# Patient Record
Sex: FEMALE | Race: WHITE | NOT HISPANIC OR LATINO | Employment: OTHER | URBAN - METROPOLITAN AREA
[De-identification: names, ages, dates, MRNs, and addresses within clinical notes are randomized per-mention and may not be internally consistent; named-entity substitution may affect disease eponyms.]

---

## 2011-12-01 LAB — EKG IMPRESSION: NORMAL

## 2017-01-24 ENCOUNTER — APPOINTMENT (OUTPATIENT)
Dept: INTERNAL MEDICINE | Facility: MEDICAL CENTER | Age: 77
End: 2017-01-24
Payer: MEDICARE

## 2017-01-24 DIAGNOSIS — M54.50 CHRONIC LEFT-SIDED LOW BACK PAIN WITHOUT SCIATICA: ICD-10-CM

## 2017-01-24 DIAGNOSIS — M25.512 CHRONIC PAIN OF BOTH SHOULDERS: ICD-10-CM

## 2017-01-24 DIAGNOSIS — Z79.899 CONTROLLED SUBSTANCE AGREEMENT SIGNED: ICD-10-CM

## 2017-01-24 DIAGNOSIS — G89.29 CHRONIC LEFT-SIDED LOW BACK PAIN WITHOUT SCIATICA: ICD-10-CM

## 2017-01-24 DIAGNOSIS — G89.29 CHRONIC PAIN OF BOTH SHOULDERS: ICD-10-CM

## 2017-01-24 DIAGNOSIS — F11.90 CHRONIC NARCOTIC USE: ICD-10-CM

## 2017-01-24 DIAGNOSIS — M25.511 CHRONIC PAIN OF BOTH SHOULDERS: ICD-10-CM

## 2017-01-25 ENCOUNTER — HOSPITAL ENCOUNTER (OUTPATIENT)
Dept: CARDIOLOGY | Facility: MEDICAL CENTER | Age: 77
End: 2017-01-25
Attending: INTERNAL MEDICINE
Payer: MEDICARE

## 2017-01-25 DIAGNOSIS — R06.09 DYSPNEA ON EXERTION: ICD-10-CM

## 2017-01-25 LAB
LV EJECT FRACT  99904: 60
LV EJECT FRACT MOD 2C 99903: 67.97
LV EJECT FRACT MOD 4C 99902: 51.48
LV EJECT FRACT MOD BP 99901: 58.06

## 2017-01-25 PROCEDURE — 93306 TTE W/DOPPLER COMPLETE: CPT

## 2017-01-25 PROCEDURE — 93306 TTE W/DOPPLER COMPLETE: CPT | Mod: 26 | Performed by: INTERNAL MEDICINE

## 2017-01-25 RX ORDER — HYDROCODONE BITARTRATE AND ACETAMINOPHEN 5; 325 MG/1; MG/1
1 TABLET ORAL 2 TIMES DAILY PRN
Qty: 60 TAB | Refills: 0 | Status: SHIPPED | OUTPATIENT
Start: 2017-01-25 | End: 2017-03-15

## 2017-01-25 NOTE — TELEPHONE ENCOUNTER
Reviewed Jesse's last note 12/16, UDS recent and . Repeat UDS negative for ambien as expected. Will refill x 60 tablets

## 2017-01-25 NOTE — TELEPHONE ENCOUNTER
Was the patient seen in the last year in this department? Yes    Last seen: 12/16/16 by Dr. Molina  Next appt: 01/31/17 with Dr. Asif      Does patient have an active prescription for medications requested? No     Received Request Via: Patient. Pt said she was unable to come today due to the access bus was unable to bring her. She r/s her appt on 01/31/2017. Also she has a heart test tomorrow. Pt is requesting a refill on her pain medication.     scanned into chart

## 2017-01-25 NOTE — TELEPHONE ENCOUNTER
Called pt phone number and no answer. Called pt's friend, Ebony and l/m. The thing pt requested is completed and its ready to be  in the front office.    Will try to call pt again later

## 2017-01-31 ENCOUNTER — OFFICE VISIT (OUTPATIENT)
Dept: INTERNAL MEDICINE | Facility: MEDICAL CENTER | Age: 77
End: 2017-01-31
Payer: MEDICARE

## 2017-01-31 VITALS
SYSTOLIC BLOOD PRESSURE: 110 MMHG | HEART RATE: 79 BPM | TEMPERATURE: 97.9 F | WEIGHT: 153.2 LBS | BODY MASS INDEX: 33.05 KG/M2 | HEIGHT: 57 IN | OXYGEN SATURATION: 95 % | DIASTOLIC BLOOD PRESSURE: 78 MMHG

## 2017-01-31 DIAGNOSIS — G89.29 CHRONIC LOW BACK PAIN WITHOUT SCIATICA, UNSPECIFIED BACK PAIN LATERALITY: ICD-10-CM

## 2017-01-31 DIAGNOSIS — M54.50 CHRONIC LOW BACK PAIN WITHOUT SCIATICA, UNSPECIFIED BACK PAIN LATERALITY: ICD-10-CM

## 2017-01-31 PROCEDURE — G8419 CALC BMI OUT NRM PARAM NOF/U: HCPCS | Mod: GC | Performed by: INTERNAL MEDICINE

## 2017-01-31 PROCEDURE — G8432 DEP SCR NOT DOC, RNG: HCPCS | Mod: GC | Performed by: INTERNAL MEDICINE

## 2017-01-31 PROCEDURE — 1036F TOBACCO NON-USER: CPT | Mod: GC | Performed by: INTERNAL MEDICINE

## 2017-01-31 PROCEDURE — 1101F PT FALLS ASSESS-DOCD LE1/YR: CPT | Mod: GC | Performed by: INTERNAL MEDICINE

## 2017-01-31 PROCEDURE — 4040F PNEUMOC VAC/ADMIN/RCVD: CPT | Mod: 8P,GC | Performed by: INTERNAL MEDICINE

## 2017-01-31 PROCEDURE — G8482 FLU IMMUNIZE ORDER/ADMIN: HCPCS | Mod: GC | Performed by: INTERNAL MEDICINE

## 2017-01-31 PROCEDURE — 99213 OFFICE O/P EST LOW 20 MIN: CPT | Mod: GE | Performed by: INTERNAL MEDICINE

## 2017-01-31 RX ORDER — KETOCONAZOLE 20 MG/ML
SHAMPOO TOPICAL
Qty: 1 BOTTLE | Refills: 3 | Status: SHIPPED | OUTPATIENT
Start: 2017-01-31 | End: 2017-10-30 | Stop reason: SDUPTHER

## 2017-01-31 ASSESSMENT — PATIENT HEALTH QUESTIONNAIRE - PHQ9: CLINICAL INTERPRETATION OF PHQ2 SCORE: 1

## 2017-01-31 NOTE — MR AVS SNAPSHOT
"        Guillermina Recio   2017 11:15 AM   Office Visit   MRN: [id number]    Department:  [facility] Med - Internal Med   Dept Phone:  [phone]    Description:  Female : 1940   Provider:  Juan Manuel Asif M.D.           Reason for Visit     Medication Refill Back pain f/u-Dr. Molina pt     Medication Refill Nizoral    Memory Loss     Results Echo      Allergies as of 2017     No Known Allergies      You were diagnosed with     Chronic low back pain without sciatica, unspecified back pain laterality   [[id number]]         Vital Signs     Blood Pressure Pulse Temperature Height Weight Body Mass Index    110/78 mmHg 79 36.6 °C (97.9 °F) 1.454 m (4' 9.25\") 69.491 kg (153 lb 3.2 oz) 32.87 kg/m2    Oxygen Saturation Smoking Status                95% Never Smoker           Basic Information     Date Of Birth Sex Race Ethnicity Preferred Language    1940 Female White Non- English      Your appointments     2017 11:15 AM   Established Patient with Juan Manuel Asif M.D.   [facility] - Internal Medicine (--)    [address] [place] [address]  [address]  [facility] [address] [phone]           You will be receiving a confirmation call a few days before your appointment from our automated call confirmation system.            2017  4:00 PM   Established Patient with CAMILA Nash / [facility] Med - Internal Medicine (--)    [address]  [address]  [facility] [id number] [phone]           You will be receiving a confirmation call a few days before your appointment from our automated call confirmation system.            Mar 15, 2017 10:40 AM   Established Patient with CAMILA Nash [facility] / [facility] - Internal Medicine (--)    [address]  [address]  [facility] [id number] [phone]           You will be receiving a confirmation call a few days before your appointment from our automated call confirmation system.           " Mar 21, 2017  2:00 PM   FOLLOW UP with Carmelo Rdz M.D.   [facility] (--)    [address]  [place] [address]   [phone]            Apr 19, 2017 10:40 AM   Established Patient with Elizabeth Molina M.D.   [facility] - Internal Medicine (--)    [address]  [address]  [place] [address]   [phone]           You will be receiving a confirmation call a few days before your appointment from our automated call confirmation system.              Problem List              ICD-10-CM Priority Class Noted - Resolved    HTN (hypertension) I10   6/25/2009 - Present    Low back pain M54.5   6/25/2009 - Present    Osteopenia M85.80   6/25/2009 - Present    Dysuria R30.0   6/25/2009 - Present    Anemia D64.9   6/25/2009 - Present    Hip pain M25.559   6/25/2009 - Present    Elbow pain M25.529   6/25/2009 - Present    VAGINAL LESION    11/10/2009 - Present    Pelvic pain    11/10/2009 - Present    Dermatochalasis H02.839   7/14/2015 - Present    Scalp itch L29.9   6/29/2016 - Present    Polypharmacy Z79.899   6/29/2016 - Present    Dyslipidemia E78.5   8/1/2016 - Present    Obesity (BMI 30-39.9) E66.9   12/16/2016 - Present    Hypercalcemia E83.52   12/16/2016 - Present    Daytime somnolence R40.0   12/16/2016 - Present    Insomnia G47.00   12/16/2016 - Present    Gastroesophageal reflux disease K21.9   12/16/2016 - Present    Vitamin D deficiency E55.9   12/16/2016 - Present    Chronic narcotic use F11.90   12/16/2016 - Present    Controlled substance agreement signed Z79.899   12/16/2016 - Present    Chronic pain of both shoulders M25.512, G89.29, M25.511   12/16/2016 - Present    Chronic left-sided low back pain without sciatica M54.5, G89.29   12/16/2016 - Present    Abnormal stress test R94.39   12/16/2016 - Present    Shortness of breath R06.02   12/16/2016 - Present      Health Maintenance        Date Due Completion Dates    IMM  DTaP/Tdap/Td Vaccine (1 - Tdap) 3/24/1957 ---    PAP SMEAR 3/24/1959 ---    IMM ZOSTER VACCINE 3/24/1998 ---    IMM PNEUMOCOCCAL 65+ (ADULT) LOW/MEDIUM RISK SERIES (1 of 2 - PCV13) 3/24/2003 ---    MAMMOGRAM 10/6/2012 10/6/2011    BONE DENSITY 4/19/2015 4/19/2010, 10/8/2008, 6/18/2007, 3/10/2006    COLONOSCOPY 1/25/2027 1/25/2017 (N/S)    Override on 1/25/2017: (N/S) (PER [facility] NO COLONOSCOPY DONE)            Current Immunizations     Influenza Vaccine Adult HD 10/31/2016 10:34 AM    Influenza Vaccine Pediatric 11/25/2008    Influenza Vaccine Quad Inj (Pf) 12/8/2015      Below and/or attached are the medications your provider expects you to take. Review all of your home medications and newly ordered medications with your provider and/or pharmacist. Follow medication instructions as directed by your provider and/or pharmacist. Please keep your medication list with you and share with your provider. Update the information when medications are discontinued, doses are changed, or new medications (including over-the-counter products) are added; and carry medication information at all times in the event of emergency situations     Allergies:  No Known Allergies          Medications  Valid as of: January 31, 2017 - 11:13 AM    Generic Name Brand Name Tablet Size Instructions for use    Acetaminophen (Tab) TYLENOL 325 MG Take 2 Tabs by mouth every 6 hours as needed.        Cholecalciferol (Tab) Vitamin D (Cholecalciferol) 1000 UNITS Take 1 Tab by mouth every day.        Ergocalciferol (Cap) DRISDOL 45177 UNITS Take 1 Cap by mouth every 7 days.        Hydrocodone-Acetaminophen (Tab) NORCO 5-325 MG Take 1 Tab by mouth 2 times a day as needed.        Ketoconazole (Shampoo) NIZORAL 2 % Apply 5 to 10 mL to wet scalp, lather, leave on 3 to 5 minutes, and rinse; apply twice weekly for 2 to 4 weeks        Lovastatin (Tab) MEVACOR 40 MG Take 1 Tab by mouth every bedtime.        Multiple Vitamins-Minerals (Cap) PRESERVISION AREDS  1 tab  po qday        Multiple Vitamins-Minerals (Tab) OCUVITE  Take 1 Tab by mouth every day.        NON SPECIFIED   OTC caffeine stimulate daily        Pantoprazole Sodium (Tablet Delayed Response) PROTONIX 20 MG Take 1 Tab by mouth every day.        .                 Medicines prescribed today were sent to:     [id number] - [place], [place] - [address]    [address] [place] [address]    Phone: [phone] Fax: [phone]    Open 24 Hours?: No      Medication refill instructions:       If your prescription bottle indicates you have medication refills left, it is not necessary to call your provider’s office. Please contact your pharmacy and they will refill your medication.    If your prescription bottle indicates you do not have any refills left, you may request refills at any time through one of the following ways: The online [employer] system (except Urgent Care), by calling your provider’s office, or by asking your pharmacy to contact your provider’s office with a refill request. Medication refills are processed only during regular business hours and may not be available until the next business day. Your provider may request additional information or to have a follow-up visit with you prior to refilling your medication.   *Please Note: Medication refills are assigned a new Rx number when refilled electronically. Your pharmacy may indicate that no refills were authorized even though a new prescription for the same medication is available at the pharmacy. Please request the medicine by name with the pharmacy before contacting your provider for a refill.        Instructions    Pain Medicine Instructions  HOW CAN PAIN MEDICINE AFFECT ME?  You were given a prescription for pain medicine. This medicine may make you tired or drowsy and may affect your ability to think clearly. Pain medicine may also affect your ability to drive or perform certain physical activities. It may not be possible to make all of your pain  go away, but you should be comfortable enough to move, breathe, and take care of yourself.  HOW OFTEN SHOULD I TAKE PAIN MEDICINE AND HOW MUCH SHOULD I TAKE?  · Take pain medicine only as directed by your health care provider and only as needed for pain.  · You do not need to take pain medicine if you are not having pain, unless directed by your health care provider.  · You can take less than the prescribed dose if you find that a smaller amount of medicine controls your pain.  WHAT RESTRICTIONS DO I HAVE WHILE TAKING PAIN MEDICINE?  Follow these instructions after you start taking pain medicine, while you are taking the medicine, and for 8 hours after you stop taking the medicine:  · Do not drive.  · Do not operate machinery.  · Do not operate power tools.  · Do not sign legal documents.  · Do not drink alcohol.  · Do not take sleeping pills.  · Do not supervise children by yourself.  · Do not participate in activities that require climbing or being in high places.  · Do not enter a body of water--such as a lake, river, ocean, spa, or swimming pool--without an adult nearby who can monitor and help you.  HOW CAN I KEEP OTHERS SAFE WHILE I AM TAKING PAIN MEDICINE?  · Store your pain medicine as directed by your health care provider. Make sure that it is placed where children and pets cannot reach it.  · Never share your pain medicine with anyone.  · Do not save any leftover pills. If you have any leftover pain medicine, get rid of it or destroy it as directed by your health care provider.  WHAT ELSE DO I NEED TO KNOW ABOUT TAKING PAIN MEDICINE?  · Use a stool softener if you become constipated from your pain medicine. Increasing your intake of fruits and vegetables will also help with constipation.  · Write down the times when you take your pain medicine. Look at the times before you take your next dose of medicine. It is easy to become confused while on pain medicine. Recording the times helps you to avoid an  overdose.  · If your pain is severe, do not try to treat it yourself by taking more pills than instructed on your prescription. Contact your health care provider for help.  · You may have been prescribed a pain medicine that contains acetaminophen. Do not take any other acetaminophen while taking this medicine. An overdose of acetaminophen can result in severe liver damage. Acetaminophen is found in many over-the-counter (OTC) and prescription medicines. If you are taking any medicines in addition to your pain medicine, check the active ingredients on those medicines to see if acetaminophen is listed.  WHEN SHOULD I CALL MY HEALTH CARE PROVIDER?  · Your medicine is not helping to make the pain go away.  · You vomit or have diarrhea shortly after taking the medicine.  · You develop new pain in areas that did not hurt before.  · You have an allergic reaction to your medicine. This may include:  ¨ Itchiness.  ¨ Swelling.  ¨ Dizziness.  ¨ Developing a new rash.  WHEN SHOULD I CALL 911 OR GO TO THE EMERGENCY ROOM?  · You feel dizzy or you faint.  · You are very confused or disoriented.  · You repeatedly vomit.  · Your skin or lips turn pale or bluish in color.  · You have shortness of breath or you are breathing much more slowly than usual.  · You have a severe allergic reaction to your medicine. This includes:  ¨ Developing tongue swelling.  ¨ Having difficulty breathing.     This information is not intended to replace advice given to you by your health care provider. Make sure you discuss any questions you have with your health care provider.     Document Released: 03/26/2002 Document Revised: 05/03/2016 Document Reviewed: 10/22/2015  [employer] Interactive Patient Education ©2016 [employer] Inc.            [employer] Access Code: [email or web address]  Expires: 3/2/2017 11:13 AM    [employer]  A secure, online tool to manage your health information     [employer]’s [employer]® is a secure, online tool that connects you to your  personalized health information from the privacy of your home -- day or night - making it very easy for you to manage your healthcare. Once the activation process is completed, you can even access your medical information using the [employer] ines, which is available for free in the Apple Ines store or Google Play store.     [employer] provides the following levels of access (as shown below):   My Chart Features   [facility] Doctor [facility]  Specialists [facility]  Urgent  Care Non-[facility]  Primary Care  Doctor   Email your healthcare team securely and privately 24/7 X X X    Manage appointments: schedule your next appointment; view details of past/upcoming appointments X      Request prescription refills. X      View recent personal medical records, including lab and immunizations X X X X   View health record, including health history, allergies, medications X X X X   Read reports about your outpatient visits, procedures, consult and ER notes X X X X   See your discharge summary, which is a recap of your hospital and/or ER visit that includes your diagnosis, lab results, and care plan. X X       How to register for [employer]:  1. Go to  [email or web address].  2. Click on the Sign Up Now box, which takes you to the New Member Sign Up page. You will need to provide the following information:  a. Enter your [employer] Access Code exactly as it appears at the top of this page. (You will not need to use this code after you’ve completed the sign-up process. If you do not sign up before the expiration date, you must request a new code.)   b. Enter your date of birth.   c. Enter your home email address.   d. Click Submit, and follow the next screen’s instructions.  3. Create a [employer] ID. This will be your [employer] login ID and cannot be changed, so think of one that is secure and easy to remember.  4. Create a [employer] password. You can change your password at any time.  5. Enter your Password Reset Question and Answer. This can  be used at a later time if you forget your password.   6. Enter your e-mail address. This allows you to receive e-mail notifications when new information is available in [employer].  7. Click Sign Up. You can now view your health information.    For assistance activating your [employer] account, call [phone]

## 2017-01-31 NOTE — PROGRESS NOTES
Established Patient    Guillermina presents today with the following:    CC: Pain med refills    HPI: 76-year-old female with chronic low back pain and shoulder pain here for more pain medication. Patient was given Norco 5/3/25/60 tablets on the 25th she is been taking the Norco but states that her back pain is not controlled on this medication. She is requesting to go up on the dosage. She states that a week ago when she was walking on snow she put her on foot forward and feels that that has worsened the pain. She has tried heating pads and they seem to be working.    Patient Active Problem List    Diagnosis Date Noted   • Obesity (BMI 30-39.9) 12/16/2016   • Hypercalcemia 12/16/2016   • Daytime somnolence 12/16/2016   • Insomnia 12/16/2016   • Gastroesophageal reflux disease 12/16/2016   • Vitamin D deficiency 12/16/2016   • Chronic narcotic use 12/16/2016   • Controlled substance agreement signed 12/16/2016   • Chronic pain of both shoulders 12/16/2016   • Chronic left-sided low back pain without sciatica 12/16/2016   • Abnormal stress test 12/16/2016   • Shortness of breath 12/16/2016   • Dyslipidemia 08/01/2016   • Scalp itch 06/29/2016   • Polypharmacy 06/29/2016   • Dermatochalasis 07/14/2015   • VAGINAL LESION 11/10/2009   • Pelvic pain 11/10/2009   • HTN (hypertension) 06/25/2009   • Low back pain 06/25/2009   • Osteopenia 06/25/2009   • Dysuria 06/25/2009   • Anemia 06/25/2009   • Hip pain 06/25/2009   • Elbow pain 06/25/2009       Current Outpatient Prescriptions   Medication Sig Dispense Refill   • hydrocodone-acetaminophen (NORCO) 5-325 MG Tab per tablet Take 1 Tab by mouth 2 times a day as needed. 60 Tab 0   • Multiple Vitamins-Minerals (OCUVITE) Tab Take 1 Tab by mouth every day.     • lovastatin (MEVACOR) 40 MG tablet Take 1 Tab by mouth every bedtime. 90 Tab 3   • acetaminophen (TYLENOL) 325 MG Tab Take 2 Tabs by mouth every 6 hours as needed.     • pantoprazole (PROTONIX) 20 MG tablet Take 1 Tab  "by mouth every day. 30 Tab 2   • vitamin D, Ergocalciferol, (DRISDOL) 85798 UNITS Cap capsule Take 1 Cap by mouth every 7 days. 12 Cap 0   • ketoconazole (NIZORAL) 2 % shampoo Apply 5 to 10 mL to wet scalp, lather, leave on 3 to 5 minutes, and rinse; apply twice weekly for 2 to 4 weeks 1 Bottle 3   • NON SPECIFIED OTC caffeine stimulate daily     • Vitamin D, Cholecalciferol, 1000 UNITS Tab Take 1 Tab by mouth every day.     • Multiple Vitamins-Minerals (PRESERVISION AREDS) Cap 1 tab po qday       No current facility-administered medications for this visit.       ROS: As per HPI.     /78 mmHg  Pulse 79  Temp(Src) 36.6 °C (97.9 °F)  Ht 1.454 m (4' 9.25\")  Wt 69.491 kg (153 lb 3.2 oz)  BMI 32.87 kg/m2  SpO2 95%    Physical Exam   Constitutional: No distress.   Cardiovascular: Normal rate, regular rhythm and normal heart sounds.  Exam reveals no gallop and no friction rub.  No murmur heard.  Pulmonary/Chest: Breath sounds normal. Chest wall is not dull to percussion.   Musculoskeletal:   no edema. Mild paraspinal muscle stiffness and tenderness in lower back left side.  Skin: No cyanosis. Nails show no clubbing.      Assessment and Plan  1. Chronic low back pain, recent worsening secondary to localized back spasm  Discussed that we will not be going up on her narcotics nor will we be refilling her pain medication before 25th of February. Advised to continue using heating pads/apply over-the-counter voltaren gel. Will avoid prescribing muscle relaxants because of her age.        Followup: Return if symptoms worsen or fail to improve.      Signed by: Juan Manuel Asif M.D.  "

## 2017-02-23 ENCOUNTER — APPOINTMENT (OUTPATIENT)
Dept: INTERNAL MEDICINE | Facility: MEDICAL CENTER | Age: 77
End: 2017-02-23
Payer: MEDICARE

## 2017-03-15 ENCOUNTER — OFFICE VISIT (OUTPATIENT)
Dept: INTERNAL MEDICINE | Facility: MEDICAL CENTER | Age: 77
End: 2017-03-15
Payer: MEDICARE

## 2017-03-15 ENCOUNTER — APPOINTMENT (OUTPATIENT)
Dept: INTERNAL MEDICINE | Facility: MEDICAL CENTER | Age: 77
End: 2017-03-15
Payer: MEDICARE

## 2017-03-15 VITALS
DIASTOLIC BLOOD PRESSURE: 80 MMHG | OXYGEN SATURATION: 94 % | SYSTOLIC BLOOD PRESSURE: 114 MMHG | WEIGHT: 154 LBS | HEART RATE: 90 BPM | HEIGHT: 57 IN | TEMPERATURE: 98.1 F | BODY MASS INDEX: 33.22 KG/M2

## 2017-03-15 DIAGNOSIS — M54.50 CHRONIC BILATERAL LOW BACK PAIN WITHOUT SCIATICA: ICD-10-CM

## 2017-03-15 DIAGNOSIS — R19.7 DIARRHEA, UNSPECIFIED TYPE: ICD-10-CM

## 2017-03-15 DIAGNOSIS — Z23 NEED FOR VACCINATION WITH 13-POLYVALENT PNEUMOCOCCAL CONJUGATE VACCINE: ICD-10-CM

## 2017-03-15 DIAGNOSIS — E55.9 VITAMIN D DEFICIENCY: ICD-10-CM

## 2017-03-15 DIAGNOSIS — J98.4 OTHER DISORDERS OF LUNG: ICD-10-CM

## 2017-03-15 DIAGNOSIS — M25.511 CHRONIC PAIN OF BOTH SHOULDERS: ICD-10-CM

## 2017-03-15 DIAGNOSIS — K21.9 GASTROESOPHAGEAL REFLUX DISEASE, ESOPHAGITIS PRESENCE NOT SPECIFIED: ICD-10-CM

## 2017-03-15 DIAGNOSIS — Z79.891 CHRONIC USE OF OPIATE DRUGS THERAPEUTIC PURPOSES: ICD-10-CM

## 2017-03-15 DIAGNOSIS — E83.52 HYPERCALCEMIA: ICD-10-CM

## 2017-03-15 DIAGNOSIS — G89.29 CHRONIC BILATERAL LOW BACK PAIN WITHOUT SCIATICA: ICD-10-CM

## 2017-03-15 DIAGNOSIS — R41.89 COGNITIVE IMPAIRMENT: ICD-10-CM

## 2017-03-15 DIAGNOSIS — Z79.899 CONTROLLED SUBSTANCE AGREEMENT SIGNED: ICD-10-CM

## 2017-03-15 DIAGNOSIS — G89.29 CHRONIC PAIN OF BOTH SHOULDERS: ICD-10-CM

## 2017-03-15 DIAGNOSIS — M25.512 CHRONIC PAIN OF BOTH SHOULDERS: ICD-10-CM

## 2017-03-15 DIAGNOSIS — R06.00 DYSPNEA, UNSPECIFIED TYPE: ICD-10-CM

## 2017-03-15 DIAGNOSIS — E78.5 DYSLIPIDEMIA: ICD-10-CM

## 2017-03-15 DIAGNOSIS — D64.9 ANEMIA, UNSPECIFIED TYPE: ICD-10-CM

## 2017-03-15 PROCEDURE — 90670 PCV13 VACCINE IM: CPT | Performed by: INTERNAL MEDICINE

## 2017-03-15 PROCEDURE — G0009 ADMIN PNEUMOCOCCAL VACCINE: HCPCS | Performed by: INTERNAL MEDICINE

## 2017-03-15 PROCEDURE — 99215 OFFICE O/P EST HI 40 MIN: CPT | Mod: 25 | Performed by: INTERNAL MEDICINE

## 2017-03-15 RX ORDER — HYDROCODONE BITARTRATE AND ACETAMINOPHEN 5; 325 MG/1; MG/1
1 TABLET ORAL 2 TIMES DAILY PRN
Qty: 60 TAB | Refills: 0 | Status: SHIPPED | OUTPATIENT
Start: 2017-03-15 | End: 2017-04-27 | Stop reason: SDUPTHER

## 2017-03-15 RX ORDER — PANTOPRAZOLE SODIUM 20 MG/1
20 TABLET, DELAYED RELEASE ORAL DAILY
Qty: 90 TAB | Refills: 3 | Status: SHIPPED | OUTPATIENT
Start: 2017-03-15 | End: 2017-04-17

## 2017-03-15 ASSESSMENT — PATIENT HEALTH QUESTIONNAIRE - PHQ9
CLINICAL INTERPRETATION OF PHQ2 SCORE: 0
SUM OF ALL RESPONSES TO PHQ QUESTIONS 1-9: 1

## 2017-03-15 NOTE — MR AVS SNAPSHOT
"        Guillermina Recio   3/15/2017 1:40 PM   Office Visit   MRN: [id number]    Department:  [facility] Med - Internal Med   Dept Phone:  [phone]    Description:  Female : 1940   Provider:  Elizabeth Molina M.D.           Reason for Visit     Memory Loss Issue. Per pt when begins to speak then she forgets    Referral Needed To help with memory    Medication Refill norco      Allergies as of 3/15/2017     No Known Allergies      You were diagnosed with     Cognitive impairment   [911575]       Chronic bilateral low back pain without sciatica   [7406150]       Chronic pain of both shoulders   [056986]       Chronic use of opiate drugs therapeutic purposes   [1223538]       Controlled substance agreement signed   [094526]       Gastroesophageal reflux disease, esophagitis presence not specified   [9064490]       Diarrhea, unspecified type   [9253126]       Vitamin D deficiency   [7262424]       Hypercalcemia   [275.42.ICD-9-CM]       Dyspnea, unspecified type   [2699908]       Other disorders of lung   [3081349]       Chronic diarrhea   [141394]       Anemia, unspecified type   [6751897]       Need for vaccination with 13-polyvalent pneumococcal conjugate vaccine   [7590661]         Vital Signs     Blood Pressure Pulse Temperature Height Weight Body Mass Index    114/80 mmHg 90 36.7 °C (98.1 °F) 1.454 m (4' 9.25\") 69.854 kg (154 lb) 33.04 kg/m2    Oxygen Saturation Smoking Status                94% Never Smoker           Basic Information     Date Of Birth Sex Race Ethnicity Preferred Language    1940 Female White Non- English      Your appointments     Mar 21, 2017  2:00 PM   FOLLOW UP with [facility] (--)    [address] [place] [address]   [phone]            2017 10:40 AM   Established Patient with CAMILA [facility] - Internal Medicine (--)    [address]  [address]  [place] [address]   [phone]           You will be receiving a confirmation call a few days before your appointment from our automated call confirmation system.            Jul 07, 2017  8:40 AM   Established Patient with Elizabeth Molina M.D.   [facility] - Internal Medicine (--)    [address]  [address]  [place] [address]   [phone]           You will be receiving a confirmation call a few days before your appointment from our automated call confirmation system.            Aug 16, 2017  2:20 PM   Established Patient with Elizabeth Molina M.D.   [facility] - Internal Medicine (--)    [address]  [address]  [place] [address]   [phone]           You will be receiving a confirmation call a few days before your appointment from our automated call confirmation system.            Sep 20, 2017  1:00 PM   Established Patient with [facility]IL[facility] - Internal Medicine (--)    [address]  [address]  [place] [address]   [phone]           You will be receiving a confirmation call a few days before your appointment from our automated call confirmation system.            Oct 18, 2017  1:40 PM   Established Patient with Elizabeth Molina M.D.   [facility] - Internal Medicine (--)    [address]  [address]  [place] [address]   [phone]           You will be receiving a confirmation call a few days before your appointment from our automated call confirmation system.              Problem List              ICD-10-CM Priority Class Noted - Resolved    HTN (hypertension) I10   6/25/2009 - Present    Low back pain M54.5   6/25/2009 - Present    Osteopenia M85.80   6/25/2009 - Present    Dysuria R30.0   6/25/2009 - Present    Anemia D64.9   6/25/2009 - Present    Hip pain M25.559   6/25/2009 - Present    Elbow pain M25.529   6/25/2009 - Present    VAGINAL LESION    11/10/2009 - Present    Pelvic  pain    11/10/2009 - Present    Dermatochalasis H02.839   7/14/2015 - Present    Scalp itch L29.9   6/29/2016 - Present    Polypharmacy Z79.899   6/29/2016 - Present    Dyslipidemia E78.5   8/1/2016 - Present    Obesity (BMI 30-39.9) E66.9   12/16/2016 - Present    Hypercalcemia E83.52   12/16/2016 - Present    Daytime somnolence R40.0   12/16/2016 - Present    Insomnia G47.00   12/16/2016 - Present    Gastroesophageal reflux disease K21.9   12/16/2016 - Present    Vitamin D deficiency E55.9   12/16/2016 - Present    Chronic narcotic use F11.90   12/16/2016 - Present    Controlled substance agreement signed Z79.899   12/16/2016 - Present    Chronic pain of both shoulders M25.512, G89.29, M25.511   12/16/2016 - Present    Chronic left-sided low back pain without sciatica M54.5, G89.29   12/16/2016 - Present    Abnormal stress test R94.39   12/16/2016 - Present    Shortness of breath R06.02   12/16/2016 - Present      Health Maintenance        Date Due Completion Dates    IMM DTaP/Tdap/Td Vaccine (1 - Tdap) 3/24/1957 ---    PAP SMEAR 3/24/1959 ---    IMM ZOSTER VACCINE 3/24/1998 ---    IMM PNEUMOCOCCAL 65+ (ADULT) LOW/MEDIUM RISK SERIES (1 of 2 - PCV13) 3/24/2003 ---    MAMMOGRAM 10/6/2012 10/6/2011    BONE DENSITY 4/19/2015 4/19/2010, 10/8/2008, 6/18/2007, 3/10/2006    COLONOSCOPY 1/25/2027 1/25/2017 (N/S)    Override on 1/25/2017: (N/S) (PER [facility] NO COLONOSCOPY DONE)            Current Immunizations     13-VALENT PCV PREVNAR 3/15/2017  2:58 PM    Influenza Vaccine Adult HD 10/31/2016 10:34 AM    Influenza Vaccine Pediatric 11/25/2008    Influenza Vaccine Quad Inj (Pf) 12/8/2015      Below and/or attached are the medications your provider expects you to take. Review all of your home medications and newly ordered medications with your provider and/or pharmacist. Follow medication instructions as directed by your provider and/or pharmacist. Please keep your medication list with you and share with your provider. Update  the information when medications are discontinued, doses are changed, or new medications (including over-the-counter products) are added; and carry medication information at all times in the event of emergency situations     Allergies:  No Known Allergies          Medications  Valid as of: March 15, 2017 -  3:01 PM    Generic Name Brand Name Tablet Size Instructions for use    Acetaminophen (Tab) TYLENOL 325 MG Take 2 Tabs by mouth every 6 hours as needed.        Cholecalciferol (Tab) Vitamin D (Cholecalciferol) 1000 UNITS Take 1 Tab by mouth every day.        Hydrocodone-Acetaminophen (Tab) NORCO 5-325 MG Take 1 Tab by mouth 2 times a day as needed.        Ketoconazole (Shampoo) NIZORAL 2 % Apply 5 to 10 mL to wet scalp, lather, leave on 3 to 5 minutes, and rinse; apply twice weekly for 2 to 4 weeks        Lovastatin (Tab) MEVACOR 40 MG Take 1 Tab by mouth every bedtime.        Multiple Vitamins-Minerals (Cap) PRESERVISION AREDS  1 tab po qday        Multiple Vitamins-Minerals (Tab) OCUVITE  Take 1 Tab by mouth every day.        Multiple Vitamins-Minerals   Take  by mouth every day.        NON SPECIFIED   OTC caffeine stimulate daily        Pantoprazole Sodium (Tablet Delayed Response) PROTONIX 20 MG Take 1 Tab by mouth every day.        .                 Medicines prescribed today were sent to:     [id number] - [place] - [address]    [address] [place] [address]    Phone: [phone] Fax: [phone]    Open 24 Hours?: No      Medication refill instructions:       If your prescription bottle indicates you have medication refills left, it is not necessary to call your provider’s office. Please contact your pharmacy and they will refill your medication.    If your prescription bottle indicates you do not have any refills left, you may request refills at any time through one of the following ways: The online [employer] system (except Urgent Care), by calling your provider’s office, or by asking  your pharmacy to contact your provider’s office with a refill request. Medication refills are processed only during regular business hours and may not be available until the next business day. Your provider may request additional information or to have a follow-up visit with you prior to refilling your medication.   *Please Note: Medication refills are assigned a new Rx number when refilled electronically. Your pharmacy may indicate that no refills were authorized even though a new prescription for the same medication is available at the pharmacy. Please request the medicine by name with the pharmacy before contacting your provider for a refill.        Your To Do List     Future Labs/Procedures Complete By Expires    BASIC METABOLIC PANEL  As directed 3/16/2018    CBC WITH DIFFERENTIAL  As directed 3/16/2018    DX-LUMBAR SPINE-2 OR 3 VIEWS  As directed 3/15/2018    VITAMIN D,25 HYDROXY  As directed 3/16/2018      Referral     A referral request has been sent to our patient care coordination department. Please allow 3-5 business days for us to process this request and contact you either by phone or mail. If you do not hear from us by the 5th business day, please call us at [phone].        Instructions    Labs.      Back Xray.     Get lung function test.      See GI.    [facility]  [address] [facility] [address] [phone]                            Have your friend come to next appt.  Come back in 1 month - come back sooner if needed.     Make appt with derm  [facility]    [address] [place] [address] [phone]                          If you want, get memory tested.               [facility]    [address] [place]  [facility] [address] [phone]                             MyChart Status: Patient Declined

## 2017-03-15 NOTE — PATIENT INSTRUCTIONS
Labs.      Back Xray.     Get lung function test.      See GI.    [facility]  [address] [facility] [place] [address] [phone]                            Have your friend come to next appt.  Come back in 1 month - come back sooner if needed.     Make appt with [facility]  [facility]    [facility] [place] [address] [phone]                          If you want, get memory tested.               [facility]    [address] [place]  [facility] [address] [phone]
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
## 2017-04-17 RX ORDER — PANTOPRAZOLE SODIUM 20 MG/1
TABLET, DELAYED RELEASE ORAL
Qty: 90 TAB | Refills: 3 | Status: SHIPPED | OUTPATIENT
Start: 2017-04-17 | End: 2018-07-08 | Stop reason: SDUPTHER

## 2017-04-17 NOTE — TELEPHONE ENCOUNTER
Last seen: 3/15/17 by Dr. Molina  Next appt: 4/19/17 with Dr. Molina    Was the patient seen in the last year in this department? Yes   Does patient have an active prescription for medications requested? No   Received Request Via: Pharmacy

## 2017-04-19 ENCOUNTER — APPOINTMENT (OUTPATIENT)
Dept: INTERNAL MEDICINE | Facility: MEDICAL CENTER | Age: 77
End: 2017-04-19
Payer: MEDICARE

## 2017-04-27 ENCOUNTER — OFFICE VISIT (OUTPATIENT)
Dept: INTERNAL MEDICINE | Facility: MEDICAL CENTER | Age: 77
End: 2017-04-27
Payer: MEDICARE

## 2017-04-27 ENCOUNTER — TELEPHONE (OUTPATIENT)
Dept: INTERNAL MEDICINE | Facility: MEDICAL CENTER | Age: 77
End: 2017-04-27

## 2017-04-27 VITALS
HEART RATE: 85 BPM | OXYGEN SATURATION: 90 % | DIASTOLIC BLOOD PRESSURE: 74 MMHG | TEMPERATURE: 97.8 F | WEIGHT: 153 LBS | SYSTOLIC BLOOD PRESSURE: 102 MMHG | BODY MASS INDEX: 33.01 KG/M2 | HEIGHT: 57 IN

## 2017-04-27 DIAGNOSIS — K21.9 GASTROESOPHAGEAL REFLUX DISEASE, ESOPHAGITIS PRESENCE NOT SPECIFIED: ICD-10-CM

## 2017-04-27 DIAGNOSIS — G89.29 CHRONIC BILATERAL LOW BACK PAIN WITHOUT SCIATICA: ICD-10-CM

## 2017-04-27 DIAGNOSIS — M25.511 CHRONIC PAIN OF BOTH SHOULDERS: ICD-10-CM

## 2017-04-27 DIAGNOSIS — M25.512 CHRONIC PAIN OF BOTH SHOULDERS: ICD-10-CM

## 2017-04-27 DIAGNOSIS — E78.5 DYSLIPIDEMIA: ICD-10-CM

## 2017-04-27 DIAGNOSIS — G89.29 CHRONIC PAIN OF BOTH SHOULDERS: ICD-10-CM

## 2017-04-27 DIAGNOSIS — M54.50 CHRONIC BILATERAL LOW BACK PAIN WITHOUT SCIATICA: ICD-10-CM

## 2017-04-27 DIAGNOSIS — Z79.891 CHRONIC USE OF OPIATE DRUGS THERAPEUTIC PURPOSES: ICD-10-CM

## 2017-04-27 DIAGNOSIS — Z79.899 CONTROLLED SUBSTANCE AGREEMENT SIGNED: ICD-10-CM

## 2017-04-27 PROCEDURE — 99214 OFFICE O/P EST MOD 30 MIN: CPT | Performed by: INTERNAL MEDICINE

## 2017-04-27 RX ORDER — HYDROCODONE BITARTRATE AND ACETAMINOPHEN 5; 325 MG/1; MG/1
1 TABLET ORAL 2 TIMES DAILY PRN
Qty: 60 TAB | Refills: 0 | Status: SHIPPED | OUTPATIENT
Start: 2017-04-27 | End: 2017-06-26 | Stop reason: SDUPTHER

## 2017-04-27 ASSESSMENT — ENCOUNTER SYMPTOMS: DEPRESSION: 0

## 2017-04-27 ASSESSMENT — LIFESTYLE VARIABLES: HISTORY_ALCOHOL_USE: 0
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
## 2017-04-27 NOTE — MR AVS SNAPSHOT
"Guillermina Recio   2017 3:20 PM   Office Visit   MRN: [id number]    Department:  [facility] - Internal Med   Dept Phone:  [phone]    Description:  Female : 1940   Provider:  Elizabeth Molina M.D.           Reason for Visit     Medication Refill Norco      Allergies as of 2017     No Known Allergies      You were diagnosed with     Chronic use of opiate drugs therapeutic purposes   [9778132]       Chronic pain of both shoulders   [120250]       Controlled substance agreement signed   [[id number]]       Chronic bilateral low back pain without sciatica   [6678401]         Vital Signs     Blood Pressure Pulse Temperature Height Weight Body Mass Index    102/74 mmHg 85 36.6 °C (97.8 °F) 1.454 m (4' 9.24\") 69.4 kg (153 lb) 32.83 kg/m2    Oxygen Saturation Smoking Status                90% Never Smoker           Basic Information     Date Of Birth Sex Race Ethnicity Preferred Language    1940 Female White Non- English      Your appointments     May 04, 2017  2:00 PM   Pulmonary Function Test with PULMONARY FUNCTION LAB   PULMONARY LAB OP [facility] (--)    [address] [facility] [id number] [phone]            2017  8:40 AM   Established Patient with CAMILA Nash / [facility] - Internal Medicine (--)    [address] [place] [address] [phone]           You will be receiving a confirmation call a few days before your appointment from our automated call confirmation system.            Aug 16, 2017  2:20 PM   Established Patient with CAMILA Nash / [facility] - Internal [facility] (--)    [address] [place] [address] [phone]           You will be receiving a confirmation call a few days before your appointment from our automated call confirmation system.            Sep 20, 2017  1:00 PM   Established Patient with CAMILA Nash / [facility] - Internal [facility] " (--)    [address]  [address]  [facility] [address]   [phone]           You will be receiving a confirmation call a few days before your appointment from our automated call confirmation system.            Oct 18, 2017  1:40 PM   Established Patient with Elizabeth Molina M.D.   [facility] Med - Internal Medicine (--)    [address]  [address]  [facility] [id number] [phone]           You will be receiving a confirmation call a few days before your appointment from our automated call confirmation system.              Problem List              ICD-10-CM Priority Class Noted - Resolved    HTN (hypertension) I10   6/25/2009 - Present    Low back pain M54.5   6/25/2009 - Present    Osteopenia M85.80   6/25/2009 - Present    Dysuria R30.0   6/25/2009 - Present    Anemia D64.9   6/25/2009 - Present    Hip pain M25.559   6/25/2009 - Present    Elbow pain M25.529   6/25/2009 - Present    VAGINAL LESION    11/10/2009 - Present    Pelvic pain    11/10/2009 - Present    Dermatochalasis H02.839   7/14/2015 - Present    Scalp itch L29.9   6/29/2016 - Present    Polypharmacy Z79.899   6/29/2016 - Present    Dyslipidemia E78.5   8/1/2016 - Present    Obesity (BMI 30-39.9) E66.9   12/16/2016 - Present    Hypercalcemia E83.52   12/16/2016 - Present    Daytime somnolence R40.0   12/16/2016 - Present    Insomnia G47.00   12/16/2016 - Present    Gastroesophageal reflux disease K21.9   12/16/2016 - Present    Vitamin D deficiency E55.9   12/16/2016 - Present    Chronic narcotic use F11.90   12/16/2016 - Present    Controlled substance agreement signed Z79.899   12/16/2016 - Present    Chronic pain of both shoulders M25.512, G89.29, M25.511   12/16/2016 - Present    Chronic left-sided low back pain without sciatica M54.5, G89.29   12/16/2016 - Present    Abnormal stress test R94.39   12/16/2016 - Present    Shortness of breath R06.02   12/16/2016 - Present      Health Maintenance        Date Due Completion  Dates    IMM DTaP/Tdap/Td Vaccine (1 - Tdap) 3/24/1957 ---    PAP SMEAR 3/24/1959 ---    IMM ZOSTER VACCINE 3/24/1998 ---    MAMMOGRAM 10/6/2012 10/6/2011    BONE DENSITY 4/19/2015 4/19/2010, 10/8/2008, 6/18/2007, 3/10/2006    IMM PNEUMOCOCCAL 65+ (ADULT) LOW/MEDIUM RISK SERIES (2 of 2 - PPSV23) 3/15/2018 3/15/2017    COLONOSCOPY 1/25/2027 1/25/2017 (N/S)    Override on 1/25/2017: (N/S) (PER [facility] NO COLONOSCOPY DONE)            Current Immunizations     13-VALENT PCV PREVNAR 3/15/2017  2:58 PM    Influenza Vaccine Adult HD 10/31/2016 10:34 AM    Influenza Vaccine Pediatric 11/25/2008    Influenza Vaccine Quad Inj (Pf) 12/8/2015      Below and/or attached are the medications your provider expects you to take. Review all of your home medications and newly ordered medications with your provider and/or pharmacist. Follow medication instructions as directed by your provider and/or pharmacist. Please keep your medication list with you and share with your provider. Update the information when medications are discontinued, doses are changed, or new medications (including over-the-counter products) are added; and carry medication information at all times in the event of emergency situations     Allergies:  No Known Allergies          Medications  Valid as of: April 27, 2017 -  4:10 PM    Generic Name Brand Name Tablet Size Instructions for use    Acetaminophen (Tab) TYLENOL 325 MG Take 2 Tabs by mouth every 6 hours as needed.        Cholecalciferol (Tab) Vitamin D (Cholecalciferol) 1000 UNITS Take 1 Tab by mouth every day.        Hydrocodone-Acetaminophen (Tab) NORCO 5-325 MG Take 1 Tab by mouth 2 times a day as needed.        Ketoconazole (Shampoo) NIZORAL 2 % Apply 5 to 10 mL to wet scalp, lather, leave on 3 to 5 minutes, and rinse; apply twice weekly for 2 to 4 weeks        Lovastatin (Tab) MEVACOR 40 MG Take 1 Tab by mouth every bedtime.        Multiple Vitamins-Minerals (Cap) PRESERVISION AREDS  1 tab po qday         Multiple Vitamins-Minerals (Tab) OCUVITE  Take 1 Tab by mouth every day.        Multiple Vitamins-Minerals   Take  by mouth every day.        NON SPECIFIED   OTC caffeine stimulate daily        Pantoprazole Sodium (Tablet Delayed Response) PROTONIX 20 MG TAKE 1 TABLET BY MOUTH EVERY DAY        .                 Medicines prescribed today were sent to:     [facility]Y #[id number] - [place], [place] - [address] [facility]    [address] [facility] [place] [address]    Phone: [phone] Fax: [phone]    Open 24 Hours?: No      Medication refill instructions:       If your prescription bottle indicates you have medication refills left, it is not necessary to call your provider’s office. Please contact your pharmacy and they will refill your medication.    If your prescription bottle indicates you do not have any refills left, you may request refills at any time through one of the following ways: The online [employer] system (except Urgent Care), by calling your provider’s office, or by asking your pharmacy to contact your provider’s office with a refill request. Medication refills are processed only during regular business hours and may not be available until the next business day. Your provider may request additional information or to have a follow-up visit with you prior to refilling your medication.   *Please Note: Medication refills are assigned a new Rx number when refilled electronically. Your pharmacy may indicate that no refills were authorized even though a new prescription for the same medication is available at the pharmacy. Please request the medicine by name with the pharmacy before contacting your provider for a refill.        Instructions    Labs.      Back Xray.     Get lung function test.  Already scheduled.    See GI.  [facility]  [address] [place] [address] [phone]    See dermatology:   [facility]    [facility] [place] [address] [phone]       Get memory testing with the following person:   SHARON ROD    [address] [place] [address] [phone]     Have your friend come to next appt.  Come back in 1 month - come back sooner if needed.             MyChart Status: Patient Declined

## 2017-04-27 NOTE — PATIENT INSTRUCTIONS
Labs.      Back Xray.     Get lung function test.  Already scheduled.    See GI.  [facility]  [address] [facility]  [place] [address] [phone]    See dermatology:   [facility]    [facility] [place] [address] [phone]      Get memory testing with the following person:   SHARON ROD    [address] [place] [address] [phone]     Have your friend come to next appt.  Come back in 1 month - come back sooner if needed.

## 2017-04-28 NOTE — TELEPHONE ENCOUNTER
Pt called says she has not called for an appt for cardiology will put it at the top of her list to get it done

## 2017-04-28 NOTE — TELEPHONE ENCOUNTER
Clair - please remind pt to see cards and give her info.   She needs to do that for possible abnormal stress test if she asks.    Forgot to tell her to do it today along with everything else  [have told her in past and placed referral]

## 2017-05-04 ENCOUNTER — HOSPITAL ENCOUNTER (OUTPATIENT)
Dept: OTHER | Facility: MEDICAL CENTER | Age: 77
End: 2017-05-04
Attending: INTERNAL MEDICINE
Payer: MEDICARE

## 2017-05-04 DIAGNOSIS — R06.00 DYSPNEA, UNSPECIFIED TYPE: ICD-10-CM

## 2017-05-04 PROCEDURE — 94726 PLETHYSMOGRAPHY LUNG VOLUMES: CPT

## 2017-05-04 PROCEDURE — 94060 EVALUATION OF WHEEZING: CPT

## 2017-05-04 PROCEDURE — 94726 PLETHYSMOGRAPHY LUNG VOLUMES: CPT | Mod: 26 | Performed by: INTERNAL MEDICINE

## 2017-05-04 PROCEDURE — 94729 DIFFUSING CAPACITY: CPT

## 2017-05-04 PROCEDURE — 94060 EVALUATION OF WHEEZING: CPT | Mod: 26 | Performed by: INTERNAL MEDICINE

## 2017-05-04 PROCEDURE — 94729 DIFFUSING CAPACITY: CPT | Mod: 26 | Performed by: INTERNAL MEDICINE

## 2017-05-04 ASSESSMENT — PULMONARY FUNCTION TESTS
FEV1_PERCENT_CHANGE: 2
FVC_PERCENT_PREDICTED: 90
FEV1_PERCENT_CHANGE: 5
FEV1/FVC_PERCENT_PREDICTED: 113
FEV1: 1.34
FEV1: 1.4
FVC: 1.66
FEV1/FVC: 82
FEV1/FVC: 84.34
FVC_PREDICTED: 1.81
FEV1_PERCENT_PREDICTED: 102
FVC_PERCENT_PREDICTED: 92
FVC: 1.63
FEV1_PERCENT_PREDICTED: 106
FEV1_PREDICTED: 1.32
FEV1/FVC_PERCENT_CHANGE: 250
FEV1/FVC_PERCENT_PREDICTED: 115
FEV1/FVC_PERCENT_PREDICTED: 73

## 2017-05-08 NOTE — PROCEDURES
DATE OF SERVICE:  05/04/2017    COMMENT:  The patient had good effort and cooperation.  The result of the   tests meet the ATS standards for acceptability and repeatability.    SPIROMETRY:  1.  FVC was 1.63 liters, 90% of predicted.  2.  FEV1 was 1.34 liters, 102% of predicted.  3.  FEV1/FVC ratio was 82%.  4.  There was no significant response to bronchodilators.  5.  Flow-volume loop was normal in shape and size.    LUNG VOLUMES:  1.  TLC was 89% of predicted.  2.  Residual volume was 87% of predicted.    Diffusion capacity was mildly decreased at 68% of predicted.    IMPRESSION:  Other than mild-to-moderate decrease in diffusion capacity, the   patient had normal pulmonary function test.  Clinical correlation is required.       ____________________________________     MD PHILOMENA Maher / RONN    DD:  05/07/2017 13:35:30  DT:  05/07/2017 17:40:32    D#:  [id number]  Job#:  [id number]

## 2017-05-31 ENCOUNTER — OFFICE VISIT (OUTPATIENT)
Dept: CARDIOLOGY | Facility: MEDICAL CENTER | Age: 77
End: 2017-05-31
Payer: MEDICARE

## 2017-05-31 VITALS
HEIGHT: 57 IN | HEART RATE: 78 BPM | DIASTOLIC BLOOD PRESSURE: 70 MMHG | BODY MASS INDEX: 33.22 KG/M2 | SYSTOLIC BLOOD PRESSURE: 102 MMHG | OXYGEN SATURATION: 90 % | WEIGHT: 154 LBS

## 2017-05-31 DIAGNOSIS — E78.5 DYSLIPIDEMIA: ICD-10-CM

## 2017-05-31 DIAGNOSIS — I10 ESSENTIAL HYPERTENSION: ICD-10-CM

## 2017-05-31 DIAGNOSIS — R07.89 CHEST DISCOMFORT: ICD-10-CM

## 2017-05-31 PROCEDURE — 1036F TOBACCO NON-USER: CPT | Performed by: NURSE PRACTITIONER

## 2017-05-31 PROCEDURE — G8432 DEP SCR NOT DOC, RNG: HCPCS | Performed by: NURSE PRACTITIONER

## 2017-05-31 PROCEDURE — G8419 CALC BMI OUT NRM PARAM NOF/U: HCPCS | Performed by: NURSE PRACTITIONER

## 2017-05-31 PROCEDURE — 1101F PT FALLS ASSESS-DOCD LE1/YR: CPT | Performed by: NURSE PRACTITIONER

## 2017-05-31 PROCEDURE — 4040F PNEUMOC VAC/ADMIN/RCVD: CPT | Performed by: NURSE PRACTITIONER

## 2017-05-31 PROCEDURE — 99214 OFFICE O/P EST MOD 30 MIN: CPT | Performed by: NURSE PRACTITIONER

## 2017-05-31 ASSESSMENT — ENCOUNTER SYMPTOMS
DOUBLE VISION: 0
ORTHOPNEA: 0
FOCAL WEAKNESS: 0
SHORTNESS OF BREATH: 1
COUGH: 0
DIZZINESS: 0
BLURRED VISION: 0
LOSS OF CONSCIOUSNESS: 0
WEAKNESS: 0
FALLS: 0
WHEEZING: 0
HEADACHES: 0
PALPITATIONS: 0

## 2017-05-31 NOTE — PROGRESS NOTES
Subjective:   Guillermina Recio is a 77 y.o. female who presents today for follow up hyperlipidemia and hypertension.     She is patient of Dr. Rdz in our clinic, HX: Hyperlipidemia and hypertension.    Patient was last seen Dec 2016 and complain of KHOURY. ECHO was normal.    Patient has been having chest discomfort on the left lateral side. She it feels like dull aching pain and lasts about couple seconds then resolves. It gets worse during exertion.     She has had no episodes of  palpitations, dizziness/lightheadedness, orthopnea, or peripheral edema. She has KHOURY on exertion which is chronic and has underlying moderate lung disease.    She is very active with her [employer] group and her grandchildren.      Past Medical History   Diagnosis Date   • History of total hip replacement      L   • Hyperlipidemia    • GERD (gastroesophageal reflux disease)    • Hiatus hernia syndrome    • Hepatitis 1970's     A based on labs done 2016   • Wears dentures    • S/P cataract extraction      bilat   • OA (osteoarthritis)      back, hip, shoulds, knees and hands   • Macular degeneration    • Dyslipidemia 8/1/2016     Past Surgical History   Procedure Laterality Date   • Other orthopedic surgery       left hip replacement   • Cataract phaco with iol  2/28/2012     Performed by ANTONIO VERMA at SURGERY [facility] DAY [facility] ORS   • Cataract phaco with iol  3/13/2012     Performed by ANTONIO VERMA at SURGERY [facility] DAY [facility] ORS   • Tubal ligation     • Hysterectomy, total abdominal     • Blepharoplasty Bilateral 7/14/2015     Procedure: BLEPHAROPLASTY - UPPER;  Surgeon: Antonio Verma M.D.;  Location: SURGERY SURG[facility]S;  Service:      Family History   Problem Relation Age of Onset   • Heart Disease     • Cancer       History   Smoking status   • Never Smoker    Smokeless tobacco   • Never Used     No Known Allergies  Outpatient Encounter Prescriptions as of 5/31/2017   Medication Sig Dispense Refill   • aspirin 81 MG  "tablet Take 81 mg by mouth every day.     • hydrocodone-acetaminophen (NORCO) 5-325 MG Tab per tablet Take 1 Tab by mouth 2 times a day as needed. (Patient taking differently: Take 1-2 Tabs by mouth 2 times a day as needed.) 60 Tab 0   • pantoprazole (PROTONIX) 20 MG tablet TAKE 1 TABLET BY MOUTH EVERY DAY 90 Tab 3   • Multiple Vitamins-Minerals (CENTRUM SILVER PO) Take  by mouth every day.     • ketoconazole (NIZORAL) 2 % shampoo Apply 5 to 10 mL to wet scalp, lather, leave on 3 to 5 minutes, and rinse; apply twice weekly for 2 to 4 weeks 1 Bottle 3   • Vitamin D, Cholecalciferol, 1000 UNITS Tab Take 1 Tab by mouth every day.     • lovastatin (MEVACOR) 40 MG tablet Take 1 Tab by mouth every bedtime. 90 Tab 3   • acetaminophen (TYLENOL) 325 MG Tab Take 2 Tabs by mouth every 6 hours as needed.     • NON SPECIFIED OTC caffeine stimulate daily     • [DISCONTINUED] Multiple Vitamins-Minerals (OCUVITE) Tab Take 1 Tab by mouth every day.     • [DISCONTINUED] Multiple Vitamins-Minerals (PRESERVISION AREDS) Cap 1 tab po qday       No facility-administered encounter medications on file as of 5/31/2017.     Review of Systems   Constitutional: Negative for malaise/fatigue.   Eyes: Negative for blurred vision and double vision.   Respiratory: Positive for shortness of breath. Negative for cough and wheezing.    Cardiovascular: Positive for chest pain. Negative for palpitations, orthopnea and leg swelling.   Musculoskeletal: Negative for falls.   Neurological: Negative for dizziness, focal weakness, loss of consciousness, weakness and headaches.   All other systems reviewed and are negative.       Objective:   /70 mmHg  Pulse 78  Ht 1.454 m (4' 9.24\")  Wt 69.854 kg (154 lb)  BMI 33.04 kg/m2  SpO2 90%    Physical Exam   Constitutional: She is oriented to person, place, and time. She appears well-developed and well-nourished.   Using cane   HENT:   Head: Normocephalic.   Neck: Normal range of motion. No JVD present. "   Cardiovascular: Normal rate, regular rhythm and normal heart sounds.    No murmur heard.  Pulmonary/Chest: Effort normal and breath sounds normal. No respiratory distress. She has no wheezes.   Abdominal: Bowel sounds are normal.   Musculoskeletal: She exhibits no edema or tenderness.   Neurological: She is alert and oriented to person, place, and time.   Skin: Skin is warm and dry.   Psychiatric: Her behavior is normal. Judgment normal.   Nursing note and vitals reviewed.        Echocardiography Laboratory  1/25/2017  Prior study is available for comparison, 11/28/2011.   Normal left ventricular systolic function.   Normal right ventricular systolic function.   No evidence of valvular abnormality based on Doppler evaluation.   Compared to the images of the prior study done -  there has been no   significant change.     PFT  5/17/2017  IMPRESSION:  Other than mild-to-moderate decrease in diffusion capacity, the    patient had normal pulmonary function test.  Clinical correlation is required.    Myocardial Perfusion   11/9/2016   Normal left ventricular size, ejection fraction, and wall motion.   Tiny fixed defect in the anterolateral wall at the base, worse on stress    images could relate to artifact.     No definite reversible defect.    Assessment:     1. Chest discomfort  PET SCAN MYOCARDIAL PERFUSION    NM-CARDIAC PET   2. Dyslipidemia     3. Essential hypertension         Medical Decision Making:  Today's Assessment / Status / Plan:     1. Chest discomfort:  - NM showed possible tiny fixed defect anterolateral wall at the base. Follow up with PET myocardial perfusion scan to further evaluate the chest discomfort.   - continue to take asa 81mg and lovastatin 40mg q evening.    2. Dyslipidemia:  - continue to take lovastatin 40mg qd  - encourage diet modification     3. Hypertension:  - controlled with exercise.     Follow up in 1 month after PET myocardial perfusion scan. Sooner as needed.    Collaborating  Provider: Dr. Ferrari

## 2017-05-31 NOTE — MR AVS SNAPSHOT
"Guillermina Recio   2017 4:00 PM   Office Visit   MRN: [id number]    Department:  [facility]   Dept Phone:  [phone]    Description:  Female : 1940   Provider:  MEDINA Barboza           Reason for Visit     Follow-Up Previous patient      Allergies as of 2017     No Known Allergies      You were diagnosed with     Chest discomfort   [537341]       Dyslipidemia   [282072]       Essential hypertension   [0629785]         Vital Signs     Blood Pressure Pulse Height Weight Body Mass Index Oxygen Saturation    102/70 mmHg 78 1.454 m (4' 9.24\") 69.854 kg (154 lb) 33.04 kg/m2 90%    Smoking Status                   Never Smoker            Basic Information     Date Of Birth Sex Race Ethnicity Preferred Language    1940 Female White Non- English      Your appointments     2017  3:20 PM   [facility] (--[facility]    [address] [place] [address]  [address]  [facility] [address] [phone]           You will be receiving a confirmation call a few days before your appointment from our automated call confirmation system.            2017  1:00 PM   Nm 58 with [facility] NM CARDIAC PET   [facility]    [address] [facility] [id number] [phone]            2017  2:00 PM   FOLLOW UP with MEDINA Barboza   [facility] (--)    [address] [place] [address]  [facility] [id number] [phone]            2017  8:40 AM   [facility] (--)    [address] [place] [address]  [address]  [facility] [id number] [phone]           You will be receiving a confirmation call a few days before your appointment from our automated call confirmation system.            Aug 16, 2017  2:20 PM   Established Patient with Elizabeth Mloina" M.D.   [facility] Med - Internal Medicine (--)    [address]  [address]  [facility] [address]   [phone]           You will be receiving a confirmation call a few days before your appointment from our automated call confirmation system.            Sep 20, 2017  1:00 PM   Established Patient with Elizabeth Molina M.D.   [facility] - Internal Medicine (--)    [address]  [address]  [facility] [id number] [phone]           You will be receiving a confirmation call a few days before your appointment from our automated call confirmation system.            Oct 18, 2017  1:40 PM   Established Patient with Elizabeth Molina M.D.   [facility] - Internal Medicine (--)    [address]  [address]  [facility] [id number] [phone]           You will be receiving a confirmation call a few days before your appointment from our automated call confirmation system.              Problem List              ICD-10-CM Priority Class Noted - Resolved    HTN (hypertension) I10   6/25/2009 - Present    Low back pain M54.5   6/25/2009 - Present    Osteopenia M85.80   6/25/2009 - Present    Dysuria R30.0   6/25/2009 - Present    Anemia D64.9   6/25/2009 - Present    Hip pain M25.559   6/25/2009 - Present    Elbow pain M25.529   6/25/2009 - Present    VAGINAL LESION    11/10/2009 - Present    Pelvic pain    11/10/2009 - Present    Dermatochalasis H02.839   7/14/2015 - Present    Scalp itch L29.9   6/29/2016 - Present    Polypharmacy Z79.899   6/29/2016 - Present    Dyslipidemia E78.5   8/1/2016 - Present    Obesity (BMI 30-39.9) E66.9   12/16/2016 - Present    Hypercalcemia E83.52   12/16/2016 - Present    Daytime somnolence R40.0   12/16/2016 - Present    Insomnia G47.00   12/16/2016 - Present    Gastroesophageal reflux disease K21.9   12/16/2016 - Present    Vitamin D deficiency E55.9   12/16/2016 - Present    Chronic narcotic use F11.90   12/16/2016 - Present     Controlled substance agreement signed Z79.899   12/16/2016 - Present    Chronic pain of both shoulders M25.512, G89.29, M25.511   12/16/2016 - Present    Chronic left-sided low back pain without sciatica M54.5, G89.29   12/16/2016 - Present    Abnormal stress test R94.39   12/16/2016 - Present    Shortness of breath R06.02   12/16/2016 - Present      Health Maintenance        Date Due Completion Dates    IMM DTaP/Tdap/Td Vaccine (1 - Tdap) 3/24/1957 ---    PAP SMEAR 3/24/1959 ---    IMM ZOSTER VACCINE 3/24/1998 ---    MAMMOGRAM 10/6/2012 10/6/2011    BONE DENSITY 4/19/2015 4/19/2010, 10/8/2008, 6/18/2007, 3/10/2006    IMM PNEUMOCOCCAL 65+ (ADULT) LOW/MEDIUM RISK SERIES (2 of 2 - PPSV23) 3/15/2018 3/15/2017    COLONOSCOPY 1/25/2027 1/25/2017 (N/S)    Override on 1/25/2017: (N/S) (PER [facility] NO COLONOSCOPY DONE)            Current Immunizations     13-VALENT PCV PREVNAR 3/15/2017  2:58 PM    Influenza Vaccine Adult HD 10/31/2016 10:34 AM    Influenza Vaccine Pediatric 11/25/2008    Influenza Vaccine Quad Inj (Pf) 12/8/2015      Below and/or attached are the medications your provider expects you to take. Review all of your home medications and newly ordered medications with your provider and/or pharmacist. Follow medication instructions as directed by your provider and/or pharmacist. Please keep your medication list with you and share with your provider. Update the information when medications are discontinued, doses are changed, or new medications (including over-the-counter products) are added; and carry medication information at all times in the event of emergency situations     Allergies:  No Known Allergies          Medications  Valid as of: May 31, 2017 -  4:20 PM    Generic Name Brand Name Tablet Size Instructions for use    Acetaminophen (Tab) TYLENOL 325 MG Take 2 Tabs by mouth every 6 hours as needed.        Aspirin (Tab) aspirin 81 MG Take 81 mg by mouth every day.        Cholecalciferol (Tab) Vitamin D  (Cholecalciferol) 1000 UNITS Take 1 Tab by mouth every day.        Hydrocodone-Acetaminophen (Tab) NORCO 5-325 MG Take 1 Tab by mouth 2 times a day as needed.        Ketoconazole (Shampoo) NIZORAL 2 % Apply 5 to 10 mL to wet scalp, lather, leave on 3 to 5 minutes, and rinse; apply twice weekly for 2 to 4 weeks        Lovastatin (Tab) MEVACOR 40 MG Take 1 Tab by mouth every bedtime.        Multiple Vitamins-Minerals   Take  by mouth every day.        NON SPECIFIED   OTC caffeine stimulate daily        Pantoprazole Sodium (Tablet Delayed Response) PROTONIX 20 MG TAKE 1 TABLET BY MOUTH EVERY DAY        .                 Medicines prescribed today were sent to:     [id number] - [place] - [address]    [address] [place] [address]    Phone: [phone] Fax: [phone]    Open 24 Hours?: No      Medication refill instructions:       If your prescription bottle indicates you have medication refills left, it is not necessary to call your provider’s office. Please contact your pharmacy and they will refill your medication.    If your prescription bottle indicates you do not have any refills left, you may request refills at any time through one of the following ways: The online [employer] system (except Urgent Care), by calling your provider’s office, or by asking your pharmacy to contact your provider’s office with a refill request. Medication refills are processed only during regular business hours and may not be available until the next business day. Your provider may request additional information or to have a follow-up visit with you prior to refilling your medication.   *Please Note: Medication refills are assigned a new Rx number when refilled electronically. Your pharmacy may indicate that no refills were authorized even though a new prescription for the same medication is available at the pharmacy. Please request the medicine by name with the pharmacy before contacting your provider for a refill.         Your To Do List     Future Labs/Procedures Complete By Expires    NM-CARDIAC PET  As directed 5/31/2018         MyChart Status: Patient Declined

## 2017-06-07 ENCOUNTER — APPOINTMENT (OUTPATIENT)
Dept: INTERNAL MEDICINE | Facility: MEDICAL CENTER | Age: 77
End: 2017-06-07
Payer: MEDICARE

## 2017-06-12 ENCOUNTER — APPOINTMENT (OUTPATIENT)
Dept: RADIOLOGY | Facility: MEDICAL CENTER | Age: 77
End: 2017-06-12
Attending: NURSE PRACTITIONER
Payer: MEDICARE

## 2017-06-14 ENCOUNTER — HOSPITAL ENCOUNTER (OUTPATIENT)
Dept: RADIOLOGY | Facility: MEDICAL CENTER | Age: 77
End: 2017-06-14
Attending: NURSE PRACTITIONER
Payer: MEDICARE

## 2017-06-14 DIAGNOSIS — R07.89 CHEST DISCOMFORT: ICD-10-CM

## 2017-06-14 PROCEDURE — 700111 HCHG RX REV CODE 636 W/ 250 OVERRIDE (IP)

## 2017-06-14 PROCEDURE — A9555 RB82 RUBIDIUM: HCPCS

## 2017-06-15 ENCOUNTER — TELEPHONE (OUTPATIENT)
Dept: CARDIOLOGY | Facility: MEDICAL CENTER | Age: 77
End: 2017-06-15

## 2017-06-15 NOTE — PROCEDURES
PET STRESS TEST    REFERRING PHYSICIAN:  CARRIE Barboza    AGE:  77.  GENDER:  Female.  HEIGHT:  4 feet 9 inches.  WEIGHT:  154 pounds.    BMI:  33.04.    INDICATIONS:  Chest pain.    PROCEDURE:  The patient reviewed and signed the acknowledgement for testing   form.  The patient was in a fasting state and was properly prepared for   testing.  An intravenous line was inserted and a flush of normal saline   followed to insure line patency.    A transmission scan was acquired for attenuation correction using the internal   Germanium sources.  The patient was then administered 30 mCi of Rubidium-82.    Approximately 90 seconds after the infusion, resting imagine were obtained   with ECG-gating.  Following the resting series, the patient administered 40 mg   of dipyridamole over four minutes.  The blood pressure, heart rate and ECG   were monitored and recorded.  After the dipyridamole infusion was completed,   another transmission scan for attenuation correction was obtained.  The   patient was then administered 25 mCi of Rubidium-82.  Approximately 90 seconds   after the infusion, Peak stress images were obtained with ECG-gating.    CLINICAL RESPONSE:  Resting blood pressure was 109/75 with a heart rate of 61.    Immediately post-dipyridamole infusion the blood pressure was 94/42 with a   heart rate of 78.  After a recovery period the blood pressure was 126/62 with   a heart rate of 68.    The patient experienced headache during testing.    Aminophylline 100 mg was administered following the scan.    ELECTROCARDIOGRAPHIC FINDINGS:  At baseline, the ECG shows sinus rhythm with   no ST or T-wave abnormalities.  There are no EKG changes with stress   compatible with ischemia.  This is a negative stress ECG for ischemia.    SCINTOGRAPHIC FINDINGS:  There is homogeneous normal tracer uptake both at   rest and stress in the LV myocardium.  There is an area of photopenia   consistent with attenuation artifact.  There is  calculated TID of 1.21.    GATED WALL MOTION FINDINGS:  Normal wall motion.  LVEF is 60%-65%.  There is   TID of 1.21.    CONCLUSIONS:  1.  Apical attenuation artifact.  2.  No fixed or reversible defects to clearly indicate ischemia or infarction.  3.  There is calculated TID of 1.21 with visual confirmation.  Clinical   correlation required.  4. Normal LVEF.       ____________________________________     MD MICHELLE Kearney / RONN    DD:  06/15/2017 06:56:53  DT:  06/15/2017 07:14:35    D#:  [id number]  Job#:  [id number]

## 2017-06-15 NOTE — TELEPHONE ENCOUNTER
----- Message from MEDINA Barboza sent at 6/15/2017  7:52 AM PDT -----  PET myocardial perfusion scan     No fixed or reversible defects to clearly indicate ischemia or infarction. Negative stress test.    Thanks,  RT

## 2017-06-26 DIAGNOSIS — M54.50 CHRONIC BILATERAL LOW BACK PAIN WITHOUT SCIATICA: ICD-10-CM

## 2017-06-26 DIAGNOSIS — G89.29 CHRONIC BILATERAL LOW BACK PAIN WITHOUT SCIATICA: ICD-10-CM

## 2017-06-26 DIAGNOSIS — Z79.899 CONTROLLED SUBSTANCE AGREEMENT SIGNED: ICD-10-CM

## 2017-06-26 DIAGNOSIS — M25.512 CHRONIC PAIN OF BOTH SHOULDERS: ICD-10-CM

## 2017-06-26 DIAGNOSIS — G89.29 CHRONIC PAIN OF BOTH SHOULDERS: ICD-10-CM

## 2017-06-26 DIAGNOSIS — M25.511 CHRONIC PAIN OF BOTH SHOULDERS: ICD-10-CM

## 2017-06-26 DIAGNOSIS — Z79.891 CHRONIC USE OF OPIATE DRUGS THERAPEUTIC PURPOSES: ICD-10-CM

## 2017-06-26 RX ORDER — HYDROCODONE BITARTRATE AND ACETAMINOPHEN 5; 325 MG/1; MG/1
1 TABLET ORAL 2 TIMES DAILY PRN
Qty: 60 TAB | Refills: 0 | Status: SHIPPED | OUTPATIENT
Start: 2017-06-26 | End: 2017-07-07 | Stop reason: SDUPTHER

## 2017-06-26 NOTE — TELEPHONE ENCOUNTER
----- Message from Anastacio Zavala sent at 6/26/2017  9:59 AM PDT -----  Regarding: JACQUE GARNER  Contact: [phone]  Patient needs Oxycodone Rx filled by today. She is completely out. I informed her we need a 72 hour notice to fill meds and she said she was unaware of this, no one has ever mentioned this to her before. She has a ride to bring her to  her Rx for this afternoon and would like to be able to  by 1-2 pm today.

## 2017-06-26 NOTE — TELEPHONE ENCOUNTER
Was the patient seen in the last year in this department? Yes    Last seen: 04/27/17 by Dr. Molina  Next appt: 06/29/17 with Dr. Keller      Does patient have an active prescription for medications requested? No I don't see Oxycodone on Med List. Called and spoke with pt. She meant Hydocodone.       Received Request Via: Patient     Scanned into pt's chart.

## 2017-07-07 ENCOUNTER — OFFICE VISIT (OUTPATIENT)
Dept: INTERNAL MEDICINE | Facility: MEDICAL CENTER | Age: 77
End: 2017-07-07
Payer: MEDICARE

## 2017-07-07 VITALS
OXYGEN SATURATION: 96 % | SYSTOLIC BLOOD PRESSURE: 136 MMHG | HEART RATE: 71 BPM | WEIGHT: 156.13 LBS | BODY MASS INDEX: 33.68 KG/M2 | DIASTOLIC BLOOD PRESSURE: 88 MMHG | TEMPERATURE: 98.2 F | HEIGHT: 57 IN

## 2017-07-07 DIAGNOSIS — Z79.899 CONTROLLED SUBSTANCE AGREEMENT SIGNED: ICD-10-CM

## 2017-07-07 DIAGNOSIS — G89.29 CHRONIC BILATERAL LOW BACK PAIN WITHOUT SCIATICA: ICD-10-CM

## 2017-07-07 DIAGNOSIS — G89.29 CHRONIC PAIN OF BOTH SHOULDERS: ICD-10-CM

## 2017-07-07 DIAGNOSIS — F34.1 DYSTHYMIA: ICD-10-CM

## 2017-07-07 DIAGNOSIS — K21.9 GASTROESOPHAGEAL REFLUX DISEASE, ESOPHAGITIS PRESENCE NOT SPECIFIED: ICD-10-CM

## 2017-07-07 DIAGNOSIS — R41.89 COGNITIVE IMPAIRMENT: ICD-10-CM

## 2017-07-07 DIAGNOSIS — M25.512 CHRONIC PAIN OF BOTH SHOULDERS: ICD-10-CM

## 2017-07-07 DIAGNOSIS — Z79.891 CHRONIC USE OF OPIATE DRUGS THERAPEUTIC PURPOSES: ICD-10-CM

## 2017-07-07 DIAGNOSIS — R06.02 SHORTNESS OF BREATH: ICD-10-CM

## 2017-07-07 DIAGNOSIS — M54.50 CHRONIC BILATERAL LOW BACK PAIN WITHOUT SCIATICA: ICD-10-CM

## 2017-07-07 DIAGNOSIS — M25.511 CHRONIC PAIN OF BOTH SHOULDERS: ICD-10-CM

## 2017-07-07 DIAGNOSIS — E78.5 DYSLIPIDEMIA: ICD-10-CM

## 2017-07-07 PROCEDURE — 99215 OFFICE O/P EST HI 40 MIN: CPT | Performed by: INTERNAL MEDICINE

## 2017-07-07 RX ORDER — HYDROCODONE BITARTRATE AND ACETAMINOPHEN 5; 325 MG/1; MG/1
1 TABLET ORAL 2 TIMES DAILY PRN
Qty: 60 TAB | Refills: 0 | Status: SHIPPED | OUTPATIENT
Start: 2017-08-01 | End: 2017-08-16 | Stop reason: SDUPTHER

## 2017-07-07 NOTE — PATIENT INSTRUCTIONS
Labs.  You need to get this done for future narcotic refills.      Back Xray.  You need to get this done for future narcotic refills.      See GI.  [facility]  [address] [facility]  [place] [address] [phone]    See dermatology:   [facility]    [address] [place], [place] [address] [address] [phone]      Get memory testing with the following person:   SHARON ROD    [address] [place] [facility] [facility] [address] [phone]     Follow up with cardiology re: cardiac and lung testing.     Have your friend come to next appt.  Come back in 6 weeks- come back sooner if needed.
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
## 2017-07-07 NOTE — MR AVS SNAPSHOT
"Guillermina Recio   2017 8:40 AM   Office Visit   MRN: [id number]    Department:  [facility] Med - Internal Med   Dept Phone:  [phone]    Description:  Female : 1940   Provider:  Elizabeth Molina M.D.           Reason for Visit     Follow-Up           Allergies as of 2017     No Known Allergies      You were diagnosed with     Chronic use of opiate drugs therapeutic purposes   [3171484]       Chronic bilateral low back pain without sciatica   [4919109]       Chronic pain of both shoulders   [271498]       Controlled substance agreement signed   [680858]       Dyslipidemia   [472857]       Gastroesophageal reflux disease, esophagitis presence not specified   [4028229]       Shortness of breath   [786.05.ICD-9-CM]       Dysthymia   [338307]       Cognitive impairment   [529898]         Vital Signs     Blood Pressure Pulse Temperature Height Weight Body Mass Index    136/88 mmHg 71 36.8 °C (98.2 °F) 1.454 m (4' 9.24\") 70.818 kg (156 lb 2 oz) 33.50 kg/m2    Oxygen Saturation Smoking Status                96% Never Smoker           Basic Information     Date Of Birth Sex Race Ethnicity Preferred Language    1940 Female White Non- English      Your appointments     2017  2:20 PM   FOLLOW UP with MEDINA Barboza   [facility] (--)    [address] [place] [id number] [phone]            Aug 16, 2017  2:20 PM   Established Patient with CAMILA Nash [facility] Med - Internal Medicine (--)    [address] [place] [address]  [facility] [address] [phone]           You will be receiving a confirmation call a few days before your appointment from our automated call confirmation system.            Sep 20, 2017  1:00 PM   Established Patient with CAMILA Nash [facility] Med - Internal Medicine (--)    [address] [place] [address]  [facility] [address] [phone]           You " will be receiving a confirmation call a few days before your appointment from our automated call confirmation system.            Oct 18, 2017  1:40 PM   Established Patient with Elizabeth Molina M.D.   [facility] Med - Internal Medicine (--)    [address] [place] [address]  [address]  [place] [address]   [phone]           You will be receiving a confirmation call a few days before your appointment from our automated call confirmation system.              Problem List              ICD-10-CM Priority Class Noted - Resolved    HTN (hypertension) I10   6/25/2009 - Present    Low back pain M54.5   6/25/2009 - Present    Osteopenia M85.80   6/25/2009 - Present    Dysuria R30.0   6/25/2009 - Present    Anemia D64.9   6/25/2009 - Present    Hip pain M25.559   6/25/2009 - Present    Elbow pain M25.529   6/25/2009 - Present    VAGINAL LESION    11/10/2009 - Present    Pelvic pain    11/10/2009 - Present    Dermatochalasis H02.839   7/14/2015 - Present    Scalp itch L29.9   6/29/2016 - Present    Polypharmacy Z79.899   6/29/2016 - Present    Dyslipidemia E78.5   8/1/2016 - Present    Obesity (BMI 30-39.9) E66.9   12/16/2016 - Present    Hypercalcemia E83.52   12/16/2016 - Present    Daytime somnolence R40.0   12/16/2016 - Present    Insomnia G47.00   12/16/2016 - Present    Gastroesophageal reflux disease K21.9   12/16/2016 - Present    Vitamin D deficiency E55.9   12/16/2016 - Present    Chronic narcotic use F11.90   12/16/2016 - Present    Controlled substance agreement signed Z79.899   12/16/2016 - Present    Chronic pain of both shoulders M25.512, G89.29, M25.511   12/16/2016 - Present    Chronic left-sided low back pain without sciatica M54.5, G89.29   12/16/2016 - Present    Abnormal stress test R94.39   12/16/2016 - Present    Shortness of breath R06.02   12/16/2016 - Present    Chronic use of opiate drugs therapeutic purposes Z79.891   7/7/2017 - Present    Chronic bilateral low back pain without sciatica  M54.5, G89.29   7/7/2017 - Present      Health Maintenance        Date Due Completion Dates    IMM DTaP/Tdap/Td Vaccine (1 - Tdap) 3/24/1957 ---    PAP SMEAR 3/24/1959 ---    IMM ZOSTER VACCINE 3/24/1998 ---    MAMMOGRAM 10/6/2012 10/6/2011    BONE DENSITY 4/19/2015 4/19/2010, 10/8/2008, 6/18/2007, 3/10/2006    IMM INFLUENZA (1) 9/1/2017 10/31/2016, 12/8/2015, 11/25/2008    IMM PNEUMOCOCCAL 65+ (ADULT) LOW/MEDIUM RISK SERIES (2 of 2 - PPSV23) 3/15/2018 3/15/2017    COLONOSCOPY 1/25/2027 1/25/2017 (N/S)    Override on 1/25/2017: (N/S) (PER [facility] NO COLONOSCOPY DONE)            Current Immunizations     13-VALENT PCV PREVNAR 3/15/2017  2:58 PM    Influenza Vaccine Adult HD 10/31/2016 10:34 AM    Influenza Vaccine Pediatric 11/25/2008    Influenza Vaccine Quad Inj (Pf) 12/8/2015      Below and/or attached are the medications your provider expects you to take. Review all of your home medications and newly ordered medications with your provider and/or pharmacist. Follow medication instructions as directed by your provider and/or pharmacist. Please keep your medication list with you and share with your provider. Update the information when medications are discontinued, doses are changed, or new medications (including over-the-counter products) are added; and carry medication information at all times in the event of emergency situations     Allergies:  No Known Allergies          Medications  Valid as of: July 07, 2017 -  9:37 AM    Generic Name Brand Name Tablet Size Instructions for use    Acetaminophen (Tab) TYLENOL 325 MG Take 2 Tabs by mouth every 6 hours as needed.        Aspirin (Tab) aspirin 81 MG Take 81 mg by mouth every day.        Cholecalciferol (Tab) Vitamin D (Cholecalciferol) 1000 UNITS Take 1 Tab by mouth every day.        Hydrocodone-Acetaminophen (Tab) NORCO 5-325 MG Take 1 Tab by mouth 2 times a day as needed.        Ketoconazole (Shampoo) NIZORAL 2 % Apply 5 to 10 mL to wet scalp, lather, leave on 3 to 5  minutes, and rinse; apply twice weekly for 2 to 4 weeks        Lovastatin (Tab) MEVACOR 40 MG Take 1 Tab by mouth every bedtime.        Multiple Vitamins-Minerals   Take  by mouth every day.        NON SPECIFIED   OTC caffeine stimulate daily        Pantoprazole Sodium (Tablet Delayed Response) PROTONIX 20 MG TAKE 1 TABLET BY MOUTH EVERY DAY        .                 Medicines prescribed today were sent to:     [facility][id number] - [place], [place] - [address] [facility]    [address] [place] [address]    Phone: [phone] Fax: [phone]    Open 24 Hours?: No      Medication refill instructions:       If your prescription bottle indicates you have medication refills left, it is not necessary to call your provider’s office. Please contact your pharmacy and they will refill your medication.    If your prescription bottle indicates you do not have any refills left, you may request refills at any time through one of the following ways: The online [employer] system (except Urgent Care), by calling your provider’s office, or by asking your pharmacy to contact your provider’s office with a refill request. Medication refills are processed only during regular business hours and may not be available until the next business day. Your provider may request additional information or to have a follow-up visit with you prior to refilling your medication.   *Please Note: Medication refills are assigned a new Rx number when refilled electronically. Your pharmacy may indicate that no refills were authorized even though a new prescription for the same medication is available at the pharmacy. Please request the medicine by name with the pharmacy before contacting your provider for a refill.        Instructions    Labs.  You need to get this done for future narcotic refills.      Back Xray.  You need to get this done for future narcotic refills.      See GI.  [facility]  [address] [place] [address] [place] [address] [phone]    See  dermatology:   [facility]    [address] [place] [address]    [phone]      Get memory testing with the following person:   SHARON ROD    [address] [place] [address] [phone]     Follow up with cardiology re: cardiac and lung testing.     Have your friend come to next appt.  Come back in 6 weeks- come back sooner if needed.             MyChart Status: Patient Declined

## 2017-08-11 ENCOUNTER — TELEPHONE (OUTPATIENT)
Dept: INTERNAL MEDICINE | Facility: MEDICAL CENTER | Age: 77
End: 2017-08-11

## 2017-08-11 NOTE — TELEPHONE ENCOUNTER
1. Caller Name: Guillermina                      Call Back Number: [phone] (home)       2. Message: Pt called and l/m. Just wanting to let Dr Molina know her kids are taking her on a mini vacation to [place]. Her original appt was 08/15 but she rescheduled it for 09/20/17    3. Patient approves office to leave a detailed voicemail/MyChart message: N\A

## 2017-08-16 ENCOUNTER — TELEPHONE (OUTPATIENT)
Dept: INTERNAL MEDICINE | Facility: MEDICAL CENTER | Age: 77
End: 2017-08-16

## 2017-08-16 DIAGNOSIS — M25.512 CHRONIC PAIN OF BOTH SHOULDERS: ICD-10-CM

## 2017-08-16 DIAGNOSIS — Z79.899 CONTROLLED SUBSTANCE AGREEMENT SIGNED: ICD-10-CM

## 2017-08-16 DIAGNOSIS — G89.29 CHRONIC BILATERAL LOW BACK PAIN WITHOUT SCIATICA: ICD-10-CM

## 2017-08-16 DIAGNOSIS — M25.511 CHRONIC PAIN OF BOTH SHOULDERS: ICD-10-CM

## 2017-08-16 DIAGNOSIS — Z79.891 CHRONIC USE OF OPIATE DRUGS THERAPEUTIC PURPOSES: ICD-10-CM

## 2017-08-16 DIAGNOSIS — G89.29 CHRONIC PAIN OF BOTH SHOULDERS: ICD-10-CM

## 2017-08-16 DIAGNOSIS — M54.50 CHRONIC BILATERAL LOW BACK PAIN WITHOUT SCIATICA: ICD-10-CM

## 2017-08-16 RX ORDER — HYDROCODONE BITARTRATE AND ACETAMINOPHEN 5; 325 MG/1; MG/1
1 TABLET ORAL 2 TIMES DAILY PRN
Qty: 60 TAB | Refills: 0 | Status: SHIPPED | OUTPATIENT
Start: 2017-08-16 | End: 2017-12-08

## 2017-08-16 NOTE — TELEPHONE ENCOUNTER
Pt came in with norco rx written in July and dated for beginning of aug.   Pharm would not fill  Reviewed  today - last filled June.   Last urine drug screen Dec was fine.   ok'd Norco for today.     Current outpatient prescriptions:   •  hydrocodone-acetaminophen (NORCO) 5-325 MG Tab per tablet, Take 1 Tab by mouth 2 times a day as needed., Disp: 60 Tab, Rfl: 0

## 2017-09-12 ENCOUNTER — OFFICE VISIT (OUTPATIENT)
Dept: CARDIOLOGY | Facility: MEDICAL CENTER | Age: 77
End: 2017-09-12
Payer: MEDICARE

## 2017-09-12 VITALS
BODY MASS INDEX: 33.22 KG/M2 | HEIGHT: 57 IN | SYSTOLIC BLOOD PRESSURE: 120 MMHG | OXYGEN SATURATION: 90 % | HEART RATE: 66 BPM | WEIGHT: 154 LBS | DIASTOLIC BLOOD PRESSURE: 64 MMHG

## 2017-09-12 DIAGNOSIS — E78.5 DYSLIPIDEMIA: ICD-10-CM

## 2017-09-12 DIAGNOSIS — E66.9 OBESITY (BMI 30-39.9): ICD-10-CM

## 2017-09-12 DIAGNOSIS — I10 ESSENTIAL HYPERTENSION: ICD-10-CM

## 2017-09-12 DIAGNOSIS — R94.39 ABNORMAL STRESS TEST: ICD-10-CM

## 2017-09-12 DIAGNOSIS — R07.9 CHEST PAIN, UNSPECIFIED TYPE: ICD-10-CM

## 2017-09-12 PROCEDURE — 99214 OFFICE O/P EST MOD 30 MIN: CPT | Performed by: NURSE PRACTITIONER

## 2017-09-12 ASSESSMENT — ENCOUNTER SYMPTOMS
PND: 0
SHORTNESS OF BREATH: 0
ORTHOPNEA: 0
CLAUDICATION: 0
FEVER: 0
ABDOMINAL PAIN: 0
PALPITATIONS: 1
MYALGIAS: 0
COUGH: 0
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
## 2017-09-18 ENCOUNTER — OFFICE VISIT (OUTPATIENT)
Dept: INTERNAL MEDICINE | Facility: MEDICAL CENTER | Age: 77
End: 2017-09-18
Payer: MEDICARE

## 2017-09-18 VITALS
TEMPERATURE: 98.6 F | WEIGHT: 154 LBS | DIASTOLIC BLOOD PRESSURE: 72 MMHG | SYSTOLIC BLOOD PRESSURE: 117 MMHG | HEIGHT: 57 IN | HEART RATE: 76 BPM | OXYGEN SATURATION: 96 % | BODY MASS INDEX: 33.22 KG/M2

## 2017-09-18 DIAGNOSIS — F32.A DEPRESSION, UNSPECIFIED DEPRESSION TYPE: ICD-10-CM

## 2017-09-18 DIAGNOSIS — M25.552 PAIN OF LEFT HIP JOINT: ICD-10-CM

## 2017-09-18 DIAGNOSIS — E78.5 DYSLIPIDEMIA: ICD-10-CM

## 2017-09-18 PROCEDURE — 99214 OFFICE O/P EST MOD 30 MIN: CPT | Mod: GC | Performed by: INTERNAL MEDICINE

## 2017-09-18 RX ORDER — HYDROCODONE BITARTRATE AND ACETAMINOPHEN 5; 325 MG/1; MG/1
1-2 TABLET ORAL EVERY 4 HOURS PRN
Qty: 60 TAB | Refills: 0 | Status: SHIPPED | OUTPATIENT
Start: 2017-09-18 | End: 2017-10-30 | Stop reason: SDUPTHER

## 2017-09-18 NOTE — PROGRESS NOTES
Established Patient    Guillermina presents today with the following:    CC: Medication refill, and consult about depression    HPI: 77 y.o. Female with past medical history of GERD, left hip joint replacement, osteoarthritis, and depression. Presents to the clinic for medication refill and for consult about anxiety with depression. The patient has an appointment with Dr. Molina on the next week but she is unable to make it because she want to attend her relative wedding next week. The patient starts crying when she starts explaining her depression and anxiety. The patient confirm having depressed mode but denies ideas of harming herself or others.  Denies denies felling guilty, psychomotor retardation, sleep problems, fever, chest pain, and headache.     Patient Active Problem List    Diagnosis Date Noted   • Chest pain 09/12/2017     Priority: Medium   • Obesity (BMI 30-39.9) 12/16/2016     Priority: Medium   • Abnormal stress test 12/16/2016     Priority: Medium   • Dyslipidemia 08/01/2016     Priority: Medium   • HTN (hypertension) 06/25/2009     Priority: Medium   • Chronic use of opiate drugs therapeutic purposes 07/07/2017     Priority: Low   • Chronic bilateral low back pain without sciatica 07/07/2017     Priority: Low   • Daytime somnolence 12/16/2016     Priority: Low   • Insomnia 12/16/2016     Priority: Low   • Gastroesophageal reflux disease 12/16/2016     Priority: Low   • Vitamin D deficiency 12/16/2016     Priority: Low   • Chronic narcotic use 12/16/2016     Priority: Low   • Controlled substance agreement signed 12/16/2016     Priority: Low   • Chronic pain of both shoulders 12/16/2016     Priority: Low   • Chronic left-sided low back pain without sciatica 12/16/2016     Priority: Low   • Scalp itch 06/29/2016     Priority: Low   • Polypharmacy 06/29/2016     Priority: Low   • Dermatochalasis 07/14/2015     Priority: Low   • VAGINAL LESION 11/10/2009     Priority: Low   • Pelvic pain 11/10/2009  "    Priority: Low   • Osteopenia 06/25/2009     Priority: Low   • Dysuria 06/25/2009     Priority: Low   • Anemia 06/25/2009     Priority: Low   • Hip pain 06/25/2009     Priority: Low   • Elbow pain 06/25/2009     Priority: Low       Current Outpatient Prescriptions   Medication Sig Dispense Refill   • sertraline (ZOLOFT) 50 MG Tab Take 1 Tab by mouth every day. 30 Tab 0   • hydrocodone-acetaminophen (NORCO) 5-325 MG Tab per tablet Take 1-2 Tabs by mouth every four hours as needed. 60 Tab 0   • hydrocodone-acetaminophen (NORCO) 5-325 MG Tab per tablet Take 1 Tab by mouth 2 times a day as needed. 60 Tab 0   • aspirin 81 MG tablet Take 81 mg by mouth every day.     • pantoprazole (PROTONIX) 20 MG tablet TAKE 1 TABLET BY MOUTH EVERY DAY 90 Tab 3   • Multiple Vitamins-Minerals (CENTRUM SILVER PO) Take  by mouth every day.     • ketoconazole (NIZORAL) 2 % shampoo Apply 5 to 10 mL to wet scalp, lather, leave on 3 to 5 minutes, and rinse; apply twice weekly for 2 to 4 weeks 1 Bottle 3   • NON SPECIFIED OTC caffeine stimulate daily     • Vitamin D, Cholecalciferol, 1000 UNITS Tab Take 1 Tab by mouth every day.     • lovastatin (MEVACOR) 40 MG tablet Take 1 Tab by mouth every bedtime. 90 Tab 3   • acetaminophen (TYLENOL) 325 MG Tab Take 2 Tabs by mouth every 6 hours as needed.       No current facility-administered medications for this visit.        ROS: As per HPI. Additional pertinent symptoms as noted below.    All others negative    /72   Pulse 76   Temp 37 °C (98.6 °F)   Ht 1.454 m (4' 9.24\")   Wt 69.9 kg (154 lb)   SpO2 96%   BMI 33.05 kg/m²     Physical Exam   Constitutional:  oriented to person, place, and time. No distress.   Eyes: Pupils are equal, round, and reactive to light. No scleral icterus.  Neck: Neck supple. No thyromegaly present.   Cardiovascular: Normal rate, regular rhythm and normal heart sounds.  Exam reveals no gallop and no friction rub.  No murmur heard.  Pulmonary/Chest: Breath " sounds normal. Chest wall is not dull to percussion.   Musculoskeletal:   no edema. Tenderness on palpation of the left hip joint, mild limitation of movement, but functional using matute.   Lymphadenopathy: no cervical adenopathy  Neurological: alert and oriented to person, place, and time.   Skin: No cyanosis. Nails show no clubbing.  Other:     Note: I have reviewed all pertinent labs and diagnostic tests associated with this visit with specific comments listed under the assessment and plan below    Assessment and Plan    1. Depression, unspecified depression type  - Hx of depression 3 years ago improved after being an active member in  [employer].  - Last months she started feeling depressed with anxiety without identifiable trigger.  - Pt is living alone and independent, but she does have friends who are helping her when she needs.  Impression: mixed anxiety - depressive disorder.  Plan  - Start on sertraline 50 mg daily  - Follow up with Dr. Molina next month.    2. Pain of left hip joint  Hx of left hip replacement surgery   Chronic pain in left hip joint after surgery  On Norco  Plan  Keep same medication.    3. Dyslipidemia.  - On Lovastatin 40 mg daily  - On 10/27/2016 Total cholesterol 277, , , and HDL 50  Plan  - Lipid panel   - Consider change Lovastatin to Lipitor according to lipid panel result (will discuss that with Dr. Molina)        Followup: Return in about 3 months (around 12/18/2017).      Signed by: Marlyn Gee M.D.

## 2017-09-18 NOTE — PATIENT INSTRUCTIONS
Depression, Adult  Depression refers to feeling sad, low, down in the dumps, blue, gloomy, or empty. In general, there are two kinds of depression:  1. Normal sadness or normal grief. This kind of depression is one that we all feel from time to time after upsetting life experiences, such as the loss of a job or the ending of a relationship. This kind of depression is considered normal, is short lived, and resolves within a few days to 2 weeks. Depression experienced after the loss of a loved one (bereavement) often lasts longer than 2 weeks but normally gets better with time.  2. Clinical depression. This kind of depression lasts longer than normal sadness or normal grief or interferes with your ability to function at home, at work, and in school. It also interferes with your personal relationships. It affects almost every aspect of your life. Clinical depression is an illness.  Symptoms of depression can also be caused by conditions other than those mentioned above, such as:  · Physical illness. Some physical illnesses, including underactive thyroid gland (hypothyroidism), severe anemia, specific types of cancer, diabetes, uncontrolled seizures, heart and lung problems, strokes, and chronic pain are commonly associated with symptoms of depression.  · Side effects of some prescription medicine. In some people, certain types of medicine can cause symptoms of depression.  · Substance abuse. Abuse of alcohol and illicit drugs can cause symptoms of depression.  SYMPTOMS  Symptoms of normal sadness and normal grief include the following:  · Feeling sad or crying for short periods of time.  · Not caring about anything (apathy).  · Difficulty sleeping or sleeping too much.  · No longer able to enjoy the things you used to enjoy.  · Desire to be by oneself all the time (social isolation).  · Lack of energy or motivation.  · Difficulty concentrating or remembering.  · Change in appetite or weight.  · Restlessness or  agitation.  Symptoms of clinical depression include the same symptoms of normal sadness or normal grief and also the following symptoms:  · Feeling sad or crying all the time.  · Feelings of guilt or worthlessness.  · Feelings of hopelessness or helplessness.  · Thoughts of suicide or the desire to harm yourself (suicidal ideation).  · Loss of touch with reality (psychotic symptoms). Seeing or hearing things that are not real (hallucinations) or having false beliefs about your life or the people around you (delusions and paranoia).  DIAGNOSIS   The diagnosis of clinical depression is usually based on how bad the symptoms are and how long they have lasted. Your health care provider will also ask you questions about your medical history and substance use to find out if physical illness, use of prescription medicine, or substance abuse is causing your depression. Your health care provider may also order blood tests.  TREATMENT   Often, normal sadness and normal grief do not require treatment. However, sometimes antidepressant medicine is given for bereavement to ease the depressive symptoms until they resolve.  The treatment for clinical depression depends on how bad the symptoms are but often includes antidepressant medicine, counseling with a mental health professional, or both. Your health care provider will help to determine what treatment is best for you.  Depression caused by physical illness usually goes away with appropriate medical treatment of the illness. If prescription medicine is causing depression, talk with your health care provider about stopping the medicine, decreasing the dose, or changing to another medicine.  Depression caused by the abuse of alcohol or illicit drugs goes away when you stop using these substances. Some adults need professional help in order to stop drinking or using drugs.  SEEK IMMEDIATE MEDICAL CARE IF:  · You have thoughts about hurting yourself or others.  · You lose touch  with reality (have psychotic symptoms).  · You are taking medicine for depression and have a serious side effect.  FOR MORE INFORMATION  · National [place] on Mental Illness: www.donaldo.org   · National [place] of Mental Health: www.nimh.nih.gov      This information is not intended to replace advice given to you by your health care provider. Make sure you discuss any questions you have with your health care provider.     Document Released: 12/15/2001 Document Revised: 01/08/2016 Document Reviewed: 03/18/2013  Elsevier Interactive Patient Education ©2016 [employer]

## 2017-10-19 DIAGNOSIS — F32.A DEPRESSION, UNSPECIFIED DEPRESSION TYPE: ICD-10-CM

## 2017-10-19 NOTE — TELEPHONE ENCOUNTER
Was the patient seen in the last year in this department? Yes    Last seen: 09/18/17 by Dr. Stern  Next appt: 10/30/17 with Dr. Castellanos      Does patient have an active prescription for medications requested? No     Received Request Via: Pharmacy

## 2017-10-19 NOTE — TELEPHONE ENCOUNTER
Needs to be seen before a 90 day supply  Gave her 30 days to last until she is seen by Jolanta sanabria

## 2017-10-30 ENCOUNTER — OFFICE VISIT (OUTPATIENT)
Dept: INTERNAL MEDICINE | Facility: MEDICAL CENTER | Age: 77
End: 2017-10-30
Payer: MEDICARE

## 2017-10-30 VITALS
TEMPERATURE: 97.2 F | OXYGEN SATURATION: 93 % | BODY MASS INDEX: 33.18 KG/M2 | WEIGHT: 153.8 LBS | DIASTOLIC BLOOD PRESSURE: 70 MMHG | SYSTOLIC BLOOD PRESSURE: 128 MMHG | HEIGHT: 57 IN | HEART RATE: 76 BPM | RESPIRATION RATE: 18 BRPM

## 2017-10-30 DIAGNOSIS — M25.552 PAIN OF LEFT HIP JOINT: ICD-10-CM

## 2017-10-30 DIAGNOSIS — Z79.891 CHRONIC USE OF OPIATE DRUGS THERAPEUTIC PURPOSES: ICD-10-CM

## 2017-10-30 DIAGNOSIS — L29.9 SCALP ITCH: ICD-10-CM

## 2017-10-30 DIAGNOSIS — K21.9 GASTROESOPHAGEAL REFLUX DISEASE, ESOPHAGITIS PRESENCE NOT SPECIFIED: ICD-10-CM

## 2017-10-30 DIAGNOSIS — Z00.00 HEALTH CARE MAINTENANCE: ICD-10-CM

## 2017-10-30 PROCEDURE — 90662 IIV NO PRSV INCREASED AG IM: CPT | Performed by: INTERNAL MEDICINE

## 2017-10-30 PROCEDURE — G0008 ADMIN INFLUENZA VIRUS VAC: HCPCS | Performed by: INTERNAL MEDICINE

## 2017-10-30 PROCEDURE — 99214 OFFICE O/P EST MOD 30 MIN: CPT | Mod: 25 | Performed by: INTERNAL MEDICINE

## 2017-10-30 RX ORDER — KETOCONAZOLE 20 MG/ML
SHAMPOO TOPICAL
Qty: 1 BOTTLE | Refills: 3 | Status: SHIPPED | OUTPATIENT
Start: 2017-10-30 | End: 2018-05-29 | Stop reason: SDUPTHER

## 2017-10-30 RX ORDER — HYDROCODONE BITARTRATE AND ACETAMINOPHEN 5; 325 MG/1; MG/1
1-2 TABLET ORAL EVERY 8 HOURS PRN
Qty: 60 TAB | Refills: 0 | Status: SHIPPED | OUTPATIENT
Start: 2017-10-30 | End: 2017-12-08 | Stop reason: SDUPTHER

## 2017-10-30 ASSESSMENT — PATIENT HEALTH QUESTIONNAIRE - PHQ9: CLINICAL INTERPRETATION OF PHQ2 SCORE: 0

## 2017-10-30 ASSESSMENT — PAIN SCALES - GENERAL: PAINLEVEL: 8=MODERATE-SEVERE PAIN

## 2017-10-30 NOTE — PROGRESS NOTES
Established Patient    Guillermina presents today with the following:    CC:   Neck pain/back pain    HPI:   Guillermina Recio is here today c/o of chronic pain in shoulder, neck/lower back/ no recent falls  Here for prescription refill.  She has been taking hydrocodone for pain 1 a day usu/reviewed [facility]/ some constipation- manages with increased fiber in diet/ forgetfulness -chronic; PCP aware.  She was planned on starting on sertraline on her last visit, has not started taking it yet as she was concerned with s/e of nausea.  Wants refill of ketoconazole shampoo.  Chronic conditions:  Hyperlipidemia/GERD/vit D def- stable.    Patient Active Problem List    Diagnosis Date Noted   • Chest pain 09/12/2017     Priority: Medium   • Obesity (BMI 30-39.9) 12/16/2016     Priority: Medium   • Abnormal stress test 12/16/2016     Priority: Medium   • Dyslipidemia 08/01/2016     Priority: Medium   • HTN (hypertension) 06/25/2009     Priority: Medium   • Chronic use of opiate drugs therapeutic purposes 07/07/2017     Priority: Low   • Chronic bilateral low back pain without sciatica 07/07/2017     Priority: Low   • Daytime somnolence 12/16/2016     Priority: Low   • Insomnia 12/16/2016     Priority: Low   • Gastroesophageal reflux disease 12/16/2016     Priority: Low   • Vitamin D deficiency 12/16/2016     Priority: Low   • Chronic narcotic use 12/16/2016     Priority: Low   • Controlled substance agreement signed 12/16/2016     Priority: Low   • Chronic pain of both shoulders 12/16/2016     Priority: Low   • Chronic left-sided low back pain without sciatica 12/16/2016     Priority: Low   • Scalp itch 06/29/2016     Priority: Low   • Polypharmacy 06/29/2016     Priority: Low   • Dermatochalasis 07/14/2015     Priority: Low   • VAGINAL LESION 11/10/2009     Priority: Low   • Pelvic pain 11/10/2009     Priority: Low   • Osteopenia 06/25/2009     Priority: Low   • Dysuria 06/25/2009     Priority: Low   • Anemia 06/25/2009    "Priority: Low   • Hip pain 06/25/2009     Priority: Low   • Elbow pain 06/25/2009     Priority: Low   • Health care maintenance 10/30/2017       Current Outpatient Prescriptions   Medication Sig Dispense Refill   • ketoconazole (NIZORAL) 2 % shampoo Apply 5 to 10 mL to wet scalp, lather, leave on 3 to 5 minutes, and rinse; apply twice weekly for 2 to 4 weeks 1 Bottle 3   • hydrocodone-acetaminophen (NORCO) 5-325 MG Tab per tablet Take 1-2 Tabs by mouth every 8 hours as needed. 60 Tab 0   • sertraline (ZOLOFT) 50 MG Tab Take 1 Tab by mouth every day. 30 Tab 0   • hydrocodone-acetaminophen (NORCO) 5-325 MG Tab per tablet Take 1 Tab by mouth 2 times a day as needed. 60 Tab 0   • aspirin 81 MG tablet Take 81 mg by mouth every day.     • pantoprazole (PROTONIX) 20 MG tablet TAKE 1 TABLET BY MOUTH EVERY DAY 90 Tab 3   • Multiple Vitamins-Minerals (CENTRUM SILVER PO) Take  by mouth every day.     • NON SPECIFIED OTC caffeine stimulate daily     • Vitamin D, Cholecalciferol, 1000 UNITS Tab Take 1 Tab by mouth every day.     • lovastatin (MEVACOR) 40 MG tablet Take 1 Tab by mouth every bedtime. 90 Tab 3   • acetaminophen (TYLENOL) 325 MG Tab Take 2 Tabs by mouth every 6 hours as needed.       No current facility-administered medications for this visit.        ROS:14 system review done and negative except per HPI and below  No n/v; baseline anxiety.    /70   Pulse 76   Temp 36.2 °C (97.2 °F)   Resp 18   Ht 1.454 m (4' 9.24\")   Wt 69.8 kg (153 lb 12.8 oz)   SpO2 93%   Breastfeeding? No   BMI 33.00 kg/m²     Physical Exam   Constitutional:  oriented to person, place, and time. No distress.   Eyes: Pupils are equal, round, and reactive to light. No scleral icterus.  Neck: Neck supple. No thyromegaly present.   Cardiovascular: Normal rate, regular rhythm and normal heart sounds No murmur heard.  Pulmonary/Chest: Breath sounds normal.   Musculoskeletal:   no edema.   Lymphadenopathy: no cervical " adenopathy  Neurological: alert and oriented to person, place, and time.   Skin: No cyanosis. Nails show no clubbing.  Other:no vertebral tenderness    Note: I have reviewed all pertinent labs and diagnostic tests associated with this visit with specific comments listed under the assessment and plan below    Assessment and Plan    1. Pain of left hip joint    - hydrocodone-acetaminophen (NORCO) 5-325 MG Tab per tablet; Take 1-2 Tabs by mouth every 8 hours as needed.  Dispense: 60 Tab; Refill: 0    2. Chronic use of opiate drugs therapeutic purposes  Chronic pain recheck:   Chronic pain treated with hydrocodone 1 -2 tab daily prn  Current alcohol or substance use:No    Consequences of Chronic Opiate therapy:  (5 A's)  Analgesia:improved  Activity: improved with pain control  Adverse Events:No  Aberrant Behaviors:NO  Affect/Mood: baseline anxiety/un altererd  Nonnarcotic treatments that are being used:tylenol as needed    I have reviewed the medical records, the [facility] and I have determined that controlled substance treatment is medically indicated.      3. Health care maintenance  - INFLUENZA VACCINE, HIGH DOSE (65+ ONLY)    4. Gastroesophageal reflux disease, esophagitis presence not specified  - on PPI    5. Scalp itch  Refill ketoconazole    Followup: Return in about 6 weeks (around 12/11/2017) for Long. with PCP      Signed by: Jolanta Castellanos M.D.

## 2017-10-31 NOTE — PROGRESS NOTES
"Guillermina Recio is a 77 y.o. female here for a non-provider visit for:   FLU    Reason for immunization: Annual Flu Vaccine  Immunization records indicate need for vaccine: Yes, confirmed with [place] [facility]  Minimum interval has been met for this vaccine: Yes  ABN completed: Not Indicated    Order and dose verified by: MAXINE NARANJO Dated  08/07/2015 was given to patient: Yes  All IAC Questionnaire questions were answered \"No.\"    Patient tolerated injection and no adverse effects were observed or reported: Yes    Pt scheduled for next dose in series: Not Indicated    "

## 2017-11-10 ENCOUNTER — HOSPITAL ENCOUNTER (OUTPATIENT)
Dept: LAB | Facility: MEDICAL CENTER | Age: 77
End: 2017-11-10
Attending: NURSE PRACTITIONER
Payer: MEDICARE

## 2017-11-10 ENCOUNTER — HOSPITAL ENCOUNTER (OUTPATIENT)
Dept: RADIOLOGY | Facility: MEDICAL CENTER | Age: 77
End: 2017-11-10
Attending: INTERNAL MEDICINE
Payer: MEDICARE

## 2017-11-10 DIAGNOSIS — G89.29 CHRONIC BILATERAL LOW BACK PAIN WITHOUT SCIATICA: ICD-10-CM

## 2017-11-10 DIAGNOSIS — E78.5 DYSLIPIDEMIA: ICD-10-CM

## 2017-11-10 DIAGNOSIS — M54.50 CHRONIC BILATERAL LOW BACK PAIN WITHOUT SCIATICA: ICD-10-CM

## 2017-11-10 LAB
ALBUMIN SERPL BCP-MCNC: 4.6 G/DL (ref 3.2–4.9)
ALBUMIN/GLOB SERPL: 1.6 G/DL
ALP SERPL-CCNC: 75 U/L (ref 30–99)
ALT SERPL-CCNC: 15 U/L (ref 2–50)
ANION GAP SERPL CALC-SCNC: 11 MMOL/L (ref 0–11.9)
AST SERPL-CCNC: 22 U/L (ref 12–45)
BILIRUB SERPL-MCNC: 0.5 MG/DL (ref 0.1–1.5)
BUN SERPL-MCNC: 26 MG/DL (ref 8–22)
CALCIUM SERPL-MCNC: 10 MG/DL (ref 8.5–10.5)
CHLORIDE SERPL-SCNC: 107 MMOL/L (ref 96–112)
CHOLEST SERPL-MCNC: 189 MG/DL (ref 100–199)
CO2 SERPL-SCNC: 23 MMOL/L (ref 20–33)
CREAT SERPL-MCNC: 1.1 MG/DL (ref 0.5–1.4)
GFR SERPL CREATININE-BSD FRML MDRD: 48 ML/MIN/1.73 M 2
GLOBULIN SER CALC-MCNC: 2.9 G/DL (ref 1.9–3.5)
GLUCOSE SERPL-MCNC: 105 MG/DL (ref 65–99)
HDLC SERPL-MCNC: 56 MG/DL
LDLC SERPL CALC-MCNC: 95 MG/DL
POTASSIUM SERPL-SCNC: 4.5 MMOL/L (ref 3.6–5.5)
PROT SERPL-MCNC: 7.5 G/DL (ref 6–8.2)
SODIUM SERPL-SCNC: 141 MMOL/L (ref 135–145)
TRIGL SERPL-MCNC: 189 MG/DL (ref 0–149)

## 2017-11-10 PROCEDURE — 36415 COLL VENOUS BLD VENIPUNCTURE: CPT

## 2017-11-10 PROCEDURE — 80053 COMPREHEN METABOLIC PANEL: CPT

## 2017-11-10 PROCEDURE — 72100 X-RAY EXAM L-S SPINE 2/3 VWS: CPT

## 2017-11-10 PROCEDURE — 80061 LIPID PANEL: CPT

## 2017-11-13 ENCOUNTER — TELEPHONE (OUTPATIENT)
Dept: CARDIOLOGY | Facility: MEDICAL CENTER | Age: 77
End: 2017-11-13

## 2017-11-13 NOTE — TELEPHONE ENCOUNTER
S/w pt about Lipid and CMP. She denies any questions at this time and says that she will try her best to limit carbohydrates even though it is the holidays.     ----- Message -----  From: MEDINA Hatch  Sent: 11/13/2017   7:53 AM  To: Nury Flowers R.N.    Triglycerides a little high, watch diet. CMP shows elevated glucose, FU with PCP. SC

## 2017-12-06 ENCOUNTER — TELEPHONE (OUTPATIENT)
Dept: INTERNAL MEDICINE | Facility: MEDICAL CENTER | Age: 77
End: 2017-12-06

## 2017-12-06 NOTE — TELEPHONE ENCOUNTER
1. Caller Name: Guillermina                      Call Back Number: [phone] (home)       2. Message: Pt called and l/m.  She has an appt on 12/11, but she can't make this appt. She made in appt in January. (I see in February she has an appt with you). Because she has a lot of other appts and she is going to be burying her  .  But she would need her pain medication for this month.    3. Patient approves office to leave a detailed voicemail/MyChart message: N\A

## 2017-12-07 NOTE — TELEPHONE ENCOUNTER
Pt needs to be seen for future pain pill Rxs.  She is due to re-sign a controlled sub agreement and provide a urine drug screen.   Please reschedule her appt.

## 2017-12-08 ENCOUNTER — OFFICE VISIT (OUTPATIENT)
Dept: INTERNAL MEDICINE | Facility: MEDICAL CENTER | Age: 77
End: 2017-12-08
Payer: MEDICARE

## 2017-12-08 VITALS
SYSTOLIC BLOOD PRESSURE: 110 MMHG | BODY MASS INDEX: 33.71 KG/M2 | OXYGEN SATURATION: 94 % | TEMPERATURE: 97.3 F | HEART RATE: 78 BPM | DIASTOLIC BLOOD PRESSURE: 70 MMHG | HEIGHT: 57 IN | WEIGHT: 156.25 LBS

## 2017-12-08 DIAGNOSIS — Z79.891 CHRONIC USE OF OPIATE DRUGS THERAPEUTIC PURPOSES: ICD-10-CM

## 2017-12-08 DIAGNOSIS — M25.512 CHRONIC PAIN OF BOTH SHOULDERS: ICD-10-CM

## 2017-12-08 DIAGNOSIS — G89.29 CHRONIC BILATERAL LOW BACK PAIN WITHOUT SCIATICA: ICD-10-CM

## 2017-12-08 DIAGNOSIS — K21.9 GASTROESOPHAGEAL REFLUX DISEASE, ESOPHAGITIS PRESENCE NOT SPECIFIED: ICD-10-CM

## 2017-12-08 DIAGNOSIS — G89.29 CHRONIC PAIN OF BOTH SHOULDERS: ICD-10-CM

## 2017-12-08 DIAGNOSIS — M54.50 CHRONIC BILATERAL LOW BACK PAIN WITHOUT SCIATICA: ICD-10-CM

## 2017-12-08 DIAGNOSIS — E78.5 DYSLIPIDEMIA: ICD-10-CM

## 2017-12-08 DIAGNOSIS — M25.511 CHRONIC PAIN OF BOTH SHOULDERS: ICD-10-CM

## 2017-12-08 DIAGNOSIS — F34.1 DYSTHYMIA: ICD-10-CM

## 2017-12-08 DIAGNOSIS — E55.9 VITAMIN D DEFICIENCY: ICD-10-CM

## 2017-12-08 PROCEDURE — 99214 OFFICE O/P EST MOD 30 MIN: CPT | Performed by: INTERNAL MEDICINE

## 2017-12-08 PROCEDURE — 99000 SPECIMEN HANDLING OFFICE-LAB: CPT | Performed by: INTERNAL MEDICINE

## 2017-12-08 RX ORDER — HYDROCODONE BITARTRATE AND ACETAMINOPHEN 5; 325 MG/1; MG/1
1 TABLET ORAL EVERY 8 HOURS PRN
Qty: 60 TAB | Refills: 0 | Status: SHIPPED | OUTPATIENT
Start: 2017-12-08 | End: 2018-01-15 | Stop reason: SDUPTHER

## 2017-12-08 RX ORDER — MULTIVIT WITH MINERALS/LUTEIN
TABLET ORAL PRN
COMMUNITY
End: 2019-04-18
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
## 2017-12-08 NOTE — PATIENT INSTRUCTIONS
Focused labs.     Do PT.      See you in January.    From last time.  See GI.  [facility]  [address] [facility]  [place] [id number] [phone]    Get memory testing with the following person:   SHARON ROD    [address] [place] [id number] [phone]     Follow up with cardiology re: cardiac and lung testing.

## 2017-12-11 DIAGNOSIS — E78.5 DYSLIPIDEMIA: ICD-10-CM

## 2017-12-12 RX ORDER — LOVASTATIN 40 MG/1
TABLET ORAL
Qty: 90 TAB | Refills: 3 | Status: SHIPPED | OUTPATIENT
Start: 2017-12-12 | End: 2018-12-12 | Stop reason: SDUPTHER

## 2017-12-12 NOTE — TELEPHONE ENCOUNTER
Last seen: 12/08/17 by Dr. Molina  Next appt: 01/15/18 with Dr. Vega    Was the patient seen in the last year in this department? Yes   Does patient have an active prescription for medications requested? No   Received Request Via: Pharmacy

## 2018-01-03 ENCOUNTER — PHYSICAL THERAPY (OUTPATIENT)
Dept: PHYSICAL THERAPY | Facility: REHABILITATION | Age: 78
End: 2018-01-03
Attending: INTERNAL MEDICINE
Payer: MEDICARE

## 2018-01-03 DIAGNOSIS — G89.29 CHRONIC MIDLINE LOW BACK PAIN WITHOUT SCIATICA: ICD-10-CM

## 2018-01-03 DIAGNOSIS — M25.512 CHRONIC LEFT SHOULDER PAIN: ICD-10-CM

## 2018-01-03 DIAGNOSIS — G89.29 CHRONIC LEFT SHOULDER PAIN: ICD-10-CM

## 2018-01-03 DIAGNOSIS — M54.50 CHRONIC MIDLINE LOW BACK PAIN WITHOUT SCIATICA: ICD-10-CM

## 2018-01-03 DIAGNOSIS — M25.50 PAIN IN JOINT, MULTIPLE SITES: ICD-10-CM

## 2018-01-03 PROCEDURE — 97161 PT EVAL LOW COMPLEX 20 MIN: CPT

## 2018-01-03 PROCEDURE — 97014 ELECTRIC STIMULATION THERAPY: CPT

## 2018-01-03 ASSESSMENT — ENCOUNTER SYMPTOMS
PAIN SCALE: 2
ALLEVIATING FACTORS: HEATING PAD

## 2018-01-03 NOTE — OP THERAPY EVALUATION
Outpatient Physical Therapy  INITIAL EVALUATION    [facility] Physical Therapy [facility]  [address]  [address]  [place] [address]  Phone:  [phone]  Fax:  [phone]    Date of Evaluation: 2018    Patient: Guillermina Recio  YOB: 1940  MRN: [id number]     Referring Provider: Elizabeth Molina M.D.  [address]  [address]  [place] [address]   Referring Diagnosis Chronic bilateral low back pain without sciatica [M54.5, G89.29];Chronic pain of both shoulders [M25.511, G89.29, M25.512];Chronic use of opiate drugs therapeutic purposes [Z79.891]     Time Calculation  Start time: 315  Stop time: 415 Time Calculation (min): 60 minutes         Chief Complaint: Back Problem and Shoulder Injury    Visit Diagnoses     ICD-10-CM   1. Pain in joint, multiple sites M25.50   2. Chronic left shoulder pain M25.512    G89.29   3. Chronic midline low back pain without sciatica M54.5    G89.29         Subjective:   Pain:     Current pain ratin    Relieving factors:  Heating pad  Treatments:     Current treatment:  Heat  Activities of Daily Living:     Patient reported ADL status: Makes and designs jewelry, active in [employer] and [employer] club, active. Lives alone. She gets a little help with house cleaning . Camps , fishes, gold paining. Plays with grand son. Hard to get off the floor. pt's going blind so she takes RTC bus.   Patient Goals:     Patient goals for therapy:  Decreased pain and increased strength      Past Medical History:   Diagnosis Date   • Dyslipidemia 2016   • GERD (gastroesophageal reflux disease)    • Hepatitis 1970's    A based on labs done    • Hiatus hernia syndrome    • History of total hip replacement     L   • Hyperlipidemia    • Macular degeneration    • OA (osteoarthritis)     back, hip, shoulds, knees and hands   • S/P cataract extraction     bilat   • Wears dentures      Past Surgical History:   Procedure Laterality Date   • BLEPHAROPLASTY Bilateral  7/14/2015    Procedure: BLEPHAROPLASTY - UPPER;  Surgeon: Antonio Garcia M.D.;  Location: SURGERY SURGICAL [facility] ORS;  Service:    • CATARACT PHACO WITH IOL  3/13/2012    Performed by ANTONIO GARCIA at SURGERY SAME DAY [facility] ORS   • CATARACT PHACO WITH IOL  2/28/2012    Performed by ANTONIO GARCIA at SURGERY SAME DAY [facility] ORS   • HYSTERECTOMY, TOTAL ABDOMINAL     • OTHER ORTHOPEDIC SURGERY      left hip replacement   • TUBAL LIGATION       Social History   Substance Use Topics   • Smoking status: Never Smoker   • Smokeless tobacco: Never Used   • Alcohol use 0.0 oz/week      Comment: 2 drink a month     Family and Occupational History     Social History   • Marital status:      Spouse name: N/A   • Number of children: N/A   • Years of education: N/A       Objective     Postural Observations  Seated posture: poor  Standing posture: poor    Additional Postural Observation Details  Pt very kyphotic with head forward posture. She ambulates with 4 point can and flexed trunk.     Active Range of Motion   Left Shoulder   Normal active range of motion    Right Shoulder   Normal active range of motion    Lumbar   Flexion: within functional limits  Extension: decreased  Left lateral flexion: decreased  Right lateral flexion: decreased  Left rotation: decreased  Right rotation: decreased    Additional Active Range of Motion Details  Pt has pain end range flexion and hand behind back    Strength:      Additional Strength Details  MMT: LE's 3+/5, UE's 3+/5         Therapeutic Exercises (CPT 29995):       Therapeutic Exercise Summary: Pt started on mid rows, clam shells and isometric ab exercises.     Therapeutic Treatments and Modalities:     1. E Stim Unattended (CPT 82762), 15 minutes, IFC to lumbar spine with MHP      Assessment, Response and Plan:   Impairments: abnormal gait and impaired physical strength    Assessment details:  This is a 77 year old female with chronic lumbar pain and bilateral  shoulder pain. Pt deconditioned and should benefit from therapy to address pain and weakness issues.   Barriers to therapy:  Financial  Other barriers to therapy:  Legally blind  Prognosis: fair    Goals:       Long Term Goals:    1. Pt able to exhibit improved sitting and standing posture.  2. Pt able to walk 10 minutes without increase in back pain.   3. Pt independent with daily postural and strengthening exercises.   Long term goal time span:  4-6 weeks    Plan:   Planned therapy interventions:  Gait Training (CPT 42341), Therapeutic Exercise (CPT 81183) and Manual Therapy (CPT 11814)  Frequency:  1x week  Duration in weeks:  6      Referring provider co-signature:  I have reviewed this plan of care and my co-signature certifies the need for services.      Physician Signature: ________________________________ Date: ______________

## 2018-01-10 ENCOUNTER — APPOINTMENT (OUTPATIENT)
Dept: PHYSICAL THERAPY | Facility: REHABILITATION | Age: 78
End: 2018-01-10
Attending: INTERNAL MEDICINE
Payer: MEDICARE

## 2018-01-15 ENCOUNTER — OFFICE VISIT (OUTPATIENT)
Dept: INTERNAL MEDICINE | Facility: MEDICAL CENTER | Age: 78
End: 2018-01-15
Payer: MEDICARE

## 2018-01-15 VITALS
BODY MASS INDEX: 32.58 KG/M2 | HEART RATE: 71 BPM | SYSTOLIC BLOOD PRESSURE: 128 MMHG | WEIGHT: 151 LBS | HEIGHT: 57 IN | DIASTOLIC BLOOD PRESSURE: 69 MMHG | TEMPERATURE: 98.1 F | OXYGEN SATURATION: 94 %

## 2018-01-15 DIAGNOSIS — M25.512 CHRONIC PAIN OF BOTH SHOULDERS: ICD-10-CM

## 2018-01-15 DIAGNOSIS — M25.511 CHRONIC PAIN OF BOTH SHOULDERS: ICD-10-CM

## 2018-01-15 DIAGNOSIS — Z79.891 CHRONIC USE OF OPIATE DRUGS THERAPEUTIC PURPOSES: ICD-10-CM

## 2018-01-15 DIAGNOSIS — G89.29 CHRONIC PAIN OF BOTH SHOULDERS: ICD-10-CM

## 2018-01-15 DIAGNOSIS — M54.50 CHRONIC BILATERAL LOW BACK PAIN WITHOUT SCIATICA: ICD-10-CM

## 2018-01-15 DIAGNOSIS — G89.29 CHRONIC BILATERAL LOW BACK PAIN WITHOUT SCIATICA: ICD-10-CM

## 2018-01-15 PROCEDURE — 99214 OFFICE O/P EST MOD 30 MIN: CPT | Mod: GC | Performed by: INTERNAL MEDICINE

## 2018-01-15 RX ORDER — HYDROCODONE BITARTRATE AND ACETAMINOPHEN 5; 325 MG/1; MG/1
1 TABLET ORAL EVERY 8 HOURS PRN
Qty: 60 TAB | Refills: 0 | Status: SHIPPED | OUTPATIENT
Start: 2018-01-15 | End: 2018-02-14

## 2018-01-15 ASSESSMENT — ENCOUNTER SYMPTOMS
ABDOMINAL PAIN: 0
SORE THROAT: 0
FLANK PAIN: 0
BLOOD IN STOOL: 0
DIARRHEA: 1
FEVER: 0
NERVOUS/ANXIOUS: 0
CLAUDICATION: 0
INSOMNIA: 0
BACK PAIN: 1
ORTHOPNEA: 0
SENSORY CHANGE: 0
SHORTNESS OF BREATH: 0
TINGLING: 0
TREMORS: 0
MYALGIAS: 0
FOCAL WEAKNESS: 0
CHILLS: 0
HEMOPTYSIS: 0
CONSTIPATION: 0
PND: 0
NAUSEA: 0
SEIZURES: 0
SPEECH CHANGE: 0
PALPITATIONS: 0
BRUISES/BLEEDS EASILY: 0
WEIGHT LOSS: 0
NECK PAIN: 1
SPUTUM PRODUCTION: 0
DIAPHORESIS: 0
COUGH: 0
POLYDIPSIA: 0
LOSS OF CONSCIOUSNESS: 0
DEPRESSION: 0
STRIDOR: 0
HEARTBURN: 0
HEADACHES: 0
DIZZINESS: 0
VOMITING: 0
HALLUCINATIONS: 0
FALLS: 0
WEAKNESS: 0
MEMORY LOSS: 0
WHEEZING: 0

## 2018-01-15 ASSESSMENT — LIFESTYLE VARIABLES: SUBSTANCE_ABUSE: 0

## 2018-01-15 NOTE — PATIENT INSTRUCTIONS
Chronic Back Pain   When back pain lasts longer than 3 months, it is called chronic back pain. People with chronic back pain often go through certain periods that are more intense (flare-ups).   CAUSES  Chronic back pain can be caused by wear and tear (degeneration) on different structures in your back. These structures include:  · The bones of your spine (vertebrae) and the joints surrounding your spinal cord and nerve roots (facets).  · The strong, fibrous tissues that connect your vertebrae (ligaments).  Degeneration of these structures may result in pressure on your nerves. This can lead to constant pain.  HOME CARE INSTRUCTIONS  · Avoid bending, heavy lifting, prolonged sitting, and activities which make the problem worse.  · Take brief periods of rest throughout the day to reduce your pain. Lying down or standing usually is better than sitting while you are resting.  · Take over-the-counter or prescription medicines only as directed by your caregiver.  SEEK IMMEDIATE MEDICAL CARE IF:   · You have weakness or numbness in one of your legs or feet.  · You have trouble controlling your bladder or bowels.  · You have nausea, vomiting, abdominal pain, shortness of breath, or fainting.     This information is not intended to replace advice given to you by your health care provider. Make sure you discuss any questions you have with your health care provider.     Document Released: 01/25/2006 Document Revised: 03/11/2013 Document Reviewed: 12/01/2012  [employer] Interactive Patient Education ©2016 [employer]

## 2018-01-15 NOTE — PROGRESS NOTES
Established Patient    Guillermina presents today with the following:    CC: Pain med refill       HPI: Mrs. Recio is a pleasant 77 years old female with past medical history of hypertension, dyslipidemia, obesity, anemia, vitamin D deficiency, chronic back pain and Lt hip replacement presented to the clinic today to get her to follow her Norco, she is on Norco 5- 325mg.   Chronic pain recheck:   Last dose of controlled substance: today  Chronic pain treated with Norco taken 3 times a day when needed   Current alcohol or substance use: drinks alcohol 1-2 drinks per month       Consequences of Chronic Opiate therapy:  (5 A's)  Analgesia:  not changed  Activity:  not changed  Adverse Events:  none  Aberrant Behaviors: no inappropriate refills requested, lost or stolen meds reported.   Affect/Mood: Normal full facial expressions     Opioid Risk Score: 6     Interpretation of Opioid Risk Score   Score 0-3 = Low risk of abuse. Do UDS at least once per year.  Score 4-7 = Moderate risk of abuse. Do UDS 1-4 times per year.  Score 8+ = High risk of abuse. Refer to specialist.    Nonnarcotic treatments that are being used: None  Pain management agreement initiated/updated and signed on: 12/2017   Urine drug screen done: 12/2017 which was reviewed today and is consistent with prescribed medications.     I have reviewed the medical records, the Prescription Monitoring Program and I have determined that controlled substance treatment is medically indicated        Patient Active Problem List    Diagnosis Date Noted   • Chest pain 09/12/2017     Priority: Medium   • Obesity (BMI 30-39.9) 12/16/2016     Priority: Medium   • Abnormal stress test 12/16/2016     Priority: Medium   • Dyslipidemia 08/01/2016     Priority: Medium   • HTN (hypertension) 06/25/2009     Priority: Medium   • Chronic use of opiate drugs therapeutic purposes 07/07/2017     Priority: Low   • Chronic bilateral low back pain without sciatica 07/07/2017     Priority:  Low   • Daytime somnolence 12/16/2016     Priority: Low   • Insomnia 12/16/2016     Priority: Low   • Gastroesophageal reflux disease 12/16/2016     Priority: Low   • Vitamin D deficiency 12/16/2016     Priority: Low   • Chronic narcotic use 12/16/2016     Priority: Low   • Controlled substance agreement signed 12/16/2016     Priority: Low   • Chronic pain of both shoulders 12/16/2016     Priority: Low   • Chronic left-sided low back pain without sciatica 12/16/2016     Priority: Low   • Scalp itch 06/29/2016     Priority: Low   • Polypharmacy 06/29/2016     Priority: Low   • Dermatochalasis 07/14/2015     Priority: Low   • VAGINAL LESION 11/10/2009     Priority: Low   • Pelvic pain 11/10/2009     Priority: Low   • Osteopenia 06/25/2009     Priority: Low   • Dysuria 06/25/2009     Priority: Low   • Anemia 06/25/2009     Priority: Low   • Hip pain 06/25/2009     Priority: Low   • Elbow pain 06/25/2009     Priority: Low   • Health care maintenance 10/30/2017       Current Outpatient Prescriptions   Medication Sig Dispense Refill   • hydrocodone-acetaminophen (NORCO) 5-325 MG Tab per tablet Take 1 Tab by mouth every 8 hours as needed for up to 30 days. 60 Tab 0   • lovastatin (MEVACOR) 40 MG tablet TAKE 1 TABLET BY MOUTH AT BEDTIME 90 Tab 3   • Ascorbic Acid (VITAMIN C) 1000 MG Tab Take  by mouth as needed (When feeling like if she starting a cold she will take 3/week).     • ketoconazole (NIZORAL) 2 % shampoo Apply 5 to 10 mL to wet scalp, lather, leave on 3 to 5 minutes, and rinse; apply twice weekly for 2 to 4 weeks 1 Bottle 3   • pantoprazole (PROTONIX) 20 MG tablet TAKE 1 TABLET BY MOUTH EVERY DAY 90 Tab 3   • Multiple Vitamins-Minerals (CENTRUM SILVER PO) Take  by mouth every day.     • sertraline (ZOLOFT) 50 MG Tab Take 1 Tab by mouth every day. 30 Tab 0   • aspirin 81 MG tablet Take 81 mg by mouth every day.     • NON SPECIFIED OTC caffeine stimulate daily     • Vitamin D, Cholecalciferol, 1000 UNITS Tab Take 1  "Tab by mouth every day.       No current facility-administered medications for this visit.        ROS: As per HPI. Additional pertinent symptoms as noted below.  Review of Systems   Constitutional: Negative for chills, diaphoresis, fever, malaise/fatigue and weight loss.   HENT: Positive for hearing loss. Negative for congestion, ear discharge, ear pain, nosebleeds, sore throat and tinnitus.    Eyes:        Partial blindness   Respiratory: Negative for cough, hemoptysis, sputum production, shortness of breath, wheezing and stridor.    Cardiovascular: Negative for chest pain, palpitations, orthopnea, claudication, leg swelling and PND.   Gastrointestinal: Positive for diarrhea (intermittent, base line for the last 4 years). Negative for abdominal pain, blood in stool, constipation, heartburn, melena, nausea and vomiting.   Genitourinary: Negative for dysuria, flank pain, frequency, hematuria and urgency.   Musculoskeletal: Positive for back pain, joint pain and neck pain. Negative for falls and myalgias.   Skin: Negative for itching and rash.   Neurological: Negative for dizziness, tingling, tremors, sensory change, speech change, focal weakness, seizures, loss of consciousness, weakness and headaches.   Endo/Heme/Allergies: Negative for environmental allergies and polydipsia. Does not bruise/bleed easily.   Psychiatric/Behavioral: Negative for depression, hallucinations, memory loss, substance abuse and suicidal ideas. The patient is not nervous/anxious and does not have insomnia.          /69   Pulse 71   Temp 36.7 °C (98.1 °F)   Ht 1.454 m (4' 9.25\")   Wt 68.5 kg (151 lb)   SpO2 94%   BMI 32.39 kg/m²     Physical Exam   Constitutional: She is well-developed, well-nourished, and in no distress. No distress.   HENT:   Head: Normocephalic and atraumatic.   Eyes: Pupils are equal, round, and reactive to light.   Neck: Normal range of motion. No thyromegaly present.   Cardiovascular: Normal rate.  Exam " reveals no gallop and no friction rub.    No murmur heard.  Pulmonary/Chest: Effort normal. No respiratory distress. She has no wheezes. She has no rales.   Abdominal: Soft. She exhibits no distension. There is no tenderness. There is no rebound.   Musculoskeletal:        Left hip: She exhibits normal range of motion and normal strength.        Lumbar back: She exhibits decreased range of motion and pain. She exhibits no tenderness, no bony tenderness, no swelling, no edema, no deformity and no laceration.   Lymphadenopathy:     She has no cervical adenopathy.   Skin: She is not diaphoretic.     Assessment and Plan    1. Chronic bilateral low back pain without sciatica  MRI l spine done in 2013 showed DJD/DDD  Repeat Xrays done in 2017 showed DJD/DDD  [facility] drug screen reviewed in 12/2017 and is consistent with patient prescribed  meds .  Controlled substance agreement done today  Opioid Risk Score: 6  I [place] the [place] or [facility] records  Last filled 12/11/2017.  Wrote for narcotics today.     2. Chronic pain of both shoulders  Shoulder Xrays 2016 showed OA  [facility] drug screen reviewed in 12/2017 and is consistent with patient prescribed  meds .  Controlled substance agreement done today   Opioid Risk Score: 6  I reviewed the patient's [place] Prescription Monitoring Program or [facility] records  Last filled 12/11/2017.  Wrote for narcotics today.       3. Chronic use of opiate drugs therapeutic purposes  MRI l spine done in 2013 showed DJD/DDD  Repeat Xrays done in 2017 showed DJD/DDD  [facility] drug screen reviewed in 12/2017 and is consistent with patient prescribed  meds .  Controlled substance agreement done today  Opioid Risk Score: 6  I reviewed the patient's [place] Prescription Monitoring Program or [facility] records  Last filled 12/11/2017.  Wrote for narcotics today.           Signed by: Ava Vega M.D.

## 2018-01-17 ENCOUNTER — PHYSICAL THERAPY (OUTPATIENT)
Dept: PHYSICAL THERAPY | Facility: REHABILITATION | Age: 78
End: 2018-01-17
Attending: INTERNAL MEDICINE
Payer: MEDICARE

## 2018-01-17 DIAGNOSIS — M25.50 PAIN IN JOINT, MULTIPLE SITES: ICD-10-CM

## 2018-01-17 PROCEDURE — 97140 MANUAL THERAPY 1/> REGIONS: CPT

## 2018-01-17 PROCEDURE — 97014 ELECTRIC STIMULATION THERAPY: CPT

## 2018-01-17 PROCEDURE — 97110 THERAPEUTIC EXERCISES: CPT

## 2018-01-17 NOTE — OP THERAPY DAILY TREATMENT
Outpatient Physical Therapy  DAILY TREATMENT     [facility] Physical Therapy [place] [address] [address]  [address]  [place] [address]  Phone:  [phone]  Fax:  [phone]    Date: 01/17/2018    Patient: Guillermina Recio  YOB: 1940  MRN: [id number]     Time Calculation    2:15  3  45 minutes     Chief Complaint: shoulder pain  Visit #: 2    SUBJECTIVE:  Pain same c/o.     OBJECTIVE:      Therapeutic Exercises (CPT 86890):     Total treatment time spent on Therapeutic Exercises: 15 minutes.      1. Nustep L2 5 minutes    2. Pulleys 3 minutes     3. Supine cane flexion     Therapeutic Treatments and Modalities:     1. Manual Therapy (CPT 10011), 14 minutes, c/s manual traction, upper trap stw, g/h obs    2. E Stim Unattended (CPT 76356), 15 minutes, ifc and MHP to right shoulder and c/s     Therapeutic Treatment and Modalities Summary: Instructed pt to work on posture and scap retraction when sitting.       ASSESSMENT:   Pt did well with exercises and manual work.   PLAN/RECOMMENDATIONS:   Cont to progress postural exercises as tolerated.

## 2018-01-24 ENCOUNTER — PHYSICAL THERAPY (OUTPATIENT)
Dept: PHYSICAL THERAPY | Facility: REHABILITATION | Age: 78
End: 2018-01-24
Attending: INTERNAL MEDICINE
Payer: MEDICARE

## 2018-01-24 DIAGNOSIS — M25.50 PAIN IN JOINT, MULTIPLE SITES: ICD-10-CM

## 2018-01-24 PROCEDURE — 97110 THERAPEUTIC EXERCISES: CPT

## 2018-01-24 PROCEDURE — 97140 MANUAL THERAPY 1/> REGIONS: CPT

## 2018-01-24 PROCEDURE — 97014 ELECTRIC STIMULATION THERAPY: CPT

## 2018-01-24 NOTE — OP THERAPY DAILY TREATMENT
Outpatient Physical Therapy  DAILY TREATMENT     [facility] Physical Therapy [address]  [address]  [place] [address]  Phone:  [phone]  Fax:  [phone]    Date: 01/24/2018    Patient: Guillermina Recio  YOB: 1940  MRN: [id number]     Time Calculation  Start time: 0215  Stop time: 0302 Time Calculation (min): 47 minutes     Chief Complaint: shoulder pain  Visit #: 3    SUBJECTIVE:  No new c/o    OBJECTIVE:    Therapeutic Exercises (CPT 96161):     Total treatment time spent on Therapeutic Exercises: 18 minutes.      1. Nustep L2 5 minutes    2. Pulleys 3 minutes     3. Supine cane flexion     4. Mid rows and pull downs with orange tband     5. Hs curls    6. Bridging     7. Hip abd with tband    Therapeutic Treatments and Modalities:     1. Manual Therapy (CPT 58081), 14 minutes, c/s manual traction, upper trap stw, g/h obs    2. E Stim Unattended (CPT 24434), 15 minutes, ifc and MHP to right shoulder and c/s       ASSESSMENT:   Response to treatment: no c/o    PLAN/RECOMMENDATIONS:   Continue to progress core exercises as tolerated.

## 2018-01-31 ENCOUNTER — PHYSICAL THERAPY (OUTPATIENT)
Dept: PHYSICAL THERAPY | Facility: REHABILITATION | Age: 78
End: 2018-01-31
Attending: INTERNAL MEDICINE
Payer: MEDICARE

## 2018-01-31 DIAGNOSIS — M25.512 CHRONIC LEFT SHOULDER PAIN: ICD-10-CM

## 2018-01-31 DIAGNOSIS — G89.29 CHRONIC LEFT SHOULDER PAIN: ICD-10-CM

## 2018-01-31 DIAGNOSIS — M25.50 PAIN IN JOINT, MULTIPLE SITES: ICD-10-CM

## 2018-01-31 PROCEDURE — 97110 THERAPEUTIC EXERCISES: CPT

## 2018-01-31 PROCEDURE — 97014 ELECTRIC STIMULATION THERAPY: CPT

## 2018-01-31 PROCEDURE — 97140 MANUAL THERAPY 1/> REGIONS: CPT

## 2018-01-31 NOTE — OP THERAPY DAILY TREATMENT
Outpatient Physical Therapy  DAILY TREATMENT     [facility] Physical Therapy [place] [address] [address]  [address]  [place] [address]  Phone:  [phone]  Fax:  [phone]    Date: 01/31/2018    Patient: Guillermina Recio  YOB: 1940  MRN: [id number]     Time Calculation  Start time: 0215  Stop time: 0256 Time Calculation (min): 41 minutes     Chief Complaint: shoulder pain  Visit #: 4    SUBJECTIVE:  I'm feeling a little better.     OBJECTIVE:    Therapeutic Exercises (CPT 86534):     Total treatment time spent on Therapeutic Exercises: 18 minutes.      1. Nustep L2 5 minutes    2. Pulleys 3 minutes     3. Supine cane flexion     4. Mid rows and pull downs with orange tband     5. Hs curls    6. Bridging     Therapeutic Treatments and Modalities:     1. Manual Therapy (CPT 60982), 14 minutes, c/s manual traction, upper trap stw, g/h obs    2. E Stim Unattended (CPT 56737), 15 minutes, ifc and MHP to right shoulder and c/s       ASSESSMENT:   Pt is improving gradually with treatment.     PLAN/RECOMMENDATIONS:   Continue with ther ex and manual treatment.

## 2018-02-07 ENCOUNTER — PHYSICAL THERAPY (OUTPATIENT)
Dept: PHYSICAL THERAPY | Facility: REHABILITATION | Age: 78
End: 2018-02-07
Attending: INTERNAL MEDICINE
Payer: MEDICARE

## 2018-02-07 DIAGNOSIS — M54.50 CHRONIC MIDLINE LOW BACK PAIN WITHOUT SCIATICA: ICD-10-CM

## 2018-02-07 DIAGNOSIS — G89.29 CHRONIC LEFT SHOULDER PAIN: ICD-10-CM

## 2018-02-07 DIAGNOSIS — G89.29 CHRONIC MIDLINE LOW BACK PAIN WITHOUT SCIATICA: ICD-10-CM

## 2018-02-07 DIAGNOSIS — M25.50 PAIN IN JOINT, MULTIPLE SITES: ICD-10-CM

## 2018-02-07 DIAGNOSIS — M25.512 CHRONIC LEFT SHOULDER PAIN: ICD-10-CM

## 2018-02-07 PROCEDURE — 97110 THERAPEUTIC EXERCISES: CPT

## 2018-02-07 PROCEDURE — 97140 MANUAL THERAPY 1/> REGIONS: CPT

## 2018-02-07 PROCEDURE — 97014 ELECTRIC STIMULATION THERAPY: CPT

## 2018-02-07 NOTE — OP THERAPY DAILY TREATMENT
Outpatient Physical Therapy  DAILY TREATMENT     [facility] Physical Therapy [address]  [address]  [place] [address]  Phone:  [phone]  Fax:  [phone]    Date: 02/07/2018    Patient: Guillermina Recio  YOB: 1940  MRN: [id number]     Time Calculation  Start time: 0145  Stop time: 0232 Time Calculation (min): 47 minutes     Chief Complaint: shoulder pain and back pain.   Visit #: 5    SUBJECTIVE:  My back is a little sore today.     OBJECTIVE:    Therapeutic Exercises (CPT 46482):     Total treatment time spent on Therapeutic Exercises: 20 minutes.      1. Nustep L2 7 minutes    2. Pulleys 3 minutes     3. Supine cane flexion     4. Mid rows and pull downs with orange tband     5. Hs curls    6. Bridging     7. Shuttle 4c, 30x    Therapeutic Treatments and Modalities:     1. Manual Therapy (CPT 75197), 14 minutes, c/s manual traction, upper trap stw, g/h obs    2. E Stim Unattended (CPT 32063), 15 minutes, ifc and MHP to right shoulder and c/s       ASSESSMENT:   Response to treatment: decrease in Low back pain.     PLAN/RECOMMENDATIONS:   Plan for treatment: continue to progress core and postural exercises.

## 2018-02-14 ENCOUNTER — PHYSICAL THERAPY (OUTPATIENT)
Dept: PHYSICAL THERAPY | Facility: REHABILITATION | Age: 78
End: 2018-02-14
Attending: INTERNAL MEDICINE
Payer: MEDICARE

## 2018-02-14 DIAGNOSIS — M25.512 CHRONIC LEFT SHOULDER PAIN: ICD-10-CM

## 2018-02-14 DIAGNOSIS — M25.50 PAIN IN JOINT, MULTIPLE SITES: ICD-10-CM

## 2018-02-14 DIAGNOSIS — G89.29 CHRONIC LEFT SHOULDER PAIN: ICD-10-CM

## 2018-02-14 PROCEDURE — 97014 ELECTRIC STIMULATION THERAPY: CPT

## 2018-02-14 PROCEDURE — 97140 MANUAL THERAPY 1/> REGIONS: CPT

## 2018-02-14 PROCEDURE — 97110 THERAPEUTIC EXERCISES: CPT

## 2018-02-14 NOTE — OP THERAPY DAILY TREATMENT
Outpatient Physical Therapy  DAILY TREATMENT     [facility] Physical Therapy [place] [address] [address]  [address]  [place] [address]  Phone:  [phone]  Fax:  [phone]    Date: 02/14/2018    Patient: Guillermina Recio  YOB: 1940  MRN: [id number]     Time Calculation  Start time: 1115  Stop time: 1200 Time Calculation (min): 45 minutes     Chief Complaint:   Visit #: 6    SUBJECTIVE:  No new c/o. I'm felling a little better overall.     OBJECTIVE:    Therapeutic Exercises (CPT 98283):     Total treatment time spent on Therapeutic Exercises: 20 minutes.      1. Nustep L3 10 minutes    2. Pulleys 3 minutes     3. Supine cane flexion     4. Mid rows and pull downs with orange tband     5. Hs curls    6. Bridging     7. Shuttle 4c, 30x    Therapeutic Treatments and Modalities:     1. Manual Therapy (CPT 07770), 14 minutes, c/s manual traction, upper trap stw, g/h obs    2. E Stim Unattended (CPT 98360), 15 minutes, ifc and MHP to right shoulder and c/s       ASSESSMENT:   pt's exercise tolerance is improving gradually.     PLAN/RECOMMENDATIONS:     Continue with ther ex, mobs, and modalities for 3-4 more sessions.

## 2018-02-21 ENCOUNTER — PHYSICAL THERAPY (OUTPATIENT)
Dept: PHYSICAL THERAPY | Facility: REHABILITATION | Age: 78
End: 2018-02-21
Attending: INTERNAL MEDICINE
Payer: MEDICARE

## 2018-02-21 DIAGNOSIS — M25.50 PAIN IN JOINT, MULTIPLE SITES: ICD-10-CM

## 2018-02-21 DIAGNOSIS — G89.29 CHRONIC LEFT SHOULDER PAIN: ICD-10-CM

## 2018-02-21 DIAGNOSIS — M25.512 CHRONIC LEFT SHOULDER PAIN: ICD-10-CM

## 2018-02-21 PROCEDURE — 97014 ELECTRIC STIMULATION THERAPY: CPT

## 2018-02-27 ENCOUNTER — OFFICE VISIT (OUTPATIENT)
Dept: INTERNAL MEDICINE | Facility: MEDICAL CENTER | Age: 78
End: 2018-02-27
Payer: MEDICARE

## 2018-02-27 VITALS
BODY MASS INDEX: 33.14 KG/M2 | DIASTOLIC BLOOD PRESSURE: 88 MMHG | SYSTOLIC BLOOD PRESSURE: 146 MMHG | WEIGHT: 153.6 LBS | HEIGHT: 57 IN | HEART RATE: 80 BPM | RESPIRATION RATE: 14 BRPM | OXYGEN SATURATION: 99 % | TEMPERATURE: 97.5 F

## 2018-02-27 DIAGNOSIS — K21.9 GASTROESOPHAGEAL REFLUX DISEASE, ESOPHAGITIS PRESENCE NOT SPECIFIED: ICD-10-CM

## 2018-02-27 DIAGNOSIS — E78.5 DYSLIPIDEMIA: ICD-10-CM

## 2018-02-27 DIAGNOSIS — F11.90 CHRONIC NARCOTIC USE: ICD-10-CM

## 2018-02-27 DIAGNOSIS — M54.50 CHRONIC BILATERAL LOW BACK PAIN WITHOUT SCIATICA: ICD-10-CM

## 2018-02-27 DIAGNOSIS — G89.29 CHRONIC BILATERAL LOW BACK PAIN WITHOUT SCIATICA: ICD-10-CM

## 2018-02-27 DIAGNOSIS — I10 ESSENTIAL HYPERTENSION: ICD-10-CM

## 2018-02-27 PROCEDURE — 99214 OFFICE O/P EST MOD 30 MIN: CPT | Mod: GC | Performed by: INTERNAL MEDICINE

## 2018-02-27 RX ORDER — HYDROCODONE BITARTRATE AND ACETAMINOPHEN 5; 325 MG/1; MG/1
1 TABLET ORAL EVERY 6 HOURS PRN
Qty: 60 TAB | Refills: 0 | Status: SHIPPED | OUTPATIENT
Start: 2018-02-27 | End: 2018-03-29

## 2018-02-27 ASSESSMENT — PAIN SCALES - GENERAL: PAINLEVEL: NO PAIN

## 2018-02-27 NOTE — PATIENT INSTRUCTIONS
Back Pain, Adult  Back pain is very common in adults. The cause of back pain is rarely dangerous and the pain often gets better over time. The cause of your back pain may not be known. Some common causes of back pain include:  · Strain of the muscles or ligaments supporting the spine.  · Wear and tear (degeneration) of the spinal disks.  · Arthritis.  · Direct injury to the back.  For many people, back pain may return. Since back pain is rarely dangerous, most people can learn to manage this condition on their own.  HOME CARE INSTRUCTIONS  Watch your back pain for any changes. The following actions may help to lessen any discomfort you are feeling:  · Remain active. It is stressful on your back to sit or  one place for long periods of time. Do not sit, drive, or  one place for more than 30 minutes at a time. Take short walks on even surfaces as soon as you are able. Try to increase the length of time you walk each day.  · Exercise regularly as directed by your health care provider. Exercise helps your back heal faster. It also helps avoid future injury by keeping your muscles strong and flexible.  · Do not stay in bed. Resting more than 1-2 days can delay your recovery.  · Pay attention to your body when you bend and lift. The most comfortable positions are those that put less stress on your recovering back. Always use proper lifting techniques, including:  ¨ Bending your knees.  ¨ Keeping the load close to your body.  ¨ Avoiding twisting.  · Find a comfortable position to sleep. Use a firm mattress and lie on your side with your knees slightly bent. If you lie on your back, put a pillow under your knees.  · Avoid feeling anxious or stressed. Stress increases muscle tension and can worsen back pain. It is important to recognize when you are anxious or stressed and learn ways to manage it, such as with exercise.  · Take medicines only as directed by your health care provider. Over-the-counter  medicines to reduce pain and inflammation are often the most helpful. Your health care provider may prescribe muscle relaxant drugs. These medicines help dull your pain so you can more quickly return to your normal activities and healthy exercise.  · Apply ice to the injured area:  ¨ Put ice in a plastic bag.  ¨ Place a towel between your skin and the bag.  ¨ Leave the ice on for 20 minutes, 2-3 times a day for the first 2-3 days. After that, ice and heat may be alternated to reduce pain and spasms.  · Maintain a healthy weight. Excess weight puts extra stress on your back and makes it difficult to maintain good posture.  SEEK MEDICAL CARE IF:  · You have pain that is not relieved with rest or medicine.  · You have increasing pain going down into the legs or buttocks.  · You have pain that does not improve in one week.  · You have night pain.  · You lose weight.  · You have a fever or chills.  SEEK IMMEDIATE MEDICAL CARE IF:   · You develop new bowel or bladder control problems.  · You have unusual weakness or numbness in your arms or legs.  · You develop nausea or vomiting.  · You develop abdominal pain.  · You feel faint.     This information is not intended to replace advice given to you by your health care provider. Make sure you discuss any questions you have with your health care provider.     Document Released: 12/18/2006 Document Revised: 01/08/2016 Document Reviewed: 04/21/2015  [employer] Interactive Patient Education ©2016 [employer]

## 2018-02-27 NOTE — PROGRESS NOTES
Established Patient    Guillermina presents today with the following:    CC: Pain med refill       HPI: Mrs. Recio is a pleasant 77 years old female with past medical history of hypertension, dyslipidemia, obesity, anemia, vitamin D deficiency, chronic back pain and Lt hip replacement presented to the clinic today to get her to follow her Norco, she is on Norco 5- 325mg from her primary provider Dr. Molina  She presented to our clinic today for her monthly refill. We reviewed her Epochheck system and there was no new red flag found by us regarding her pain meds use. She report some intermittent distention (gas related) in the belly, otherwise, she has no side effect of her pain meds. The patient also asked for mammogram survey, which the patient and us decided to leave the discussion of mammogram to Dr. Molina.   She has no other subjective complaints in office and the patient has stable vitals/activity when we saw her.       Chronic pain recheck:   Last dose of controlled substance: yesterday  Chronic pain treated with Norco when needed, on average, she used 2 pills per day.    Current alcohol or substance use: drinks alcohol 1-2 drinks per month       Consequences of Chronic Opiate therapy:  (5 A's)  Analgesia:  Getting better when taking meds.   Activity:  not changed  Adverse Events:  Some intermittent irregular bowel movement, but not major enough to affect her life.   Aberrant Behaviors: no inappropriate refills requested, lost or stolen meds reported.   Affect/Mood: Normal full facial expressions             Patient Active Problem List    Diagnosis Date Noted   • Chest pain 09/12/2017     Priority: Medium   • Obesity (BMI 30-39.9) 12/16/2016     Priority: Medium   • Abnormal stress test 12/16/2016     Priority: Medium   • Dyslipidemia 08/01/2016     Priority: Medium   • HTN (hypertension) 06/25/2009     Priority: Medium   • Chronic use of opiate drugs therapeutic purposes 07/07/2017     Priority: Low   • Chronic  bilateral low back pain without sciatica 07/07/2017     Priority: Low   • Daytime somnolence 12/16/2016     Priority: Low   • Insomnia 12/16/2016     Priority: Low   • Gastroesophageal reflux disease 12/16/2016     Priority: Low   • Vitamin D deficiency 12/16/2016     Priority: Low   • Chronic narcotic use 12/16/2016     Priority: Low   • Controlled substance agreement signed 12/16/2016     Priority: Low   • Chronic pain of both shoulders 12/16/2016     Priority: Low   • Chronic left-sided low back pain without sciatica 12/16/2016     Priority: Low   • Scalp itch 06/29/2016     Priority: Low   • Polypharmacy 06/29/2016     Priority: Low   • Dermatochalasis 07/14/2015     Priority: Low   • VAGINAL LESION 11/10/2009     Priority: Low   • Pelvic pain 11/10/2009     Priority: Low   • Osteopenia 06/25/2009     Priority: Low   • Dysuria 06/25/2009     Priority: Low   • Anemia 06/25/2009     Priority: Low   • Hip pain 06/25/2009     Priority: Low   • Elbow pain 06/25/2009     Priority: Low   • Preventative health care 10/30/2017       Current Outpatient Prescriptions   Medication Sig Dispense Refill   • HYDROcodone-acetaminophen (NORCO) 5-325 MG Tab per tablet Take 1 Tab by mouth every 6 hours as needed for up to 30 days. 60 Tab 0   • lovastatin (MEVACOR) 40 MG tablet TAKE 1 TABLET BY MOUTH AT BEDTIME 90 Tab 3   • Ascorbic Acid (VITAMIN C) 1000 MG Tab Take  by mouth as needed (When feeling like if she starting a cold she will take 3/week).     • ketoconazole (NIZORAL) 2 % shampoo Apply 5 to 10 mL to wet scalp, lather, leave on 3 to 5 minutes, and rinse; apply twice weekly for 2 to 4 weeks 1 Bottle 3   • sertraline (ZOLOFT) 50 MG Tab Take 1 Tab by mouth every day. 30 Tab 0   • aspirin 81 MG tablet Take 81 mg by mouth every day.     • pantoprazole (PROTONIX) 20 MG tablet TAKE 1 TABLET BY MOUTH EVERY DAY 90 Tab 3   • Multiple Vitamins-Minerals (CENTRUM SILVER PO) Take  by mouth every day.     • NON SPECIFIED OTC caffeine  "stimulate daily     • Vitamin D, Cholecalciferol, 1000 UNITS Tab Take 1 Tab by mouth every day.       No current facility-administered medications for this visit.        ROS: As per HPI. Additional pertinent symptoms as noted below.  Constitutional: Denies fevers.  Eyes: Difficult hearing and poor vision   Ears/Nose/Throat/Mouth: Denies nasal congestion or sore throat   Cardiovascular: Denies chest pain or palpitations   Respiratory: Denies shortness of breath , Denies cough  Gastrointestinal/Hepatic: Some gas in the belly.   Genitourinary: Denies bladder dysfunction, dysuria or frequency  Musculoskeletal/Rheum: No complaints   Skin/Breast: Denies rash   Neurological: Denies headache. Denied focal weakness/parasthesias  Psychiatric: Stable mood.   All other systems were reviewed and are negative (AMA/CMS criteria)      /88   Pulse 80   Temp 36.4 °C (97.5 °F)   Resp 14   Ht 1.454 m (4' 9.25\")   Wt 69.7 kg (153 lb 9.6 oz)   SpO2 99%   BMI 32.95 kg/m²     Physical Exam   HEENT: grossly normal   Cardiovascular: Normal heart rate, Normal rhythm   Lungs: Respiratory effort is normal. Normal breath sounds  Abdomen: Bowel sounds normal, Soft, No tenderness  Skin: No erythema, No rash  Lower limbs: normal, no pitting edema   Neurologic: Alert & oriented x 3, Normal motor function, Normal sensory function, No focal deficits noted, cranial nerves II through XII are normal  PSY: stable mood.   Other systems also examined, grossly normal.   Lower back: She exhibits decreased range of motion and pain when moving, not much changed compared to her baseline.      Assessment and Plan  Problem List Items Addressed This Visit     HTN (hypertension)  - stable at clinic, no change of her management.       Dyslipidemia  - Last lipid profile in Nov 2017, followed by Dr. Molina, no change from this visit.       Gastroesophageal reflux disease  - Stable, still feels some acid from time to time, good response to anti-acid meds. " Keep her PPI for now      Chronic bilateral low back pain without sciatica  Chronic narcotic use  - There is no major change regarding the characteristics of her pain.   - She is taking 60pills / 30day as Dr. Mloina provided   - No new red flag noted, no issue from the NarXcheck  - No new focal neurologic finding    Relevant Medications    HYDROcodone-acetaminophen (NORCO) 5-325 MG Tab per tablet   The patient will see Dr. Molina on April 5 for preventive care and long-term management of her lower back pain.          Signed by: López William M.D.

## 2018-02-28 ENCOUNTER — PHYSICAL THERAPY (OUTPATIENT)
Dept: PHYSICAL THERAPY | Facility: REHABILITATION | Age: 78
End: 2018-02-28
Attending: INTERNAL MEDICINE
Payer: MEDICARE

## 2018-02-28 DIAGNOSIS — M54.50 CHRONIC MIDLINE LOW BACK PAIN WITHOUT SCIATICA: ICD-10-CM

## 2018-02-28 DIAGNOSIS — M25.50 PAIN IN JOINT, MULTIPLE SITES: ICD-10-CM

## 2018-02-28 DIAGNOSIS — M25.512 CHRONIC LEFT SHOULDER PAIN: ICD-10-CM

## 2018-02-28 DIAGNOSIS — G89.29 CHRONIC LEFT SHOULDER PAIN: ICD-10-CM

## 2018-02-28 DIAGNOSIS — G89.29 CHRONIC MIDLINE LOW BACK PAIN WITHOUT SCIATICA: ICD-10-CM

## 2018-02-28 PROCEDURE — 97110 THERAPEUTIC EXERCISES: CPT

## 2018-02-28 PROCEDURE — 97014 ELECTRIC STIMULATION THERAPY: CPT

## 2018-02-28 PROCEDURE — 97140 MANUAL THERAPY 1/> REGIONS: CPT

## 2018-02-28 NOTE — OP THERAPY DAILY TREATMENT
Outpatient Physical Therapy  DAILY TREATMENT     [facility] Therapy [address].  [address]  [place] [address]  Phone:  [phone]  Fax:  [phone]    Date: 02/28/2018    Patient: Guillermina Recio  YOB: 1940  MRN: [id number]     Time Calculation  Start time: 0115  Stop time: 0215 Time Calculation (min): 60 minutes     Chief Complaint: shoulder pain   Visit #: 8    SUBJECTIVE:  I'm feeling a little stronger overall.     OBJECTIVE:    Therapeutic Exercises (CPT 63236):     1. Nustep L3 15 minutes    2. Pulleys 3 minutes     3. Supine cane flexion     4. Mid rows and pull downs with pink tband     5. Hs curls    6. Bridging     7. Shuttle 4c, 30x    Therapeutic Treatments and Modalities:     1. Manual Therapy (CPT 70293), c/s manual traction, upper trap stw, g/h obs    2. E Stim Unattended (CPT 59574), ifc and MHP to right shoulder and c/s     Time-based treatments/modalities:  Manual therapy minutes (CPT 68623): 15 minutes  Therapeutic exercise minutes (CPT 65707): 26 minutes    ASSESSMENT:   Pt is tolerating exercises well overall.     PLAN/RECOMMENDATIONS:   Continue to progress core exercises and postural exercises.

## 2018-03-07 ENCOUNTER — PHYSICAL THERAPY (OUTPATIENT)
Dept: PHYSICAL THERAPY | Facility: REHABILITATION | Age: 78
End: 2018-03-07
Attending: INTERNAL MEDICINE
Payer: MEDICARE

## 2018-03-07 DIAGNOSIS — M25.50 PAIN IN JOINT, MULTIPLE SITES: ICD-10-CM

## 2018-03-07 DIAGNOSIS — M54.50 CHRONIC MIDLINE LOW BACK PAIN WITHOUT SCIATICA: ICD-10-CM

## 2018-03-07 DIAGNOSIS — M25.512 CHRONIC LEFT SHOULDER PAIN: ICD-10-CM

## 2018-03-07 DIAGNOSIS — G89.29 CHRONIC LEFT SHOULDER PAIN: ICD-10-CM

## 2018-03-07 DIAGNOSIS — G89.29 CHRONIC MIDLINE LOW BACK PAIN WITHOUT SCIATICA: ICD-10-CM

## 2018-03-07 PROCEDURE — 97014 ELECTRIC STIMULATION THERAPY: CPT

## 2018-03-07 NOTE — OP THERAPY DAILY TREATMENT
Outpatient Physical Therapy  DAILY TREATMENT     [facility] Therapy [address]  [address]  [place] [address]  Phone:  [phone]  Fax:  [phone]    Date: 03/07/2018    Patient: Guillermina Recio  YOB: 1940  MRN: [id number]     Time Calculation  Start time: 0200  Stop time: 0305 Time Calculation (min): 65 minutes     Chief Complaint: back pain and shoulder pain.   Visit #: 9    SUBJECTIVE:  My hips are feeling a little stiff today.     OBJECTIVE:    Therapeutic Exercises (CPT 63568):     1. Nustep L3 20 minutes, 1 mile     2. Pulleys 3 minutes     3. Supine cane flexion     4. Mid rows and pull downs with pink tband     5. Hs curls    6. Bridging     7. Shuttle 4c, 30x    Therapeutic Treatments and Modalities:     1. Manual Therapy (CPT 92360), c/s manual traction, upper trap stw, g/h obs    2. E Stim Unattended (CPT 70426), ifc and MHP to right shoulder and c/s     Time-based treatments/modalities:  Manual therapy minutes (CPT 07030): 15 minutes  Therapeutic exercise minutes (CPT 27281): 42 minutes    ASSESSMENT:   pt's exercise tolerance improving.     PLAN/RECOMMENDATIONS:   Continue to progress strengthening exercises.

## 2018-03-14 ENCOUNTER — PHYSICAL THERAPY (OUTPATIENT)
Dept: PHYSICAL THERAPY | Facility: REHABILITATION | Age: 78
End: 2018-03-14
Attending: INTERNAL MEDICINE
Payer: MEDICARE

## 2018-03-14 DIAGNOSIS — G89.29 CHRONIC LEFT SHOULDER PAIN: ICD-10-CM

## 2018-03-14 DIAGNOSIS — M25.50 PAIN IN JOINT, MULTIPLE SITES: ICD-10-CM

## 2018-03-14 DIAGNOSIS — M54.50 CHRONIC MIDLINE LOW BACK PAIN WITHOUT SCIATICA: ICD-10-CM

## 2018-03-14 DIAGNOSIS — M25.512 CHRONIC LEFT SHOULDER PAIN: ICD-10-CM

## 2018-03-14 DIAGNOSIS — G89.29 CHRONIC MIDLINE LOW BACK PAIN WITHOUT SCIATICA: ICD-10-CM

## 2018-03-14 PROCEDURE — 97140 MANUAL THERAPY 1/> REGIONS: CPT

## 2018-03-14 PROCEDURE — 97014 ELECTRIC STIMULATION THERAPY: CPT

## 2018-03-14 PROCEDURE — 97110 THERAPEUTIC EXERCISES: CPT

## 2018-03-14 NOTE — OP THERAPY DAILY TREATMENT
Outpatient Physical Therapy  DAILY TREATMENT     [facility] Physical Therapy [address].  [address]  [place] [address]  Phone:  [phone]  Fax:  [phone]    Date: 03/14/2018    Patient: Guillermina Recio  YOB: 1940  MRN: [id number]     Time Calculation  Start time: 0215  Stop time: 0305 Time Calculation (min): 50 minutes     Chief Complaint: neck and shoulder pain back pain   Visit #: 10    SUBJECTIVE:  Pt states low back is sore today.     OBJECTIVE:    Therapeutic Exercises (CPT 25577):     1. Nustep L3 15 minutes, 1 mile     2. Pulleys 3 minutes     3. Hip abduction with tband    4. Mid rows and pull downs with pink tband     5. Hs curls, 20    6. Bridging , 20    7. Shuttle 4c, 30x    8. Sit to stand, 10    Therapeutic Treatments and Modalities:     1. Manual Therapy (CPT 27119), c/s manual traction, upper trap stw    2. E Stim Unattended (CPT 36028), ifc and MHP to right shoulder and c/s     Time-based treatments/modalities:  Manual therapy minutes (CPT 34643): 10 minutes  Therapeutic exercise minutes (CPT 90284): 20 minutes    ASSESSMENT:   Response to treatment: no c/o. Shoulders feel better.     PLAN/RECOMMENDATIONS:   Plan for treatment: continue with core strengthening and postural exercises.

## 2018-03-28 ENCOUNTER — PHYSICAL THERAPY (OUTPATIENT)
Dept: PHYSICAL THERAPY | Facility: REHABILITATION | Age: 78
End: 2018-03-28
Attending: INTERNAL MEDICINE
Payer: MEDICARE

## 2018-03-28 DIAGNOSIS — M25.50 PAIN IN JOINT, MULTIPLE SITES: ICD-10-CM

## 2018-03-28 PROCEDURE — 97110 THERAPEUTIC EXERCISES: CPT

## 2018-03-28 PROCEDURE — 97014 ELECTRIC STIMULATION THERAPY: CPT

## 2018-03-28 NOTE — OP THERAPY DAILY TREATMENT
"  Outpatient Physical Therapy  DAILY TREATMENT     [facility] Physical Therapy [address].  [address]  [place] [address]  Phone:  [phone]  Fax:  [phone]    Date: 03/28/2018    Patient: Guillermina Recio  YOB: 1940  MRN: [id number]     Time Calculation  Start time: 0220  Stop time: 0344 Time Calculation (min): 84 minutes     Chief Complaint: neck and shoulder pain back pain   Visit #: 11    SUBJECTIVE:  Pt states low back is sore today--5/10.  \" I katy of did my exercises\"    OBJECTIVE:    Therapeutic Exercises (CPT 36941):     1. Nustep L2-3 25 minutes, 1.10 mile     2. Pulley 8'    Therapeutic Treatments and Modalities:     1. Manual Therapy (CPT 31522), stm/mfr bilateral scap    2. E Stim Unattended (CPT 23780), [place] and MHP to bilateral traps and l/s    Time-based treatments/modalities:  Manual therapy minutes (CPT 03341): 10 minutes  Therapeutic exercise minutes (CPT 68705): 35 minutes  Much better 3/10 for back and shoulder  ASSESSMENT:   Feels better after exercise  PLAN/RECOMMENDATIONS:    continue with core strengthening and postural exercises, stm,mfr   "

## 2018-04-05 ENCOUNTER — APPOINTMENT (OUTPATIENT)
Dept: INTERNAL MEDICINE | Facility: MEDICAL CENTER | Age: 78
End: 2018-04-05
Payer: MEDICARE

## 2018-04-11 ENCOUNTER — PHYSICAL THERAPY (OUTPATIENT)
Dept: PHYSICAL THERAPY | Facility: REHABILITATION | Age: 78
End: 2018-04-11
Attending: INTERNAL MEDICINE
Payer: MEDICARE

## 2018-04-11 DIAGNOSIS — M25.512 CHRONIC LEFT SHOULDER PAIN: ICD-10-CM

## 2018-04-11 DIAGNOSIS — M25.50 PAIN IN JOINT, MULTIPLE SITES: ICD-10-CM

## 2018-04-11 DIAGNOSIS — G89.29 CHRONIC LEFT SHOULDER PAIN: ICD-10-CM

## 2018-04-11 PROCEDURE — 97140 MANUAL THERAPY 1/> REGIONS: CPT

## 2018-04-11 PROCEDURE — 97014 ELECTRIC STIMULATION THERAPY: CPT

## 2018-04-11 PROCEDURE — 97110 THERAPEUTIC EXERCISES: CPT

## 2018-04-11 NOTE — OP THERAPY DAILY TREATMENT
Outpatient Physical Therapy  DAILY TREATMENT     [facility] Physical Therapy [address].  [address]  [place] [address]  Phone:  [phone]  Fax:  [phone]    Date: 04/11/2018    Patient: Guillermina Recio  YOB: 1940  MRN: [id number]     Time Calculation  Start time: 0122  Stop time: 0216 Time Calculation (min): 54 minutes     Chief Complaint: neck and shoulder pain back pain   Visit #: 12    SUBJECTIVE:  Doing ok but R shoulder is bad today and R hip is worse.  Planning to get an x-ray to R hip within the next week    OBJECTIVE:    Therapeutic Exercises (CPT 31245):     1. Nustep L2-3 24 minutes, 1.1 mile     Therapeutic Treatments and Modalities:     1. Manual Therapy (CPT 71052), stm/mfr R scap and trapezius and caudal mobs R ghj gd:2    2. E Stim Unattended (CPT 85061), [place] and MHP to bilateral traps and l/s    Time-based treatments/modalities:  Manual therapy minutes (CPT 26785): 10 minutes  Therapeutic exercise minutes (CPT 34481): 24 minutes    Pain prior to rx: 3/10 r shoulder  Much better 2/10  A little less  Shoulder pain but still pops  ASSESSMENT:   Tolerated exercise w/ decrease in pain  PLAN/RECOMMENDATIONS:    continue with core strengthening and postural exercises, stm,mfr

## 2018-04-12 ENCOUNTER — OFFICE VISIT (OUTPATIENT)
Dept: INTERNAL MEDICINE | Facility: MEDICAL CENTER | Age: 78
End: 2018-04-12
Payer: MEDICARE

## 2018-04-12 VITALS
SYSTOLIC BLOOD PRESSURE: 122 MMHG | BODY MASS INDEX: 32.53 KG/M2 | HEART RATE: 98 BPM | OXYGEN SATURATION: 91 % | RESPIRATION RATE: 18 BRPM | WEIGHT: 150.8 LBS | HEIGHT: 57 IN | DIASTOLIC BLOOD PRESSURE: 72 MMHG | TEMPERATURE: 98.2 F

## 2018-04-12 DIAGNOSIS — M25.511 CHRONIC PAIN OF BOTH SHOULDERS: ICD-10-CM

## 2018-04-12 DIAGNOSIS — M54.50 CHRONIC LEFT-SIDED LOW BACK PAIN WITHOUT SCIATICA: ICD-10-CM

## 2018-04-12 DIAGNOSIS — M25.512 CHRONIC PAIN OF BOTH SHOULDERS: ICD-10-CM

## 2018-04-12 DIAGNOSIS — M54.50 CHRONIC BILATERAL LOW BACK PAIN WITHOUT SCIATICA: ICD-10-CM

## 2018-04-12 DIAGNOSIS — G89.29 HIP PAIN, CHRONIC, RIGHT: ICD-10-CM

## 2018-04-12 DIAGNOSIS — G89.29 CHRONIC PAIN OF BOTH SHOULDERS: ICD-10-CM

## 2018-04-12 DIAGNOSIS — M25.551 HIP PAIN, CHRONIC, RIGHT: ICD-10-CM

## 2018-04-12 DIAGNOSIS — G89.29 CHRONIC LEFT-SIDED LOW BACK PAIN WITHOUT SCIATICA: ICD-10-CM

## 2018-04-12 DIAGNOSIS — G89.29 CHRONIC BILATERAL LOW BACK PAIN WITHOUT SCIATICA: ICD-10-CM

## 2018-04-12 DIAGNOSIS — Z79.891 CHRONIC USE OF OPIATE DRUGS THERAPEUTIC PURPOSES: ICD-10-CM

## 2018-04-12 DIAGNOSIS — H91.93 HEARING DIFFICULTY OF BOTH EARS: ICD-10-CM

## 2018-04-12 PROCEDURE — 99214 OFFICE O/P EST MOD 30 MIN: CPT | Mod: GC | Performed by: INTERNAL MEDICINE

## 2018-04-12 RX ORDER — HYDROCODONE BITARTRATE AND ACETAMINOPHEN 5; 325 MG/1; MG/1
TABLET ORAL
Refills: 0 | COMMUNITY
Start: 2018-03-03 | End: 2018-05-29

## 2018-04-12 RX ORDER — HYDROCODONE BITARTRATE AND ACETAMINOPHEN 5; 325 MG/1; MG/1
1-2 TABLET ORAL 2 TIMES DAILY PRN
Qty: 60 TAB | Refills: 0 | Status: SHIPPED | OUTPATIENT
Start: 2018-04-12 | End: 2018-05-29 | Stop reason: SDUPTHER

## 2018-04-12 ASSESSMENT — PAIN SCALES - GENERAL: PAINLEVEL: 5=MODERATE PAIN

## 2018-04-12 NOTE — PATIENT INSTRUCTIONS
Hip Pain  Your hip is the joint between your upper legs and your lower pelvis. The bones, cartilage, tendons, and muscles of your hip joint perform a lot of work each day supporting your body weight and allowing you to move around.  Hip pain can range from a minor ache to severe pain in one or both of your hips. Pain may be felt on the inside of the hip joint near the groin, or the outside near the buttocks and upper thigh. You may have swelling or stiffness as well.  Follow these instructions at home:  · Take medicines only as directed by your health care provider.  · Apply ice to the injured area:  ¨ Put ice in a plastic bag.  ¨ Place a towel between your skin and the bag.  ¨ Leave the ice on for 15-20 minutes at a time, 3-4 times a day.  · Keep your leg raised (elevated) when possible to lessen swelling.  · Avoid activities that cause pain.  · Follow specific exercises as directed by your health care provider.  · Sleep with a pillow between your legs on your most comfortable side.  · Record how often you have hip pain, the location of the pain, and what it feels like.  Contact a health care provider if:  · You are unable to put weight on your leg.  · Your hip is red or swollen or very tender to touch.  · Your pain or swelling continues or worsens after 1 week.  · You have increasing difficulty walking.  · You have a fever.  Get help right away if:  · You have fallen.  · You have a sudden increase in pain and swelling in your hip.  This information is not intended to replace advice given to you by your health care provider. Make sure you discuss any questions you have with your health care provider.  Document Released: 06/07/2011 Document Revised: 05/25/2017 Document Reviewed: 08/14/2014  [employer] Interactive Patient Education © 2017 [employer]
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
## 2018-04-19 ENCOUNTER — PHYSICAL THERAPY (OUTPATIENT)
Dept: PHYSICAL THERAPY | Facility: REHABILITATION | Age: 78
End: 2018-04-19
Attending: INTERNAL MEDICINE
Payer: MEDICARE

## 2018-04-19 DIAGNOSIS — G89.29 CHRONIC LEFT SHOULDER PAIN: ICD-10-CM

## 2018-04-19 DIAGNOSIS — G89.29 CHRONIC MIDLINE LOW BACK PAIN WITHOUT SCIATICA: ICD-10-CM

## 2018-04-19 DIAGNOSIS — M25.512 CHRONIC LEFT SHOULDER PAIN: ICD-10-CM

## 2018-04-19 DIAGNOSIS — M54.50 CHRONIC MIDLINE LOW BACK PAIN WITHOUT SCIATICA: ICD-10-CM

## 2018-04-19 PROCEDURE — 97014 ELECTRIC STIMULATION THERAPY: CPT

## 2018-04-19 PROCEDURE — 97140 MANUAL THERAPY 1/> REGIONS: CPT

## 2018-04-19 PROCEDURE — 97110 THERAPEUTIC EXERCISES: CPT

## 2018-04-19 NOTE — OP THERAPY DAILY TREATMENT
Outpatient Physical Therapy  DAILY TREATMENT     [facility] Physical Therapy [address]  [address]  [place] [address]  Phone:  [phone]  Fax:  [phone]    Date: 04/19/2018    Patient: Guillermina Recio  YOB: 1940  MRN: [id number]     Time Calculation  Start time: 0219  Stop time: 0325 Time Calculation (min): 66 minutes     Chief Complaint: neck and shoulder pain back pain   Visit #: 13    SUBJECTIVE:  Working around house and flared up my R side from my shoulder to my hip two days ago    OBJECTIVE:    Therapeutic Exercises (CPT 10442):     1. Nustep L2 19' minutes, .84 mile       Therapeutic Exercise Summary: Pulley x 5' for shoulder rom'  stm t/s and l/s paraspinals  MFR: L scap      Therapeutic Treatments and Modalities:     2. E Stim Unattended (CPT 37213), [place] and MHP to bilateral traps and l/s    Time-based treatments/modalities:  Manual therapy minutes (CPT 05231): 8 minutes  Therapeutic exercise minutes (CPT 53734): 24 minutes    Pain prior to rx: 6/10 r shoulder  Much better3-4/10  A little less  Shoulder pain   ASSESSMENT:   Tolerated exercise w/ decrease in  But came into clinic with increased neck/back pain from work at home  PLAN/RECOMMENDATIONS:    continue with core strengthening and postural exercises, stm,mfr

## 2018-04-25 ENCOUNTER — PHYSICAL THERAPY (OUTPATIENT)
Dept: PHYSICAL THERAPY | Facility: REHABILITATION | Age: 78
End: 2018-04-25
Attending: INTERNAL MEDICINE
Payer: MEDICARE

## 2018-04-25 DIAGNOSIS — M25.512 CHRONIC LEFT SHOULDER PAIN: ICD-10-CM

## 2018-04-25 DIAGNOSIS — G89.29 CHRONIC LEFT SHOULDER PAIN: ICD-10-CM

## 2018-04-25 DIAGNOSIS — M54.50 CHRONIC MIDLINE LOW BACK PAIN WITHOUT SCIATICA: ICD-10-CM

## 2018-04-25 DIAGNOSIS — G89.29 CHRONIC MIDLINE LOW BACK PAIN WITHOUT SCIATICA: ICD-10-CM

## 2018-04-25 DIAGNOSIS — M25.50 PAIN IN JOINT, MULTIPLE SITES: ICD-10-CM

## 2018-04-25 PROCEDURE — 97140 MANUAL THERAPY 1/> REGIONS: CPT

## 2018-04-25 PROCEDURE — 97110 THERAPEUTIC EXERCISES: CPT

## 2018-04-25 PROCEDURE — 97014 ELECTRIC STIMULATION THERAPY: CPT

## 2018-04-25 NOTE — OP THERAPY DAILY TREATMENT
Outpatient Physical Therapy  DAILY TREATMENT     [facility] Physical Therapy [address].  [address]  [place] [address]  Phone:  [phone]  Fax:  [phone]    Date: 04/25/2018    Patient: Guillermina Recio  YOB: 1940  MRN: [id number]     Time Calculation  Start time: 0205  Stop time: 0300 Time Calculation (min): 55 minutes     Chief Complaint: neck and shoulder pain  Visit #: 14    SUBJECTIVE:  Pt over did it gardening last weekend. Whole body hurt afterwards.     OBJECTIVE:      Therapeutic Exercises (CPT 45376):     1. Nustep L3 15 minutes, 1 mile     2. Pulleys 3 minutes     3. Hip abduction with tband    4. Mid rows and pull downs with pink tband     5. Hs curls, 20    Therapeutic Treatments and Modalities:     1. Manual Therapy (CPT 02659), c/s manual traction, upper trap stw    2. E Stim Unattended (CPT 44850), ifc and MHP to left shoulder and c/s     Time-based treatments/modalities:  Manual therapy minutes (CPT 86586): 16 minutes  Therapeutic exercise minutes (CPT 73720): 27 minutes       ASSESSMENT:   Response to treatment: pt had decreased neck and shouder pain post treatment 6-3/10.    PLAN/RECOMMENDATIONS:   Continue to progress postural and core exercises.

## 2018-05-02 ENCOUNTER — APPOINTMENT (OUTPATIENT)
Dept: PHYSICAL THERAPY | Facility: REHABILITATION | Age: 78
End: 2018-05-02
Attending: INTERNAL MEDICINE
Payer: MEDICARE

## 2018-05-02 DIAGNOSIS — K21.9 GASTROESOPHAGEAL REFLUX DISEASE, ESOPHAGITIS PRESENCE NOT SPECIFIED: ICD-10-CM

## 2018-05-02 RX ORDER — PANTOPRAZOLE SODIUM 20 MG/1
20 TABLET, DELAYED RELEASE ORAL DAILY
Qty: 30 TAB | Refills: 0 | Status: SHIPPED | OUTPATIENT
Start: 2018-05-02 | End: 2018-05-29

## 2018-05-02 NOTE — TELEPHONE ENCOUNTER
Was the patient seen in the last year in this department? Yes    Last seen: 04/18/18 by Dr. Pruitt  Next appt: 06/15/18 with Dr. Molina    Does patient have an active prescription for medications requested? No     Received Request Via: Pharmacy

## 2018-05-08 ENCOUNTER — TELEPHONE (OUTPATIENT)
Dept: INTERNAL MEDICINE | Facility: MEDICAL CENTER | Age: 78
End: 2018-05-08

## 2018-05-08 NOTE — TELEPHONE ENCOUNTER
Called patient back with no answer. Left v/m to let her know we received message and asked for a call back to let her know what medication was recommended

## 2018-05-08 NOTE — TELEPHONE ENCOUNTER
1. Caller Name: Guillermina                      Call Back Number: [phone] (home)       2. Message: 03/07/18-Pt called and l/m. Pt feels like she has full blown allergies. Would like to know what she can take OTC for her allergies. Her eyes are running and nose is blowing.    3. Patient approves office to leave a detailed voicemail/MyChart message: N\A

## 2018-05-09 ENCOUNTER — APPOINTMENT (OUTPATIENT)
Dept: PHYSICAL THERAPY | Facility: REHABILITATION | Age: 78
End: 2018-05-09
Attending: INTERNAL MEDICINE
Payer: MEDICARE

## 2018-05-15 ENCOUNTER — PHYSICAL THERAPY (OUTPATIENT)
Dept: PHYSICAL THERAPY | Facility: REHABILITATION | Age: 78
End: 2018-05-15
Attending: INTERNAL MEDICINE
Payer: MEDICARE

## 2018-05-15 DIAGNOSIS — G89.29 CHRONIC MIDLINE LOW BACK PAIN WITHOUT SCIATICA: ICD-10-CM

## 2018-05-15 DIAGNOSIS — M25.512 CHRONIC LEFT SHOULDER PAIN: ICD-10-CM

## 2018-05-15 DIAGNOSIS — G89.29 CHRONIC LEFT SHOULDER PAIN: ICD-10-CM

## 2018-05-15 DIAGNOSIS — M54.50 CHRONIC MIDLINE LOW BACK PAIN WITHOUT SCIATICA: ICD-10-CM

## 2018-05-15 DIAGNOSIS — M25.50 PAIN IN JOINT, MULTIPLE SITES: ICD-10-CM

## 2018-05-15 PROCEDURE — 97014 ELECTRIC STIMULATION THERAPY: CPT

## 2018-05-15 PROCEDURE — 97110 THERAPEUTIC EXERCISES: CPT

## 2018-05-15 PROCEDURE — 97140 MANUAL THERAPY 1/> REGIONS: CPT

## 2018-05-15 NOTE — OP THERAPY DAILY TREATMENT
Outpatient Physical Therapy  DAILY TREATMENT     [facility] Physical Therapy [place] [address] [address]  [address]  [place] [address]  Phone:  [phone]  Fax:  [phone]    Date: 05/15/2018    Patient: Guillermina Recio  YOB: 1940  MRN: [id number]     Time Calculation  Start time: 1035  Stop time: 1124 Time Calculation (min): 49 minutes     Chief Complaint: shoulder and neck pain  Visit #: 15    SUBJECTIVE:  My shoulders have been a little sore from gardening.     OBJECTIVE:      Therapeutic Exercises (CPT 52144):     1. Nustep L3 18minutes, 1 mile     2. Pulleys 3 minutes     3. Hip abduction with tband    4. Mid rows and pull downs with pink tband     Therapeutic Treatments and Modalities:     1. Manual Therapy (CPT 23085), gentle T/S mobs, upper trap stw    2. E Stim Unattended (CPT 23007), to C/S and upper traps with MHP    Time-based treatments/modalities:       ASSESSMENT:   Pt seems to be plateauing with treatment .     PLAN/RECOMMENDATIONS:   Planned interventions for next visit: E-stim unattended (CPT 85578), manual therapy (CPT 72812) and therapeutic exercise (CPT 36095).  Will DC pt to a home program after 1-2 more sessions.

## 2018-05-16 ENCOUNTER — APPOINTMENT (OUTPATIENT)
Dept: PHYSICAL THERAPY | Facility: REHABILITATION | Age: 78
End: 2018-05-16
Attending: INTERNAL MEDICINE
Payer: MEDICARE

## 2018-05-22 ENCOUNTER — PHYSICAL THERAPY (OUTPATIENT)
Dept: PHYSICAL THERAPY | Facility: REHABILITATION | Age: 78
End: 2018-05-22
Attending: INTERNAL MEDICINE
Payer: MEDICARE

## 2018-05-22 DIAGNOSIS — G89.29 CHRONIC LEFT SHOULDER PAIN: ICD-10-CM

## 2018-05-22 DIAGNOSIS — M25.512 CHRONIC LEFT SHOULDER PAIN: ICD-10-CM

## 2018-05-22 DIAGNOSIS — M54.50 CHRONIC MIDLINE LOW BACK PAIN WITHOUT SCIATICA: ICD-10-CM

## 2018-05-22 DIAGNOSIS — G89.29 CHRONIC MIDLINE LOW BACK PAIN WITHOUT SCIATICA: ICD-10-CM

## 2018-05-22 PROCEDURE — 97140 MANUAL THERAPY 1/> REGIONS: CPT

## 2018-05-22 PROCEDURE — 97014 ELECTRIC STIMULATION THERAPY: CPT

## 2018-05-22 PROCEDURE — 97110 THERAPEUTIC EXERCISES: CPT

## 2018-05-22 NOTE — OP THERAPY DAILY TREATMENT
Outpatient Physical Therapy  DAILY TREATMENT     [facility] Physical Therapy [place] [address]  [address]  [place] [address]  Phone:  [phone]  Fax:  [phone]    Date: 05/22/2018    Patient: Guillermina Recio  YOB: 1940  MRN: [id number]     Time Calculation  Start time: 0145  Stop time: 0232 Time Calculation (min): 47 minutes     Chief Complaint: back and shoulder pain  Visit #: 16    SUBJECTIVE:  i feel like i'm improving gradually. pt's looking for a stationary bike to have at home.    OBJECTIVE:          Therapeutic Exercises (CPT 52976):     1. Nustep L3 15 minutes, 1 mile     2. Pulleys 3 minutes     3. Hip abduction with tband    4. Mid rows and pull downs with pink tband     5. Hs curls, 20    Therapeutic Treatments and Modalities:     1. Manual Therapy (CPT 52842), c/s manual traction, upper trap stw    2. E Stim Unattended (CPT 85661), ifc and MHP to left shoulder and c/s     Time-based treatments/modalities:        ASSESSMENT:   pt's exercise tolerance is improving gradually.     PLAN/RECOMMENDATIONS:   Plan for treatment: therapy treatment to continue next visit.progress home exercises.

## 2018-05-24 ENCOUNTER — TELEPHONE (OUTPATIENT)
Dept: INTERNAL MEDICINE | Facility: MEDICAL CENTER | Age: 78
End: 2018-05-24

## 2018-05-24 NOTE — TELEPHONE ENCOUNTER
1. Caller Name: Guillermina                      Call Back Number: [phone] (home)       2. Message:Pt called and l/m. Requesting a refill on her pain medication. She only has 8 pills left. She is doing heavy gardening with her [employer].    3. Patient approves office to leave a detailed voicemail/MyChart message: N\A    Called pt and l/m. Notifying her she would need to be seen for this refill. If Dr Molina doesn't have any availability she can make an appt with one of our residents.

## 2018-05-29 ENCOUNTER — OFFICE VISIT (OUTPATIENT)
Dept: INTERNAL MEDICINE | Facility: MEDICAL CENTER | Age: 78
End: 2018-05-29
Payer: MEDICARE

## 2018-05-29 VITALS
SYSTOLIC BLOOD PRESSURE: 124 MMHG | DIASTOLIC BLOOD PRESSURE: 66 MMHG | WEIGHT: 148 LBS | OXYGEN SATURATION: 92 % | HEIGHT: 57 IN | BODY MASS INDEX: 31.93 KG/M2 | TEMPERATURE: 99.3 F | HEART RATE: 71 BPM

## 2018-05-29 DIAGNOSIS — L21.9 SEBORRHEIC DERMATITIS OF SCALP: ICD-10-CM

## 2018-05-29 DIAGNOSIS — M54.50 CHRONIC BILATERAL LOW BACK PAIN WITHOUT SCIATICA: ICD-10-CM

## 2018-05-29 DIAGNOSIS — G89.29 CHRONIC LEFT-SIDED LOW BACK PAIN WITHOUT SCIATICA: ICD-10-CM

## 2018-05-29 DIAGNOSIS — E55.9 VITAMIN D DEFICIENCY: ICD-10-CM

## 2018-05-29 DIAGNOSIS — M25.512 CHRONIC PAIN OF BOTH SHOULDERS: ICD-10-CM

## 2018-05-29 DIAGNOSIS — M25.511 CHRONIC PAIN OF BOTH SHOULDERS: ICD-10-CM

## 2018-05-29 DIAGNOSIS — G89.29 CHRONIC BILATERAL LOW BACK PAIN WITHOUT SCIATICA: ICD-10-CM

## 2018-05-29 DIAGNOSIS — L43.9 LICHEN PLANUS: ICD-10-CM

## 2018-05-29 DIAGNOSIS — Z79.891 CHRONIC USE OF OPIATE DRUGS THERAPEUTIC PURPOSES: ICD-10-CM

## 2018-05-29 DIAGNOSIS — G89.29 CHRONIC PAIN OF BOTH SHOULDERS: ICD-10-CM

## 2018-05-29 DIAGNOSIS — M54.50 CHRONIC LEFT-SIDED LOW BACK PAIN WITHOUT SCIATICA: ICD-10-CM

## 2018-05-29 DIAGNOSIS — J30.2 SEASONAL ALLERGIC RHINITIS, UNSPECIFIED TRIGGER: ICD-10-CM

## 2018-05-29 PROCEDURE — 99214 OFFICE O/P EST MOD 30 MIN: CPT | Performed by: INTERNAL MEDICINE

## 2018-05-29 PROCEDURE — 99000 SPECIMEN HANDLING OFFICE-LAB: CPT | Performed by: INTERNAL MEDICINE

## 2018-05-29 RX ORDER — HYDROCODONE BITARTRATE AND ACETAMINOPHEN 5; 325 MG/1; MG/1
1-2 TABLET ORAL 2 TIMES DAILY PRN
Qty: 60 TAB | Refills: 0 | Status: SHIPPED | OUTPATIENT
Start: 2018-05-29 | End: 2018-07-10 | Stop reason: SDUPTHER

## 2018-05-29 RX ORDER — MV-MN/OM3/DHA/EPA/FISH/LUT/ZEA 250-5-1 MG
1 CAPSULE ORAL DAILY
Status: ON HOLD | COMMUNITY
End: 2019-12-28

## 2018-05-29 RX ORDER — KETOCONAZOLE 20 MG/ML
SHAMPOO TOPICAL
Qty: 1 BOTTLE | Refills: 3 | Status: SHIPPED | OUTPATIENT
Start: 2018-05-29 | End: 2019-01-29 | Stop reason: SDUPTHER

## 2018-05-29 RX ORDER — MULTIVIT-MIN/IRON/FOLIC ACID/K 18-600-40
1 CAPSULE ORAL DAILY
Qty: 60 TAB | Refills: 11 | Status: SHIPPED | OUTPATIENT
Start: 2018-05-29 | End: 2019-01-29

## 2018-05-29 RX ORDER — LORATADINE 10 MG/1
10 TABLET ORAL DAILY
Qty: 30 TAB | Refills: 11 | Status: SHIPPED | OUTPATIENT
Start: 2018-05-29 | End: 2019-11-12

## 2018-05-29 NOTE — PATIENT INSTRUCTIONS
Use the hydrocortisone cream for your rash on your arm.     Get your blood work and xray as ordered.     It was nice to you.     Your prescriptions were sent to the pharmacy.
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
## 2018-06-06 ENCOUNTER — TELEPHONE (OUTPATIENT)
Dept: HEALTH INFORMATION MANAGEMENT | Facility: OTHER | Age: 78
End: 2018-06-06

## 2018-06-06 ENCOUNTER — PATIENT OUTREACH (OUTPATIENT)
Dept: HEALTH INFORMATION MANAGEMENT | Facility: OTHER | Age: 78
End: 2018-06-06

## 2018-06-06 NOTE — PROGRESS NOTES
1. Attempt #: 1    2. [facility]Connect Verified: yes    3. Verify PCP: yes    5.  Reviewed/Updated the following with patient:       •   Communication Preference Obtained? YES    6. MyChart Activation: declined    7. [employer]: no        9. Care Gap Scheduling (Attempt to Schedule EACH Overdue Care Gap!)     Health Maintenance Due   Topic Date Due   • Annual Wellness Visit  1940   • IMM DTaP/Tdap/Td Vaccine (1 - Tdap) 03/24/1957   • PAP SMEAR  03/24/1959   • MAMMOGRAM  10/06/2012   • BONE DENSITY  04/19/2015   • IMM PNEUMOCOCCAL 65+ (ADULT) LOW/MEDIUM RISK SERIES (2 of 2 - PPSV23) 03/15/2018        Patient declined AWV

## 2018-06-06 NOTE — TELEPHONE ENCOUNTER
Patient is over the recommended age and is showing overdue for PAP SMEAR AND I DID SPEAK TO THE PT AND SHE STATED SHE NO LONGER GETS PAPS DONE DUE TO HER AGE. in Health Maintenance.    Please reply to this message as to whether these tests are appropriate and I will update the Health Maintenance Topic or contact the patient to schedule.

## 2018-06-15 ENCOUNTER — APPOINTMENT (OUTPATIENT)
Dept: INTERNAL MEDICINE | Facility: MEDICAL CENTER | Age: 78
End: 2018-06-15
Payer: MEDICARE

## 2018-06-19 ENCOUNTER — APPOINTMENT (OUTPATIENT)
Dept: PHYSICAL THERAPY | Facility: REHABILITATION | Age: 78
End: 2018-06-19
Attending: INTERNAL MEDICINE
Payer: MEDICARE

## 2018-07-09 RX ORDER — PANTOPRAZOLE SODIUM 20 MG/1
TABLET, DELAYED RELEASE ORAL
Qty: 90 TAB | Refills: 0 | Status: SHIPPED | OUTPATIENT
Start: 2018-07-09 | End: 2018-11-29 | Stop reason: SDUPTHER

## 2018-07-09 NOTE — TELEPHONE ENCOUNTER
Was the patient seen in the last year in this department? Yes    Last seen: 05/29/18 by Dr. Lofton  Next appt: 07/10/18 with Dr. Perez      Does patient have an active prescription for medications requested? No     Received Request Via: Pharmacy

## 2018-07-10 ENCOUNTER — OFFICE VISIT (OUTPATIENT)
Dept: INTERNAL MEDICINE | Facility: MEDICAL CENTER | Age: 78
End: 2018-07-10
Payer: MEDICARE

## 2018-07-10 VITALS
OXYGEN SATURATION: 97 % | HEART RATE: 85 BPM | TEMPERATURE: 98 F | SYSTOLIC BLOOD PRESSURE: 136 MMHG | BODY MASS INDEX: 32.15 KG/M2 | WEIGHT: 149 LBS | HEIGHT: 57 IN | DIASTOLIC BLOOD PRESSURE: 80 MMHG

## 2018-07-10 DIAGNOSIS — M54.50 CHRONIC BILATERAL LOW BACK PAIN WITHOUT SCIATICA: ICD-10-CM

## 2018-07-10 DIAGNOSIS — R19.4 CHANGE IN BOWEL HABITS: ICD-10-CM

## 2018-07-10 DIAGNOSIS — Z79.891 CHRONIC USE OF OPIATE DRUGS THERAPEUTIC PURPOSES: ICD-10-CM

## 2018-07-10 DIAGNOSIS — G89.29 CHRONIC BILATERAL LOW BACK PAIN WITHOUT SCIATICA: ICD-10-CM

## 2018-07-10 DIAGNOSIS — Z71.85 VACCINE COUNSELING: ICD-10-CM

## 2018-07-10 PROCEDURE — 99214 OFFICE O/P EST MOD 30 MIN: CPT | Performed by: INTERNAL MEDICINE

## 2018-07-10 RX ORDER — HYDROCODONE BITARTRATE AND ACETAMINOPHEN 5; 325 MG/1; MG/1
1-2 TABLET ORAL 2 TIMES DAILY PRN
Qty: 60 TAB | Refills: 0 | Status: SHIPPED | OUTPATIENT
Start: 2018-07-10 | End: 2018-08-09 | Stop reason: SDUPTHER

## 2018-07-10 NOTE — PATIENT INSTRUCTIONS
Diet for Irritable Bowel Syndrome  When you have irritable bowel syndrome (IBS), the foods you eat and your eating habits are very important. IBS may cause various symptoms, such as abdominal pain, constipation, or diarrhea. Choosing the right foods can help ease discomfort caused by these symptoms. Work with your health care provider and dietitian to find the best eating plan to help control your symptoms.  WHAT GENERAL GUIDELINES DO I NEED TO FOLLOW?  · Keep a food diary. This will help you identify foods that cause symptoms. Write down:  ¨ What you eat and when.  ¨ What symptoms you have.  ¨ When symptoms occur in relation to your meals.  · Avoid foods that cause symptoms. Talk with your dietitian about other ways to get the same nutrients that are in these foods.  · Eat more foods that contain fiber. Take a fiber supplement if directed by your dietitian.  · Eat your meals slowly, in a relaxed setting.  · Aim to eat 5-6 small meals per day. Do not skip meals.  · Drink enough fluids to keep your urine clear or pale yellow.  · Ask your health care provider if you should take an over-the-counter probiotic during flare-ups to help restore healthy gut bacteria.  · If you have cramping or diarrhea, try making your meals low in fat and high in carbohydrates. Examples of carbohydrates are pasta, rice, whole grain breads and cereals, fruits, and vegetables.  · If dairy products cause your symptoms to flare up, try eating less of them. You might be able to handle yogurt better than other dairy products because it contains bacteria that help with digestion.  WHAT FOODS ARE NOT RECOMMENDED?  The following are some foods and drinks that may worsen your symptoms:  · Fatty foods, such as French fries.  · Milk products, such as cheese or ice cream.  · Chocolate.  · Alcohol.  · Products with caffeine, such as coffee.  · Carbonated drinks, such as soda.  The items listed above may not be a complete list of foods and beverages to  avoid. Contact your dietitian for more information.  WHAT FOODS ARE GOOD SOURCES OF FIBER?  Your health care provider or dietitian may recommend that you eat more foods that contain fiber. Fiber can help reduce constipation and other IBS symptoms. Add foods with fiber to your diet a little at a time so that your body can get used to them. Too much fiber at once might cause gas and swelling of your abdomen. The following are some foods that are good sources of fiber:  · Apples.  · Peaches.  · Pears.  · Berries.  · Figs.  · Broccoli (raw).  · Cabbage.  · Carrots.  · Raw peas.  · Kidney beans.  · Lima beans.  · Whole grain bread.  · Whole grain cereal.  FOR MORE INFORMATION   International Foundation for Functional Gastrointestinal Disorders: www.iffgd.org  National [place] of Diabetes and Digestive and Kidney Diseases: www.niddk.nih.gov/health-information/health-topics/digestive-diseases/ibs/Pages/facts.aspx     This information is not intended to replace advice given to you by your health care provider. Make sure you discuss any questions you have with your health care provider.     Document Released: 03/09/2005 Document Revised: 01/08/2016 Document Reviewed: 03/20/2015  [employer] Interactive Patient Education ©2016 [employer]

## 2018-07-10 NOTE — PROGRESS NOTES
Guillermina Recio is a 78 y.o. female who is here for routine follow up.    CC: Chronic pain, medication refill, Diarrhea, constipation, vaccine question    HPI:    Patient is a 78-year-old female who is presenting today with complaints of changes in her bowel habits as well as chronic low back pain. In regards to the changes in her bowel habits, she is complaining of diarrhea as well as constipation. She says this has been ongoing for the past 6 months to one year. She says at times she will start on the toilet and will not have any bowel movement and then at other times she is unable to make it in time and has diarrhea. She is complaining of some abdominal cramping but denies any blood in her stool. She's not complaining of any nausea or vomiting. She is drinking a lot more fluids and one of her nurse friends from the [facility] suggested that she get dentures and that she may not be chewing her food that well and that she might be having the symptoms. We also discussed her diet in further detail. Patient says that she eats a lot of spinach, eggs, vegetables, and fruits. She has a garden at home so she does do a lot of gardening and grows a lot of foods at home. She says she does have state or pork ribs from time to time as well as she enjoys those. She said she was recommended at some point to cut down her sugar intake but she says that something that she enjoys and does not want to do. She has not noticed a correlation between any specific foods and the symptoms that she gets.    Patient is also complaining of chronic low back pain and does use Norco one to 2 tablets every day. Patient has been on this medication for her really long time. Patient says that she got up prescription for it May 29, 2018 and has been really active, has been gardening a lot, there were a lot of bugs in her house so she's been cleaning a lot, and she has been playing with her grandkids as she is trying to be more involved in their  lives. She is going to be going on a seven-day vocation as well and recently went to [place] so she has been using the pills about 2 times daily consistently and the last time she got a prescription it was from May 29 until July 29 and the prescription was for 60 pills but she says since the instructions say that she can take 1-2 tablets daily she has been taking 2 tablets more consistently because of the activity that she is doing. She is requesting a refill on that medication.     Chronic pain recheck:   Last dose of controlled substance:   Chronic pain treated with: Norco 5-325mg 1-2 tablet daily PRN.   Current alcohol or substance use: None     Consequences of Chronic Opiate therapy:  (5 A's)  Analgesia:  improved  Activity:  improved  Adverse Events:  none  Aberrant Behaviors: no inappropriate refills requested, lost or stolen meds reported.   Affect/Mood: full facial expressions, normal speech pattern and content, normal thought patterns, normal perception     Nonnarcotic treatments that are being used:      Pain management agreement initiated/updated and signed on: 12/12/2017  Urine drug screen done: 5/29/2018. It was consistent.  Consent for opioid therapy done today as per .     I have reviewed the medical records, the Prescription Monitoring Program and I have determined that controlled substance treatment is medically indicated.    No problem-specific Assessment & Plan notes found for this encounter.      She  has a past medical history of Chronic pain; Dyslipidemia (8/1/2016); GERD (gastroesophageal reflux disease); Hepatitis (1970's); Hiatus hernia syndrome; History of total hip replacement; Hyperlipidemia; Macular degeneration; OA (osteoarthritis); S/P cataract extraction; and Wears dentures.    Patient Active Problem List    Diagnosis Date Noted   • Chest pain 09/12/2017     Priority: Medium   • Obesity (BMI 30-39.9) 12/16/2016     Priority: Medium   • Abnormal stress test 12/16/2016      Priority: Medium   • Dyslipidemia 08/01/2016     Priority: Medium   • HTN (hypertension) 06/25/2009     Priority: Medium   • Chronic use of opiate drugs therapeutic purposes 07/07/2017     Priority: Low   • Chronic bilateral low back pain without sciatica 07/07/2017     Priority: Low   • Daytime somnolence 12/16/2016     Priority: Low   • Insomnia 12/16/2016     Priority: Low   • Gastroesophageal reflux disease 12/16/2016     Priority: Low   • Vitamin D deficiency 12/16/2016     Priority: Low   • Chronic narcotic use 12/16/2016     Priority: Low   • Controlled substance agreement signed 12/16/2016     Priority: Low   • Chronic pain of both shoulders 12/16/2016     Priority: Low   • Chronic left-sided low back pain without sciatica 12/16/2016     Priority: Low   • Scalp itch 06/29/2016     Priority: Low   • Polypharmacy 06/29/2016     Priority: Low   • Dermatochalasis 07/14/2015     Priority: Low   • VAGINAL LESION 11/10/2009     Priority: Low   • Pelvic pain 11/10/2009     Priority: Low   • Osteopenia 06/25/2009     Priority: Low   • Dysuria 06/25/2009     Priority: Low   • Anemia 06/25/2009     Priority: Low   • Hip pain 06/25/2009     Priority: Low   • Elbow pain 06/25/2009     Priority: Low   • Hip pain, chronic, right 04/12/2018   • Hearing difficulty of both ears 04/12/2018   • Preventative health care 10/30/2017       Allergies:Patient has no known allergies.    Current Outpatient Prescriptions   Medication Sig Dispense Refill   • HYDROcodone-acetaminophen (NORCO) 5-325 MG Tab per tablet Take 1-2 Tabs by mouth 2 times a day as needed for up to 30 days. 60 Tab 0   • pantoprazole (PROTONIX) 20 MG tablet TAKE 1 TABLET BY MOUTH EVERY DAY 90 Tab 0   • Multiple Vitamins-Minerals (OCUVITE ADULT 50+) Cap Take 1 Cap by mouth every day.     • ketoconazole (NIZORAL) 2 % shampoo Apply 5 to 10 mL to wet scalp, lather, leave on 3 to 5 minutes, and rinse; apply twice weekly for 2 to 4 weeks 1 Bottle 3   • Vitamin D,  "Cholecalciferol, 1000 units Tab Take 1 Tab by mouth every day. 60 Tab 11   • loratadine (CLARITIN) 10 MG Tab Take 1 Tab by mouth every day. 30 Tab 11   • lovastatin (MEVACOR) 40 MG tablet TAKE 1 TABLET BY MOUTH AT BEDTIME 90 Tab 3   • Ascorbic Acid (VITAMIN C) 1000 MG Tab Take  by mouth as needed (When feeling like if she starting a cold she will take 3/week).     • Multiple Vitamins-Minerals (CENTRUM SILVER PO) Take  by mouth every day.     • aspirin 81 MG tablet Take 81 mg by mouth every day.     • NON SPECIFIED OTC caffeine stimulate daily       No current facility-administered medications for this visit.        Social History   Substance Use Topics   • Smoking status: Never Smoker   • Smokeless tobacco: Never Used   • Alcohol use 0.0 oz/week      Comment: 2 drink a month       Family History   Problem Relation Age of Onset   • Heart Disease     • Cancer         Review of Systems:   Pertinent positives as stated in HPI, all others reviewed as negative.    Physical Exam:  Blood pressure 136/80, pulse 85, temperature 36.7 °C (98 °F), height 1.454 m (4' 9.25\"), weight 67.6 kg (149 lb), SpO2 97 %. Body mass index is 31.96 kg/m².    General Appearance: overweight appearing, walks with a cane, alert, no distress, cooperative  Skin: No rashes or lesions.  Oropharynx: Oropharynx moist and without lesion.  Lungs: Lungs clear to auscultation bilaterally.  Heart: RRR without murmur, gallop, or rubs.  Abdomen: Soft, non-tender. BS normal.   Musculoskeletal: Extremities: Upper and lower extremities appear normal. No deformities, edema. She does have tenderness to palpation over the lumbosacral region.  Peripheral Pulses: Pulses: radial=4/4, dorsalis pedis=4/4  Psychiatric: Mood appears normal.     Assessment/Plan:     1. Chronic use of opiate drugs therapeutic purposes  No red flags noted. No concerns on examination today.  Patient is using the medication as prescribed. She says since the label says that she can take up to 2 " tablets she has been doing so and hence the need for prescription today.  Contract on file and the UDS was done May 29, 2018 which was consistent.  Reviewed [place] prescription monitoring program and there are no signs of abuse. Patient is not experiencing adverse effects from the medication.  I did advise the patient that if she is having to take that additional pill to maybe break it in half and see if she has a good response with taking 1-1/2 pills rather than 2.  I advised the patient that I will give her medication refill for 30 days of 60 pills.  - HYDROcodone-acetaminophen (NORCO) 5-325 MG Tab per tablet; Take 1-2 Tabs by mouth 2 times a day as needed for up to 30 days.  Dispense: 60 Tab; Refill: 0  - CONSENT FOR OPIATE PRESCRIPTION    2. Chronic bilateral low back pain without sciatica  - HYDROcodone-acetaminophen (NORCO) 5-325 MG Tab per tablet; Take 1-2 Tabs by mouth 2 times a day as needed for up to 30 days.  Dispense: 60 Tab; Refill: 0  - CONSENT FOR OPIATE PRESCRIPTION    3. Change in bowel habits  Seems like the change in her bowel habits with diarrhea as well as constipation may be consistent with irritable bowel syndrome which is mixed type.  Patient given information on a diet for irritable bowel syndrome and I did discuss this with the patient in more detail as well verbally.  Tissue was also advised to possibly keep a food diary to see if there is any foods that might exacerbate her symptoms.    4. Vaccine counseling  Patient was here for tetanus and pneumonia vaccine.  Recommended patient to get both of them from the pharmacy.      Followup: Return if symptoms worsen or fail to improve.    This note was created using voice recognition software. There may be unintended errors spelling, and grammar or content.

## 2018-07-25 ENCOUNTER — TELEPHONE (OUTPATIENT)
Dept: INTERNAL MEDICINE | Facility: MEDICAL CENTER | Age: 78
End: 2018-07-25

## 2018-07-25 NOTE — TELEPHONE ENCOUNTER
1. Caller Name: Guillermina                      Call Back Number: [phone] (home)       2. Message: Pt called and l/m. She got no call regarding her vitamins. If she should continue with her Vitamin D?    3. Patient approves office to leave a detailed voicemail/MyChart message: N\A    Tried to call pt x3 today. Busy signal. Unable to l/m to clarify message

## 2018-07-26 NOTE — TELEPHONE ENCOUNTER
I would continue with vitamin D.   Not sure what else she is referring too.  Please clarify.  Try to get her in with me (as opposed to someone else in clinic)

## 2018-07-26 NOTE — TELEPHONE ENCOUNTER
Called and spoke with pt. Notified her regarding the Vitamin D. She also was asking about a refill on her allergy medication. I told pt she still has refills left at her pharmacy. Pt understood.     I can schedule pt on your schedule, the nest available I see is 08/23 at 11:20 on a same day appt slot. Would this be okay?  (Pt said she is not sure if she can come that day, she is planning to go camping with her kids but don't know when)

## 2018-08-09 ENCOUNTER — OFFICE VISIT (OUTPATIENT)
Dept: INTERNAL MEDICINE | Facility: MEDICAL CENTER | Age: 78
End: 2018-08-09
Payer: MEDICARE

## 2018-08-09 VITALS
BODY MASS INDEX: 31.71 KG/M2 | HEIGHT: 57 IN | DIASTOLIC BLOOD PRESSURE: 83 MMHG | OXYGEN SATURATION: 93 % | WEIGHT: 147 LBS | SYSTOLIC BLOOD PRESSURE: 139 MMHG | TEMPERATURE: 98.5 F | HEART RATE: 63 BPM

## 2018-08-09 DIAGNOSIS — M54.50 CHRONIC BILATERAL LOW BACK PAIN WITHOUT SCIATICA: ICD-10-CM

## 2018-08-09 DIAGNOSIS — Z79.891 CHRONIC USE OF OPIATE DRUGS THERAPEUTIC PURPOSES: ICD-10-CM

## 2018-08-09 DIAGNOSIS — G89.29 CHRONIC BILATERAL LOW BACK PAIN WITHOUT SCIATICA: ICD-10-CM

## 2018-08-09 DIAGNOSIS — G47.00 INSOMNIA, UNSPECIFIED TYPE: ICD-10-CM

## 2018-08-09 PROCEDURE — 99214 OFFICE O/P EST MOD 30 MIN: CPT | Performed by: INTERNAL MEDICINE

## 2018-08-09 RX ORDER — HYDROCODONE BITARTRATE AND ACETAMINOPHEN 5; 325 MG/1; MG/1
1-2 TABLET ORAL 2 TIMES DAILY PRN
Qty: 60 TAB | Refills: 0 | Status: SHIPPED | OUTPATIENT
Start: 2018-08-10 | End: 2018-08-23

## 2018-08-09 RX ORDER — CHOLECALCIFEROL (VITAMIN D3) 125 MCG
1 CAPSULE ORAL NIGHTLY PRN
Qty: 30 TAB | Refills: 0 | Status: SHIPPED | OUTPATIENT
Start: 2018-08-09 | End: 2018-08-23

## 2018-08-09 NOTE — PATIENT INSTRUCTIONS
· TRY MELATONIN FOR SLEEP.    Insomnia  Insomnia is a sleep disorder that makes it difficult to fall asleep or to stay asleep. Insomnia can cause tiredness (fatigue), low energy, difficulty concentrating, mood swings, and poor performance at work or school.  There are three different ways to classify insomnia:  · Difficulty falling asleep.  · Difficulty staying asleep.  · Waking up too early in the morning.  Any type of insomnia can be long-term (chronic) or short-term (acute). Both are common. Short-term insomnia usually lasts for three months or less. Chronic insomnia occurs at least three times a week for longer than three months.  What are the causes?  Insomnia may be caused by another condition, situation, or substance, such as:  · Anxiety.  · Certain medicines.  · Gastroesophageal reflux disease (GERD) or other gastrointestinal conditions.  · Asthma or other breathing conditions.  · Restless legs syndrome, sleep apnea, or other sleep disorders.  · Chronic pain.  · Menopause. This may include hot flashes.  · Stroke.  · Abuse of alcohol, tobacco, or illegal drugs.  · Depression.  · Caffeine.  · Neurological disorders, such as Alzheimer disease.  · An overactive thyroid (hyperthyroidism).  The cause of insomnia may not be known.  What increases the risk?  Risk factors for insomnia include:  · Gender. Women are more commonly affected than men.  · Age. Insomnia is more common as you get older.  · Stress. This may involve your professional or personal life.  · Income. Insomnia is more common in people with lower income.  · Lack of exercise.  · Irregular work schedule or night shifts.  · Traveling between different time zones.  What are the signs or symptoms?  If you have insomnia, trouble falling asleep or trouble staying asleep is the main symptom. This may lead to other symptoms, such as:  · Feeling fatigued.  · Feeling nervous about going to sleep.  · Not feeling rested in the morning.  · Having trouble  concentrating.  · Feeling irritable, anxious, or depressed.  How is this treated?  Treatment for insomnia depends on the cause. If your insomnia is caused by an underlying condition, treatment will focus on addressing the condition. Treatment may also include:  · Medicines to help you sleep.  · Counseling or therapy.  · Lifestyle adjustments.  Follow these instructions at home:  · Take medicines only as directed by your health care provider.  · Keep regular sleeping and waking hours. Avoid naps.  · Keep a sleep diary to help you and your health care provider figure out what could be causing your insomnia. Include:  ¨ When you sleep.  ¨ When you wake up during the night.  ¨ How well you sleep.  ¨ How rested you feel the next day.  ¨ Any side effects of medicines you are taking.  ¨ What you eat and drink.  · Make your bedroom a comfortable place where it is easy to fall asleep:  ¨ Put up shades or special blackout curtains to block light from outside.  ¨ Use a white noise machine to block noise.  ¨ Keep the temperature cool.  · Exercise regularly as directed by your health care provider. Avoid exercising right before bedtime.  · Use relaxation techniques to manage stress. Ask your health care provider to suggest some techniques that may work well for you. These may include:  ¨ Breathing exercises.  ¨ Routines to release muscle tension.  ¨ Visualizing peaceful scenes.  · Cut back on alcohol, caffeinated beverages, and cigarettes, especially close to bedtime. These can disrupt your sleep.  · Do not overeat or eat spicy foods right before bedtime. This can lead to digestive discomfort that can make it hard for you to sleep.  · Limit screen use before bedtime. This includes:  ¨ Watching TV.  ¨ Using your smartphone, tablet, and computer.  · Stick to a routine. This can help you fall asleep faster. Try to do a quiet activity, brush your teeth, and go to bed at the same time each night.  · Get out of bed if you are still  awake after 15 minutes of trying to sleep. Keep the lights down, but try reading or doing a quiet activity. When you feel sleepy, go back to bed.  · Make sure that you drive carefully. Avoid driving if you feel very sleepy.  · Keep all follow-up appointments as directed by your health care provider. This is important.  Contact a health care provider if:  · You are tired throughout the day or have trouble in your daily routine due to sleepiness.  · You continue to have sleep problems or your sleep problems get worse.  Get help right away if:  · You have serious thoughts about hurting yourself or someone else.  This information is not intended to replace advice given to you by your health care provider. Make sure you discuss any questions you have with your health care provider.  Document Released: 12/15/2001 Document Revised: 05/19/2017 Document Reviewed: 09/18/2015  [employer] Interactive Patient Education © 2017 [employer]

## 2018-08-10 NOTE — PROGRESS NOTES
"    Guillermina Recio is a 78 y.o. female who is here for routine follow up.    CC: Pain medication refill, insomnia    HPI:    Patient is a 78-year-old female who is a patient of Dr. Molina who is presenting here for refill on her pain medication. According to some of the telephone encounters, patient had asked about continuing on the vitamin D was recommended she continue. She says she needs a prescription for it but I informed her that she should have refills according to the telephone notes and that she should check with her pharmacy about that.    Patient is reporting insomnia today. She says that she lives in an apartment building where there is a lot of noise around and there is younger adults who lives in neighboring apartments who make a lot of noise\" asleep until early on in the morning around 3 AM or 5 AM. She said she needs something to help sleep. She has not tried anything over-the-counter at this point. We did discuss sleep hygiene and she says she follows those recommendations but it's the noise that does not let her sleep. She has no trouble staying asleep once she is asleep.    Patient is also worried at this visit about being placed in a nursing home. She says that she never wants to be placed in a nursing home and that she is independent and she wants to function as she is at this point. Unclear exactly why she is worried about it. She says she does forget things from time to time but knows where she is, knows the date, and is oriented. I advised her to discuss this with her primary care physician at the next visit. It looks like her primary physician did refer her to see someone for memory testing it is unclear whether the patient got it done. I'm not able to find this in her chart.    In regards to the pain medication, patient takes this for her chronic low back pain. She takes Norco one to 2 tablets every day. She says although she is in to get a refill on her medication she is not going to " fill it for some time but will need it before her appointment with her primary care physician. According to the prescription monitoring program, she is not getting early refill and its on time. She wants to a lot of activities with her grandkids and continue doing that. She says this enables her to do that. No concerns of abuse and no red flags noted.    Opioid Risk Score: 6     Interpretation of Opioid Risk Score   Score 0-3 = Low risk of abuse. Do UDS at least once per year.  Score 4-7 = Moderate risk of abuse. Do UDS 1-4 times per year.  Score 8+ = High risk of abuse. Refer to specialist.    Chronic pain recheck:   Last dose of controlled substance: 8/8/2018  Chronic pain treated with: Norco 5-325mg 1-2 tablet daily PRN.   Current alcohol or substance use: None     Consequences of Chronic Opiate therapy:  (5 A's)  Analgesia:  improved  Activity:  improved  Adverse Events:  none  Aberrant Behaviors: no inappropriate refills requested, lost or stolen meds reported.   Affect/Mood: full facial expressions, normal speech pattern and content, normal thought patterns, normal perception     Nonnarcotic treatments that are being used: None     Pain management agreement initiated/updated and signed on: 12/12/2017  Urine drug screen done: 5/29/2018. It was consistent.  Consent for opioid therapy done today as per .     I have reviewed the medical records, the Prescription Monitoring Program and I have determined that controlled substance treatment is medically indicated.      No problem-specific Assessment & Plan notes found for this encounter.      She  has a past medical history of Chronic pain; Dyslipidemia (8/1/2016); GERD (gastroesophageal reflux disease); Hepatitis (1970's); Hiatus hernia syndrome; History of total hip replacement; Hyperlipidemia; Macular degeneration; OA (osteoarthritis); S/P cataract extraction; and Wears dentures.    Patient Active Problem List    Diagnosis Date Noted   • Chest pain  09/12/2017     Priority: Medium   • Obesity (BMI 30-39.9) 12/16/2016     Priority: Medium   • Abnormal stress test 12/16/2016     Priority: Medium   • Dyslipidemia 08/01/2016     Priority: Medium   • HTN (hypertension) 06/25/2009     Priority: Medium   • Chronic use of opiate drugs therapeutic purposes 07/07/2017     Priority: Low   • Chronic bilateral low back pain without sciatica 07/07/2017     Priority: Low   • Daytime somnolence 12/16/2016     Priority: Low   • Insomnia 12/16/2016     Priority: Low   • Gastroesophageal reflux disease 12/16/2016     Priority: Low   • Vitamin D deficiency 12/16/2016     Priority: Low   • Chronic narcotic use 12/16/2016     Priority: Low   • Controlled substance agreement signed 12/16/2016     Priority: Low   • Chronic pain of both shoulders 12/16/2016     Priority: Low   • Chronic left-sided low back pain without sciatica 12/16/2016     Priority: Low   • Scalp itch 06/29/2016     Priority: Low   • Polypharmacy 06/29/2016     Priority: Low   • Dermatochalasis 07/14/2015     Priority: Low   • VAGINAL LESION 11/10/2009     Priority: Low   • Pelvic pain 11/10/2009     Priority: Low   • Osteopenia 06/25/2009     Priority: Low   • Dysuria 06/25/2009     Priority: Low   • Anemia 06/25/2009     Priority: Low   • Hip pain 06/25/2009     Priority: Low   • Elbow pain 06/25/2009     Priority: Low   • Hip pain, chronic, right 04/12/2018   • Hearing difficulty of both ears 04/12/2018   • Preventative health care 10/30/2017       Allergies:Patient has no known allergies.    Current Outpatient Prescriptions   Medication Sig Dispense Refill   • HYDROcodone-acetaminophen (NORCO) 5-325 MG Tab per tablet Take 1-2 Tabs by mouth 2 times a day as needed for up to 30 days. 60 Tab 0   • Melatonin 5 MG Tab Take 1 Tab by mouth at bedtime as needed. 30 Tab 0   • pantoprazole (PROTONIX) 20 MG tablet TAKE 1 TABLET BY MOUTH EVERY DAY 90 Tab 0   • Multiple Vitamins-Minerals (OCUVITE ADULT 50+) Cap Take 1 Cap by  "mouth every day.     • ketoconazole (NIZORAL) 2 % shampoo Apply 5 to 10 mL to wet scalp, lather, leave on 3 to 5 minutes, and rinse; apply twice weekly for 2 to 4 weeks 1 Bottle 3   • Vitamin D, Cholecalciferol, 1000 units Tab Take 1 Tab by mouth every day. 60 Tab 11   • loratadine (CLARITIN) 10 MG Tab Take 1 Tab by mouth every day. 30 Tab 11   • lovastatin (MEVACOR) 40 MG tablet TAKE 1 TABLET BY MOUTH AT BEDTIME 90 Tab 3   • Ascorbic Acid (VITAMIN C) 1000 MG Tab Take  by mouth as needed (When feeling like if she starting a cold she will take 3/week).     • Multiple Vitamins-Minerals (CENTRUM SILVER PO) Take  by mouth every day.     • aspirin 81 MG tablet Take 81 mg by mouth every day.     • NON SPECIFIED OTC caffeine stimulate daily       No current facility-administered medications for this visit.        Social History   Substance Use Topics   • Smoking status: Never Smoker   • Smokeless tobacco: Never Used   • Alcohol use 0.0 oz/week      Comment: 2 drink a month       Family History   Problem Relation Age of Onset   • Heart Disease Unknown    • Cancer Unknown      Review of Systems:   Pertinent positives as stated in HPI, all others reviewed as negative.    Physical Exam:  Blood pressure 139/83, pulse 63, temperature 36.9 °C (98.5 °F), height 1.454 m (4' 9.25\"), weight 66.7 kg (147 lb), SpO2 93 %. Body mass index is 31.53 kg/m².    General Appearance: healthy, alert, no distress, cooperative  Head: Normocephalic. No masses appreciated.   Lungs: Lungs clear to auscultation bilaterally.  Heart: RRR without murmur, gallop, or rubs.  Abdomen: Soft, non-tender. BS normal. Obese.   Musculoskeletal: Extremities: Upper and lower extremities appear normal. No deformities, edema. She does have tenderness to palpation over the right hip.  Peripheral Pulses: Pulses: radial=4/4, dorsalis pedis=4/4  Psychiatric: Mood appears normal.     Assessment/Plan:     1. Chronic bilateral low back pain without sciatica  Continue on the " current regimen of [place].  Patient has a contract on file and a recent urine drug screen.  Scription monitoring program was reviewed and there are no signs of early refills or extra prescriptions from other physicians.  Patient's history is consistent with what she is getting.  Patient is going to be seeing her primary care physician soon as well so she can talk about it in more detail at that time about possibility of weaning down or continuing on the current regimen.  No red flags or signs of abuse noted.  - HYDROcodone-acetaminophen (NORCO) 5-325 MG Tab per tablet; Take 1-2 Tabs by mouth 2 times a day as needed for up to 30 days.  Dispense: 60 Tab; Refill: 0  - Consent for Opiate Prescription    2. Insomnia, unspecified type  We discussed sleep hygiene in detail. She has no problems with sleep but there is a lot of noise around that doesn't let her sleep. I advised her to try melatonin and see if that may help. Also advised her to discuss this with her primary provider if she still having issues.  - Melatonin 5 MG Tab; Take 1 Tab by mouth at bedtime as needed.  Dispense: 30 Tab; Refill: 0    3. Chronic use of opiate drugs therapeutic purposes  See above for more detail.  - HYDROcodone-acetaminophen (NORCO) 5-325 MG Tab per tablet; Take 1-2 Tabs by mouth 2 times a day as needed for up to 30 days.  Dispense: 60 Tab; Refill: 0  - Consent for Opiate Prescription      Patient advised to discuss memory issues with her primary provider. It looks like she was made a referral to see a Psychologist for that but there is no record of that and it is unclear whether the patient followed up with those recommendations. She says she is extremely concerned about ending up in a nursing home.    Instructions were written for the patient in regards to taking the vitamin D, her appointments with her primary physician, the insomnia as well.      Followup: Return in 2 weeks (on 8/23/2018).     This note was created using voice  recognition software. There may be unintended errors spelling, and grammar or content.

## 2018-08-23 ENCOUNTER — OFFICE VISIT (OUTPATIENT)
Dept: INTERNAL MEDICINE | Facility: MEDICAL CENTER | Age: 78
End: 2018-08-23
Payer: MEDICARE

## 2018-08-23 VITALS
SYSTOLIC BLOOD PRESSURE: 106 MMHG | HEIGHT: 57 IN | DIASTOLIC BLOOD PRESSURE: 64 MMHG | WEIGHT: 149.13 LBS | OXYGEN SATURATION: 93 % | BODY MASS INDEX: 32.17 KG/M2 | TEMPERATURE: 97.3 F | HEART RATE: 80 BPM

## 2018-08-23 DIAGNOSIS — G89.29 CHRONIC BILATERAL LOW BACK PAIN WITHOUT SCIATICA: ICD-10-CM

## 2018-08-23 DIAGNOSIS — R73.01 IFG (IMPAIRED FASTING GLUCOSE): ICD-10-CM

## 2018-08-23 DIAGNOSIS — E66.9 OBESITY (BMI 30.0-34.9): ICD-10-CM

## 2018-08-23 DIAGNOSIS — R19.7 DIARRHEA, UNSPECIFIED TYPE: ICD-10-CM

## 2018-08-23 DIAGNOSIS — M54.50 CHRONIC BILATERAL LOW BACK PAIN WITHOUT SCIATICA: ICD-10-CM

## 2018-08-23 DIAGNOSIS — G47.9 SLEEP DIFFICULTIES: ICD-10-CM

## 2018-08-23 DIAGNOSIS — E78.5 DYSLIPIDEMIA: ICD-10-CM

## 2018-08-23 DIAGNOSIS — E55.9 VITAMIN D DEFICIENCY: ICD-10-CM

## 2018-08-23 DIAGNOSIS — Z79.891 CHRONIC USE OF OPIATE DRUGS THERAPEUTIC PURPOSES: ICD-10-CM

## 2018-08-23 DIAGNOSIS — R41.89 IMPAIRED COGNITION: ICD-10-CM

## 2018-08-23 PROCEDURE — 99214 OFFICE O/P EST MOD 30 MIN: CPT | Performed by: INTERNAL MEDICINE

## 2018-08-23 RX ORDER — ACETAMINOPHEN 325 MG/1
650 TABLET ORAL EVERY 6 HOURS PRN
COMMUNITY
Start: 2018-08-23 | End: 2019-11-12

## 2018-08-23 RX ORDER — PNEUMOCOCCAL 13-VALENT CONJUGATE VACCINE 2.2; 2.2; 2.2; 2.2; 2.2; 4.4; 2.2; 2.2; 2.2; 2.2; 2.2; 2.2; 2.2 UG/.5ML; UG/.5ML; UG/.5ML; UG/.5ML; UG/.5ML; UG/.5ML; UG/.5ML; UG/.5ML; UG/.5ML; UG/.5ML; UG/.5ML; UG/.5ML; UG/.5ML
INJECTION, SUSPENSION INTRAMUSCULAR
COMMUNITY
Start: 2018-08-15 | End: 2018-08-23

## 2018-08-23 NOTE — PATIENT INSTRUCTIONS
Labs.     Get memory testing.  [facility]    [id number] [place]  [place] [address]  [phone]     Don't miss meals.   Eat smaller meals.    Avoid processed foods.     I recommend the Shingrix vaccine series to prevent shingles.  Please get the vaccines at a local pharmacy.        Go to bed and wake up at regular times.   Spend no more than 6-10 hours in bed per day.   Keep bedroom dark, quiet, and comfortable.   Minimize naps to no more than 1 per day and no more than 30 min per nap.    Keep active during the day.   Increase exposure to sunlight during awake times and avoid bright lights before going to sleep.   Avoid caffeine use.   Limit fluid intake 3 hours prior to bed.    See GI.  [facility]  [address] [facility]  [place] [id number] [phone]    Follow up with cardiology re: cardiac and lung testing.    You can take acetaminophen (Tylenol) regular-strength 325 mg 1-2 tablets every 6 hours as needed for pain. No more than 650 mg per dose and no more than 3 grams in 24 hours. Do not combine with other acetaminophen (Tylenol) containing products.
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
## 2018-09-05 ENCOUNTER — TELEPHONE (OUTPATIENT)
Dept: INTERNAL MEDICINE | Facility: MEDICAL CENTER | Age: 78
End: 2018-09-05

## 2018-09-05 NOTE — TELEPHONE ENCOUNTER
1. Caller Name: Guillermina                      Call Back Number: [phone] (home)       2. Message: Pt called and l/m. She held onto the rx you gave her until she was able to fill it but when she gave it to the pharmacy they said they are unable to fill it.     3. Patient approves office to leave a detailed voicemail/MyChart message: Okay per pt to leave a detail message on v/m    Called and spoke with pt.  Notified her I received her message and I will forward this to Dr Molina     scanned and routed to you.

## 2018-09-05 NOTE — TELEPHONE ENCOUNTER
Called and spoke with pt. Notified pt of this. Pt said she does not take them very often only when she needs them.  Scheduled pt an appt on 09/07/18 with Dr Kendrick

## 2018-09-05 NOTE — TELEPHONE ENCOUNTER
At her last visit, she told me that she was no longer narcotics.   I did not write her for narcotics at that visit on 8-23-18.  I see the last Rx she filled was from 7-13-18 for 60 tabs.   She will need to come in to talk about her pain.    Ok to book with resident.

## 2018-09-07 ENCOUNTER — TELEPHONE (OUTPATIENT)
Dept: PHYSICAL THERAPY | Facility: REHABILITATION | Age: 78
End: 2018-09-07

## 2018-09-07 ENCOUNTER — OFFICE VISIT (OUTPATIENT)
Dept: INTERNAL MEDICINE | Facility: MEDICAL CENTER | Age: 78
End: 2018-09-07
Payer: MEDICARE

## 2018-09-07 VITALS
WEIGHT: 149 LBS | DIASTOLIC BLOOD PRESSURE: 88 MMHG | TEMPERATURE: 98 F | OXYGEN SATURATION: 93 % | SYSTOLIC BLOOD PRESSURE: 148 MMHG | HEART RATE: 76 BPM | HEIGHT: 57 IN | BODY MASS INDEX: 32.15 KG/M2

## 2018-09-07 DIAGNOSIS — M54.50 CHRONIC BILATERAL LOW BACK PAIN WITHOUT SCIATICA: ICD-10-CM

## 2018-09-07 DIAGNOSIS — Z79.891 CHRONIC USE OF OPIATE DRUGS THERAPEUTIC PURPOSES: ICD-10-CM

## 2018-09-07 DIAGNOSIS — G89.29 CHRONIC BILATERAL LOW BACK PAIN WITHOUT SCIATICA: ICD-10-CM

## 2018-09-07 PROCEDURE — 99214 OFFICE O/P EST MOD 30 MIN: CPT | Mod: GC | Performed by: INTERNAL MEDICINE

## 2018-09-07 RX ORDER — HYDROCODONE BITARTRATE AND ACETAMINOPHEN 5; 325 MG/1; MG/1
1-2 TABLET ORAL EVERY 4 HOURS PRN
Qty: 60 TAB | Refills: 0 | Status: SHIPPED | OUTPATIENT
Start: 2018-09-07 | End: 2018-10-07

## 2018-09-07 RX ORDER — HYDROCODONE BITARTRATE AND ACETAMINOPHEN 5; 325 MG/1; MG/1
1-2 TABLET ORAL EVERY 4 HOURS PRN
COMMUNITY
End: 2018-09-07 | Stop reason: SDUPTHER

## 2018-09-07 NOTE — PROGRESS NOTES
Established Patient    Guillermina presents today with the following:    CC: Refill for narcotics, referral for physical therapy    HPI: Patient is an 78 year old woman with a history of chronic lower back pain and arthritis. She has a 10 year history of opiate use for her pain. She states that she takes about 1-2 pills per day for severe back pain. She states that the pain is debilitating and interferes with her ability to clean her house and play with her grandkids. She was last seen in clinic on 8/23 and as per chart stated that she had self-discontinued her opiate medication. However, she called on 9/5/2018 stating that she needed a refill on her Norco. We discussed whether or not she would be able to cut down the frequency of taking her medication and she stated that she has tried but ends up in too much pain and that she cannot cut down her medication. She stated that she is only taking it as needed.  She denied any recent falls, trauma, sleepiness, or other side effects of the medication. She denies any alcohol or other drug abuse. There were no concerns or red flags for abuse of medication. Her last consent for opiate therapy was done on 7/10/2018 and her pain manaement agreement was on done on 12/12/2017.     Her opiate risk score is 6      Patient Active Problem List    Diagnosis Date Noted   • Chest pain 09/12/2017     Priority: Medium   • Obesity (BMI 30-39.9) 12/16/2016     Priority: Medium   • Abnormal stress test 12/16/2016     Priority: Medium   • Dyslipidemia 08/01/2016     Priority: Medium   • HTN (hypertension) 06/25/2009     Priority: Medium   • Chronic use of opiate drugs therapeutic purposes 07/07/2017     Priority: Low   • Chronic bilateral low back pain without sciatica 07/07/2017     Priority: Low   • Daytime somnolence 12/16/2016     Priority: Low   • Insomnia 12/16/2016     Priority: Low   • Gastroesophageal reflux disease 12/16/2016     Priority: Low   • Vitamin D deficiency 12/16/2016      Priority: Low   • Chronic narcotic use 12/16/2016     Priority: Low   • Controlled substance agreement signed 12/16/2016     Priority: Low   • Chronic pain of both shoulders 12/16/2016     Priority: Low   • Chronic left-sided low back pain without sciatica 12/16/2016     Priority: Low   • Scalp itch 06/29/2016     Priority: Low   • Polypharmacy 06/29/2016     Priority: Low   • Dermatochalasis 07/14/2015     Priority: Low   • VAGINAL LESION 11/10/2009     Priority: Low   • Pelvic pain 11/10/2009     Priority: Low   • Osteopenia 06/25/2009     Priority: Low   • Dysuria 06/25/2009     Priority: Low   • Anemia 06/25/2009     Priority: Low   • Hip pain 06/25/2009     Priority: Low   • Elbow pain 06/25/2009     Priority: Low   • Hip pain, chronic, right 04/12/2018   • Hearing difficulty of both ears 04/12/2018   • Preventative health care 10/30/2017       Current Outpatient Prescriptions   Medication Sig Dispense Refill   • HYDROcodone-acetaminophen (NORCO) 5-325 MG Tab per tablet Take 1-2 Tabs by mouth every four hours as needed.     • acetaminophen (TYLENOL) 325 MG Tab Take 2 Tabs by mouth every 6 hours as needed.     • pantoprazole (PROTONIX) 20 MG tablet TAKE 1 TABLET BY MOUTH EVERY DAY 90 Tab 0   • Multiple Vitamins-Minerals (OCUVITE ADULT 50+) Cap Take 1 Cap by mouth every day.     • ketoconazole (NIZORAL) 2 % shampoo Apply 5 to 10 mL to wet scalp, lather, leave on 3 to 5 minutes, and rinse; apply twice weekly for 2 to 4 weeks 1 Bottle 3   • Vitamin D, Cholecalciferol, 1000 units Tab Take 1 Tab by mouth every day. 60 Tab 11   • loratadine (CLARITIN) 10 MG Tab Take 1 Tab by mouth every day. (Patient taking differently: Take 10 mg by mouth 1 time daily as needed.) 30 Tab 11   • lovastatin (MEVACOR) 40 MG tablet TAKE 1 TABLET BY MOUTH AT BEDTIME 90 Tab 3   • Ascorbic Acid (VITAMIN C) 1000 MG Tab Take  by mouth as needed (When feeling like if she starting a cold she will take 3/week).     • aspirin 81 MG tablet Take 81  "mg by mouth every day.     • Multiple Vitamins-Minerals (CENTRUM SILVER PO) Take  by mouth every day.     • NON SPECIFIED OTC caffeine stimulate daily       No current facility-administered medications for this visit.        ROS: As per HPI. Additional pertinent symptoms as noted below.    Review of Systems   Constitutional: Negative for chills, fever and weight loss.   HENT: Positive for hearing loss. Negative for congestion, sinus pain, sore throat and tinnitus.         Baseline hearing loss. Patient states that she is seeing someone for hearing aids   Eyes: Negative for double vision and pain.   Respiratory: Negative for cough and shortness of breath.    Cardiovascular: Negative for chest pain and palpitations.   Gastrointestinal: Negative for constipation, diarrhea, heartburn, nausea and vomiting.   Genitourinary: Negative for dysuria, frequency and urgency.   Musculoskeletal: Positive for back pain and joint pain. Negative for myalgias.        Lower back pain and arthritis     Skin: Negative for itching and rash.   Neurological: Negative for dizziness and headaches.   Endo/Heme/Allergies: Negative for environmental allergies. Does not bruise/bleed easily.   Psychiatric/Behavioral: Positive for memory loss. Negative for suicidal ideas. The patient is not nervous/anxious.         Baseline difficulties with memory. Patient believes it to be age related         /88   Pulse 76   Temp 36.7 °C (98 °F)   Ht 1.454 m (4' 9.25\")   Wt 67.6 kg (149 lb)   SpO2 93%   BMI 31.96 kg/m²     Physical Exam   Constitutional:  oriented to person, place, and time. No distress.   Eyes: Pupils are equal, round, and reactive to light. No scleral icterus.  Neck: Neck supple. No thyromegaly present.   Cardiovascular: Normal rate, regular rhythm and normal heart sounds.  Exam reveals no gallop and no friction rub.  No murmur heard.  Pulmonary/Chest: Breath sounds normal. Chest wall is not dull to percussion.   Musculoskeletal:   " no edema.   Lymphadenopathy: no cervical adenopathy  Neurological: alert and oriented to person, place, and time.   Skin: No cyanosis. Nails show no clubbing.      Assessment and Plan    1. Chronic left sided back pain without sciatica  2. Chronic Narcotic Use  - Given that the patient has been on opiates for 10 years there is a high risk of withdrawal. Refilled norco for 30 days. Patient to follow up with Dr. Molina for further refills of medication in 4 weeks.  - Referral for PT for low back pain to help get exercises for pain to wean off narcotics.     3. Health maintenance  - Patient was seen last week in clinic. Patient is up to date on flu shor and prevnar. She wants to hold off on mammogram, pap, and dexa.      4. Obesity  - Patient would like a referral to physical therapy. She stated that last time she went for back pain she ended up doing exercise that helped her lose weight. She is motivated to lose weight.     Signed by: Regina Kendrick M.D.

## 2018-09-07 NOTE — OP THERAPY DISCHARGE SUMMARY
Outpatient Physical Therapy  DISCHARGE SUMMARY NOTE      [facility] [address]  [address]  [place] [address]  Phone:  [phone]  Fax:  [phone]    Date of Visit: 09/07/2018    Patient: Guillermina Recio  YOB: 1940  MRN: [id number]     Referring Provider: Elizabeth Molina M.D.    Referring Diagnosis M54.5, M25.511         Your patient is being discharged from Physical Therapy with the following comments:   · Goals partially met    Comments:  Pt was seen for 16 visits for chronic shoulder, neck and and back pain.    Patient has met her goals:   1. Pt able to exhibit improved sitting and standing posture.  2. Pt able to walk 10 minutes without increase in back pain.   3. Pt independent with daily postural and strengthening exercises.     Limitations Remaining:  She continues to have tightness in her cervical spine and shoulders if she's too sedentary.     Recommendations:  She will be dc'd on a HEP at this time.     Lucy Faria, PT    Date: 9/7/2018

## 2018-09-08 ASSESSMENT — ENCOUNTER SYMPTOMS
NAUSEA: 0
DIARRHEA: 0
CHILLS: 0
SORE THROAT: 0
PALPITATIONS: 0
VOMITING: 0
MYALGIAS: 0
CONSTIPATION: 0
NERVOUS/ANXIOUS: 0
BRUISES/BLEEDS EASILY: 0
DIZZINESS: 0
COUGH: 0
WEIGHT LOSS: 0
SHORTNESS OF BREATH: 0
MEMORY LOSS: 1
FEVER: 0
DOUBLE VISION: 0
SINUS PAIN: 0
EYE PAIN: 0
HEADACHES: 0
BACK PAIN: 1
HEARTBURN: 0

## 2018-10-04 ENCOUNTER — PATIENT OUTREACH (OUTPATIENT)
Dept: HEALTH INFORMATION MANAGEMENT | Facility: OTHER | Age: 78
End: 2018-10-04

## 2018-10-04 NOTE — PROGRESS NOTES
Attempt #:Final    WebIZ Checked & Epic Updated: yes  [facility] Verified: yes  Verify PCP: yes    Communication Preference Obtained: not able to complete pre-planning/ pt was in a rush.    Annual Wellness Visit Scheduling  1. Scheduling Status:Scheduled     Care Gap Scheduling (Attempt to Schedule EACH Overdue Care Gap!)  Health Maintenance Due   Topic Date Due   • Annual Wellness Visit  1940   • IMM HEP B VACCINE (1 of 3 - Risk 3-dose series) 03/24/1957   • IMM ZOSTER VACCINES (1 of 2) 03/24/1988   • MAMMOGRAM  10/06/2012   • BONE DENSITY  04/19/2015     MyChart Activation: declined

## 2018-10-29 ENCOUNTER — TELEPHONE (OUTPATIENT)
Dept: INTERNAL MEDICINE | Facility: MEDICAL CENTER | Age: 78
End: 2018-10-29

## 2018-10-29 ENCOUNTER — APPOINTMENT (OUTPATIENT)
Dept: RADIOLOGY | Facility: MEDICAL CENTER | Age: 78
End: 2018-10-29
Attending: EMERGENCY MEDICINE
Payer: MEDICARE

## 2018-10-29 ENCOUNTER — HOSPITAL ENCOUNTER (EMERGENCY)
Facility: MEDICAL CENTER | Age: 78
End: 2018-10-29
Attending: EMERGENCY MEDICINE
Payer: MEDICARE

## 2018-10-29 VITALS
HEART RATE: 85 BPM | WEIGHT: 153 LBS | DIASTOLIC BLOOD PRESSURE: 75 MMHG | TEMPERATURE: 98.5 F | SYSTOLIC BLOOD PRESSURE: 112 MMHG | OXYGEN SATURATION: 92 % | RESPIRATION RATE: 16 BRPM | BODY MASS INDEX: 32.82 KG/M2

## 2018-10-29 DIAGNOSIS — M54.16 LUMBAR RADICULOPATHY: Primary | ICD-10-CM

## 2018-10-29 DIAGNOSIS — M25.551 RIGHT HIP PAIN: ICD-10-CM

## 2018-10-29 DIAGNOSIS — M54.31 SCIATICA OF RIGHT SIDE: ICD-10-CM

## 2018-10-29 PROCEDURE — 99284 EMERGENCY DEPT VISIT MOD MDM: CPT

## 2018-10-29 PROCEDURE — 73502 X-RAY EXAM HIP UNI 2-3 VIEWS: CPT | Mod: RT

## 2018-10-29 PROCEDURE — 73560 X-RAY EXAM OF KNEE 1 OR 2: CPT | Mod: RT

## 2018-10-29 RX ORDER — PREDNISONE 20 MG/1
40 TABLET ORAL DAILY
Qty: 10 TAB | Refills: 0 | Status: SHIPPED | OUTPATIENT
Start: 2018-10-29 | End: 2018-11-03

## 2018-10-29 RX ORDER — HYDROCODONE BITARTRATE AND ACETAMINOPHEN 5; 325 MG/1; MG/1
TABLET ORAL
Qty: 15 TAB | Refills: 0 | Status: SHIPPED | OUTPATIENT
Start: 2018-10-29 | End: 2018-11-07
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
## 2018-10-29 NOTE — TELEPHONE ENCOUNTER
1. Caller Name: Guillermina                      Call Back Number: [phone] (home)       2. Message: Pt called and l/m. She hurt herself. Does she have to come in to be seen to get her hip xray?    3. Patient approves office to leave a detailed voicemail/MyChart message: N\A      Was about to call pt.  Saw pt is currently admitted

## 2018-10-29 NOTE — ED TRIAGE NOTES
Pt BIB family with c/c of right hip and knee pain for approx 1 month and worsening over 2 weeks. Pt denies trauma.

## 2018-10-30 NOTE — ED NOTES
Pt discharge home. Pt given discharge instructions and prescription. Pt verbalized understanding, all questions answered ,vss upon d/c. Pt's friend will be driving her home

## 2018-10-30 NOTE — ED NOTES
Pt back in her room after x-ray.  Wheeled to the bathroom, then assisted back to her bed.  Await results.

## 2018-10-31 ENCOUNTER — TELEPHONE (OUTPATIENT)
Dept: INTERNAL MEDICINE | Facility: MEDICAL CENTER | Age: 78
End: 2018-10-31

## 2018-10-31 ENCOUNTER — APPOINTMENT (OUTPATIENT)
Dept: RADIOLOGY | Facility: MEDICAL CENTER | Age: 78
End: 2018-10-31
Attending: EMERGENCY MEDICINE
Payer: MEDICARE

## 2018-10-31 NOTE — TELEPHONE ENCOUNTER
MAs -  Please call pt and let her know that I found out that her MRI L spine was cancelled  See if she wants to come in sooner -- we can eval her and see if an MRI is needed.

## 2018-10-31 NOTE — TELEPHONE ENCOUNTER
Called and spoke with pt. Pt said she feels miserable. I asked if she would like to be seen today. She said no, she wouldn't be able to scheduled. Asked if she is able to come tomorrow at 1045am with Dr Vega. She said she would have to call me back if she is able to get a ride. I told pt I will schedule just in case and wait for her call back. She understood.

## 2018-10-31 NOTE — TELEPHONE ENCOUNTER
Called and spoke with pt.  Pt said she is unable to come tomorrow.  Pt said she has no fever and no chills. She just feels miserable that she feels cold and she can feel it in her joints. Also the her hip/thigh hurts. But she still gets up and cooks herself something to eat.   She don't the point coming in sooner for an appt. I explained to pt she can get her MRI sooner and/or get her pain better controlled. She said she don't have a ride available and her medication is helping for now. I told pt if she would like to be seen sooner to give me a call. Pt is aware she has an appt on 11/09/18

## 2018-11-09 ENCOUNTER — OFFICE VISIT (OUTPATIENT)
Dept: INTERNAL MEDICINE | Facility: MEDICAL CENTER | Age: 78
End: 2018-11-09
Payer: MEDICARE

## 2018-11-09 VITALS
SYSTOLIC BLOOD PRESSURE: 123 MMHG | WEIGHT: 149.6 LBS | OXYGEN SATURATION: 94 % | BODY MASS INDEX: 32.28 KG/M2 | DIASTOLIC BLOOD PRESSURE: 77 MMHG | TEMPERATURE: 97.9 F | HEIGHT: 57 IN | HEART RATE: 73 BPM

## 2018-11-09 DIAGNOSIS — G89.29 CHRONIC BILATERAL LOW BACK PAIN WITHOUT SCIATICA: ICD-10-CM

## 2018-11-09 DIAGNOSIS — Z00.00 HEALTH CARE MAINTENANCE: ICD-10-CM

## 2018-11-09 DIAGNOSIS — F11.90 CHRONIC NARCOTIC USE: ICD-10-CM

## 2018-11-09 DIAGNOSIS — E66.9 OBESITY (BMI 30-39.9): ICD-10-CM

## 2018-11-09 DIAGNOSIS — Z79.891 CHRONIC USE OF OPIATE DRUGS THERAPEUTIC PURPOSES: ICD-10-CM

## 2018-11-09 DIAGNOSIS — M54.50 CHRONIC BILATERAL LOW BACK PAIN WITHOUT SCIATICA: ICD-10-CM

## 2018-11-09 PROCEDURE — 99214 OFFICE O/P EST MOD 30 MIN: CPT | Mod: GC | Performed by: INTERNAL MEDICINE

## 2018-11-09 RX ORDER — HYDROCODONE BITARTRATE AND ACETAMINOPHEN 5; 325 MG/1; MG/1
1-2 TABLET ORAL
Qty: 30 TAB | Refills: 0 | Status: SHIPPED | OUTPATIENT
Start: 2018-11-09 | End: 2018-12-09

## 2018-11-09 NOTE — PATIENT INSTRUCTIONS
You can take acetaminophen (Tylenol) regular-strength 325 mg 1-2 tablets every 6 hours as needed for pain. No more than 650 mg per dose and no more than 3 grams in 24 hours. Do not combine with other acetaminophen (Tylenol) containing products. You can take acetaminophen (Tylenol) regular-strength 325 mg 1-2 tablets every 6 hours as needed for pain. No more than 650 mg per dose and no more than 3 grams in 24 hours. Do not combine with other acetaminophen (Tylenol) containing products.     Try taking 1/2 hydrocodone tab as needed

## 2018-11-09 NOTE — PROGRESS NOTES
"Established Patient    Guillermina presents today with the following:    CC: back pain    HPI: 78F, PMH of chronic low back pain with opiate use, obesity, HTN, GERD, DLD; presents today with back pain. She had a recent hospitalization for right hip pain with sciatica which improved following a tapering dose of prednisone and now is at her baseline chronic pain. She continues to have her chronic back pain and is requesting refill for her norco as she will be active during the holidays cooking and for activities with her grandkids. She had been taking 1-2 pills a day as needed. Her opiate risk score is 6, her last signed consent was on 8/9/18, pain management agreement on 12/12/17. She states she is in the process of scheduling appointments for physical therapy at [facility] with her therapist for further pain management.     She is also concerned she is \"forgetting things\" without any acute changes, but has noticed she needs to write things down more often.    Patient Active Problem List    Diagnosis Date Noted   • Chest pain 09/12/2017     Priority: Medium   • Obesity (BMI 30-39.9) 12/16/2016     Priority: Medium   • Abnormal stress test 12/16/2016     Priority: Medium   • Dyslipidemia 08/01/2016     Priority: Medium   • HTN (hypertension) 06/25/2009     Priority: Medium   • Chronic use of opiate drugs therapeutic purposes 07/07/2017     Priority: Low   • Chronic bilateral low back pain without sciatica 07/07/2017     Priority: Low   • Daytime somnolence 12/16/2016     Priority: Low   • Insomnia 12/16/2016     Priority: Low   • Gastroesophageal reflux disease 12/16/2016     Priority: Low   • Vitamin D deficiency 12/16/2016     Priority: Low   • Chronic narcotic use 12/16/2016     Priority: Low   • Controlled substance agreement signed 12/16/2016     Priority: Low   • Chronic pain of both shoulders 12/16/2016     Priority: Low   • Chronic left-sided low back pain without sciatica 12/16/2016     Priority: Low   • Scalp itch " 06/29/2016     Priority: Low   • Polypharmacy 06/29/2016     Priority: Low   • Dermatochalasis 07/14/2015     Priority: Low   • VAGINAL LESION 11/10/2009     Priority: Low   • Pelvic pain 11/10/2009     Priority: Low   • Osteopenia 06/25/2009     Priority: Low   • Dysuria 06/25/2009     Priority: Low   • Anemia 06/25/2009     Priority: Low   • Hip pain 06/25/2009     Priority: Low   • Elbow pain 06/25/2009     Priority: Low   • Hip pain, chronic, right 04/12/2018   • Hearing difficulty of both ears 04/12/2018   • Preventative health care 10/30/2017       Current Outpatient Prescriptions   Medication Sig Dispense Refill   • HYDROcodone-acetaminophen (NORCO) 5-325 MG Tab per tablet Take 1-2 Tabs by mouth 1 time daily as needed for up to 30 days. 30 Tab 0   • acetaminophen (TYLENOL) 325 MG Tab Take 2 Tabs by mouth every 6 hours as needed.     • pantoprazole (PROTONIX) 20 MG tablet TAKE 1 TABLET BY MOUTH EVERY DAY 90 Tab 0   • Multiple Vitamins-Minerals (OCUVITE ADULT 50+) Cap Take 1 Cap by mouth every day.     • ketoconazole (NIZORAL) 2 % shampoo Apply 5 to 10 mL to wet scalp, lather, leave on 3 to 5 minutes, and rinse; apply twice weekly for 2 to 4 weeks 1 Bottle 3   • Vitamin D, Cholecalciferol, 1000 units Tab Take 1 Tab by mouth every day. 60 Tab 11   • loratadine (CLARITIN) 10 MG Tab Take 1 Tab by mouth every day. 30 Tab 11   • lovastatin (MEVACOR) 40 MG tablet TAKE 1 TABLET BY MOUTH AT BEDTIME 90 Tab 3   • Ascorbic Acid (VITAMIN C) 1000 MG Tab Take  by mouth as needed (When feeling like if she starting a cold she will take 3/week).     • aspirin 81 MG tablet Take 81 mg by mouth every day.     • Multiple Vitamins-Minerals (CENTRUM SILVER PO) Take  by mouth every day.     • NON SPECIFIED OTC caffeine stimulate daily       No current facility-administered medications for this visit.        ROS: As per HPI. Additional pertinent symptoms as noted below.    Constitutional: denies fever/chills  Eyes: denies vision  "changes/eye pain  ENT: denies hearing changes (does have hearing loss), ear/nose/mouth/throat pain  Cardiovascular: denies chest pain/palpitations  Respiratory: denies dyspnea, cough, wheezing  GI: denies nausea, vomiting, diarrhea, constipation, hematechezia, melena  : denies dysuria, hematuria  Musculo-skeletal: chronic back pain denies new muscle,joint,bone pain  Skin: denies skin changes, rashes  Endo: denies easy bruising/bleeding, polydipsia  Neurological: denies lightheadedness, headaches  Psychological: denies depression, anxiety     /77 (BP Location: Left arm, Patient Position: Sitting)   Pulse 73   Temp 36.6 °C (97.9 °F) (Temporal)   Ht 1.448 m (4' 9\")   Wt 67.9 kg (149 lb 9.6 oz)   SpO2 94%   BMI 32.37 kg/m²     Physical Exam   Constitutional:  oriented to person, place, and time. No distress.   Eyes: Pupils are equal, round, and reactive to light. No scleral icterus.  Neck: Neck supple. No thyromegaly present.   Cardiovascular: Normal rate, regular rhythm and normal heart sounds.  Exam reveals no gallop and no friction rub.  No murmur heard.  Pulmonary/Chest: Breath sounds normal. Chest wall is not dull to percussion.   Musculoskeletal:   Straight leg test negative, no point tenderness on spine/paraspinals. no edema.   Lymphadenopathy: no cervical adenopathy  Neurological: alert and oriented to person, place, and time.   Skin: No cyanosis. Nails show no clubbing.        Assessment and Plan    1. Chronic use of opiate drugs therapeutic purposes  -plan per Chronic bilateral low back pain without sciatica    2. Chronic bilateral low back pain without sciatica  -patient taking norco for chronic pain, scheduling PT (offered to provide referral if she has difficulty)  -norco prn 30 day supply with instructions to use half a pill at a time instead of 1 whole pill to reduce opiate dependence  -UDS to be considered next visit  -f/u in 4 weeks with PCP  -continue to follow with PT    3. Obesity (BMI " 30-39.9)  -patient scheduling appointments with PT, she had success with past experiences  -continue to follow with PT  -counseling on diet and exercise provided during this visit    4. Health care maintenance  -patient requesting to defer further health care maintenance until after the holidays (mammogram, pap, dexa, cognition/dementia screening)  -received flu shot 8/15    5. Chronic narcotic use  -plan per Chronic bilateral low back pain without sciatica      Signed by: Umer Hardin M.D.      Chronic pain recheck for: low back pain  Last dose of controlled substance: 11/7/18  Chronic pain treated with Norco 5-325mg taken 1-2 times a day    She  reports that she drinks alcohol. Occasional  She  reports that she does not use drugs.    Interval history: per HPI  Any major change in health since last appointment? No    Consequences of Chronic Opiate therapy:  (5 A's)  Analgesia: Compared to no treatment or prior treatment, pain is currently improved  Activity: improved  Adverse Events:CAPHE@ denies constipation, dry mouth, itchy skin, nausea and sedation  Aberrant Behaviors: She reports she is taking medication as prescribed and is not veering from agreed treatment regimen or provider recommendations. There have been no inappropriate refills or lost/stolen meds reported.  Affect/Mood: Pain is impacting patient's mood.  Patient denies depression/anxiety.    Nonnarcotic treatments that are being used: physical therapy.     Last imaging: -    Opioid Risk Score: 6    Interpretation of Opioid Risk Score   Score 0-3 = Low risk of abuse. Do UDS at least once per year.  Score 4-7 = Moderate risk of abuse. Do UDS 1-4 times per year.  Score 8+ = High risk of abuse. Refer to specialist.    Last order of CONTROLLED SUBSTANCE TREATMENT AGREEMENT was found on 8/23/2018 from Office Visit on 8/23/2018     UDS Summary                URINE DRUG SCREEN Next Due 5/24/2019      Done 5/29/2018 [facility] PAIN MANAGEMENT SCREEN      Patient has more history with this topic...        Most recent UDS reviewed today and is consistent with prescribed medications.     I have reviewed the medical records, the Prescription Monitoring Program and I have determined that controlled substance treatment is medically indicated.

## 2018-11-12 ENCOUNTER — APPOINTMENT (OUTPATIENT)
Dept: INTERNAL MEDICINE | Facility: MEDICAL CENTER | Age: 78
End: 2018-11-12
Payer: MEDICARE

## 2018-11-16 ENCOUNTER — HOSPITAL ENCOUNTER (OUTPATIENT)
Dept: RADIOLOGY | Facility: MEDICAL CENTER | Age: 78
End: 2018-11-16
Attending: EMERGENCY MEDICINE
Payer: MEDICARE

## 2018-11-16 DIAGNOSIS — M54.16 LUMBAR RADICULOPATHY: ICD-10-CM

## 2018-11-16 PROCEDURE — 72148 MRI LUMBAR SPINE W/O DYE: CPT

## 2018-11-27 ENCOUNTER — APPOINTMENT (OUTPATIENT)
Dept: PHYSICAL THERAPY | Facility: REHABILITATION | Age: 78
End: 2018-11-27
Payer: MEDICARE

## 2018-11-28 RX ORDER — PANTOPRAZOLE SODIUM 20 MG/1
TABLET, DELAYED RELEASE ORAL
Qty: 90 TAB | Refills: 0 | OUTPATIENT
Start: 2018-11-28

## 2018-11-28 NOTE — TELEPHONE ENCOUNTER
Last seen: 11/09/18 by Dr. Hardin  Next appt: 12/14/18 with Dr. Molina    Was the patient seen in the last year in this department? Yes   Does patient have an active prescription for medications requested? No   Received Request Via: Pharmacy

## 2018-11-28 NOTE — TELEPHONE ENCOUNTER
Declined med  Need to see labs as last ones done 1+ year  Will only ok 30 days if needed until she getslabs

## 2018-11-29 RX ORDER — CHOLECALCIFEROL (VITAMIN D3) 25 MCG
1 CAPSULE ORAL
Refills: 11 | COMMUNITY
Start: 2018-10-10 | End: 2018-11-29

## 2018-11-29 RX ORDER — PANTOPRAZOLE SODIUM 20 MG/1
20 TABLET, DELAYED RELEASE ORAL
Qty: 30 TAB | Refills: 0 | Status: SHIPPED | OUTPATIENT
Start: 2018-11-29 | End: 2019-01-29 | Stop reason: SDUPTHER

## 2018-11-30 ENCOUNTER — APPOINTMENT (OUTPATIENT)
Dept: PHYSICAL THERAPY | Facility: REHABILITATION | Age: 78
End: 2018-11-30
Payer: MEDICARE

## 2018-12-04 ENCOUNTER — TELEPHONE (OUTPATIENT)
Dept: INTERNAL MEDICINE | Facility: MEDICAL CENTER | Age: 78
End: 2018-12-04

## 2018-12-04 ENCOUNTER — APPOINTMENT (OUTPATIENT)
Dept: PHYSICAL THERAPY | Facility: REHABILITATION | Age: 78
End: 2018-12-04
Payer: MEDICARE

## 2018-12-05 NOTE — TELEPHONE ENCOUNTER
1. Caller Name: Guillermina                      Call Back Number: [phone] (home)       2. Message: Pt called and l/m. She was out of town and forgot to turn her rx in for her Oxycodone. She is going to be going to be leaving by train for 2 weeks in [place]. But she is going to really need her medication.    3. Patient approves office to leave a detailed voicemail/MyChart message: N\A      Called and spoke with pt. Scheduled pt an appt on 12/12/18 at 2:15pm with Dr Benavides

## 2018-12-07 ENCOUNTER — APPOINTMENT (OUTPATIENT)
Dept: PHYSICAL THERAPY | Facility: REHABILITATION | Age: 78
End: 2018-12-07
Payer: MEDICARE

## 2018-12-11 ENCOUNTER — APPOINTMENT (OUTPATIENT)
Dept: PHYSICAL THERAPY | Facility: REHABILITATION | Age: 78
End: 2018-12-11
Payer: MEDICARE

## 2018-12-12 ENCOUNTER — OFFICE VISIT (OUTPATIENT)
Dept: INTERNAL MEDICINE | Facility: MEDICAL CENTER | Age: 78
End: 2018-12-12
Payer: MEDICARE

## 2018-12-12 VITALS
TEMPERATURE: 98.3 F | SYSTOLIC BLOOD PRESSURE: 150 MMHG | WEIGHT: 149.4 LBS | HEART RATE: 88 BPM | OXYGEN SATURATION: 95 % | BODY MASS INDEX: 32.23 KG/M2 | DIASTOLIC BLOOD PRESSURE: 83 MMHG | HEIGHT: 57 IN

## 2018-12-12 DIAGNOSIS — G89.29 CHRONIC BILATERAL LOW BACK PAIN WITHOUT SCIATICA: ICD-10-CM

## 2018-12-12 DIAGNOSIS — E78.5 DYSLIPIDEMIA: ICD-10-CM

## 2018-12-12 DIAGNOSIS — M54.50 CHRONIC BILATERAL LOW BACK PAIN WITHOUT SCIATICA: ICD-10-CM

## 2018-12-12 DIAGNOSIS — Z79.891 CHRONIC USE OF OPIATE DRUGS THERAPEUTIC PURPOSES: ICD-10-CM

## 2018-12-12 DIAGNOSIS — M48.061 SPINAL STENOSIS OF LUMBAR REGION, UNSPECIFIED WHETHER NEUROGENIC CLAUDICATION PRESENT: ICD-10-CM

## 2018-12-12 PROCEDURE — 99213 OFFICE O/P EST LOW 20 MIN: CPT | Mod: GE | Performed by: INTERNAL MEDICINE

## 2018-12-12 RX ORDER — LOVASTATIN 40 MG/1
TABLET ORAL
Qty: 90 TAB | Refills: 0 | Status: SHIPPED | OUTPATIENT
Start: 2018-12-12 | End: 2019-03-11 | Stop reason: SDUPTHER

## 2018-12-12 RX ORDER — HYDROCODONE BITARTRATE AND ACETAMINOPHEN 5; 325 MG/1; MG/1
1 TABLET ORAL
Qty: 30 TAB | Refills: 0 | Status: SHIPPED | OUTPATIENT
Start: 2018-12-12 | End: 2019-01-11

## 2018-12-12 RX ORDER — HYDROCODONE BITARTRATE AND ACETAMINOPHEN 5; 325 MG/1; MG/1
1 TABLET ORAL
COMMUNITY
End: 2018-12-12 | Stop reason: SDUPTHER

## 2018-12-12 ASSESSMENT — PAIN SCALES - GENERAL: PAINLEVEL: 3=SLIGHT PAIN

## 2018-12-12 NOTE — LETTER
[place] [facility]  [facility] - INTERNAL MEDICINE  [address] [facility] [address]     December 12, 2018    Patient: Guillermina Recio   YOB: 1940   Date of Visit: 12/12/2018       To Whom It May Concern:    Guillermina Recio was seen and treated in our department on 12/12/2018.     This letter is written by her request (for when she travels) to verify that she has been given a new prescription for opiate mediation (Norco 5), for the next 30 days.      If you should note that she has narcotic medication (Norco 5), this is consistent with her prescription.         Sincerely,           Stone Benavides M.D.   12/12/2018
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
## 2018-12-14 ENCOUNTER — APPOINTMENT (OUTPATIENT)
Dept: PHYSICAL THERAPY | Facility: REHABILITATION | Age: 78
End: 2018-12-14
Payer: MEDICARE

## 2018-12-31 RX ORDER — PANTOPRAZOLE SODIUM 20 MG/1
TABLET, DELAYED RELEASE ORAL
Qty: 30 TAB | OUTPATIENT
Start: 2018-12-31

## 2018-12-31 NOTE — TELEPHONE ENCOUNTER
Was the patient seen in the last year in this department? Yes   Last seen: 12/12/18 by Dr. Benavides  Next appt: 01/29/19 with Dr. Mays      Does patient have an active prescription for medications requested? No     Received Request Via: Pharmacy

## 2019-01-21 RX ORDER — PANTOPRAZOLE SODIUM 20 MG/1
TABLET, DELAYED RELEASE ORAL
Qty: 30 TAB | OUTPATIENT
Start: 2019-01-21

## 2019-01-28 NOTE — PROGRESS NOTES
Established Patient    Guillermina presents today with the following:    CC: Med refill    HPI: Follow-up patient presents to the [facility] clinic patient is a pleasant 78-year-old lady that comes here for medication refills.  Her PCP has been asking her to have labs done for the last 6 months, but patient has forgot to do them.    GERD:  -Well-controlled with pantoprazole.  Patient is asking for refill.    Seborrheic keratosis of scalp  -Stable, chronic condition  -Well-controlled with ketoconazole shampoo     Chronic pain recheck:   Chronic pain treated with Norco 5 taken once a day  Current alcohol or substance use: no  Last refilled 20 tablets on December 12, 2018     Consequences of Chronic Opiate therapy:   Analgesia:  improved  Activity:  significantly improved  Adverse Events:  occasional constipation, but controlled with diet (chooses not to use softeners, but uses food alternatives, and finds it helps).   Aberrant Behaviors: no inappropriate refills, lost or stolen meds reported.   Affect/Mood: good grooming, full facial expressions, normal speech pattern and content, normal thought patterns     Non-narcotic treatments that are being used:   Also using tylenol occasionally (prn).   Will be going to physical therapy.      Functional ability and goals of treatment:  Be able to do routine activities and cleaning.         Urine drug screen done: 5/2018  was consistent with prescribed medications.     I have reviewed the medical records, the Prescription Monitoring Program (Amanda) and I have determined that controlled substance treatment is medically indicated.     Patient Active Problem List    Diagnosis Date Noted   • Chest pain 09/12/2017     Priority: Medium   • Obesity (BMI 30-39.9) 12/16/2016     Priority: Medium   • Abnormal stress test 12/16/2016     Priority: Medium   • Dyslipidemia 08/01/2016     Priority: Medium   • HTN (hypertension) 06/25/2009     Priority: Medium   • Chronic use of opiate drugs  therapeutic purposes 07/07/2017     Priority: Low   • Chronic bilateral low back pain without sciatica 07/07/2017     Priority: Low   • Daytime somnolence 12/16/2016     Priority: Low   • Insomnia 12/16/2016     Priority: Low   • Gastroesophageal reflux disease 12/16/2016     Priority: Low   • Vitamin D deficiency 12/16/2016     Priority: Low   • Chronic narcotic use 12/16/2016     Priority: Low   • Controlled substance agreement signed 12/16/2016     Priority: Low   • Chronic pain of both shoulders 12/16/2016     Priority: Low   • Chronic left-sided low back pain without sciatica 12/16/2016     Priority: Low   • Scalp itch 06/29/2016     Priority: Low   • Polypharmacy 06/29/2016     Priority: Low   • Dermatochalasis 07/14/2015     Priority: Low   • VAGINAL LESION 11/10/2009     Priority: Low   • Pelvic pain 11/10/2009     Priority: Low   • Osteopenia 06/25/2009     Priority: Low   • Dysuria 06/25/2009     Priority: Low   • Anemia 06/25/2009     Priority: Low   • Hip pain 06/25/2009     Priority: Low   • Elbow pain 06/25/2009     Priority: Low   • Spinal stenosis of lumbar region 12/12/2018   • Hip pain, chronic, right 04/12/2018   • Hearing difficulty of both ears 04/12/2018   • Preventative health care 10/30/2017       Current Outpatient Prescriptions   Medication Sig Dispense Refill   • lovastatin (MEVACOR) 40 MG tablet TAKE 1 TABLET BY MOUTH AT BEDTIME 90 Tab 0   • pantoprazole (PROTONIX) 20 MG tablet Take 1 Tab by mouth every day. 30 Tab 0   • acetaminophen (TYLENOL) 325 MG Tab Take 2 Tabs by mouth every 6 hours as needed.     • Multiple Vitamins-Minerals (OCUVITE ADULT 50+) Cap Take 1 Cap by mouth every day.     • ketoconazole (NIZORAL) 2 % shampoo Apply 5 to 10 mL to wet scalp, lather, leave on 3 to 5 minutes, and rinse; apply twice weekly for 2 to 4 weeks 1 Bottle 3   • Vitamin D, Cholecalciferol, 1000 units Tab Take 1 Tab by mouth every day. 60 Tab 11   • loratadine (CLARITIN) 10 MG Tab Take 1 Tab by mouth  "every day. 30 Tab 11   • Ascorbic Acid (VITAMIN C) 1000 MG Tab Take  by mouth as needed (When feeling like if she starting a cold she will take 3/week).     • aspirin 81 MG tablet Take 81 mg by mouth every day.     • Multiple Vitamins-Minerals (CENTRUM SILVER PO) Take  by mouth every day.     • NON SPECIFIED OTC caffeine stimulate daily       No current facility-administered medications for this visit.        ROS: As per HPI. Additional pertinent symptoms as noted below.    Review of Systems   Constitutional: Negative for fever and malaise/fatigue.   HENT: Negative for hearing loss.    Eyes: Negative for blurred vision.   Respiratory: Negative for cough and shortness of breath.    Cardiovascular: Negative for chest pain, palpitations and leg swelling.   Gastrointestinal: Positive for constipation. Negative for abdominal pain and nausea.   Genitourinary: Negative for dysuria and frequency.   Musculoskeletal: Positive for back pain.   Skin: Negative for rash.   Neurological: Negative for dizziness, sensory change, focal weakness, weakness and headaches.   Psychiatric/Behavioral: Positive for depression. Negative for memory loss, substance abuse and suicidal ideas. The patient is not nervous/anxious.        /78 (BP Location: Left arm, Patient Position: Sitting, BP Cuff Size: Adult)   Pulse 86   Temp 36.1 °C (97 °F)   Ht 1.448 m (4' 9\")   Wt 66.7 kg (147 lb)   SpO2 93%   BMI 31.81 kg/m²      Physical Exam   Constitutional:  oriented to person, place, and time. No distress.  Healthy appearing.  Eyes: Pupils are equal, round, and reactive to light. No scleral icterus.  Neck: Neck supple. No thyromegaly present.   Cardiovascular: Normal rate, regular rhythm and normal heart sounds.  Exam reveals no gallop and no friction rub.  No murmur heard.  Pulmonary/Chest: Breath sounds normal. Chest wall is not dull to percussion.   Musculoskeletal:   no edema.   Neurological: alert and oriented to person, place, and " time.  Grossly nonfocal.  Walking with walker.  Skin: No cyanosis. Nails show no clubbing.      Assessment and Plan    GERD:  -Well-controlled with pantoprazole.    -Refilled 1 months worth    Seborrheic keratosis of scalp  -Refilled ketoconazole shampoo    Chronic bilateral lower back pain  -Narc check was done, and is not receiving narcotics from any other clinic.  -[facility]  -Patient signed consent for opiate prescription and pain contract  -ORT risk is low  -Hydrocodone 1 a day helps improve patient's ADLs greatly.  Her function is greatly increased.    Healthcare maintenance  -Reprinted patient's labs that PCP had ordered - gave patient instructions on lab locations, and to have these done before her next PCP visit.  -She will follow-up with PCP in 3-4 weeks    Signed by: Sandra Mays M.D.

## 2019-01-29 ENCOUNTER — OFFICE VISIT (OUTPATIENT)
Dept: INTERNAL MEDICINE | Facility: MEDICAL CENTER | Age: 79
End: 2019-01-29
Payer: MEDICARE

## 2019-01-29 ENCOUNTER — TELEPHONE (OUTPATIENT)
Dept: INTERNAL MEDICINE | Facility: MEDICAL CENTER | Age: 79
End: 2019-01-29

## 2019-01-29 VITALS
WEIGHT: 147 LBS | BODY MASS INDEX: 31.71 KG/M2 | TEMPERATURE: 97 F | SYSTOLIC BLOOD PRESSURE: 124 MMHG | DIASTOLIC BLOOD PRESSURE: 78 MMHG | HEIGHT: 57 IN | OXYGEN SATURATION: 93 % | HEART RATE: 86 BPM

## 2019-01-29 DIAGNOSIS — M54.50 CHRONIC BILATERAL LOW BACK PAIN WITHOUT SCIATICA: ICD-10-CM

## 2019-01-29 DIAGNOSIS — K21.9 GASTROESOPHAGEAL REFLUX DISEASE, ESOPHAGITIS PRESENCE NOT SPECIFIED: ICD-10-CM

## 2019-01-29 DIAGNOSIS — M54.50 CHRONIC LEFT-SIDED LOW BACK PAIN WITHOUT SCIATICA: ICD-10-CM

## 2019-01-29 DIAGNOSIS — L21.9 SEBORRHEIC DERMATITIS OF SCALP: ICD-10-CM

## 2019-01-29 DIAGNOSIS — M15.9 GENERALIZED OSTEOARTHRITIS: ICD-10-CM

## 2019-01-29 DIAGNOSIS — G89.29 CHRONIC LEFT-SIDED LOW BACK PAIN WITHOUT SCIATICA: ICD-10-CM

## 2019-01-29 DIAGNOSIS — Z79.891 CHRONIC USE OF OPIATE DRUGS THERAPEUTIC PURPOSES: ICD-10-CM

## 2019-01-29 DIAGNOSIS — Z79.899 POLYPHARMACY: ICD-10-CM

## 2019-01-29 DIAGNOSIS — R41.89 IMPAIRED COGNITION: ICD-10-CM

## 2019-01-29 DIAGNOSIS — G89.29 CHRONIC BILATERAL LOW BACK PAIN WITHOUT SCIATICA: ICD-10-CM

## 2019-01-29 PROCEDURE — 99214 OFFICE O/P EST MOD 30 MIN: CPT | Mod: GC | Performed by: INTERNAL MEDICINE

## 2019-01-29 PROCEDURE — 99000 SPECIMEN HANDLING OFFICE-LAB: CPT | Performed by: INTERNAL MEDICINE

## 2019-01-29 RX ORDER — HYDROCODONE BITARTRATE AND ACETAMINOPHEN 5; 325 MG/1; MG/1
1-2 TABLET ORAL EVERY 4 HOURS PRN
COMMUNITY
End: 2019-01-29 | Stop reason: SDUPTHER

## 2019-01-29 RX ORDER — CHOLECALCIFEROL (VITAMIN D3) 25 MCG
1 CAPSULE ORAL
Refills: 11 | COMMUNITY
Start: 2018-12-12 | End: 2019-11-12

## 2019-01-29 RX ORDER — KETOCONAZOLE 20 MG/ML
SHAMPOO TOPICAL
Qty: 1 BOTTLE | Refills: 3 | Status: ON HOLD | OUTPATIENT
Start: 2019-01-29 | End: 2019-12-09

## 2019-01-29 RX ORDER — PANTOPRAZOLE SODIUM 20 MG/1
20 TABLET, DELAYED RELEASE ORAL
Qty: 30 TAB | Refills: 0 | Status: SHIPPED | OUTPATIENT
Start: 2019-01-29 | End: 2019-04-18 | Stop reason: SDUPTHER

## 2019-01-29 RX ORDER — HYDROCODONE BITARTRATE AND ACETAMINOPHEN 5; 325 MG/1; MG/1
1 TABLET ORAL
Qty: 30 TAB | Refills: 0 | Status: SHIPPED | OUTPATIENT
Start: 2019-01-29 | End: 2019-02-26 | Stop reason: SDUPTHER

## 2019-01-29 ASSESSMENT — ENCOUNTER SYMPTOMS
COUGH: 0
NERVOUS/ANXIOUS: 0
CONSTIPATION: 1
BLURRED VISION: 0
DEPRESSION: 1
NAUSEA: 0
PALPITATIONS: 0
FEVER: 0
BACK PAIN: 1
ABDOMINAL PAIN: 0
SHORTNESS OF BREATH: 0
FOCAL WEAKNESS: 0
WEAKNESS: 0
MEMORY LOSS: 0
SENSORY CHANGE: 0
DIZZINESS: 0
HEADACHES: 0

## 2019-01-29 ASSESSMENT — LIFESTYLE VARIABLES: SUBSTANCE_ABUSE: 0

## 2019-01-29 NOTE — TELEPHONE ENCOUNTER
Incoming fax from [facility] asking for records. Pt has an appt with them on 01/31/19.  Referred pt in 2016. Referral was closed. Asked Dr Molina to re-refer pt to [place] so I would be able to fax records.  Dr Molina re-referred pt to [facility]    Last OV note and demographics  faxed.

## 2019-02-13 ENCOUNTER — TELEPHONE (OUTPATIENT)
Dept: INTERNAL MEDICINE | Facility: MEDICAL CENTER | Age: 79
End: 2019-02-13

## 2019-02-13 NOTE — TELEPHONE ENCOUNTER
Please make her upcoming appt with Tanvir thornton (60min, not 30 minutes).  Reason - complex case and controlled substances.

## 2019-02-13 NOTE — TELEPHONE ENCOUNTER
1. Caller Name: Guillermina                       Call Back Number: [phone] (home)       2. Message: Pt called and l/m. Just wanting to let Dr Molina know. She has not gone to any appts that we set up for her. She has been sick. Now she is well. And have her bus passes. Now she is scheduling for them.  (FYI)    3. Patient approves office to leave a detailed voicemail/MyChart message: N\A

## 2019-02-22 ENCOUNTER — TELEPHONE (OUTPATIENT)
Dept: INTERNAL MEDICINE | Facility: MEDICAL CENTER | Age: 79
End: 2019-02-22

## 2019-02-22 NOTE — TELEPHONE ENCOUNTER
1. Caller Name: Guillermina                      Call Back Number: [phone] (home)       2. Message: Pt called and l/m. She has a pinch nerve in the Neck and been under a heating pad x3-4 days. For her Neck/shoulder area. That she has all her Paperwork for her labs. Pt wanted a call back    3. Patient approves office to leave a detailed voicemail/MyChart message: N\A      Called and spoke with pt. Pt wanted to let Dr Molina she is limited on bus passes.  She is going to be getting her labs done prior to her appt with Dr Ramey.  Pt said she is having Memory loss but will deal with that later.  Also she said her friend is going to have her meet someone on 03/06/19 to get her organized.

## 2019-02-22 NOTE — TELEPHONE ENCOUNTER
Has been having neck pain and HAs last few days.  Worse with certain movements.  Getting better with heat.   No alarm sxs -- no weakness, numbness, rads, b/b incont, f/c.   No falls.   Thinks snow and cold weather were a trigger.   All questions were answered to the patient's and/or family's satisfaction and patient and/or family was/were appreciative of the phone call.

## 2019-02-22 NOTE — TELEPHONE ENCOUNTER
Part 2 of telephone call  Said she has been lying on the heating pad all night long.  Said that I could not recommend lying on the pad and she should apply no direct pressure to be heating pad.  And should only use it for short period of time.  She expressed understanding.

## 2019-02-26 ENCOUNTER — OFFICE VISIT (OUTPATIENT)
Dept: INTERNAL MEDICINE | Facility: MEDICAL CENTER | Age: 79
End: 2019-02-26
Payer: MEDICARE

## 2019-02-26 ENCOUNTER — HOSPITAL ENCOUNTER (OUTPATIENT)
Dept: LAB | Facility: MEDICAL CENTER | Age: 79
End: 2019-02-26
Attending: INTERNAL MEDICINE
Payer: MEDICARE

## 2019-02-26 VITALS
DIASTOLIC BLOOD PRESSURE: 76 MMHG | TEMPERATURE: 97.2 F | WEIGHT: 148.38 LBS | BODY MASS INDEX: 32.01 KG/M2 | OXYGEN SATURATION: 93 % | HEIGHT: 57 IN | SYSTOLIC BLOOD PRESSURE: 112 MMHG | HEART RATE: 83 BPM

## 2019-02-26 DIAGNOSIS — E78.5 DYSLIPIDEMIA: ICD-10-CM

## 2019-02-26 DIAGNOSIS — G89.29 CHRONIC LEFT-SIDED LOW BACK PAIN WITHOUT SCIATICA: Primary | ICD-10-CM

## 2019-02-26 DIAGNOSIS — Z79.891 CHRONIC USE OF OPIATE DRUGS THERAPEUTIC PURPOSES: ICD-10-CM

## 2019-02-26 DIAGNOSIS — E55.9 VITAMIN D DEFICIENCY: ICD-10-CM

## 2019-02-26 DIAGNOSIS — M54.2 NECK PAIN ON RIGHT SIDE: ICD-10-CM

## 2019-02-26 DIAGNOSIS — R41.89 IMPAIRED COGNITION: ICD-10-CM

## 2019-02-26 DIAGNOSIS — M54.50 CHRONIC LEFT-SIDED LOW BACK PAIN WITHOUT SCIATICA: Primary | ICD-10-CM

## 2019-02-26 DIAGNOSIS — R73.01 IFG (IMPAIRED FASTING GLUCOSE): ICD-10-CM

## 2019-02-26 LAB
25(OH)D3 SERPL-MCNC: 32 NG/ML (ref 30–100)
ALBUMIN SERPL BCP-MCNC: 4.7 G/DL (ref 3.2–4.9)
ALBUMIN/GLOB SERPL: 2 G/DL
ALP SERPL-CCNC: 78 U/L (ref 30–99)
ALT SERPL-CCNC: 13 U/L (ref 2–50)
ANION GAP SERPL CALC-SCNC: 7 MMOL/L (ref 0–11.9)
AST SERPL-CCNC: 20 U/L (ref 12–45)
BASOPHILS # BLD AUTO: 0.2 % (ref 0–1.8)
BASOPHILS # BLD: 0.01 K/UL (ref 0–0.12)
BILIRUB SERPL-MCNC: 0.4 MG/DL (ref 0.1–1.5)
BUN SERPL-MCNC: 26 MG/DL (ref 8–22)
CALCIUM SERPL-MCNC: 11 MG/DL (ref 8.5–10.5)
CHLORIDE SERPL-SCNC: 107 MMOL/L (ref 96–112)
CHOLEST SERPL-MCNC: 211 MG/DL (ref 100–199)
CO2 SERPL-SCNC: 26 MMOL/L (ref 20–33)
CREAT SERPL-MCNC: 1.21 MG/DL (ref 0.5–1.4)
EOSINOPHIL # BLD AUTO: 0.01 K/UL (ref 0–0.51)
EOSINOPHIL NFR BLD: 0.2 % (ref 0–6.9)
ERYTHROCYTE [DISTWIDTH] IN BLOOD BY AUTOMATED COUNT: 51.7 FL (ref 35.9–50)
EST. AVERAGE GLUCOSE BLD GHB EST-MCNC: 123 MG/DL
FASTING STATUS PATIENT QL REPORTED: NORMAL
FOLATE SERPL-MCNC: >23.6 NG/ML
GLOBULIN SER CALC-MCNC: 2.4 G/DL (ref 1.9–3.5)
GLUCOSE SERPL-MCNC: 99 MG/DL (ref 65–99)
HBA1C MFR BLD: 5.9 % (ref 0–5.6)
HCT VFR BLD AUTO: 47.7 % (ref 37–47)
HDLC SERPL-MCNC: 51 MG/DL
HGB BLD-MCNC: 14.5 G/DL (ref 12–16)
IMM GRANULOCYTES # BLD AUTO: 0.01 K/UL (ref 0–0.11)
IMM GRANULOCYTES NFR BLD AUTO: 0.2 % (ref 0–0.9)
LDLC SERPL CALC-MCNC: 112 MG/DL
LYMPHOCYTES # BLD AUTO: 1.31 K/UL (ref 1–4.8)
LYMPHOCYTES NFR BLD: 25.4 % (ref 22–41)
MCH RBC QN AUTO: 26.3 PG (ref 27–33)
MCHC RBC AUTO-ENTMCNC: 30.4 G/DL (ref 33.6–35)
MCV RBC AUTO: 86.6 FL (ref 81.4–97.8)
MONOCYTES # BLD AUTO: 0.7 K/UL (ref 0–0.85)
MONOCYTES NFR BLD AUTO: 13.6 % (ref 0–13.4)
NEUTROPHILS # BLD AUTO: 3.11 K/UL (ref 2–7.15)
NEUTROPHILS NFR BLD: 60.4 % (ref 44–72)
NRBC # BLD AUTO: 0 K/UL
NRBC BLD-RTO: 0 /100 WBC
PLATELET # BLD AUTO: 250 K/UL (ref 164–446)
PMV BLD AUTO: 9.5 FL (ref 9–12.9)
POTASSIUM SERPL-SCNC: 4.6 MMOL/L (ref 3.6–5.5)
PROT SERPL-MCNC: 7.1 G/DL (ref 6–8.2)
RBC # BLD AUTO: 5.51 M/UL (ref 4.2–5.4)
SODIUM SERPL-SCNC: 140 MMOL/L (ref 135–145)
TRIGL SERPL-MCNC: 240 MG/DL (ref 0–149)
TSH SERPL DL<=0.005 MIU/L-ACNC: 3.4 UIU/ML (ref 0.38–5.33)
VIT B12 SERPL-MCNC: 557 PG/ML (ref 211–911)
WBC # BLD AUTO: 5.2 K/UL (ref 4.8–10.8)

## 2019-02-26 PROCEDURE — 85025 COMPLETE CBC W/AUTO DIFF WBC: CPT

## 2019-02-26 PROCEDURE — 99213 OFFICE O/P EST LOW 20 MIN: CPT | Performed by: FAMILY MEDICINE

## 2019-02-26 PROCEDURE — 80053 COMPREHEN METABOLIC PANEL: CPT

## 2019-02-26 PROCEDURE — 36415 COLL VENOUS BLD VENIPUNCTURE: CPT

## 2019-02-26 PROCEDURE — 82306 VITAMIN D 25 HYDROXY: CPT

## 2019-02-26 PROCEDURE — 82746 ASSAY OF FOLIC ACID SERUM: CPT

## 2019-02-26 PROCEDURE — 83036 HEMOGLOBIN GLYCOSYLATED A1C: CPT

## 2019-02-26 PROCEDURE — 80061 LIPID PANEL: CPT

## 2019-02-26 PROCEDURE — 82607 VITAMIN B-12: CPT

## 2019-02-26 PROCEDURE — 84443 ASSAY THYROID STIM HORMONE: CPT

## 2019-02-26 RX ORDER — HYDROCODONE BITARTRATE AND ACETAMINOPHEN 5; 325 MG/1; MG/1
1 TABLET ORAL
Qty: 30 TAB | Refills: 0 | Status: SHIPPED | OUTPATIENT
Start: 2019-03-01 | End: 2019-03-31

## 2019-02-26 ASSESSMENT — PATIENT HEALTH QUESTIONNAIRE - PHQ9
CLINICAL INTERPRETATION OF PHQ2 SCORE: 1
SUM OF ALL RESPONSES TO PHQ QUESTIONS 1-9: 4
5. POOR APPETITE OR OVEREATING: 1 - SEVERAL DAYS

## 2019-02-26 NOTE — PATIENT INSTRUCTIONS
1. You have been prescribed pain medicine for the neck pain.  2. You will be referred to pain clinic as discussed.  3. Please make a follow- up appointments in 2 weeks.
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
## 2019-02-26 NOTE — Clinical Note
CHINI - Made referral to pain specialist as she is using different prescribers for every refill over the last year and her ORT score is 8.

## 2019-02-26 NOTE — PROGRESS NOTES
Patient was scheduled for general follow-up however is acute neck pain due to lifting groceries, no neurological issue due to this.  She did increase her opiate use.  Was looking for a refill.  Patient says that her pain is so bad and that if she is unable to have medication she thinks about hurting herself, but has no current plans to.  She says that her [employer] keeps her safe.  Opiate Risk Score 8  Date of Last therapy agreement/ update 12/2018  Date of last UDS 12/2018    Discrepancies:No    [facility]  reviewed.  No discrepancies noted.  Review of  suggest patient has had a different prescriber every time for the last year.  Feel it is unsafe we will refer to pain management.    Opioid Risk Score: 8    Interpretation of Opioid Risk Score   Score 0-3 = Low risk of abuse. Do UDS at least once per year.  Score 4-7 = Moderate risk of abuse. Do UDS 1-4 times per year.  Score 8+ = High risk of abuse. Refer to specialist.    At the time of the visit, I personally examined the patient and evaluated the patient's medical history, physical examination, laboratory results/studies, and assessment and I discussed the findings and formulated the care plan documented with the resident physician.    Kehinde Lofton M.D.

## 2019-02-27 DIAGNOSIS — E83.52 HYPERCALCEMIA: ICD-10-CM

## 2019-02-27 PROBLEM — M54.2 NECK PAIN ON RIGHT SIDE: Status: ACTIVE | Noted: 2019-02-27

## 2019-03-11 DIAGNOSIS — E78.5 DYSLIPIDEMIA: ICD-10-CM

## 2019-03-11 RX ORDER — LOVASTATIN 40 MG/1
TABLET ORAL
Qty: 90 TAB | Refills: 0 | Status: SHIPPED | OUTPATIENT
Start: 2019-03-11 | End: 2019-06-15 | Stop reason: SDUPTHER

## 2019-03-11 NOTE — TELEPHONE ENCOUNTER
Last seen: 02/26/19 by Dr. Ramey  Next appt: 03/12/19 with Dr. Bryan    Was the patient seen in the last year in this department? Yes   Does patient have an active prescription for medications requested? No   Received Request Via: Pharmacy

## 2019-03-12 ENCOUNTER — OFFICE VISIT (OUTPATIENT)
Dept: INTERNAL MEDICINE | Facility: MEDICAL CENTER | Age: 79
End: 2019-03-12
Payer: MEDICARE

## 2019-03-12 VITALS
BODY MASS INDEX: 31.71 KG/M2 | HEIGHT: 57 IN | TEMPERATURE: 98.9 F | WEIGHT: 147 LBS | HEART RATE: 74 BPM | SYSTOLIC BLOOD PRESSURE: 108 MMHG | DIASTOLIC BLOOD PRESSURE: 64 MMHG | OXYGEN SATURATION: 94 %

## 2019-03-12 DIAGNOSIS — M54.2 NECK PAIN ON RIGHT SIDE: ICD-10-CM

## 2019-03-12 DIAGNOSIS — K21.9 GASTROESOPHAGEAL REFLUX DISEASE, ESOPHAGITIS PRESENCE NOT SPECIFIED: ICD-10-CM

## 2019-03-12 DIAGNOSIS — E55.9 VITAMIN D DEFICIENCY: ICD-10-CM

## 2019-03-12 PROCEDURE — 99214 OFFICE O/P EST MOD 30 MIN: CPT | Performed by: INTERNAL MEDICINE

## 2019-03-12 ASSESSMENT — PATIENT HEALTH QUESTIONNAIRE - PHQ9
CLINICAL INTERPRETATION OF PHQ2 SCORE: 2
5. POOR APPETITE OR OVEREATING: 3 - NEARLY EVERY DAY
SUM OF ALL RESPONSES TO PHQ QUESTIONS 1-9: 9

## 2019-03-12 ASSESSMENT — PAIN SCALES - GENERAL: PAINLEVEL: NO PAIN

## 2019-03-12 NOTE — PROGRESS NOTES
Established Patient    Guillermina presents today with the following:    CC: Patient of Dr. Molina is here today for follow up    HPI: 78 year old lady with PMH of neck and shoulder pain, dyslipidemia and GERD is here today for follow up on her medical problems.  She complaines of CP which I only lasts for 1-2 seconds. Some times they are related to exertion and some times she has them at rest. No other associated symptoms like SOB or dizziness. Chest pains are not new and have no recent changes. They happen 1-2 times a day.    She is working on some paper work to get assistance to pay for house keeping. Currently 2 ladies from her [employer] are helping her with the house chores, she cooks herself and can do some of the housekeeping herself.    She has chronic neck, shoulder and low back pain with no neurological symptoms.    Patient Active Problem List    Diagnosis Date Noted   • Chest pain 09/12/2017     Priority: Medium   • Obesity (BMI 30-39.9) 12/16/2016     Priority: Medium   • Abnormal stress test 12/16/2016     Priority: Medium   • Dyslipidemia 08/01/2016     Priority: Medium   • HTN (hypertension) 06/25/2009     Priority: Medium   • Chronic use of opiate drugs therapeutic purposes 07/07/2017     Priority: Low   • Chronic bilateral low back pain without sciatica 07/07/2017     Priority: Low   • Daytime somnolence 12/16/2016     Priority: Low   • Insomnia 12/16/2016     Priority: Low   • Gastroesophageal reflux disease 12/16/2016     Priority: Low   • Vitamin D deficiency 12/16/2016     Priority: Low   • Chronic narcotic use 12/16/2016     Priority: Low   • Controlled substance agreement signed 12/16/2016     Priority: Low   • Chronic pain of both shoulders 12/16/2016     Priority: Low   • Chronic left-sided low back pain without sciatica 12/16/2016     Priority: Low   • Scalp itch 06/29/2016     Priority: Low   • Polypharmacy 06/29/2016     Priority: Low   • Dermatochalasis 07/14/2015     Priority: Low   •  VAGINAL LESION 11/10/2009     Priority: Low   • Pelvic pain 11/10/2009     Priority: Low   • Osteopenia 06/25/2009     Priority: Low   • Dysuria 06/25/2009     Priority: Low   • Anemia 06/25/2009     Priority: Low   • Hip pain 06/25/2009     Priority: Low   • Elbow pain 06/25/2009     Priority: Low   • Neck pain on right side 02/27/2019   • Spinal stenosis of lumbar region 12/12/2018   • Hip pain, chronic, right 04/12/2018   • Hearing difficulty of both ears 04/12/2018   • Preventative health care 10/30/2017       Current Outpatient Prescriptions   Medication Sig Dispense Refill   • lovastatin (MEVACOR) 40 MG tablet TAKE 1 TABLET BY MOUTH EVERYDAY AT BEDTIME 90 Tab 0   • HYDROcodone-acetaminophen (NORCO) 5-325 MG Tab per tablet Take 1 Tab by mouth every 24 hours as needed for up to 30 days. 30 Tab 0   • ketoconazole (NIZORAL) 2 % shampoo Apply 5 to 10 mL to wet scalp, lather, leave on 3 to 5 minutes, and rinse; apply twice weekly for 2 to 4 weeks 1 Bottle 3   • pantoprazole (PROTONIX) 20 MG tablet Take 1 Tab by mouth every day. 30 Tab 0   • VITAMIN D HIGH POTENCY 1000 units Cap Take 1 Cap by mouth every day.  11   • acetaminophen (TYLENOL) 325 MG Tab Take 2 Tabs by mouth every 6 hours as needed.     • Multiple Vitamins-Minerals (OCUVITE ADULT 50+) Cap Take 1 Cap by mouth every day.     • loratadine (CLARITIN) 10 MG Tab Take 1 Tab by mouth every day. (Patient not taking: Reported on 2/26/2019) 30 Tab 11   • Ascorbic Acid (VITAMIN C) 1000 MG Tab Take  by mouth as needed (When feeling like if she starting a cold she will take 3/week).     • aspirin 81 MG tablet Take 81 mg by mouth every day.     • Multiple Vitamins-Minerals (CENTRUM SILVER PO) Take  by mouth every day.     • NON SPECIFIED OTC caffeine stimulate daily       No current facility-administered medications for this visit.        ROS: As per HPI. All others reviewed and were negative.      SL=023/84  GA= 74    Physical Exam   Constitutional:  oriented to  person, place, and time. No distress.   Eyes: Pupils are equal, round, and reactive to light. No scleral icterus.  Neck: Neck supple. No thyromegaly present.   Cardiovascular: Normal rate, regular rhythm and normal heart sounds.  Exam reveals no gallop and no friction rub.  No murmur heard.  Pulmonary/Chest: Breath sounds normal. Chest wall is not dull to percussion.   Musculoskeletal:   no edema.   Lymphadenopathy: no cervical adenopathy  Neurological: alert and oriented to person, place, and time.   Skin: No cyanosis. Nails show no clubbing.      Note: I have reviewed all pertinent labs and diagnostic tests associated with this visit with specific comments listed under the assessment and plan below  Results for ERIN JADE (MRN [id number]) as of 3/12/2019 13:09   Ref. Range 2/26/2019 10:11   WBC Latest Ref Range: 4.8 - 10.8 K/uL 5.2   RBC Latest Ref Range: 4.20 - 5.40 M/uL 5.51 (H)   Hemoglobin Latest Ref Range: 12.0 - 16.0 g/dL 14.5   Hematocrit Latest Ref Range: 37.0 - 47.0 % 47.7 (H)   MCV Latest Ref Range: 81.4 - 97.8 fL 86.6   MCH Latest Ref Range: 27.0 - 33.0 pg 26.3 (L)   MCHC Latest Ref Range: 33.6 - 35.0 g/dL 30.4 (L)   RDW Latest Ref Range: 35.9 - 50.0 fL 51.7 (H)   Platelet Count Latest Ref Range: 164 - 446 K/uL 250   MPV Latest Ref Range: 9.0 - 12.9 fL 9.5   Neutrophils-Polys Latest Ref Range: 44.00 - 72.00 % 60.40   Neutrophils (Absolute) Latest Ref Range: 2.00 - 7.15 K/uL 3.11   Lymphocytes Latest Ref Range: 22.00 - 41.00 % 25.40   Lymphs (Absolute) Latest Ref Range: 1.00 - 4.80 K/uL 1.31   Monocytes Latest Ref Range: 0.00 - 13.40 % 13.60 (H)   Monos (Absolute) Latest Ref Range: 0.00 - 0.85 K/uL 0.70   Eosinophils Latest Ref Range: 0.00 - 6.90 % 0.20   Eos (Absolute) Latest Ref Range: 0.00 - 0.51 K/uL 0.01   Basophils Latest Ref Range: 0.00 - 1.80 % 0.20   Baso (Absolute) Latest Ref Range: 0.00 - 0.12 K/uL 0.01   Immature Granulocytes Latest Ref Range: 0.00 - 0.90 % 0.20   Immature Granulocytes  (abs) Latest Ref Range: 0.00 - 0.11 K/uL 0.01   Nucleated RBC Latest Units: /100 WBC 0.00   NRBC (Absolute) Latest Units: K/uL 0.00   Sodium Latest Ref Range: 135 - 145 mmol/L 140   Potassium Latest Ref Range: 3.6 - 5.5 mmol/L 4.6   Chloride Latest Ref Range: 96 - 112 mmol/L 107   Co2 Latest Ref Range: 20 - 33 mmol/L 26   Anion Gap Latest Ref Range: 0.0 - 11.9  7.0   Glucose Latest Ref Range: 65 - 99 mg/dL 99   Bun Latest Ref Range: 8 - 22 mg/dL 26 (H)   Creatinine Latest Ref Range: 0.50 - 1.40 mg/dL 1.21   GFR If  Latest Ref Range: >60 mL/min/1.73 m 2 52 (A)   GFR If Non  Latest Ref Range: >60 mL/min/1.73 m 2 43 (A)   Calcium Latest Ref Range: 8.5 - 10.5 mg/dL 11.0 (H)   AST(SGOT) Latest Ref Range: 12 - 45 U/L 20   ALT(SGPT) Latest Ref Range: 2 - 50 U/L 13   Alkaline Phosphatase Latest Ref Range: 30 - 99 U/L 78   Total Bilirubin Latest Ref Range: 0.1 - 1.5 mg/dL 0.4   Albumin Latest Ref Range: 3.2 - 4.9 g/dL 4.7   Total Protein Latest Ref Range: 6.0 - 8.2 g/dL 7.1   Globulin Latest Ref Range: 1.9 - 3.5 g/dL 2.4   A-G Ratio Latest Units: g/dL 2.0   Glycohemoglobin Latest Ref Range: 0.0 - 5.6 % 5.9 (H)   Estim. Avg Glu Latest Units: mg/dL 123   Fasting Status Unknown Fasting   Cholesterol,Tot Latest Ref Range: 100 - 199 mg/dL 211 (H)   Triglycerides Latest Ref Range: 0 - 149 mg/dL 240 (H)   HDL Latest Ref Range: >=40 mg/dL 51   LDL Latest Ref Range: <100 mg/dL 112 (H)   Vitamin B12 -True Cobalamin Latest Ref Range: 211 - 911 pg/mL 557   Folate -Folic Acid Latest Ref Range: >4.0 ng/mL >23.6   25-Hydroxy   Vitamin D 25 Latest Ref Range: 30 - 100 ng/mL 32   TSH Latest Ref Range: 0.380 - 5.330 uIU/mL 3.400     Assessment and Plan    1. Neck pain on right side  On Hydrocodone /acetaminophen. No constipation. Last fill was 3/6/10. Also has chronic low back pain , she is requesting to be referred to PT again, I referred her.    2. Gastroesophageal reflux disease, esophagitis presence not  specified  On pantoprazole, good control.    3. Vitamin D deficiency  Last vitamin D level is 32 which is good range. Ok to continue Vit D 1000 unit daily.    4- CRI  No significant changes in creatinine. Last eGFR was 43.    5- Chest Pain  Atypical cp that last for 1-2 seconds, is not always related to exertion and is not associated with any other symptoms.  She declines any further evaluation. She would like to speak with her girl friend first and will let us know.     Followup: 1 month      Signed by: Violeta Bryan

## 2019-03-15 ENCOUNTER — TELEPHONE (OUTPATIENT)
Dept: INTERNAL MEDICINE | Facility: MEDICAL CENTER | Age: 79
End: 2019-03-15

## 2019-03-15 NOTE — TELEPHONE ENCOUNTER
MAs --   Pt reported chest pain at [place] earlier this week.   Could you offer her an appt to get this eval'd  Also, make sure she has a next available with me

## 2019-03-18 NOTE — TELEPHONE ENCOUNTER
If it's the chest pain that she mentioned to Patricia, no need to come in sooner.   If it is new, then, needs to come.   Still cont to try to get a hold of her sooner re: above and then she needs f/u 1month with someone and next available with me.

## 2019-03-19 NOTE — TELEPHONE ENCOUNTER
Spoke to pt chest pain is the same not stressing about if it gets worse will call and schedule, let pt know that  would like to see her back in a month with whoever is available and then follow up with her when available appt pt understood

## 2019-04-10 ENCOUNTER — PHYSICAL THERAPY (OUTPATIENT)
Dept: PHYSICAL THERAPY | Facility: MEDICAL CENTER | Age: 79
End: 2019-04-10
Attending: INTERNAL MEDICINE
Payer: MEDICARE

## 2019-04-10 DIAGNOSIS — M54.2 NECK PAIN ON RIGHT SIDE: ICD-10-CM

## 2019-04-10 PROCEDURE — 97161 PT EVAL LOW COMPLEX 20 MIN: CPT

## 2019-04-10 ASSESSMENT — ENCOUNTER SYMPTOMS
PAIN TIMING: INTERMITTENT
QUALITY: ACHING
PAIN SCALE: 3
PAIN SCALE AT LOWEST: 2
PAIN SCALE AT HIGHEST: 7

## 2019-04-10 NOTE — OP THERAPY EVALUATION
Outpatient Physical Therapy  INITIAL EVALUATION    [facility]  [address]  [place] [address]  Phone:  [phone]  Fax:  [phone]    Date of Evaluation: 04/10/2019    Patient: Guillermina Recio  YOB: 1940  MRN: [id number]     Referring Provider: Violeta Bryan  [address]  [address]  [place] [address]   Referring Diagnosis Neck pain on right side [M54.2]     Time Calculation  Start time: 415  Stop time: 515 Time Calculation (min): 60 minutes     Physical Therapy Occurrence Codes    Date of onset of impairment:  3/10/19   Date physical therapy care plan established or reviewed:  4/10/19   Date physical therapy treatment started:  4/10/19          Chief Complaint: Neck Pain; Hip Problem; and Back Injury    Visit Diagnoses     ICD-10-CM   1. Neck pain on right side M54.2         Subjective   History of Present Illness:     Date of onset:  3/10/2019    History of chief complaint:  Hx of left hip replacement. Did some weeding last month and pulled some weeds and strained her neck. She is having some left buttocks pain. Legally blind.     Pain:     Current pain rating:  3    At best pain ratin    At worst pain ratin    Location:  Right upper trap pain that radiates into her scapula    Quality:  Aching    Pain timing:  Intermittent    Aggravating factors:  Gardening, lifting or reaching over head, mining, housework, cooking, sleeping on her side.     Relieving factors:  Massage, manipulation    Progression:  Worsening    Activity Tolerance:     Current activity tolerance / Recreational activities:  Dusts, does dishes, light housework. Dances sometimes, likes to camp. Pt plays with adopted grandkids. she's been stretching and trying to do some core exercises.     Work:  Retired.    Social Support:     Lives in:  Apartment    Lives with:  Alone    Hand Dominance:     Hand dominance:  Right    Treatments:     Treatments to  date:  Previously did well with PT: exercise, manipulation and modalities.     Patient Goals:     Patient goals for therapy:  Decrease pain, get stronger, be able to camp, go mining, tend to her garden.       Past Medical History:   Diagnosis Date   • Chronic pain    • Dyslipidemia 8/1/2016   • GERD (gastroesophageal reflux disease)    • Hepatitis 1970's    A based on labs done 2016   • Hiatus hernia syndrome    • History of total hip replacement     L   • Hyperlipidemia    • Macular degeneration    • OA (osteoarthritis)     back, hip, shoulds, knees and hands   • S/P cataract extraction     bilat   • Wears dentures      Past Surgical History:   Procedure Laterality Date   • BLEPHAROPLASTY Bilateral 7/14/2015    Procedure: BLEPHAROPLASTY - UPPER;  Surgeon: Antonio Garcia M.D.;  Location: SURGERY SURGICAL [facility] ORS;  Service:    • CATARACT PHACO WITH IOL  3/13/2012    Performed by ANTONIO GARCIA at SURGERY SAME DAY [facility] ORS   • CATARACT PHACO WITH IOL  2/28/2012    Performed by ANTONIO GARCIA at SURGERY SAME DAY [facility] ORS   • HYSTERECTOMY, TOTAL ABDOMINAL     • OTHER ORTHOPEDIC SURGERY      left hip replacement   • TUBAL LIGATION         Precautions:       Objective   Observation and functional movement:  Pt has head forward posture with kyphotic T/S. Pt ambulates with a quad cane.     Range of motion and strength:    Pt has limited c/s rotation: 45 degrees bilateral. Decreased shoulder flexion bilaterally.     Sensation and reflexes:     Sensation is intact.      Reflexes are normal and symmetrical.    Palpation and joint mobility:     Tightness bilateral upper traps. Decreased mobility C6-T1 centrally.     Special tests:      Negative neural tension tests.     Basic self care and IADL's:     Independent with all ADL's.            Therapeutic Exercises (CPT 73717):     1. Nustep L2 , 10 minutes    2. Sit to stand    3. Scap retraction in supine    4. Hip abduction supine lying     Therapeutic  Treatments and Modalities:     1. E Stim Unattended (CPT 90545), ifc and mhp to left hip, no charge    Time-based treatments/modalities:          Assessment, Response and Plan:   Impairments: impaired physical strength, lacks appropriate home exercise program and pain with function    Assessment details:  Ms. Recio is a pleasant 79 year old female with chronic cervical spine pain that appears degenerative and postural in nature. Pt would benefit from skilled PT to address pain and dysfunction.   Prognosis: fair    Goals:   Short Term Goals:   Pt able to demonstrate improved posture.   Pt compliant with daily HEP.  Short term goal time span:  2-4 weeks      Long Term Goals:    Pt able to do light gardening without pain.  Pt able to stand for 30 minutes to prepare meals.  Pt able sleep on her sides without increase in neck pain.   Long term goal time span:  6-8 weeks    Plan:   Therapy options:  Physical therapy treatment to continue  Planned therapy interventions:  E Stim Unattended (CPT 03000), Neuromuscular Re-education (CPT 88038) and Therapeutic Exercise (CPT 30453)  Frequency:  2x week  Duration in weeks:  8        Referring provider co-signature:  I have reviewed this plan of care and my co-signature certifies the need for services.  Certification Dates:   From 4-10-19     To 6-10-19    Physician Signature: ________________________________ Date: ______________

## 2019-04-16 ENCOUNTER — TELEPHONE (OUTPATIENT)
Dept: INTERNAL MEDICINE | Facility: MEDICAL CENTER | Age: 79
End: 2019-04-16

## 2019-04-16 NOTE — TELEPHONE ENCOUNTER
Was the patient seen in the last year in this department? Yes    Does patient have an active prescription for medications requested? Yes-It was refilled on 3/11/19 for a 90 day supply.     Received Request Via: Pharmacy-FAX from [facility] insurance will allow a 100 day supply for pt.

## 2019-04-18 ENCOUNTER — OFFICE VISIT (OUTPATIENT)
Dept: INTERNAL MEDICINE | Facility: MEDICAL CENTER | Age: 79
End: 2019-04-18
Payer: MEDICARE

## 2019-04-18 VITALS
HEART RATE: 76 BPM | WEIGHT: 148.25 LBS | BODY MASS INDEX: 31.99 KG/M2 | HEIGHT: 57 IN | DIASTOLIC BLOOD PRESSURE: 80 MMHG | SYSTOLIC BLOOD PRESSURE: 126 MMHG | OXYGEN SATURATION: 96 % | TEMPERATURE: 97 F

## 2019-04-18 DIAGNOSIS — Z79.891 CHRONIC USE OF OPIATE DRUGS THERAPEUTIC PURPOSES: ICD-10-CM

## 2019-04-18 DIAGNOSIS — G89.29 CHRONIC LEFT-SIDED LOW BACK PAIN WITHOUT SCIATICA: ICD-10-CM

## 2019-04-18 DIAGNOSIS — M54.50 CHRONIC LEFT-SIDED LOW BACK PAIN WITHOUT SCIATICA: ICD-10-CM

## 2019-04-18 DIAGNOSIS — E66.9 OBESITY, UNSPECIFIED CLASSIFICATION, UNSPECIFIED OBESITY TYPE, UNSPECIFIED WHETHER SERIOUS COMORBIDITY PRESENT: ICD-10-CM

## 2019-04-18 DIAGNOSIS — R07.9 CHEST PAIN, UNSPECIFIED TYPE: ICD-10-CM

## 2019-04-18 DIAGNOSIS — K21.9 GASTROESOPHAGEAL REFLUX DISEASE, ESOPHAGITIS PRESENCE NOT SPECIFIED: ICD-10-CM

## 2019-04-18 DIAGNOSIS — R53.81 DEBILITY: ICD-10-CM

## 2019-04-18 PROCEDURE — 99215 OFFICE O/P EST HI 40 MIN: CPT | Performed by: INTERNAL MEDICINE

## 2019-04-18 RX ORDER — HYDROCODONE BITARTRATE AND ACETAMINOPHEN 5; 325 MG/1; MG/1
.5-1 TABLET ORAL
Qty: 30 TAB | Refills: 0 | Status: SHIPPED | OUTPATIENT
Start: 2019-04-18 | End: 2019-05-28 | Stop reason: SDUPTHER

## 2019-04-18 RX ORDER — HYDROCODONE BITARTRATE AND ACETAMINOPHEN 5; 325 MG/1; MG/1
0.5 TABLET ORAL EVERY 4 HOURS PRN
COMMUNITY
End: 2019-04-18 | Stop reason: SDUPTHER

## 2019-04-18 RX ORDER — PANTOPRAZOLE SODIUM 20 MG/1
20 TABLET, DELAYED RELEASE ORAL
Qty: 90 TAB | Refills: 1 | Status: ON HOLD | OUTPATIENT
Start: 2019-04-18 | End: 2019-12-28
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
## 2019-04-18 NOTE — LETTER
[place] [facility] ADVANCED [facility]IC[facility] MED - INTERNAL MEDICINE  [address] [facility] [address]     April 18, 2019    Patient: Guillermina Recio   YOB: 1940   Date of Visit: 4/18/2019       To Whom It May Concern:    Guillermina Recio was seen and treated in our department on 4/18/2019.   She is requesting reduced bus rates so that she can get to all of her medical appointments.  This sounds reasonable to me.  Please let me know if you need additional information.      Sincerely,     Elizabeth Molina M.D.

## 2019-04-23 ENCOUNTER — TELEPHONE (OUTPATIENT)
Dept: INTERNAL MEDICINE | Facility: MEDICAL CENTER | Age: 79
End: 2019-04-23

## 2019-04-23 DIAGNOSIS — M54.50 CHRONIC BILATERAL LOW BACK PAIN WITHOUT SCIATICA: ICD-10-CM

## 2019-04-23 DIAGNOSIS — G89.29 CHRONIC PAIN OF BOTH SHOULDERS: ICD-10-CM

## 2019-04-23 DIAGNOSIS — M25.511 CHRONIC PAIN OF BOTH SHOULDERS: ICD-10-CM

## 2019-04-23 DIAGNOSIS — G89.29 CHRONIC LEFT-SIDED LOW BACK PAIN WITHOUT SCIATICA: ICD-10-CM

## 2019-04-23 DIAGNOSIS — Z79.891 CHRONIC USE OF OPIATE DRUGS THERAPEUTIC PURPOSES: ICD-10-CM

## 2019-04-23 DIAGNOSIS — G89.29 CHRONIC BILATERAL LOW BACK PAIN WITHOUT SCIATICA: ICD-10-CM

## 2019-04-23 DIAGNOSIS — M25.512 CHRONIC PAIN OF BOTH SHOULDERS: ICD-10-CM

## 2019-04-23 DIAGNOSIS — M54.50 CHRONIC LEFT-SIDED LOW BACK PAIN WITHOUT SCIATICA: ICD-10-CM

## 2019-04-23 NOTE — TELEPHONE ENCOUNTER
Received [facility] drug test results today from earlier this month.   [facility] drug screen reviewed and is consistent with patient taking prescribed controlled substance(s) (Norco) with no inconsistencies.

## 2019-04-25 ENCOUNTER — TELEPHONE (OUTPATIENT)
Dept: INTERNAL MEDICINE | Facility: MEDICAL CENTER | Age: 79
End: 2019-04-25

## 2019-04-25 NOTE — TELEPHONE ENCOUNTER
I know that she says she can't come in to be seen; however, ... she would need to be seen either here or urgent care -- please offer her an appt.

## 2019-04-25 NOTE — TELEPHONE ENCOUNTER
1. Caller Name: Guillermina                      Call Back Number: [phone] (home)       2. Message: Pt called and l/m. Stating she has strep throat and asking what she should do.  She can't get to the dr's.  Pt said she will be drinking hot tea with honey and gurgling with hot salt water.  Per pt it's starting to hurt and starting to bother her ear.     3. Patient approves office to leave a detailed voicemail/MyChart message: N\A    Tried to call pt twice. Line busy

## 2019-04-26 NOTE — TELEPHONE ENCOUNTER
Called and spoke with pt. Pt said she don't have strep throat.  She said she has a cold and  allergies.  Right now she is taking tylenol cold medicine and some [facility] allergies OTC.  I asked if her throat is still hurting. She said no.  I asked if she would like for me to make her an appt.  Pt said she will call me in a few days if she is not better.

## 2019-05-01 ENCOUNTER — PHYSICAL THERAPY (OUTPATIENT)
Dept: PHYSICAL THERAPY | Facility: MEDICAL CENTER | Age: 79
End: 2019-05-01
Attending: INTERNAL MEDICINE
Payer: MEDICARE

## 2019-05-01 DIAGNOSIS — M25.50 PAIN IN JOINT, MULTIPLE SITES: ICD-10-CM

## 2019-05-01 DIAGNOSIS — M54.2 NECK PAIN ON RIGHT SIDE: ICD-10-CM

## 2019-05-01 DIAGNOSIS — M54.50 CHRONIC MIDLINE LOW BACK PAIN WITHOUT SCIATICA: ICD-10-CM

## 2019-05-01 DIAGNOSIS — G89.29 CHRONIC LEFT SHOULDER PAIN: ICD-10-CM

## 2019-05-01 DIAGNOSIS — G89.29 CHRONIC MIDLINE LOW BACK PAIN WITHOUT SCIATICA: ICD-10-CM

## 2019-05-01 DIAGNOSIS — M25.512 CHRONIC LEFT SHOULDER PAIN: ICD-10-CM

## 2019-05-01 PROCEDURE — 97110 THERAPEUTIC EXERCISES: CPT

## 2019-05-01 PROCEDURE — 97014 ELECTRIC STIMULATION THERAPY: CPT

## 2019-05-01 NOTE — OP THERAPY DAILY TREATMENT
Outpatient Physical Therapy  DAILY TREATMENT     [facility]  [address]  [place] [address]  Phone:  [phone]  Fax:  [phone]    Date: 05/01/2019    Patient: Guillermina Recio  YOB: 1940  MRN: [id number]     Time Calculation  Start time: 1130  Stop time: 1218 Time Calculation (min): 48 minutes     Chief Complaint: neck pain, hip pain, lumbar pain  Visit #: 2    SUBJECTIVE:  Pt states she's getting over a cold and feels a little weak.     OBJECTIVE:    Therapeutic Exercises (CPT 72709):     1. Nustep L2 10 minutes    2. Pulleys, 3 minutes    3. Bridging with shoulder flexion with ball    4. Hs curls    5. Sit and stand, 2 x 10    6. Hip abduction with pink tband    Therapeutic Treatments and Modalities:     1. E Stim Unattended (CPT 53499), IFC and MHP to C/S    Therapeutic Treatment and Modalities Summary: STW to upper traps and gentle T/S mobs : 7 minutes no charge    Time-based treatments/modalities:  Therapeutic exercise minutes (CPT 99523): 23 minutes       ASSESSMENT:   Pt tolerated exercises well overall.     PLAN/RECOMMENDATIONS:   Plan for treatment: therapy treatment to continue next visit.  Planned interventions for next visit: E-stim unattended (CPT 44458) and therapeutic exercise (CPT 36239).

## 2019-05-08 ENCOUNTER — APPOINTMENT (OUTPATIENT)
Dept: PHYSICAL THERAPY | Facility: MEDICAL CENTER | Age: 79
End: 2019-05-08
Attending: INTERNAL MEDICINE
Payer: MEDICARE

## 2019-05-15 ENCOUNTER — APPOINTMENT (OUTPATIENT)
Dept: PHYSICAL THERAPY | Facility: MEDICAL CENTER | Age: 79
End: 2019-05-15
Attending: INTERNAL MEDICINE
Payer: MEDICARE

## 2019-05-22 ENCOUNTER — PHYSICAL THERAPY (OUTPATIENT)
Dept: PHYSICAL THERAPY | Facility: MEDICAL CENTER | Age: 79
End: 2019-05-22
Attending: INTERNAL MEDICINE
Payer: MEDICARE

## 2019-05-22 DIAGNOSIS — G89.29 CHRONIC MIDLINE LOW BACK PAIN WITHOUT SCIATICA: ICD-10-CM

## 2019-05-22 DIAGNOSIS — G89.29 CHRONIC LEFT SHOULDER PAIN: ICD-10-CM

## 2019-05-22 DIAGNOSIS — M54.2 NECK PAIN ON RIGHT SIDE: ICD-10-CM

## 2019-05-22 DIAGNOSIS — M25.512 CHRONIC LEFT SHOULDER PAIN: ICD-10-CM

## 2019-05-22 DIAGNOSIS — M54.50 CHRONIC MIDLINE LOW BACK PAIN WITHOUT SCIATICA: ICD-10-CM

## 2019-05-22 PROCEDURE — 97014 ELECTRIC STIMULATION THERAPY: CPT

## 2019-05-22 PROCEDURE — 97110 THERAPEUTIC EXERCISES: CPT

## 2019-05-22 NOTE — OP THERAPY DAILY TREATMENT
Outpatient Physical Therapy  DAILY TREATMENT     [facility] Outpatient Physical Therapy  [address]  [place] [address]  Phone:  [phone]  Fax:  [phone]    Date: 05/22/2019    Patient: Guillermina Recio  YOB: 1940  MRN: [id number]     Time Calculation  Start time: 1130  Stop time: 1216 Time Calculation (min): 46 minutes     Chief Complaint: hip pain, neck and back pain  Visit #: 3    SUBJECTIVE:  Pt states her hip is hurting today: probably from the weather.     OBJECTIVE:            Therapeutic Exercises (CPT 00499):     1. Nustep L2 , 15 minutes    2. Pulleys, 3 minutes    3. Bridging with shoulder flexion with ball    4. Hs curls    5. Sit and stand, 2 x 10    6. Hip abduction with pink tband    7. Seated flexion, 2 x 5    8. Trunk rotations    Therapeutic Treatments and Modalities:     1. E Stim Unattended (CPT 39037), IFC and MHP to right C/S and LS    Time-based treatments/modalities:  Therapeutic exercise minutes (CPT 35467): 30 minutes      ASSESSMENT:   Pt tolerated ther ex well.     PLAN/RECOMMENDATIONS:   Plan for treatment: therapy treatment to continue next visit.  Planned interventions for next visit: E-stim unattended (CPT 04476) and therapeutic exercise (CPT 74556). Mechanical traction possibly,

## 2019-05-24 ENCOUNTER — APPOINTMENT (OUTPATIENT)
Dept: PHYSICAL THERAPY | Facility: MEDICAL CENTER | Age: 79
End: 2019-05-24
Attending: INTERNAL MEDICINE
Payer: MEDICARE

## 2019-05-28 ENCOUNTER — OFFICE VISIT (OUTPATIENT)
Dept: INTERNAL MEDICINE | Facility: MEDICAL CENTER | Age: 79
End: 2019-05-28
Payer: MEDICARE

## 2019-05-28 VITALS
OXYGEN SATURATION: 96 % | BODY MASS INDEX: 32.1 KG/M2 | WEIGHT: 148.8 LBS | TEMPERATURE: 98.6 F | HEIGHT: 57 IN | DIASTOLIC BLOOD PRESSURE: 60 MMHG | HEART RATE: 76 BPM | SYSTOLIC BLOOD PRESSURE: 110 MMHG

## 2019-05-28 DIAGNOSIS — Z79.891 CHRONIC USE OF OPIATE DRUGS THERAPEUTIC PURPOSES: ICD-10-CM

## 2019-05-28 DIAGNOSIS — F11.90 CHRONIC NARCOTIC USE: ICD-10-CM

## 2019-05-28 DIAGNOSIS — Z79.899 CONTROLLED SUBSTANCE AGREEMENT SIGNED: ICD-10-CM

## 2019-05-28 DIAGNOSIS — M48.061 SPINAL STENOSIS OF LUMBAR REGION, UNSPECIFIED WHETHER NEUROGENIC CLAUDICATION PRESENT: ICD-10-CM

## 2019-05-28 DIAGNOSIS — G89.29 CHRONIC LEFT-SIDED LOW BACK PAIN WITHOUT SCIATICA: ICD-10-CM

## 2019-05-28 DIAGNOSIS — L57.8 NODULAR ELASTOSIS WITH CYSTS AND COMEDONES OF FAVRE AND RACOUCHOT: ICD-10-CM

## 2019-05-28 DIAGNOSIS — M54.50 CHRONIC LEFT-SIDED LOW BACK PAIN WITHOUT SCIATICA: ICD-10-CM

## 2019-05-28 DIAGNOSIS — H54.7 IMPAIRED VISION: ICD-10-CM

## 2019-05-28 DIAGNOSIS — L70.0 NODULAR ELASTOSIS WITH CYSTS AND COMEDONES OF FAVRE AND RACOUCHOT: ICD-10-CM

## 2019-05-28 DIAGNOSIS — H91.93 HEARING DIFFICULTY OF BOTH EARS: ICD-10-CM

## 2019-05-28 PROCEDURE — 99214 OFFICE O/P EST MOD 30 MIN: CPT | Mod: GC | Performed by: INTERNAL MEDICINE

## 2019-05-28 RX ORDER — DIAPER,BRIEF,ADULT, DISPOSABLE
1 EACH MISCELLANEOUS 2 TIMES DAILY PRN
Status: ON HOLD | COMMUNITY
Start: 2014-03-13 | End: 2019-11-21

## 2019-05-28 RX ORDER — HYDROCODONE BITARTRATE AND ACETAMINOPHEN 5; 325 MG/1; MG/1
.5-1 TABLET ORAL
Qty: 30 TAB | Refills: 0 | Status: SHIPPED | OUTPATIENT
Start: 2019-05-28 | End: 2019-06-27

## 2019-05-28 RX ORDER — ZOLPIDEM TARTRATE 5 MG/1
5 TABLET ORAL DAILY
COMMUNITY
Start: 2014-01-08 | End: 2019-05-28

## 2019-05-28 RX ORDER — CYCLOBENZAPRINE HCL 10 MG
10 TABLET ORAL DAILY
COMMUNITY
Start: 2014-07-23 | End: 2019-05-28
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
## 2019-05-28 NOTE — PATIENT INSTRUCTIONS
Follow up with [facility] for your back pain medicines.  Return to see Dr. Molina in 30 days.  Refer to the information below for your black/white-heads.  Try Eucerin AQUAporin ACTIVE Refreshing Lutsen-Lotion           Acne  Acne is a skin problem that causes pimples. Acne occurs when the pores in the skin get blocked. The pores may become infected with bacteria, or they may become red, sore, and swollen. Acne is a common skin problem, especially for teenagers. Acne usually goes away over time.  What are the causes?  Each pore contains an oil gland. Oil glands make an oily substance that is called sebum. Acne happens when these glands get plugged with sebum, dead skin cells, and dirt. Then, the bacteria that are normally found in the oil glands multiply and cause inflammation.  Acne is commonly triggered by changes in your hormones. These hormonal changes can cause the oil glands to get bigger and to make more sebum. Factors that can make acne worse include:  · Hormone changes during:  ¨ Adolescence.  ¨ Women's menstrual cycles.  ¨ Pregnancy.  · Oil-based cosmetics and hair products.  · Harshly scrubbing the skin.  · Strong soaps.  · Stress.  · Hormone problems that are due to certain diseases.  · Long or oily hair rubbing against the skin.  · Certain medicines.  · Pressure from headbands, backpacks, or shoulder pads.  · Exposure to certain oils and chemicals.  What increases the risk?  This condition is more likely to develop in:  · Teenagers.  · People who have a family history of acne.  What are the signs or symptoms?  Acne often occurs on the face, neck, chest, and upper back. Symptoms include:  · Small, red bumps (pimples or papules).  · Whiteheads.  · Blackheads.  · Small, pus-filled pimples (pustules).  · Big, red pimples or pustules that feel tender.  More severe acne can cause:  · An infected area that contains a collection of pus (abscess).  · Hard, painful, fluid-filled sacs  (cysts).  · Scars.  How is this diagnosed?  This condition is diagnosed with a medical history and physical exam. Blood tests may also be done.  How is this treated?  Treatment for this condition can vary depending on the severity of your acne. Treatment may include:  · Creams and lotions that prevent oil glands from clogging.  · Creams and lotions that treat or prevent infections and inflammation.  · Antibiotic medicines that are applied to the skin or taken as a pill.  · Pills that decrease sebum production.  · Birth control pills.  · Light or laser treatments.  · Surgery.  · Injections of medicine into the affected areas.  · Chemicals that cause peeling of the skin.  Your health care provider will also recommend the best way to take care of your skin. Good skin care is the most important part of treatment.  Follow these instructions at home:  Skin care  Take care of your skin as told by your health care provider. You may be told to do these things:  · Wash your skin gently at least two times each day, as well as:  ¨ After you exercise.  ¨ Before you go to bed.  · Use mild soap.  · Apply a water-based skin moisturizer after you wash your skin.  · Use a sunscreen or sunblock with SPF 30 or greater. This is especially important if you are using acne medicines.  · Choose cosmetics that will not plug your oil glands (are noncomedogenic).  Medicines  · Take over-the-counter and prescription medicines only as told by your health care provider.  · If you were prescribed an antibiotic medicine, apply or take it as told by your health care provider. Do not stop taking the antibiotic even if your condition improves.  General instructions  · Keep your hair clean and off of your face. If you have oily hair, shampoo your hair regularly or daily.  · Avoid leaning your chin or forehead against your hands.  · Avoid wearing tight headbands or hats.  · Avoid picking or squeezing your pimples. That can make your acne worse and cause  scarring.  · Keep all follow-up visits as told by your health care provider. This is important.  · Shave gently and only when necessary.  · Keep a food journal to figure out if any foods are linked with your acne.  Contact a health care provider if:  · Your acne is not better after eight weeks.  · Your acne gets worse.  · You have a large area of skin that is red or tender.  · You think that you are having side effects from any acne medicine.  This information is not intended to replace advice given to you by your health care provider. Make sure you discuss any questions you have with your health care provider.  Document Released: 12/15/2001 Document Revised: 08/18/2017 Document Reviewed: 02/24/2016  [employer] Patient Education © 2017 Elsevier Inc.    Salicylic Acid pads, wash  What is this medicine?  SALICYCLIC ACID (SAL i LAILA ik AS id) is a skin cleanser. It is used to treat and prevent acne.  This medicine may be used for other purposes; ask your health care provider or pharmacist if you have questions.  COMMON BRAND NAME(S): Clearasil 3-in-1, Clearasil Blackhead Clearing Scrub, Clearasil Oil Control, Clearasil Ultra Astringent, Neutrogena Acne Wash, Salkera, Salvax  What should I tell my health care provider before I take this medicine?  They need to know if you have any of these conditions:  -kidney disease  -liver disease  -an unusual or allergic reaction to salicylic acid, other medicines, foods, dyes, or preservatives  -pregnant or trying to get pregnant  -breast-feeding  How should I use this medicine?  This medicine is for external use only. Do not take by mouth. Follow the directions on the label. Do not apply to raw or irritated skin. Avoid getting medicine in your eyes, lips, nose, mouth, or other sensitive areas. Use this medicine at regular intervals. Do not use more often than directed.  Talk to your pediatrician regarding the use of this medicine in children. Special care may be  needed.  Overdosage: If you think you have taken too much of this medicine contact a poison control center or emergency room at once.  NOTE: This medicine is only for you. Do not share this medicine with others.  What if I miss a dose?  If you miss a dose, use it as soon as you can. If it is almost time for your next dose, use only that dose. Do not use double or extra doses.  What may interact with this medicine?  -other medicines for acne  This list may not describe all possible interactions. Give your health care provider a list of all the medicines, herbs, non-prescription drugs, or dietary supplements you use. Also tell them if you smoke, drink alcohol, or use illegal drugs. Some items may interact with your medicine.  What should I watch for while using this medicine?  Tell your doctor or healthcare professional if your symptoms do not start to get better or if they get worse.  This medicine can make you more sensitive to the sun. Keep out of the sun. If you cannot avoid being in the sun, wear protective clothing and use sunscreen. Do not use sun lamps or tanning beds/booths.  What side effects may I notice from receiving this medicine?  Side effects that you should report to your doctor or health care professional as soon as possible:  -allergic reactions like skin rash, itching or hives, swelling of the face, lips, or tongue  Side effects that usually do not require medical attention (report to your doctor or health care professional if they continue or are bothersome):  -skin irritation  This list may not describe all possible side effects. Call your doctor for medical advice about side effects. You may report side effects to FDA at 8-253-FDA-0768.  Where should I keep my medicine?  Keep out of the reach of children.  Store at room temperature between 15 and 30 degrees C (59 and 86 degrees F). Keep container tightly closed. Throw away any unused medicine after the expiration date.  NOTE: This sheet is a  summary. It may not cover all possible information. If you have questions about this medicine, talk to your doctor, pharmacist, or health care provider.  © 2018 Elsevier/Gold Standard (2009-08-21 13:37:44)

## 2019-05-29 ENCOUNTER — PHYSICAL THERAPY (OUTPATIENT)
Dept: PHYSICAL THERAPY | Facility: MEDICAL CENTER | Age: 79
End: 2019-05-29
Attending: INTERNAL MEDICINE
Payer: MEDICARE

## 2019-05-29 DIAGNOSIS — M54.50 CHRONIC MIDLINE LOW BACK PAIN WITHOUT SCIATICA: ICD-10-CM

## 2019-05-29 DIAGNOSIS — M25.512 CHRONIC LEFT SHOULDER PAIN: ICD-10-CM

## 2019-05-29 DIAGNOSIS — G89.29 CHRONIC MIDLINE LOW BACK PAIN WITHOUT SCIATICA: ICD-10-CM

## 2019-05-29 DIAGNOSIS — G89.29 CHRONIC LEFT SHOULDER PAIN: ICD-10-CM

## 2019-05-29 DIAGNOSIS — M54.2 NECK PAIN ON RIGHT SIDE: ICD-10-CM

## 2019-05-29 PROCEDURE — 97110 THERAPEUTIC EXERCISES: CPT

## 2019-05-29 PROCEDURE — 97014 ELECTRIC STIMULATION THERAPY: CPT

## 2019-05-29 NOTE — ASSESSMENT & PLAN NOTE
For chronic, debilitating back pain not relieved with alternate pain medicines.   Back pain has been adequately managed with NORCO 5-325 PO daily.  ORT score 10.   Referred to the pain clinic, lost the referral information, has not yet established care with [facility].  Signed the opioid-use contract in Apr 2019, completed a UDS at that time, UDS was positive only for hydrocodone and otherwise negative.  NARxCHECK query non-concerning.   Denies worsening of her chronic constipation, denies SOB and altered mentation.     Agrees to follow up on her pain clinic referral soon.  Prescribed a 30-day supply of NORCO with a 1-month follow up visit.   Provided with copies of her referral information.

## 2019-05-29 NOTE — ASSESSMENT & PLAN NOTE
"Reports multiple \"blackheads\" and \"whiteheads\" in the periorbital, frontal and temporal regions of her face.   Elderly female with considerable sun exposure history; continues to spend large amounts of time outdoors, loves gardening and camping.   Physical exam significant for multiple, small, white-yellow nodules around the eyes and along the forehead with signs of actinic damage of facial skin.    Recommended OTC remedies for now.  The patient has been scheduled for a 1 month follow-up visit, she would benefit from the following additional information:   Apply sunscreen with good protection throughout the UVA and UVB range.   Avoid sun exposure between 10 am and 2 pm.  Try topical retinoids to help in the extraction of individual comedones.    "

## 2019-05-29 NOTE — ASSESSMENT & PLAN NOTE
Chronic, adequately managed with NORCO 5-325 PO daily currently.  Referred to the pain clinic but lost the referral information and has not yet established care with [facility].  Signed the opioid-use contract 1 month ago in Apr 2019, completed a UDS at that time, UDS was positive only for hydrocodone and otherwise negative, NARxCHECK query non-concerning.   Denies worsening of her chronic constipation, denies SOB and altered mentation.     Agrees to follow up on her pain clinic referral soon.  Prescribed a 30-day supply of NORCO with a 1-month follow up visit.   Provided with copies of her referral information.

## 2019-05-29 NOTE — OP THERAPY DAILY TREATMENT
Outpatient Physical Therapy  DAILY TREATMENT     [facility] Outpatient Physical Therapy  [address]  [place] [address]  Phone:  [phone]  Fax:  [phone]    Date: 05/29/2019    Patient: Guillermina Recio  YOB: 1940  MRN: [id number]     Time Calculation  Start time: 1130  Stop time: 1218 Time Calculation (min): 48 minutes     Chief Complaint: neck and back pain  Visit #: 4    SUBJECTIVE:  Pt states her right hip and upper back are hurting today.     OBJECTIVE:      Therapeutic Exercises (CPT 93497):     1. Nustep L3, 15 minutes    2. Trunk rotations    3. Bridging with shoulder flexion with ball    4. Hs curls    5. Sit and stand, 2 x 10    6. Hip abduction with pink tband    7. Seated flexion, 2 x 5    Therapeutic Treatments and Modalities:     1. E Stim Unattended (CPT 04917), IFC and MHP to right C/S and LS    Therapeutic Treatment and Modalities Summary: Gentle t/s rib screws, stw to upper traps. 6 minutes , nc.    Time-based treatments/modalities:  Therapeutic exercise minutes (CPT 31650): 28 minutes       ASSESSMENT:    pt continues to have chronic neck and hip pain.     PLAN/RECOMMENDATIONS:   Plan for treatment: therapy treatment to continue next visit.  Planned interventions for next visit: E-stim unattended (CPT 37211), neuromuscular re-education (CPT 24154) and therapeutic exercise (CPT 04997).

## 2019-05-31 ENCOUNTER — APPOINTMENT (OUTPATIENT)
Dept: PHYSICAL THERAPY | Facility: MEDICAL CENTER | Age: 79
End: 2019-05-31
Attending: INTERNAL MEDICINE
Payer: MEDICARE

## 2019-06-05 ENCOUNTER — PHYSICAL THERAPY (OUTPATIENT)
Dept: PHYSICAL THERAPY | Facility: MEDICAL CENTER | Age: 79
End: 2019-06-05
Attending: INTERNAL MEDICINE
Payer: MEDICARE

## 2019-06-05 DIAGNOSIS — G89.29 CHRONIC LEFT SHOULDER PAIN: ICD-10-CM

## 2019-06-05 DIAGNOSIS — M25.512 CHRONIC LEFT SHOULDER PAIN: ICD-10-CM

## 2019-06-05 DIAGNOSIS — M54.2 NECK PAIN ON RIGHT SIDE: ICD-10-CM

## 2019-06-05 DIAGNOSIS — M25.50 PAIN IN JOINT, MULTIPLE SITES: ICD-10-CM

## 2019-06-05 PROCEDURE — 97110 THERAPEUTIC EXERCISES: CPT

## 2019-06-05 PROCEDURE — 97014 ELECTRIC STIMULATION THERAPY: CPT

## 2019-06-05 NOTE — OP THERAPY DAILY TREATMENT
Outpatient Physical Therapy  DAILY TREATMENT     [facility] Outpatient Physical Therapy  [address]  [place] [address]  Phone:  [phone]  Fax:  [phone]    Date: 06/05/2019    Patient: Guillermina Recio  YOB: 1940  MRN: [id number]     Time Calculation  Start time: 0215  Stop time: 0300 Time Calculation (min): 45 minutes     Chief Complaint: back and hip pain  Visit #: 5    SUBJECTIVE:  My back is a little sore today, I did a lot of walking yesterday.     OBJECTIVE:    Therapeutic Exercises (CPT 35087):     1. Nustep L3, 13 minutes    2. Trunk rotations    3. Bridging with shoulder flexion with ball    4. Hs curls    5. Sit and stand, 2 x 10    6. Hip abduction with pink tband    7. Seated flexion, 2 x 5    Therapeutic Treatments and Modalities:     1. E Stim Unattended (CPT 31955), IFC and MHP to right C/S and LS    Time-based treatments/modalities:  Therapeutic exercise minutes (CPT 65833): 30 minutes       ASSESSMENT:   Pt fatigued today and needed shorts breaks.     PLAN/RECOMMENDATIONS:   Plan for treatment: therapy treatment to continue next visit.  Planned interventions for next visit: E-stim unattended (CPT 63579), neuromuscular re-education (CPT 57327) and therapeutic exercise (CPT 51829).

## 2019-06-12 ENCOUNTER — APPOINTMENT (OUTPATIENT)
Dept: PHYSICAL THERAPY | Facility: MEDICAL CENTER | Age: 79
End: 2019-06-12
Attending: INTERNAL MEDICINE
Payer: MEDICARE

## 2019-06-12 NOTE — OP THERAPY PROGRESS SUMMARY
Outpatient Physical Therapy  PROGRESS SUMMARY NOTE      [facility] Outpatient Physical Therapy  [address]  [place] [address]  Phone:  [phone]  Fax:  [phone]    Date of Visit: 06/05/2019    Patient: Guillermina Recio  YOB: 1940  MRN: [id number]     Referring Provider: Violeta Bryan  [address]  [facility] [address]  [place] [address]   Referring Diagnosis Cervicalgia [M54.2]     Visit Diagnoses     ICD-10-CM   1. Neck pain on right side M54.2   2. Chronic left shoulder pain M25.512    G89.29   3. Pain in joint, multiple sites M25.50       Rehab Potential: good    Physical Therapy Occurrence Codes    Date of onset of impairment:  3/10/19   Date physical therapy care plan established or reviewed:  4/10/19   Date physical therapy treatment started:  4/10/19          Cert Period: 6-5-19 to 8-5-19  Progress Report Period: 4-10-19 to 6-10-19            Plan:   Frequency:  1x week  Duration in weeks:  8    Guillermina has only been seen for 5 sessions in last 2 months bc it's difficult for her to get transportation into the clinic. She has been compliant with her HEP and I think she would benefit from 6-8 more sessions. If we spread them out, she'll be able to afford the bus fee.        Long Term Goals:    Pt able to do light gardening without pain.  Pt able to stand for 30 minutes to prepare meals.  Pt able sleep on her sides without increase in neck pain.     Referring provider co-signature:  I have reviewed this plan of care and my co-signature certifies the need for services.    Physician Signature: ________________________________ Date: ______________

## 2019-06-15 DIAGNOSIS — E78.5 DYSLIPIDEMIA: ICD-10-CM

## 2019-06-17 ENCOUNTER — TELEPHONE (OUTPATIENT)
Dept: INTERNAL MEDICINE | Facility: MEDICAL CENTER | Age: 79
End: 2019-06-17

## 2019-06-17 RX ORDER — LOVASTATIN 40 MG/1
TABLET ORAL
Qty: 90 TAB | Refills: 1 | Status: ON HOLD | OUTPATIENT
Start: 2019-06-17 | End: 2019-12-09

## 2019-06-17 NOTE — LETTER
June 18, 2019        Guillermina Recio  [address]  [place] [address]        Dear Guillermina:    The Lab Order that Dr Molina would like for you to get done is attached.    If you have any questions or concerns, please don't hesitate to call.        Sincerely,        Clair Lassiter  Medical Assistant  Electronically Signed

## 2019-06-17 NOTE — TELEPHONE ENCOUNTER
I ok'd some meds and noticed that pt did not get calcium level I ordered  Please remind her to do this.

## 2019-06-17 NOTE — TELEPHONE ENCOUNTER
Last seen: 05/28/19 by Dr. Galdamez  Next appt: 07/23/19 with Dr. Ramey    Was the patient seen in the last year in this department? Yes   Does patient have an active prescription for medications requested? No   Received Request Via: Pharmacy

## 2019-06-18 NOTE — TELEPHONE ENCOUNTER
Called pt and l/m.  Notifying pt there is a lab test Dr Molina still would like for her to get done and that I will be mailing her the lab slip in the mail.

## 2019-06-19 ENCOUNTER — PHYSICAL THERAPY (OUTPATIENT)
Dept: PHYSICAL THERAPY | Facility: MEDICAL CENTER | Age: 79
End: 2019-06-19
Attending: INTERNAL MEDICINE
Payer: MEDICARE

## 2019-06-19 DIAGNOSIS — G89.29 CHRONIC LEFT SHOULDER PAIN: ICD-10-CM

## 2019-06-19 DIAGNOSIS — G89.29 CHRONIC MIDLINE LOW BACK PAIN WITHOUT SCIATICA: ICD-10-CM

## 2019-06-19 DIAGNOSIS — M54.50 CHRONIC MIDLINE LOW BACK PAIN WITHOUT SCIATICA: ICD-10-CM

## 2019-06-19 DIAGNOSIS — M25.512 CHRONIC LEFT SHOULDER PAIN: ICD-10-CM

## 2019-06-19 DIAGNOSIS — M54.2 NECK PAIN ON RIGHT SIDE: ICD-10-CM

## 2019-06-19 DIAGNOSIS — M25.50 PAIN IN JOINT, MULTIPLE SITES: ICD-10-CM

## 2019-06-19 PROCEDURE — 97014 ELECTRIC STIMULATION THERAPY: CPT

## 2019-06-19 PROCEDURE — 97110 THERAPEUTIC EXERCISES: CPT

## 2019-06-19 NOTE — OP THERAPY DAILY TREATMENT
"  Outpatient Physical Therapy  DAILY TREATMENT     [facility] Outpatient Physical Therapy  [address]  [place] [address]  Phone:  [phone]  Fax:  [phone]    Date: 06/19/2019    Patient: Guillermina Recio  YOB: 1940  MRN: [id number]     Time Calculation  Start time: 1200  Stop time: 1300 Time Calculation (min): 60 minutes     Chief Complaint: neck and back pain  Visit #: 6    SUBJECTIVE:  i only slept 4 hours last night so i\"m tired. My right hip is sore today.     OBJECTIVE:        Therapeutic Exercises (CPT 38590):     1. Nustep L3, 15 minutes    2. Trunk rotations    3. Bridging with shoulder flexion with ball    4. Hs curls    5. Sit and stand, 2 x 10    6. Hip abduction with pink tband    7. Seated flexion, 2 x 5    Therapeutic Treatments and Modalities:     1. E Stim Unattended (CPT 86666), IFC and MHP to right C/S and LS    Time-based treatments/modalities:  Therapeutic exercise minutes (CPT 79258): 35 minutes      ASSESSMENT:   Pt continues to have hip and back pain that varies in nature.     PLAN/RECOMMENDATIONS:   Plan for treatment: therapy treatment to continue next visit.  Planned interventions for next visit: will DC to [facility] after next visit.       "

## 2019-06-26 ENCOUNTER — TELEPHONE (OUTPATIENT)
Dept: INTERNAL MEDICINE | Facility: MEDICAL CENTER | Age: 79
End: 2019-06-26

## 2019-06-26 NOTE — TELEPHONE ENCOUNTER
1. Caller Name: Guillermina                      Call Back Number: [phone] (home)       2. Message: Pt called and l/m. She has an appt in August. Pt said she would like to make appt with pain management. Pt would like to see someone before her appt. Because she is going to go on vacation for 2 weeks.  She needs her pain pills.     3. Patient approves office to leave a detailed voicemail/MyChart message: N\A      Called and spoke with pt. Notified her of the number to call to schedule for her pain management. I told pt unfortunately we don't have any availability in the next two week.

## 2019-06-28 ENCOUNTER — HOSPITAL ENCOUNTER (OUTPATIENT)
Dept: LAB | Facility: MEDICAL CENTER | Age: 79
End: 2019-06-28
Attending: INTERNAL MEDICINE
Payer: MEDICARE

## 2019-06-28 DIAGNOSIS — E83.52 HYPERCALCEMIA: ICD-10-CM

## 2019-06-28 LAB — CA-I SERPL-SCNC: 1.2 MMOL/L (ref 1.1–1.3)

## 2019-06-28 PROCEDURE — 36415 COLL VENOUS BLD VENIPUNCTURE: CPT

## 2019-06-28 PROCEDURE — 82330 ASSAY OF CALCIUM: CPT

## 2019-08-27 ENCOUNTER — TELEPHONE (OUTPATIENT)
Dept: PHYSICAL THERAPY | Facility: MEDICAL CENTER | Age: 79
End: 2019-08-27

## 2019-08-27 NOTE — OP THERAPY DISCHARGE SUMMARY
Outpatient Physical Therapy  DISCHARGE SUMMARY NOTE      [facility] Outpatient Physical Therapy  [address]  [place] [address]  Phone:  [phone]  Fax:  [phone]    Date of Visit: 08/27/2019    Patient: Guillermina Recio  YOB: 1940  MRN: [id number]     Referring Provider: Violeta Hagen M.D.   Referring Diagnosis Neck pain     Physical Therapy Occurrence Codes    Date of onset of impairment:  3/10/19   Date physical therapy care plan established or reviewed:  4/10/19   Date physical therapy treatment started:  4/10/19            Your patient is being discharged from Physical Therapy with the following comments:   · Goals partially met    Comments:  Pt was seen for 6 sessions of PT for chronic neck pain. She makes temporary improvement with treatment but her pain returns within a few days.     Limitations Remaining:  Chronic tightness in upper trapezius and decreased mobility in cervical spine.     Recommendations:  She will be DC'd on a HEP at this time.     Lucy Faria, PT    Date: 8/27/2019

## 2019-09-03 ENCOUNTER — HOSPITAL ENCOUNTER (OUTPATIENT)
Dept: RADIOLOGY | Facility: MEDICAL CENTER | Age: 79
End: 2019-09-03
Attending: INTERNAL MEDICINE
Payer: MEDICARE

## 2019-09-03 DIAGNOSIS — M25.551 RIGHT HIP PAIN: ICD-10-CM

## 2019-09-03 PROCEDURE — 73502 X-RAY EXAM HIP UNI 2-3 VIEWS: CPT | Mod: RT

## 2019-10-17 ENCOUNTER — HOSPITAL ENCOUNTER (OUTPATIENT)
Dept: LAB | Facility: MEDICAL CENTER | Age: 79
End: 2019-10-17
Attending: INTERNAL MEDICINE
Payer: MEDICARE

## 2019-10-17 LAB — AMBIGUOUS DTTM AMBI4: NORMAL

## 2019-10-17 PROCEDURE — 80307 DRUG TEST PRSMV CHEM ANLYZR: CPT

## 2019-10-19 LAB
AMPHETAMINES UR QL: NEGATIVE NG/ML
BARBITURATES UR QL: NEGATIVE NG/ML
BENZODIAZ UR QL: NEGATIVE NG/ML
CANNABINOIDS UR QL SCN: NEGATIVE NG/ML
COCAINE UR QL: NEGATIVE NG/ML
DRUG SCREEN COMMENT UR-IMP: NORMAL
MDMA CTO UR SCN-MCNC: NEGATIVE NG/ML
METHADONE UR QL: NEGATIVE NG/ML
OPIATES UR QL: POSITIVE NG/ML
OXYCODONE CTO UR SCN-MCNC: NEGATIVE NG/ML
PCP UR QL SCN: NEGATIVE NG/ML
PROPOXYPH UR QL: NEGATIVE NG/ML

## 2019-11-12 ENCOUNTER — HOSPITAL ENCOUNTER (OUTPATIENT)
Dept: RADIOLOGY | Facility: MEDICAL CENTER | Age: 79
DRG: 470 | End: 2019-11-12
Attending: ORTHOPAEDIC SURGERY | Admitting: ORTHOPAEDIC SURGERY
Payer: MEDICARE

## 2019-11-12 DIAGNOSIS — Z01.810 PRE-OPERATIVE CARDIOVASCULAR EXAMINATION: ICD-10-CM

## 2019-11-12 DIAGNOSIS — Z01.811 PREOP RESPIRATORY EXAM: ICD-10-CM

## 2019-11-12 DIAGNOSIS — Z01.812 PRE-OPERATIVE LABORATORY EXAMINATION: ICD-10-CM

## 2019-11-12 LAB
ANION GAP SERPL CALC-SCNC: 13 MMOL/L (ref 0–11.9)
ANISOCYTOSIS BLD QL SMEAR: ABNORMAL
APTT PPP: 31 SEC (ref 24.7–36)
BASOPHILS # BLD AUTO: 0.1 % (ref 0–1.8)
BASOPHILS # BLD: 0.01 K/UL (ref 0–0.12)
BUN SERPL-MCNC: 34 MG/DL (ref 8–22)
BURR CELLS BLD QL SMEAR: NORMAL
CALCIUM SERPL-MCNC: 10 MG/DL (ref 8.5–10.5)
CHLORIDE SERPL-SCNC: 108 MMOL/L (ref 96–112)
CO2 SERPL-SCNC: 21 MMOL/L (ref 20–33)
COMMENT 1642: NORMAL
CREAT SERPL-MCNC: 1.12 MG/DL (ref 0.5–1.4)
EOSINOPHIL # BLD AUTO: 0.01 K/UL (ref 0–0.51)
EOSINOPHIL NFR BLD: 0.1 % (ref 0–6.9)
ERYTHROCYTE [DISTWIDTH] IN BLOOD BY AUTOMATED COUNT: 50.4 FL (ref 35.9–50)
ERYTHROCYTE [SEDIMENTATION RATE] IN BLOOD BY WESTERGREN METHOD: 39 MM/HOUR (ref 0–30)
GLUCOSE SERPL-MCNC: 94 MG/DL (ref 65–99)
HCT VFR BLD AUTO: 48.1 % (ref 37–47)
HGB BLD-MCNC: 14.3 G/DL (ref 12–16)
IMM GRANULOCYTES # BLD AUTO: 0.04 K/UL (ref 0–0.11)
IMM GRANULOCYTES NFR BLD AUTO: 0.4 % (ref 0–0.9)
INR PPP: 1.1 (ref 0.87–1.13)
LYMPHOCYTES # BLD AUTO: 1.63 K/UL (ref 1–4.8)
LYMPHOCYTES NFR BLD: 17.3 % (ref 22–41)
MACROCYTES BLD QL SMEAR: ABNORMAL
MCH RBC QN AUTO: 25.5 PG (ref 27–33)
MCHC RBC AUTO-ENTMCNC: 29.7 G/DL (ref 33.6–35)
MCV RBC AUTO: 85.9 FL (ref 81.4–97.8)
MICROCYTES BLD QL SMEAR: ABNORMAL
MONOCYTES # BLD AUTO: 0.93 K/UL (ref 0–0.85)
MONOCYTES NFR BLD AUTO: 9.9 % (ref 0–13.4)
MORPHOLOGY BLD-IMP: NORMAL
NEUTROPHILS # BLD AUTO: 6.78 K/UL (ref 2–7.15)
NEUTROPHILS NFR BLD: 72.2 % (ref 44–72)
NRBC # BLD AUTO: 0 K/UL
NRBC BLD-RTO: 0 /100 WBC
OVALOCYTES BLD QL SMEAR: NORMAL
PLATELET # BLD AUTO: 237 K/UL (ref 164–446)
PLATELET BLD QL SMEAR: NORMAL
PMV BLD AUTO: 9.2 FL (ref 9–12.9)
POIKILOCYTOSIS BLD QL SMEAR: NORMAL
POLYCHROMASIA BLD QL SMEAR: NORMAL
POTASSIUM SERPL-SCNC: 5 MMOL/L (ref 3.6–5.5)
PROTHROMBIN TIME: 14.5 SEC (ref 12–14.6)
RBC # BLD AUTO: 5.6 M/UL (ref 4.2–5.4)
RBC BLD AUTO: PRESENT
SCCMEC + MECA PNL NOSE NAA+PROBE: NEGATIVE
SCCMEC + MECA PNL NOSE NAA+PROBE: NEGATIVE
SODIUM SERPL-SCNC: 142 MMOL/L (ref 135–145)
WBC # BLD AUTO: 9.4 K/UL (ref 4.8–10.8)

## 2019-11-12 PROCEDURE — 93010 ELECTROCARDIOGRAM REPORT: CPT | Performed by: ORTHOPAEDIC SURGERY

## 2019-11-12 PROCEDURE — 36415 COLL VENOUS BLD VENIPUNCTURE: CPT

## 2019-11-12 PROCEDURE — 93005 ELECTROCARDIOGRAM TRACING: CPT | Performed by: ORTHOPAEDIC SURGERY

## 2019-11-12 PROCEDURE — 80048 BASIC METABOLIC PNL TOTAL CA: CPT

## 2019-11-12 PROCEDURE — 85025 COMPLETE CBC W/AUTO DIFF WBC: CPT

## 2019-11-12 PROCEDURE — 85730 THROMBOPLASTIN TIME PARTIAL: CPT

## 2019-11-12 PROCEDURE — 85652 RBC SED RATE AUTOMATED: CPT

## 2019-11-12 PROCEDURE — 83036 HEMOGLOBIN GLYCOSYLATED A1C: CPT

## 2019-11-12 PROCEDURE — 93005 ELECTROCARDIOGRAM TRACING: CPT

## 2019-11-12 PROCEDURE — 87640 STAPH A DNA AMP PROBE: CPT

## 2019-11-12 PROCEDURE — 85610 PROTHROMBIN TIME: CPT

## 2019-11-12 PROCEDURE — 71046 X-RAY EXAM CHEST 2 VIEWS: CPT

## 2019-11-12 PROCEDURE — 87641 MR-STAPH DNA AMP PROBE: CPT

## 2019-11-12 RX ORDER — ACETAMINOPHEN 500 MG
500 TABLET ORAL 2 TIMES DAILY PRN
Status: ON HOLD | COMMUNITY
End: 2019-12-28

## 2019-11-12 RX ORDER — SWAB
1 SWAB, NON-MEDICATED MISCELLANEOUS DAILY
Status: ON HOLD | COMMUNITY
End: 2019-12-28

## 2019-11-12 RX ORDER — HYDROCODONE BITARTRATE AND ACETAMINOPHEN 5; 325 MG/1; MG/1
.5-1 TABLET ORAL 2 TIMES DAILY PRN
Status: ON HOLD | COMMUNITY
End: 2019-11-27

## 2019-11-13 LAB
EKG IMPRESSION: NORMAL
EST. AVERAGE GLUCOSE BLD GHB EST-MCNC: 123 MG/DL
HBA1C MFR BLD: 5.9 % (ref 0–5.6)

## 2019-11-13 PROCEDURE — 93010 ELECTROCARDIOGRAM REPORT: CPT | Performed by: INTERNAL MEDICINE

## 2019-11-13 NOTE — DISCHARGE PLANNING
DISCHARGE PLANNING NOTE - TOTAL JOINT     Procedure: Procedure(s):  ARTHROPLASTY, HIP, TOTAL  Procedure Date: 11/21/2019  Insurance:  Payor: AMBETTER / Plan: AMBETTER   Equipment currently available at home? cane, front-wheel walker, raised toilet seat and Quad cane  Steps into the home? 1  Steps within the home? 0  Toilet height? Standard  Type of shower? tub-shower  Who will be with you during your recovery? daughter, other: CNA Caregiver/friend  Outpatient Physical Therapy set up after surgery? No   Did you take the Total Joint Class and where? No, Provided JAIME booklet     Plan: Guillermina is almost blind.Reviewed Equipment Resource list and provided a copy to the patient. Recommended a Tub transfer bench and raised toilet seat with arms. Provided Home Safety Checklist. Dr. Delgado stated in his notes that he would be ordering HH for her. SW/MICHEAL to F/U. Anticipate discharge home.

## 2019-11-21 ENCOUNTER — ANESTHESIA (OUTPATIENT)
Dept: SURGERY | Facility: MEDICAL CENTER | Age: 79
DRG: 470 | End: 2019-11-21
Payer: MEDICARE

## 2019-11-21 ENCOUNTER — APPOINTMENT (OUTPATIENT)
Dept: RADIOLOGY | Facility: MEDICAL CENTER | Age: 79
DRG: 470 | End: 2019-11-21
Attending: PHYSICIAN ASSISTANT
Payer: MEDICARE

## 2019-11-21 ENCOUNTER — ANESTHESIA EVENT (OUTPATIENT)
Dept: SURGERY | Facility: MEDICAL CENTER | Age: 79
DRG: 470 | End: 2019-11-21
Payer: MEDICARE

## 2019-11-21 ENCOUNTER — HOSPITAL ENCOUNTER (INPATIENT)
Facility: MEDICAL CENTER | Age: 79
LOS: 6 days | DRG: 470 | End: 2019-11-27
Attending: ORTHOPAEDIC SURGERY | Admitting: ORTHOPAEDIC SURGERY
Payer: MEDICARE

## 2019-11-21 DIAGNOSIS — Z01.812 PRE-OPERATIVE LABORATORY EXAMINATION: ICD-10-CM

## 2019-11-21 DIAGNOSIS — Z01.810 PRE-OPERATIVE CARDIOVASCULAR EXAMINATION: ICD-10-CM

## 2019-11-21 DIAGNOSIS — Z01.811 PRE-OPERATIVE RESPIRATORY EXAMINATION: ICD-10-CM

## 2019-11-21 DIAGNOSIS — Z96.641 STATUS POST RIGHT HIP REPLACEMENT: ICD-10-CM

## 2019-11-21 PROCEDURE — 160035 HCHG PACU - 1ST 60 MINS PHASE I: Performed by: ORTHOPAEDIC SURGERY

## 2019-11-21 PROCEDURE — 160036 HCHG PACU - EA ADDL 30 MINS PHASE I: Performed by: ORTHOPAEDIC SURGERY

## 2019-11-21 PROCEDURE — 502000 HCHG MISC OR IMPLANTS RC 0278: Performed by: ORTHOPAEDIC SURGERY

## 2019-11-21 PROCEDURE — 501838 HCHG SUTURE GENERAL: Performed by: ORTHOPAEDIC SURGERY

## 2019-11-21 PROCEDURE — 0SR904A REPLACEMENT OF RIGHT HIP JOINT WITH CERAMIC ON POLYETHYLENE SYNTHETIC SUBSTITUTE, UNCEMENTED, OPEN APPROACH: ICD-10-PCS | Performed by: ORTHOPAEDIC SURGERY

## 2019-11-21 PROCEDURE — 700111 HCHG RX REV CODE 636 W/ 250 OVERRIDE (IP): Performed by: ANESTHESIOLOGY

## 2019-11-21 PROCEDURE — A9270 NON-COVERED ITEM OR SERVICE: HCPCS | Performed by: PHYSICIAN ASSISTANT

## 2019-11-21 PROCEDURE — 700112 HCHG RX REV CODE 229: Performed by: PHYSICIAN ASSISTANT

## 2019-11-21 PROCEDURE — 700105 HCHG RX REV CODE 258: Performed by: PHYSICIAN ASSISTANT

## 2019-11-21 PROCEDURE — 160048 HCHG OR STATISTICAL LEVEL 1-5: Performed by: ORTHOPAEDIC SURGERY

## 2019-11-21 PROCEDURE — 160002 HCHG RECOVERY MINUTES (STAT): Performed by: ORTHOPAEDIC SURGERY

## 2019-11-21 PROCEDURE — 700105 HCHG RX REV CODE 258

## 2019-11-21 PROCEDURE — 51798 US URINE CAPACITY MEASURE: CPT

## 2019-11-21 PROCEDURE — 160042 HCHG SURGERY MINUTES - EA ADDL 1 MIN LEVEL 5: Performed by: ORTHOPAEDIC SURGERY

## 2019-11-21 PROCEDURE — A9270 NON-COVERED ITEM OR SERVICE: HCPCS | Performed by: ANESTHESIOLOGY

## 2019-11-21 PROCEDURE — 770001 HCHG ROOM/CARE - MED/SURG/GYN PRIV*

## 2019-11-21 PROCEDURE — 700101 HCHG RX REV CODE 250: Performed by: ANESTHESIOLOGY

## 2019-11-21 PROCEDURE — 700101 HCHG RX REV CODE 250: Performed by: PHYSICIAN ASSISTANT

## 2019-11-21 PROCEDURE — 700111 HCHG RX REV CODE 636 W/ 250 OVERRIDE (IP): Performed by: ORTHOPAEDIC SURGERY

## 2019-11-21 PROCEDURE — 700102 HCHG RX REV CODE 250 W/ 637 OVERRIDE(OP): Performed by: PHYSICIAN ASSISTANT

## 2019-11-21 PROCEDURE — 700111 HCHG RX REV CODE 636 W/ 250 OVERRIDE (IP)

## 2019-11-21 PROCEDURE — 503051 HCHG CLOSURE PRINEO SKIN: Performed by: ORTHOPAEDIC SURGERY

## 2019-11-21 PROCEDURE — 700101 HCHG RX REV CODE 250: Performed by: ORTHOPAEDIC SURGERY

## 2019-11-21 PROCEDURE — 700105 HCHG RX REV CODE 258: Performed by: ORTHOPAEDIC SURGERY

## 2019-11-21 PROCEDURE — 72170 X-RAY EXAM OF PELVIS: CPT

## 2019-11-21 PROCEDURE — 700111 HCHG RX REV CODE 636 W/ 250 OVERRIDE (IP): Performed by: PHYSICIAN ASSISTANT

## 2019-11-21 PROCEDURE — 502578 HCHG PACK, TOTAL HIP: Performed by: ORTHOPAEDIC SURGERY

## 2019-11-21 PROCEDURE — 700102 HCHG RX REV CODE 250 W/ 637 OVERRIDE(OP): Performed by: ANESTHESIOLOGY

## 2019-11-21 PROCEDURE — 160031 HCHG SURGERY MINUTES - 1ST 30 MINS LEVEL 5: Performed by: ORTHOPAEDIC SURGERY

## 2019-11-21 PROCEDURE — 160009 HCHG ANES TIME/MIN: Performed by: ORTHOPAEDIC SURGERY

## 2019-11-21 DEVICE — IMPLANTABLE DEVICE: Type: IMPLANTABLE DEVICE | Site: HIP | Status: FUNCTIONAL

## 2019-11-21 RX ORDER — OMEPRAZOLE 20 MG/1
20 CAPSULE, DELAYED RELEASE ORAL
Status: DISCONTINUED | OUTPATIENT
Start: 2019-11-21 | End: 2019-11-27 | Stop reason: HOSPADM

## 2019-11-21 RX ORDER — CEFAZOLIN SODIUM 2 G/100ML
2 INJECTION, SOLUTION INTRAVENOUS EVERY 8 HOURS
Status: COMPLETED | OUTPATIENT
Start: 2019-11-21 | End: 2019-11-22

## 2019-11-21 RX ORDER — SCOLOPAMINE TRANSDERMAL SYSTEM 1 MG/1
1 PATCH, EXTENDED RELEASE TRANSDERMAL
Status: DISCONTINUED | OUTPATIENT
Start: 2019-11-21 | End: 2019-11-27 | Stop reason: HOSPADM

## 2019-11-21 RX ORDER — ONDANSETRON 2 MG/ML
4 INJECTION INTRAMUSCULAR; INTRAVENOUS EVERY 4 HOURS PRN
Status: DISCONTINUED | OUTPATIENT
Start: 2019-11-21 | End: 2019-11-27 | Stop reason: HOSPADM

## 2019-11-21 RX ORDER — POLYETHYLENE GLYCOL 3350 17 G/17G
1 POWDER, FOR SOLUTION ORAL 2 TIMES DAILY PRN
Status: DISCONTINUED | OUTPATIENT
Start: 2019-11-21 | End: 2019-11-27 | Stop reason: HOSPADM

## 2019-11-21 RX ORDER — DEXAMETHASONE SODIUM PHOSPHATE 4 MG/ML
4 INJECTION, SOLUTION INTRA-ARTICULAR; INTRALESIONAL; INTRAMUSCULAR; INTRAVENOUS; SOFT TISSUE
Status: COMPLETED | OUTPATIENT
Start: 2019-11-21 | End: 2019-11-25

## 2019-11-21 RX ORDER — DIPHENHYDRAMINE HYDROCHLORIDE 50 MG/ML
25 INJECTION INTRAMUSCULAR; INTRAVENOUS EVERY 6 HOURS PRN
Status: DISCONTINUED | OUTPATIENT
Start: 2019-11-21 | End: 2019-11-27 | Stop reason: HOSPADM

## 2019-11-21 RX ORDER — NYSTATIN 100000 [USP'U]/G
POWDER TOPICAL
Status: ON HOLD | COMMUNITY
Start: 2019-11-15 | End: 2019-12-09

## 2019-11-21 RX ORDER — BISACODYL 10 MG
10 SUPPOSITORY, RECTAL RECTAL
Status: DISCONTINUED | OUTPATIENT
Start: 2019-11-21 | End: 2019-11-27 | Stop reason: HOSPADM

## 2019-11-21 RX ORDER — ONDANSETRON 2 MG/ML
INJECTION INTRAMUSCULAR; INTRAVENOUS PRN
Status: DISCONTINUED | OUTPATIENT
Start: 2019-11-21 | End: 2019-11-21 | Stop reason: SURG

## 2019-11-21 RX ORDER — DIPHENHYDRAMINE HCL 25 MG
25 TABLET ORAL EVERY 6 HOURS PRN
Status: DISCONTINUED | OUTPATIENT
Start: 2019-11-21 | End: 2019-11-27 | Stop reason: HOSPADM

## 2019-11-21 RX ORDER — M-VIT,TX,IRON,MINS/CALC/FOLIC 27MG-0.4MG
1 TABLET ORAL DAILY
Status: DISCONTINUED | OUTPATIENT
Start: 2019-11-21 | End: 2019-11-27 | Stop reason: HOSPADM

## 2019-11-21 RX ORDER — DEXTROSE MONOHYDRATE, SODIUM CHLORIDE, AND POTASSIUM CHLORIDE 50; 1.49; 4.5 G/1000ML; G/1000ML; G/1000ML
INJECTION, SOLUTION INTRAVENOUS CONTINUOUS
Status: ACTIVE | OUTPATIENT
Start: 2019-11-21 | End: 2019-11-21

## 2019-11-21 RX ORDER — BUPIVACAINE HYDROCHLORIDE AND EPINEPHRINE 5; 5 MG/ML; UG/ML
INJECTION, SOLUTION EPIDURAL; INTRACAUDAL; PERINEURAL
Status: DISCONTINUED | OUTPATIENT
Start: 2019-11-21 | End: 2019-11-21 | Stop reason: HOSPADM

## 2019-11-21 RX ORDER — HALOPERIDOL 5 MG/ML
1 INJECTION INTRAMUSCULAR
Status: DISCONTINUED | OUTPATIENT
Start: 2019-11-21 | End: 2019-11-21 | Stop reason: HOSPADM

## 2019-11-21 RX ORDER — KETOROLAC TROMETHAMINE 30 MG/ML
15 INJECTION, SOLUTION INTRAMUSCULAR; INTRAVENOUS EVERY 6 HOURS
Status: DISPENSED | OUTPATIENT
Start: 2019-11-21 | End: 2019-11-24

## 2019-11-21 RX ORDER — DIPHENHYDRAMINE HYDROCHLORIDE 50 MG/ML
12.5 INJECTION INTRAMUSCULAR; INTRAVENOUS
Status: DISCONTINUED | OUTPATIENT
Start: 2019-11-21 | End: 2019-11-21 | Stop reason: HOSPADM

## 2019-11-21 RX ORDER — ACETAMINOPHEN 500 MG
1000 TABLET ORAL EVERY 6 HOURS
Status: DISPENSED | OUTPATIENT
Start: 2019-11-21 | End: 2019-11-26

## 2019-11-21 RX ORDER — DOCUSATE SODIUM 100 MG/1
100 CAPSULE, LIQUID FILLED ORAL 2 TIMES DAILY
Status: DISCONTINUED | OUTPATIENT
Start: 2019-11-21 | End: 2019-11-27 | Stop reason: HOSPADM

## 2019-11-21 RX ORDER — CHLORPROMAZINE HYDROCHLORIDE 10 MG/1
25 TABLET, FILM COATED ORAL EVERY 6 HOURS PRN
Status: DISCONTINUED | OUTPATIENT
Start: 2019-11-21 | End: 2019-11-27 | Stop reason: HOSPADM

## 2019-11-21 RX ORDER — CEFAZOLIN SODIUM 2 G/100ML
2 INJECTION, SOLUTION INTRAVENOUS
Status: DISCONTINUED | OUTPATIENT
Start: 2019-11-21 | End: 2019-11-21 | Stop reason: HOSPADM

## 2019-11-21 RX ORDER — KETAMINE HYDROCHLORIDE 50 MG/ML
INJECTION, SOLUTION INTRAMUSCULAR; INTRAVENOUS PRN
Status: DISCONTINUED | OUTPATIENT
Start: 2019-11-21 | End: 2019-11-21 | Stop reason: SURG

## 2019-11-21 RX ORDER — SODIUM CHLORIDE 9 MG/ML
INJECTION, SOLUTION INTRAVENOUS
Status: COMPLETED
Start: 2019-11-21 | End: 2019-11-21

## 2019-11-21 RX ORDER — OXYCODONE HYDROCHLORIDE 10 MG/1
10 TABLET ORAL
Status: DISCONTINUED | OUTPATIENT
Start: 2019-11-21 | End: 2019-11-27

## 2019-11-21 RX ORDER — OXYCODONE HYDROCHLORIDE 5 MG/1
5 TABLET ORAL
Status: DISCONTINUED | OUTPATIENT
Start: 2019-11-21 | End: 2019-11-27

## 2019-11-21 RX ORDER — LIDOCAINE HYDROCHLORIDE 10 MG/ML
INJECTION, SOLUTION EPIDURAL; INFILTRATION; INTRACAUDAL; PERINEURAL
Status: COMPLETED
Start: 2019-11-21 | End: 2019-11-21

## 2019-11-21 RX ORDER — LOVASTATIN 20 MG/1
40 TABLET ORAL
Status: DISCONTINUED | OUTPATIENT
Start: 2019-11-21 | End: 2019-11-27 | Stop reason: HOSPADM

## 2019-11-21 RX ORDER — CALCIUM CARBONATE 500 MG/1
500 TABLET, CHEWABLE ORAL
Status: DISCONTINUED | OUTPATIENT
Start: 2019-11-21 | End: 2019-11-27 | Stop reason: HOSPADM

## 2019-11-21 RX ORDER — AMOXICILLIN 250 MG
1 CAPSULE ORAL
Status: DISCONTINUED | OUTPATIENT
Start: 2019-11-21 | End: 2019-11-27 | Stop reason: HOSPADM

## 2019-11-21 RX ORDER — ONDANSETRON 2 MG/ML
4 INJECTION INTRAMUSCULAR; INTRAVENOUS
Status: DISCONTINUED | OUTPATIENT
Start: 2019-11-21 | End: 2019-11-21 | Stop reason: HOSPADM

## 2019-11-21 RX ORDER — KETOROLAC TROMETHAMINE 30 MG/ML
INJECTION, SOLUTION INTRAMUSCULAR; INTRAVENOUS PRN
Status: DISCONTINUED | OUTPATIENT
Start: 2019-11-21 | End: 2019-11-21 | Stop reason: SURG

## 2019-11-21 RX ORDER — HYDROMORPHONE HYDROCHLORIDE 1 MG/ML
0.2 INJECTION, SOLUTION INTRAMUSCULAR; INTRAVENOUS; SUBCUTANEOUS
Status: DISCONTINUED | OUTPATIENT
Start: 2019-11-21 | End: 2019-11-21 | Stop reason: HOSPADM

## 2019-11-21 RX ORDER — DIPHENHYDRAMINE HCL 25 MG
25 TABLET ORAL NIGHTLY PRN
Status: DISCONTINUED | OUTPATIENT
Start: 2019-11-22 | End: 2019-11-27 | Stop reason: HOSPADM

## 2019-11-21 RX ORDER — MENTHOL/CAMPHOR 11 %-11 %
1 OINTMENT (GRAM) TOPICAL PRN
Status: DISCONTINUED | OUTPATIENT
Start: 2019-11-21 | End: 2019-11-21

## 2019-11-21 RX ORDER — VANCOMYCIN HYDROCHLORIDE 1 G/20ML
INJECTION, POWDER, LYOPHILIZED, FOR SOLUTION INTRAVENOUS
Status: COMPLETED | OUTPATIENT
Start: 2019-11-21 | End: 2019-11-21

## 2019-11-21 RX ORDER — NYSTATIN 100000 [USP'U]/G
POWDER TOPICAL 2 TIMES DAILY
Status: DISCONTINUED | OUTPATIENT
Start: 2019-11-21 | End: 2019-11-27 | Stop reason: HOSPADM

## 2019-11-21 RX ORDER — CHLORPROMAZINE HYDROCHLORIDE 25 MG/ML
25 INJECTION INTRAMUSCULAR EVERY 6 HOURS PRN
Status: DISCONTINUED | OUTPATIENT
Start: 2019-11-21 | End: 2019-11-27 | Stop reason: HOSPADM

## 2019-11-21 RX ORDER — SODIUM CHLORIDE, SODIUM LACTATE, POTASSIUM CHLORIDE, CALCIUM CHLORIDE 600; 310; 30; 20 MG/100ML; MG/100ML; MG/100ML; MG/100ML
INJECTION, SOLUTION INTRAVENOUS CONTINUOUS
Status: ACTIVE | OUTPATIENT
Start: 2019-11-21 | End: 2019-11-21

## 2019-11-21 RX ORDER — DEXAMETHASONE SODIUM PHOSPHATE 4 MG/ML
6 INJECTION, SOLUTION INTRA-ARTICULAR; INTRALESIONAL; INTRAMUSCULAR; INTRAVENOUS; SOFT TISSUE EVERY 6 HOURS PRN
Status: DISCONTINUED | OUTPATIENT
Start: 2019-11-21 | End: 2019-11-27 | Stop reason: HOSPADM

## 2019-11-21 RX ORDER — MORPHINE SULFATE 4 MG/ML
4 INJECTION, SOLUTION INTRAMUSCULAR; INTRAVENOUS
Status: DISCONTINUED | OUTPATIENT
Start: 2019-11-21 | End: 2019-11-27

## 2019-11-21 RX ORDER — CALCIUM CARBONATE 500 MG/1
500 TABLET, CHEWABLE ORAL
COMMUNITY
End: 2020-01-09

## 2019-11-21 RX ORDER — AMOXICILLIN 250 MG
1 CAPSULE ORAL NIGHTLY
Status: DISCONTINUED | OUTPATIENT
Start: 2019-11-21 | End: 2019-11-27 | Stop reason: HOSPADM

## 2019-11-21 RX ORDER — HALOPERIDOL 5 MG/ML
1 INJECTION INTRAMUSCULAR EVERY 6 HOURS PRN
Status: DISCONTINUED | OUTPATIENT
Start: 2019-11-21 | End: 2019-11-27 | Stop reason: HOSPADM

## 2019-11-21 RX ORDER — HYDRALAZINE HYDROCHLORIDE 20 MG/ML
5 INJECTION INTRAMUSCULAR; INTRAVENOUS
Status: DISCONTINUED | OUTPATIENT
Start: 2019-11-21 | End: 2019-11-21 | Stop reason: HOSPADM

## 2019-11-21 RX ORDER — MULTIVIT WITH MINERALS/LUTEIN
1 TABLET ORAL DAILY
Status: DISCONTINUED | OUTPATIENT
Start: 2019-11-21 | End: 2019-11-21

## 2019-11-21 RX ORDER — CEFAZOLIN SODIUM 1 G/3ML
INJECTION, POWDER, FOR SOLUTION INTRAMUSCULAR; INTRAVENOUS PRN
Status: DISCONTINUED | OUTPATIENT
Start: 2019-11-21 | End: 2019-11-21 | Stop reason: SURG

## 2019-11-21 RX ORDER — HYDROMORPHONE HYDROCHLORIDE 2 MG/ML
INJECTION, SOLUTION INTRAMUSCULAR; INTRAVENOUS; SUBCUTANEOUS PRN
Status: DISCONTINUED | OUTPATIENT
Start: 2019-11-21 | End: 2019-11-21 | Stop reason: SURG

## 2019-11-21 RX ORDER — DEXAMETHASONE SODIUM PHOSPHATE 4 MG/ML
INJECTION, SOLUTION INTRA-ARTICULAR; INTRALESIONAL; INTRAMUSCULAR; INTRAVENOUS; SOFT TISSUE PRN
Status: DISCONTINUED | OUTPATIENT
Start: 2019-11-21 | End: 2019-11-21 | Stop reason: SURG

## 2019-11-21 RX ORDER — HYDROMORPHONE HYDROCHLORIDE 1 MG/ML
0.1 INJECTION, SOLUTION INTRAMUSCULAR; INTRAVENOUS; SUBCUTANEOUS
Status: DISCONTINUED | OUTPATIENT
Start: 2019-11-21 | End: 2019-11-21 | Stop reason: HOSPADM

## 2019-11-21 RX ORDER — HYDROMORPHONE HYDROCHLORIDE 1 MG/ML
0.4 INJECTION, SOLUTION INTRAMUSCULAR; INTRAVENOUS; SUBCUTANEOUS
Status: DISCONTINUED | OUTPATIENT
Start: 2019-11-21 | End: 2019-11-21 | Stop reason: HOSPADM

## 2019-11-21 RX ORDER — TRANEXAMIC ACID 100 MG/ML
INJECTION, SOLUTION INTRAVENOUS PRN
Status: DISCONTINUED | OUTPATIENT
Start: 2019-11-21 | End: 2019-11-21 | Stop reason: SURG

## 2019-11-21 RX ORDER — ENEMA 19; 7 G/133ML; G/133ML
1 ENEMA RECTAL
Status: DISCONTINUED | OUTPATIENT
Start: 2019-11-21 | End: 2019-11-27 | Stop reason: HOSPADM

## 2019-11-21 RX ORDER — SODIUM CHLORIDE, SODIUM LACTATE, POTASSIUM CHLORIDE, CALCIUM CHLORIDE 600; 310; 30; 20 MG/100ML; MG/100ML; MG/100ML; MG/100ML
INJECTION, SOLUTION INTRAVENOUS CONTINUOUS
Status: DISCONTINUED | OUTPATIENT
Start: 2019-11-21 | End: 2019-11-21 | Stop reason: HOSPADM

## 2019-11-21 RX ADMIN — FENTANYL CITRATE 25 MCG: 50 INJECTION, SOLUTION INTRAMUSCULAR; INTRAVENOUS at 11:53

## 2019-11-21 RX ADMIN — ROCURONIUM BROMIDE 50 MG: 10 INJECTION, SOLUTION INTRAVENOUS at 10:14

## 2019-11-21 RX ADMIN — DOCUSATE SODIUM 100 MG: 100 CAPSULE, LIQUID FILLED ORAL at 17:11

## 2019-11-21 RX ADMIN — CEFAZOLIN SODIUM 2 G: 2 INJECTION, SOLUTION INTRAVENOUS at 17:11

## 2019-11-21 RX ADMIN — CEFAZOLIN 2 G: 330 INJECTION, POWDER, FOR SOLUTION INTRAMUSCULAR; INTRAVENOUS at 10:16

## 2019-11-21 RX ADMIN — NYSTATIN: 100000 POWDER TOPICAL at 17:12

## 2019-11-21 RX ADMIN — ACETAMINOPHEN 1000 MG: 500 TABLET ORAL at 17:11

## 2019-11-21 RX ADMIN — Medication 0.5 ML: at 08:00

## 2019-11-21 RX ADMIN — KETAMINE HYDROCHLORIDE 30 MG: 50 INJECTION, SOLUTION INTRAMUSCULAR; INTRAVENOUS at 10:14

## 2019-11-21 RX ADMIN — METHOCARBAMOL 1000 MG: 100 INJECTION INTRAMUSCULAR; INTRAVENOUS at 22:55

## 2019-11-21 RX ADMIN — MULTIPLE VITAMINS W/ MINERALS TAB 1 TABLET: TAB at 17:11

## 2019-11-21 RX ADMIN — DEXAMETHASONE SODIUM PHOSPHATE 8 MG: 4 INJECTION, SOLUTION INTRA-ARTICULAR; INTRALESIONAL; INTRAMUSCULAR; INTRAVENOUS; SOFT TISSUE at 10:17

## 2019-11-21 RX ADMIN — LIDOCAINE HYDROCHLORIDE 80 MG: 20 INJECTION, SOLUTION INTRAVENOUS at 10:14

## 2019-11-21 RX ADMIN — TRANEXAMIC ACID 1000 MG: 100 INJECTION, SOLUTION INTRAVENOUS at 10:17

## 2019-11-21 RX ADMIN — KETOROLAC TROMETHAMINE 15 MG: 30 INJECTION, SOLUTION INTRAMUSCULAR at 22:12

## 2019-11-21 RX ADMIN — KETOROLAC TROMETHAMINE 15 MG: 30 INJECTION, SOLUTION INTRAMUSCULAR at 17:12

## 2019-11-21 RX ADMIN — HYDROMORPHONE HYDROCHLORIDE 0.4 MG: 2 INJECTION, SOLUTION INTRAMUSCULAR; INTRAVENOUS; SUBCUTANEOUS at 10:25

## 2019-11-21 RX ADMIN — CHOLECALCIFEROL (VITAMIN D3) 10 MCG (400 UNIT) TABLET 400 UNITS: at 17:11

## 2019-11-21 RX ADMIN — FENTANYL CITRATE 25 MCG: 50 INJECTION, SOLUTION INTRAMUSCULAR; INTRAVENOUS at 12:16

## 2019-11-21 RX ADMIN — FENTANYL CITRATE 50 MCG: 50 INJECTION, SOLUTION INTRAMUSCULAR; INTRAVENOUS at 10:14

## 2019-11-21 RX ADMIN — ONDANSETRON 4 MG: 2 INJECTION INTRAMUSCULAR; INTRAVENOUS at 11:03

## 2019-11-21 RX ADMIN — KETOROLAC TROMETHAMINE 30 MG: 30 INJECTION, SOLUTION INTRAMUSCULAR at 11:03

## 2019-11-21 RX ADMIN — HYDROCODONE BITARTRATE AND ACETAMINOPHEN 15 ML: 7.5; 325 SOLUTION ORAL at 11:52

## 2019-11-21 RX ADMIN — PROPOFOL 100 MG: 10 INJECTION, EMULSION INTRAVENOUS at 10:14

## 2019-11-21 RX ADMIN — SODIUM CHLORIDE 1000 ML: 9 INJECTION, SOLUTION INTRAVENOUS at 17:13

## 2019-11-21 RX ADMIN — SODIUM CHLORIDE, POTASSIUM CHLORIDE, SODIUM LACTATE AND CALCIUM CHLORIDE: 600; 310; 30; 20 INJECTION, SOLUTION INTRAVENOUS at 08:00

## 2019-11-21 RX ADMIN — TRANEXAMIC ACID 1000 MG: 100 INJECTION, SOLUTION INTRAVENOUS at 12:43

## 2019-11-21 RX ADMIN — METHOCARBAMOL 1000 MG: 100 INJECTION INTRAMUSCULAR; INTRAVENOUS at 18:00

## 2019-11-21 RX ADMIN — LIDOCAINE HYDROCHLORIDE 0.5 ML: 10 INJECTION, SOLUTION EPIDURAL; INFILTRATION; INTRACAUDAL at 08:00

## 2019-11-21 RX ADMIN — LOVASTATIN 40 MG: 20 TABLET ORAL at 22:13

## 2019-11-21 RX ADMIN — FENTANYL CITRATE 25 MCG: 50 INJECTION, SOLUTION INTRAMUSCULAR; INTRAVENOUS at 12:08

## 2019-11-21 ASSESSMENT — PATIENT HEALTH QUESTIONNAIRE - PHQ9
7. TROUBLE CONCENTRATING ON THINGS, SUCH AS READING THE NEWSPAPER OR WATCHING TELEVISION: NOT AT ALL
SUM OF ALL RESPONSES TO PHQ QUESTIONS 1-9: 5
2. FEELING DOWN, DEPRESSED, IRRITABLE, OR HOPELESS: SEVERAL DAYS
8. MOVING OR SPEAKING SO SLOWLY THAT OTHER PEOPLE COULD HAVE NOTICED. OR THE OPPOSITE, BEING SO FIGETY OR RESTLESS THAT YOU HAVE BEEN MOVING AROUND A LOT MORE THAN USUAL: NOT AT ALL
2. FEELING DOWN, DEPRESSED, IRRITABLE, OR HOPELESS: NOT AT ALL
6. FEELING BAD ABOUT YOURSELF - OR THAT YOU ARE A FAILURE OR HAVE LET YOURSELF OR YOUR FAMILY DOWN: NOT AL ALL
4. FEELING TIRED OR HAVING LITTLE ENERGY: SEVERAL DAYS
SUM OF ALL RESPONSES TO PHQ9 QUESTIONS 1 AND 2: 2
SUM OF ALL RESPONSES TO PHQ9 QUESTIONS 1 AND 2: 0
3. TROUBLE FALLING OR STAYING ASLEEP OR SLEEPING TOO MUCH: SEVERAL DAYS
1. LITTLE INTEREST OR PLEASURE IN DOING THINGS: SEVERAL DAYS
5. POOR APPETITE OR OVEREATING: SEVERAL DAYS
9. THOUGHTS THAT YOU WOULD BE BETTER OFF DEAD, OR OF HURTING YOURSELF: NOT AT ALL
1. LITTLE INTEREST OR PLEASURE IN DOING THINGS: NOT AT ALL

## 2019-11-21 ASSESSMENT — LIFESTYLE VARIABLES
ON A TYPICAL DAY WHEN YOU DRINK ALCOHOL HOW MANY DRINKS DO YOU HAVE: 0
HOW MANY TIMES IN THE PAST YEAR HAVE YOU HAD 5 OR MORE DRINKS IN A DAY: 0
DOES PATIENT WANT TO STOP DRINKING: NO
EVER_SMOKED: NEVER
CONSUMPTION TOTAL: NEGATIVE
TOTAL SCORE: 0
AVERAGE NUMBER OF DAYS PER WEEK YOU HAVE A DRINK CONTAINING ALCOHOL: 0
HAVE YOU EVER FELT YOU SHOULD CUT DOWN ON YOUR DRINKING: NO
EVER FELT BAD OR GUILTY ABOUT YOUR DRINKING: NO
ALCOHOL_USE: NO
HAVE PEOPLE ANNOYED YOU BY CRITICIZING YOUR DRINKING: NO
EVER HAD A DRINK FIRST THING IN THE MORNING TO STEADY YOUR NERVES TO GET RID OF A HANGOVER: NO
TOTAL SCORE: 0
TOTAL SCORE: 0

## 2019-11-21 ASSESSMENT — COGNITIVE AND FUNCTIONAL STATUS - GENERAL
DRESSING REGULAR LOWER BODY CLOTHING: A LOT
DAILY ACTIVITIY SCORE: 16
TOILETING: A LOT
SUGGESTED CMS G CODE MODIFIER MOBILITY: CL
MOVING FROM LYING ON BACK TO SITTING ON SIDE OF FLAT BED: A LOT
CLIMB 3 TO 5 STEPS WITH RAILING: A LOT
DRESSING REGULAR UPPER BODY CLOTHING: A LITTLE
HELP NEEDED FOR BATHING: A LOT
MOBILITY SCORE: 12
TURNING FROM BACK TO SIDE WHILE IN FLAT BAD: A LOT
EATING MEALS: A LITTLE
STANDING UP FROM CHAIR USING ARMS: A LOT
SUGGESTED CMS G CODE MODIFIER DAILY ACTIVITY: CK
MOVING TO AND FROM BED TO CHAIR: A LOT
WALKING IN HOSPITAL ROOM: A LOT

## 2019-11-21 ASSESSMENT — PAIN SCALES - WONG BAKER
WONGBAKER_NUMERICALRESPONSE: HURTS JUST A LITTLE BIT
WONGBAKER_NUMERICALRESPONSE: HURTS EVEN MORE
WONGBAKER_NUMERICALRESPONSE: HURTS A WHOLE LOT

## 2019-11-21 ASSESSMENT — COPD QUESTIONNAIRES
DURING THE PAST 4 WEEKS HOW MUCH DID YOU FEEL SHORT OF BREATH: SOME OF THE TIME
COPD SCREENING SCORE: 4
HAVE YOU SMOKED AT LEAST 100 CIGARETTES IN YOUR ENTIRE LIFE: NO/DON'T KNOW
IN THE PAST 12 MONTHS DO YOU DO LESS THAN YOU USED TO BECAUSE OF YOUR BREATHING PROBLEMS: DISAGREE/UNSURE
DO YOU EVER COUGH UP ANY MUCUS OR PHLEGM?: YES, A FEW DAYS A WEEK OR MONTH

## 2019-11-21 ASSESSMENT — PAIN SCALES - GENERAL: PAIN_LEVEL: 0

## 2019-11-21 NOTE — ANESTHESIA TIME REPORT
Anesthesia Start and Stop Event Times     Date Time Event    11/21/2019 0943 Ready for Procedure     1006 Anesthesia Start     1146 Anesthesia Stop        Responsible Staff  11/21/19    Name Role Begin End    Kristy De La O M.D. Anesth 1006 1146        Preop Diagnosis (Free Text):  Pre-op Diagnosis     RIGHT TOTAL OSTEOARTHRITIS AND DEGENERATIVE JOINT DISEASE        Preop Diagnosis (Codes):    Post op Diagnosis  Hip osteoarthritis      Premium Reason  Non-Premium    Comments:

## 2019-11-21 NOTE — OR SURGEON
Immediate Post OP Note    PreOp Diagnosis: r hip oa djd    PostOp Diagnosis: same    Procedure(s):R  ARTHROPLASTY, HIP, TOTAL - Wound Class: Clean    Surgeon(s):  Khang Delgado M.D.    Anesthesiologist/Type of Anesthesia:  Anesthesiologist: Kristy De La O M.D./General    Surgical Staff:  Assistant: WILLIAMS Cervantes  Circulator: Cyndi Ayala R.N.  Limb Payne: Jose Nava  Relief Circulator: Shiela Villalpando R.N.  Scrub Person: Mirza Garber    Specimens removed if any:  none    Estimated Blood Loss: 140cc    Findings: none    Complications: none        11/21/2019 11:16 AM Khang Delgado M.D.

## 2019-11-21 NOTE — ANESTHESIA PREPROCEDURE EVALUATION
79F HTN (c/w diet and exercise), HLD and recent TTE with pEF, nml RHF, no significant valvulopathies, GERD w/c on PPI, lumbar spinal stenosis with CLBP and sciatica with chronic narcotic use, chronic pain of both shoulders, here for R JAIME. Pt states she has COPD and refuses any inhalers. Cough productive of white sputum, breathing at baseline per patient, no recent hx of cough productive of colored sputum or changes in amount or character.  Denies CAD, CHF, CVA, CKD, DM2, bleeding/clotting d/o.  Denies FHX or PHx of anesthesia cxs.  Denies motion sickness.   Discussed risks/benefits GA. Questions answered.      Relevant Problems   CARDIAC   (+) HTN (hypertension)      GI   (+) Gastroesophageal reflux disease       Physical Exam    Airway   Mallampati: I  TM distance: >3 FB  Neck ROM: full       Cardiovascular - normal exam  Rhythm: regular  Rate: normal  (-) murmur     Dental - normal exam  (+) upper dentures, lower dentures         Pulmonary - normal exam  Breath sounds clear to auscultation     Abdominal    Neurological - normal exam               Anesthesia Plan    ASA 2       Plan - general       Airway plan will be ETT        Induction: intravenous    Postoperative Plan: Postoperative administration of opioids is intended.    Pertinent diagnostic labs and testing reviewed    Informed Consent:    Anesthetic plan and risks discussed with patient.    Use of blood products discussed with: patient whom consented to blood products.

## 2019-11-21 NOTE — OP REPORT
DATE OF SERVICE:  11/21/2019    SERVICE:  Orthopedic surgery.    PREOPERATIVE DIAGNOSES:  Right hip degenerative joint disease, osteoarthritis.    POSTOPERATIVE DIAGNOSES:  Right hip degenerative joint disease,   osteoarthritis.    PROCEDURE:  Right total hip arthroplasty using DePuy Dani and Dani   uncemented total hip prosthesis with a size 2 high offset femoral component, a   size 48 mm acetabular component, a 10-degree posterior-lipped polyethylene   liner and a +9, 32 mm ceramic head.    SURGEON:  Khang Delgado MD    ASSISTANT SURGEON:  Chapito Inman PA-C, [facility]    ANESTHETIC:  General.    ANESTHESIOLOGIST:  Kristy De La O MD    COMPLICATIONS:  None.    BLOOD LOSS:  140 mL.    MEASURES USED TO DECREASE THE CHANCES OF INFECTION:  1.  Mupirocin ointment nasally.  2.  A 24-hour Hibiclens wash.  3.  Perioperative Ancef.  4.  Use of tranexamic acid to decrease the total surgical time.  5.  I personally washed the entire area of the hip and the entire right lower   extremity myself with isopropyl alcohol before the routine scrub.  6.  Betadine wash and tissue massage during the operation.  7.  We irrigated at least 5 times with pulsatile lavage throughout the   operation.  8.  Placement of 1000 mg of vancomycin powder in the wound.  9.  Use of Prineo Dermabond mesh glue closure, which has been shown to   decrease the incidence of infection.    DESCRIPTION OF PROCEDURE:  After informed consent was obtained, the patient   was brought to the operating room and given general endotracheal anesthetic.    She was placed in the lateral decubitus position with right side up and given   preoperative IV Ancef.  After the field was draped, a time-out was called   correctly identifying the patient and the procedure to be done.  We then made   a curvilinear incision standard for the posterior approach to the hip.  We   carefully dissected down through subcutaneous tissue down to the fascial   layer.  The fascial layer  was then carefully cleaned off with a Hayes elevator.    A small rent was made in the fascia and this incision was carried proximally   with a curved Devine scissors.  We then freed up the fascial layer and placed   the deep [place] retractor.  We then removed the piriformis bursa   and identified the piriformis and conjoined tendon.  These 2 tendons were then   tagged with a #5 Ethibond, divided and later reattached to the piriformis   fossa at the end of the operation.  We then skeletonized the posterior   proximal femur, removing the capsule and soft tissue down to the lesser   trochanter.  The hip was then dislocated.  We then made our femoral neck cut   approximately 1 fingerbreadth proximal to the lesser trochanter.    We then placed the acetabular retractors.  We then used sequentially larger   acetabular reamers and reamed up to a size 47, at which time we had reached   the floor of the acetabular fossa.  We then placed a 48 mm acetabular shell   with blows from the mallet.  We placed maximal axial traction rotational   forces and rocking forces on this implant and noted to be completely solid.    Therefore, we did not need to place a supplemental screw.  After irrigating   with copious amounts of irrigation, we then placed a polyethylene   posterior-lipped liner with blows from the mallet.  Kocher traction was placed   on this implant and was noted to be secure.      We then turned our attention to preparing the femur.  We first used the cookie   cutter.  This was followed by sequentially larger cylindrical reamers.  A   lateralizing reamer and then we broached.  We broached up to a size 2, at   which time we had an excellent fit.  We then trialed.  All trial components   were then removed.  After irrigating, we then placed a femoral component,   which was a high offset, size 2 stem with blows from the mallet.  We then   trialed again and noted a +9, 32 mm component to be the ideal component.  The    Coleman taper was then thoroughly cleaned and dried.  We then malleted into   place the ceramic 32 mm +9 head.  Digital traction was placed on the implant   to make sure it was secure.  The implant was then reduced.  We then took the   patient through range of motion.  She had full extension approximately 2-3 mm   of shuck on axial traction.  Maximal internal and external rotation and full   extension failed to dislocate the prosthesis.  At 45 degrees of flexion, she   rotated to 90 degrees of internal rotation before starting to dislocate.  At   90 degrees of flexion, she also rotated to 90 degrees of internal rotation   before starting to dislocate.  We noted the hip to be exceptionally stable.    We then irrigated the wound with copious amounts of irrigation and closed the   wound in layers after doing a Betadine wash and tissue massage.  We placed   1000 mg of vancomycin powder distributed equally below and above the fascia.    The fascial layer was closed with interrupted #1 Vicryl suture, subcutaneous   tissue with 2-0 Vicryl and skin was closed with Prineo Dermabond mesh glue   closure.  We injected 20 mL of 0.5% Marcaine with epinephrine.  Wound dressing   was applied and the procedure was terminated.  No complications were   experienced.  Blood loss was approximately 140 mL.       ____________________________________     MD CRISTOBAL FIGUEREDO / RONN    DD:  11/21/2019 11:24:23  DT:  11/21/2019 11:39:06    D#:  [id number]  Job#:  [id number]

## 2019-11-21 NOTE — ANESTHESIA POSTPROCEDURE EVALUATION
Patient: Guillermina Recio    Procedure Summary     Date:  11/21/19 Room / Location:  [facility] 04 / SURGERY [facility]    Anesthesia Start:  1006 Anesthesia Stop:  1146    Procedure:  ARTHROPLASTY, HIP, TOTAL (Right Hip) Diagnosis:  (RIGHT TOTAL OSTEOARTHRITIS AND DEGENERATIVE JOINT DISEASE)    Surgeon:  Khang Delgado M.D. Responsible Provider:  Kristy De La O M.D.    Anesthesia Type:  general ASA Status:  2          Final Anesthesia Type: general  Last vitals  BP   Blood Pressure : 119/72    Temp   37.3 °C (99.2 °F)    Pulse   Pulse: 74   Resp   18    SpO2   97 %      Anesthesia Post Evaluation    Patient location during evaluation: PACU  Patient participation: complete - patient participated  Level of consciousness: awake and alert  Pain score: 0    Airway patency: patent  Anesthetic complications: no  Cardiovascular status: hemodynamically stable  Respiratory status: acceptable  Hydration status: euvolemic    PONV: none           Nurse Pain Score: 0 (NPRS)

## 2019-11-21 NOTE — ANESTHESIA PROCEDURE NOTES
Airway  Date/Time: 11/21/2019 10:15 AM  Performed by: Kristy De La O M.D.  Authorized by: Kristy De La O M.D.     Location:  OR  Urgency:  Elective  Difficult Airway: No    Indications for Airway Management:  Anesthesia  Spontaneous Ventilation: absent    Sedation Level:  Deep  Preoxygenated: Yes    Patient Position:  Sniffing  Final Airway Type:  Endotracheal airway  Final Endotracheal Airway:  ETT  Cuffed: Yes    Technique Used for Successful ETT Placement:  Direct laryngoscopy  Insertion Site:  Oral  Blade Type:  Mark  Laryngoscope Blade/Videolaryngoscope Blade Size:  3  ETT Size (mm):  7.0  Measured from:  Teeth  ETT to Teeth (cm):  20  Placement Verified by: auscultation and capnometry    Cormack-Lehane Classification:  Grade I - full view of glottis  Number of Attempts at Approach:  1   Atraumatic, DLx1, bite block placed, eyes taped, all PPP

## 2019-11-22 LAB
ERYTHROCYTE [DISTWIDTH] IN BLOOD BY AUTOMATED COUNT: 47.9 FL (ref 35.9–50)
HCT VFR BLD AUTO: 34.5 % (ref 37–47)
HGB BLD-MCNC: 10.7 G/DL (ref 12–16)
MCH RBC QN AUTO: 25.3 PG (ref 27–33)
MCHC RBC AUTO-ENTMCNC: 30.7 G/DL (ref 33.6–35)
MCV RBC AUTO: 82.3 FL (ref 81.4–97.8)
PLATELET # BLD AUTO: 246 K/UL (ref 164–446)
PMV BLD AUTO: 8.5 FL (ref 9–12.9)
RBC # BLD AUTO: 4.19 M/UL (ref 4.2–5.4)
WBC # BLD AUTO: 11.1 K/UL (ref 4.8–10.8)

## 2019-11-22 PROCEDURE — 97166 OT EVAL MOD COMPLEX 45 MIN: CPT

## 2019-11-22 PROCEDURE — 700102 HCHG RX REV CODE 250 W/ 637 OVERRIDE(OP): Performed by: PHYSICIAN ASSISTANT

## 2019-11-22 PROCEDURE — 36415 COLL VENOUS BLD VENIPUNCTURE: CPT

## 2019-11-22 PROCEDURE — 770001 HCHG ROOM/CARE - MED/SURG/GYN PRIV*

## 2019-11-22 PROCEDURE — 97162 PT EVAL MOD COMPLEX 30 MIN: CPT

## 2019-11-22 PROCEDURE — A9270 NON-COVERED ITEM OR SERVICE: HCPCS | Performed by: PHYSICIAN ASSISTANT

## 2019-11-22 PROCEDURE — 700111 HCHG RX REV CODE 636 W/ 250 OVERRIDE (IP): Performed by: PHYSICIAN ASSISTANT

## 2019-11-22 PROCEDURE — 700105 HCHG RX REV CODE 258: Performed by: PHYSICIAN ASSISTANT

## 2019-11-22 PROCEDURE — 700112 HCHG RX REV CODE 229: Performed by: PHYSICIAN ASSISTANT

## 2019-11-22 PROCEDURE — 97165 OT EVAL LOW COMPLEX 30 MIN: CPT

## 2019-11-22 PROCEDURE — 85027 COMPLETE CBC AUTOMATED: CPT

## 2019-11-22 RX ADMIN — DOCUSATE SODIUM 100 MG: 100 CAPSULE, LIQUID FILLED ORAL at 16:17

## 2019-11-22 RX ADMIN — LOVASTATIN 40 MG: 20 TABLET ORAL at 21:01

## 2019-11-22 RX ADMIN — METHOCARBAMOL 1000 MG: 100 INJECTION INTRAMUSCULAR; INTRAVENOUS at 06:22

## 2019-11-22 RX ADMIN — NYSTATIN: 100000 POWDER TOPICAL at 18:00

## 2019-11-22 RX ADMIN — ASPIRIN 325 MG: 325 TABLET, COATED ORAL at 13:52

## 2019-11-22 RX ADMIN — ACETAMINOPHEN 1000 MG: 500 TABLET ORAL at 05:06

## 2019-11-22 RX ADMIN — ASPIRIN 325 MG: 325 TABLET, COATED ORAL at 00:22

## 2019-11-22 RX ADMIN — ACETAMINOPHEN 1000 MG: 500 TABLET ORAL at 00:22

## 2019-11-22 RX ADMIN — OMEPRAZOLE 20 MG: 20 CAPSULE, DELAYED RELEASE ORAL at 13:51

## 2019-11-22 RX ADMIN — SENNOSIDES AND DOCUSATE SODIUM 1 TABLET: 8.6; 5 TABLET ORAL at 21:01

## 2019-11-22 RX ADMIN — CHOLECALCIFEROL (VITAMIN D3) 10 MCG (400 UNIT) TABLET 400 UNITS: at 05:05

## 2019-11-22 RX ADMIN — KETOROLAC TROMETHAMINE 15 MG: 30 INJECTION, SOLUTION INTRAMUSCULAR at 13:51

## 2019-11-22 RX ADMIN — KETOROLAC TROMETHAMINE 15 MG: 30 INJECTION, SOLUTION INTRAMUSCULAR at 05:05

## 2019-11-22 RX ADMIN — NYSTATIN: 100000 POWDER TOPICAL at 05:07

## 2019-11-22 RX ADMIN — CEFAZOLIN SODIUM 2 G: 2 INJECTION, SOLUTION INTRAVENOUS at 00:23

## 2019-11-22 RX ADMIN — KETOROLAC TROMETHAMINE 15 MG: 30 INJECTION, SOLUTION INTRAMUSCULAR at 21:00

## 2019-11-22 RX ADMIN — MULTIPLE VITAMINS W/ MINERALS TAB 1 TABLET: TAB at 05:07

## 2019-11-22 RX ADMIN — ACETAMINOPHEN 1000 MG: 500 TABLET ORAL at 13:51

## 2019-11-22 RX ADMIN — METHOCARBAMOL 1000 MG: 100 INJECTION INTRAMUSCULAR; INTRAVENOUS at 16:12

## 2019-11-22 ASSESSMENT — COGNITIVE AND FUNCTIONAL STATUS - GENERAL
PERSONAL GROOMING: A LITTLE
TOILETING: A LOT
MOBILITY SCORE: 11
WALKING IN HOSPITAL ROOM: A LOT
STANDING UP FROM CHAIR USING ARMS: A LITTLE
CLIMB 3 TO 5 STEPS WITH RAILING: TOTAL
SUGGESTED CMS G CODE MODIFIER MOBILITY: CL
SUGGESTED CMS G CODE MODIFIER DAILY ACTIVITY: CK
HELP NEEDED FOR BATHING: A LOT
MOVING TO AND FROM BED TO CHAIR: UNABLE
TURNING FROM BACK TO SIDE WHILE IN FLAT BAD: UNABLE
MOVING FROM LYING ON BACK TO SITTING ON SIDE OF FLAT BED: A LITTLE
DAILY ACTIVITIY SCORE: 15
DRESSING REGULAR UPPER BODY CLOTHING: A LITTLE
EATING MEALS: A LITTLE
DRESSING REGULAR LOWER BODY CLOTHING: A LOT

## 2019-11-22 ASSESSMENT — GAIT ASSESSMENTS
DISTANCE (FEET): 2
DEVIATION: ANTALGIC;STEP TO;DECREASED BASE OF SUPPORT
ASSISTIVE DEVICE: FRONT WHEEL WALKER
GAIT LEVEL OF ASSIST: MINIMAL ASSIST

## 2019-11-22 ASSESSMENT — ACTIVITIES OF DAILY LIVING (ADL): TOILETING: INDEPENDENT

## 2019-11-22 NOTE — CARE PLAN
Problem: Safety  Goal: Will remain free from injury  Outcome: PROGRESSING AS EXPECTED   Treaded socks in place, bed in the lowest position, bed alarm on, call light and belongings within reach, pt call for assistance appropriately   Problem: Pain Management  Goal: Pain level will decrease to patient's comfort goal  Outcome: PROGRESSING AS EXPECTED     Problem: Skin Integrity  Goal: Risk for impaired skin integrity will decrease  Outcome: PROGRESSING AS EXPECTED

## 2019-11-22 NOTE — PROGRESS NOTES
2 RN skin check complete with Milena GO. Milena is primary nurse and responsible for interventions.   Devices in place abductor pillow, SCD, and Oxy mask.  Skin assessed under devices.  No confirmed pressure ulcers found.  New potential pressure ulcers noted.  The following interventions in place; pillows in use for support and positioning, pillows used to float heels, q2h turns.       R breast redness, blanchable, Pt states she gets sweaty. Coccyx red, blanchable. Bilat heels red, but blanchable. Operative site, gauze tegaderm, CDI.

## 2019-11-22 NOTE — CARE PLAN
Problem: Pain  Goal: Alleviation of Pain or a reduction in pain to the patient's comfort goal  Outcome: PROGRESSING AS EXPECTED   Patient reports pain being managed sufficiently with scheduled pain medicine and ice. Doesn't require PRN medications overnight.    Problem: Risk for Deep Vein Thrombosis/Venous Thromboembolism  Goal: DVT/VTE Prevention Measures in Place  Outcome: PROGRESSING AS EXPECTED   Patient wears SCDs bilaterally when in bed. Patient started ASA 12hrs post op.

## 2019-11-22 NOTE — CARE PLAN
Problem: Nutritional:  Goal: Achieve adequate nutritional intake  Description  Patient will consume 50% of meals   Outcome: PROGRESSING AS EXPECTED    See RD note.

## 2019-11-22 NOTE — DIETARY
"Nutrition services: Day 1 of admit.  Guillermina Recio is a 79 y.o. female with admitting DX of right total osteoarthritis and degenerative joint disease and primary osteoarthritis of right hip.    Consult received for MST 2 with weight loss and poor PO intake.    Spoke with pt and family during visit, family members state pt had a good appetite PTA and was eating well. Reports few lb weight loss recently but unsure of specific amounts. Per chart review, pt lost 2 lbs in 6 months, which is not significant weight loss. Pt requesting Boost Plus daily.    Assessment:  Height: 148.2 cm (4' 10.35\")  Weight: 66.6 kg (146 lb 13.2 oz) - stand up scale  Body mass index is 30.32 kg/m²., BMI classification: obesity class 1  Diet/Intake: regular, % of 2 meals per ADLs    Evaluation:   1. PMH of obesity, HTN, impaired vision, GERD.  2. Pt is POD#1 s/p right hip replacement yesterday.  3. Per family, pt was eating adequately with good PO intake PTA and has had few lb weight loss recently, unsure specific amount. Per chart review, pt had 2 lb weight loss in 6 months, not significant.  4. Labs: BUN 34, A1C 5.9 (11/12/19)  5. Meds: D3, colace, nystatin, colace, theragran MVI, dulcolax prn, zofran prn  6. Last BM: 11/20 PTA    Malnutrition Risk: Malnutrition risk not met at this time.    Recommendations/Plan:  1. Recommend regular diet with Boost Plus daily requested per pt, pending MD approval.  2. Encourage intake of meals and supplements.  3. Document intake of all meals as % taken in ADL's to provide interdisciplinary communication across all shifts.   4. Monitor weight.  5. Nutrition rep will continue to see patient for ongoing meal and snack preferences.     RD to follow.            "

## 2019-11-22 NOTE — DISCHARGE PLANNING
Spoke with Representative from [facility] and he met with patient and gave her resources that [facility] has a program where she can contact Danielle at [facility] and they can arrange medical uber for transportation for her. She also has RTC access. Her daughter will be staying with her for awhile after discharge

## 2019-11-22 NOTE — THERAPY
"Physical Therapy Evaluation completed.   Bed Mobility:  Supine to Sit: (found up in and returned to chair)  Transfers: Sit to Stand: Minimal Assist  Gait: Level Of Assist: Minimal Assist(2-3 steps x2 trials) with Front-Wheel Walker       Plan of Care: Will benefit from Physical Therapy 4 times per week  Discharge Recommendations: Equipment: Will Continue to Assess for Equipment Needs. Post-acute therapy Discharge to a transitional care facility for continued skilled therapy services.    Pt is s/p R JAIME and presents most limited by pain. She required min A to initiate lift off to sit to stand as well as verbal directives for R LE placement. During brief stepping with FWW, pt with poor weight shift to R LE (due to pain) and quickly fatigued. While here, PT will follow to address impaired activity tolerance, gait devaitions, balance impairments, and fall risk. Pt will benefit from placement as she is well below her functional baseline and a high fall risk.     See \"Rehab Therapy-Acute\" Patient Summary Report for complete documentation.     "

## 2019-11-22 NOTE — RESPIRATORY CARE
COPD EDUCATION by COPD CLINICAL EDUCATOR  11/22/2019 at 2:48 PM by Marilia Lozano     Patient interviewed by COPD education team. Patient refused COPD program at this time. States she does not have and has never smoked

## 2019-11-22 NOTE — THERAPY
"Occupational Therapy Evaluation completed.   Functional Status: Supine > EOB mod A, stand-pivot with FWW min A, LB dressing max A  Plan of Care: Will benefit from Occupational Therapy 3 times per week  Discharge Recommendations:  Equipment: Will Continue to Assess for Equipment Needs. Post-acute therapy: see below    See \"Rehab Therapy-Acute\" Patient Summary Report for complete documentation.    79 y.o. female with h/o HTN, lumbar stenosis, HLD R eye blindness, admitted for elective R JAIME, posterior approach. Seen now for OT eval. Pt completed: bed mobility, transfer to bedside chair, LB dressing. Educated pt on posterior hip precautions. Pt unable to read hand-out due to visual deficit; need to reprint in larger font. Pt reports she obtained shower chair from [facility] versus tub transfer bench that was recommended by pre-op  (per notes). Pt's daughter will stay with her and assist as needed. OT will train pt on use of LB dressing equipment to increase assist within posterior hip precautions, however pt may require continued assist due to visual deficit. As of this assess pt would benefit from post-acute transitional care setting. If daughter is willing and able to provide assist with ADL and transfers, pt may be able to DC home with HH. Acute OT will follow to train on compensatory ADL, progress safe transfers and standing activity, assist with DC planning.     "

## 2019-11-22 NOTE — PROGRESS NOTES
2 RN skin check complete with Milena GO. Milena is primary nurse and responsible for interventions.   Devices in place abductor pillow, SCD, and Oxy mask.  Skin assessed under devices.  No confirmed pressure ulcers found.  New potential pressure ulcers noted.  The following interventions in place; pillows in use for support and positioning, pillows used to float heels, q2h turns.

## 2019-11-23 LAB
ERYTHROCYTE [DISTWIDTH] IN BLOOD BY AUTOMATED COUNT: 52.7 FL (ref 35.9–50)
HCT VFR BLD AUTO: 37 % (ref 37–47)
HGB BLD-MCNC: 10.6 G/DL (ref 12–16)
MCH RBC QN AUTO: 25.2 PG (ref 27–33)
MCHC RBC AUTO-ENTMCNC: 28.6 G/DL (ref 33.6–35)
MCV RBC AUTO: 87.9 FL (ref 81.4–97.8)
PLATELET # BLD AUTO: 238 K/UL (ref 164–446)
PMV BLD AUTO: 8.6 FL (ref 9–12.9)
RBC # BLD AUTO: 4.21 M/UL (ref 4.2–5.4)
WBC # BLD AUTO: 9.4 K/UL (ref 4.8–10.8)

## 2019-11-23 PROCEDURE — A9270 NON-COVERED ITEM OR SERVICE: HCPCS | Performed by: PHYSICIAN ASSISTANT

## 2019-11-23 PROCEDURE — 85027 COMPLETE CBC AUTOMATED: CPT

## 2019-11-23 PROCEDURE — 700105 HCHG RX REV CODE 258: Performed by: PHYSICIAN ASSISTANT

## 2019-11-23 PROCEDURE — 770001 HCHG ROOM/CARE - MED/SURG/GYN PRIV*

## 2019-11-23 PROCEDURE — 700102 HCHG RX REV CODE 250 W/ 637 OVERRIDE(OP): Performed by: PHYSICIAN ASSISTANT

## 2019-11-23 PROCEDURE — 97116 GAIT TRAINING THERAPY: CPT

## 2019-11-23 PROCEDURE — 36415 COLL VENOUS BLD VENIPUNCTURE: CPT

## 2019-11-23 PROCEDURE — 700112 HCHG RX REV CODE 229: Performed by: PHYSICIAN ASSISTANT

## 2019-11-23 PROCEDURE — 97530 THERAPEUTIC ACTIVITIES: CPT

## 2019-11-23 PROCEDURE — 700111 HCHG RX REV CODE 636 W/ 250 OVERRIDE (IP): Performed by: PHYSICIAN ASSISTANT

## 2019-11-23 RX ADMIN — METHOCARBAMOL 1000 MG: 100 INJECTION INTRAMUSCULAR; INTRAVENOUS at 01:16

## 2019-11-23 RX ADMIN — NYSTATIN: 100000 POWDER TOPICAL at 06:11

## 2019-11-23 RX ADMIN — ASPIRIN 325 MG: 325 TABLET, COATED ORAL at 01:09

## 2019-11-23 RX ADMIN — KETOROLAC TROMETHAMINE 15 MG: 30 INJECTION, SOLUTION INTRAMUSCULAR at 01:15

## 2019-11-23 RX ADMIN — OMEPRAZOLE 20 MG: 20 CAPSULE, DELAYED RELEASE ORAL at 13:26

## 2019-11-23 RX ADMIN — KETOROLAC TROMETHAMINE 15 MG: 30 INJECTION, SOLUTION INTRAMUSCULAR at 13:30

## 2019-11-23 RX ADMIN — NYSTATIN: 100000 POWDER TOPICAL at 17:51

## 2019-11-23 RX ADMIN — KETOROLAC TROMETHAMINE 15 MG: 30 INJECTION, SOLUTION INTRAMUSCULAR at 20:27

## 2019-11-23 RX ADMIN — MULTIPLE VITAMINS W/ MINERALS TAB 1 TABLET: TAB at 06:05

## 2019-11-23 RX ADMIN — ACETAMINOPHEN 1000 MG: 500 TABLET ORAL at 06:05

## 2019-11-23 RX ADMIN — KETOROLAC TROMETHAMINE 15 MG: 30 INJECTION, SOLUTION INTRAMUSCULAR at 06:05

## 2019-11-23 RX ADMIN — ACETAMINOPHEN 1000 MG: 500 TABLET ORAL at 01:09

## 2019-11-23 RX ADMIN — ASPIRIN 325 MG: 325 TABLET, COATED ORAL at 13:26

## 2019-11-23 RX ADMIN — DOCUSATE SODIUM 100 MG: 100 CAPSULE, LIQUID FILLED ORAL at 17:51

## 2019-11-23 RX ADMIN — SENNOSIDES AND DOCUSATE SODIUM 1 TABLET: 8.6; 5 TABLET ORAL at 20:28

## 2019-11-23 RX ADMIN — METHOCARBAMOL 1000 MG: 100 INJECTION INTRAMUSCULAR; INTRAVENOUS at 20:29

## 2019-11-23 RX ADMIN — LOVASTATIN 40 MG: 20 TABLET ORAL at 20:28

## 2019-11-23 RX ADMIN — OXYCODONE HYDROCHLORIDE 5 MG: 5 TABLET ORAL at 17:51

## 2019-11-23 RX ADMIN — ACETAMINOPHEN 1000 MG: 500 TABLET ORAL at 17:51

## 2019-11-23 RX ADMIN — METHOCARBAMOL 1000 MG: 100 INJECTION INTRAMUSCULAR; INTRAVENOUS at 11:02

## 2019-11-23 RX ADMIN — CHOLECALCIFEROL (VITAMIN D3) 10 MCG (400 UNIT) TABLET 400 UNITS: at 06:05

## 2019-11-23 RX ADMIN — ACETAMINOPHEN 1000 MG: 500 TABLET ORAL at 13:26

## 2019-11-23 ASSESSMENT — GAIT ASSESSMENTS
DISTANCE (FEET): 12
DEVIATION: BRADYKINETIC;ANTALGIC;STEP TO
GAIT LEVEL OF ASSIST: MINIMAL ASSIST
ASSISTIVE DEVICE: FRONT WHEEL WALKER

## 2019-11-23 ASSESSMENT — COGNITIVE AND FUNCTIONAL STATUS - GENERAL
WALKING IN HOSPITAL ROOM: A LOT
MOBILITY SCORE: 12
CLIMB 3 TO 5 STEPS WITH RAILING: A LOT
MOVING FROM LYING ON BACK TO SITTING ON SIDE OF FLAT BED: A LITTLE
SUGGESTED CMS G CODE MODIFIER MOBILITY: CL
MOVING TO AND FROM BED TO CHAIR: UNABLE
TURNING FROM BACK TO SIDE WHILE IN FLAT BAD: UNABLE
STANDING UP FROM CHAIR USING ARMS: A LITTLE

## 2019-11-23 NOTE — PROGRESS NOTES
Progress Note               Author: Khang Delgado Date & Time created: 2019  9:54 AM     Interval History:  Pt has no complaints    Review of Systems:  ROS    Physical Exam:  Physical Exam  Skin:     Comments: Sensation and motor intact b le  Wound dressing dry         Labs:          No results for input(s): SODIUM, POTASSIUM, CHLORIDE, CO2, BUN, CREATININE, MAGNESIUM, PHOSPHORUS, CALCIUM in the last 72 hours.  No results for input(s): ALTSGPT, ASTSGOT, ALKPHOSPHAT, TBILIRUBIN, DBILIRUBIN, GAMMAGT, AMYLASE, LIPASE, ALB, PREALBUMIN, GLUCOSE in the last 72 hours.  Recent Labs     19   RBC 4.19*   HEMOGLOBIN 10.7*   HEMATOCRIT 34.5*   PLATELETCT 246     Recent Labs     19   WBC 11.1*     Hemodynamics:  Temp (24hrs), Av.6 °C (97.9 °F), Min:36.3 °C (97.3 °F), Max:36.9 °C (98.5 °F)  Temperature: 36.3 °C (97.3 °F)  Pulse  Av  Min: 56  Max: 77   Blood Pressure : (!) 89/53(Informed RN)     Respiratory:    Respiration: 18, Pulse Oximetry: 90 %           Fluids:    Intake/Output Summary (Last 24 hours) at 2019 0954  Last data filed at 2019 1800  Gross per 24 hour   Intake 360 ml   Output --   Net 360 ml        GI/Nutrition:  Orders Placed This Encounter   Procedures   • Diet Order Regular     Standing Status:   Standing     Number of Occurrences:   1     Order Specific Question:   Diet:     Answer:   Regular [1]     Medical Decision Making, by Problem:  There are no active hospital problems to display for this patient.      Plan:  Pt adamantly against going to skilled wants rehab or home  Physical therapy today  Cont po pain meds  Aspirin dvt prophylaxis    Quality-Core Measures

## 2019-11-23 NOTE — CARE PLAN
Problem: Elimination  Goal: Establishment and Maintenance of regular bowel elimination  Outcome: PROGRESSING AS EXPECTED  Patient had BM 11/22 in bedside commode. Large BM, loose/soft. Patient reports feeling much better.  Goal: Regular urinary elimination  Outcome: PROGRESSING AS EXPECTED   Patient voids in bedside commode. Stress incontinence with coughing. No signs of retention.

## 2019-11-23 NOTE — CARE PLAN
Problem: Communication  Goal: The ability to communicate needs accurately and effectively will improve  Outcome: PROGRESSING AS EXPECTED  Pt uses call light appropriately, can communicate her needs, review plan of care with pt, call PT/OT for more tx     Problem: Safety  Goal: Will remain free from injury  Outcome: PROGRESSING AS EXPECTED  Pt calls for help as needed, call light in reach, bed in low, locked position, socks not on in bed ,but for walking, yes

## 2019-11-24 LAB
ERYTHROCYTE [DISTWIDTH] IN BLOOD BY AUTOMATED COUNT: 51.2 FL (ref 35.9–50)
HCT VFR BLD AUTO: 35.4 % (ref 37–47)
HGB BLD-MCNC: 10.6 G/DL (ref 12–16)
MCH RBC QN AUTO: 25.5 PG (ref 27–33)
MCHC RBC AUTO-ENTMCNC: 29.9 G/DL (ref 33.6–35)
MCV RBC AUTO: 85.1 FL (ref 81.4–97.8)
PLATELET # BLD AUTO: 249 K/UL (ref 164–446)
PMV BLD AUTO: 8.9 FL (ref 9–12.9)
RBC # BLD AUTO: 4.16 M/UL (ref 4.2–5.4)
WBC # BLD AUTO: 8.1 K/UL (ref 4.8–10.8)

## 2019-11-24 PROCEDURE — A9270 NON-COVERED ITEM OR SERVICE: HCPCS | Performed by: PHYSICIAN ASSISTANT

## 2019-11-24 PROCEDURE — 36415 COLL VENOUS BLD VENIPUNCTURE: CPT

## 2019-11-24 PROCEDURE — 700111 HCHG RX REV CODE 636 W/ 250 OVERRIDE (IP): Performed by: ORTHOPAEDIC SURGERY

## 2019-11-24 PROCEDURE — 700105 HCHG RX REV CODE 258: Performed by: PHYSICIAN ASSISTANT

## 2019-11-24 PROCEDURE — 700112 HCHG RX REV CODE 229: Performed by: PHYSICIAN ASSISTANT

## 2019-11-24 PROCEDURE — 700102 HCHG RX REV CODE 250 W/ 637 OVERRIDE(OP): Performed by: PHYSICIAN ASSISTANT

## 2019-11-24 PROCEDURE — 770001 HCHG ROOM/CARE - MED/SURG/GYN PRIV*

## 2019-11-24 PROCEDURE — 85027 COMPLETE CBC AUTOMATED: CPT

## 2019-11-24 PROCEDURE — 700111 HCHG RX REV CODE 636 W/ 250 OVERRIDE (IP): Performed by: PHYSICIAN ASSISTANT

## 2019-11-24 RX ORDER — ONDANSETRON 4 MG/1
4 TABLET, ORALLY DISINTEGRATING ORAL EVERY 4 HOURS PRN
Status: DISCONTINUED | OUTPATIENT
Start: 2019-11-24 | End: 2019-11-27 | Stop reason: HOSPADM

## 2019-11-24 RX ORDER — ONDANSETRON 4 MG/1
4 TABLET, ORALLY DISINTEGRATING ORAL EVERY 4 HOURS PRN
Status: DISCONTINUED | OUTPATIENT
Start: 2019-11-24 | End: 2019-11-24

## 2019-11-24 RX ADMIN — KETOROLAC TROMETHAMINE 15 MG: 30 INJECTION, SOLUTION INTRAMUSCULAR at 13:00

## 2019-11-24 RX ADMIN — CHOLECALCIFEROL (VITAMIN D3) 10 MCG (400 UNIT) TABLET 400 UNITS: at 05:22

## 2019-11-24 RX ADMIN — LOVASTATIN 40 MG: 20 TABLET ORAL at 21:48

## 2019-11-24 RX ADMIN — METHOCARBAMOL 1000 MG: 100 INJECTION INTRAMUSCULAR; INTRAVENOUS at 09:59

## 2019-11-24 RX ADMIN — ONDANSETRON 4 MG: 4 TABLET, ORALLY DISINTEGRATING ORAL at 18:44

## 2019-11-24 RX ADMIN — ANTACID TABLETS 500 MG: 500 TABLET, CHEWABLE ORAL at 18:13

## 2019-11-24 RX ADMIN — ACETAMINOPHEN 1000 MG: 500 TABLET ORAL at 05:22

## 2019-11-24 RX ADMIN — KETOROLAC TROMETHAMINE 15 MG: 30 INJECTION, SOLUTION INTRAMUSCULAR at 05:22

## 2019-11-24 RX ADMIN — NYSTATIN: 100000 POWDER TOPICAL at 18:22

## 2019-11-24 RX ADMIN — METHOCARBAMOL 1000 MG: 100 INJECTION INTRAMUSCULAR; INTRAVENOUS at 00:57

## 2019-11-24 RX ADMIN — ASPIRIN 325 MG: 325 TABLET, COATED ORAL at 12:59

## 2019-11-24 RX ADMIN — DOCUSATE SODIUM 100 MG: 100 CAPSULE, LIQUID FILLED ORAL at 18:06

## 2019-11-24 RX ADMIN — ACETAMINOPHEN 1000 MG: 500 TABLET ORAL at 13:00

## 2019-11-24 RX ADMIN — ASPIRIN 325 MG: 325 TABLET, COATED ORAL at 00:42

## 2019-11-24 RX ADMIN — MULTIPLE VITAMINS W/ MINERALS TAB 1 TABLET: TAB at 05:22

## 2019-11-24 RX ADMIN — DOCUSATE SODIUM 100 MG: 100 CAPSULE, LIQUID FILLED ORAL at 05:22

## 2019-11-24 RX ADMIN — ACETAMINOPHEN 1000 MG: 500 TABLET ORAL at 18:06

## 2019-11-24 RX ADMIN — SENNOSIDES AND DOCUSATE SODIUM 1 TABLET: 8.6; 5 TABLET ORAL at 21:49

## 2019-11-24 RX ADMIN — POLYETHYLENE GLYCOL 3350 1 PACKET: 17 POWDER, FOR SOLUTION ORAL at 19:33

## 2019-11-24 RX ADMIN — ONDANSETRON 4 MG: 2 INJECTION INTRAMUSCULAR; INTRAVENOUS at 13:10

## 2019-11-24 RX ADMIN — ACETAMINOPHEN 1000 MG: 500 TABLET ORAL at 00:42

## 2019-11-24 RX ADMIN — OMEPRAZOLE 20 MG: 20 CAPSULE, DELAYED RELEASE ORAL at 12:59

## 2019-11-24 NOTE — CARE PLAN
Problem: Communication  Goal: The ability to communicate needs accurately and effectively will improve  Outcome: PROGRESSING AS EXPECTED  Pt uses call light appropriately, can  communicate her needs, review plan of care- understood, will continue to monitor     Problem: Safety  Goal: Will remain free from injury  Outcome: PROGRESSING AS EXPECTED  Pt calls for help as needed, safely transfer to chair/commode with assist, bed in low, locked position, socks on, call light in reach     Problem: Pain Management  Goal: Pain level will decrease to patient's comfort goal  Outcome: PROGRESSING AS EXPECTED  Pt reporting pain sufficiently controlled at this time, encouraging mobility & fluids

## 2019-11-24 NOTE — THERAPY
"Physical Therapy Treatment completed.   Bed Mobility:  Supine to Sit: (found up in and returned to chair)  Transfers: Sit to Stand: Minimal Assist  Gait: Level Of Assist: Minimal Assist with Front-Wheel Walker 12ft       Plan of Care: Will benefit from Physical Therapy 4 times per week  Discharge Recommendations: Equipment: Will Continue to Assess for Equipment Needs. Post-acute therapy  Recommend post-acute placement for continued physical therapy services prior to discharge home. Patient can tolerate post-acute therapies at a 5x/week frequency.       See \"Rehab Therapy-Acute\" Patient Summary Report for complete documentation.     Pt was pleasant and motivated to participate. Upon arrival pt urgently requesting use of commode. She performed stand step pivot with fww over to commode and Maryan. Pt in forward flexed posture and vc to stand upright and maintain posterior hip precautions. Practiced sit to stand transfers with step by step cues for technique multiple time, due to vision pt will require vc for multiple reps. She was able to increase gait with fww and Maryan 12ft x2 with seated rest breaks. She demonstrated short shuffling steps and antalgic gait pattern, and decreased weigth bearing on RLE. Encouraged pt to continue to mobilize with nursing staff. Continue to recommend post acute placement at [facility].   "

## 2019-11-25 ENCOUNTER — APPOINTMENT (OUTPATIENT)
Dept: RADIOLOGY | Facility: MEDICAL CENTER | Age: 79
DRG: 470 | End: 2019-11-25
Attending: INTERNAL MEDICINE
Payer: MEDICARE

## 2019-11-25 PROBLEM — Z96.641 STATUS POST RIGHT HIP REPLACEMENT: Status: ACTIVE | Noted: 2019-11-25

## 2019-11-25 PROBLEM — J44.9 COPD (CHRONIC OBSTRUCTIVE PULMONARY DISEASE) (HCC): Status: ACTIVE | Noted: 2019-11-25

## 2019-11-25 PROBLEM — R73.9 HYPERGLYCEMIA: Status: ACTIVE | Noted: 2019-11-25

## 2019-11-25 PROBLEM — R79.89 LFT ELEVATION: Status: ACTIVE | Noted: 2019-11-25

## 2019-11-25 PROBLEM — R11.2 NAUSEA AND VOMITING: Status: ACTIVE | Noted: 2019-11-25

## 2019-11-25 LAB
ALBUMIN SERPL BCP-MCNC: 3.3 G/DL (ref 3.2–4.9)
ALBUMIN/GLOB SERPL: 0.9 G/DL
ALP SERPL-CCNC: 68 U/L (ref 30–99)
ALT SERPL-CCNC: 18 U/L (ref 2–50)
ANION GAP SERPL CALC-SCNC: 10 MMOL/L (ref 0–11.9)
AST SERPL-CCNC: 53 U/L (ref 12–45)
BILIRUB SERPL-MCNC: 0.4 MG/DL (ref 0.1–1.5)
BUN SERPL-MCNC: 25 MG/DL (ref 8–22)
CALCIUM SERPL-MCNC: 8.8 MG/DL (ref 8.5–10.5)
CHLORIDE SERPL-SCNC: 112 MMOL/L (ref 96–112)
CO2 SERPL-SCNC: 22 MMOL/L (ref 20–33)
CREAT SERPL-MCNC: 0.83 MG/DL (ref 0.5–1.4)
ERYTHROCYTE [DISTWIDTH] IN BLOOD BY AUTOMATED COUNT: 50.9 FL (ref 35.9–50)
GLOBULIN SER CALC-MCNC: 3.5 G/DL (ref 1.9–3.5)
GLUCOSE SERPL-MCNC: 139 MG/DL (ref 65–99)
HCT VFR BLD AUTO: 36.7 % (ref 37–47)
HGB BLD-MCNC: 11.1 G/DL (ref 12–16)
LIPASE SERPL-CCNC: 32 U/L (ref 11–82)
MCH RBC QN AUTO: 25.3 PG (ref 27–33)
MCHC RBC AUTO-ENTMCNC: 30.2 G/DL (ref 33.6–35)
MCV RBC AUTO: 83.8 FL (ref 81.4–97.8)
PLATELET # BLD AUTO: 314 K/UL (ref 164–446)
PMV BLD AUTO: 8.4 FL (ref 9–12.9)
POTASSIUM SERPL-SCNC: 4.8 MMOL/L (ref 3.6–5.5)
PROT SERPL-MCNC: 6.8 G/DL (ref 6–8.2)
RBC # BLD AUTO: 4.38 M/UL (ref 4.2–5.4)
SODIUM SERPL-SCNC: 144 MMOL/L (ref 135–145)
WBC # BLD AUTO: 9.1 K/UL (ref 4.8–10.8)

## 2019-11-25 PROCEDURE — 700111 HCHG RX REV CODE 636 W/ 250 OVERRIDE (IP): Performed by: INTERNAL MEDICINE

## 2019-11-25 PROCEDURE — 700117 HCHG RX CONTRAST REV CODE 255: Performed by: INTERNAL MEDICINE

## 2019-11-25 PROCEDURE — 80053 COMPREHEN METABOLIC PANEL: CPT

## 2019-11-25 PROCEDURE — A9270 NON-COVERED ITEM OR SERVICE: HCPCS | Performed by: PHYSICIAN ASSISTANT

## 2019-11-25 PROCEDURE — 36415 COLL VENOUS BLD VENIPUNCTURE: CPT

## 2019-11-25 PROCEDURE — 99223 1ST HOSP IP/OBS HIGH 75: CPT | Performed by: INTERNAL MEDICINE

## 2019-11-25 PROCEDURE — 700111 HCHG RX REV CODE 636 W/ 250 OVERRIDE (IP): Performed by: PHYSICIAN ASSISTANT

## 2019-11-25 PROCEDURE — 770001 HCHG ROOM/CARE - MED/SURG/GYN PRIV*

## 2019-11-25 PROCEDURE — 700102 HCHG RX REV CODE 250 W/ 637 OVERRIDE(OP): Performed by: PHYSICIAN ASSISTANT

## 2019-11-25 PROCEDURE — 97116 GAIT TRAINING THERAPY: CPT

## 2019-11-25 PROCEDURE — 700112 HCHG RX REV CODE 229: Performed by: PHYSICIAN ASSISTANT

## 2019-11-25 PROCEDURE — 74177 CT ABD & PELVIS W/CONTRAST: CPT

## 2019-11-25 PROCEDURE — 700111 HCHG RX REV CODE 636 W/ 250 OVERRIDE (IP): Performed by: ORTHOPAEDIC SURGERY

## 2019-11-25 PROCEDURE — 83690 ASSAY OF LIPASE: CPT

## 2019-11-25 PROCEDURE — 85027 COMPLETE CBC AUTOMATED: CPT

## 2019-11-25 PROCEDURE — 97530 THERAPEUTIC ACTIVITIES: CPT

## 2019-11-25 RX ADMIN — FAMOTIDINE 20 MG: 10 INJECTION INTRAVENOUS at 23:18

## 2019-11-25 RX ADMIN — ONDANSETRON 4 MG: 4 TABLET, ORALLY DISINTEGRATING ORAL at 02:06

## 2019-11-25 RX ADMIN — CHOLECALCIFEROL (VITAMIN D3) 10 MCG (400 UNIT) TABLET 400 UNITS: at 05:05

## 2019-11-25 RX ADMIN — NYSTATIN: 100000 POWDER TOPICAL at 17:55

## 2019-11-25 RX ADMIN — MAGNESIUM HYDROXIDE 30 ML: 400 SUSPENSION ORAL at 08:35

## 2019-11-25 RX ADMIN — DEXAMETHASONE SODIUM PHOSPHATE 4 MG: 4 INJECTION, SOLUTION INTRA-ARTICULAR; INTRALESIONAL; INTRAMUSCULAR; INTRAVENOUS; SOFT TISSUE at 14:40

## 2019-11-25 RX ADMIN — MULTIPLE VITAMINS W/ MINERALS TAB 1 TABLET: TAB at 05:05

## 2019-11-25 RX ADMIN — DOCUSATE SODIUM 100 MG: 100 CAPSULE, LIQUID FILLED ORAL at 05:05

## 2019-11-25 RX ADMIN — ASPIRIN 325 MG: 325 TABLET, COATED ORAL at 17:55

## 2019-11-25 RX ADMIN — IOHEXOL 100 ML: 350 INJECTION, SOLUTION INTRAVENOUS at 22:58

## 2019-11-25 RX ADMIN — ONDANSETRON 4 MG: 4 TABLET, ORALLY DISINTEGRATING ORAL at 08:13

## 2019-11-25 RX ADMIN — OMEPRAZOLE 20 MG: 20 CAPSULE, DELAYED RELEASE ORAL at 17:54

## 2019-11-25 RX ADMIN — ACETAMINOPHEN 1000 MG: 500 TABLET ORAL at 05:05

## 2019-11-25 ASSESSMENT — ENCOUNTER SYMPTOMS
DIARRHEA: 0
ORTHOPNEA: 0
FOCAL WEAKNESS: 0
WEIGHT LOSS: 0
DOUBLE VISION: 0
NAUSEA: 1
COUGH: 0
CHILLS: 0
NERVOUS/ANXIOUS: 1
POLYDIPSIA: 0
SPEECH CHANGE: 0
HEARTBURN: 0
BRUISES/BLEEDS EASILY: 0
HALLUCINATIONS: 0
HEMOPTYSIS: 0
FEVER: 0
PHOTOPHOBIA: 0
TREMORS: 0
HEADACHES: 0
SPUTUM PRODUCTION: 0
NECK PAIN: 0
CONSTIPATION: 0
VOMITING: 1
BACK PAIN: 0
PALPITATIONS: 0
FLANK PAIN: 0
BLURRED VISION: 0
ABDOMINAL PAIN: 1

## 2019-11-25 ASSESSMENT — COGNITIVE AND FUNCTIONAL STATUS - GENERAL
SUGGESTED CMS G CODE MODIFIER MOBILITY: CL
TURNING FROM BACK TO SIDE WHILE IN FLAT BAD: A LOT
MOVING FROM LYING ON BACK TO SITTING ON SIDE OF FLAT BED: A LITTLE
STANDING UP FROM CHAIR USING ARMS: A LITTLE
CLIMB 3 TO 5 STEPS WITH RAILING: A LOT
MOBILITY SCORE: 13
MOVING TO AND FROM BED TO CHAIR: UNABLE
WALKING IN HOSPITAL ROOM: A LOT

## 2019-11-25 ASSESSMENT — GAIT ASSESSMENTS
ASSISTIVE DEVICE: FRONT WHEEL WALKER
DEVIATION: BRADYKINETIC;SHUFFLED GAIT
GAIT LEVEL OF ASSIST: MINIMAL ASSIST
DISTANCE (FEET): 25

## 2019-11-25 ASSESSMENT — LIFESTYLE VARIABLES: SUBSTANCE_ABUSE: 0
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
## 2019-11-25 NOTE — CARE PLAN
Problem: Safety  Goal: Will remain free from injury  Outcome: PROGRESSING AS EXPECTED  Goal: Will remain free from falls  Outcome: PROGRESSING AS EXPECTED  Pt uses call light approprietly.     Problem: Pain Management  Goal: Pain level will decrease to patient's comfort goal  Outcome: PROGRESSING AS EXPECTED   Pt denies pain/need for pain meds.

## 2019-11-25 NOTE — THERAPY
"Physical Therapy Treatment completed.   Bed Mobility:  Supine to Sit: Minimal Assist(HOB flat and railing)  Transfers: Sit to Stand: Minimal Assist(from EOB->FWW)  Gait: Level Of Assist: Minimal Assist with Front-Wheel Walker       Plan of Care: Will benefit from Physical Therapy 4 times per week  Discharge Recommendations: Equipment: Will Continue to Assess for Equipment Needs. Post-acute therapy Discharge to a transitional care facility for continued skilled therapy services.     See \"Rehab Therapy-Acute\" Patient Summary Report for complete documentation.       "

## 2019-11-25 NOTE — PROGRESS NOTES
Progress Note               Author: Khang Delgado Date & Time created: 2019  6:40 PM     Interval History:  Pt c/o some nausea. Iv infiltrated    Review of Systems:  ROS    Physical Exam:  Physical Exam  Neurological:      Mental Status: Mental status is at baseline.      Comments: Sensation and motor intact b le  Wound intact with minimal drainage         Labs:          No results for input(s): SODIUM, POTASSIUM, CHLORIDE, CO2, BUN, CREATININE, MAGNESIUM, PHOSPHORUS, CALCIUM in the last 72 hours.  No results for input(s): ALTSGPT, ASTSGOT, ALKPHOSPHAT, TBILIRUBIN, DBILIRUBIN, GAMMAGT, AMYLASE, LIPASE, ALB, PREALBUMIN, GLUCOSE in the last 72 hours.  Recent Labs     19  1121 19  0438   RBC 4.19* 4.21 4.16*   HEMOGLOBIN 10.7* 10.6* 10.6*   HEMATOCRIT 34.5* 37.0 35.4*   PLATELETCT 246 238 249     Recent Labs     19  1121 19  0438   WBC 11.1* 9.4 8.1     Hemodynamics:  Temp (24hrs), Av.6 °C (97.8 °F), Min:36.1 °C (96.9 °F), Max:37 °C (98.6 °F)  Temperature: 36.2 °C (97.1 °F)  Pulse  Av.2  Min: 56  Max: 82   Blood Pressure : (!) 98/61(Informed Rn)     Respiratory:    Respiration: 17, Pulse Oximetry: 88 %(Informed RN)           Fluids:    Intake/Output Summary (Last 24 hours) at 2019 1840  Last data filed at 2019 1400  Gross per 24 hour   Intake 1083.33 ml   Output --   Net 1083.33 ml        GI/Nutrition:  Orders Placed This Encounter   Procedures   • Diet Order Regular     Standing Status:   Standing     Number of Occurrences:   1     Order Specific Question:   Diet:     Answer:   Regular [1]     Medical Decision Making, by Problem:  There are no active hospital problems to display for this patient.      Plan:  Po zofran  Will work on placement in rehab or home pt is adamantly against nursing skilled facility  Po pain meds  Pt/ot  Aspirin dvt prophylaxis      Quality-Core Measures

## 2019-11-25 NOTE — CARE PLAN
Problem: Nutritional:  Goal: Achieve adequate nutritional intake  Description  Patient will consume 50% of meals   Outcome: MET  Per chart pt PO %. Pt is eating well. RD available prn

## 2019-11-26 ENCOUNTER — APPOINTMENT (OUTPATIENT)
Dept: RADIOLOGY | Facility: MEDICAL CENTER | Age: 79
DRG: 470 | End: 2019-11-26
Attending: INTERNAL MEDICINE
Payer: MEDICARE

## 2019-11-26 PROBLEM — K56.609 SBO (SMALL BOWEL OBSTRUCTION) (HCC): Status: ACTIVE | Noted: 2019-11-25

## 2019-11-26 LAB
ERYTHROCYTE [DISTWIDTH] IN BLOOD BY AUTOMATED COUNT: 51 FL (ref 35.9–50)
HCT VFR BLD AUTO: 41.2 % (ref 37–47)
HGB BLD-MCNC: 12.5 G/DL (ref 12–16)
MCH RBC QN AUTO: 25.4 PG (ref 27–33)
MCHC RBC AUTO-ENTMCNC: 30.3 G/DL (ref 33.6–35)
MCV RBC AUTO: 83.7 FL (ref 81.4–97.8)
PLATELET # BLD AUTO: 425 K/UL (ref 164–446)
PMV BLD AUTO: 8.6 FL (ref 9–12.9)
RBC # BLD AUTO: 4.92 M/UL (ref 4.2–5.4)
WBC # BLD AUTO: 10.7 K/UL (ref 4.8–10.8)

## 2019-11-26 PROCEDURE — 770001 HCHG ROOM/CARE - MED/SURG/GYN PRIV*

## 2019-11-26 PROCEDURE — 85027 COMPLETE CBC AUTOMATED: CPT

## 2019-11-26 PROCEDURE — 97530 THERAPEUTIC ACTIVITIES: CPT

## 2019-11-26 PROCEDURE — 700112 HCHG RX REV CODE 229: Performed by: PHYSICIAN ASSISTANT

## 2019-11-26 PROCEDURE — 700111 HCHG RX REV CODE 636 W/ 250 OVERRIDE (IP): Performed by: INTERNAL MEDICINE

## 2019-11-26 PROCEDURE — 36415 COLL VENOUS BLD VENIPUNCTURE: CPT

## 2019-11-26 PROCEDURE — 71045 X-RAY EXAM CHEST 1 VIEW: CPT

## 2019-11-26 PROCEDURE — A9270 NON-COVERED ITEM OR SERVICE: HCPCS | Performed by: PHYSICIAN ASSISTANT

## 2019-11-26 PROCEDURE — 97535 SELF CARE MNGMENT TRAINING: CPT

## 2019-11-26 PROCEDURE — 99233 SBSQ HOSP IP/OBS HIGH 50: CPT | Performed by: INTERNAL MEDICINE

## 2019-11-26 PROCEDURE — 97116 GAIT TRAINING THERAPY: CPT

## 2019-11-26 PROCEDURE — 99222 1ST HOSP IP/OBS MODERATE 55: CPT | Performed by: PHYSICAL MEDICINE & REHABILITATION

## 2019-11-26 PROCEDURE — 700102 HCHG RX REV CODE 250 W/ 637 OVERRIDE(OP): Performed by: PHYSICIAN ASSISTANT

## 2019-11-26 PROCEDURE — 76705 ECHO EXAM OF ABDOMEN: CPT

## 2019-11-26 RX ADMIN — CHOLECALCIFEROL (VITAMIN D3) 10 MCG (400 UNIT) TABLET 400 UNITS: at 04:20

## 2019-11-26 RX ADMIN — MULTIPLE VITAMINS W/ MINERALS TAB 1 TABLET: TAB at 04:20

## 2019-11-26 RX ADMIN — ASPIRIN 325 MG: 325 TABLET, COATED ORAL at 13:10

## 2019-11-26 RX ADMIN — FAMOTIDINE 20 MG: 10 INJECTION INTRAVENOUS at 04:21

## 2019-11-26 RX ADMIN — OMEPRAZOLE 20 MG: 20 CAPSULE, DELAYED RELEASE ORAL at 13:10

## 2019-11-26 RX ADMIN — DOCUSATE SODIUM 100 MG: 100 CAPSULE, LIQUID FILLED ORAL at 04:20

## 2019-11-26 RX ADMIN — FAMOTIDINE 20 MG: 10 INJECTION INTRAVENOUS at 17:55

## 2019-11-26 RX ADMIN — ASPIRIN 325 MG: 325 TABLET, COATED ORAL at 01:49

## 2019-11-26 RX ADMIN — NYSTATIN: 100000 POWDER TOPICAL at 06:01

## 2019-11-26 RX ADMIN — NYSTATIN: 100000 POWDER TOPICAL at 17:57

## 2019-11-26 RX ADMIN — LOVASTATIN 40 MG: 20 TABLET ORAL at 20:48

## 2019-11-26 RX ADMIN — ACETAMINOPHEN 1000 MG: 500 TABLET ORAL at 01:49

## 2019-11-26 ASSESSMENT — COGNITIVE AND FUNCTIONAL STATUS - GENERAL
PERSONAL GROOMING: A LITTLE
SUGGESTED CMS G CODE MODIFIER MOBILITY: CK
MOBILITY SCORE: 15
WALKING IN HOSPITAL ROOM: A LITTLE
STANDING UP FROM CHAIR USING ARMS: A LITTLE
CLIMB 3 TO 5 STEPS WITH RAILING: A LOT
HELP NEEDED FOR BATHING: A LOT
TURNING FROM BACK TO SIDE WHILE IN FLAT BAD: A LOT
DRESSING REGULAR LOWER BODY CLOTHING: A LOT
TOILETING: A LOT
MOVING TO AND FROM BED TO CHAIR: A LOT
SUGGESTED CMS G CODE MODIFIER DAILY ACTIVITY: CK
DRESSING REGULAR UPPER BODY CLOTHING: A LITTLE
DAILY ACTIVITIY SCORE: 15
MOVING FROM LYING ON BACK TO SITTING ON SIDE OF FLAT BED: A LITTLE
EATING MEALS: A LITTLE

## 2019-11-26 ASSESSMENT — ENCOUNTER SYMPTOMS
NAUSEA: 0
DIARRHEA: 0
COUGH: 0
HEARTBURN: 0
WEIGHT LOSS: 0
SPUTUM PRODUCTION: 0
EYE REDNESS: 0
FOCAL WEAKNESS: 0
NECK PAIN: 0
HEADACHES: 0
SHORTNESS OF BREATH: 0
BACK PAIN: 0
VOMITING: 0
EYE DISCHARGE: 0
ABDOMINAL PAIN: 0
BLURRED VISION: 0
FEVER: 0
MYALGIAS: 0
STRIDOR: 0
EYE PAIN: 0
ORTHOPNEA: 0
SEIZURES: 0
DIZZINESS: 0
PALPITATIONS: 0
CHILLS: 0

## 2019-11-26 ASSESSMENT — GAIT ASSESSMENTS
DEVIATION: BRADYKINETIC;SHUFFLED GAIT
ASSISTIVE DEVICE: FRONT WHEEL WALKER
GAIT LEVEL OF ASSIST: MINIMAL ASSIST
DISTANCE (FEET): 50

## 2019-11-26 NOTE — PROGRESS NOTES
"Orthopaedic Progress Note    Author: Chapito Inman Date & Time created: 11/25/2019   9:04 PM     Interval Events:  Pod#4  ROS  Hemodynamics:  /81   Pulse 84   Temp 36.4 °C (97.6 °F) (Temporal)   Resp 20   Ht 1.482 m (4' 10.35\")   Wt 68.8 kg (151 lb 10.8 oz)   SpO2 90%      No Active Precaution Orders    Respiratory:    Respiration: 20, Pulse Oximetry: 90 %           Fluids:    Intake/Output Summary (Last 24 hours) at 11/25/2019 2104  Last data filed at 11/25/2019 1700  Gross per 24 hour   Intake 220 ml   Output 900 ml   Net -680 ml     Admit Weight: 66.6 kg (146 lb 13.2 oz)  Current      Physical Exam  Labs:  Recent Labs     11/23/19  1121 11/24/19  0438 11/25/19  0702   WBC 9.4 8.1 9.1   RBC 4.21 4.16* 4.38   HEMOGLOBIN 10.6* 10.6* 11.1*   HEMATOCRIT 37.0 35.4* 36.7*   MCV 87.9 85.1 83.8   MCH 25.2* 25.5* 25.3*   MCHC 28.6* 29.9* 30.2*   RDW 52.7* 51.2* 50.9*   PLATELETCT 238 249 314   MPV 8.6* 8.9* 8.4*     Recent Labs     11/25/19  1812   SODIUM 144   POTASSIUM 4.8   CHLORIDE 112   CO2 22   GLUCOSE 139*   BUN 25*   CREATININE 0.83   CALCIUM 8.8       Medical Decision Making/Problem List:    There are no active hospital problems to display for this patient.    Core Measures & Quality Metrics:  Current DVT prophylaxis: aspirin, mobilization, [place]  Discussed patient condition with RN and hospitalist and orthopedics.  Clearance for lovenox/heparin: ok, if needed  Weight Bearing Status: as tolerated with pain  Wounds & Drains:   Wound/dressing cdi  Disposition and Follow-up:   Pt nausea for 24hrs, bilious vomit per 12 hours today, new onset of left upper and lower quad abdomen pain,  Consulted internal med/hospitalist  Multiple anti-emesis med PO and IV failed   meaures  Should be okay to snf when N/V controlled  "

## 2019-11-26 NOTE — PROGRESS NOTES
[facility] Medicine Daily Progress Note    Date of Service  11/26/2019    Chief Complaint  79 y.o. female admitted 11/21/2019 with total hip arthroplasty on the right side    Hospital Course    79 y.o. female with  past medical history of emphysema, GERD, hiatal hernia syndrome, dyslipidemia, total hip replacement, who presented 11/21/2019 for total hip arthroplasty on the right side, that was performed on 11/21/2019. IM was consulted due to nausea and vomiting. CT was done and found to have SBO and NGT was placed overnight.      Interval Problem Update  Denied abdominal pain. Nausea and vomiting is improving. Had 2 loose stools this am and passing gas. Complaint about being hungry. NGT was pulled out by the patient. I consulted Dr. Ferrer for SBO.     Consultants/Specialty  DR. Ferrer    Code Status  full    Disposition  Remain on the floor    Review of Systems  Review of Systems   Constitutional: Negative for chills, fever and weight loss.   HENT: Negative for congestion and nosebleeds.    Eyes: Negative for blurred vision, pain, discharge and redness.   Respiratory: Negative for cough, sputum production, shortness of breath and stridor.    Cardiovascular: Negative for chest pain, palpitations and orthopnea.   Gastrointestinal: Negative for abdominal pain, diarrhea, heartburn, nausea and vomiting.   Genitourinary: Negative for dysuria, frequency and urgency.   Musculoskeletal: Negative for back pain, myalgias and neck pain.   Skin: Negative for itching and rash.   Neurological: Negative for dizziness, focal weakness, seizures and headaches.        Physical Exam  Temp:  [36.1 °C (97 °F)-36.9 °C (98.5 °F)] 36.9 °C (98.5 °F)  Pulse:  [74-85] 79  Resp:  [16-20] 18  BP: (119-137)/(65-92) 120/65  SpO2:  [90 %-95 %] 93 %    Physical Exam  Vitals signs reviewed.   Constitutional:       General: She is not in acute distress.     Appearance: Normal appearance.   HENT:      Head: Normocephalic and atraumatic.      Nose: No  congestion or rhinorrhea.   Eyes:      Extraocular Movements: Extraocular movements intact.      Pupils: Pupils are equal, round, and reactive to light.   Neck:      Musculoskeletal: Normal range of motion and neck supple.   Cardiovascular:      Rate and Rhythm: Normal rate and regular rhythm.      Pulses: Normal pulses.   Pulmonary:      Effort: Pulmonary effort is normal. No respiratory distress.      Breath sounds: Normal breath sounds.   Abdominal:      General: Bowel sounds are normal. There is no distension.      Palpations: Abdomen is soft.      Tenderness: There is no tenderness.   Musculoskeletal:         General: No swelling or tenderness.   Skin:     General: Skin is warm.      Findings: No erythema.   Neurological:      General: No focal deficit present.      Mental Status: She is alert and oriented to person, place, and time.         Fluids    Intake/Output Summary (Last 24 hours) at 11/26/2019 0802  Last data filed at 11/25/2019 2029  Gross per 24 hour   Intake --   Output 1150 ml   Net -1150 ml       Laboratory  Recent Labs     11/24/19  0438 11/25/19  0702 11/26/19  0511   WBC 8.1 9.1 10.7   RBC 4.16* 4.38 4.92   HEMOGLOBIN 10.6* 11.1* 12.5   HEMATOCRIT 35.4* 36.7* 41.2   MCV 85.1 83.8 83.7   MCH 25.5* 25.3* 25.4*   MCHC 29.9* 30.2* 30.3*   RDW 51.2* 50.9* 51.0*   PLATELETCT 249 314 425   MPV 8.9* 8.4* 8.6*     Recent Labs     11/25/19  1812   SODIUM 144   POTASSIUM 4.8   CHLORIDE 112   CO2 22   GLUCOSE 139*   BUN 25*   CREATININE 0.83   CALCIUM 8.8                   Imaging  DX-CHEST-LIMITED (1 VIEW)   Final Result      1.  No acute cardiac or pulmonary abnormalities are identified.   2.  Stable mildly enlarged cardiac silhouette.   3.  Large hiatal hernia is again seen.   4.  NG tube is not identified within the expected course of the esophagus. The extensive portion of the NG tube is visualized over the right lateral hemithorax and soft tissues.      DX-ABDOMEN FOR TUBE PLACEMENT   Final Result       1.  Enteric tube not visualized   2.  Small bowel obstruction or less likely ileus unchanged      DX-ABDOMEN FOR TUBE PLACEMENT   Final Result      1.  Enteric tube tip projects over the proximal small bowel   2.  Large hiatal hernia   3.  Small bowel obstruction, unchanged      DX-ABDOMEN FOR TUBE PLACEMENT   Final Result      1.  Enteric tube not visualized   2.  Large hiatal hernia   3.  Ongoing small bowel obstruction      US-RUQ   Final Result      1.  Cholelithiasis   2.  RIGHT hepatic cyst   3.  RIGHT renal cyst   4.  Pancreas obscured by bowel gas      CT-ABDOMEN-PELVIS WITH   Final Result      1.  Small bowel obstruction with transition point in the jejunum in an area of wall thickening. This could be infectious or inflammatory, less likely related to ischemia or neoplastic infiltration.   2.  Trace free fluid in the LEFT paracolic gutter   3.  Large hiatal hernia   4.  LEFT basilar atelectasis   5.  Trace LEFT pleural fluid   6.  6 mm RIGHT middle lobe pulmonary nodule   7.  Cholelithiasis   8.  Atrophic pancreas                  DX-PELVIS-1 OR 2 VIEWS   Final Result      Postsurgical changes status post right hip arthroplasty.         DX-CHEST-2 VIEWS   Final Result      Minimal linear atelectasis in the left costophrenic angle.      Stable cardiomegaly.      Prominent hiatal hernia.         [id number] VIEWS    (Results Pending)        Assessment/Plan  SBO (small bowel obstruction) (HCC)  Assessment & Plan  History of large hiatal hernia  CT showed SBO  NPO  Pain control  IVF  I consulted Dr. Ferrer      Dyslipidemia- (present on admission)  Assessment & Plan  Continue lovastatin as outpatient    HTN (hypertension)- (present on admission)  Assessment & Plan  Blood pressure is under control.  Not on blood pressure medications    Hyperglycemia  Assessment & Plan   A1c 5.9    Status post right hip replacement  Assessment & Plan  Management per orthopedic service    LFT elevation  Assessment &  Plan  Ultrasound cholelithiasis  Follow cmp in am    COPD (chronic obstructive pulmonary disease) (HCC)  Assessment & Plan  Not in exacerbation.  RT protocol, DuoNeb as needed       VTE prophylaxis: aspirin 325 mg bid per ortho

## 2019-11-26 NOTE — THERAPY
"Occupational Therapy Treatment completed with focus on ADLs, ADL transfers and patient education.  Functional Status: Sit to stands supv, transfers with FWW min A, toileting max A for hygiene, standing partial sponge bath and grooming min A  Plan of Care: Will benefit from Occupational Therapy 4 times per week  Discharge Recommendations:  Equipment Will Continue to Assess for Equipment Needs. Post-acute therapy: Recommend post-acute placement for additional occupational therapy services prior to discharge home. Patient can tolerate post-acute therapies at a 5x/week frequency.    See \"Rehab Therapy-Acute\" Patient Summary Report for complete documentation.     Pt seen for OT tx. Now with SBO. Pt received in bedside chair. Unable to recall any posterior hip precautions. PTA provided hand-written list of precautions in large print to accommodate pt's visual impairment. Pt able to read customized list. Pt mobilized to bathroom using FWW. Required max A for coty care following toilet use. Pt stood at sink to complete partial sponge bath and oral care. Increased WOB with mobility. Pt required seated rest break during standing activity due to fatigue. Returned to bedside chair using FWW. Pt's daughter arrived towards end of session. Educated daughter on posterior hip precautions. Pt demos improved functional mobility this session, improved standing ADL. Would continue to benefit from acute OT followed by post-acute transitional care setting.   "

## 2019-11-26 NOTE — DISCHARGE PLANNING
[facility]/ Transitional Care Coordination  Tc to [facility] / inquiring about possibility of obtaining an order for Physiatry Consult.  Rehab dx :  Total hip replacement.    NG tube for suction placed; +cholelithiasis -  Pt has support from foster daughter and dtr from [place] is staying in [place] to assist as needed.   +FWW.     Continue to follow for post acute needs.

## 2019-11-26 NOTE — CONSULTS
Surgical History and Physical    Date of Service: 11/26/2019    Requesting Physician: Pb Cheung MD - Hospitalist    PCP: Elizabeth Molina M.D.    Reason for Consult: Radiographic small bowel obstruction    HPI: This is a 79 y.o. female who is POD5 from a hip arthroplasty who developed nausea, vomiting, and abdominal distention in the preceding 24 hours.  Overnight a CT was performed and showed findings with a small bowel obstruction along the jejunum.  An NGT was placed, but subsequently came out.    The patient was evaluated at bedside in her room.  Her daughter was present.  She denies any nausea, vomiting, abdominal distention, fevers, chills, fatigue, malaise, or abdominal pain.  She is begging me to let her eat something.  Her last bowel movement was earlier today, x2, loose.      PAST MEDICAL HISTORY:   Past Medical History:   Diagnosis Date   • Arthritis    • Bowel habit changes     diarrhea   • Breath shortness     COPD   • Cataract     bilateral IOL   • Chronic pain 11/2019    hips   • Dyslipidemia 8/1/2016   • Emphysema of lung (HCC)    • GERD (gastroesophageal reflux disease)    • Heart burn    • Hepatitis 1970's    A based on labs done 2016   • Hiatus hernia syndrome    • High cholesterol    • History of total hip replacement     L   • Hyperlipidemia    • Macular degeneration    • Memory loss    • OA (osteoarthritis)     back, hip, shoulds, knees and hands   • Psychiatric problem     depression   • S/P cataract extraction     bilat   • Snoring    • stomach flu 11/08/2019   • Urinary incontinence    • Wears dentures          PAST SURGICAL HISTORY:   Past Surgical History:   Procedure Laterality Date   • PB TOTAL HIP ARTHROPLASTY Right 11/21/2019    Procedure: ARTHROPLASTY, HIP, TOTAL;  Surgeon: Khang Delgado M.D.;  Location: SURGERY [facility];  Service: Orthopedics   • BLEPHAROPLASTY Bilateral 7/14/2015    Procedure: BLEPHAROPLASTY - UPPER;  Surgeon: Antonio Garcia M.D.;  Location: SURGERY SURGICAL  [facility] ORS;  Service:    • CATARACT PHACO WITH IOL  3/13/2012    Performed by MALLORY VERMA at SURGERY SAME DAY [facility] ORS   • CATARACT PHACO WITH IOL  2/28/2012    Performed by MALLORY VERMA at SURGERY SAME DAY [facility] ORS   • HYSTERECTOMY, TOTAL ABDOMINAL     • OTHER ORTHOPEDIC SURGERY      left hip replacement   • TUBAL LIGATION            ALLERGIES: [place]       CURRENT MEDICATIONS:   Outpatient Medications Marked as Taking for the 11/21/19 encounter (Hospital Encounter)   Medication Sig   • Trolamine Salicylate (ASPERCREME EX) Apply 1 Each to affected area(s) 2 times a day as needed.   • calcium carbonate (TUMS) 500 MG Chew Tab Take 500 mg by mouth 1 time daily as needed.   • nystatin (MYCOSTATIN) powder NYSTATIN 769208 UNIT/GM POWD         FAMILY HISTORY: family history includes Cancer in an other family member; Heart Disease in an other family member.      SOCIAL HISTORY:  reports that she has never smoked. She has never used smokeless tobacco. She reports previous alcohol use. She reports that she does not use drugs.    [place]    Review of Systems:  Constitutional: Negative for fever, chills, weight loss, malaise/fatigue and diaphoresis.   HENT: Negative for hearing loss, ear pain, nosebleeds, congestion, sore throat, neck pain, tinnitus and ear discharge.    Eyes: Negative for blurred vision, double vision, photophobia, pain, discharge and redness.   Respiratory: Negative for cough, hemoptysis, sputum production, shortness of breath, wheezing and stridor.    Cardiovascular: Negative for chest pain, palpitations, orthopnea, claudication, leg swelling and PND.   Gastrointestinal: Negative for heartburn, nausea, vomiting, abdominal pain, diarrhea, constipation, blood in stool and melena.   Genitourinary: Negative for dysuria, urgency, frequency, hematuria and flank pain.   Musculoskeletal: Negative for myalgias, back pain, joint pain and falls.   Skin: Negative for itching and  "rash.  Neurological: Negative for dizziness, tingling, tremors, sensory change, speech change, focal weakness, seizures, loss of consciousness, weakness and headaches.   Endo/Heme/Allergies: Negative for environmental allergies and polydipsia. Does not bruise/bleed easily.   Psychiatric/Behavioral: Negative for depression, suicidal ideas, hallucinations, memory loss and substance abuse. The patient is not nervous/anxious and does not have insomnia.    Physical Exam:  /78   Pulse 76   Temp 36.4 °C (97.5 °F) (Temporal)   Resp 17   Ht 1.482 m (4' 10.35\")   Wt 68.8 kg (151 lb 10.8 oz)   SpO2 98%   Vitals:    11/26/19 0800   BP: 131/78   Pulse: 76   Resp: 17   Temp: 36.4 °C (97.5 °F)   SpO2: 98%     GENERAL:  The patient is healthy-appearing and in no acute distress  HEENT:  Atraumatic, normocephalic.  Normal pinna bilaterally.  External auditory canals are without discharge.  Conjunctivae and sclerae are clear. Extraocular movements are full. Pupils are equal, round, and reactive to light.  Oral mucosa is moist.  NECK:  Soft and supple without lymphadenopathy. No masses are noted.  Trachea is midline.  CHEST:  Lungs are clear to auscultation bilaterally.  No masses, lesions, or signs of trauma were noted.    CARDIOVASCULAR:  Regular rate and rhythm.  No murmurs appreciated.  No JVD.  Palpable pulses present in all four extremities.    ABDOMEN:  Soft, non-tender, non-distended.  Non-tympanitic.  No hepatomegaly or splenomegaly.  No incisional, umbilical, or inguinal hernias were appreciated.  There is a well healed infra-umbilical midline incision.  GENITOURINARY:  The patient has normal external reproductive anatomy.  LYMPHATIC:  There is no adenopathy in the cervical, supraclavicular, infraclavicular, axillary or inguinal regions.  SKIN:  Warm and well perfused. No rashes.  NEUROLOGIC:  Alert and oriented. Cranial nerves II through XII are grossly intact. Motor and sensory exams are normal in all four " extremities. Motor and sensory reflexes are 2+ and symmetric with bilateral plantar responses.  PSYCHIATRIC: Affect and mood is appropriate for age and condition.    Labs:  Recent Labs     11/24/19  0438 11/25/19  0702 11/26/19  0511   WBC 8.1 9.1 10.7   RBC 4.16* 4.38 4.92   HEMOGLOBIN 10.6* 11.1* 12.5   HEMATOCRIT 35.4* 36.7* 41.2   MCV 85.1 83.8 83.7   MCH 25.5* 25.3* 25.4*   MCHC 29.9* 30.2* 30.3*   RDW 51.2* 50.9* 51.0*   PLATELETCT 249 314 425   MPV 8.9* 8.4* 8.6*     Recent Labs     11/25/19  1812   SODIUM 144   POTASSIUM 4.8   CHLORIDE 112   CO2 22   GLUCOSE 139*   BUN 25*   CREATININE 0.83   CALCIUM 8.8         Recent Labs     11/25/19  1812   ASTSGOT 53*   ALTSGPT 18   TBILIRUBIN 0.4   ALKPHOSPHAT 68   GLOBULIN 3.5       Radiology:  DX-CHEST-LIMITED (1 VIEW)   Final Result      1.  No acute cardiac or pulmonary abnormalities are identified.   2.  Stable mildly enlarged cardiac silhouette.   3.  Large hiatal hernia is again seen.   4.  NG tube is not identified within the expected course of the esophagus. The extensive portion of the NG tube is visualized over the right lateral hemithorax and soft tissues.      DX-ABDOMEN FOR TUBE PLACEMENT   Final Result      1.  Enteric tube not visualized   2.  Small bowel obstruction or less likely ileus unchanged      DX-ABDOMEN FOR TUBE PLACEMENT   Final Result      1.  Enteric tube tip projects over the proximal small bowel   2.  Large hiatal hernia   3.  Small bowel obstruction, unchanged      DX-ABDOMEN FOR TUBE PLACEMENT   Final Result      1.  Enteric tube not visualized   2.  Large hiatal hernia   3.  Ongoing small bowel obstruction      US-RUQ   Final Result      1.  Cholelithiasis   2.  RIGHT hepatic cyst   3.  RIGHT renal cyst   4.  Pancreas obscured by bowel gas      CT-ABDOMEN-PELVIS WITH   Final Result      1.  Small bowel obstruction with transition point in the jejunum in an area of wall thickening. This could be infectious or inflammatory, less likely  related to ischemia or neoplastic infiltration.   2.  Trace free fluid in the LEFT paracolic gutter   3.  Large hiatal hernia   4.  LEFT basilar atelectasis   5.  Trace LEFT pleural fluid   6.  6 mm RIGHT middle lobe pulmonary nodule   7.  Cholelithiasis   8.  Atrophic pancreas                  DX-PELVIS-1 OR 2 VIEWS   Final Result      Postsurgical changes status post right hip arthroplasty.         DX-CHEST-2 VIEWS   Final Result      Minimal linear atelectasis in the left costophrenic angle.      Stable cardiomegaly.      Prominent hiatal hernia.             Assessment:   1) Small Bowel Obstruction:    No clinical evidence of obstruction at this time.  Afebrile.  Normal vitals.  Normal labs.  Abdominal exam benign.  Suspect this was post-op ileus versus partial small bowel obstruction from prior open tubal ligation through a lower midline abdominal incision.  No follow up imaging warranted at this time.    Plan    Clear liquid diet, OK for jello and applesauce too  Resume all scheduled and PRN oral medications  Morning labs  Day RN updated as to diet order  ____________________________________   Robi CUNNINGHAM / RONN     DD: 11/26/2019   DT: 2:27 PM

## 2019-11-26 NOTE — DISCHARGE PLANNING
[facility] Rehabilitation Transitional Care Coordination     Referral from: Dr. Cheung     Facesheet indicates: SCP    Potential Rehab Diagnosis:   total hip arthroplasty on the right side    Chart review indicates patient does have on going medical management and does have therapy needs to possibly meet inpatient rehab facility criteria with the goal of returning to community.    D/C support:Friend     Physiatry consultation forwarded per protocol.      Surgery to consult regarding SBO    Thank you for the referral.

## 2019-11-26 NOTE — CONSULTS
"Hospital Medicine Consultation    Date of Service  11/25/2019    Referring Physician  Khang Delgado M.D.    Consulting Physician  Tomy Brar M.D.    Reason for Consultation  Nausea and vomiting    History of Presenting Illness  79 y.o. female with  past medical history of emphysema, GERD, hiatal hernia syndrome, dyslipidemia, total hip replacement, who presented 11/21/2019 for total hip arthroplasty on the right side, that was performed on 11/21/2019.  Postop.  Was not complicated and patient was planned for discharge to rehab.  However, for the last 24 hours since yesterday patient's has been experiencing constant nausea, throwing up watery green bilious liquid.  Patient unable to tolerate oral intake.  Patient is currently feeling uncomfortable and holding the bag with greenish vomit.  Abdomen is distended.  According to her last bowel movements was \" probably yesterday\".  Patient does have some diffuse abdominal discomfort.  Reportedly she started having left upper and left lower quadrant pain today.  Patient does not provide me with details.  Patient is poor historian and gets irritated by any questions.    Review of Systems  Review of Systems   Constitutional: Negative for chills, fever and weight loss.   HENT: Negative for ear pain, hearing loss and tinnitus.    Eyes: Negative for blurred vision, double vision and photophobia.   Respiratory: Negative for cough, hemoptysis and sputum production.    Cardiovascular: Negative for chest pain, palpitations and orthopnea.   Gastrointestinal: Positive for abdominal pain, nausea and vomiting. Negative for constipation, diarrhea and heartburn.   Genitourinary: Negative for dysuria, flank pain, frequency and hematuria.   Musculoskeletal: Negative for back pain, joint pain and neck pain.   Skin: Negative for itching and rash.   Neurological: Negative for tremors, speech change, focal weakness and headaches.   Endo/Heme/Allergies: Negative for environmental " allergies and polydipsia. Does not bruise/bleed easily.   Psychiatric/Behavioral: Negative for hallucinations and substance abuse. The patient is nervous/anxious.        Past Medical History   has a past medical history of Arthritis, Bowel habit changes, Breath shortness, Cataract, Chronic pain (11/2019), Dyslipidemia (8/1/2016), Emphysema of lung (HCC), GERD (gastroesophageal reflux disease), Heart burn, Hepatitis (1970's), Hiatus hernia syndrome, High cholesterol, History of total hip replacement, Hyperlipidemia, Macular degeneration, Memory loss, OA (osteoarthritis), Psychiatric problem, S/P cataract extraction, Snoring, stomach flu (11/08/2019), Urinary incontinence, and Wears dentures.    Surgical History   has a past surgical history that includes other orthopedic surgery; cataract phaco with iol (2/28/2012); cataract phaco with iol (3/13/2012); tubal ligation; hysterectomy, total abdominal; blepharoplasty (Bilateral, 7/14/2015); and pr total hip arthroplasty (Right, 11/21/2019).    Family History  Denies family history of heart disease    Social History  She denies smoking cigarette. She has never used smokeless tobacco. She reports previous alcohol use. She reports that she does not use drugs.    Medications  Prior to Admission Medications   Prescriptions Last Dose Informant Patient Reported? Taking?   Camphor-Menthol (TIGER BALM ULTRA STRENGTH EX) 11/14/2019 at Unknown time Patient Yes No   Sig: Apply 1 Each to affected area(s) as needed.   HYDROcodone-acetaminophen (NORCO) 5-325 MG Tab per tablet 11/20/2019 at afternoon Patient Yes No   Sig: Take 0.5-1 Tabs by mouth 2 times a day as needed.   Multiple Vitamins-Minerals (CENTRUM SILVER PO) >week at Unknown time Patient Yes No   Sig: Take 1 Tab by mouth every day.   Multiple Vitamins-Minerals (OCUVITE ADULT 50+) Cap >week at Unknown time Patient Yes No   Sig: Take 1 Cap by mouth every day.   Non Formulary Request 11/17/2019 at finished Patient Yes No   Sig:  Take 1 Tab by mouth 2 times a day as needed. Cold +Head congestion daytime    Trolamine Salicylate (ASPERCREME EX) 11/19/2019 at Unknown time Patient Yes Yes   Sig: Apply 1 Each to affected area(s) 2 times a day as needed.   Vitamin D, Cholecalciferol, 400 units Cap >week at am Patient Yes No   Sig: Take 1 Cap by mouth every day.   acetaminophen (TYLENOL) 500 MG Tab 11/20/2019 at afternoon Patient Yes No   Sig: Take 500 mg by mouth 2 times a day as needed.   calcium carbonate (TUMS) 500 MG Chew Tab 11/14/2019 at Unknown time Patient Yes Yes   Sig: Take 500 mg by mouth 1 time daily as needed.   ketoconazole (NIZORAL) 2 % shampoo 11/14/2019 at Unknown time Patient No No   Sig: Apply 5 to 10 mL to wet scalp, lather, leave on 3 to 5 minutes, and rinse; apply twice weekly for 2 to 4 weeks   Patient taking differently: Apply 5 to 10 mL to wet scalp, lather, leave on 3 to 5 minutes, and rinse; apply once a week   lovastatin (MEVACOR) 40 MG tablet >week at pm Patient No No   Sig: TAKE 1 TABLET BY MOUTH EVERYDAY AT BEDTIME   mupirocin (BACTROBAN) 2 % Ointment 11/20/2019 at pm Patient Yes No   Sig: Apply 1 Each to affected area(s) 2 times a day.   nystatin (MYCOSTATIN) powder not started yet at Unknown time Patient Yes Yes   Sig: NYSTATIN [id number] UNIT/GM POWD   pantoprazole (PROTONIX) 20 MG tablet >2 weeks at Unknown time Patient No No   Sig: Take 1 Tab by mouth every day.      Facility-Administered Medications: None       Allergies  Allergies   Allergen Reactions   • [place]        Physical Exam  Temp:  [36.3 °C (97.3 °F)-37.3 °C (99.1 °F)] 36.4 °C (97.6 °F)  Pulse:  [82-93] 84  Resp:  [18-20] 20  BP: (117-133)/(69-81) 133/81  SpO2:  [90 %-94 %] 90 %    Physical Exam  Vitals signs and nursing note reviewed.   Constitutional:       General: She is in acute distress.      Appearance: Normal appearance. She is ill-appearing.   HENT:      Head: Normocephalic and atraumatic.      Nose: Nose normal.      Mouth/Throat:      Mouth:  Mucous membranes are moist.   Eyes:      Extraocular Movements: Extraocular movements intact.      Pupils: Pupils are equal, round, and reactive to light.   Neck:      Musculoskeletal: Normal range of motion and neck supple.   Cardiovascular:      Rate and Rhythm: Normal rate and regular rhythm.   Pulmonary:      Effort: Pulmonary effort is normal.      Breath sounds: Normal breath sounds.   Abdominal:      General: Abdomen is flat. There is distension.      Tenderness: There is no tenderness. There is no right CVA tenderness or left CVA tenderness.   Musculoskeletal: Normal range of motion.         General: No swelling or deformity.      Comments: Surgical site CDI   Skin:     General: Skin is warm and dry.   Neurological:      General: No focal deficit present.      Mental Status: She is alert and oriented to person, place, and time.   Psychiatric:         Mood and Affect: Mood normal.         Behavior: Behavior normal.         Cognition and Memory: Cognition is impaired.         Fluids  Date 11/25/19 0700 - 11/26/19 0659   Shift 9362-3074 0409-8395 2157-8339 24 Hour Total   INTAKE   Shift Total       OUTPUT   Emesis 400 500  900   Shift Total 400 500  900   Weight (kg) 68.8 68.8 68.8 68.8       Laboratory  Recent Labs     11/23/19  1121 11/24/19  0438 11/25/19  0702   WBC 9.4 8.1 9.1   RBC 4.21 4.16* 4.38   HEMOGLOBIN 10.6* 10.6* 11.1*   HEMATOCRIT 37.0 35.4* 36.7*   MCV 87.9 85.1 83.8   MCH 25.2* 25.5* 25.3*   MCHC 28.6* 29.9* 30.2*   RDW 52.7* 51.2* 50.9*   PLATELETCT 238 249 314   MPV 8.6* 8.9* 8.4*     Recent Labs     11/25/19  1812   SODIUM 144   POTASSIUM 4.8   CHLORIDE 112   CO2 22   GLUCOSE 139*   BUN 25*   CREATININE 0.83   CALCIUM 8.8                     Imaging  DX-PELVIS-1 OR 2 VIEWS   Final Result      Postsurgical changes status post right hip arthroplasty.         DX-CHEST-2 VIEWS   Final Result      Minimal linear atelectasis in the left costophrenic angle.      Stable cardiomegaly.      Prominent  hiatal hernia.         US-RUQ    (Results Pending)   CT-ABDOMEN-PELVIS WITH    (Results Pending)       Assessment/Plan  Nausea and vomiting  Assessment & Plan  History of large hiatal hernia  Abdomen is benign, but distended  Intractable nausea and vomiting  Will order CT abdomen with contrast to rule out obstruction  Right upper quadrant ultrasound  Zofran as needed  Place NG tube to low suction  Keep n.p.o. for now    Dyslipidemia- (present on admission)  Assessment & Plan  Continue lovastatin as outpatient    HTN (hypertension)- (present on admission)  Assessment & Plan  Blood pressure is under control.  Not on blood pressure medications    Hyperglycemia  Assessment & Plan  We will check A1c    Status post right hip replacement  Assessment & Plan  Management per orthopedic service    LFT elevation  Assessment & Plan  Follow-up with right upper quadrant ultrasound result    COPD (chronic obstructive pulmonary disease) (HCC)  Assessment & Plan  Not in exacerbation.  RT protocol, DuoNeb as needed

## 2019-11-26 NOTE — PROGRESS NOTES
Patient vomiting on and off for 12 hours, nausea for 24 hours +. PA notified. Emesis watery green / yellow. Over 500 ml throughout day.

## 2019-11-26 NOTE — CARE PLAN
Problem: Safety  Goal: Will remain free from injury  Outcome: PROGRESSING AS EXPECTED   Bed in lowest position, bed alarm on, call within reach, rounding in place, non skid socks on, bed locked, fall wrist band on, high risk for falls, kai 16. Patient turns self, activity provided and encouraged.   Problem: Bowel/Gastric:  Goal: Will not experience complications related to bowel motility  Outcome: PROGRESSING SLOWER THAN EXPECTED   Bowl sounds heard in all four quadrants, nausea and vomiting has subsided, patient is experience diarrhea from previous laxatives.

## 2019-11-26 NOTE — PROGRESS NOTES
Notified MD Cheung of [facility] recommendation for 2-view abdominal xray for placement verification of ngt.  Order received.

## 2019-11-26 NOTE — PROGRESS NOTES
Notified by xray dept that ordered imaging not appropriate for situation.  Discussed POC with MD Cheung at bedside.  Pt passing gas, loose BM x 2 today, denies n/v. MD states he will review chart as pt may not need ngt at this point.   Pt allowed to have ice chips.

## 2019-11-26 NOTE — CARE PLAN
Problem: Safety  Goal: Will remain free from injury  Outcome: PROGRESSING AS EXPECTED  Goal: Will remain free from falls  Outcome: PROGRESSING AS EXPECTED   Pt uses call light approprietly.   Problem: Bowel/Gastric:  Goal: Normal bowel function is maintained or improved  Outcome: PROGRESSING SLOWER THAN EXPECTED  Goal: Will not experience complications related to bowel motility  Outcome: PROGRESSING SLOWER THAN EXPECTED   Pt having nausea and vomiting.

## 2019-11-27 ENCOUNTER — HOSPITAL ENCOUNTER (INPATIENT)
Facility: REHABILITATION | Age: 79
LOS: 13 days | DRG: 560 | End: 2019-12-10
Attending: PHYSICAL MEDICINE & REHABILITATION | Admitting: PHYSICAL MEDICINE & REHABILITATION
Payer: MEDICARE

## 2019-11-27 VITALS
HEIGHT: 58 IN | HEART RATE: 62 BPM | DIASTOLIC BLOOD PRESSURE: 62 MMHG | TEMPERATURE: 98.7 F | BODY MASS INDEX: 31.84 KG/M2 | RESPIRATION RATE: 16 BRPM | WEIGHT: 151.68 LBS | SYSTOLIC BLOOD PRESSURE: 106 MMHG | OXYGEN SATURATION: 98 %

## 2019-11-27 DIAGNOSIS — Z96.641 STATUS POST RIGHT HIP REPLACEMENT: ICD-10-CM

## 2019-11-27 PROBLEM — Z78.9 IMPAIRED MOBILITY AND ADLS: Status: ACTIVE | Noted: 2019-11-27

## 2019-11-27 PROBLEM — Z74.09 IMPAIRED MOBILITY AND ADLS: Status: ACTIVE | Noted: 2019-11-27

## 2019-11-27 LAB
ALBUMIN SERPL BCP-MCNC: 2.8 G/DL (ref 3.2–4.9)
ALBUMIN/GLOB SERPL: 1 G/DL
ALP SERPL-CCNC: 50 U/L (ref 30–99)
ALT SERPL-CCNC: 16 U/L (ref 2–50)
ANION GAP SERPL CALC-SCNC: 7 MMOL/L (ref 0–11.9)
AST SERPL-CCNC: 35 U/L (ref 12–45)
BILIRUB SERPL-MCNC: 0.4 MG/DL (ref 0.1–1.5)
BUN SERPL-MCNC: 31 MG/DL (ref 8–22)
CALCIUM SERPL-MCNC: 8 MG/DL (ref 8.5–10.5)
CHLORIDE SERPL-SCNC: 109 MMOL/L (ref 96–112)
CO2 SERPL-SCNC: 23 MMOL/L (ref 20–33)
CREAT SERPL-MCNC: 0.78 MG/DL (ref 0.5–1.4)
ERYTHROCYTE [DISTWIDTH] IN BLOOD BY AUTOMATED COUNT: 50.7 FL (ref 35.9–50)
GLOBULIN SER CALC-MCNC: 2.7 G/DL (ref 1.9–3.5)
GLUCOSE SERPL-MCNC: 91 MG/DL (ref 65–99)
HCT VFR BLD AUTO: 36.7 % (ref 37–47)
HGB BLD-MCNC: 10.9 G/DL (ref 12–16)
MCH RBC QN AUTO: 24.9 PG (ref 27–33)
MCHC RBC AUTO-ENTMCNC: 29.7 G/DL (ref 33.6–35)
MCV RBC AUTO: 83.8 FL (ref 81.4–97.8)
PLATELET # BLD AUTO: 335 K/UL (ref 164–446)
PMV BLD AUTO: 8.8 FL (ref 9–12.9)
POTASSIUM SERPL-SCNC: 4.6 MMOL/L (ref 3.6–5.5)
PROT SERPL-MCNC: 5.5 G/DL (ref 6–8.2)
RBC # BLD AUTO: 4.38 M/UL (ref 4.2–5.4)
SODIUM SERPL-SCNC: 139 MMOL/L (ref 135–145)
WBC # BLD AUTO: 7.8 K/UL (ref 4.8–10.8)

## 2019-11-27 PROCEDURE — 700102 HCHG RX REV CODE 250 W/ 637 OVERRIDE(OP): Performed by: PHYSICAL MEDICINE & REHABILITATION

## 2019-11-27 PROCEDURE — 99223 1ST HOSP IP/OBS HIGH 75: CPT | Mod: AI | Performed by: PHYSICAL MEDICINE & REHABILITATION

## 2019-11-27 PROCEDURE — A9270 NON-COVERED ITEM OR SERVICE: HCPCS | Performed by: PHYSICIAN ASSISTANT

## 2019-11-27 PROCEDURE — 700102 HCHG RX REV CODE 250 W/ 637 OVERRIDE(OP): Performed by: PHYSICIAN ASSISTANT

## 2019-11-27 PROCEDURE — 85027 COMPLETE CBC AUTOMATED: CPT

## 2019-11-27 PROCEDURE — 770010 HCHG ROOM/CARE - REHAB SEMI PRIVAT*

## 2019-11-27 PROCEDURE — 700111 HCHG RX REV CODE 636 W/ 250 OVERRIDE (IP): Performed by: INTERNAL MEDICINE

## 2019-11-27 PROCEDURE — A9270 NON-COVERED ITEM OR SERVICE: HCPCS | Performed by: PHYSICAL MEDICINE & REHABILITATION

## 2019-11-27 PROCEDURE — 80053 COMPREHEN METABOLIC PANEL: CPT

## 2019-11-27 PROCEDURE — 36415 COLL VENOUS BLD VENIPUNCTURE: CPT

## 2019-11-27 PROCEDURE — 99239 HOSP IP/OBS DSCHRG MGMT >30: CPT | Performed by: INTERNAL MEDICINE

## 2019-11-27 RX ORDER — ASPIRIN 325 MG
325 TABLET, DELAYED RELEASE (ENTERIC COATED) ORAL 2 TIMES DAILY
Qty: 60 TAB | Refills: 0 | Status: ON HOLD
Start: 2019-11-27 | End: 2019-12-09

## 2019-11-27 RX ORDER — ACETAMINOPHEN 500 MG
1000 TABLET ORAL 3 TIMES DAILY
Status: DISCONTINUED | OUTPATIENT
Start: 2019-11-27 | End: 2019-12-10 | Stop reason: HOSPADM

## 2019-11-27 RX ORDER — TRAZODONE HYDROCHLORIDE 50 MG/1
50 TABLET ORAL
Status: DISCONTINUED | OUTPATIENT
Start: 2019-11-27 | End: 2019-12-10 | Stop reason: HOSPADM

## 2019-11-27 RX ORDER — OMEPRAZOLE 20 MG/1
20 CAPSULE, DELAYED RELEASE ORAL
Status: CANCELLED | OUTPATIENT
Start: 2019-11-27

## 2019-11-27 RX ORDER — ENEMA 19; 7 G/133ML; G/133ML
1 ENEMA RECTAL
Status: DISCONTINUED | OUTPATIENT
Start: 2019-11-27 | End: 2019-12-10 | Stop reason: HOSPADM

## 2019-11-27 RX ORDER — OXYCODONE HYDROCHLORIDE 5 MG/1
5 TABLET ORAL EVERY 4 HOURS PRN
Status: DISCONTINUED | OUTPATIENT
Start: 2019-11-27 | End: 2019-12-10 | Stop reason: HOSPADM

## 2019-11-27 RX ORDER — BISACODYL 10 MG
10 SUPPOSITORY, RECTAL RECTAL
Status: DISCONTINUED | OUTPATIENT
Start: 2019-11-27 | End: 2019-11-30

## 2019-11-27 RX ORDER — ALUMINA, MAGNESIA, AND SIMETHICONE 2400; 2400; 240 MG/30ML; MG/30ML; MG/30ML
20 SUSPENSION ORAL
Status: DISCONTINUED | OUTPATIENT
Start: 2019-11-27 | End: 2019-12-10 | Stop reason: HOSPADM

## 2019-11-27 RX ORDER — OXYCODONE HYDROCHLORIDE 5 MG/1
5 TABLET ORAL EVERY 6 HOURS PRN
Qty: 10 TAB | Refills: 0 | Status: ON HOLD | OUTPATIENT
Start: 2019-11-27 | End: 2019-12-09

## 2019-11-27 RX ORDER — ACETAMINOPHEN 325 MG/1
650 TABLET ORAL EVERY 4 HOURS PRN
Status: DISCONTINUED | OUTPATIENT
Start: 2019-11-27 | End: 2019-11-27

## 2019-11-27 RX ORDER — OMEPRAZOLE 20 MG/1
20 CAPSULE, DELAYED RELEASE ORAL
Status: DISCONTINUED | OUTPATIENT
Start: 2019-11-28 | End: 2019-12-10 | Stop reason: HOSPADM

## 2019-11-27 RX ORDER — LOVASTATIN 20 MG/1
40 TABLET ORAL
Status: CANCELLED | OUTPATIENT
Start: 2019-11-27

## 2019-11-27 RX ORDER — ONDANSETRON 2 MG/ML
4 INJECTION INTRAMUSCULAR; INTRAVENOUS 4 TIMES DAILY PRN
Status: DISCONTINUED | OUTPATIENT
Start: 2019-11-27 | End: 2019-12-10 | Stop reason: HOSPADM

## 2019-11-27 RX ORDER — POLYETHYLENE GLYCOL 3350 17 G/17G
1 POWDER, FOR SOLUTION ORAL
Status: DISCONTINUED | OUTPATIENT
Start: 2019-11-27 | End: 2019-11-30

## 2019-11-27 RX ORDER — CALCIUM CARBONATE 500 MG/1
500 TABLET, CHEWABLE ORAL
Status: DISCONTINUED | OUTPATIENT
Start: 2019-11-27 | End: 2019-12-10 | Stop reason: HOSPADM

## 2019-11-27 RX ORDER — TRAMADOL HYDROCHLORIDE 50 MG/1
50 TABLET ORAL EVERY 4 HOURS PRN
Status: DISCONTINUED | OUTPATIENT
Start: 2019-11-27 | End: 2019-12-10 | Stop reason: HOSPADM

## 2019-11-27 RX ORDER — ECHINACEA PURPUREA EXTRACT 125 MG
2 TABLET ORAL PRN
Status: DISCONTINUED | OUTPATIENT
Start: 2019-11-27 | End: 2019-12-10 | Stop reason: HOSPADM

## 2019-11-27 RX ORDER — NYSTATIN 100000 [USP'U]/G
POWDER TOPICAL 2 TIMES DAILY
Status: DISCONTINUED | OUTPATIENT
Start: 2019-11-27 | End: 2019-12-10 | Stop reason: HOSPADM

## 2019-11-27 RX ORDER — POLYVINYL ALCOHOL 14 MG/ML
1 SOLUTION/ DROPS OPHTHALMIC PRN
Status: DISCONTINUED | OUTPATIENT
Start: 2019-11-27 | End: 2019-12-10 | Stop reason: HOSPADM

## 2019-11-27 RX ORDER — AMOXICILLIN 250 MG
2 CAPSULE ORAL 2 TIMES DAILY
Status: DISCONTINUED | OUTPATIENT
Start: 2019-11-27 | End: 2019-11-30

## 2019-11-27 RX ORDER — NYSTATIN 100000 [USP'U]/G
POWDER TOPICAL 2 TIMES DAILY
Status: CANCELLED | OUTPATIENT
Start: 2019-11-27

## 2019-11-27 RX ORDER — OXYCODONE HYDROCHLORIDE 5 MG/1
5 TABLET ORAL EVERY 6 HOURS PRN
Status: DISCONTINUED | OUTPATIENT
Start: 2019-11-27 | End: 2019-11-27 | Stop reason: HOSPADM

## 2019-11-27 RX ORDER — LACTULOSE 20 G/30ML
30 SOLUTION ORAL
Status: DISCONTINUED | OUTPATIENT
Start: 2019-11-27 | End: 2019-12-10 | Stop reason: HOSPADM

## 2019-11-27 RX ORDER — LANOLIN ALCOHOL/MO/W.PET/CERES
3 CREAM (GRAM) TOPICAL NIGHTLY PRN
Status: DISCONTINUED | OUTPATIENT
Start: 2019-11-27 | End: 2019-12-10 | Stop reason: HOSPADM

## 2019-11-27 RX ORDER — ONDANSETRON 4 MG/1
4 TABLET, ORALLY DISINTEGRATING ORAL 4 TIMES DAILY PRN
Status: DISCONTINUED | OUTPATIENT
Start: 2019-11-27 | End: 2019-12-10 | Stop reason: HOSPADM

## 2019-11-27 RX ORDER — LOVASTATIN 20 MG/1
40 TABLET ORAL
Status: DISCONTINUED | OUTPATIENT
Start: 2019-11-27 | End: 2019-12-10 | Stop reason: HOSPADM

## 2019-11-27 RX ORDER — CALCIUM CARBONATE 500 MG/1
500 TABLET, CHEWABLE ORAL
Status: CANCELLED | OUTPATIENT
Start: 2019-11-27

## 2019-11-27 RX ORDER — AMOXICILLIN 250 MG
1 CAPSULE ORAL DAILY
Qty: 30 TAB | Refills: 0 | Status: ON HOLD
Start: 2019-11-27 | End: 2019-12-09

## 2019-11-27 RX ADMIN — OXYCODONE HYDROCHLORIDE 5 MG: 5 TABLET ORAL at 17:23

## 2019-11-27 RX ADMIN — CHOLECALCIFEROL (VITAMIN D3) 10 MCG (400 UNIT) TABLET 400 UNITS: at 06:01

## 2019-11-27 RX ADMIN — LOVASTATIN 40 MG: 20 TABLET ORAL at 20:05

## 2019-11-27 RX ADMIN — ASPIRIN 325 MG: 325 TABLET, COATED ORAL at 00:33

## 2019-11-27 RX ADMIN — MULTIPLE VITAMINS W/ MINERALS TAB 1 TABLET: TAB at 06:01

## 2019-11-27 RX ADMIN — NYSTATIN: 100000 POWDER TOPICAL at 06:05

## 2019-11-27 RX ADMIN — FAMOTIDINE 20 MG: 10 INJECTION INTRAVENOUS at 06:01

## 2019-11-27 RX ADMIN — ACETAMINOPHEN 1000 MG: 500 TABLET, FILM COATED ORAL at 20:05

## 2019-11-27 RX ADMIN — NYSTATIN: 100000 POWDER TOPICAL at 20:05

## 2019-11-27 RX ADMIN — ACETAMINOPHEN 1000 MG: 500 TABLET, FILM COATED ORAL at 17:23

## 2019-11-27 ASSESSMENT — ENCOUNTER SYMPTOMS
SHORTNESS OF BREATH: 0
HEARTBURN: 0
ABDOMINAL PAIN: 0
CHILLS: 0
HEADACHES: 0
VOMITING: 0
ORTHOPNEA: 0
BLURRED VISION: 0
DIZZINESS: 0
NAUSEA: 0
MYALGIAS: 0
WEIGHT LOSS: 0
FEVER: 0
COUGH: 0
EYE DISCHARGE: 0
DIARRHEA: 0
FOCAL WEAKNESS: 0
SPUTUM PRODUCTION: 0
EYE PAIN: 0
BACK PAIN: 0
PALPITATIONS: 0
NECK PAIN: 0
STRIDOR: 0
SEIZURES: 0
EYE REDNESS: 0

## 2019-11-27 ASSESSMENT — LIFESTYLE VARIABLES
HOW MANY TIMES IN THE PAST YEAR HAVE YOU HAD 5 OR MORE DRINKS IN A DAY: 0
HAVE PEOPLE ANNOYED YOU BY CRITICIZING YOUR DRINKING: NO
CONSUMPTION TOTAL: NEGATIVE
EVER FELT BAD OR GUILTY ABOUT YOUR DRINKING: NO
TOTAL SCORE: 0
ON A TYPICAL DAY WHEN YOU DRINK ALCOHOL HOW MANY DRINKS DO YOU HAVE: 0
EVER_SMOKED: NEVER
EVER HAD A DRINK FIRST THING IN THE MORNING TO STEADY YOUR NERVES TO GET RID OF A HANGOVER: NO
AVERAGE NUMBER OF DAYS PER WEEK YOU HAVE A DRINK CONTAINING ALCOHOL: 0
ALCOHOL_USE: NO
TOTAL SCORE: 0
DOES PATIENT WANT TO STOP DRINKING: NO
TOTAL SCORE: 0
HAVE YOU EVER FELT YOU SHOULD CUT DOWN ON YOUR DRINKING: NO

## 2019-11-27 ASSESSMENT — COPD QUESTIONNAIRES
COPD SCREENING SCORE: 6
DO YOU EVER COUGH UP ANY MUCUS OR PHLEGM?: YES, EVERY DAY
DURING THE PAST 4 WEEKS HOW MUCH DID YOU FEEL SHORT OF BREATH: SOME OF THE TIME
HAVE YOU SMOKED AT LEAST 100 CIGARETTES IN YOUR ENTIRE LIFE: NO/DON'T KNOW

## 2019-11-27 ASSESSMENT — PATIENT HEALTH QUESTIONNAIRE - PHQ9
SUM OF ALL RESPONSES TO PHQ9 QUESTIONS 1 AND 2: 1
8. MOVING OR SPEAKING SO SLOWLY THAT OTHER PEOPLE COULD HAVE NOTICED. OR THE OPPOSITE, BEING SO FIGETY OR RESTLESS THAT YOU HAVE BEEN MOVING AROUND A LOT MORE THAN USUAL: NOT AT ALL
9. THOUGHTS THAT YOU WOULD BE BETTER OFF DEAD, OR OF HURTING YOURSELF: NOT AT ALL
SUM OF ALL RESPONSES TO PHQ QUESTIONS 1-9: 5
2. FEELING DOWN, DEPRESSED, IRRITABLE, OR HOPELESS: NOT AT ALL
4. FEELING TIRED OR HAVING LITTLE ENERGY: SEVERAL DAYS
3. TROUBLE FALLING OR STAYING ASLEEP OR SLEEPING TOO MUCH: MORE THAN HALF THE DAYS
1. LITTLE INTEREST OR PLEASURE IN DOING THINGS: SEVERAL DAYS
6. FEELING BAD ABOUT YOURSELF - OR THAT YOU ARE A FAILURE OR HAVE LET YOURSELF OR YOUR FAMILY DOWN: NOT AL ALL
7. TROUBLE CONCENTRATING ON THINGS, SUCH AS READING THE NEWSPAPER OR WATCHING TELEVISION: NOT AT ALL
5. POOR APPETITE OR OVEREATING: SEVERAL DAYS

## 2019-11-27 NOTE — DISCHARGE SUMMARY
Discharge Summary    CHIEF COMPLAINT ON ADMISSION  No chief complaint on file.      Reason for Admission  RIGHT TOTAL OSTEOARTHRITIS AND DEG*     Admission Date  11/21/2019    CODE STATUS  Full Code    HPI & HOSPITAL COURSE    79 y.o. female with  past medical history of emphysema, GERD, hiatal hernia syndrome, dyslipidemia, total hip replacement, who presented 11/21/2019 for total hip arthroplasty on the right side, that was performed on 11/21/2019. IM was consulted due to nausea and vomiting. CT was done and found to have SBO and NGT was placed overnight.  The patient pulled out NG tube.  General surgery was consulted for her SBO.  The patient continued to have bowel movement today.  No more abdominal pain, nausea and vomiting.  Surgery saw her and recommended to advance diet as tolerated and currently patient is on the liquid diet.  PT OT saw her and recommended skilled which she will be discharged there today. I saw and examined the patient today.  Please call [phone] to schedule PCP appointment for patient.    Required specialty appointments include:   Dr. Delgado in 1 week.    # things to follow at SNF  - to advanced food slowly as tolerated  - PT/OT    Therefore, she is discharged in fair and stable condition to home with close outpatient follow-up.    The patient met 2-midnight criteria for an inpatient stay at the time of discharge.    Discharge Date  11/27/19      FOLLOW UP ITEMS POST DISCHARGE      DISCHARGE DIAGNOSES  Active Problems:    SBO (small bowel obstruction) (HCC) POA: Unknown    HTN (hypertension) POA: Yes    Dyslipidemia POA: Yes    COPD (chronic obstructive pulmonary disease) (HCC) POA: Unknown    LFT elevation POA: Unknown    Status post right hip replacement POA: Unknown    Hyperglycemia POA: Unknown  Resolved Problems:    * No resolved hospital problems. *      FOLLOW UP  Future Appointments   Date Time Provider Department Center   12/5/2019 10:40 AM Elizabeth Molina M.D. [facility] None    12/26/2019 10:40 AM Elizabeth Molina M.D. [facility] None     Elizabeth Molina M.D.  [address]  [address]  [place] [id number] [phone]    In 1 week      Khang Delgado M.D.  [address] [facility]  [address]  [place] [id number] [phone]    In 1 week        MEDICATIONS ON DISCHARGE     Medication List      START taking these medications      Instructions   Aspirin 325 MG Tbec   Take 1 Tab by mouth 2 times a day.  Dose:  325 mg     oxyCODONE immediate-release 5 MG Tabs  Commonly known as:  ROXICODONE   Take 1 Tab by mouth every 6 hours as needed for up to 3 days.  Dose:  5 mg     senna-docusate 8.6-50 MG Tabs  Commonly known as:  PERICOLACE or SENOKOT S   Take 1 Tab by mouth every day.  Dose:  1 Tab        CHANGE how you take these medications      Instructions   ketoconazole 2 % shampoo  What changed:  additional instructions  Commonly known as:  NIZORAL   Apply 5 to 10 mL to wet scalp, lather, leave on 3 to 5 minutes, and rinse; apply twice weekly for 2 to 4 weeks        CONTINUE taking these medications      Instructions   acetaminophen 500 MG Tabs  Commonly known as:  TYLENOL   Take 500 mg by mouth 2 times a day as needed.  Dose:  500 mg     ASPERCREME EX   Apply 1 Each to affected area(s) 2 times a day as needed.  Dose:  1 Each     calcium carbonate 500 MG Chew  Commonly known as:  TUMS   Take 500 mg by mouth 1 time daily as needed.  Dose:  500 mg     * CENTRUM SILVER PO   Take 1 Tab by mouth every day.  Dose:  1 Tab     * OCUVITE ADULT 50+ Caps   Take 1 Cap by mouth every day.  Dose:  1 Cap     lovastatin 40 MG tablet  Commonly known as:  MEVACOR   TAKE 1 TABLET BY MOUTH EVERYDAY AT BEDTIME     mupirocin 2 % Oint  Commonly known as:  BACTROBAN   Apply 1 Each to affected area(s) 2 times a day.  Dose:  1 Each     Non Formulary Request   Take 1 Tab by mouth 2 times a day as needed. Cold +Head congestion daytime  Dose:  1 Tab     nystatin powder  Commonly known as:  MYCOSTATIN   NYSTATIN 634789 UNIT/GM POWD      pantoprazole 20 MG tablet  Commonly known as:  PROTONIX   Take 1 Tab by mouth every day.  Dose:  20 mg     TIGER BALM ULTRA STRENGTH EX   Apply 1 Each to affected area(s) as needed.  Dose:  1 Each     Vitamin D (Cholecalciferol) 400 units Caps   Take 1 Cap by mouth every day.  Dose:  1 Cap         * This list has 2 medication(s) that are the same as other medications prescribed for you. Read the directions carefully, and ask your doctor or other care provider to review them with you.            STOP taking these medications    HYDROcodone-acetaminophen 5-325 MG Tabs per tablet  Commonly known as:  NORCO            Allergies  Allergies   Allergen Reactions   • Sagebrush        DIET  Orders Placed This Encounter   Procedures   • Diet Order Full Liquid     Standing Status:   Standing     Number of Occurrences:   1     Order Specific Question:   Diet:     Answer:   Full Liquid [11]       ACTIVITY  As tolerated.  Weight bearing as tolerated    CONSULTATIONS  Dr. Nicolas Delgado  PROCEDURES      LABORATORY  Lab Results   Component Value Date    SODIUM 139 11/27/2019    POTASSIUM 4.6 11/27/2019    CHLORIDE 109 11/27/2019    CO2 23 11/27/2019    GLUCOSE 91 11/27/2019    BUN 31 (H) 11/27/2019    CREATININE 0.78 11/27/2019    CREATININE 0.9 01/16/2009        Lab Results   Component Value Date    WBC 7.8 11/27/2019    HEMOGLOBIN 10.9 (L) 11/27/2019    HEMATOCRIT 36.7 (L) 11/27/2019    PLATELETCT 335 11/27/2019        Total time of the discharge process exceeds 38 minutes.

## 2019-11-27 NOTE — DISCHARGE PLANNING
F/U with client at bedside to discuss IRF referral. Explained specifics of inpatient rehab, answered questions/concerns. Guillermina is agreeable to admission. She tells me her daughter Mendy will be staying with her at discharge, assisting as needed. Pt is aware transfer to [facility] pending today 11:30a. Provided TCC contact information for any additional questions.     Dr. Tejada will accept pt to acute rehab. Transport scheduled 11:30a today. Nursing to call report to [phone]. ANGELA Tejada updated, reports she will forward information to bedside RN.  MICHEAL Roche aware. VM update left on pt's home phone for Mendy (per pt, daughter staying there now.) TCC will follow to assist as needed with transition to [facility].

## 2019-11-27 NOTE — PROGRESS NOTES
Patient left in good condition to [facility]. AVS printed. Copy given to patient and signed copy placed in chart. IV left in per rehab nurse. Patient left with all belongings with transport.

## 2019-11-27 NOTE — DISCHARGE PLANNING
Pt to be transferred to [facility] at 1130, Cobra complete and placed in chart, bedside nurse notified.

## 2019-11-27 NOTE — CARE PLAN
Problem: Communication  Goal: The ability to communicate needs accurately and effectively will improve  Outcome: PROGRESSING AS EXPECTED     Problem: Safety  Goal: Will remain free from injury  Outcome: PROGRESSING AS EXPECTED  Goal: Will remain free from falls  Outcome: PROGRESSING AS EXPECTED  Note:   Bed locked in lowest position with two side rails up and the bed alarm on; frequent rounding done     Problem: Discharge Barriers/Planning  Goal: Patient's continuum of care needs will be met  Outcome: PROGRESSING AS EXPECTED  Note:   Rehab today     Problem: Pain Management  Goal: Pain level will decrease to patient's comfort goal  Outcome: PROGRESSING AS EXPECTED

## 2019-11-27 NOTE — CONSULTS
"    Physical Medicine and Rehabilitation Consultation         Initial Consult      Date of Consultation: 11/26/2019  Consulting provider: Pb Cheung MD   Reason for consultation: assess for acute inpatient rehab appropriateness  LOS: 5 Day(s)    Chief complaint: Right Total Hip Arthroplasty    HPI: \"Harinder\" is a 79 y.o. right hand dominant female with a past medical history of arthritis, COPD, chronic pain in the hips, dyslipidemia, GERD, heartburn, hyperlipidemia, left total hip replacement, macular degeneration;  who presented on 11/21/2019  6:43 AM for a right total hip with Dr. Delgado.  Postop recovery was complicated by 2 days of constant nausea and bilious green vomiting, secondary to small bowel obstruction along the jejunum. An NG tube was placed on 11/26 but subsequently came out. Patient placed on a clear liquid diet by Dr. Ferrer. A RUQ US also found cholelithiasis.     The patient currently reports good energy, and eagerness to start rehab.  Patient is sparingly eating at [facility], states if she eats too much she will get nauseas.       PROCEDURE:  Right total hip arthroplasty  11/21/2019  SURGEON:  Khang Delgado MD    ROS:  Pertinent positives are listed in HPI, all other systems reviewed and are negative      Social Hx:  Pre-morbidly, this patient lived in a single level home with One steps to enter, alone and able to care for self.   Employment: retired   Tobacco: denies   Alcohol: denies   Drugs: denies       Patient's daughter is coming to live with her for a month to help out.     Current level of function:  The patient was evaluated by acute care Physical Therapy and Occupational Therapy; currently requiring minimal assistance for mobility and maximal assistance for ADLs      PMH:  Past Medical History:   Diagnosis Date   • Arthritis    • Bowel habit changes     diarrhea   • Breath shortness     COPD   • Cataract     bilateral IOL   • Chronic pain 11/2019    hips   • Dyslipidemia " 8/1/2016   • Emphysema of lung (HCC)    • GERD (gastroesophageal reflux disease)    • Heart burn    • Hepatitis 1970's    A based on labs done 2016   • Hiatus hernia syndrome    • High cholesterol    • History of total hip replacement     L   • Hyperlipidemia    • Macular degeneration    • Memory loss    • OA (osteoarthritis)     back, hip, shoulds, knees and hands   • Psychiatric problem     depression   • S/P cataract extraction     bilat   • Snoring    • stomach flu 11/08/2019   • Urinary incontinence    • Wears dentures        PSH:  Past Surgical History:   Procedure Laterality Date   • PB TOTAL HIP ARTHROPLASTY Right 11/21/2019    Procedure: ARTHROPLASTY, HIP, TOTAL;  Surgeon: Khang Delgado M.D.;  Location: SURGERY [facility] ORS;  Service: Orthopedics   • BLEPHAROPLASTY Bilateral 7/14/2015    Procedure: BLEPHAROPLASTY - UPPER;  Surgeon: Antonio Garcia M.D.;  Location: SURGERY [facility] ORS;  Service:    • CATARACT PHACO WITH IOL  3/13/2012    Performed by ANTONIO GARCIA at SURGERY SAME DAY [facility] ORS   • CATARACT PHACO WITH IOL  2/28/2012    Performed by ANTONIO GARCIA at SURGERY SAME DAY [facility] ORS   • HYSTERECTOMY, TOTAL ABDOMINAL     • OTHER ORTHOPEDIC SURGERY      left hip replacement   • TUBAL LIGATION         FHX:    Family History   Problem Relation Age of Onset   • Heart Disease Other    • Cancer Other        Medications:  Current Facility-Administered Medications   Medication Dose   • famotidine (PEPCID) injection 20 mg  20 mg   • ondansetron (ZOFRAN ODT) dispertab 4 mg  4 mg   • calcium carbonate (TUMS) chewable tab 500 mg  500 mg   • lovastatin (MEVACOR) tablet 40 mg  40 mg   • therapeutic multivitamin-minerals (THERAGRAN-M) tablet 1 Tab  1 Tab   • nystatin (MYCOSTATIN) powder     • omeprazole (PRILOSEC) capsule 20 mg  20 mg   • cholecalciferol (VITAMIN D3) tablet 400 Units  400 Units   • Pharmacy Consult Request ...Pain Management Review 1 Each  1 Each   • ondansetron (ZOFRAN)  "syringe/vial injection 4 mg  4 mg   • diphenhydrAMINE (BENADRYL) injection 25 mg  25 mg   • haloperidol lactate (HALDOL) injection 1 mg  1 mg   • scopolamine (TRANSDERM-SCOP) patch 1 Patch  1 Patch   • docusate sodium (COLACE) capsule 100 mg  100 mg   • senna-docusate (PERICOLACE or SENOKOT S) 8.6-50 MG per tablet 1 Tab  1 Tab   • senna-docusate (PERICOLACE or SENOKOT S) 8.6-50 MG per tablet 1 Tab  1 Tab   • polyethylene glycol/lytes (MIRALAX) PACKET 1 Packet  1 Packet   • magnesium hydroxide (MILK OF MAGNESIA) suspension 30 mL  30 mL   • bisacodyl (DULCOLAX) suppository 10 mg  10 mg   • fleet enema 133 mL  1 Each   • aspirin EC (ECOTRIN) tablet 325 mg  325 mg   • acetaminophen (TYLENOL) tablet 1,000 mg  1,000 mg   • oxyCODONE immediate-release (ROXICODONE) tablet 5 mg  5 mg   • oxyCODONE immediate release (ROXICODONE) tablet 10 mg  10 mg   • morphine (pf) 4 MG/ML injection 4 mg  4 mg   • diphenhydrAMINE (BENADRYL) tablet/capsule 25 mg  25 mg   • dexamethasone (DECADRON) injection 6 mg  6 mg   • diphenhydrAMINE (BENADRYL) tablet/capsule 25 mg  25 mg    Or   • diphenhydrAMINE (BENADRYL) injection 25 mg  25 mg   • chlorproMAZINE (THORAZINE) tablet 25 mg  25 mg    Or   • chlorproMAZINE (THORAZINE) injection 25 mg  25 mg   • benzocaine-menthol (CEPACOL) lozenge 1 Lozenge  1 Lozenge       Allergies:  Allergies   Allergen Reactions   • Sagebrush        Physical Exam:  Vitals: /77   Pulse 82   Temp 36.6 °C (97.8 °F) (Temporal)   Resp 17   Ht 1.482 m (4' 10.35\")   Wt 68.8 kg (151 lb 10.8 oz)   SpO2 96%   Gen: NAD  Head: NC/AT  Eyes/ Nose/ Mouth: PERRLA, moist mucous membranes  Cardio: RRR, no mumurs  Pulm: CTAB, with normal respiratory effort  Abd: Soft NTND, diminished bowel sounds,   Ext: No peripheral edema. No calf tenderness. No clubbing/cyanosis.     Mental status:  A&Ox4 (person, place, date, situation) answers questions appropriately follows commands  Speech: fluent, no aphasia or dysarthria    CRANIAL " NERVES:  2,3: visual acuity grossly intact, PERRL  3,4,6: EOMI bilaterally, no nystagmus or diplopia  5: sensation intact to light touch bilaterally and symmetric  7: no facial asymmetry  8: hearing grossly intact  9,10: symmetric palate elevation  11: SCM/Trapezius strength 5/5 bilaterally  12: tongue protrudes midline    Motor:      Shoulder flexors:  Bilateral -  5/5  Elbow flexors:  Bilateral -  5/5  Wrist extensors:  Bilateral -  5/5  Elbow extensors:  Bilateral -  5/5  Finger flexors:  Bilateral -  5/5  Finger abductors:  Bilateral -  5/5  Hip flexors:  Bilateral -  4/5  Knee ext:  Bilateral -  4/5  Dorsiflexors:  Bilateral -  5/5  EHL:  Bilateral -  5/5  Plantar flexors:  Bilateral -  5/5     Sensory:   intact to light touch through out    DTRs: 2+ in bilateral biceps, triceps, brachioradialis, 2+ in bilateral patellar and achilles tendons  Negative Valerio b/l     Tone: no spasticity noted, no cogwheeling noted    Labs: Reviewed and significant for   Recent Labs     11/24/19  0438 11/25/19  0702 11/26/19  0511   RBC 4.16* 4.38 4.92   HEMOGLOBIN 10.6* 11.1* 12.5   HEMATOCRIT 35.4* 36.7* 41.2   PLATELETCT 249 314 425     Recent Labs     11/25/19  1812   SODIUM 144   POTASSIUM 4.8   CHLORIDE 112   CO2 22   GLUCOSE 139*   BUN 25*   CREATININE 0.83   CALCIUM 8.8     Recent Results (from the past 24 hour(s))   LIPASE    Collection Time: 11/25/19  6:12 PM   Result Value Ref Range    Lipase 32 11 - 82 U/L   Comp Metabolic Panel    Collection Time: 11/25/19  6:12 PM   Result Value Ref Range    Sodium 144 135 - 145 mmol/L    Potassium 4.8 3.6 - 5.5 mmol/L    Chloride 112 96 - 112 mmol/L    Co2 22 20 - 33 mmol/L    Anion Gap 10.0 0.0 - 11.9    Glucose 139 (H) 65 - 99 mg/dL    Bun 25 (H) 8 - 22 mg/dL    Creatinine 0.83 0.50 - 1.40 mg/dL    Calcium 8.8 8.5 - 10.5 mg/dL    AST(SGOT) 53 (H) 12 - 45 U/L    ALT(SGPT) 18 2 - 50 U/L    Alkaline Phosphatase 68 30 - 99 U/L    Total Bilirubin 0.4 0.1 - 1.5 mg/dL    Albumin 3.3  3.2 - 4.9 g/dL    Total Protein 6.8 6.0 - 8.2 g/dL    Globulin 3.5 1.9 - 3.5 g/dL    A-G Ratio 0.9 g/dL   ESTIMATED GFR    Collection Time: 11/25/19  6:12 PM   Result Value Ref Range    GFR If African American >60 >60 mL/min/1.73 m 2    GFR If Non African American >60 >60 mL/min/1.73 m 2   CBC without Differential    Collection Time: 11/26/19  5:11 AM   Result Value Ref Range    WBC 10.7 4.8 - 10.8 K/uL    RBC 4.92 4.20 - 5.40 M/uL    Hemoglobin 12.5 12.0 - 16.0 g/dL    Hematocrit 41.2 37.0 - 47.0 %    MCV 83.7 81.4 - 97.8 fL    MCH 25.4 (L) 27.0 - 33.0 pg    MCHC 30.3 (L) 33.6 - 35.0 g/dL    RDW 51.0 (H) 35.9 - 50.0 fL    Platelet Count 425 164 - 446 K/uL    MPV 8.6 (L) 9.0 - 12.9 fL       Imaging:   CT AP 11/25/2019  1.  Small bowel obstruction with transition point in the jejunum in an area of wall thickening. This could be infectious or inflammatory, less likely related to ischemia or neoplastic infiltration.  2.  Trace free fluid in the LEFT paracolic gutter  3.  Large hiatal hernia  4.  LEFT basilar atelectasis  5.  Trace LEFT pleural fluid  6.  6 mm RIGHT middle lobe pulmonary nodule  7.  Cholelithiasis  8.  Atrophic pancreas       ASSESSMENT:  Patient is a 79 y.o. female POD #6 s/p Right JAIME now with SBO and on a full liquid diet.     Rehabilitation: Impaired ADLs and mobility  Patient is a good candidate for inpatient rehab based on needs for PT, OT.  Patient will also benefit from family training.  Patient has a good discharge situation which will be home with daughter who can provide 24hr assistance.   Barriers to transfer include: resolution of SBO, TCCs to verify disposition, medical clearance and bed availability     Right Total Hip Arthroplasty   - continue PT/OT  - Pain control as detailed below   - Expected transfer to [facility] pending resolution of SBO     SBO  - management per primary team   - Full liquid diet  - Monitor electrolyte abnormalities   - Multivitamin daily     Pain  - Tylenol 1,000 mg Q6  hrs PRN   - Morphine injections - not using, recommend DC   - Oxycodone 5-10 mg Q3 hrs PRN (Used once at 5 mg)    GI PPX  - prilosec 20mg daily     Anemia   - hgb 12.5, improved from 11.1 yesterday.   - Monitor     Hyperglycemia   - Glycohemoglobin 5.9% with    - diet controlled  - monitor     DVT Prophylaxis:   - SCDs       Discussed with pt, summarized hospitalization and care, options for next step of care  Labs reviewed   Imaging personally reviewed   Discussed with team about recommendations     Thank you for allowing us to participate in the care of this patient.       Discussed with patient about recommendations for and plan for rehabilitation as follows. Patient with multiple co-morbidities(including but not limited to Small bowel obstruction, anemia, hyperglycemia, COPD); with and functional deficits in mobility/self-care, and Moderate de-conditioning.     Pre-morbidly, this patient lived in a single level home with One steps to enter, alone and able to care for self. The patient was evaluated by acute care Physical Therapy and Occupational Therapy; currently requiring minimal assistance for mobility and maximal assistance for ADLs    The patient is a(n) Good candidate for an acute inpatient rehabilitation program with a coordinated program of care at an intensity and frequency not available at a lower level of care.     Note: if patient continues to progress while waiting for medical clearance, and no longer requires 2 of of 3 therapy services (PT/OT/SLP) at a CGA/Minimal assistance level, patient will no longer need acute inpatient rehabilitation.    This recommendation is substantiated by the patient's current medical condition with intervention and assessment of medical issues requiring an acute level of care for patient's safety and maximum outcome. A coordinated program of care will be provided by an interdisciplinary team including physical therapy, occupational therapy, physiatry and rehab  nursing. Rehab goals include improved mobility, self-care management, strength and conditioning/endurance, pain management, bowel and bladder management, mood and affect, and safety with independent home management including caregiver training.     Estimated length of stay is approximately 14 days. Rehab potential: Very Good. Disposition: to pre-morbid independent living setting with supportive care of patient's family. We will continue to follow with you in anticipation of discharge to acute inpatient rehabilitation when medically stable to do so at the discretion of her  attending physician. Thank you for allowing us to participate in her care. Please call with any questions regarding this recommendation.    Angelo Ho, DO   Physical Medicine and Rehabilitation   11/26/2019

## 2019-11-27 NOTE — PROGRESS NOTES
Patient admitted to facility [id number] via w/c; accompanied by hospital transport.  Patient assisted to room and positioned in bed for comfort and safety; call light within reach.  Patient assisted with stowing belongings and oriented to room and facility.  Admission assessment performed and documented in computer. Patient received and reviewed education binder. Admission paperwork completed; signed copies placed in chart.  Will continue to monitor.

## 2019-11-27 NOTE — CARE PLAN
Problem: Safety  Goal: Will remain free from injury  11/26/2019 2206 by Marcy Lambert R.N.  Outcome: PROGRESSING AS EXPECTED  11/26/2019 2205 by Marcy Lambert R.N.  Outcome: PROGRESSING AS EXPECTED     Problem: Safety  Goal: Will remain free from falls  11/26/2019 2206 by Marcy Lambert R.N.  Outcome: PROGRESSING AS EXPECTED  11/26/2019 2205 by Marcy Lambert R.N.  Outcome: PROGRESSING AS EXPECTED   PT uses call light approprietly.   Problem: Pain Management  Goal: Pain level will decrease to patient's comfort goal  Outcome: PROGRESSING AS EXPECTED   Pt has minimal pain/denies need for pain meds.

## 2019-11-27 NOTE — DISCHARGE INSTRUCTIONS
Discharge Instructions    Discharged to other by [facility] with escort. Discharged via wheelchair, hospital escort: Yes.  Special equipment needed: Not Applicable    Be sure to schedule a follow-up appointment with your primary care doctor or any specialists as instructed.     Discharge Plan:   Influenza Vaccine Indication: Not indicated: Previously immunized this influenza season and > 8 years of age(Pt states she got flu shot at [facility])    I understand that a diet low in cholesterol, fat, and sodium is recommended for good health. Unless I have been given specific instructions below for another diet, I accept this instruction as my diet prescription.   Other diet: full liquid    Special Instructions: Discharge instructions for the Orthopedic Patient    Follow up with Primary Care Physician within 2 weeks of discharge to home, regarding:  Review of medications and diagnostic testing.  Surveillance for medical complications.  Workup and treatment of osteoporosis, if appropriate.     -Is this a Joint Replacement patient? Yes   Total Joint Hip Replacement Discharge Instructions    Pain  - The goal is to slowly wean off the prescription pain medicine.  - Ice can be used for pain control.  20 minutes at a time is recommended, and never directly against your skin or incision.  - Most patients are off the pain pills by 3 weeks; others may require a low level of pain medications for many months. If your pain continues to be severe, follow up with your physician.  Infection  Deep hip joint infections that require removal of the prostheses occur in less than 0.1% of patients. Lesser infections in the skin (cellulites) are more common and much more easily treated.  - Keep the incision as clean and dry as possible.  - Always wash your hands before touching your incision.  - Skin infections tend to develop around 7-10 days after surgery, most can be treated with oral antibiotics.  - Dental Care should be delayed for 3 months  after surgery, your surgeon recommends taking a dose of antibiotics 1 hour prior to any dental procedure.  After 2 years, most surgeons recommend antibiotics only before an extensive procedure.  Ask your surgeon what he recommends.  - Signs and symptoms of infection can include:  low grade fever, redness, pain, swelling and drainage from your incision.  Notify your surgeon immediately if you develop any of these symptoms.  Post op Disturbances  - Bowel habits - constipation is extremely common and is caused by a combination of anesthesia, lack of mobility and pain medicine.  Use stool softeners or laxatives if necessary. It is important not to ignore this problem, as bowel obstructions can be a serious complication after joint replacement surgery.  - Mood/Energy Level - Many patients experience a lack of energy and endurance for up to 2-3 months after surgery.  Some may also feel down and can even become depressed.  This is likely due to the postoperative anemia, change in activity level, lack of sleep, pain medicine and just the emotional reaction to the surgery itself that is a big disruption in a person’s life.  This usually passes.  If symptoms persist, follow up with your primary physician.  - Returning to work - Your surgeon will give you more specific instructions.  Generally, if you work a sedentary job requiring little standing or walking, most patients may return within 2-6 weeks.  Manual labor jobs involving walking, lifting and standing may take 3-4 months.  Your surgeon’s office can provide a release to part-time or light duty work early on in your recovery and progress you to full duty as able.  - Driving - You can begin driving an automatic shift car in 4 to 8 weeks, provided you are no longer taking narcotic pain medication. If you have a stick-shift car and your right hip was replaced, do not begin driving until your doctor says you can.   - Avoiding falls -  throw rugs and tack down loose  carpeting.  Be aware of floor hazards such as pets, small objects or uneven surfaces.   -  Airport Metal Detectors - The sensitivity of metal detectors varies and it is likely that your prosthesis will cause an alarm. Inform the  that you have an artificial joint.  Diet  - Resume your normal diet as tolerated.  - It is important to achieve a healthy nutritional status by eating a well balanced diet on a regular basis.  - Your physician may recommend that you take iron and vitamin supplements.   - Continue to drink plenty of fluids.  Shower/Bathing  - You may shower as soon as you get home from the hospital unless otherwise instructed.  - Keep your incision out of water.  To keep the incision dry when showering, cover it with a plastic bag or plastic wrap.  - Pat incision dry if it gets wet.  Don’t rub.  - Do not submerge in a bath until staples are out and the incision is completely healed. (Approximately 6-8 weeks after surgery).  Dressing Change:  Procedure (if recommended by your physician)  - Wash hands.  - Open all dressing change materials.  - Remove old dressing and discard.  - Inspect incision for redness, increase in clear drainage, yellow/green drainage, odor and surrounding skin hot to touch.  -  ABD (large gauze) pad by one corner and lay over the incision.  Be careful not to touch the inside of the dressing that will lay over the incision.  - Secure in place as instructed (Ace wrap or tape).    Swelling/Bruising  - Swelling is normal after hip replacement and can involve the thigh, knee, calf and foot.  - Swelling can last from 3-6 months.  - Elevate your leg higher than your heart while reclining.  The first week you are home you should elevate your leg an equal amount of time, as you are active.    - Anti-inflammatory pills can be taken once you have stopped the blood thinners.  - The swelling is usually worse after you go home since you are upright for longer periods of  time.  - Bruising is common and can involve the entire leg including the thigh, calf and even foot.  Bruising often does not appear until after you arrive home and it can be quite dramatic- purple, black, green.  The bruising you can see is not usually concerning and will subside without any treatment.      Blood Clot Prevention  Blood clots in the legs and the less common, but frightening, clots that travel to the lungs are a real focus of our preventative. Most patients are at standard risk for them, but those patients who are at higher risk include people who have had previous clots, a family history of clotting, smoking, diabetes, obesity, advanced age, use of estrogen and a sedentary lifestyle.    - Signs of blood clots in legs - Swelling in thigh, calf or ankle that does not go down with elevation.  Pain, heat and tenderness in calf, back of calf or groin area.  NOTE: blood clots can occur in either leg.  - You have been receiving anticoagulant therapy (blood thinners) in the hospital and you may be instructed to continue at home depending on your risk factors.  - Your risk for developing a clot continues for up to 2-3 months after surgery.  You should avoid prolonged sitting and dehydration during that time (long air trips and car trips).  If you do take a trip during this time, please get up and move around every 1- 1.5 hours.  - If you are prescribed blood thinning medication for home, follow instructions as directed. (Handouts provided if applicable).      Activity    Once you get home, you should stay active. The key is not to overdo it! While you can expect some good days and some bad days, you should notice a gradual improvement over time you should notice a gradual improvement and a gradual increase in your endurance over the next 6 to 12 months.    - Weight Bearing - If you have undergone cemented or hybrid hip replacement, you can put some weight on the leg immediately using a cane or walker, and you  should continue to use some support for 4 to 6 weeks to help the muscles recover.   - Sleeping Positions - Sleep on your back with your legs slightly apart or on your side with a regular pillow between your knees. Be sure to use the pillow for at least 6 weeks, or until your doctor says you can do without it. Sleeping on your stomach should be all right  - Sitting - For at least the first 3 months, sit only in chairs that have arms. Do not sit on low chairs, low stools, or reclining chairs. Do not cross your legs at the knees. The physical therapist will show you how to sit and stand from a chair, keeping your affected leg out in front of you. Get up and move around on a regular basis--at least once every hour.  - Walking - Walk as much as you like once your doctor gives you the go-ahead, but remember that walking is no substitute for your prescribed exercises. Walking with a pair of trekking poles is helpful and adds as much as 40% to the exercise you get when you walk  - Therapy may be needed in some cases, to strengthen your muscles and improve your gait (walking pattern).  This decision will be made at your post-operative appointment.  Follow your therapist recommended post-operative exercises (handout provided by Therapist).  - Swimming is also recommended; you can begin as soon as the sutures have been removed and the wound is healed, approximately 6 to 8 weeks after surgery. Using a pair of training fins may make swimming a more enjoyable and effective exercise.  - Other activities - Lower impact activities are preferred.  If you have specific questions, consult your Surgeon.    - Sexual activity - Your surgeon can tell you when it should be safe to resume sexual activity.      When to Call the Doctor   Call the physician if:   - Fever over 100.5? F  - Increased pain, drainage, redness, odor or heat around the incision area  - Shaking chills  - Increased knee pain with activity and rest  - Increased pain in  calf, tenderness or redness above or below the knee  - Increased swelling of calf, ankle, foot  - Sudden increased shortness of breath, sudden onset of chest pain, localized chest pain with coughing  - Incision opening  Or, if there are any questions or concerns about medications or care.       -Is this patient being discharged with medication to prevent blood clots?  Yes, Aspirin Aspirin, ASA oral tablets  What is this medicine?  ASPIRIN (AS pir in) is a pain reliever. It is used to treat mild pain and fever. This medicine is also used as directed by a doctor to prevent and to treat heart attacks, to prevent strokes, and to treat arthritis or inflammation.  This medicine may be used for other purposes; ask your health care provider or pharmacist if you have questions.  COMMON BRAND NAME(S): Aspir-Low, Aspir-Katie, Aspirtab, [employer] Advanced Aspirin, [employer] Aspirin, [employer] Aspirin Extra Strength, [employer] Aspirin Plus, Juwan Extra Strength, Juwan Extra Strength Plus, [employer] Genuine Aspirin, [employer] Womens Aspirin, Bufferin, Bufferin Extra Strength, Bufferin Low Dose  What should I tell my health care provider before I take this medicine?  They need to know if you have any of these conditions:  -anemia  -asthma  -bleeding problems  -child with chickenpox, the flu, or other viral infection  -diabetes  -gout  -if you frequently drink alcohol containing drinks  -kidney disease  -liver disease  -low level of vitamin K  -lupus  -smoke tobacco  -stomach ulcers or other problems  -an unusual or allergic reaction to aspirin, tartrazine dye, other medicines, dyes, or preservatives  -pregnant or trying to get pregnant  -breast-feeding  How should I use this medicine?  Take this medicine by mouth with a glass of water. Follow the directions on the package or prescription label. You can take this medicine with or without food. If it upsets your stomach, take it with food. Do not take your medicine more often than directed.  Talk to your  pediatrician regarding the use of this medicine in children. While this drug may be prescribed for children as young as 12 years of age for selected conditions, precautions do apply. Children and teenagers should not use this medicine to treat chicken pox or flu symptoms unless directed by a doctor.  Patients over 65 years old may have a stronger reaction and need a smaller dose.  Overdosage: If you think you have taken too much of this medicine contact a poison control center or emergency room at once.  NOTE: This medicine is only for you. Do not share this medicine with others.  What if I miss a dose?  If you are taking this medicine on a regular schedule and miss a dose, take it as soon as you can. If it is almost time for your next dose, take only that dose. Do not take double or extra doses.  What may interact with this medicine?  Do not take this medicine with any of the following medications:  -cidofovir  -ketorolac  -probenecid  This medicine may also interact with the following medications:  -alcohol  -alendronate  -bismuth subsalicylate  -flavocoxid  -herbal supplements like feverfew, garlic, abiel, ginkgo biloba, horse chestnut  -medicines for diabetes or glaucoma like acetazolamide, methazolamide  -medicines for gout  -medicines that treat or prevent blood clots like enoxaparin, heparin, ticlopidine, warfarin  -other aspirin and aspirin-like medicines  -NSAIDs, medicines for pain and inflammation, like ibuprofen or naproxen  -pemetrexed  -sulfinpyrazone  -varicella live vaccine  This list may not describe all possible interactions. Give your health care provider a list of all the medicines, herbs, non-prescription drugs, or dietary supplements you use. Also tell them if you smoke, drink alcohol, or use illegal drugs. Some items may interact with your medicine.  What should I watch for while using this medicine?  If you are treating yourself for pain, tell your doctor or health care professional if the  pain lasts more than 10 days, if it gets worse, or if there is a new or different kind of pain. Tell your doctor if you see redness or swelling. Also, check with your doctor if you have a fever that lasts for more than 3 days. Only take this medicine to prevent heart attacks or blood clotting if prescribed by your doctor or health care professional.  Do not take aspirin or aspirin-like medicines with this medicine. Too much aspirin can be dangerous. Always read the labels carefully.  This medicine can irritate your stomach or cause bleeding problems. Do not smoke cigarettes or drink alcohol while taking this medicine. Do not lie down for 30 minutes after taking this medicine to prevent irritation to your throat.  If you are scheduled for any medical or dental procedure, tell your healthcare provider that you are taking this medicine. You may need to stop taking this medicine before the procedure.  This medicine may be used to treat migraines. If you take migraine medicines for 10 or more days a month, your migraines may get worse. Keep a diary of headache days and medicine use. Contact your healthcare professional if your migraine attacks occur more frequently.  What side effects may I notice from receiving this medicine?  Side effects that you should report to your doctor or health care professional as soon as possible:  -allergic reactions like skin rash, itching or hives, swelling of the face, lips, or tongue  -breathing problems  -changes in hearing, ringing in the ears  -confusion  -general ill feeling or flu-like symptoms  -pain on swallowing  -redness, blistering, peeling or loosening of the skin, including inside the mouth or nose  -signs and symptoms of bleeding such as bloody or black, tarry stools; red or dark-brown urine; spitting up blood or brown material that looks like coffee grounds; red spots on the skin; unusual bruising or bleeding from the eye, gums, or nose  -trouble passing urine or change in  the amount of urine  -unusually weak or tired  -yellowing of the eyes or skin  Side effects that usually do not require medical attention (report to your doctor or health care professional if they continue or are bothersome):  -diarrhea or constipation  -headache  -nausea, vomiting  -stomach gas, heartburn  This list may not describe all possible side effects. Call your doctor for medical advice about side effects. You may report side effects to FDA at 5-480-HQF-2585.  Where should I keep my medicine?  Keep out of the reach of children.  Store at room temperature between 15 and 30 degrees C (59 and 86 degrees F). Protect from heat and moisture. Do not use this medicine if it has a strong vinegar smell. Throw away any unused medicine after the expiration date.  NOTE: This sheet is a summary. It may not cover all possible information. If you have questions about this medicine, talk to your doctor, pharmacist, or health care provider.  © 2018 [employer] Standard (2014-08-19 11:30:31)      · Is patient discharged on Warfarin / Coumadin?   No     Depression / Suicide Risk    As you are discharged from this [facility] facility, it is important to learn how to keep safe from harming yourself.    Recognize the warning signs:  · Abrupt changes in personality, positive or negative- including increase in energy   · Giving away possessions  · Change in eating patterns- significant weight changes-  positive or negative  · Change in sleeping patterns- unable to sleep or sleeping all the time   · Unwillingness or inability to communicate  · Depression  · Unusual sadness, discouragement and loneliness  · Talk of wanting to die  · Neglect of personal appearance   · Rebelliousness- reckless behavior  · Withdrawal from people/activities they love  · Confusion- inability to concentrate     If you or a loved one observes any of these behaviors or has concerns about self-harm, here's what you can do:  · Talk about it- your feelings  and reasons for harming yourself  · Remove any means that you might use to hurt yourself (examples: pills, rope, extension cords, firearm)  · Get professional help from the community (Mental Health, Substance Abuse, psychological counseling)  · Do not be alone:Call your Safe Contact- someone whom you trust who will be there for you.  · Call your local CRISIS HOTLINE 406-0108 or 439-597-3339  · Call your local Children's Mobile Crisis Response Team [place] (559) 255-7745 or www.Glokalise  · Call the toll free National Suicide Prevention Hotlines   · National Suicide Prevention Lifeline 926-633-QMBZ (7369)  · National Hope Line Network 800-SUICIDE (437-1078)

## 2019-11-27 NOTE — PROGRESS NOTES
[facility] Medicine Daily Progress Note    Date of Service  11/27/2019    Chief Complaint  79 y.o. female admitted 11/21/2019 with total hip arthroplasty on the right side    Hospital Course    79 y.o. female with  past medical history of emphysema, GERD, hiatal hernia syndrome, dyslipidemia, total hip replacement, who presented 11/21/2019 for total hip arthroplasty on the right side, that was performed on 11/21/2019. IM was consulted due to nausea and vomiting. CT was done and found to have SBO and NGT was placed overnight.      Interval Problem Update  Denied abdominal pain. Nausea and vomiting is improving. Had 2 loose stools this am and passing gas. Complaint about being hungry. NGT was pulled out by the patient. I consulted Dr. Ferrer for SBO.     Consultants/Specialty  DR. Ferrer    Code Status  full    Disposition  Remain on the floor    Review of Systems  Review of Systems   Constitutional: Negative for chills, fever and weight loss.   HENT: Negative for congestion and nosebleeds.    Eyes: Negative for blurred vision, pain, discharge and redness.   Respiratory: Negative for cough, sputum production, shortness of breath and stridor.    Cardiovascular: Negative for chest pain, palpitations and orthopnea.   Gastrointestinal: Negative for abdominal pain, diarrhea, heartburn, nausea and vomiting.   Genitourinary: Negative for dysuria, frequency and urgency.   Musculoskeletal: Negative for back pain, myalgias and neck pain.   Skin: Negative for itching and rash.   Neurological: Negative for dizziness, focal weakness, seizures and headaches.        Physical Exam  Temp:  [36.3 °C (97.4 °F)-37.2 °C (98.9 °F)] 36.3 °C (97.4 °F)  Pulse:  [71-82] 71  Resp:  [17-18] 18  BP: (108-131)/(71-78) 113/72  SpO2:  [93 %-98 %] 93 %    Physical Exam  Vitals signs reviewed.   Constitutional:       General: She is not in acute distress.     Appearance: Normal appearance.   HENT:      Head: Normocephalic and atraumatic.      Nose: No  congestion or rhinorrhea.   Eyes:      Extraocular Movements: Extraocular movements intact.      Pupils: Pupils are equal, round, and reactive to light.   Neck:      Musculoskeletal: Normal range of motion and neck supple.   Cardiovascular:      Rate and Rhythm: Normal rate and regular rhythm.      Pulses: Normal pulses.   Pulmonary:      Effort: Pulmonary effort is normal. No respiratory distress.      Breath sounds: Normal breath sounds.   Abdominal:      General: Bowel sounds are normal. There is no distension.      Palpations: Abdomen is soft.      Tenderness: There is no tenderness.   Musculoskeletal:         General: No swelling or tenderness.   Skin:     General: Skin is warm.      Findings: No erythema.   Neurological:      General: No focal deficit present.      Mental Status: She is alert and oriented to person, place, and time.         Fluids  No intake or output data in the 24 hours ending 11/27/19 0758    Laboratory  Recent Labs     11/25/19  0702 11/26/19  0511 11/27/19  0458   WBC 9.1 10.7 7.8   RBC 4.38 4.92 4.38   HEMOGLOBIN 11.1* 12.5 10.9*   HEMATOCRIT 36.7* 41.2 36.7*   MCV 83.8 83.7 83.8   MCH 25.3* 25.4* 24.9*   MCHC 30.2* 30.3* 29.7*   RDW 50.9* 51.0* 50.7*   PLATELETCT 314 425 335   MPV 8.4* 8.6* 8.8*     Recent Labs     11/25/19  1812 11/27/19  0458   SODIUM 144 139   POTASSIUM 4.8 4.6   CHLORIDE 112 109   CO2 22 23   GLUCOSE 139* 91   BUN 25* 31*   CREATININE 0.83 0.78   CALCIUM 8.8 8.0*                   Imaging  DX-CHEST-LIMITED (1 VIEW)   Final Result      1.  No acute cardiac or pulmonary abnormalities are identified.   2.  Stable mildly enlarged cardiac silhouette.   3.  Large hiatal hernia is again seen.   4.  NG tube is not identified within the expected course of the esophagus. The extensive portion of the NG tube is visualized over the right lateral hemithorax and soft tissues.      DX-ABDOMEN FOR TUBE PLACEMENT   Final Result      1.  Enteric tube not visualized   2.  Small bowel  obstruction or less likely ileus unchanged      DX-ABDOMEN FOR TUBE PLACEMENT   Final Result      1.  Enteric tube tip projects over the proximal small bowel   2.  Large hiatal hernia   3.  Small bowel obstruction, unchanged      DX-ABDOMEN FOR TUBE PLACEMENT   Final Result      1.  Enteric tube not visualized   2.  Large hiatal hernia   3.  Ongoing small bowel obstruction      US-RUQ   Final Result      1.  Cholelithiasis   2.  RIGHT hepatic cyst   3.  RIGHT renal cyst   4.  Pancreas obscured by bowel gas      CT-ABDOMEN-PELVIS WITH   Final Result      1.  Small bowel obstruction with transition point in the jejunum in an area of wall thickening. This could be infectious or inflammatory, less likely related to ischemia or neoplastic infiltration.   2.  Trace free fluid in the LEFT paracolic gutter   3.  Large hiatal hernia   4.  LEFT basilar atelectasis   5.  Trace LEFT pleural fluid   6.  6 mm RIGHT middle lobe pulmonary nodule   7.  Cholelithiasis   8.  Atrophic pancreas                  DX-PELVIS-1 OR 2 VIEWS   Final Result      Postsurgical changes status post right hip arthroplasty.         DX-CHEST-2 VIEWS   Final Result      Minimal linear atelectasis in the left costophrenic angle.      Stable cardiomegaly.      Prominent hiatal hernia.              Assessment/Plan  SBO (small bowel obstruction) (HCC)  Assessment & Plan  History of large hiatal hernia  CT showed SBO  NPO  Pain control  IVF  I consulted Dr. Ferrer      Dyslipidemia- (present on admission)  Assessment & Plan  Continue lovastatin as outpatient    HTN (hypertension)- (present on admission)  Assessment & Plan  Blood pressure is under control.  Not on blood pressure medications    Hyperglycemia  Assessment & Plan   A1c 5.9    Status post right hip replacement  Assessment & Plan  Management per orthopedic service    LFT elevation  Assessment & Plan  Ultrasound cholelithiasis  Follow cmp in am    COPD (chronic obstructive pulmonary disease)  (HCC)  Assessment & Plan  Not in exacerbation.  RT protocol, DuoNeb as needed       VTE prophylaxis: aspirin 325 mg bid per ortho

## 2019-11-27 NOTE — PREADMISSION SCREENING NOTE
Pre-Admission Screening Form    Patient Information:   Name: Guillermina Recio     MRN: [id number]       : 1940      Age: 79 y.o.   Gender: female      Race: White [7]       Marital Status:  [5]  Family Contact: Ebony Spangler, Charity Crabrtee        Relationship: Friend [5]  Friend [5]  Daughter [2]  Home Phone: [phone]               Cell Phone:   [phone]    Advanced Directives: None  Code Status:  FULL  Current Attending Provider: Pb Cheung M.D.  Referring Physician: Dr. Cheung  Physiatrist Consult: Dr. Angelo Ho       Referral Date: 2019  Primary Payor Source:  [employer]  Secondary Payor Source:      Medical Information:   Date of Admission to Acute Care [place]2019  Room Number: T318/00  Rehabilitation Diagnosis: 08.51 Unilateral Hip Replacement  Immunization History   Administered Date(s) Administered   • Influenza (IM) Preservative Free 2015   • Influenza Seasonal Injectable 2012   • Influenza Vaccine Adult HD 10/07/2014, 10/31/2016, 10/30/2017, 08/15/2018, 10/01/2019   • Influenza Vaccine Pediatric Preserv Free 2008   • Influenza Vaccine Pediatric Split 2006   • Influenza Vaccine Quad Inj (Pf) 2006, 2015   • Pneumococcal Conjugate Vaccine (Prevnar/PCV-13) 03/15/2017, 08/15/2018   • Tdap Vaccine 08/15/2018     Allergies   Allergen Reactions   • [place]      Past Medical History:   Diagnosis Date   • Arthritis    • Bowel habit changes     diarrhea   • Breath shortness     COPD   • Cataract     bilateral IOL   • Chronic pain 2019    hips   • Dyslipidemia 2016   • Emphysema of lung (HCC)    • GERD (gastroesophageal reflux disease)    • Heart burn    • Hepatitis 1970's    A based on labs done    • Hiatus hernia syndrome    • High cholesterol    • History of total hip replacement     L   • Hyperlipidemia    • Macular degeneration    • Memory loss    • OA (osteoarthritis)     back, hip, shoulds, knees and  hands   • Psychiatric problem     depression   • S/P cataract extraction     bilat   • Snoring    • stomach flu 11/08/2019   • Urinary incontinence    • Wears dentures      Past Surgical History:   Procedure Laterality Date   • PB TOTAL HIP ARTHROPLASTY Right 11/21/2019    Procedure: ARTHROPLASTY, HIP, TOTAL;  Surgeon: Khang Delgado M.D.;  Location: SURGERY [facility] ORS;  Service: Orthopedics   • BLEPHAROPLASTY Bilateral 7/14/2015    Procedure: BLEPHAROPLASTY - UPPER;  Surgeon: Antonio Verma M.D.;  Location: SURGERY [facility] ORS;  Service:    • CATARACT PHACO WITH IOL  3/13/2012    Performed by ANTONIO VERMA at SURGERY SAME DAY [facility] ORS   • CATARACT PHACO WITH IOL  2/28/2012    Performed by ANTONIO VERMA at SURGERY SAME DAY [facility] ORS   • HYSTERECTOMY, TOTAL ABDOMINAL     • OTHER ORTHOPEDIC SURGERY      left hip replacement   • TUBAL LIGATION         History Leading to Admission, Conditions that Caused the Need for Rehab (CMS):     Khang Delgado M.D.   Physician   Surgery Orthopedic   OP Report   Signed   Date of Service:  11/21/2019 12:00 AM                    []Hide copied text    []Hover for details  DATE OF SERVICE:  11/21/2019     SERVICE:  Orthopedic surgery.     PREOPERATIVE DIAGNOSES:  Right hip degenerative joint disease, osteoarthritis.     POSTOPERATIVE DIAGNOSES:  Right hip degenerative joint disease,   osteoarthritis.     PROCEDURE:  Right total hip arthroplasty using DePuy Dani and Dani   uncemented total hip prosthesis with a size 2 high offset femoral component, a   size 48 mm acetabular component, a 10-degree posterior-lipped polyethylene   liner and a +9, 32 mm ceramic head.     SURGEON:  Khang Delgado MD     ASSISTANT SURGEON:  Chapito Inman PA-C, [facility]     ANESTHETIC:  General.     ANESTHESIOLOGIST:  Kristy De La O MD     COMPLICATIONS:  None.     BLOOD LOSS:  140 mL.     MEASURES USED TO DECREASE THE CHANCES OF INFECTION:  1.  Mupirocin ointment  "nasally.  2.  A 24-hour Hibiclens wash.  3.  Perioperative Ancef.  4.  Use of tranexamic acid to decrease the total surgical time.  5.  I personally washed the entire area of the hip and the entire right lower   extremity myself with isopropyl alcohol before the routine scrub.  6.  Betadine wash and tissue massage during the operation.  7.  We irrigated at least 5 times with pulsatile lavage throughout the   operation.  8.  Placement of 1000 mg of vancomycin powder in the wound.  9.  Use of Prineo Dermabond mesh glue closure, which has been shown to   decrease the incidence of infection.        Tomy Brar M.D.   Physician   [facility] Medicine   Consults   Addendum   Date of Service:  11/25/2019  8:57 PM               Addendum             []Hide copied text    [][place] for details  Hospital Medicine Consultation     Date of Service  11/25/2019     Referring Physician  Khang Delgado M.D.     Consulting Physician  Tomy Brar M.D.     Reason for Consultation  Nausea and vomiting     History of Presenting Illness  79 y.o. female with  past medical history of emphysema, GERD, hiatal hernia syndrome, dyslipidemia, total hip replacement, who presented 11/21/2019 for total hip arthroplasty on the right side, that was performed on 11/21/2019.  Postop.  Was not complicated and patient was planned for discharge to rehab.  However, for the last 24 hours since yesterday patient's has been experiencing constant nausea, throwing up watery green bilious liquid.  Patient unable to tolerate oral intake.  Patient is currently feeling uncomfortable and holding the bag with greenish vomit.  Abdomen is distended.  According to her last bowel movements was \" probably yesterday\".  Patient does have some diffuse abdominal discomfort.  Reportedly she started having left upper and left lower quadrant pain today.  Patient does not provide me with details.  Patient is poor historian and gets irritated by any questions.             "     Assessment/Plan  Nausea and vomiting  Assessment & Plan  History of large hiatal hernia  Abdomen is benign, but distended  Intractable nausea and vomiting  Will order CT abdomen with contrast to rule out obstruction  Right upper quadrant ultrasound  Zofran as needed  Place NG tube to low suction  Keep n.p.o. for now     Dyslipidemia- (present on admission)  Assessment & Plan  Continue lovastatin as outpatient     HTN (hypertension)- (present on admission)  Assessment & Plan  Blood pressure is under control.  Not on blood pressure medications     Hyperglycemia  Assessment & Plan  We will check A1c     Status post right hip replacement  Assessment & Plan  Management per orthopedic service     LFT elevation  Assessment & Plan  Follow-up with right upper quadrant ultrasound result     COPD (chronic obstructive pulmonary disease) (HCC)  Assessment & Plan  Not in exacerbation.  RT protocol, DuoNeb as needed                    Steve Ferrer M.D.   Physician   Surgery General   Consults   Signed   Date of Service:  11/26/2019  2:27 PM               Expand All Collapse All      []Hide copied text    []Hover for details  Surgical History and Physical     Date of Service: 11/26/2019     Requesting Physician: Pb Cheung MD - Hospitalist     PCP: Elizabeth Molina M.D.     Reason for Consult: Radiographic small bowel obstruction     HPI: This is a 79 y.o. female who is POD5 from a hip arthroplasty who developed nausea, vomiting, and abdominal distention in the preceding 24 hours.  Overnight a CT was performed and showed findings with a small bowel obstruction along the jejunum.  An NGT was placed, but subsequently came out.     The patient was evaluated at bedside in her room.  Her daughter was present.  She denies any nausea, vomiting, abdominal distention, fevers, chills, fatigue, malaise, or abdominal pain.  She is begging me to let her eat something.  Her last bowel movement was earlier today, x2, loose.        Assessment:  "  1) Small Bowel Obstruction:     No clinical evidence of obstruction at this time.  Afebrile.  Normal vitals.  Normal labs.  Abdominal exam benign.  Suspect this was post-op ileus versus partial small bowel obstruction from prior open tubal ligation through a lower midline abdominal incision.  No follow up imaging warranted at this time.     Plan    Clear liquid diet, OK for jello and applesauce too  Resume all scheduled and PRN oral medications  Morning labs  Day RN updated as to diet order  ____________________________________   Robi Ho D.O.   Physician   Physical Medicine & Rehab   Consults   Signed   Date of Service:  11/26/2019  4:17 PM            Consult Orders   IP CONSULT FOR PHYSIATRY [[id number]] ordered by Pb Cheung M.D. at 11/26/19 1358          Expand All Collapse All      []Hide copied text    []Hover for details                                                  Physical Medicine and Rehabilitation Consultation                                                                                      Initial Consult        Date of Consultation: 11/26/2019  Consulting provider: Pb Cheung MD   Reason for consultation: assess for acute inpatient rehab appropriateness  LOS: 5 Day(s)     Chief complaint: Right Total Hip Arthroplasty     HPI: \"Harinder\" is a 79 y.o. right hand dominant female with a past medical history of arthritis, COPD, chronic pain in the hips, dyslipidemia, GERD, heartburn, hyperlipidemia, left total hip replacement, macular degeneration;  who presented on 11/21/2019  6:43 AM for a right total hip with Dr. Delgado.  Postop recovery was complicated by 2 days of constant nausea and bilious green vomiting, secondary to small bowel obstruction along the jejunum. An NG tube was placed on 11/26 but subsequently came out. Patient placed on a clear liquid diet by Dr. Ferrer. A RUQ US also found cholelithiasis.      The patient currently reports good energy, and eagerness to " start rehab.  Patient is sparingly eating at [facility], states if she eats too much she will get nauseas.         PROCEDURE:  Right total hip arthroplasty  11/21/2019  SURGEON:  Khang Delgado MD     ROS:  Pertinent positives are listed in HPI, all other systems reviewed and are negative        Social Hx:  Pre-morbidly, this patient lived in a single level home with One steps to enter, alone and able to care for self.   Employment: retired   Tobacco: denies   Alcohol: denies   Drugs: denies         Patient's daughter is coming to live with her for a month to help out.      Current level of function:  The patient was evaluated by acute care Physical Therapy and Occupational Therapy; currently requiring minimal assistance for mobility and maximal assistance for ADLs              ASSESSMENT:  Patient is a 79 y.o. female POD #6 s/p Right JAIME now with SBO and on a full liquid diet.      Rehabilitation: Impaired ADLs and mobility  Patient is a good candidate for inpatient rehab based on needs for PT, OT.  Patient will also benefit from family training.  Patient has a good discharge situation which will be home with daughter who can provide 24hr assistance.   Barriers to transfer include: resolution of SBO, TCCs to verify disposition, medical clearance and bed availability      Right Total Hip Arthroplasty   - continue PT/OT  - Pain control as detailed below   - Expected transfer to [facility] pending resolution of SBO      SBO  - management per primary team   - Full liquid diet  - Monitor electrolyte abnormalities   - Multivitamin daily      Pain  - Tylenol 1,000 mg Q6 hrs PRN   - Morphine injections - not using, recommend DC   - Oxycodone 5-10 mg Q3 hrs PRN (Used once at 5 mg)     GI PPX  - prilosec 20mg daily      Anemia   - hgb 12.5, improved from 11.1 yesterday.   - Monitor      Hyperglycemia   - Glycohemoglobin 5.9% with    - diet controlled  - monitor      DVT Prophylaxis:   - SCDs         Discussed with  pt, summarized hospitalization and care, options for next step of care  Labs reviewed   Imaging personally reviewed   Discussed with team about recommendations      Thank you for allowing us to participate in the care of this patient.         Discussed with patient about recommendations for and plan for rehabilitation as follows. Patient with multiple co-morbidities(including but not limited to Small bowel obstruction, anemia, hyperglycemia, COPD); with and functional deficits in mobility/self-care, and Moderate de-conditioning.      Pre-morbidly, this patient lived in a single level home with One steps to enter, alone and able to care for self. The patient was evaluated by acute care Physical Therapy and Occupational Therapy; currently requiring minimal assistance for mobility and maximal assistance for ADLs     The patient is a(n) Good candidate for an acute inpatient rehabilitation program with a coordinated program of care at an intensity and frequency not available at a lower level of care.      Note: if patient continues to progress while waiting for medical clearance, and no longer requires 2 of of 3 therapy services (PT/OT/SLP) at a CGA/Minimal assistance level, patient will no longer need acute inpatient rehabilitation.     This recommendation is substantiated by the patient's current medical condition with intervention and assessment of medical issues requiring an acute level of care for patient's safety and maximum outcome. A coordinated program of care will be provided by an interdisciplinary team including physical therapy, occupational therapy, physiatry and rehab nursing. Rehab goals include improved mobility, self-care management, strength and conditioning/endurance, pain management, bowel and bladder management, mood and affect, and safety with independent home management including caregiver training.      Estimated length of stay is approximately 14 days. Rehab potential: Very Good. Disposition: to  pre-morbid independent living setting with supportive care of patient's family. We will continue to follow with you in anticipation of discharge to acute inpatient rehabilitation when medically stable to do so at the discretion of her  attending physician. Thank you for allowing us to participate in her care. Please call with any questions regarding this recommendation.     Angelo Ho, DO   Physical Medicine and Rehabilitation   11/26/2019                    Co-morbidities: See above  Potential Risk - Complications: Deep Vein Thrombosis, Malnutrition, Pain, Pneumonia, Pressure Ulcer and Infection  Level of Risk: High    Ongoing Medical Management Needed (Medical/Nursing Needs):   Patient Active Problem List    Diagnosis Date Noted   • SBO (small bowel obstruction) ([facility]) 11/25/2019     Priority: High   • Chest pain 09/12/2017     Priority: Medium   • Obesity (BMI 30-39.9) 12/16/2016     Priority: Medium   • Abnormal stress test 12/16/2016     Priority: Medium   • Dyslipidemia 08/01/2016     Priority: Medium   • HTN (hypertension) 06/25/2009     Priority: Medium   • Chronic use of opiate drugs therapeutic purposes 07/07/2017     Priority: Low   • Chronic bilateral low back pain without sciatica 07/07/2017     Priority: Low   • Daytime somnolence 12/16/2016     Priority: Low   • Insomnia 12/16/2016     Priority: Low   • Gastroesophageal reflux disease 12/16/2016     Priority: Low   • Vitamin D deficiency 12/16/2016     Priority: Low   • Chronic narcotic use 12/16/2016     Priority: Low   • Controlled substance agreement signed 12/16/2016     Priority: Low   • Chronic pain of both shoulders 12/16/2016     Priority: Low   • Chronic left-sided low back pain without sciatica 12/16/2016     Priority: Low   • Scalp itch 06/29/2016     Priority: Low   • Polypharmacy 06/29/2016     Priority: Low   • Dermatochalasis 07/14/2015     Priority: Low   • VAGINAL LESION 11/10/2009     Priority: Low   • Pelvic pain 11/10/2009      "Priority: Low   • Osteopenia 06/25/2009     Priority: Low   • Dysuria 06/25/2009     Priority: Low   • Anemia 06/25/2009     Priority: Low   • Hip pain 06/25/2009     Priority: Low   • Elbow pain 06/25/2009     Priority: Low   • COPD (chronic obstructive pulmonary disease) ([facility]) 11/25/2019   • LFT elevation 11/25/2019   • Status post right hip replacement 11/25/2019   • Hyperglycemia 11/25/2019   • Impaired vision 05/28/2019   • 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 05/28/2019   • Neck pain on right side 02/27/2019   • Hip pain, chronic, right 04/12/2018   • Hearing difficulty of both ears 04/12/2018   • Preventative health care 10/30/2017       Current Vital Signs:   Temperature: 37.1 °C (98.7 °F) Pulse: 62 Respiration: 16 Blood Pressure : 106/62  Weight: 68.8 kg (151 lb 10.8 oz) Height: 148.2 cm (4' 10.35\")  Pulse Oximetry: 98 % O2 (LPM): 1      Completed Laboratory Reports:  Recent Labs     11/25/19  0702 11/25/19  1812 11/26/19  0511 11/27/19  0458   WBC 9.1  --  10.7 7.8   HEMOGLOBIN 11.1*  --  12.5 10.9*   HEMATOCRIT 36.7*  --  41.2 36.7*   PLATELETCT 314  --  425 335   SODIUM  --  144  --  139   POTASSIUM  --  4.8  --  4.6   BUN  --  25*  --  31*   CREATININE  --  0.83  --  0.78   ALBUMIN  --  3.3  --  2.8*   GLUCOSE  --  139*  --  91     Additional Labs: Not Applicable    Prior Living Situation:   Housing / Facility: 1 Story Apartment / Condo  Steps Into Home: 1  Steps In Home: 0  Lives with - Patient's Self Care Capacity: Alone and Unable to Care For Self  Equipment Owned: 4-Wheel Walker, Quad Cane, Single Point Cane    Prior Level of Function / Living Situation:   Physical Therapy: Prior Services: Housekeeping / Homemaker Services, Other (Comments)  Housing / Facility: 1 Story Apartment / Condo  Steps Into Home: 1  Steps In Home: 0  Bathroom Set up: Bathtub / Shower Combination, Shower Chair  Equipment Owned: 4-Wheel Walker, Quad Cane, Single Point Cane  Lives with - Patient's " Self Care Capacity: Alone and Unable to Care For Self  Bed Mobility: Independent  Transfer Status: Independent  Ambulation: Independent  Distance Ambulation (Feet): (community)  Assistive Devices Used: None  Current Level of Function:   Level Of Assist: Minimal Assist  Assistive Device: Front Wheel Walker  Distance (Feet): 50  Deviation: Bradykinetic, Shuffled Gait  Weight Bearing Status: WBAT Rt LE// Post Prec  Skilled Intervention: Verbal Cuing  Comments: Pt conts to improve her amb distances. Overall amb efforts are limited by pt c/o weakness. + bowel obstruction and now has NGT  Supine to Sit: Minimal Assist(HOB flat and use of railing from the Lft side)  Sit to Supine: (pt left up in the chair)  Scooting: Supervised(seated)  Rolling: Minimum Assist to Lt.(w/railing)  Skilled Intervention: Verbal Cuing, Postural Facilitation, Compensatory Strategies  Comments: NT, up to chair pre and post   Sit to Stand: Supervised  Bed, Chair, Wheelchair Transfer: Minimal Assist  Toilet Transfers: Minimal Assist  Transfer Method: Stand Pivot(with FWW)  Skilled Intervention: Compensatory Strategies, Postural Facilitation, Tactile Cuing, Verbal Cuing  Comments: Pt conts to need min assist w/functional transfers and cueing for Rt LE placement to maintain her hip precautions.   Sitting in Chair: up pre and post   Sitting Edge of Bed: NT  Standing: 10 min total   Occupational Therapy:   Self Feeding: Independent  Grooming / Hygiene: Independent  Bathing: Independent  Dressing: Independent  Toileting: Independent  Medication Management: Independent  Laundry: Dependent  Kitchen Mobility: Independent  Finances: Independent  Home Management: Requires Assist  Shopping: Requires Assist  Prior Level Of Mobility: Supervision With Device in Community, Independent With Device in Home(Quad cane )  Driving / Transportation: Relatives / Others Provide Transportation  Prior Services: Housekeeping / Homemaker Services, Other (Comments)  Housing /  Facility[facility] [address] [facility]  Current Level of Function:   Eating: Supervision(set-up)  Bathing: Minimal Assist(partial sponge bath at sink )  Lower Body Dressing: Not Tested  Toileting: Maximal Assist(coty care following urination )  Skilled Intervention: Compensatory Strategies, Postural Facilitation, Verbal Cuing  Speech Language Pathology:      Rehabilitation Prognosis/Potential: Good  Estimated Length of Stay: 14 days    Nursing:   Orientation : Oriented x 4  Continent    Scope/Intensity of Services Recommended:  Physical Therapy: 1.5 hr / day  5 days / week. Therapeutic Interventions Required: Maximize Endurance, Mobility, Strength and Safety  Occupational Therapy: 1.5 hr / day 5 days / week. Therapeutic Interventions Required: Maximize Self Care, ADLs, IADLs and Energy Conservation  Rehabilitation Nursin/. Therapeutic Interventions Required: Monitor Pain, Skin, Vital Signs, Intake and Output, Labs, Safety, Family Training and Bowel & Bladder regimen; DVT  Prophylaxis; Rt hip surgical incision care; Infection control; ADL's.   Rehabilitation Physician: 3 - 5 days / week. Therapeutic Interventions Required: Medical Management  Respiratory Care: Daily. Therapeutic Interventions Required: Pulmonary Toileting, O2 Weaning and Respiratory care per protocol.    Dietician: Consult. Therapeutic Interventions Required: Nutritional evaluation with recommendations to promote optimal heatlh/healing.     Rehabilitation Goals and Plan (Expected frequency & duration of treatment in the IRF):   Return to the Community, Modified Independent Level of Care, Outpatient Support and Family Able to Provide  Assistance  Anticipated Date of Rehabilitation Admission: 2019  Patient/Family oriented IRF level of care/facility/plan: Yes  Patient/Family willing to participate in IRF care/facility/plan: Yes  Patient able to tolerate IRF level of care proposed: Yes  Patient has potential to benefit IRF level of care  proposed: Yes  Comments: Not Applicable    Special Needs or Precautions - Medical Necessity:  Safety Concerns/Precautions:  Fall Risk / High Risk for Falls, Balance and Bed / Chair Alarm  Complex Wound Care: Right hip surgical incision care  Pain Management  Requires Oxygen  Weight-bearing Status: As Tolerated, Right, Lower Extremity     Diet:   DIET ORDERS (From admission to next 24h)     Start     Ordered    11/26/19 1341  Diet Order Clear Liquid  ALL MEALS     Question:  Diet:  Answer:  Clear Liquid    11/26/19 1340                Anticipated Discharge Destination / Patient/Family Goal:  Destination: Home with Assistance Support System: Family   Anticipated home health services: OT, PT and Nursing  Previously used HH service/ provider: Not Applicable  Anticipated DME Needs: To be determined  Outpatient Services: To be determined  Alternative resources to address additional identified needs:   N/A  Pre-Screen Completed: 11/27/2019 9:26 AM Sushila Lawrence R.N.

## 2019-11-27 NOTE — PROGRESS NOTES
Trauma / Surgical Daily Progress Note    Date of Service  11/27/2019    Interval Events  No nausea or emesis overnight  No abdominal pain  Sitting on commode having a bowel movement, loose    -Advance diet as tolerated  -Patient requesting Vanilla Boost Shakes  -No indication for abdominal surgery, will sign off    Review of Systems  ROS     Vital Signs  Temp:  [36.3 °C (97.4 °F)-37.2 °C (98.9 °F)] 37.1 °C (98.7 °F)  Pulse:  [62-82] 62  Resp:  [16-18] 16  BP: (106-117)/(62-77) 106/62  SpO2:  [93 %-98 %] 98 %    Physical Exam  Physical Exam  Vitals signs and nursing note reviewed.   Constitutional:       General: She is awake.      Appearance: Normal appearance. She is morbidly obese.   Cardiovascular:      Rate and Rhythm: Normal rate and regular rhythm.      Pulses: Normal pulses.   Pulmonary:      Effort: Pulmonary effort is normal.      Breath sounds: Normal breath sounds and air entry.   Abdominal:      General: Abdomen is protuberant. A surgical scar is present.      Palpations: Abdomen is soft.      Tenderness: There is no tenderness.   Neurological:      Mental Status: She is alert.   Psychiatric:         Behavior: Behavior is cooperative.         Laboratory  Recent Results (from the past 24 hour(s))   CBC without Differential    Collection Time: 11/27/19  4:58 AM   Result Value Ref Range    WBC 7.8 4.8 - 10.8 K/uL    RBC 4.38 4.20 - 5.40 M/uL    Hemoglobin 10.9 (L) 12.0 - 16.0 g/dL    Hematocrit 36.7 (L) 37.0 - 47.0 %    MCV 83.8 81.4 - 97.8 fL    MCH 24.9 (L) 27.0 - 33.0 pg    MCHC 29.7 (L) 33.6 - 35.0 g/dL    RDW 50.7 (H) 35.9 - 50.0 fL    Platelet Count 335 164 - 446 K/uL    MPV 8.8 (L) 9.0 - 12.9 fL   Comp Metabolic Panel    Collection Time: 11/27/19  4:58 AM   Result Value Ref Range    Sodium 139 135 - 145 mmol/L    Potassium 4.6 3.6 - 5.5 mmol/L    Chloride 109 96 - 112 mmol/L    Co2 23 20 - 33 mmol/L    Anion Gap 7.0 0.0 - 11.9    Glucose 91 65 - 99 mg/dL    Bun 31 (H) 8 - 22 mg/dL    Creatinine 0.78  0.50 - 1.40 mg/dL    Calcium 8.0 (L) 8.5 - 10.5 mg/dL    AST(SGOT) 35 12 - 45 U/L    ALT(SGPT) 16 2 - 50 U/L    Alkaline Phosphatase 50 30 - 99 U/L    Total Bilirubin 0.4 0.1 - 1.5 mg/dL    Albumin 2.8 (L) 3.2 - 4.9 g/dL    Total Protein 5.5 (L) 6.0 - 8.2 g/dL    Globulin 2.7 1.9 - 3.5 g/dL    A-G Ratio 1.0 g/dL   ESTIMATED GFR    Collection Time: 11/27/19  4:58 AM   Result Value Ref Range    GFR If African American >60 >60 mL/min/1.73 m 2    GFR If Non African American >60 >60 mL/min/1.73 m 2       Fluids  No intake or output data in the 24 hours ending 11/27/19 0907    Core Measures & Quality Metrics  Core Measures & Quality Metrics  ANGEL Score  ETOH Screening    Assessment/Plan  1) Small Bowel Obstruction:    Plan as outlined above    Robi Ferrer MD
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
## 2019-11-27 NOTE — CARE PLAN
Problem: Safety  Goal: Will remain free from injury  Outcome: PROGRESSING AS EXPECTED  Note:   Oriented pt to new environment. Call light is within easy reach. Encouraged pt to use call light when assistance is needed, pt is wearing non skid socks.      Problem: Communication  Goal: The ability to communicate needs accurately and effectively will improve  Note:   Pt is AO x 4. Pt is able to communicate needs to staff effectively.

## 2019-11-28 ENCOUNTER — APPOINTMENT (OUTPATIENT)
Dept: RADIOLOGY | Facility: REHABILITATION | Age: 79
DRG: 560 | End: 2019-11-28
Attending: HOSPITALIST
Payer: MEDICARE

## 2019-11-28 ENCOUNTER — APPOINTMENT (OUTPATIENT)
Dept: RADIOLOGY | Facility: REHABILITATION | Age: 79
DRG: 560 | End: 2019-11-28
Attending: PHYSICAL MEDICINE & REHABILITATION
Payer: MEDICARE

## 2019-11-28 PROBLEM — K56.7 ILEUS (HCC): Status: ACTIVE | Noted: 2019-11-25

## 2019-11-28 PROBLEM — R79.89 AZOTEMIA: Status: ACTIVE | Noted: 2019-11-28

## 2019-11-28 PROBLEM — R91.1 LUNG NODULE: Status: ACTIVE | Noted: 2019-11-28

## 2019-11-28 LAB
25(OH)D3 SERPL-MCNC: 21 NG/ML (ref 30–100)
ALBUMIN SERPL BCP-MCNC: 3.3 G/DL (ref 3.2–4.9)
ALBUMIN/GLOB SERPL: 1.4 G/DL
ALP SERPL-CCNC: 57 U/L (ref 30–99)
ALT SERPL-CCNC: 15 U/L (ref 2–50)
ANION GAP SERPL CALC-SCNC: 9 MMOL/L (ref 0–11.9)
ANISOCYTOSIS BLD QL SMEAR: ABNORMAL
AST SERPL-CCNC: 25 U/L (ref 12–45)
BASOPHILS # BLD AUTO: 0.4 % (ref 0–1.8)
BASOPHILS # BLD: 0.03 K/UL (ref 0–0.12)
BILIRUB SERPL-MCNC: 0.3 MG/DL (ref 0.1–1.5)
BUN SERPL-MCNC: 25 MG/DL (ref 8–22)
BURR CELLS BLD QL SMEAR: NORMAL
CALCIUM SERPL-MCNC: 8.7 MG/DL (ref 8.5–10.5)
CHLORIDE SERPL-SCNC: 109 MMOL/L (ref 96–112)
CO2 SERPL-SCNC: 25 MMOL/L (ref 20–33)
COMMENT 1642: NORMAL
CREAT SERPL-MCNC: 0.84 MG/DL (ref 0.5–1.4)
EOSINOPHIL # BLD AUTO: 0.03 K/UL (ref 0–0.51)
EOSINOPHIL NFR BLD: 0.4 % (ref 0–6.9)
ERYTHROCYTE [DISTWIDTH] IN BLOOD BY AUTOMATED COUNT: 50.7 FL (ref 35.9–50)
GLOBULIN SER CALC-MCNC: 2.4 G/DL (ref 1.9–3.5)
GLUCOSE SERPL-MCNC: 84 MG/DL (ref 65–99)
HCT VFR BLD AUTO: 39.3 % (ref 37–47)
HGB BLD-MCNC: 11.4 G/DL (ref 12–16)
HYPOCHROMIA BLD QL SMEAR: ABNORMAL
IMM GRANULOCYTES # BLD AUTO: 0.23 K/UL (ref 0–0.11)
IMM GRANULOCYTES NFR BLD AUTO: 3.3 % (ref 0–0.9)
LYMPHOCYTES # BLD AUTO: 1.24 K/UL (ref 1–4.8)
LYMPHOCYTES NFR BLD: 17.6 % (ref 22–41)
MAGNESIUM SERPL-MCNC: 2.3 MG/DL (ref 1.5–2.5)
MCH RBC QN AUTO: 24.6 PG (ref 27–33)
MCHC RBC AUTO-ENTMCNC: 29 G/DL (ref 33.6–35)
MCV RBC AUTO: 84.7 FL (ref 81.4–97.8)
MICROCYTES BLD QL SMEAR: ABNORMAL
MONOCYTES # BLD AUTO: 0.75 K/UL (ref 0–0.85)
MONOCYTES NFR BLD AUTO: 10.6 % (ref 0–13.4)
MORPHOLOGY BLD-IMP: NORMAL
NEUTROPHILS # BLD AUTO: 4.78 K/UL (ref 2–7.15)
NEUTROPHILS NFR BLD: 67.7 % (ref 44–72)
NRBC # BLD AUTO: 0 K/UL
NRBC BLD-RTO: 0 /100 WBC
OVALOCYTES BLD QL SMEAR: NORMAL
PLATELET # BLD AUTO: 327 K/UL (ref 164–446)
PLATELET BLD QL SMEAR: NORMAL
PMV BLD AUTO: 8.8 FL (ref 9–12.9)
POIKILOCYTOSIS BLD QL SMEAR: NORMAL
POTASSIUM SERPL-SCNC: 5.1 MMOL/L (ref 3.6–5.5)
PROT SERPL-MCNC: 5.7 G/DL (ref 6–8.2)
RBC # BLD AUTO: 4.64 M/UL (ref 4.2–5.4)
RBC BLD AUTO: PRESENT
SODIUM SERPL-SCNC: 143 MMOL/L (ref 135–145)
WBC # BLD AUTO: 7.1 K/UL (ref 4.8–10.8)

## 2019-11-28 PROCEDURE — A9270 NON-COVERED ITEM OR SERVICE: HCPCS | Performed by: PHYSICAL MEDICINE & REHABILITATION

## 2019-11-28 PROCEDURE — 770010 HCHG ROOM/CARE - REHAB SEMI PRIVAT*

## 2019-11-28 PROCEDURE — 80053 COMPREHEN METABOLIC PANEL: CPT

## 2019-11-28 PROCEDURE — 99232 SBSQ HOSP IP/OBS MODERATE 35: CPT | Performed by: PHYSICAL MEDICINE & REHABILITATION

## 2019-11-28 PROCEDURE — A9270 NON-COVERED ITEM OR SERVICE: HCPCS | Performed by: HOSPITALIST

## 2019-11-28 PROCEDURE — 97166 OT EVAL MOD COMPLEX 45 MIN: CPT

## 2019-11-28 PROCEDURE — 700102 HCHG RX REV CODE 250 W/ 637 OVERRIDE(OP): Performed by: PHYSICAL MEDICINE & REHABILITATION

## 2019-11-28 PROCEDURE — 85025 COMPLETE CBC W/AUTO DIFF WBC: CPT

## 2019-11-28 PROCEDURE — 36415 COLL VENOUS BLD VENIPUNCTURE: CPT

## 2019-11-28 PROCEDURE — 82306 VITAMIN D 25 HYDROXY: CPT

## 2019-11-28 PROCEDURE — 92523 SPEECH SOUND LANG COMPREHEN: CPT

## 2019-11-28 PROCEDURE — 74018 RADEX ABDOMEN 1 VIEW: CPT

## 2019-11-28 PROCEDURE — 83735 ASSAY OF MAGNESIUM: CPT

## 2019-11-28 PROCEDURE — 97161 PT EVAL LOW COMPLEX 20 MIN: CPT

## 2019-11-28 PROCEDURE — 700102 HCHG RX REV CODE 250 W/ 637 OVERRIDE(OP): Performed by: HOSPITALIST

## 2019-11-28 PROCEDURE — 94760 N-INVAS EAR/PLS OXIMETRY 1: CPT

## 2019-11-28 PROCEDURE — 97530 THERAPEUTIC ACTIVITIES: CPT

## 2019-11-28 PROCEDURE — 99222 1ST HOSP IP/OBS MODERATE 55: CPT | Performed by: HOSPITALIST

## 2019-11-28 PROCEDURE — 92610 EVALUATE SWALLOWING FUNCTION: CPT

## 2019-11-28 RX ORDER — BUDESONIDE AND FORMOTEROL FUMARATE DIHYDRATE 80; 4.5 UG/1; UG/1
2 AEROSOL RESPIRATORY (INHALATION) 2 TIMES DAILY
Status: DISCONTINUED | OUTPATIENT
Start: 2019-11-28 | End: 2019-12-04

## 2019-11-28 RX ORDER — SIMETHICONE 80 MG
160 TABLET,CHEWABLE ORAL
Status: DISCONTINUED | OUTPATIENT
Start: 2019-11-28 | End: 2019-11-30

## 2019-11-28 RX ADMIN — ACETAMINOPHEN 1000 MG: 500 TABLET, FILM COATED ORAL at 09:18

## 2019-11-28 RX ADMIN — NYSTATIN: 100000 POWDER TOPICAL at 10:52

## 2019-11-28 RX ADMIN — SENNOSIDES AND DOCUSATE SODIUM 2 TABLET: 8.6; 5 TABLET ORAL at 09:19

## 2019-11-28 RX ADMIN — CHOLECALCIFEROL (VITAMIN D3) 10 MCG (400 UNIT) TABLET 400 UNITS: at 09:20

## 2019-11-28 RX ADMIN — SENNOSIDES AND DOCUSATE SODIUM 2 TABLET: 8.6; 5 TABLET ORAL at 20:30

## 2019-11-28 RX ADMIN — SIMETHICONE CHEW TAB 80 MG 160 MG: 80 TABLET ORAL at 20:32

## 2019-11-28 RX ADMIN — BUDESONIDE AND FORMOTEROL FUMARATE DIHYDRATE 2 PUFF: 80; 4.5 AEROSOL RESPIRATORY (INHALATION) at 20:38

## 2019-11-28 RX ADMIN — ASPIRIN 325 MG: 325 TABLET, COATED ORAL at 11:51

## 2019-11-28 RX ADMIN — OMEPRAZOLE 20 MG: 20 CAPSULE, DELAYED RELEASE ORAL at 11:51

## 2019-11-28 RX ADMIN — SIMETHICONE CHEW TAB 80 MG 160 MG: 80 TABLET ORAL at 11:51

## 2019-11-28 RX ADMIN — NYSTATIN: 100000 POWDER TOPICAL at 20:38

## 2019-11-28 RX ADMIN — OXYCODONE HYDROCHLORIDE 5 MG: 5 TABLET ORAL at 09:19

## 2019-11-28 RX ADMIN — OXYCODONE HYDROCHLORIDE 5 MG: 5 TABLET ORAL at 20:30

## 2019-11-28 RX ADMIN — LOVASTATIN 40 MG: 20 TABLET ORAL at 20:33

## 2019-11-28 RX ADMIN — SIMETHICONE CHEW TAB 80 MG 160 MG: 80 TABLET ORAL at 17:27

## 2019-11-28 ASSESSMENT — PATIENT HEALTH QUESTIONNAIRE - PHQ9
5. POOR APPETITE OR OVEREATING: SEVERAL DAYS
SUM OF ALL RESPONSES TO PHQ QUESTIONS 1-9: 5
4. FEELING TIRED OR HAVING LITTLE ENERGY: SEVERAL DAYS
7. TROUBLE CONCENTRATING ON THINGS, SUCH AS READING THE NEWSPAPER OR WATCHING TELEVISION: NOT AT ALL
2. FEELING DOWN, DEPRESSED, IRRITABLE, OR HOPELESS: NOT AT ALL
9. THOUGHTS THAT YOU WOULD BE BETTER OFF DEAD, OR OF HURTING YOURSELF: NOT AT ALL
8. MOVING OR SPEAKING SO SLOWLY THAT OTHER PEOPLE COULD HAVE NOTICED. OR THE OPPOSITE, BEING SO FIGETY OR RESTLESS THAT YOU HAVE BEEN MOVING AROUND A LOT MORE THAN USUAL: NOT AT ALL
SUM OF ALL RESPONSES TO PHQ9 QUESTIONS 1 AND 2: 1
3. TROUBLE FALLING OR STAYING ASLEEP OR SLEEPING TOO MUCH: MORE THAN HALF THE DAYS
6. FEELING BAD ABOUT YOURSELF - OR THAT YOU ARE A FAILURE OR HAVE LET YOURSELF OR YOUR FAMILY DOWN: NOT AL ALL
1. LITTLE INTEREST OR PLEASURE IN DOING THINGS: SEVERAL DAYS

## 2019-11-28 ASSESSMENT — BRIEF INTERVIEW FOR MENTAL STATUS (BIMS)
ASKED TO RECALL BLUE: NO, COULD NOT RECALL
WHAT YEAR IS IT: CORRECT
INITIAL REPETITION OF BED BLUE SOCK - FIRST ATTEMPT: 3
WHAT YEAR IS IT: CORRECT
INITIAL REPETITION OF BED BLUE SOCK - FIRST ATTEMPT: 3
ASKED TO RECALL SOCK: YES, AFTER CUEING (SOMETHING TO WEAR")"
ASKED TO RECALL BED: NO, COULD NOT RECALL
BIMS SUMMARY SCORE: 12
ASKED TO RECALL BED: YES, NO CUE REQUIRED
WHAT DAY OF THE WEEK IS IT: CORRECT
ASKED TO RECALL BLUE: YES, NO CUE REQUIRED
BIMS SUMMARY SCORE: 11
WHAT DAY OF THE WEEK IS IT: CORRECT
WHAT MONTH IS IT: ACCURATE WITHIN 5 DAYS
ASKED TO RECALL SOCK: NO, COULD NOT RECALL
WHAT MONTH IS IT: ACCURATE WITHIN 5 DAYS

## 2019-11-28 ASSESSMENT — ENCOUNTER SYMPTOMS
ROS GI COMMENTS: BLOATING
ABDOMINAL PAIN: 0
VOMITING: 0
NAUSEA: 0
COUGH: 0
FEVER: 0
PALPITATIONS: 0
EYES NEGATIVE: 1
CHILLS: 0
SHORTNESS OF BREATH: 0

## 2019-11-28 ASSESSMENT — MONTREAL COGNITIVE ASSESSMENT (MOCA)
9. REPEAT EACH SENTENCE: 2
6. READ LIST OF DIGITS [FORWARD/BACKWARD]: 2
7. [VIGILENCE] TAP WHEN HEARING DESIGNATED LETTER: 1
ORIENTATION SUBSCORE: 6
10. [FLUENCY] NAME WORDS STARTING WITH DESIGNATED LETTER: 1
11. FOR EACH PAIR OF WORDS, WHAT CATEGORY DO THEY BELONG TO (OUT OF 2): 1
8. SERIAL SUBTRACTION OF 7S: 1
12. MEMORY INDEX SCORE: 1

## 2019-11-28 ASSESSMENT — ACTIVITIES OF DAILY LIVING (ADL): TOILETING: INDEPENDENT

## 2019-11-28 NOTE — CARE PLAN
"  Problem: Communication  Goal: The ability to communicate needs accurately and effectively will improve  Note:   Patient able to communicate needs to staff. Extra time and emotional support provided. Encouraged patient to ask questions or concerns.      Problem: Bowel/Gastric:  Goal: Normal bowel function is maintained or improved  Note:   DX-Abdomen results are back as follows: \"Persistent mild dilation of loops of small bowel in the left mid abdomen suggesting resolving or partial small bowel obstruction\". Scheduled simethicone was administered as ordered.      "

## 2019-11-28 NOTE — PROGRESS NOTES
"Rehab Progress Note     Interval Events (Subjective)  Patient was seen in her room up in her wheelchair.  She reports that she slept well. No complaints of chest pain or shortness of breath.  She reports that she started working with therapy, which went pretty well, but she has had some trouble with transfers. Pain is well-controlled.  No abdominal pain today.    Objective:  VITAL SIGNS: /46   Pulse 65   Temp 36.7 °C (98.1 °F) (Oral)   Resp 20   Ht 1.473 m (4' 10\")   Wt 68.5 kg (151 lb)   SpO2 90%   BMI 31.56 kg/m²   Gen: patient is alert and cooperative and in no acute distress  CV: RRR, nl S1, S2  Lungs: Clear to auscultation bilaterally  Abd: positive bowel sounds, nontender, nondistended  Ext: No pitting edema in the lower extremities. No calf tenderness    Recent Results (from the past 72 hour(s))   LIPASE    Collection Time: 11/25/19  6:12 PM   Result Value Ref Range    Lipase 32 11 - 82 U/L   Comp Metabolic Panel    Collection Time: 11/25/19  6:12 PM   Result Value Ref Range    Sodium 144 135 - 145 mmol/L    Potassium 4.8 3.6 - 5.5 mmol/L    Chloride 112 96 - 112 mmol/L    Co2 22 20 - 33 mmol/L    Anion Gap 10.0 0.0 - 11.9    Glucose 139 (H) 65 - 99 mg/dL    Bun 25 (H) 8 - 22 mg/dL    Creatinine 0.83 0.50 - 1.40 mg/dL    Calcium 8.8 8.5 - 10.5 mg/dL    AST(SGOT) 53 (H) 12 - 45 U/L    ALT(SGPT) 18 2 - 50 U/L    Alkaline Phosphatase 68 30 - 99 U/L    Total Bilirubin 0.4 0.1 - 1.5 mg/dL    Albumin 3.3 3.2 - 4.9 g/dL    Total Protein 6.8 6.0 - 8.2 g/dL    Globulin 3.5 1.9 - 3.5 g/dL    A-G Ratio 0.9 g/dL   ESTIMATED GFR    Collection Time: 11/25/19  6:12 PM   Result Value Ref Range    GFR If African American >60 >60 mL/min/1.73 m 2    GFR If Non African American >60 >60 mL/min/1.73 m 2   CBC without Differential    Collection Time: 11/26/19  5:11 AM   Result Value Ref Range    WBC 10.7 4.8 - 10.8 K/uL    RBC 4.92 4.20 - 5.40 M/uL    Hemoglobin 12.5 12.0 - 16.0 g/dL    Hematocrit 41.2 37.0 - 47.0 %    " MCV 83.7 81.4 - 97.8 fL    MCH 25.4 (L) 27.0 - 33.0 pg    MCHC 30.3 (L) 33.6 - 35.0 g/dL    RDW 51.0 (H) 35.9 - 50.0 fL    Platelet Count 425 164 - 446 K/uL    MPV 8.6 (L) 9.0 - 12.9 fL   CBC without Differential    Collection Time: 11/27/19  4:58 AM   Result Value Ref Range    WBC 7.8 4.8 - 10.8 K/uL    RBC 4.38 4.20 - 5.40 M/uL    Hemoglobin 10.9 (L) 12.0 - 16.0 g/dL    Hematocrit 36.7 (L) 37.0 - 47.0 %    MCV 83.8 81.4 - 97.8 fL    MCH 24.9 (L) 27.0 - 33.0 pg    MCHC 29.7 (L) 33.6 - 35.0 g/dL    RDW 50.7 (H) 35.9 - 50.0 fL    Platelet Count 335 164 - 446 K/uL    MPV 8.8 (L) 9.0 - 12.9 fL   Comp Metabolic Panel    Collection Time: 11/27/19  4:58 AM   Result Value Ref Range    Sodium 139 135 - 145 mmol/L    Potassium 4.6 3.6 - 5.5 mmol/L    Chloride 109 96 - 112 mmol/L    Co2 23 20 - 33 mmol/L    Anion Gap 7.0 0.0 - 11.9    Glucose 91 65 - 99 mg/dL    Bun 31 (H) 8 - 22 mg/dL    Creatinine 0.78 0.50 - 1.40 mg/dL    Calcium 8.0 (L) 8.5 - 10.5 mg/dL    AST(SGOT) 35 12 - 45 U/L    ALT(SGPT) 16 2 - 50 U/L    Alkaline Phosphatase 50 30 - 99 U/L    Total Bilirubin 0.4 0.1 - 1.5 mg/dL    Albumin 2.8 (L) 3.2 - 4.9 g/dL    Total Protein 5.5 (L) 6.0 - 8.2 g/dL    Globulin 2.7 1.9 - 3.5 g/dL    A-G Ratio 1.0 g/dL   ESTIMATED GFR    Collection Time: 11/27/19  4:58 AM   Result Value Ref Range    GFR If African American >60 >60 mL/min/1.73 m 2    GFR If Non African American >60 >60 mL/min/1.73 m 2   CBC with Differential    Collection Time: 11/28/19  5:35 AM   Result Value Ref Range    WBC 7.1 4.8 - 10.8 K/uL    RBC 4.64 4.20 - 5.40 M/uL    Hemoglobin 11.4 (L) 12.0 - 16.0 g/dL    Hematocrit 39.3 37.0 - 47.0 %    MCV 84.7 81.4 - 97.8 fL    MCH 24.6 (L) 27.0 - 33.0 pg    MCHC 29.0 (L) 33.6 - 35.0 g/dL    RDW 50.7 (H) 35.9 - 50.0 fL    Platelet Count 327 164 - 446 K/uL    MPV 8.8 (L) 9.0 - 12.9 fL    Neutrophils-Polys 67.70 44.00 - 72.00 %    Lymphocytes 17.60 (L) 22.00 - 41.00 %    Monocytes 10.60 0.00 - 13.40 %    Eosinophils 0.40  0.00 - 6.90 %    Basophils 0.40 0.00 - 1.80 %    Immature Granulocytes 3.30 (H) 0.00 - 0.90 %    Nucleated RBC 0.00 /100 WBC    Neutrophils (Absolute) 4.78 2.00 - 7.15 K/uL    Lymphs (Absolute) 1.24 1.00 - 4.80 K/uL    Monos (Absolute) 0.75 0.00 - 0.85 K/uL    Eos (Absolute) 0.03 0.00 - 0.51 K/uL    Baso (Absolute) 0.03 0.00 - 0.12 K/uL    Immature Granulocytes (abs) 0.23 (H) 0.00 - 0.11 K/uL    NRBC (Absolute) 0.00 K/uL    Hypochromia 1+     Anisocytosis 2+     Microcytosis 1+    Comp Metabolic Panel (CMP)    Collection Time: 11/28/19  5:35 AM   Result Value Ref Range    Sodium 143 135 - 145 mmol/L    Potassium 5.1 3.6 - 5.5 mmol/L    Chloride 109 96 - 112 mmol/L    Co2 25 20 - 33 mmol/L    Anion Gap 9.0 0.0 - 11.9    Glucose 84 65 - 99 mg/dL    Bun 25 (H) 8 - 22 mg/dL    Creatinine 0.84 0.50 - 1.40 mg/dL    Calcium 8.7 8.5 - 10.5 mg/dL    AST(SGOT) 25 12 - 45 U/L    ALT(SGPT) 15 2 - 50 U/L    Alkaline Phosphatase 57 30 - 99 U/L    Total Bilirubin 0.3 0.1 - 1.5 mg/dL    Albumin 3.3 3.2 - 4.9 g/dL    Total Protein 5.7 (L) 6.0 - 8.2 g/dL    Globulin 2.4 1.9 - 3.5 g/dL    A-G Ratio 1.4 g/dL   Magnesium    Collection Time: 11/28/19  5:35 AM   Result Value Ref Range    Magnesium 2.3 1.5 - 2.5 mg/dL   Vitamin D, 25-hydroxy (blood)    Collection Time: 11/28/19  5:35 AM   Result Value Ref Range    25-Hydroxy   Vitamin D 25 21 (L) 30 - 100 ng/mL   ESTIMATED GFR    Collection Time: 11/28/19  5:35 AM   Result Value Ref Range    GFR If African American >60 >60 mL/min/1.73 m 2    GFR If Non African American >60 >60 mL/min/1.73 m 2   PERIPHERAL SMEAR REVIEW    Collection Time: 11/28/19  5:35 AM   Result Value Ref Range    Peripheral Smear Review see below    PLATELET ESTIMATE    Collection Time: 11/28/19  5:35 AM   Result Value Ref Range    Plt Estimation Normal    MORPHOLOGY    Collection Time: 11/28/19  5:35 AM   Result Value Ref Range    RBC Morphology Present     Poikilocytosis 2+     Ovalocytes 1+     Echinocytes 1+     DIFFERENTIAL COMMENT    Collection Time: 11/28/19  5:35 AM   Result Value Ref Range    Comments-Diff see below        Current Facility-Administered Medications   Medication Frequency   • simethicone (MYLICON) chewable tab 160 mg 4X/DAY WITH MEALS + NIGHTLY   • budesonide-formoterol (SYMBICORT) 80-4.5 MCG/ACT inhaler 2 Puff BID   • Respiratory Care per Protocol Continuous RT   • Pharmacy Consult Request ...Pain Management Review 1 Each PHARMACY TO DOSE   • senna-docusate (PERICOLACE or SENOKOT S) 8.6-50 MG per tablet 2 Tab BID    And   • polyethylene glycol/lytes (MIRALAX) PACKET 1 Packet QDAY PRN    And   • magnesium hydroxide (MILK OF MAGNESIA) suspension 30 mL QDAY PRN    And   • bisacodyl (DULCOLAX) suppository 10 mg QDAY PRN   • artificial tears ophthalmic solution 1 Drop PRN   • benzocaine-menthol (CEPACOL) lozenge 1 Lozenge Q2HRS PRN   • mag hydrox-al hydrox-simeth (MAALOX PLUS ES or MYLANTA DS) suspension 20 mL Q2HRS PRN   • ondansetron (ZOFRAN ODT) dispertab 4 mg 4X/DAY PRN    Or   • ondansetron (ZOFRAN) syringe/vial injection 4 mg 4X/DAY PRN   • traZODone (DESYREL) tablet 50 mg QHS PRN   • sodium chloride (OCEAN) 0.65 % nasal spray 2 Spray PRN   • melatonin tablet 3 mg HS PRN   • lactulose 20 GM/30ML solution 30 mL QDAY PRN   • fleet enema 133 mL QDAY PRN   • aspirin EC (ECOTRIN) tablet 325 mg BID   • calcium carbonate (TUMS) chewable tab 500 mg QDAY PRN   • cholecalciferol (VITAMIN D3) tablet 400 Units DAILY   • lovastatin (MEVACOR) tablet 40 mg QHS   • nystatin (MYCOSTATIN) powder BID   • omeprazole (PRILOSEC) capsule 20 mg QDAY   • acetaminophen (TYLENOL) tablet 1,000 mg TID   • tramadol (ULTRAM) 50 MG tablet 50 mg Q4HRS PRN   • oxyCODONE immediate-release (ROXICODONE) tablet 5 mg Q4HRS PRN       Orders Placed This Encounter   Procedures   • Diet Order Full Liquid     Standing Status:   Standing     Number of Occurrences:   1     Order Specific Question:   Diet:     Answer:   Full Liquid [11]     Order  Specific Question:   Consistency/Fluid modifications:     Answer:   Thin Liquids [3]       Assessment:  Active Hospital Problems    Diagnosis   • *Status post right hip replacement   • Ileus (HCC)   • Obesity (BMI 30-39.9)   • Dyslipidemia   • HTN (hypertension)   • Gastroesophageal reflux disease   • Vitamin D deficiency   • Anemia   • Azotemia   • Lung nodule   • Impaired mobility and ADLs   • COPD (chronic obstructive pulmonary disease) (HCC)   • Hip pain, chronic, right       Medical Decision Making and Plan:  1. S/p total hip replacement.  Continue full rehabilitation program with PT/OT to improve strength, balance, coordination and ADLs    2. DVT prophylaxis: Continue aspirin.  Encouraged OOB.  Continue to monitor for signs or symptoms of DVT    3. Pain: Continue scheduled tylenol.  PRN tramadol or oxycodone    4. Bowel/Bladder: Continue program to prevent and treat constipation and urinary incontinence  - Timed voids q3h while awake.    - I&O cath for PVR >400CC    5. Skin: Continue to monitor for skin changes and continue q2h turns    6. Vitamin D deficiency: Vitamin D level 21 on admission.  Increase supplementation to 1000IU daily.    7. Anemia: Follow H/H    8. COPD  -Symbicort started.  - RT protocol    9. GERD second to hiatal hernia  -Continue prilosec    10. Ileus  -Continue to follow  -Continue medications    11. Dyslipidemia  -Continue Mevaor    12. Azotemia  -Continue to monitor      Appreciate hospitalist's input regarding medical comorbidities      Esteban Oliveira M.D.  Physical Medicine and Rehabilitation  [facility]

## 2019-11-28 NOTE — FLOWSHEET NOTE
11/28/19 1514   Events/Summary/Plan   Events/Summary/Plan o2 check done pt resting well    Oximetry   #Pulse Oximetry (Single Determination) Yes   Oxygen   Home O2 Use Prior To Admission? No   Pulse Oximetry 95 %   O2 (LPM) 2   O2 Daily Delivery Respiratory  Silicone Nasal Cannula

## 2019-11-28 NOTE — CARE PLAN
Problem: Safety  Goal: Will remain free from injury  Note:   Pt remains free of accidental injury at this time. Able to verbalize needs and does not attempt self transfer. Bed rails x2 secured for safety. Call light and personal belongings within reach. Hourly rounds done by staff to ensure safety and check if needs are met. Will continue to assess and monitor for safety.       Problem: Pain Management  Goal: Pain level will decrease to patient's comfort goal  Note:   Pt denies pain or discomfort this shift. Advised to alert staff if pain occurs. Refuses PRN pain medications. Will continue to assess and monitor for pain.
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
## 2019-11-28 NOTE — THERAPY
"Occupational Therapy   Initial Evaluation     Patient Name: Guillermina Recio  Age:  79 y.o., Sex:  female  Medical Record #: [id number]  Today's Date: 11/28/2019     Subjective    \"I'm older now and I got more aches and pains now\" (patient comparing CLOF to previous rehab stay ~11 years ago)     Objective     11/28/19 0800   Prior Living Situation   Prior Services Home-Independent   Housing / Facility 1 Story House  (1st story apartment)   Steps Into Home 1   Steps In Home 0   Bathroom Set up Bathtub / Shower Combination;Tub Transfer Bench;Grab Bars  (hand held shower head)   Equipment Owned Quad Cane;Scooter;Raised Toilet Seat Without Arms;Tub Transfer Bench;4-Wheel Walker   Lives with - Patient's Self Care Capacity Alone and Able to Care For Self   Prior Level of ADL Function   Self Feeding Independent   Grooming / Hygiene Independent   Bathing Independent   Dressing Independent   Toileting Independent   Prior Level of IADL Function   Medication Management Independent   Laundry Requires Assist  (friend assists with picking up laundry Q 2 weeks.)   Kitchen Mobility Independent   Home Management Requires Assist   Shopping Requires Assist  ([employer] members assist)   Prior Level Of Mobility Independent With Device in Community  (Pt reports she previously used quad cane )   Driving / Transportation Relatives / Others Provide Transportation   Occupation (Pre-Hospital Vocational) Retired Due To Age   Leisure Interests Gardening;Hobbies   IRF-LAKEISHA:  Prior Functioning: Everyday Activities   Self Care Independent   Indoor Mobility (Ambulation) Independent   Stairs Needed some help   Functional Cognition Needed some help   Prior Device Use None of the given options   Vitals   Patient BP Position Sitting   Blood Pressure  100/46   Pulse Oximetry 90 %   O2 Delivery None (Room Air)   Pain 0 - 10 Group   Pain Rating Scale (NPRS) 0   Cognition    Orientation Level Oriented x 4   Level of Consciousness Alert   IRF-LAKEISHA:  Cognitive " "Pattern Assessment   Cognitive Pattern Assessment Used BIMS   IRF-LAKEISHA:  Brief Interview for Mental Status (BIMS)   Repetition of Three Words (First Attempt) 3   Temporal Orientation: Able to Report the Correct Year Correct   Temporal Orientation: Able to Report the Correct Month Accurate within 5 days   Temporal Orientation: Able to Report the Correct Day of the Week Correct   Able to Recall \"Sock\" No, could not recall   Able to Recall \"Blue\" Yes, no cue required   Able to Recall \"Bed\" No, could not recall   BIMS Summary Score 11   Vision Screen   Vision Not tested  (Wears glasses, 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, 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)   Passive ROM Upper Body   Passive ROM Upper Body WDL   Active ROM Upper Body   Active ROM Upper Body  WDL   Dominant Hand Right   Strength Upper Body   Upper Body Strength  WDL   Sensation Upper Body   Upper Extremity Sensation  Not Tested  (Appears WFL)   Upper Body Muscle Tone   Upper Body Muscle Tone  WDL   Balance Assessment   Sitting Balance (Static) Fair +   Sitting Balance (Dynamic) Fair   Standing Balance (Static) Poor +   Coordination Upper Body   Coordination Not Tested  (Appears intact based on functional observation)   IRF-LAKEISHA:  Eating   Assistance Needed Set-up / clean-up  (modified diet)   CARE Score 5   Discharge Goal:  Assistance Needed Independent   Discharge Goal:  Score 6   IRF-LAKEISHA:  Oral Hygiene   Assistance Needed Set-up / clean-up   CARE Score 5   Discharge Goal:  Assistance Needed Independent   Discharge Goal:  Score 6   IRF-LAKEISHA:  Shower/Bathe Self   Assistance Needed Physical assistance   Physical Assistance Level 25%-49%   CARE Score 3   Discharge Goal:  Assistance Needed Independent  (with AE)   Discharge Goal Score 6   IRF-LAKEISHA:  Upper Body Dressing   Assistance Needed Set-up / clean-up   Physical Assistance Level No physical assistance or only touching/steadying assist   CARE Score 5   Discharge Goal:  Assistance Needed Independent   Dischage Goal:  Score 6 "   IRF-LAKEISHA:  Lower Body Dressing   Assistance Needed Physical assistance   Physical Assistance Level 25%-49%   CARE Score 3   Discharge Goal:  Assistance Needed Independent   Discharge Goal:  Score 6   IRF LAKEISHA:  Putting On/Taking Off Footwear   Assistance Needed Physical assistance   Physical Assistance Level Total assistance   CARE Score 1   Discharge Goal:  Assistance Needed Independent  (with AE as needed)   Discharge Goal:  Score 6   IRF-LAKEISHA:  Toileting Hygiene   Assistance Needed Physical assistance   Physical Assistance Level 25%-49%   CARE Score 3   Discharge Goal:  Assistance Needed Independent   Discharge Goal:  Score 6   IRF-LAKEISHA:  Toilet Transfer   Assistance Needed Physical assistance   Physical Assistance Level Less than 25%   CARE Score 3   Discharge Goal:  Assistance Needed Independent   Discahrge Goal:  Score 6   IRF-LAKEISHA:  Hearing, Speech, and Vision   Expression of Ideas and Wants 4   Understanding Verbal and Non-Verbal Content 4   Problem List   Problem List Decreased Active Daily Living Skills;Decreased Homemaking Skills;Decreased Functional Mobility;Safety Awareness Deficits / Cognition;Decreased Activity Tolerance;Impaired Postural Control / Balance   Precautions   Precautions No Weight Bearing Restrictions Right Lower Extremity   Comments 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   Interdisciplinary Plan of Care Collaboration   IDT Collaboration with  Speech Therapist;Nursing   Patient Position at End of Therapy Seated;Call Light within Reach;Self Releasing Lap Belt Applied   Collaboration Comments Transferred care to ; notified RN re: low DBP   Equipment Needs   Assistive Device / DME Wheelchair;Tub Transfer Bench;Grab  Bars In Shower / Tub;Grab Bars By Toilet   Adaptive Equipment Other (Comments)  (To be assessed)   OT Total Time Spent   OT Individual Total Time Spent (Mins) 60   OT Charge Group   OT Evaluation OT Evaluation Mod       FIM Eating Score:  5 - Standby Prompting/Supervision or Set-up  Eating Description:  Increased time, Modified diet, Supervision for safety, Verbal cueing, Set-up of equipment or meal/tube feeding    FIM Grooming Score:  5 - Standby Prompting/Supervision or Set-up  Grooming Description:  Set-up of equipment, Verbal cueing, Supervision for safety, Increased time    FIM Bathing Score:  3 - Moderate Assistance  Bathing Description:  Grab bar, Tub bench, Hand held shower, Increased time, Supervision for safety, Verbal cueing, Set-up of equipment, Initial preparation for task(Patient requires assist with washing B feet and buttocks while incorporating sitting and standing with use of grab bar for safety and balance)    FIM Upper Body Dressin - Standby Prompting/Supervision or Set-up  Upper Body Dressing Description:  Increased time, Supervision for safety, Set-up of equipment, Verbal cueing(Patient donned shirt for set-up while seated)    FIM Lower Body Dressing Score:  3 - Moderate Assistance  Lower Body Dressing Description:  Increased time, Supervision for safety, Verbal cueing, Set-up of equipment, Initial preparation for task(Patient requires assist with threading RLE through elastic waist pants.)    FIM Toileting Body Dressing:  3 - Moderate Assistance  Toileting Description:  Grab bar, Increased time, Supervision for safety, Verbal cueing, Set-up of equipment, Initial preparation for task    FIM Bed/Chair/Wheelchair Transfers Score:    Bed/Chair/Wheelchair Transfers Description:       FIM Toilet Transfer Score:  4 - Minimal Assistance  Toilet Transfer Description:  Grab bar, Increased time, Supervision for safety, Verbal cueing, Set-up of equipment, Initial preparation for task    FIM  Tub/Shower Transfers Score:  4 - Minimal Assistance  Tub/Shower Transfers Description:  Grab bar, Increased time, Supervision for safety, Verbal cueing, Initial preparation for task, Set-up of equipment    Assessment  Patient is 79 y.o. female with a diagnosis of R hip replcament.  Additional factors influencing patient status / progress (ie: cognitive factors, co-morbidities, social support, etc): Independent with ADLs prior to hospitalization. received assist with IADLs, lives alone, decreased cognition (patient reports having difficulty with memory premorbidly).       Plan  Recommend Occupational Therapy  minutes per day 5-7 days per week for 2 weeks for the following treatments:  OT Group Therapy, OT Self Care/ADL, OT Cognitive Skill Dev, OT Community Reintegration, OT Manual Ther Technique, OT Neuro Re-Ed/Balance, OT Sensory Int Techniques, OT Therapeutic Activity, OT Evaluation and OT Therapeutic Exercise.    Goals:  Long term and short term goals have been discussed with patient and they are in agreement.    Occupational Therapy Goals     Problem: Bathing     Dates: Start: 11/28/19       Description:     Goal: STG-Within one week, patient will bathe     Dates: Start: 11/28/19       Description: 1) Individualized Goal:  Min assist with AE as needed    2) Interventions:  OT Group Therapy, OT Self Care/ADL, OT Cognitive Skill Dev, OT Community Reintegration, OT Manual Ther Technique, OT Neuro Re-Ed/Balance, OT Sensory Int Techniques, OT Therapeutic Activity, OT Evaluation and OT Therapeutic Exercise                   Problem: Dressing     Dates: Start: 11/28/19       Description:     Goal: STG-Within one week, patient will dress LB     Dates: Start: 11/28/19       Description: 1) Individualized Goal: Min assist with AE as needed    2) Interventions: OT Group Therapy, OT Self Care/ADL, OT Cognitive Skill Dev, OT Community Reintegration, OT Manual Ther Technique, OT Neuro Re-Ed/Balance, OT Sensory Int  Techniques, OT Therapeutic Activity, OT Evaluation and OT Therapeutic Exercise                   Problem: OT Long Term Goals     Dates: Start: 11/28/19       Description:     Goal: LTG-By discharge, patient will complete basic self care tasks     Dates: Start: 11/28/19       Description: 1) Individualized Goal: Mod I with AE as needed    2) Interventions: OT Group Therapy, OT Self Care/ADL, OT Cognitive Skill Dev, OT Community Reintegration, OT Manual Ther Technique, OT Neuro Re-Ed/Balance, OT Sensory Int Techniques, OT Therapeutic Activity, OT Evaluation and OT Therapeutic Exercise             Goal: LTG-By discharge, patient will perform bathroom transfers     Dates: Start: 11/28/19       Description: 1) Individualized Goal: CGA for safety and balance    2) Interventions: OT Group Therapy, OT Self Care/ADL, OT Cognitive Skill Dev, OT Community Reintegration, OT Manual Ther Technique, OT Neuro Re-Ed/Balance, OT Sensory Int Techniques, OT Therapeutic Activity, OT Evaluation and OT Therapeutic Exercise

## 2019-11-28 NOTE — ASSESSMENT & PLAN NOTE
Surgery notes reviewed, more likely post-op ileus than small bowel obstruction  Serial KUB shows resolution of distended bowel loops  Constipation resolved w/ increased bowel regimen  Continue Simethicone for gas  Diet advanced to regular and tolerating

## 2019-11-28 NOTE — THERAPY
"Physical Therapy   Initial Evaluation     Patient Name: Guillermina Recio  Age:  79 y.o., Sex:  female  Medical Record #: [id number]  Today's Date: 11/28/2019     Subjective  \"I'm interested in finding a male  to go to [employer] with, hold my my hand, rub my back once in awhile. I heard even on one of those [employer] dating sofia someone got murdered so I don't like those\" Daughter will be in town until the Jan 5th .   Members of the [employer] assist with grocery shop, assist with finances. Pt lives in an apartment.   \" I got to get home to make scones and cookies for my friends for Cj\"  \"Oh heavens, of course I cook. I make a mean pot roast!\"     Objective       11/28/19 0931   Prior Living Situation   Prior Services Home-Independent   Housing / Facility 1 Story House  (1 platform step to enter)   Steps Into Home 1   Steps In Home 0   Bathroom Set up Bathtub / Shower Combination;Tub Transfer Bench;Grab Bars   Equipment Owned Quad Cane;Scooter;Raised Toilet Seat Without Arms;Tub Transfer Bench;4-Wheel Walker   Lives with - Patient's Self Care Capacity Alone and Able to Care For Self   Comments \"I'm interested in finding a male  to go to [employer] with, hold my my hand, rub my back once in awhile. I heard even on one of those [employer] dating sofia someone got murdered so I don't like those\" Daughter will be in town until the Jan 5th . Members of the [employer] assist with grocery shop, assist with finances. Pt lives in an apartment. \" I got to get home to make scones and cookies for cj presents\" \"Oh heavens yes I cook!! I make a mean pot roast\"   Prior Level of Functional Mobility   Bed Mobility Independent   Transfer Status Independent   Ambulation Independent   Distance Ambulation (Feet)   (limited household distance)   Assistive Devices Used Quad Cane   Stairs Required Assist   Comments required SPV and occasionally physical assistance for stairs    IRF-LAKEISHA:  Prior Functioning: Everyday Activities "   Self Care Independent   Indoor Mobility (Ambulation) Independent   Stairs Needed some help   Functional Cognition Needed some help   Prior Device Use None of the given options  (quad cane)   Vitals   O2 (LPM) 2   O2 Delivery Nasal Cannula   Pain   Intervention Declines   Cognition    Orientation Level Oriented x 4   Level of Consciousness Alert   Passive ROM Lower Body   Comments WDL; R LE not tested in hip ext, IR/ER, flexion d/t pain and precautions    Active ROM Lower Body    Active ROM Lower Body  X   Comments R hip flexion limited by pain   Strength Lower Body   Lower Body Strength  WDL   Comments R hip flexion limited by pain   Sensation Lower Body   Lower Extremity Sensation   WDL   Lower Body Muscle Tone   Lower Body Muscle Tone  WDL   Balance Assessment   Sitting Balance (Static) Fair +   Sitting Balance (Dynamic) Fair   Standing Balance (Static) Poor   Standing Balance (Dynamic) Poor -   Weight Shift Sitting Poor   Weight Shift Standing Poor   Bed Mobility    Supine to Sit Stand by Assist   Sit to Supine Stand by Assist   Sit to Stand Minimal Assist   Scooting Minimal Assist   Rolling Minimal Assist to Rt.;Minimum Assist to Lt.   Coordination Lower Body    Coordination Lower Body  WDL   IRF-LAKEISHA:  Roll Left and Right   Assistance Needed Physical assistance   Physical Assistance Level Less than 25%   CARE Score 3   Discharge Goal:  Assistance Needed Independent   Discharge Goal:  Physical Assistance Level No physical assistance or only touching/steadying assist   Discharge Goal:  Score 6   IRF-LAKEISHA:  Sit to Lying   Assistance Needed Physical assistance   Physical Assistance Level 25%-49%   CARE Score 3   Discharge Goal:  Assistance Needed Independent   Discharge Goal:  Physical Assistance Level No physical assistance or only touching/steadying assist   Discharge Goal:  Score 6   IRF-LAKEISHA:  Lying to Sitting on Side of Bed   Assistance Needed Incidental touching   Physical Assistance Level No physical  assistance or only touching/steadying assist   CARE Score 4   IRF-LAKEISHA:  Sit to Stand   Assistance Needed Physical assistance   Physical Assistance Level 25%-49%   CARE Score 3   Discharge Goal:  Assistance Needed Independent;Adaptive equipment   Discharge Goal:  Physical Assistance Level No physical assistance or only touching/steadying assist   Discahrge Goal:  Score 6   IRF-LAKEISHA:  Chair/Bed-to-Chair Transfer   Assistance Needed Physical assistance;Adaptive equipment   Physical Assistance Level 25%-49%   CARE Score 3   Discharge Goal:  Assistance Needed Independent;Adaptive equipment   Discharge Goal:  Physical Assistance Level No physical assistance or only touching/steadying assist   Discharge Goal:  Score 6   IRF-LAKEISHA:  Car Transfer   Reason if not Attempted Safety concerns   CARE Score 88   Discharge Goal:  Assistance Needed Supervision;Adaptive equipment   Discharge Goal:  Physical Assistance Level No physical assistance or only touching/steadying assist   Discharge Goal:  Score 4   IRF LAKEISHA:  Walking   Does the Patient Walk? Yes   IRF LAKEISHA:  Walk 10 Feet   Assistance Needed Adaptive equipment;Incidental touching   Physical Assistance Level Less than 25%   CARE Score 3   Discharge Goal:  Assistance Needed Independent;Adaptive equipment   Discharge Goal:  Physical Assistance Level No physical assistance or only touching/steadying assist   Discharge Goal:  Score 6   IRF-LAKEISHA:  Walk 50 Feet with Two Turns   Reason if not Attempted Safety concerns   CARE Score 88   Discharge Goal:  Assistance Needed Independent;Adaptive equipment   Discharge Goal:  Physical Assistance Level No physical assistance or only touching/steadying assist   Discharge Goal:  Score 6   IRF-LAKEISHA:  Walk 150 Feet   Reason if not Attempted Safety concerns   CARE Score 88   Discharge Goal:  Assistance Needed Independent;Adaptive equipment   Discharge Goal:  Physical Assistance Level No physical assistance or only touching/steadying assist   Discharge  Goal:  Score 6   IRF LAKEISHA:  Walking 10 Feet on Uneven Surfaces   Reason if not Attempted Safety concerns   CARE Score 88   Discharge Goal:  Assistance Needed Independent;Adaptive equipment   Discharge Goal:  Physical Assistance Level No physical assistance or only touching/steadying assist   Discharge Goal:  Score 6   IRF LAKEISHA:  1 Step (Curb)   Reason if not Attempted Safety concerns   CARE Score 88   Discharge Goal:  Assistance Needed Independent;Adaptive equipment   Discharge Goal:  Physical Assistance Level No physical assistance or only touching/steadying assist   Discharge Goal:  Score 6   IRF-LAKEISHA:  4 Steps   Reason if not Attempted Safety concerns   CARE Score 88   Discharge Goal:  Assistance Needed Independent;Adaptive equipment   Discharge Goal:  Physical Assistance Level No physical assistance or only touching/steadying assist   Discharge Goal:  Score 6   IRF LAKEISHA:  12 Steps   Reason if not Attempted Safety concerns   CARE Score 88   Discharge Goal:  Assistance Needed Independent;Adaptive equipment   Discharge Goal:  Physical Assistance Level No physical assistance or only touching/steadying assist   Discharge Goal:  Score 6   IRF LAKEISHA:  Picking Up Object   Reason if not Attempted Safety concerns   CARE Score 88   Discharge Goal:  Assistance Needed Independent;Adaptive equipment   Discharge Goal:  Physical Assistance Level No physical assistance or only touching/steadying assist   Discharge Goal:  Score 6   IRF-LAKEISHA:  Wheel 50 Feet with Two Turns   Indicate the Type of Wheelchair or Scooter Used Manual   Assistance Needed Physical assistance   Physical Assistance Level Total assistance   CARE Score 1   Discharge Goal:  Assistance Needed Independent;Adaptive equipment   Discharge Goal:  Physical Assistance Level No physical assistance or only touching/steadying assist   Discharge Goal:  Score 6   IRF-LAKEISHA:  Wheel 150 Feet   Indicate the Type of Wheelchair or Scooter Used Manual   Assistance Needed Physical  assistance   Physical Assistance Level Total assistance   CARE Score 1   Discharge Goal:  Assistance Needed Independent;Adaptive equipment   Discharge Goal:  Physical Assistance Level No physical assistance or only touching/steadying assist   Discharge Goal:  Score 6   Problem List    Problems Motor Planning / Sequencing;Limited Knowledge of Post-Op Precautions;Impaired Vision;Decreased Activity Tolerance;Impaired Balance;Impaired Ambulation;Impaired Transfers;Impaired Bed Mobility   Precautions   Precautions No Weight Bearing Restrictions Left Lower Extremity   Comments 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   PT Total Time Spent   PT Individual Total Time Spent (Mins) 60   PT Charge Group   PT Therapeutic Activities 1   PT Evaluation PT Evaluation Low       FIM Bed/Chair/Wheelchair Transfers Score: 4 - Minimal Assistance  Bed/Chair/Wheelchair Transfers Description:  (bed mob: Maryan; transfer: Maryan for stability, no AD, SPT)    FIM Walking Score:  1 - Total Assistance  Walking Description:  (12ft x 1 w/c follow FWW CGA)    FIM Wheelchair Score:  1 - Total Assistance  Wheelchair Description:  (8ftx1, B UE, VCs for steering)    FIM Stairs Score:  0 - Not tested,unsafe activity  Stairs Description:       Assessment  Patient is 79 y.o. female with a diagnosis of s/p R hip replacement.  Additional factors influencing patient status / progress (ie: cognitive factors, co-morbidities, social support, etc): COPD, GERD, lives alone, daughter lives in [place], antalgic gait pattern, supportive Jehovah's witness/ friends in the community.      Plan  Recommend Physical Therapy  minutes  per day 5-7 days per week for 10-14 days for the following treatments:  PT Group Therapy, PT Gait Training, PT Therapeutic Exercises, PT Neuro Re-Ed/Balance, PT Therapeutic Activity, PT Manual Therapy and PT Evaluation.    Goals:  Long term and short term goals have been discussed with patient and they are in agreement.    Physical Therapy Problems     Problem: Mobility     Dates: Start: 11/28/19       Description:     Goal: STG-Within one week, patient will propel wheelchair household distances     Dates: Start: 11/28/19       Description: 1) Individualized goal:  50ft x 1, B UE, SPV  2) Interventions:  PT Group Therapy, PT Gait Training, PT Therapeutic Exercises, PT Neuro Re-Ed/Balance, PT Therapeutic Activity, PT Manual Therapy and PT Evaluation             Goal: STG-Within one week, patient will ambulate household distance     Dates: Start: 11/28/19       Description: 1) Individualized goal:  25ft x 1 FWW SBA  2) Interventions:  PT Group Therapy, PT Gait Training, PT Therapeutic Exercises, PT Neuro Re-Ed/Balance, PT Therapeutic Activity, PT Manual Therapy and PT Evaluation                   Problem: Mobility Transfers     Dates: Start: 11/28/19       Description:     Goal: STG-Within one week, patient will perform bed mobility     Dates: Start: 11/28/19       Description: 1) Individualized goal: SBA with HOB flat, leg  as needed  2) Interventions:  PT Group Therapy, PT Gait Training, PT Therapeutic Exercises, PT Neuro Re-Ed/Balance, PT Therapeutic Activity, PT Manual Therapy and PT Evaluation             Goal: STG-Within one week, patient will transfer bed to chair     Dates: Start: 11/28/19       Description: 1) Individualized goal:  SBA with FWW or LRAD  2) Interventions:  PT Group Therapy, PT Gait Training, PT Therapeutic Exercises, PT Neuro Re-Ed/Balance, PT Therapeutic Activity, PT Manual Therapy and PT Evaluation                   Problem: PT-Long Term Goals     Dates: Start: 11/28/19        Description:     Goal: LTG-By discharge, patient will ambulate     Dates: Start: 11/28/19       Description: 1) Individualized goal:  150ft  x1 FWW SPV  2) Interventions:  PT Group Therapy, PT Gait Training, PT Therapeutic Exercises, PT Neuro Re-Ed/Balance, PT Therapeutic Activity, PT Manual Therapy and PT Evaluation             Goal: LTG-By discharge, patient will perform home exercise program     Dates: Start: 11/28/19       Description: 1) Individualized goal:  For B LE strengthening, mod I  2) Interventions:  PT Group Therapy, PT Gait Training, PT Therapeutic Exercises, PT Neuro Re-Ed/Balance, PT Therapeutic Activity, PT Manual Therapy and PT Evaluation             Goal: LTG-By discharge, patient will transfer in/out of a car     Dates: Start: 11/28/19       Description: 1) Individualized goal:  LRAD, CGA  2) Interventions:  PT Group Therapy, PT Gait Training, PT Therapeutic Exercises, PT Neuro Re-Ed/Balance, PT Therapeutic Activity, PT Manual Therapy and PT Evaluation             Goal: LTG-By discharge, patient will     Dates: Start: 11/28/19       Description: 1) Individualized goal: perform platform step to enter home, CGA, FWW  2) Interventions:  PT Group Therapy, PT Gait Training, PT Therapeutic Exercises, PT Neuro Re-Ed/Balance, PT Therapeutic Activity, PT Manual Therapy and PT Evaluation

## 2019-11-29 PROCEDURE — 700102 HCHG RX REV CODE 250 W/ 637 OVERRIDE(OP): Performed by: PHYSICAL MEDICINE & REHABILITATION

## 2019-11-29 PROCEDURE — A9270 NON-COVERED ITEM OR SERVICE: HCPCS | Performed by: HOSPITALIST

## 2019-11-29 PROCEDURE — 97110 THERAPEUTIC EXERCISES: CPT

## 2019-11-29 PROCEDURE — 770010 HCHG ROOM/CARE - REHAB SEMI PRIVAT*

## 2019-11-29 PROCEDURE — 700102 HCHG RX REV CODE 250 W/ 637 OVERRIDE(OP): Performed by: HOSPITALIST

## 2019-11-29 PROCEDURE — 97530 THERAPEUTIC ACTIVITIES: CPT

## 2019-11-29 PROCEDURE — 97535 SELF CARE MNGMENT TRAINING: CPT

## 2019-11-29 PROCEDURE — G0515 COGNITIVE SKILLS DEVELOPMENT: HCPCS

## 2019-11-29 PROCEDURE — 99232 SBSQ HOSP IP/OBS MODERATE 35: CPT | Performed by: HOSPITALIST

## 2019-11-29 PROCEDURE — 94760 N-INVAS EAR/PLS OXIMETRY 1: CPT

## 2019-11-29 PROCEDURE — A9270 NON-COVERED ITEM OR SERVICE: HCPCS | Performed by: PHYSICAL MEDICINE & REHABILITATION

## 2019-11-29 PROCEDURE — 97116 GAIT TRAINING THERAPY: CPT

## 2019-11-29 PROCEDURE — 99233 SBSQ HOSP IP/OBS HIGH 50: CPT | Performed by: PHYSICAL MEDICINE & REHABILITATION

## 2019-11-29 RX ORDER — VITS A,C,E/LUTEIN/MINERALS 300MCG-200
1 TABLET ORAL DAILY
Status: DISCONTINUED | OUTPATIENT
Start: 2019-11-29 | End: 2019-11-29

## 2019-11-29 RX ORDER — I-VITE, TAB 1000-60-2MG (60/BT) 300MCG-200
1 TAB ORAL DAILY
Status: DISCONTINUED | OUTPATIENT
Start: 2019-11-29 | End: 2019-12-05

## 2019-11-29 RX ADMIN — LOVASTATIN 40 MG: 20 TABLET ORAL at 22:03

## 2019-11-29 RX ADMIN — ACETAMINOPHEN 1000 MG: 500 TABLET, FILM COATED ORAL at 08:19

## 2019-11-29 RX ADMIN — SENNOSIDES AND DOCUSATE SODIUM 2 TABLET: 8.6; 5 TABLET ORAL at 22:04

## 2019-11-29 RX ADMIN — SIMETHICONE CHEW TAB 80 MG 160 MG: 80 TABLET ORAL at 17:38

## 2019-11-29 RX ADMIN — ASPIRIN 325 MG: 325 TABLET, COATED ORAL at 11:28

## 2019-11-29 RX ADMIN — OMEPRAZOLE 20 MG: 20 CAPSULE, DELAYED RELEASE ORAL at 11:27

## 2019-11-29 RX ADMIN — VITAMIN D, TAB 1000IU (100/BT) 1000 UNITS: 25 TAB at 08:19

## 2019-11-29 RX ADMIN — SIMETHICONE CHEW TAB 80 MG 160 MG: 80 TABLET ORAL at 22:04

## 2019-11-29 RX ADMIN — NYSTATIN: 100000 POWDER TOPICAL at 22:06

## 2019-11-29 RX ADMIN — ACETAMINOPHEN 1000 MG: 500 TABLET, FILM COATED ORAL at 22:03

## 2019-11-29 RX ADMIN — SIMETHICONE CHEW TAB 80 MG 160 MG: 80 TABLET ORAL at 11:27

## 2019-11-29 RX ADMIN — OXYCODONE HYDROCHLORIDE 5 MG: 5 TABLET ORAL at 08:20

## 2019-11-29 RX ADMIN — SIMETHICONE CHEW TAB 80 MG 160 MG: 80 TABLET ORAL at 08:19

## 2019-11-29 RX ADMIN — ACETAMINOPHEN 1000 MG: 500 TABLET, FILM COATED ORAL at 15:05

## 2019-11-29 ASSESSMENT — ENCOUNTER SYMPTOMS
VOMITING: 0
ROS GI COMMENTS: BLOATING
SHORTNESS OF BREATH: 0
NAUSEA: 0
FEVER: 0
ABDOMINAL PAIN: 0
EYES NEGATIVE: 1
PALPITATIONS: 0
CHILLS: 0
COUGH: 0

## 2019-11-29 NOTE — THERAPY
"Speech Language Pathology   Initial Assessment     Patient Name: Guillermina Recio  AGE:  79 y.o., SEX:  female  Medical Record #: [id number]  Today's Date: 11/28/2019     Subjective    Pt pleasant and cooperative.      Objective       11/28/19 0801   Prior Living Situation   Prior Services Home-Independent   Housing / Facility 1 Story House   Lives with - Patient's Self Care Capacity Alone and Able to Care For Self   Prior Level Of Function   Communication Within Functional Limits   Swallow Within Functional Limits   Dentition Edentulous   Dentures Uppers;Lowers  (ill-fitting, does not use)   Hearing Impaired Both Ears   Hearing Aid Right;Left  (not present)   Vision   (impaired, both eyes)   Patient's Primary Language English   Education Some College or Trade School   Occupation (Pre-Hospital Vocational) Retired Due To Age   Cognition   Moderate Attention Moderate (3)   Verbal Short Term Memory 5 Minutes  (1/5 on MOCA; 5/5 with cues)   Functional Problem Solving Moderate (3)   Safety Awareness Minimal (4)   Insight into Deficits Minimal (4)   Self Monitoring Moderate (3)   IRF-LAKEISHA:  Brief Interview for Mental Status (BIMS)   Repetition of Three Words (First Attempt) 3   Temporal Orientation: Able to Report the Correct Year Correct   Temporal Orientation: Able to Report the Correct Month Accurate within 5 days   Temporal Orientation: Able to Report the Correct Day of the Week Correct   Able to Recall \"Sock\" Yes, after cueing (\"something to wear\")   Able to Recall \"Blue\" No, could not recall   Able to Recall \"Bed\" Yes, no cue required   BIMS Summary Score 12   Social / Pragmatic Communication   Turn Taking Minimal (4)   Topic Maintenance Minimal (4)   Attention to Social Cues Minimal (4)   Tracheostomy   Tracheostomy No   Speech Mechanisms / Voice Production   Speech Mechanisms / Voice Production (WDL) WDL   Labial Function   Labial Function (WDL) WDL   Lingual Function   Lingual Function (WDL) WDL   Laryngeal " Function   Laryngeal Function (WDL) WDL   Swallowing   Swallowing (WDL) X   Masticated Foods Minimal   Dysphagia Strategies / Recommendations   Strategies / Interventions Recommended (Yes / No) No   Barriers To Oral Feeding   Barriers To Oral Feeding Surgical Intervention / Anatomical Changes   IRF-LAKEISHA:  Swallowing/Nutritional Status   Swallowing/Nutritional Status Modified food consistency   IRF-LAKEISHA:  Eating   Assistance Needed Set-up / clean-up   [place] Physical Assistance Level No physical assistance or only touching/steadying assist   CARE Score 5   Discharge Goal:  Assistance Needed Independent   Discharge Goal:  Physical Assistance Level No physical assistance or only touching/steadying assist   Discharge Goal:  Score 6   Outcome Measures   Outcome Measures Utilized MoCA  (7.1 BLIND version)   MoCA (El Cognitive Assessment)   Attention - Digits 2   Attention - Letters 1   Attention - Serial 7 Subtraction 1   Language - Repeat 2   Language - Fluency 1   Language - Abstraction 1   Delayed Recall 1   Orientation 6   Problem List   Problem List Cognitive-Linguistic Deficits   Current Discharge Plan   Current Discharge Plan Return to Prior Living Situation   Interdisciplinary Plan of Care Collaboration   IDT Collaboration with  Physician;Nursing;Occupational Therapist   Patient Position at End of Therapy Seated;Call Light within Reach;Tray Table within Reach  (left in care of nursing)   Collaboration Comments CLOF, diet recs, OK to trial min amounts solid food to assess swallow per MD, then MD to advance diet 2/2 GI issues   Speech Language Pathologist Assigned   Assigned SLP / Pager # [phone] cog only   SLP Total Time Spent   SLP Individual Total Time Spent (Mins) 90   SLP Charge Group   SLP Speech Language Evaluation Speech Sound Language Comprehension   SLP Oral Pharyngeal Evaluation Oral Pharyngeal Evaluation       FIM Eating Score:  5 - Standby Prompting/Supervision or Set-up  Eating Description:  Modified  diet    FIM Comprehension Score:  5 - Stand-by Prompting/Supervision or Set-up  Comprehension Description:  Glasses, Visual devices    FIM Expression Score:  5 - Stand-by Prompting/Supervision or Set-up  Expression Description:  Verbal cueing    FIM Social Interaction Score:  4 - Minimal Assistance  Social Interaction Description:  Verbal cues    FIM Problem Solving Score:  4 - Minimal Assistance  Problem Solving Description:  Verbal cueing, Therapy schedule    FIM Memory Score:  3 - Moderate Assistance  Memory Description:  Verbal cueing    Assessment    Patient is 79 y.o. female with a diagnosis of s/p hip replacement and small bowel obstruction.  Additional factors influencing patient status/progress (ie: cognitive factors, co-morbidities, social support, etc): independent PLOF, COPD.      CLINICAL SWALLOW EVALUATION: Oral motor exam grossly unremarkable, aside from fully edentulous status. Pt has dentures, but reported that they are ill-fitting and she does not use them. Speech WFL. Vocal quality WFL. Pt currently on full liquid diet (thin liquids) 2/2 small bowel obstruction. OK per MD (Dr. Madrigal) to trial small amounts of solid texture for purpose of swallow evaluation. PO trials of soft solid texture and thin liquids via cup, spoon, and straw completed. Mildly prolonged mastication, but not oral residue noted. RN administered meds/pills with liquid wash. No overt clinical s/sx of possible aspiration noted. Pt reports no recent or distant history of swallowing issues. She does modify her diet at home due to dentition. Pt presents with a functional swallow and skilled swallowing intervention not warranted at this time. When pt has been medically cleared for diet advancement, recommended diet texture is chopped/dental soft due to edentulous status only, thin liquids, and meds as tolerated. Diet recs communicated to MD, who was in agreement and reported that she will change diet order when medically appropriate for  RYLAN chow. COGNITIVE-LINGUISTIC EVALUATION: El Cognitive Assessment (MOCA-BLIND) and informal non-standardized measures used for assessment. Pt reported having macular degeneration and visual impairments in both eyes. Pt not able to read paperwork presented due to vision. Pt wears bilateral hearing aids, which are not present. Basic expressive and receptive language intact. MILD to MOD deficits noted with delayed recall, attention, and working memory. MOD cues required to redirect patient. Pt reported that she has noticed recent changes in her memory and does not feel she is at baseline level of functioning since her hip surgery. Pt lives alone and manages her own medications, finances, and cooks for herself. She receives some assistance at the bank with bill management due to her vision status, but otherwise is independent. Pt would benefit from cognitive-linguistic intervention to assist with overall safe completion of ADLs/IADLs and return to independent PLOF.     Plan  Recommend Speech Therapy  minutes per day 5-7 days per week for 1-2 weeks for the following treatments:  SLP Speech Language Treatment, SLP Self Care / ADL Training , SLP Cognitive Skill Development and SLP Group Treatment.    Goals:  Long term and short term goals have been discussed with patient and they are in agreement.    Speech Therapy Problems     Problem: Memory STGs     Dates: Start: 11/28/19       Description:     Goal: STG-Within one week, patient will     Dates: Start: 11/28/19       Description: 1) Individualized goal:  Recall recently taught precautions and safety strategies r/t rehabilitation stay with 80% accuracy and MIN A.   2) Interventions:  SLP Speech Language Treatment, SLP Self Care / ADL Training , SLP Cognitive Skill Development and SLP Group Treatment                   Problem: Problem Solving STGs     Dates: Start: 11/28/19       Description:     Goal: STG-Within one week, patient will     Dates: Start:  11/28/19       Description: 1) Individualized goal:  Complete functional problem solving tasks r/t medication management with 80% accuracy and MIN A.  2) Interventions:  SLP Speech Language Treatment, SLP Self Care / ADL Training , SLP Cognitive Skill Development and SLP Group Treatment             Goal: STG-Within one week, patient will     Dates: Start: 11/28/19       Description: 1) Individualized goal:  Complete functional problem solving tasks r/t basic financial/bill management with 80% accuracy and MIN A.  2) Interventions:  SLP Speech Language Treatment, SLP Self Care / ADL Training , SLP Cognitive Skill Development and SLP Group Treatment                   Problem: Speech/Swallowing LTGs     Dates: Start: 11/28/19       Description:     Goal: LTG-By discharge, patient will solve basic problems     Dates: Start: 11/28/19       Description: 1) Individualized goal:  R/t safety and ADLs/IADLs with MOD I.   2) Interventions:  SLP Speech Language Treatment, SLP Self Care / ADL Training , SLP Cognitive Skill Development and SLP Group Treatment

## 2019-11-29 NOTE — REHAB-PHARMACY IDT TEAM NOTE
Pharmacy   Pharmacy  Antibiotics/Antifungals/Antivirals:  Medication:      Active Orders (From admission, onward)    None        Route:         n/a  Stop Date:  n/a  Reason:   Antihypertensives/Cardiac:  Medication:    Orders (72h ago, onward)     Start     Ordered    11/27/19 2100  lovastatin (MEVACOR) tablet 40 mg  EVERY BEDTIME      11/27/19 1312              Patient Vitals for the past 24 hrs:   BP Pulse   11/29/19 1022 -- 83   11/29/19 0700 102/54 --   11/29/19 0640 (!) 86/47 80   11/28/19 1915 (!) 90/54 89   11/28/19 1451 (!) 98/54 81     Anticoagulation:  Medication:  Aspirin  INR:      Other key medications: omeprazole  A review of the medication list reveals no issues at this time.  Section completed by:  Henrietta Caldwell [facility]

## 2019-11-29 NOTE — THERAPY
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, L eye viision impaired, COPD, posterior hip precautions - Right hip   Vitals   O2 (LPM) 2   O2 Delivery Nasal Cannula   Non Verbal Descriptors   Non Verbal Scale  Calm   Cognition    Level of Consciousness Alert   Sitting Upper Body Exercises   Upper Extremity Bike Level 2 Resistance  (FluidoBike, 15 mins, rest break x3, additional time, 1.182km)   Interdisciplinary Plan of Care Collaboration   IDT Collaboration with  Nursing   Patient Position at End of Therapy Seated;Self Releasing Lap Belt Applied   OT Total Time Spent   OT Individual Total Time Spent (Mins) 30   OT Charge Group   OT Self Care / ADL 1   OT Therapeutic Exercise  1       FIM Eating Score:  5 - Standby Prompting/Supervision or Set-up  Eating Description:  Increased time, Supervision for safety, Modified diet(Vision impaired, set up required.)    Assessment  Pt was alert and cooperative w/ tx.  Pt sitting on manual wc w/ O2, while eating breakfast.  Pt reported her food tray was just delivered and she was hungry.  Therapist assisted w/ set up and container management secondary impaired vision.  There ex included UEB/FluidoBike, 15 mins, Level 2 as reflected above.  Noted debility, impaired endurance, NWB RLE    Plan    Continued OT to address debility, balance.  Education for safety awareness, NRG conservation, use of AE/DME for safe ADL's " and transfers.

## 2019-11-29 NOTE — PROGRESS NOTES
"Rehab Progress Note     Date of Service: 11/29/2019  Chief Complaint: follow up hip replacement and small bowel obstruction    Interval Events (Subjective)    Patient seen and examined in her room today.  She is about ready to go to speech therapy.  She is irritable because she really wants to eat more solid food.  Her diet is slowly being advanced.  She does think that her belly continues to be distended.  She currently is denying any pain in her hip.  She is participating well with therapy.      Objective:  VITAL SIGNS: /54 Comment: manual  Pulse 83   Temp 36.6 °C (97.8 °F) (Oral)   Resp 18   Ht 1.473 m (4' 10\")   Wt 68.5 kg (151 lb)   SpO2 93%   BMI 31.56 kg/m²   Gen: alert, no apparent distress  CV: regular rate and rhythm, no murmurs, no peripheral edema  Resp: clear to ascultation bilaterally, normal respiratory effort  GI: soft, non-tender abdomen, bowel sounds present  Neuro: impaired vision    Recent Results (from the past 72 hour(s))   CBC without Differential    Collection Time: 11/27/19  4:58 AM   Result Value Ref Range    WBC 7.8 4.8 - 10.8 K/uL    RBC 4.38 4.20 - 5.40 M/uL    Hemoglobin 10.9 (L) 12.0 - 16.0 g/dL    Hematocrit 36.7 (L) 37.0 - 47.0 %    MCV 83.8 81.4 - 97.8 fL    MCH 24.9 (L) 27.0 - 33.0 pg    MCHC 29.7 (L) 33.6 - 35.0 g/dL    RDW 50.7 (H) 35.9 - 50.0 fL    Platelet Count 335 164 - 446 K/uL    MPV 8.8 (L) 9.0 - 12.9 fL   Comp Metabolic Panel    Collection Time: 11/27/19  4:58 AM   Result Value Ref Range    Sodium 139 135 - 145 mmol/L    Potassium 4.6 3.6 - 5.5 mmol/L    Chloride 109 96 - 112 mmol/L    Co2 23 20 - 33 mmol/L    Anion Gap 7.0 0.0 - 11.9    Glucose 91 65 - 99 mg/dL    Bun 31 (H) 8 - 22 mg/dL    Creatinine 0.78 0.50 - 1.40 mg/dL    Calcium 8.0 (L) 8.5 - 10.5 mg/dL    AST(SGOT) 35 12 - 45 U/L    ALT(SGPT) 16 2 - 50 U/L    Alkaline Phosphatase 50 30 - 99 U/L    Total Bilirubin 0.4 0.1 - 1.5 mg/dL    Albumin 2.8 (L) 3.2 - 4.9 g/dL    Total Protein 5.5 (L) 6.0 - 8.2 " g/dL    Globulin 2.7 1.9 - 3.5 g/dL    A-G Ratio 1.0 g/dL   ESTIMATED GFR    Collection Time: 11/27/19  4:58 AM   Result Value Ref Range    GFR If African American >60 >60 mL/min/1.73 m 2    GFR If Non African American >60 >60 mL/min/1.73 m 2   CBC with Differential    Collection Time: 11/28/19  5:35 AM   Result Value Ref Range    WBC 7.1 4.8 - 10.8 K/uL    RBC 4.64 4.20 - 5.40 M/uL    Hemoglobin 11.4 (L) 12.0 - 16.0 g/dL    Hematocrit 39.3 37.0 - 47.0 %    MCV 84.7 81.4 - 97.8 fL    MCH 24.6 (L) 27.0 - 33.0 pg    MCHC 29.0 (L) 33.6 - 35.0 g/dL    RDW 50.7 (H) 35.9 - 50.0 fL    Platelet Count 327 164 - 446 K/uL    MPV 8.8 (L) 9.0 - 12.9 fL    Neutrophils-Polys 67.70 44.00 - 72.00 %    Lymphocytes 17.60 (L) 22.00 - 41.00 %    Monocytes 10.60 0.00 - 13.40 %    Eosinophils 0.40 0.00 - 6.90 %    Basophils 0.40 0.00 - 1.80 %    Immature Granulocytes 3.30 (H) 0.00 - 0.90 %    Nucleated RBC 0.00 /100 WBC    Neutrophils (Absolute) 4.78 2.00 - 7.15 K/uL    Lymphs (Absolute) 1.24 1.00 - 4.80 K/uL    Monos (Absolute) 0.75 0.00 - 0.85 K/uL    Eos (Absolute) 0.03 0.00 - 0.51 K/uL    Baso (Absolute) 0.03 0.00 - 0.12 K/uL    Immature Granulocytes (abs) 0.23 (H) 0.00 - 0.11 K/uL    NRBC (Absolute) 0.00 K/uL    Hypochromia 1+     Anisocytosis 2+     Microcytosis 1+    Comp Metabolic Panel (CMP)    Collection Time: 11/28/19  5:35 AM   Result Value Ref Range    Sodium 143 135 - 145 mmol/L    Potassium 5.1 3.6 - 5.5 mmol/L    Chloride 109 96 - 112 mmol/L    Co2 25 20 - 33 mmol/L    Anion Gap 9.0 0.0 - 11.9    Glucose 84 65 - 99 mg/dL    Bun 25 (H) 8 - 22 mg/dL    Creatinine 0.84 0.50 - 1.40 mg/dL    Calcium 8.7 8.5 - 10.5 mg/dL    AST(SGOT) 25 12 - 45 U/L    ALT(SGPT) 15 2 - 50 U/L    Alkaline Phosphatase 57 30 - 99 U/L    Total Bilirubin 0.3 0.1 - 1.5 mg/dL    Albumin 3.3 3.2 - 4.9 g/dL    Total Protein 5.7 (L) 6.0 - 8.2 g/dL    Globulin 2.4 1.9 - 3.5 g/dL    A-G Ratio 1.4 g/dL   Magnesium    Collection Time: 11/28/19  5:35 AM    Result Value Ref Range    Magnesium 2.3 1.5 - 2.5 mg/dL   Vitamin D, 25-hydroxy (blood)    Collection Time: 11/28/19  5:35 AM   Result Value Ref Range    25-Hydroxy   Vitamin D 25 21 (L) 30 - 100 ng/mL   ESTIMATED GFR    Collection Time: 11/28/19  5:35 AM   Result Value Ref Range    GFR If African American >60 >60 mL/min/1.73 m 2    GFR If Non African American >60 >60 mL/min/1.73 m 2   PERIPHERAL SMEAR REVIEW    Collection Time: 11/28/19  5:35 AM   Result Value Ref Range    Peripheral Smear Review see below    PLATELET ESTIMATE    Collection Time: 11/28/19  5:35 AM   Result Value Ref Range    Plt Estimation Normal    MORPHOLOGY    Collection Time: 11/28/19  5:35 AM   Result Value Ref Range    RBC Morphology Present     Poikilocytosis 2+     Ovalocytes 1+     Echinocytes 1+    DIFFERENTIAL COMMENT    Collection Time: 11/28/19  5:35 AM   Result Value Ref Range    Comments-Diff see below        Current Facility-Administered Medications   Medication Frequency   • I-ULICES TABS 1 Tab DAILY   • simethicone (MYLICON) chewable tab 160 mg 4X/DAY WITH MEALS + NIGHTLY   • budesonide-formoterol (SYMBICORT) 80-4.5 MCG/ACT inhaler 2 Puff BID   • vitamin D (cholecalciferol) 1000 UNIT tablet 1,000 Units DAILY   • Respiratory Care per Protocol Continuous RT   • Pharmacy Consult Request ...Pain Management Review 1 Each PHARMACY TO DOSE   • senna-docusate (PERICOLACE or SENOKOT S) 8.6-50 MG per tablet 2 Tab BID    And   • polyethylene glycol/lytes (MIRALAX) PACKET 1 Packet QDAY PRN    And   • magnesium hydroxide (MILK OF MAGNESIA) suspension 30 mL QDAY PRN    And   • bisacodyl (DULCOLAX) suppository 10 mg QDAY PRN   • artificial tears ophthalmic solution 1 Drop PRN   • benzocaine-menthol (CEPACOL) lozenge 1 Lozenge Q2HRS PRN   • mag hydrox-al hydrox-simeth (MAALOX PLUS ES or MYLANTA DS) suspension 20 mL Q2HRS PRN   • ondansetron (ZOFRAN ODT) dispertab 4 mg 4X/DAY PRN    Or   • ondansetron (ZOFRAN) syringe/vial injection 4 mg 4X/DAY  PRN   • traZODone (DESYREL) tablet 50 mg QHS PRN   • sodium chloride (OCEAN) 0.65 % nasal spray 2 Spray PRN   • melatonin tablet 3 mg HS PRN   • lactulose 20 GM/30ML solution 30 mL QDAY PRN   • fleet enema 133 mL QDAY PRN   • aspirin EC (ECOTRIN) tablet 325 mg BID   • calcium carbonate (TUMS) chewable tab 500 mg QDAY PRN   • lovastatin (MEVACOR) tablet 40 mg QHS   • nystatin (MYCOSTATIN) powder BID   • omeprazole (PRILOSEC) capsule 20 mg QDAY   • acetaminophen (TYLENOL) tablet 1,000 mg TID   • tramadol (ULTRAM) 50 MG tablet 50 mg Q4HRS PRN   • oxyCODONE immediate-release (ROXICODONE) tablet 5 mg Q4HRS PRN       Orders Placed This Encounter   Procedures   • Diet Order Full Liquid     Standing Status:   Standing     Number of Occurrences:   1     Order Specific Question:   Diet:     Answer:   Full Liquid [11]     Order Specific Question:   Consistency/Fluid modifications:     Answer:   Thin Liquids [3]       Assessment:  Active Hospital Problems    Diagnosis   • *Status post right hip replacement   • Ileus (HCC)   • Obesity (BMI 30-39.9)   • Dyslipidemia   • HTN (hypertension)   • Gastroesophageal reflux disease   • Vitamin D deficiency   • Anemia   • Azotemia   • Lung nodule   • Impaired mobility and ADLs   • COPD (chronic obstructive pulmonary disease) (HCC)   • Hip pain, chronic, right     This patient is a 79 y.o. female admitted for acute inpatient rehabilitation with Status post right hip replacement.    Therapy update 11/29/2019  Modified Independent eating  Supervision grooming  Mod assist bathing  Supervision upper body dressing  Mod assist lower body dressing  Mod assist toileting  Total assistbladder  Supervision bowel  Min assist bed/chair transfer  Min assist toilet transfer  Min assist tub/shower transfer  Total assist ambulation  Total assist wheelchair propulsion  Not tested stairs  Supervision comprehension  Supervision expression  Min assist social interaction  Min assist problem solving  Mod assist  memory    We will have her first weekly conference on Monday to discuss a discharge date.    Medical Decision Making and Plan:    S/p Right hip replacement  11/21 Dr. Delgado  Continue full rehab program  PT/OT/SLP, 1 hr each discipline, 5 days per week  Outpatient follow up    Pain Management  Scheduled tylenol  PRN tramadol    Bowel  SBO, see below  Meds as needed  Last BM 11/27    Bladder  Check PVRs - 17  ICP for over 400 cc  Scheduled toileting    DVT prophylaxis  ASA BID per surgery    Appreciate the assistance of the hospitalist with her medical comorbidities :     Small bowel obstruction, diet slowly being advanced  Large hiatal hernia  GERD  Hypertension  COPD  Hyperlipidemia  Azotemia  Anemia  Vit D deficiency, started on supplementation  Lung nodule, outpatient follow up    Total time:  36 minutes.  I spent greater than 50% of the time for patient care, counseling, and coordination on this date, including patient face-to face time, unit/floor time with review of records/pertinent lab data and studies, as well as discussing diagnostic evaluation/work up, planned therapeutic interventions, and future disposition of care, as per the interval events/subjective and the assessment and plan as noted above.        Salma Tejada M.D.   Physical Medicine and Rehabilitation

## 2019-11-29 NOTE — THERAPY
Speech Language Pathology  Daily Treatment     Patient Name: Guillermina Recio  Age:  79 y.o., Sex:  female  Medical Record #: [id number]  Today's Date: 11/29/2019     Subjective    Pt was pleasant and cooperative during this ST session      Objective       11/29/19 0931   SLP Total Time Spent   SLP Individual Total Time Spent (Mins) 60   Charge Group   SLP Cognitive Skill Development 4       Assessment    Discussed how pt manages her medications at home with her current visual deficits.  Pt reported that she memorizes them and then organizes them based on the pill shape/color in a weekly medication box.  Pt reported that if she ever has any medication changes she has a friend from [employer] help her organize them the first time with her.  Pt would benefit from calling her pharmacy to see if they are able to print large font medication labels.  After pt's medications that she will be taking when she returns home are finalized pt would benefit from a list that she can refer to (pt is able to read 48 font with her glasses and a flashlight).  Pt also reported that she would like to learn brail.      Plan    Complete a medication sorting task with current medications at this time for practice

## 2019-11-29 NOTE — THERAPY
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, L eye viision impaired, COPD, posterior hip precautions - Right hip         Subjective    \"my back is bruised\" prior to starting UB therex      Objective       11/29/19 1031   Sitting Upper Body Exercises   Comments green theraband UB therex seated: chest pulls, diagonal pulls, tricep extension, bicep curls, scapular retraction pulls, internal rotation 2 sets x 10 each   Interdisciplinary Plan of Care Collaboration   Patient Position at End of Therapy Seated;Call Light within Reach;Tray Table within Reach   OT Total Time Spent   OT Individual Total Time Spent (Mins) 30   OT Charge Group   OT Therapeutic Exercise  2       Assessment    Pt completed UB therex to the best of their abilities with minimal complaints of lower back pain during exercises    Plan    Cont overall strength/endurance, standing balance for ADL's and functional mobility    "

## 2019-11-29 NOTE — PROGRESS NOTES
Report received from previous shift. Patient lying quietly in bed, offered no complaints and denied pain at that time. Patient was cooperative with assessment and medication administration. Will continue to monitor.

## 2019-11-30 ENCOUNTER — APPOINTMENT (OUTPATIENT)
Dept: RADIOLOGY | Facility: REHABILITATION | Age: 79
DRG: 560 | End: 2019-11-30
Attending: HOSPITALIST
Payer: MEDICARE

## 2019-11-30 PROCEDURE — 74018 RADEX ABDOMEN 1 VIEW: CPT

## 2019-11-30 PROCEDURE — A9270 NON-COVERED ITEM OR SERVICE: HCPCS | Performed by: PHYSICAL MEDICINE & REHABILITATION

## 2019-11-30 PROCEDURE — 97530 THERAPEUTIC ACTIVITIES: CPT

## 2019-11-30 PROCEDURE — 97110 THERAPEUTIC EXERCISES: CPT

## 2019-11-30 PROCEDURE — 97535 SELF CARE MNGMENT TRAINING: CPT

## 2019-11-30 PROCEDURE — 97116 GAIT TRAINING THERAPY: CPT

## 2019-11-30 PROCEDURE — 94760 N-INVAS EAR/PLS OXIMETRY 1: CPT

## 2019-11-30 PROCEDURE — 700102 HCHG RX REV CODE 250 W/ 637 OVERRIDE(OP): Performed by: PHYSICAL MEDICINE & REHABILITATION

## 2019-11-30 PROCEDURE — A9270 NON-COVERED ITEM OR SERVICE: HCPCS | Performed by: HOSPITALIST

## 2019-11-30 PROCEDURE — 99232 SBSQ HOSP IP/OBS MODERATE 35: CPT | Performed by: HOSPITALIST

## 2019-11-30 PROCEDURE — 700102 HCHG RX REV CODE 250 W/ 637 OVERRIDE(OP): Performed by: HOSPITALIST

## 2019-11-30 PROCEDURE — 770010 HCHG ROOM/CARE - REHAB SEMI PRIVAT*

## 2019-11-30 PROCEDURE — 700101 HCHG RX REV CODE 250: Performed by: PHYSICAL MEDICINE & REHABILITATION

## 2019-11-30 PROCEDURE — G0515 COGNITIVE SKILLS DEVELOPMENT: HCPCS

## 2019-11-30 RX ORDER — POLYETHYLENE GLYCOL 3350 17 G/17G
1 POWDER, FOR SOLUTION ORAL DAILY
Status: DISCONTINUED | OUTPATIENT
Start: 2019-11-30 | End: 2019-12-02

## 2019-11-30 RX ORDER — SIMETHICONE 80 MG
160 TABLET,CHEWABLE ORAL
Status: DISCONTINUED | OUTPATIENT
Start: 2019-12-01 | End: 2019-12-03

## 2019-11-30 RX ORDER — BISACODYL 10 MG
10 SUPPOSITORY, RECTAL RECTAL
Status: DISCONTINUED | OUTPATIENT
Start: 2019-11-30 | End: 2019-12-02

## 2019-11-30 RX ORDER — AMOXICILLIN 250 MG
2 CAPSULE ORAL 2 TIMES DAILY
Status: DISCONTINUED | OUTPATIENT
Start: 2019-11-30 | End: 2019-12-02

## 2019-11-30 RX ADMIN — BUDESONIDE AND FORMOTEROL FUMARATE DIHYDRATE 2 PUFF: 80; 4.5 AEROSOL RESPIRATORY (INHALATION) at 09:24

## 2019-11-30 RX ADMIN — SENNOSIDES AND DOCUSATE SODIUM 2 TABLET: 8.6; 5 TABLET ORAL at 21:03

## 2019-11-30 RX ADMIN — NYSTATIN: 100000 POWDER TOPICAL at 21:06

## 2019-11-30 RX ADMIN — SENNOSIDES AND DOCUSATE SODIUM 2 TABLET: 8.6; 5 TABLET ORAL at 09:01

## 2019-11-30 RX ADMIN — ACETAMINOPHEN 1000 MG: 500 TABLET, FILM COATED ORAL at 16:11

## 2019-11-30 RX ADMIN — OMEPRAZOLE 20 MG: 20 CAPSULE, DELAYED RELEASE ORAL at 13:02

## 2019-11-30 RX ADMIN — POLYETHYLENE GLYCOL 3350 1 PACKET: 17 POWDER, FOR SOLUTION ORAL at 18:02

## 2019-11-30 RX ADMIN — VITAMIN D, TAB 1000IU (100/BT) 1000 UNITS: 25 TAB at 09:01

## 2019-11-30 RX ADMIN — OXYCODONE HYDROCHLORIDE 5 MG: 5 TABLET ORAL at 13:02

## 2019-11-30 RX ADMIN — ACETAMINOPHEN 1000 MG: 500 TABLET, FILM COATED ORAL at 21:03

## 2019-11-30 RX ADMIN — NYSTATIN: 100000 POWDER TOPICAL at 09:02

## 2019-11-30 RX ADMIN — I-VITE, TAB 1000-60-2MG (60/BT) 1 TABLET: TAB at 09:02

## 2019-11-30 RX ADMIN — LOVASTATIN 40 MG: 20 TABLET ORAL at 21:03

## 2019-11-30 RX ADMIN — ACETAMINOPHEN 1000 MG: 500 TABLET, FILM COATED ORAL at 09:01

## 2019-11-30 RX ADMIN — SIMETHICONE CHEW TAB 80 MG 160 MG: 80 TABLET ORAL at 09:01

## 2019-11-30 RX ADMIN — SIMETHICONE CHEW TAB 80 MG 160 MG: 80 TABLET ORAL at 13:02

## 2019-11-30 RX ADMIN — ASPIRIN 325 MG: 325 TABLET, COATED ORAL at 13:03

## 2019-11-30 ASSESSMENT — ENCOUNTER SYMPTOMS
PALPITATIONS: 0
EYES NEGATIVE: 1
CONSTIPATION: 1
CHILLS: 0
NAUSEA: 0
COUGH: 0
ABDOMINAL PAIN: 0
FEVER: 0
DIARRHEA: 0
VOMITING: 0
ROS GI COMMENTS: BLOATING
SHORTNESS OF BREATH: 0

## 2019-11-30 NOTE — PROGRESS NOTES
"Hospital Medicine Daily Progress Note      Chief Complaint  Ileus    Interval Problem Update  Pt upset that she's not getting \"real food.\"  Denies abd pain, nausea, or vomiting but c/o constipation.  KUB reviewed.    Review of Systems  Review of Systems   Constitutional: Negative for chills and fever.   HENT: Negative.    Eyes: Negative.    Respiratory: Negative for cough and shortness of breath.    Cardiovascular: Negative for chest pain and palpitations.   Gastrointestinal: Positive for constipation. Negative for abdominal pain, diarrhea, nausea and vomiting.        Bloating   Musculoskeletal: Positive for joint pain.        Wound pain   Skin: Negative for itching and rash.        Physical Exam  Temp:  [36.4 °C (97.6 °F)-36.8 °C (98.3 °F)] 36.4 °C (97.6 °F)  Pulse:  [87-99] 97  Resp:  [16-18] 18  BP: (101-106)/(59-65) 101/59  SpO2:  [91 %-92 %] 91 %    Physical Exam  Constitutional:       General: She is not in acute distress.     Appearance: Normal appearance. She is not ill-appearing.   HENT:      Head: Normocephalic and atraumatic.      Right Ear: External ear normal.      Left Ear: External ear normal.      Nose: Nose normal.   Eyes:      General:         Right eye: No discharge.         Left eye: No discharge.      Extraocular Movements: Extraocular movements intact.      Conjunctiva/sclera: Conjunctivae normal.   Neck:      Musculoskeletal: Normal range of motion and neck supple.   Cardiovascular:      Rate and Rhythm: Normal rate and regular rhythm.   Pulmonary:      Effort: No respiratory distress.      Breath sounds: No wheezing.      Comments: Decreased BS  Abdominal:      General: Bowel sounds are normal. There is no distension.      Palpations: Abdomen is soft.      Tenderness: There is no tenderness. There is no guarding or rebound.   Musculoskeletal:      Right lower leg: No edema.      Left lower leg: No edema.   Skin:     General: Skin is warm and dry.   Neurological:      Mental Status: She is " alert and oriented to person, place, and time.         Fluids    Intake/Output Summary (Last 24 hours) at 11/30/2019 1539  Last data filed at 11/30/2019 1233  Gross per 24 hour   Intake 1040 ml   Output --   Net 1040 ml       Laboratory  Recent Labs     11/28/19  0535   WBC 7.1   RBC 4.64   HEMOGLOBIN 11.4*   HEMATOCRIT 39.3   MCV 84.7   MCH 24.6*   MCHC 29.0*   RDW 50.7*   PLATELETCT 327   MPV 8.8*     Recent Labs     11/28/19  0535   SODIUM 143   POTASSIUM 5.1   CHLORIDE 109   CO2 25   GLUCOSE 84   BUN 25*   CREATININE 0.84   CALCIUM 8.7                   Assessment/Plan  Ileus (HCC)- (present on admission)  Assessment & Plan  Surgery notes reviewed, more likely post-op ileus than small bowel obstruction  Serial KUB shows resolution of distended bowel loops  Advance to GI soft diet  Increase bowel regimen for constipation  Decrease Simethicone    Dyslipidemia- (present on admission)  Assessment & Plan  On Mevacor    Lung nodule- (present on admission)  Assessment & Plan  CT Abd/Pelvis 11/25/19 showed 6 mm RML pulmonary nodule  Needs close F/U given smoking history    Azotemia- (present on admission)  Assessment & Plan  Follow renal function  Check F/U labs in am    COPD (chronic obstructive pulmonary disease) (HCC)- (present on admission)  Assessment & Plan  Continue Symbicort and RT protocol    Hip pain, chronic, right- (present on admission)  Assessment & Plan  2/2 OA/DJD  S/P elective right total hip arthroplasty on 11/21/19 by Dr. Delgado  Wound care and pain control    Vitamin D deficiency- (present on admission)  Assessment & Plan  Vit D level 21  On supplementation    Gastroesophageal reflux disease- (present on admission)  Assessment & Plan  2/2 Hiatal Hernia  Continue Prilosec    Anemia- (present on admission)  Assessment & Plan  Follow H/H  Check F/U labs in am     Full Code    Reviewed w/ pt and RN

## 2019-11-30 NOTE — FLOWSHEET NOTE
Oximetry check     11/30/19 0830   Events/Summary/Plan   Events/Summary/Plan O2 spot check   Oximetry   #Pulse Oximetry (Single Determination) Yes   Oxygen   Home O2 Use Prior To Admission? No   Pulse Oximetry 91 %   O2 (LPM) 2   O2 Daily Delivery Respiratory  Silicone Nasal Cannula

## 2019-11-30 NOTE — THERAPY
11/29/19 1459   Precautions   Precautions Posterior Hip Precautions;Weight Bearing As Tolerated Right Lower Extremity;Fall Risk   Comments Impaired vision, COPD   Vitals   Pulse Oximetry 91 %   Room Air Oximetry 87   O2 (LPM) 2   O2 Delivery Nasal Cannula   Pain 0 - 10 Group   Location Hip   Location Orientation Right   Therapist Pain Assessment 3;During Activity   Cognition    Level of Consciousness Alert   Bed Mobility    Supine to Sit Stand by Assist   Sit to Stand Contact Guard Assist   Scooting Contact Guard Assist   PT Total Time Spent   PT Individual Total Time Spent (Mins) 60   PT Charge Group   Charges Yes   PT Gait Training 2   PT Therapeutic Activities 2   Physical Therapy   Daily Treatment     Patient Name: Guillermina Recio  Age:  79 y.o., Sex:  female  Medical Record #: [id number]  Today's Date: 11/29/2019     Precautions  Precautions: Posterior Hip Precautions, Weight Bearing As Tolerated Right Lower Extremity, Fall Risk  Comments: Impaired vision, COPD    Subjective    The patient was resting in bed and she agreed to PT.  She said she is trying to put more weight on her right leg when she is standing or walking.     Objective    The patient participated in bed mobility and transfer training requiring standby assistance supine to sit and to transfer with a FWW.  She participated in gait training in the parallel bars tolerating 20 FT x5 SBA.  Room air trial 87%.  91% on 2 L O2.    FIM Bed/Chair/Wheelchair Transfers Score: 4 - Minimal Assistance  Bed/Chair/Wheelchair Transfers Description:  Adaptive equipment, Increased time, Supervision for safety, Verbal cueing, Set-up of equipment, Assist with one limb(FWW, CGA)    FIM Walking Score:  1 - Total Assistance  Walking Description:  Extra time, Safety concerns, Verbal cueing, Supervision for safety, Two hand rails, Assist device/equipment(Parallel bars, SBA, 20 FT x5)    FIM Wheelchair Score:     Wheelchair Description:       FIM Stairs Score:     Stairs  Description:         Assessment    The patient demonstrated improved bed mobility and gait.  Tolerated 20 FT x5 in the parallel bars SBA.  The patient definitely needs to be using supplemental oxygen which may need to be increased to 3 L with activity.    Plan    Bed mobility and transfer training, gait training, therapeutic exercise, safety education and posterior hip precautions

## 2019-11-30 NOTE — THERAPY
Occupational Therapy  Daily Treatment     Patient Name: Guillermina Recio  Age:  79 y.o., Sex:  female  Medical Record #: [id number]  Today's Date: 11/30/2019     Precautions  Precautions: (P) Posterior Hip Precautions, Weight Bearing As Tolerated Right Lower Extremity, Fall Risk  Comments: (P) Impaired vision, COPD         Subjective    Patient and family in room. Agreeable to OT.     Objective       11/30/19 1301   Precautions   Precautions Posterior Hip Precautions;Weight Bearing As Tolerated Right Lower Extremity;Fall Risk   Comments Impaired vision, COPD   Sitting Upper Body Exercises   Sitting Upper Body Exercises Yes   Tricep Press 2 sets of 10;Bilateral;Weight (See Comments for lbs)  (20 lbs on rickshaw forward)   Interdisciplinary Plan of Care Collaboration   IDT Collaboration with  Family / Caregiver;Nursing   Patient Position at End of Therapy Seated;Chair Alarm On;Call Light within Reach;Tray Table within Reach   Collaboration Comments RN gave pain med; family present at start of session   OT Total Time Spent   OT Individual Total Time Spent (Mins) 60   OT Charge Group   OT Self Care / ADL 3   OT Therapeutic Exercise  1       FIM Lower Body Dressing Score:  5 - Standby Prompting/Supervsion or Set-up  Lower Body Dressing Description:  Reacher, Sock aid, Dressing stick, Increased time, Supervision for safety, Verbal cueing, Set-up of equipment, Initial preparation for task(setup/sba with cues, extra time and AE to don/doff socks and thread pants up to knees, then unthread)    Educated patient on purpose of and how to use AE for LB dressing in order to maintain posterior hip precautions.  Practiced with AE.  Patient donned/doffed socks x 2 attempts.  Informed pt she can get these items at [facility], as she stated she goes there sometimes for DME.  Printed off picture of sock aide and dressing stick with names of these items written in large black letters so patient can read them and remember them.   Patient Seen in: [facility] AND [facility] Department    History   Patient presents with:  Abdomen/Flank Pain (GI/)      HPI    The patient presents complaining of right lower quadrant abdominal pain that is been present for the past 3 days.   Pain wa     Assessment    Patient demonstrated ability to use AE for LB Dressing with practice.  Motivated to increase independence.    Plan    Cont overall strength/endurance, standing balance for ADL's and functional mobility     Skin: Negative for rash and wound. Neurological: Negative for syncope and headaches. Constitutional and vital signs reviewed. All other systems reviewed and negative except as noted above.     PSFH elements reviewed from today and agreed exce -----------         ------                     CBC W/ DIFFERENTIAL[[id number]]          Abnormal            Final result                 Please view results for these tests on the individual orders.    [address] Laboratory testing unremarkable, urinalysis unremarkable, nonpregnant status. CT obtained to rule out acute appendicitis and this shows no acute abnormalities.   Patient reassured, stable for discharge home with outpatient follow-up, understands return to

## 2019-11-30 NOTE — CARE PLAN
Problem: Infection  Goal: Will remain free from infection  Outcome: PROGRESSING AS EXPECTED  Note:   Patient remains free from s/s infection; afebrile.  Will continue to monitor.      Problem: Urinary Elimination:  Goal: Ability to reestablish a normal urinary elimination pattern will improve  Outcome: PROGRESSING AS EXPECTED  Note:   Patient voiding adequate amounts of clear, yellow urine. Denies dysuria and flank pain: afebrile. Has been continent despite hx of incontinence. Will continue to monitor.

## 2019-11-30 NOTE — THERAPY
11/30/19 1059   Precautions   Precautions Posterior Hip Precautions;Weight Bearing As Tolerated Right Lower Extremity;Fall Risk   Comments Impaired vision, COPD   Pain 0 - 10 Group   Location Leg   Location Orientation Right;Proximal;Left;Distal;Posterior   Therapist Pain Assessment 3;Prior to Activity;During Activity   Cognition    Level of Consciousness Alert   Sitting Lower Body Exercises   Sitting Lower Body Exercises Yes   Ankle Pumps 1 set of 10   Hip Flexion 1 set of 10  (Assisted)   Hip Abduction 2 sets of 15;Bilateral   Hip Adduction 2 sets of 15;Bilateral   Long Arc Quad 2 sets of 15;Bilateral   Hamstring Curl 2 sets of 15;Bilateral   PT Total Time Spent   PT Individual Total Time Spent (Mins) 60   PT Charge Group   PT Gait Training 2   PT Therapeutic Exercise 1   PT Therapeutic Activities 1   Physical Therapy   Daily Treatment     Patient Name: Guillermina Recio  Age:  79 y.o., Sex:  female  Medical Record #: [id number]  Today's Date: 11/30/2019     Precautions  Precautions: Posterior Hip Precautions, Weight Bearing As Tolerated Right Lower Extremity, Fall Risk  Comments: Impaired vision, COPD    Subjective    The patient agreed to PT.  She reported some low-grade pain in her right leg and left lower leg.  She said she wanted to get up and walk     Objective    The patient participated in gait training initially in the parallel bars 20 FT x3 SBA followed by 30 FT x1 with a FWW CGA.  She was limited by impaired endurance and respiration.  She then participated in seated bilateral lower extremity therapeutic exercise which is indicated above.  PT also assisted the patient in the x-ray room to transfer to/from the x-ray table with a walker.    FIM Bed/Chair/Wheelchair Transfers Score: 4 - Minimal Assistance  Bed/Chair/Wheelchair Transfers Description:       FIM Walking Score:  1 - Total Assistance  Walking Description:  Extra time, Safety concerns, Verbal cueing, Supervision for safety, Two hand rails,  Walker, Oxygen(20 FT x3 SBA parallel bars, 30 FT x1 FWW CGA)    FIM Wheelchair Score:     Wheelchair Description:       FIM Stairs Score:  0 - Not tested,unsafe activity  Stairs Description:         Assessment    The patient is making slow but consistent progress.  She is limited by compromised respiration due to COPD and fear of falling.    Plan    Safety education, bed mobility and transfer training gait training, posterior hip precautions, therapeutic exercise

## 2019-11-30 NOTE — CARE PLAN
Problem: Communication  Goal: The ability to communicate needs accurately and effectively will improve  Outcome: PROGRESSING AS EXPECTED  Patient is alert and oriented x 4.       Problem: Safety  Goal: Will remain free from injury  Outcome: PROGRESSING AS EXPECTED  Pt uses call light consistently and appropriately. Waits for assistance does not attempt self transfer this shift. Able to verbalize needs.

## 2019-11-30 NOTE — FLOWSHEET NOTE
MDI     11/30/19 0924   Events/Summary/Plan   Events/Summary/Plan MDI instruct   MDI/DPI Group   #MDI/DPI Given MDI/DPI x 1   Chest Exam   Work Of Breathing / Effort Mild   Respiration 16   Pulse 88   Breath Sounds   Pre/Post Intervention Pre Intervention Assessment   RUL Breath Sounds Clear   RML Breath Sounds Clear   RLL Breath Sounds Diminished   HAJA Breath Sounds Clear   LLL Breath Sounds Diminished   Secretions   Cough Non Productive

## 2019-11-30 NOTE — PROGRESS NOTES
Hospital Medicine Daily Progress Note      Chief Complaint  Ileus    Interval Problem Update  Still w/ abd bloating but pt denies abd pain, nausea, or vomiting.  Tolerating FLD.    Review of Systems  Review of Systems   Constitutional: Negative for chills and fever.   HENT: Negative.    Eyes: Negative.    Respiratory: Negative for cough and shortness of breath.    Cardiovascular: Negative for chest pain and palpitations.   Gastrointestinal: Negative for abdominal pain, nausea and vomiting.        Bloating   Musculoskeletal: Positive for joint pain.        Wound pain   Skin: Negative for itching and rash.        Physical Exam  Temp:  [36.6 °C (97.8 °F)-36.7 °C (98.1 °F)] 36.6 °C (97.9 °F)  Pulse:  [] 100  Resp:  [17-18] 18  BP: ()/(47-64) 94/64  SpO2:  [91 %-96 %] 91 %    Physical Exam  Constitutional:       General: She is not in acute distress.     Appearance: Normal appearance. She is not ill-appearing.   HENT:      Head: Normocephalic and atraumatic.      Right Ear: External ear normal.      Left Ear: External ear normal.      Nose: Nose normal.   Eyes:      General:         Right eye: No discharge.         Left eye: No discharge.      Extraocular Movements: Extraocular movements intact.      Conjunctiva/sclera: Conjunctivae normal.   Neck:      Musculoskeletal: Normal range of motion and neck supple.   Cardiovascular:      Rate and Rhythm: Normal rate and regular rhythm.   Pulmonary:      Effort: No respiratory distress.      Breath sounds: No wheezing.      Comments: Decreased BS  Abdominal:      General: Bowel sounds are normal. There is distension.      Palpations: Abdomen is soft.      Tenderness: There is no tenderness. There is no guarding or rebound.   Musculoskeletal:      Right lower leg: No edema.      Left lower leg: No edema.   Skin:     General: Skin is warm and dry.   Neurological:      Mental Status: She is alert and oriented to person, place, and time.         Fluids    Intake/Output  Summary (Last 24 hours) at 11/29/2019 1655  Last data filed at 11/29/2019 1300  Gross per 24 hour   Intake 1270 ml   Output --   Net 1270 ml       Laboratory  Recent Labs     11/27/19  0458 11/28/19  0535   WBC 7.8 7.1   RBC 4.38 4.64   HEMOGLOBIN 10.9* 11.4*   HEMATOCRIT 36.7* 39.3   MCV 83.8 84.7   MCH 24.9* 24.6*   MCHC 29.7* 29.0*   RDW 50.7* 50.7*   PLATELETCT 335 327   MPV 8.8* 8.8*     Recent Labs     11/27/19  0458 11/28/19  0535   SODIUM 139 143   POTASSIUM 4.6 5.1   CHLORIDE 109 109   CO2 23 25   GLUCOSE 91 84   BUN 31* 25*   CREATININE 0.78 0.84   CALCIUM 8.0* 8.7                   Assessment/Plan  Ileus (HCC)- (present on admission)  Assessment & Plan  Surgery notes reviewed, more likely post-op ileus than small bowel obstruction  Continue FLD, Senokot, and Simethicone  Follow serial KUB    Dyslipidemia- (present on admission)  Assessment & Plan  On Mevacor    Lung nodule- (present on admission)  Assessment & Plan  CT Abd/Pelvis 11/25/19 showed 6 mm RML pulmonary nodule  Needs close F/U given smoking history    Azotemia- (present on admission)  Assessment & Plan  Follow renal function    COPD (chronic obstructive pulmonary disease) (HCC)- (present on admission)  Assessment & Plan  Continue Symbicort and RT protocol    Hip pain, chronic, right- (present on admission)  Assessment & Plan  2/2 OA/DJD  S/P elective right total hip arthroplasty on 11/21/19 by Dr. Delgado  Wound care and pain control    Vitamin D deficiency- (present on admission)  Assessment & Plan  Vit D level 21  On supplementation    Gastroesophageal reflux disease- (present on admission)  Assessment & Plan  2/2 Hiatal Hernia  Continue Prilosec    Anemia- (present on admission)  Assessment & Plan  Follow H/H     Full Code

## 2019-12-01 ENCOUNTER — APPOINTMENT (OUTPATIENT)
Dept: RADIOLOGY | Facility: REHABILITATION | Age: 79
DRG: 560 | End: 2019-12-01
Attending: HOSPITALIST
Payer: MEDICARE

## 2019-12-01 LAB
ANION GAP SERPL CALC-SCNC: 8 MMOL/L (ref 0–11.9)
BUN SERPL-MCNC: 19 MG/DL (ref 8–22)
CALCIUM SERPL-MCNC: 8.6 MG/DL (ref 8.5–10.5)
CHLORIDE SERPL-SCNC: 109 MMOL/L (ref 96–112)
CO2 SERPL-SCNC: 23 MMOL/L (ref 20–33)
CREAT SERPL-MCNC: 0.88 MG/DL (ref 0.5–1.4)
ERYTHROCYTE [DISTWIDTH] IN BLOOD BY AUTOMATED COUNT: 49 FL (ref 35.9–50)
GLUCOSE SERPL-MCNC: 100 MG/DL (ref 65–99)
HCT VFR BLD AUTO: 36.7 % (ref 37–47)
HGB BLD-MCNC: 11 G/DL (ref 12–16)
MCH RBC QN AUTO: 24.7 PG (ref 27–33)
MCHC RBC AUTO-ENTMCNC: 30 G/DL (ref 33.6–35)
MCV RBC AUTO: 82.3 FL (ref 81.4–97.8)
PLATELET # BLD AUTO: 271 K/UL (ref 164–446)
PMV BLD AUTO: 8.9 FL (ref 9–12.9)
POTASSIUM SERPL-SCNC: 4.7 MMOL/L (ref 3.6–5.5)
RBC # BLD AUTO: 4.46 M/UL (ref 4.2–5.4)
SODIUM SERPL-SCNC: 140 MMOL/L (ref 135–145)
WBC # BLD AUTO: 6.7 K/UL (ref 4.8–10.8)

## 2019-12-01 PROCEDURE — 85027 COMPLETE CBC AUTOMATED: CPT

## 2019-12-01 PROCEDURE — 36415 COLL VENOUS BLD VENIPUNCTURE: CPT

## 2019-12-01 PROCEDURE — A9270 NON-COVERED ITEM OR SERVICE: HCPCS | Performed by: PHYSICAL MEDICINE & REHABILITATION

## 2019-12-01 PROCEDURE — 99232 SBSQ HOSP IP/OBS MODERATE 35: CPT | Performed by: HOSPITALIST

## 2019-12-01 PROCEDURE — 94760 N-INVAS EAR/PLS OXIMETRY 1: CPT

## 2019-12-01 PROCEDURE — 700101 HCHG RX REV CODE 250: Performed by: PHYSICAL MEDICINE & REHABILITATION

## 2019-12-01 PROCEDURE — 700102 HCHG RX REV CODE 250 W/ 637 OVERRIDE(OP): Performed by: PHYSICAL MEDICINE & REHABILITATION

## 2019-12-01 PROCEDURE — 74018 RADEX ABDOMEN 1 VIEW: CPT

## 2019-12-01 PROCEDURE — A9270 NON-COVERED ITEM OR SERVICE: HCPCS | Performed by: HOSPITALIST

## 2019-12-01 PROCEDURE — 700102 HCHG RX REV CODE 250 W/ 637 OVERRIDE(OP): Performed by: HOSPITALIST

## 2019-12-01 PROCEDURE — 770010 HCHG ROOM/CARE - REHAB SEMI PRIVAT*

## 2019-12-01 PROCEDURE — 80048 BASIC METABOLIC PNL TOTAL CA: CPT

## 2019-12-01 RX ADMIN — ASPIRIN 325 MG: 325 TABLET, COATED ORAL at 13:19

## 2019-12-01 RX ADMIN — ACETAMINOPHEN 1000 MG: 500 TABLET, FILM COATED ORAL at 16:22

## 2019-12-01 RX ADMIN — OMEPRAZOLE 20 MG: 20 CAPSULE, DELAYED RELEASE ORAL at 13:19

## 2019-12-01 RX ADMIN — NYSTATIN: 100000 POWDER TOPICAL at 20:53

## 2019-12-01 RX ADMIN — OXYCODONE HYDROCHLORIDE 5 MG: 5 TABLET ORAL at 20:49

## 2019-12-01 RX ADMIN — ACETAMINOPHEN 1000 MG: 500 TABLET, FILM COATED ORAL at 08:25

## 2019-12-01 RX ADMIN — SIMETHICONE CHEW TAB 80 MG 160 MG: 80 TABLET ORAL at 17:55

## 2019-12-01 RX ADMIN — BUDESONIDE AND FORMOTEROL FUMARATE DIHYDRATE 2 PUFF: 80; 4.5 AEROSOL RESPIRATORY (INHALATION) at 08:27

## 2019-12-01 RX ADMIN — I-VITE, TAB 1000-60-2MG (60/BT) 1 TABLET: TAB at 08:26

## 2019-12-01 RX ADMIN — LOVASTATIN 40 MG: 20 TABLET ORAL at 20:48

## 2019-12-01 RX ADMIN — ACETAMINOPHEN 1000 MG: 500 TABLET, FILM COATED ORAL at 20:49

## 2019-12-01 RX ADMIN — SIMETHICONE CHEW TAB 80 MG 160 MG: 80 TABLET ORAL at 08:25

## 2019-12-01 RX ADMIN — NYSTATIN: 100000 POWDER TOPICAL at 08:26

## 2019-12-01 RX ADMIN — SIMETHICONE CHEW TAB 80 MG 160 MG: 80 TABLET ORAL at 13:19

## 2019-12-01 RX ADMIN — VITAMIN D, TAB 1000IU (100/BT) 1000 UNITS: 25 TAB at 08:25

## 2019-12-01 ASSESSMENT — ENCOUNTER SYMPTOMS
DIARRHEA: 0
ROS GI COMMENTS: BLOATING
CHILLS: 0
SHORTNESS OF BREATH: 0
FEVER: 0
CONSTIPATION: 0
PALPITATIONS: 0
EYES NEGATIVE: 1
NAUSEA: 0
ABDOMINAL PAIN: 0
VOMITING: 0
COUGH: 0

## 2019-12-01 NOTE — PROGRESS NOTES
Hospital Medicine Daily Progress Note      Chief Complaint  Ileus    Interval Problem Update  No abd pain or N/V but pt still c/o bloating and gas.  Will check F/U KUB.  Labs reviewed.    Review of Systems  Review of Systems   Constitutional: Negative for chills and fever.   HENT: Negative.    Eyes: Negative.    Respiratory: Negative for cough and shortness of breath.    Cardiovascular: Negative for chest pain and palpitations.   Gastrointestinal: Negative for abdominal pain, constipation, diarrhea, nausea and vomiting.        Bloating   Musculoskeletal: Positive for joint pain.        Wound pain   Skin: Negative for itching and rash.        Physical Exam  Temp:  [36.4 °C (97.6 °F)-37.1 °C (98.8 °F)] 36.6 °C (97.9 °F)  Pulse:  [84-97] 91  Resp:  [18] 18  BP: ()/(59-76) 104/76  SpO2:  [91 %-92 %] 91 %    Physical Exam  Constitutional:       General: She is not in acute distress.     Appearance: Normal appearance. She is not ill-appearing.   HENT:      Head: Normocephalic and atraumatic.      Right Ear: External ear normal.      Left Ear: External ear normal.      Nose: Nose normal.   Eyes:      General:         Right eye: No discharge.         Left eye: No discharge.      Extraocular Movements: Extraocular movements intact.      Conjunctiva/sclera: Conjunctivae normal.   Neck:      Musculoskeletal: Normal range of motion and neck supple.   Cardiovascular:      Rate and Rhythm: Normal rate and regular rhythm.   Pulmonary:      Effort: No respiratory distress.      Breath sounds: No wheezing.      Comments: Decreased BS  Abdominal:      General: Bowel sounds are normal. There is no distension.      Palpations: Abdomen is soft.      Tenderness: There is no tenderness. There is no guarding or rebound.   Musculoskeletal:      Right lower leg: No edema.      Left lower leg: No edema.   Skin:     General: Skin is warm and dry.   Neurological:      Mental Status: She is alert and oriented to person, place, and time.          Fluids    Intake/Output Summary (Last 24 hours) at 12/1/2019 1335  Last data filed at 12/1/2019 0900  Gross per 24 hour   Intake 240 ml   Output --   Net 240 ml       Laboratory  Recent Labs     12/01/19  0617   WBC 6.7   RBC 4.46   HEMOGLOBIN 11.0*   HEMATOCRIT 36.7*   MCV 82.3   MCH 24.7*   MCHC 30.0*   RDW 49.0   PLATELETCT 271   MPV 8.9*     Recent Labs     12/01/19  0615   SODIUM 140   POTASSIUM 4.7   CHLORIDE 109   CO2 23   GLUCOSE 100*   BUN 19   CREATININE 0.88   CALCIUM 8.6                   Assessment/Plan  Ileus (HCC)- (present on admission)  Assessment & Plan  Surgery notes reviewed, more likely post-op ileus than small bowel obstruction  Serial KUB shows resolution of distended bowel loops  Constipation resolved w/ increased bowel regimen  Continue Simethicone for gas  Advanced to GI soft diet 11/30/19    Dyslipidemia- (present on admission)  Assessment & Plan  On Mevacor    Lung nodule- (present on admission)  Assessment & Plan  CT Abd/Pelvis 11/25/19 showed 6 mm RML pulmonary nodule  Needs close F/U given smoking history    Azotemia- (present on admission)  Assessment & Plan  Fenal function improving    COPD (chronic obstructive pulmonary disease) (HCC)- (present on admission)  Assessment & Plan  Continue Symbicort and RT protocol    Hip pain, chronic, right- (present on admission)  Assessment & Plan  2/2 OA/DJD  S/P elective right total hip arthroplasty on 11/21/19 by Dr. Delgado  Wound care and pain control    Vitamin D deficiency- (present on admission)  Assessment & Plan  Vit D level 21  On supplementation    Gastroesophageal reflux disease- (present on admission)  Assessment & Plan  2/2 Hiatal Hernia  Continue Prilosec    Anemia- (present on admission)  Assessment & Plan  Follow H/H     Full Code

## 2019-12-01 NOTE — FLOWSHEET NOTE
12/01/19 1023   Events/Summary/Plan   Events/Summary/Plan 02 spot check, pt denies SOB    Chest Exam   Respiration 18   Pulse 91   Oximetry   #Pulse Oximetry (Single Determination) Yes   Oxygen   Home O2 Use Prior To Admission? No   Pulse Oximetry 91 %   O2 (LPM) 2   O2 Daily Delivery Respiratory  Silicone Nasal Cannula   Room Air Challenge   (not able to wean yet)

## 2019-12-01 NOTE — THERAPY
Speech Language Pathology  Daily Treatment     Patient Name: Guillermina Recio  Age:  79 y.o., Sex:  female  Medical Record #: [id number]  Today's Date: 11/30/2019     Subjective    Pt pleasant and cooperative during tx.  Pt's dtr present and supportive.       Objective       11/30/19 1431   Cognition   Moderate Attention Moderate (3)   Functional Memory Activities Moderate (3)   Insight into Deficits Minimal (4)   Interdisciplinary Plan of Care Collaboration   IDT Collaboration with  Family / Caregiver   Collaboration Comments dtr present during tx.    SLP Total Time Spent   SLP Individual Total Time Spent (Mins) 60   Charge Group   SLP Cognitive Skill Development 4       FIM Comprehension Score:  5 - Stand-by Prompting/Supervision or Set-up  Comprehension Description:  Visual devices, Glasses    FIM Expression Score:  6 - Modified Independent  Expression Description:  Verbal cueing    FIM Social Interaction Score:  4 - Minimal Assistance  Social Interaction Description:  Verbal cues(verbose)    FIM Problem Solving Score:  4 - Minimal Assistance  Problem Solving Description:  Verbal cueing, Therapy schedule    FIM Memory Score:  3 - Moderate Assistance  Memory Description:  Verbal cueing      Assessment    Medication management: pt recalled names of 50% of medications, when given use.  Pt recalled therapy tasks, and meals, independently.  Pt benefits from min verbal cues to recall names of therapists.  Pt recalled items need from care chest (she discussed these during her OT session).      Plan    Target med management, finance, and recall.

## 2019-12-01 NOTE — FLOWSHEET NOTE
12/01/19 1346   Events/Summary/Plan   Events/Summary/Plan Re-checked SATS per CNA reque.  Oxygen increased to 3 lpm   Interdisciplinary Plan of Care-Goals (Indications)   Bronchodilator Indications History / Diagnosis   Interdisciplinary Plan of Care-Outcomes    Bronchodilator Outcome Patient at Stable Baseline   Respiratory WDL   Respiratory (WDL) X   Chest Exam   Respiration 18   Pulse 90   Oximetry   #Pulse Oximetry (Single Determination) Yes   Oxygen   Pulse Oximetry 92 %   O2 (LPM) 3  (had to increase to get SATS over 90%)   O2 Daily Delivery Respiratory  Silicone Nasal Cannula

## 2019-12-02 PROBLEM — I95.9 HYPOTENSION: Status: ACTIVE | Noted: 2019-12-02

## 2019-12-02 PROCEDURE — 99232 SBSQ HOSP IP/OBS MODERATE 35: CPT | Performed by: HOSPITALIST

## 2019-12-02 PROCEDURE — 700102 HCHG RX REV CODE 250 W/ 637 OVERRIDE(OP): Performed by: PHYSICAL MEDICINE & REHABILITATION

## 2019-12-02 PROCEDURE — 97535 SELF CARE MNGMENT TRAINING: CPT

## 2019-12-02 PROCEDURE — 97530 THERAPEUTIC ACTIVITIES: CPT

## 2019-12-02 PROCEDURE — 97116 GAIT TRAINING THERAPY: CPT

## 2019-12-02 PROCEDURE — 99233 SBSQ HOSP IP/OBS HIGH 50: CPT | Performed by: PHYSICAL MEDICINE & REHABILITATION

## 2019-12-02 PROCEDURE — 700101 HCHG RX REV CODE 250: Performed by: PHYSICAL MEDICINE & REHABILITATION

## 2019-12-02 PROCEDURE — 770010 HCHG ROOM/CARE - REHAB SEMI PRIVAT*

## 2019-12-02 PROCEDURE — 700102 HCHG RX REV CODE 250 W/ 637 OVERRIDE(OP): Performed by: HOSPITALIST

## 2019-12-02 PROCEDURE — A9270 NON-COVERED ITEM OR SERVICE: HCPCS | Performed by: PHYSICAL MEDICINE & REHABILITATION

## 2019-12-02 PROCEDURE — G0515 COGNITIVE SKILLS DEVELOPMENT: HCPCS

## 2019-12-02 PROCEDURE — A9270 NON-COVERED ITEM OR SERVICE: HCPCS | Performed by: HOSPITALIST

## 2019-12-02 PROCEDURE — 94760 N-INVAS EAR/PLS OXIMETRY 1: CPT

## 2019-12-02 RX ORDER — POLYETHYLENE GLYCOL 3350 17 G/17G
1 POWDER, FOR SOLUTION ORAL
Status: DISCONTINUED | OUTPATIENT
Start: 2019-12-02 | End: 2019-12-10 | Stop reason: HOSPADM

## 2019-12-02 RX ORDER — AMOXICILLIN 250 MG
2 CAPSULE ORAL 2 TIMES DAILY PRN
Status: DISCONTINUED | OUTPATIENT
Start: 2019-12-02 | End: 2019-12-10 | Stop reason: HOSPADM

## 2019-12-02 RX ORDER — BISACODYL 10 MG
10 SUPPOSITORY, RECTAL RECTAL
Status: DISCONTINUED | OUTPATIENT
Start: 2019-12-02 | End: 2019-12-10 | Stop reason: HOSPADM

## 2019-12-02 RX ADMIN — BUDESONIDE AND FORMOTEROL FUMARATE DIHYDRATE 2 PUFF: 80; 4.5 AEROSOL RESPIRATORY (INHALATION) at 20:21

## 2019-12-02 RX ADMIN — SIMETHICONE CHEW TAB 80 MG 160 MG: 80 TABLET ORAL at 18:02

## 2019-12-02 RX ADMIN — ACETAMINOPHEN 1000 MG: 500 TABLET, FILM COATED ORAL at 20:21

## 2019-12-02 RX ADMIN — ACETAMINOPHEN 1000 MG: 500 TABLET, FILM COATED ORAL at 08:56

## 2019-12-02 RX ADMIN — I-VITE, TAB 1000-60-2MG (60/BT) 1 TABLET: TAB at 08:48

## 2019-12-02 RX ADMIN — ACETAMINOPHEN 1000 MG: 500 TABLET, FILM COATED ORAL at 14:11

## 2019-12-02 RX ADMIN — SIMETHICONE CHEW TAB 80 MG 160 MG: 80 TABLET ORAL at 13:12

## 2019-12-02 RX ADMIN — BUDESONIDE AND FORMOTEROL FUMARATE DIHYDRATE 2 PUFF: 80; 4.5 AEROSOL RESPIRATORY (INHALATION) at 08:48

## 2019-12-02 RX ADMIN — SIMETHICONE CHEW TAB 80 MG 160 MG: 80 TABLET ORAL at 08:49

## 2019-12-02 RX ADMIN — VITAMIN D, TAB 1000IU (100/BT) 1000 UNITS: 25 TAB at 08:50

## 2019-12-02 RX ADMIN — LOVASTATIN 40 MG: 20 TABLET ORAL at 20:21

## 2019-12-02 RX ADMIN — NYSTATIN: 100000 POWDER TOPICAL at 20:21

## 2019-12-02 RX ADMIN — ASPIRIN 325 MG: 325 TABLET, COATED ORAL at 13:11

## 2019-12-02 RX ADMIN — SENNOSIDES AND DOCUSATE SODIUM 1 TABLET: 8.6; 5 TABLET ORAL at 08:49

## 2019-12-02 RX ADMIN — OMEPRAZOLE 20 MG: 20 CAPSULE, DELAYED RELEASE ORAL at 14:11

## 2019-12-02 RX ADMIN — NYSTATIN: 100000 POWDER TOPICAL at 08:48

## 2019-12-02 ASSESSMENT — ENCOUNTER SYMPTOMS
COUGH: 0
SHORTNESS OF BREATH: 0
CHILLS: 0
VOMITING: 0
CONSTIPATION: 0
DIARRHEA: 0
EYES NEGATIVE: 1
FEVER: 0
ROS GI COMMENTS: BLOATING
NAUSEA: 0
PALPITATIONS: 0
ABDOMINAL PAIN: 0

## 2019-12-02 NOTE — THERAPY
Physical Therapy   Daily Treatment     Patient Name: Guillermina Recio  Age:  79 y.o., Sex:  female  Medical Record #: [id number]  Today's Date: 12/2/2019     Precautions  Precautions: Fall Risk, Posterior Hip Precautions, Weight Bearing As Tolerated Right Lower Extremity  Comments: impaired vision, COPD     Subjective    Pt seated EOB with full breakfast tray bedside, requests time to finish eating before PT.      Objective       12/02/19 0831   Precautions   Precautions Fall Risk;Posterior Hip Precautions;Weight Bearing As Tolerated Right Lower Extremity   Comments impaired vision, COPD    Interdisciplinary Plan of Care Collaboration   IDT Collaboration with  Occupational Therapist   Patient Position at End of Therapy Seated  (in therapy gym)   Collaboration Comments pt care passed to OT   Therapy Missed   Missed Therapy (Minutes) 15   Reason For Missed Therapy Non-Medical - Other (Please Comment)  (Pt completing breakfast)   PT Total Time Spent   PT Individual Total Time Spent (Mins) 45   PT Charge Group   PT Gait Training 1   PT Therapeutic Activities 2     Pt completed dressing EOB with intermittent standing using FWW, doffed t-shirt and hospital pants, donned bra, sweatpants, and sweatshirt with set-up/ stand by and verbal cuing.     FIM Walking Score:  1 - Total Assistance  Walking Description:  Extra time, Supervision for safety(10 ft and 15 ft x3 with FWW and close SBA )      Assessment    Pt with poor adherence to hip precautions during dressing. Ambulation continues to be limited by L hip pain and SOB/ poor endurance.     Plan    Safety education, bed mobility and transfer training gait training, posterior hip precautions, therapeutic exercise

## 2019-12-02 NOTE — REHAB-OT IDT TEAM NOTE
Occupational Therapy   Activities of Daily Living  Eating Initial:  5 - Standby Prompting/Supervision or Set-up  Eating Current:  5 - Standby Prompting/Supervision or Set-up   Eating Description:  Increased time, Supervision for safety, Modified diet(Vision impaired, set up required.)  Grooming Initial:  5 - Standby Prompting/Supervision or Set-up  Grooming Current:  5 - Standby Prompting/Supervision or Set-up   Grooming Description:  Set-up of equipment, Verbal cueing, Supervision for safety, Increased time  Bathing Initial:  3 - Moderate Assistance  Bathing Current:  5 - Standby Prompting/Supervision or Set-up   Bathing Description:  Grab bar, Tub bench, Long handled bath tool, Hand held shower, Increased time, Supervision for safety, Verbal cueing(min A for problem solving new environment due to visual deficits)  Upper Body Dressing Initial:  5 - Standby Prompting/Supervision or Set-up  Upper Body Dressing Current:  5 - Standby Prompting/Supervision or Set-up   Upper Body Dressing Description:  Increased time, Supervision for safety, Verbal cueing  Lower Body Dressing Initial:  3 - Moderate Assistance  Lower Body Dressing Current:  4 - Minimal Assistance   Lower Body Dressing Description:  4 - Minimal Assistance  Toileting Initial:  3 - Moderate Assistance  Toileting Current:  4 - Minimal Assistance   Toileting Description:  Grab bar, Supervision for safety, Increased time(CGA)  Toilet Transfer Initial:  4 - Minimal Assistance  Toilet Transfer Current:  4 - Minimal Assistance   Toilet Transfer Description:  4 - Minimal Assistance  Tub / Shower Transfer Initial:  4 - Minimal Assistance  Tub / Shower Transfer Current:  4 - Minimal Assistance   Tub / Shower Transfer Description:  Grab bar, Increased time, Supervision for safety, Verbal cueing(CGA FWW from toilet to shower, grab bar for transition)  IADL:  Not yet addressed, per pt she has assist for laundry, shopping and home management  Family Training/Education:   Not yet addressed   DME/DC Recommendations:  Reacher, sock aid. Pt plans to meet needs using care chest which she is familiar with    Strengths:  Able to follow instructions, Alert and oriented, Effective communication skills, Making steady progress towards goals, Manages pain appropriately, Motivated for self care and independence, Pleasant and cooperative and Willingly participates in therapeutic activities  Barriers:  Generalized weakness, Limited mobility, Poor balance and Other: impaired short term memory, impaired attention, baseline visual deficits     # of short term goals set= 2    # of short term goals met= 2     Occupational Therapy Goals     Problem: OT Long Term Goals     Dates: Start: 11/28/19       Description:     Goal: LTG-By discharge, patient will complete basic self care tasks     Dates: Start: 11/28/19       Description: 1) Individualized Goal: Mod I with AE as needed    2) Interventions: OT Group Therapy, OT Self Care/ADL, OT Cognitive Skill Dev, OT Community Reintegration, OT Manual Ther Technique, OT Neuro Re-Ed/Balance, OT Sensory Int Techniques, OT Therapeutic Activity, OT Evaluation and OT Therapeutic Exercise             Goal: LTG-By discharge, patient will perform bathroom transfers     Dates: Start: 11/28/19       Description: 1) Individualized Goal: CGA for safety and balance    2) Interventions: OT Group Therapy, OT Self Care/ADL, OT Cognitive Skill Dev, OT Community Reintegration, OT Manual Ther Technique, OT Neuro Re-Ed/Balance, OT Sensory Int Techniques, OT Therapeutic Activity, OT Evaluation and OT Therapeutic Exercise                         Section completed by:  Nelly Escalona MS,OTR/L

## 2019-12-02 NOTE — ASSESSMENT & PLAN NOTE
Currently resolved  BP low normal and ok  Not on any hypertensive meds  ? 2nd to pain meds  Encouraging oral fluids  Monitor

## 2019-12-02 NOTE — CARE PLAN
Problem: Bathing  Goal: STG-Within one week, patient will bathe  Description  1) Individualized Goal:  Min assist with AE as needed    2) Interventions:  OT Group Therapy, OT Self Care/ADL, OT Cognitive Skill Dev, OT Community Reintegration, OT Manual Ther Technique, OT Neuro Re-Ed/Balance, OT Sensory Int Techniques, OT Therapeutic Activity, OT Evaluation and OT Therapeutic Exercise     Outcome: MET     Problem: Dressing  Goal: STG-Within one week, patient will dress LB  Description  1) Individualized Goal: Min assist with AE as needed    2) Interventions: OT Group Therapy, OT Self Care/ADL, OT Cognitive Skill Dev, OT Community Reintegration, OT Manual Ther Technique, OT Neuro Re-Ed/Balance, OT Sensory Int Techniques, OT Therapeutic Activity, OT Evaluation and OT Therapeutic Exercise     Outcome: MET

## 2019-12-02 NOTE — REHAB-SLP IDT TEAM NOTE
Speech Therapy   Cognitive Linquistic Functions  Comprehension Initial:  5 - Stand-by Prompting/Supervision or Set-up  Comprehension Current:  6 - Modified Independent   Comprehension Description:  Glasses  Expression Initial:  5 - Stand-by Prompting/Supervision or Set-up  Expression Current:  7 - Independent   Expression Description:  Verbal cueing  Social Interaction Initial:  4 - Minimal Assistance  Social Interaction Current:  5 - Stand-by Prompting/Supervision or Set-up   Social Interaction Description:  Verbal cues  Problem Solving Initial:  4 - Minimal Assistance  Problem Solving Current:  4 - Minimal Assistance   Problem Solving Description:  Verbal cueing  Memory Initial:  3 - Moderate Assistance  Memory Current:  4 - Minimal Assistance   Memory Description:  Verbal cueing  Executive Functioning / Organization Initial:   NA  Executive Functioning / Organization Current:   NA   Executive Functioning Desciption:  NA  Swallowing  Swallowing Status:  Not following for dysphagia management, Diet upgrades per GI   Orders Placed This Encounter   Procedures   • Diet Order Low Fiber(GI Soft)     Standing Status:   Standing     Number of Occurrences:   1     Order Specific Question:   Diet:     Answer:   Low Fiber(GI Soft) [2]     Order Specific Question:   Consistency/Fluid modifications:     Answer:   Thin Liquids [3]     Behavior Modification Plan  Keep instructions simple/concrete, Redirect to task/topic and Analyze tasks (break down into smaller steps)  Assistive Technology  Low/Impaired vision equipment, Memory aides: and Low tech: Calendar, planner, schedule, alarms/timers, pill organizer, post-it notes, lists  Family Training/Education:  To be completed   DC Recommendations:   Pt would benefit from assistance with medication management tasks when she returns home d/t visual deficits   Strengths:  Able to follow instructions, Effective communication skills, Motivated for self care and independence, Pleasant  and cooperative and Willingly participates in therapeutic activities  Barriers:  Other: Visual deficits   # of short term goals set=3  # of short term goals met=2  Speech Therapy Problems     Problem: Memory STGs     Dates: Start: 12/02/19       Description:     Goal: STG-Within one week, patient will     Dates: Start: 12/02/19       Description: 1) Individualized goal:  Recall recently taught precautions and safety strategies r/t rehabilitation stay with 80% accuracy and Spv.    2) Interventions:  SLP Speech Language Treatment, SLP Self Care / ADL Training , SLP Cognitive Skill Development and SLP Group Treatment                   Problem: Problem Solving STGs     Dates: Start: 11/28/19       Description:     Goal: STG-Within one week, patient will     Dates: Start: 11/28/19       Description: 1) Individualized goal:  Complete functional problem solving tasks r/t medication management with 80% accuracy and MIN A.  2) Interventions:  SLP Speech Language Treatment, SLP Self Care / ADL Training , SLP Cognitive Skill Development and SLP Group Treatment       Note:     Goal Note filed on 12/02/19 0932 by Tano Lazar MS,CCC-SLP    Continue to target medication management                        Problem: Speech/Swallowing LTGs     Dates: Start: 11/28/19       Description:     Goal: LTG-By discharge, patient will solve basic problems     Dates: Start: 11/28/19       Description: 1) Individualized goal:  R/t safety and ADLs/IADLs with MOD I.   2) Interventions:  SLP Speech Language Treatment, SLP Self Care / ADL Training , SLP Cognitive Skill Development and SLP Group Treatment                          Section completed by:  Tano Lazar MS,CCC-SLP

## 2019-12-02 NOTE — PROGRESS NOTES
"Rehab Progress Note     Date of Service: 12/2/2019  Chief Complaint: follow up hip replacement and small bowel obstruction    Interval Events (Subjective)    Patient seen and examined in her room today.  Her daughter is present.  Therapy reports patient is limited by fear of having pain in her hip with weightbearing.  Since diet has been advanced over the weekend so now she is tolerating a soft diet and is moving her bowels.  She did have some episodes of blood pressure on the lower side today.  We discussed the plan discharge date for next week.  Patient has no new complaints.    Objective:  VITAL SIGNS: /72   Pulse 80   Temp 36.9 °C (98.4 °F) (Oral)   Resp 18   Ht 1.473 m (4' 10\")   Wt 66.7 kg (147 lb 0.8 oz)   SpO2 92%   BMI 30.73 kg/m²   Gen: alert, no apparent distress  CV: regular rate and rhythm, no murmurs, no peripheral edema  Resp: clear to ascultation bilaterally, normal respiratory effort  GI: soft, non-tender abdomen, bowel sounds present    Recent Results (from the past 72 hour(s))   Basic Metabolic Panel    Collection Time: 12/01/19  6:15 AM   Result Value Ref Range    Sodium 140 135 - 145 mmol/L    Potassium 4.7 3.6 - 5.5 mmol/L    Chloride 109 96 - 112 mmol/L    Co2 23 20 - 33 mmol/L    Glucose 100 (H) 65 - 99 mg/dL    Bun 19 8 - 22 mg/dL    Creatinine 0.88 0.50 - 1.40 mg/dL    Calcium 8.6 8.5 - 10.5 mg/dL    Anion Gap 8.0 0.0 - 11.9   ESTIMATED GFR    Collection Time: 12/01/19  6:15 AM   Result Value Ref Range    GFR If African American >60 >60 mL/min/1.73 m 2    GFR If Non African American >60 >60 mL/min/1.73 m 2   CBC WITHOUT DIFFERENTIAL    Collection Time: 12/01/19  6:17 AM   Result Value Ref Range    WBC 6.7 4.8 - 10.8 K/uL    RBC 4.46 4.20 - 5.40 M/uL    Hemoglobin 11.0 (L) 12.0 - 16.0 g/dL    Hematocrit 36.7 (L) 37.0 - 47.0 %    MCV 82.3 81.4 - 97.8 fL    MCH 24.7 (L) 27.0 - 33.0 pg    MCHC 30.0 (L) 33.6 - 35.0 g/dL    RDW 49.0 35.9 - 50.0 fL    Platelet Count 271 164 - 446 K/uL "    MPV 8.9 (L) 9.0 - 12.9 fL       Current Facility-Administered Medications   Medication Frequency   • simethicone (MYLICON) chewable tab 160 mg TID WITH MEALS   • polyethylene glycol/lytes (MIRALAX) PACKET 1 Packet DAILY    And   • senna-docusate (PERICOLACE or SENOKOT S) 8.6-50 MG per tablet 2 Tab BID    And   • magnesium hydroxide (MILK OF MAGNESIA) suspension 30 mL QDAY PRN    And   • bisacodyl (DULCOLAX) suppository 10 mg QDAY PRN   • I-ULICES TABS 1 Tab DAILY   • budesonide-formoterol (SYMBICORT) 80-4.5 MCG/ACT inhaler 2 Puff BID   • vitamin D (cholecalciferol) 1000 UNIT tablet 1,000 Units DAILY   • Respiratory Care per Protocol Continuous RT   • Pharmacy Consult Request ...Pain Management Review 1 Each PHARMACY TO DOSE   • artificial tears ophthalmic solution 1 Drop PRN   • benzocaine-menthol (CEPACOL) lozenge 1 Lozenge Q2HRS PRN   • mag hydrox-al hydrox-simeth (MAALOX PLUS ES or MYLANTA DS) suspension 20 mL Q2HRS PRN   • ondansetron (ZOFRAN ODT) dispertab 4 mg 4X/DAY PRN    Or   • ondansetron (ZOFRAN) syringe/vial injection 4 mg 4X/DAY PRN   • traZODone (DESYREL) tablet 50 mg QHS PRN   • sodium chloride (OCEAN) 0.65 % nasal spray 2 Spray PRN   • melatonin tablet 3 mg HS PRN   • lactulose 20 GM/30ML solution 30 mL QDAY PRN   • fleet enema 133 mL QDAY PRN   • aspirin EC (ECOTRIN) tablet 325 mg BID   • calcium carbonate (TUMS) chewable tab 500 mg QDAY PRN   • lovastatin (MEVACOR) tablet 40 mg QHS   • nystatin (MYCOSTATIN) powder BID   • omeprazole (PRILOSEC) capsule 20 mg QDAY   • acetaminophen (TYLENOL) tablet 1,000 mg TID   • tramadol (ULTRAM) 50 MG tablet 50 mg Q4HRS PRN   • oxyCODONE immediate-release (ROXICODONE) tablet 5 mg Q4HRS PRN       Orders Placed This Encounter   Procedures   • Diet Order Low Fiber(GI Soft)     Standing Status:   Standing     Number of Occurrences:   1     Order Specific Question:   Diet:     Answer:   Low Fiber(GI Soft) [2]     Order Specific Question:   Consistency/Fluid  modifications:     Answer:   Thin Liquids [3]       Assessment:  Active Hospital Problems    Diagnosis   • *Status post right hip replacement   • Ileus (HCC)   • Obesity (BMI 30-39.9)   • Dyslipidemia   • HTN (hypertension)   • Gastroesophageal reflux disease   • Vitamin D deficiency   • Anemia   • Azotemia   • Lung nodule   • Impaired mobility and ADLs   • COPD (chronic obstructive pulmonary disease) (HCC)   • Hip pain, chronic, right     This patient is a 79 y.o. female admitted for acute inpatient rehabilitation with Status post right hip replacement.    I led and attended the weekly conference today, and agree with the IDT conference documentation and plan of care as noted below.    Date of conference: 12/2/2019    Goals and barriers: See IDT note.    Biggest barriers: bowel and bladder incontinence, limited endurance, difficult with weight bearing, fearful and pain, respiratory insufficiency, visual deficits, short term memory deficits    Goals: community outing    Therapy update 12/2/2019  Supervision eating  Supervision grooming  Supervision bathing  Supervision upper body dressing  Min assist lower body dressing  Min assist toileting  Total assistbladder  Supervision bowel  Min assist bed/chair transfer  Min assist toilet transfer  Min assist tub/shower transfer  Total assist ambulation  Total assist wheelchair propulsion  Not tested stairs  Modified Independent comprehension  Independent expression  Supervision social interaction  Min assist problem solving  Min assist memory    Decrease SLP to 30 min, share the other 30 with PT/OT    CM/social support: lives alone, friend with intermittent support    Anticipated DC date: 12/10/2019    Home health: PT/OT/SLP/RN    Equip: [facility]    Follow up: PCP, surgery, [facility]    Medical Decision Making and Plan:    S/p Right hip replacement  11/21 Dr. Delgado  Continue full rehab program  PT/OT/SLP, 1 hr each discipline, 5 days per week  Posterior hip  precautions  Outpatient follow up    Pain Management  Scheduled tylenol  PRN tramadol  Pain currently well controlled    Bowel  SBO, see below  Meds as needed  Last BM 12/1    Bladder  Check PVRs - 17, 11  ICP for over 400 cc  Scheduled toileting    DVT prophylaxis  ASA BID per surgery    Appreciate the assistance of the hospitalist with her medical comorbidities :     Small bowel obstruction, diet slowly being advanced - currently on soft diet  Large hiatal hernia  GERD  Hypertension  Hypotension  COPD  Respiratory insufficiency  Hyperlipidemia  Azotemia  Anemia, stable  Vit D deficiency, on supplementation  Lung nodule, outpatient follow up    Total time:  40 minutes.  I spent greater than 50% of the time for patient care, counseling, and coordination on this date, including patient face-to face time, unit/floor time with review of records/pertinent lab data and studies, as well as discussing diagnostic evaluation/work up, planned therapeutic interventions, and future disposition of care, as per the interval events/subjective and the assessment and plan as noted above.    Salma Tejada M.D.   Physical Medicine and Rehabilitation

## 2019-12-02 NOTE — REHAB-PT IDT TEAM NOTE
Physical Therapy   Mobility  Bed mobility:   SBA- CGA  Bed /Chair/Wheelchair Transfer Initial:  4 - Minimal Assistance  Bed /Chair/Wheelchair Transfer Current:  4 - Minimal Assistance   Bed/Chair/Wheelchair Transfer Description:  Adaptive equipment, Increased time, Supervision for safety, Verbal cueing, Set-up of equipment, Assist with one limb(FWW, CGA)  Walk Initial:  1 - Total Assistance  Walk Current:  1 - Total Assistance   Walk Description:  Extra time, Supervision for safety(10 ft and 15 ft x3 with FWW and close SBA )  Wheelchair Initial:  1 - Total Assistance  Wheelchair Current:  1 - Total Assistance   Wheelchair Description:  (8ftx1, B UE, VCs for steering)  Stairs Initial:  0 - Not tested,unsafe activity  Stairs Current: 0 - Not tested,unsafe activity   Stairs Description:    Patient/Family Training/Education:  TBD   DME/DC Recommendations:  FWW, possible manual WC  Strengths:  Adequate strength, Pleasant and cooperative and Willingly participates in therapeutic activities  Barriers:   Decreased endurance, Poor activity tolerance and Other: R hip pain and decreased WBing  # of short term goals set= 4  # of short term goals met=1  Physical Therapy Problems     Problem: Mobility     Dates: Start: 11/28/19       Description:     Goal: STG-Within one week, patient will propel wheelchair household distances     Dates: Start: 11/28/19       Description: 1) Individualized goal:  50ft x 1, B UE, SPV  2) Interventions:  PT Group Therapy, PT Gait Training, PT Therapeutic Exercises, PT Neuro Re-Ed/Balance, PT Therapeutic Activity, PT Manual Therapy and PT Evaluation       Note:     Goal Note filed on 12/02/19 1147 by Kala Hall, PT    Limited by endurance                   Goal: STG-Within one week, patient will ambulate household distance     Dates: Start: 11/28/19       Description: 1) Individualized goal:  25ft x 1 FWW SBA  2) Interventions:  PT Group Therapy, PT Gait Training, PT Therapeutic Exercises, PT Neuro  Re-Ed/Balance, PT Therapeutic Activity, PT Manual Therapy and PT Evaluation       Note:     Goal Note filed on 12/02/19 1147 by Kala Hall, PT    15 ft SBA with FWW                        Problem: Mobility Transfers     Dates: Start: 11/28/19       Description:     Goal: STG-Within one week, patient will transfer bed to chair     Dates: Start: 11/28/19       Description: 1) Individualized goal:  SBA with FWW or LRAD  2) Interventions:  PT Group Therapy, PT Gait Training, PT Therapeutic Exercises, PT Neuro Re-Ed/Balance, PT Therapeutic Activity, PT Manual Therapy and PT Evaluation       Note:     Goal Note filed on 12/02/19 1147 by Kala Hall, PT    CGA with FWW                        Problem: PT-Long Term Goals     Dates: Start: 11/28/19       Description:     Goal: LTG-By discharge, patient will ambulate     Dates: Start: 11/28/19       Description: 1) Individualized goal:  150ft  x1 FWW SPV  2) Interventions:  PT Group Therapy, PT Gait Training, PT Therapeutic Exercises, PT Neuro Re-Ed/Balance, PT Therapeutic Activity, PT Manual Therapy and PT Evaluation             Goal: LTG-By discharge, patient will perform home exercise program     Dates: Start: 11/28/19       Description: 1) Individualized goal:  For B LE strengthening, mod I  2) Interventions:  PT Group Therapy, PT Gait Training, PT Therapeutic Exercises, PT Neuro Re-Ed/Balance, PT Therapeutic Activity, PT Manual Therapy and PT Evaluation             Goal: LTG-By discharge, patient will transfer in/out of a car     Dates: Start: 11/28/19       Description: 1) Individualized goal:  LRAD, CGA  2) Interventions:  PT Group Therapy, PT Gait Training, PT Therapeutic Exercises, PT Neuro Re-Ed/Balance, PT Therapeutic Activity, PT Manual Therapy and PT Evaluation             Goal: LTG-By discharge, patient will     Dates: Start: 11/28/19       Description: 1) Individualized goal: perform platform step to enter home, CGA, FWW  2) Interventions:  PT Group Therapy, PT  Gait Training, PT Therapeutic Exercises, PT Neuro Re-Ed/Balance, PT Therapeutic Activity, PT Manual Therapy and PT Evaluation                           Section completed by:  Kala Hall, PT, DPT

## 2019-12-02 NOTE — THERAPY
Speech Language Pathology  Daily Treatment     Patient Name: Guillermina Recio  Age:  79 y.o., Sex:  female  Medical Record #: [id number]  Today's Date: 12/2/2019     Subjective    Pt was pleasant and cooperative during this ST session, pt's friend/caregiver present      Objective       12/02/19 1301   SLP Total Time Spent   SLP Individual Total Time Spent (Mins) 30   Charge Group   SLP Cognitive Skill Development 2       Assessment    Continued problem solving medication management with pt and pt's caregiver.  After further discussion of the Script Talk device pt's caregiver reported that pt might do better with a pill pack system.  [facility] does also provide a free pill pack system and pt's caregiver would be able to label each pack in larger print (M=Morning, A=Afternoon, N=Night) so pt is able to take her medications out of a pack.  Pt's caregiver reported that pt tends to spill medications on the floor when she uses a pill box.  Unsure if pt would be able to sign up for this service as soon as she discharges or If it would be better if she signs up after her appointment with her PCP in case her medications change.      Plan    Continue planning/problem solving medication management.  Decrease ST to 30 minutes.

## 2019-12-02 NOTE — THERAPY
"Occupational Therapy  Daily Treatment     Patient Name: Guillermina Recio  Age:  79 y.o., Sex:  female  Medical Record #: [id number]  Today's Date: 2019     Precautions  Precautions: (P) Fall Risk, Posterior Hip Precautions, Weight Bearing As Tolerated Right Lower Extremity  Comments: (P) impaired vision, COPD     Safety   ADL Safety : (P) Requires Supervision for Safety, Requires Cueing for Safety, Requires Physical Assist for Safety  Bathroom Safety: (P) Requires Supervision for Safety, Requires Physical Assist for Safety, Requires Cuing for Safety  Comments: (P) SBA/CGA for standing components, requires cues for initiation of AE for adherence to posterior hip precautions    Subjective    \"My left leg won't cooperate, I thought it was my good leg!\"     Objective       19   Precautions   Precautions Fall Risk;Posterior Hip Precautions;Weight Bearing As Tolerated Right Lower Extremity   Comments impaired vision, COPD    Safety    ADL Safety  Requires Supervision for Safety;Requires Cueing for Safety;Requires Physical Assist for Safety   Bathroom Safety Requires Supervision for Safety;Requires Physical Assist for Safety;Requires Cuing for Safety   Comments SBA/CGA for standing components, requires cues for initiation of AE for adherence to posterior hip precautions   OT Total Time Spent   OT Individual Total Time Spent (Mins) 60   OT Charge Group   OT Self Care / ADL 4       FIM Bathing Score:  5 - Standby Prompting/Supervision or Set-up  Bathing Description:       FIM Upper Body Dressin - Standby Prompting/Supervision or Set-up  Upper Body Dressing Description:  Increased time, Supervision for safety, Verbal cueing    FIM Lower Body Dressing Score:  4 - Minimal Assistance  Lower Body Dressing Description:  Reacher, Sock aid, Increased time, Supervision for safety, Verbal cueing(requires increased time and min A to thread underwear/pants with reacher, consistent verbal cues, can manage sock aid " with setup and increased time, SBA for standing components)    FIM Toiletin - Minimal Assistance  Toileting Description:  Grab bar, Supervision for safety, Increased time(CGA)    FIM Toilet Transfer Score:  4 - Minimal Assistance  Toilet Transfer Description:  (CGA FWW in/out of bathroom and on/off toilet)    FIM Tub/Shower Transfers Score:  4 - Minimal Assistance  Tub/Shower Transfers Description:  Grab bar, Increased time, Supervision for safety, Verbal cueing(CGA FWW from toilet to shower, grab bar for transition)      Assessment    Pt tolerated session well, demos steady progress with ADL routine, she was able to complete bathroom mobility using FWW given additional time and CGA, demos difficulty advancing LLE. She requires consistent cues for initiation and correct use of AE for adherence to posterior hip precautions, will benefit from blocked practice 2/2 cognitive and visual deficits impacting function.     Plan    Integration of posterior hip precautions into ADLs, IADLs. Cont overall strength/endurance, standing balance/tolerance for ADL's and functional mobility. Management of  deficits

## 2019-12-02 NOTE — THERAPY
"Speech Language Pathology  Daily Treatment     Patient Name: Guillermina Recio  Age:  79 y.o., Sex:  female  Medical Record #: [id number]  Today's Date: 12/2/2019     Subjective    Pt was pleasant and cooperative during this ST session      Objective       12/02/19 1101   SLP Total Time Spent   SLP Individual Total Time Spent (Mins) 30   Charge Group   SLP Cognitive Skill Development 2       Assessment    Pt participated in a ST session problem solving how pt will manage her medications when she returns home.  Pt concerned that she will not be able to memorize a new medication regimen.  After researching services [facility] offers to seniors the 'Script talk\" system was found.  This is a system that will read medication labels aloud for the visually impaired.  Called [employer] and they reported that they would be able to provide a system for free as long as pt is enrolled in [facility] mail order prescriptions.      Plan    Contact [facility] to see how pt can enroll in mail order prescriptions, continue medication management problem solving.      "

## 2019-12-02 NOTE — PROGRESS NOTES
Hospital Medicine Daily Progress Note      Chief Complaint  Ileus    Interval Problem Update  No new complaints, tolerating diet but has poor appetite.  BP trending low; pt asymptomatic.    Review of Systems  Review of Systems   Constitutional: Negative for chills and fever.   HENT: Negative.    Eyes: Negative.    Respiratory: Negative for cough and shortness of breath.    Cardiovascular: Negative for chest pain and palpitations.   Gastrointestinal: Negative for abdominal pain, constipation, diarrhea, nausea and vomiting.        Bloating   Musculoskeletal: Positive for joint pain.        Wound pain   Skin: Negative for itching and rash.        Physical Exam  Temp:  [36.5 °C (97.7 °F)-37.1 °C (98.7 °F)] 36.9 °C (98.4 °F)  Pulse:  [80-92] 80  Resp:  [18-20] 18  BP: ()/(52-72) 100/72  SpO2:  [91 %-93 %] 92 %    Physical Exam  Constitutional:       General: She is not in acute distress.     Appearance: Normal appearance. She is not ill-appearing.   HENT:      Head: Normocephalic and atraumatic.      Right Ear: External ear normal.      Left Ear: External ear normal.      Nose: Nose normal.   Eyes:      General:         Right eye: No discharge.         Left eye: No discharge.      Extraocular Movements: Extraocular movements intact.      Conjunctiva/sclera: Conjunctivae normal.   Neck:      Musculoskeletal: Normal range of motion and neck supple.   Cardiovascular:      Rate and Rhythm: Normal rate and regular rhythm.   Pulmonary:      Effort: No respiratory distress.      Breath sounds: No wheezing.      Comments: Decreased BS  Abdominal:      General: Bowel sounds are normal. There is no distension.      Palpations: Abdomen is soft.      Tenderness: There is no tenderness. There is no guarding or rebound.   Musculoskeletal:      Right lower leg: No edema.      Left lower leg: No edema.   Skin:     General: Skin is warm and dry.   Neurological:      Mental Status: She is alert and oriented to person, place, and  time.         Fluids    Intake/Output Summary (Last 24 hours) at 12/2/2019 1222  Last data filed at 12/1/2019 1800  Gross per 24 hour   Intake 580 ml   Output --   Net 580 ml       Laboratory  Recent Labs     12/01/19  0617   WBC 6.7   RBC 4.46   HEMOGLOBIN 11.0*   HEMATOCRIT 36.7*   MCV 82.3   MCH 24.7*   MCHC 30.0*   RDW 49.0   PLATELETCT 271   MPV 8.9*     Recent Labs     12/01/19  0615   SODIUM 140   POTASSIUM 4.7   CHLORIDE 109   CO2 23   GLUCOSE 100*   BUN 19   CREATININE 0.88   CALCIUM 8.6                   Assessment/Plan  Ileus (HCC)- (present on admission)  Assessment & Plan  Surgery notes reviewed, more likely post-op ileus than small bowel obstruction  Serial KUB shows resolution of distended bowel loops  Constipation resolved w/ increased bowel regimen  Continue Simethicone for gas  Advanced to GI soft diet 11/30/19    Dyslipidemia- (present on admission)  Assessment & Plan  On Mevacor    Hypotension  Assessment & Plan  Likely 2/2 pain meds and decreased PO intake  Encourage PO fluids    Lung nodule- (present on admission)  Assessment & Plan  CT Abd/Pelvis 11/25/19 showed 6 mm RML pulmonary nodule  Needs close F/U given smoking history    Azotemia- (present on admission)  Assessment & Plan  Fenal function improving    COPD (chronic obstructive pulmonary disease) (HCC)- (present on admission)  Assessment & Plan  Continue Symbicort and RT protocol    Hip pain, chronic, right- (present on admission)  Assessment & Plan  2/2 OA/DJD  S/P elective right total hip arthroplasty on 11/21/19 by Dr. Delgado  Wound care and pain control    Vitamin D deficiency- (present on admission)  Assessment & Plan  Vit D level 21  On supplementation    Gastroesophageal reflux disease- (present on admission)  Assessment & Plan  2/2 Hiatal Hernia  Continue Prilosec    Anemia- (present on admission)  Assessment & Plan  Follow H/H     Full Code

## 2019-12-02 NOTE — REHAB-CM IDT TEAM NOTE
Case Management    DC Planning  DC destination/dispostion: Patient lives alone in a single story house, one step to enter. She hopes to return home with supportive care from family. Daughter to come live with her for about a month after discharge to assist.    DC Needs: family training, DME TBD. Follow up appointments.   Barriers to discharge: Lives alone, used a blind cane for ambulation.  Strengths: supportive family and friends.     Section completed by:  Nat Robles R.N.

## 2019-12-02 NOTE — CARE PLAN
Problem: Bowel/Gastric:  Goal: Normal bowel function is maintained or improved  Outcome: PROGRESSING SLOWER THAN EXPECTED  Goal: Will not experience complications related to bowel motility  Outcome: PROGRESSING SLOWER THAN EXPECTED   Had 1 more large loose bowel movement in diaper this am. No further episodes during the day. She refused any stool softners at this time.

## 2019-12-02 NOTE — THERAPY
"Physical Therapy   Daily Treatment     Patient Name: Guillermina Recio  Age:  79 y.o., Sex:  female  Medical Record #: [id number]  Today's Date: 12/2/2019     Precautions  Precautions: Fall Risk, Posterior Hip Precautions, Weight Bearing As Tolerated Right Lower Extremity  Comments: impaired vision, COPD     Subjective    \"My right hip is pretty tired, she had me walk a lot this morning\"     Objective       12/02/19 1501   Neuro-Muscular Treatments   Neuro-Muscular Treatments Facilitation;Joint Approximation;Sequencing;Tactile Cuing;Verbal Cuing;Weight Shift Right;Weight Shift Left   Comments pre-gait standing/ stepping activity with UE support at FWW and manual cues for R terminal hip/ knee ext stabilization, 3 x 5 BLE's   PT Total Time Spent   PT Individual Total Time Spent (Mins) 15   PT Charge Group   PT Gait Training 1       FIM Bed/Chair/Wheelchair Transfers Score: 4 - Minimal Assistance  Bed/Chair/Wheelchair Transfers Description:  Adaptive equipment, Increased time, Set-up of equipment(CGA with FWW/ SBA for sit<>stand)      Assessment    Pt completed pre-gait/ WB activity as noted with manual cues for stabilization, presents with R hip weakness/ antalgia    Plan    Safety education, bed mobility and transfer training gait training, posterior hip precautions, therapeutic exercise    "

## 2019-12-02 NOTE — REHAB-COLLABORATIVE ONGOING IDT TEAM NOTE
Weekly Interdisciplinary Team Conference Note    Weekly Interdisciplinary Team Conference # 1  Date:  12/2/2019    Clinicians present and reporting at team conference include the following:   MD: Salma Tejada MD   RN:  Alexsandra Kearns RN   PT:   Kala Hall PT, DPT  OT:  Nelly Escalona MS, OTR/L    ST:  Tano Lazar MS, CCC-SLP  CM:  Nat Robles RN  REC:  None  RT:  Kate Lim, RRT  RPh:  Isidro Venegas RP  Other:   Dr. Berman  All reporting clinicians have a working knowledge of this patient's plan of care.    Targeted DC Date:  12/10     Medical    Patient Active Problem List    Diagnosis Date Noted   • Ileus (HCC) 11/25/2019     Priority: High   • Chest pain 09/12/2017     Priority: Medium   • Obesity (BMI 30-39.9) 12/16/2016     Priority: Medium   • Abnormal stress test 12/16/2016     Priority: Medium   • Dyslipidemia 08/01/2016     Priority: Medium   • HTN (hypertension) 06/25/2009     Priority: Medium   • Chronic use of opiate drugs therapeutic purposes 07/07/2017     Priority: Low   • Chronic bilateral low back pain without sciatica 07/07/2017     Priority: Low   • Daytime somnolence 12/16/2016     Priority: Low   • Insomnia 12/16/2016     Priority: Low   • Gastroesophageal reflux disease 12/16/2016     Priority: Low   • Vitamin D deficiency 12/16/2016     Priority: Low   • Chronic narcotic use 12/16/2016     Priority: Low   • Controlled substance agreement signed 12/16/2016     Priority: Low   • Chronic pain of both shoulders 12/16/2016     Priority: Low   • Chronic left-sided low back pain without sciatica 12/16/2016     Priority: Low   • Scalp itch 06/29/2016     Priority: Low   • Polypharmacy 06/29/2016     Priority: Low   • Dermatochalasis 07/14/2015     Priority: Low   • VAGINAL LESION 11/10/2009     Priority: Low   • Pelvic pain 11/10/2009     Priority: Low   • Osteopenia 06/25/2009     Priority: Low   • Dysuria 06/25/2009     Priority: Low   • Anemia 06/25/2009     Priority: Low   • Hip pain  06/25/2009     Priority: Low   • Elbow pain 06/25/2009     Priority: Low   • Hypotension 12/02/2019   • Azotemia 11/28/2019   • Lung nodule 11/28/2019   • Impaired mobility and ADLs 11/27/2019   • COPD (chronic obstructive pulmonary disease) (HCC) 11/25/2019   • LFT elevation 11/25/2019   • Status post right hip replacement 11/25/2019   • Hyperglycemia 11/25/2019   • Impaired vision 05/28/2019   • 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 **        Nursing  Diet/Nutrition:  Other:  Low Fiber GI Soft, Thin Liquids  Medication Administration:  Whole with Liquid Wash  % consumed at meals in last 24 hours:  Consumed 50-75% of meals per documentation.  Meal/Snack Consumption for the past 24 hrs:   Oral Nutrition   12/01/19 1800 Between 50-75% Consumed   12/01/19 1346 Between 50-75% Consumed   12/01/19 0900 Breakfast;Between % Consumed     Snack schedule:  None  Supplement: N/A  Appetite:  Good  Fluid Intake/Output in past 24 hours: In: 820 [P.O.:820]  Out: -   Admit Weight:  Weight: 68.5 kg (151 lb)  Weight Last 7 Days   [66.7 kg (147 lb 0.8 oz)-68.5 kg (151 lb)] 66.7 kg (147 lb 0.8 oz) (12/01 1600)    Pain Issues:    Location:  Leg;Back (12/01 2049)  Right;Proximal;Left;Distal;Posterior (12/01 2049)         Severity:  Moderate   Scheduled pain medications:  None     PRN pain medications used in last 24 hours:  oxycodone immediate release (ROXICODONE)    Non Pharmacologic Interventions:  relaxation, repositioned and rest  Effectiveness of pain management plan:  good=patient states acceptable comfort after interventions    Bowel:    Bowel Assist Initial Score:  5 - Standby Prompting/Supervision or Set-up  Bowel Assist Current Score:  5 - Standby  Prompting/Supervision or Set-up  Bowl Accidents in last 7 days:  5  Last bowel movement: 12/01/19  Stool Description: Large, Soft, Brown     Usual bowel pattern:  daily  Scheduled bowel medications:  senna-docusate (PERICOLACE or SENOKOT S)   PRN bowel medications used in last 24 hours:  senna-docusate (PERICOLACE or SENOKOT S)  and polyethylene glycol/lytes (MIRALAX)   Nursing Interventions:  Increased time, Supervision, Verbal cueing, Brief  Effectiveness of bowel program:   good=regular, predictable, controlled emptying of bowel  Bladder:    Bladder Assist Initial Score:  1 - Total Assistance  Bladder Assist Current Score:  1 - Total Assistance  Bladder Accidents in last 7 days:  0  PVR range for past 24-48 hours:  [11-29]  ()  Intermittent Catheterization:  N/A  Medications affecting bladder:  None    Time void schedule/voiding pattern:  Voiding every 2-4 hours  Interventions:  Increased time, Supervision, Verbal cueing, Brief, Time void patient initiated  Effectiveness of bladder training:  Good=regular, predictable, emptying of bladder, patient initiates time voiding        Sleep/wake cycle:   Average hours slept:  Sleeps 4-6 hours without waking  Sleep medication usage:  None    Patient/Family Training/Education:  General Self Care and Safety  Discharge Supply Recommendations:  Strengths: Alert and oriented, Supportive family and Pleasant and cooperative   Barriers:   Limited mobility       Nursing Problems     Problem: Bowel/Gastric:     Description:     Goal: Normal bowel function is maintained or improved     Description:           Goal: Will not experience complications related to bowel motility     Description:                 Problem: Communication     Description:     Goal: The ability to communicate needs accurately and effectively will improve     Description:                 Problem: Discharge Barriers/Planning     Description:     Goal: Patient's continuum of care needs will be met     Description:                  Problem: Infection     Description:     Goal: Will remain free from infection     Description:                 Problem: Knowledge Deficit     Description:     Goal: Knowledge of disease process/condition, treatment plan, diagnostic tests, and medications will improve     Description:           Goal: Knowledge of the prescribed therapeutic regimen will improve     Description:                 Problem: Pain Management     Description:     Goal: Pain level will decrease to patient's comfort goal     Description:                 Problem: Respiratory:     Description:     Goal: Respiratory status will improve     Description:                 Problem: Safety     Description:     Goal: Will remain free from injury     Description:           Goal: Will remain free from falls     Description:                 Problem: Skin Integrity     Description:     Goal: Risk for impaired skin integrity will decrease     Description:                 Problem: Urinary Elimination:     Description:     Goal: Ability to reestablish a normal urinary elimination pattern will improve     Description:                 Problem: Venous Thromboembolism (VTW)/Deep Vein Thrombosis (DVT) Prevention:     Description:     Goal: Patient will participate in Venous Thrombosis (VTE)/Deep Vein Thrombosis (DVT)Prevention Measures     Description:                        Long Term Goals:   At discharge patient will be able to function safely at home with support.    Section completed by:  HERMINIA CissePYeimyNYeimy        Respiratory Therapy    Pt has a history of COPD with no home oxygen and no home meds.  She has been on 2 lpm since admission but required an increase to 3 lpm yesterday to maintain SATS over 90%.  Symbicort 80-4.5 has been added since admission.  Section completed by:  Kate Lim, DAYSI    Mobility  Bed mobility:   SBA- CGA  Bed /Chair/Wheelchair Transfer Initial:  4 - Minimal Assistance  Bed /Chair/Wheelchair Transfer  Current:  4 - Minimal Assistance   Bed/Chair/Wheelchair Transfer Description:  Adaptive equipment, Increased time, Supervision for safety, Verbal cueing, Set-up of equipment, Assist with one limb(FWW, CGA)  Walk Initial:  1 - Total Assistance  Walk Current:  1 - Total Assistance   Walk Description:  Extra time, Supervision for safety(10 ft and 15 ft x3 with FWW and close SBA )  Wheelchair Initial:  1 - Total Assistance  Wheelchair Current:  1 - Total Assistance   Wheelchair Description:  (8ftx1, B UE, VCs for steering)  Stairs Initial:  0 - Not tested,unsafe activity  Stairs Current: 0 - Not tested,unsafe activity   Stairs Description:    Patient/Family Training/Education:  TBD   DME/DC Recommendations:  FWW, possible manual WC  Strengths:  Adequate strength, Pleasant and cooperative and Willingly participates in therapeutic activities  Barriers:   Decreased endurance, Poor activity tolerance and Other: R hip pain and decreased WBing  # of short term goals set= 4  # of short term goals met=1  Physical Therapy Problems     Problem: Mobility     Dates: Start: 11/28/19       Description:     Goal: STG-Within one week, patient will propel wheelchair household distances     Dates: Start: 11/28/19       Description: 1) Individualized goal:  50ft x 1, B UE, SPV  2) Interventions:  PT Group Therapy, PT Gait Training, PT Therapeutic Exercises, PT Neuro Re-Ed/Balance, PT Therapeutic Activity, PT Manual Therapy and PT Evaluation       Note:     Goal Note filed on 12/02/19 1147 by Kala Hall, PT    Limited by endurance                   Goal: STG-Within one week, patient will ambulate household distance     Dates: Start: 11/28/19       Description: 1) Individualized goal:  25ft x 1 FWW SBA  2) Interventions:  PT Group Therapy, PT Gait Training, PT Therapeutic Exercises, PT Neuro Re-Ed/Balance, PT Therapeutic Activity, PT Manual Therapy and PT Evaluation       Note:     Goal Note filed on 12/02/19 1147 by Kala Hall, PT    15 ft  SBA with FWW                        Problem: Mobility Transfers     Dates: Start: 11/28/19       Description:     Goal: STG-Within one week, patient will transfer bed to chair     Dates: Start: 11/28/19       Description: 1) Individualized goal:  SBA with FWW or LRAD  2) Interventions:  PT Group Therapy, PT Gait Training, PT Therapeutic Exercises, PT Neuro Re-Ed/Balance, PT Therapeutic Activity, PT Manual Therapy and PT Evaluation       Note:     Goal Note filed on 12/02/19 1147 by Kala Hall, PT    CGA with FWW                        Problem: PT-Long Term Goals     Dates: Start: 11/28/19       Description:     Goal: LTG-By discharge, patient will ambulate     Dates: Start: 11/28/19       Description: 1) Individualized goal:  150ft  x1 FWW SPV  2) Interventions:  PT Group Therapy, PT Gait Training, PT Therapeutic Exercises, PT Neuro Re-Ed/Balance, PT Therapeutic Activity, PT Manual Therapy and PT Evaluation             Goal: LTG-By discharge, patient will perform home exercise program     Dates: Start: 11/28/19       Description: 1) Individualized goal:  For B LE strengthening, mod I  2) Interventions:  PT Group Therapy, PT Gait Training, PT Therapeutic Exercises, PT Neuro Re-Ed/Balance, PT Therapeutic Activity, PT Manual Therapy and PT Evaluation             Goal: LTG-By discharge, patient will transfer in/out of a car     Dates: Start: 11/28/19       Description: 1) Individualized goal:  LRAD, CGA  2) Interventions:  PT Group Therapy, PT Gait Training, PT Therapeutic Exercises, PT Neuro Re-Ed/Balance, PT Therapeutic Activity, PT Manual Therapy and PT Evaluation             Goal: LTG-By discharge, patient will     Dates: Start: 11/28/19       Description: 1) Individualized goal: perform platform step to enter home, CGA, FWW  2) Interventions:  PT Group Therapy, PT Gait Training, PT Therapeutic Exercises, PT Neuro Re-Ed/Balance, PT Therapeutic Activity, PT Manual Therapy and PT Evaluation                            Section completed by:  Kala Hall, PT, DPT    Activities of Daily Living  Eating Initial:  5 - Standby Prompting/Supervision or Set-up  Eating Current:  5 - Standby Prompting/Supervision or Set-up   Eating Description:  Increased time, Supervision for safety, Modified diet(Vision impaired, set up required.)  Grooming Initial:  5 - Standby Prompting/Supervision or Set-up  Grooming Current:  5 - Standby Prompting/Supervision or Set-up   Grooming Description:  Set-up of equipment, Verbal cueing, Supervision for safety, Increased time  Bathing Initial:  3 - Moderate Assistance  Bathing Current:  5 - Standby Prompting/Supervision or Set-up   Bathing Description:  Grab bar, Tub bench, Long handled bath tool, Hand held shower, Increased time, Supervision for safety, Verbal cueing(min A for problem solving new environment due to visual deficits)  Upper Body Dressing Initial:  5 - Standby Prompting/Supervision or Set-up  Upper Body Dressing Current:  5 - Standby Prompting/Supervision or Set-up   Upper Body Dressing Description:  Increased time, Supervision for safety, Verbal cueing  Lower Body Dressing Initial:  3 - Moderate Assistance  Lower Body Dressing Current:  4 - Minimal Assistance   Lower Body Dressing Description:  4 - Minimal Assistance  Toileting Initial:  3 - Moderate Assistance  Toileting Current:  4 - Minimal Assistance   Toileting Description:  Grab bar, Supervision for safety, Increased time(CGA)  Toilet Transfer Initial:  4 - Minimal Assistance  Toilet Transfer Current:  4 - Minimal Assistance   Toilet Transfer Description:  4 - Minimal Assistance  Tub / Shower Transfer Initial:  4 - Minimal Assistance  Tub / Shower Transfer Current:  4 - Minimal Assistance   Tub / Shower Transfer Description:  Grab bar, Increased time, Supervision for safety, Verbal cueing(CGA FWW from toilet to shower, grab bar for transition)  IADL:  Not yet addressed, per pt she has assist for laundry, shopping and home  management  Family Training/Education:  Not yet addressed   DME/DC Recommendations:  Reacher, sock aid. Pt plans to meet needs using care chest which she is familiar with    Strengths:  Able to follow instructions, Alert and oriented, Effective communication skills, Making steady progress towards goals, Manages pain appropriately, Motivated for self care and independence, Pleasant and cooperative and Willingly participates in therapeutic activities  Barriers:  Generalized weakness, Limited mobility, Poor balance and Other: impaired short term memory, impaired attention, baseline visual deficits     # of short term goals set= 2    # of short term goals met= 2     Occupational Therapy Goals     Problem: OT Long Term Goals     Dates: Start: 11/28/19       Description:     Goal: LTG-By discharge, patient will complete basic self care tasks     Dates: Start: 11/28/19       Description: 1) Individualized Goal: Mod I with AE as needed    2) Interventions: OT Group Therapy, OT Self Care/ADL, OT Cognitive Skill Dev, OT Community Reintegration, OT Manual Ther Technique, OT Neuro Re-Ed/Balance, OT Sensory Int Techniques, OT Therapeutic Activity, OT Evaluation and OT Therapeutic Exercise             Goal: LTG-By discharge, patient will perform bathroom transfers     Dates: Start: 11/28/19       Description: 1) Individualized Goal: CGA for safety and balance    2) Interventions: OT Group Therapy, OT Self Care/ADL, OT Cognitive Skill Dev, OT Community Reintegration, OT Manual Ther Technique, OT Neuro Re-Ed/Balance, OT Sensory Int Techniques, OT Therapeutic Activity, OT Evaluation and OT Therapeutic Exercise                         Section completed by:  Nelly Escalona MS,OTR/L    Cognitive Linquistic Functions  Comprehension Initial:  5 - Stand-by Prompting/Supervision or Set-up  Comprehension Current:  6 - Modified Independent   Comprehension Description:  Glasses  Expression Initial:  5 - Stand-by Prompting/Supervision or  Set-up  Expression Current:  7 - Independent   Expression Description:  Verbal cueing  Social Interaction Initial:  4 - Minimal Assistance  Social Interaction Current:  5 - Stand-by Prompting/Supervision or Set-up   Social Interaction Description:  Verbal cues  Problem Solving Initial:  4 - Minimal Assistance  Problem Solving Current:  4 - Minimal Assistance   Problem Solving Description:  Verbal cueing  Memory Initial:  3 - Moderate Assistance  Memory Current:  4 - Minimal Assistance   Memory Description:  Verbal cueing  Executive Functioning / Organization Initial:   NA  Executive Functioning / Organization Current:   NA   Executive Functioning Desciption:  NA  Swallowing  Swallowing Status:  Not following for dysphagia management, Diet upgrades per GI   Orders Placed This Encounter   Procedures   • Diet Order Low Fiber(GI Soft)     Standing Status:   Standing     Number of Occurrences:   1     Order Specific Question:   Diet:     Answer:   Low Fiber(GI Soft) [2]     Order Specific Question:   Consistency/Fluid modifications:     Answer:   Thin Liquids [3]     Behavior Modification Plan  Keep instructions simple/concrete, Redirect to task/topic and Analyze tasks (break down into smaller steps)  Assistive Technology  Low/Impaired vision equipment, Memory aides: and Low tech: Calendar, planner, schedule, alarms/timers, pill organizer, post-it notes, lists  Family Training/Education:  To be completed   DC Recommendations:   Pt would benefit from assistance with medication management tasks when she returns home d/t visual deficits   Strengths:  Able to follow instructions, Effective communication skills, Motivated for self care and independence, Pleasant and cooperative and Willingly participates in therapeutic activities  Barriers:  Other: Visual deficits   # of short term goals set=3  # of short term goals met=2  Speech Therapy Problems     Problem: Memory STGs     Dates: Start: 12/02/19       Description:     Goal:  STG-Within one week, patient will     Dates: Start: 12/02/19       Description: 1) Individualized goal:  Recall recently taught precautions and safety strategies r/t rehabilitation stay with 80% accuracy and Spv.    2) Interventions:  SLP Speech Language Treatment, SLP Self Care / ADL Training , SLP Cognitive Skill Development and SLP Group Treatment                   Problem: Problem Solving STGs     Dates: Start: 11/28/19       Description:     Goal: STG-Within one week, patient will     Dates: Start: 11/28/19       Description: 1) Individualized goal:  Complete functional problem solving tasks r/t medication management with 80% accuracy and MIN A.  2) Interventions:  SLP Speech Language Treatment, SLP Self Care / ADL Training , SLP Cognitive Skill Development and SLP Group Treatment       Note:     Goal Note filed on 12/02/19 0932 by Tano Lazar MS,CCC-SLP    Continue to target medication management                        Problem: Speech/Swallowing LTGs     Dates: Start: 11/28/19       Description:     Goal: LTG-By discharge, patient will solve basic problems     Dates: Start: 11/28/19       Description: 1) Individualized goal:  R/t safety and ADLs/IADLs with MOD I.   2) Interventions:  SLP Speech Language Treatment, SLP Self Care / ADL Training , SLP Cognitive Skill Development and SLP Group Treatment                          Section completed by:  Tano Lazar MS,CCC-SLP          REHAB-Pharmacy IDT Team Note by Henrietta Caldwell RP at 11/29/2019 11:17 AM  Version 1 of 1    Author:  Henrietta Caldwell RPH Service:  -- Author Type:  Pharmacist    Filed:  11/29/2019 11:26 AM Date of Service:  11/29/2019 11:17 AM Status:  Signed    :  Henrietta Caldwell RPH (Pharmacist)         Pharmacy[TK.1]   Pharmacy  Antibiotics/Antifungals/Antivirals:  Medication:      Active Orders (From admission, onward)    None        Route:         n/a  Stop Date:  n/a  Reason:   Antihypertensives/Cardiac:  Medication:    Orders  (72h ago, onward)     Start     Ordered    11/27/19 2100  lovastatin (MEVACOR) tablet 40 mg  EVERY BEDTIME      11/27/19 1312              Patient Vitals for the past 24 hrs:   BP Pulse   11/29/19 1022 -- 83   11/29/19 0700 102/54 --   11/29/19 0640 (!) 86/47 80   11/28/19 1915 (!) 90/54 89   11/28/19 1451 (!) 98/54 81     Anticoagulation:  Medication:  Aspirin  INR:      Other key medications: omeprazole  A review of the medication list reveals no issues at this time.  Section completed by:[TK.2]  Henrietta Caldwell [facility][TK.1]        Attribution Key     TK.1 - Henrietta Caldwell [facility] on 11/29/2019 11:24 AM   TK.2 - Henrietta Caldwell [facility] on 11/29/2019 11:17 AM                  DC Planning  DC destination/dispostion: Patient lives alone in a single story house, one step to enter. She hopes to return home with supportive care from family. Daughter to come live with her for about a month after discharge to assist.    DC Needs: family training, DME TBD. Follow up appointments.   Barriers to discharge: Lives alone, used a blind cane for ambulation.  Strengths: supportive family and friends.     Section completed by:  Nat Robles R.N.    Recommendation:  Update IPOC to address therapy service delivery:  PT__60/90___ min/day alternating with OT, OT ____90/60_ min/day alternating with PT, ST ___30__ min/day on 5/7 days per week for at least 15 hours per week.    Physician Summary  Salma Tejada MD participated and led team conference discussion.

## 2019-12-02 NOTE — REHAB-NURSING IDT TEAM NOTE
Nursing   Nursing  Diet/Nutrition:  Other:  Low Fiber GI Soft, Thin Liquids  Medication Administration:  Whole with Liquid Wash  % consumed at meals in last 24 hours:  Consumed 50-75% of meals per documentation.  Meal/Snack Consumption for the past 24 hrs:   Oral Nutrition   12/01/19 1800 Between 50-75% Consumed   12/01/19 1346 Between 50-75% Consumed   12/01/19 0900 Breakfast;Between % Consumed     Snack schedule:  None  Supplement: N/A  Appetite:  Good  Fluid Intake/Output in past 24 hours: In: 820 [P.O.:820]  Out: -   Admit Weight:  Weight: 68.5 kg (151 lb)  Weight Last 7 Days   [66.7 kg (147 lb 0.8 oz)-68.5 kg (151 lb)] 66.7 kg (147 lb 0.8 oz) (12/01 1600)    Pain Issues:    Location:  Leg;Back (12/01 2049)  Right;Proximal;Left;Distal;Posterior (12/01 2049)         Severity:  Moderate   Scheduled pain medications:  None     PRN pain medications used in last 24 hours:  oxycodone immediate release (ROXICODONE)    Non Pharmacologic Interventions:  relaxation, repositioned and rest  Effectiveness of pain management plan:  good=patient states acceptable comfort after interventions    Bowel:    Bowel Assist Initial Score:  5 - Standby Prompting/Supervision or Set-up  Bowel Assist Current Score:  5 - Standby Prompting/Supervision or Set-up  Bowl Accidents in last 7 days:  5  Last bowel movement: 12/01/19  Stool Description: Large, Soft, Brown     Usual bowel pattern:  daily  Scheduled bowel medications:  senna-docusate (PERICOLACE or SENOKOT S)   PRN bowel medications used in last 24 hours:  senna-docusate (PERICOLACE or SENOKOT S)  and polyethylene glycol/lytes (MIRALAX)   Nursing Interventions:  Increased time, Supervision, Verbal cueing, Brief  Effectiveness of bowel program:   good=regular, predictable, controlled emptying of bowel  Bladder:    Bladder Assist Initial Score:  1 - Total Assistance  Bladder Assist Current Score:  1 - Total Assistance  Bladder Accidents in last 7 days:  0  PVR range for past  24-48 hours:  [11-29]  ()  Intermittent Catheterization:  N/A  Medications affecting bladder:  None    Time void schedule/voiding pattern:  Voiding every 2-4 hours  Interventions:  Increased time, Supervision, Verbal cueing, Brief, Time void patient initiated  Effectiveness of bladder training:  Good=regular, predictable, emptying of bladder, patient initiates time voiding        Sleep/wake cycle:   Average hours slept:  Sleeps 4-6 hours without waking  Sleep medication usage:  None    Patient/Family Training/Education:  General Self Care and Safety  Discharge Supply Recommendations:  Strengths: Alert and oriented, Supportive family and Pleasant and cooperative   Barriers:   Limited mobility       Nursing Problems     Problem: Bowel/Gastric:     Description:     Goal: Normal bowel function is maintained or improved     Description:           Goal: Will not experience complications related to bowel motility     Description:                 Problem: Communication     Description:     Goal: The ability to communicate needs accurately and effectively will improve     Description:                 Problem: Discharge Barriers/Planning     Description:     Goal: Patient's continuum of care needs will be met     Description:                 Problem: Infection     Description:     Goal: Will remain free from infection     Description:                 Problem: Knowledge Deficit     Description:     Goal: Knowledge of disease process/condition, treatment plan, diagnostic tests, and medications will improve     Description:           Goal: Knowledge of the prescribed therapeutic regimen will improve     Description:                 Problem: Pain Management     Description:     Goal: Pain level will decrease to patient's comfort goal     Description:                 Problem: Respiratory:     Description:     Goal: Respiratory status will improve     Description:                 Problem: Safety     Description:     Goal: Will  remain free from injury     Description:           Goal: Will remain free from falls     Description:                 Problem: Skin Integrity     Description:     Goal: Risk for impaired skin integrity will decrease     Description:                 Problem: Urinary Elimination:     Description:     Goal: Ability to reestablish a normal urinary elimination pattern will improve     Description:                 Problem: Venous Thromboembolism (VTW)/Deep Vein Thrombosis (DVT) Prevention:     Description:     Goal: Patient will participate in Venous Thrombosis (VTE)/Deep Vein Thrombosis (DVT)Prevention Measures     Description:                        Long Term Goals:   At discharge patient will be able to function safely at home with support.    Section completed by:  Aurea Garcia L.P.N.

## 2019-12-02 NOTE — CARE PLAN
Problem: Pain Management  Goal: Pain level will decrease to patient's comfort goal  Outcome: PROGRESSING AS EXPECTED  Medicated with Roxicodone 5 mg per prn order.  Patient complains of back pain.       Problem: Respiratory:  Goal: Respiratory status will improve  Outcome: PROGRESSING AS EXPECTED  Respirations even and unlabored.  Skin warm and dry.

## 2019-12-02 NOTE — CARE PLAN
Problem: Problem Solving STGs  Goal: STG-Within one week, patient will  Description  1) Individualized goal:  Complete functional problem solving tasks r/t medication management with 80% accuracy and MIN A.  2) Interventions:  SLP Speech Language Treatment, SLP Self Care / ADL Training , SLP Cognitive Skill Development and SLP Group Treatment     Outcome: NOT MET  Note:   Continue to target medication management     Problem: Memory STGs  Goal: STG-Within one week, patient will  Description  1) Individualized goal:  Recall recently taught precautions and safety strategies r/t rehabilitation stay with 80% accuracy and MIN A.   2) Interventions:  SLP Speech Language Treatment, SLP Self Care / ADL Training , SLP Cognitive Skill Development and SLP Group Treatment     Outcome: MET  Note:   Pt is able to recall daily therapy tasks with min A     Problem: Problem Solving STGs  Goal: STG-Within one week, patient will  Description  1) Individualized goal:  Complete functional problem solving tasks r/t basic financial/bill management with 80% accuracy and MIN A.  2) Interventions:  SLP Speech Language Treatment, SLP Self Care / ADL Training , SLP Cognitive Skill Development and SLP Group Treatment     Outcome: DISCHARGED-GOAL NOT MET  Note:   Pt has assistance with financial management tasks and will not need to complete this independently when she returns home

## 2019-12-02 NOTE — FLOWSHEET NOTE
12/02/19 0755   Events/Summary/Plan   Events/Summary/Plan 02 spot check   Interdisciplinary Plan of Care-Goals (Indications)   Bronchodilator Indications History / Diagnosis   Interdisciplinary Plan of Care-Outcomes    Bronchodilator Outcome Patient at Stable Baseline   Respiratory WDL   Respiratory (WDL) X   Chest Exam   Work Of Breathing / Effort Increased Work of Breathing  (when supine)   Respiration 20   Pulse 92   Oximetry   #Pulse Oximetry (Single Determination) Yes   Oxygen   Home O2 Use Prior To Admission? No   Pulse Oximetry 91 %   O2 (LPM) 3   O2 Daily Delivery Respiratory  Silicone Nasal Cannula

## 2019-12-02 NOTE — DISCHARGE PLANNING
CASE MANAGEMENT INITIAL ASSESSMENT    Admit Date:  11/27/2019     I met with patient to discuss role of case management / discharge planning / team conference. Patient is alert and able to answer all questions.   Patient is a  79 y.o. female transferred from [facility]. Admitting doctor at [facility] is Dr. Tejada.     Diagnosis: 08.51 unilateral hip replacement  S/P total hip arthroplasty    Co-morbidities:   Patient Active Problem List    Diagnosis Date Noted   • Ileus (HCC) 11/25/2019     Priority: High   • Chest pain 09/12/2017     Priority: Medium   • Obesity (BMI 30-39.9) 12/16/2016     Priority: Medium   • Abnormal stress test 12/16/2016     Priority: Medium   • Dyslipidemia 08/01/2016     Priority: Medium   • HTN (hypertension) 06/25/2009     Priority: Medium   • Chronic use of opiate drugs therapeutic purposes 07/07/2017     Priority: Low   • Chronic bilateral low back pain without sciatica 07/07/2017     Priority: Low   • Daytime somnolence 12/16/2016     Priority: Low   • Insomnia 12/16/2016     Priority: Low   • Gastroesophageal reflux disease 12/16/2016     Priority: Low   • Vitamin D deficiency 12/16/2016     Priority: Low   • Chronic narcotic use 12/16/2016     Priority: Low   • Controlled substance agreement signed 12/16/2016     Priority: Low   • Chronic pain of both shoulders 12/16/2016     Priority: Low   • Chronic left-sided low back pain without sciatica 12/16/2016     Priority: Low   • Scalp itch 06/29/2016     Priority: Low   • Polypharmacy 06/29/2016     Priority: Low   • Dermatochalasis 07/14/2015     Priority: Low   • VAGINAL LESION 11/10/2009     Priority: Low   • Pelvic pain 11/10/2009     Priority: Low   • Osteopenia 06/25/2009     Priority: Low   • Dysuria 06/25/2009     Priority: Low   • Anemia 06/25/2009     Priority: Low   • Hip pain 06/25/2009     Priority: Low   • Elbow pain 06/25/2009     Priority: Low   • Azotemia 11/28/2019   • Lung nodule 11/28/2019   • Impaired  "mobility and ADLs 11/27/2019   • COPD (chronic obstructive pulmonary disease) (HCC) 11/25/2019   • LFT elevation 11/25/2019   • Status post right hip replacement 11/25/2019   • Hyperglycemia 11/25/2019   • Impaired vision 05/28/2019   • 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 05/28/2019   • Neck pain on right side 02/27/2019   • Hip pain, chronic, right 04/12/2018   • Hearing difficulty of both ears 04/12/2018   • Preventative health care 10/30/2017     Prior Living Situation:  Housing / Facility: 1 Story Apartment / Condo(one step to enter)  Lives with - Patient's Self Care Capacity: Alone and Able to Care For Self    Prior Level of Function:  Medication Management: Independent  Home Management: Requires Assist  Shopping: Other (Comments)(Ebony assists )  Prior Level Of Mobility: Independent With Device in Community(Pt reports she previously used quad cane )  Driving / Transportation: Relatives / Others Provide Transportation    Support Systems:  Primary : Ebony Spangler, friend, [phone]. Cathy Alvarengaglenda, Friend, [phone]     Previous Services Utilized:   Equipment Owned: Quad Cane, 4-Wheel Walker, Scooter, Other (Comments), Raised Toilet Seat Without Arms(unable to use scooter (no ramp))  Prior Services: Home-Independent    Other Information:  Occupation (Pre-Hospital Vocational): Retired Due To Age  Primary Payor Source: [employer]  Secondary Payor Source: Other (Comments)(Medicaid )  Primary Care Practitioner : Elizabeth Molina     Patient / Family Goal:  Patient / Family Goal: \"to stay alive\" patient lives on the first floor of an apartment building with [address]. She lives alone and is able to care for herself. She has 2 friends that come over to help her with her cooking and shopping. She would like to go home with home health. She also stated that \" I would like someone to come in and clean for me because I cant ask my friends to do that.\" She was also inquiring if " home health has a nutrionalist that she could talk to.    DC Planning  DC destination/dispostion: Patient lives alone in a single story house, one step to enter. She hopes to return home with supportive care from family. Daughter to come live with her for about a month after discharge to assist.    DC Needs: family training, DME TBD. Follow up appointments with PCP   Barriers to discharge: Lives alone, used a blind cane for ambulation. Does not drive but uses access.    Strengths: supportive family and friends. Motivated.      Additional Case Management Questions:  Have you ever received case management services for yourself or a family member? No    Do you feel you have and an understanding of what services  provide? Yes    Do you have any additional questions regarding case management? No         CASE MANAGEMENT PLAN OF CARE   Individualized Goals:   1. To improve functional status to return home.   2. Would like home health.  3. Would like to speak with a dietician.    Barriers:   1. Lives alone  2. Impaired vision     Plan:  1. Continue to follow patient through hospitalization and provide discharge planning in collaboration with patient, family, physicians and ancillary services.     2. Utilize community resources to ensure a safe discharge.

## 2019-12-03 PROCEDURE — 700102 HCHG RX REV CODE 250 W/ 637 OVERRIDE(OP): Performed by: PHYSICAL MEDICINE & REHABILITATION

## 2019-12-03 PROCEDURE — 97530 THERAPEUTIC ACTIVITIES: CPT

## 2019-12-03 PROCEDURE — 99231 SBSQ HOSP IP/OBS SF/LOW 25: CPT | Performed by: PHYSICAL MEDICINE & REHABILITATION

## 2019-12-03 PROCEDURE — 770010 HCHG ROOM/CARE - REHAB SEMI PRIVAT*

## 2019-12-03 PROCEDURE — 700101 HCHG RX REV CODE 250: Performed by: PHYSICAL MEDICINE & REHABILITATION

## 2019-12-03 PROCEDURE — 97535 SELF CARE MNGMENT TRAINING: CPT

## 2019-12-03 PROCEDURE — A9270 NON-COVERED ITEM OR SERVICE: HCPCS | Performed by: PHYSICAL MEDICINE & REHABILITATION

## 2019-12-03 PROCEDURE — 94760 N-INVAS EAR/PLS OXIMETRY 1: CPT

## 2019-12-03 PROCEDURE — 97116 GAIT TRAINING THERAPY: CPT

## 2019-12-03 PROCEDURE — A9270 NON-COVERED ITEM OR SERVICE: HCPCS | Performed by: HOSPITALIST

## 2019-12-03 PROCEDURE — 700102 HCHG RX REV CODE 250 W/ 637 OVERRIDE(OP): Performed by: HOSPITALIST

## 2019-12-03 PROCEDURE — 97110 THERAPEUTIC EXERCISES: CPT

## 2019-12-03 PROCEDURE — 99232 SBSQ HOSP IP/OBS MODERATE 35: CPT | Performed by: HOSPITALIST

## 2019-12-03 PROCEDURE — G0515 COGNITIVE SKILLS DEVELOPMENT: HCPCS

## 2019-12-03 RX ORDER — SIMETHICONE 80 MG
80 TABLET,CHEWABLE ORAL
Status: DISCONTINUED | OUTPATIENT
Start: 2019-12-03 | End: 2019-12-10 | Stop reason: HOSPADM

## 2019-12-03 RX ADMIN — NYSTATIN: 100000 POWDER TOPICAL at 21:18

## 2019-12-03 RX ADMIN — OXYCODONE HYDROCHLORIDE 5 MG: 5 TABLET ORAL at 08:02

## 2019-12-03 RX ADMIN — ACETAMINOPHEN 1000 MG: 500 TABLET, FILM COATED ORAL at 15:24

## 2019-12-03 RX ADMIN — VITAMIN D, TAB 1000IU (100/BT) 1000 UNITS: 25 TAB at 08:03

## 2019-12-03 RX ADMIN — SIMETHICONE CHEW TAB 80 MG 80 MG: 80 TABLET ORAL at 17:32

## 2019-12-03 RX ADMIN — ACETAMINOPHEN 1000 MG: 500 TABLET, FILM COATED ORAL at 08:02

## 2019-12-03 RX ADMIN — BUDESONIDE AND FORMOTEROL FUMARATE DIHYDRATE 2 PUFF: 80; 4.5 AEROSOL RESPIRATORY (INHALATION) at 21:15

## 2019-12-03 RX ADMIN — BUDESONIDE AND FORMOTEROL FUMARATE DIHYDRATE 2 PUFF: 80; 4.5 AEROSOL RESPIRATORY (INHALATION) at 08:03

## 2019-12-03 RX ADMIN — I-VITE, TAB 1000-60-2MG (60/BT) 1 TABLET: TAB at 11:43

## 2019-12-03 RX ADMIN — LOVASTATIN 40 MG: 20 TABLET ORAL at 21:15

## 2019-12-03 RX ADMIN — ACETAMINOPHEN 1000 MG: 500 TABLET, FILM COATED ORAL at 21:15

## 2019-12-03 RX ADMIN — ASPIRIN 325 MG: 325 TABLET, COATED ORAL at 11:42

## 2019-12-03 RX ADMIN — SIMETHICONE CHEW TAB 80 MG 160 MG: 80 TABLET ORAL at 08:03

## 2019-12-03 RX ADMIN — OMEPRAZOLE 20 MG: 20 CAPSULE, DELAYED RELEASE ORAL at 11:45

## 2019-12-03 RX ADMIN — SENNOSIDES AND DOCUSATE SODIUM 2 TABLET: 8.6; 5 TABLET ORAL at 08:02

## 2019-12-03 RX ADMIN — SIMETHICONE CHEW TAB 80 MG 160 MG: 80 TABLET ORAL at 11:43

## 2019-12-03 ASSESSMENT — ENCOUNTER SYMPTOMS
CHILLS: 0
DIARRHEA: 0
NAUSEA: 0
SHORTNESS OF BREATH: 0
FEVER: 0
VOMITING: 0
ABDOMINAL PAIN: 0
NERVOUS/ANXIOUS: 0

## 2019-12-03 NOTE — PROGRESS NOTES
Hospital Medicine Daily Progress Note      Chief Complaint:  Ileus  Low blood pressure    Interval History:  No significant events or changes since last visit    Review of Systems  Review of Systems   Constitutional: Negative for chills and fever.   Respiratory: Negative for shortness of breath.    Cardiovascular: Negative for chest pain.   Gastrointestinal: Negative for abdominal pain, diarrhea, nausea and vomiting.   Psychiatric/Behavioral: The patient is not nervous/anxious.         Physical Exam  Temp:  [36.4 °C (97.5 °F)-36.9 °C (98.4 °F)] 36.9 °C (98.4 °F)  Pulse:  [63-92] 89  Resp:  [16-19] 18  BP: ()/(60-74) 110/74  SpO2:  [92 %-95 %] 95 %    Physical Exam  Vitals signs and nursing note reviewed.   Constitutional:       Appearance: Normal appearance.   HENT:      Head: Atraumatic.   Eyes:      Conjunctiva/sclera: Conjunctivae normal.      Pupils: Pupils are equal, round, and reactive to light.   Neck:      Musculoskeletal: Normal range of motion and neck supple.   Cardiovascular:      Rate and Rhythm: Normal rate and regular rhythm.      Heart sounds: No murmur.   Pulmonary:      Effort: Pulmonary effort is normal.      Breath sounds: No wheezing or rales.   Abdominal:      General: There is no distension.      Palpations: Abdomen is soft.      Tenderness: There is no tenderness.   Musculoskeletal:      Right lower leg: No edema.      Left lower leg: No edema.   Skin:     General: Skin is warm and dry.      Findings: No rash.   Neurological:      Mental Status: She is alert and oriented to person, place, and time.   Psychiatric:         Mood and Affect: Mood normal.         Behavior: Behavior normal.         Fluids    Intake/Output Summary (Last 24 hours) at 12/3/2019 1139  Last data filed at 12/2/2019 2026  Gross per 24 hour   Intake 240 ml   Output --   Net 240 ml       Laboratory  Recent Labs     12/01/19  0617   WBC 6.7   RBC 4.46   HEMOGLOBIN 11.0*   HEMATOCRIT 36.7*   MCV 82.3   MCH 24.7*   MCHC  30.0*   RDW 49.0   PLATELETCT 271   MPV 8.9*     Recent Labs     12/01/19  0615   SODIUM 140   POTASSIUM 4.7   CHLORIDE 109   CO2 23   GLUCOSE 100*   BUN 19   CREATININE 0.88   CALCIUM 8.6                   Assessment/Plan  Ileus (HCC)- (present on admission)  Assessment & Plan  Surgery notes reviewed, more likely post-op ileus than small bowel obstruction  Serial KUB shows resolution of distended bowel loops  Constipation resolved with increased bowel regimen -- having reguilar BM's  On Simethicone  Advancing diet as tolerate: GI soft diet (11/30)    Dyslipidemia- (present on admission)  Assessment & Plan  On Mevacor    Hypotension  Assessment & Plan  BP occ dips a little lower  ? 2nd to pain meds and decreased PO intake  Encouraging oral fluids  Monitor    Lung nodule- (present on admission)  Assessment & Plan  CT Abd/Pelvis 11/25/19 showed 6 mm RML pulmonary nodule  Needs close F/U given smoking history    COPD (chronic obstructive pulmonary disease) (HCC)- (present on admission)  Assessment & Plan  Continue Symbicort and RT protocol    Hip pain, chronic, right- (present on admission)  Assessment & Plan  2nd to OA/DJD  S/P elective right total hip arthroplasty on 11/21/19 by Dr. Delgado    Vitamin D deficiency- (present on admission)  Assessment & Plan  Vit D: 21  On supplements    Gastroesophageal reflux disease- (present on admission)  Assessment & Plan  2nd to hiatal hernia  On Prilosec    Anemia- (present on admission)  Assessment & Plan  H&H stable with Hb 9.4  Monitor

## 2019-12-03 NOTE — THERAPY
Occupational Therapy  Daily Treatment     Patient Name: Guillermina Recio  Age:  79 y.o., Sex:  female  Medical Record #: [id number]  Today's Date: 12/3/2019     Precautions  Precautions: (P) Fall Risk, Posterior Hip Precautions, Weight Bearing As Tolerated Right Lower Extremity  Comments: (P) impaired vision, COPD     Safety   ADL Safety : Requires Supervision for Safety, Requires Cueing for Safety, Requires Physical Assist for Safety  Bathroom Safety: Requires Supervision for Safety, Requires Physical Assist for Safety, Requires Cuing for Safety  Comments: SBA/CGA for standing components, requires cues for initiation of AE for adherence to posterior hip precautions    Subjective    Pt received up in w/c, friend Cathy present, reports intent to continue to support patient at d/c, would like to take patient out on Sunday to [facility]     Objective       12/03/19 1301   Precautions   Precautions Fall Risk;Posterior Hip Precautions;Weight Bearing As Tolerated Right Lower Extremity   Comments impaired vision, COPD    OT Total Time Spent   OT Individual Total Time Spent (Mins) 60   OT Charge Group   OT Self Care / ADL 3   OT Therapy Activity 1     Session focused on DME education in bathroom, pt completed tx in/out of tub using tub transfer bench and on/off commode over toilet, use of FWW for approach, all completed with SBA. Height of tub bench and commode measured for application to home DME, pt benefits from heights of 19-20 inches to enable maneuvering and maintaining hip precautions. Discussed options for transporting items including basket attachment for FWW or backpack    Assessment    Pt tolerated session well, requires consistent redirection to stay on task but receptive to education on DME. Demonstrating improved RLE WB tolerance during mobility and improved carryover of mechanics during sit to stands from w/c and bathroom DME surfaces.  Pt wrote down DME recommendations at end of session to pass onto friend who is  coordinating her equipment through care chest, friend Cathy present and also writing down recommendations to ensure clarity. Cathy agreeable to returning Friday for training on car and bathroom transfers for outing Sunday.     Plan    Integration of posterior hip precautions into ADLs, IADLs. Cont overall strength/endurance, standing balance/tolerance for ADL's and functional mobility. Management of  deficits

## 2019-12-03 NOTE — PROGRESS NOTES
"Rehab Progress Note     Date of Service: 12/3/2019  Chief Complaint: follow up hip replacement and small bowel obstruction    Interval Events (Subjective)    Patient seen and examined today on her way to her speech therapy session.  She tells me she would like to try to attend an event at her [employer] as therapeutic outing prior to her planned discharge on the 10th.  She reports her pain is currently well controlled.  She has no other complaints.  She reports she wishes she could find a male  but that she would not want him to be skinny.    Objective:  VITAL SIGNS: /74   Pulse 89   Temp 36.9 °C (98.4 °F) (Oral)   Resp 18   Ht 1.473 m (4' 10\")   Wt 66.7 kg (147 lb 0.8 oz)   SpO2 95% Comment: on 3 lpm, weaned to 2  BMI 30.73 kg/m²   Gen: alert, no apparent distress  CV: regular rate and rhythm, no murmurs, no peripheral edema  Resp: clear to ascultation bilaterally, normal respiratory effort  GI: soft, non-tender abdomen, bowel sounds present  Neuro: notable for visual deficits     Recent Results (from the past 72 hour(s))   Basic Metabolic Panel    Collection Time: 12/01/19  6:15 AM   Result Value Ref Range    Sodium 140 135 - 145 mmol/L    Potassium 4.7 3.6 - 5.5 mmol/L    Chloride 109 96 - 112 mmol/L    Co2 23 20 - 33 mmol/L    Glucose 100 (H) 65 - 99 mg/dL    Bun 19 8 - 22 mg/dL    Creatinine 0.88 0.50 - 1.40 mg/dL    Calcium 8.6 8.5 - 10.5 mg/dL    Anion Gap 8.0 0.0 - 11.9   ESTIMATED GFR    Collection Time: 12/01/19  6:15 AM   Result Value Ref Range    GFR If African American >60 >60 mL/min/1.73 m 2    GFR If Non African American >60 >60 mL/min/1.73 m 2   CBC WITHOUT DIFFERENTIAL    Collection Time: 12/01/19  6:17 AM   Result Value Ref Range    WBC 6.7 4.8 - 10.8 K/uL    RBC 4.46 4.20 - 5.40 M/uL    Hemoglobin 11.0 (L) 12.0 - 16.0 g/dL    Hematocrit 36.7 (L) 37.0 - 47.0 %    MCV 82.3 81.4 - 97.8 fL    MCH 24.7 (L) 27.0 - 33.0 pg    MCHC 30.0 (L) 33.6 - 35.0 g/dL    RDW 49.0 35.9 - 50.0 fL    " Platelet Count 271 164 - 446 K/uL    MPV 8.9 (L) 9.0 - 12.9 fL       Current Facility-Administered Medications   Medication Frequency   • simethicone (MYLICON) chewable tab 80 mg TID WITH MEALS   • senna-docusate (PERICOLACE or SENOKOT S) 8.6-50 MG per tablet 2 Tab BID PRN    And   • polyethylene glycol/lytes (MIRALAX) PACKET 1 Packet QDAY PRN    And   • magnesium hydroxide (MILK OF MAGNESIA) suspension 30 mL QDAY PRN    And   • bisacodyl (DULCOLAX) suppository 10 mg QDAY PRN   • I-ULICES TABS 1 Tab DAILY   • budesonide-formoterol (SYMBICORT) 80-4.5 MCG/ACT inhaler 2 Puff BID   • vitamin D (cholecalciferol) 1000 UNIT tablet 1,000 Units DAILY   • Respiratory Care per Protocol Continuous RT   • Pharmacy Consult Request ...Pain Management Review 1 Each PHARMACY TO DOSE   • artificial tears ophthalmic solution 1 Drop PRN   • benzocaine-menthol (CEPACOL) lozenge 1 Lozenge Q2HRS PRN   • mag hydrox-al hydrox-simeth (MAALOX PLUS ES or MYLANTA DS) suspension 20 mL Q2HRS PRN   • ondansetron (ZOFRAN ODT) dispertab 4 mg 4X/DAY PRN    Or   • ondansetron (ZOFRAN) syringe/vial injection 4 mg 4X/DAY PRN   • traZODone (DESYREL) tablet 50 mg QHS PRN   • sodium chloride (OCEAN) 0.65 % nasal spray 2 Spray PRN   • melatonin tablet 3 mg HS PRN   • lactulose 20 GM/30ML solution 30 mL QDAY PRN   • fleet enema 133 mL QDAY PRN   • aspirin EC (ECOTRIN) tablet 325 mg BID   • calcium carbonate (TUMS) chewable tab 500 mg QDAY PRN   • lovastatin (MEVACOR) tablet 40 mg QHS   • nystatin (MYCOSTATIN) powder BID   • omeprazole (PRILOSEC) capsule 20 mg QDAY   • acetaminophen (TYLENOL) tablet 1,000 mg TID   • tramadol (ULTRAM) 50 MG tablet 50 mg Q4HRS PRN   • oxyCODONE immediate-release (ROXICODONE) tablet 5 mg Q4HRS PRN       Orders Placed This Encounter   Procedures   • Diet Order Low Fiber(GI Soft)     Standing Status:   Standing     Number of Occurrences:   1     Order Specific Question:   Diet:     Answer:   Low Fiber(GI Soft) [2]     Order Specific  Question:   Consistency/Fluid modifications:     Answer:   Thin Liquids [3]       Assessment:  Active Hospital Problems    Diagnosis   • *Status post right hip replacement   • Ileus (HCC)   • Obesity (BMI 30-39.9)   • Dyslipidemia   • HTN (hypertension)   • Gastroesophageal reflux disease   • Vitamin D deficiency   • Anemia   • Azotemia   • Lung nodule   • Impaired mobility and ADLs   • COPD (chronic obstructive pulmonary disease) (HCC)   • Hip pain, chronic, right     This patient is a 79 y.o. female admitted for acute inpatient rehabilitation with Status post right hip replacement.    I led and attended the weekly conference, and agree with the IDT conference documentation and plan of care as noted below.    Date of conference: 12/2/2019    Goals and barriers: See IDT note.    Biggest barriers: bowel and bladder incontinence, limited endurance, difficult with weight bearing, fearful and pain, respiratory insufficiency, visual deficits, short term memory deficits    Goals: community outing  - patient wants to attend an event at her [employer]    Decrease SLP to 30 min, share the other 30 with PT/OT    CM/social support: lives alone, friend with intermittent support    Anticipated DC date: 12/10/2019    Home health: PT/OT/SLP/RN    Equip: [facility]    Follow up: PCP, surgery, [facility]    Medical Decision Making and Plan:    S/p Right hip replacement  11/21 Dr. Delgado  Continue full rehab program  PT/OT/SLP, 1 hr each discipline, 5 days per week  Posterior hip precautions  Outpatient follow up    Pain Management  Scheduled tylenol  PRN tramadol  Pain currently well controlled    Bowel  SBO, see below  Meds as needed  Last BM 12/1    Bladder  Check PVRs - 17, 11  ICP for over 400 cc  Scheduled toileting    DVT prophylaxis  ASA BID per surgery    Appreciate the assistance of the hospitalist with her medical comorbidities :     Small bowel obstruction, diet slowly being advanced - currently on soft diet  Large  hiatal hernia  GERD  Hypertension  Hypotension  COPD  Respiratory insufficiency  Hyperlipidemia  Azotemia  Anemia, stable  Vit D deficiency, on supplementation  Lung nodule, outpatient follow up    Total time:  16 minutes.  I spent greater than 50% of the time for patient care, counseling, and coordination on this date, including patient face-to face time, unit/floor time with review of records/pertinent lab data and studies, as well as discussing diagnostic evaluation/work up, planned therapeutic interventions, and future disposition of care, as per the interval events/subjective and the assessment and plan as noted above.    I have performed a physical exam, reviewed and updated ROS, as well as the assessment and plan today 12/3/2019. In review of note from 12/2/2019 there are no new changes except as documented above.          Salma Tejada M.D.   Physical Medicine and Rehabilitation

## 2019-12-03 NOTE — CARE PLAN
Problem: Safety  Goal: Will remain free from falls  Intervention: Implement fall precautions  Note:   Use of call light encouraged to make needs known.Fall precautions and hourly rounding in place for safety.Call light within reach.Will continue to monitor and assess needs and safety.     Problem: Pain Management  Goal: Pain level will decrease to patient's comfort goal  Intervention: Follow pain managment plan developed in collaboration with patient and Interdisciplinary Team  Note:   Pain is manageable with scheduled medication at this time.Repositioned with pillows for comfort.Will continue to monitor and assess pain level and medicate as needed.

## 2019-12-03 NOTE — THERAPY
Speech Language Pathology  Daily Treatment     Patient Name: Guillermina Recio  Age:  79 y.o., Sex:  female  Medical Record #: [id number]  Today's Date: 12/3/2019     Subjective    Pt was pleasant and cooperative during this ST session      Objective       12/03/19 1031   SLP Total Time Spent   SLP Individual Total Time Spent (Mins) 30   Charge Group   SLP Cognitive Skill Development 2       Assessment    Continued problem solving medication management when pt returns home.  Per [facility] pharmacy it will take 2-3 weeks to set up delivery of pill pack medications.  Pt reported that her friend Cathy might be able to sort her medications for her so she can take them out of a pill box for 2 weeks while they set up the pill pack medications.  Attempted to call pt's friend Cathy; however she was unavailable.      Plan    Schedule training with pt's friend Cathy to problem solve medication management

## 2019-12-03 NOTE — FLOWSHEET NOTE
12/03/19 0745   Events/Summary/Plan   Events/Summary/Plan 02 spot check and wean back to 2 lpm   Interdisciplinary Plan of Care-Goals (Indications)   Bronchodilator Indications History / Diagnosis   Interdisciplinary Plan of Care-Outcomes    Bronchodilator Outcome Patient at Stable Baseline   Respiratory WDL   Respiratory (WDL) X   Chest Exam   Respiration 18   Pulse 90   Secretions   Cough Congested;Non Productive   Oximetry   #Pulse Oximetry (Single Determination) Yes   Oxygen   Home O2 Use Prior To Admission? No   Pulse Oximetry 95 %  (on 3 lpm, weaned to 2)   O2 (LPM) 2   O2 Daily Delivery Respiratory  Silicone Nasal Cannula

## 2019-12-03 NOTE — THERAPY
"Physical Therapy   Daily Treatment     Patient Name: Guillermina Recio  Age:  79 y.o., Sex:  female  Medical Record #: [id number]  Today's Date: 12/3/2019     Precautions  Precautions: Fall Risk, Posterior Hip Precautions, Weight Bearing As Tolerated Right Lower Extremity  Comments: impaired vision, COPD     Subjective    Pt agreed to PT     Objective       12/03/19 0931   Precautions   Precautions Fall Risk;Posterior Hip Precautions;Weight Bearing As Tolerated Right Lower Extremity   Comments impaired vision, COPD    Vitals   Pulse 89   O2 (LPM) 3   Vitals Comments 88-89 3L pulsed concentrator with activity 90-91 with cues for PLB technique on 3L with activity   Pain   Pain Scales 0 to 10 Scale    Intervention Cold Pack  (to R hip seated post tx)   Pain 0 - 10 Group   Location Hip   Location Orientation Right   Therapist Pain Assessment 3;Post Activity Pain Same as Prior to Activity   Cognition    Level of Consciousness Alert   Supine Lower Body Exercise   Supine Lower Body Exercises Yes   Hip Abduction 3 sets of 15;Right    Short Arc Quad 3 sets of 15;Bilateral   Heel Slide 3 sets of 15;Right   Ankle Pumps 3 sets of 15;Bilateral;Medium Resistance Theraband  (PF/DF green resistance band)   Other Exercises glute squeezes x 15 with 5\" holds   Standing Lower Body Exercises   Standing Lower Body Exercises Yes   Other Exercises pre gait stepping forward with L LE to increase R LE wbing 2 x 10 B UE support   Bed Mobility    Supine to Sit Contact Guard Assist  (on therapy mat, no HR)   Sit to Supine Minimal Assist  (on therapy mat no HR)   Sit to Stand Stand by Assist   Neuro-Muscular Treatments   Neuro-Muscular Treatments Sequencing;Weight Shift Left;Weight Shift Right;Verbal Cuing;Postural Changes   Interdisciplinary Plan of Care Collaboration   IDT Collaboration with  Occupational Therapist;Respiratory Therapist   Patient Position at End of Therapy Seated;Self Releasing Lap Belt Applied;Call Light within Reach;Tray Table " within Reach   Collaboration Comments O2 wean/SP02   PT Total Time Spent   PT Individual Total Time Spent (Mins) 60   PT Charge Group   PT Gait Training 1   PT Therapeutic Exercise 2   PT Therapeutic Activities 1   Supervising Physical Therapist amy chávez       Provided large print hand out with posterior hip precautions and reviewed them with patient-provided visual demonstrations and verbal instruction     FIM Walking Score:  1 - Total Assistance  Walking Description:  Walker, Supervision for safety, Safety concerns, Extra time, Verbal cueing(18' x 2 FWW SBA)    FIM Bed/Chair/Wheelchair Transfers Score: 4 - Minimal Assistance  Bed/Chair/Wheelchair Transfers Description:       Assessment    With improved understanding of posterior hip precautions following demo/instructions. Pt SP02 88-89% on 3L with activity and increased to 90-91% with cues for pursed lip breathing technique-pulsed concentrator    Plan    Safety education, bed mobility and transfer training gait training, posterior hip precautions, therapeutic exercise

## 2019-12-03 NOTE — THERAPY
Physical Therapy   Daily Treatment     Patient Name: Guillermina Recio  Age:  79 y.o., Sex:  female  Medical Record #: [id number]  Today's Date: 12/3/2019     Precautions  Precautions: Fall Risk, Posterior Hip Precautions, Weight Bearing As Tolerated Right Lower Extremity  Comments: impaired vision, COPD     Subjective    Pt up in wc, willing to participate     Objective       12/03/19 1101   Sitting Lower Body Exercises   Long Arc Quad 2 sets of 15   Standing Lower Body Exercises   Standing Lower Body Exercises Yes   Heel Rise 3 sets of 10   Other Exercises pre-gait standing/ stepping forward/ back 3 x 10 BLE's with // bars, pt requires manual cues for stance stabilization RLE   PT Total Time Spent   PT Individual Total Time Spent (Mins) 30   PT Charge Group   PT Gait Training 1   PT Therapeutic Exercise 1       Gait/ endurance training with // bars and SBA 20 ft x 3, verbal cues for increased RLE WB with less UE support.    Assessment    Pt remains with antalgic gait/ limited WB tolerance RLE.     Plan    Safety education, bed mobility and transfer training gait training, posterior hip precautions, therapeutic exercise

## 2019-12-03 NOTE — DISCHARGE PLANNING
Met with patient following team conference on 12/2/2019. Discussed progress and targeted discharge date of 12/10/2019. Will refer patient to Home Health Care at discharge per team recommendation. Patient tearful during conversation her welfare is being discontinued if she doesn't get appropriate documentation completed. Debora (family friend) in room working on setting up social security account for patient. No questions for CM regarding plan at this time.

## 2019-12-03 NOTE — CARE PLAN
Problem: Safety  Goal: Will remain free from injury  Note:   Pt uses call light consistently and appropriately. Waits for assistance does not attempt self transfer this shift. Able to verbalize needs.      Problem: Infection  Goal: Will remain free from infection  Note:   Patient remains free from s/s infection; afebrile.  Will continue to monitor.

## 2019-12-04 ENCOUNTER — APPOINTMENT (OUTPATIENT)
Dept: RADIOLOGY | Facility: REHABILITATION | Age: 79
DRG: 560 | End: 2019-12-04
Attending: HOSPITALIST
Payer: MEDICARE

## 2019-12-04 PROCEDURE — 97530 THERAPEUTIC ACTIVITIES: CPT

## 2019-12-04 PROCEDURE — 700102 HCHG RX REV CODE 250 W/ 637 OVERRIDE(OP): Performed by: HOSPITALIST

## 2019-12-04 PROCEDURE — 99232 SBSQ HOSP IP/OBS MODERATE 35: CPT | Performed by: HOSPITALIST

## 2019-12-04 PROCEDURE — 700101 HCHG RX REV CODE 250: Performed by: PHYSICAL MEDICINE & REHABILITATION

## 2019-12-04 PROCEDURE — A9270 NON-COVERED ITEM OR SERVICE: HCPCS | Performed by: HOSPITALIST

## 2019-12-04 PROCEDURE — A9270 NON-COVERED ITEM OR SERVICE: HCPCS | Performed by: PHYSICAL MEDICINE & REHABILITATION

## 2019-12-04 PROCEDURE — 700102 HCHG RX REV CODE 250 W/ 637 OVERRIDE(OP): Performed by: PHYSICAL MEDICINE & REHABILITATION

## 2019-12-04 PROCEDURE — 770010 HCHG ROOM/CARE - REHAB SEMI PRIVAT*

## 2019-12-04 PROCEDURE — 97116 GAIT TRAINING THERAPY: CPT

## 2019-12-04 PROCEDURE — G0515 COGNITIVE SKILLS DEVELOPMENT: HCPCS

## 2019-12-04 PROCEDURE — 71045 X-RAY EXAM CHEST 1 VIEW: CPT

## 2019-12-04 PROCEDURE — 92508 TX SP LANG VOICE COMM GROUP: CPT

## 2019-12-04 PROCEDURE — 97110 THERAPEUTIC EXERCISES: CPT

## 2019-12-04 PROCEDURE — 99231 SBSQ HOSP IP/OBS SF/LOW 25: CPT | Performed by: PHYSICAL MEDICINE & REHABILITATION

## 2019-12-04 RX ORDER — BUDESONIDE AND FORMOTEROL FUMARATE DIHYDRATE 160; 4.5 UG/1; UG/1
2 AEROSOL RESPIRATORY (INHALATION)
Status: DISCONTINUED | OUTPATIENT
Start: 2019-12-04 | End: 2019-12-10 | Stop reason: HOSPADM

## 2019-12-04 RX ADMIN — LOVASTATIN 40 MG: 20 TABLET ORAL at 20:19

## 2019-12-04 RX ADMIN — OXYCODONE HYDROCHLORIDE 5 MG: 5 TABLET ORAL at 07:44

## 2019-12-04 RX ADMIN — SIMETHICONE CHEW TAB 80 MG 80 MG: 80 TABLET ORAL at 07:44

## 2019-12-04 RX ADMIN — ACETAMINOPHEN 1000 MG: 500 TABLET, FILM COATED ORAL at 07:44

## 2019-12-04 RX ADMIN — SIMETHICONE CHEW TAB 80 MG 80 MG: 80 TABLET ORAL at 11:56

## 2019-12-04 RX ADMIN — VITAMIN D, TAB 1000IU (100/BT) 1000 UNITS: 25 TAB at 07:44

## 2019-12-04 RX ADMIN — BUDESONIDE AND FORMOTEROL FUMARATE DIHYDRATE 2 PUFF: 80; 4.5 AEROSOL RESPIRATORY (INHALATION) at 07:46

## 2019-12-04 RX ADMIN — NYSTATIN: 100000 POWDER TOPICAL at 20:21

## 2019-12-04 RX ADMIN — SIMETHICONE CHEW TAB 80 MG 80 MG: 80 TABLET ORAL at 17:30

## 2019-12-04 RX ADMIN — ASPIRIN 325 MG: 325 TABLET, COATED ORAL at 11:56

## 2019-12-04 RX ADMIN — ACETAMINOPHEN 1000 MG: 500 TABLET, FILM COATED ORAL at 20:19

## 2019-12-04 RX ADMIN — BUDESONIDE AND FORMOTEROL FUMARATE DIHYDRATE 2 PUFF: 160; 4.5 AEROSOL RESPIRATORY (INHALATION) at 21:13

## 2019-12-04 RX ADMIN — I-VITE, TAB 1000-60-2MG (60/BT) 1 TABLET: TAB at 10:13

## 2019-12-04 RX ADMIN — OMEPRAZOLE 20 MG: 20 CAPSULE, DELAYED RELEASE ORAL at 11:56

## 2019-12-04 RX ADMIN — ACETAMINOPHEN 1000 MG: 500 TABLET, FILM COATED ORAL at 14:46

## 2019-12-04 ASSESSMENT — ENCOUNTER SYMPTOMS
HALLUCINATIONS: 0
FEVER: 0
SHORTNESS OF BREATH: 0
HEADACHES: 0
BLURRED VISION: 0
NAUSEA: 0
VOMITING: 0
DIZZINESS: 0
PALPITATIONS: 0

## 2019-12-04 NOTE — PROGRESS NOTES
Hospital Medicine Daily Progress Note      Chief Complaint:  Ileus  Low blood pressure    Interval History:  No significant events or changes since last visit    Review of Systems  Review of Systems   Constitutional: Negative for fever.   Eyes: Negative for blurred vision.   Respiratory: Negative for shortness of breath.    Cardiovascular: Negative for palpitations.   Gastrointestinal: Negative for nausea and vomiting.   Neurological: Negative for dizziness and headaches.   Psychiatric/Behavioral: Negative for hallucinations.        Physical Exam  Temp:  [36.4 °C (97.5 °F)-36.7 °C (98.1 °F)] 36.7 °C (98 °F)  Pulse:  [72-83] 72  Resp:  [16-18] 18  BP: ()/(50-72) 110/72  SpO2:  [93 %] 93 %    Physical Exam  Vitals signs and nursing note reviewed.   Constitutional:       General: She is not in acute distress.  HENT:      Mouth/Throat:      Mouth: Mucous membranes are moist.      Pharynx: Oropharynx is clear.   Eyes:      General: No scleral icterus.  Neck:      Musculoskeletal: No neck rigidity.      Vascular: No JVD.   Cardiovascular:      Rate and Rhythm: Normal rate and regular rhythm.      Heart sounds: Normal heart sounds. No murmur.   Pulmonary:      Effort: Pulmonary effort is normal.      Breath sounds: Normal breath sounds. No stridor.   Abdominal:      General: There is no distension.      Palpations: Abdomen is soft.      Tenderness: There is no tenderness.   Musculoskeletal:      Right lower leg: No edema.      Left lower leg: No edema.   Skin:     General: Skin is warm and dry.      Findings: No rash.   Neurological:      Mental Status: She is alert and oriented to person, place, and time.   Psychiatric:         Mood and Affect: Mood normal.         Behavior: Behavior normal.         Fluids    Intake/Output Summary (Last 24 hours) at 12/4/2019 1008  Last data filed at 12/4/2019 0900  Gross per 24 hour   Intake 700 ml   Output --   Net 700 ml       Laboratory                         Assessment/Plan  Ileus (HCC)- (present on admission)  Assessment & Plan  Surgery notes reviewed, more likely post-op ileus than small bowel obstruction  Serial KUB shows resolution of distended bowel loops  Constipation resolved with increased bowel regimen -- having reguilar BM's  On Simethicone  Advancing diet as tolerate: GI soft diet (11/30) --> will advance to a regular diet (12/4)  Monitor    Dyslipidemia- (present on admission)  Assessment & Plan  On Mevacor    Hypotension  Assessment & Plan  BP occ dips a little lower  Not on any hypertensive meds  ? 2nd to pain meds  Encouraging oral fluids  Monitor    Lung nodule- (present on admission)  Assessment & Plan  CT Abd/Pelvis 11/25/19 showed 6 mm RML pulmonary nodule  Needs close F/U given smoking history    COPD (chronic obstructive pulmonary disease) (HCC)- (present on admission)  Assessment & Plan  Continue Symbicort and RT protocol    Hip pain, chronic, right- (present on admission)  Assessment & Plan  2nd to OA/DJD  S/P elective right total hip arthroplasty on 11/21/19 by Dr. Delgado    Vitamin D deficiency- (present on admission)  Assessment & Plan  Vit D: 21  On supplements    Gastroesophageal reflux disease- (present on admission)  Assessment & Plan  2nd to hiatal hernia  On Prilosec    Anemia- (present on admission)  Assessment & Plan  H&H stable with Hb 11.0 (12/1)  Monitor

## 2019-12-04 NOTE — CARE PLAN
Problem: Safety  Goal: Will remain free from falls  Intervention: Implement fall precautions  Note:   Appropriately uses call light to make needs known.Fall precautions and hourly rounding in place for safety.Call light within reach.Will continue to monitor and assess needs and safety.     Problem: Bowel/Gastric:  Goal: Normal bowel function is maintained or improved  Intervention: Educate patient and significant other/support system about signs and symptoms of constipation and interventions to implement  Note:   Pt is continent of bowel per report.LBM 12/03.Will continue to monitor and assess for s/sx of constipation.     Problem: Pain Management  Goal: Pain level will decrease to patient's comfort goal  Intervention: Follow pain managment plan developed in collaboration with patient and Interdisciplinary Team  Note:   Scheduled tylenol given with good effect.Repositioned with pillows for comfort.Will continue to monitor and assess pain level and medicate as needed.

## 2019-12-04 NOTE — THERAPY
Occupational Therapy  Daily Treatment     Patient Name: Guillermina Recio  Age:  79 y.o., Sex:  female  Medical Record #: [id number]  Today's Date: 12/4/2019     Precautions  Precautions: (P) Fall Risk, Posterior Hip Precautions, Weight Bearing As Tolerated Right Lower Extremity  Comments: (P) impaired vision, COPD     Safety   ADL Safety : Requires Supervision for Safety, Requires Cueing for Safety, Requires Physical Assist for Safety  Bathroom Safety: Requires Supervision for Safety, Requires Physical Assist for Safety, Requires Cuing for Safety  Comments: SBA/CGA for standing components, requires cues for initiation of AE for adherence to posterior hip precautions    Subjective    Pt agreeable to OT     Objective       12/04/19 1031   Precautions   Precautions Fall Risk;Posterior Hip Precautions;Weight Bearing As Tolerated Right Lower Extremity   Comments impaired vision, COPD    Sitting Upper Body Exercises   Upper Extremity Bike Level 1 Resistance  (10 min, rest breaks x2)   Balance   Comments standing in // bars; alternating unilateral functional reach up/out/fwd, marching, stepping fwd/back   OT Total Time Spent   OT Individual Total Time Spent (Mins) 60   OT Charge Group   OT Therapy Activity 4     Assessment    Pt with limited tolerance this session due to fatigue and hip pain, ice pack applied to R hip with pt reporting some relief. She required consistent rest breaks for energy conservation and pain management. Pt was able to verbalize 1/3 hip precautions with prompting, able to recall 3/3 later in session, able to verbalize strategies and needed equipment for managing LB dressing while maintaining precautions.     Plan    Integration of posterior hip precautions into ADLs, IADLs. Cont overall strength/endurance, standing balance/tolerance for ADL's and functional mobility. Management of  deficits

## 2019-12-04 NOTE — THERAPY
12/04/19 1029   Precautions   Precautions Fall Risk;Posterior Hip Precautions;Weight Bearing As Tolerated Right Lower Extremity  (Right hip posterior precautions)   Comments Impaired vision, COPD   Pain 0 - 10 Group   Therapist Pain Assessment 0   Cognition    Level of Consciousness Alert   Sitting Lower Body Exercises   Ankle Pumps 1 set of 15;Bilateral   Hip Flexion 2 sets of 15;Bilateral   Hip Abduction 2 sets of 15;Bilateral   Hip Adduction 2 sets of 15;Bilateral   Long Arc Quad 2 sets of 15;Bilateral   Hamstring Curl 2 sets of 15;Bilateral   Neuro-Muscular Treatments   Neuro-Muscular Treatments Sequencing;Tactile Cuing;Tapping;Verbal Cuing   Comments Sequencing sit to/from stand, sequencing gait with a FWW   PT Total Time Spent   PT Individual Total Time Spent (Mins) 60   PT Charge Group   PT Gait Training 2   PT Therapeutic Exercise 2   Physical Therapy   Daily Treatment     Patient Name: Guillermina Recio  Age:  79 y.o., Sex:  female  Medical Record #: [id number]  Today's Date: 12/4/2019     Precautions  Precautions: Fall Risk, Posterior Hip Precautions, Weight Bearing As Tolerated Right Lower Extremity  Comments: impaired vision, COPD     Subjective    The patient agreed to PT.     Objective    The patient participated in gait training with a FWW, CGA/gait belt.  She tolerated 55 FT x4 with encouragement to increase her distance with consistency.  She also participated in seated bilateral lower extremity therapeutic exercise 2 sets of 15 as indicated above.    FIM Bed/Chair/Wheelchair Transfers Score: 4 - Minimal Assistance  Bed/Chair/Wheelchair Transfers Description:  Increased time, Adaptive equipment, Verbal cueing, Set-up of equipment, Supervision for safety(FWW, CGA)    FIM Walking Score:  2 - Max Assistance  Walking Description:  Extra time, Verbal cueing, Supervision for safety, Oxygen, Walker(55 FT x4, FWW, CGA)    FIM Wheelchair Score:     Wheelchair Description:       FIM Stairs Score:  0 - Not  tested,unsafe activity  Stairs Description:         Assessment    The patient is making progress in bed mobility, transfers, gait, tolerance for activity.  She is limited by COPD and pain.    Plan    Bed mobility and transfer training, safety education, gait training as tolerated, therapeutic exercise, balance and coordination

## 2019-12-04 NOTE — PROGRESS NOTES
"Rehab Progress Note     Date of Service: 12/4/2019  Chief Complaint: follow up hip replacement and small bowel obstruction    Interval Events (Subjective)    Patient seen and examined today in the therapy gym. She is working with PT. Pain is currently controlled. She states yesterday that her left leg just wouldn't move right. She would like to go to a therapeutic outing at her [employer] on Sunday. Discussed with PT will have to clear caregiver for transfers.    Objective:  VITAL SIGNS: /70   Pulse 74   Temp 36.6 °C (97.9 °F) (Temporal)   Resp 18   Ht 1.473 m (4' 10\")   Wt 66.7 kg (147 lb 0.8 oz)   SpO2 95%   BMI 30.73 kg/m²    Gen: alert, no apparent distress  CV: regular rate and rhythm, no murmurs, no peripheral edema  Resp: clear to ascultation bilaterally, normal respiratory effort  GI: soft, non-tender abdomen, bowel sounds present  Neuro: notable for chronic visual deficits    No results found for this or any previous visit (from the past 72 hour(s)).    Current Facility-Administered Medications   Medication Frequency   • budesonide-formoterol (SYMBICORT) 160-4.5 MCG/ACT inhaler 2 Puff BID (RT)   • simethicone (MYLICON) chewable tab 80 mg TID WITH MEALS   • senna-docusate (PERICOLACE or SENOKOT S) 8.6-50 MG per tablet 2 Tab BID PRN    And   • polyethylene glycol/lytes (MIRALAX) PACKET 1 Packet QDAY PRN    And   • magnesium hydroxide (MILK OF MAGNESIA) suspension 30 mL QDAY PRN    And   • bisacodyl (DULCOLAX) suppository 10 mg QDAY PRN   • I-ULICES TABS 1 Tab DAILY   • vitamin D (cholecalciferol) 1000 UNIT tablet 1,000 Units DAILY   • Respiratory Care per Protocol Continuous RT   • Pharmacy Consult Request ...Pain Management Review 1 Each PHARMACY TO DOSE   • artificial tears ophthalmic solution 1 Drop PRN   • benzocaine-menthol (CEPACOL) lozenge 1 Lozenge Q2HRS PRN   • mag hydrox-al hydrox-simeth (MAALOX PLUS ES or MYLANTA DS) suspension 20 mL Q2HRS PRN   • ondansetron (ZOFRAN ODT) dispertab 4 mg " 4X/DAY PRN    Or   • ondansetron (ZOFRAN) syringe/vial injection 4 mg 4X/DAY PRN   • traZODone (DESYREL) tablet 50 mg QHS PRN   • sodium chloride (OCEAN) 0.65 % nasal spray 2 Spray PRN   • melatonin tablet 3 mg HS PRN   • lactulose 20 GM/30ML solution 30 mL QDAY PRN   • fleet enema 133 mL QDAY PRN   • aspirin EC (ECOTRIN) tablet 325 mg BID   • calcium carbonate (TUMS) chewable tab 500 mg QDAY PRN   • lovastatin (MEVACOR) tablet 40 mg QHS   • nystatin (MYCOSTATIN) powder BID   • omeprazole (PRILOSEC) capsule 20 mg QDAY   • acetaminophen (TYLENOL) tablet 1,000 mg TID   • tramadol (ULTRAM) 50 MG tablet 50 mg Q4HRS PRN   • oxyCODONE immediate-release (ROXICODONE) tablet 5 mg Q4HRS PRN       Orders Placed This Encounter   Procedures   • Diet Order Regular     Standing Status:   Standing     Number of Occurrences:   1     Order Specific Question:   Diet:     Answer:   Regular [1]     Order Specific Question:   Consistency/Fluid modifications:     Answer:   Thin Liquids [3]       Assessment:  Active Hospital Problems    Diagnosis   • *Status post right hip replacement   • Ileus (HCC)   • Obesity (BMI 30-39.9)   • Dyslipidemia   • HTN (hypertension)   • Gastroesophageal reflux disease   • Vitamin D deficiency   • Anemia   • Azotemia   • Lung nodule   • Impaired mobility and ADLs   • COPD (chronic obstructive pulmonary disease) (HCC)   • Hip pain, chronic, right     This patient is a 79 y.o. female admitted for acute inpatient rehabilitation with Status post right hip replacement.    I led and attended the weekly conference, and agree with the IDT conference documentation and plan of care as noted below.    Date of conference: 12/2/2019    Goals and barriers: See IDT note.    Biggest barriers: bowel and bladder incontinence, limited endurance, difficult with weight bearing, fearful and pain, respiratory insufficiency, visual deficits, short term memory deficits    Goals: community outing  - patient wants to attend an event  at her [employer]    Decrease SLP to 30 min, share the other 30 with PT/OT    CM/social support: lives alone, friend with intermittent support    Anticipated DC date: 12/10/2019    Home health: PT/OT/SLP/RN    Equip: [facility]    Follow up: PCP, surgery, [facility]    Medical Decision Making and Plan:    S/p Right hip replacement  11/21 Dr. Delgado  Continue full rehab program  PT/OT/SLP, 1 hr each discipline, 5 days per week  Posterior hip precautions  Outpatient follow up    Pain Management  Scheduled tylenol  PRN tramadol  Pain currently well controlled    Bowel  SBO, see below  Meds as needed  Last BM 12/3    Bladder  Check PVRs - 17, 11  ICP for over 400 cc  Scheduled toileting    DVT prophylaxis  ASA BID per surgery    Appreciate the assistance of the hospitalist with her medical comorbidities :     Small bowel obstruction, resolved, diet advanced  Large hiatal hernia  GERD  Hypertension  Hypotension, continues  COPD  Respiratory insufficiency  Hyperlipidemia  Azotemia  Anemia, stable  Vit D deficiency, on supplementation  Lung nodule, outpatient follow up    Total time:  17 minutes.  I spent greater than 50% of the time for patient care, counseling, and coordination on this date, including patient face-to face time, unit/floor time with review of records/pertinent lab data and studies, as well as discussing diagnostic evaluation/work up, planned therapeutic interventions, and future disposition of care, as per the interval events/subjective and the assessment and plan as noted above.    I have performed a physical exam, reviewed and updated ROS, as well as the assessment and plan today 12/4/2019. In review of note from 12/3/2019 there are no new changes except as documented above.              Salma Tejada M.D.   Physical Medicine and Rehabilitation

## 2019-12-04 NOTE — THERAPY
Speech Language Pathology  Daily Treatment     Patient Name: Guillermina Recio  Age:  79 y.o., Sex:  female  Medical Record #: [id number]  Today's Date: 12/4/2019     Subjective    Pt was able to actively participate in a ST group session targeting safety awareness and fall prevention      Objective       12/04/19 1401   SLP Total Time Spent   SLP Group Total Time Spent (Mins) 45   Charge Group   SLP Treatment - Group Speech Language Treatment - Group       Assessment    Pt participated in a ST group targeting fall prevention and safety awareness in the home setting.  Pt was able to problem solve modifications she would have to make to her house before discharging home (I.e. getting food at eye level so she doesn't have to bend down to reach it, getting up a stair to get into the house, having help with medications, needing a plan for rides to doctors appointments, pulling up rugs that she has tripped on in the past) independently.      Plan    Continue targeting problem solving for visual deficits

## 2019-12-04 NOTE — THERAPY
Speech Language Pathology  Daily Treatment     Patient Name: Guillermina Recio  Age:  79 y.o., Sex:  female  Medical Record #: [id number]  Today's Date: 12/4/2019     Subjective    Pt was pleasant and cooperative during this ST session      Objective       12/04/19 1331   SLP Total Time Spent   SLP Individual Total Time Spent (Mins) 30   Charge Group   SLP Cognitive Skill Development 2       Assessment    Continued problem solving medication management.  Pt verbalized that it might be easier having her daughter or her friend Cathy set up all of her pills for her until they are able to sign up for [facility] pill packs.  Pt reported that her friend Cathy is coming in for transfer training on Friday and that she would be able to bring her daughter for family training with ST on Friday as well.    Plan    Family training to be completed on Friday with pt's daughter and friend Cathy, continue targeting problem solving for current visual deficits

## 2019-12-05 ENCOUNTER — HOME HEALTH ADMISSION (OUTPATIENT)
Dept: HOME HEALTH SERVICES | Facility: HOME HEALTHCARE | Age: 79
End: 2019-12-05
Payer: MEDICARE

## 2019-12-05 PROCEDURE — G0515 COGNITIVE SKILLS DEVELOPMENT: HCPCS

## 2019-12-05 PROCEDURE — 97535 SELF CARE MNGMENT TRAINING: CPT

## 2019-12-05 PROCEDURE — 94760 N-INVAS EAR/PLS OXIMETRY 1: CPT

## 2019-12-05 PROCEDURE — 99232 SBSQ HOSP IP/OBS MODERATE 35: CPT | Performed by: HOSPITALIST

## 2019-12-05 PROCEDURE — A9270 NON-COVERED ITEM OR SERVICE: HCPCS | Performed by: PHYSICAL MEDICINE & REHABILITATION

## 2019-12-05 PROCEDURE — 99231 SBSQ HOSP IP/OBS SF/LOW 25: CPT | Performed by: PHYSICAL MEDICINE & REHABILITATION

## 2019-12-05 PROCEDURE — 700102 HCHG RX REV CODE 250 W/ 637 OVERRIDE(OP): Performed by: HOSPITALIST

## 2019-12-05 PROCEDURE — 97530 THERAPEUTIC ACTIVITIES: CPT

## 2019-12-05 PROCEDURE — A9270 NON-COVERED ITEM OR SERVICE: HCPCS | Performed by: HOSPITALIST

## 2019-12-05 PROCEDURE — 97116 GAIT TRAINING THERAPY: CPT

## 2019-12-05 PROCEDURE — 700102 HCHG RX REV CODE 250 W/ 637 OVERRIDE(OP): Performed by: PHYSICAL MEDICINE & REHABILITATION

## 2019-12-05 PROCEDURE — 700101 HCHG RX REV CODE 250: Performed by: PHYSICAL MEDICINE & REHABILITATION

## 2019-12-05 PROCEDURE — 97110 THERAPEUTIC EXERCISES: CPT

## 2019-12-05 PROCEDURE — 770010 HCHG ROOM/CARE - REHAB SEMI PRIVAT*

## 2019-12-05 RX ORDER — VITS A,C,E/LUTEIN/MINERALS 300MCG-200
1 TABLET ORAL DAILY
Status: DISCONTINUED | OUTPATIENT
Start: 2019-12-05 | End: 2019-12-10 | Stop reason: HOSPADM

## 2019-12-05 RX ADMIN — SENNOSIDES AND DOCUSATE SODIUM 2 TABLET: 8.6; 5 TABLET ORAL at 11:30

## 2019-12-05 RX ADMIN — OXYCODONE HYDROCHLORIDE 5 MG: 5 TABLET ORAL at 08:01

## 2019-12-05 RX ADMIN — SIMETHICONE CHEW TAB 80 MG 80 MG: 80 TABLET ORAL at 16:45

## 2019-12-05 RX ADMIN — ACETAMINOPHEN 1000 MG: 500 TABLET, FILM COATED ORAL at 08:01

## 2019-12-05 RX ADMIN — SIMETHICONE CHEW TAB 80 MG 80 MG: 80 TABLET ORAL at 08:02

## 2019-12-05 RX ADMIN — BUDESONIDE AND FORMOTEROL FUMARATE DIHYDRATE 2 PUFF: 160; 4.5 AEROSOL RESPIRATORY (INHALATION) at 20:02

## 2019-12-05 RX ADMIN — I-VITE, TAB 1000-60-2MG (60/BT) 1 TABLET: TAB at 11:31

## 2019-12-05 RX ADMIN — LOVASTATIN 40 MG: 20 TABLET ORAL at 20:05

## 2019-12-05 RX ADMIN — OMEPRAZOLE 20 MG: 20 CAPSULE, DELAYED RELEASE ORAL at 11:30

## 2019-12-05 RX ADMIN — ASPIRIN 325 MG: 325 TABLET, COATED ORAL at 11:30

## 2019-12-05 RX ADMIN — VITAMIN D, TAB 1000IU (100/BT) 1000 UNITS: 25 TAB at 08:01

## 2019-12-05 RX ADMIN — NYSTATIN: 100000 POWDER TOPICAL at 20:02

## 2019-12-05 RX ADMIN — ASPIRIN 325 MG: 325 TABLET, COATED ORAL at 20:05

## 2019-12-05 RX ADMIN — ACETAMINOPHEN 1000 MG: 500 TABLET, FILM COATED ORAL at 20:05

## 2019-12-05 RX ADMIN — ACETAMINOPHEN 1000 MG: 500 TABLET, FILM COATED ORAL at 15:06

## 2019-12-05 RX ADMIN — SIMETHICONE CHEW TAB 80 MG 80 MG: 80 TABLET ORAL at 11:30

## 2019-12-05 RX ADMIN — BUDESONIDE AND FORMOTEROL FUMARATE DIHYDRATE 2 PUFF: 160; 4.5 AEROSOL RESPIRATORY (INHALATION) at 08:02

## 2019-12-05 ASSESSMENT — ENCOUNTER SYMPTOMS
FEVER: 0
BLURRED VISION: 0
DIARRHEA: 0
DIZZINESS: 0
NERVOUS/ANXIOUS: 0
COUGH: 0

## 2019-12-05 NOTE — THERAPY
"Occupational Therapy  Daily Treatment     Patient Name: Guillermina Recio  Age:  79 y.o., Sex:  female  Medical Record #: [id number]  Today's Date: 12/5/2019     Precautions  Precautions: Fall Risk, Posterior Hip Precautions, Weight Bearing As Tolerated Right Lower Extremity  Comments: Impaired vision, COPD    Safety   ADL Safety : Requires Supervision for Safety, Requires Cueing for Safety, Requires Physical Assist for Safety  Bathroom Safety: Requires Supervision for Safety, Requires Physical Assist for Safety, Requires Cuing for Safety  Comments: SBA/CGA for standing components, requires cues for initiation of AE for adherence to posterior hip precautions    Subjective    \"I just get so tired!\"     Objective       12/05/19 1031   Precautions   Precautions Fall Risk;Posterior Hip Precautions;Weight Bearing As Tolerated Right Lower Extremity   Comments Impaired vision, COPD   Sitting Upper Body Exercises   Upper Extremity Bike Level 2 Resistance  (10 min, rest breaks x2, motomed)   Balance   Comments functional mobility in gym with FWW with SBA   OT Total Time Spent   OT Individual Total Time Spent (Mins) 60   OT Charge Group   OT Self Care / ADL 1   OT Therapy Activity 3     Assessment    Pt tolerated session fair, continues with impaired endurance necessitating frequent extended rest breaks. Demos improvements in balance during functional mobility for toileting and in gym requiring SBA only and with increased foot clearance. Declined to practice ADLs wishing to focus on exercise, agreeable to weekend shower in prep for Sunday outing.     Plan    Integration of posterior hip precautions into ADLs, IADLs. Cont overall strength/endurance, standing balance/tolerance for ADL's and functional mobility. Management of  deficits    "

## 2019-12-05 NOTE — PROGRESS NOTES
"Rehab Progress Note     Date of Service: 12/5/2019  Chief Complaint: follow up hip replacement and small bowel obstruction    Interval Events (Subjective)    Patient seen and examined today in the therapy gym.  She continues to have mild hypotension for which the hospitalist is managing.  Her pain has improved and she is able to move better with therapy.  She has no new complaints today.    Objective:  VITAL SIGNS: /67   Pulse 80   Temp 36.2 °C (97.1 °F) (Temporal)   Resp 18   Ht 1.473 m (4' 10\")   Wt 66.7 kg (147 lb 0.8 oz)   SpO2 92%   BMI 30.73 kg/m²    Gen: alert, no apparent distress  CV: regular rate and rhythm, no murmurs, no peripheral edema  Resp: clear to ascultation bilaterally, normal respiratory effort  GI: soft, non-tender abdomen, bowel sounds present  Neuro: notable for chronic visual impairment    No results found for this or any previous visit (from the past 72 hour(s)).    Current Facility-Administered Medications   Medication Frequency   • OCUVITE-LUTEIN tablet 1 tablet DAILY   • aspirin EC (ECOTRIN) tablet 325 mg BID   • budesonide-formoterol (SYMBICORT) 160-4.5 MCG/ACT inhaler 2 Puff BID (RT)   • simethicone (MYLICON) chewable tab 80 mg TID WITH MEALS   • senna-docusate (PERICOLACE or SENOKOT S) 8.6-50 MG per tablet 2 Tab BID PRN    And   • polyethylene glycol/lytes (MIRALAX) PACKET 1 Packet QDAY PRN    And   • magnesium hydroxide (MILK OF MAGNESIA) suspension 30 mL QDAY PRN    And   • bisacodyl (DULCOLAX) suppository 10 mg QDAY PRN   • vitamin D (cholecalciferol) 1000 UNIT tablet 1,000 Units DAILY   • Respiratory Care per Protocol Continuous RT   • Pharmacy Consult Request ...Pain Management Review 1 Each PHARMACY TO DOSE   • artificial tears ophthalmic solution 1 Drop PRN   • benzocaine-menthol (CEPACOL) lozenge 1 Lozenge Q2HRS PRN   • mag hydrox-al hydrox-simeth (MAALOX PLUS ES or MYLANTA DS) suspension 20 mL Q2HRS PRN   • ondansetron (ZOFRAN ODT) dispertab 4 mg 4X/DAY PRN    Or "   • ondansetron (ZOFRAN) syringe/vial injection 4 mg 4X/DAY PRN   • traZODone (DESYREL) tablet 50 mg QHS PRN   • sodium chloride (OCEAN) 0.65 % nasal spray 2 Spray PRN   • melatonin tablet 3 mg HS PRN   • lactulose 20 GM/30ML solution 30 mL QDAY PRN   • fleet enema 133 mL QDAY PRN   • calcium carbonate (TUMS) chewable tab 500 mg QDAY PRN   • lovastatin (MEVACOR) tablet 40 mg QHS   • nystatin (MYCOSTATIN) powder BID   • omeprazole (PRILOSEC) capsule 20 mg QDAY   • acetaminophen (TYLENOL) tablet 1,000 mg TID   • tramadol (ULTRAM) 50 MG tablet 50 mg Q4HRS PRN   • oxyCODONE immediate-release (ROXICODONE) tablet 5 mg Q4HRS PRN       Orders Placed This Encounter   Procedures   • Diet Order Regular     Standing Status:   Standing     Number of Occurrences:   1     Order Specific Question:   Diet:     Answer:   Regular [1]     Order Specific Question:   Consistency/Fluid modifications:     Answer:   Thin Liquids [3]       Assessment:  Active Hospital Problems    Diagnosis   • *Status post right hip replacement   • Ileus (HCC)   • Obesity (BMI 30-39.9)   • Dyslipidemia   • HTN (hypertension)   • Gastroesophageal reflux disease   • Vitamin D deficiency   • Anemia   • Azotemia   • Lung nodule   • Impaired mobility and ADLs   • COPD (chronic obstructive pulmonary disease) (HCC)   • Hip pain, chronic, right     This patient is a 79 y.o. female admitted for acute inpatient rehabilitation with Status post right hip replacement.    I led and attended the weekly conference, and agree with the IDT conference documentation and plan of care as noted below.    Date of conference: 12/2/2019    Goals and barriers: See IDT note.    Biggest barriers: bowel and bladder incontinence, limited endurance, difficult with weight bearing, fearful and pain, respiratory insufficiency, visual deficits, short term memory deficits    Goals: community outing  - patient wants to attend an event at her [employer]    Decrease SLP to 30 min, share the other 30  with PT/OT    CM/social support: lives alone, friend with intermittent support    Anticipated DC date: 12/10/2019    Home health: PT/OT/SLP/RN    Equip: [facility]    Follow up: PCP, surgery, [facility]    Medical Decision Making and Plan:    S/p Right hip replacement  11/21 Dr. Delgado  Continue full rehab program  PT/OT/SLP, 1 hr each discipline, 5 days per week  Posterior hip precautions  Outpatient follow up    Pain Management  Scheduled tylenol  PRN tramadol  Pain currently well controlled    Bowel  SBO, see below  Meds as needed  Last BM 12/4    Bladder  Check PVRs - 17, 11, 50  ICP for over 400 cc  Scheduled toileting    DVT prophylaxis  ASA BID per surgery    Appreciate the assistance of the hospitalist with her medical comorbidities :     Small bowel obstruction, resolved, diet advanced  Large hiatal hernia  GERD  Hypertension, off all BP meds  Hypotension, continues  COPD, started on Symbicort, respiratory therapy to attempt titration, was not on O2 at home  Respiratory insufficiency  Hyperlipidemia  Azotemia, resolved  Anemia, stable  Vit D deficiency, on supplementation  Lung nodule, outpatient follow up    Total time:  18 minutes.  I spent greater than 50% of the time for patient care, counseling, and coordination on this date, including patient face-to face time, unit/floor time with review of records/pertinent lab data and studies, as well as discussing diagnostic evaluation/work up, planned therapeutic interventions, and future disposition of care, as per the interval events/subjective and the assessment and plan as noted above.    I have performed a physical exam, reviewed and updated ROS, as well as the assessment and plan today 12/5/2019. In review of note from 12/4/2019 there are no new changes except as documented above.        Salma Tejada M.D.   Physical Medicine and Rehabilitation

## 2019-12-05 NOTE — DISCHARGE PLANNING
ATTN: Case Management  RE: Referral for Home Health    As of 12/05/2019, we have accepted the Home Health referral for the patient listed above.    A [facility] clinician will be out to see the patient within 48 hours. If you have any questions or concerns regarding the patient's transition to Home Health, please do not hesitate to contact us at [phone].      We look forward to collaborating with you,  [facility]

## 2019-12-05 NOTE — THERAPY
"Physical Therapy   Daily Treatment     Patient Name: Guillermina Recio  Age:  79 y.o., Sex:  female  Medical Record #: [id number]  Today's Date: 12/4/2019     Precautions  Precautions: Fall Risk, Posterior Hip Precautions, Weight Bearing As Tolerated Right Lower Extremity  Comments: impaired vision, COPD     Subjective    \"I'm trying to rest! I already did a lot of therapy today!\" Pt very resistant initially, reluctantly agreeable to supine there-ex in bed      Objective       12/04/19 1531   Precautions   Precautions Fall Risk;Posterior Hip Precautions;Weight Bearing As Tolerated Right Lower Extremity   Comments impaired vision, COPD    Supine Lower Body Exercise   Hip Abduction 2 sets of 15   Hip Adduction  2 sets of 15  (not past midline on R)   Short Arc Quad 2 sets of 15   Heel Slide 2 sets of 15   Ankle Pumps 2 sets of 15   Other Exercises Supine marches 2x15    Interdisciplinary Plan of Care Collaboration   Patient Position at End of Therapy In Bed;Call Light within Reach;Tray Table within Reach   PT Total Time Spent   PT Individual Total Time Spent (Mins) 30   PT Charge Group   PT Therapeutic Exercise 1   PT Therapeutic Activities 1       FIM Bed/Chair/Wheelchair Transfers Score: 5 - Standby Prompting/Supervision or Set-up  Bed/Chair/Wheelchair Transfers Description:  Verbal cueing, Set-up of equipment, Supervision for safety(bed <> WC, SBA with extra time)    FIM Toilet Transfer Score:  5 - Standby Prompting/Supervision or Set-up  Toilet Transfer Description:  Grab bar, Increased time, Set-up of equipment(WC <> toilet, SBA with grab bar)      Assessment    Pt reported being very tired and was initially very rude to therapist. Apologized toward end of session reporting \"I'm not usually grumpy\". Cooperative with session.    Plan    Bed mobility and transfer training, safety education, gait training as tolerated, therapeutic exercise, balance and coordination       "

## 2019-12-05 NOTE — CARE PLAN
Problem: Safety  Goal: Will remain free from injury  Note:   Patient demonstrates good safety technique this shift.  Asks for assistance when needed and does not attempt self transfer.  Able to verbalize needs.  Will continue to monitor.      Problem: Skin Integrity  Goal: Risk for impaired skin integrity will decrease  Note:   Patient's skin remains intact and free from new or accidental injury this shift.  Will continue to monitor.

## 2019-12-05 NOTE — PROGRESS NOTES
Hospital Medicine Daily Progress Note      Chief Complaint:  Ileus  Low blood pressure    Interval History:  No significant events or changes since last visit    Review of Systems  Review of Systems   Constitutional: Negative for fever.   Eyes: Negative for blurred vision.   Respiratory: Negative for cough.    Cardiovascular: Negative for chest pain.   Gastrointestinal: Negative for diarrhea.   Musculoskeletal: Negative for joint pain.   Neurological: Negative for dizziness.   Psychiatric/Behavioral: The patient is not nervous/anxious.         Physical Exam  Temp:  [36.2 °C (97.1 °F)-36.6 °C (97.9 °F)] 36.2 °C (97.1 °F)  Pulse:  [] 80  Resp:  [18] 18  BP: ()/(62-70) 100/67  SpO2:  [92 %-95 %] 92 %    Physical Exam  Vitals signs and nursing note reviewed.   Constitutional:       Appearance: She is not diaphoretic.   HENT:      Mouth/Throat:      Pharynx: No oropharyngeal exudate or posterior oropharyngeal erythema.   Eyes:      Extraocular Movements: Extraocular movements intact.   Neck:      Vascular: No carotid bruit or JVD.   Cardiovascular:      Rate and Rhythm: Normal rate and regular rhythm.      Heart sounds: Normal heart sounds. No murmur.   Pulmonary:      Effort: Pulmonary effort is normal.      Breath sounds: Normal breath sounds. No stridor.   Abdominal:      General: Bowel sounds are normal.      Palpations: Abdomen is soft.   Musculoskeletal:      Right lower leg: No edema.      Left lower leg: No edema.   Skin:     General: Skin is warm and dry.      Findings: No rash.   Neurological:      Mental Status: She is alert and oriented to person, place, and time.   Psychiatric:         Mood and Affect: Mood normal.         Behavior: Behavior normal.         Fluids    Intake/Output Summary (Last 24 hours) at 12/5/2019 0957  Last data filed at 12/5/2019 0900  Gross per 24 hour   Intake 1120 ml   Output --   Net 1120 ml       Laboratory                        Assessment/Plan  Ileus (HCC)- (present  on admission)  Assessment & Plan  Surgery notes reviewed, more likely post-op ileus than small bowel obstruction  Serial KUB shows resolution of distended bowel loops  Constipation resolved with increased bowel regimen -- having fairly reguilar BM's  On Simethicone  Advancing diet as tolerate: GI soft diet (11/30) --> regular diet (12/4)  Monitor    Dyslipidemia- (present on admission)  Assessment & Plan  On Mevacor    Hypotension  Assessment & Plan  BP ok but occ dips a little lower  Not on any hypertensive meds  ? 2nd to pain meds  Encouraging oral fluids  Monitor    Lung nodule- (present on admission)  Assessment & Plan  CT Abd/Pelvis 11/25/19 showed 6 mm RML pulmonary nodule  Needs close F/U given smoking history    COPD (chronic obstructive pulmonary disease) (HCC)- (present on admission)  Assessment & Plan  Sat ok on 2 liters O2 NC  CXR (12/4): retrocardiac opacity with air-fluid levels consistent with a large hernia                      interstitial prominence in the lung bases similar to the prior exam                      no significant pleural fluid or pneumothorax is identified                      no significant interval change  Was not on O2 at home but should have been 2nd to pt getting SOB on regular activities   Continue Symbicort and RT protocol    Hip pain, chronic, right- (present on admission)  Assessment & Plan  2nd to OA/DJD  S/P elective right total hip arthroplasty on 11/21/19 by Dr. Delgado    Vitamin D deficiency- (present on admission)  Assessment & Plan  Vit D: 21  On supplements    Gastroesophageal reflux disease- (present on admission)  Assessment & Plan  2nd to hiatal hernia  On Prilosec    Anemia- (present on admission)  Assessment & Plan  H&H stable with Hb 11.0 (12/1)  Monitor

## 2019-12-05 NOTE — CARE PLAN
Problem: Safety  Goal: Will remain free from falls  Intervention: Implement fall precautions  Note:   Appropriately uses call light to make needs known.Fall precautions and frequent rounding in place for safety.Call light within reach.Will continue to monitor and assess needs and safety.     Problem: Pain Management  Goal: Pain level will decrease to patient's comfort goal  Intervention: Follow pain managment plan developed in collaboration with patient and Interdisciplinary Team  Note:   Pain is manageable with scheduled medication.Repositioned with pillows for comfort.Will continue to monitor and assess pain level and medicate as needed.     Problem: Urinary Elimination:  Goal: Ability to reestablish a normal urinary elimination pattern will improve  Intervention: Assist patient to sit on commode or toilet for voiding  Note:   Assisted to the bathroom, continent of bladder.Denies any discomfort or dysuria.Will continue to monitor.

## 2019-12-05 NOTE — THERAPY
12/05/19 1029   Precautions   Precautions Fall Risk;Posterior Hip Precautions;Weight Bearing As Tolerated Right Lower Extremity   Comments Impaired vision, COPD   Pain 0 - 10 Group   Location Hip   Location Orientation Right   Therapist Pain Assessment 3;Prior to Activity;During Activity   Cognition    Level of Consciousness Alert   Neuro-Muscular Treatments   Neuro-Muscular Treatments Sequencing;Tactile Cuing;Tapping;Verbal Cuing   Comments Sequencing sit to/from stand and sequencing gait with a fww   PT Total Time Spent   PT Individual Total Time Spent (Mins) 30   PT Charge Group   PT Gait Training 2   Physical Therapy   Daily Treatment     Patient Name: Guillermina Recio  Age:  79 y.o., Sex:  female  Medical Record #: [id number]  Today's Date: 12/5/2019     Precautions  Precautions: Fall Risk, Posterior Hip Precautions, Weight Bearing As Tolerated Right Lower Extremity  Comments: Impaired vision, COPD    Subjective    The patient was sitting up in her chair and she agreed to PT.  She said her hip was aching but not significantly painful.     Objective    The patient participated in practicing sequencing sit to/from stand and sequencing gait with a FWW.  She tolerated 55 FT x4 CGA.  Gait distance is limited by her compromised respiration.    FIM Bed/Chair/Wheelchair Transfers Score: 4 - Minimal Assistance  Bed/Chair/Wheelchair Transfers Description:       FIM Walking Score:  2 - Max Assistance  Walking Description:  Extra time, Safety concerns, Verbal cueing, Supervision for safety, Walker, Requires incidental assist(FWW, CGA, 55 FT x4)    FIM Wheelchair Score:     Wheelchair Description:       FIM Stairs Score:  0 - Not tested,unsafe activity  Stairs Description:         Assessment    The patient always tries to do her best and remains motivated.  She is limited by compromised respiration.    Plan    Bed mobility and transfer training, car transfer training 12/6/2019, gait training as tolerated, therapeutic  exercise, safety education

## 2019-12-06 PROCEDURE — 99231 SBSQ HOSP IP/OBS SF/LOW 25: CPT | Performed by: PHYSICAL MEDICINE & REHABILITATION

## 2019-12-06 PROCEDURE — A9270 NON-COVERED ITEM OR SERVICE: HCPCS | Performed by: HOSPITALIST

## 2019-12-06 PROCEDURE — 94760 N-INVAS EAR/PLS OXIMETRY 1: CPT

## 2019-12-06 PROCEDURE — 97110 THERAPEUTIC EXERCISES: CPT

## 2019-12-06 PROCEDURE — A9270 NON-COVERED ITEM OR SERVICE: HCPCS | Performed by: PHYSICAL MEDICINE & REHABILITATION

## 2019-12-06 PROCEDURE — 770010 HCHG ROOM/CARE - REHAB SEMI PRIVAT*

## 2019-12-06 PROCEDURE — G0515 COGNITIVE SKILLS DEVELOPMENT: HCPCS

## 2019-12-06 PROCEDURE — 700102 HCHG RX REV CODE 250 W/ 637 OVERRIDE(OP): Performed by: HOSPITALIST

## 2019-12-06 PROCEDURE — 97535 SELF CARE MNGMENT TRAINING: CPT

## 2019-12-06 PROCEDURE — 700102 HCHG RX REV CODE 250 W/ 637 OVERRIDE(OP): Performed by: PHYSICAL MEDICINE & REHABILITATION

## 2019-12-06 PROCEDURE — 99232 SBSQ HOSP IP/OBS MODERATE 35: CPT | Performed by: HOSPITALIST

## 2019-12-06 PROCEDURE — 97530 THERAPEUTIC ACTIVITIES: CPT

## 2019-12-06 RX ADMIN — I-VITE, TAB 1000-60-2MG (60/BT) 1 TABLET: TAB at 08:38

## 2019-12-06 RX ADMIN — BUDESONIDE AND FORMOTEROL FUMARATE DIHYDRATE 2 PUFF: 160; 4.5 AEROSOL RESPIRATORY (INHALATION) at 19:48

## 2019-12-06 RX ADMIN — ASPIRIN 325 MG: 325 TABLET, COATED ORAL at 19:49

## 2019-12-06 RX ADMIN — SIMETHICONE CHEW TAB 80 MG 80 MG: 80 TABLET ORAL at 12:56

## 2019-12-06 RX ADMIN — BUDESONIDE AND FORMOTEROL FUMARATE DIHYDRATE 2 PUFF: 160; 4.5 AEROSOL RESPIRATORY (INHALATION) at 08:40

## 2019-12-06 RX ADMIN — ASPIRIN 325 MG: 325 TABLET, COATED ORAL at 08:38

## 2019-12-06 RX ADMIN — OMEPRAZOLE 20 MG: 20 CAPSULE, DELAYED RELEASE ORAL at 12:56

## 2019-12-06 RX ADMIN — ACETAMINOPHEN 1000 MG: 500 TABLET, FILM COATED ORAL at 08:38

## 2019-12-06 RX ADMIN — NYSTATIN: 100000 POWDER TOPICAL at 19:48

## 2019-12-06 RX ADMIN — NYSTATIN: 100000 POWDER TOPICAL at 08:39

## 2019-12-06 RX ADMIN — LOVASTATIN 40 MG: 20 TABLET ORAL at 19:49

## 2019-12-06 RX ADMIN — VITAMIN D, TAB 1000IU (100/BT) 1000 UNITS: 25 TAB at 08:38

## 2019-12-06 RX ADMIN — ACETAMINOPHEN 1000 MG: 500 TABLET, FILM COATED ORAL at 14:43

## 2019-12-06 RX ADMIN — ACETAMINOPHEN 1000 MG: 500 TABLET, FILM COATED ORAL at 19:49

## 2019-12-06 RX ADMIN — SIMETHICONE CHEW TAB 80 MG 80 MG: 80 TABLET ORAL at 17:39

## 2019-12-06 RX ADMIN — SIMETHICONE CHEW TAB 80 MG 80 MG: 80 TABLET ORAL at 08:38

## 2019-12-06 ASSESSMENT — ENCOUNTER SYMPTOMS
NAUSEA: 0
NERVOUS/ANXIOUS: 0
ABDOMINAL PAIN: 0
CHILLS: 0
SHORTNESS OF BREATH: 0
FEVER: 0
VOMITING: 0
DIARRHEA: 0

## 2019-12-06 NOTE — PROGRESS NOTES
Hospital Medicine Daily Progress Note      Chief Complaint:  Ileus  Low blood pressure    Interval History:  No significant events or changes since last visit    Review of Systems  Review of Systems   Constitutional: Negative for chills and fever.   Respiratory: Negative for shortness of breath.    Cardiovascular: Negative for chest pain.   Gastrointestinal: Negative for abdominal pain, diarrhea, nausea and vomiting.   Psychiatric/Behavioral: The patient is not nervous/anxious.         Physical Exam  Temp:  [36.3 °C (97.3 °F)-36.8 °C (98.2 °F)] 36.3 °C (97.3 °F)  Pulse:  [67-95] 67  Resp:  [16-20] 18  BP: (100-117)/(63-74) 100/63  SpO2:  [92 %-94 %] 94 %    Physical Exam  Vitals signs and nursing note reviewed.   Constitutional:       Appearance: Normal appearance.   HENT:      Head: Atraumatic.   Eyes:      Conjunctiva/sclera: Conjunctivae normal.      Pupils: Pupils are equal, round, and reactive to light.   Neck:      Musculoskeletal: Normal range of motion and neck supple.      Vascular: No JVD.   Cardiovascular:      Rate and Rhythm: Normal rate and regular rhythm.      Heart sounds: Normal heart sounds.   Pulmonary:      Effort: Pulmonary effort is normal.      Breath sounds: Normal breath sounds.   Abdominal:      General: Bowel sounds are normal.      Palpations: Abdomen is soft.   Skin:     General: Skin is warm and dry.   Neurological:      Mental Status: She is alert and oriented to person, place, and time.   Psychiatric:         Mood and Affect: Mood normal.         Behavior: Behavior normal.         Fluids  No intake or output data in the 24 hours ending 12/06/19 0957    Laboratory                        Assessment/Plan  Ileus (HCC)- (present on admission)  Assessment & Plan  Surgery notes reviewed, more likely post-op ileus than small bowel obstruction  Serial KUB shows resolution of distended bowel loops  Constipation resolved with increased bowel regimen -- having fairly reguilar BM's  On  Simethicone  Advanced diet as tolerated: GI soft diet (11/30) --> Regular diet (12/4)  Monitor    Dyslipidemia- (present on admission)  Assessment & Plan  On Mevacor    Hypotension  Assessment & Plan  BP ok but occ dips a little lower  Not on any hypertensive meds  ? 2nd to pain meds  Encouraging oral fluids  Monitor    Lung nodule- (present on admission)  Assessment & Plan  CT Abd/Pelvis 11/25/19 showed 6 mm RML pulmonary nodule  Needs close F/U given smoking history    COPD (chronic obstructive pulmonary disease) (HCC)- (present on admission)  Assessment & Plan  Sats ok on 2 liters O2 NC  CXR (12/4): retrocardiac opacity with air-fluid levels consistent with a large hernia                      interstitial prominence in the lung bases similar to the prior exam                      no significant pleural fluid or pneumothorax is identified                      no significant interval change  Note: was not on O2 at home but should have been 2nd to pt getting SOB on regular activities   Continue Symbicort and RT protocol    Hip pain, chronic, right- (present on admission)  Assessment & Plan  2nd to OA/DJD  S/P elective right total hip arthroplasty on 11/21/19 by Dr. Delgado    Vitamin D deficiency- (present on admission)  Assessment & Plan  Vit D: 21  On supplements    Gastroesophageal reflux disease- (present on admission)  Assessment & Plan  2nd to hiatal hernia  On Prilosec    Anemia- (present on admission)  Assessment & Plan  H&H stable with Hb 11.0 (12/1)  Monitor

## 2019-12-06 NOTE — FLOWSHEET NOTE
12/05/19 1618   Events/Summary/Plan   Events/Summary/Plan O2 spot check   Chest Exam   Respiration 16   Pulse 80   Oximetry   #Pulse Oximetry (Single Determination) Yes   Oxygen   Home O2 Use Prior To Admission? No   Pulse Oximetry 92 %   O2 (LPM) 2   O2 Daily Delivery Respiratory  Silicone Nasal Cannula

## 2019-12-06 NOTE — REHAB-PHARMACY IDT TEAM NOTE
Pharmacy   Pharmacy  Antibiotics/Antifungals/Antivirals:  Medication:      Active Orders (From admission, onward)    None        Route:        NA  Stop Date:  NA  Reason:      NA  Antihypertensives/Cardiac:  Medication:    Orders (72h ago, onward)     Start     Ordered    11/27/19 2100  lovastatin (MEVACOR) tablet 40 mg  EVERY BEDTIME      11/27/19 1312              Patient Vitals for the past 24 hrs:   BP Pulse   12/06/19 1051 -- 88   12/06/19 0700 100/63 67   12/05/19 1853 117/74 95   12/05/19 1618 -- 80   12/05/19 1427 100/72 72     Anticoagulation:  Medication: Aspirin      Other key medications: A review of the medication list reveals no issues at this time.    Section completed by: Isidro Venegas [facility]

## 2019-12-06 NOTE — THERAPY
Speech Language Pathology  Daily Treatment     Patient Name: Guillermina Recio  Age:  79 y.o., Sex:  female  Medical Record #: [id number]  Today's Date: 12/5/2019     Subjective    Patient was pleasant and cooperative.  Her friend accompanied her.     Objective       12/05/19 1401   Cognition   Functional Problem Solving Minimal (4)   Safety Awareness Minimal (4)   Insight into Deficits Minimal (4)   SLP Total Time Spent   SLP Individual Total Time Spent (Mins) 30   Charge Group   SLP Cognitive Skill Development 2           Assessment    Patient reported that she believes she will go home using walker. Patient participated in safety planning and problem solving for safety in ADLS with use of walker.  Patient needed min A for safety insight and planning for walker.  Discussed problem solving for bending and reaching with walker ( perhaps better to do seated).  How to move and carry some items.  ( obtain a walker basket, covered cup, a rectangular container to move food in basket)   Patient did say that she wants to be able to heat liquids in a mug in microwave.   Educated on difficulty and safety risks of walking with walker while holding a mug filled with hot fluid.  Friend stated that she has help with medications until they can set up pill packs.    Plan    Target safety problem solving.

## 2019-12-06 NOTE — THERAPY
"Occupational Therapy  Daily Treatment     Patient Name: Guillermina Recio  Age:  79 y.o., Sex:  female  Medical Record #: [id number]  Today's Date: 2019     Precautions  Precautions: (P) Posterior Hip Precautions, Fall Risk  Comments: (P) Impaired vision, COPD    Safety   ADL Safety : (P) Requires Supervision for Safety, Requires Cueing for Safety  Bathroom Safety: (P) Requires Supervision for Safety, Requires Cuing for Safety  Comments: (P) requires cues for initiation and strategy when using AE for LB dressing for adherence to posterior hip precautions. Requires increased time to manage tasks due to visual deficits    Subjective    \"I don't want to be dependent on this machine! I hate being weak\" -pt frustrated with continued need for supplemental O2     Objective       19 0701   Precautions   Precautions Posterior Hip Precautions;Fall Risk   Comments Impaired vision, COPD   Safety    ADL Safety  Requires Supervision for Safety;Requires Cueing for Safety   Bathroom Safety Requires Supervision for Safety;Requires Cuing for Safety   Comments requires cues for initiation and strategy when using AE for LB dressing for adherence to posterior hip precautions. Requires increased time to manage tasks due to visual deficits   Vitals   Room Air Oximetry 88   O2 (LPM) 0   O2 Delivery None (Room Air)   Vitals Comments room air trial during ADLs, pt consistently dropping to 88-89, can recover with rest breaks and cues for PLB without O2 on. O2 replaced at end of session on 2L    OT Total Time Spent   OT Individual Total Time Spent (Mins) 60   OT Charge Group   OT Self Care / ADL 4       FIM Grooming Score:  6 - Modified Independent  Grooming Description:  Increased time    FIM Upper Body Dressin - Standby Prompting/Supervision or Set-up  Upper Body Dressing Description:  Set-up of equipment    FIM Lower Body Dressing Score:  5 - Standby Prompting/Supervsion or Set-up  Lower Body Dressing Description:  Increased " time, Set-up of equipment, Dressing stick, Sock aid, Reacher, Verbal cueing(consistent verbal cues for strategy when using reacher + dressing stick for threading pants, able to use sock aid with no cues, use of FWW as needed for safety during standing)    FIM Toiletin - Standby Prompting/Supervision or Set-up  Toileting Description:  Supervision for safety, Increased time, Grab bar    FIM Toilet Transfer Score:  5 - Standby Prompting/Supervision or Set-up  Toilet Transfer Description:  (FWW to/from toilet)      Assessment    Pt seen for continued training on use of AE during dressing for adherence to posterior hip precautions. She continues to require verbal cues for initiation of AE use as she tends to bend forward reflexively, once AE use is initiated she is able to complete with supervision, requires verbal cues for strategy when using reacher/dressing stick to doff/don pants and underwear. Pt trialed on room air during ADLs, consistently dropped to 88-89 O2 levels, able to recover to 92-94 with rest and cues for pursed lip breathing. O2 replaced at end of session as pt unable to maintain levels in 90s without verbal cues for breathing pattern.     Plan    Integration of posterior hip precautions into ADLs, IADLs. Cont overall strength/endurance, standing balance/tolerance for ADL's and functional mobility. Management of  deficits. Shower/ADL routine Saturday

## 2019-12-06 NOTE — THERAPY
12/06/19 1359   Precautions   Precautions Posterior Hip Precautions;Fall Risk;Other (See Comments)   Comments Impaired vision, COPD   Pain 0 - 10 Group   Location Hip;Leg   Location Orientation Right;Lateral   Therapist Pain Assessment 3   Cognition    Level of Consciousness Alert   PT Total Time Spent   PT Individual Total Time Spent (Mins) 60   PT Charge Group   PT Therapeutic Activities 4   Physical Therapy   Daily Treatment     Patient Name: Guillermina Recio  Age:  79 y.o., Sex:  female  Medical Record #: [id number]  Today's Date: 12/6/2019     Precautions  Precautions: Posterior Hip Precautions, Fall Risk, Other (See Comments)  Comments: Impaired vision, COPD    Subjective    The patient agreed to PT to do family training with her daughter and a caregiver/friend     Objective    Caregiver training was completed for transfer training bed to/from chair, gait training, car transfer for going to [facility] on Sunday with her daughter and friend, up/down a curb/platform, wheelchair management activities        Assessment    The patient's daughter and caregiver/friend participated in training which covered the above-mentioned activities and how to keep the patient safe when going on an outing Sunday to [facility].  They demonstrated appropriate skills to assist the patient and to keep her safe.    Plan    Safety education, therapeutic exercise, reinforce posterior hip precautions, therapeutic exercise, gait training, transfer training

## 2019-12-06 NOTE — THERAPY
12/05/19 1359   Precautions   Precautions Posterior Hip Precautions;Fall Risk   Comments Impaired vision, COPD   Pain 0 - 10 Group   Location Hip;Leg   Location Orientation Right;Lateral   Therapist Pain Assessment 3;4;During Activity   Sitting Lower Body Exercises   Ankle Pumps 2 sets of 15;Bilateral   Hip Flexion 2 sets of 15;Bilateral   Hip Abduction 2 sets of 15;Bilateral   Hip Adduction 2 sets of 15;Bilateral   Long Arc Quad 2 sets of 15;Bilateral   Hamstring Curl 2 sets of 15;Bilateral   Neuro-Muscular Treatments   Neuro-Muscular Treatments Sequencing;Verbal Cuing   Comments Sequencing sit to/from stand and sequencing gait with a fww   PT Total Time Spent   PT Individual Total Time Spent (Mins) 60   PT Charge Group   PT Gait Training 2   PT Therapeutic Exercise 2   Physical Therapy   Daily Treatment     Patient Name: Guillermina Recio  Age:  79 y.o., Sex:  female  Medical Record #: [id number]  Today's Date: 12/5/2019     Precautions  Precautions: Posterior Hip Precautions, Fall Risk  Comments: Impaired vision, COPD    Subjective    The patient was up in her chair and she was visiting with a good friend.     Objective    The patient participated in gait training using a fww and requiring CGA.  She tolerated 55 FT x4 and was limited by shortness of breath.  She also participated in seated bilateral lower extremity therapeutic exercise 2 sets of 15 as indicated above.        Assessment    The patient was in an exceptionally good mood this afternoon due to the visit from her long-term friend.  She is making progress in mobility, transfers, gait.  Primary limiting factor is shortness of breath due to COPD.    Plan    Continue therapeutic exercise, gait training as tolerated, up/down 4-6 stairs, transfer training

## 2019-12-06 NOTE — CARE PLAN
Problem: Safety  Goal: Will remain free from injury  Outcome: PROGRESSING AS EXPECTED  Patient weak/unsteady, assisted with transfers to prevent falls.     Problem: Infection  Goal: Will remain free from infection  Outcome: PROGRESSING AS EXPECTED  Patient's left hip incision derma bonded with no signs of infection, will continue to monitor for any changes.

## 2019-12-06 NOTE — THERAPY
Speech Language Pathology  Daily Treatment     Patient Name: Guillermina Recio  Age:  79 y.o., Sex:  female  Medical Record #: [id number]  Today's Date: 12/6/2019     Subjective    Pt was pleasant and cooperative during this ST session, pt's daughter and friend Cathy were present for family training.      Objective       12/06/19 1401   SLP Total Time Spent   SLP Individual Total Time Spent (Mins) 30   Charge Group   SLP Cognitive Skill Development 2       Assessment    Family training completed with pt's daughter who will be helping pt until the beginning of January and pt's friend Cathy who will be able to help pt when her daughter leaves.  Discussed that they are not able to sign up for [facility] pill packs until after she discharges home and it will take between 2-4 weeks to process.  Pt's daughter and her friend reported that they will be able to organize pt's medications in her pill boxes and label them with colors so pt is able to identify the days and times.  Pt's friend Cathy said she would be able to sign pt up for the pill packets when she is discharged.  Pt's friend mentioned that pt will be gone from 9 am to around 4 pm this Sunday on a day pass, they were then informed that the day pass could be no longer than 4 hours long and they would need to fill out a form with their nurse before they leave on Sunday.  Pt and pt's friend were in agreement and assured this therapist that they would not be gone for longer than 4 hours.      Plan    Continue targeting functional memory and problem solving

## 2019-12-06 NOTE — PROGRESS NOTES
"Rehab Progress Note     Date of Service: 12/6/2019  Chief Complaint: follow up hip replacement and small bowel obstruction    Interval Events (Subjective)    Patient seen and examined today just prior to her therapy session.  She does want to do a therapeutic outing on Sunday to go to [employer] and go to a potluck dinner for her .  Her caregiver will be here tomorrow for family training and car transfer training.  Patient currently has no new complaints.    Objective:  VITAL SIGNS: /63   Pulse 88   Temp 36.3 °C (97.3 °F) (Temporal)   Resp 16   Ht 1.473 m (4' 10\")   Wt 66.7 kg (147 lb 0.8 oz)   SpO2 95%   BMI 30.73 kg/m²    Gen: alert, no apparent distress  CV: regular rate and rhythm, no murmurs, no peripheral edema  Resp: clear to ascultation bilaterally, normal respiratory effort, on O2  GI: soft, non-tender abdomen, bowel sounds present  Neuro: Chronic visual impairment      No results found for this or any previous visit (from the past 72 hour(s)).    Current Facility-Administered Medications   Medication Frequency   • OCUVITE-LUTEIN tablet 1 tablet DAILY   • aspirin EC (ECOTRIN) tablet 325 mg BID   • budesonide-formoterol (SYMBICORT) 160-4.5 MCG/ACT inhaler 2 Puff BID (RT)   • simethicone (MYLICON) chewable tab 80 mg TID WITH MEALS   • senna-docusate (PERICOLACE or SENOKOT S) 8.6-50 MG per tablet 2 Tab BID PRN    And   • polyethylene glycol/lytes (MIRALAX) PACKET 1 Packet QDAY PRN    And   • magnesium hydroxide (MILK OF MAGNESIA) suspension 30 mL QDAY PRN    And   • bisacodyl (DULCOLAX) suppository 10 mg QDAY PRN   • vitamin D (cholecalciferol) 1000 UNIT tablet 1,000 Units DAILY   • Respiratory Care per Protocol Continuous RT   • Pharmacy Consult Request ...Pain Management Review 1 Each PHARMACY TO DOSE   • artificial tears ophthalmic solution 1 Drop PRN   • benzocaine-menthol (CEPACOL) lozenge 1 Lozenge Q2HRS PRN   • mag hydrox-al hydrox-simeth (MAALOX PLUS ES or MYLANTA DS) suspension 20 mL " Q2HRS PRN   • ondansetron (ZOFRAN ODT) dispertab 4 mg 4X/DAY PRN    Or   • ondansetron (ZOFRAN) syringe/vial injection 4 mg 4X/DAY PRN   • traZODone (DESYREL) tablet 50 mg QHS PRN   • sodium chloride (OCEAN) 0.65 % nasal spray 2 Spray PRN   • melatonin tablet 3 mg HS PRN   • lactulose 20 GM/30ML solution 30 mL QDAY PRN   • fleet enema 133 mL QDAY PRN   • calcium carbonate (TUMS) chewable tab 500 mg QDAY PRN   • lovastatin (MEVACOR) tablet 40 mg QHS   • nystatin (MYCOSTATIN) powder BID   • omeprazole (PRILOSEC) capsule 20 mg QDAY   • acetaminophen (TYLENOL) tablet 1,000 mg TID   • tramadol (ULTRAM) 50 MG tablet 50 mg Q4HRS PRN   • oxyCODONE immediate-release (ROXICODONE) tablet 5 mg Q4HRS PRN       Orders Placed This Encounter   Procedures   • Diet Order Regular     Standing Status:   Standing     Number of Occurrences:   1     Order Specific Question:   Diet:     Answer:   Regular [1]     Order Specific Question:   Consistency/Fluid modifications:     Answer:   Thin Liquids [3]       Assessment:  Active Hospital Problems    Diagnosis   • *Status post right hip replacement   • Ileus (HCC)   • Obesity (BMI 30-39.9)   • Dyslipidemia   • HTN (hypertension)   • Gastroesophageal reflux disease   • Vitamin D deficiency   • Anemia   • Azotemia   • Lung nodule   • Impaired mobility and ADLs   • COPD (chronic obstructive pulmonary disease) (HCC)   • Hip pain, chronic, right     This patient is a 79 y.o. female admitted for acute inpatient rehabilitation with Status post right hip replacement.    I led and attended the weekly conference, and agree with the IDT conference documentation and plan of care as noted below.    Date of conference: 12/2/2019    Goals and barriers: See IDT note.    Biggest barriers: bowel and bladder incontinence, limited endurance, difficult with weight bearing, fearful and pain, respiratory insufficiency, visual deficits, short term memory deficits    Goals: community outing  - patient wants to attend  an event at her [employer] -plan for this Sunday after caregiver and car transfer training    Decrease SLP to 30 min, share the other 30 with PT/OT    CM/social support: lives alone, friend with intermittent support    Anticipated DC date: 12/10/2019    Home health: PT/OT/SLP/RN    Equip: [facility]    Follow up: PCP, surgery, [facility]    Medical Decision Making and Plan:    S/p Right hip replacement  11/21 Dr. Delgado  Continue full rehab program  PT/OT/SLP, 1 hr each discipline, 5 days per week  Posterior hip precautions  Outpatient follow up for suture/dressing removal per surgeon    Pain Management  Scheduled tylenol  PRN tramadol  Pain currently well controlled    Bowel  SBO, see below  Meds as needed  Last BM 12/5    Bladder  Check PVRs - 17, 11, 50  ICP for over 400 cc  Scheduled toileting    DVT prophylaxis  ASA BID per surgery    Appreciate the assistance of the hospitalist with her medical comorbidities :     Small bowel obstruction, resolved, diet advanced  Large hiatal hernia  GERD  Hypertension, off all BP meds  Hypotension, improved  COPD, started on Symbicort, respiratory therapy to attempt titration, was not on O2 at home  Respiratory insufficiency  Hyperlipidemia  Azotemia, resolved  Anemia, stable  Vit D deficiency, on supplementation  Lung nodule, outpatient follow up    Total time:  16 minutes.  I spent greater than 50% of the time for patient care, counseling, and coordination on this date, including patient face-to face time, unit/floor time with review of records/pertinent lab data and studies, as well as discussing diagnostic evaluation/work up, planned therapeutic interventions, and future disposition of care, as per the interval events/subjective and the assessment and plan as noted above.    I have performed a physical exam, reviewed and updated ROS, as well as the assessment and plan today 12/6/2019. In review of note from 12/5/2019 there are no new changes except as documented  above.    Salma Tejada M.D.   Physical Medicine and Rehabilitation

## 2019-12-06 NOTE — CARE PLAN
Problem: Safety  Goal: Will remain free from injury  Note:   Patient educated on importance of utilizing call light to minimize the potential of injury from falls. Patient verbalizes understanding.       Problem: Infection  Goal: Will remain free from infection  Note:   Education reinforced to wash hands thoroughly after using the restroom, prior to eating and throughout the day to minimize the potential of acquiring germs while in a facility setting. Patient engaged in conversation and verbalizes understanding. Patient forgetful at times.

## 2019-12-06 NOTE — THERAPY
Occupational Therapy  Daily Treatment     Patient Name: Guillermina Recio  Age:  79 y.o., Sex:  female  Medical Record #: [id number]  Today's Date: 2019     Precautions  Precautions: Posterior Hip Precautions, Fall Risk  Comments: Impared vision, COPD    Safety   ADL Safety : Requires Supervision for Safety, Requires Cueing for Safety  Bathroom Safety: Requires Supervision for Safety, Requires Cuing for Safety  Comments: requires cues for initiation and strategy when using AE for LB dressing for adherence to posterior hip precautions. Requires increased time to manage tasks due to visual deficits    Subjective    Patient agreeable to participate in OT.      Objective       19 0931   Precautions   Precautions Posterior Hip Precautions;Fall Risk   Comments Impared vision, COPD   Sitting Upper Body Exercises   Upper Extremity Bike Level 1 Resistance  (10 mins (motomed))   Interdisciplinary Plan of Care Collaboration   Patient Position at End of Therapy Seated;Self Releasing Lap Belt Applied;Call Light within Reach   OT Total Time Spent   OT Individual Total Time Spent (Mins) 30   OT Charge Group   OT Self Care / ADL 1   OT Therapeutic Exercise  1       FIM Toiletin - Standby Prompting/Supervision or Set-up  Toileting Description:  Grab bar, Increased time, Supervision for safety, Verbal cueing    FIM Toilet Transfer Score:  5 - Standby Prompting/Supervision or Set-up  Toilet Transfer Description:  Increased time, Grab bar, Supervision for safety, Verbal cueing    Pt on 2L of O2 throughout session.     Assessment    Patient tolerated OT session well with focus on ADLs (toileting) and endurance. Patient benefits from rest breaks throughout session. Functional performance primarily limited by poor endurance.     Plan  Integration of posterior hip precautions into ADLs, IADLs. Cont overall strength/endurance, standing balance/tolerance for ADL's and functional mobility. Management of  deficits. Shower/ADL  routine Saturday. Therapeutic outing Sunday.

## 2019-12-06 NOTE — DISCHARGE PLANNING
Per Unique at [facility], DME referral accepted.  Per Sasha at [facility], referral accepted.

## 2019-12-07 PROBLEM — E87.5 HIGH POTASSIUM: Status: ACTIVE | Noted: 2019-12-07

## 2019-12-07 LAB — POTASSIUM SERPL-SCNC: 4.5 MMOL/L (ref 3.6–5.5)

## 2019-12-07 PROCEDURE — 84132 ASSAY OF SERUM POTASSIUM: CPT

## 2019-12-07 PROCEDURE — 99232 SBSQ HOSP IP/OBS MODERATE 35: CPT | Performed by: HOSPITALIST

## 2019-12-07 PROCEDURE — 700102 HCHG RX REV CODE 250 W/ 637 OVERRIDE(OP): Performed by: PHYSICAL MEDICINE & REHABILITATION

## 2019-12-07 PROCEDURE — 97110 THERAPEUTIC EXERCISES: CPT

## 2019-12-07 PROCEDURE — A9270 NON-COVERED ITEM OR SERVICE: HCPCS | Performed by: HOSPITALIST

## 2019-12-07 PROCEDURE — 97530 THERAPEUTIC ACTIVITIES: CPT

## 2019-12-07 PROCEDURE — 36415 COLL VENOUS BLD VENIPUNCTURE: CPT

## 2019-12-07 PROCEDURE — A9270 NON-COVERED ITEM OR SERVICE: HCPCS | Performed by: PHYSICAL MEDICINE & REHABILITATION

## 2019-12-07 PROCEDURE — 700102 HCHG RX REV CODE 250 W/ 637 OVERRIDE(OP): Performed by: HOSPITALIST

## 2019-12-07 PROCEDURE — 97535 SELF CARE MNGMENT TRAINING: CPT

## 2019-12-07 PROCEDURE — 770010 HCHG ROOM/CARE - REHAB SEMI PRIVAT*

## 2019-12-07 PROCEDURE — G0515 COGNITIVE SKILLS DEVELOPMENT: HCPCS

## 2019-12-07 PROCEDURE — 97116 GAIT TRAINING THERAPY: CPT

## 2019-12-07 PROCEDURE — 99231 SBSQ HOSP IP/OBS SF/LOW 25: CPT | Performed by: PHYSICAL MEDICINE & REHABILITATION

## 2019-12-07 RX ADMIN — BUDESONIDE AND FORMOTEROL FUMARATE DIHYDRATE 2 PUFF: 160; 4.5 AEROSOL RESPIRATORY (INHALATION) at 08:35

## 2019-12-07 RX ADMIN — I-VITE, TAB 1000-60-2MG (60/BT) 1 TABLET: TAB at 08:32

## 2019-12-07 RX ADMIN — LOVASTATIN 40 MG: 20 TABLET ORAL at 19:51

## 2019-12-07 RX ADMIN — ASPIRIN 325 MG: 325 TABLET, COATED ORAL at 19:51

## 2019-12-07 RX ADMIN — OMEPRAZOLE 20 MG: 20 CAPSULE, DELAYED RELEASE ORAL at 11:56

## 2019-12-07 RX ADMIN — SIMETHICONE CHEW TAB 80 MG 80 MG: 80 TABLET ORAL at 08:32

## 2019-12-07 RX ADMIN — SIMETHICONE CHEW TAB 80 MG 80 MG: 80 TABLET ORAL at 11:56

## 2019-12-07 RX ADMIN — TRAMADOL HYDROCHLORIDE 50 MG: 50 TABLET, COATED ORAL at 23:48

## 2019-12-07 RX ADMIN — NYSTATIN: 100000 POWDER TOPICAL at 19:50

## 2019-12-07 RX ADMIN — BUDESONIDE AND FORMOTEROL FUMARATE DIHYDRATE 2 PUFF: 160; 4.5 AEROSOL RESPIRATORY (INHALATION) at 19:50

## 2019-12-07 RX ADMIN — ASPIRIN 325 MG: 325 TABLET, COATED ORAL at 08:32

## 2019-12-07 RX ADMIN — VITAMIN D, TAB 1000IU (100/BT) 1000 UNITS: 25 TAB at 08:32

## 2019-12-07 RX ADMIN — NYSTATIN: 100000 POWDER TOPICAL at 08:35

## 2019-12-07 RX ADMIN — ACETAMINOPHEN 1000 MG: 500 TABLET, FILM COATED ORAL at 08:32

## 2019-12-07 RX ADMIN — ACETAMINOPHEN 1000 MG: 500 TABLET, FILM COATED ORAL at 14:42

## 2019-12-07 RX ADMIN — ACETAMINOPHEN 1000 MG: 500 TABLET, FILM COATED ORAL at 19:51

## 2019-12-07 RX ADMIN — OXYCODONE HYDROCHLORIDE 5 MG: 5 TABLET ORAL at 08:35

## 2019-12-07 RX ADMIN — SIMETHICONE CHEW TAB 80 MG 80 MG: 80 TABLET ORAL at 17:40

## 2019-12-07 RX ADMIN — MELATONIN 3 MG: at 19:51

## 2019-12-07 ASSESSMENT — ENCOUNTER SYMPTOMS
BLURRED VISION: 0
HALLUCINATIONS: 0
DIZZINESS: 0
NAUSEA: 0
SHORTNESS OF BREATH: 0
FEVER: 0
PALPITATIONS: 0
VOMITING: 0
HEADACHES: 0

## 2019-12-07 NOTE — PROGRESS NOTES
Hospital Medicine Daily Progress Note      Chief Complaint:  Ileus  Low blood pressure    Interval History:  No significant events or changes since last visit    Review of Systems  Review of Systems   Constitutional: Negative for fever.   Eyes: Negative for blurred vision.   Respiratory: Negative for shortness of breath.    Cardiovascular: Negative for palpitations.   Gastrointestinal: Negative for nausea and vomiting.   Neurological: Negative for dizziness and headaches.   Psychiatric/Behavioral: Negative for hallucinations.        Physical Exam  Temp:  [36.5 °C (97.7 °F)-36.9 °C (98.5 °F)] 36.5 °C (97.7 °F)  Pulse:  [66-90] 66  Resp:  [16-20] 16  BP: ()/(56-67) 98/58  SpO2:  [94 %-95 %] 94 %    Physical Exam  Vitals signs and nursing note reviewed.   Constitutional:       General: She is not in acute distress.  HENT:      Mouth/Throat:      Mouth: Mucous membranes are moist.      Pharynx: Oropharynx is clear.   Eyes:      General: No scleral icterus.  Neck:      Musculoskeletal: No neck rigidity.      Vascular: No JVD.   Cardiovascular:      Rate and Rhythm: Normal rate and regular rhythm.      Heart sounds: Normal heart sounds.   Pulmonary:      Effort: Pulmonary effort is normal.      Breath sounds: No wheezing or rales.   Abdominal:      General: Bowel sounds are normal.      Palpations: Abdomen is soft.   Skin:     General: Skin is warm and dry.   Neurological:      Mental Status: She is alert and oriented to person, place, and time.   Psychiatric:         Mood and Affect: Mood normal.         Behavior: Behavior normal.         Fluids    Intake/Output Summary (Last 24 hours) at 12/7/2019 1045  Last data filed at 12/6/2019 1900  Gross per 24 hour   Intake 320 ml   Output --   Net 320 ml       Laboratory      Recent Labs     12/07/19  0539   POTASSIUM 4.5                   Assessment/Plan  Ileus (HCC)- (present on admission)  Assessment & Plan  Surgery notes reviewed, more likely post-op ileus than small  bowel obstruction  Serial KUB shows resolution of distended bowel loops  Constipation resolved with increased bowel regimen -- having fairly regular BM's  On Simethicone  Diet advanced to a 'Regular' diet and tolerating (12/4)  Monitor    Dyslipidemia- (present on admission)  Assessment & Plan  On Mevacor    High potassium  Assessment & Plan  Currently resolved  K+ 5.1 --> 4.7 --> 4.5 (12/7)    Hypotension  Assessment & Plan  BP low normal  Not on any hypertensive meds  ? 2nd to pain meds  Encouraging oral fluids  Monitor    Lung nodule- (present on admission)  Assessment & Plan  CT Abd/Pelvis 11/25/19 showed 6 mm RML pulmonary nodule  Needs close F/U given smoking history    COPD (chronic obstructive pulmonary disease) (HCC)- (present on admission)  Assessment & Plan  Sats ok on 2 liters O2 NC  CXR (12/4): retrocardiac opacity with air-fluid levels consistent with a large hernia                      interstitial prominence in the lung bases similar to the prior exam                      no significant pleural fluid or pneumothorax is identified                      no significant interval change  Note: was not on O2 at home but should have been 2nd to pt getting SOB on regular activities   Continue Symbicort and RT protocol    Hip pain, chronic, right- (present on admission)  Assessment & Plan  2nd to OA/DJD  S/P elective right total hip arthroplasty on 11/21/19 by Dr. Delgado    Vitamin D deficiency- (present on admission)  Assessment & Plan  Vit D: 21  On supplements    Gastroesophageal reflux disease- (present on admission)  Assessment & Plan  2nd to hiatal hernia  On Prilosec    Anemia- (present on admission)  Assessment & Plan  H&H stable with Hb 11.0 (12/1)  Monitor

## 2019-12-07 NOTE — THERAPY
"Occupational Therapy  Daily Treatment     Patient Name: Guillermina Recio  Age:  79 y.o., Sex:  female  Medical Record #: [id number]  Today's Date: 2019     Precautions  Precautions: (P) Posterior Hip Precautions, Fall Risk, Other (See Comments)  Comments: (P) Impaired vision, COPD    Safety   ADL Safety : Requires Supervision for Safety  Bathroom Safety: Requires Supervision for Safety, Requires Cuing for Safety  Comments: requires cues for initiation and strategy when using AE for LB dressing for adherence to posterior hip precautions. Requires increased time to manage tasks due to visual deficits    Subjective    \"If you try I'll punch you.\"  Stated when offered assist with toileting      Objective       19 1301   Precautions   Precautions Posterior Hip Precautions;Fall Risk;Other (See Comments)   Comments Impaired vision, COPD   Sitting Upper Body Exercises   Upper Extremity Bike Level 1 Resistance  (x 5 minutes x 2)   Interdisciplinary Plan of Care Collaboration   IDT Collaboration with  Nursing   Patient Position at End of Therapy Seated;Call Light within Reach;Tray Table within Reach;Family / Friend in Room   Collaboration Comments nurse Griselda assisted with toilet and transfer as patient prefers female caregiver.    OT Total Time Spent   OT Individual Total Time Spent (Mins) 30   OT Charge Group   OT Self Care / ADL 1   OT Therapeutic Exercise  1       FIM Toiletin - Standby Prompting/Supervision or Set-up  Toileting Description:  (observed female caregiver assist per pt request )    FIM Toilet Transfer Score:  5 - Standby Prompting/Supervision or Set-up  Toilet Transfer Description:  Grab bar(observed female caregiver assist per pt reques)      Assessment   completed tx no complaints     Plan    Integration of posterior hip precautions into ADLs, IADLs. Cont overall strength/endurance, standing balance/tolerance for ADL's and functional mobility. Management of  deficits.    "

## 2019-12-07 NOTE — THERAPY
"Occupational Therapy  Daily Treatment     Patient Name: Guillermina Recio  Age:  79 y.o., Sex:  female  Medical Record #: [id number]  Today's Date: 2019     Precautions  Precautions: Posterior Hip Precautions, Fall Risk, Other (See Comments)  Comments: Impaired vision, COPD    Safety   ADL Safety : (P) Requires Supervision for Safety  Bathroom Safety: Requires Supervision for Safety, Requires Cuing for Safety  Comments: requires cues for initiation and strategy when using AE for LB dressing for adherence to posterior hip precautions. Requires increased time to manage tasks due to visual deficits    Subjective    \"I'm going to [employer] tomorrow. They will bring that grey belt with to help me.\"     Objective       19 0901   Safety    ADL Safety  Requires Supervision for Safety   Cognition    Orientation Level Oriented x 4   Level of Consciousness Alert   Balance   Sitting Balance (Static) Good   Sitting Balance (Dynamic) Fair +   Standing Balance (Static) Fair   Standing Balance (Dynamic) Fair -   Interdisciplinary Plan of Care Collaboration   IDT Collaboration with  Certified Nursing Assistant   Patient Position at End of Therapy Seated;Other (Comments)  (With CNA completing hair care)   Collaboration Comments CLOF and CNA assisted pt with hair care   OT Total Time Spent   OT Individual Total Time Spent (Mins) 60   OT Charge Group   OT Self Care / ADL 4     RN also changed pt's bandage during session.    FIM Eating Score:  5 - Standby Prompting/Supervision or Set-up  Eating Description:       FIM Grooming Score:  6 - Modified Independent  Grooming Description:       FIM Bathing Score:  5 - Standby Prompting/Supervision or Set-up  Bathing Description:       FIM Upper Body Dressin - Modified Independent  Upper Body Dressing Description:       FIM Lower Body Dressing Score:  5 - Standby Prompting/Supervsion or Set-up  Lower Body Dressing Description:  Increased time, Set-up of equipment(grab bar)    FIM " Toileting Body Dressin - Standby Prompting/Supervision or Set-up  Toileting Description:  Grab bar, Increased time, Supervision for safety    FIM Bed/Chair/Wheelchair Transfers Score: 5 - Standby Prompting/Supervision or Set-up  Bed/Chair/Wheelchair Transfers Description:  Increased time, Supervision for safety    FIM Toilet Transfer Score:  5 - Standby Prompting/Supervision or Set-up  Toilet Transfer Description:  Grab bar, Supervision for safety    FIM Tub/Shower Transfers Score:  5 - Standby Prompting/Supervision or Set-up  Tub/Shower Transfers Description:  Grab bar, Increased time, Supervision for safety      Assessment    Pt with bright affect. Pt progressing with transfers, managing her oxygen line during shower, and standing tolerance this date.     Plan    Integration of posterior hip precautions into ADLs, IADLs. Cont overall strength/endurance, standing balance/tolerance for ADL's and functional mobility. Management of  deficits. Therapeutic outing .

## 2019-12-07 NOTE — THERAPY
Speech Language Pathology  Daily Treatment     Patient Name: Guillermina Recio  Age:  79 y.o., Sex:  female  Medical Record #: [id number]  Today's Date: 12/7/2019     Subjective    Pt pleasant and cooperative. Dtr present and supportive during second half of session.      Objective       12/07/19 1101   Cognition   Functional Problem Solving Minimal (4)   Safety Awareness Supervision (5)   Insight into Deficits Minimal (4)   Self Monitoring Minimal (4)   Interdisciplinary Plan of Care Collaboration   IDT Collaboration with  Certified Nursing Assistant   Patient Position at End of Therapy Seated;Chair Alarm On;Family / Friend in Room  (02 plugged back into wall)   Collaboration Comments Pt's problem solving skills.   SLP Total Time Spent   SLP Individual Total Time Spent (Mins) 30   Charge Group   SLP Cognitive Skill Development 2       FIM Comprehension Score:  6 - Modified Independent  Comprehension Description:  Glasses    FIM Expression Score:  7 - Independent  Expression Description:       FIM Social Interaction Score:  5 - Stand-by Prompting/Supervision or Set-up  Social Interaction Description:  Verbal cues, Schedule    FIM Problem Solving Score:  5 - Standby Prompting/Supervision or Set-up  Problem Solving Description:  Verbal cueing    FIM Memory Score:  5 - Standby Prompting/Supervision or Set-up  Memory Description:  Verbal cueing, Therapy schedule      Assessment    Pt completed problem solving task while completing ADL (setting last couple curlers) with 90% acc and MIN cues. Pt struggling with problems related to medication (e.g., what would you do if your insurance suddenly stopped paying for a medication). Pt able to attend to problem solving task w/ SPV cues.  Pt able to generate list of safety equipment that she currently owns and what she will need.     Plan    Continue problem solving.

## 2019-12-07 NOTE — CARE PLAN
Problem: Venous Thromboembolism (VTW)/Deep Vein Thrombosis (DVT) Prevention:  Goal: Patient will participate in Venous Thrombosis (VTE)/Deep Vein Thrombosis (DVT)Prevention Measures  Outcome: PROGRESSING AS EXPECTED  Flowsheets (Taken 12/7/2019 1030)  Pharmacologic Prophylaxis Used: -- (ASA)  Note:   Pt is on ASA only for DVT prophylaxis. No s/s of DVT or edema; will continue to monitor.     Problem: Respiratory:  Goal: Respiratory status will improve  Outcome: PROGRESSING AS EXPECTED  Note:   Pt is currently weaning down from 2-3 LPM supplemental O2 as needed. Pt did not use home oxygen. No s/s of major respiratory depress; pt has cough and diminished breath sounds in the lower lobes. Will continue to monitor.

## 2019-12-07 NOTE — CARE PLAN
Problem: Infection  Goal: Will remain free from infection  Note:   Education reinforced to wash hands thoroughly after using the restroom, prior to eating and throughout the day to minimize the potential of acquiring germs while in a facility setting. Patient engaged in conversation and verbalizes understanding.       Problem: Skin Integrity  Goal: Risk for impaired skin integrity will decrease  Note:   Patient educated to change positions to minimize the potential of skin break down and possible further complications of open wounds due to pressure, when sitting or laying for periods over half of an hour. Patient engaged in education and verbalizes understanding.

## 2019-12-07 NOTE — PROGRESS NOTES
"Rehab Progress Note     Date of Service: 12/7/2019  Chief Complaint: follow up hip replacement and small bowel obstruction    Interval Events (Subjective)    She is seen and examined today in her room.  Her caregiver is present.  This is the caregiver that is going to take her to [employer] tomorrow.  Patient is very excited and as her hair is done up in curlers.  Really has no new complaints and is just very excited about her therapeutic outing tomorrow.  She tells me she is very happy as she will get to see her male friend.    Objective:  VITAL SIGNS: BP (!) 98/58   Pulse 66   Temp 36.5 °C (97.7 °F) (Temporal)   Resp 16   Ht 1.473 m (4' 10\")   Wt 66.7 kg (147 lb 0.8 oz)   SpO2 94%   BMI 30.73 kg/m²    Gen: alert, no apparent distress  CV: regular rate and rhythm, no murmurs, no peripheral edema  Resp: clear to ascultation bilaterally, normal respiratory effort  GI: soft, non-tender abdomen, bowel sounds present  Neuro: notable for chronic visual impairment    Recent Results (from the past 72 hour(s))   POTASSIUM SERUM (K)    Collection Time: 12/07/19  5:39 AM   Result Value Ref Range    Potassium 4.5 3.6 - 5.5 mmol/L       Current Facility-Administered Medications   Medication Frequency   • OCUVITE-LUTEIN tablet 1 tablet DAILY   • aspirin EC (ECOTRIN) tablet 325 mg BID   • budesonide-formoterol (SYMBICORT) 160-4.5 MCG/ACT inhaler 2 Puff BID (RT)   • simethicone (MYLICON) chewable tab 80 mg TID WITH MEALS   • senna-docusate (PERICOLACE or SENOKOT S) 8.6-50 MG per tablet 2 Tab BID PRN    And   • polyethylene glycol/lytes (MIRALAX) PACKET 1 Packet QDAY PRN    And   • magnesium hydroxide (MILK OF MAGNESIA) suspension 30 mL QDAY PRN    And   • bisacodyl (DULCOLAX) suppository 10 mg QDAY PRN   • vitamin D (cholecalciferol) 1000 UNIT tablet 1,000 Units DAILY   • Respiratory Care per Protocol Continuous RT   • Pharmacy Consult Request ...Pain Management Review 1 Each PHARMACY TO DOSE   • artificial tears ophthalmic " solution 1 Drop PRN   • benzocaine-menthol (CEPACOL) lozenge 1 Lozenge Q2HRS PRN   • mag hydrox-al hydrox-simeth (MAALOX PLUS ES or MYLANTA DS) suspension 20 mL Q2HRS PRN   • ondansetron (ZOFRAN ODT) dispertab 4 mg 4X/DAY PRN    Or   • ondansetron (ZOFRAN) syringe/vial injection 4 mg 4X/DAY PRN   • traZODone (DESYREL) tablet 50 mg QHS PRN   • sodium chloride (OCEAN) 0.65 % nasal spray 2 Spray PRN   • melatonin tablet 3 mg HS PRN   • lactulose 20 GM/30ML solution 30 mL QDAY PRN   • fleet enema 133 mL QDAY PRN   • calcium carbonate (TUMS) chewable tab 500 mg QDAY PRN   • lovastatin (MEVACOR) tablet 40 mg QHS   • nystatin (MYCOSTATIN) powder BID   • omeprazole (PRILOSEC) capsule 20 mg QDAY   • acetaminophen (TYLENOL) tablet 1,000 mg TID   • tramadol (ULTRAM) 50 MG tablet 50 mg Q4HRS PRN   • oxyCODONE immediate-release (ROXICODONE) tablet 5 mg Q4HRS PRN       Orders Placed This Encounter   Procedures   • Diet Order Regular     Standing Status:   Standing     Number of Occurrences:   1     Order Specific Question:   Diet:     Answer:   Regular [1]     Order Specific Question:   Consistency/Fluid modifications:     Answer:   Thin Liquids [3]       Assessment:  Active Hospital Problems    Diagnosis   • *Status post right hip replacement   • Ileus (HCC)   • Obesity (BMI 30-39.9)   • Dyslipidemia   • HTN (hypertension)   • Gastroesophageal reflux disease   • Vitamin D deficiency   • Anemia   • Azotemia   • Lung nodule   • Impaired mobility and ADLs   • COPD (chronic obstructive pulmonary disease) (HCC)   • Hip pain, chronic, right     This patient is a 79 y.o. female admitted for acute inpatient rehabilitation with Status post right hip replacement.    I led and attended the weekly conference, and agree with the IDT conference documentation and plan of care as noted below.    Date of conference: 12/2/2019    Goals and barriers: See IDT note.    Biggest barriers: bowel and bladder incontinence, limited endurance, difficult  with weight bearing, fearful and pain, respiratory insufficiency, visual deficits, short term memory deficits    Goals: community outing  - patient wants to attend an event at her [employer] -plan for this Sunday after caregiver and car transfer training    Decrease SLP to 30 min, share the other 30 with PT/OT    CM/social support: lives alone, friend with intermittent support    Anticipated DC date: 12/10/2019    Home health: PT/OT/SLP/RN    Equip: [facility]    Follow up: PCP, surgery, [facility]    Medical Decision Making and Plan:    S/p Right hip replacement  11/21 Dr. Delgado  Continue full rehab program  PT/OT/SLP, 1 hr each discipline, 5 days per week  Posterior hip precautions  Outpatient follow up for suture/dressing removal per surgeon    Pain Management  Scheduled tylenol  PRN tramadol  Pain currently well controlled    Bowel  SBO, see below  Meds as needed  Last BM 12/7    Bladder  Check PVRs - 17, 11, 50  ICP for over 400 cc  Scheduled toileting    DVT prophylaxis  ASA BID per surgery    Appreciate the assistance of the hospitalist with her medical comorbidities :     Small bowel obstruction, resolved, diet advanced  Large hiatal hernia  GERD  Hypertension, off all BP meds  Hypotension, improved  COPD, started on Symbicort, respiratory therapy to attempt titration, was not on O2 at home  Respiratory insufficiency  Hyperlipidemia  Azotemia, resolved  Anemia, stable  Vit D deficiency, on supplementation  Lung nodule, outpatient follow up    Total time:  17 minutes.  I spent greater than 50% of the time for patient care, counseling, and coordination on this date, including patient face-to face time, unit/floor time with review of records/pertinent lab data and studies, as well as discussing diagnostic evaluation/work up, planned therapeutic interventions, and future disposition of care, as per the interval events/subjective and the assessment and plan as noted above.    I have performed a physical exam,  reviewed and updated ROS, as well as the assessment and plan today 12/7/2019. In review of note from 12/6/2019 there are no new changes except as documented above.          Salma Tejada M.D.   Physical Medicine and Rehabilitation

## 2019-12-07 NOTE — REHAB-CM IDT TEAM NOTE
Case Management    DC Planning  DC destination/dispostion:  Patient lives alone in a single story house, one step to enter. She hopes to return home with supportive care from family and friends. Daughter to come live with her for about a month after discharge to assist.  Discharge support to be confirmed by case management.     Referrals: [facility] - RN/PT/OT  Preferred DME - wheel chair with cushion  Division of Aging and Disability for assist with personal care/homemaker services    DC Needs: Possible home oxygen. TBD closer to discharge.   MD f/u appointments - PCP appointment in place, Dr. Delgado, Surgery appointment scheduled for 12/12/19.      Barriers to discharge:  Lives alone.  Does not drive but uses RTC access.       Strengths: Supportive family and friends. Motivated.         Section completed by:  Irma Jose R.N.

## 2019-12-08 PROCEDURE — 700102 HCHG RX REV CODE 250 W/ 637 OVERRIDE(OP): Performed by: PHYSICAL MEDICINE & REHABILITATION

## 2019-12-08 PROCEDURE — 770010 HCHG ROOM/CARE - REHAB SEMI PRIVAT*

## 2019-12-08 PROCEDURE — A9270 NON-COVERED ITEM OR SERVICE: HCPCS | Performed by: HOSPITALIST

## 2019-12-08 PROCEDURE — 700102 HCHG RX REV CODE 250 W/ 637 OVERRIDE(OP): Performed by: HOSPITALIST

## 2019-12-08 PROCEDURE — 94760 N-INVAS EAR/PLS OXIMETRY 1: CPT

## 2019-12-08 PROCEDURE — A9270 NON-COVERED ITEM OR SERVICE: HCPCS | Performed by: PHYSICAL MEDICINE & REHABILITATION

## 2019-12-08 PROCEDURE — 99232 SBSQ HOSP IP/OBS MODERATE 35: CPT | Performed by: HOSPITALIST

## 2019-12-08 RX ADMIN — ASPIRIN 325 MG: 325 TABLET, COATED ORAL at 20:50

## 2019-12-08 RX ADMIN — NYSTATIN: 100000 POWDER TOPICAL at 20:52

## 2019-12-08 RX ADMIN — BUDESONIDE AND FORMOTEROL FUMARATE DIHYDRATE 2 PUFF: 160; 4.5 AEROSOL RESPIRATORY (INHALATION) at 20:53

## 2019-12-08 RX ADMIN — VITAMIN D, TAB 1000IU (100/BT) 1000 UNITS: 25 TAB at 08:17

## 2019-12-08 RX ADMIN — SIMETHICONE CHEW TAB 80 MG 80 MG: 80 TABLET ORAL at 08:17

## 2019-12-08 RX ADMIN — ACETAMINOPHEN 1000 MG: 500 TABLET, FILM COATED ORAL at 08:17

## 2019-12-08 RX ADMIN — BUDESONIDE AND FORMOTEROL FUMARATE DIHYDRATE 2 PUFF: 160; 4.5 AEROSOL RESPIRATORY (INHALATION) at 08:19

## 2019-12-08 RX ADMIN — TRAZODONE HYDROCHLORIDE 50 MG: 50 TABLET ORAL at 20:50

## 2019-12-08 RX ADMIN — ACETAMINOPHEN 1000 MG: 500 TABLET, FILM COATED ORAL at 14:12

## 2019-12-08 RX ADMIN — LOVASTATIN 40 MG: 20 TABLET ORAL at 20:50

## 2019-12-08 RX ADMIN — ASPIRIN 325 MG: 325 TABLET, COATED ORAL at 08:17

## 2019-12-08 RX ADMIN — NYSTATIN: 100000 POWDER TOPICAL at 08:18

## 2019-12-08 RX ADMIN — SIMETHICONE CHEW TAB 80 MG 80 MG: 80 TABLET ORAL at 16:50

## 2019-12-08 RX ADMIN — TRAMADOL HYDROCHLORIDE 50 MG: 50 TABLET, COATED ORAL at 09:04

## 2019-12-08 RX ADMIN — ACETAMINOPHEN 1000 MG: 500 TABLET, FILM COATED ORAL at 20:50

## 2019-12-08 RX ADMIN — I-VITE, TAB 1000-60-2MG (60/BT) 1 TABLET: TAB at 08:17

## 2019-12-08 RX ADMIN — MELATONIN 3 MG: at 20:50

## 2019-12-08 ASSESSMENT — ENCOUNTER SYMPTOMS
FEVER: 0
VOMITING: 0
CHILLS: 0
NERVOUS/ANXIOUS: 0
ABDOMINAL PAIN: 0
DIARRHEA: 0
SHORTNESS OF BREATH: 0
NAUSEA: 0

## 2019-12-08 NOTE — THERAPY
12/07/19 1559   Precautions   Precautions Posterior Hip Precautions;Weight Bearing As Tolerated Right Lower Extremity;Fall Risk;Other (See Comments)   Comments Impaired vision, COPD   Pain 0 - 10 Group   Location Hip;Leg   Location Orientation Right;Lateral   Therapist Pain Assessment 3;4;Prior to Activity;During Activity   PT Total Time Spent   PT Individual Total Time Spent (Mins) 60   PT Charge Group   PT Gait Training 3   PT Therapeutic Activities 1   Physical Therapy   Daily Treatment     Patient Name: Guillermina Recio  Age:  79 y.o., Sex:  female  Medical Record #: [id number]  Today's Date: 12/7/2019     Precautions  Precautions: Posterior Hip Precautions, Weight Bearing As Tolerated Right Lower Extremity, Fall Risk, Other (See Comments)  Comments: Impaired vision, COPD    Subjective    The patient was up in her chair and she agreed to PT.     Objective    The patient practiced sequencing sit to/from stand and participated in gait training with a fww, SBA.  She tolerated 55 FT x6.  We also spent time discussing her outing tomorrow going to the [employer] for [employer] service and the luncheon for the .  She was given the outing form that has goals for the outing.  Her caregiver Cathy will be responsible for her and assist her with the form and achieving her goals.    FIM Bed/Chair/Wheelchair Transfers Score: 4 - Minimal Assistance  Bed/Chair/Wheelchair Transfers Description:       FIM Walking Score:  2 - Max Assistance  Walking Description:  Extra time, Verbal cueing, Supervision for safety, Walker, Oxygen(FWW, SBA 55 FT x6)    FIM Wheelchair Score:     Wheelchair Description:       FIM Stairs Score:  0 - Not tested,unsafe activity  Stairs Description:         Assessment    The patient participated in gait training with a walker and tolerated 55 FT x6.  The patient's gait quality is improving as demonstrated by increased step length and increased pace.  The patient also recognized that she is walking with better  quality.  Her limitation in increasing distance is oxygen dependent COPD.    Plan    Safety education, therapeutic exercise, gait training, stepping up/down platform 5 inches with a FWW, safety education

## 2019-12-08 NOTE — CARE PLAN
Problem: Communication  Goal: The ability to communicate needs accurately and effectively will improve  Outcome: PROGRESSING AS EXPECTED  Note:   Patient uses call light consistently and appropriately this shift.  Waits for assistance when needed and does not attempt self transfer.  Able to verbalize needs.  Will continue to monitor.      Problem: Psychosocial Needs:  Goal: Level of anxiety will decrease  Outcome: PROGRESSING AS EXPECTED  Note:   Pt demonstrates appropriate mood and behavior this shift; [facility]. Will continue to monitor.

## 2019-12-08 NOTE — PROGRESS NOTES
Hospital Medicine Daily Progress Note      Chief Complaint:  Ileus  Low blood pressure    Interval History:  No significant events or changes since last visit    Review of Systems  Review of Systems   Constitutional: Negative for chills and fever.   Respiratory: Negative for shortness of breath.    Cardiovascular: Negative for chest pain.   Gastrointestinal: Negative for abdominal pain, diarrhea, nausea and vomiting.   Psychiatric/Behavioral: The patient is not nervous/anxious.         Physical Exam  Temp:  [36.2 °C (97.1 °F)-37 °C (98.6 °F)] 37 °C (98.6 °F)  Pulse:  [85-94] 94  Resp:  [18-20] 20  BP: (103-104)/(56-68) 103/56  SpO2:  [95 %] 95 %    Physical Exam  Vitals signs and nursing note reviewed.   Constitutional:       Appearance: Normal appearance.   HENT:      Head: Atraumatic.   Eyes:      Conjunctiva/sclera: Conjunctivae normal.      Pupils: Pupils are equal, round, and reactive to light.   Neck:      Musculoskeletal: Normal range of motion and neck supple.   Cardiovascular:      Rate and Rhythm: Normal rate and regular rhythm.      Heart sounds: No murmur.   Pulmonary:      Effort: Pulmonary effort is normal.      Breath sounds: No wheezing or rales.   Abdominal:      General: There is no distension.      Palpations: Abdomen is soft.      Tenderness: There is no tenderness.   Musculoskeletal:      Right lower leg: No edema.      Left lower leg: No edema.   Skin:     General: Skin is warm and dry.      Findings: No rash.   Neurological:      Mental Status: She is alert and oriented to person, place, and time.   Psychiatric:         Mood and Affect: Mood normal.         Behavior: Behavior normal.         Fluids    Intake/Output Summary (Last 24 hours) at 12/8/2019 0952  Last data filed at 12/8/2019 0900  Gross per 24 hour   Intake 720 ml   Output --   Net 720 ml       Laboratory      Recent Labs     12/07/19  0539   POTASSIUM 4.5                   Assessment/Plan  Ileus (HCC)- (present on  admission)  Assessment & Plan  Surgery notes reviewed, more likely post-op ileus than small bowel obstruction  Serial KUB shows resolution of distended bowel loops  Constipation resolved with increased bowel regimen -- having fairly regular BM's  On Simethicone  Diet advanced to a 'Regular' diet and tolerating (12/4)  Monitor    Dyslipidemia- (present on admission)  Assessment & Plan  On Mevacor    High potassium  Assessment & Plan  Currently resolved  K+ 5.1 --> 4.7 --> 4.5 (12/7)    Hypotension  Assessment & Plan  Currently resolved  BP low normal and ok  Not on any hypertensive meds  ? 2nd to pain meds  Encouraging oral fluids  Monitor    Lung nodule- (present on admission)  Assessment & Plan  CT Abd/Pelvis 11/25/19 showed 6 mm RML pulmonary nodule  Needs close F/U given smoking history    COPD (chronic obstructive pulmonary disease) (HCC)- (present on admission)  Assessment & Plan  Sats ok on 2 liters O2 NC  CXR (12/4): retrocardiac opacity with air-fluid levels consistent with a large hernia                      interstitial prominence in the lung bases similar to the prior exam                      no significant pleural fluid or pneumothorax is identified                      no significant interval change  Note: was not on O2 at home but should have been 2nd to pt getting SOB on regular activities   Continue Symbicort and RT protocol    Hip pain, chronic, right- (present on admission)  Assessment & Plan  2nd to OA/DJD  S/P elective right total hip arthroplasty on 11/21/19 by Dr. Delgado    Vitamin D deficiency- (present on admission)  Assessment & Plan  Vit D: 21  On supplements    Gastroesophageal reflux disease- (present on admission)  Assessment & Plan  2nd to hiatal hernia  On Prilosec    Anemia- (present on admission)  Assessment & Plan  H&H stable with Hb 11.0 (12/1)  Monitor

## 2019-12-08 NOTE — CARE PLAN
Problem: Safety  Goal: Will remain free from injury  Note:   Patient educated on importance of utilizing call light to minimize the potential of injury from falls. Patient verbalizes understanding. Patient using call light appropriately.       Problem: Infection  Goal: Will remain free from infection  Note:   Education reinforced to wash hands thoroughly after using the restroom, prior to eating and throughout the day to minimize the potential of acquiring germs while in a facility setting. Patient engaged in conversation and verbalizes understanding.

## 2019-12-09 PROBLEM — R79.89 AZOTEMIA: Status: RESOLVED | Noted: 2019-11-28 | Resolved: 2019-12-09

## 2019-12-09 PROBLEM — E87.5 HIGH POTASSIUM: Status: RESOLVED | Noted: 2019-12-07 | Resolved: 2019-12-09

## 2019-12-09 PROBLEM — I95.9 HYPOTENSION: Status: RESOLVED | Noted: 2019-12-02 | Resolved: 2019-12-09

## 2019-12-09 PROBLEM — K56.7 ILEUS (HCC): Status: RESOLVED | Noted: 2019-11-25 | Resolved: 2019-12-09

## 2019-12-09 PROCEDURE — 97535 SELF CARE MNGMENT TRAINING: CPT

## 2019-12-09 PROCEDURE — 97530 THERAPEUTIC ACTIVITIES: CPT

## 2019-12-09 PROCEDURE — A9270 NON-COVERED ITEM OR SERVICE: HCPCS | Performed by: PHYSICAL MEDICINE & REHABILITATION

## 2019-12-09 PROCEDURE — 94760 N-INVAS EAR/PLS OXIMETRY 1: CPT

## 2019-12-09 PROCEDURE — 99231 SBSQ HOSP IP/OBS SF/LOW 25: CPT | Performed by: HOSPITALIST

## 2019-12-09 PROCEDURE — A9270 NON-COVERED ITEM OR SERVICE: HCPCS | Performed by: HOSPITALIST

## 2019-12-09 PROCEDURE — 700102 HCHG RX REV CODE 250 W/ 637 OVERRIDE(OP): Performed by: PHYSICAL MEDICINE & REHABILITATION

## 2019-12-09 PROCEDURE — G0515 COGNITIVE SKILLS DEVELOPMENT: HCPCS

## 2019-12-09 PROCEDURE — 97116 GAIT TRAINING THERAPY: CPT

## 2019-12-09 PROCEDURE — 99233 SBSQ HOSP IP/OBS HIGH 50: CPT | Performed by: PHYSICAL MEDICINE & REHABILITATION

## 2019-12-09 PROCEDURE — 700102 HCHG RX REV CODE 250 W/ 637 OVERRIDE(OP): Performed by: HOSPITALIST

## 2019-12-09 PROCEDURE — 770010 HCHG ROOM/CARE - REHAB SEMI PRIVAT*

## 2019-12-09 RX ORDER — AMOXICILLIN 250 MG
2 CAPSULE ORAL 2 TIMES DAILY PRN
Qty: 30 TAB | Refills: 0 | Status: ON HOLD | COMMUNITY
Start: 2019-12-09 | End: 2019-12-28

## 2019-12-09 RX ORDER — TRAMADOL HYDROCHLORIDE 50 MG/1
50 TABLET ORAL EVERY 8 HOURS PRN
Qty: 14 TAB | Refills: 0 | Status: ON HOLD | OUTPATIENT
Start: 2019-12-09 | End: 2019-12-28

## 2019-12-09 RX ORDER — SIMETHICONE 80 MG
80 TABLET,CHEWABLE ORAL
Qty: 30 TAB | Refills: 3 | COMMUNITY
Start: 2019-12-09 | End: 2019-12-12

## 2019-12-09 RX ORDER — POLYETHYLENE GLYCOL 3350 17 G/17G
POWDER, FOR SOLUTION ORAL
Refills: 3 | COMMUNITY
Start: 2019-12-09 | End: 2019-12-12

## 2019-12-09 RX ORDER — LOVASTATIN 40 MG/1
40 TABLET ORAL
Qty: 30 TAB | Refills: 0 | Status: SHIPPED | OUTPATIENT
Start: 2019-12-09 | End: 2023-03-06 | Stop reason: SDUPTHER

## 2019-12-09 RX ORDER — ASPIRIN 325 MG
325 TABLET, DELAYED RELEASE (ENTERIC COATED) ORAL 2 TIMES DAILY
Qty: 60 TAB | Status: ON HOLD | COMMUNITY
Start: 2019-12-09 | End: 2019-12-28

## 2019-12-09 RX ORDER — BUDESONIDE AND FORMOTEROL FUMARATE DIHYDRATE 160; 4.5 UG/1; UG/1
2 AEROSOL RESPIRATORY (INHALATION) 2 TIMES DAILY
Qty: 1 INHALER | Refills: 0 | Status: SHIPPED | OUTPATIENT
Start: 2019-12-09 | End: 2021-06-09

## 2019-12-09 RX ADMIN — SIMETHICONE CHEW TAB 80 MG 80 MG: 80 TABLET ORAL at 08:43

## 2019-12-09 RX ADMIN — ACETAMINOPHEN 1000 MG: 500 TABLET, FILM COATED ORAL at 14:18

## 2019-12-09 RX ADMIN — VITAMIN D, TAB 1000IU (100/BT) 1000 UNITS: 25 TAB at 08:43

## 2019-12-09 RX ADMIN — TRAZODONE HYDROCHLORIDE 50 MG: 50 TABLET ORAL at 20:33

## 2019-12-09 RX ADMIN — ASPIRIN 325 MG: 325 TABLET, COATED ORAL at 08:43

## 2019-12-09 RX ADMIN — BUDESONIDE AND FORMOTEROL FUMARATE DIHYDRATE 2 PUFF: 160; 4.5 AEROSOL RESPIRATORY (INHALATION) at 08:47

## 2019-12-09 RX ADMIN — SENNOSIDES AND DOCUSATE SODIUM 2 TABLET: 8.6; 5 TABLET ORAL at 14:18

## 2019-12-09 RX ADMIN — SIMETHICONE CHEW TAB 80 MG 80 MG: 80 TABLET ORAL at 17:42

## 2019-12-09 RX ADMIN — OMEPRAZOLE 20 MG: 20 CAPSULE, DELAYED RELEASE ORAL at 12:11

## 2019-12-09 RX ADMIN — OXYCODONE HYDROCHLORIDE 5 MG: 5 TABLET ORAL at 19:55

## 2019-12-09 RX ADMIN — SIMETHICONE CHEW TAB 80 MG 80 MG: 80 TABLET ORAL at 12:11

## 2019-12-09 RX ADMIN — LOVASTATIN 40 MG: 20 TABLET ORAL at 20:32

## 2019-12-09 RX ADMIN — BUDESONIDE AND FORMOTEROL FUMARATE DIHYDRATE 2 PUFF: 160; 4.5 AEROSOL RESPIRATORY (INHALATION) at 20:32

## 2019-12-09 RX ADMIN — I-VITE, TAB 1000-60-2MG (60/BT) 1 TABLET: TAB at 08:43

## 2019-12-09 RX ADMIN — CALCIUM CARBONATE (ANTACID) CHEW TAB 500 MG 500 MG: 500 CHEW TAB at 12:50

## 2019-12-09 RX ADMIN — ASPIRIN 325 MG: 325 TABLET, COATED ORAL at 20:39

## 2019-12-09 RX ADMIN — ACETAMINOPHEN 1000 MG: 500 TABLET, FILM COATED ORAL at 08:43

## 2019-12-09 RX ADMIN — ACETAMINOPHEN 1000 MG: 500 TABLET, FILM COATED ORAL at 20:32

## 2019-12-09 RX ADMIN — NYSTATIN: 100000 POWDER TOPICAL at 20:36

## 2019-12-09 ASSESSMENT — ENCOUNTER SYMPTOMS
SHORTNESS OF BREATH: 0
ROS GI COMMENTS: +GAS
VOMITING: 0
ABDOMINAL PAIN: 0
COUGH: 0
PALPITATIONS: 0
NAUSEA: 0
FEVER: 0
CHILLS: 0
EYES NEGATIVE: 1

## 2019-12-09 ASSESSMENT — ACTIVITIES OF DAILY LIVING (ADL)
TOILET_TRANSFER_LEVEL_OF_ASSIST: REQUIRES SUPERVISION WITH TOILET TRANSFER
SHOWER_TRANSFER_LEVEL_OF_ASSIST: REQUIRES SUPERVISION WITH SHOWER TRANSFER
TOILETING_LEVEL_OF_ASSIST: ABLE TO COMPLETE TOILETING WITHOUT ASSIST

## 2019-12-09 NOTE — PROGRESS NOTES
Received bedside shift report from Griselda SEAMAN RN regarding patient and assumed care. Patient awake, calm and stable, currently positioned in bed for comfort and safety; call light within reach. Denies pain or discomfort at this time. Will continue to monitor.

## 2019-12-09 NOTE — THERAPY
"   12/09/19 1329   Precautions   Precautions Posterior Hip Precautions;Weight Bearing As Tolerated Right Lower Extremity;Fall Risk   Comments Impaired vision, COPD   Vitals   O2 (LPM) 2   O2 Delivery Nasal Cannula   Pain 0 - 10 Group   Location Hip;Leg   Location Orientation Right;Lateral   Therapist Pain Assessment 2;3;During Activity   Cognition    Level of Consciousness Alert   Neuro-Muscular Treatments   Neuro-Muscular Treatments Sequencing;Tactile Cuing;Tapping;Verbal Cuing   Comments Sequencing sit to/from stand, gait with a fww and stepping up/down 6\" high platform with fww   PT Total Time Spent   PT Individual Total Time Spent (Mins) 60   PT Charge Group   PT Gait Training 2   PT Therapeutic Activities 2   Physical Therapy   Daily Treatment     Patient Name: Guillermina Recio  Age:  79 y.o., Sex:  female  Medical Record #: [id number]  Today's Date: 12/9/2019     Precautions  Precautions: Posterior Hip Precautions, Weight Bearing As Tolerated Right Lower Extremity, Fall Risk  Comments: Impaired vision, COPD    Subjective    The patient was up in her chair and ready for PT.  She said her [employer] outing yesterday was successful without any problems.  She also said she wanted to practice up and down a platform with a walker to overcome her fear.     Objective    The patient participated in gait training with the fww and tolerated 55 FT x4.  She practiced up/down a 6 inch platform with a fww.  After the first attempt with verbal cues she was able to replicate the entire sequence without assistance.  She went up and down a total of 4 times.    FIM Bed/Chair/Wheelchair Transfers Score: 5 - Standby Prompting/Supervision or Set-up  Bed/Chair/Wheelchair Transfers Description:  Increased time, Supervision for safety, Verbal cueing, Adaptive equipment(FWW)    FIM Walking Score:  2 - Max Assistance  Walking Description:  Walker, Oxygen, Extra time, Verbal cueing, Supervision for safety, Assist device/equipment(FWW, SBA 55 " FT)    FIM Wheelchair Score:  1 - Total Assistance  Wheelchair Description:  Extra time, Supervision for safety, Verbal cueing, Requires incidental assist    FIM Stairs Score:  0 - Not tested,unsafe activity  Stairs Description:         Assessment    The patient was very concerned about going home and going up and down the front steps with her walker to go into her apartment.  With practice she was able to do it with CGA and a gait belt.  She remembered all of the components of the sequence.    Plan    Therapeutic exercise, safety education, gait training as tolerated, bed mobility and transfers, up/down one 6 inch step.

## 2019-12-09 NOTE — REHAB-COLLABORATIVE ONGOING IDT TEAM NOTE
Weekly Interdisciplinary Team Conference Note    Weekly Interdisciplinary Team Conference # 2  Date:  12/9/2019    Clinicians present and reporting at team conference include the following:   MD: Salma Tejada MD   RN:  Sharri Jackson RN    PT:   Steve Hoyt PT, MPT, MEd  OT:  Nelly Escalona MS, OTR/L    ST:  Tano Lazar MS, CCC-SLP  CM:  Irma Jose RN CCM  REC:  Not Applicable  RT:  Fco Sheehan, RRT  RPh:  Isidro Venegas RP  Other:   None  All reporting clinicians have a working knowledge of this patient's plan of care.    Targeted DC Date:  12/10/2019     Medical    Patient Active Problem List    Diagnosis Date Noted   • Ileus (HCC) 11/25/2019     Priority: High   • Chest pain 09/12/2017     Priority: Medium   • Obesity (BMI 30-39.9) 12/16/2016     Priority: Medium   • Abnormal stress test 12/16/2016     Priority: Medium   • Dyslipidemia 08/01/2016     Priority: Medium   • HTN (hypertension) 06/25/2009     Priority: Medium   • Chronic use of opiate drugs therapeutic purposes 07/07/2017     Priority: Low   • Chronic bilateral low back pain without sciatica 07/07/2017     Priority: Low   • Daytime somnolence 12/16/2016     Priority: Low   • Insomnia 12/16/2016     Priority: Low   • Gastroesophageal reflux disease 12/16/2016     Priority: Low   • Vitamin D deficiency 12/16/2016     Priority: Low   • Chronic narcotic use 12/16/2016     Priority: Low   • Controlled substance agreement signed 12/16/2016     Priority: Low   • Chronic pain of both shoulders 12/16/2016     Priority: Low   • Chronic left-sided low back pain without sciatica 12/16/2016     Priority: Low   • Scalp itch 06/29/2016     Priority: Low   • Polypharmacy 06/29/2016     Priority: Low   • Dermatochalasis 07/14/2015     Priority: Low   • VAGINAL LESION 11/10/2009     Priority: Low   • Pelvic pain 11/10/2009     Priority: Low   • Osteopenia 06/25/2009     Priority: Low   • Dysuria 06/25/2009     Priority: Low   • Anemia 06/25/2009     Priority: Low    • Hip pain 06/25/2009     Priority: Low   • Elbow pain 06/25/2009     Priority: Low   • High potassium 12/07/2019   • Hypotension 12/02/2019   • Azotemia 11/28/2019   • Lung nodule 11/28/2019   • Impaired mobility and ADLs 11/27/2019   • COPD (chronic obstructive pulmonary disease) (HCC) 11/25/2019   • LFT elevation 11/25/2019   • Status post right hip replacement 11/25/2019   • Hyperglycemia 11/25/2019   • Impaired vision 05/28/2019   • 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 **        Nursing  Diet/Nutrition:  Regular and Thin Liquids  Medication Administration:  Whole with Liquid Wash  % consumed at meals in last 24 hours:  Consumed 50-75% of meals per documentation.  Meal/Snack Consumption for the past 24 hrs:   Oral Nutrition   12/08/19 0900 Breakfast;Between 50-75% Consumed     Snack schedule:  None  Supplement:  Boost Plus milkshake, milkshake fortified with Beneprotein per pt req/discretion per home regimen.  Appetite:  Fair  Fluid Intake/Output in past 24 hours: In: 600 [P.O.:600]  Out: - Pt up to the toilet to void.  Admit Weight:  Weight: 68.5 kg (151 lb)  Weight Last 7 Days   [67.5 kg (148 lb 13 oz)] 67.5 kg (148 lb 13 oz) (12/08 0500)    Pain Issues:    Location:  Hip;Leg (12/08 2150)  Right;Lateral (12/08 2150)         Severity:  Moderate   Scheduled pain medications:  Tylenol  PRN pain medications used in last 24 hours:  tramadol (Ultram)   Non Pharmacologic Interventions:  rest  Effectiveness of pain management plan:  good=patient states acceptable comfort after interventions    Bowel:    Bowel Assist Initial Score:  5 - Standby Prompting/Supervision or Set-up  Bowel Assist Current Score:  5 - Standby Prompting/Supervision or Set-up  Bowl  Accidents in last 7 days:  0  Last bowel movement: 12/07/19  Stool Description: Large     Usual bowel pattern:  daily  Scheduled bowel medications:  None  PRN bowel medications used in last 24 hours:  None  Nursing Interventions:  Increased time, Positioning on commode/toilet, Supervision, Verbal cueing, Set-up, Brief  Effectiveness of bowel program:   fair=sometimes needs prn bowel meds for constipation  Bladder:    Bladder Assist Initial Score:  1 - Total Assistance  Bladder Assist Current Score:  5 - Standby Prompting/Supervision or Set-up  Bladder Accidents in last 7 days:  0  PVR range for past 24-48 hours: -- ()  Intermittent Catheterization:  N/A  Medications affecting bladder:  None    Time void schedule/voiding pattern:  Voiding every 2-4 hours  Interventions:  Increased time, Supervision, Verbal cueing, Brief, Time void patient initiated  Effectiveness of bladder training:  Good=regular, predictable, emptying of bladder, patient initiates time voiding    Wound:         Patient Lines/Drains/Airways Status    Active Current Wounds     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  Effectiveness of intervention:  wound is improving     Sleep/wake cycle:   Average hours slept:  Sleeps 3-4 hours without waking  Sleep medication usage:  Other Melatonin 3 mg and Trazodone 50 prn.    Patient/Family Training/Education:  Fall Prevention, General Self Care, Medication Management, Pain Management, Safe Transfers, Safety and Wound Care  Discharge Supply Recommendations:  Wound Care Supplies and Other: Adaptive equipment.  Strengths: Effective communication skills   Barriers:   Generalized weakness, Impulsive and Limited mobility       Nursing Problems     Problem: Bowel/Gastric:     Description:     Goal: Normal bowel function is maintained or improved     Description:           Goal: Will not experience complications related to bowel motility      Description:                 Problem: Communication     Description:     Goal: The ability to communicate needs accurately and effectively will improve     Description:                 Problem: Discharge Barriers/Planning     Description:     Goal: Patient's continuum of care needs will be met     Description:                 Problem: Infection     Description:     Goal: Will remain free from infection     Description:                 Problem: Knowledge Deficit     Description:     Goal: Knowledge of disease process/condition, treatment plan, diagnostic tests, and medications will improve     Description:           Goal: Knowledge of the prescribed therapeutic regimen will improve     Description:                 Problem: Pain Management     Description:     Goal: Pain level will decrease to patient's comfort goal     Description:                 Problem: Psychosocial Needs:     Description:     Goal: Level of anxiety will decrease     Description:                 Problem: Respiratory:     Description:     Goal: Respiratory status will improve     Description:                 Problem: Safety     Description:     Goal: Will remain free from injury     Description:           Goal: Will remain free from falls     Description:                 Problem: Skin Integrity     Description:     Goal: Risk for impaired skin integrity will decrease     Description:                 Problem: Urinary Elimination:     Description:     Goal: Ability to reestablish a normal urinary elimination pattern will improve     Description:                 Problem: Venous Thromboembolism (VTW)/Deep Vein Thrombosis (DVT) Prevention:     Description:     Goal: Patient will participate in Venous Thrombosis (VTE)/Deep Vein Thrombosis (DVT)Prevention Measures     Description:     Flowsheet:     Taken at 12/07/19 1030    Pharmacologic Prophylaxis Used No Value by  Griselda Alvarez RSINDI. Comment:  ASA                             Long Term Goals:   At  discharge patient will be able to function safely at home and in the community with support.    Section completed by:  Bin Ann L.P.N.2        Respiratory Therapy    Continues to require supplemental oxygen at 2 lpm.  However, room air SpO2 readings are improving.  Room air SpO2 now 87%.    Section completed by:  Fco Sheehan, RRT    Mobility  Bed mobility:   SBA supine to/from sit  Bed /Chair/Wheelchair Transfer Initial:  4 - Minimal Assistance  Bed /Chair/Wheelchair Transfer Current:  5 - Standby Prompting/Supervision FWW   Bed/Chair/Wheelchair Transfer Description:  Increased time, Supervision for safety, Verbal cueing, Adaptive equipment(FWW)  Walk Initial:  1 - Total Assistance  Walk Current:  2 - Max Assistance   FWW SBA 55 FT   Walk Description:  Walker, Oxygen, Extra time, Verbal cueing, Supervision for safety, Assist device/equipment(FWW, SBA 55 FT)  Wheelchair Initial:  1 - Total Assistance  Wheelchair Current:  1 - Total Assistance   Wheelchair Description:  Extra time, Supervision for safety, Verbal cueing, Requires incidental assist  Stairs Initial:  0 - Not tested,unsafe activity  Stairs Current: 0 - Not tested,unsafe activity   Stairs Description:    Patient/Family Training/Education: In progress  DME/DC Recommendations:  FWW, manual wheelchair  Strengths:  Making steady progress towards goals, Manages pain appropriately, Motivated for self care and independence, Pleasant and cooperative, Supportive family and Willingly participates in therapeutic activities  Barriers:   Other: Impaired bed mobility, transfers, gait, tolerance for activity/endurance, posterior hip precautions, fall risk  # of short term goals set= 3  # of short term goals met=3  Physical Therapy Problems     Problem: PT-Long Term Goals     Dates: Start: 11/28/19       Description:     Goal: LTG-By discharge, patient will ambulate     Dates: Start: 11/28/19       Description: 1) Individualized goal:  150ft  x1 FWW SPV  2)  Interventions:  PT Group Therapy, PT Gait Training, PT Therapeutic Exercises, PT Neuro Re-Ed/Balance, PT Therapeutic Activity, PT Manual Therapy and PT Evaluation             Goal: LTG-By discharge, patient will perform home exercise program     Dates: Start: 11/28/19       Description: 1) Individualized goal:  For B LE strengthening, mod I  2) Interventions:  PT Group Therapy, PT Gait Training, PT Therapeutic Exercises, PT Neuro Re-Ed/Balance, PT Therapeutic Activity, PT Manual Therapy and PT Evaluation             Goal: LTG-By discharge, patient will transfer in/out of a car     Dates: Start: 11/28/19       Description: 1) Individualized goal:  LRAD, CGA  2) Interventions:  PT Group Therapy, PT Gait Training, PT Therapeutic Exercises, PT Neuro Re-Ed/Balance, PT Therapeutic Activity, PT Manual Therapy and PT Evaluation             Goal: LTG-By discharge, patient will     Dates: Start: 11/28/19       Description: 1) Individualized goal: perform platform step to enter home, CGA, FWW  2) Interventions:  PT Group Therapy, PT Gait Training, PT Therapeutic Exercises, PT Neuro Re-Ed/Balance, PT Therapeutic Activity, PT Manual Therapy and PT Evaluation                           Section completed by:  Steve Hoyt MSPTYRONE    Activities of Daily Living  Eating Initial:  5 - Standby Prompting/Supervision or Set-up  Eating Current:  6 - Modified Independent   Eating Description:  Increased time, Supervision for safety, Modified diet(Vision impaired, set up required.)  Grooming Initial:  5 - Standby Prompting/Supervision or Set-up  Grooming Current:  6 - Modified Independent   Grooming Description:  Increased time  Bathing Initial:  3 - Moderate Assistance  Bathing Current:  5 - Standby Prompting/Supervision or Set-up   Bathing Description:  Long handled bath tool, Set-up of equipment  Upper Body Dressing Initial:  5 - Standby Prompting/Supervision or Set-up  Upper Body Dressing Current:  6 - Modified Independent   Upper Body  Dressing Description:  Increased time  Lower Body Dressing Initial:  3 - Moderate Assistance  Lower Body Dressing Current:  6 - Modified Independent   Lower Body Dressing Description:  6 - Modified Independent  Toileting Initial:  3 - Moderate Assistance  Toileting Current:  6 - Modified Independent   Toileting Description:  Grab bar, Increased time  Toilet Transfer Initial:  4 - Minimal Assistance  Toilet Transfer Current:  5 - Standby Prompting/Supervision or Set-up   Toilet Transfer Description:  5 - Standby Prompting/Supervision or Set-up  Tub / Shower Transfer Initial:  4 - Minimal Assistance  Tub / Shower Transfer Current:  5 - Standby Prompting/Supervision or Set-up   Tub / Shower Transfer Description:  Supervision for safety(FWW to/from bathroom, txed on/off tub bench, able to manage LEs in/out of tub with additional time)  IADL:  SBA for kitchen mobility   Family Training/Education:  Caregiver training completed   DME/DC Recommendations:  Caregiver has setup tub transfer bench and toilet riser with handles and has obtained sock aid. Pt has reacher already. She will need a FWW    Strengths:  Able to follow instructions, Making steady progress towards goals, Motivated for self care and independence, Pleasant and cooperative, Supportive family and Willingly participates in therapeutic activities  Barriers:  Decreased endurance, Generalized weakness, Limited mobility, Poor balance and Other: intermittent hip pain, impaired vision at baseline     # of short term goals set= 2    # of short term goals met= 2     Occupational Therapy Goals     Problem: OT Long Term Goals     Dates: Start: 11/28/19       Description:     Goal: LTG-By discharge, patient will complete basic self care tasks     Dates: Start: 11/28/19       Description: 1) Individualized Goal: Mod I with AE as needed    2) Interventions: OT Group Therapy, OT Self Care/ADL, OT Cognitive Skill Dev, OT Community Reintegration, OT Manual Ther Technique, OT  Neuro Re-Ed/Balance, OT Sensory Int Techniques, OT Therapeutic Activity, OT Evaluation and OT Therapeutic Exercise             Goal: LTG-By discharge, patient will perform bathroom transfers     Dates: Start: 11/28/19       Description: 1) Individualized Goal: CGA for safety and balance    2) Interventions: OT Group Therapy, OT Self Care/ADL, OT Cognitive Skill Dev, OT Community Reintegration, OT Manual Ther Technique, OT Neuro Re-Ed/Balance, OT Sensory Int Techniques, OT Therapeutic Activity, OT Evaluation and OT Therapeutic Exercise                         Section completed by:  Nelly Escalona MS,OTR/L    Cognitive Linquistic Functions  Comprehension Initial:  5 - Stand-by Prompting/Supervision or Set-up  Comprehension Current:  6 - Modified Independent   Comprehension Description:  Glasses  Expression Initial:  5 - Stand-by Prompting/Supervision or Set-up  Expression Current:  7 - Independent   Expression Description:  Verbal cueing  Social Interaction Initial:  4 - Minimal Assistance  Social Interaction Current:  5 - Stand-by Prompting/Supervision or Set-up   Social Interaction Description:  Verbal cues, Schedule  Problem Solving Initial:  4 - Minimal Assistance  Problem Solving Current:  5 - Standby Prompting/Supervision or Set-up   Problem Solving Description:  Verbal cueing  Memory Initial:  3 - Moderate Assistance  Memory Current:  5 - Standby Prompting/Supervision or Set-up   Memory Description:  Verbal cueing, Therapy schedule  Executive Functioning / Organization Initial:   NA  Executive Functioning / Organization Current:   NA   Executive Functioning Desciption:  NA  Swallowing  Swallowing Status:  Not following for dysphagia management   Orders Placed This Encounter   Procedures   • Diet Order Regular     Standing Status:   Standing     Number of Occurrences:   1     Order Specific Question:   Diet:     Answer:   Regular [1]     Order Specific Question:   Consistency/Fluid modifications:     Answer:    Thin Liquids [3]     Behavior Modification Plan  Keep instructions simple/concrete, Redirect to task/topic and Analyze tasks (break down into smaller steps)  Assistive Technology  Low/Impaired vision equipment, Memory aides: and Low tech: Calendar, planner, schedule, alarms/timers, pill organizer, post-it notes, lists  Family Training/Education:  Completed with pt's daughter and friend Cathy   DC Recommendations:   Pt will require assistance with medication management which her daughter and friend Cathy will provide   Strengths:  Able to follow instructions, Effective communication skills, Good insight into deficits/needs, Motivated for self care and independence, Pleasant and cooperative, Supportive family and Willingly participates in therapeutic activities  Barriers:  Poor carryover of learning  # of short term goals set=2  # of short term goals met=1  Speech Therapy Problems     Problem: Memory STGs     Dates: Start: 12/02/19       Description:     Goal: STG-Within one week, patient will     Dates: Start: 12/02/19       Description: 1) Individualized goal:  Recall recently taught precautions and safety strategies r/t rehabilitation stay with 80% accuracy and Spv.    2) Interventions:  SLP Speech Language Treatment, SLP Self Care / ADL Training , SLP Cognitive Skill Development and SLP Group Treatment       Note:     Goal Note filed on 12/09/19 0800 by Tano Lazar MS,CCC-SLP    Min A required for pt to recall recently trained strategies and precautions                         Problem: Speech/Swallowing LTGs     Dates: Start: 11/28/19       Description:     Goal: LTG-By discharge, patient will solve basic problems     Dates: Start: 11/28/19       Description: 1) Individualized goal:  R/t safety and ADLs/IADLs with MOD I.   2) Interventions:  SLP Speech Language Treatment, SLP Self Care / ADL Training , SLP Cognitive Skill Development and SLP Group Treatment                          Section completed by:  Tano  MS Nagi,[facility]-SLP          REHAB-Pharmacy IDT Team Note by Isidro Venegas [facility] at 12/6/2019 12:06 PM  Version 1 of 1    Author:  Isidro Venegas RPH Service:  -- Author Type:  Pharmacist    Filed:  12/6/2019 12:07 PM Date of Service:  12/6/2019 12:06 PM Status:  Signed    :  Isidro Venegas RPH (Pharmacist)         Pharmacy   Pharmacy  Antibiotics/Antifungals/Antivirals:  Medication:      Active Orders (From admission, onward)    None        Route:        NA  Stop Date:  NA  Reason:      NA  Antihypertensives/Cardiac:  Medication:    Orders (72h ago, onward)     Start     Ordered    11/27/19 2100  lovastatin (MEVACOR) tablet 40 mg  EVERY BEDTIME      11/27/19 1312              Patient Vitals for the past 24 hrs:   BP Pulse   12/06/19 1051 -- 88   12/06/19 0700 100/63 67   12/05/19 1853 117/74 95   12/05/19 1618 -- 80   12/05/19 1427 100/72 72     Anticoagulation:  Medication: Aspirin      Other key medications: A review of the medication list reveals no issues at this time.    Section completed by: Isidro Venegas [facility][[email or web address]]     Attribution Key     AW.1 - Isidro Venegas RP on 12/6/2019 12:06 PM                  DC Planning  DC destination/dispostion:  Patient lives alone in a single story house, one step to enter. She hopes to return home with supportive care from family and friends. Daughter to come live with her for about a month after discharge to assist.  Discharge support to be confirmed by case management.     Referrals: [facility] - RN/PT/OT  Preferred DME - wheel chair with cushion  Division of Aging and Disability for assist with personal care/homemaker services    DC Needs: Possible home oxygen. TBD closer to discharge.   MD f/u appointments - PCP appointment in place, Dr. Delgado, Surgery appointment scheduled for 12/12/19.      Barriers to discharge:  Lives alone.  Does not drive but uses RTC access.        Strengths: Supportive family and friends. Motivated.         Section completed  by:  Irma Jose R.N.      Physician Summary  Salma Tejada MD participated and led team conference discussion.

## 2019-12-09 NOTE — REHAB-NURSING IDT TEAM NOTE
Nursing   Nursing  Diet/Nutrition:  Regular and Thin Liquids  Medication Administration:  Whole with Liquid Wash  % consumed at meals in last 24 hours:  Consumed 50-75% of meals per documentation.  Meal/Snack Consumption for the past 24 hrs:   Oral Nutrition   12/08/19 0900 Breakfast;Between 50-75% Consumed     Snack schedule:  None  Supplement:  Boost Plus milkshake, milkshake fortified with Beneprotein per pt req/discretion per home regimen.  Appetite:  Fair  Fluid Intake/Output in past 24 hours: In: 600 [P.O.:600]  Out: - Pt up to the toilet to void.  Admit Weight:  Weight: 68.5 kg (151 lb)  Weight Last 7 Days   [67.5 kg (148 lb 13 oz)] 67.5 kg (148 lb 13 oz) (12/08 0500)    Pain Issues:    Location:  Hip;Leg (12/08 2150)  Right;Lateral (12/08 2150)         Severity:  Moderate   Scheduled pain medications:  Tylenol  PRN pain medications used in last 24 hours:  tramadol (Ultram)   Non Pharmacologic Interventions:  rest  Effectiveness of pain management plan:  good=patient states acceptable comfort after interventions    Bowel:    Bowel Assist Initial Score:  5 - Standby Prompting/Supervision or Set-up  Bowel Assist Current Score:  5 - Standby Prompting/Supervision or Set-up  Bowl Accidents in last 7 days:  0  Last bowel movement: 12/07/19  Stool Description: Large     Usual bowel pattern:  daily  Scheduled bowel medications:  None  PRN bowel medications used in last 24 hours:  None  Nursing Interventions:  Increased time, Positioning on commode/toilet, Supervision, Verbal cueing, Set-up, Brief  Effectiveness of bowel program:   fair=sometimes needs prn bowel meds for constipation  Bladder:    Bladder Assist Initial Score:  1 - Total Assistance  Bladder Assist Current Score:  5 - Standby Prompting/Supervision or Set-up  Bladder Accidents in last 7 days:  0  PVR range for past 24-48 hours: -- ()  Intermittent Catheterization:  N/A  Medications affecting bladder:  None    Time void schedule/voiding pattern:  Voiding  every 2-4 hours  Interventions:  Increased time, Supervision, Verbal cueing, Brief, Time void patient initiated  Effectiveness of bladder training:  Good=regular, predictable, emptying of bladder, patient initiates time voiding    Wound:         Patient Lines/Drains/Airways Status    Active Current Wounds     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  Effectiveness of intervention:  wound is improving     Sleep/wake cycle:   Average hours slept:  Sleeps 3-4 hours without waking  Sleep medication usage:  Other Melatonin 3 mg and Trazodone 50 prn.    Patient/Family Training/Education:  Fall Prevention, General Self Care, Medication Management, Pain Management, Safe Transfers, Safety and Wound Care  Discharge Supply Recommendations:  Wound Care Supplies and Other: Adaptive equipment.  Strengths: Effective communication skills   Barriers:   Generalized weakness, Impulsive and Limited mobility       Nursing Problems     Problem: Bowel/Gastric:     Description:     Goal: Normal bowel function is maintained or improved     Description:           Goal: Will not experience complications related to bowel motility     Description:                 Problem: Communication     Description:     Goal: The ability to communicate needs accurately and effectively will improve     Description:                 Problem: Discharge Barriers/Planning     Description:     Goal: Patient's continuum of care needs will be met     Description:                 Problem: Infection     Description:     Goal: Will remain free from infection     Description:                 Problem: Knowledge Deficit     Description:     Goal: Knowledge of disease process/condition, treatment plan, diagnostic tests, and medications will improve     Description:           Goal: Knowledge of the prescribed therapeutic regimen will improve     Description:                 Problem: Pain Management      Description:     Goal: Pain level will decrease to patient's comfort goal     Description:                 Problem: Psychosocial Needs:     Description:     Goal: Level of anxiety will decrease     Description:                 Problem: Respiratory:     Description:     Goal: Respiratory status will improve     Description:                 Problem: Safety     Description:     Goal: Will remain free from injury     Description:           Goal: Will remain free from falls     Description:                 Problem: Skin Integrity     Description:     Goal: Risk for impaired skin integrity will decrease     Description:                 Problem: Urinary Elimination:     Description:     Goal: Ability to reestablish a normal urinary elimination pattern will improve     Description:                 Problem: Venous Thromboembolism (VTW)/Deep Vein Thrombosis (DVT) Prevention:     Description:     Goal: Patient will participate in Venous Thrombosis (VTE)/Deep Vein Thrombosis (DVT)Prevention Measures     Description:     Flowsheet:     Taken at 12/07/19 1030    Pharmacologic Prophylaxis Used No Value by  Griselda Alvarez, R.N. Comment:  ASA                             Long Term Goals:   At discharge patient will be able to function safely at home and in the community with support.    Section completed by:  Bin Ann L.P.N.2

## 2019-12-09 NOTE — CARE PLAN
Problem: Memory STGs  Goal: STG-Within one week, patient will  Description  1) Individualized goal:  Recall recently taught precautions and safety strategies r/t rehabilitation stay with 80% accuracy and Spv.    2) Interventions:  SLP Speech Language Treatment, SLP Self Care / ADL Training , SLP Cognitive Skill Development and SLP Group Treatment     Outcome: NOT MET  Note:   Min A required for pt to recall recently trained strategies and precautions      Problem: Problem Solving STGs  Goal: STG-Within one week, patient will  Description  1) Individualized goal:  Complete functional problem solving tasks r/t medication management with 80% accuracy and MIN A.  2) Interventions:  SLP Speech Language Treatment, SLP Self Care / ADL Training , SLP Cognitive Skill Development and SLP Group Treatment     Outcome: MET  Note:   Pt is able to problem solve for medication management tasks with min A

## 2019-12-09 NOTE — REHAB-SLP IDT TEAM NOTE
Speech Therapy   Cognitive Linquistic Functions  Comprehension Initial:  5 - Stand-by Prompting/Supervision or Set-up  Comprehension Current:  6 - Modified Independent   Comprehension Description:  Glasses  Expression Initial:  5 - Stand-by Prompting/Supervision or Set-up  Expression Current:  7 - Independent   Expression Description:  Verbal cueing  Social Interaction Initial:  4 - Minimal Assistance  Social Interaction Current:  5 - Stand-by Prompting/Supervision or Set-up   Social Interaction Description:  Verbal cues, Schedule  Problem Solving Initial:  4 - Minimal Assistance  Problem Solving Current:  5 - Standby Prompting/Supervision or Set-up   Problem Solving Description:  Verbal cueing  Memory Initial:  3 - Moderate Assistance  Memory Current:  5 - Standby Prompting/Supervision or Set-up   Memory Description:  Verbal cueing, Therapy schedule  Executive Functioning / Organization Initial:   NA  Executive Functioning / Organization Current:   NA   Executive Functioning Desciption:  NA  Swallowing  Swallowing Status:  Not following for dysphagia management   Orders Placed This Encounter   Procedures   • Diet Order Regular     Standing Status:   Standing     Number of Occurrences:   1     Order Specific Question:   Diet:     Answer:   Regular [1]     Order Specific Question:   Consistency/Fluid modifications:     Answer:   Thin Liquids [3]     Behavior Modification Plan  Keep instructions simple/concrete, Redirect to task/topic and Analyze tasks (break down into smaller steps)  Assistive Technology  Low/Impaired vision equipment, Memory aides: and Low tech: Calendar, planner, schedule, alarms/timers, pill organizer, post-it notes, lists  Family Training/Education:  Completed with pt's daughter and friend Cathy   DC Recommendations:   Pt will require assistance with medication management which her daughter and friend Cathy will provide   Strengths:  Able to follow instructions, Effective communication skills,  Good insight into deficits/needs, Motivated for self care and independence, Pleasant and cooperative, Supportive family and Willingly participates in therapeutic activities  Barriers:  Poor carryover of learning  # of short term goals set=2  # of short term goals met=1  Speech Therapy Problems     Problem: Memory STGs     Dates: Start: 12/02/19       Description:     Goal: STG-Within one week, patient will     Dates: Start: 12/02/19       Description: 1) Individualized goal:  Recall recently taught precautions and safety strategies r/t rehabilitation stay with 80% accuracy and Spv.    2) Interventions:  SLP Speech Language Treatment, SLP Self Care / ADL Training , SLP Cognitive Skill Development and SLP Group Treatment       Note:     Goal Note filed on 12/09/19 0800 by Tano Lazar MS,CCC-SLP    Min A required for pt to recall recently trained strategies and precautions                         Problem: Speech/Swallowing LTGs     Dates: Start: 11/28/19       Description:     Goal: LTG-By discharge, patient will solve basic problems     Dates: Start: 11/28/19       Description: 1) Individualized goal:  R/t safety and ADLs/IADLs with MOD I.   2) Interventions:  SLP Speech Language Treatment, SLP Self Care / ADL Training , SLP Cognitive Skill Development and SLP Group Treatment                          Section completed by:  Tano Lazar MS,CCC-SLP

## 2019-12-09 NOTE — THERAPY
Speech Language Pathology  Daily Treatment     Patient Name: Guillermina Recio  Age:  79 y.o., Sex:  female  Medical Record #: [id number]  Today's Date: 12/9/2019     Subjective    Pt was pleasant and cooperative during this ST session      Objective       12/09/19 1431   SLP Total Time Spent   SLP Individual Total Time Spent (Mins) 30   Charge Group   SLP Cognitive Skill Development 2         Assessment    Pt required mod cues to recall how her medications were going to be managed when she returns home (pt unable to recall family training session that was completed on Friday with ST).  Pt was able to recall how to get up and down stairs, hip precautions and how to safely transfer in her wheelchair independently.      Plan    Pt to discharge home tomorrow with assistance from her daughter and friend Cathy

## 2019-12-09 NOTE — THERAPY
Occupational Therapy  Daily Treatment     Patient Name: Guillermina Recio  Age:  79 y.o., Sex:  female  Medical Record #: [id number]  Today's Date: 2019     Precautions  Precautions: (P) Posterior Hip Precautions, Weight Bearing As Tolerated Right Lower Extremity, Fall Risk, Other (See Comments)  Comments: (P) Impaired vision, COPD    Safety   ADL Safety : Requires Supervision for Safety  Bathroom Safety: Requires Supervision for Safety, Requires Cuing for Safety  Comments: (P) Requires SBA for mobility with FWW and bathing, modified independent for dressing and toileting with  use of AE for maintaining hip precautions    Subjective    Pt received seated in chair, reports feeling ready to go home, enjoyed her outing yesterday to [place]     Objective       19 0931   Precautions   Precautions Posterior Hip Precautions;Weight Bearing As Tolerated Right Lower Extremity;Fall Risk;Other (See Comments)   Comments Impaired vision, COPD   Safety    Comments Requires SBA for mobility with FWW and bathing, modified independent for dressing and toileting with  use of AE for maintaining hip precautions   Interdisciplinary Plan of Care Collaboration   IDT Collaboration with  Family / Caregiver   Patient Position at End of Therapy Seated;Call Light within Reach;Tray Table within Reach;Phone within Reach   Collaboration Comments phone discussion with friend/caregiver Ebony re: DME, per Ebony she has toilet riser with handles and tub transfer bench setup at house, has sock aid for patient as well which she will bring over. Discussed need for equipment to be ~20 inches in height to optimize independence and maintain hip precautions   OT Total Time Spent   OT Individual Total Time Spent (Mins) 60   OT Charge Group   OT Self Care / ADL 3   OT Therapy Activity 1       FIM Upper Body Dressin - Modified Independent  Upper Body Dressing Description:  Increased time    FIM Lower Body Dressing Score:  6 - Modified  Independent  Lower Body Dressing Description:  Reacher, Sock aid, Increased time    FIM Toiletin - Modified Independent  Toileting Description:  Grab bar, Increased time    FIM Toilet Transfer Score:  5 - Standby Prompting/Supervision or Set-up  Toilet Transfer Description:  Supervision for safety(FWW to/from bathroom)    FIM Tub/Shower Transfers Score:  5 - Standby Prompting/Supervision or Set-up  Tub/Shower Transfers Description:  Supervision for safety(FWW to/from bathroom, txed on/off tub bench, able to manage LEs in/out of tub with additional time)      Assessment    Pt tolerated session well, reviewed necessary DME for shower/toilet transfers and pt able to return demo appropriate and safe mechanics. Reviewed hip precautions and AE for dressing, pt with improved initiation and use of AE though does continue to require increased time. Pt's daughter planning to stay with her for a few weeks to ensure safe transition.     Plan    Home with HHOT follow up, support from daughter as needed

## 2019-12-09 NOTE — REHAB-PT IDT TEAM NOTE
Physical Therapy   Mobility  Bed mobility:   SBA supine to/from sit  Bed /Chair/Wheelchair Transfer Initial:  4 - Minimal Assistance  Bed /Chair/Wheelchair Transfer Current:  5 - Standby Prompting/Supervision FWW   Bed/Chair/Wheelchair Transfer Description:  Increased time, Supervision for safety, Verbal cueing, Adaptive equipment(FWW)  Walk Initial:  1 - Total Assistance  Walk Current:  2 - Max Assistance   FWW SBA 55 FT   Walk Description:  Walker, Oxygen, Extra time, Verbal cueing, Supervision for safety, Assist device/equipment(FWW, SBA 55 FT)  Wheelchair Initial:  1 - Total Assistance  Wheelchair Current:  1 - Total Assistance   Wheelchair Description:  Extra time, Supervision for safety, Verbal cueing, Requires incidental assist  Stairs Initial:  0 - Not tested,unsafe activity  Stairs Current: 0 - Not tested,unsafe activity   Stairs Description:    Patient/Family Training/Education: In progress  DME/DC Recommendations:  FWW, manual wheelchair  Strengths:  Making steady progress towards goals, Manages pain appropriately, Motivated for self care and independence, Pleasant and cooperative, Supportive family and Willingly participates in therapeutic activities  Barriers:   Other: Impaired bed mobility, transfers, gait, tolerance for activity/endurance, posterior hip precautions, fall risk  # of short term goals set= 3  # of short term goals met=3  Physical Therapy Problems     Problem: PT-Long Term Goals     Dates: Start: 11/28/19       Description:     Goal: LTG-By discharge, patient will ambulate     Dates: Start: 11/28/19       Description: 1) Individualized goal:  150ft  x1 FWW SPV  2) Interventions:  PT Group Therapy, PT Gait Training, PT Therapeutic Exercises, PT Neuro Re-Ed/Balance, PT Therapeutic Activity, PT Manual Therapy and PT Evaluation             Goal: LTG-By discharge, patient will perform home exercise program     Dates: Start: 11/28/19       Description: 1) Individualized goal:  For B LE  strengthening, mod I  2) Interventions:  PT Group Therapy, PT Gait Training, PT Therapeutic Exercises, PT Neuro Re-Ed/Balance, PT Therapeutic Activity, PT Manual Therapy and PT Evaluation             Goal: LTG-By discharge, patient will transfer in/out of a car     Dates: Start: 11/28/19       Description: 1) Individualized goal:  LRAD, CGA  2) Interventions:  PT Group Therapy, PT Gait Training, PT Therapeutic Exercises, PT Neuro Re-Ed/Balance, PT Therapeutic Activity, PT Manual Therapy and PT Evaluation             Goal: LTG-By discharge, patient will     Dates: Start: 11/28/19       Description: 1) Individualized goal: perform platform step to enter home, CGA, FWW  2) Interventions:  PT Group Therapy, PT Gait Training, PT Therapeutic Exercises, PT Neuro Re-Ed/Balance, PT Therapeutic Activity, PT Manual Therapy and PT Evaluation                           Section completed by:  BIANCA Moss

## 2019-12-09 NOTE — DISCHARGE PLANNING
Case Management Discharge Instructions  Discharge December 10, 2019        Discharge Location: Home with Home Health     Agency Name / Phone: [facility] [phone]     Home Health: Registered Nurse, Occupational Therapist, Physical Therapist           DME Provider / Phone: [facility] [phone]     Medical Equipment Ordered: Wheelchair, Oxygen  Patient has obtained a shower chair and toilet riser with arms from [employer]           Follow-up Information:    Khang Delgado M.D.  [address] [address]  [phone]  Surgery - Thursday, Dec. 12, 2019 @ 1PM, 12:15 check in     Dr. Cobos  [address] [place] [address]   [phone]  On 12/17/2019  Primary care - Elizabeth Molina M.D. had no opening until January.   Hospital f/u appointment scheduled for Tuesday, Dec. 17, 2019 @ 9:45AM

## 2019-12-09 NOTE — REHAB-OT IDT TEAM NOTE
Occupational Therapy   Activities of Daily Living  Eating Initial:  5 - Standby Prompting/Supervision or Set-up  Eating Current:  6 - Modified Independent   Eating Description:  Increased time, Supervision for safety, Modified diet(Vision impaired, set up required.)  Grooming Initial:  5 - Standby Prompting/Supervision or Set-up  Grooming Current:  6 - Modified Independent   Grooming Description:  Increased time  Bathing Initial:  3 - Moderate Assistance  Bathing Current:  5 - Standby Prompting/Supervision or Set-up   Bathing Description:  Long handled bath tool, Set-up of equipment  Upper Body Dressing Initial:  5 - Standby Prompting/Supervision or Set-up  Upper Body Dressing Current:  6 - Modified Independent   Upper Body Dressing Description:  Increased time  Lower Body Dressing Initial:  3 - Moderate Assistance  Lower Body Dressing Current:  6 - Modified Independent   Lower Body Dressing Description:  6 - Modified Independent  Toileting Initial:  3 - Moderate Assistance  Toileting Current:  6 - Modified Independent   Toileting Description:  Grab bar, Increased time  Toilet Transfer Initial:  4 - Minimal Assistance  Toilet Transfer Current:  5 - Standby Prompting/Supervision or Set-up   Toilet Transfer Description:  5 - Standby Prompting/Supervision or Set-up  Tub / Shower Transfer Initial:  4 - Minimal Assistance  Tub / Shower Transfer Current:  5 - Standby Prompting/Supervision or Set-up   Tub / Shower Transfer Description:  Supervision for safety(FWW to/from bathroom, txed on/off tub bench, able to manage LEs in/out of tub with additional time)  IADL:  SBA for kitchen mobility   Family Training/Education:  Caregiver training completed   DME/DC Recommendations:  Caregiver has setup tub transfer bench and toilet riser with handles and has obtained sock aid. Pt has reacher already. She will need a FWW    Strengths:  Able to follow instructions, Making steady progress towards goals, Motivated for self care and  independence, Pleasant and cooperative, Supportive family and Willingly participates in therapeutic activities  Barriers:  Decreased endurance, Generalized weakness, Limited mobility, Poor balance and Other: intermittent hip pain, impaired vision at baseline     # of short term goals set= 2    # of short term goals met= 2     Occupational Therapy Goals     Problem: OT Long Term Goals     Dates: Start: 11/28/19       Description:     Goal: LTG-By discharge, patient will complete basic self care tasks     Dates: Start: 11/28/19       Description: 1) Individualized Goal: Mod I with AE as needed    2) Interventions: OT Group Therapy, OT Self Care/ADL, OT Cognitive Skill Dev, OT Community Reintegration, OT Manual Ther Technique, OT Neuro Re-Ed/Balance, OT Sensory Int Techniques, OT Therapeutic Activity, OT Evaluation and OT Therapeutic Exercise             Goal: LTG-By discharge, patient will perform bathroom transfers     Dates: Start: 11/28/19       Description: 1) Individualized Goal: CGA for safety and balance    2) Interventions: OT Group Therapy, OT Self Care/ADL, OT Cognitive Skill Dev, OT Community Reintegration, OT Manual Ther Technique, OT Neuro Re-Ed/Balance, OT Sensory Int Techniques, OT Therapeutic Activity, OT Evaluation and OT Therapeutic Exercise                         Section completed by:  Nelly Escalona MS,OTR/L

## 2019-12-09 NOTE — PROGRESS NOTES
DATE OF SERVICE:  12/04/2019    The patient says she is participating actively.  She complained of some,   although not significant pain.  Patient reports she is sleeping fairly well.    She said her appetite is slowly returning.  The patient said that she is   unhappy about being in the hospital, but with this consideration in mind, she   is doing fairly well.       ____________________________________     BLAYNE DAWSON, PHD    STEFANIE / RONN    DD:  12/08/2019 18:16:02  DT:  12/09/2019 01:22:38    D#:  [id number]  Job#:  [id number]

## 2019-12-09 NOTE — CARE PLAN
Problem: Bathing  Goal: STG-Within one week, patient will bathe  Description  1) Individualized Goal:  With supervision using AE as needed    2) Interventions:  OT Group Therapy, OT Self Care/ADL, OT Cognitive Skill Dev, OT Community Reintegration, OT Manual Ther Technique, OT Neuro Re-Ed/Balance, OT Sensory Int Techniques, OT Therapeutic Activity, OT Evaluation and OT Therapeutic Exercise      12/9/2019 1130 by Nelly Escalona MS,OTR/L  Outcome: MET  12/9/2019 1130 by Nelly Escalona MS,OTR/L  Reactivated     Problem: Dressing  Goal: STG-Within one week, patient will dress LB  Description  1) Individualized Goal: supervision with AE as needed    2) Interventions: OT Group Therapy, OT Self Care/ADL, OT Cognitive Skill Dev, OT Community Reintegration, OT Manual Ther Technique, OT Neuro Re-Ed/Balance, OT Sensory Int Techniques, OT Therapeutic Activity, OT Evaluation and OT Therapeutic Exercise      12/9/2019 1130 by Nelly Escalona MS,OTR/L  Outcome: MET  12/9/2019 1130 by Nelly Escalona MS,OTR/L  Reactivated

## 2019-12-09 NOTE — FLOWSHEET NOTE
Respiratory Therapy Update    Interdisciplinary Plan of Care-Goals (Indications)  Bronchodilator Indications: History / Diagnosis (12/03/19 0745)  Interdisciplinary Plan of Care-Outcomes   Bronchodilator Outcome: Patient at Stable Baseline (12/03/19 0745)               Cough: Moist;Non Productive;Strong (12/08/19 1525)                           O2 (LPM): 2 (12/08/19 1525)  O2 Daily Delivery Respiratory : Silicone Nasal Cannula (12/08/19 1525)    Breath Sounds  Pre/Post Intervention: Pre Intervention Assessment (12/01/19 2030)  RUL Breath Sounds: Clear (12/08/19 1525)  RML Breath Sounds: Clear (12/08/19 1525)  RLL Breath Sounds: Diminished (12/08/19 1525)  HAJA Breath Sounds: Clear (12/08/19 1525)  LLL Breath Sounds: Diminished (12/08/19 1525)        Events/Summary/Plan: SpO2 checked with pt sitting at bedside.  No distress at this time. (12/08/19 1525)

## 2019-12-09 NOTE — FLOWSHEET NOTE
12/09/19 0933   Events/Summary/Plan   Events/Summary/Plan Room air SpO2 check sitting in wheelchair   Chest Exam   Respiration (!) 24   Pulse 87   Oximetry   #Pulse Oximetry (Single Determination) Yes   Oxygen   Home O2 Use Prior To Admission? No   Pulse Oximetry 94 %   O2 (LPM) 2   O2 Daily Delivery Respiratory  Silicone Nasal Cannula   Room Air Challenge Fail  (Room air SpO2=87%)

## 2019-12-09 NOTE — CARE PLAN
Problem: Communication  Goal: The ability to communicate needs accurately and effectively will improve  Note:   Encouraged to make needs known to staff and to use call light for staff assist.      Problem: Safety  Goal: Will remain free from falls  Note:   Call light kept within reach and encouraged to use for assistance and with toileting needs. Encouraged to call staff and to wait for staff before transfers.  Bed alarm is place.  Hourly rounding in effect.     Problem: Pain Management  Goal: Pain level will decrease to patient's comfort goal  Note:   Has scheduled Tylenol for right hip pain, has + relief. See MAR and doc flow sheet. Will continue to monitor patient.

## 2019-12-09 NOTE — PROGRESS NOTES
"Rehab Progress Note     Date of Service: 12/9/2019  Chief Complaint: follow up hip replacement and small bowel obstruction    Interval Events (Subjective)    Patient is seen and examined in her room today.  She is very happy that her therapeutic [facility] went [facility].  Unfortunately they forgot her walker but she was able to mobilize without any issues with her wheelchair and her caregiver assistance to do transfers.  She did not need to use the bathroom when she was out.  Pain is currently well controlled.  She does feel like she is ready for discharge tomorrow.  She has no new complaints.    Objective:  VITAL SIGNS: /60   Pulse 87   Temp 36.6 °C (97.8 °F) (Oral)   Resp (!) 24   Ht 1.473 m (4' 10\")   Wt 67.5 kg (148 lb 13 oz)   SpO2 94%   BMI 31.10 kg/m²    Gen: alert, no apparent distress  CV: regular rate and rhythm, no murmurs, no peripheral edema  Resp: clear to ascultation bilaterally, normal respiratory effort  GI: soft, non-tender abdomen, bowel sounds present  Neuro: notable for chronic visual impairment    Recent Results (from the past 72 hour(s))   POTASSIUM SERUM (K)    Collection Time: 12/07/19  5:39 AM   Result Value Ref Range    Potassium 4.5 3.6 - 5.5 mmol/L       Current Facility-Administered Medications   Medication Frequency   • OCUVITE-LUTEIN tablet 1 tablet DAILY   • aspirin EC (ECOTRIN) tablet 325 mg BID   • budesonide-formoterol (SYMBICORT) 160-4.5 MCG/ACT inhaler 2 Puff BID (RT)   • simethicone (MYLICON) chewable tab 80 mg TID WITH MEALS   • senna-docusate (PERICOLACE or SENOKOT S) 8.6-50 MG per tablet 2 Tab BID PRN    And   • polyethylene glycol/lytes (MIRALAX) PACKET 1 Packet QDAY PRN    And   • magnesium hydroxide (MILK OF MAGNESIA) suspension 30 mL QDAY PRN    And   • bisacodyl (DULCOLAX) suppository 10 mg QDAY PRN   • vitamin D (cholecalciferol) 1000 UNIT tablet 1,000 Units DAILY   • Respiratory Care per Protocol Continuous RT   • Pharmacy " Consult Request ...Pain Management Review 1 Each PHARMACY TO DOSE   • artificial tears ophthalmic solution 1 Drop PRN   • benzocaine-menthol (CEPACOL) lozenge 1 Lozenge Q2HRS PRN   • mag hydrox-al hydrox-simeth (MAALOX PLUS ES or MYLANTA DS) suspension 20 mL Q2HRS PRN   • ondansetron (ZOFRAN ODT) dispertab 4 mg 4X/DAY PRN    Or   • ondansetron (ZOFRAN) syringe/vial injection 4 mg 4X/DAY PRN   • traZODone (DESYREL) tablet 50 mg QHS PRN   • sodium chloride (OCEAN) 0.65 % nasal spray 2 Spray PRN   • melatonin tablet 3 mg HS PRN   • lactulose 20 GM/30ML solution 30 mL QDAY PRN   • fleet enema 133 mL QDAY PRN   • calcium carbonate (TUMS) chewable tab 500 mg QDAY PRN   • lovastatin (MEVACOR) tablet 40 mg QHS   • nystatin (MYCOSTATIN) powder BID   • omeprazole (PRILOSEC) capsule 20 mg QDAY   • acetaminophen (TYLENOL) tablet 1,000 mg TID   • tramadol (ULTRAM) 50 MG tablet 50 mg Q4HRS PRN   • oxyCODONE immediate-release (ROXICODONE) tablet 5 mg Q4HRS PRN       Orders Placed This Encounter   Procedures   • Diet Order Regular     Standing Status:   Standing     Number of Occurrences:   1     Order Specific Question:   Diet:     Answer:   Regular [1]     Order Specific Question:   Consistency/Fluid modifications:     Answer:   Thin Liquids [3]       Assessment:  Active Hospital Problems    Diagnosis   • *Status post right hip replacement   • Ileus (HCC)   • Obesity (BMI 30-39.9)   • Dyslipidemia   • HTN (hypertension)   • Gastroesophageal reflux disease   • Vitamin D deficiency   • Anemia   • Azotemia   • Lung nodule   • Impaired mobility and ADLs   • COPD (chronic obstructive pulmonary disease) (HCC)   • Hip pain, chronic, right     This patient is a 79 y.o. female admitted for acute inpatient rehabilitation with Status post right hip replacement.    I led and attended the weekly conference, and agree with the IDT conference documentation and plan of care as noted below.    Date of conference: 12/9/2019    Goals and barriers:  See IDT note.    Biggest barriers: visual impairments    Goals in next week: discharge    Therapy update 12/9/2019  Modified Independent eating  Modified Independent grooming  Supervision bathing  Modified Independent upper body dressing  Modified Independent lower body dressing  Modified Independent toileting  Supervisionbladder  Supervision bowel  Supervision bed/chair transfer  Supervision toilet transfer  Supervision tub/shower transfer  Max assist ambulation  Total assist wheelchair propulsion  Not tested stairs  Modified Independent comprehension  Independent expression  Supervision social interaction  Supervision problem solving  Supervision memory    Decrease SLP to 30 min, share the other 30 with PT/OT    CM/social support: lives alone, friend with intermittent support    Anticipated DC date: 12/10/2019    Home health: PT/OT/SLP/RN    Equip: JAMES, PINKY    Follow up: PCP, surgery, [facility]    Medical Decision Making and Plan:    S/p Right hip replacement  11/21 Dr. Delgado  Continue full rehab program  PT/OT/SLP, 1 hr each discipline, 5 days per week  Posterior hip precautions  Outpatient follow up for suture/dressing removal per surgeon    Pain Management  Scheduled tylenol  PRN tramadol  Pain currently well controlled    Bowel  SBO, see below  Meds as needed  Last BM 12/7    Bladder  Check PVRs - 17, 11, 50  ICP for over 400 cc  Scheduled toileting    DVT prophylaxis  ASA BID per surgery    Appreciate the assistance of the hospitalist with her medical comorbidities :     Small bowel obstruction, resolved, diet advanced  Large hiatal hernia  GERD  Hypertension, off all BP meds  Hypotension, improved  COPD, started on Symbicort, respiratory therapy to attempt titration, was not on O2 at home  Respiratory insufficiency  Hyperlipidemia  Azotemia, resolved  Anemia, stable  Vit D deficiency, on supplementation  Lung nodule, outpatient follow up    Total time:  40 minutes.  I spent greater than 50% of the  time for patient care, counseling, and coordination on this date, including patient face-to face time, unit/floor time with review of records/pertinent lab data and studies, as well as discussing diagnostic evaluation/work up, planned therapeutic interventions, and future disposition of care, as per the interval events/subjective and the assessment and plan as noted above.    Salma Tejada M.D.   Physical Medicine and Rehabilitation

## 2019-12-10 VITALS
BODY MASS INDEX: 31.24 KG/M2 | HEIGHT: 58 IN | HEART RATE: 79 BPM | WEIGHT: 148.81 LBS | TEMPERATURE: 98.7 F | SYSTOLIC BLOOD PRESSURE: 105 MMHG | RESPIRATION RATE: 18 BRPM | OXYGEN SATURATION: 94 % | DIASTOLIC BLOOD PRESSURE: 54 MMHG

## 2019-12-10 PROCEDURE — A9270 NON-COVERED ITEM OR SERVICE: HCPCS | Performed by: PHYSICAL MEDICINE & REHABILITATION

## 2019-12-10 PROCEDURE — 99239 HOSP IP/OBS DSCHRG MGMT >30: CPT | Performed by: PHYSICAL MEDICINE & REHABILITATION

## 2019-12-10 PROCEDURE — 700102 HCHG RX REV CODE 250 W/ 637 OVERRIDE(OP): Performed by: PHYSICAL MEDICINE & REHABILITATION

## 2019-12-10 PROCEDURE — 99231 SBSQ HOSP IP/OBS SF/LOW 25: CPT | Performed by: HOSPITALIST

## 2019-12-10 PROCEDURE — A9270 NON-COVERED ITEM OR SERVICE: HCPCS | Performed by: HOSPITALIST

## 2019-12-10 PROCEDURE — 700102 HCHG RX REV CODE 250 W/ 637 OVERRIDE(OP): Performed by: HOSPITALIST

## 2019-12-10 RX ADMIN — ACETAMINOPHEN 1000 MG: 500 TABLET, FILM COATED ORAL at 08:37

## 2019-12-10 RX ADMIN — I-VITE, TAB 1000-60-2MG (60/BT) 1 TABLET: TAB at 08:37

## 2019-12-10 RX ADMIN — ASPIRIN 325 MG: 325 TABLET, COATED ORAL at 08:37

## 2019-12-10 RX ADMIN — VITAMIN D, TAB 1000IU (100/BT) 1000 UNITS: 25 TAB at 08:37

## 2019-12-10 RX ADMIN — POLYETHYLENE GLYCOL 3350 1 PACKET: 17 POWDER, FOR SOLUTION ORAL at 06:19

## 2019-12-10 RX ADMIN — SIMETHICONE CHEW TAB 80 MG 80 MG: 80 TABLET ORAL at 08:37

## 2019-12-10 RX ADMIN — BUDESONIDE AND FORMOTEROL FUMARATE DIHYDRATE 2 PUFF: 160; 4.5 AEROSOL RESPIRATORY (INHALATION) at 08:39

## 2019-12-10 ASSESSMENT — ENCOUNTER SYMPTOMS
NAUSEA: 0
COUGH: 0
ABDOMINAL PAIN: 0
ROS GI COMMENTS: +GAS
PALPITATIONS: 0
VOMITING: 0
SHORTNESS OF BREATH: 0
EYES NEGATIVE: 1
FEVER: 0
CHILLS: 0

## 2019-12-10 NOTE — DISCHARGE PLANNING
Late entry for 12/9/19. Met with patient following Team Conference on 12/9/19 to relay progress and discuss discharge on 12/10/19. Patient was concerned about transportation home. Her daughter, May, will be staying with her, and patient had not spoken with daughter or caregiver re: transport home.   Daughter was currently have car trouble. I did not have daughter's phone number. I called Cathy, caregiver, who informed she was available today and could transport patient home. Cathy indicated that the best contact number for daughter is patient's home phone, but patient's phone message center is full.  Transport home concern was resolved yesterday.  Today when discharged, patient indicated she is aware of the medical uber benefit through [employer] and will work on using that if daughter's vehicle is not available or Cathy cannot transport her. Cathy voiced she could get patient to her two medical appointments - one with surgeon and the other with primary care provider.

## 2019-12-10 NOTE — PROGRESS NOTES
Hospital Medicine Daily Progress Note      Chief Complaint  Ileus    Interval Problem Update  No overnight problems.    Review of Systems  Review of Systems   Constitutional: Negative for chills and fever.   HENT: Negative.    Eyes: Negative.    Respiratory: Negative for cough and shortness of breath.    Cardiovascular: Negative for chest pain and palpitations.   Gastrointestinal: Negative for abdominal pain, nausea and vomiting.        +gas   Musculoskeletal: Positive for joint pain.   Skin: Negative for itching and rash.        Physical Exam  Temp:  [36.6 °C (97.8 °F)-37 °C (98.6 °F)] 37 °C (98.6 °F)  Pulse:  [67-95] 86  Resp:  [17-24] 18  BP: (100-110)/(58-75) 100/58  SpO2:  [91 %-96 %] 91 %    Physical Exam  Vitals signs reviewed.   Constitutional:       General: She is not in acute distress.     Appearance: Normal appearance. She is not ill-appearing.   HENT:      Head: Normocephalic and atraumatic.      Right Ear: External ear normal.      Left Ear: External ear normal.      Nose: Nose normal.   Eyes:      General:         Right eye: No discharge.         Left eye: No discharge.      Extraocular Movements: Extraocular movements intact.      Conjunctiva/sclera: Conjunctivae normal.   Neck:      Musculoskeletal: Normal range of motion and neck supple.   Cardiovascular:      Rate and Rhythm: Normal rate and regular rhythm.   Pulmonary:      Effort: No respiratory distress.      Breath sounds: No wheezing.      Comments: Decreased BS  Abdominal:      General: Bowel sounds are normal. There is no distension.      Palpations: Abdomen is soft.      Tenderness: There is no tenderness. There is no guarding or rebound.   Musculoskeletal:      Right lower leg: No edema.      Left lower leg: No edema.   Skin:     General: Skin is warm and dry.   Neurological:      Mental Status: She is alert and oriented to person, place, and time.         Fluids    Intake/Output Summary (Last 24 hours) at 12/9/2019 7376  Last data filed  at 12/9/2019 1249  Gross per 24 hour   Intake 920 ml   Output --   Net 920 ml       Laboratory      Recent Labs     12/07/19  0539   POTASSIUM 4.5                   Assessment/Plan  Ileus ([facility])  Assessment & Plan  Surgery notes reviewed, more likely post-op ileus than small bowel obstruction  Serial KUB shows resolution of distended bowel loops  Constipation resolved w/ increased bowel regimen  Continue Simethicone for gas  Diet advanced to regular and tolerating    Dyslipidemia- (present on admission)  Assessment & Plan  On Mevacor    Lung nodule- (present on admission)  Assessment & Plan  CT Abd/Pelvis 11/25/19 showed 6 mm RML pulmonary nodule  Needs close F/U given smoking history    COPD (chronic obstructive pulmonary disease) ([facility])- (present on admission)  Assessment & Plan  Continue Symbicort and RT protocol    Hip pain, chronic, right- (present on admission)  Assessment & Plan  2/2 OA/DJD  S/P elective right total hip arthroplasty on 11/21/19 by Dr. Delgado    Vitamin D deficiency- (present on admission)  Assessment & Plan  Vit D level 21  On supplementation    Gastroesophageal reflux disease- (present on admission)  Assessment & Plan  2/2 Hiatal Hernia  Continue Prilosec    Anemia- (present on admission)  Assessment & Plan  Follow H/H     Full Code    Reviewed w/ Dr. Tejada
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
## 2019-12-10 NOTE — DISCHARGE INSTRUCTIONS
[facility] NURSING DISCHARGE INSTRUCTIONS    Blood Pressure : 105/54  Weight: 67.5 kg (148 lb 13 oz)  Nursing recommendations for Guillermina Recio at time of discharge are as follows:  Client verbalized understanding of all discharge instructions and prescriptions.     Review all your home medications and newly ordered medications with your doctor and/or pharmacist. Follow medication instructions as directed by your doctor and/or pharmacist.    Pain Management:   Discharge Pain Medication Instructions:  Comfort Goal: Comfort at Rest, Comfort with Movement, Sleep Comfortably  Notify your primary care provider if pain is unrelieved with these measures, if the pain is new, or increased in intensity.    Discharge Skin Characteristics:    Discharge Skin Exam:       Skin / Wound Care Instructions: Please contact your primary care physician for any change in skin integrity.     If You Have Surgical Incisions / Wounds:  Monitor surgical site(s) for signs of increased swelling, redness or symptoms of drainage from the site or fever as this could indicate signs and symptoms of infection. If these symptoms are noted, notifiy your primary care provider.      Discharge Safety Instructions:       Discharge Safety Concerns:    The interdisciplinary team has made recommendation that you should have adult supervision in the house due to balance problem  Anti-embolic stockings are required below the knee to increase circulation to the lower extremities.      Nursing Discharge Plan:   Influenza Vaccine Indication: Not indicated: Previously immunized this influenza season and > 8 years of age    Case Management Discharge Instructions:   Discharge Location: Home with Home Health  Agency Name/Address/Phone: [facility] [phone]  Home Health: Registered Nurse, Occupational Therapist, Physical Therapist  Outpatient Services:    DME Provider/Phone: [facility] [phone]  Medical Equipment  Ordered: Wheelchair, Oxygen  Prescription Faxed to:      Suicidal Feelings: How to Help Yourself  Suicide is the taking of one's own life. If you feel as though life is getting too tough to handle and are thinking about suicide, get help right away. To get help:  · Call your local emergency services (911 in the [place]  · Call a suicide hotline to speak with a trained counselor who understands how you are feeling. The following is a list of suicide hotlines in the [place]. For a list of hotlines in [place], visit www.suicide.org/hotlines/international/ljgdnz-xnismxr-srnyenrh.html.  ¨ 5-889-910-TALK (1-439.238.9004).  ¨ 8-548-WJPLRRL (1-863.750.1092).  ¨ 1-113.156.4917. This is a hotline for [place] speakers.  ¨ 1-024-195-4TTY (1-983.506.6388). This is a hotline for TTY users.  ¨ 1-537-4-U-DIONY (1-195.937.6846). This is a hotline for lesbian, gallegos, bisexual, transgender, or questioning youth.  · Contact a crisis center or a local suicide prevention center. To find a crisis center or suicide prevention center:  ¨ Call your local hospital, clinic, community service organization, mental health center, social service provider, or health department. Ask for assistance in connecting to a crisis center.  ¨ Visit www.suicidepreventionlifeline.org/getinvolved/ for a list of crisis centers in the [place], or visit www.suicideprevention.ca/sqpevskv-hqrou-yhkrcwe/find-a-crisis-centre for a list of centers in [place].  · Visit the following websites:  ¨ National Suicide Prevention Lifeline: www.suicidepreventionlifeline.org  ¨ Hopeline: www.hopeline.[employer]  ¨ American Foundation for Suicide Prevention: www.afsp.org  ¨ The Diony Project (for lesbian, gallegos, bisexual, transgender, or questioning youth): www.thetrevorproject.org  How can I help myself feel better?  · Promise yourself that you will not do anything drastic when you have suicidal feelings. Remember, there is hope. Many people have gotten through suicidal  thoughts and feelings, and you will, too. You may have gotten through them before, and this proves that you can get through them again.  · Let family, friends, teachers, or counselors know how you are feeling. Try not to isolate yourself from those who care about you. Remember, they will want to help you. Talk with someone every day, even if you do not feel sociable. Face-to-face conversation is best.  · Call a mental health professional and see one regularly.  · Visit your primary health care provider every year.  · Eat a well-balanced diet, and space your meals so you eat regularly.  · Get plenty of rest.  · Avoid alcohol and drugs, and remove them from your home. They will only make you feel worse.  · If you are thinking of taking a lot of medicine, give your medicine to someone who can give it to you one day at a time. If you are on antidepressants and are concerned you will overdose, let your health care provider know so he or she can give you safer medicines. Ask your mental health professional about the possible side effects of any medicines you are taking.  · Remove weapons, poisons, knives, and anything else that could harm you from your home.  · Try to stick to routines. Follow a schedule every day. Put self-care on your schedule.  · Make a list of realistic goals, and cross them off when you achieve them. Accomplishments give a sense of worth.  · Wait until you are feeling better before doing the things you find difficult or unpleasant.  · Exercise if you are able. You will feel better if you exercise for even a half hour each day.  · Go out in the sun or into nature. This will help you recover from depression faster. If you have a favorite place to walk, go there.  · Do the things that have always given you pleasure. Play your favorite music, read a good book, paint a picture, play your favorite instrument, or do anything else that takes your mind off your depression if it is safe to do.  · Keep your  living space well lit.  · When you are feeling well, write yourself a letter about tips and support that you can read when you are not feeling well.  · Remember that life’s difficulties can be sorted out with help. Conditions can be treated. You can work on thoughts and strategies that serve you well.  This information is not intended to replace advice given to you by your health care provider. Make sure you discuss any questions you have with your health care provider.  Document Released: 06/23/2004 Document Revised: 08/16/2017 Document Reviewed: 04/14/2015  [employer] Interactive Patient Education © 2017 [employer]    Fall Prevention in the Home  Introduction  Falls can cause injuries. They can happen to people of all ages. There are many things you can do to make your home safe and to help prevent falls.  What can I do on the outside of my home?  · Regularly fix the edges of walkways and driveways and fix any cracks.  · Remove anything that might make you trip as you walk through a door, such as a raised step or threshold.  · Trim any bushes or trees on the path to your home.  · Use bright outdoor lighting.  · Clear any walking paths of anything that might make someone trip, such as rocks or tools.  · Regularly check to see if handrails are loose or broken. Make sure that both sides of any steps have handrails.  · Any raised decks and porches should have guardrails on the edges.  · Have any leaves, snow, or ice cleared regularly.  · Use sand or salt on walking paths during winter.  · Clean up any spills in your garage right away. This includes oil or grease spills.  What can I do in the bathroom?  · Use night lights.  · Install grab bars by the toilet and in the tub and shower. Do not use towel bars as grab bars.  · Use non-skid mats or decals in the tub or shower.  · If you need to sit down in the shower, use a plastic, non-slip stool.  · Keep the floor dry. Clean up any water that spills on the floor as soon  as it happens.  · Remove soap buildup in the tub or shower regularly.  · Attach bath mats securely with double-sided non-slip rug tape.  · Do not have throw rugs and other things on the floor that can make you trip.  What can I do in the bedroom?  · Use night lights.  · Make sure that you have a light by your bed that is easy to reach.  · Do not use any sheets or blankets that are too big for your bed. They should not hang down onto the floor.  · Have a firm chair that has side arms. You can use this for support while you get dressed.  · Do not have throw rugs and other things on the floor that can make you trip.  What can I do in the kitchen?  · Clean up any spills right away.  · Avoid walking on wet floors.  · Keep items that you use a lot in easy-to-reach places.  · If you need to reach something above you, use a strong step stool that has a grab bar.  · Keep electrical cords out of the way.  · Do not use floor polish or wax that makes floors slippery. If you must use wax, use non-skid floor wax.  · Do not have throw rugs and other things on the floor that can make you trip.  What can I do with my stairs?  · Do not leave any items on the stairs.  · Make sure that there are handrails on both sides of the stairs and use them. Fix handrails that are broken or loose. Make sure that handrails are as long as the stairways.  · Check any carpeting to make sure that it is firmly attached to the stairs. Fix any carpet that is loose or worn.  · Avoid having throw rugs at the top or bottom of the stairs. If you do have throw rugs, attach them to the floor with carpet tape.  · Make sure that you have a light switch at the top of the stairs and the bottom of the stairs. If you do not have them, ask someone to add them for you.  What else can I do to help prevent falls?  · Wear shoes that:  ¨ Do not have high heels.  ¨ Have rubber bottoms.  ¨ Are comfortable and fit you well.  ¨ Are closed at the toe. Do not wear sandals.  · If  you use a stepladder:  ¨ Make sure that it is fully opened. Do not climb a closed stepladder.  ¨ Make sure that both sides of the stepladder are locked into place.  ¨ Ask someone to hold it for you, if possible.  · Clearly delfino and make sure that you can see:  ¨ Any grab bars or handrails.  ¨ First and last steps.  ¨ Where the edge of each step is.  · Use tools that help you move around (mobility aids) if they are needed. These include:  ¨ Canes.  ¨ Walkers.  ¨ Scooters.  ¨ Crutches.  · Turn on the lights when you go into a dark area. Replace any light bulbs as soon as they burn out.  · Set up your furniture so you have a clear path. Avoid moving your furniture around.  · If any of your floors are uneven, fix them.  · If there are any pets around you, be aware of where they are.  · Review your medicines with your doctor. Some medicines can make you feel dizzy. This can increase your chance of falling.  Ask your doctor what other things that you can do to help prevent falls.  This information is not intended to replace advice given to you by your health care provider. Make sure you discuss any questions you have with your health care provider.  Document Released: 10/14/2010 Document Revised: 05/25/2017 Document Reviewed: 01/22/2016  © 2017 [employer]      Physical Therapy Discharge Instructions for Guillermina Recio    12/9/2019    Weight Bearing Status - Patient Should: Bear Weight as Tolerated Right Leg  Level of Assist Required for Ambulation: Supervision on Flat Surfaces, Assist for Balance on Curbs, Should Not Attempt Stairs at This Time(Assist for balance on front step into apartment)  Distance Patient May Ambulate:  FT as tolerated  Device Recommended for Ambulation: Front-Wheeled Walker  Level of Assist Required to Propel Wheelchair: Intermittent Physical Assist  Level of Assist Required for Transfers: Supervision(Always use the front wheel walker)  Device Recommended for Transfers: Front-Wheeled  Walker  Home Exercise Program: Refer to Home Exercise Program Handout for Details  Prosthesis / Orthosis Recommendation / Location: No Prosthesis  or Orthosis Recommended   Guillermina, it was great to help you during this phase of your recovery.  Best wishes for continued success,   Robi Hoyt [facility], MEd  Occupational Therapy Discharge Instructions for Guillermina Recio    12/9/2019    Level of Assist Required for Eating: Able to Complete Eating without Assist  Level of Assist Required for Grooming: Able to Complete Grooming without Assist  Level of Assist Required for Dressing: Able to Complete Dressing without Assist  Equipment for Dressing: Reacher, Sock Aid  Level of Assist Required for Toileting: Able to Complete Toileting without Assist  Level of Assist Required for Toilet Transfer: Requires Supervision with Toilet Transfer  Equipment for Toilet Transfer: Raised Toilet Seat with Arms  Level of Assist Required for Bathing: Requires Supervision with Bathing  Equipment for Bathing: Tub Transfer Bench, Long Handled Sponge  Level of Assist Required for Shower Transfer: Requires Supervision with Shower Transfer  Equipment for Shower Transfer: Tub Transfer Bench  Level of Assist Required for Home Mgmt: Requires Supervision with Home Management  Level of Assist Required for Meal Prep: Requires Supervision with Meal Preparation  Driving: May not Drive, Please Contact Physician for Further Information  Comments: It was great working with you Guillermina! -Nelly OTR/L

## 2019-12-10 NOTE — DISCHARGE PLANNING
Case management Summary:   Met with patient and prior to discharge.   Reviewed all follow up appointments.   Referral made to [facility]. They have accepted referral and are ready to follow.    [facility] has delivered a wc and FWW to patient.  WC needs to have Standard foot rests (has elevating foot rests) and FWW is too tall. [facility] will have to switch these out for patient at her home.   Patient has an open file with [facility], per Tiffani there. Patient had a  named Amaury at [facility]. [place] has retired. Tiffani will f/u and call patient tomorrow to schedule an appointment and assess for needs. Tiffani indicated patient was last seen by them in August.   During hospitalization, I have provided support and education and have been available for questions and information during hours of operation, communicated with therapy team and MD along with providing links/resources  to outside services.    Patient verbalizes agreement with all plans and has an understanding of the next steps within the post acute services.     Individualized Goals:   1. To improve functional status to return home.   2. Would like home health.  3. Would like to speak with a dietician.        Outcome:   1. Met. Daughter and caregiver to assist at discharge.   2. Met  3. Met

## 2019-12-10 NOTE — PROGRESS NOTES
Hospital Medicine Daily Progress Note      Chief Complaint  Ileus    Interval Problem Update  Pt looking forward to going home.    Review of Systems  Review of Systems   Constitutional: Negative for chills and fever.   HENT: Negative.    Eyes: Negative.    Respiratory: Negative for cough and shortness of breath.    Cardiovascular: Negative for chest pain and palpitations.   Gastrointestinal: Negative for abdominal pain, nausea and vomiting.        +gas   Musculoskeletal: Positive for joint pain.   Skin: Negative for itching and rash.        Physical Exam  Temp:  [37 °C (98.6 °F)-37.1 °C (98.7 °F)] 37.1 °C (98.7 °F)  Pulse:  [79-97] 79  Resp:  [18] 18  BP: ()/(53-58) 105/54  SpO2:  [91 %-94 %] 94 %    Physical Exam  Vitals signs reviewed.   Constitutional:       General: She is not in acute distress.     Appearance: Normal appearance. She is not ill-appearing.   HENT:      Head: Normocephalic and atraumatic.      Right Ear: External ear normal.      Left Ear: External ear normal.      Nose: Nose normal.   Eyes:      General:         Right eye: No discharge.         Left eye: No discharge.      Extraocular Movements: Extraocular movements intact.      Conjunctiva/sclera: Conjunctivae normal.   Neck:      Musculoskeletal: Normal range of motion and neck supple.   Cardiovascular:      Rate and Rhythm: Normal rate and regular rhythm.   Pulmonary:      Effort: No respiratory distress.      Breath sounds: No wheezing.      Comments: Decreased BS  Abdominal:      General: Bowel sounds are normal. There is no distension.      Palpations: Abdomen is soft.      Tenderness: There is no tenderness. There is no guarding or rebound.   Musculoskeletal:      Right lower leg: No edema.      Left lower leg: No edema.   Skin:     General: Skin is warm and dry.   Neurological:      Mental Status: She is alert and oriented to person, place, and time.         Fluids    Intake/Output Summary (Last 24 hours) at 12/10/2019 0942  Last  data filed at 12/10/2019 0619  Gross per 24 hour   Intake 890 ml   Output --   Net 890 ml       Laboratory                        Assessment/Plan  Ileus ([facility])  Assessment & Plan  Surgery notes reviewed, more likely post-op ileus than small bowel obstruction  Serial KUB shows resolution of distended bowel loops  Constipation resolved w/ increased bowel regimen  Continue Simethicone for gas  Diet advanced to regular and tolerating    Dyslipidemia- (present on admission)  Assessment & Plan  On Mevacor    Lung nodule- (present on admission)  Assessment & Plan  CT Abd/Pelvis 11/25/19 showed 6 mm RML pulmonary nodule  Needs close F/U given smoking history    COPD (chronic obstructive pulmonary disease) ([facility])- (present on admission)  Assessment & Plan  Continue Symbicort and RT protocol    Hip pain, chronic, right- (present on admission)  Assessment & Plan  2/2 OA/DJD  S/P elective right total hip arthroplasty on 11/21/19 by Dr. Delgado    Vitamin D deficiency- (present on admission)  Assessment & Plan  Vit D level 21  On supplementation    Gastroesophageal reflux disease- (present on admission)  Assessment & Plan  2/2 Hiatal Hernia  Continue Prilosec    Anemia- (present on admission)  Assessment & Plan  Follow H/H     Full Code

## 2019-12-10 NOTE — CARE PLAN
Problem: Communication  Goal: The ability to communicate needs accurately and effectively will improve  Outcome: PROGRESSING AS EXPECTED  Intervention: Educate patient and significant other/support system about the plan of care, procedures, treatments, medications and allow for questions  Note:   Plan of care for the day discussed this morning with pt. All questions and concerns answered at this time. Pt reports sleeping well last night. Will continue to monitor    Pt to be picked up today at 11am by her friend. Confirmed with CM yesterday.

## 2019-12-10 NOTE — CARE PLAN
Problem: Communication  Goal: The ability to communicate needs accurately and effectively will improve  Note:   Encouraged to make needs known to staff and to use call light for staff assist.      Problem: Safety  Goal: Will remain free from falls  Note:   Call light kept within reach and encouraged to use for assistance and with toileting needs. Encouraged to call staff and to wait for staff before transfers.  Bed alarm is place.  Hourly rounding in effect.     Problem: Pain Management  Goal: Pain level will decrease to patient's comfort goal  Note:   Has scheduled Tylenol and prn Oxycodone for right hip pain and kerline feet pain, has + relief. See MAR and doc flow sheet. Will continue to monitor patient.

## 2019-12-10 NOTE — PROGRESS NOTES
Confirmed with pt last bowel movement was on 12/07/19, offered laxatives this morning.  Pt agreed with Miralax.  Will continue to monitor patient.

## 2019-12-10 NOTE — THERAPY
Physical Therapy   Daily Treatment     Patient Name: Guillermina Recio  Age:  79 y.o., Sex:  female  Medical Record #: [id number]  Today's Date: 12/9/2019     Precautions  Precautions: (P) Posterior Hip Precautions, Weight Bearing As Tolerated Right Lower Extremity, Fall Risk  Comments: (P) Impaired vision, COPD    Subjective    Patient reported fatigue, but needed to use bathroom.  Patient is looking forward to D/C.  Patient reported that walker was too high, after in-room trial.      Objective       12/09/19 1401   Precautions   Precautions Posterior Hip Precautions;Weight Bearing As Tolerated Right Lower Extremity;Fall Risk   Comments Impaired vision, COPD   Vitals   O2 (LPM) 2   O2 Delivery Nasal Cannula   Bed Mobility    Sit to Stand Stand by Assist   Scooting Stand by Assist   Neuro-Muscular Treatments   Neuro-Muscular Treatments Weight Shift Right;Weight Shift Left   Comments In room wc mobility SPV in room/ bathroom 20 ft x2, amb in room 20 ft SBA; PT Managed O2.  SPT with FWW SBA wc <> bed. D/C plan reviewed.  Toileting mod I; grooming SPV wc level, toilet transfer <> wc SBA/set up.    Interdisciplinary Plan of Care Collaboration   IDT Collaboration with  Respiratory Therapist;Physical Therapist   Patient Position at End of Therapy Seated  (handoff to SLP)   Collaboration Comments Delivered FWW too high for patient is lowest position; primary PT to follow up with CM. RT notified of desat on room air to 81%; no wean- maintain 2L.   PT Total Time Spent   PT Individual Total Time Spent (Mins) 30   PT Charge Group   PT Therapeutic Activities 2       Assessment    Patient demonstrated SBA mobility with FWW and wc for household distances. Recommend shorter FWW to accommodate patient's stature- too high at lowest adjustment.     Plan    D/C patient home with daughter/ HEP, and HH PT.

## 2019-12-10 NOTE — PROGRESS NOTES
Received bedside shift report from Sharri BERNAL RN regarding patient and assumed care. Patient awake, calm and stable, currently positioned in bed for comfort and safety; call light within reach. Denies pain or discomfort at this time. Will continue to monitor.

## 2019-12-10 NOTE — PROGRESS NOTES
"Rehab Progress Note     Date of Service: 12/9/2019  Chief Complaint: follow up hip replacement and small bowel obstruction    Interval Events (Subjective)    Patient seen and examined today in her room. She had an excellent time at her therapeutic community outing yesterday to her [employer], despite not being able to find her walker prior to the outing. She was able to mobilize with her WC, and did not need to use the restroom. She has no new complaints, and is ready to go home tomorrow as she misses her cat.    Objective:  VITAL SIGNS: /58   Pulse 86   Temp 37 °C (98.6 °F) (Temporal)   Resp 18   Ht 1.473 m (4' 10\")   Wt 67.5 kg (148 lb 13 oz)   SpO2 91%   BMI 31.10 kg/m²    Gen: alert, no apparent distress  CV: regular rate and rhythm, no murmurs, no peripheral edema  Resp: clear to ascultation bilaterally, normal respiratory effort  GI: soft, non-tender abdomen, bowel sounds present  Neuro: notable for chronic visual impairment    Recent Results (from the past 72 hour(s))   POTASSIUM SERUM (K)    Collection Time: 12/07/19  5:39 AM   Result Value Ref Range    Potassium 4.5 3.6 - 5.5 mmol/L       Current Facility-Administered Medications   Medication Frequency   • OCUVITE-LUTEIN tablet 1 tablet DAILY   • aspirin EC (ECOTRIN) tablet 325 mg BID   • budesonide-formoterol (SYMBICORT) 160-4.5 MCG/ACT inhaler 2 Puff BID (RT)   • simethicone (MYLICON) chewable tab 80 mg TID WITH MEALS   • senna-docusate (PERICOLACE or SENOKOT S) 8.6-50 MG per tablet 2 Tab BID PRN    And   • polyethylene glycol/lytes (MIRALAX) PACKET 1 Packet QDAY PRN    And   • magnesium hydroxide (MILK OF MAGNESIA) suspension 30 mL QDAY PRN    And   • bisacodyl (DULCOLAX) suppository 10 mg QDAY PRN   • vitamin D (cholecalciferol) 1000 UNIT tablet 1,000 Units DAILY   • Respiratory Care per Protocol Continuous RT   • Pharmacy Consult Request ...Pain Management Review 1 Each PHARMACY TO DOSE   • artificial tears ophthalmic solution 1 Drop PRN   • " benzocaine-menthol (CEPACOL) lozenge 1 Lozenge Q2HRS PRN   • mag hydrox-al hydrox-simeth (MAALOX PLUS ES or MYLANTA DS) suspension 20 mL Q2HRS PRN   • ondansetron (ZOFRAN ODT) dispertab 4 mg 4X/DAY PRN    Or   • ondansetron (ZOFRAN) syringe/vial injection 4 mg 4X/DAY PRN   • traZODone (DESYREL) tablet 50 mg QHS PRN   • sodium chloride (OCEAN) 0.65 % nasal spray 2 Spray PRN   • melatonin tablet 3 mg HS PRN   • lactulose 20 GM/30ML solution 30 mL QDAY PRN   • fleet enema 133 mL QDAY PRN   • calcium carbonate (TUMS) chewable tab 500 mg QDAY PRN   • lovastatin (MEVACOR) tablet 40 mg QHS   • nystatin (MYCOSTATIN) powder BID   • omeprazole (PRILOSEC) capsule 20 mg QDAY   • acetaminophen (TYLENOL) tablet 1,000 mg TID   • tramadol (ULTRAM) 50 MG tablet 50 mg Q4HRS PRN   • oxyCODONE immediate-release (ROXICODONE) tablet 5 mg Q4HRS PRN       Orders Placed This Encounter   Procedures   • Diet Order Regular     Standing Status:   Standing     Number of Occurrences:   1     Order Specific Question:   Diet:     Answer:   Regular [1]     Order Specific Question:   Consistency/Fluid modifications:     Answer:   Thin Liquids [3]       Assessment:  Active Hospital Problems    Diagnosis   • *Status post right hip replacement   • Ileus (HCC)   • Obesity (BMI 30-39.9)   • Dyslipidemia   • HTN (hypertension)   • Gastroesophageal reflux disease   • Vitamin D deficiency   • Anemia   • Azotemia   • Lung nodule   • Impaired mobility and ADLs   • COPD (chronic obstructive pulmonary disease) (HCC)   • Hip pain, chronic, right     This patient is a 79 y.o. female admitted for acute inpatient rehabilitation with Status post right hip replacement.    I led and attended the weekly conference, and agree with the IDT conference documentation and plan of care as noted below.    Date of conference: 12/9/2019    Goals and barriers: See IDT note.    Biggest barriers: visual impairments    Goals in next week: discharge    Therapy update  12/9/2019  Modified Independent eating  Modified Independent grooming  Supervision bathing  Modified Independent upper body dressing  Modified Independent lower body dressing  Modified Independent toileting  Supervisionbladder  Supervision bowel  Supervision bed/chair transfer  Supervision toilet transfer  Supervision tub/shower transfer  Max assist ambulation  Total assist wheelchair propulsion  Not tested stairs  Modified Independent comprehension  Independent expression  Supervision social interaction  Supervision problem solving  Supervision memory    Decrease SLP to 30 min, share the other 30 with PT/OT    CM/social support: lives alone, friend with intermittent support    Anticipated DC date: 12/10/2019    Home health: PT/OT/SLP/RN    Equip: PINK[facility] RAMIREZ    Follow up: PCP, surgery, [facility]    Medical Decision Making and Plan:    S/p Right hip replacement  11/21 Dr. Delgado  Continue full rehab program  PT/OT/SLP, 1 hr each discipline, 5 days per week  Posterior hip precautions  Outpatient follow up for suture/dressing removal per surgeon    Pain Management  Scheduled tylenol  PRN tramadol - using very little  Pain currently well controlled    Bowel  SBO, see below  Meds as needed  Last BM 12/7    Bladder  Check PVRs - 17, 11, 50  ICP for over 400 cc  Scheduled toileting    DVT prophylaxis  ASA BID per surgery    Appreciated the assistance of the hospitalist with her medical comorbidities :     Small bowel obstruction, resolved, diet advanced  Large hiatal hernia  GERD  Hypertension, off all BP meds  Hypotension, improved  COPD, started on Symbicort, respiratory therapy to attempt titration, was not on O2 at home  Respiratory insufficiency  Hyperlipidemia  Azotemia, resolved  Anemia, stable  Vit D deficiency, on supplementation  Lung nodule, outpatient follow up    Total time:  40 minutes.  I spent greater than 50% of the time for patient care, counseling, and coordination on this date, including patient  face-to face time, unit/floor time with review of records/pertinent lab data and studies, as well as discussing diagnostic evaluation/work up, planned therapeutic interventions, and future disposition of care, as per the interval events/subjective and the assessment and plan as noted above.    Salma Tejada M.D.   Physical Medicine and Rehabilitation

## 2019-12-10 NOTE — PROGRESS NOTES
Patient discharged to home per order.  Discharge instructions reviewed with patient and friend; they verbalize understanding and signed copies placed in chart.  Patient has all belongings; signed copy of form in chart.  Patient left facility at 1120 via wc accompanied by rehab staff.  Have enjoyed working with this pleasant patient.

## 2019-12-12 ENCOUNTER — APPOINTMENT (OUTPATIENT)
Dept: RADIOLOGY | Facility: MEDICAL CENTER | Age: 79
DRG: 280 | End: 2019-12-12
Attending: EMERGENCY MEDICINE
Payer: MEDICARE

## 2019-12-12 ENCOUNTER — APPOINTMENT (OUTPATIENT)
Dept: CARDIOLOGY | Facility: MEDICAL CENTER | Age: 79
DRG: 280 | End: 2019-12-12
Attending: INTERNAL MEDICINE
Payer: MEDICARE

## 2019-12-12 ENCOUNTER — HOSPITAL ENCOUNTER (INPATIENT)
Facility: MEDICAL CENTER | Age: 79
LOS: 2 days | DRG: 280 | End: 2019-12-14
Attending: EMERGENCY MEDICINE | Admitting: HOSPITALIST
Payer: MEDICARE

## 2019-12-12 DIAGNOSIS — R94.31 ABNORMAL EKG: ICD-10-CM

## 2019-12-12 DIAGNOSIS — M24.451 RECURRENT DISLOCATION, RIGHT HIP: ICD-10-CM

## 2019-12-12 DIAGNOSIS — R07.9 CHEST PAIN, UNSPECIFIED TYPE: ICD-10-CM

## 2019-12-12 PROBLEM — S73.004A HIP DISLOCATION, RIGHT (HCC): Status: ACTIVE | Noted: 2019-12-12

## 2019-12-12 PROBLEM — J96.00 ACUTE RESPIRATORY FAILURE (HCC): Status: ACTIVE | Noted: 2019-12-12

## 2019-12-12 PROBLEM — I21.4 NSTEMI (NON-ST ELEVATED MYOCARDIAL INFARCTION) (HCC): Status: ACTIVE | Noted: 2017-09-12

## 2019-12-12 PROBLEM — R55 SYNCOPE: Status: ACTIVE | Noted: 2019-12-12

## 2019-12-12 LAB
ALBUMIN SERPL BCP-MCNC: 3.4 G/DL (ref 3.2–4.9)
ALBUMIN/GLOB SERPL: 1.3 G/DL
ALP SERPL-CCNC: 82 U/L (ref 30–99)
ALT SERPL-CCNC: 8 U/L (ref 2–50)
ANION GAP SERPL CALC-SCNC: 12 MMOL/L (ref 0–11.9)
ANISOCYTOSIS BLD QL SMEAR: ABNORMAL
APPEARANCE UR: ABNORMAL
APTT PPP: 30.3 SEC (ref 24.7–36)
AST SERPL-CCNC: 17 U/L (ref 12–45)
BACTERIA #/AREA URNS HPF: ABNORMAL /HPF
BASOPHILS # BLD AUTO: 1 % (ref 0–1.8)
BASOPHILS # BLD: 0.05 K/UL (ref 0–0.12)
BILIRUB SERPL-MCNC: 0.2 MG/DL (ref 0.1–1.5)
BILIRUB UR QL STRIP.AUTO: NEGATIVE
BUN SERPL-MCNC: 16 MG/DL (ref 8–22)
CALCIUM SERPL-MCNC: 9.1 MG/DL (ref 8.4–10.2)
CHLORIDE SERPL-SCNC: 106 MMOL/L (ref 96–112)
CO2 SERPL-SCNC: 24 MMOL/L (ref 20–33)
COLOR UR: YELLOW
CREAT SERPL-MCNC: 0.75 MG/DL (ref 0.5–1.4)
EKG IMPRESSION: NORMAL
EOSINOPHIL # BLD AUTO: 0 K/UL (ref 0–0.51)
EOSINOPHIL NFR BLD: 0 % (ref 0–6.9)
ERYTHROCYTE [DISTWIDTH] IN BLOOD BY AUTOMATED COUNT: 52.7 FL (ref 35.9–50)
GLOBULIN SER CALC-MCNC: 2.6 G/DL (ref 1.9–3.5)
GLUCOSE SERPL-MCNC: 95 MG/DL (ref 65–99)
GLUCOSE UR STRIP.AUTO-MCNC: NEGATIVE MG/DL
HCT VFR BLD AUTO: 35.8 % (ref 37–47)
HGB BLD-MCNC: 10.4 G/DL (ref 12–16)
INR PPP: 1.03 (ref 0.87–1.13)
KETONES UR STRIP.AUTO-MCNC: NEGATIVE MG/DL
LEUKOCYTE ESTERASE UR QL STRIP.AUTO: NEGATIVE
LV EJECT FRACT  99904: 65
LV EJECT FRACT MOD 4C 99902: 55.78
LYMPHOCYTES # BLD AUTO: 0.39 K/UL (ref 1–4.8)
LYMPHOCYTES NFR BLD: 8 % (ref 22–41)
MANUAL DIFF BLD: NORMAL
MCH RBC QN AUTO: 24.4 PG (ref 27–33)
MCHC RBC AUTO-ENTMCNC: 29.1 G/DL (ref 33.6–35)
MCV RBC AUTO: 83.8 FL (ref 81.4–97.8)
MICRO URNS: ABNORMAL
MONOCYTES # BLD AUTO: 0.74 K/UL (ref 0–0.85)
MONOCYTES NFR BLD AUTO: 15 % (ref 0–13.4)
NEUTROPHILS # BLD AUTO: 3.72 K/UL (ref 2–7.15)
NEUTROPHILS NFR BLD: 76 % (ref 44–72)
NITRITE UR QL STRIP.AUTO: POSITIVE
NRBC # BLD AUTO: 0 K/UL
NRBC BLD-RTO: 0 /100 WBC
OVALOCYTES BLD QL SMEAR: NORMAL
PH UR STRIP.AUTO: 5.5 [PH] (ref 5–8)
PLATELET # BLD AUTO: 278 K/UL (ref 164–446)
PLATELET BLD QL SMEAR: NORMAL
PMV BLD AUTO: 8.5 FL (ref 9–12.9)
POIKILOCYTOSIS BLD QL SMEAR: NORMAL
POLYCHROMASIA BLD QL SMEAR: NORMAL
POTASSIUM SERPL-SCNC: 4.5 MMOL/L (ref 3.6–5.5)
PROT SERPL-MCNC: 6 G/DL (ref 6–8.2)
PROT UR QL STRIP: NEGATIVE MG/DL
PROTHROMBIN TIME: 13.6 SEC (ref 12–14.6)
RBC # BLD AUTO: 4.27 M/UL (ref 4.2–5.4)
RBC BLD AUTO: PRESENT
RBC UR QL AUTO: NEGATIVE
SODIUM SERPL-SCNC: 142 MMOL/L (ref 135–145)
SP GR UR STRIP.AUTO: 1.02
TROPONIN T SERPL-MCNC: 34 NG/L (ref 6–19)
TROPONIN T SERPL-MCNC: 37 NG/L (ref 6–19)
TROPONIN T SERPL-MCNC: 39 NG/L (ref 6–19)
UNIDENT CRYS URNS QL MICRO: ABNORMAL /HPF
WBC # BLD AUTO: 4.9 K/UL (ref 4.8–10.8)
WBC #/AREA URNS HPF: ABNORMAL /HPF

## 2019-12-12 PROCEDURE — 700111 HCHG RX REV CODE 636 W/ 250 OVERRIDE (IP): Performed by: HOSPITALIST

## 2019-12-12 PROCEDURE — 99223 1ST HOSP IP/OBS HIGH 75: CPT | Mod: AI | Performed by: HOSPITALIST

## 2019-12-12 PROCEDURE — 84484 ASSAY OF TROPONIN QUANT: CPT | Mod: 91

## 2019-12-12 PROCEDURE — 72170 X-RAY EXAM OF PELVIS: CPT

## 2019-12-12 PROCEDURE — 94760 N-INVAS EAR/PLS OXIMETRY 1: CPT

## 2019-12-12 PROCEDURE — 85730 THROMBOPLASTIN TIME PARTIAL: CPT | Mod: 91

## 2019-12-12 PROCEDURE — 700102 HCHG RX REV CODE 250 W/ 637 OVERRIDE(OP): Performed by: HOSPITALIST

## 2019-12-12 PROCEDURE — 96374 THER/PROPH/DIAG INJ IV PUSH: CPT

## 2019-12-12 PROCEDURE — 770020 HCHG ROOM/CARE - TELE (206)

## 2019-12-12 PROCEDURE — 93306 TTE W/DOPPLER COMPLETE: CPT

## 2019-12-12 PROCEDURE — 700117 HCHG RX CONTRAST REV CODE 255: Performed by: INTERNAL MEDICINE

## 2019-12-12 PROCEDURE — 700111 HCHG RX REV CODE 636 W/ 250 OVERRIDE (IP): Performed by: EMERGENCY MEDICINE

## 2019-12-12 PROCEDURE — 71045 X-RAY EXAM CHEST 1 VIEW: CPT

## 2019-12-12 PROCEDURE — 85610 PROTHROMBIN TIME: CPT

## 2019-12-12 PROCEDURE — 85007 BL SMEAR W/DIFF WBC COUNT: CPT

## 2019-12-12 PROCEDURE — 99223 1ST HOSP IP/OBS HIGH 75: CPT | Mod: 25 | Performed by: INTERNAL MEDICINE

## 2019-12-12 PROCEDURE — 80053 COMPREHEN METABOLIC PANEL: CPT

## 2019-12-12 PROCEDURE — 93005 ELECTROCARDIOGRAM TRACING: CPT | Performed by: HOSPITALIST

## 2019-12-12 PROCEDURE — 85027 COMPLETE CBC AUTOMATED: CPT

## 2019-12-12 PROCEDURE — 93306 TTE W/DOPPLER COMPLETE: CPT | Mod: 26 | Performed by: INTERNAL MEDICINE

## 2019-12-12 PROCEDURE — 73552 X-RAY EXAM OF FEMUR 2/>: CPT | Mod: RT

## 2019-12-12 PROCEDURE — A9270 NON-COVERED ITEM OR SERVICE: HCPCS | Performed by: HOSPITALIST

## 2019-12-12 PROCEDURE — 99285 EMERGENCY DEPT VISIT HI MDM: CPT

## 2019-12-12 PROCEDURE — 93010 ELECTROCARDIOGRAM REPORT: CPT | Performed by: INTERNAL MEDICINE

## 2019-12-12 PROCEDURE — 96375 TX/PRO/DX INJ NEW DRUG ADDON: CPT

## 2019-12-12 PROCEDURE — 93005 ELECTROCARDIOGRAM TRACING: CPT | Performed by: EMERGENCY MEDICINE

## 2019-12-12 PROCEDURE — 93005 ELECTROCARDIOGRAM TRACING: CPT

## 2019-12-12 PROCEDURE — 81001 URINALYSIS AUTO W/SCOPE: CPT

## 2019-12-12 RX ORDER — ENALAPRILAT 1.25 MG/ML
1.25 INJECTION INTRAVENOUS EVERY 6 HOURS PRN
Status: DISCONTINUED | OUTPATIENT
Start: 2019-12-12 | End: 2019-12-14 | Stop reason: HOSPADM

## 2019-12-12 RX ORDER — OMEPRAZOLE 20 MG/1
20 CAPSULE, DELAYED RELEASE ORAL DAILY
Status: DISCONTINUED | OUTPATIENT
Start: 2019-12-12 | End: 2019-12-14 | Stop reason: HOSPADM

## 2019-12-12 RX ORDER — PANTOPRAZOLE SODIUM 20 MG/1
20 TABLET, DELAYED RELEASE ORAL
Status: DISCONTINUED | OUTPATIENT
Start: 2019-12-12 | End: 2019-12-12

## 2019-12-12 RX ORDER — HEPARIN SODIUM 1000 [USP'U]/ML
4000 INJECTION, SOLUTION INTRAVENOUS; SUBCUTANEOUS ONCE
Status: COMPLETED | OUTPATIENT
Start: 2019-12-12 | End: 2019-12-12

## 2019-12-12 RX ORDER — HEPARIN SODIUM 1000 [USP'U]/ML
2600 INJECTION, SOLUTION INTRAVENOUS; SUBCUTANEOUS PRN
Status: DISCONTINUED | OUTPATIENT
Start: 2019-12-12 | End: 2019-12-14 | Stop reason: HOSPADM

## 2019-12-12 RX ORDER — ASPIRIN 600 MG/1
300 SUPPOSITORY RECTAL DAILY
Status: DISCONTINUED | OUTPATIENT
Start: 2019-12-12 | End: 2019-12-14 | Stop reason: HOSPADM

## 2019-12-12 RX ORDER — ACETAMINOPHEN 500 MG
500 TABLET ORAL 2 TIMES DAILY PRN
Status: DISCONTINUED | OUTPATIENT
Start: 2019-12-12 | End: 2019-12-14 | Stop reason: HOSPADM

## 2019-12-12 RX ORDER — BUDESONIDE AND FORMOTEROL FUMARATE DIHYDRATE 160; 4.5 UG/1; UG/1
2 AEROSOL RESPIRATORY (INHALATION) 2 TIMES DAILY
Status: DISCONTINUED | OUTPATIENT
Start: 2019-12-12 | End: 2019-12-14 | Stop reason: HOSPADM

## 2019-12-12 RX ORDER — MORPHINE SULFATE 4 MG/ML
2 INJECTION, SOLUTION INTRAMUSCULAR; INTRAVENOUS ONCE
Status: COMPLETED | OUTPATIENT
Start: 2019-12-12 | End: 2019-12-12

## 2019-12-12 RX ORDER — BISACODYL 10 MG
10 SUPPOSITORY, RECTAL RECTAL
Status: DISCONTINUED | OUTPATIENT
Start: 2019-12-12 | End: 2019-12-14 | Stop reason: HOSPADM

## 2019-12-12 RX ORDER — AMOXICILLIN 250 MG
2 CAPSULE ORAL 2 TIMES DAILY
Status: DISCONTINUED | OUTPATIENT
Start: 2019-12-12 | End: 2019-12-14 | Stop reason: HOSPADM

## 2019-12-12 RX ORDER — CALCIUM CARBONATE 500 MG/1
TABLET, CHEWABLE ORAL
Status: DISPENSED
Start: 2019-12-12 | End: 2019-12-13

## 2019-12-12 RX ORDER — LOVASTATIN 20 MG/1
40 TABLET ORAL
Status: DISCONTINUED | OUTPATIENT
Start: 2019-12-12 | End: 2019-12-14 | Stop reason: HOSPADM

## 2019-12-12 RX ORDER — TRAMADOL HYDROCHLORIDE 50 MG/1
TABLET ORAL
Status: DISPENSED
Start: 2019-12-12 | End: 2019-12-13

## 2019-12-12 RX ORDER — TRAMADOL HYDROCHLORIDE 50 MG/1
50 TABLET ORAL EVERY 8 HOURS PRN
Status: DISCONTINUED | OUTPATIENT
Start: 2019-12-12 | End: 2019-12-14 | Stop reason: HOSPADM

## 2019-12-12 RX ORDER — ASPIRIN 325 MG
325 TABLET ORAL DAILY
Status: DISCONTINUED | OUTPATIENT
Start: 2019-12-12 | End: 2019-12-14 | Stop reason: HOSPADM

## 2019-12-12 RX ORDER — ONDANSETRON 2 MG/ML
4 INJECTION INTRAMUSCULAR; INTRAVENOUS EVERY 4 HOURS PRN
Status: DISCONTINUED | OUTPATIENT
Start: 2019-12-12 | End: 2019-12-14 | Stop reason: HOSPADM

## 2019-12-12 RX ORDER — POLYETHYLENE GLYCOL 3350 17 G/17G
1 POWDER, FOR SOLUTION ORAL
Status: DISCONTINUED | OUTPATIENT
Start: 2019-12-12 | End: 2019-12-14 | Stop reason: HOSPADM

## 2019-12-12 RX ORDER — ASPIRIN 81 MG/1
324 TABLET, CHEWABLE ORAL DAILY
Status: DISCONTINUED | OUTPATIENT
Start: 2019-12-12 | End: 2019-12-14 | Stop reason: HOSPADM

## 2019-12-12 RX ORDER — ONDANSETRON 2 MG/ML
4 INJECTION INTRAMUSCULAR; INTRAVENOUS ONCE
Status: COMPLETED | OUTPATIENT
Start: 2019-12-12 | End: 2019-12-12

## 2019-12-12 RX ORDER — MORPHINE SULFATE 4 MG/ML
2-4 INJECTION, SOLUTION INTRAMUSCULAR; INTRAVENOUS
Status: DISCONTINUED | OUTPATIENT
Start: 2019-12-12 | End: 2019-12-14 | Stop reason: HOSPADM

## 2019-12-12 RX ORDER — CALCIUM CARBONATE 500 MG/1
500 TABLET, CHEWABLE ORAL
Status: DISCONTINUED | OUTPATIENT
Start: 2019-12-12 | End: 2019-12-14 | Stop reason: HOSPADM

## 2019-12-12 RX ORDER — ONDANSETRON 4 MG/1
4 TABLET, ORALLY DISINTEGRATING ORAL EVERY 4 HOURS PRN
Status: DISCONTINUED | OUTPATIENT
Start: 2019-12-12 | End: 2019-12-14 | Stop reason: HOSPADM

## 2019-12-12 RX ORDER — HEPARIN SODIUM 5000 [USP'U]/100ML
INJECTION, SOLUTION INTRAVENOUS CONTINUOUS
Status: DISCONTINUED | OUTPATIENT
Start: 2019-12-12 | End: 2019-12-14 | Stop reason: HOSPADM

## 2019-12-12 RX ADMIN — ASPIRIN 325 MG ORAL TABLET 325 MG: 325 PILL ORAL at 17:45

## 2019-12-12 RX ADMIN — BUDESONIDE AND FORMOTEROL FUMARATE DIHYDRATE 2 PUFF: 160; 4.5 AEROSOL RESPIRATORY (INHALATION) at 17:46

## 2019-12-12 RX ADMIN — CALCIUM CARBONATE (ANTACID) CHEW TAB 500 MG 500 MG: 500 CHEW TAB at 15:36

## 2019-12-12 RX ADMIN — OMEPRAZOLE 20 MG: 20 CAPSULE, DELAYED RELEASE ORAL at 17:45

## 2019-12-12 RX ADMIN — MORPHINE SULFATE 2 MG: 4 INJECTION INTRAVENOUS at 17:35

## 2019-12-12 RX ADMIN — TRAMADOL HYDROCHLORIDE 50 MG: 50 TABLET, FILM COATED ORAL at 15:36

## 2019-12-12 RX ADMIN — ONDANSETRON 4 MG: 2 INJECTION INTRAMUSCULAR; INTRAVENOUS at 10:09

## 2019-12-12 RX ADMIN — MORPHINE SULFATE 2 MG: 4 INJECTION INTRAVENOUS at 10:09

## 2019-12-12 RX ADMIN — HEPARIN SODIUM 900 UNITS/HR: 5000 INJECTION, SOLUTION INTRAVENOUS at 17:38

## 2019-12-12 RX ADMIN — HEPARIN SODIUM 4000 UNITS: 1000 INJECTION, SOLUTION INTRAVENOUS; SUBCUTANEOUS at 17:38

## 2019-12-12 RX ADMIN — SENNOSIDES AND DOCUSATE SODIUM 2 TABLET: 8.6; 5 TABLET ORAL at 17:46

## 2019-12-12 RX ADMIN — HUMAN ALBUMIN MICROSPHERES AND PERFLUTREN 3 ML: 10; .22 INJECTION, SOLUTION INTRAVENOUS at 13:55

## 2019-12-12 ASSESSMENT — COGNITIVE AND FUNCTIONAL STATUS - GENERAL
SUGGESTED CMS G CODE MODIFIER DAILY ACTIVITY: CL
HELP NEEDED FOR BATHING: A LOT
PERSONAL GROOMING: A LOT
DRESSING REGULAR LOWER BODY CLOTHING: A LOT
STANDING UP FROM CHAIR USING ARMS: TOTAL
TOILETING: A LOT
SUGGESTED CMS G CODE MODIFIER MOBILITY: CN
WALKING IN HOSPITAL ROOM: TOTAL
TURNING FROM BACK TO SIDE WHILE IN FLAT BAD: UNABLE
EATING MEALS: A LOT
CLIMB 3 TO 5 STEPS WITH RAILING: TOTAL
DRESSING REGULAR UPPER BODY CLOTHING: A LOT
DAILY ACTIVITIY SCORE: 12
MOBILITY SCORE: 6
MOVING FROM LYING ON BACK TO SITTING ON SIDE OF FLAT BED: UNABLE
MOVING TO AND FROM BED TO CHAIR: UNABLE

## 2019-12-12 ASSESSMENT — ENCOUNTER SYMPTOMS
SORE THROAT: 0
EYE PAIN: 0
COUGH: 0
TINGLING: 0
DIZZINESS: 0
FEVER: 0
NAUSEA: 0
CHILLS: 0
BLURRED VISION: 0
ABDOMINAL PAIN: 0
PALPITATIONS: 0
SHORTNESS OF BREATH: 0
NECK PAIN: 0
INSOMNIA: 0
BACK PAIN: 0
DEPRESSION: 0
VOMITING: 0
HEADACHES: 0

## 2019-12-12 ASSESSMENT — COPD QUESTIONNAIRES
COPD SCREENING SCORE: 4
COPD SCREENING SCORE: 6
DURING THE PAST 4 WEEKS HOW MUCH DID YOU FEEL SHORT OF BREATH: SOME OF THE TIME
DO YOU EVER COUGH UP ANY MUCUS OR PHLEGM?: YES, A FEW DAYS A WEEK OR MONTH
IN THE PAST 12 MONTHS DO YOU DO LESS THAN YOU USED TO BECAUSE OF YOUR BREATHING PROBLEMS: DISAGREE/UNSURE
HAVE YOU SMOKED AT LEAST 100 CIGARETTES IN YOUR ENTIRE LIFE: NO/DON'T KNOW
DURING THE PAST 4 WEEKS HOW MUCH DID YOU FEEL SHORT OF BREATH: SOME OF THE TIME
DO YOU EVER COUGH UP ANY MUCUS OR PHLEGM?: NO/ONLY WITH OCCASIONAL COLDS OR INFECTIONS
HAVE YOU SMOKED AT LEAST 100 CIGARETTES IN YOUR ENTIRE LIFE: YES

## 2019-12-12 ASSESSMENT — LIFESTYLE VARIABLES
EVER FELT BAD OR GUILTY ABOUT YOUR DRINKING: NO
HAVE PEOPLE ANNOYED YOU BY CRITICIZING YOUR DRINKING: NO
EVER_SMOKED: NEVER
TOTAL SCORE: 0
EVER HAD A DRINK FIRST THING IN THE MORNING TO STEADY YOUR NERVES TO GET RID OF A HANGOVER: NO
EVER_SMOKED: NEVER
CONSUMPTION TOTAL: NEGATIVE
TOTAL SCORE: 0
DOES PATIENT WANT TO STOP DRINKING: NO
AVERAGE NUMBER OF DAYS PER WEEK YOU HAVE A DRINK CONTAINING ALCOHOL: 0
ON A TYPICAL DAY WHEN YOU DRINK ALCOHOL HOW MANY DRINKS DO YOU HAVE: 0
HAVE YOU EVER FELT YOU SHOULD CUT DOWN ON YOUR DRINKING: NO
ALCOHOL_USE: NO
HOW MANY TIMES IN THE PAST YEAR HAVE YOU HAD 5 OR MORE DRINKS IN A DAY: 0
TOTAL SCORE: 0

## 2019-12-12 ASSESSMENT — PATIENT HEALTH QUESTIONNAIRE - PHQ9
1. LITTLE INTEREST OR PLEASURE IN DOING THINGS: NOT AT ALL
2. FEELING DOWN, DEPRESSED, IRRITABLE, OR HOPELESS: NOT AT ALL
SUM OF ALL RESPONSES TO PHQ9 QUESTIONS 1 AND 2: 0

## 2019-12-12 ASSESSMENT — PAIN SCALES - WONG BAKER
WONGBAKER_NUMERICALRESPONSE: HURTS EVEN MORE
WONGBAKER_NUMERICALRESPONSE: HURTS JUST A LITTLE BIT

## 2019-12-12 NOTE — ED NOTES
Patient resting comfortably, denies needs at this time. Call light in reach. Bed in low position. A&O x 4. Family at bedside. Pain tolerable. Pt eating ice chips

## 2019-12-12 NOTE — ED NOTES
ERP at bedside to discuss results. Pt reports being nearly pain free following medication while at rest.
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
## 2019-12-13 LAB
APTT PPP: 72.2 SEC (ref 24.7–36)
APTT PPP: 92.8 SEC (ref 24.7–36)
APTT PPP: 93.6 SEC (ref 24.7–36)
TROPONIN T SERPL-MCNC: 39 NG/L (ref 6–19)
TROPONIN T SERPL-MCNC: 42 NG/L (ref 6–19)

## 2019-12-13 PROCEDURE — 85730 THROMBOPLASTIN TIME PARTIAL: CPT

## 2019-12-13 PROCEDURE — 99232 SBSQ HOSP IP/OBS MODERATE 35: CPT | Performed by: INTERNAL MEDICINE

## 2019-12-13 PROCEDURE — A9270 NON-COVERED ITEM OR SERVICE: HCPCS

## 2019-12-13 PROCEDURE — A9270 NON-COVERED ITEM OR SERVICE: HCPCS | Performed by: HOSPITALIST

## 2019-12-13 PROCEDURE — 700111 HCHG RX REV CODE 636 W/ 250 OVERRIDE (IP): Performed by: HOSPITALIST

## 2019-12-13 PROCEDURE — 84484 ASSAY OF TROPONIN QUANT: CPT

## 2019-12-13 PROCEDURE — 700102 HCHG RX REV CODE 250 W/ 637 OVERRIDE(OP)

## 2019-12-13 PROCEDURE — 770020 HCHG ROOM/CARE - TELE (206)

## 2019-12-13 PROCEDURE — 83550 IRON BINDING TEST: CPT

## 2019-12-13 PROCEDURE — 700102 HCHG RX REV CODE 250 W/ 637 OVERRIDE(OP): Performed by: HOSPITALIST

## 2019-12-13 PROCEDURE — 83540 ASSAY OF IRON: CPT

## 2019-12-13 RX ADMIN — LOVASTATIN 40 MG: 20 TABLET ORAL at 21:31

## 2019-12-13 RX ADMIN — HEPARIN SODIUM 25000 UNITS: 5000 INJECTION, SOLUTION INTRAVENOUS at 21:51

## 2019-12-13 RX ADMIN — BUDESONIDE AND FORMOTEROL FUMARATE DIHYDRATE 2 PUFF: 160; 4.5 AEROSOL RESPIRATORY (INHALATION) at 18:29

## 2019-12-13 RX ADMIN — MORPHINE SULFATE 4 MG: 4 INJECTION INTRAVENOUS at 21:33

## 2019-12-13 RX ADMIN — MORPHINE SULFATE 4 MG: 4 INJECTION INTRAVENOUS at 18:25

## 2019-12-13 RX ADMIN — ACETAMINOPHEN 500 MG: 500 TABLET, FILM COATED ORAL at 21:31

## 2019-12-13 RX ADMIN — SENNOSIDES AND DOCUSATE SODIUM 2 TABLET: 8.6; 5 TABLET ORAL at 18:24

## 2019-12-13 ASSESSMENT — ENCOUNTER SYMPTOMS
STRIDOR: 0
SHORTNESS OF BREATH: 0
ABDOMINAL PAIN: 0
DIZZINESS: 0
SINUS PAIN: 0
CHILLS: 0
PALPITATIONS: 0
DIAPHORESIS: 0
COUGH: 0
MYALGIAS: 1
FEVER: 0
TINGLING: 0
NAUSEA: 0
DIARRHEA: 0
HEADACHES: 0

## 2019-12-13 ASSESSMENT — PAIN SCALES - WONG BAKER
WONGBAKER_NUMERICALRESPONSE: HURTS A LITTLE MORE

## 2019-12-13 NOTE — PROGRESS NOTES
Hospital Medicine Daily Progress Note    Date of Service  12/13/2019    Chief Complaint  79 y.o. female admitted 12/12/2019 with hip dislocation    Hospital Course    79 y.o. female who presented 12/12/2019 with a syncopal episode while walking down stairs at home. She had subsequent hip pain. While in the ER she was found to have a right hip prosthetic dislocation. She then developed chest pain in the ER with EKG changes and an elevated troponin. She has been seen by cardiology and heparin was started. Today she denies any chest pain.      Interval Problem Update  Troponin is stable, echocardiogram is pending    Consultants/Specialty  Cardiology Dr. Julian  Orthopedic surgery Dr. Delgado    Code Status  full    Disposition  tbd    Review of Systems  Review of Systems   Constitutional: Positive for malaise/fatigue. Negative for chills, diaphoresis and fever.   HENT: Negative for sinus pain.    Respiratory: Negative for cough, shortness of breath and stridor.    Cardiovascular: Negative for chest pain, palpitations and leg swelling.   Gastrointestinal: Negative for abdominal pain, diarrhea and nausea.   Genitourinary: Negative for dysuria and urgency.   Musculoskeletal: Positive for joint pain and myalgias.   Skin: Negative for itching and rash.   Neurological: Negative for dizziness, tingling and headaches.        Physical Exam  Temp:  [36.5 °C (97.7 °F)-36.8 °C (98.2 °F)] 36.5 °C (97.7 °F)  Pulse:  [72-77] 77  Resp:  [17-18] 18  BP: (117-128)/(77-78) 119/78  SpO2:  [92 %-96 %] 92 %    Physical Exam  Vitals signs and nursing note reviewed.   Constitutional:       General: She is not in acute distress.     Appearance: She is not diaphoretic.   HENT:      Nose: Nose normal. No congestion.      Mouth/Throat:      Mouth: Mucous membranes are moist.      Pharynx: No oropharyngeal exudate.   Eyes:      Conjunctiva/sclera: Conjunctivae normal.      Pupils: Pupils are equal, round, and reactive to light.   Neck:       Musculoskeletal: Neck supple. No muscular tenderness.   Cardiovascular:      Rate and Rhythm: Normal rate and regular rhythm.      Pulses: Normal pulses.      Heart sounds: Normal heart sounds.   Pulmonary:      Effort: No respiratory distress.      Breath sounds: No stridor. No rhonchi.   Abdominal:      General: There is no distension.      Palpations: Abdomen is soft.      Tenderness: There is no tenderness.   Musculoskeletal:         General: No swelling or tenderness.   Skin:     General: Skin is warm and dry.      Capillary Refill: Capillary refill takes 2 to 3 seconds.      Coloration: Skin is not jaundiced.   Neurological:      Mental Status: She is alert and oriented to person, place, and time. Mental status is at baseline.   Psychiatric:         Mood and Affect: Mood normal.         Behavior: Behavior normal.         Fluids    Intake/Output Summary (Last 24 hours) at 12/13/2019 1446  Last data filed at 12/12/2019 1915  Gross per 24 hour   Intake 100 ml   Output --   Net 100 ml       Laboratory  Recent Labs     12/12/19  1015   WBC 4.9   RBC 4.27   HEMOGLOBIN 10.4*   HEMATOCRIT 35.8*   MCV 83.8   MCH 24.4*   MCHC 29.1*   RDW 52.7*   PLATELETCT 278   MPV 8.5*     Recent Labs     12/12/19  1015   SODIUM 142   POTASSIUM 4.5   CHLORIDE 106   CO2 24   GLUCOSE 95   BUN 16   CREATININE 0.75   CALCIUM 9.1     Recent Labs     12/12/19  1015 12/13/19  1238   APTT 30.3 92.8*   INR 1.03  --                Imaging  EC-ECHOCARDIOGRAM COMPLETE W/ CONT   Final Result      DX-FEMUR-2+ RIGHT   Final Result      No periprosthetic or other fracture      Right superior hip dislocation      DX-PELVIS-1 OR 2 VIEWS   Final Result      Right hip arthroplasty dislocation      Unchanged left hip arthroplasty            DX-CHEST-LIMITED (1 VIEW)   Final Result      Basilar atelectasis and minor fissure thickening could be from lung scarring or edema      Stable cardiac silhouette enlargement, aortic ectasia and large hiatal hernia       CT-CTA CHEST PULMONARY ARTERY W/ RECONS    (Results Pending)        Assessment/Plan  * Syncope  Assessment & Plan  Monitor on tele    NSTEMI (non-ST elevated myocardial infarction) ([facility])- (present on admission)  Assessment & Plan  Concerning for stress cardiomyopathy or pulmonary embolus, cardiology is consulted and will see the patient    HTN (hypertension)- (present on admission)  Assessment & Plan  Will monitor and treat elevated blood pressure as needed    Acute respiratory failure ([facility])  Assessment & Plan  She apparently was unable to wean off of O2 at rehab but has never been on O2 before this.  Patient does have COPD at her baseline but has not needed oxygen in the past  CT angiogram is pending    Hip dislocation, right ([facility])  Assessment & Plan  Ortho consulted. Relocation once cardiac issues are resolved/stable.   Pain control  eval for possible PE  Dr. Delgado is her orthopedic surgeon and requests transfer of the patient to [facility] once cardiac work up is completed      COPD (chronic obstructive pulmonary disease) ([facility])- (present on admission)  Assessment & Plan  No flare. With chronic respiratory failure  Will treat with respiratory therapy    Chronic narcotic use- (present on admission)  Assessment & Plan  Continue home pain meds.     Anemia- (present on admission)  Assessment & Plan  Check iron studies         VTE prophylaxis: heparin

## 2019-12-13 NOTE — ASSESSMENT & PLAN NOTE
Ortho consulted. The patient had right hip arthroplasty with Dr. Delgado on 11/21 and will need OR hip revision due to dislocation but will need to coordinate given her acute pulmonary emboli

## 2019-12-13 NOTE — FLOWSHEET NOTE
12/12/19 1720   Events/Summary/Plan   Events/Summary/Plan RCP complete   Non-Invasive Resp Device Site Inspection Completed Intact   Incentive Spirometry Group   Incentive Spirometry Instruction Yes   Breathing Exercises Yes   Incentive Spirometer Volume 1000 mL   Incentive Spirometer Date Last Changed 12/12/19   Incentive Spirometer Next Change Date (Q 30 Days) 01/11/20   Chest Exam   Respiration 18   Pulse 75   Breath Sounds   RUL Breath Sounds Clear   RML Breath Sounds Clear;Diminished   RLL Breath Sounds Diminished   HAJA Breath Sounds Clear   LLL Breath Sounds Diminished   Oximetry   #Pulse Oximetry (Single Determination) Yes   Oxygen   Home O2 Use Prior To Admission? No   Pulse Oximetry 96 %   O2 (LPM) 2   O2 Daily Delivery Respiratory  Silicone Nasal Cannula

## 2019-12-13 NOTE — H&P
Hospital Medicine History & Physical Note    Date of Service  12/12/2019    Primary Care Physician  Elizabeth Molina M.D.    Consultants  Cardiology. I discussed with them today.     Code Status  full    Chief Complaint  Hip pain    History of Presenting Illness  79 y.o. female who presented 12/12/2019 with a syncopal episode today while walking down stairs at home. She had subsequent hip pain. While in the ER she was found to have a right hip prosthetic dislocation. She then developed chest pain in the ER with EKG changes and an elevated troponin. She has been seen by cardiology and heparinized. She denies chest pain presently and says it resolved in the ED. She denies sympotoms prior to her syncopal episode and was not having chest pain at the time. She has a significant history of a hip replacement about 6 weeks ago. She was discharged from rehab on 12/10 and was unable to be weaned off of there. Home O2 was ordered.     Review of Systems  Review of Systems   Constitutional: Negative for chills and fever.   HENT: Negative for sore throat.    Eyes: Negative for blurred vision and pain.   Respiratory: Negative for cough and shortness of breath.    Cardiovascular: Negative for chest pain and palpitations.   Gastrointestinal: Negative for abdominal pain, nausea and vomiting.   Genitourinary: Negative for dysuria and urgency.   Musculoskeletal: Positive for joint pain. Negative for back pain and neck pain.   Skin: Negative for itching and rash.   Neurological: Negative for dizziness, tingling and headaches.   Psychiatric/Behavioral: Negative for depression. The patient does not have insomnia.    All other systems reviewed and are negative.      Past Medical History   has a past medical history of Arthritis, Bowel habit changes, Breath shortness, Cataract, Chronic pain (11/2019), Dyslipidemia (8/1/2016), Emphysema of lung (HCC), GERD (gastroesophageal reflux disease), Heart burn, Hepatitis (1970's), Hiatus hernia  syndrome, High cholesterol, History of total hip replacement, Hyperlipidemia, Macular degeneration, Memory loss, OA (osteoarthritis), Psychiatric problem, S/P cataract extraction, Snoring, stomach flu (11/08/2019), Urinary incontinence, and Wears dentures.    Surgical History   has a past surgical history that includes other orthopedic surgery; cataract phaco with iol (2/28/2012); cataract phaco with iol (3/13/2012); tubal ligation; hysterectomy, total abdominal; blepharoplasty (Bilateral, 7/14/2015); and pr total hip arthroplasty (Right, 11/21/2019).     Family History  family history includes Cancer in an other family member; Heart Disease in an other family member.     Social History   reports that she has never smoked. She has never used smokeless tobacco. She reports previous alcohol use. She reports that she does not use drugs.    Allergies  Allergies   Allergen Reactions   • Sagebrush        Medications  Prior to Admission Medications   Prescriptions Last Dose Informant Patient Reported? Taking?   Multiple Vitamins-Minerals (CENTRUM SILVER PO) 12/10/2019 Patient Yes No   Sig: Take 1 Tab by mouth every day.   Multiple Vitamins-Minerals (OCUVITE ADULT 50+) Cap 12/10/2019 Patient Yes No   Sig: Take 1 Cap by mouth every day.   Vitamin D, Cholecalciferol, 400 units Cap 12/10/2019 Patient Yes No   Sig: Take 1 Cap by mouth every day.   acetaminophen (TYLENOL) 500 MG Tab PRN at PRN Patient Yes No   Sig: Take 500 mg by mouth 2 times a day as needed.   aspirin  MG Tablet Delayed Response 12/10/2019 Patient Yes No   Sig: Take 1 Tab by mouth 2 times a day. Until told to discontinue by your surgeon   budesonide-formoterol (SYMBICORT) 160-4.5 MCG/ACT Aerosol 12/11/2019 at PM Patient No No   Sig: Inhale 2 Puffs by mouth 2 Times a Day.   calcium carbonate (TUMS) 500 MG Chew Tab PRN at PRN Patient Yes No   Sig: Take 500 mg by mouth 1 time daily as needed.   lovastatin (MEVACOR) 40 MG tablet 12/10/2019 Patient No No    Sig: Take 1 Tab by mouth every bedtime.   pantoprazole (PROTONIX) 20 MG tablet 12/10/2019 Patient No No   Sig: Take 1 Tab by mouth every day.   senna-docusate (PERICOLACE OR SENOKOT S) 8.6-50 MG Tab 12/10/2019 Patient Yes No   Sig: Take 2 Tabs by mouth 2 times a day as needed for Constipation.   tramadol (ULTRAM) 50 MG Tab 12/11/2019 at PM Patient No No   Sig: Take 1 Tab by mouth every 8 hours as needed for up to 7 days.      Facility-Administered Medications: None       Physical Exam  Temp:  [36.8 °C (98.2 °F)-37.3 °C (99.1 °F)] 36.8 °C (98.2 °F)  Pulse:  [49-79] 72  Resp:  [15-22] 18  BP: (102-128)/(61-78) 128/78  SpO2:  [91 %-96 %] 96 %    Physical Exam  Vitals signs and nursing note reviewed.   Constitutional:       General: She is not in acute distress.     Appearance: She is well-developed. She is not diaphoretic.   HENT:      Right Ear: External ear normal.      Left Ear: External ear normal.      Nose: Nose normal.   Eyes:      General:         Right eye: No discharge.         Left eye: No discharge.      Conjunctiva/sclera: Conjunctivae normal.   Neck:      Vascular: No JVD.   Cardiovascular:      Rate and Rhythm: Regular rhythm.      Heart sounds: Normal heart sounds. No murmur.   Pulmonary:      Effort: Pulmonary effort is normal. No respiratory distress.      Breath sounds: Normal breath sounds. No stridor. No wheezing or rales.   Abdominal:      General: Bowel sounds are normal. There is no distension.      Palpations: Abdomen is soft.      Tenderness: There is no tenderness.   Musculoskeletal:         General: Tenderness present.   Skin:     General: Skin is warm and dry.      Coloration: Skin is pale.      Findings: No erythema.   Neurological:      Mental Status: She is alert and oriented to person, place, and time.   Psychiatric:         Behavior: Behavior normal.         Laboratory:  Recent Labs     12/12/19  1015   WBC 4.9   RBC 4.27   HEMOGLOBIN 10.4*   HEMATOCRIT 35.8*   MCV 83.8   MCH 24.4*    MCHC 29.1*   RDW 52.7*   PLATELETCT 278   MPV 8.5*     Recent Labs     12/12/19  1015   SODIUM 142   POTASSIUM 4.5   CHLORIDE 106   CO2 24   GLUCOSE 95   BUN 16   CREATININE 0.75   CALCIUM 9.1     Recent Labs     12/12/19  1015   ALTSGPT 8   ASTSGOT 17   ALKPHOSPHAT 82   TBILIRUBIN 0.2   GLUCOSE 95     Recent Labs     12/12/19  1015   APTT 30.3   INR 1.03     No results for input(s): NTPROBNP in the last 72 hours.      Recent Labs     12/12/19  1015 12/12/19  1340   TROPONINT 37* 34*       Urinalysis:    Recent Labs     12/12/19  1206   SPECGRAVITY 1.025   GLUCOSEUR Negative   KETONES Negative   NITRITE Positive*   LEUKESTERAS Negative   WBCURINE 5-10*   BACTERIA Many*        Imaging:  EC-ECHOCARDIOGRAM COMPLETE W/ CONT   Final Result      DX-FEMUR-2+ RIGHT   Final Result      No periprosthetic or other fracture      Right superior hip dislocation      DX-PELVIS-1 OR 2 VIEWS   Final Result      Right hip arthroplasty dislocation      Unchanged left hip arthroplasty            DX-CHEST-LIMITED (1 VIEW)   Final Result      Basilar atelectasis and minor fissure thickening could be from lung scarring or edema      Stable cardiac silhouette enlargement, aortic ectasia and large hiatal hernia            Assessment/Plan:  I anticipate this patient will require at least two midnights for appropriate medical management, necessitating inpatient admission.    * Syncope  Assessment & Plan  Unclear etiology. No prodrome. This is concerning for a cardiac origin. Trend on tele. Treat nstemi as above. Work up for PE. Echocardiogram. Tele monitoring.     NSTEMI (non-ST elevated myocardial infarction) (HCC)- (present on admission)  Assessment & Plan  Concerning for stress cardiomyopathy vs acs. I am also concerned about a PE with recent hip surgery and syncope. Check cta for a PE. Heparinize, trend on tele. Cards following. Asa/statin therapy. Consider BB/ace pending workup.     HTN (hypertension)- (present on  admission)  Assessment & Plan  Not on meds. Bp lowish. Add prn meds.     Acute respiratory failure (HCC)  Assessment & Plan  She apparently was unable to wean off of O2 at rehab but has never been on O2 before this. Possibly from COPD but not having a flare and I am concerned about a PE. CTA chest.     Hip dislocation, right ([facility])  Assessment & Plan  Ortho consulted. Relocation once cardiac issues are resolved/stable.   Pain control  eval for possible PE  Consider DVT US      COPD (chronic obstructive pulmonary disease) ([facility])- (present on admission)  Assessment & Plan  No flare. With chronic respiratory failure    Chronic narcotic use- (present on admission)  Assessment & Plan  Continue meds.     Anemia- (present on admission)  Assessment & Plan  Check iron studies      VTE prophylaxis: scd

## 2019-12-13 NOTE — PROGRESS NOTES
Assumed pt care. AOx4. VSS. Pt screaming in pain, 10/10 R hip per pt. Discussed POC.  Pt needs reinforcement.  Hourly rounding in place. Fall precautions in place and call lights w/in reach.

## 2019-12-13 NOTE — PROGRESS NOTES
"IV infiltrated, pt refused to do another IV saying \"I have been poked so many times.\" This RN able to convince pt of another IV placement, US guided.  Charge RN made aware.   "

## 2019-12-14 ENCOUNTER — HOSPITAL ENCOUNTER (INPATIENT)
Facility: MEDICAL CENTER | Age: 79
LOS: 14 days | DRG: 299 | End: 2019-12-28
Attending: HOSPITALIST | Admitting: INTERNAL MEDICINE
Payer: MEDICARE

## 2019-12-14 ENCOUNTER — APPOINTMENT (OUTPATIENT)
Dept: RADIOLOGY | Facility: MEDICAL CENTER | Age: 79
DRG: 280 | End: 2019-12-14
Attending: HOSPITALIST
Payer: MEDICARE

## 2019-12-14 VITALS
HEIGHT: 57 IN | BODY MASS INDEX: 33.53 KG/M2 | DIASTOLIC BLOOD PRESSURE: 68 MMHG | TEMPERATURE: 99.9 F | WEIGHT: 155.42 LBS | HEART RATE: 104 BPM | OXYGEN SATURATION: 92 % | SYSTOLIC BLOOD PRESSURE: 114 MMHG | RESPIRATION RATE: 22 BRPM

## 2019-12-14 DIAGNOSIS — I26.01 ACUTE SEPTIC PULMONARY EMBOLISM WITH ACUTE COR PULMONALE (HCC): ICD-10-CM

## 2019-12-14 DIAGNOSIS — S73.004A CLOSED DISLOCATION OF RIGHT HIP, INITIAL ENCOUNTER (HCC): ICD-10-CM

## 2019-12-14 DIAGNOSIS — I95.9 HYPOTENSION, UNSPECIFIED HYPOTENSION TYPE: ICD-10-CM

## 2019-12-14 PROBLEM — I26.09 PULMONARY EMBOLISM WITH ACUTE COR PULMONALE (HCC): Status: ACTIVE | Noted: 2019-12-14

## 2019-12-14 PROBLEM — R79.89 ELEVATED TROPONIN: Status: ACTIVE | Noted: 2019-12-14

## 2019-12-14 LAB
APTT PPP: 71.1 SEC (ref 24.7–36)
IRON SATN MFR SERPL: 7 % (ref 15–55)
IRON SERPL-MCNC: 16 UG/DL (ref 40–170)
TIBC SERPL-MCNC: 223 UG/DL (ref 250–450)

## 2019-12-14 PROCEDURE — 770020 HCHG ROOM/CARE - TELE (206)

## 2019-12-14 PROCEDURE — 94760 N-INVAS EAR/PLS OXIMETRY 1: CPT

## 2019-12-14 PROCEDURE — 99223 1ST HOSP IP/OBS HIGH 75: CPT | Performed by: INTERNAL MEDICINE

## 2019-12-14 PROCEDURE — 700117 HCHG RX CONTRAST REV CODE 255

## 2019-12-14 PROCEDURE — 99232 SBSQ HOSP IP/OBS MODERATE 35: CPT | Performed by: INTERNAL MEDICINE

## 2019-12-14 PROCEDURE — 700102 HCHG RX REV CODE 250 W/ 637 OVERRIDE(OP): Performed by: HOSPITALIST

## 2019-12-14 PROCEDURE — 700111 HCHG RX REV CODE 636 W/ 250 OVERRIDE (IP): Performed by: INTERNAL MEDICINE

## 2019-12-14 PROCEDURE — 700111 HCHG RX REV CODE 636 W/ 250 OVERRIDE (IP): Performed by: HOSPITALIST

## 2019-12-14 PROCEDURE — 700102 HCHG RX REV CODE 250 W/ 637 OVERRIDE(OP): Performed by: INTERNAL MEDICINE

## 2019-12-14 PROCEDURE — 85730 THROMBOPLASTIN TIME PARTIAL: CPT

## 2019-12-14 PROCEDURE — A9270 NON-COVERED ITEM OR SERVICE: HCPCS | Performed by: HOSPITALIST

## 2019-12-14 PROCEDURE — 71275 CT ANGIOGRAPHY CHEST: CPT

## 2019-12-14 PROCEDURE — A9270 NON-COVERED ITEM OR SERVICE: HCPCS | Performed by: INTERNAL MEDICINE

## 2019-12-14 RX ORDER — MORPHINE SULFATE 4 MG/ML
2-4 INJECTION, SOLUTION INTRAMUSCULAR; INTRAVENOUS
Status: CANCELLED | OUTPATIENT
Start: 2019-12-14

## 2019-12-14 RX ORDER — POLYETHYLENE GLYCOL 3350 17 G/17G
1 POWDER, FOR SOLUTION ORAL
Status: CANCELLED | OUTPATIENT
Start: 2019-12-14

## 2019-12-14 RX ORDER — POLYETHYLENE GLYCOL 3350 17 G/17G
1 POWDER, FOR SOLUTION ORAL
Status: DISCONTINUED | OUTPATIENT
Start: 2019-12-14 | End: 2019-12-28 | Stop reason: HOSPADM

## 2019-12-14 RX ORDER — BISACODYL 10 MG
10 SUPPOSITORY, RECTAL RECTAL
Status: CANCELLED | OUTPATIENT
Start: 2019-12-14

## 2019-12-14 RX ORDER — AMOXICILLIN 250 MG
2 CAPSULE ORAL 2 TIMES DAILY
Status: CANCELLED | OUTPATIENT
Start: 2019-12-14

## 2019-12-14 RX ORDER — ONDANSETRON 4 MG/1
4 TABLET, ORALLY DISINTEGRATING ORAL EVERY 4 HOURS PRN
Status: DISCONTINUED | OUTPATIENT
Start: 2019-12-14 | End: 2019-12-28 | Stop reason: HOSPADM

## 2019-12-14 RX ORDER — AMOXICILLIN 250 MG
2 CAPSULE ORAL 2 TIMES DAILY
Status: DISCONTINUED | OUTPATIENT
Start: 2019-12-15 | End: 2019-12-28 | Stop reason: HOSPADM

## 2019-12-14 RX ORDER — ACETAMINOPHEN 500 MG
500 TABLET ORAL 2 TIMES DAILY PRN
Status: CANCELLED | OUTPATIENT
Start: 2019-12-14

## 2019-12-14 RX ORDER — BISACODYL 10 MG
10 SUPPOSITORY, RECTAL RECTAL
Status: DISCONTINUED | OUTPATIENT
Start: 2019-12-14 | End: 2019-12-28 | Stop reason: HOSPADM

## 2019-12-14 RX ORDER — BUDESONIDE AND FORMOTEROL FUMARATE DIHYDRATE 160; 4.5 UG/1; UG/1
2 AEROSOL RESPIRATORY (INHALATION) 2 TIMES DAILY
Status: CANCELLED | OUTPATIENT
Start: 2019-12-14

## 2019-12-14 RX ORDER — HALOPERIDOL 5 MG/ML
1 INJECTION INTRAMUSCULAR EVERY 4 HOURS PRN
Status: DISCONTINUED | OUTPATIENT
Start: 2019-12-14 | End: 2019-12-24

## 2019-12-14 RX ORDER — HEPARIN SODIUM 5000 [USP'U]/100ML
INJECTION, SOLUTION INTRAVENOUS CONTINUOUS
Status: DISCONTINUED | OUTPATIENT
Start: 2019-12-14 | End: 2019-12-18

## 2019-12-14 RX ORDER — ONDANSETRON 2 MG/ML
4 INJECTION INTRAMUSCULAR; INTRAVENOUS EVERY 4 HOURS PRN
Status: CANCELLED | OUTPATIENT
Start: 2019-12-14

## 2019-12-14 RX ORDER — ONDANSETRON 4 MG/1
4 TABLET, ORALLY DISINTEGRATING ORAL EVERY 4 HOURS PRN
Status: CANCELLED | OUTPATIENT
Start: 2019-12-14

## 2019-12-14 RX ORDER — MORPHINE SULFATE 4 MG/ML
2-4 INJECTION, SOLUTION INTRAMUSCULAR; INTRAVENOUS
Status: DISCONTINUED | OUTPATIENT
Start: 2019-12-14 | End: 2019-12-17

## 2019-12-14 RX ORDER — ASPIRIN 81 MG/1
324 TABLET, CHEWABLE ORAL DAILY
Status: CANCELLED | OUTPATIENT
Start: 2019-12-15

## 2019-12-14 RX ORDER — ONDANSETRON 2 MG/ML
4 INJECTION INTRAMUSCULAR; INTRAVENOUS EVERY 4 HOURS PRN
Status: DISCONTINUED | OUTPATIENT
Start: 2019-12-14 | End: 2019-12-28 | Stop reason: HOSPADM

## 2019-12-14 RX ORDER — CALCIUM CARBONATE 500 MG/1
500 TABLET, CHEWABLE ORAL
Status: CANCELLED | OUTPATIENT
Start: 2019-12-14

## 2019-12-14 RX ORDER — HEPARIN SODIUM 1000 [USP'U]/ML
2600 INJECTION, SOLUTION INTRAVENOUS; SUBCUTANEOUS PRN
Status: CANCELLED | OUTPATIENT
Start: 2019-12-14

## 2019-12-14 RX ORDER — OMEPRAZOLE 20 MG/1
20 CAPSULE, DELAYED RELEASE ORAL DAILY
Status: DISCONTINUED | OUTPATIENT
Start: 2019-12-15 | End: 2019-12-28 | Stop reason: HOSPADM

## 2019-12-14 RX ORDER — OMEPRAZOLE 20 MG/1
20 CAPSULE, DELAYED RELEASE ORAL DAILY
Status: CANCELLED | OUTPATIENT
Start: 2019-12-15

## 2019-12-14 RX ORDER — ASPIRIN 325 MG
325 TABLET ORAL DAILY
Status: DISCONTINUED | OUTPATIENT
Start: 2019-12-15 | End: 2019-12-15

## 2019-12-14 RX ORDER — TRAMADOL HYDROCHLORIDE 50 MG/1
50 TABLET ORAL EVERY 8 HOURS PRN
Status: DISCONTINUED | OUTPATIENT
Start: 2019-12-14 | End: 2019-12-17

## 2019-12-14 RX ORDER — ACETAMINOPHEN 500 MG
500 TABLET ORAL 2 TIMES DAILY PRN
Status: DISCONTINUED | OUTPATIENT
Start: 2019-12-14 | End: 2019-12-16

## 2019-12-14 RX ORDER — HEPARIN SODIUM 1000 [USP'U]/ML
2600 INJECTION, SOLUTION INTRAVENOUS; SUBCUTANEOUS PRN
Status: DISCONTINUED | OUTPATIENT
Start: 2019-12-14 | End: 2019-12-18

## 2019-12-14 RX ORDER — HEPARIN SODIUM 5000 [USP'U]/100ML
INJECTION, SOLUTION INTRAVENOUS CONTINUOUS
Status: CANCELLED | OUTPATIENT
Start: 2019-12-14

## 2019-12-14 RX ORDER — BUDESONIDE AND FORMOTEROL FUMARATE DIHYDRATE 160; 4.5 UG/1; UG/1
2 AEROSOL RESPIRATORY (INHALATION) 2 TIMES DAILY
Status: DISCONTINUED | OUTPATIENT
Start: 2019-12-15 | End: 2019-12-28 | Stop reason: HOSPADM

## 2019-12-14 RX ORDER — ENALAPRILAT 1.25 MG/ML
1.25 INJECTION INTRAVENOUS EVERY 6 HOURS PRN
Status: CANCELLED | OUTPATIENT
Start: 2019-12-14

## 2019-12-14 RX ORDER — ENALAPRILAT 1.25 MG/ML
1.25 INJECTION INTRAVENOUS EVERY 6 HOURS PRN
Status: DISCONTINUED | OUTPATIENT
Start: 2019-12-14 | End: 2019-12-26

## 2019-12-14 RX ORDER — FERROUS SULFATE 325(65) MG
325 TABLET ORAL 2 TIMES DAILY WITH MEALS
Status: DISCONTINUED | OUTPATIENT
Start: 2019-12-14 | End: 2019-12-28 | Stop reason: HOSPADM

## 2019-12-14 RX ORDER — TRAMADOL HYDROCHLORIDE 50 MG/1
50 TABLET ORAL EVERY 8 HOURS PRN
Status: CANCELLED | OUTPATIENT
Start: 2019-12-14

## 2019-12-14 RX ORDER — ASPIRIN 325 MG
325 TABLET ORAL DAILY
Status: CANCELLED | OUTPATIENT
Start: 2019-12-15

## 2019-12-14 RX ORDER — ASPIRIN 300 MG/1
300 SUPPOSITORY RECTAL DAILY
Status: DISCONTINUED | OUTPATIENT
Start: 2019-12-15 | End: 2019-12-15

## 2019-12-14 RX ORDER — ASPIRIN 81 MG/1
324 TABLET, CHEWABLE ORAL DAILY
Status: DISCONTINUED | OUTPATIENT
Start: 2019-12-15 | End: 2019-12-15

## 2019-12-14 RX ORDER — LOVASTATIN 20 MG/1
40 TABLET ORAL
Status: CANCELLED | OUTPATIENT
Start: 2019-12-14

## 2019-12-14 RX ORDER — FERROUS SULFATE 325(65) MG
325 TABLET ORAL 2 TIMES DAILY WITH MEALS
Status: CANCELLED | OUTPATIENT
Start: 2019-12-14

## 2019-12-14 RX ORDER — CALCIUM CARBONATE 500 MG/1
500 TABLET, CHEWABLE ORAL
Status: DISCONTINUED | OUTPATIENT
Start: 2019-12-14 | End: 2019-12-28 | Stop reason: HOSPADM

## 2019-12-14 RX ORDER — LOVASTATIN 20 MG/1
40 TABLET ORAL
Status: DISCONTINUED | OUTPATIENT
Start: 2019-12-14 | End: 2019-12-28 | Stop reason: HOSPADM

## 2019-12-14 RX ORDER — ASPIRIN 600 MG/1
300 SUPPOSITORY RECTAL DAILY
Status: CANCELLED | OUTPATIENT
Start: 2019-12-15

## 2019-12-14 RX ORDER — LORAZEPAM 2 MG/ML
2 INJECTION INTRAMUSCULAR ONCE
Status: COMPLETED | OUTPATIENT
Start: 2019-12-14 | End: 2019-12-14

## 2019-12-14 RX ADMIN — LOVASTATIN 40 MG: 20 TABLET ORAL at 20:05

## 2019-12-14 RX ADMIN — BUDESONIDE AND FORMOTEROL FUMARATE DIHYDRATE 2 PUFF: 160; 4.5 AEROSOL RESPIRATORY (INHALATION) at 07:12

## 2019-12-14 RX ADMIN — MORPHINE SULFATE 4 MG: 4 INJECTION INTRAVENOUS at 06:11

## 2019-12-14 RX ADMIN — SENNOSIDES AND DOCUSATE SODIUM 2 TABLET: 8.6; 5 TABLET ORAL at 06:11

## 2019-12-14 RX ADMIN — ACETAMINOPHEN 500 MG: 500 TABLET ORAL at 23:47

## 2019-12-14 RX ADMIN — HALOPERIDOL LACTATE 1 MG: 5 INJECTION, SOLUTION INTRAMUSCULAR at 23:46

## 2019-12-14 RX ADMIN — TRAMADOL HYDROCHLORIDE 50 MG: 50 TABLET, FILM COATED ORAL at 14:47

## 2019-12-14 RX ADMIN — TRAMADOL HYDROCHLORIDE 50 MG: 50 TABLET, FILM COATED ORAL at 23:47

## 2019-12-14 RX ADMIN — OMEPRAZOLE 20 MG: 20 CAPSULE, DELAYED RELEASE ORAL at 06:11

## 2019-12-14 RX ADMIN — MORPHINE SULFATE 4 MG: 4 INJECTION INTRAVENOUS at 17:31

## 2019-12-14 RX ADMIN — IOHEXOL 80 ML: 350 INJECTION, SOLUTION INTRAVENOUS at 09:25

## 2019-12-14 RX ADMIN — TRAMADOL HYDROCHLORIDE 50 MG: 50 TABLET, FILM COATED ORAL at 00:28

## 2019-12-14 RX ADMIN — FERROUS SULFATE TAB 325 MG (65 MG ELEMENTAL FE) 325 MG: 325 (65 FE) TAB at 20:05

## 2019-12-14 RX ADMIN — ASPIRIN 325 MG ORAL TABLET 325 MG: 325 PILL ORAL at 06:11

## 2019-12-14 RX ADMIN — LORAZEPAM 2 MG: 2 INJECTION INTRAMUSCULAR; INTRAVENOUS at 08:45

## 2019-12-14 ASSESSMENT — ENCOUNTER SYMPTOMS
WHEEZING: 0
CHILLS: 0
HEMOPTYSIS: 0
NAUSEA: 0
VOMITING: 0
ABDOMINAL PAIN: 0
COUGH: 0
PALPITATIONS: 0
SHORTNESS OF BREATH: 1
TINGLING: 0
DIARRHEA: 0
SINUS PAIN: 0
VOMITING: 0
TREMORS: 0
ABDOMINAL PAIN: 0
STRIDOR: 0
TINGLING: 0
SPUTUM PRODUCTION: 0
SHORTNESS OF BREATH: 1
DIZZINESS: 0
FEVER: 0
NAUSEA: 0
FEVER: 0
BRUISES/BLEEDS EASILY: 0
COUGH: 0
DIZZINESS: 0
WHEEZING: 0
SORE THROAT: 0
MYALGIAS: 1
MYALGIAS: 1

## 2019-12-14 ASSESSMENT — COPD QUESTIONNAIRES
DO YOU EVER COUGH UP ANY MUCUS OR PHLEGM?: NO/ONLY WITH OCCASIONAL COLDS OR INFECTIONS
HAVE YOU SMOKED AT LEAST 100 CIGARETTES IN YOUR ENTIRE LIFE: NO/DON'T KNOW
COPD SCREENING SCORE: 4
DURING THE PAST 4 WEEKS HOW MUCH DID YOU FEEL SHORT OF BREATH: SOME OF THE TIME

## 2019-12-14 ASSESSMENT — PATIENT HEALTH QUESTIONNAIRE - PHQ9
SUM OF ALL RESPONSES TO PHQ QUESTIONS 1-9: 7
1. LITTLE INTEREST OR PLEASURE IN DOING THINGS: SEVERAL DAYS
4. FEELING TIRED OR HAVING LITTLE ENERGY: NOT AT ALL
2. FEELING DOWN, DEPRESSED, IRRITABLE, OR HOPELESS: MORE THAN HALF THE DAYS
3. TROUBLE FALLING OR STAYING ASLEEP OR SLEEPING TOO MUCH: NOT AT ALL
5. POOR APPETITE OR OVEREATING: NOT AT ALL
6. FEELING BAD ABOUT YOURSELF - OR THAT YOU ARE A FAILURE OR HAVE LET YOURSELF OR YOUR FAMILY DOWN: SEVERAL DAYS
7. TROUBLE CONCENTRATING ON THINGS, SUCH AS READING THE NEWSPAPER OR WATCHING TELEVISION: MORE THAN HALF THE DAYS
8. MOVING OR SPEAKING SO SLOWLY THAT OTHER PEOPLE COULD HAVE NOTICED. OR THE OPPOSITE, BEING SO FIGETY OR RESTLESS THAT YOU HAVE BEEN MOVING AROUND A LOT MORE THAN USUAL: SEVERAL DAYS
9. THOUGHTS THAT YOU WOULD BE BETTER OFF DEAD, OR OF HURTING YOURSELF: NOT AT ALL
SUM OF ALL RESPONSES TO PHQ9 QUESTIONS 1 AND 2: 3

## 2019-12-14 ASSESSMENT — PAIN SCALES - WONG BAKER
WONGBAKER_NUMERICALRESPONSE: HURTS A LITTLE MORE
WONGBAKER_NUMERICALRESPONSE: HURTS A LITTLE MORE

## 2019-12-14 ASSESSMENT — LIFESTYLE VARIABLES: EVER_SMOKED: NEVER

## 2019-12-14 NOTE — PROGRESS NOTES
Direct admit from [facility]. Referring/accepting is Dr. Vera for NSTEMI, PE, & dislocated Rt hip prosthesis.   Pt's orthopedist, Dr. Khang Delgado does not have privileges at [facility] so [facility].  Discharge and readmit orders signed and held--please release and implement upon pt arrival. Page the direct admit on call hospitalist to notify that patient has arrived & for any acute changes. Call referring/accepting MD from [facility] for any concerns related to the admission orders.

## 2019-12-14 NOTE — ASSESSMENT & PLAN NOTE
12/12 Reduced in the operating room by Dr. Delgado  PT/OT and placement  Pain management  Rt hip arthroplasty 11/21  Multiple complications post operatively including SBO, and PE.

## 2019-12-14 NOTE — PROGRESS NOTES
Report given to ABBI Gordon. Pt denies needs at this time. Safety precautions in place. Call light within reach.

## 2019-12-14 NOTE — H&P
Hospital Medicine History & Physical Note    Date of Service  12/14/2019    Primary Care Physician  Elizabeth Molina M.D.    Consultants  Orthopedic surgery Dr. Delgado  Cardiology Dr. Julian    Code Status  full    Chief Complaint  Right hip pain    History of Presenting Illness  79 y.o. female who had a right hip arthroplasty on 11/21. Tatianna presented 12/12/2019 with a syncopal episode while walking down stairs at home. She had subsequent hip pain. While in the ER she was found to have a right hip prosthetic dislocation. She then developed chest pain in the ER with EKG changes and an elevated troponin. She has been seen by cardiology and heparin was started. CT scan of the chest shows pulmonary emboli so heparin drip is continued. These results were discussed with orthopedic surgery Dr. Delgado who states the hip relocation should not require any cutting so the patient should resume anticoagulation therapy although her hip will need to be reduced in the operating room at [facility].    Review of Systems  Review of Systems   Constitutional: Negative for chills, fever and malaise/fatigue.   HENT: Negative for congestion and sore throat.    Respiratory: Positive for shortness of breath. Negative for cough, hemoptysis, sputum production, wheezing and stridor.    Cardiovascular: Negative for chest pain and palpitations.   Gastrointestinal: Negative for abdominal pain, nausea and vomiting.   Genitourinary: Negative for dysuria and urgency.   Musculoskeletal: Positive for joint pain and myalgias.        Right hip pain   Skin: Negative for itching and rash.   Neurological: Negative for dizziness and tingling.   Endo/Heme/Allergies: Does not bruise/bleed easily.   All other systems reviewed and are negative.      Past Medical History   has a past medical history of Arthritis, Bowel habit changes, Breath shortness, Cataract, Chronic pain (11/2019), Dyslipidemia (8/1/2016), Emphysema of lung (HCC), GERD  (gastroesophageal reflux disease), Heart burn, Hepatitis (1970's), Hiatus hernia syndrome, High cholesterol, History of total hip replacement, Hyperlipidemia, Macular degeneration, Memory loss, OA (osteoarthritis), Psychiatric problem, S/P cataract extraction, Snoring, stomach flu (11/08/2019), Urinary incontinence, and Wears dentures.    Surgical History   has a past surgical history that includes other orthopedic surgery; cataract phaco with iol (2/28/2012); cataract phaco with iol (3/13/2012); tubal ligation; hysterectomy, total abdominal; blepharoplasty (Bilateral, 7/14/2015); and pr total hip arthroplasty (Right, 11/21/2019).     Family History  family history includes Cancer in an other family member; Heart Disease in an other family member.     Social History   reports that she has never smoked. She has never used smokeless tobacco. She reports previous alcohol use. She reports that she does not use drugs.    Allergies  Allergies   Allergen Reactions   • [place]        Medications  Heparin drip  Tylenol 650mg q4h prn mild pain  mevacor 40mg daily  symbicort 2 puffs bid  Tramadol 50mg q6h prn moderate pain  Aspirin 325mg daily  Omeprazole 20mg daily  Morphine 2-4mg iv q2h prn severe pain      Physical Exam  T99.4 HR96 /59 RR20 O2 sat 97% on 4 liters    Physical Exam  Vitals signs and nursing note reviewed.   Constitutional:       Appearance: She is ill-appearing. She is not diaphoretic.   HENT:      Nose: No congestion or rhinorrhea.      Mouth/Throat:      Mouth: Mucous membranes are moist.      Pharynx: No oropharyngeal exudate.   Eyes:      General: No scleral icterus.     Extraocular Movements: Extraocular movements intact.      Conjunctiva/sclera: Conjunctivae normal.   Cardiovascular:      Rate and Rhythm: Normal rate and regular rhythm.      Heart sounds: Normal heart sounds. No murmur.   Pulmonary:      Breath sounds: Normal breath sounds. Decreased air movement present. No stridor. No wheezing  or rhonchi.   Abdominal:      General: There is no distension.      Palpations: Abdomen is soft.      Tenderness: There is no tenderness.   Musculoskeletal:         General: Tenderness present. No swelling.   Skin:     General: Skin is dry.      Coloration: Skin is pale.      Findings: No erythema.   Neurological:      Mental Status: She is alert and oriented to person, place, and time.      Coordination: Coordination abnormal.   Psychiatric:         Mood and Affect: Mood normal.         Behavior: Behavior normal.         Laboratory:  Recent Labs     12/12/19  1015   WBC 4.9   RBC 4.27   HEMOGLOBIN 10.4*   HEMATOCRIT 35.8*   MCV 83.8   MCH 24.4*   MCHC 29.1*   RDW 52.7*   PLATELETCT 278   MPV 8.5*     Recent Labs     12/12/19  1015   SODIUM 142   POTASSIUM 4.5   CHLORIDE 106   CO2 24   GLUCOSE 95   BUN 16   CREATININE 0.75   CALCIUM 9.1     Recent Labs     12/12/19  1015   ALTSGPT 8   ASTSGOT 17   ALKPHOSPHAT 82   TBILIRUBIN 0.2   GLUCOSE 95     Recent Labs     12/12/19  1015  12/13/19  0536 12/13/19  1238 12/14/19  1159   APTT 30.3   < > 72.2* 92.8* 71.1*   INR 1.03  --   --   --   --     < > = values in this interval not displayed.     No results for input(s): NTPROBNP in the last 72 hours.      Recent Labs     12/12/19  1753 12/12/19  2326 12/13/19  0536   TROPONINT 39* 42* 39*       Urinalysis:    Recent Labs     12/12/19  1206   SPECGRAVITY 1.025   GLUCOSEUR Negative   KETONES Negative   NITRITE Positive*   LEUKESTERAS Negative   WBCURINE 5-10*   BACTERIA Many*        Imaging:  No orders to display         Assessment/Plan:  I anticipate this patient will require at least two midnights for appropriate medical management, necessitating inpatient admission.    Elevated troponin  Assessment & Plan  Due to pulmonary embolus, stable, cardiology is consulted and recommends no other intervention    Closed dislocation of right hip (HCC)  Assessment & Plan  Unable to be reduced in the emergency department  Dr Delgado is  consulted as right hip arthroplasty was done 11/21 and plans for OR reduction 12/15    Acute pulmonary embolism with acute cor pulmonale (HCC)  Assessment & Plan  Continue heparin drip, Dr. Delgado is aware and hip relocation should require anaesthesia but no cutting  Echocardiogram confirms increased right heart pressure    Acute respiratory failure (HCC)- (present on admission)  Assessment & Plan  Continue supplemental oxygen for hypoxia  Respiratory therapy and encourage mobility after surgery    Normocytic anemia  Assessment & Plan  Monitor labs on heparin drip        VTE prophylaxis: heparin drip

## 2019-12-14 NOTE — CARE PLAN
Problem: Safety  Goal: Will remain free from injury  Outcome: PROGRESSING AS EXPECTED  Note:   Reinforced fall risk precautions. Bed alarm on, Non-skid socks on feet, call light in reach. 2 person assist to turn and use bedpan. Bedrest only due to dislocated hip. Turn Q2 hrs.      Problem: Venous Thromboembolism (VTW)/Deep Vein Thrombosis (DVT) Prevention:  Goal: Patient will participate in Venous Thrombosis (VTE)/Deep Vein Thrombosis (DVT)Prevention Measures  Outcome: PROGRESSING AS EXPECTED  Note:   Heparin drip in place, patient aPTT therapeutic. Will continue to monitor aPTT Q24 hrs     Problem: Pain Management  Goal: Pain level will decrease to patient's comfort goal  Outcome: PROGRESSING AS EXPECTED  Note:   Patient does not have pain at rest, high rates of pain with activity. Medicated with oral medication

## 2019-12-14 NOTE — CARE PLAN
Problem: Communication  Goal: The ability to communicate needs accurately and effectively will improve  Outcome: PROGRESSING AS EXPECTED     Problem: Safety  Goal: Will remain free from injury  Outcome: PROGRESSING AS EXPECTED    Ongoing care. No acute issues at this time. Heparin at therapeutic range running at 900unit/hr or 18mls/hr. Plan for possible surgery tomorrow. Possible getting transferred to regional for further eval. Will continue to monitor patient.

## 2019-12-14 NOTE — ASSESSMENT & PLAN NOTE
Causing right heart strain and hypoxia, will continue heparin drip  I reviewed the CT scan results with Dr. Duggan and will discuss the findings with surgery Dr. Delgado's group

## 2019-12-14 NOTE — PROGRESS NOTES
Telemetry Shift Summary    Rhythm SR  HR Range 60-70  Ectopy r-o PVC/PAC  Measurements 0.16/0.08/0.40        Normal Values  Rhythm SR  HR Range    Measurements 0.12-0.20 / 0.06-0.10  / 0.30-0.52

## 2019-12-14 NOTE — PROGRESS NOTES
Bedside report received from ABBI Gordon. Patient resting comfortably in bed. Requesting pain medication but no available PRNs until 0830. Declines any other needs at this time. Heparin drip rate verified with off going nurse. Call light in reach. Bed alarm on. Safety measures in place.

## 2019-12-14 NOTE — PROGRESS NOTES
Hospital Medicine Daily Progress Note    Date of Service  12/14/2019    Chief Complaint  79 y.o. female admitted 12/12/2019 with hip dislocation    Hospital Course    79 y.o. female who presented 12/12/2019 with a syncopal episode while walking down stairs at home. She had subsequent hip pain. While in the ER she was found to have a right hip prosthetic dislocation. She then developed chest pain in the ER with EKG changes and an elevated troponin. She has been seen by cardiology and heparin was started. Today she denies any chest pain.      Interval Problem Update  Echocardiogram shows increased pulmonary artery pressure  CT scan shows pulmonary emboli    Consultants/Specialty  Cardiology Dr. Julian  Orthopedic surgery Dr. Delgado    Code Status  full    Disposition  tbd    Review of Systems  Review of Systems   Constitutional: Positive for malaise/fatigue. Negative for fever.   HENT: Negative for congestion and sinus pain.    Respiratory: Positive for shortness of breath. Negative for cough and wheezing.    Cardiovascular: Negative for chest pain and leg swelling.   Gastrointestinal: Negative for abdominal pain, diarrhea, nausea and vomiting.   Genitourinary: Negative for dysuria and urgency.   Musculoskeletal: Positive for joint pain and myalgias.        Right hip pain   Skin: Negative for itching.   Neurological: Negative for dizziness, tingling and tremors.        Physical Exam  Temp:  [36.4 °C (97.5 °F)-37.4 °C (99.4 °F)] 37 °C (98.6 °F)  Pulse:  [75-94] 94  Resp:  [18-22] 18  BP: ()/(43-89) 117/43  SpO2:  [90 %-97 %] 90 %    Physical Exam  Vitals signs and nursing note reviewed.   Constitutional:       General: She is not in acute distress.     Appearance: She is not diaphoretic.   HENT:      Nose: Nose normal. No congestion.      Mouth/Throat:      Mouth: Mucous membranes are moist.      Pharynx: No oropharyngeal exudate.   Eyes:      Conjunctiva/sclera: Conjunctivae normal.      Pupils: Pupils are equal,  round, and reactive to light.   Neck:      Musculoskeletal: Neck supple. No muscular tenderness.   Cardiovascular:      Rate and Rhythm: Normal rate and regular rhythm.      Pulses: Normal pulses.      Heart sounds: Normal heart sounds.   Pulmonary:      Effort: No respiratory distress.      Breath sounds: No stridor. No rhonchi.   Abdominal:      General: There is no distension.      Palpations: Abdomen is soft.      Tenderness: There is no tenderness.   Musculoskeletal:         General: No swelling or tenderness.   Skin:     General: Skin is warm and dry.      Capillary Refill: Capillary refill takes 2 to 3 seconds.      Coloration: Skin is not jaundiced.   Neurological:      Mental Status: She is alert and oriented to person, place, and time. Mental status is at baseline.   Psychiatric:         Mood and Affect: Mood normal.         Behavior: Behavior normal.         Fluids    Intake/Output Summary (Last 24 hours) at 12/14/2019 1040  Last data filed at 12/14/2019 0600  Gross per 24 hour   Intake 220 ml   Output 400 ml   Net -180 ml       Laboratory  Recent Labs     12/12/19  1015   WBC 4.9   RBC 4.27   HEMOGLOBIN 10.4*   HEMATOCRIT 35.8*   MCV 83.8   MCH 24.4*   MCHC 29.1*   RDW 52.7*   PLATELETCT 278   MPV 8.5*     Recent Labs     12/12/19  1015   SODIUM 142   POTASSIUM 4.5   CHLORIDE 106   CO2 24   GLUCOSE 95   BUN 16   CREATININE 0.75   CALCIUM 9.1     Recent Labs     12/12/19  1015 12/12/19  2326 12/13/19  0536 12/13/19  1238   APTT 30.3 93.6* 72.2* 92.8*   INR 1.03  --   --   --                Imaging  CT-CTA CHEST PULMONARY ARTERY W/ RECONS   Final Result      1.  Bilateral pulmonary emboli are identified involving the main pulmonary arteries and more peripheral arteries as listed above.      2.  Abnormally elevated RV LV ratio consistent with right heart strain.      3.  Large hiatal hernia extends in the left hemithorax.      4.  Probable consolidative atelectasis in the left lung base.      These findings  were discussed with dr navarro 12/14/2019.      EC-ECHOCARDIOGRAM COMPLETE W/ CONT   Final Result      DX-FEMUR-2+ RIGHT   Final Result      No periprosthetic or other fracture      Right superior hip dislocation      DX-PELVIS-1 OR 2 VIEWS   Final Result      Right hip arthroplasty dislocation      Unchanged left hip arthroplasty            DX-CHEST-LIMITED (1 VIEW)   Final Result      Basilar atelectasis and minor fissure thickening could be from lung scarring or edema      Stable cardiac silhouette enlargement, aortic ectasia and large hiatal hernia           Assessment/Plan  * Syncope  Assessment & Plan  Monitor on tele, due to acute pulmonary emboli    NSTEMI (non-ST elevated myocardial infarction) ([facility])- (present on admission)  Assessment & Plan  Due to pulmonary emboli, level stable    HTN (hypertension)- (present on admission)  Assessment & Plan  Will treat as needed    Pulmonary embolism with acute cor pulmonale ([facility])  Assessment & Plan  Causing right heart strain and hypoxia, will continue heparin drip  I reviewed the CT scan results with Dr. Duggan and will discuss the findings with surgery Dr. Delgado's group    Acute respiratory failure ([facility])  Assessment & Plan  Continue oxygen and respiratory therapy and heparin drip    Hip dislocation, right ([facility])  Assessment & Plan  Ortho consulted. The patient had right hip arthroplasty with Dr. Delgado on 11/21 and will need OR hip revision due to dislocation but will need to coordinate given her acute pulmonary emboli      COPD (chronic obstructive pulmonary disease) ([facility])- (present on admission)  Assessment & Plan  No flare.   Will treat with respiratory therapy    Chronic narcotic use- (present on admission)  Assessment & Plan  Continue home pain meds. Acute medication added for pain from hip dislocation    Anemia- (present on admission)  Assessment & Plan  Iron deficiency, will order oral replacement       VTE prophylaxis: heparin

## 2019-12-14 NOTE — DISCHARGE SUMMARY
"Discharge Summary    CHIEF COMPLAINT ON ADMISSION  Chief Complaint   Patient presents with   • Hip Pain     Rt hip pain following GLF. 3 weeks post rt hip replacement.    • Syncope     Possible syncopal event.  pt was walking to restroom, \" all of the sudden I was on ground\". Unwitnessed. Reports hitting head on dresser, denies head neck or back pain.        Reason for Admission  Hip dislocation    Admission Date  12/12/2019    CODE STATUS  Full Code    HPI & HOSPITAL COURSE    79 y.o. female who presented 12/12/2019 with a syncopal episode while walking down stairs at home. She had subsequent hip pain. While in the ER she was found to have a right hip prosthetic dislocation. She then developed chest pain in the ER with EKG changes and an elevated troponin. She has been seen by cardiology and heparin was started. Today she denies any chest pain. CT scan of the chest shows pulmonary emboli so heparin drip is continued. These results were discussed with orthopedic surgery Dr. Delgado who states the hip relocation should not require any cutting so the patient should resume anticoagulation therapy although her hip will need to be reduced in the operating room at [facility].    Therefore, she is discharged in guarded and stable condition to a short-term general hosptial for inpatient care.    The patient met 2-midnight criteria for an inpatient stay at the time of discharge.    Discharge Date  12/14/2019    FOLLOW UP ITEMS POST DISCHARGE  Orthopedic surgery Dr. Delgado for right hip reduction    DISCHARGE DIAGNOSES  Principal Problem:    Syncope POA: Unknown  Active Problems:    HTN (hypertension) POA: Yes    NSTEMI (non-ST elevated myocardial infarction) ([facility]) POA: Yes    COPD (chronic obstructive pulmonary disease) ([facility]) POA: Yes    Hip dislocation, right ([facility]) POA: Unknown    Acute respiratory failure (HCC) POA: Unknown    Pulmonary embolism with acute cor pulmonale (HCC) POA: Unknown    Anemia POA: " Yes    Chronic narcotic use POA: Yes  Resolved Problems:    * No resolved hospital problems. *      FOLLOW UP  Future Appointments   Date Time Provider Department Center   12/26/2019 10:40 AM Elizabeth Molina M.D. [facility] None     No follow-up provider specified.    MEDICATIONS ON DISCHARGE     Medication List      ASK your doctor about these medications      Instructions   acetaminophen 500 MG Tabs  Commonly known as:  TYLENOL   Take 500 mg by mouth 2 times a day as needed.  Dose:  500 mg     Aspirin 325 MG Tbec   Take 1 Tab by mouth 2 times a day. Until told to discontinue by your surgeon  Dose:  325 mg     budesonide-formoterol 160-4.5 MCG/ACT Aero  Commonly known as:  SYMBICORT   Inhale 2 Puffs by mouth 2 Times a Day.  Dose:  2 Puff     calcium carbonate 500 MG Chew  Commonly known as:  TUMS   Take 500 mg by mouth 1 time daily as needed.  Dose:  500 mg     * CENTRUM SILVER PO   Take 1 Tab by mouth every day.  Dose:  1 Tab     * OCUVITE ADULT 50+ Caps   Take 1 Cap by mouth every day.  Dose:  1 Cap     lovastatin 40 MG tablet  Commonly known as:  MEVACOR   Take 1 Tab by mouth every bedtime.  Dose:  40 mg     pantoprazole 20 MG tablet  Commonly known as:  PROTONIX   Take 1 Tab by mouth every day.  Dose:  20 mg     senna-docusate 8.6-50 MG Tabs  Commonly known as:  PERICOLACE or SENOKOT S   Take 2 Tabs by mouth 2 times a day as needed for Constipation.  Dose:  2 Tab     tramadol 50 MG Tabs  Commonly known as:  ULTRAM   Take 1 Tab by mouth every 8 hours as needed for up to 7 days.  Dose:  50 mg     Vitamin D (Cholecalciferol) 400 units Caps   Take 1 Cap by mouth every day.  Dose:  1 Cap         * This list has 2 medication(s) that are the same as other medications prescribed for you. Read the directions carefully, and ask your doctor or other care provider to review them with you.                Allergies  Allergies   Allergen Reactions   • [place]        DIET  Orders Placed This Encounter   Procedures   • Diet Order  Cardiac     Standing Status:   Standing     Number of Occurrences:   1     Order Specific Question:   Diet:     Answer:   Cardiac [6]     Order Specific Question:   Miscellaneous modifications:     Answer:   No Decaf, No Caffeine(for test) [11]       ACTIVITY    Non-weight bearing RIGHT leg    CONSULTATIONS  Orthopedic surgery Dr. Delgado  Cardiology Dr. Julian    PROCEDURES  none    LABORATORY  Lab Results   Component Value Date    SODIUM 142 12/12/2019    POTASSIUM 4.5 12/12/2019    CHLORIDE 106 12/12/2019    CO2 24 12/12/2019    GLUCOSE 95 12/12/2019    BUN 16 12/12/2019    CREATININE 0.75 12/12/2019    CREATININE 0.9 01/16/2009        Lab Results   Component Value Date    WBC 4.9 12/12/2019    HEMOGLOBIN 10.4 (L) 12/12/2019    HEMATOCRIT 35.8 (L) 12/12/2019    PLATELETCT 278 12/12/2019        Total time of the discharge process exceeds 55 minutes.

## 2019-12-14 NOTE — DIETARY
"Nutrition services: Day 2 of admit.  Guillermina Recio is a 79 y.o. female with admitting DX of NSTEMI, right hip dislocation.  Pt seen for MST score of 2 per nutrition screen for unplanned weight loss of 2-13 lb x 3 months with poor oral intake.    Assessment:  Height: 144.8 cm (4' 9\")  Weight: 70.5 kg (155 lb 6.8 oz)  Body mass index is 33.63 kg/m²., BMI classification: Class 1 Obesity  Diet/Intake: Cardiac, No Decaf, No Caffeine for test    Evaluation:   1. Pt very sleepy at time of visit.  Did not eat Breakfast this morning.  2. Recent admission to [facility] and then [facility] in November.  Pt recently discharged from Rehab on 12/10.  Recorded PO intake at Rehab generally >50%.  3. Per chart review, weight on 11/23/19 = 68.8 kg.  Weight has increased.  4. Pt with right hip dislocation.  Ortho consulted with possible transfer to [facility] for surgery.    Malnutrition Risk: Criteria not met    Recommendations/Plan:  1. Cardiac diet as ordered.   2. Encourage intake of meals  3. Document intake of all meals as % taken in ADL's to provide interdisciplinary communication across all shifts.   4. Monitor weight.  5. Nutrition rep will continue to see patient for ongoing meal and snack preferences.     RD following.            "

## 2019-12-14 NOTE — ASSESSMENT & PLAN NOTE
Due to pulmonary emboli, at this point there is likely some component of pulmonary edema as well  Continue oxygen wean as tolerates  Treat PE with eliquis  12/26:  Changed IV lasix to po lasix since SBP 90s and not given lasix today.  CXR remains with pulmonary edema noted.   EF 65% moderate pulmonary htn with right ventricle dilation.

## 2019-12-14 NOTE — PROGRESS NOTES
Cardiology Follow Up Progress Note    Date of Service  12/14/2019    Attending Physician  Katie Vera M.D.    Chief Complaint   Syncope with associated nausea and hip fracture.  Abnormal EKG    HPI  Guillermina Recio is a 79 y.o. female admitted 12/12/2019 with hip fracture after syncope and nausea possible vasovagal.  Scheduled for CT scan today.  Troponins trended and just minimally elevated.  Patient denies chest pain.  Needs surgery on the right hip.    Interim Events  As above    Review of Systems  Review of Systems    Vital signs in last 24 hours  Temp:  [36.4 °C (97.5 °F)-37.4 °C (99.4 °F)] 36.4 °C (97.5 °F)  Pulse:  [75-91] 90  Resp:  [20] 20  BP: ()/(55-89) 126/63  SpO2:  [91 %-97 %] 92 %    Physical Exam  Physical Exam  Vitals signs reviewed.   Constitutional:       General: She is not in acute distress.     Appearance: She is well-developed. She is not diaphoretic.   Eyes:      Conjunctiva/sclera: Conjunctivae normal.   Neck:      Thyroid: No thyroid mass or thyromegaly.      Vascular: No JVD.      Trachea: No tracheal deviation.   Cardiovascular:      Rate and Rhythm: Normal rate and regular rhythm.      Heart sounds: No murmur.   Pulmonary:      Effort: Pulmonary effort is normal. No respiratory distress.      Breath sounds: Normal breath sounds.   Chest:      Chest wall: No tenderness.   Abdominal:      Palpations: Abdomen is soft.      Tenderness: There is no tenderness.   Musculoskeletal:      Comments: Nonambulatory secondary to hip fracture   Skin:     General: Skin is warm and dry.   Neurological:      Mental Status: She is alert and oriented to person, place, and time.      Motor: No tremor.   Psychiatric:         Behavior: Behavior normal.         Lab Review  Lab Results   Component Value Date/Time    WBC 4.9 12/12/2019 10:15 AM    RBC 4.27 12/12/2019 10:15 AM    HEMOGLOBIN 10.4 (L) 12/12/2019 10:15 AM    HEMATOCRIT 35.8 (L) 12/12/2019 10:15 AM    MCV 83.8 12/12/2019 10:15 AM    MCH  24.4 (L) 12/12/2019 10:15 AM    MCHC 29.1 (L) 12/12/2019 10:15 AM    MPV 8.5 (L) 12/12/2019 10:15 AM      Lab Results   Component Value Date/Time    SODIUM 142 12/12/2019 10:15 AM    POTASSIUM 4.5 12/12/2019 10:15 AM    CHLORIDE 106 12/12/2019 10:15 AM    CO2 24 12/12/2019 10:15 AM    GLUCOSE 95 12/12/2019 10:15 AM    BUN 16 12/12/2019 10:15 AM    CREATININE 0.75 12/12/2019 10:15 AM    CREATININE 0.9 01/16/2009 11:34 AM      Lab Results   Component Value Date/Time    ASTSGOT 17 12/12/2019 10:15 AM    ALTSGPT 8 12/12/2019 10:15 AM     Lab Results   Component Value Date/Time    CHOLSTRLTOT 211 (H) 02/26/2019 10:11 AM     (H) 02/26/2019 10:11 AM    HDL 51 02/26/2019 10:11 AM    TRIGLYCERIDE 240 (H) 02/26/2019 10:11 AM    TROPONINT 39 (H) 12/13/2019 05:36 AM       No results for input(s): NTPROBNP in the last 72 hours.    Cardiac Imaging and Procedures Review  EKG:  My personal interpretation of the EKG dated anterior T wave inversions    Echocardiogram: Relatively normal      Imaging  CT of the chest pending    Assessment/Plan  No new Assessment & Plan notes have been filed under this hospital service since the last note was generated.  Service: Cardiac Electrophysiology  1.  Abnormal EKG with minimal troponin rise in the setting of a hip fracture.  Minimally elevated troponins.  Patient denies chest pain or congestive heart failure symptoms.  Await CT scan of the chest to rule out PE.  2.  Right hip fracture requires pinning.  Possible transfer to [facility] for surgery.    Thank you for allowing me to participate in the care of this patient.      Please contact me with any questions.    Chencho Springer M.D.   Cardiologist, [facility]  [phone]

## 2019-12-15 ENCOUNTER — APPOINTMENT (OUTPATIENT)
Dept: RADIOLOGY | Facility: MEDICAL CENTER | Age: 79
DRG: 299 | End: 2019-12-15
Attending: INTERNAL MEDICINE
Payer: MEDICARE

## 2019-12-15 ENCOUNTER — APPOINTMENT (OUTPATIENT)
Dept: RADIOLOGY | Facility: MEDICAL CENTER | Age: 79
DRG: 299 | End: 2019-12-15
Attending: ORTHOPAEDIC SURGERY
Payer: MEDICARE

## 2019-12-15 ENCOUNTER — APPOINTMENT (OUTPATIENT)
Dept: RADIOLOGY | Facility: MEDICAL CENTER | Age: 79
DRG: 299 | End: 2019-12-15
Attending: HOSPITALIST
Payer: MEDICARE

## 2019-12-15 PROBLEM — R50.9 FEVER: Status: ACTIVE | Noted: 2019-12-15

## 2019-12-15 PROBLEM — R57.9 SHOCK (HCC): Status: ACTIVE | Noted: 2019-12-15

## 2019-12-15 PROBLEM — I27.20 PULMONARY HYPERTENSION (HCC): Status: ACTIVE | Noted: 2019-12-15

## 2019-12-15 LAB
ABO GROUP BLD: NORMAL
ACTION RANGE TRIGGERED IACRT: NO
ACTION RANGE TRIGGERED IACRT: YES
ANION GAP SERPL CALC-SCNC: 12 MMOL/L (ref 0–11.9)
APPEARANCE UR: ABNORMAL
APTT PPP: 118.4 SEC (ref 24.7–36)
APTT PPP: 69.4 SEC (ref 24.7–36)
APTT PPP: 74.2 SEC (ref 24.7–36)
APTT PPP: 90.7 SEC (ref 24.7–36)
BACTERIA #/AREA URNS HPF: ABNORMAL /HPF
BARCODED ABORH UBTYP: 5100
BARCODED ABORH UBTYP: 5100
BARCODED PRD CODE UBPRD: NORMAL
BARCODED PRD CODE UBPRD: NORMAL
BARCODED UNIT NUM UBUNT: NORMAL
BARCODED UNIT NUM UBUNT: NORMAL
BASE EXCESS BLDA CALC-SCNC: 0 MMOL/L (ref -4–3)
BASE EXCESS BLDA CALC-SCNC: 1 MMOL/L (ref -4–3)
BASOPHILS # BLD AUTO: 0.1 % (ref 0–1.8)
BASOPHILS # BLD: 0.01 K/UL (ref 0–0.12)
BILIRUB UR QL STRIP.AUTO: NEGATIVE
BLD GP AB SCN SERPL QL: NORMAL
BODY TEMPERATURE: ABNORMAL DEGREES
BODY TEMPERATURE: ABNORMAL DEGREES
BUN SERPL-MCNC: 27 MG/DL (ref 8–22)
CALCIUM SERPL-MCNC: 9.6 MG/DL (ref 8.5–10.5)
CHLORIDE SERPL-SCNC: 103 MMOL/L (ref 96–112)
CO2 BLDA-SCNC: 25 MMOL/L (ref 20–33)
CO2 BLDA-SCNC: 25 MMOL/L (ref 20–33)
CO2 SERPL-SCNC: 24 MMOL/L (ref 20–33)
COLOR UR: YELLOW
COMPONENT R 8504R: NORMAL
COMPONENT R 8504R: NORMAL
CREAT SERPL-MCNC: 1.09 MG/DL (ref 0.5–1.4)
EKG IMPRESSION: NORMAL
EOSINOPHIL # BLD AUTO: 0 K/UL (ref 0–0.51)
EOSINOPHIL NFR BLD: 0 % (ref 0–6.9)
EPI CELLS #/AREA URNS HPF: NEGATIVE /HPF
ERYTHROCYTE [DISTWIDTH] IN BLOOD BY AUTOMATED COUNT: 55 FL (ref 35.9–50)
GLUCOSE SERPL-MCNC: 105 MG/DL (ref 65–99)
GLUCOSE UR STRIP.AUTO-MCNC: NEGATIVE MG/DL
HCO3 BLDA-SCNC: 23.8 MMOL/L (ref 17–25)
HCO3 BLDA-SCNC: 24 MMOL/L (ref 17–25)
HCT VFR BLD AUTO: 26 % (ref 37–47)
HGB BLD-MCNC: 6.3 G/DL (ref 12–16)
HGB BLD-MCNC: 7.1 G/DL (ref 12–16)
HGB BLD-MCNC: 7.5 G/DL (ref 12–16)
HOROWITZ INDEX BLDA+IHG-RTO: 122 MM[HG]
HOROWITZ INDEX BLDA+IHG-RTO: 46 MM[HG]
IMM GRANULOCYTES # BLD AUTO: 0.03 K/UL (ref 0–0.11)
IMM GRANULOCYTES NFR BLD AUTO: 0.2 % (ref 0–0.9)
INST. QUALIFIED PATIENT IIQPT: YES
INST. QUALIFIED PATIENT IIQPT: YES
KETONES UR STRIP.AUTO-MCNC: ABNORMAL MG/DL
LACTATE BLD-SCNC: 1.1 MMOL/L (ref 0.5–2)
LACTATE BLD-SCNC: 2.2 MMOL/L (ref 0.5–2)
LEUKOCYTE ESTERASE UR QL STRIP.AUTO: ABNORMAL
LYMPHOCYTES # BLD AUTO: 0.48 K/UL (ref 1–4.8)
LYMPHOCYTES NFR BLD: 3.8 % (ref 22–41)
MCH RBC QN AUTO: 25.1 PG (ref 27–33)
MCHC RBC AUTO-ENTMCNC: 29.5 G/DL (ref 33.6–35)
MCV RBC AUTO: 84.9 FL (ref 81.4–97.8)
MICRO URNS: ABNORMAL
MONOCYTES # BLD AUTO: 0.96 K/UL (ref 0–0.85)
MONOCYTES NFR BLD AUTO: 7.6 % (ref 0–13.4)
NEUTROPHILS # BLD AUTO: 11.18 K/UL (ref 2–7.15)
NEUTROPHILS NFR BLD: 88.3 % (ref 44–72)
NITRITE UR QL STRIP.AUTO: POSITIVE
NRBC # BLD AUTO: 0 K/UL
NRBC BLD-RTO: 0 /100 WBC
O2/TOTAL GAS SETTING VFR VENT: 100 %
O2/TOTAL GAS SETTING VFR VENT: 65 %
PCO2 BLDA: 29.2 MMHG (ref 26–37)
PCO2 BLDA: 35.2 MMHG (ref 26–37)
PCO2 TEMP ADJ BLDA: 30.5 MMHG (ref 26–37)
PCO2 TEMP ADJ BLDA: 37.8 MMHG (ref 26–37)
PH BLDA: 7.44 [PH] (ref 7.4–7.5)
PH BLDA: 7.52 [PH] (ref 7.4–7.5)
PH TEMP ADJ BLDA: 7.41 [PH] (ref 7.4–7.5)
PH TEMP ADJ BLDA: 7.51 [PH] (ref 7.4–7.5)
PH UR STRIP.AUTO: 5 [PH] (ref 5–8)
PLATELET # BLD AUTO: 372 K/UL (ref 164–446)
PMV BLD AUTO: 9.3 FL (ref 9–12.9)
PO2 BLDA: 46 MMHG (ref 64–87)
PO2 BLDA: 79 MMHG (ref 64–87)
PO2 TEMP ADJ BLDA: 51 MMHG (ref 64–87)
PO2 TEMP ADJ BLDA: 85 MMHG (ref 64–87)
POTASSIUM SERPL-SCNC: 4.9 MMOL/L (ref 3.6–5.5)
PRODUCT TYPE UPROD: NORMAL
PRODUCT TYPE UPROD: NORMAL
PROT UR QL STRIP: NEGATIVE MG/DL
RBC # BLD AUTO: 2.99 M/UL (ref 4.2–5.4)
RBC # URNS HPF: ABNORMAL /HPF
RBC UR QL AUTO: NEGATIVE
RH BLD: NORMAL
SAO2 % BLDA: 83 % (ref 93–99)
SAO2 % BLDA: 97 % (ref 93–99)
SODIUM SERPL-SCNC: 139 MMOL/L (ref 135–145)
SP GR UR STRIP.AUTO: 1.02
SPECIMEN DRAWN FROM PATIENT: ABNORMAL
SPECIMEN DRAWN FROM PATIENT: ABNORMAL
UFH PPP CHRO-ACNC: 0.3 U/ML
UNIT STATUS USTAT: NORMAL
UNIT STATUS USTAT: NORMAL
UROBILINOGEN UR STRIP.AUTO-MCNC: 0.2 MG/DL
WBC # BLD AUTO: 12.7 K/UL (ref 4.8–10.8)
WBC #/AREA URNS HPF: ABNORMAL /HPF

## 2019-12-15 PROCEDURE — 700101 HCHG RX REV CODE 250: Performed by: INTERNAL MEDICINE

## 2019-12-15 PROCEDURE — 93010 ELECTROCARDIOGRAM REPORT: CPT | Performed by: INTERNAL MEDICINE

## 2019-12-15 PROCEDURE — 87040 BLOOD CULTURE FOR BACTERIA: CPT

## 2019-12-15 PROCEDURE — 85520 HEPARIN ASSAY: CPT

## 2019-12-15 PROCEDURE — 700102 HCHG RX REV CODE 250 W/ 637 OVERRIDE(OP): Performed by: INTERNAL MEDICINE

## 2019-12-15 PROCEDURE — 80048 BASIC METABOLIC PNL TOTAL CA: CPT

## 2019-12-15 PROCEDURE — 700105 HCHG RX REV CODE 258: Performed by: INTERNAL MEDICINE

## 2019-12-15 PROCEDURE — 71045 X-RAY EXAM CHEST 1 VIEW: CPT

## 2019-12-15 PROCEDURE — A9270 NON-COVERED ITEM OR SERVICE: HCPCS | Performed by: INTERNAL MEDICINE

## 2019-12-15 PROCEDURE — 87077 CULTURE AEROBIC IDENTIFY: CPT

## 2019-12-15 PROCEDURE — 51798 US URINE CAPACITY MEASURE: CPT

## 2019-12-15 PROCEDURE — 87186 SC STD MICRODIL/AGAR DIL: CPT

## 2019-12-15 PROCEDURE — 700111 HCHG RX REV CODE 636 W/ 250 OVERRIDE (IP): Performed by: HOSPITALIST

## 2019-12-15 PROCEDURE — C1751 CATH, INF, PER/CENT/MIDLINE: HCPCS

## 2019-12-15 PROCEDURE — 83605 ASSAY OF LACTIC ACID: CPT

## 2019-12-15 PROCEDURE — 700111 HCHG RX REV CODE 636 W/ 250 OVERRIDE (IP): Performed by: INTERNAL MEDICINE

## 2019-12-15 PROCEDURE — 99291 CRITICAL CARE FIRST HOUR: CPT | Mod: 25 | Performed by: INTERNAL MEDICINE

## 2019-12-15 PROCEDURE — 81001 URINALYSIS AUTO W/SCOPE: CPT

## 2019-12-15 PROCEDURE — 36556 INSERT NON-TUNNEL CV CATH: CPT | Mod: RT | Performed by: INTERNAL MEDICINE

## 2019-12-15 PROCEDURE — 82803 BLOOD GASES ANY COMBINATION: CPT

## 2019-12-15 PROCEDURE — B548ZZA ULTRASONOGRAPHY OF SUPERIOR VENA CAVA, GUIDANCE: ICD-10-PCS | Performed by: INTERNAL MEDICINE

## 2019-12-15 PROCEDURE — 85730 THROMBOPLASTIN TIME PARTIAL: CPT | Mod: 91

## 2019-12-15 PROCEDURE — 36556 INSERT NON-TUNNEL CV CATH: CPT

## 2019-12-15 PROCEDURE — 86901 BLOOD TYPING SEROLOGIC RH(D): CPT

## 2019-12-15 PROCEDURE — 36600 WITHDRAWAL OF ARTERIAL BLOOD: CPT

## 2019-12-15 PROCEDURE — 85018 HEMOGLOBIN: CPT

## 2019-12-15 PROCEDURE — 99292 CRITICAL CARE ADDL 30 MIN: CPT | Mod: 25 | Performed by: INTERNAL MEDICINE

## 2019-12-15 PROCEDURE — 700101 HCHG RX REV CODE 250

## 2019-12-15 PROCEDURE — 87086 URINE CULTURE/COLONY COUNT: CPT

## 2019-12-15 PROCEDURE — 85025 COMPLETE CBC W/AUTO DIFF WBC: CPT

## 2019-12-15 PROCEDURE — 99233 SBSQ HOSP IP/OBS HIGH 50: CPT | Performed by: HOSPITALIST

## 2019-12-15 PROCEDURE — 94640 AIRWAY INHALATION TREATMENT: CPT

## 2019-12-15 PROCEDURE — 86900 BLOOD TYPING SEROLOGIC ABO: CPT

## 2019-12-15 PROCEDURE — 86850 RBC ANTIBODY SCREEN: CPT

## 2019-12-15 PROCEDURE — 02HV33Z INSERTION OF INFUSION DEVICE INTO SUPERIOR VENA CAVA, PERCUTANEOUS APPROACH: ICD-10-PCS | Performed by: INTERNAL MEDICINE

## 2019-12-15 PROCEDURE — 770022 HCHG ROOM/CARE - ICU (200)

## 2019-12-15 PROCEDURE — 93005 ELECTROCARDIOGRAM TRACING: CPT | Performed by: HOSPITALIST

## 2019-12-15 RX ORDER — NOREPINEPHRINE BITARTRATE 1 MG/ML
INJECTION, SOLUTION INTRAVENOUS
Status: COMPLETED
Start: 2019-12-15 | End: 2019-12-15

## 2019-12-15 RX ORDER — FUROSEMIDE 10 MG/ML
20 INJECTION INTRAMUSCULAR; INTRAVENOUS
Status: DISCONTINUED | OUTPATIENT
Start: 2019-12-15 | End: 2019-12-15

## 2019-12-15 RX ORDER — SODIUM CHLORIDE, SODIUM LACTATE, POTASSIUM CHLORIDE, CALCIUM CHLORIDE 600; 310; 30; 20 MG/100ML; MG/100ML; MG/100ML; MG/100ML
1000 INJECTION, SOLUTION INTRAVENOUS CONTINUOUS
Status: DISCONTINUED | OUTPATIENT
Start: 2019-12-15 | End: 2019-12-16

## 2019-12-15 RX ORDER — IPRATROPIUM BROMIDE AND ALBUTEROL SULFATE 2.5; .5 MG/3ML; MG/3ML
3 SOLUTION RESPIRATORY (INHALATION)
Status: DISCONTINUED | OUTPATIENT
Start: 2019-12-15 | End: 2019-12-16

## 2019-12-15 RX ORDER — SODIUM CHLORIDE, SODIUM LACTATE, POTASSIUM CHLORIDE, AND CALCIUM CHLORIDE .6; .31; .03; .02 G/100ML; G/100ML; G/100ML; G/100ML
500 INJECTION, SOLUTION INTRAVENOUS ONCE
Status: COMPLETED | OUTPATIENT
Start: 2019-12-15 | End: 2019-12-15

## 2019-12-15 RX ADMIN — ACETAMINOPHEN 500 MG: 500 TABLET ORAL at 12:28

## 2019-12-15 RX ADMIN — PIPERACILLIN AND TAZOBACTAM 3.38 G: 3; .375 INJECTION, POWDER, LYOPHILIZED, FOR SOLUTION INTRAVENOUS; PARENTERAL at 22:53

## 2019-12-15 RX ADMIN — AZITHROMYCIN MONOHYDRATE 500 MG: 500 INJECTION, POWDER, LYOPHILIZED, FOR SOLUTION INTRAVENOUS at 18:03

## 2019-12-15 RX ADMIN — FUROSEMIDE 20 MG: 10 INJECTION, SOLUTION INTRAMUSCULAR; INTRAVENOUS at 12:28

## 2019-12-15 RX ADMIN — SODIUM CHLORIDE, POTASSIUM CHLORIDE, SODIUM LACTATE AND CALCIUM CHLORIDE 1000 ML: 600; 310; 30; 20 INJECTION, SOLUTION INTRAVENOUS at 18:02

## 2019-12-15 RX ADMIN — ACETAMINOPHEN 500 MG: 500 TABLET ORAL at 21:30

## 2019-12-15 RX ADMIN — SENNOSIDES AND DOCUSATE SODIUM 2 TABLET: 8.6; 5 TABLET ORAL at 05:56

## 2019-12-15 RX ADMIN — IPRATROPIUM BROMIDE AND ALBUTEROL SULFATE 3 ML: .5; 3 SOLUTION RESPIRATORY (INHALATION) at 18:51

## 2019-12-15 RX ADMIN — VANCOMYCIN HYDROCHLORIDE 1600 MG: 500 INJECTION, POWDER, LYOPHILIZED, FOR SOLUTION INTRAVENOUS at 18:28

## 2019-12-15 RX ADMIN — BUDESONIDE AND FORMOTEROL FUMARATE DIHYDRATE 2 PUFF: 160; 4.5 AEROSOL RESPIRATORY (INHALATION) at 18:53

## 2019-12-15 RX ADMIN — SODIUM CHLORIDE, POTASSIUM CHLORIDE, SODIUM LACTATE AND CALCIUM CHLORIDE 1000 ML: 600; 310; 30; 20 INJECTION, SOLUTION INTRAVENOUS at 14:24

## 2019-12-15 RX ADMIN — LOVASTATIN 40 MG: 20 TABLET ORAL at 21:30

## 2019-12-15 RX ADMIN — HEPARIN SODIUM 900 UNITS/HR: 5000 INJECTION, SOLUTION INTRAVENOUS at 02:00

## 2019-12-15 RX ADMIN — OMEPRAZOLE 20 MG: 20 CAPSULE, DELAYED RELEASE ORAL at 05:56

## 2019-12-15 RX ADMIN — NOREPINEPHRINE BITARTRATE 6 MCG/MIN: 1 INJECTION INTRAVENOUS at 14:50

## 2019-12-15 RX ADMIN — PIPERACILLIN AND TAZOBACTAM 3.38 G: 3; .375 INJECTION, POWDER, LYOPHILIZED, FOR SOLUTION INTRAVENOUS; PARENTERAL at 18:24

## 2019-12-15 RX ADMIN — IPRATROPIUM BROMIDE AND ALBUTEROL SULFATE 3 ML: .5; 3 SOLUTION RESPIRATORY (INHALATION) at 22:32

## 2019-12-15 RX ADMIN — NOREPINEPHRINE BITARTRATE 8 MG: 1 INJECTION INTRAVENOUS at 14:43

## 2019-12-15 RX ADMIN — ASPIRIN 325 MG: 325 TABLET, FILM COATED ORAL at 05:56

## 2019-12-15 RX ADMIN — BUDESONIDE AND FORMOTEROL FUMARATE DIHYDRATE 2 PUFF: 160; 4.5 AEROSOL RESPIRATORY (INHALATION) at 06:05

## 2019-12-15 ASSESSMENT — COGNITIVE AND FUNCTIONAL STATUS - GENERAL
HELP NEEDED FOR BATHING: TOTAL
MOBILITY SCORE: 7
EATING MEALS: A LOT
CLIMB 3 TO 5 STEPS WITH RAILING: TOTAL
MOVING TO AND FROM BED TO CHAIR: UNABLE
SUGGESTED CMS G CODE MODIFIER MOBILITY: CM
PERSONAL GROOMING: A LOT
MOVING FROM LYING ON BACK TO SITTING ON SIDE OF FLAT BED: UNABLE
SUGGESTED CMS G CODE MODIFIER DAILY ACTIVITY: CL
DRESSING REGULAR UPPER BODY CLOTHING: A LOT
DAILY ACTIVITIY SCORE: 9
WALKING IN HOSPITAL ROOM: TOTAL
STANDING UP FROM CHAIR USING ARMS: TOTAL
TOILETING: TOTAL
TURNING FROM BACK TO SIDE WHILE IN FLAT BAD: A LOT
DRESSING REGULAR LOWER BODY CLOTHING: TOTAL

## 2019-12-15 ASSESSMENT — LIFESTYLE VARIABLES
TOTAL SCORE: 0
HAVE PEOPLE ANNOYED YOU BY CRITICIZING YOUR DRINKING: NO
EVER HAD A DRINK FIRST THING IN THE MORNING TO STEADY YOUR NERVES TO GET RID OF A HANGOVER: NO
EVER_SMOKED: NEVER
AVERAGE NUMBER OF DAYS PER WEEK YOU HAVE A DRINK CONTAINING ALCOHOL: 0
DOES PATIENT WANT TO STOP DRINKING: NO
ON A TYPICAL DAY WHEN YOU DRINK ALCOHOL HOW MANY DRINKS DO YOU HAVE: 1
SUBSTANCE_ABUSE: 0
CONSUMPTION TOTAL: NEGATIVE
HAVE YOU EVER FELT YOU SHOULD CUT DOWN ON YOUR DRINKING: NO
TOTAL SCORE: 0
EVER FELT BAD OR GUILTY ABOUT YOUR DRINKING: NO
TOTAL SCORE: 0
HOW MANY TIMES IN THE PAST YEAR HAVE YOU HAD 5 OR MORE DRINKS IN A DAY: 0
ALCOHOL_USE: YES

## 2019-12-15 ASSESSMENT — ENCOUNTER SYMPTOMS
EYE PAIN: 0
PALPITATIONS: 0
HEMOPTYSIS: 0
CONSTIPATION: 0
LOSS OF CONSCIOUSNESS: 0
CHILLS: 1
ABDOMINAL PAIN: 0
COUGH: 1
SORE THROAT: 0
DIARRHEA: 0
BACK PAIN: 0
FOCAL WEAKNESS: 0
VOMITING: 0
DIAPHORESIS: 1
SENSORY CHANGE: 0
SPUTUM PRODUCTION: 0
NECK PAIN: 0
WHEEZING: 1
DIZZINESS: 0
COUGH: 0
DEPRESSION: 0
CLAUDICATION: 0
MYALGIAS: 0
BRUISES/BLEEDS EASILY: 0
FEVER: 1
WEAKNESS: 0
EYE DISCHARGE: 0
SHORTNESS OF BREATH: 1
NAUSEA: 0
SPEECH CHANGE: 0
HEADACHES: 0

## 2019-12-15 NOTE — PROGRESS NOTES
[facility] Medicine Daily Progress Note    Date of Service  12/15/2019    Chief Complaint  79 y.o. female admitted 12/14/2019 with right hip pain s/p syncope and fall at home, found to have bilateral PE s/p right hip arthroplasty 11/21/19.    Hospital Course    12/15: This 80 yo female with right hip arthroplasty 11/21/19, sycnope fall at home with dislocation, CTA with extensive bilateral PEs, started on heparin drip overnight, echo EF 65% with moderate RV dilation, developed worsening hypoxia/SOB with bilateral rhonchi, wet cough on exam.  BP 80/50s.  Lasix 20mg IV bid ordered and given just prior to transfer.  CXR reviewed with bilateral infiltrates seen.  Transferred to ICU for possible pressor management, family at bedside discussion about her full code.  Family on phone with other family members before deciding.  Bladder scan 160.  No pain meds given except tylenol.  Fever may be from PE source.  Ordered BCs x2, UC, lactic acid.  U/s LEs.   No labs since 12/12.  Ordered cbc, BMP.  Transferred from [facility] originally admitted 12/12 transfer placed 12/14.  Critical care time 45 minutes with family discussion regarding code status, repeat assessments, transfer orders and management of critically ill patient. *        Consultants/Specialty  Dr. Danny Julian    Code Status  Full code:  D/w daughter and grand-daughter at bedside.  Understand patient needs ICU transfer, want to discuss with rest of family prior to determining code wishes.      Disposition  ICU transfer for persistent hypotension, worsening hypoxia, fever.    Review of Systems  Review of Systems   Constitutional: Positive for chills, diaphoresis and fever. Negative for malaise/fatigue.   HENT: Negative for congestion and sore throat.    Eyes: Negative for pain and discharge.   Respiratory: Positive for cough (wet), shortness of breath and wheezing (rhonchi bilaterally). Negative for hemoptysis and sputum production.    Cardiovascular: Positive  for leg swelling. Negative for chest pain, palpitations and claudication.   Gastrointestinal: Negative for abdominal pain, constipation, diarrhea, melena, nausea and vomiting.   Genitourinary: Negative for dysuria, frequency and urgency.   Musculoskeletal: Positive for joint pain (right hip internally rotated and shortened.). Negative for back pain, myalgias and neck pain.   Skin: Negative for itching and rash.   Neurological: Negative for dizziness, sensory change, speech change, focal weakness, loss of consciousness, weakness and headaches.   Endo/Heme/Allergies: Does not bruise/bleed easily.   Psychiatric/Behavioral: Negative for depression, substance abuse and suicidal ideas.        Physical Exam  Temp:  [36.9 °C (98.5 °F)-38 °C (100.4 °F)] 38 °C (100.4 °F)  Pulse:  [] 107  Resp:  [18-26] 26  BP: ()/(49-70) 87/49  SpO2:  [88 %-100 %] 96 %    Physical Exam  Vitals signs and nursing note reviewed.   Constitutional:       General: She is in acute distress.      Appearance: She is well-developed. She is ill-appearing and toxic-appearing. She is not diaphoretic.      Comments: pale   HENT:      Head: Normocephalic and atraumatic.      Nose: Nose normal.      Mouth/Throat:      Pharynx: No oropharyngeal exudate.   Eyes:      General: No scleral icterus.        Right eye: No discharge.         Left eye: No discharge.      Conjunctiva/sclera: Conjunctivae normal.      Pupils: Pupils are equal, round, and reactive to light.   Neck:      Musculoskeletal: Normal range of motion and neck supple.      Thyroid: No thyromegaly.      Vascular: No JVD.      Trachea: No tracheal deviation.   Cardiovascular:      Rate and Rhythm: Normal rate and regular rhythm.      Heart sounds: Normal heart sounds. No murmur. No friction rub. No gallop.    Pulmonary:      Effort: Respiratory distress present.      Breath sounds: No stridor. Rhonchi and rales present. No wheezing.      Comments: Wet cough on exam  Chest:      Chest  wall: No tenderness.   Abdominal:      General: Bowel sounds are normal. There is no distension.      Palpations: Abdomen is soft. There is no mass.      Tenderness: There is no tenderness. There is no guarding or rebound.   Musculoskeletal: Normal range of motion.         General: Tenderness and deformity (right leg internally rotated and shortened) present.   Lymphadenopathy:      Cervical: No cervical adenopathy.   Skin:     General: Skin is warm and dry.      Findings: No erythema or rash.   Neurological:      Mental Status: She is alert and oriented to person, place, and time.      Cranial Nerves: No cranial nerve deficit.      Motor: No abnormal muscle tone.      Coordination: Coordination normal.   Psychiatric:         Behavior: Behavior normal.         Thought Content: Thought content normal.         Judgment: Judgment normal.         Fluids    Intake/Output Summary (Last 24 hours) at 12/15/2019 1408  Last data filed at 12/14/2019 2347  Gross per 24 hour   Intake 100 ml   Output --   Net 100 ml       Laboratory  Recent Labs     12/15/19  1312   WBC 12.7*   RBC 2.99*   HEMOGLOBIN 7.5*   HEMATOCRIT 26.0*   MCV 84.9   MCH 25.1*   MCHC 29.5*   RDW 55.0*   PLATELETCT 372   MPV 9.3     Recent Labs     12/15/19  1312   SODIUM 139   POTASSIUM 4.9   CHLORIDE 103   CO2 24   GLUCOSE 105*   BUN 27*   CREATININE 1.09   CALCIUM 9.6     Recent Labs     12/14/19  1159 12/15/19  0322 12/15/19  1021   APTT 71.1* 118.4* 90.7*               Imaging  DX-CHEST-PORTABLE (1 VIEW)   Final Result         1.  Findings on chest radiograph appear stable since the prior radiograph.  No new abnormalities are identified.      2.  Large hiatal hernia is again identified.      DX-PORTABLE FLUORO > 1 HOUR    (Results Pending)   DX-HIP-UNILATERAL-WITHOUT PELVIS-1 VIEW RIGHT    (Results Pending)   US-EXTREMITY VENOUS LOWER BILAT    (Results Pending)        Assessment/Plan  Fever  Assessment & Plan  BCs x2, UC, lactic acid ordered.  Fever can be  seen with PE.  CTA chest no infiltrates seen.      Pulmonary hypertension (HCC)  Assessment & Plan  2/2 extensive bilateral PE load,   Echo EF 65% moderate RV dilation and pulm htn seen.    Elevated troponin- (present on admission)  Assessment & Plan  Due to pulmonary embolus, stable, cardiology is consulted and recommends no other intervention    Closed dislocation of right hip (HCC)  Assessment & Plan  Unable to be reduced in the emergency department  Dr Delgado is consulted as right hip arthroplasty was done 11/21 and plans for OR reduction 12/15    Acute pulmonary embolism with acute cor pulmonale (HCC)  Assessment & Plan  Continue heparin drip, Dr. Delgado is aware and hip relocation should require anaesthesia but no cutting  Echocardiogram confirms increased right heart pressure, pulmonary htn.  12/15:  Transfer to ICU for worsening hypoxia, pulmonary edema.  U/s LEs.  Fairly extensive PE load with bilateral PE main and peripheral.  Likely came from RLE recent hip arthroplasty.    Acute respiratory failure (HCC)- (present on admission)  Assessment & Plan  Continue supplemental oxygen for hypoxia  Respiratory therapy and encourage mobility after surgery  12/15: worsening hypoxia, rhonchi on exam  Started lasix 20mg IV bid  Extensive pulmonary load with RV dilation and pulmonary hypertension.  ICU transfer for hypotension and worsening hypoxia.      Hypotension (arterial)  Assessment & Plan  Unclear etiology  Ordered labs  NPO for OR reduction right hip.  May be throwing more PEs.    Normocytic anemia  Assessment & Plan  Monitor labs on heparin drip       VTE prophylaxis: heparin drip

## 2019-12-15 NOTE — DISCHARGE PLANNING
Notified by MD pt requires transfer to [facility] for higher level of care.     Obtaned room assignment T738 from transfer center.     REMSA form completed and faxed to [facility]    Called pt's daughter Charity [phone], per pt's request to notify of transfer.     COBRA completed and placed in pt's chart

## 2019-12-15 NOTE — PROGRESS NOTES
Discharge/Transfer orders written. IVs left in place for transfer. Heparin drip continued by JAVID using their tubing and pump. Tele removed and returned to monitor tech. Daughter notified of transfer. No personal belongings with patient, daughter states she has all the patient's belongings at home.   Report called to Wilbur at [facility] 7 (pt going to [id number])

## 2019-12-15 NOTE — PROGRESS NOTES
Telemetry Shift Summary    Rhythm SR/ST  HR Range 90s-100s  Ectopy Occ PVCs  Measurements *.14/.06/.36        Normal Values  Rhythm SR  HR Range    Measurements 0.12-0.20 / 0.06-0.10  / 0.30-0.52

## 2019-12-15 NOTE — H&P
DATE OF SCHEDULED SURGERY:  12/15/2019    HISTORY AND PHYSICAL AND CONSULTATION    CHIEF COMPLAINT:  Right hip pain.    HISTORY OF PRESENT ILLNESS:  The patient is an 79-year-old female who had   right total hip arthroplasty by Dr. Delgado on 2019.  She presented to the   ER on 2019 with syncopal episode while walking down the stairs at home   with subsequent hip pain and right hip dislocation.  While in the ER, they had   seen that she had right hip prosthetic dislocation.  They were not able to   reduce the hip dislocation on the right side.  She then developed chest pain   in the ER and elevated troponin, seen by cardiology and heparin.  CT scan   shows pulmonary embolism, so she is on a heparin drip at this point.    Therefore, we were consulted for hip relocation and should not require any   cutting, so the patient could resume anticoagulation therapy.  More likely, we   will try to reduce the hip dislocation on the right side.    REVIEW OF SYSTEMS:  CONSTITUTIONAL:  Negative for chills, fever, or malaise.  HEENT:  Negative for any congestion in her throat.  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  RESPIRATORY:  Positive shortness of breath when she is in pain.  CARDIOVASCULAR:  Regular rate and rhythm at this point.  GASTROINTESTINAL:  Negative for any abdominal pain, nausea, or vomiting.  GENITOURINARY:  Dysuria and urgency are negative.  MUSCULOSKELETAL:  Definitely positive right hip joint pain with myalgia,   severe right hip pain due to dislocation, looks likes on x-ray it is   posteriorly.  SKIN:  Negative for itching or rash.  NEUROLOGIC:  Negative for any dizziness or tingling.  All other systems are negative.    PAST MEDICAL HISTORY:  She does have an extensive past medical history   includin. Arthritis.  2. Bowel habits are irregular.  3. Shortness of breath.  4. Cataracts.  5. Dyslipidemia.  6. Emphysema in her lungs.  7. GERD.  8. Heartburn.  9. Hepatitis.  10. High cholesterol.  11.  Hyperlipidemia.  12. Macular degeneration causing blindness.  13. Psychiatric problems.  14. Cataract extractions.  15. Urinary incontinence.  16. Dentures.    PAST SURGICAL HISTORY:  Includes:  1. Cataract back in 2012.  2. Tubal ligation.  3. Hysterectomy, total abdominal.  4. Blepharoplasty, bilateral in 2015.  5. Previous left total hip arthroplasty by Dr. Delgado several years ago, but   most recently on 11/21/2019 right total hip arthroplasty.    SOCIAL HISTORY:  Negative for smoking, drug use, or alcohol.    FAMILY HISTORY:  At this point, is really irrelevant.    CURRENT MEDICATIONS:  1. Heparin drip.  2. Tylenol.  3. Symbicort.  4. Mevacor.  5. Tramadol.  6. Aspirin.  7. Omeprazole.  8. Morphine for exacerbation.    PHYSICAL EXAMINATION:  VITAL SIGNS:  Noted above.  CONSTITUTIONAL:  She does definitely appears in mild amount of distress.  HEENT:  No congestion or rhinorrhea.  No scleral icterus.  Again, she is   legally blind.  CARDIOVASCULAR:  Regular rate and rhythm.  PULMONARY:  Normal breath sounds.  It does not seems like she is having any   wheezing or stridor.  No problems breathing.  ABDOMEN:  No distention.  Soft and nontender.  MUSCULOSKELETAL:  Definitely tenderness on the right side of the hip on   palpation.  SKIN:  Warm and dry.  She does always look a kind of pale, although this is   her normal look though.  NEUROLOGICALLY:  Coordination is abnormal due to hip dislocation.  She is   alert and oriented to place, person, and time at this point.  Answers all   questions correctly.  Makes good eye contact.  PSYCHIATRIC:  Normal mood and behavior.    ASSESSMENT AND PLAN:  At this point, we are going to try to reduce the right   hip in the OR when we get her under anesthesia and get the muscles loosen from   muscle relaxers, more likely we will reduce the right hip.  If we are unable   to reduce it, she is going to have an open surgery at a later date, which   would be a poly exchange with a  constrained component on the right side.  I am   hoping to try and avoid an open operation.  More than likely we would do a   closed reduction of the right hip due to dislocation ___ they were unable to   reduce it in the emergency room.  I will do the OR reduction today on   12/15/2019.  Again, she does have an acute pulmonary embolism.  She has a   pulmonary drip.  She also has acute respiratory failure due to hypoxia and she   has a normal baseline anemia and elevated troponin more likely due to the   pulmonary embolism as well.  All questions and concerns were answered.  We are   going to the OR and hopefully will not have any problems and we can get her   hip back in place and hopefully she can recover from a pulmonary embolism,   possibly deep venous thrombosis.                   ____________________________________     JOSE LUIS Arroyo / RONN    DD:  12/15/2019 12:30:38  DT:  12/15/2019 15:04:06    D#:  [id number]  Job#:  [id number]

## 2019-12-15 NOTE — RESPIRATORY CARE
COPD EDUCATION by COPD CLINICAL EDUCATOR  12/15/2019 at 11:44 AM by Marilia Lozano     Patient interviewed by COPD education team. Patient declined COPD program at this time. Difficult to engage in conversation and denies she has COPD. She has never smoked per EMR review

## 2019-12-15 NOTE — ASSESSMENT & PLAN NOTE
Improved     PODIATRY PROGRESS NOTE   [id number] ROSALIE ESCOBEDO is a pleasant well-nourished, well developed 63y Male in no acute distress, alert awake, and oriented to person, place and time.   Patient is a 63y old  Male who presents with a chief complaint of worsening chronic left foot ulcer (06 Nov 2018 16:08)    HPI:  63y male with PMH of CAD, NSTEMI s/p CABG (5 vessel according to patient; March 2016); DM with peripheral neuropathy, Left DFU, HTN, HLD was sent for worsening chronic left foot ulcer. For the past 2 yrs pt has chronic diabetic left foot ulcer that has been taken care by podiatry at [facility] and has wound care nurse (3 days/week) for dressing. Today during dressing, nurse notice significant bleeding from the ulcer site and Q tip goes down to bone and referred the patient to the ER. He also complains of frequent falls approx. once every 2 months. On his last fall, he hit his back on the side walk and now suffering from back pain since. Denies fever, chills, trauma, chest pain, palpitations, dizziness, changes in urination, or diarrhea. Patient was recently discharged from the ED (10/29/18) with UTI and sent home on Vantin. At baseline, patient walk with cane; wife passed away Feb 2018 - coping ok but finds it difficult to manage ADL's.    In ED, Xray L Foot: Erosive change to the medial cuneiform, new since prior examination consistent with osteomyelitis.  He also started to have some nausea with no vomiting or abd pain. US Abdomen Limited (11.04.18): Cholelithiasis without evidence of cholecystitis.    Pt received:  2L LR stat  Vancomycin 2gm IV stat  Zosyn 4.5gm IV stat  Zofran 4mg IV push  Morphine 4 mg IV push (04 Nov 2018 17:17)    pt was seen and assessed at bedside am rounds with MS4.  pt states that he has chills overnight. he states that MRI was done last night.  denies any n/v/f/sob at this time    Vital Signs Last 24 Hrs  T(C): 36 (07 Nov 2018 06:01), Max: 36.9 (06 Nov 2018 14:15)  T(F): 96.8 (07 Nov 2018 06:01), Max: 98.5 (06 Nov 2018 14:15)  HR: 68 (07 Nov 2018 06:01) (68 - 73)  BP: 138/76 (07 Nov 2018 06:01) (138/76 - 170/79)  BP(mean): --  RR: 17 (07 Nov 2018 06:01) (17 - 18)  SpO2: --                        9.2    8.38  )-----------( 336      ( 07 Nov 2018 08:11 )             28.6                 11-07    138  |  99  |  9<L>  ----------------------------<  100<H>  4.3   |  27  |  0.9    Ca    9.0      07 Nov 2018 08:11  Mg     2.0     11-07    TPro  6.6  /  Alb  2.6<L>  /  TBili  0.8  /  DBili  x   /  AST  15  /  ALT  13  /  AlkPhos  199<H>  11-07  < from: Xray Foot AP + Lateral, Left (11.04.18 @ 13:21) >    EXAM:  XR FOOT 2 VIEWS LT            PROCEDURE DATE:  11/04/2018            INTERPRETATION:  Clinical History / Reason for exam: Evaluate for   infection.    Multiple views of left foot are provided for review and compared to   September 25, 2016.    The patient status post amputation of the first digit to the level of the   base of the first metatarsal. Again seen is amputation of the second   digit at the level of the proximal interphalangeal joint. There is no   acute displaced fracture or dislocation. There is erosive change to the   medial cuneiform, new since prior examination. There is adjacent soft   tissue swelling. There is soft tissue swelling along the dorsum of the   foot. There are chronic changes in the midfoot. There is a calcaneal spur   There are chronic changes at the Lisfranc joint.    Impression    Erosive change to the medial cuneiform, new since prior examination   consistent with osteomyelitis.    Diffuse soft tissue swelling consistent with cellulitis.    JUDI KEATING M.D., ATTENDING RADIOLOGIST  This document has been electronically signed. Nov 4 2018  2:25PM    < end of copied text>    < from: MR Foot No Cont, Left (11.06.18 @ 18:56) >    EXAM:  MR FOOT LT            PROCEDURE DATE:  11/06/2018            INTERPRETATION:  Clinical History / Reason for exam: Plantar midfoot   ulcer. Evaluate for osteomyelitis.    Technique: Multiplanar multisequence MRI of the left midfoot without   intravenous contrast was performed.    Comparison: Left foot radiographs November 4, 2018, and MRI of the   midfoot September 26, 2016    Findings:    Again seen is plantar medial midfoot ulcer, with subjacent large phlegmon   measuring 5.9 x 3.5 x 4.4 cm demonstrating direct extension into the   first tarsometatarsal joint. Patient is post trans-metatarsal amputation   of the first ray. There is septic arthritis of the first tarsometatarsal   joint, and osteomyelitis of the medial cuneiform with pathologic   fracture, new since 2016. There is osteomyelitis of the plantar aspect of   the first metatarsal base stump.    There is further increased neuropathic osteoarthropathy of the second   through fourth tarsometatarsal joints, with increased erosive changes   since 2016. There is small joint effusion continuation of the remainder   of the tarsometatarsal joints, and navicular cuneiform joints, consistent   with septic arthritis. There is associated bone marrow edema of the   middle and lateral cuneiforms, lateral navicula, second and third   metatarsal bases.    Focal hypointense T1 signal abnormality at the plantar aspect of the   second metatarsal base is noted, with superimposed osteomyelitis not   excluded.    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 Patient is post   trans-proximal phalangeal amputation of the second toe.    Chronic atrophy and edema of the intrinsic muscles of the foot consistent   with chronic neuritis.    Impression:    Large plantar medial midfoot ulcer with subjacent 5.9 cm phlegmon,   increased in size since September 2016, demonstrating direct extension   into the first tarsometatarsal joint.    First tarsometatarsal joint septic arthritis with osteomyelitis and   pathologic fractureof the medial cuneiform, and osteomyelitis of the   plantar first metatarsal base stump.    Worsening neuropathic osteoarthropathy of the second through fifth   tarsometatarsal joints since 2016, with superimposed septic arthritis,   and likely superimposed osteomyelitis of the plantar second metatarsal   base.    Septic arthritis of the navicular-cuneiform joints.    TROY PITTS M.D., ATTENDING RADIOLOGIST  This document has been electronically signed. Nov 7 2018  9:23AM                < end of copied text >    Derm: fibrogranular wound base with yellowish drainage noted on the dressing, +tunneling, + PTB, macerated rim around the wound, measures about 3 cm x 2.8cm x 5cm, no clinical signs of infection, no malodor.  Neuro: Gross sensation diminished to level of digits left foot    assessment  - left sub 1st metatarsal DFU  - Foot xray consistent w/ osteomyelitis medial cuneiform    Plan:   - pt seen/evaluated @ bedside  - c/w IV abx  - dressed w/ plain gauze packing/dsd/kerlix left foot  - f/u wound culture  - continue IV abx as per ID  - MRI reviewed- Positive OM  - cardiac clearance and OR stratification for possible sx.  - podiatry will f/u with attending for further evaluation  11-07-18 @ 10:28 PODIATRY PROGRESS NOTE   [id number] ROSALIE ESCOBEDO is a pleasant well-nourished, well developed 63y Male in no acute distress, alert awake, and oriented to person, place and time.   Patient is a 63y old  Male who presents with a chief complaint of worsening chronic left foot ulcer (06 Nov 2018 16:08)    HPI:  63y male with PMH of CAD, NSTEMI s/p CABG (5 vessel according to patient; March 2016); DM with peripheral neuropathy, Left DFU, HTN, HLD was sent for worsening chronic left foot ulcer. For the past 2 yrs pt has chronic diabetic left foot ulcer that has been taken care by podiatry at [facility] and has wound care nurse (3 days/week) for dressing. Today during dressing, nurse notice significant bleeding from the ulcer site and Q tip goes down to bone and referred the patient to the ER. He also complains of frequent falls approx. once every 2 months. On his last fall, he hit his back on the side walk and now suffering from back pain since. Denies fever, chills, trauma, chest pain, palpitations, dizziness, changes in urination, or diarrhea. Patient was recently discharged from the ED (10/29/18) with UTI and sent home on Vantin. At baseline, patient walk with cane; wife passed away Feb 2018 - coping ok but finds it difficult to manage ADL's.    In ED, Xray L Foot: Erosive change to the medial cuneiform, new since prior examination consistent with osteomyelitis.  He also started to have some nausea with no vomiting or abd pain. US Abdomen Limited (11.04.18): Cholelithiasis without evidence of cholecystitis.    Pt received:  2L LR stat  Vancomycin 2gm IV stat  Zosyn 4.5gm IV stat  Zofran 4mg IV push  Morphine 4 mg IV push (04 Nov 2018 17:17)    pt was seen and assessed at bedside am rounds with MS4.  pt states that he has chills overnight. he states that MRI was done last night.  denies any n/v/f/sob at this time    Vital Signs Last 24 Hrs  T(C): 36 (07 Nov 2018 06:01), Max: 36.9 (06 Nov 2018 14:15)  T(F): 96.8 (07 Nov 2018 06:01), Max: 98.5 (06 Nov 2018 14:15)  HR: 68 (07 Nov 2018 06:01) (68 - 73)  BP: 138/76 (07 Nov 2018 06:01) (138/76 - 170/79)  BP(mean): --  RR: 17 (07 Nov 2018 06:01) (17 - 18)  SpO2: --                        9.2    8.38  )-----------( 336      ( 07 Nov 2018 08:11 )             28.6                 11-07    138  |  99  |  9<L>  ----------------------------<  100<H>  4.3   |  27  |  0.9    Ca    9.0      07 Nov 2018 08:11  Mg     2.0     11-07    TPro  6.6  /  Alb  2.6<L>  /  TBili  0.8  /  DBili  x   /  AST  15  /  ALT  13  /  AlkPhos  199<H>  11-07  < from: Xray Foot AP + Lateral, Left (11.04.18 @ 13:21) >    EXAM:  XR FOOT 2 VIEWS LT            PROCEDURE DATE:  11/04/2018            INTERPRETATION:  Clinical History / Reason for exam: Evaluate for   infection.    Multiple views of left foot are provided for review and compared to   September 25, 2016.    The patient status post amputation of the first digit to the level of the   base of the first metatarsal. Again seen is amputation of the second   digit at the level of the proximal interphalangeal joint. There is no   acute displaced fracture or dislocation. There is erosive change to the   medial cuneiform, new since prior examination. There is adjacent soft   tissue swelling. There is soft tissue swelling along the dorsum of the   foot. There are chronic changes in the midfoot. There is a calcaneal spur   There are chronic changes at the Lisfranc joint.    Impression    Erosive change to the medial cuneiform, new since prior examination   consistent with osteomyelitis.    Diffuse soft tissue swelling consistent with cellulitis.    JUDI KEATING M.D., ATTENDING RADIOLOGIST  This document has been electronically signed. Nov 4 2018  2:25PM    < end of copied text>    < from: MR Foot No Cont, Left (11.06.18 @ 18:56) >    EXAM:  MR FOOT LT            PROCEDURE DATE:  11/06/2018            INTERPRETATION:  Clinical History / Reason for exam: Plantar midfoot   ulcer. Evaluate for osteomyelitis.    Technique: Multiplanar multisequence MRI of the left midfoot without   intravenous contrast was performed.    Comparison: Left foot radiographs November 4, 2018, and MRI of the   midfoot September 26, 2016    Findings:    Again seen is plantar medial midfoot ulcer, with subjacent large phlegmon   measuring 5.9 x 3.5 x 4.4 cm demonstrating direct extension into the   first tarsometatarsal joint. Patient is post trans-metatarsal amputation   of the first ray. There is septic arthritis of the first tarsometatarsal   joint, and osteomyelitis of the medial cuneiform with pathologic   fracture, new since 2016. There is osteomyelitis of the plantar aspect of   the first metatarsal base stump.    There is further increased neuropathic osteoarthropathy of the second   through fourth tarsometatarsal joints, with increased erosive changes   since 2016. There is small joint effusion continuation of the remainder   of the tarsometatarsal joints, and navicular cuneiform joints, consistent   with septic arthritis. There is associated bone marrow edema of the   middle and lateral cuneiforms, lateral navicula, second and third   metatarsal bases.    Focal hypointense T1 signal abnormality at the plantar aspect of the   second metatarsal base is noted, with superimposed osteomyelitis not   excluded.    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 Patient is post   trans-proximal phalangeal amputation of the second toe.    Chronic atrophy and edema of the intrinsic muscles of the foot consistent   with chronic neuritis.    Impression:    Large plantar medial midfoot ulcer with subjacent 5.9 cm phlegmon,   increased in size since September 2016, demonstrating direct extension   into the first tarsometatarsal joint.    First tarsometatarsal joint septic arthritis with osteomyelitis and   pathologic fractureof the medial cuneiform, and osteomyelitis of the   plantar first metatarsal base stump.    Worsening neuropathic osteoarthropathy of the second through fifth   tarsometatarsal joints since 2016, with superimposed septic arthritis,   and likely superimposed osteomyelitis of the plantar second metatarsal   base.    Septic arthritis of the navicular-cuneiform joints.    TROY PITTS M.D., ATTENDING RADIOLOGIST  This document has been electronically signed. Nov 7 2018  9:23AM                < end of copied text >    Derm: fibrogranular wound base with yellowish drainage noted on the dressing, +tunneling, + PTB, macerated rim around the wound, measures about 3 cm x 2.8cm x 5cm, no clinical signs of infection, no malodor.  Neuro: Gross sensation diminished to level of digits left foot    assessment  - left sub 1st metatarsal DFU  - Foot xray consistent w/ osteomyelitis medial cuneiform    Plan:   - pt seen/evaluated @ bedside  - c/w IV abx as per ID  - dressed w/ plain gauze packing/dsd/kerlix left foot  - f/u wound culture  - MRI reviewed- Positive OM  - No surgical intervention at this admission due to wedding to attend as per patient, request ID consult for possible PICC line.  - Local wound care: irrigate with normal saline and apply silvercel rope packing/dsd/abd/kerlix daily  - req visiting nurse for dressing change 4 times a week  - dispense DARCO left foot, partial weight bearing to the heel.  - pt will f/u as an outpatient at Dr. velazquez's office in 1 week  - podiaty will f/u while in house.  11-07-18 @ 10:28

## 2019-12-15 NOTE — PROGRESS NOTES
"Patient noted to be shaking, patient stated that she is \"cold\". Patient vitals obtained, patient noted to have fever, paged hospitalist for updates and orders.   "

## 2019-12-15 NOTE — DISCHARGE PLANNING
Received Transport Form @ 1600  Spoke to Kelsey HUA  Transport is scheduled for 12/14 @1700 going to [facility].    Attempted to get [facility] authorization however per Tristan, system is currently doing a routine system update and would be back up within 1 hour. Per Kelsey HUA no need to call [place] again, will arrange transport.

## 2019-12-15 NOTE — PROGRESS NOTES
Bedside report received, pt care assumed, tele box on.  Pt denies any additional needs at this time. Bed in lowest position, bed alarm on. Patient in restraints, restraints checked. call light within reach.

## 2019-12-15 NOTE — PROGRESS NOTES
2 RN skin check completed with Reshma GO. Small amount of redness/pink skin to breast folds but skin is intact. Sacrum is red but blanchable.  Incision to right hip and dressing is clean/dry/intact.  Patient to have her hip relocated tomorrow per Dr. Delgado.  states actual surgery will not actually be needed tomorrow.

## 2019-12-15 NOTE — PROGRESS NOTES
Patient received from dayshift RN.  Per RN report patient is confused at times and is also confused at times to situation. Currently patient is oriented self, situation, and place. Patient did not kow the year, stating it was 1919 but knew it was December. Heparin drip still present and this was verified to be okay with the pharmacist, next ptt tomorrow.    Dr. Jarvis paged at 1900 and spoken to at 1913 and  was notified patient was here on tele 7 to come be seen.    WILLIAMS Inman paged at 1910 and 2021 because he is currently taking calls for Dr. Delgado. P.A. called back at 2037 and he was notified patient was here at [facility] on tele 7.     Dr. Olson called at 2230 and stated patient would have hip relocated tomorow without any actual surgery (incision),  stated to make sure patient is npo at midnight.

## 2019-12-15 NOTE — PROGRESS NOTES
Patient grabbing at oxygen tubing, iv's, hip dressing, gown, continuous pulse ox line and removed all of the above or attempting to remove all of the above. Patient removed 1 IV.  Patient currently disoriented to situation. Patient knows she is at [facility]. Per RN handoff report patient has some confusion at times and is not oriented to situation at times. Bilateral soft wrist restraints placed for patient safety.    2317-Dr. Granger paged for restraints orders. Awaiting response.    2325-This RN spoke with pt.'s daughter Mendy and notified her patient was in soft wrist restraints.  Mendy stated patient has been getting confused since being in the hospital and she was confused this morning.    2334-This RN spoke to Dr. granger and  ordered haldol and restraints.   updated on pt.'s BP of 94/70.  stated it is okay to give the patient tramadol.      2335-Blood bank called this RN. Blood bank stated that if pt. needs blood it will need to be ordered and would need atleast 30 minutes to be delivered to hospital because pt. has an antibody.  Dr. Delgado stated prior that hip would be tried to be relocated without an actual surgery tomorrow so no incisions would have to made. The doctor stated if for some reason that this doesn't work then the patient would need surgery but it would not be tommorow. The blood bank was updated on no expected surgery tommorow.

## 2019-12-15 NOTE — CARE PLAN
Problem: Safety - Medical Restraint  Goal: Remains free of injury from restraints (Restraint for Interference with Medical Device)  Description  INTERVENTIONS:  1. Determine that other, less restrictive measures have been tried or would not be effective before applying the restraint  2. Evaluate the patient's condition at the time of restraint application  3. Inform patient/family regarding the reason for restraint  4. Q2H: Monitor safety, psychosocial status, comfort, nutrition and hydration  Outcome: PROGRESSING AS EXPECTED  Note:   No injuries     Problem: Communication  Goal: The ability to communicate needs accurately and effectively will improve  Outcome: PROGRESSING SLOWER THAN EXPECTED  Note:   Patient being noncompliant.     Problem: Knowledge Deficit  Goal: Knowledge of disease process/condition, treatment plan, diagnostic tests, and medications will improve  Outcome: PROGRESSING SLOWER THAN EXPECTED  Note:   Pt. confused     Problem: Safety - Medical Restraint  Goal: Free from restraint(s) (Restraint for Interference with Medical Device)  Description  INTERVENTIONS:  1. ONCE/SHIFT or MINIMUM Q12H: Assess and document the continuing need for restraints  2. Q24H: Continued use of restraint requires LIP to perform face to face examination and written order  3. Identify and implement measures to help patient regain control  Outcome: PROGRESSING SLOWER THAN EXPECTED  Note:   started

## 2019-12-15 NOTE — PROGRESS NOTES
And arrived from [facility] for syncopal episode and now has pulmonary embolism.  Patient was seen by me upon arrival.

## 2019-12-16 ENCOUNTER — APPOINTMENT (OUTPATIENT)
Dept: RADIOLOGY | Facility: MEDICAL CENTER | Age: 79
DRG: 299 | End: 2019-12-16
Attending: HOSPITALIST
Payer: MEDICARE

## 2019-12-16 LAB
ANION GAP SERPL CALC-SCNC: 9 MMOL/L (ref 0–11.9)
BASOPHILS # BLD AUTO: 0.2 % (ref 0–1.8)
BASOPHILS # BLD: 0.03 K/UL (ref 0–0.12)
BUN SERPL-MCNC: 23 MG/DL (ref 8–22)
CALCIUM SERPL-MCNC: 8.6 MG/DL (ref 8.5–10.5)
CHLORIDE SERPL-SCNC: 105 MMOL/L (ref 96–112)
CO2 SERPL-SCNC: 24 MMOL/L (ref 20–33)
CREAT SERPL-MCNC: 0.91 MG/DL (ref 0.5–1.4)
EOSINOPHIL # BLD AUTO: 0 K/UL (ref 0–0.51)
EOSINOPHIL NFR BLD: 0 % (ref 0–6.9)
ERYTHROCYTE [DISTWIDTH] IN BLOOD BY AUTOMATED COUNT: 49.1 FL (ref 35.9–50)
GLUCOSE SERPL-MCNC: 132 MG/DL (ref 65–99)
HCT VFR BLD AUTO: 22.9 % (ref 37–47)
HGB BLD-MCNC: 6.6 G/DL (ref 12–16)
HGB BLD-MCNC: 6.8 G/DL (ref 12–16)
HGB BLD-MCNC: 8 G/DL (ref 12–16)
HGB BLD-MCNC: 8.1 G/DL (ref 12–16)
IMM GRANULOCYTES # BLD AUTO: 0.09 K/UL (ref 0–0.11)
IMM GRANULOCYTES NFR BLD AUTO: 0.7 % (ref 0–0.9)
LACTATE BLD-SCNC: 1 MMOL/L (ref 0.5–2)
LYMPHOCYTES # BLD AUTO: 1.11 K/UL (ref 1–4.8)
LYMPHOCYTES NFR BLD: 8.3 % (ref 22–41)
MCH RBC QN AUTO: 24.9 PG (ref 27–33)
MCHC RBC AUTO-ENTMCNC: 29.7 G/DL (ref 33.6–35)
MCV RBC AUTO: 83.9 FL (ref 81.4–97.8)
MONOCYTES # BLD AUTO: 1.2 K/UL (ref 0–0.85)
MONOCYTES NFR BLD AUTO: 8.9 % (ref 0–13.4)
NEUTROPHILS # BLD AUTO: 11.01 K/UL (ref 2–7.15)
NEUTROPHILS NFR BLD: 81.9 % (ref 44–72)
NRBC # BLD AUTO: 0 K/UL
NRBC BLD-RTO: 0 /100 WBC
PLATELET # BLD AUTO: 299 K/UL (ref 164–446)
PMV BLD AUTO: 8.8 FL (ref 9–12.9)
POTASSIUM SERPL-SCNC: 4.1 MMOL/L (ref 3.6–5.5)
RBC # BLD AUTO: 2.73 M/UL (ref 4.2–5.4)
SODIUM SERPL-SCNC: 138 MMOL/L (ref 135–145)
VANCOMYCIN SERPL-MCNC: 10.4 UG/ML
WBC # BLD AUTO: 13.4 K/UL (ref 4.8–10.8)

## 2019-12-16 PROCEDURE — 86922 COMPATIBILITY TEST ANTIGLOB: CPT

## 2019-12-16 PROCEDURE — 80048 BASIC METABOLIC PNL TOTAL CA: CPT

## 2019-12-16 PROCEDURE — 94640 AIRWAY INHALATION TREATMENT: CPT

## 2019-12-16 PROCEDURE — 700102 HCHG RX REV CODE 250 W/ 637 OVERRIDE(OP): Performed by: INTERNAL MEDICINE

## 2019-12-16 PROCEDURE — A9270 NON-COVERED ITEM OR SERVICE: HCPCS | Performed by: INTERNAL MEDICINE

## 2019-12-16 PROCEDURE — 700105 HCHG RX REV CODE 258: Performed by: INTERNAL MEDICINE

## 2019-12-16 PROCEDURE — 80202 ASSAY OF VANCOMYCIN: CPT

## 2019-12-16 PROCEDURE — 85025 COMPLETE CBC W/AUTO DIFF WBC: CPT

## 2019-12-16 PROCEDURE — 700111 HCHG RX REV CODE 636 W/ 250 OVERRIDE (IP): Performed by: INTERNAL MEDICINE

## 2019-12-16 PROCEDURE — P9016 RBC LEUKOCYTES REDUCED: HCPCS

## 2019-12-16 PROCEDURE — 93970 EXTREMITY STUDY: CPT

## 2019-12-16 PROCEDURE — 99233 SBSQ HOSP IP/OBS HIGH 50: CPT | Performed by: HOSPITALIST

## 2019-12-16 PROCEDURE — 700101 HCHG RX REV CODE 250: Performed by: INTERNAL MEDICINE

## 2019-12-16 PROCEDURE — 30233N1 TRANSFUSION OF NONAUTOLOGOUS RED BLOOD CELLS INTO PERIPHERAL VEIN, PERCUTANEOUS APPROACH: ICD-10-PCS | Performed by: INTERNAL MEDICINE

## 2019-12-16 PROCEDURE — 93970 EXTREMITY STUDY: CPT | Mod: 26 | Performed by: INTERNAL MEDICINE

## 2019-12-16 PROCEDURE — A4314 CATH W/DRAINAGE 2-WAY LATEX: HCPCS | Performed by: INTERNAL MEDICINE

## 2019-12-16 PROCEDURE — 99291 CRITICAL CARE FIRST HOUR: CPT | Performed by: INTERNAL MEDICINE

## 2019-12-16 PROCEDURE — 770022 HCHG ROOM/CARE - ICU (200)

## 2019-12-16 PROCEDURE — 700105 HCHG RX REV CODE 258: Performed by: HOSPITALIST

## 2019-12-16 PROCEDURE — 85018 HEMOGLOBIN: CPT

## 2019-12-16 PROCEDURE — 83605 ASSAY OF LACTIC ACID: CPT

## 2019-12-16 PROCEDURE — 86902 BLOOD TYPE ANTIGEN DONOR EA: CPT

## 2019-12-16 PROCEDURE — 36430 TRANSFUSION BLD/BLD COMPNT: CPT

## 2019-12-16 RX ORDER — IPRATROPIUM BROMIDE AND ALBUTEROL SULFATE 2.5; .5 MG/3ML; MG/3ML
3 SOLUTION RESPIRATORY (INHALATION)
Status: DISCONTINUED | OUTPATIENT
Start: 2019-12-16 | End: 2019-12-28 | Stop reason: HOSPADM

## 2019-12-16 RX ORDER — ACETAMINOPHEN 500 MG
500 TABLET ORAL EVERY 6 HOURS PRN
Status: DISCONTINUED | OUTPATIENT
Start: 2019-12-16 | End: 2019-12-28 | Stop reason: HOSPADM

## 2019-12-16 RX ORDER — SODIUM CHLORIDE 9 MG/ML
INJECTION, SOLUTION INTRAVENOUS CONTINUOUS
Status: ACTIVE | OUTPATIENT
Start: 2019-12-16 | End: 2019-12-16

## 2019-12-16 RX ORDER — SODIUM CHLORIDE, SODIUM LACTATE, POTASSIUM CHLORIDE, CALCIUM CHLORIDE 600; 310; 30; 20 MG/100ML; MG/100ML; MG/100ML; MG/100ML
1000 INJECTION, SOLUTION INTRAVENOUS CONTINUOUS
Status: DISCONTINUED | OUTPATIENT
Start: 2019-12-16 | End: 2019-12-21

## 2019-12-16 RX ADMIN — SODIUM CHLORIDE, POTASSIUM CHLORIDE, SODIUM LACTATE AND CALCIUM CHLORIDE 1000 ML: 600; 310; 30; 20 INJECTION, SOLUTION INTRAVENOUS at 20:15

## 2019-12-16 RX ADMIN — TRAMADOL HYDROCHLORIDE 50 MG: 50 TABLET, FILM COATED ORAL at 13:31

## 2019-12-16 RX ADMIN — FERROUS SULFATE TAB 325 MG (65 MG ELEMENTAL FE) 325 MG: 325 (65 FE) TAB at 07:56

## 2019-12-16 RX ADMIN — ACETAMINOPHEN 500 MG: 500 TABLET ORAL at 11:47

## 2019-12-16 RX ADMIN — PIPERACILLIN AND TAZOBACTAM 3.38 G: 3; .375 INJECTION, POWDER, LYOPHILIZED, FOR SOLUTION INTRAVENOUS; PARENTERAL at 12:02

## 2019-12-16 RX ADMIN — TRAMADOL HYDROCHLORIDE 50 MG: 50 TABLET, FILM COATED ORAL at 21:49

## 2019-12-16 RX ADMIN — SENNOSIDES AND DOCUSATE SODIUM 2 TABLET: 8.6; 5 TABLET ORAL at 17:43

## 2019-12-16 RX ADMIN — BUDESONIDE AND FORMOTEROL FUMARATE DIHYDRATE 2 PUFF: 160; 4.5 AEROSOL RESPIRATORY (INHALATION) at 17:44

## 2019-12-16 RX ADMIN — SODIUM CHLORIDE, POTASSIUM CHLORIDE, SODIUM LACTATE AND CALCIUM CHLORIDE 1000 ML: 600; 310; 30; 20 INJECTION, SOLUTION INTRAVENOUS at 01:00

## 2019-12-16 RX ADMIN — PIPERACILLIN AND TAZOBACTAM 3.38 G: 3; .375 INJECTION, POWDER, LYOPHILIZED, FOR SOLUTION INTRAVENOUS; PARENTERAL at 21:49

## 2019-12-16 RX ADMIN — BUDESONIDE AND FORMOTEROL FUMARATE DIHYDRATE 2 PUFF: 160; 4.5 AEROSOL RESPIRATORY (INHALATION) at 05:02

## 2019-12-16 RX ADMIN — SODIUM CHLORIDE, POTASSIUM CHLORIDE, SODIUM LACTATE AND CALCIUM CHLORIDE 1000 ML: 600; 310; 30; 20 INJECTION, SOLUTION INTRAVENOUS at 10:17

## 2019-12-16 RX ADMIN — SODIUM CHLORIDE 30 ML: 9 INJECTION, SOLUTION INTRAVENOUS at 11:44

## 2019-12-16 RX ADMIN — LOVASTATIN 40 MG: 20 TABLET ORAL at 21:49

## 2019-12-16 RX ADMIN — ACETAMINOPHEN 500 MG: 500 TABLET ORAL at 22:58

## 2019-12-16 RX ADMIN — IPRATROPIUM BROMIDE AND ALBUTEROL SULFATE 3 ML: .5; 3 SOLUTION RESPIRATORY (INHALATION) at 07:08

## 2019-12-16 RX ADMIN — SENNOSIDES AND DOCUSATE SODIUM 2 TABLET: 8.6; 5 TABLET ORAL at 05:00

## 2019-12-16 RX ADMIN — IPRATROPIUM BROMIDE AND ALBUTEROL SULFATE 3 ML: .5; 3 SOLUTION RESPIRATORY (INHALATION) at 11:00

## 2019-12-16 RX ADMIN — PIPERACILLIN AND TAZOBACTAM 3.38 G: 3; .375 INJECTION, POWDER, LYOPHILIZED, FOR SOLUTION INTRAVENOUS; PARENTERAL at 05:00

## 2019-12-16 RX ADMIN — HEPARIN SODIUM 800 UNITS/HR: 5000 INJECTION, SOLUTION INTRAVENOUS at 09:57

## 2019-12-16 RX ADMIN — IPRATROPIUM BROMIDE AND ALBUTEROL SULFATE 3 ML: .5; 3 SOLUTION RESPIRATORY (INHALATION) at 02:36

## 2019-12-16 RX ADMIN — NOREPINEPHRINE BITARTRATE 3 MCG/MIN: 1 INJECTION INTRAVENOUS at 10:18

## 2019-12-16 RX ADMIN — OMEPRAZOLE 20 MG: 20 CAPSULE, DELAYED RELEASE ORAL at 05:00

## 2019-12-16 RX ADMIN — IPRATROPIUM BROMIDE AND ALBUTEROL SULFATE 3 ML: .5; 3 SOLUTION RESPIRATORY (INHALATION) at 18:25

## 2019-12-16 RX ADMIN — FERROUS SULFATE TAB 325 MG (65 MG ELEMENTAL FE) 325 MG: 325 (65 FE) TAB at 17:43

## 2019-12-16 ASSESSMENT — ENCOUNTER SYMPTOMS
SPEECH CHANGE: 0
HEADACHES: 0
SHORTNESS OF BREATH: 1
FEVER: 1
SPUTUM PRODUCTION: 0
BLOOD IN STOOL: 0
INSOMNIA: 1
SENSORY CHANGE: 0
CHILLS: 0
DOUBLE VISION: 0
FOCAL WEAKNESS: 0
NERVOUS/ANXIOUS: 0
BLURRED VISION: 0
PALPITATIONS: 0
HEMOPTYSIS: 0
NAUSEA: 0
VOMITING: 0
COUGH: 0
MYALGIAS: 1
ABDOMINAL PAIN: 0
BRUISES/BLEEDS EASILY: 0
SORE THROAT: 0
BACK PAIN: 0

## 2019-12-16 NOTE — ASSESSMENT & PLAN NOTE
Due to PE versus infection, possibly both, persisting but curve seems to improving  Antibiotics for pneumonia/UTI complete 12/19-status post 5 days Zosyn-monitoring for need to resume  Blood cultures negative so far- repeat if blood cultures positive and patient looks toxic  UA consistent with urinary tract infection, urine culture positive for pansensitive E. Coli  Repeat UA 12/21-negative  CXR with left retrocardiac opacity lower lobe atelectasis versus new infiltrate vs volume overload  She has a large left hiatal hernia which is obscuring the film in part as well  Repeat chest x-ray as needed  MRSA nares negative  No significant diarrhea, abdomen benign  Reassess daily with clinical pharmacist Re: Drug fever  Check blood cultures x2, lactic acid and procalcitonin as needed  Low threshold to resume antibiotics

## 2019-12-16 NOTE — PROGRESS NOTES
[facility] Medicine Daily Progress Note    Date of Service  12/16/2019    Chief Complaint  79 y.o. female admitted 12/14/2019 with history of total hip arthroplasty on 1121 transferred from [facility] where she was admitted for syncope due to PE and right hip dislocation    Hospital Course          Interval Problem Update    Patient transferred to ICU for worsening hypoxia and hypotension  AOx4  Hip pain with movement  SR 60-80  SBP   Levo at 3  Tmax 100.1  Kimbrough output 650    Hg 6.8   On 5 L NC  Day 2 Bri/zosyn/argenis    Consultants/Specialty  Critical care  Ortho    Code Status  DNR intubation okay    Disposition  To be determined    Review of Systems  Review of Systems   Respiratory: Positive for shortness of breath.    Cardiovascular: Positive for leg swelling. Negative for chest pain and palpitations.   Gastrointestinal: Negative for abdominal pain, nausea and vomiting.   Musculoskeletal: Positive for joint pain (Right hip pain).   All other systems reviewed and are negative.       Physical Exam  Temp:  [37.6 °C (99.7 °F)-38.4 °C (101.1 °F)] 37.8 °C (100 °F)  Pulse:  [] 80  Resp:  [] 24  BP: ()/(45-70) 107/56  SpO2:  [86 %-100 %] 95 %    Physical Exam  Vitals signs and nursing note reviewed.   Constitutional:       General: She is not in acute distress.  HENT:      Head: Normocephalic and atraumatic.      Nose: Nose normal.      Mouth/Throat:      Pharynx: No oropharyngeal exudate or posterior oropharyngeal erythema.   Eyes:      General: No scleral icterus.        Right eye: No discharge.         Left eye: No discharge.   Neck:      Musculoskeletal: Neck supple.   Cardiovascular:      Rate and Rhythm: Normal rate and regular rhythm.      Heart sounds: No murmur. No friction rub. No gallop.    Pulmonary:      Effort: Pulmonary effort is normal. No respiratory distress.      Breath sounds: No stridor. Decreased breath sounds present. No rhonchi or rales.   Chest:       Chest wall: No tenderness.   Abdominal:      General: Bowel sounds are normal. There is no distension.      Palpations: Abdomen is soft. There is no mass.      Tenderness: There is no tenderness. There is no guarding.   Musculoskeletal:         General: Swelling and tenderness present.   Skin:     General: Skin is warm and dry.      Coloration: Skin is not cyanotic.      Nails: There is no clubbing.        Comments: Surgical wound healing with no drainage erythema or induration   Neurological:      General: No focal deficit present.      Mental Status: She is alert and oriented to person, place, and time.      Cranial Nerves: No cranial nerve deficit.      Motor: No weakness.   Psychiatric:         Mood and Affect: Mood normal.         Behavior: Behavior normal.         Thought Content: Thought content normal.         Fluids    Intake/Output Summary (Last 24 hours) at 12/16/2019 1537  Last data filed at 12/16/2019 1400  Gross per 24 hour   Intake 4808.99 ml   Output 920 ml   Net 3888.99 ml       Laboratory  Recent Labs     12/15/19  1312  12/15/19  2145 12/16/19  0445 12/16/19  0912   WBC 12.7*  --   --  13.4*  --    RBC 2.99*  --   --  2.73*  --    HEMOGLOBIN 7.5*   < > 6.3* 6.8* 6.6*   HEMATOCRIT 26.0*  --   --  22.9*  --    MCV 84.9  --   --  83.9  --    MCH 25.1*  --   --  24.9*  --    MCHC 29.5*  --   --  29.7*  --    RDW 55.0*  --   --  49.1  --    PLATELETCT 372  --   --  299  --    MPV 9.3  --   --  8.8*  --     < > = values in this interval not displayed.     Recent Labs     12/15/19  1312 12/16/19  0445   SODIUM 139 138   POTASSIUM 4.9 4.1   CHLORIDE 103 105   CO2 24 24   GLUCOSE 105* 132*   BUN 27* 23*   CREATININE 1.09 0.91   CALCIUM 9.6 8.6     Recent Labs     12/15/19  1021 12/15/19  1535 12/15/19  2145   APTT 90.7* 69.4* 74.2*               Imaging  US-EXTREMITY VENOUS LOWER BILAT   Final Result      DX-CHEST-FOR LINE PLACEMENT Perform procedure in: Patient's Room   Final Result      1.  New right IJV  central line tip projects in satisfactory position. No pneumothorax.      2.  New linear atelectasis in the right midlung medially.      3.  Large hiatal hernia again noted.      DX-CHEST-PORTABLE (1 VIEW)   Final Result         1.  Findings on chest radiograph appear stable since the prior radiograph.  No new abnormalities are identified.      2.  Large hiatal hernia is again identified.      IR-PULMONARY ANGIOGRAM-BILATERAL    (Results Pending)        Assessment/Plan  * Shock (HCC)  Assessment & Plan  Wean off norepinephrine as tolerated    Fever  Assessment & Plan  Possible UTI  Wound does not appear infected  No pneumonia on chest imaging  Continue Zosyn  DC vancomycin and azithromycin  Follow-up on cultures      Acute pulmonary embolism with acute cor pulmonale (HCC)  Assessment & Plan  Continue anticoagulation with close clinical monitoring  Continue supportive care and wean off pressor as tolerated  Continue oxygen and O2 protocol      Acute respiratory failure (HCC)- (present on admission)  Assessment & Plan  Likely secondary to her pulmonary emboli  Continue oxygen  Continue heparin drip with close monitoring given anemia and drop in hemoglobin      Closed dislocation of right hip (HCC)  Assessment & Plan  Unable to be reduced in the emergency department  Discussed with Ortho plan is for reduction in the OR when she is more clinically stable and off pressors    Elevated troponin- (present on admission)  Assessment & Plan  Secondary to her pulmonary emboli    COPD (chronic obstructive pulmonary disease) (HCC)- (present on admission)  Assessment & Plan  Continue Symbicort oxygen and bronchodilators    Normocytic anemia  Assessment & Plan  With acute drop in hemoglobin  Transfuse 1 unit packed RBC  Monitor H&H  No signs of GI bleed or bleeding from surgical site continue close clinical monitoring      Plan of care reviewed with patient and discussed with nursing staff pharmacist and critical care    VTE  prophylaxis: heparin drip

## 2019-12-16 NOTE — DIETARY
Nutrition Services: Day 2 of admit.  Guillermina Recio is a 79 y.o. female with admitting DX of NSTEMI, PE, dislocated Rt hip prosthetic, Dislocated hip, right, initial encounter   Consult received for Poor PO & Wt Loss 2-13# in 3 months on Nutrition Admit Screen (MST 2)    Assessment:  Ht: 144.8 cm, Wt 12/16: 69.4 kg via bed scale, BMI: 33.1 - Class I Obesity  Diet: NPO since midnight    Evaluation:   1. Pt admitted to [facility] on 12/14 then transferred to [facility]  2. Per rounds pt had rapid called last night and was transferred to RICU  3. Per chart review pt's wt 11/23: 68.8 kg. Wt appears relatively stable.   4. Labs: Glucose 132, BUN 23  5. Meds: ferrous sulfate, mevacor, vancomycin, prilosec, zosyn, pericolace    Malnutrition Risk: No criteria noted at this time.     Recommendations/Plan:  1. Start PO diet when medically feasible.    2. Monitor weight.    RD following

## 2019-12-16 NOTE — CONSULTS
Critical Care Consultation    Date of consult: 12/15/2019    Referring Physician  Corazon Paz M.D.    Reason for Consultation  Shock, respiratory failure    History of Presenting Illness  79 y.o. female who presented 12/14/2019 as a transfer from [facility] where she was admitted for syncope due to a PE and complicated by right hip dislocation.  On 11/21 she had a right JAIME.  She was transferred to [facility] for reduction of the right hip dislocation, but today while on the floor developed worsening hypoxemia and hypotension so was transferred to the ICU.  She also developed fever.  Guillermina reports she is currently quite dyspneic, denies chest pain or abdominal pain.  She was previously quite healthy other than a history of COPD from secondhand smoke.  Her daughter reports that her mom does well at home except for when she gets up without help and her daughter attributes her syncope and fall to getting up without assistance.    Her PTTs have been therapeutic.  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  Considered clot in transit however it is more typical of a Chiari network and clot.  Fast exam is negative other than redemonstrating known hepatic cyst.    Code Status  DNAR, I OK    Review of Systems  Review of Systems   Constitutional: Positive for fever.   Respiratory: Positive for shortness of breath. Negative for cough.    Cardiovascular: Negative for chest pain.   Gastrointestinal: Negative for abdominal pain.       Past Medical History   has a past medical history of Arthritis, Bowel habit changes, Breath shortness, Cataract, Chronic pain (11/2019), Dyslipidemia (8/1/2016), Emphysema of lung (HCC), GERD (gastroesophageal reflux disease), Heart burn, Hepatitis (1970's), Hiatus hernia syndrome, High cholesterol, History of total hip replacement, Hyperlipidemia,  Macular degeneration, Memory loss, OA (osteoarthritis), Psychiatric problem, S/P cataract extraction, Snoring, stomach flu (11/08/2019), Urinary incontinence, and Wears dentures.    Surgical History   has a past surgical history that includes other orthopedic surgery; cataract phaco with iol (2/28/2012); cataract phaco with iol (3/13/2012); tubal ligation; hysterectomy, total abdominal; blepharoplasty (Bilateral, 7/14/2015); and pr total hip arthroplasty (Right, 11/21/2019).    Family History  family history includes Cancer in an other family member; Heart Disease in an other family member.    Social History   reports that she has never smoked. She has never used smokeless tobacco. She reports previous alcohol use. She reports that she does not use drugs.    Medications  Home Medications    **Home medications have not yet been reviewed for this encounter**       Current Facility-Administered Medications   Medication Dose Route Frequency Provider Last Rate Last Dose   • norepinephrine (LEVOPHED) 8 mg in  mL Infusion  0-30 mcg/min Intravenous Continuous Sasha Zimmerman M.D. 18.8 mL/hr at 12/15/19 1520 10 mcg/min at 12/15/19 1520   • MD Alert...Vancomycin per Pharmacy   Other PHARMACY TO DOSE Sasha Zimmerman M.D.       • azithromycin (ZITHROMAX) 500 mg in D5W 250 mL IVPB  500 mg Intravenous Q24HRS Sasha Zimmerman M.D.       • piperacillin-tazobactam (ZOSYN) 3.375 g in  mL IVPB  3.375 g Intravenous Once Sasha Zimmerman M.D.        And   • piperacillin-tazobactam (ZOSYN) 3.375 g in  mL IVPB  3.375 g Intravenous Q8HRS Sasha Zimmerman M.D.       • ferrous sulfate tablet 325 mg  325 mg Oral BID WITH MEALS Katie Vera M.D.   Stopped at 12/15/19 0730   • enalaprilat (VASOTEC) injection 1.25 mg  1.25 mg Intravenous Q6HRS PRN Katie Vera M.D.       • ondansetron (ZOFRAN ODT) dispertab 4 mg  4 mg Oral Q4HRS PRN Katie P Jody, M.D.       • ondansetron (ZOFRAN) syringe/vial injection 4 mg  4 mg  Intravenous Q4HRS PRN Katie Vera M.D.       • senna-docusate (PERICOLACE or SENOKOT S) 8.6-50 MG per tablet 2 Tab  2 Tab Oral BID Katie Vera M.D.   2 Tab at 12/15/19 0556    And   • polyethylene glycol/lytes (MIRALAX) PACKET 1 Packet  1 Packet Oral QDAY PRN Katie Vera M.D.        And   • magnesium hydroxide (MILK OF MAGNESIA) suspension 30 mL  30 mL Oral QDAY PRN Katie Vera M.D.        And   • bisacodyl (DULCOLAX) suppository 10 mg  10 mg Rectal QDAY PRN Katie Vera M.D.       • Respiratory Care per Protocol   Nebulization Continuous RT Katie Vera M.D.       • heparin injection 2,600 Units  2,600 Units Intravenous PRN Katie Vera M.D.        And   • heparin infusion 25,000 units in 500 mL 0.45% NACL   Intravenous Continuous Katie Vera M.D. 16 mL/hr at 12/15/19 1540 800 Units/hr at 12/15/19 1540   • acetaminophen (TYLENOL) tablet 500 mg  500 mg Oral BID PRN Katie Vera M.D.   500 mg at 12/15/19 1228   • budesonide-formoterol (SYMBICORT) 160-4.5 MCG/ACT inhaler 2 Puff  2 Puff Inhalation BID Katie Vera M.D.   2 Puff at 12/15/19 0605   • calcium carbonate (TUMS) chewable tab 500 mg  500 mg Oral QDAY PRN Katie Vera M.D.       • lovastatin (MEVACOR) tablet 40 mg  40 mg Oral QHS Katie Vera M.D.   40 mg at 12/14/19 2005   • morphine (pf) 4 MG/ML injection 2-4 mg  2-4 mg Intravenous Q2HRS PRN Katie Vera M.D.       • omeprazole (PRILOSEC) capsule 20 mg  20 mg Oral DAILY Katie Vera M.D.   20 mg at 12/15/19 0556   • tramadol (ULTRAM) 50 MG tablet 50 mg  50 mg Oral Q8HRS PRN Katie Vera M.D.   50 mg at 12/14/19 2347   • haloperidol lactate (HALDOL) injection 1 mg  1 mg Intravenous Q4HRS PRN Earlene Jarvis M.D.   1 mg at 12/14/19 2346       Allergies  Allergies   Allergen Reactions   • Sagebrush        Vital Signs last 24 hours  Temp:  [36.9 °C (98.5 °F)-38 °C (100.4 °F)] 37.6 °C (99.7 °F)  Pulse:  [] 99  Resp:  [] 112  BP: (71126)/(42-70)  102/55  SpO2:  [88 %-100 %] 95 %    Physical Exam  Physical Exam  Constitutional:       General: She is in acute distress.      Appearance: She is ill-appearing.   HENT:      Mouth/Throat:      Mouth: Mucous membranes are dry.   Eyes:      Pupils: Pupils are equal, round, and reactive to light.   Neck:      Musculoskeletal: No neck rigidity.   Cardiovascular:      Rate and Rhythm: Tachycardia present. Rhythm irregular.      Pulses: Normal pulses.   Pulmonary:      Effort: Respiratory distress present.      Breath sounds: Rales (L>R) present. No wheezing.   Abdominal:      General: Abdomen is flat. Bowel sounds are normal. There is distension.      Palpations: Abdomen is soft.      Tenderness: There is no tenderness.   Genitourinary:     Comments: RHYS with brown stool  Musculoskeletal:      Right lower leg: No edema.      Left lower leg: No edema.      Comments: Right hip is dislocated, patient lying rotated to the left   Skin:     General: Skin is warm and dry.      Comments: No ecchymosis over right hip   Neurological:      Mental Status: She is oriented to person, place, and time.      Comments: Occasionally confused         Fluids    Intake/Output Summary (Last 24 hours) at 12/15/2019 1725  Last data filed at 12/14/2019 2347  Gross per 24 hour   Intake 100 ml   Output --   Net 100 ml       Laboratory  Recent Results (from the past 48 hour(s))   APTT    Collection Time: 12/14/19 11:59 AM   Result Value Ref Range    APTT 71.1 (H) 24.7 - 36.0 sec   COD - Adult (Type and Screen)    Collection Time: 12/15/19  3:22 AM   Result Value Ref Range    ABO Grouping Only B     Rh Grouping Only POS     Antibody Screen-Cod NEG    APTT    Collection Time: 12/15/19  3:22 AM   Result Value Ref Range    APTT 118.4 (HH) 24.7 - 36.0 sec   APTT    Collection Time: 12/15/19 10:21 AM   Result Value Ref Range    APTT 90.7 (H) 24.7 - 36.0 sec   EKG    Collection Time: 12/15/19 12:58 PM   Result Value Ref Range    Report       [facility]    Test Date:  2019-12-15  Pt Name:    ERIN JADE                Department: 171  MRN:        [id number]                      Room:       R111  Gender:     Female                       Technician: GLORIA  :        [id number]                   Requested By:MAICOL TOMLIN  Order #:    [id number]                    Reading MD: Wilian Arrington MD    Measurements  Intervals                                Axis  Rate:       112                          P:          -7  PA:         129                          QRS:        33  QRSD:       82                           T:          -56  QT:         322  QTc:        440    Interpretive Statements  SINUS TACHYCARDIA  ABNORMAL T, CONSIDER ISCHEMIA, DIFFUSE LEADS  Compared to ECG 2019 16:33:09  Sinus rhythm no longer present  Electronically Signed On 12- 13:59:38 PST by Wilian Arrington MD     CBC WITH DIFFERENTIAL    Collection Time: 12/15/19  1:12 PM   Result Value Ref Range    WBC 12.7 (H) 4.8 - 10.8 K/uL    RBC 2.99 (L) 4.20 - 5.40 M/uL    Hemoglobin 7.5 (L) 12.0 - 16.0 g/dL    Hematocrit 26.0 (L) 37.0 - 47.0 %    MCV 84.9 81.4 - 97.8 fL    MCH 25.1 (L) 27.0 - 33.0 pg    MCHC 29.5 (L) 33.6 - 35.0 g/dL    RDW 55.0 (H) 35.9 - 50.0 fL    Platelet Count 372 164 - 446 K/uL    MPV 9.3 9.0 - 12.9 fL    Neutrophils-Polys 88.30 (H) 44.00 - 72.00 %    Lymphocytes 3.80 (L) 22.00 - 41.00 %    Monocytes 7.60 0.00 - 13.40 %    Eosinophils 0.00 0.00 - 6.90 %    Basophils 0.10 0.00 - 1.80 %    Immature Granulocytes 0.20 0.00 - 0.90 %    Nucleated RBC 0.00 /100 WBC    Neutrophils (Absolute) 11.18 (H) 2.00 - 7.15 K/uL    Lymphs (Absolute) 0.48 (L) 1.00 - 4.80 K/uL    Monos (Absolute) 0.96 (H) 0.00 - 0.85 K/uL    Eos (Absolute) 0.00 0.00 - 0.51 K/uL    Baso (Absolute) 0.01 0.00 - 0.12 K/uL    Immature Granulocytes (abs) 0.03 0.00 - 0.11 K/uL    NRBC (Absolute) 0.00 K/uL   Basic Metabolic Panel    Collection Time: 12/15/19  1:12 PM   Result Value Ref Range    Sodium 139 135 -  145 mmol/L    Potassium 4.9 3.6 - 5.5 mmol/L    Chloride 103 96 - 112 mmol/L    Co2 24 20 - 33 mmol/L    Glucose 105 (H) 65 - 99 mg/dL    Bun 27 (H) 8 - 22 mg/dL    Creatinine 1.09 0.50 - 1.40 mg/dL    Calcium 9.6 8.5 - 10.5 mg/dL    Anion Gap 12.0 (H) 0.0 - 11.9   LACTIC ACID    Collection Time: 12/15/19  1:12 PM   Result Value Ref Range    Lactic Acid 2.2 (H) 0.5 - 2.0 mmol/L   ESTIMATED GFR    Collection Time: 12/15/19  1:12 PM   Result Value Ref Range    GFR If  58 (A) >60 mL/min/1.73 m 2    GFR If Non  48 (A) >60 mL/min/1.73 m 2   ISTAT ARTERIAL BLOOD GAS    Collection Time: 12/15/19  1:16 PM   Result Value Ref Range    Ph 7.437 7.400 - 7.500    Pco2 35.2 26.0 - 37.0 mmHg    Po2 46 (LL) 64 - 87 mmHg    Tco2 25 20 - 33 mmol/L    S02 83 (L) 93 - 99 %    Hco3 23.8 17.0 - 25.0 mmol/L    BE 0 -4 - 3 mmol/L    Body Temp 101.5 F degrees    O2 Therapy 100 %    iPF Ratio 46     Ph Temp Rafa 7.413 7.400 - 7.500    Pco2 Temp Co 37.8 (H) 26.0 - 37.0 mmHg    Po2 Temp Cor 51 (L) 64 - 87 mmHg    Specimen Arterial     Action Range Triggered YES     Inst. Qualified Patient YES    ISTAT ARTERIAL BLOOD GAS    Collection Time: 12/15/19  2:10 PM   Result Value Ref Range    Ph 7.522 (H) 7.400 - 7.500    Pco2 29.2 26.0 - 37.0 mmHg    Po2 79 64 - 87 mmHg    Tco2 25 20 - 33 mmol/L    S02 97 93 - 99 %    Hco3 24.0 17.0 - 25.0 mmol/L    BE 1 -4 - 3 mmol/L    Body Temp 100.4 F degrees    O2 Therapy 65 %    iPF Ratio 122     Ph Temp Rafa 7.507 (H) 7.400 - 7.500    Pco2 Temp Co 30.5 26.0 - 37.0 mmHg    Po2 Temp Cor 85 64 - 87 mmHg    Specimen Arterial     Action Range Triggered NO     Inst. Qualified Patient YES    HEPARIN ANTI-XA    Collection Time: 12/15/19  3:35 PM   Result Value Ref Range    Heparin Anti-Xa 0.3 U/mL   APTT    Collection Time: 12/15/19  3:35 PM   Result Value Ref Range    APTT 69.4 (H) 24.7 - 36.0 sec   HGB    Collection Time: 12/15/19  4:30 PM   Result Value Ref Range    Hemoglobin 7.1  (L) 12.0 - 16.0 g/dL       Imaging  DX-CHEST-FOR LINE PLACEMENT Perform procedure in: Patient's Room   Final Result      1.  New right IJV central line tip projects in satisfactory position. No pneumothorax.      2.  New linear atelectasis in the right midlung medially.      3.  Large hiatal hernia again noted.      DX-CHEST-PORTABLE (1 VIEW)   Final Result         1.  Findings on chest radiograph appear stable since the prior radiograph.  No new abnormalities are identified.      2.  Large hiatal hernia is again identified.      US-EXTREMITY VENOUS LOWER BILAT    (Results Pending)   IR-PULMONARY ANGIOGRAM-BILATERAL    (Results Pending)       Assessment/Plan  * Shock (HCC)  Assessment & Plan  --Distributive d/t sepsis given fever versus obstructive from PE vs hemorrhagic/hypovolemic given dropping Hgb  --Appears hypovolemic by IVC analysis  --Bedside TTE shows akinetic RV free wall with pressure and volume overload.  TTE on 12/12 showed the akinetic free wall but does not comment on pressure or volume overload  --Hemoglobin is dropping so I am concerned she could be bleeding somewhere, although FAST and rectal exam are negative  --Blood cultures x2  --Vancomycin/Zosyn/azithromycin for empiric coverage of pneumonia at high risk for resistant pathogens  --Gentle fluids resuscitation with LR at 250 an hour for 1 L given her tenuous respiratory status  --Every 4 hour hemoglobins  --Management of PE as below    Fever  Assessment & Plan  --Due to PE versus infection  --Blood cultures, UA  --CXR with left lower lobe atelectasis versus new infiltrate.  She has a large left hiatal hernia which is obscuring the film  --Empiric treatment of pneumonia with Vanco/Zosyn/azithromycin as she has been in the hospital since 12/12 so is high risk for resistant bugs      Acute pulmonary embolism with acute cor pulmonale (HCC)  Assessment & Plan  --Massive versus submassive PE  --On 12/12 there was RV free wall hypokinesis, but they do  not comment on RV pressure or volume overload  --If her hypotension is due to PE then thrombolytics are indicated.  However it is not clear to me that PE is the sole cause of her hypotension, especially given the fact that her IVC is small and collapsing which is inconsistent with obstructive shock.  Her hemoglobin has decreased 3 points from 12/12, and dropped from 7.5 to 7.1 today within 3 hours, so I am concerned she could be bleeding and therefore TPA is contraindicated right now  --Continue supportive care and serial hemoglobins.  If she decompensates further we may need to readdress the idea of either catheter directed or systemic thrombolytics  --Continue heparin drip, PTTs have been therapeutic    Acute respiratory failure (HCC)- (present on admission)  Assessment & Plan  --Hypoxemia due to PE versus aspiration versus pneumonia  --Hypovolemic by IVC so low suspicion for cardiogenic pulmonary edema.  Difficult echo windows unable to get E/e'.  No diuresis  --High flow nasal cannula  --Patient would want trial of intubation if needed  --See discussion of PE below  --Treat pneumonia with vancomycin, Zosyn, azithromycin    Closed dislocation of right hip (HCC)  Assessment & Plan  --Not stable for reduction today    Elevated troponin- (present on admission)  Assessment & Plan  Due to PE    COPD (chronic obstructive pulmonary disease) (HCC)- (present on admission)  Assessment & Plan  --No wheezing on exam  --Every 4 hours duo nebs given respiratory status  --No indication for prednisone     Normocytic anemia  Assessment & Plan  --Blood loss versus chronic versus dilutional  --Negative FAST exam, no blood on rectal exam, no ecchymosis at hip dislocation site  --Every 6 hour hemoglobin        Discussed patient condition and risk of morbidity and/or mortality with Hospitalist, Family, RN, RT, Pharmacy, Patient and IR.    The patient remains critically ill with shock on vasopressors, respiratory failure and PE at very  high risk of decompensation and death.  Critical care time = 110 minutes in directly providing and coordinating critical care and extensive data review.  No time overlap and excludes procedures.

## 2019-12-16 NOTE — ASSESSMENT & PLAN NOTE
Hypoxemia due to PE versus aspiration versus pneumonia both, clinically minimal change, remains on 3.5-5 L nasal cannula O2, continue to titrate Status post high flow nasal cannula  Atelectasis/small effusions contributing-responding to pulmonary toilet  Mobilize as much as allowed by orthopedics will allow with her hip that was recently reduced-okay to mobilize with assistance  Add PEP or IPV since I-S is low  Diuresis with furosemide ongoing and will continue, significant positive fluid balance and volume overload clinically   Replete potassium  5-day course of Zosyn for UTI/pneumonia complete 12/19- E. coli in urine, no positive respiratory cultures  Monitoring for need to resume antibiotic therapy    Continue to mobilize and aggressive diuresis

## 2019-12-16 NOTE — PROGRESS NOTES
Blood is now ready, requested. Patient has 0330 HGB due, will draw post transfusion with a.m. labs.

## 2019-12-16 NOTE — ASSESSMENT & PLAN NOTE
Close reduction with sedation for Dr. Delgado at the bedside in ICU 12/19 with successful reduction by x-ray

## 2019-12-16 NOTE — ASSESSMENT & PLAN NOTE
Due to PE,  demand ischemia clinically, secondary to pulmonary embolus, pulmonary hypertension and strain noted on echo  Echocardiogram noted, LV function well-preserved  Monitor

## 2019-12-16 NOTE — ASSESSMENT & PLAN NOTE
No wheezing on exam, continue monitor  Continue RT protocols, continue DuoNeb via IPV every 6 hours, every 2 hours as needed  Hold on IV steroids for now, treating atelectasis and volume overload than bronchospasm, monitoring for infection  Update vaccines prior to discharge

## 2019-12-16 NOTE — CARE PLAN
Problem: Nutritional:  Goal: Achieve adequate nutritional intake  Description  Patient will start diet and consume 50% of meals   Outcome: NOT MET

## 2019-12-16 NOTE — ASSESSMENT & PLAN NOTE
No acute exacerbation  Continue Symbicort oxygen and bronchodilators  RT per protocol  Supplemental oxygen

## 2019-12-16 NOTE — PROGRESS NOTES
Spoke to Dr Choi regarding HGB, 1unit PRBC ordered. Consent obtained. Per blood bank, due to patient having antibodies it will take longer for the blood to be ready. Will continue to monitor patient..

## 2019-12-16 NOTE — PROCEDURES
Central Line Insertion  Date/Time: 12/15/2019 4:15 PM  Performed by: Sasha Zimmerman M.D.  Authorized by: Sasha Zimmerman M.D.     Consent:     Consent obtained:  Verbal    Consent given by:  Patient    Risks discussed:  Arterial puncture, infection, incorrect placement, pneumothorax, nerve damage and bleeding  Pre-procedure details:     Hand hygiene: Hand hygiene performed prior to insertion      Sterile barrier technique: All elements of maximal sterile technique followed      Skin preparation:  ChloraPrep    Skin preparation agent: Skin preparation agent completely dried prior to procedure    Sedation:     Sedation type:  None  Anesthesia:     Anesthesia method:  Local infiltration    Local anesthetic:  Lidocaine 1% w/o epi  Procedure details:     Location:  R internal jugular    Patient position:  Trendelenburg    Procedural supplies:  Triple lumen    Catheter size:  7 Fr    Landmarks identified: yes      Ultrasound guidance: yes      Sterile ultrasound techniques: Sterile gel and sterile probe covers were used      Number of attempts:  1    Successful placement: yes    Post-procedure details:     Post-procedure:  Dressing applied and line sutured    Assessment:  Blood return through all ports, no pneumothorax on x-ray, placement verified by x-ray and free fluid flow    Patient tolerance of procedure:  Tolerated well, no immediate complications

## 2019-12-16 NOTE — PROGRESS NOTES
"Pharmacy Kinetics 79 y.o. female on vancomycin day # 1 12/15/2019    New start vancomycin: Loaded with 25 mg/kg (1600 mg)  Provider specified end date: TBD    Indication for Treatment: Pneumonia    Pertinent history per medical record: Admitted on 2019 for syncope secondary to  pulmonary embolism. She presented to [facility] for evaluation 2 days prior to arrival here. She has been on a heparin drip for her pulmonary embolism when today she developed fever, worse dyspnea, and hypotension. She transferred to the ICU for closer monitoring and broad spectrum abx therapy was initiated for possible sepsis. Her hemoglobin has been trending down recently, possibly related to a bleed.     Other antibiotics: zosyn 3.375g IV q8h    Allergies: Sagebrush     List concerns for renal function: Age, concurrent zosyn, presser dependent shock, PERLA, SIRS    Pertinent cultures to date:   12/15/19 Blood x 2: IN process      MRSA nares swab if pneumonia is a concern: ordered    Recent Labs     12/15/19  1312   WBC 12.7*   NEUTSPOLYS 88.30*     Recent Labs     12/15/19  1312   BUN 27*   CREATININE 1.09     No results for input(s): VANCOTROUGH, VANCOPEAK, VANCORANDOM in the last 72 hours.    Intake/Output Summary (Last 24 hours) at 12/15/2019 2227  Last data filed at 12/15/2019 2200  Gross per 24 hour   Intake 1691.29 ml   Output 300 ml   Net 1391.29 ml      /55   Pulse 92   Temp (!) 38.3 °C (101 °F) (Temporal)   Resp (!) 25   Ht 1.448 m (4' 9\")   Wt 65.4 kg (144 lb 2.9 oz)   SpO2 94%  Temp (24hrs), Av.7 °C (99.8 °F), Min:36.9 °C (98.5 °F), Max:38.4 °C (101.1 °F)      A/P   1. Vancomycin dose change: Check level prior to starting maintenance dosing  2. Next vancomycin level: 19 at 1200 (18 hour level)  3. Goal trough: 16-20 mcg/mL  4. Comments: Will check level following the loading dose due to hypotension, elevated renal indices, and concern for accumulation. MRSA nares ordered to aid in rapid " de-escalation. H/h continuing to trend down, now at transfusion threshold.     Tony Brink, PharmD

## 2019-12-16 NOTE — CONSULTS
DATE OF SERVICE:  12/03/2019    BEHAVIORAL MEDICINE EVALUATION    BRIEF HISTORY OF PRESENTING COMPLAINTS:  The patient is an 79-year-old white    female who is referred for behavioral medicine evaluation by Dr. Tjeada.  The patient was transferred to rehab from acute where she was   admitted to the hospital on 11/21/2019 for a right total hip replacement.  The   patient developed some postoperative complications including a small-bowel   obstruction.  The patient was treated and stabilized.  She was then sent to   rehab to address her general debility.    PAST MEDICAL HISTORY:  Significant for arthritis, bowel habit changes, breath   shortness, COPD, cataracts, chronic hip pain, dyslipidemia, emphysema of the   lungs, GERD, heartburn, hepatitis A, hiatal hernia, hypercholesterolemia,   history of total hip replacement, hyperlipidemia, macular degeneration, memory   loss, osteoarthritis, cataracts, snoring, stomach flu, urinary incontinence,   dentures.    PSYCHOLOGICAL STATUS:  MENTAL STATUS EXAMINATION:  The patient is a well-nourished overweight female   of very short stature who appeared older than her stated age of 79.  At   presentation, the patient was alert.  She was sitting in a wheelchair next to   her bed talking to a friend when approached.  The patient oriented marginally   well to my presence.  The patient was kempt in appearance.  She was dressed   casually in street attire that was appropriate to her age and setting.  The   patient's manner of presentation was cooperative.    The patient was grossly oriented to time, place and person, but not always   precisely to the date.  Her language was logical and goal oriented.  Her   speech was normal rate and rhythm.  The patient's concentration and memory   functioning seemed generally intact.    The patient's affect was slightly constricted, stable, and mildly intense.    She related only marginally well.  Mood appeared irritable, but appropriate  to   the context.    There was no evidence of delusional or perceptual disturbance.  Also, the   patient showed no unusual pain or motor behavior during the interview.    SPECIFIC BEHAVIORAL COMPLAINTS:  Patient admitted to mild symptoms of   generalized mood disturbance.  She reported in the past several days she has   felt restless, irritable, sad and somewhat discouraged about the future.  She   reported no strong feelings of guilt, worthlessness, or hopelessness.  She   also denied any thoughts of wanting to die.    The patient also reported loss of energy and poor appetite.  She says she is   bothered by hip and leg pain.  She is asking for medication.  She also   reported her sleep is restless.    The patient denied any interpersonal discord or discomfort, family disharmony   or problems managing her day-to-day stressors.  She also reported no ETOH or   other drug abuse or any use of tobacco.    PSYCHIATRIC HISTORY:  The patient denied any history significant for   psychiatric disturbance or treatment including in or outpatient care.    PSYCHOMETRIC TESTING:  The patient was administered 2 psychometric tests and 2   screening instruments.  The PS/PC-R revealed mild symptoms of generalized   mood disturbance.  Her CDR survey showed no problems with level of   consciousness or attention.  Her thinking seemed somewhat limited in scope.    No perceptual problems are noted.  The patient's speech was slightly   dysarthric.  She did reveal some slight problems with memory functioning.  She   also reported problems with behavioral activation and basic self-care.    Moreover, she identified an irritable mood, loss of vigor, sleep disturbance,   diminished appetite and some leg and hip pain.    The patient was screened for any elder abuse or risk of suicide.  There is no   strong evidence for either problem.    SOCIAL HISTORY:  The patient is retired.  She is .  She lives alone in   [place], [place].    IMPRESSION:   Adjustment disorder with mild dysphoric mood.    RECOMMENDATIONS:  The patient will be followed for status and supportive care.       ____________________________________     BLAYNE DAWSON, PHD    STEFANIE / RONN    DD:  12/15/2019 18:30:09  DT:  12/15/2019 20:22:18    D#:  [id number]  Job#:  [id number]

## 2019-12-16 NOTE — ASSESSMENT & PLAN NOTE
S/p submassive PE, improved after fluid resuscitation, off IV pressures, on oral midodrine ongoing, off norepinephrine  Echo noted, EF 65%, moderate pulmonary hypertension and mild RV dilation with acute event  Oxygenation unchanged, on 3.5 L NC - WOB low  TPA contraindicated initially, has been treated with heparin, transitioning to Eliquis  Continue to monitor hemoglobin, if bleeding becomes significant may need IVC filter and reversal of Eliquis anticoagulation

## 2019-12-16 NOTE — PROGRESS NOTES
Critical Care Progress Note    Date of admission  12/14/2019    Chief Complaint  79 y.o. female admitted 12/14/2019 with transfer for PE    Hospital Course    79 y.o. female who presented 12/14/2019 as a transfer from [facility] where she was admitted for syncope due to a PE and complicated by right hip dislocation.  On 11/21 she had a right JAIME.  She was transferred to [facility] for reduction of the right hip dislocation, but today while on the floor developed worsening hypoxemia and hypotension so was transferred to the ICU.  She also developed fever.  Guillermina reports she is currently quite dyspneic, denies chest pain or abdominal pain.  She was previously quite healthy other than a history of COPD from secondhand smoke.  Her daughter reports that her mom does well at home except for when she gets up without help and her daughter attributes her syncope and fall to getting up without assistance.  Her PTTs have been therapeutic.  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  Considered clot in transit however it is more typical of a Chiari network and clot.       Interval Problem Update  Reviewed last 24 hour events:    A&O x4  Mentating well  WOB better  O2 weaned HFNC -> 5 lpm NC  No CXR  SR 80s  SBp 90-110s  NE 3  CVC  Tm 101.3    NPO - sips  Hgb 6.8 after 1 unit of blood, repeat blood transfusion ordered, repeat hemoglobin later in day 8.0  No signs of bleeding at this time, continue to monitor  Vanco/Zosyn/Azith  MRSA nares neg  LA 1.0  Leg U/s  ABN UA    Examined wound with nursing staff and hospitalist, right hip surgery site actually looks pretty good at this time without signs of infection    Diet as see  Stop Vanco, wound looks good  Stop Azith  Check NIVT  Check Ucx?  Transfusion  Monitoring for need for IVC filter    Still actively titrating  norepinephrine infusion, dose is come down  Oxygenation status unchanged  Reviewed case with orthopedics PA, suggested we reassess 24 hours post being off norepinephrine before considering elective procedures    Review of Systems  Review of Systems   Constitutional: Positive for fever and malaise/fatigue. Negative for chills.   HENT: Negative for congestion and sore throat.    Eyes: Negative for blurred vision and double vision.   Respiratory: Positive for shortness of breath (Improved). Negative for cough, hemoptysis and sputum production.    Cardiovascular: Positive for leg swelling. Negative for chest pain and palpitations.   Gastrointestinal: Negative for abdominal pain, blood in stool, melena, nausea and vomiting.   Genitourinary: Negative.    Musculoskeletal: Positive for joint pain and myalgias (Right hip). Negative for back pain.   Neurological: Negative for sensory change, speech change, focal weakness and headaches.   Endo/Heme/Allergies: Does not bruise/bleed easily.   Psychiatric/Behavioral: The patient has insomnia. The patient is not nervous/anxious.         Vital Signs for last 24 hours   Temp:  [37.6 °C (99.7 °F)-38.4 °C (101.1 °F)] 37.8 °C (100 °F)  Pulse:  [] 80  Resp:  [] 20  BP: ()/(45-70) 102/57  SpO2:  [86 %-100 %] 95 %    Hemodynamic parameters for last 24 hours       Respiratory Information for the last 24 hours       Physical Exam   Physical Exam  Vitals signs reviewed.   Constitutional:       Appearance: She is obese.      Interventions: Nasal cannula in place.   HENT:      Head: Normocephalic and atraumatic.      Mouth/Throat:      Mouth: Mucous membranes are moist.   Eyes:      General: No scleral icterus.     Extraocular Movements: Extraocular movements intact.      Pupils: Pupils are equal, round, and reactive to light.   Neck:      Musculoskeletal: Neck supple.   Cardiovascular:      Rate and Rhythm: Normal rate and regular rhythm.      Pulses: Normal pulses.       Heart sounds: No murmur. No friction rub. No gallop.    Pulmonary:      Effort: No respiratory distress.      Breath sounds: Rhonchi present. No wheezing or rales.   Abdominal:      General: Abdomen is flat. There is no distension.      Palpations: Abdomen is soft.      Tenderness: There is no tenderness. There is no right CVA tenderness, left CVA tenderness or guarding.   Musculoskeletal:         General: Swelling present.      Right lower leg: Edema (Recent right hip surgery) present.   Skin:     General: Skin is warm and dry.      Capillary Refill: Capillary refill takes less than 2 seconds.      Coloration: Skin is not cyanotic.      Nails: There is no clubbing.        Comments: Right hip incision clean and dry with large Steri-Strip   Neurological:      General: No focal deficit present.      Mental Status: She is alert and oriented to person, place, and time.      Cranial Nerves: No cranial nerve deficit.      Sensory: No sensory deficit.      Motor: No weakness.   Psychiatric:         Attention and Perception: Attention normal.         Mood and Affect: Mood is not anxious.         Speech: Speech normal.         Behavior: Behavior is not agitated. Behavior is cooperative.         Medications  Current Facility-Administered Medications   Medication Dose Route Frequency Provider Last Rate Last Dose   • lactated ringers infusion  1,000 mL Intravenous Continuous Jeremy M Gonda, M.D. 100 mL/hr at 12/16/19 1017 1,000 mL at 12/16/19 1017   • acetaminophen (TYLENOL) tablet 500 mg  500 mg Oral Q6HRS PRN Cash Shin M.D.   500 mg at 12/16/19 1147   • NS infusion   Intravenous Continuous Mo Lux M.D. 30 mL/hr at 12/16/19 1144 30 mL at 12/16/19 1144   • norepinephrine (LEVOPHED) 8 mg in  mL Infusion  0-30 mcg/min Intravenous Continuous Sasha Zimmerman M.D. 5.6 mL/hr at 12/16/19 1018 3 mcg/min at 12/16/19 1018   • piperacillin-tazobactam (ZOSYN) 3.375 g in  mL IVPB  3.375 g Intravenous Q8HRS  Sasha Zimmerman M.D.   Stopped at 12/16/19 1602   • ipratropium-albuterol (DUONEB) nebulizer solution  3 mL Nebulization Q4HRS (RT) Sasha Zimmerman M.D.   3 mL at 12/16/19 1100   • ferrous sulfate tablet 325 mg  325 mg Oral BID WITH MEALS Katie Vera M.D.   325 mg at 12/16/19 0756   • enalaprilat (VASOTEC) injection 1.25 mg  1.25 mg Intravenous Q6HRS PRN Katie Vera M.D.       • ondansetron (ZOFRAN ODT) dispertab 4 mg  4 mg Oral Q4HRS PRN Katie Vera M.D.       • ondansetron (ZOFRAN) syringe/vial injection 4 mg  4 mg Intravenous Q4HRS PRN Katie Vera M.D.       • senna-docusate (PERICOLACE or SENOKOT S) 8.6-50 MG per tablet 2 Tab  2 Tab Oral BID Katie Vera M.D.   2 Tab at 12/16/19 0500    And   • polyethylene glycol/lytes (MIRALAX) PACKET 1 Packet  1 Packet Oral QDAY PRN Katie Vera M.D.        And   • magnesium hydroxide (MILK OF MAGNESIA) suspension 30 mL  30 mL Oral QDAY PRN Katie Vera M.D.        And   • bisacodyl (DULCOLAX) suppository 10 mg  10 mg Rectal QDAY PRN Katie Vera M.D.       • Respiratory Care per Protocol   Nebulization Continuous RT Katie Vera M.D.       • heparin injection 2,600 Units  2,600 Units Intravenous PRN Katie Vera M.D.        And   • heparin infusion 25,000 units in 500 mL 0.45% NACL   Intravenous Continuous Katie Vera M.D. 16 mL/hr at 12/16/19 0957 800 Units/hr at 12/16/19 0957   • budesonide-formoterol (SYMBICORT) 160-4.5 MCG/ACT inhaler 2 Puff  2 Puff Inhalation BID Katie Vera M.D.   2 Puff at 12/16/19 0502   • calcium carbonate (TUMS) chewable tab 500 mg  500 mg Oral QDAY PRN Katie Vera M.D.       • lovastatin (MEVACOR) tablet 40 mg  40 mg Oral QHS Katie Vera M.D.   40 mg at 12/15/19 2130   • morphine (pf) 4 MG/ML injection 2-4 mg  2-4 mg Intravenous Q2HRS PRN Katie Vera M.D.       • omeprazole (PRILOSEC) capsule 20 mg  20 mg Oral DAILY Katie Vera M.D.   20 mg at 12/16/19 0500   • tramadol (ULTRAM) 50 MG  tablet 50 mg  50 mg Oral Q8HRS PRN Katie Vera M.D.   50 mg at 12/16/19 1331   • haloperidol lactate (HALDOL) injection 1 mg  1 mg Intravenous Q4HRS PRN Earlene Jarvis M.D.   1 mg at 12/14/19 2346       Fluids    Intake/Output Summary (Last 24 hours) at 12/16/2019 1655  Last data filed at 12/16/2019 1400  Gross per 24 hour   Intake 4808.99 ml   Output 920 ml   Net 3888.99 ml       Laboratory  Recent Labs     12/15/19  1316 12/15/19  1410   ISTATAPH 7.437 7.522*   ISTATAPCO2 35.2 29.2   ISTATAPO2 46* 79   ISTATATCO2 25 25   WJVAVVU8OZN 83* 97   ISTATARTHCO3 23.8 24.0   ISTATARTBE 0 1   ISTATTEMP 101.5 F 100.4 F   ISTATFIO2 100 65   ISTATSPEC Arterial Arterial   ISTATAPHTC 7.413 7.507*   YIFIUGAG6SG 51* 85         Recent Labs     12/15/19  1312 12/16/19  0445   SODIUM 139 138   POTASSIUM 4.9 4.1   CHLORIDE 103 105   CO2 24 24   BUN 27* 23*   CREATININE 1.09 0.91   CALCIUM 9.6 8.6     Recent Labs     12/15/19  1312 12/16/19  0445   GLUCOSE 105* 132*     Recent Labs     12/15/19  1312 12/16/19  0445   WBC 12.7* 13.4*   NEUTSPOLYS 88.30* 81.90*   LYMPHOCYTES 3.80* 8.30*   MONOCYTES 7.60 8.90   EOSINOPHILS 0.00 0.00   BASOPHILS 0.10 0.20     Recent Labs     12/15/19  1021 12/15/19  1312 12/15/19  1535  12/15/19  2145 12/16/19  0445 12/16/19  0912 12/16/19  1611   RBC  --  2.99*  --   --   --  2.73*  --   --    HEMOGLOBIN  --  7.5*  --    < > 6.3* 6.8* 6.6* 8.0*   HEMATOCRIT  --  26.0*  --   --   --  22.9*  --   --    PLATELETCT  --  372  --   --   --  299  --   --    APTT 90.7*  --  69.4*  --  74.2*  --   --   --     < > = values in this interval not displayed.       Imaging  X-Ray:  I have personally reviewed the images and compared with prior images.  EKG:  I have personally reviewed the images and compared with prior images.  CT:    Reviewed  Echo:   Reviewed    Assessment/Plan  * Shock (HCC)  Assessment & Plan  --Distributive d/t sepsis given fever versus obstructive from PE vs hemorrhagic/hypovolemic given  dropping Hgb - probable combination of several if not all  --Appears hypovolemic by IVC analysis, improved  --Bedside TTE shows akinetic RV free wall with pressure and volume overload.  TTE on 12/12 showed the akinetic free wall but does not comment on pressure or volume overload, RVSP 58  --Hemoglobin continues to be low despite transfusion, still concerned of bleeding, although FAST and rectal exam are negative, no bleeding clinically  --Transfusing to hemoglobin 7-8, continue serial H/H, monitor for the need for other blood products as needed clinically  --Blood cultures x2 ending and negative so far  --Vancomycin/Zosyn/azithromycin to be de-escalated to Zosyn, MRSA nares negative and no atypical features suggest the need for azithromycin and wound looks okay  --Resuscitation complete, appears euvolemic, follow-up ultrasound IVC as needed  --Reduce H/H to every 6-8 hours  --Management of PE as below with heparin, if GI bleeding becomes more predominant, consider IVC filter and reversal with protamine    Fever  Assessment & Plan  --Due to PE versus infection, possibly both  --Blood cultures pending  --UA consistent with urinary tract infection classically, urine culture pending?  --CXR with left lower lobe atelectasis versus new infiltrate.  She has a large left hiatal hernia which is obscuring the film, monitor.  --Empiric treatment of pneumonia with Vanco/Zosyn/azithromycin as she has been in the hospital since 12/12 so is high risk for resistant bugs  --De-escalate antibiotics, vancomycin and azithromycin discontinued, continue Zosyn 5 days for pneumonia, probably will need 3 days for UTI, monitor      Acute pulmonary embolism with acute cor pulmonale (HCC)  Assessment & Plan  --Massive versus submassive PE  --On 12/12 there was RV free wall hypokinesis, but they do not comment on RV pressure or volume overload  --If her hypotension is due to PE then thrombolytics are indicated.  However it is not clear to me  that PE is the sole cause of her hypotension, especially given the fact that her IVC is small and collapsing which is inconsistent with obstructive shock.  Her hemoglobin has decreased 3 points from 12/12, and dropped from 7.5 to 7.1 today within 3 hours, so I am concerned she could be bleeding and therefore TPA is contraindicated initially  --Continue supportive care and serial hemoglobins.  If she decompensates further we may need to readdress the idea of either catheter directed or systemic thrombolytics  --Continue heparin drip, PTTs have been therapeutic  --Continue to monitor hemoglobin, if bleeding becomes significant may need IVC filter and reversal of heparin anticoagulation  --Hold on long-term anticoagulation with warfarin or NOAC for now    Acute respiratory failure (HCC)- (present on admission)  Assessment & Plan  --Hypoxemia due to PE versus aspiration versus pneumonia both, clinically improved  --Hypovolemic by IVC so low suspicion for cardiogenic pulmonary edema.  Difficult echo windows unable to get E/e'.  No diuresis now  --High flow nasal cannula being weaned to regular nasal cannula  --Patient would want trial of intubation if needed, not necessary so far  --See discussion of PE below  --Treat pneumonia with Zosyn (vs UTI -UA consistent with UTI, urine culture pending?,  Plan 3 days antibiotics for UTI minimum)  --Discontinue azithromycin, doubt atypical pneumonic process  --Discontinue vancomycin, MRSA nares negative and wounds look good    Closed dislocation of right hip (HCC)  Assessment & Plan  --Not stable for reduction today, reviewed with orthopedics  --Ideally respiratory and hemodynamic status stable for at least 24 hours prior to elective orthopedic procedure    Elevated troponin- (present on admission)  Assessment & Plan  Due to PE,  demand ischemia clinically  Echocardiogram noted, LV function well-preserved    COPD (chronic obstructive pulmonary disease) (HCC)- (present on  admission)  Assessment & Plan  --No wheezing on exam, continue monitor  --Every 4 hours duo nebs given respiratory status, RT protocols  --No indication for prednisone, monitoring  --Update vaccines prior to discharge    Gastroesophageal reflux disease- (present on admission)  Assessment & Plan  PPI  Antireflux measures  Monitor for bleeding    Obesity (BMI 30-39.9)- (present on admission)  Assessment & Plan  Mobilize  Incentive spirometry  Weight loss to be encouraged    Dyslipidemia- (present on admission)  Assessment & Plan  Statin    Normocytic anemia  Assessment & Plan  --Blood loss versus chronic versus dilutional  --Negative FAST exam, no blood on rectal exam, no ecchymosis at hip dislocation site  --Every 6 hour hemoglobin         VTE:  Heparin  Ulcer: PPI  Lines: Central Line  Ongoing indication addressed    I have performed a physical exam and reviewed and updated ROS and Plan today (12/16/2019). In review of yesterday's note (12/15/2019), there are no changes except as documented above.     Patient remains critically ill.  Oxygenation has improved, weaning O2 supplementation.  Hemodynamics improved but were still actively titrating norepinephrine infusion for hypotension.  Prognosis remains guarded.  Patient remains at significant increased risk for morbidity and mortality without above critical care interventions.    Discussed patient condition and risk of morbidity and/or mortality with Hospitalist, RN, RT, Pharmacy, , Charge nurse / hot rounds, Patient and orthopedics     The patient remains critically ill.  Critical care time = 45 minutes in directly providing and coordinating critical care and extensive data review.  No time overlap and excludes procedures.

## 2019-12-16 NOTE — ASSESSMENT & PLAN NOTE
Initial shock and admit felt to be multifactorial, primarily pulmonary embolus with relative hypovolemia  Off pressor on oral midodrine continue to titrate

## 2019-12-17 LAB
ANION GAP SERPL CALC-SCNC: 5 MMOL/L (ref 0–11.9)
APTT PPP: 50.1 SEC (ref 24.7–36)
APTT PPP: 55.2 SEC (ref 24.7–36)
APTT PPP: 58.6 SEC (ref 24.7–36)
BASOPHILS # BLD AUTO: 0.3 % (ref 0–1.8)
BASOPHILS # BLD AUTO: 0.4 % (ref 0–1.8)
BASOPHILS # BLD: 0.02 K/UL (ref 0–0.12)
BASOPHILS # BLD: 0.03 K/UL (ref 0–0.12)
BUN SERPL-MCNC: 17 MG/DL (ref 8–22)
CALCIUM SERPL-MCNC: 8.1 MG/DL (ref 8.5–10.5)
CHLORIDE SERPL-SCNC: 108 MMOL/L (ref 96–112)
CO2 SERPL-SCNC: 27 MMOL/L (ref 20–33)
CREAT SERPL-MCNC: 0.7 MG/DL (ref 0.5–1.4)
EOSINOPHIL # BLD AUTO: 0.07 K/UL (ref 0–0.51)
EOSINOPHIL # BLD AUTO: 0.08 K/UL (ref 0–0.51)
EOSINOPHIL NFR BLD: 1 % (ref 0–6.9)
EOSINOPHIL NFR BLD: 1.2 % (ref 0–6.9)
ERYTHROCYTE [DISTWIDTH] IN BLOOD BY AUTOMATED COUNT: 51.5 FL (ref 35.9–50)
ERYTHROCYTE [DISTWIDTH] IN BLOOD BY AUTOMATED COUNT: 51.8 FL (ref 35.9–50)
GLUCOSE SERPL-MCNC: 132 MG/DL (ref 65–99)
HCT VFR BLD AUTO: 25.9 % (ref 37–47)
HCT VFR BLD AUTO: 26 % (ref 37–47)
HGB BLD-MCNC: 7.8 G/DL (ref 12–16)
HGB BLD-MCNC: 7.9 G/DL (ref 12–16)
IMM GRANULOCYTES # BLD AUTO: 0.06 K/UL (ref 0–0.11)
IMM GRANULOCYTES # BLD AUTO: 0.07 K/UL (ref 0–0.11)
IMM GRANULOCYTES NFR BLD AUTO: 0.8 % (ref 0–0.9)
IMM GRANULOCYTES NFR BLD AUTO: 1.1 % (ref 0–0.9)
LYMPHOCYTES # BLD AUTO: 0.79 K/UL (ref 1–4.8)
LYMPHOCYTES # BLD AUTO: 0.87 K/UL (ref 1–4.8)
LYMPHOCYTES NFR BLD: 11.9 % (ref 22–41)
LYMPHOCYTES NFR BLD: 11.9 % (ref 22–41)
MAGNESIUM SERPL-MCNC: 2 MG/DL (ref 1.5–2.5)
MCH RBC QN AUTO: 25.5 PG (ref 27–33)
MCH RBC QN AUTO: 25.6 PG (ref 27–33)
MCHC RBC AUTO-ENTMCNC: 30.1 G/DL (ref 33.6–35)
MCHC RBC AUTO-ENTMCNC: 30.4 G/DL (ref 33.6–35)
MCV RBC AUTO: 84.1 FL (ref 81.4–97.8)
MCV RBC AUTO: 84.6 FL (ref 81.4–97.8)
MONOCYTES # BLD AUTO: 0.9 K/UL (ref 0–0.85)
MONOCYTES # BLD AUTO: 0.98 K/UL (ref 0–0.85)
MONOCYTES NFR BLD AUTO: 13.4 % (ref 0–13.4)
MONOCYTES NFR BLD AUTO: 13.6 % (ref 0–13.4)
NEUTROPHILS # BLD AUTO: 4.76 K/UL (ref 2–7.15)
NEUTROPHILS # BLD AUTO: 5.33 K/UL (ref 2–7.15)
NEUTROPHILS NFR BLD: 71.9 % (ref 44–72)
NEUTROPHILS NFR BLD: 72.5 % (ref 44–72)
NRBC # BLD AUTO: 0 K/UL
NRBC # BLD AUTO: 0 K/UL
NRBC BLD-RTO: 0 /100 WBC
NRBC BLD-RTO: 0 /100 WBC
PLATELET # BLD AUTO: 277 K/UL (ref 164–446)
PLATELET # BLD AUTO: 303 K/UL (ref 164–446)
PMV BLD AUTO: 8.8 FL (ref 9–12.9)
PMV BLD AUTO: 8.9 FL (ref 9–12.9)
POTASSIUM SERPL-SCNC: 4.2 MMOL/L (ref 3.6–5.5)
RBC # BLD AUTO: 3.06 M/UL (ref 4.2–5.4)
RBC # BLD AUTO: 3.09 M/UL (ref 4.2–5.4)
SODIUM SERPL-SCNC: 140 MMOL/L (ref 135–145)
WBC # BLD AUTO: 6.6 K/UL (ref 4.8–10.8)
WBC # BLD AUTO: 7.3 K/UL (ref 4.8–10.8)

## 2019-12-17 PROCEDURE — 700105 HCHG RX REV CODE 258: Performed by: INTERNAL MEDICINE

## 2019-12-17 PROCEDURE — 700102 HCHG RX REV CODE 250 W/ 637 OVERRIDE(OP): Performed by: INTERNAL MEDICINE

## 2019-12-17 PROCEDURE — 85025 COMPLETE CBC W/AUTO DIFF WBC: CPT

## 2019-12-17 PROCEDURE — 700111 HCHG RX REV CODE 636 W/ 250 OVERRIDE (IP): Performed by: INTERNAL MEDICINE

## 2019-12-17 PROCEDURE — 80048 BASIC METABOLIC PNL TOTAL CA: CPT

## 2019-12-17 PROCEDURE — 83735 ASSAY OF MAGNESIUM: CPT

## 2019-12-17 PROCEDURE — A9270 NON-COVERED ITEM OR SERVICE: HCPCS | Performed by: INTERNAL MEDICINE

## 2019-12-17 PROCEDURE — 700102 HCHG RX REV CODE 250 W/ 637 OVERRIDE(OP): Performed by: HOSPITALIST

## 2019-12-17 PROCEDURE — 85730 THROMBOPLASTIN TIME PARTIAL: CPT

## 2019-12-17 PROCEDURE — 99291 CRITICAL CARE FIRST HOUR: CPT | Performed by: INTERNAL MEDICINE

## 2019-12-17 PROCEDURE — 770022 HCHG ROOM/CARE - ICU (200)

## 2019-12-17 PROCEDURE — 99233 SBSQ HOSP IP/OBS HIGH 50: CPT | Performed by: HOSPITALIST

## 2019-12-17 PROCEDURE — A9270 NON-COVERED ITEM OR SERVICE: HCPCS | Performed by: HOSPITALIST

## 2019-12-17 RX ORDER — MIDODRINE HYDROCHLORIDE 5 MG/1
5 TABLET ORAL
Status: DISCONTINUED | OUTPATIENT
Start: 2019-12-17 | End: 2019-12-20

## 2019-12-17 RX ORDER — OXYCODONE HYDROCHLORIDE 5 MG/1
5 TABLET ORAL EVERY 4 HOURS PRN
Status: DISCONTINUED | OUTPATIENT
Start: 2019-12-17 | End: 2019-12-26

## 2019-12-17 RX ORDER — OXYCODONE HYDROCHLORIDE 10 MG/1
10 TABLET ORAL EVERY 4 HOURS PRN
Status: DISCONTINUED | OUTPATIENT
Start: 2019-12-17 | End: 2019-12-26

## 2019-12-17 RX ADMIN — LOVASTATIN 40 MG: 20 TABLET ORAL at 20:47

## 2019-12-17 RX ADMIN — FENTANYL CITRATE 25 MCG: 0.05 INJECTION, SOLUTION INTRAMUSCULAR; INTRAVENOUS at 23:24

## 2019-12-17 RX ADMIN — HEPARIN SODIUM 2200 UNITS: 1000 INJECTION, SOLUTION INTRAVENOUS; SUBCUTANEOUS at 06:12

## 2019-12-17 RX ADMIN — PIPERACILLIN AND TAZOBACTAM 3.38 G: 3; .375 INJECTION, POWDER, LYOPHILIZED, FOR SOLUTION INTRAVENOUS; PARENTERAL at 20:47

## 2019-12-17 RX ADMIN — FERROUS SULFATE TAB 325 MG (65 MG ELEMENTAL FE) 325 MG: 325 (65 FE) TAB at 08:25

## 2019-12-17 RX ADMIN — OXYCODONE HYDROCHLORIDE 5 MG: 5 TABLET ORAL at 18:10

## 2019-12-17 RX ADMIN — MORPHINE SULFATE 4 MG: 4 INJECTION INTRAVENOUS at 00:19

## 2019-12-17 RX ADMIN — OMEPRAZOLE 20 MG: 20 CAPSULE, DELAYED RELEASE ORAL at 05:02

## 2019-12-17 RX ADMIN — OXYCODONE HYDROCHLORIDE 10 MG: 10 TABLET ORAL at 23:14

## 2019-12-17 RX ADMIN — SENNOSIDES AND DOCUSATE SODIUM 2 TABLET: 8.6; 5 TABLET ORAL at 05:03

## 2019-12-17 RX ADMIN — SENNOSIDES AND DOCUSATE SODIUM 2 TABLET: 8.6; 5 TABLET ORAL at 18:10

## 2019-12-17 RX ADMIN — TRAMADOL HYDROCHLORIDE 50 MG: 50 TABLET, FILM COATED ORAL at 08:27

## 2019-12-17 RX ADMIN — MAGNESIUM HYDROXIDE 30 ML: 400 SUSPENSION ORAL at 23:18

## 2019-12-17 RX ADMIN — SODIUM CHLORIDE, POTASSIUM CHLORIDE, SODIUM LACTATE AND CALCIUM CHLORIDE 1000 ML: 600; 310; 30; 20 INJECTION, SOLUTION INTRAVENOUS at 06:12

## 2019-12-17 RX ADMIN — BUDESONIDE AND FORMOTEROL FUMARATE DIHYDRATE 2 PUFF: 160; 4.5 AEROSOL RESPIRATORY (INHALATION) at 05:04

## 2019-12-17 RX ADMIN — PIPERACILLIN AND TAZOBACTAM 3.38 G: 3; .375 INJECTION, POWDER, LYOPHILIZED, FOR SOLUTION INTRAVENOUS; PARENTERAL at 05:02

## 2019-12-17 RX ADMIN — FERROUS SULFATE TAB 325 MG (65 MG ELEMENTAL FE) 325 MG: 325 (65 FE) TAB at 18:10

## 2019-12-17 RX ADMIN — OXYCODONE HYDROCHLORIDE 5 MG: 5 TABLET ORAL at 12:20

## 2019-12-17 RX ADMIN — HEPARIN SODIUM 900 UNITS/HR: 5000 INJECTION, SOLUTION INTRAVENOUS at 17:36

## 2019-12-17 RX ADMIN — PIPERACILLIN AND TAZOBACTAM 3.38 G: 3; .375 INJECTION, POWDER, LYOPHILIZED, FOR SOLUTION INTRAVENOUS; PARENTERAL at 12:21

## 2019-12-17 RX ADMIN — FENTANYL CITRATE 25 MCG: 0.05 INJECTION, SOLUTION INTRAMUSCULAR; INTRAVENOUS at 19:21

## 2019-12-17 RX ADMIN — MIDODRINE HYDROCHLORIDE 5 MG: 5 TABLET ORAL at 18:10

## 2019-12-17 RX ADMIN — ACETAMINOPHEN 500 MG: 500 TABLET ORAL at 19:27

## 2019-12-17 ASSESSMENT — ENCOUNTER SYMPTOMS
FEVER: 0
BLURRED VISION: 0
ABDOMINAL PAIN: 0
INSOMNIA: 1
SORE THROAT: 0
VOMITING: 0
CHILLS: 0
STRIDOR: 0
NERVOUS/ANXIOUS: 0
FOCAL WEAKNESS: 0
SHORTNESS OF BREATH: 1
SENSORY CHANGE: 0
SPUTUM PRODUCTION: 0
SPEECH CHANGE: 0
NECK PAIN: 0
SHORTNESS OF BREATH: 0
HEADACHES: 0
MYALGIAS: 1
BLOOD IN STOOL: 0
BRUISES/BLEEDS EASILY: 0
DIZZINESS: 0
NAUSEA: 0
COUGH: 0
HEMOPTYSIS: 0
FEVER: 1
DOUBLE VISION: 0
BACK PAIN: 0
PALPITATIONS: 0

## 2019-12-17 ASSESSMENT — PATIENT HEALTH QUESTIONNAIRE - PHQ9
SUM OF ALL RESPONSES TO PHQ9 QUESTIONS 1 AND 2: 3
1. LITTLE INTEREST OR PLEASURE IN DOING THINGS: SEVERAL DAYS
2. FEELING DOWN, DEPRESSED, IRRITABLE, OR HOPELESS: MORE THAN HALF THE DAYS

## 2019-12-17 NOTE — PROGRESS NOTES
Critical Care Progress Note    Date of admission  12/14/2019    Chief Complaint  79 y.o. female admitted 12/14/2019 with transfer for PE    Hospital Course    79 y.o. female who presented 12/14/2019 as a transfer from [facility] where she was admitted for syncope due to a PE and complicated by right hip dislocation.  On 11/21 she had a right JAIME.  She was transferred to [facility] for reduction of the right hip dislocation, but today while on the floor developed worsening hypoxemia and hypotension so was transferred to the ICU.  She also developed fever.  Guillermina reports she is currently quite dyspneic, denies chest pain or abdominal pain.  She was previously quite healthy other than a history of COPD from secondhand smoke.  Her daughter reports that her mom does well at home except for when she gets up without help and her daughter attributes her syncope and fall to getting up without assistance.  Her PTTs have been therapeutic.  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  Considered clot in transit however it is more typical of a Chiari network and clot.       Interval Problem Update  Reviewed last 24 hour events:    A&O x4  Follows well  R hip pain persists  SR 60s, occ PVCs, 7 beats SVT?  SBp 100s  NE drip 3.5, actively titrating  UO adequate  Kimbrough  3 lpm NC O2  IS 1000  CXR atx persists  Tm 100.2, WBC 6  Hgb 8.0  Heparin drip 900    PPI  Zosyn 3/5  Ucx pending    D/c Morphine/Tramadol -> FENT/Oxycodone   Wean NE drip  Wean O2  To OR to fix hip tomorrow if hemodynamics and respiratory status stable/improved    Serial follow-up  Neurologic status unchanged  Respiratory status unchanged, remains on 3 L O2 with acceptable saturations and work of breathing  Hemodynamics remain problematic, unable to wean off norepinephrine although doses come down from double  digits to 1-3 range     YESTERDAY  A&O x4  Mentating well  WOB better  O2 weaned HFNC -> 5 lpm NC  No CXR  SR 80s  SBp 90-110s  NE 3  CVC  Tm 101.3    NPO - sips  Hgb 6.8 after 1 unit of blood, repeat blood transfusion ordered, repeat hemoglobin later in day 8.0  No signs of bleeding at this time, continue to monitor  Vanco/Zosyn/Azith  MRSA nares neg  LA 1.0  Leg U/s  ABN UA    Examined wound with nursing staff and hospitalist, right hip surgery site actually looks pretty good at this time without signs of infection    Diet as see  Stop Vanco, wound looks good  Stop Azith  Check NIVT  Check Ucx?  Transfusion  Monitoring for need for IVC filter    Still actively titrating norepinephrine infusion, dose is come down  Oxygenation status unchanged  Reviewed case with orthopedics PA, suggested we reassess 24 hours post being off norepinephrine before considering elective procedures    Review of Systems  Review of Systems   Constitutional: Positive for fever (Improved) and malaise/fatigue (Improved). Negative for chills.   HENT: Negative for congestion and sore throat.    Eyes: Negative for blurred vision and double vision.   Respiratory: Positive for shortness of breath (Improved again). Negative for cough, hemoptysis and sputum production.    Cardiovascular: Positive for leg swelling (No change). Negative for chest pain and palpitations.   Gastrointestinal: Negative for abdominal pain, blood in stool, melena, nausea and vomiting.   Genitourinary: Negative.    Musculoskeletal: Positive for joint pain (No change right hip, any ROM very problematic) and myalgias (Right hip, no change). Negative for back pain.   Neurological: Negative for sensory change, speech change, focal weakness and headaches.   Endo/Heme/Allergies: Does not bruise/bleed easily.   Psychiatric/Behavioral: The patient has insomnia. The patient is not nervous/anxious.         Vital Signs for last 24 hours   Pulse:  [] 68  Resp:  [16-45]  27  BP: ()/(53-71) 103/55  SpO2:  [88 %-100 %] 95 %    Hemodynamic parameters for last 24 hours       Respiratory Information for the last 24 hours       Physical Exam   Physical Exam  Vitals signs reviewed.   Constitutional:       Appearance: She is obese.      Interventions: Nasal cannula in place.   HENT:      Head: Normocephalic and atraumatic.      Mouth/Throat:      Mouth: Mucous membranes are moist.   Eyes:      General: No scleral icterus.     Extraocular Movements: Extraocular movements intact.      Pupils: Pupils are equal, round, and reactive to light.   Neck:      Musculoskeletal: Neck supple.   Cardiovascular:      Rate and Rhythm: Normal rate and regular rhythm.      Pulses: Normal pulses.      Heart sounds: No murmur. No friction rub. No gallop.    Pulmonary:      Effort: No respiratory distress.      Breath sounds: Rhonchi (No change) present. No wheezing or rales.      Comments: Work of breathing acceptable, tachypneic at times  Abdominal:      General: Abdomen is flat. There is no distension.      Palpations: Abdomen is soft.      Tenderness: There is no tenderness. There is no right CVA tenderness, left CVA tenderness or guarding.   Musculoskeletal:         General: Swelling (No change) present.      Right lower leg: Edema (Recent right hip surgery) present.   Skin:     General: Skin is warm and dry.      Capillary Refill: Capillary refill takes less than 2 seconds.      Coloration: Skin is not cyanotic.      Nails: There is no clubbing.        Comments: Right hip incision remains clean and dry with large Steri-Strip   Neurological:      General: No focal deficit present.      Mental Status: She is alert and oriented to person, place, and time.      Cranial Nerves: No cranial nerve deficit.      Sensory: No sensory deficit.      Motor: No weakness.   Psychiatric:         Attention and Perception: Attention normal.         Mood and Affect: Mood is not anxious.         Speech: Speech normal.          Behavior: Behavior is not agitated. Behavior is cooperative.         Medications  Current Facility-Administered Medications   Medication Dose Route Frequency Provider Last Rate Last Dose   • piperacillin-tazobactam (ZOSYN) 3.375 g in  mL IVPB  3.375 g Intravenous Q8HRS Cash Shin M.D. 25 mL/hr at 12/17/19 2047 3.375 g at 12/17/19 2047   • oxyCODONE immediate-release (ROXICODONE) tablet 5 mg  5 mg Oral Q4HRS PRN Cash Shin M.D.   5 mg at 12/17/19 1810    Or   • oxyCODONE immediate release (ROXICODONE) tablet 10 mg  10 mg Oral Q4HRS PRN Cash Shin M.D.       • fentaNYL (SUBLIMAZE) injection 25-50 mcg  25-50 mcg Intravenous Q HOUR PRN Cash Shin M.D.   25 mcg at 12/17/19 1921   • midodrine (PROAMATINE) tablet 5 mg  5 mg Oral TID WITH MEALS Chencho Asencio DABIGAIL   5 mg at 12/17/19 1810   • lactated ringers infusion  1,000 mL Intravenous Continuous Jeremy M Gonda, M.D. 100 mL/hr at 12/17/19 0612 1,000 mL at 12/17/19 0612   • acetaminophen (TYLENOL) tablet 500 mg  500 mg Oral Q6HRS PRN Cash Shin M.D.   500 mg at 12/17/19 1927   • ipratropium-albuterol (DUONEB) nebulizer solution  3 mL Nebulization Q4H PRN (RT) Sasha Zimmerman M.D.       • norepinephrine (LEVOPHED) 8 mg in  mL Infusion  0-30 mcg/min Intravenous Continuous Sasha Zimmerman M.D. 1.9 mL/hr at 12/17/19 1500 1 mcg/min at 12/17/19 1500   • ferrous sulfate tablet 325 mg  325 mg Oral BID WITH MEALS Katie Vera M.D.   325 mg at 12/17/19 1810   • enalaprilat (VASOTEC) injection 1.25 mg  1.25 mg Intravenous Q6HRS PRN Katie Vera M.D.       • ondansetron (ZOFRAN ODT) dispertab 4 mg  4 mg Oral Q4HRS PRN Katie Vera M.D.       • ondansetron (ZOFRAN) syringe/vial injection 4 mg  4 mg Intravenous Q4HRS PRN Katie Vera M.D.       • senna-docusate (PERICOLACE or SENOKOT S) 8.6-50 MG per tablet 2 Tab  2 Tab Oral BID Katie Vera M.D.   2 Tab at 12/17/19 1810    And   • polyethylene glycol/lytes  (MIRALAX) PACKET 1 Packet  1 Packet Oral QDAY PRN Katie Vera M.D.        And   • magnesium hydroxide (MILK OF MAGNESIA) suspension 30 mL  30 mL Oral QDAY PRN Katie Vera M.D.        And   • bisacodyl (DULCOLAX) suppository 10 mg  10 mg Rectal QDAY PRN Katie Vera M.D.       • Respiratory Care per Protocol   Nebulization Continuous RT Katie Vera M.D.       • heparin injection 2,600 Units  2,600 Units Intravenous PRN Katie Vera M.D.   2,200 Units at 12/17/19 0612    And   • heparin infusion 25,000 units in 500 mL 0.45% NACL   Intravenous Continuous Katie Vera M.D. 18 mL/hr at 12/17/19 1909 900 Units/hr at 12/17/19 1909   • budesonide-formoterol (SYMBICORT) 160-4.5 MCG/ACT inhaler 2 Puff  2 Puff Inhalation BID Katie Vera M.D.   Stopped at 12/17/19 1800   • calcium carbonate (TUMS) chewable tab 500 mg  500 mg Oral QDAY PRN Katie Vera M.D.       • lovastatin (MEVACOR) tablet 40 mg  40 mg Oral QHS Katie Vera M.D.   40 mg at 12/17/19 2047   • omeprazole (PRILOSEC) capsule 20 mg  20 mg Oral DAILY Katie Vera M.D.   20 mg at 12/17/19 0502   • haloperidol lactate (HALDOL) injection 1 mg  1 mg Intravenous Q4HRS PRN Earlene Jarvis M.D.   1 mg at 12/14/19 2346       Fluids    Intake/Output Summary (Last 24 hours) at 12/17/2019 2243  Last data filed at 12/17/2019 2000  Gross per 24 hour   Intake 2962.49 ml   Output 745 ml   Net 2217.49 ml       Laboratory  Recent Labs     12/15/19  1316 12/15/19  1410   ISTATAPH 7.437 7.522*   ISTATAPCO2 35.2 29.2   ISTATAPO2 46* 79   ISTATATCO2 25 25   DCMXQHL4OEZ 83* 97   ISTATARTHCO3 23.8 24.0   ISTATARTBE 0 1   ISTATTEMP 101.5 F 100.4 F   ISTATFIO2 100 65   ISTATSPEC Arterial Arterial   ISTATAPHTC 7.413 7.507*   PSQOGBAD7TL 51* 85         Recent Labs     12/15/19  1312 12/16/19  0445 12/17/19  0500   SODIUM 139 138 140   POTASSIUM 4.9 4.1 4.2   CHLORIDE 103 105 108   CO2 24 24 27   BUN 27* 23* 17   CREATININE 1.09 0.91 0.70   MAGNESIUM  --   --   2.0   CALCIUM 9.6 8.6 8.1*     Recent Labs     12/15/19  1312 12/16/19  0445 12/17/19  0500   GLUCOSE 105* 132* 132*     Recent Labs     12/16/19  0445 12/17/19  0500 12/17/19  0833   WBC 13.4* 7.3 6.6   NEUTSPOLYS 81.90* 72.50* 71.90   LYMPHOCYTES 8.30* 11.90* 11.90*   MONOCYTES 8.90 13.40 13.60*   EOSINOPHILS 0.00 1.00 1.20   BASOPHILS 0.20 0.40 0.30     Recent Labs     12/16/19  0445  12/16/19  2340 12/17/19  0500 12/17/19  0833 12/17/19  1309 12/17/19  1917   RBC 2.73*  --   --  3.09* 3.06*  --   --    HEMOGLOBIN 6.8*   < > 8.1* 7.9* 7.8*  --   --    HEMATOCRIT 22.9*  --   --  26.0* 25.9*  --   --    PLATELETCT 299  --   --  303 277  --   --    APTT  --   --   --  50.1*  --  58.6* 55.2*    < > = values in this interval not displayed.       Imaging  X-Ray:  I have personally reviewed the images and compared with prior images.  EKG:  I have personally reviewed the images and compared with prior images.  CT:    Reviewed  Echo:   Reviewed    Assessment/Plan  * Shock (HCC)  Assessment & Plan  Improved, suspect multifactorial, primary etiology pulmonary embolus, relative hypovolemia initially, doubt significant sepsis or GI bleeding at this time, monitoring and/or actively treating all of the above    --Distributive d/t sepsis given fever versus obstructive from PE vs hemorrhagic/hypovolemic given dropping Hgb - probable combination of several if not all  --Appears hypovolemic by IVC analysis, improved  --Bedside TTE shows akinetic RV free wall with pressure and volume overload.  TTE on 12/12 showed the akinetic free wall but does not comment on pressure or volume overload, RVSP 58  --Hemoglobin continues to be low despite transfusion, still concerned of bleeding, although FAST and rectal exam are negative, no bleeding clinically  --Transfusing to hemoglobin 7-8, continue serial H/H, monitor for the need for other blood products as needed clinically  --Blood cultures x2 ending and negative so  far  --Vancomycin/Zosyn/azithromycin to be de-escalated to Zosyn, MRSA nares negative and no atypical features suggest the need for azithromycin and wound looks okay  --Resuscitation complete, appears euvolemic, follow-up ultrasound IVC as needed  --Reduce H/H to every 6-8 hours  --Management of PE as below with heparin, if GI bleeding becomes more predominant, consider IVC filter and reversal with protamine    Fever  Assessment & Plan  --Due to PE versus infection, possibly both, improved  --Blood cultures pending, negative so far  --UA consistent with urinary tract infection classically, urine culture pending?  No results to date  --CXR with left lower lobe atelectasis versus new infiltrate.  She has a large left hiatal hernia which is obscuring the film, monitor.  --Empiric treatment of pneumonia with Vanco/Zosyn/azithromycin as she has been in the hospital since 12/12 so is high risk for resistant bugs  --De-escalate antibiotics, vancomycin and azithromycin discontinued, continue Zosyn 5 days for pneumonia, probably will need 3 days for UTI, monitor      Acute pulmonary embolism with acute cor pulmonale (HCC)  Assessment & Plan  --submassive PE, improved after fluid resuscitation, remains on pressors, oxygenation improved  --On 12/12 there was RV free wall hypokinesis, but they do not comment on RV pressure or volume overload  --If her hypotension is due to PE then thrombolytics are indicated.  However it is not clear to me that PE is the sole cause of her hypotension, especially given the fact that her IVC is small and collapsing which is inconsistent with obstructive shock.  Her hemoglobin has decreased 3 points from 12/12, and dropped from 7.5 to 7.1 today within 3 hours, so I am concerned she could be bleeding and therefore TPA is contraindicated initially  --Continue supportive care and serial hemoglobins.  If she decompensates further we may need to readdress the idea of either catheter directed or  systemic thrombolytics  --Continue heparin drip, PTTs have been therapeutic  --Continue to monitor hemoglobin, if bleeding becomes significant may need IVC filter and reversal of heparin anticoagulation  --Hold on long-term anticoagulation with warfarin or NOAC for now, still needs to go the OR for correction of dislocated right hip    Acute respiratory failure (HCC)- (present on admission)  Assessment & Plan  --Hypoxemia due to PE versus aspiration versus pneumonia both, clinically unchanged, remains on supplemental O2 at reasonable flow  --Hypovolemic by IVC so low suspicion for cardiogenic pulmonary edema.  Difficult echo windows unable to get E/e'.  No diuresis now, monitoring  --Status post high flow nasal cannula  --Patient would want trial of intubation if needed, not necessary so far, monitoring  --See discussion of PE below  --Treat pneumonia with Zosyn (vs UTI -UA consistent with UTI, urine culture pending?,  Plan 3 days antibiotics for UTI minimum), probable 5-day course for possible pneumonic complication  --Discontinue azithromycin, doubt atypical pneumonic process  --Discontinue vancomycin, MRSA nares negative and wounds look good    Closed dislocation of right hip (HCC)  Assessment & Plan  --Not stable for reduction today, reviewed with orthopedics 12/16  --Ideally respiratory and hemodynamic status stable for at least 24 hours prior to elective orthopedic procedure  --Reassess in a.m. daily for appropriateness of elective procedure in the OR    COPD (chronic obstructive pulmonary disease) (HCC)- (present on admission)  Assessment & Plan  --No wheezing on exam, continue monitor  --Every 4 hours duo nebs given respiratory status, RT protocols  --No indication for prednisone, monitoring daily  --Update vaccines prior to discharge    Elevated troponin- (present on admission)  Assessment & Plan  Due to PE,  demand ischemia clinically  Echocardiogram noted, LV function  well-preserved  Monitor    Gastroesophageal reflux disease- (present on admission)  Assessment & Plan  PPI  Antireflux measures  Monitor for bleeding    Obesity (BMI 30-39.9)- (present on admission)  Assessment & Plan  Mobilize  Incentive spirometry  Weight loss to be encouraged    Dyslipidemia- (present on admission)  Assessment & Plan  Statin, weight loss, low-fat diet long-term    Normocytic anemia  Assessment & Plan  --Blood loss versus chronic versus dilutional  --Negative FAST exam, no blood on rectal exam, no ecchymosis at hip dislocation site  --Every 6 hour hemoglobin         VTE:  Heparin  Ulcer: PPI  Lines: Central Line  Ongoing indication addressed    I have performed a physical exam and reviewed and updated ROS and Plan today (12/17/2019). In review of yesterday's note (12/16/2019), there are no changes except as documented above.     Patient remains critically ill.  Oxygenation is unchanged.  Hemodynamics are still problematic, continue to actively titrate norepinephrine although doses improved versus yesterday.  Prognosis remains guarded.  Patient still needs to go to the OR for correction of dislocated right hip which is status post recent ORIF.  Patient remains at significant increased risk for morbidity and mortality without above critical care interventions.    Discussed patient condition and risk of morbidity and/or mortality with Hospitalist, RN, RT, Pharmacy, , Charge nurse / hot rounds and Patient     The patient remains critically ill.  Critical care time = 40 minutes in directly providing and coordinating critical care and extensive data review.  No time overlap and excludes procedures.

## 2019-12-17 NOTE — CARE PLAN
Problem: Bowel/Gastric:  Goal: Normal bowel function is maintained or improved  12/16/2019 1804 by Maddie Longoria R.N.  Outcome: PROGRESSING SLOWER THAN EXPECTED  12/16/2019 1803 by Maddie Longoria R.N.  Outcome: PROGRESSING AS EXPECTED    Unable to have bowel movement, stool softer given, passing gas

## 2019-12-17 NOTE — CARE PLAN
Problem: Communication  Goal: The ability to communicate needs accurately and effectively will improve  Outcome: PROGRESSING AS EXPECTED  Intervention: [facility] patient and significant other/support system to call light to alert staff of needs  Flowsheets (Taken 12/16/2019 2059)  Oriented to:: All of the Following : Location of Bathroom, Visiting Policy, Unit Routine, Call Light and Bedside Controls, Bedside Rail Policy, Smoking Policy, Rights and Responsibilities, Bedside Report, and Patient Education Notebook     Problem: Safety  Goal: Will remain free from falls  Outcome: PROGRESSING AS EXPECTED  Intervention: Implement fall precautions  Flowsheets (Taken 12/16/2019 2000)  Environmental Precautions: Treaded Slipper Socks on Patient;Personal Belongings, Wastebasket, Call Bell etc. in Easy Reach;Report Given to Other Health Care Providers Regarding Fall Risk;Communication Sign for Patients & Families;Bed in Low Position;Mobility Assessed & Appropriate Sign Placed

## 2019-12-17 NOTE — PROGRESS NOTES
"Orthopaedic Progress Note    Author: Chapito Inman Date & Time created: 12/17/2019   9:12 AM     Interval Events:  approx 3 days since re admit for PE 12/16  ROS  Hemodynamics:  /66   Pulse 61   Temp 37.8 °C (100 °F) (Bladder)   Resp 20   Ht 1.448 m (4' 9.01\")   Wt 69.4 kg (153 lb)   SpO2 100%      No Active Precaution Orders    Respiratory:    Respiration: 20, Pulse Oximetry: 100 %, O2 Daily Delivery Respiratory : Silicone Nasal Cannula     Given By:: Mouthpiece, Work Of Breathing / Effort: Mild  RUL Breath Sounds: Clear, RML Breath Sounds: Diminished, RLL Breath Sounds: Diminished, HAJA Breath Sounds: Clear, LLL Breath Sounds: Diminished  Fluids:    Intake/Output Summary (Last 24 hours) at 12/17/2019 0912  Last data filed at 12/17/2019 0600  Gross per 24 hour   Intake 4726.19 ml   Output 735 ml   Net 3991.19 ml     Admit Weight: 65.4 kg (144 lb 2.9 oz)  Current      Physical Exam  Labs:  Recent Labs     12/16/19  0445  12/16/19  2340 12/17/19  0500 12/17/19  0833   WBC 13.4*  --   --  7.3 6.6   RBC 2.73*  --   --  3.09* 3.06*   HEMOGLOBIN 6.8*   < > 8.1* 7.9* 7.8*   HEMATOCRIT 22.9*  --   --  26.0* 25.9*   MCV 83.9  --   --  84.1 84.6   MCH 24.9*  --   --  25.6* 25.5*   MCHC 29.7*  --   --  30.4* 30.1*   RDW 49.1  --   --  51.5* 51.8*   PLATELETCT 299  --   --  303 277   MPV 8.8*  --   --  8.9* 8.8*    < > = values in this interval not displayed.     Recent Labs     12/15/19  1312 12/16/19  0445 12/17/19  0500   SODIUM 139 138 140   POTASSIUM 4.9 4.1 4.2   CHLORIDE 103 105 108   CO2 24 24 27   GLUCOSE 105* 132* 132*   BUN 27* 23* 17   CREATININE 1.09 0.91 0.70   CALCIUM 9.6 8.6 8.1*       Medical Decision Making/Problem List:    Active Hospital Problems    Diagnosis   • *Shock (HCC) [R57.9]   • Fever [R50.9]   • Acute pulmonary embolism with acute cor pulmonale (HCC) [I26.09]   • Acute respiratory failure (HCC) [J96.00]   • Closed dislocation of right hip ([facility]) [S73.004A]   • Gastroesophageal reflux " disease [K21.9]   • Obesity (BMI 30-39.9) [E66.9]   • Dyslipidemia [E78.5]   • Normocytic anemia [D64.9]   • Elevated troponin [R79.89]   • COPD (chronic obstructive pulmonary disease) ([facility]) [J44.9]     Core Measures & Quality Metrics:  Current DVT prophylaxis: hospital heparin for PE  Discussed patient condition with Hospitalist, RN, Patient and orthopedics.  Clearance for lovenox/heparin: ok  Weight Bearing Status: not until reduced  Wounds & Drains: wound healed no infection  Disposition and Follow-up:   Discussed with ice  and wants to wait until pt is off pressors until we reduce hip probable Thursday.   measures

## 2019-12-17 NOTE — PROGRESS NOTES
Hospital Medicine Daily Progress Note    Date of Service  12/17/2019    Chief Complaint  Passed out while going down stairs    Hospital Course    This is a legally blind 79 y.o. 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  She required ICU status due to associated hypoxia and hypotension.      Interval Problem Update  A+Ox3  Afebrile  Regular diet  UOP via ferreira adequate   Heparin drip  Off pressors but had to resume tony in day.  I have ordered midodrine  Ortho to reevaluate for hip dislocation replacement    Consultants/Specialty  Intensivist    Code Status  DNR but intubation okay    Disposition  Monitor in ICU while on vasopressors    Review of Systems  Review of Systems   Constitutional: Negative for chills and fever.   HENT: Negative for congestion.    Eyes: Negative for blurred vision.   Respiratory: Negative for shortness of breath and stridor.    Cardiovascular: Positive for leg swelling. Negative for chest pain and palpitations.   Gastrointestinal: Negative for abdominal pain and nausea.   Genitourinary: Negative for dysuria.   Musculoskeletal: Positive for joint pain (right hip). Negative for back pain and neck pain.   Neurological: Negative for dizziness, sensory change and headaches.   Psychiatric/Behavioral: The patient is not nervous/anxious.         Physical Exam  Pulse:  [] 77  Resp:  [16-35] 24  BP: ()/(53-76) 104/59  SpO2:  [88 %-100 %] 95 %    Physical Exam  Vitals signs reviewed.   Constitutional:       Appearance: Normal appearance. She is not ill-appearing.   HENT:      Head: Normocephalic and atraumatic.      Nose: Nose normal.      Mouth/Throat:      Mouth: Mucous membranes are dry.   Eyes:      General:         Right eye: No discharge.         Left eye: No discharge.      Extraocular Movements: Extraocular movements intact.      Conjunctiva/sclera: Conjunctivae normal.   Neck:       Musculoskeletal: No muscular tenderness.   Cardiovascular:      Rate and Rhythm: Normal rate and regular rhythm.      Pulses:           Radial pulses are 2+ on the right side and 2+ on the left side.        Dorsalis pedis pulses are 2+ on the right side and 2+ on the left side.      Heart sounds: Normal heart sounds. No murmur.   Pulmonary:      Effort: Pulmonary effort is normal. No respiratory distress.      Breath sounds: Normal breath sounds. No wheezing.   Abdominal:      General: There is no distension.      Palpations: Abdomen is soft. There is no mass.      Tenderness: There is no tenderness.   Musculoskeletal:         General: No swelling, tenderness or deformity.      Right lower leg: Edema present.   Lymphadenopathy:      Cervical: No cervical adenopathy.   Skin:     Coloration: Skin is pale. Skin is not jaundiced.   Neurological:      Mental Status: She is alert and oriented to person, place, and time.      Cranial Nerves: No cranial nerve deficit.   Psychiatric:         Mood and Affect: Mood normal.         Behavior: Behavior normal.         Fluids    Intake/Output Summary (Last 24 hours) at 12/17/2019 1831  Last data filed at 12/17/2019 1600  Gross per 24 hour   Intake 3676.82 ml   Output 580 ml   Net 3096.82 ml       Laboratory  Recent Labs     12/16/19  0445  12/16/19  2340 12/17/19  0500 12/17/19  0833   WBC 13.4*  --   --  7.3 6.6   RBC 2.73*  --   --  3.09* 3.06*   HEMOGLOBIN 6.8*   < > 8.1* 7.9* 7.8*   HEMATOCRIT 22.9*  --   --  26.0* 25.9*   MCV 83.9  --   --  84.1 84.6   MCH 24.9*  --   --  25.6* 25.5*   MCHC 29.7*  --   --  30.4* 30.1*   RDW 49.1  --   --  51.5* 51.8*   PLATELETCT 299  --   --  303 277   MPV 8.8*  --   --  8.9* 8.8*    < > = values in this interval not displayed.     Recent Labs     12/15/19  1312 12/16/19  0445 12/17/19  0500   SODIUM 139 138 140   POTASSIUM 4.9 4.1 4.2   CHLORIDE 103 105 108   CO2 24 24 27   GLUCOSE 105* 132* 132*   BUN 27* 23* 17   CREATININE 1.09 0.91 0.70    CALCIUM 9.6 8.6 8.1*     Recent Labs     12/15/19  2145 12/17/19  0500 12/17/19  1309   APTT 74.2* 50.1* 58.6*               Imaging  US-EXTREMITY VENOUS LOWER BILAT   Final Result      DX-CHEST-FOR LINE PLACEMENT Perform procedure in: Patient's Room   Final Result      1.  New right IJV central line tip projects in satisfactory position. No pneumothorax.      2.  New linear atelectasis in the right midlung medially.      3.  Large hiatal hernia again noted.      DX-CHEST-PORTABLE (1 VIEW)   Final Result         1.  Findings on chest radiograph appear stable since the prior radiograph.  No new abnormalities are identified.      2.  Large hiatal hernia is again identified.      IR-PULMONARY ANGIOGRAM-BILATERAL    (Results Pending)        Assessment/Plan  * Shock (HCC)  Assessment & Plan  Vasopressors to keep MAP >65 and SBP >90  Initiate midodrine 5mg TID  Check tsh/ft4    Fever  Assessment & Plan  Afebrile  UTI with negative urine culture today  Negative MRSA swab  Blood cultures negative to date  On Zosyn since 12/15      Acute pulmonary embolism with acute cor pulmonale (HCC)  Assessment & Plan  On IV heparin with monitoring of PTT  She will need transition to oral anticoagulant status post reduction of dislocated hip  Continue supportive care and wean off pressor as tolerated  Continue oxygen and O2 protocol      Acute respiratory failure (HCC)- (present on admission)  Assessment & Plan  Likely secondary to her pulmonary emboli  Continue oxygen  RT per protocol      Closed dislocation of right hip (HCC)  Assessment & Plan  Unable to be reduced in the emergency department  Orthopedics consulting  Plan is to take to the operating room once off pressors  Pain management  Stop IV morphine and initiate IV fentanyl    Elevated troponin- (present on admission)  Assessment & Plan  Demand ischemia secondary to pulmonary emboli    COPD (chronic obstructive pulmonary disease) (HCC)- (present on admission)  Assessment &  Plan  Continue Symbicort oxygen and bronchodilators  RT per protocol  Supplemental oxygen    Gastroesophageal reflux disease- (present on admission)  Assessment & Plan  Tums as needed  Oral omeprazole 20 mg daily    Obesity (BMI 30-39.9)- (present on admission)  Assessment & Plan  Body mass index is 33.1 kg/m².      Dyslipidemia- (present on admission)  Assessment & Plan  Statin therapy    Normocytic anemia  Assessment & Plan  Monitor CBC  Status post transfusion packed red blood cells  No sign of acute blood loss       VTE prophylaxis: heparin

## 2019-12-17 NOTE — ASSESSMENT & PLAN NOTE
Continue PPI, okay to give enterally  Antireflux measures ongoing  Monitor for bleeding, clinically no so far

## 2019-12-18 LAB
ANION GAP SERPL CALC-SCNC: 6 MMOL/L (ref 0–11.9)
ANISOCYTOSIS BLD QL SMEAR: ABNORMAL
APTT PPP: 52.6 SEC (ref 24.7–36)
BACTERIA UR CULT: ABNORMAL
BACTERIA UR CULT: ABNORMAL
BASOPHILS # BLD AUTO: 0.2 % (ref 0–1.8)
BASOPHILS # BLD: 0.01 K/UL (ref 0–0.12)
BUN SERPL-MCNC: 11 MG/DL (ref 8–22)
CALCIUM SERPL-MCNC: 8.1 MG/DL (ref 8.5–10.5)
CHLORIDE SERPL-SCNC: 107 MMOL/L (ref 96–112)
CO2 SERPL-SCNC: 27 MMOL/L (ref 20–33)
COMMENT 1642: NORMAL
CREAT SERPL-MCNC: 0.65 MG/DL (ref 0.5–1.4)
EOSINOPHIL # BLD AUTO: 0.03 K/UL (ref 0–0.51)
EOSINOPHIL NFR BLD: 0.6 % (ref 0–6.9)
ERYTHROCYTE [DISTWIDTH] IN BLOOD BY AUTOMATED COUNT: 52.6 FL (ref 35.9–50)
GLUCOSE SERPL-MCNC: 97 MG/DL (ref 65–99)
HCT VFR BLD AUTO: 25.2 % (ref 37–47)
HGB BLD-MCNC: 7.5 G/DL (ref 12–16)
HYPOCHROMIA BLD QL SMEAR: ABNORMAL
IMM GRANULOCYTES # BLD AUTO: 0.09 K/UL (ref 0–0.11)
IMM GRANULOCYTES NFR BLD AUTO: 1.8 % (ref 0–0.9)
LYMPHOCYTES # BLD AUTO: 0.69 K/UL (ref 1–4.8)
LYMPHOCYTES NFR BLD: 14.1 % (ref 22–41)
MCH RBC QN AUTO: 25.1 PG (ref 27–33)
MCHC RBC AUTO-ENTMCNC: 29.8 G/DL (ref 33.6–35)
MCV RBC AUTO: 84.3 FL (ref 81.4–97.8)
MONOCYTES # BLD AUTO: 0.69 K/UL (ref 0–0.85)
MONOCYTES NFR BLD AUTO: 14.1 % (ref 0–13.4)
MORPHOLOGY BLD-IMP: NORMAL
NEUTROPHILS # BLD AUTO: 3.38 K/UL (ref 2–7.15)
NEUTROPHILS NFR BLD: 69.2 % (ref 44–72)
NRBC # BLD AUTO: 0.02 K/UL
NRBC BLD-RTO: 0.4 /100 WBC
OVALOCYTES BLD QL SMEAR: NORMAL
PLATELET # BLD AUTO: 271 K/UL (ref 164–446)
PLATELET BLD QL SMEAR: NORMAL
PMV BLD AUTO: 8.8 FL (ref 9–12.9)
POIKILOCYTOSIS BLD QL SMEAR: NORMAL
POTASSIUM SERPL-SCNC: 3.8 MMOL/L (ref 3.6–5.5)
RBC # BLD AUTO: 2.99 M/UL (ref 4.2–5.4)
RBC BLD AUTO: PRESENT
SIGNIFICANT IND 70042: ABNORMAL
SITE SITE: ABNORMAL
SODIUM SERPL-SCNC: 140 MMOL/L (ref 135–145)
SOURCE SOURCE: ABNORMAL
WBC # BLD AUTO: 4.9 K/UL (ref 4.8–10.8)

## 2019-12-18 PROCEDURE — 80048 BASIC METABOLIC PNL TOTAL CA: CPT

## 2019-12-18 PROCEDURE — 99233 SBSQ HOSP IP/OBS HIGH 50: CPT | Performed by: INTERNAL MEDICINE

## 2019-12-18 PROCEDURE — 700105 HCHG RX REV CODE 258: Performed by: INTERNAL MEDICINE

## 2019-12-18 PROCEDURE — 700102 HCHG RX REV CODE 250 W/ 637 OVERRIDE(OP): Performed by: INTERNAL MEDICINE

## 2019-12-18 PROCEDURE — 99232 SBSQ HOSP IP/OBS MODERATE 35: CPT | Performed by: HOSPITALIST

## 2019-12-18 PROCEDURE — 85730 THROMBOPLASTIN TIME PARTIAL: CPT

## 2019-12-18 PROCEDURE — 770022 HCHG ROOM/CARE - ICU (200)

## 2019-12-18 PROCEDURE — A9270 NON-COVERED ITEM OR SERVICE: HCPCS | Performed by: INTERNAL MEDICINE

## 2019-12-18 PROCEDURE — 94667 MNPJ CHEST WALL 1ST: CPT

## 2019-12-18 PROCEDURE — 700111 HCHG RX REV CODE 636 W/ 250 OVERRIDE (IP): Performed by: INTERNAL MEDICINE

## 2019-12-18 PROCEDURE — 94669 MECHANICAL CHEST WALL OSCILL: CPT

## 2019-12-18 PROCEDURE — 94760 N-INVAS EAR/PLS OXIMETRY 1: CPT

## 2019-12-18 PROCEDURE — A9270 NON-COVERED ITEM OR SERVICE: HCPCS | Performed by: HOSPITALIST

## 2019-12-18 PROCEDURE — 700102 HCHG RX REV CODE 250 W/ 637 OVERRIDE(OP): Performed by: HOSPITALIST

## 2019-12-18 PROCEDURE — 85025 COMPLETE CBC W/AUTO DIFF WBC: CPT

## 2019-12-18 RX ORDER — HEPARIN SODIUM 5000 [USP'U]/100ML
INJECTION, SOLUTION INTRAVENOUS CONTINUOUS
Status: DISCONTINUED | OUTPATIENT
Start: 2019-12-18 | End: 2019-12-20

## 2019-12-18 RX ORDER — HEPARIN SODIUM 1000 [USP'U]/ML
2200 INJECTION, SOLUTION INTRAVENOUS; SUBCUTANEOUS PRN
Status: DISCONTINUED | OUTPATIENT
Start: 2019-12-18 | End: 2019-12-20

## 2019-12-18 RX ADMIN — ACETAMINOPHEN 500 MG: 500 TABLET ORAL at 21:04

## 2019-12-18 RX ADMIN — SODIUM CHLORIDE, POTASSIUM CHLORIDE, SODIUM LACTATE AND CALCIUM CHLORIDE 1000 ML: 600; 310; 30; 20 INJECTION, SOLUTION INTRAVENOUS at 03:00

## 2019-12-18 RX ADMIN — ACETAMINOPHEN 500 MG: 500 TABLET ORAL at 06:41

## 2019-12-18 RX ADMIN — PIPERACILLIN AND TAZOBACTAM 3.38 G: 3; .375 INJECTION, POWDER, LYOPHILIZED, FOR SOLUTION INTRAVENOUS; PARENTERAL at 06:13

## 2019-12-18 RX ADMIN — OMEPRAZOLE 20 MG: 20 CAPSULE, DELAYED RELEASE ORAL at 06:13

## 2019-12-18 RX ADMIN — ACETAMINOPHEN 500 MG: 500 TABLET ORAL at 13:54

## 2019-12-18 RX ADMIN — LOVASTATIN 40 MG: 20 TABLET ORAL at 21:04

## 2019-12-18 RX ADMIN — OXYCODONE HYDROCHLORIDE 10 MG: 10 TABLET ORAL at 04:29

## 2019-12-18 RX ADMIN — FERROUS SULFATE TAB 325 MG (65 MG ELEMENTAL FE) 325 MG: 325 (65 FE) TAB at 17:09

## 2019-12-18 RX ADMIN — MIDODRINE HYDROCHLORIDE 5 MG: 5 TABLET ORAL at 11:51

## 2019-12-18 RX ADMIN — PIPERACILLIN AND TAZOBACTAM 3.38 G: 3; .375 INJECTION, POWDER, LYOPHILIZED, FOR SOLUTION INTRAVENOUS; PARENTERAL at 21:04

## 2019-12-18 RX ADMIN — SENNOSIDES AND DOCUSATE SODIUM 2 TABLET: 8.6; 5 TABLET ORAL at 17:09

## 2019-12-18 RX ADMIN — OXYCODONE HYDROCHLORIDE 10 MG: 10 TABLET ORAL at 10:12

## 2019-12-18 RX ADMIN — FENTANYL CITRATE 25 MCG: 0.05 INJECTION, SOLUTION INTRAMUSCULAR; INTRAVENOUS at 13:49

## 2019-12-18 RX ADMIN — BUDESONIDE AND FORMOTEROL FUMARATE DIHYDRATE 2 PUFF: 160; 4.5 AEROSOL RESPIRATORY (INHALATION) at 06:13

## 2019-12-18 RX ADMIN — FENTANYL CITRATE 25 MCG: 0.05 INJECTION, SOLUTION INTRAMUSCULAR; INTRAVENOUS at 15:06

## 2019-12-18 RX ADMIN — OXYCODONE HYDROCHLORIDE 10 MG: 10 TABLET ORAL at 21:26

## 2019-12-18 RX ADMIN — SODIUM CHLORIDE, POTASSIUM CHLORIDE, SODIUM LACTATE AND CALCIUM CHLORIDE 1000 ML: 600; 310; 30; 20 INJECTION, SOLUTION INTRAVENOUS at 11:51

## 2019-12-18 RX ADMIN — FENTANYL CITRATE 25 MCG: 0.05 INJECTION, SOLUTION INTRAMUSCULAR; INTRAVENOUS at 21:05

## 2019-12-18 RX ADMIN — SENNOSIDES AND DOCUSATE SODIUM 2 TABLET: 8.6; 5 TABLET ORAL at 06:13

## 2019-12-18 RX ADMIN — MIDODRINE HYDROCHLORIDE 5 MG: 5 TABLET ORAL at 07:41

## 2019-12-18 RX ADMIN — HEPARIN SODIUM 2200 UNITS: 1000 INJECTION, SOLUTION INTRAVENOUS; SUBCUTANEOUS at 21:13

## 2019-12-18 RX ADMIN — BUDESONIDE AND FORMOTEROL FUMARATE DIHYDRATE 2 PUFF: 160; 4.5 AEROSOL RESPIRATORY (INHALATION) at 17:09

## 2019-12-18 RX ADMIN — FERROUS SULFATE TAB 325 MG (65 MG ELEMENTAL FE) 325 MG: 325 (65 FE) TAB at 07:41

## 2019-12-18 RX ADMIN — PIPERACILLIN AND TAZOBACTAM 3.38 G: 3; .375 INJECTION, POWDER, LYOPHILIZED, FOR SOLUTION INTRAVENOUS; PARENTERAL at 12:01

## 2019-12-18 RX ADMIN — OXYCODONE HYDROCHLORIDE 10 MG: 10 TABLET ORAL at 15:06

## 2019-12-18 RX ADMIN — MIDODRINE HYDROCHLORIDE 5 MG: 5 TABLET ORAL at 17:10

## 2019-12-18 ASSESSMENT — ENCOUNTER SYMPTOMS
INSOMNIA: 1
CHILLS: 0
SORE THROAT: 0
FOCAL WEAKNESS: 0
NECK PAIN: 0
MYALGIAS: 1
PALPITATIONS: 0
NAUSEA: 0
HEMOPTYSIS: 0
NERVOUS/ANXIOUS: 0
FEVER: 1
BRUISES/BLEEDS EASILY: 0
ABDOMINAL PAIN: 0
DOUBLE VISION: 0
SENSORY CHANGE: 0
STRIDOR: 0
SHORTNESS OF BREATH: 0
COUGH: 0
SPEECH CHANGE: 0
VOMITING: 0
HEADACHES: 0
CHILLS: 1
BLURRED VISION: 0
BLOOD IN STOOL: 0
SPUTUM PRODUCTION: 0
BACK PAIN: 0
FEVER: 0
DIZZINESS: 0

## 2019-12-18 NOTE — PROGRESS NOTES
"Hospital Medicine Daily Progress Note    Date of Service  12/18/2019    Chief Complaint  Passed out while going down stairs    Hospital Course    This is a legally blind 79 y.o. 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  She required ICU status due to associated hypoxia and hypotension.      Interval Problem Update  A+Ox4  Anxious in regards to surgery  NSR  Tmax:101.1  On 3L NC O2  Regular diet      Consultants/Specialty  Intensivist    Code Status  DNR but intubation okay    Disposition  Monitor in ICU while on vasopressors    Review of Systems  Review of Systems   Constitutional: Positive for chills (\"I'm cold\"). Negative for fever.   HENT: Negative for congestion.    Respiratory: Negative for shortness of breath and stridor.    Cardiovascular: Positive for leg swelling. Negative for chest pain.   Gastrointestinal: Negative for abdominal pain and nausea.   Genitourinary: Negative for dysuria.   Musculoskeletal: Positive for joint pain (right hip). Negative for back pain and neck pain.   Neurological: Negative for dizziness, speech change and headaches.   Psychiatric/Behavioral: The patient is not nervous/anxious.         Physical Exam  Pulse:  [64-95] 84  Resp:  [17-59] 25  BP: ()/(53-72) 84/54  SpO2:  [91 %-98 %] 93 %    Physical Exam  Vitals signs reviewed.   Constitutional:       Appearance: Normal appearance. She is not ill-appearing.   HENT:      Head: Normocephalic and atraumatic.      Nose: Nose normal.      Mouth/Throat:      Mouth: Mucous membranes are dry.   Eyes:      General: No scleral icterus.        Right eye: No discharge.         Left eye: No discharge.      Extraocular Movements: Extraocular movements intact.      Conjunctiva/sclera: Conjunctivae normal.   Neck:      Musculoskeletal: No muscular tenderness.   Cardiovascular:      Rate and Rhythm: Normal rate and regular rhythm.      Pulses:    "        Radial pulses are 2+ on the right side and 2+ on the left side.        Dorsalis pedis pulses are 2+ on the right side and 2+ on the left side.      Heart sounds: Normal heart sounds. No murmur.   Pulmonary:      Effort: Pulmonary effort is normal. No respiratory distress.      Breath sounds: Normal breath sounds. No wheezing.   Abdominal:      General: There is no distension.      Palpations: Abdomen is soft. There is no mass.      Tenderness: There is no tenderness.   Musculoskeletal:         General: Swelling present. No tenderness or deformity.      Right lower leg: Edema present.   Lymphadenopathy:      Cervical: No cervical adenopathy.   Skin:     Coloration: Skin is pale. Skin is not jaundiced.   Neurological:      Mental Status: She is alert and oriented to person, place, and time.      Cranial Nerves: No cranial nerve deficit.   Psychiatric:         Mood and Affect: Mood normal.         Behavior: Behavior normal.         Thought Content: Thought content normal.         Fluids    Intake/Output Summary (Last 24 hours) at 12/18/2019 1821  Last data filed at 12/18/2019 1800  Gross per 24 hour   Intake 3291.95 ml   Output 925 ml   Net 2366.95 ml       Laboratory  Recent Labs     12/17/19  0500 12/17/19  0833 12/18/19  0416   WBC 7.3 6.6 4.9   RBC 3.09* 3.06* 2.99*   HEMOGLOBIN 7.9* 7.8* 7.5*   HEMATOCRIT 26.0* 25.9* 25.2*   MCV 84.1 84.6 84.3   MCH 25.6* 25.5* 25.1*   MCHC 30.4* 30.1* 29.8*   RDW 51.5* 51.8* 52.6*   PLATELETCT 303 277 271   MPV 8.9* 8.8* 8.8*     Recent Labs     12/16/19  0445 12/17/19  0500 12/18/19  0416   SODIUM 138 140 140   POTASSIUM 4.1 4.2 3.8   CHLORIDE 105 108 107   CO2 24 27 27   GLUCOSE 132* 132* 97   BUN 23* 17 11   CREATININE 0.91 0.70 0.65   CALCIUM 8.6 8.1* 8.1*     Recent Labs     12/17/19  0500 12/17/19  1309 12/17/19  1917   APTT 50.1* 58.6* 55.2*               Imaging  US-EXTREMITY VENOUS LOWER BILAT   Final Result      DX-CHEST-FOR LINE PLACEMENT Perform procedure in:  Patient's Room   Final Result      1.  New right IJV central line tip projects in satisfactory position. No pneumothorax.      2.  New linear atelectasis in the right midlung medially.      3.  Large hiatal hernia again noted.      DX-CHEST-PORTABLE (1 VIEW)   Final Result         1.  Findings on chest radiograph appear stable since the prior radiograph.  No new abnormalities are identified.      2.  Large hiatal hernia is again identified.      IR-PULMONARY ANGIOGRAM-BILATERAL    (Results Pending)        Assessment/Plan  * Shock (HCC)  Assessment & Plan  Vasopressors to keep MAP >65 and SBP >90  midodrine 5mg TID initiated 12/17/19  Check tsh/ft4    Fever  Assessment & Plan  Afebrile  UTI with negative urine culture today  Negative MRSA swab  Blood cultures negative to date  On Zosyn since 12/15 and 2 discontinue 12/19/2019      Acute pulmonary embolism with acute cor pulmonale (HCC)  Assessment & Plan  On IV heparin with monitoring of PTT  She will need transition to oral anticoagulant status post reduction of dislocated hip  Continue supportive care and wean off pressor as tolerated  Continue oxygen and O2 protocol      Acute respiratory failure (HCC)- (present on admission)  Assessment & Plan  Likely secondary to her pulmonary emboli  Continue oxygen  RT per protocol      Closed dislocation of right hip (HCC)  Assessment & Plan  Unable to be reduced in the emergency department  Orthopedics consulting, Dr. Delgado's office made aware the patient is ready for OR await input from orthopedics  Pain management  PRN IV fentanyl    COPD (chronic obstructive pulmonary disease) (HCC)- (present on admission)  Assessment & Plan  Continue Symbicort oxygen and bronchodilators  RT per protocol  Supplemental oxygen    Elevated troponin- (present on admission)  Assessment & Plan  Demand ischemia secondary to pulmonary emboli    Gastroesophageal reflux disease- (present on admission)  Assessment & Plan  Tums as needed  Oral  omeprazole 20 mg daily    Obesity (BMI 30-39.9)- (present on admission)  Assessment & Plan  Body mass index is 36.49 kg/m².      Dyslipidemia- (present on admission)  Assessment & Plan  Statin therapy    Normocytic anemia  Assessment & Plan  Monitor CBC  Status post transfusion packed red blood cells  No sign of acute blood loss       VTE prophylaxis: heparin

## 2019-12-18 NOTE — CARE PLAN
Problem: Bowel/Gastric:  Goal: Normal bowel function is maintained or improved  Outcome: PROGRESSING AS EXPECTED  Goal: Will not experience complications related to bowel motility  Outcome: PROGRESSING AS EXPECTED     Problem: Knowledge Deficit  Goal: Knowledge of disease process/condition, treatment plan, diagnostic tests, and medications will improve  Outcome: PROGRESSING AS EXPECTED  Goal: Knowledge of the prescribed therapeutic regimen will improve  Outcome: PROGRESSING AS EXPECTED

## 2019-12-18 NOTE — PROGRESS NOTES
Spoke with Dr. Dunne regarding scheduling for patient's hip reduction. Dr. Dunne stated that the procedure will be performed in the ICU tomorrow. Dr. Shin informed.

## 2019-12-18 NOTE — PROGRESS NOTES
Spiritual Care Note    Patient Information     Patient's Name: Guillermina Recio   MRN: [id number]    YOB: 1940   Age and Gender: 79 y.o. female   Service Area: [facility]   Room (and Bed): [facility]/   Ethnicity or Nationality:     Primary Language: English   Jew/Spiritual preference: Nazarene   Place of Residence: [place], [place]   Family/Friends/Others Present: , 3 family/friends   Clinical Team Present: no   Medical Diagnosis(-es)/Procedure(s): NSTEMI   Code Status: DNAR, I OK    Date of Admission: 12/14/2019   Length of Stay: 4 days        Spiritual Care Provider Information:  Name of Spiritual Care Provider: Qiana Faith  Title of Spiritual Care Provider: Associate Solis  Phone Number: [phone]  E-mail: [email or web address]  Total time : 20 minutes    Spiritual Screen Results:    Gen Nursing  Spiritual Screen  Is your spiritual health or inner well-being important to you as you cope with your medical condition?: Yes  Would you like to receive a visit from our Spiritual Care team or your own Bahai or spiritual leader?: No  Was spiritual care education provided to the patient?: Declined  Sources of Hope/Gabriella: Animals, Higher power, Nature, Excercise/Physical activity, Family, Prayer, Jew/Spiritual community     Palliative Care  PC Jew/Spiritual Screening  Was spiritual care education provided to the patient?: Declined      Encounter/Request Information  Encounter/Request Type     Visited With: Patient and family together    Nature of the Visit: Initial, On shift    Crisis Visit: Critical care    Referral From/ Origin of Request: Verbal staff(Referred by Chaplain Owusu re:pt having surgery later in day)    Religous Needs/Values       Spiritual Assessment   Spiritual Care Encounters    Observations/Symptoms: Accepting, Thankfulness( and three others present in room; pt alert, pleasant)    Interacton/Conversation:  introduced self to  pt. According to nurse, pt had been teary earlier in morning. Visitors in room very talktative, as well as pt herself. Pt very upbeat, appreciative of visit. Asst.  Miguel from [employer] [facility] in [facility] present in room. In addition,  [facility] in [place] is scheduled to come later today. Pt appreciated this 's visit and support of all her visitors at this time.    Assessment: Need  Need: Seeking Spiritual Assistance and Support    Interventions: Compassionate Presence, Reflective Listening, Encouragement    Outcomes: Ability to Communicate with Truth and Honesty, Acceptance, Coping, Value/Dignity/Respect    Plan: Visit Upon Request    Notes:

## 2019-12-18 NOTE — CARE PLAN
Problem: Bowel/Gastric:  Goal: Normal bowel function is maintained or improved  Outcome: NOT MET  Note:   Last BM prior to arrival. Patient states that she feels constipated. Bowel protocol escalated.      Problem: Pain Management  Goal: Pain level will decrease to patient's comfort goal  Outcome: PROGRESSING AS EXPECTED  Intervention: Follow pain managment plan developed in collaboration with patient and Interdisciplinary Team  Note:   Pain assessed q 2 hours and with each interaction. Medications administered as appropriate. Non-pharmacologic methods of pain relief utilized.

## 2019-12-18 NOTE — PROGRESS NOTES
Critical Care Progress Note    Date of admission  12/14/2019    Chief Complaint  79 y.o. female admitted 12/14/2019 with transfer for PE    Hospital Course    79 y.o. female who presented 12/14/2019 as a transfer from [facility] where she was admitted for syncope due to a PE and complicated by right hip dislocation.  On 11/21 she had a right JAIME.  She was transferred to [facility] for reduction of the right hip dislocation, but today while on the floor developed worsening hypoxemia and hypotension so was transferred to the ICU.  She also developed fever.  Guillermina reports she is currently quite dyspneic, denies chest pain or abdominal pain.  She was previously quite healthy other than a history of COPD from secondhand smoke.  Her daughter reports that her mom does well at home except for when she gets up without help and her daughter attributes her syncope and fall to getting up without assistance.  Her PTTs have been therapeutic.  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  Considered clot in transit however it is more typical of a Chiari network and clot.       Interval Problem Update  Reviewed last 24 hour events:    A&O x4  Anxious  SR 60-80s  SBp 100-120s  NE drip off  Tm 101.1  3 lpm NC  Kimbrough - UO adequate  CVC    Heparin 900 - PTT therapeutic  Zosyn 4/5      Add PEP  Reduce R hip in OR?  Orthopedics informed patient's hemodynamics have improved, left message with MA    Serial follow-up  Patient remains fully anticoagulated without bleeding  Patient anxious re: hip reduction  Hemodynamics okay, remains off norepinephrine  Awaiting follow-up from orthopedics     YESTERDAY  A&O x4  Follows well  R hip pain persists  SR 60s, occ PVCs, 7 beats SVT?  SBp 100s  NE drip 3.5, actively titrating  UO adequate  Kimbrough  3 lpm NC O2  IS 1000  CXR atx persists  Tm  100.2, WBC 6  Hgb 8.0  Heparin drip 900    PPI  Zosyn 3/5  Ucx pending    D/c Morphine/Tramadol -> FENT/Oxycodone   Wean NE drip  Wean O2  To OR to fix hip tomorrow if hemodynamics and respiratory status stable/improved    Serial follow-up  Neurologic status unchanged  Respiratory status unchanged, remains on 3 L O2 with acceptable saturations and work of breathing  Hemodynamics remain problematic, unable to wean off norepinephrine although doses come down from double digits to 1-3 range    Review of Systems  Review of Systems   Constitutional: Positive for fever (Improved) and malaise/fatigue (Improved). Negative for chills.   HENT: Negative for congestion and sore throat.    Eyes: Negative for blurred vision and double vision.   Respiratory: Positive for shortness of breath (Resolving). Negative for cough, hemoptysis and sputum production.    Cardiovascular: Positive for leg swelling (No change). Negative for chest pain and palpitations.   Gastrointestinal: Negative for abdominal pain, blood in stool, melena, nausea and vomiting.   Genitourinary: Negative.    Musculoskeletal: Positive for joint pain (No change right hip, any ROM very problematic) and myalgias (Right hip, no change). Negative for back pain.   Neurological: Negative for sensory change, speech change, focal weakness and headaches.   Endo/Heme/Allergies: Does not bruise/bleed easily.   Psychiatric/Behavioral: Negative for depression. The patient is nervous/anxious and has insomnia.       Vital Signs for last 24 hours   Pulse:  [72-95] 93  Resp:  [17-59] 22  BP: ()/(53-72) 114/59  SpO2:  [91 %-99 %] 94 %    Hemodynamic parameters for last 24 hours       Respiratory Information for the last 24 hours       Physical Exam   Physical Exam  Vitals signs reviewed.   Constitutional:       Appearance: She is obese.      Interventions: Nasal cannula in place.   HENT:      Head: Normocephalic and atraumatic.      Mouth/Throat:      Mouth: Mucous  membranes are moist.   Eyes:      General: No scleral icterus.     Extraocular Movements: Extraocular movements intact.      Pupils: Pupils are equal, round, and reactive to light.   Neck:      Musculoskeletal: Neck supple.   Cardiovascular:      Rate and Rhythm: Normal rate and regular rhythm.      Pulses: Normal pulses.      Heart sounds: No murmur. No friction rub. No gallop.    Pulmonary:      Effort: No respiratory distress.      Breath sounds: Rhonchi (No change) present. No wheezing or rales.      Comments: Work of breathing acceptable, tachypneic at times  Abdominal:      General: Abdomen is flat. There is no distension.      Palpations: Abdomen is soft.      Tenderness: There is no tenderness. There is no right CVA tenderness, left CVA tenderness or guarding.   Musculoskeletal:         General: Swelling (No change) present.      Right lower leg: Edema (Recent right hip surgery) present.   Skin:     General: Skin is warm and dry.      Capillary Refill: Capillary refill takes less than 2 seconds.      Coloration: Skin is not cyanotic.      Nails: There is no clubbing.        Comments: Right hip incision remains clean and dry with large Steri-Strip   Neurological:      General: No focal deficit present.      Mental Status: She is alert and oriented to person, place, and time.      Cranial Nerves: No cranial nerve deficit.      Sensory: No sensory deficit.      Motor: No weakness.   Psychiatric:         Attention and Perception: Attention normal.         Mood and Affect: Mood is anxious.         Speech: Speech normal.         Behavior: Behavior is not agitated. Behavior is cooperative.         Cognition and Memory: Cognition normal.         Medications  Current Facility-Administered Medications   Medication Dose Route Frequency Provider Last Rate Last Dose   • heparin injection 2,200 Units  2,200 Units Intravenous PRN Katie Vera M.D.        And   • heparin infusion 25,000 units in 500 mL 0.45% NACL    Intravenous Continuous Katie Vera M.D.       • piperacillin-tazobactam (ZOSYN) 3.375 g in  mL IVPB  3.375 g Intravenous Q8HRS Cash Shin M.D. 25 mL/hr at 12/18/19 2104 3.375 g at 12/18/19 2104   • oxyCODONE immediate-release (ROXICODONE) tablet 5 mg  5 mg Oral Q4HRS PRN Cash Shin M.D.   5 mg at 12/17/19 1810    Or   • oxyCODONE immediate release (ROXICODONE) tablet 10 mg  10 mg Oral Q4HRS PRN Cash Shin M.D.   10 mg at 12/18/19 2126   • fentaNYL (SUBLIMAZE) injection 25-50 mcg  25-50 mcg Intravenous Q HOUR PRN Cash Shin M.D.   25 mcg at 12/18/19 2105   • midodrine (PROAMATINE) tablet 5 mg  5 mg Oral TID WITH MEALS Chencho Asencio D.O.   5 mg at 12/18/19 1710   • lactated ringers infusion  1,000 mL Intravenous Continuous Jeremy M Gonda, M.D. 100 mL/hr at 12/18/19 1151 1,000 mL at 12/18/19 1151   • acetaminophen (TYLENOL) tablet 500 mg  500 mg Oral Q6HRS PRN Cash Shin M.D.   500 mg at 12/18/19 2104   • ipratropium-albuterol (DUONEB) nebulizer solution  3 mL Nebulization Q4H PRN (RT) Sasha Zimmerman M.D.       • norepinephrine (LEVOPHED) 8 mg in  mL Infusion  0-30 mcg/min Intravenous Continuous Sasha Zimmerman M.D.   Stopped at 12/18/19 0430   • ferrous sulfate tablet 325 mg  325 mg Oral BID WITH MEALS Katie Vera M.D.   325 mg at 12/18/19 1709   • enalaprilat (VASOTEC) injection 1.25 mg  1.25 mg Intravenous Q6HRS PRN Katie Vera M.D.       • ondansetron (ZOFRAN ODT) dispertab 4 mg  4 mg Oral Q4HRS PRN Katie Vera M.D.       • ondansetron (ZOFRAN) syringe/vial injection 4 mg  4 mg Intravenous Q4HRS PRN Katie Vera M.D.       • senna-docusate (PERICOLACE or SENOKOT S) 8.6-50 MG per tablet 2 Tab  2 Tab Oral BID Katie Vera M.D.   2 Tab at 12/18/19 1709    And   • polyethylene glycol/lytes (MIRALAX) PACKET 1 Packet  1 Packet Oral QDAY PRN Katie Vera M.D.        And   • magnesium hydroxide (MILK OF MAGNESIA) suspension 30 mL  30 mL Oral  QDAY PRN Katie Vera M.D.   30 mL at 12/17/19 2318    And   • bisacodyl (DULCOLAX) suppository 10 mg  10 mg Rectal QDAY PRN Katie Vera M.D.       • Respiratory Care per Protocol   Nebulization Continuous RT Katie Vera M.D.       • budesonide-formoterol (SYMBICORT) 160-4.5 MCG/ACT inhaler 2 Puff  2 Puff Inhalation BID Katie Vera M.D.   2 Puff at 12/18/19 1709   • calcium carbonate (TUMS) chewable tab 500 mg  500 mg Oral QDAY PRN Katie Vera M.D.       • lovastatin (MEVACOR) tablet 40 mg  40 mg Oral QHS Katie Vera M.D.   40 mg at 12/18/19 2104   • omeprazole (PRILOSEC) capsule 20 mg  20 mg Oral DAILY Katie Vera M.D.   20 mg at 12/18/19 0613   • haloperidol lactate (HALDOL) injection 1 mg  1 mg Intravenous Q4HRS PRN Earlene Jarvis M.D.   1 mg at 12/14/19 2346       Fluids    Intake/Output Summary (Last 24 hours) at 12/19/2019 0001  Last data filed at 12/18/2019 2200  Gross per 24 hour   Intake 2908.55 ml   Output 850 ml   Net 2058.55 ml       Laboratory          Recent Labs     12/16/19  0445 12/17/19  0500 12/18/19  0416   SODIUM 138 140 140   POTASSIUM 4.1 4.2 3.8   CHLORIDE 105 108 107   CO2 24 27 27   BUN 23* 17 11   CREATININE 0.91 0.70 0.65   MAGNESIUM  --  2.0  --    CALCIUM 8.6 8.1* 8.1*     Recent Labs     12/16/19  0445 12/17/19  0500 12/18/19  0416   GLUCOSE 132* 132* 97     Recent Labs     12/17/19  0500 12/17/19  0833 12/18/19  0416   WBC 7.3 6.6 4.9   NEUTSPOLYS 72.50* 71.90 69.20   LYMPHOCYTES 11.90* 11.90* 14.10*   MONOCYTES 13.40 13.60* 14.10*   EOSINOPHILS 1.00 1.20 0.60   BASOPHILS 0.40 0.30 0.20     Recent Labs     12/17/19  0500 12/17/19  0833 12/17/19  1309 12/17/19  1917 12/18/19  0416 12/18/19  1930   RBC 3.09* 3.06*  --   --  2.99*  --    HEMOGLOBIN 7.9* 7.8*  --   --  7.5*  --    HEMATOCRIT 26.0* 25.9*  --   --  25.2*  --    PLATELETCT 303 277  --   --  271  --    APTT 50.1*  --  58.6* 55.2*  --  52.6*       Imaging  X-Ray:  I have personally reviewed the images  and compared with prior images.  EKG:  I have personally reviewed the images and compared with prior images.  CT:    Reviewed  Echo:   Reviewed    Assessment/Plan  * Shock (HCC)  Assessment & Plan  Improved, suspect multifactorial, primary etiology pulmonary embolus, relative hypovolemia initially, doubt significant sepsis or GI bleeding at this time, monitoring and/or actively treating all of the above    --Distributive d/t sepsis given fever versus obstructive from PE vs hemorrhagic/hypovolemic given dropping Hgb - probable combination of several if not all  --Appears hypovolemic by IVC analysis, improved  --Bedside TTE shows akinetic RV free wall with pressure and volume overload.  TTE on 12/12 showed the akinetic free wall but does not comment on pressure or volume overload, RVSP 58  --Hemoglobin continues to be low despite transfusion, still concerned of bleeding, although FAST and rectal exam are negative, no bleeding clinically  --Transfusing to hemoglobin 7-8, continue serial H/H, monitor for the need for other blood products as needed clinically  --Blood cultures x2 ending and negative so far  --Vancomycin/Zosyn/azithromycin to be de-escalated to Zosyn, MRSA nares negative and no atypical features suggest the need for azithromycin and wound looks okay  --Resuscitation complete, appears euvolemic, follow-up ultrasound IVC as needed  --Reduce H/H to every 6-8 hours  --Management of PE as below with heparin, if GI bleeding becomes more predominant, consider IVC filter and reversal with protamine    Fever  Assessment & Plan  --Due to PE versus infection, possibly both, improved  --Blood cultures pending, negative so far  --UA consistent with urinary tract infection classically, urine culture pending?  No results to date  --CXR with left lower lobe atelectasis versus new infiltrate.  She has a large left hiatal hernia which is obscuring the film, monitor.  --Empiric treatment of pneumonia with  Vanco/Zosyn/azithromycin as she has been in the hospital since 12/12 so is high risk for resistant bugs  --De-escalate antibiotics, vancomycin and azithromycin discontinued, continue Zosyn 5 days for pneumonia, probably will need 3 days for UTI, monitor, last day of IV antibiotics 12/19      Acute pulmonary embolism with acute cor pulmonale (HCC)  Assessment & Plan  --submassive PE, improved after fluid resuscitation, remains on pressors, oxygenation improved  --On 12/12 there was RV free wall hypokinesis, but they do not comment on RV pressure or volume overload  --If her hypotension is due to PE then thrombolytics are indicated.  However it is not clear to me that PE is the sole cause of her hypotension, especially given the fact that her IVC is small and collapsing which is inconsistent with obstructive shock.  Her hemoglobin has decreased 3 points from 12/12, and dropped from 7.5 to 7.1 today within 3 hours, so I am concerned she could be bleeding and therefore TPA is contraindicated initially  --Continue supportive care and serial hemoglobins.  If she decompensates further we may need to readdress the idea of either catheter directed or systemic thrombolytics  --Continue heparin drip, PTTs have been therapeutic  --Continue to monitor hemoglobin, if bleeding becomes significant may need IVC filter and reversal of heparin anticoagulation  --Hold on long-term anticoagulation with warfarin or NOAC for now, still needs to go the OR for correction of dislocated right hip, start NOAC after reduction of hip and wean off heparin    Acute respiratory failure (HCC)- (present on admission)  Assessment & Plan  --Hypoxemia due to PE versus aspiration versus pneumonia both, clinically unchanged, remains on supplemental O2 at reasonable flow  --Hypovolemic by IVC so low suspicion for cardiogenic pulmonary edema.  Difficult echo windows unable to get E/e'.  No diuresis now, monitoring  --Status post high flow nasal  cannula  --Patient would want trial of intubation if needed, not necessary so far, monitoring  --See discussion of PE below  --Treat pneumonia with Zosyn (vs UTI -UA consistent with UTI, urine culture pending?,  Plan 3 days antibiotics for UTI minimum), probable 5-day course for possible pneumonic complication, last day antibiotics 12/19  --Discontinue azithromycin, doubt atypical pneumonic process  --Discontinue vancomycin, MRSA nares negative and wounds look good    Closed dislocation of right hip (HCC)  Assessment & Plan  --Reviewed with orthopedics 12/16  --Ideally respiratory and hemodynamic status stable for at least 24 hours prior to elective orthopedic procedure  --Off pressors and respiratory status improved, cleared for OR for closed reduction of right hip dislocation    COPD (chronic obstructive pulmonary disease) ([facility])- (present on admission)  Assessment & Plan  --No wheezing on exam, continue monitor  --Continue RT protocols, okay to reduce DuoNeb to every 6 hours and as needed  --No indication for prednisone, monitoring daily  --Update vaccines prior to discharge    Elevated troponin- (present on admission)  Assessment & Plan  Due to PE,  demand ischemia clinically  Echocardiogram noted, LV function well-preserved  Monitor    Gastroesophageal reflux disease- (present on admission)  Assessment & Plan  PPI  Antireflux measures  Monitor for bleeding    Obesity (BMI 30-39.9)- (present on admission)  Assessment & Plan  Mobilize  Incentive spirometry  Weight loss to be encouraged    Dyslipidemia- (present on admission)  Assessment & Plan  Statin, weight loss, low-fat diet long-term    Normocytic anemia  Assessment & Plan  --Blood loss versus chronic versus dilutional  --Negative FAST exam, no blood on rectal exam, no ecchymosis at hip dislocation site  --Every 6 hour hemoglobin         VTE:  Heparin  Ulcer: PPI  Lines: Central Line  Ongoing indication addressed    I have performed a physical exam and  reviewed and updated ROS and Plan today (12/19/2019). In review of yesterday's note (12/18/2019), there are no changes except as documented above.     Discussed patient condition and risk of morbidity and/or mortality with Hospitalist, RN, RT, Pharmacy, , Charge nurse / hot rounds and Patient.  Call placed orthopedics, waiting for callback.

## 2019-12-18 NOTE — CARE PLAN
Problem: Skin Integrity  Goal: Risk for impaired skin integrity will decrease  Outcome: NOT MET  Note:   PT non-compliant with q2hour repositioning. Refused every offer to reposition, offered to give pain meds beforehand but patient continues to refuse assistance repositioning. Provided patient with education regarding high risk for pressure injury formation, pt states she is aware of the risks.      Problem: Mobility  Goal: Risk for activity intolerance will decrease  Outcome: NOT MET  Note:   Bedrest, dislocated right hip.      Problem: Communication  Goal: The ability to communicate needs accurately and effectively will improve  Outcome: PROGRESSING AS EXPECTED     Problem: Safety  Goal: Will remain free from injury  Outcome: PROGRESSING AS EXPECTED  Note:   Bed in lowest position, call light within reach, bed alarm set.      Problem: Infection  Goal: Will remain free from infection  Outcome: PROGRESSING AS EXPECTED     Problem: Venous Thromboembolism (VTW)/Deep Vein Thrombosis (DVT) Prevention:  Goal: Patient will participate in Venous Thrombosis (VTE)/Deep Vein Thrombosis (DVT)Prevention Measures  Outcome: PROGRESSING AS EXPECTED  Note:   Weight based heparin drip protocol followed.      Problem: Pain Management  Goal: Pain level will decrease to patient's comfort goal  Outcome: PROGRESSING AS EXPECTED     Problem: Bowel/Gastric:  Goal: Normal bowel function is maintained or improved  Outcome: PROGRESSING SLOWER THAN EXPECTED  Goal: Will not experience complications related to bowel motility  Outcome: PROGRESSING SLOWER THAN EXPECTED  Note:   PRN milk of mag given along with scheduled medications. PT reports passing gas but no urge for a BM.      Problem: Urinary Elimination:  Goal: Ability to reestablish a normal urinary elimination pattern will improve  Outcome: PROGRESSING SLOWER THAN EXPECTED  Note:   Kimbrough Catheter in place.

## 2019-12-18 NOTE — CARE PLAN
Problem: Nutritional:  Goal: Achieve adequate nutritional intake  Description  Patient will start diet and consume 50% of meals   Outcome: PROGRESSING AS EXPECTED  Pt started on a regular diet. Per chart pt PO <25-75% 2 meals and % 2 snacks documented. RD will continue to follow.

## 2019-12-19 ENCOUNTER — APPOINTMENT (OUTPATIENT)
Dept: RADIOLOGY | Facility: MEDICAL CENTER | Age: 79
DRG: 299 | End: 2019-12-19
Attending: PHYSICIAN ASSISTANT
Payer: MEDICARE

## 2019-12-19 LAB
ANION GAP SERPL CALC-SCNC: 5 MMOL/L (ref 0–11.9)
APTT PPP: 81.2 SEC (ref 24.7–36)
APTT PPP: 91.7 SEC (ref 24.7–36)
BASOPHILS # BLD AUTO: 0.2 % (ref 0–1.8)
BASOPHILS # BLD: 0.01 K/UL (ref 0–0.12)
BUN SERPL-MCNC: 12 MG/DL (ref 8–22)
CALCIUM SERPL-MCNC: 7.8 MG/DL (ref 8.5–10.5)
CHLORIDE SERPL-SCNC: 109 MMOL/L (ref 96–112)
CO2 SERPL-SCNC: 26 MMOL/L (ref 20–33)
CREAT SERPL-MCNC: 0.7 MG/DL (ref 0.5–1.4)
EOSINOPHIL # BLD AUTO: 0.05 K/UL (ref 0–0.51)
EOSINOPHIL NFR BLD: 1.2 % (ref 0–6.9)
ERYTHROCYTE [DISTWIDTH] IN BLOOD BY AUTOMATED COUNT: 53.1 FL (ref 35.9–50)
GLUCOSE SERPL-MCNC: 100 MG/DL (ref 65–99)
HCT VFR BLD AUTO: 23.9 % (ref 37–47)
HGB BLD-MCNC: 7.1 G/DL (ref 12–16)
IMM GRANULOCYTES # BLD AUTO: 0.14 K/UL (ref 0–0.11)
IMM GRANULOCYTES NFR BLD AUTO: 3.2 % (ref 0–0.9)
LYMPHOCYTES # BLD AUTO: 0.77 K/UL (ref 1–4.8)
LYMPHOCYTES NFR BLD: 17.9 % (ref 22–41)
MCH RBC QN AUTO: 25.3 PG (ref 27–33)
MCHC RBC AUTO-ENTMCNC: 29.7 G/DL (ref 33.6–35)
MCV RBC AUTO: 85.1 FL (ref 81.4–97.8)
MONOCYTES # BLD AUTO: 0.65 K/UL (ref 0–0.85)
MONOCYTES NFR BLD AUTO: 15.1 % (ref 0–13.4)
NEUTROPHILS # BLD AUTO: 2.69 K/UL (ref 2–7.15)
NEUTROPHILS NFR BLD: 62.4 % (ref 44–72)
NRBC # BLD AUTO: 0.03 K/UL
NRBC BLD-RTO: 0.7 /100 WBC
PLATELET # BLD AUTO: 240 K/UL (ref 164–446)
PMV BLD AUTO: 8.8 FL (ref 9–12.9)
POTASSIUM SERPL-SCNC: 3.8 MMOL/L (ref 3.6–5.5)
RBC # BLD AUTO: 2.81 M/UL (ref 4.2–5.4)
SODIUM SERPL-SCNC: 140 MMOL/L (ref 135–145)
T4 FREE SERPL-MCNC: 0.92 NG/DL (ref 0.53–1.43)
TSH SERPL DL<=0.005 MIU/L-ACNC: 5.74 UIU/ML (ref 0.38–5.33)
WBC # BLD AUTO: 4.3 K/UL (ref 4.8–10.8)

## 2019-12-19 PROCEDURE — 700111 HCHG RX REV CODE 636 W/ 250 OVERRIDE (IP): Performed by: INTERNAL MEDICINE

## 2019-12-19 PROCEDURE — 84439 ASSAY OF FREE THYROXINE: CPT

## 2019-12-19 PROCEDURE — A9270 NON-COVERED ITEM OR SERVICE: HCPCS | Performed by: INTERNAL MEDICINE

## 2019-12-19 PROCEDURE — 99152 MOD SED SAME PHYS/QHP 5/>YRS: CPT

## 2019-12-19 PROCEDURE — 700105 HCHG RX REV CODE 258: Performed by: INTERNAL MEDICINE

## 2019-12-19 PROCEDURE — 72170 X-RAY EXAM OF PELVIS: CPT

## 2019-12-19 PROCEDURE — 99156 MOD SED OTH PHYS/QHP 5/>YRS: CPT | Mod: 59 | Performed by: INTERNAL MEDICINE

## 2019-12-19 PROCEDURE — 94668 MNPJ CHEST WALL SBSQ: CPT

## 2019-12-19 PROCEDURE — 85730 THROMBOPLASTIN TIME PARTIAL: CPT | Mod: 91

## 2019-12-19 PROCEDURE — 85025 COMPLETE CBC W/AUTO DIFF WBC: CPT

## 2019-12-19 PROCEDURE — 700102 HCHG RX REV CODE 250 W/ 637 OVERRIDE(OP): Performed by: INTERNAL MEDICINE

## 2019-12-19 PROCEDURE — A9270 NON-COVERED ITEM OR SERVICE: HCPCS | Performed by: HOSPITALIST

## 2019-12-19 PROCEDURE — 700102 HCHG RX REV CODE 250 W/ 637 OVERRIDE(OP): Performed by: HOSPITALIST

## 2019-12-19 PROCEDURE — 84443 ASSAY THYROID STIM HORMONE: CPT

## 2019-12-19 PROCEDURE — 99233 SBSQ HOSP IP/OBS HIGH 50: CPT | Performed by: HOSPITALIST

## 2019-12-19 PROCEDURE — 0SS9XZZ REPOSITION RIGHT HIP JOINT, EXTERNAL APPROACH: ICD-10-PCS | Performed by: ORTHOPAEDIC SURGERY

## 2019-12-19 PROCEDURE — 99157 MOD SED OTHER PHYS/QHP EA: CPT | Mod: 59 | Performed by: INTERNAL MEDICINE

## 2019-12-19 PROCEDURE — 94669 MECHANICAL CHEST WALL OSCILL: CPT

## 2019-12-19 PROCEDURE — 770022 HCHG ROOM/CARE - ICU (200)

## 2019-12-19 PROCEDURE — 80048 BASIC METABOLIC PNL TOTAL CA: CPT

## 2019-12-19 PROCEDURE — 700101 HCHG RX REV CODE 250: Performed by: INTERNAL MEDICINE

## 2019-12-19 RX ORDER — MIDAZOLAM HYDROCHLORIDE 1 MG/ML
2 INJECTION INTRAMUSCULAR; INTRAVENOUS ONCE
Status: DISCONTINUED | OUTPATIENT
Start: 2019-12-19 | End: 2019-12-19

## 2019-12-19 RX ORDER — PHENYLEPHRINE HCL IN 0.9% NACL 0.5 MG/5ML
SYRINGE (ML) INTRAVENOUS
Status: ACTIVE
Start: 2019-12-19 | End: 2019-12-19

## 2019-12-19 RX ORDER — KETAMINE HYDROCHLORIDE 50 MG/ML
0-80 INJECTION, SOLUTION INTRAMUSCULAR; INTRAVENOUS ONCE
Status: COMPLETED | OUTPATIENT
Start: 2019-12-19 | End: 2019-12-19

## 2019-12-19 RX ORDER — PHENYLEPHRINE HYDROCHLORIDE 10 MG/ML
INJECTION, SOLUTION INTRAMUSCULAR; INTRAVENOUS; SUBCUTANEOUS
Status: DISPENSED
Start: 2019-12-19 | End: 2019-12-19

## 2019-12-19 RX ORDER — KETAMINE HYDROCHLORIDE 50 MG/ML
0-80 INJECTION, SOLUTION INTRAMUSCULAR; INTRAVENOUS ONCE
Status: DISCONTINUED | OUTPATIENT
Start: 2019-12-19 | End: 2019-12-19

## 2019-12-19 RX ORDER — MIDAZOLAM HYDROCHLORIDE 1 MG/ML
2 INJECTION INTRAMUSCULAR; INTRAVENOUS ONCE
Status: COMPLETED | OUTPATIENT
Start: 2019-12-19 | End: 2019-12-19

## 2019-12-19 RX ORDER — PHENYLEPHRINE HCL IN 0.9% NACL 0.5 MG/5ML
100 SYRINGE (ML) INTRAVENOUS
Status: DISPENSED | OUTPATIENT
Start: 2019-12-19 | End: 2019-12-19

## 2019-12-19 RX ORDER — PHENYLEPHRINE HCL IN 0.9% NACL 0.5 MG/5ML
100 SYRINGE (ML) INTRAVENOUS
Status: DISCONTINUED | OUTPATIENT
Start: 2019-12-19 | End: 2019-12-19

## 2019-12-19 RX ADMIN — ACETAMINOPHEN 500 MG: 500 TABLET ORAL at 14:13

## 2019-12-19 RX ADMIN — MIDAZOLAM 0.5 MG: 1 INJECTION INTRAMUSCULAR; INTRAVENOUS at 11:51

## 2019-12-19 RX ADMIN — PIPERACILLIN AND TAZOBACTAM 3.38 G: 3; .375 INJECTION, POWDER, LYOPHILIZED, FOR SOLUTION INTRAVENOUS; PARENTERAL at 14:13

## 2019-12-19 RX ADMIN — BUDESONIDE AND FORMOTEROL FUMARATE DIHYDRATE 2 PUFF: 160; 4.5 AEROSOL RESPIRATORY (INHALATION) at 05:59

## 2019-12-19 RX ADMIN — SODIUM CHLORIDE, POTASSIUM CHLORIDE, SODIUM LACTATE AND CALCIUM CHLORIDE 1000 ML: 600; 310; 30; 20 INJECTION, SOLUTION INTRAVENOUS at 18:58

## 2019-12-19 RX ADMIN — MIDODRINE HYDROCHLORIDE 5 MG: 5 TABLET ORAL at 05:41

## 2019-12-19 RX ADMIN — KETAMINE HYDROCHLORIDE 40 MG: 50 INJECTION, SOLUTION INTRAMUSCULAR; INTRAVENOUS at 11:52

## 2019-12-19 RX ADMIN — PIPERACILLIN AND TAZOBACTAM 3.38 G: 3; .375 INJECTION, POWDER, LYOPHILIZED, FOR SOLUTION INTRAVENOUS; PARENTERAL at 04:16

## 2019-12-19 RX ADMIN — SENNOSIDES AND DOCUSATE SODIUM 2 TABLET: 8.6; 5 TABLET ORAL at 05:40

## 2019-12-19 RX ADMIN — FENTANYL CITRATE 50 MCG: 0.05 INJECTION, SOLUTION INTRAMUSCULAR; INTRAVENOUS at 11:50

## 2019-12-19 RX ADMIN — HEPARIN SODIUM 1000 UNITS/HR: 5000 INJECTION, SOLUTION INTRAVENOUS at 00:38

## 2019-12-19 RX ADMIN — PIPERACILLIN AND TAZOBACTAM 3.38 G: 3; .375 INJECTION, POWDER, LYOPHILIZED, FOR SOLUTION INTRAVENOUS; PARENTERAL at 20:10

## 2019-12-19 RX ADMIN — OMEPRAZOLE 20 MG: 20 CAPSULE, DELAYED RELEASE ORAL at 05:40

## 2019-12-19 RX ADMIN — LOVASTATIN 40 MG: 20 TABLET ORAL at 20:10

## 2019-12-19 RX ADMIN — ACETAMINOPHEN 500 MG: 500 TABLET ORAL at 21:04

## 2019-12-19 RX ADMIN — MIDODRINE HYDROCHLORIDE 5 MG: 5 TABLET ORAL at 18:09

## 2019-12-19 RX ADMIN — BUDESONIDE AND FORMOTEROL FUMARATE DIHYDRATE 2 PUFF: 160; 4.5 AEROSOL RESPIRATORY (INHALATION) at 18:09

## 2019-12-19 RX ADMIN — FENTANYL CITRATE 50 MCG: 0.05 INJECTION, SOLUTION INTRAMUSCULAR; INTRAVENOUS at 11:49

## 2019-12-19 RX ADMIN — OXYCODONE HYDROCHLORIDE 10 MG: 10 TABLET ORAL at 19:22

## 2019-12-19 RX ADMIN — KETAMINE HYDROCHLORIDE 40 MG: 50 INJECTION, SOLUTION INTRAMUSCULAR; INTRAVENOUS at 11:53

## 2019-12-19 RX ADMIN — FERROUS SULFATE TAB 325 MG (65 MG ELEMENTAL FE) 325 MG: 325 (65 FE) TAB at 05:41

## 2019-12-19 RX ADMIN — FERROUS SULFATE TAB 325 MG (65 MG ELEMENTAL FE) 325 MG: 325 (65 FE) TAB at 18:09

## 2019-12-19 RX ADMIN — SENNOSIDES AND DOCUSATE SODIUM 2 TABLET: 8.6; 5 TABLET ORAL at 18:09

## 2019-12-19 RX ADMIN — MIDODRINE HYDROCHLORIDE 5 MG: 5 TABLET ORAL at 14:12

## 2019-12-19 RX ADMIN — MIDAZOLAM 0.5 MG: 1 INJECTION INTRAMUSCULAR; INTRAVENOUS at 11:49

## 2019-12-19 ASSESSMENT — ENCOUNTER SYMPTOMS
SENSORY CHANGE: 0
CHILLS: 0
SHORTNESS OF BREATH: 0
COUGH: 0
DOUBLE VISION: 0
MYALGIAS: 1
DIZZINESS: 0
STRIDOR: 0
BRUISES/BLEEDS EASILY: 0
INSOMNIA: 1
FEVER: 0
SPEECH CHANGE: 0
BLURRED VISION: 0
BACK PAIN: 0
SPUTUM PRODUCTION: 0
BLOOD IN STOOL: 0
FEVER: 1
FOCAL WEAKNESS: 0
NERVOUS/ANXIOUS: 1
HEADACHES: 0
SORE THROAT: 0
ABDOMINAL PAIN: 0
NAUSEA: 0
DEPRESSION: 0
HEMOPTYSIS: 0
PALPITATIONS: 0
SHORTNESS OF BREATH: 1
NERVOUS/ANXIOUS: 0
VOMITING: 0

## 2019-12-19 ASSESSMENT — COGNITIVE AND FUNCTIONAL STATUS - GENERAL
MOBILITY SCORE: 6
CLIMB 3 TO 5 STEPS WITH RAILING: TOTAL
PERSONAL GROOMING: TOTAL
MOVING FROM LYING ON BACK TO SITTING ON SIDE OF FLAT BED: UNABLE
EATING MEALS: A LOT
TOILETING: TOTAL
DRESSING REGULAR LOWER BODY CLOTHING: TOTAL
SUGGESTED CMS G CODE MODIFIER DAILY ACTIVITY: CM
SUGGESTED CMS G CODE MODIFIER MOBILITY: CN
DAILY ACTIVITIY SCORE: 7
WALKING IN HOSPITAL ROOM: TOTAL
STANDING UP FROM CHAIR USING ARMS: TOTAL
TURNING FROM BACK TO SIDE WHILE IN FLAT BAD: UNABLE
HELP NEEDED FOR BATHING: TOTAL
MOVING TO AND FROM BED TO CHAIR: UNABLE
DRESSING REGULAR UPPER BODY CLOTHING: TOTAL

## 2019-12-19 NOTE — PROCEDURES
RCC    Conscious sedation note    Diagnosis: Dislocated right hip status post ORIF  Procedure: Reduction of dislocated right hip  Surgeon: Dr. Delgado, see his close reduction note    Timeout observed  Start time 11:48  Stop time 12:13  Total time 25 minutes    Medications:  Versed 0.5 mg IV x2  Fentanyl 50 mcg IV x2  Ketamine 40 mg IV x2    Cardiac monitoring with sinus rhythm in the 90s throughout  Systolic blood pressure in the 110-130 range throughout with BP every 2 minutes  Pulse oximetry 93 to 99% throughout  End-tidal CO2 25-30 throughout through continuous nasal monitoring    Patient awake alert and appropriate preprocedure and post procedure following simple commands and moving all 4 extremities    [id number], [id number]

## 2019-12-19 NOTE — PROGRESS NOTES
[facility] Medicine Daily Progress Note    Date of Service  12/19/2019    Chief Complaint  Passed out while going down stairs    Hospital Course    This is a legally blind 79 y.o. 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  She required ICU status due to associated hypoxia and hypotension.      Interval Problem Update  Completes 5 days of zosyn today with Ecoli finalized on Urine culture  On 3L O2 via NC  Alert with clear speech.  Daughter and son are at bedside.  Pulls 750cc on IS  Patient had reposition of dislocated right hip by Dr. Delgado today in ICU      Consultants/Specialty  Intensivist    Code Status  DNR but intubation okay     Disposition  Monitor in ICU    Review of Systems  Review of Systems   Constitutional: Positive for malaise/fatigue. Negative for fever.   HENT: Negative for congestion.    Respiratory: Negative for shortness of breath and stridor.    Cardiovascular: Positive for leg swelling. Negative for chest pain.   Gastrointestinal: Negative for abdominal pain and nausea.   Musculoskeletal: Positive for joint pain (right hip).   Neurological: Negative for dizziness, speech change and headaches.   Psychiatric/Behavioral: The patient is not nervous/anxious.         Physical Exam  Pulse:  [63-93] 69  Resp:  [16-62] 24  BP: ()/(49-91) 117/63  SpO2:  [91 %-99 %] 96 %    Physical Exam  Vitals signs reviewed.   Constitutional:       Appearance: Normal appearance. She is not ill-appearing.   HENT:      Head: Normocephalic and atraumatic.      Nose: Nose normal.      Mouth/Throat:      Mouth: Mucous membranes are dry.   Eyes:      General: No scleral icterus.        Right eye: No discharge.         Left eye: No discharge.      Extraocular Movements: Extraocular movements intact.      Conjunctiva/sclera: Conjunctivae normal.   Neck:      Musculoskeletal: No muscular tenderness.   Cardiovascular:      Rate  and Rhythm: Normal rate and regular rhythm.      Pulses:           Radial pulses are 2+ on the right side and 2+ on the left side.        Dorsalis pedis pulses are 2+ on the right side and 2+ on the left side.      Heart sounds: Normal heart sounds. No murmur.   Pulmonary:      Effort: Pulmonary effort is normal. No respiratory distress.      Breath sounds: Normal breath sounds. No wheezing.   Abdominal:      General: There is no distension.      Palpations: Abdomen is soft. There is no mass.      Tenderness: There is no tenderness.   Musculoskeletal:         General: Swelling present. No tenderness or deformity.   Lymphadenopathy:      Cervical: No cervical adenopathy.   Skin:     Coloration: Skin is pale. Skin is not jaundiced.   Neurological:      Mental Status: She is alert and oriented to person, place, and time.      Cranial Nerves: No cranial nerve deficit.   Psychiatric:         Mood and Affect: Mood normal.         Behavior: Behavior normal.         Thought Content: Thought content normal.         Fluids    Intake/Output Summary (Last 24 hours) at 12/19/2019 1736  Last data filed at 12/19/2019 1600  Gross per 24 hour   Intake 2430.23 ml   Output 725 ml   Net 1705.23 ml       Laboratory  Recent Labs     12/17/19  0833 12/18/19  0416 12/19/19  0400   WBC 6.6 4.9 4.3*   RBC 3.06* 2.99* 2.81*   HEMOGLOBIN 7.8* 7.5* 7.1*   HEMATOCRIT 25.9* 25.2* 23.9*   MCV 84.6 84.3 85.1   MCH 25.5* 25.1* 25.3*   MCHC 30.1* 29.8* 29.7*   RDW 51.8* 52.6* 53.1*   PLATELETCT 277 271 240   MPV 8.8* 8.8* 8.8*     Recent Labs     12/17/19  0500 12/18/19  0416 12/19/19  0400   SODIUM 140 140 140   POTASSIUM 4.2 3.8 3.8   CHLORIDE 108 107 109   CO2 27 27 26   GLUCOSE 132* 97 100*   BUN 17 11 12   CREATININE 0.70 0.65 0.70   CALCIUM 8.1* 8.1* 7.8*     Recent Labs     12/18/19  1930 12/19/19  0400 12/19/19  1036   APTT 52.6* 91.7* 81.2*               Imaging  DX-PELVIS-1 OR 2 VIEWS   Final Result      1.  START NUMBER RIGHT HIP  DISLOCATION NO LONGER PRESENT INVOLVING THE ARTHROPLASTY.      2.  NO ACUTE FRACTURE IDENTIFIED.      US-EXTREMITY VENOUS LOWER BILAT   Final Result      DX-CHEST-FOR LINE PLACEMENT Perform procedure in: Patient's Room   Final Result      1.  New right IJV central line tip projects in satisfactory position. No pneumothorax.      2.  New linear atelectasis in the right midlung medially.      3.  Large hiatal hernia again noted.      DX-CHEST-PORTABLE (1 VIEW)   Final Result         1.  Findings on chest radiograph appear stable since the prior radiograph.  No new abnormalities are identified.      2.  Large hiatal hernia is again identified.      IR-PULMONARY ANGIOGRAM-BILATERAL    (Results Pending)        Assessment/Plan  * Shock (HCC)  Assessment & Plan  As needed vasopressors to keep MAP >65 and SBP >90  midodrine 5mg TID initiated 12/17/19  Mildly elevated TSH with normal free thyroxine  PT/OT and mobilization as tolerates    Fever  Assessment & Plan  Afebrile  UTI with negative urine culture  Negative MRSA swab  Blood cultures negative to date  On Zosyn since 12/15 and discontinued 12/19/2019      Acute pulmonary embolism with acute cor pulmonale (HCC)  Assessment & Plan  On IV heparin with monitoring of PTT  She will need transition to oral anticoagulant status post reduction of dislocated hip  Continue supportive care and wean off pressor as tolerated  Continue oxygen and O2 protocol      Acute respiratory failure (HCC)- (present on admission)  Assessment & Plan  Likely secondary to her pulmonary emboli  Continue oxygen  RT per protocol      Closed dislocation of right hip (HCC)  Assessment & Plan  Unable to be reduced in the emergency department  Orthopedics consulting, Dr. Delgado's office made aware the patient is ready for OR await input from orthopedics  Pain management  PRN IV fentanyl    COPD (chronic obstructive pulmonary disease) (HCC)- (present on admission)  Assessment & Plan  Continue Symbicort  oxygen and bronchodilators  RT per protocol  Supplemental oxygen    Elevated troponin- (present on admission)  Assessment & Plan  Demand ischemia secondary to pulmonary emboli    Gastroesophageal reflux disease- (present on admission)  Assessment & Plan  Tums as needed  Oral omeprazole 20 mg daily    Obesity (BMI 30-39.9)- (present on admission)  Assessment & Plan  Body mass index is 36.49 kg/m².      Dyslipidemia- (present on admission)  Assessment & Plan  Statin therapy    Normocytic anemia  Assessment & Plan  Monitor CBC  Status post transfusion packed red blood cells  No sign of acute blood loss       VTE prophylaxis: heparin

## 2019-12-19 NOTE — PROGRESS NOTES
Assumed care of patient. Bedside report received. POC reviewed with patient and all questions answered.

## 2019-12-19 NOTE — PROGRESS NOTES
Critical Care Progress Note    Date of admission  12/14/2019    Chief Complaint  79 y.o. female admitted 12/14/2019 with transfer for PE    Hospital Course    79 y.o. female who presented 12/14/2019 as a transfer from [facility] where she was admitted for syncope due to a PE and complicated by right hip dislocation.  On 11/21 she had a right JAIME.  She was transferred to [facility] for reduction of the right hip dislocation, but today while on the floor developed worsening hypoxemia and hypotension so was transferred to the ICU.  She also developed fever.  Guillermina reports she is currently quite dyspneic, denies chest pain or abdominal pain.  She was previously quite healthy other than a history of COPD from secondhand smoke.  Her daughter reports that her mom does well at home except for when she gets up without help and her daughter attributes her syncope and fall to getting up without assistance.  Her PTTs have been therapeutic.  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  Considered clot in transit however it is more typical of a Chiari network and clot.       Interval Problem Update  Reviewed last 24 hour events:    A&O x4  Anxious re fixing hip  Follows well  SR 70-80s  SBp 80s-100s  UO adequate  NC O2  , PEP good  T-max 102.9  No new positive cultures  WBC 4.3  Midodrine 5 tid  Zosyn 5/5  Ucx E coli - finally back pansensitive  Hemoglobin 7.1, no active bleeding  Platelet count normal 240    Replete K  Conscious sedation for orthopedics for reduction of dislocated right artificial hip  Aggressive pulmonary toilet  Mobilize as per orthopedics, ideally get out of bed but that may not be prudent as of yet with her hip  Complete IV antibiotics  If continues to have fever further evaluation  Continue midodrine for soft blood pressure  Start oral anticoagulation if  okay with orthopedics 12/20    Patient received fentanyl 50 mcg x 2, Vidaza Baxter 0.5 mg x 2 and ketamine 40 mg x 2 IV for conscious sedation for reduction of dislocated right hip.  Patient tolerated sedation well without problem with close monitoring by RN, RT and myself.  Clinically Dr. Delgado appeared satisfied with reduction of the hip.  AP pelvic x-rays reveal reduction of dislocation.  Patient to be kept in be shaved abduction brace.     YESTERDAY  A&O x4  Anxious  SR 60-80s  SBp 100-120s  NE drip off  Tm 101.1  3 lpm NC  Kimbrough - UO adequate  CVC    Heparin 900 - PTT therapeutic  Zosyn 4/5      Add PEP  Reduce R hip in OR?  Orthopedics informed patient's hemodynamics have improved, left message with MA    Serial follow-up  Patient remains fully anticoagulated without bleeding  Patient anxious re: hip reduction  Hemodynamics okay, remains off norepinephrine  Awaiting follow-up from orthopedics    Review of Systems  Review of Systems   Constitutional: Positive for fever (Intermittent). Negative for chills and malaise/fatigue (Resolved).   HENT: Negative for congestion and sore throat.    Eyes: Negative for blurred vision and double vision.   Respiratory: Positive for shortness of breath (Improved again). Negative for cough, hemoptysis and sputum production.    Cardiovascular: Positive for leg swelling (No delta). Negative for chest pain and palpitations.   Gastrointestinal: Negative for abdominal pain, blood in stool, melena, nausea and vomiting.   Genitourinary: Negative.    Musculoskeletal: Positive for joint pain (No change right hip, any ROM very problematic) and myalgias (Right hip, no change again). Negative for back pain.   Neurological: Negative for sensory change, speech change, focal weakness and headaches.   Endo/Heme/Allergies: Does not bruise/bleed easily.   Psychiatric/Behavioral: Negative for depression. The patient is nervous/anxious (Anxious Re: Reduction of hip) and has insomnia.        Vital Signs for last 24 hours   Pulse:  [63-98] 90  Resp:  [16-62] 20  BP: ()/(50-91) 107/57  SpO2:  [86 %-99 %] 96 %    Hemodynamic parameters for last 24 hours       Respiratory Information for the last 24 hours       Physical Exam   Physical Exam  Vitals signs reviewed.   Constitutional:       Appearance: She is obese.      Interventions: Nasal cannula in place.   HENT:      Head: Normocephalic and atraumatic.      Mouth/Throat:      Mouth: Mucous membranes are moist.   Eyes:      General: No scleral icterus.     Extraocular Movements: Extraocular movements intact.      Pupils: Pupils are equal, round, and reactive to light.   Neck:      Musculoskeletal: Neck supple.   Cardiovascular:      Rate and Rhythm: Normal rate and regular rhythm.      Pulses: Normal pulses.      Heart sounds: No murmur. No friction rub. No gallop.    Pulmonary:      Effort: No respiratory distress.      Breath sounds: Rhonchi (Improved) present. No wheezing or rales.      Comments: Work of breathing acceptable, tachypneic at times  Abdominal:      General: Abdomen is flat. There is no distension.      Palpations: Abdomen is soft.      Tenderness: There is no tenderness. There is no right CVA tenderness, left CVA tenderness or guarding.   Musculoskeletal:         General: Swelling (No change) present.      Right lower leg: Edema (Recent right hip surgery) present.   Skin:     General: Skin is warm and dry.      Capillary Refill: Capillary refill takes less than 2 seconds.      Coloration: Skin is not cyanotic.      Nails: There is no clubbing.        Comments: Right hip incision remains clean and dry with large Steri-Strip   Neurological:      General: No focal deficit present.      Mental Status: She is alert and oriented to person, place, and time.      Cranial Nerves: No cranial nerve deficit.      Sensory: No sensory deficit.      Motor: No weakness.   Psychiatric:         Attention and Perception: Attention normal.          Mood and Affect: Mood is anxious.         Speech: Speech normal.         Behavior: Behavior is not agitated. Behavior is cooperative.         Cognition and Memory: Cognition normal.         Medications  Current Facility-Administered Medications   Medication Dose Route Frequency Provider Last Rate Last Dose   • PHENYLEPHRINE HCL 10 MG/ML INJ SOLN (S-ORDERABLE)        Stopped at 12/19/19 1200   • PHENYLEPHRINE HCL-NACL 1-0.9 MG/10ML-% IV SOSY        Stopped at 12/19/19 1200   • heparin injection 2,200 Units  2,200 Units Intravenous PRN Katie Vera M.D.        And   • heparin infusion 25,000 units in 500 mL 0.45% NACL   Intravenous Continuous Katie Vera M.D. 20 mL/hr at 12/19/19 1100 1,000 Units/hr at 12/19/19 1100   • piperacillin-tazobactam (ZOSYN) 3.375 g in  mL IVPB  3.375 g Intravenous Q8HRS Cash Shin M.D. 25 mL/hr at 12/19/19 2010 3.375 g at 12/19/19 2010   • oxyCODONE immediate-release (ROXICODONE) tablet 5 mg  5 mg Oral Q4HRS PRN Cash Shin M.D.   5 mg at 12/17/19 1810    Or   • oxyCODONE immediate release (ROXICODONE) tablet 10 mg  10 mg Oral Q4HRS PRN Cash Shin M.D.   10 mg at 12/19/19 1922   • fentaNYL (SUBLIMAZE) injection 25-50 mcg  25-50 mcg Intravenous Q HOUR PRN Cash Shin M.D.   50 mcg at 12/19/19 1150   • midodrine (PROAMATINE) tablet 5 mg  5 mg Oral TID WITH MEALS Chencho Asencio D.O.   5 mg at 12/19/19 1809   • lactated ringers infusion  1,000 mL Intravenous Continuous Jeremy M Gonda, M.D. 100 mL/hr at 12/19/19 1858 1,000 mL at 12/19/19 1858   • acetaminophen (TYLENOL) tablet 500 mg  500 mg Oral Q6HRS PRN Cash Shin M.D.   500 mg at 12/19/19 2104   • ipratropium-albuterol (DUONEB) nebulizer solution  3 mL Nebulization Q4H PRN (RT) Sasha Zimmerman M.D.       • norepinephrine (LEVOPHED) 8 mg in  mL Infusion  0-30 mcg/min Intravenous Continuous Sasha Zimmerman M.D.   Stopped at 12/18/19 8110   • ferrous sulfate tablet 325 mg  325 mg Oral  BID WITH MEALS Katie Vera M.D.   325 mg at 12/19/19 1809   • enalaprilat (VASOTEC) injection 1.25 mg  1.25 mg Intravenous Q6HRS PRN Katie Vera M.D.       • ondansetron (ZOFRAN ODT) dispertab 4 mg  4 mg Oral Q4HRS PRN Katie Vera M.D.       • ondansetron (ZOFRAN) syringe/vial injection 4 mg  4 mg Intravenous Q4HRS PRN Katie Vera M.D.       • senna-docusate (PERICOLACE or SENOKOT S) 8.6-50 MG per tablet 2 Tab  2 Tab Oral BID Katie Vera M.D.   2 Tab at 12/19/19 1809    And   • polyethylene glycol/lytes (MIRALAX) PACKET 1 Packet  1 Packet Oral QDAY PRN Katie Vera M.D.        And   • magnesium hydroxide (MILK OF MAGNESIA) suspension 30 mL  30 mL Oral QDAY PRN Katie Vera M.D.   30 mL at 12/17/19 2318    And   • bisacodyl (DULCOLAX) suppository 10 mg  10 mg Rectal QDAY PRN Katie Vera M.D.       • Respiratory Care per Protocol   Nebulization Continuous RT Katie Vera M.D.       • budesonide-formoterol (SYMBICORT) 160-4.5 MCG/ACT inhaler 2 Puff  2 Puff Inhalation BID Katie Vera M.D.   2 Puff at 12/19/19 1809   • calcium carbonate (TUMS) chewable tab 500 mg  500 mg Oral QDAY PRN Katie Vera M.D.       • lovastatin (MEVACOR) tablet 40 mg  40 mg Oral QHS Katie Vera M.D.   40 mg at 12/19/19 2010   • omeprazole (PRILOSEC) capsule 20 mg  20 mg Oral DAILY Katie Vera M.D.   20 mg at 12/19/19 0540   • haloperidol lactate (HALDOL) injection 1 mg  1 mg Intravenous Q4HRS PRN Earlene Jarvis M.D.   1 mg at 12/14/19 2346       Fluids    Intake/Output Summary (Last 24 hours) at 12/19/2019 2305  Last data filed at 12/19/2019 2200  Gross per 24 hour   Intake 2320 ml   Output 695 ml   Net 1625 ml       Laboratory          Recent Labs     12/17/19  0500 12/18/19  0416 12/19/19  0400   SODIUM 140 140 140   POTASSIUM 4.2 3.8 3.8   CHLORIDE 108 107 109   CO2 27 27 26   BUN 17 11 12   CREATININE 0.70 0.65 0.70   MAGNESIUM 2.0  --   --    CALCIUM 8.1* 8.1* 7.8*     Recent Labs      12/17/19  0500 12/18/19  0416 12/19/19  0400   GLUCOSE 132* 97 100*     Recent Labs     12/17/19  0833 12/18/19  0416 12/19/19  0400   WBC 6.6 4.9 4.3*   NEUTSPOLYS 71.90 69.20 62.40   LYMPHOCYTES 11.90* 14.10* 17.90*   MONOCYTES 13.60* 14.10* 15.10*   EOSINOPHILS 1.20 0.60 1.20   BASOPHILS 0.30 0.20 0.20     Recent Labs     12/17/19  0833  12/18/19  0416 12/18/19  1930 12/19/19  0400 12/19/19  1036   RBC 3.06*  --  2.99*  --  2.81*  --    HEMOGLOBIN 7.8*  --  7.5*  --  7.1*  --    HEMATOCRIT 25.9*  --  25.2*  --  23.9*  --    PLATELETCT 277  --  271  --  240  --    APTT  --    < >  --  52.6* 91.7* 81.2*    < > = values in this interval not displayed.       Imaging  X-Ray:  I have personally reviewed the images and compared with prior images.  EKG:  I have personally reviewed the images and compared with prior images.  CT:    Reviewed  Echo:   Reviewed    Assessment/Plan  * Shock (HCC)  Assessment & Plan  Improved, suspect multifactorial, primary etiology pulmonary embolus, relative hypovolemia initially, doubt significant sepsis or GI bleeding at this time, monitoring and/or actively treating all of the above    --Distributive d/t sepsis given fever versus obstructive from PE vs hemorrhagic/hypovolemic given dropping Hgb - probable combination of several if not all  --Appears hypovolemic by IVC analysis, improved  --Bedside TTE shows akinetic RV free wall with pressure and volume overload.  TTE on 12/12 showed the akinetic free wall but does not comment on pressure or volume overload, RVSP 58  --Hemoglobin continues to be low despite transfusion, still concerned of bleeding, although FAST and rectal exam are negative, no bleeding clinically  --Transfusing to hemoglobin 7-8, continue serial H/H, monitor for the need for other blood products as needed clinically  --Blood cultures x2 ending and negative so far  --Vancomycin/Zosyn/azithromycin to be de-escalated to Zosyn, MRSA nares negative and no atypical features  suggest the need for azithromycin and wound looks okay  --Resuscitation complete, appears euvolemic, follow-up ultrasound IVC as needed  --Reduce H/H to every 6-8 hours  --Management of PE as below with heparin, if GI bleeding becomes more predominant, consider IVC filter and reversal with protamine    Fever  Assessment & Plan  --Due to PE versus infection, possibly both, improved  --Blood cultures pending, negative so far  --UA consistent with urinary tract infection classically, urine culture positive for pansensitive E. coli  --CXR with left lower lobe atelectasis versus new infiltrate.  She has a large left hiatal hernia which is obscuring the film, monitor.  --Empiric treatment of pneumonia with Vanco/Zosyn/azithromycin as she has been in the hospital since 12/12 so is high risk for resistant bugs  --De-escalate antibiotics, vancomycin and azithromycin discontinued, continue Zosyn 5 days for pneumonia, probably will need 3 days for UTI, monitor, last day of IV antibiotics 12/19      Acute pulmonary embolism with acute cor pulmonale (HCC)  Assessment & Plan  --submassive PE, improved after fluid resuscitation, off IV pressures, on oral midodrine at low-dose, oxygenation improved, now on nasal cannula O2  --On 12/12 there was RV free wall hypokinesis, but they do not comment on RV pressure or volume overload  --TPA is contraindicated initially, improving on IV heparin  --Continue supportive care and serial hemoglobins.  If she decompensates further we may need to readdress the idea of either catheter directed or systemic thrombolytics  --Continue heparin drip, PTTs have been therapeutic  --Continue to monitor hemoglobin, if bleeding becomes significant may need IVC filter and reversal of heparin anticoagulation  --Hold on long-term anticoagulation with warfarin or NOAC for now, still needs to go the OR for correction of dislocated right hip, start NOAC after reduction of hip and wean off heparin if okay with  orthopedics    Acute respiratory failure (HCC)- (present on admission)  Assessment & Plan  --Hypoxemia due to PE versus aspiration versus pneumonia both, clinically unchanged, remains on supplemental O2 at reasonable flow  --Hypovolemic by IVC so low suspicion for cardiogenic pulmonary edema.  Difficult echo windows unable to get E/e'.  No diuresis now, monitoring  --Status post high flow nasal cannula  --Patient would want trial of intubation if needed, not necessary so far, monitoring  --See discussion of PE below  --Treat pneumonia with Zosyn (vs UTI -UA consistent with UTI, urine culture positive for E. coli,  Plan 3 days antibiotics for UTI minimum), probable 5-day course for possible pneumonic complication, last day antibiotics 12/19  --Discontinue azithromycin, doubt atypical pneumonic process  --Discontinue vancomycin, MRSA nares negative and wounds look good    Closed dislocation of right hip (HCC)  Assessment & Plan  --Reviewed with orthopedics 12/16 and again 1219  --Close reduction with sedation for Dr. Delgado at the bedside in ICU 1219 with successful reduction by x-ray    COPD (chronic obstructive pulmonary disease) (HCC)- (present on admission)  Assessment & Plan  --No wheezing on exam, continue monitor  --Continue RT protocols, okay to reduce DuoNeb to every 6 hours and as needed  --No indication for prednisone, monitoring daily  --Update vaccines prior to discharge    Elevated troponin- (present on admission)  Assessment & Plan  Due to PE,  demand ischemia clinically, secondary to pulmonary embolus, pulmonary hypertension and strain noted on echo  Echocardiogram noted, LV function well-preserved  Monitor    Gastroesophageal reflux disease- (present on admission)  Assessment & Plan  PPI  Antireflux measures  Monitor for bleeding    Obesity (BMI 30-39.9)- (present on admission)  Assessment & Plan  Mobilize  Incentive spirometry  Weight loss to be encouraged    Dyslipidemia- (present on  admission)  Assessment & Plan  Statin, weight loss, low-fat diet long-term    Normocytic anemia  Assessment & Plan  --Blood loss versus chronic versus dilutional  --Negative FAST exam, no blood on rectal exam, no ecchymosis at hip dislocation site  --Every 6 hour hemoglobin         VTE:  Heparin  Ulcer: PPI  Lines: Central Line  Ongoing indication addressed    I have performed a physical exam and reviewed and updated ROS and Plan today (12/19/2019). In review of yesterday's note (12/18/2019), there are no changes except as documented above.     Discussed patient condition and risk of morbidity and/or mortality with Hospitalist, RN, RT, Pharmacy, , Charge nurse / hot rounds and Patient.  Case reviewed at length with Dr. Delgado and his physician assistant.

## 2019-12-19 NOTE — PROGRESS NOTES
Dr. Dunne, Dr. Shin and RN agreed on plan of care for patient during bedside hip reduction. Procedure scheduled for 1100 today. Patient to remain NPO until procedure per MD. Patient informed and stated she is fearful of dying and would like her daughter to be notified. Daughter called and is on her way.

## 2019-12-19 NOTE — CARE PLAN
Problem: Bowel/Gastric:  Goal: Normal bowel function is maintained or improved  Outcome: PROGRESSING AS EXPECTED     Problem: Respiratory:  Goal: Respiratory status will improve  Outcome: PROGRESSING AS EXPECTED     Problem: Urinary Elimination:  Goal: Ability to reestablish a normal urinary elimination pattern will improve  Outcome: PROGRESSING AS EXPECTED

## 2019-12-19 NOTE — CARE PLAN
Problem: Oxygenation:  Goal: Maintain adequate oxygenation dependent on patient condition  Outcome: PROGRESSING AS EXPECTED     Problem: Hyperinflation:  Goal: Prevent or improve atelectasis  Outcome: PROGRESSING AS EXPECTED     PEP QID   3L O2 NC   -1000ml

## 2019-12-19 NOTE — DOCUMENTATION QUERY
"                                                                         [facility]                                                                       Query Response Note      PATIENT:               ERIN JADE  ACCT #:                  [id number]  MRN:                     [id number]  :                      1940  ADMIT DATE:       2019 7:00 PM  DISCH DATE:          RESPONDING  PROVIDER #:        [id number]           QUERY TEXT:    \"Sepsis\" is documented under \"Shock\" in the Pulmonary Notes, however this condition is not documented in the H&P or any other notes. Please clarify status of this condition:    NOTE:  If an appropriate response is not listed below, please respond with a new note.     The patient's Clinical Indicators include:  H&P: Elevated troponin; dislocation R hip; acute pulmonary embolism w/ cor pulmonale; acute respiratory failure  Admit SIRS 1/4 (HR: 104) T: 99.4; RR: 19; SIRS within 5 hours of admit / (HR: 112; RR: 24)   12/15 SIRS (HR: 82 - 113; RR: 12 - 51: T: 101.1 F; WBC's: 12.7); Lactic Acid: 2.2  12/15 UA: Positive - Leukocyte esterase; WBC's, bacteria; Urine Culture: Negative; Blood Cultures x 2: Negative  12/15 Pulmonary Note: Shock, d/t sepsis given fever versus obstructive from PE vs hemorrhagic/hypovolemic given dropping Hgb. Treat pneumonia with Zosyn (vs UTI -UA consistent with UTI, urine culture pending?  12/15 &  MD Note: Possible UTI   Treatment: IVF; zosyn; vancomycin; zithromax; lab/imaging  Risk Factors: Advanced age; possible UTI; possible pneumonia; extended hospitalization  Options provided:   -- Sepsis is ruled in   -- Sepsis ruled out   -- Unable to determine      Query created by: Cari Beatty on 2019 12:21 PM    RESPONSE TEXT:    Patient not septic when I saw her. Ask prior provider. She did have hypotension.          Electronically signed by:  TJ BOWMAN 2019 7:29 AM              "

## 2019-12-19 NOTE — PROGRESS NOTES
"Orthopaedic Progress Note    Author: Chapito Inman Date & Time created: 12/18/2019   10:07 PM     Interval Events:  Pod#4 since admit for PE  ROS  Hemodynamics:  /59   Pulse 93   Temp 37.8 °C (100 °F) (Bladder)   Resp (!) 22   Ht 1.448 m (4' 9.01\")   Wt 76.5 kg (168 lb 10.4 oz)   SpO2 94%      No Active Precaution Orders    Respiratory:    Respiration: (!) 22, Pulse Oximetry: 94 %, O2 Daily Delivery Respiratory : Nasal Cannula     PEP/CPT Method: Positive Airway Pressure Device, Work Of Breathing / Effort: Mild  RUL Breath Sounds: Clear, RML Breath Sounds: Clear, RLL Breath Sounds: Diminished, HAJA Breath Sounds: Clear, LLL Breath Sounds: Diminished  Fluids:    Intake/Output Summary (Last 24 hours) at 12/18/2019 2207  Last data filed at 12/18/2019 2000  Gross per 24 hour   Intake 3148.35 ml   Output 805 ml   Net 2343.35 ml     Admit Weight: 65.4 kg (144 lb 2.9 oz)  Current Weight: 76.5 kg (168 lb 10.4 oz)    Physical Exam  Labs:  Recent Labs     12/17/19  0500 12/17/19  0833 12/18/19  0416   WBC 7.3 6.6 4.9   RBC 3.09* 3.06* 2.99*   HEMOGLOBIN 7.9* 7.8* 7.5*   HEMATOCRIT 26.0* 25.9* 25.2*   MCV 84.1 84.6 84.3   MCH 25.6* 25.5* 25.1*   MCHC 30.4* 30.1* 29.8*   RDW 51.5* 51.8* 52.6*   PLATELETCT 303 277 271   MPV 8.9* 8.8* 8.8*     Recent Labs     12/16/19  0445 12/17/19  0500 12/18/19  0416   SODIUM 138 140 140   POTASSIUM 4.1 4.2 3.8   CHLORIDE 105 108 107   CO2 24 27 27   GLUCOSE 132* 132* 97   BUN 23* 17 11   CREATININE 0.91 0.70 0.65   CALCIUM 8.6 8.1* 8.1*       Medical Decision Making/Problem List:    Active Hospital Problems    Diagnosis   • *Shock (HCC) [R57.9]   • Fever [R50.9]   • Acute pulmonary embolism with acute cor pulmonale (HCC) [I26.09]   • Acute respiratory failure (HCC) [J96.00]   • Closed dislocation of right hip (HCC) [S73.004A]   • COPD (chronic obstructive pulmonary disease) (HCC) [J44.9]   • Elevated troponin [R79.89]   • Gastroesophageal reflux disease [K21.9]   • Obesity (BMI " 30-39.9) [E66.9]   • Dyslipidemia [E78.5]   • Normocytic anemia [D64.9]     Core Measures & Quality Metrics:  Current DVT prophylaxis: heparin drip  Discussed patient condition with RN.  Clearance for lovenox/heparin: ok  Weight Bearing Status: bedrest until hip is reduced  Wounds & Drains: wound completely healed no signs of infection  Disposition and Follow-up:   We are cleared by icu doctors to reduce hip right.  Will add on to OR scheduled tomorrow,   measures

## 2019-12-19 NOTE — PROGRESS NOTES
Roseann Delgado and Kip at bedside for reduction of right hip. Medicated per MAR and instruction from Dr. Shin. Time out called at 1148. Consent signed. All agreed. Reduction at 1156. MD to order post x-ray.

## 2019-12-20 LAB
ANION GAP SERPL CALC-SCNC: 6 MMOL/L (ref 0–11.9)
APTT PPP: 106.6 SEC (ref 24.7–36)
BACTERIA BLD CULT: NORMAL
BACTERIA BLD CULT: NORMAL
BASOPHILS # BLD AUTO: 0.2 % (ref 0–1.8)
BASOPHILS # BLD: 0.02 K/UL (ref 0–0.12)
BUN SERPL-MCNC: 12 MG/DL (ref 8–22)
CALCIUM SERPL-MCNC: 8.1 MG/DL (ref 8.5–10.5)
CHLORIDE SERPL-SCNC: 108 MMOL/L (ref 96–112)
CO2 SERPL-SCNC: 26 MMOL/L (ref 20–33)
CREAT SERPL-MCNC: 0.64 MG/DL (ref 0.5–1.4)
EOSINOPHIL # BLD AUTO: 0 K/UL (ref 0–0.51)
EOSINOPHIL NFR BLD: 0 % (ref 0–6.9)
ERYTHROCYTE [DISTWIDTH] IN BLOOD BY AUTOMATED COUNT: 54 FL (ref 35.9–50)
GLUCOSE SERPL-MCNC: 123 MG/DL (ref 65–99)
HCT VFR BLD AUTO: 25.9 % (ref 37–47)
HGB BLD-MCNC: 7.7 G/DL (ref 12–16)
IMM GRANULOCYTES # BLD AUTO: 0.24 K/UL (ref 0–0.11)
IMM GRANULOCYTES NFR BLD AUTO: 2.1 % (ref 0–0.9)
LYMPHOCYTES # BLD AUTO: 0.96 K/UL (ref 1–4.8)
LYMPHOCYTES NFR BLD: 8.5 % (ref 22–41)
MCH RBC QN AUTO: 25.2 PG (ref 27–33)
MCHC RBC AUTO-ENTMCNC: 29.7 G/DL (ref 33.6–35)
MCV RBC AUTO: 84.9 FL (ref 81.4–97.8)
MONOCYTES # BLD AUTO: 1.16 K/UL (ref 0–0.85)
MONOCYTES NFR BLD AUTO: 10.3 % (ref 0–13.4)
NEUTROPHILS # BLD AUTO: 8.86 K/UL (ref 2–7.15)
NEUTROPHILS NFR BLD: 78.9 % (ref 44–72)
NRBC # BLD AUTO: 0.03 K/UL
NRBC BLD-RTO: 0.3 /100 WBC
PLATELET # BLD AUTO: 290 K/UL (ref 164–446)
PMV BLD AUTO: 8.9 FL (ref 9–12.9)
POTASSIUM SERPL-SCNC: 4 MMOL/L (ref 3.6–5.5)
RBC # BLD AUTO: 3.05 M/UL (ref 4.2–5.4)
SIGNIFICANT IND 70042: NORMAL
SIGNIFICANT IND 70042: NORMAL
SITE SITE: NORMAL
SITE SITE: NORMAL
SODIUM SERPL-SCNC: 140 MMOL/L (ref 135–145)
SOURCE SOURCE: NORMAL
SOURCE SOURCE: NORMAL
WBC # BLD AUTO: 11.2 K/UL (ref 4.8–10.8)

## 2019-12-20 PROCEDURE — 700111 HCHG RX REV CODE 636 W/ 250 OVERRIDE (IP): Performed by: INTERNAL MEDICINE

## 2019-12-20 PROCEDURE — A9270 NON-COVERED ITEM OR SERVICE: HCPCS | Performed by: INTERNAL MEDICINE

## 2019-12-20 PROCEDURE — 770022 HCHG ROOM/CARE - ICU (200)

## 2019-12-20 PROCEDURE — 80048 BASIC METABOLIC PNL TOTAL CA: CPT

## 2019-12-20 PROCEDURE — 94668 MNPJ CHEST WALL SBSQ: CPT

## 2019-12-20 PROCEDURE — 94669 MECHANICAL CHEST WALL OSCILL: CPT

## 2019-12-20 PROCEDURE — 85730 THROMBOPLASTIN TIME PARTIAL: CPT

## 2019-12-20 PROCEDURE — 700102 HCHG RX REV CODE 250 W/ 637 OVERRIDE(OP): Performed by: HOSPITALIST

## 2019-12-20 PROCEDURE — 85025 COMPLETE CBC W/AUTO DIFF WBC: CPT

## 2019-12-20 PROCEDURE — 700102 HCHG RX REV CODE 250 W/ 637 OVERRIDE(OP): Performed by: INTERNAL MEDICINE

## 2019-12-20 PROCEDURE — 700105 HCHG RX REV CODE 258: Performed by: INTERNAL MEDICINE

## 2019-12-20 PROCEDURE — 99233 SBSQ HOSP IP/OBS HIGH 50: CPT | Performed by: INTERNAL MEDICINE

## 2019-12-20 PROCEDURE — 99232 SBSQ HOSP IP/OBS MODERATE 35: CPT | Performed by: HOSPITALIST

## 2019-12-20 PROCEDURE — A9270 NON-COVERED ITEM OR SERVICE: HCPCS | Performed by: HOSPITALIST

## 2019-12-20 RX ORDER — MIDODRINE HYDROCHLORIDE 5 MG/1
10 TABLET ORAL
Status: DISCONTINUED | OUTPATIENT
Start: 2019-12-20 | End: 2019-12-27

## 2019-12-20 RX ADMIN — ACETAMINOPHEN 500 MG: 500 TABLET ORAL at 22:37

## 2019-12-20 RX ADMIN — APIXABAN 10 MG: 5 TABLET, FILM COATED ORAL at 10:51

## 2019-12-20 RX ADMIN — SODIUM CHLORIDE, POTASSIUM CHLORIDE, SODIUM LACTATE AND CALCIUM CHLORIDE 1000 ML: 600; 310; 30; 20 INJECTION, SOLUTION INTRAVENOUS at 14:25

## 2019-12-20 RX ADMIN — FENTANYL CITRATE 25 MCG: 0.05 INJECTION, SOLUTION INTRAMUSCULAR; INTRAVENOUS at 15:26

## 2019-12-20 RX ADMIN — SODIUM CHLORIDE, POTASSIUM CHLORIDE, SODIUM LACTATE AND CALCIUM CHLORIDE 1000 ML: 600; 310; 30; 20 INJECTION, SOLUTION INTRAVENOUS at 03:22

## 2019-12-20 RX ADMIN — MIDODRINE HYDROCHLORIDE 10 MG: 5 TABLET ORAL at 17:41

## 2019-12-20 RX ADMIN — BUDESONIDE AND FORMOTEROL FUMARATE DIHYDRATE 2 PUFF: 160; 4.5 AEROSOL RESPIRATORY (INHALATION) at 05:06

## 2019-12-20 RX ADMIN — FERROUS SULFATE TAB 325 MG (65 MG ELEMENTAL FE) 325 MG: 325 (65 FE) TAB at 17:41

## 2019-12-20 RX ADMIN — HEPARIN SODIUM 1000 UNITS/HR: 5000 INJECTION, SOLUTION INTRAVENOUS at 02:00

## 2019-12-20 RX ADMIN — MIDODRINE HYDROCHLORIDE 10 MG: 5 TABLET ORAL at 12:30

## 2019-12-20 RX ADMIN — OMEPRAZOLE 20 MG: 20 CAPSULE, DELAYED RELEASE ORAL at 05:06

## 2019-12-20 RX ADMIN — OXYCODONE HYDROCHLORIDE 10 MG: 10 TABLET ORAL at 20:20

## 2019-12-20 RX ADMIN — APIXABAN 10 MG: 5 TABLET, FILM COATED ORAL at 17:42

## 2019-12-20 RX ADMIN — OXYCODONE HYDROCHLORIDE 10 MG: 10 TABLET ORAL at 05:57

## 2019-12-20 RX ADMIN — ACETAMINOPHEN 500 MG: 500 TABLET ORAL at 05:10

## 2019-12-20 RX ADMIN — LOVASTATIN 40 MG: 20 TABLET ORAL at 20:20

## 2019-12-20 RX ADMIN — ACETAMINOPHEN 500 MG: 500 TABLET ORAL at 12:37

## 2019-12-20 RX ADMIN — MIDODRINE HYDROCHLORIDE 5 MG: 5 TABLET ORAL at 08:58

## 2019-12-20 RX ADMIN — FERROUS SULFATE TAB 325 MG (65 MG ELEMENTAL FE) 325 MG: 325 (65 FE) TAB at 08:58

## 2019-12-20 RX ADMIN — BUDESONIDE AND FORMOTEROL FUMARATE DIHYDRATE 2 PUFF: 160; 4.5 AEROSOL RESPIRATORY (INHALATION) at 17:43

## 2019-12-20 ASSESSMENT — ENCOUNTER SYMPTOMS
HEMOPTYSIS: 0
BLURRED VISION: 0
SPUTUM PRODUCTION: 0
BACK PAIN: 0
SENSORY CHANGE: 0
NAUSEA: 0
DIZZINESS: 0
DIARRHEA: 0
HEADACHES: 0
CHILLS: 0
DEPRESSION: 0
PALPITATIONS: 0
FOCAL WEAKNESS: 0
NERVOUS/ANXIOUS: 0
SPEECH CHANGE: 0
FEVER: 1
COUGH: 0
SINUS PAIN: 0
SHORTNESS OF BREATH: 0
ABDOMINAL PAIN: 0
PND: 0
STRIDOR: 0
INSOMNIA: 1
FLANK PAIN: 0
BLOOD IN STOOL: 0
ORTHOPNEA: 0
CHILLS: 1
BRUISES/BLEEDS EASILY: 0
VOMITING: 0
SORE THROAT: 0
DOUBLE VISION: 0
MYALGIAS: 1

## 2019-12-20 NOTE — CARE PLAN
Problem: Bowel/Gastric:  Goal: Normal bowel function is maintained or improved  Outcome: NOT MET  Intervention: Educate patient and significant other/support system about diet, fluid intake, medications and activity to promote bowel function  Note:   Patient educated on the importance of oral fluid intake and diet in order to maintain normal bowel function.      Problem: Skin Integrity  Goal: Risk for impaired skin integrity will decrease  Note:   Patient encouraged to turn with assist q 2 hours. Patient compliant with turns and repositioning. Mepilex in place and devices rotated.

## 2019-12-20 NOTE — OP REPORT
DATE OF SERVICE:  12/19/2019    SERVICE:  Orthopedic surgery.    PREOPERATIVE DIAGNOSIS:  Dislocated right total hip arthroplasty after a   syncopal event.    POSTOPERATIVE DIAGNOSIS:   Dislocated right total hip arthroplasty after a   syncopal event.    PROCEDURE:  Closed reduction of right hip dislocation.    SURGEON:  Khang Delgado MD    ASSISTANT SURGEON:  Chapito Inman PA-C, CFA    ANESTHETIC:  IV sedation with fentanyl, Versed, and ketamine.    ANESTHESIOLOGIST:  Pulmonary intensive care specialist, Cash Sihn MD    COMPLICATIONS:  None.    BLOOD LOSS:  Zero.    HISTORY AND INDICATIONS:  The patient is an 79-year-old female who   approximately a month ago had a right total hip arthroplasty done by myself.    She has previously had a left total hip arthroplasty done several years ago.    Several weeks out of the operation, she had a syncopal episode, which resulted   in a hip dislocation of her right hip.  She was hospitalized and diagnosed   with pulmonary embolism and placed in intensive care.  Because of her   condition, reduction of the hip was precluded by her intensive care doctors   who were struggling with blood pressure and other physiological parameters.    Because of this, the reduction of the hip had to be delayed until she was   cleared by the pulmonary intensive care team.  Once she was cleared, today's   procedure was performed.    DESCRIPTION OF PROCEDURE:  After informed consent was obtained and a consent   was signed, IV induction of anesthesia was performed.  Dr. Shin supervised   this and it included IV fentanyl, Versed, and ketamine.  Once satisfactory   anesthesia was induced, I then performed a longitudinal traction maneuver on   the right hip.  There was a sensation of relocation of the hip.  The limb   suddenly became much longer to its length and it was thought that the hip was   reduced.  Pillows were placed between the patient's legs including an   abduction pillow  from the operating room and post-procedure x-rays were done,   which confirmed that the hip had been relocated.  No complications were   experienced.  The patient tolerated the procedure well and did not appear to   have any distress during the procedure.       ____________________________________     MD CRISTOBAL FIGUEREDO / RONN    DD:  12/19/2019 15:56:54  DT:  12/19/2019 18:34:55    D#:  [id number]  Job#:  [id number]

## 2019-12-20 NOTE — DOCUMENTATION QUERY
"                                                                         [facility]                                                                       Query Response Note      PATIENT:               ERIN JADE  ACCT #:                  [id number]  MRN:                     [id number]  :                      1940  ADMIT DATE:       2019 7:00 PM  DISCH DATE:          RESPONDING  PROVIDER #:        [id number]           QUERY TEXT:    \"Transfer to ICU for worsening pulmonary edema\" is documented in the Hospital Medicine Notes. \"Low suspicion for cardiogenic pulmonary edema\" is documented in the Pulmonary Notes. Please clarify acuity and type or if pulmonary edema has been ruled out.    NOTE:  If an appropriate response is not listed below, please respond with a new note.    The patient's Clinical Indicators include:  H&P: Elevated troponin; dislocation R hip; acute pulmonary embolism w/ cor pulmonale; acute respiratory failure   ECHO: LVEF 65-70%. Reduced right ventricular systolic function. Moderate pulmonary hypertension. Indeterminate diastolic function.  12/15 CXR: No focal infiltrate or pulmonary edema. New linear atelectasis in the right midlung medially.  12/15 MD Note: Transfer to ICU for worsening hypoxia, pulmonary edema  12/15 Pulmonary Consult: Hypovolemic by IVC so low suspicion for cardiogenic pulmonary edema.  Treatment: Pulmonary consult; IV Lasix x 1 dose; RT protocol; oxygen; imaging  Risk Factors: Advanced age; acute pulmonary embolism; acute respiratory failure  Options provided:   -- Acute pulmonary edema - cardiac related, (please specify type and acuity of CHF, if applicable, or other cardiac condition)   -- Acute Pulmonary Edema, Non-Cardiogenic   -- Chronic pulmonary edema - cardiac related, (please specify type and acuity of CHF, if applicable, or other cardiac condition)   -- Chronic Pulmonary Edema, Non-Cardiogenic   -- Pulmonary Edema has been ruled out   -- Unable to " determine      Query created by: Cari Beatty on 12/17/2019 3:29 PM    RESPONSE TEXT:    Pulmonary Edema has been ruled out          Electronically signed by:  ISA BRIGGS 12/19/2019 11:05 PM

## 2019-12-20 NOTE — CARE PLAN
Problem: Communication  Goal: The ability to communicate needs accurately and effectively will improve. I have encouraged patient to verbalize feelings about illness and hospital stay.   Outcome: PROGRESSING AS EXPECTED     Problem: Safety  Goal: Will remain free from injury: patient is wearing hospital clothing, gown and hospital non-skid socks, bed is at lowest position and alarm on. Patient's call light is also within reach.  Outcome: PROGRESSING AS EXPECTED

## 2019-12-20 NOTE — PROGRESS NOTES
Critical Care Progress Note    Date of admission  12/14/2019    Chief Complaint  79 y.o. female admitted 12/14/2019 with transfer for PE    Hospital Course    79 y.o. female who presented 12/14/2019 as a transfer from [facility] where she was admitted for syncope due to a PE and complicated by right hip dislocation.  On 11/21 she had a right JAIME.  She was transferred to [facility] for reduction of the right hip dislocation, but today while on the floor developed worsening hypoxemia and hypotension so was transferred to the ICU.  She also developed fever.  Guillermina reports she is currently quite dyspneic, denies chest pain or abdominal pain.  She was previously quite healthy other than a history of COPD from secondhand smoke.  Her daughter reports that her mom does well at home except for when she gets up without help and her daughter attributes her syncope and fall to getting up without assistance.  Her PTTs have been therapeutic.  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  Considered clot in transit however it is more typical of a Chiari network and clot.       Interval Problem Update  Reviewed last 24 hour events:    A&O x4  Hip reduce yesterday  Follows  SR 90s  SBp 80-90s  UO adequate  Midodrine    No CXR  O2 3 lpm NC    PEP QID  T 38.8  WBC 11.2  Zosyn complete  Loose BMs  Heparin drip    NOAC - Eliquis  Fever eval?  Mobility per Ortho  Transfer?  Rehab next week?    Serial follow-up  Hemodynamics unchanged, blood pressure soft on midodrine  Respiratory status similar with work of breathing still minimal but O2 requirements slight up as O2 titrated to 3.5 L  Patient still with fever, T-max this afternoon 102, ID ROS negative and patient is just completing 5 days of Zosyn for pneumonia/UTI  Central line site looks good, patient not having diarrhea,  surgical wound on right hip actually looks pretty good     YESTERDAY  A&O x4  Anxious re fixing hip  Follows well  SR 70-80s  SBp 80s-100s  UO adequate  NC O2  , PEP good  T-max 102.9  No new positive cultures  WBC 4.3  Midodrine 5 tid  Zosyn 5/5  Ucx E coli - finally back pansensitive  Hemoglobin 7.1, no active bleeding  Platelet count normal 240    Replete K  Conscious sedation for orthopedics for reduction of dislocated right artificial hip  Aggressive pulmonary toilet  Mobilize as per orthopedics, ideally get out of bed but that may not be prudent as of yet with her hip  Complete IV antibiotics  If continues to have fever further evaluation  Continue midodrine for soft blood pressure  Start oral anticoagulation if okay with orthopedics 12/20    Patient received fentanyl 50 mcg x 2, Vidaza Baxter 0.5 mg x 2 and ketamine 40 mg x 2 IV for conscious sedation for reduction of dislocated right hip.  Patient tolerated sedation well without problem with close monitoring by RN, RT and myself.  Clinically Dr. Delgado appeared satisfied with reduction of the hip.  AP pelvic x-rays reveal reduction of dislocation.  Patient to be kept in be shaved abduction brace.    Review of Systems  Review of Systems   Constitutional: Positive for fever (Persisting). Negative for chills and malaise/fatigue.   HENT: Negative for congestion, sinus pain and sore throat.    Eyes: Negative for blurred vision and double vision.   Respiratory: Negative for cough, hemoptysis, sputum production and shortness of breath (Denies today).    Cardiovascular: Positive for leg swelling (No delta). Negative for chest pain, palpitations, orthopnea and PND.   Gastrointestinal: Negative for abdominal pain, blood in stool, diarrhea, melena, nausea and vomiting.   Genitourinary: Negative for dysuria, flank pain and hematuria.   Musculoskeletal: Positive for joint pain (Improved after reduction of hip) and myalgias (Improved after reduction of right hip).  Negative for back pain.   Neurological: Negative for sensory change, speech change, focal weakness and headaches.   Endo/Heme/Allergies: Does not bruise/bleed easily.   Psychiatric/Behavioral: Negative for depression. The patient has insomnia. The patient is not nervous/anxious (Resolved for now).       Vital Signs for last 24 hours   Pulse:  [76-95] 92  Resp:  [20-41] 20  BP: ()/(50-62) 94/53  SpO2:  [90 %-97 %] 93 %    Hemodynamic parameters for last 24 hours       Respiratory Information for the last 24 hours       Physical Exam   Physical Exam  Vitals signs reviewed.   Constitutional:       Appearance: She is obese.      Interventions: Nasal cannula in place.   HENT:      Head: Normocephalic and atraumatic.      Mouth/Throat:      Mouth: Mucous membranes are moist.   Eyes:      General: No scleral icterus.     Extraocular Movements: Extraocular movements intact.      Pupils: Pupils are equal, round, and reactive to light.   Neck:      Musculoskeletal: Neck supple.   Cardiovascular:      Rate and Rhythm: Normal rate and regular rhythm.      Pulses: Normal pulses.      Heart sounds: No murmur. No friction rub. No gallop.    Pulmonary:      Effort: No respiratory distress.      Breath sounds: No wheezing, rhonchi (Resolved) or rales.      Comments: Work of breathing acceptable, tachypneic at times  Abdominal:      General: Abdomen is flat. There is no distension.      Palpations: Abdomen is soft.      Tenderness: There is no tenderness. There is no right CVA tenderness, left CVA tenderness or guarding.   Musculoskeletal:         General: Swelling (No delta) present.      Right lower leg: Edema (Unchanged, secondary to right hip surgery recently) present.   Skin:     General: Skin is warm and dry.      Capillary Refill: Capillary refill takes less than 2 seconds.      Coloration: Skin is not cyanotic.      Nails: There is no clubbing.        Comments: Right hip incision remains clean and dry with large  Steri-Strip   Neurological:      General: No focal deficit present.      Mental Status: She is alert and oriented to person, place, and time.      Cranial Nerves: No cranial nerve deficit.      Sensory: No sensory deficit.      Motor: No weakness.   Psychiatric:         Attention and Perception: Attention normal.         Mood and Affect: Mood is anxious.         Speech: Speech normal.         Behavior: Behavior is not agitated. Behavior is cooperative.         Cognition and Memory: Cognition normal.         Medications  Current Facility-Administered Medications   Medication Dose Route Frequency Provider Last Rate Last Dose   • midodrine (PROAMATINE) tablet 10 mg  10 mg Oral TID WITH MEALS Cash Shin M.D.   10 mg at 12/20/19 1741   • apixaban (ELIQUIS) tablet 10 mg  10 mg Oral BID Chencho Asencio D.OYeimy   10 mg at 12/20/19 1742   • [START ON 12/27/2019] apixaban (ELIQUIS) tablet 5 mg  5 mg Oral BID Chencho Asencio D.O.       • oxyCODONE immediate-release (ROXICODONE) tablet 5 mg  5 mg Oral Q4HRS PRN Cash Shin M.D.   5 mg at 12/17/19 1810    Or   • oxyCODONE immediate release (ROXICODONE) tablet 10 mg  10 mg Oral Q4HRS PRN Cash Shin M.D.   10 mg at 12/20/19 2020   • fentaNYL (SUBLIMAZE) injection 25-50 mcg  25-50 mcg Intravenous Q HOUR PRN Cash Shin M.D.   25 mcg at 12/20/19 1526   • lactated ringers infusion  1,000 mL Intravenous Continuous Jeremy M Gonda, M.D. 100 mL/hr at 12/20/19 1425 1,000 mL at 12/20/19 1425   • acetaminophen (TYLENOL) tablet 500 mg  500 mg Oral Q6HRS PRN Cash Shin M.D.   500 mg at 12/20/19 1237   • ipratropium-albuterol (DUONEB) nebulizer solution  3 mL Nebulization Q4H PRN (RT) Sasha Zimmerman M.D.       • norepinephrine (LEVOPHED) 8 mg in  mL Infusion  0-30 mcg/min Intravenous Continuous Sasha Zimmerman M.D.   Stopped at 12/18/19 4227   • ferrous sulfate tablet 325 mg  325 mg Oral BID WITH MEALS Katie Vera M.D.   325 mg at 12/20/19 3656   •  enalaprilat (VASOTEC) injection 1.25 mg  1.25 mg Intravenous Q6HRS PRN Katie Vera M.D.       • ondansetron (ZOFRAN ODT) dispertab 4 mg  4 mg Oral Q4HRS PRN Katie Vera M.D.       • ondansetron (ZOFRAN) syringe/vial injection 4 mg  4 mg Intravenous Q4HRS PRN Katie Vera M.D.       • senna-docusate (PERICOLACE or SENOKOT S) 8.6-50 MG per tablet 2 Tab  2 Tab Oral BID Katie Vera M.D.   Stopped at 12/20/19 0600    And   • polyethylene glycol/lytes (MIRALAX) PACKET 1 Packet  1 Packet Oral QDAY PRN Katie Vera M.D.        And   • magnesium hydroxide (MILK OF MAGNESIA) suspension 30 mL  30 mL Oral QDAY HIRAN Katie Vera M.D.   30 mL at 12/17/19 2318    And   • bisacodyl (DULCOLAX) suppository 10 mg  10 mg Rectal QDAY PRN Katie Vera M.D.       • Respiratory Care per Protocol   Nebulization Continuous RT Katie Vera M.D.       • budesonide-formoterol (SYMBICORT) 160-4.5 MCG/ACT inhaler 2 Puff  2 Puff Inhalation BID Katie Vera M.D.   2 Puff at 12/20/19 1743   • calcium carbonate (TUMS) chewable tab 500 mg  500 mg Oral QDAY PRN Katie Vera M.D.       • lovastatin (MEVACOR) tablet 40 mg  40 mg Oral QHS Katie Vera M.D.   40 mg at 12/20/19 2020   • omeprazole (PRILOSEC) capsule 20 mg  20 mg Oral DAILY Katie Vera M.D.   20 mg at 12/20/19 0506   • haloperidol lactate (HALDOL) injection 1 mg  1 mg Intravenous Q4HRS PRN Earlene Jarvis M.D.   1 mg at 12/14/19 2346       Fluids    Intake/Output Summary (Last 24 hours) at 12/20/2019 2201  Last data filed at 12/20/2019 2000  Gross per 24 hour   Intake 3398.67 ml   Output 595 ml   Net 2803.67 ml       Laboratory          Recent Labs     12/18/19  0416 12/19/19  0400 12/20/19  0516   SODIUM 140 140 140   POTASSIUM 3.8 3.8 4.0   CHLORIDE 107 109 108   CO2 27 26 26   BUN 11 12 12   CREATININE 0.65 0.70 0.64   CALCIUM 8.1* 7.8* 8.1*     Recent Labs     12/18/19  0416 12/19/19  0400 12/20/19  0516   GLUCOSE 97 100* 123*     Recent Labs      12/18/19  0416 12/19/19  0400 12/20/19  0516   WBC 4.9 4.3* 11.2*   NEUTSPOLYS 69.20 62.40 78.90*   LYMPHOCYTES 14.10* 17.90* 8.50*   MONOCYTES 14.10* 15.10* 10.30   EOSINOPHILS 0.60 1.20 0.00   BASOPHILS 0.20 0.20 0.20     Recent Labs     12/18/19  0416  12/19/19  0400 12/19/19  1036 12/20/19  0516 12/20/19  1040   RBC 2.99*  --  2.81*  --  3.05*  --    HEMOGLOBIN 7.5*  --  7.1*  --  7.7*  --    HEMATOCRIT 25.2*  --  23.9*  --  25.9*  --    PLATELETCT 271  --  240  --  290  --    APTT  --    < > 91.7* 81.2*  --  106.6*    < > = values in this interval not displayed.       Imaging  X-Ray:  I have personally reviewed the images and compared with prior images.  EKG:  I have personally reviewed the images and compared with prior images.  CT:    Reviewed  Echo:   Reviewed    Assessment/Plan  * Shock (HCC)  Assessment & Plan  Improved, suspect multifactorial, primary etiology pulmonary embolus, relative hypovolemia initially, doubt significant sepsis or GI bleeding at this time, monitoring and/or actively treating all of the above    --Distributive d/t sepsis given fever versus obstructive from PE vs hemorrhagic/hypovolemic given dropping Hgb - probable combination of several if not all  --Appears hypovolemic by IVC analysis, improved  --Bedside TTE shows akinetic RV free wall with pressure and volume overload.  TTE on 12/12 showed the akinetic free wall but does not comment on pressure or volume overload, RVSP 58  --Hemoglobin continues to be low despite transfusion, still concerned of bleeding, although FAST and rectal exam are negative, no bleeding clinically  --Transfusing to hemoglobin 7-8, continue serial H/H, monitor for the need for other blood products as needed clinically  --Blood cultures x2 ending and negative so far  --Vancomycin/Zosyn/azithromycin to be de-escalated to Zosyn, MRSA nares negative and no atypical features suggest the need for azithromycin and wound looks okay  --Resuscitation complete,  appears euvolemic, follow-up ultrasound IVC as needed  --Reduce H/H to every 6-8 hours  --Management of PE as below with heparin, if GI bleeding becomes more predominant, consider IVC filter and reversal with protamine    Fever  Assessment & Plan  Due to PE versus infection, possibly both, persisting  Antibiotics for pneumonia/UTI complete 12/19-status post 5 days Zosyn  Blood cultures negative so far  UA consistent with urinary tract infection classically, urine culture positive for pansensitive E. coli  Initial CXR with left lower lobe atelectasis versus new infiltrate.  She has a large left hiatal hernia which is obscuring the film, repeat chest x-ray as needed  Patient initially treated with vancomycin and azithromycin as well but both discontinued, MRSA nares negative  IV sites look good, reassess daily  No significant diarrhea, abdomen benign, monitor for complication of antibiotic use  Reassess daily with clinical pharmacist Re: Drug fever      Acute pulmonary embolism with acute cor pulmonale (HCC)  Assessment & Plan  --submassive PE, improved after fluid resuscitation, off IV pressures, on oral midodrine ongoing, oxygenation slight worse, now on nasal cannula O2 now 3.5 L  --On 12/12 there was RV free wall hypokinesis, but they do not comment on RV pressure or volume overload  --TPA is contraindicated initially, improving on IV heparin  --Continue supportive care and serial hemoglobins.  If she decompensates further we may need to readdress the idea of either catheter directed or systemic thrombolytics  --Continue heparin drip, PTTs have been therapeutic  --Continue to monitor hemoglobin, if bleeding becomes significant may need IVC filter and reversal of heparin anticoagulation  --Start Eliquis, right hip dislocation post ORIF has been successfully reduced and no surgery planned    Acute respiratory failure (HCC)- (present on admission)  Assessment & Plan  Hypoxemia due to PE versus aspiration versus  pneumonia both, clinically minimal change but requiring slightly increased oxygen flow at 3.5 L  Doubt cardiogenic pulmonary edema component  Status post high flow nasal cannula  Patient would want trial of intubation if needed, not necessary so far, monitoring  5-day course of Zosyn for UTI/pneumonia complete 12/19- E. coli in urine, no positive respiratory cultures  Repeat chest x-ray in a.m. as needed  Off azithromycin, doubt atypical pneumonic process  Off vancomycin, MRSA nares negative and wounds look good    Closed dislocation of right hip (HCC)  Assessment & Plan  Reviewed with orthopedics 12/16 and again 12/19  Close reduction with sedation for Dr. Delgado at the bedside in ICU 12/19 with successful reduction by x-ray    COPD (chronic obstructive pulmonary disease) (HCC)- (present on admission)  Assessment & Plan  No wheezing on exam, continue monitor  Continue RT protocols, okay to reduce DuoNeb to every 6 hours and as needed  No indication for prednisone, monitoring daily  Update vaccines prior to discharge    Elevated troponin- (present on admission)  Assessment & Plan  Due to PE,  demand ischemia clinically, secondary to pulmonary embolus, pulmonary hypertension and strain noted on echo  Echocardiogram noted, LV function well-preserved  Monitor    Gastroesophageal reflux disease- (present on admission)  Assessment & Plan  PPI  Antireflux measures  Monitor for bleeding    Obesity (BMI 30-39.9)- (present on admission)  Assessment & Plan  Mobilize  Incentive spirometry  Weight loss to be encouraged    Dyslipidemia- (present on admission)  Assessment & Plan  Statin, weight loss, low-fat diet long-term    Normocytic anemia  Assessment & Plan  --Blood loss versus chronic versus dilutional  --Negative FAST exam, no blood on rectal exam, no ecchymosis at hip dislocation site  --Every 6 hour hemoglobin         VTE:  Heparin  Ulcer: PPI  Lines: Central Line  Ongoing indication addressed    I have performed a  physical exam and reviewed and updated ROS and Plan today (12/20/2019). In review of yesterday's note (12/19/2019), there are no changes except as documented above.     Discussed patient condition and risk of morbidity and/or mortality with Hospitalist, RN, RT, Pharmacy, , Charge nurse / hot rounds and Patient.

## 2019-12-20 NOTE — CARE PLAN
Problem: Nutritional:  Goal: Achieve adequate nutritional intake  Description  Patient will start diet and consume 50% of meals   Outcome: PROGRESSING SLOWER THAN EXPECTED

## 2019-12-20 NOTE — CARE PLAN
Problem: Psychosocial Needs:  Goal: Level of anxiety will decrease  Outcome: NOT MET  Intervention: Identify and develop with patient strategies to cope with anxiety triggers  Note:   Patient states that pain is tolerable but fear of potential pain is causing anxiety. Patient informed of methods to reduce anxiety including deep breathing exercises.      Problem: Pain Management  Goal: Pain level will decrease to patient's comfort goal  Outcome: PROGRESSING AS EXPECTED  Intervention: Follow pain managment plan developed in collaboration with patient and Interdisciplinary Team  Note:   Pharmacologic and non-pharmacologic methods used to reduce pain. Turns, distraction and repositioning used to manage pain.

## 2019-12-20 NOTE — CARE PLAN
Problem: Oxygenation:  Goal: Maintain adequate oxygenation dependent on patient condition  Outcome: PROGRESSING AS EXPECTED     Problem: Hyperinflation:  Goal: Prevent or improve atelectasis  Outcome: PROGRESSING AS EXPECTED     PEP QID   IS- 750 ml

## 2019-12-20 NOTE — PROGRESS NOTES
Hospital Medicine Daily Progress Note    Date of Service  12/20/2019    Chief Complaint  Passed out while going down stairs    Hospital Course    This is a legally blind 79 y.o. 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  She required ICU status due to associated hypoxia and hypotension.      Interval Problem Update  A+Ox3  NSR  Midodrine 5mg increased to 10mgTID  Tmax:102.9  On 3L NC  Multiple loose BM's  UOP:845ml  Some right hip discomfort  Mobilize to edge of bed  initiated on Eliquis    Consultants/Specialty  Intensivist    Code Status  DNR but intubation okay     Disposition  Monitor in ICU    Review of Systems  Review of Systems   Constitutional: Positive for chills and fever.   HENT: Negative for congestion.    Respiratory: Negative for shortness of breath and stridor.    Cardiovascular: Positive for leg swelling. Negative for chest pain.   Gastrointestinal: Negative for abdominal pain, diarrhea and nausea.   Genitourinary: Negative for dysuria.   Musculoskeletal: Positive for joint pain (right hip).   Neurological: Negative for dizziness and headaches.   Psychiatric/Behavioral: The patient is not nervous/anxious.         Physical Exam  Pulse:  [76-98] 77  Resp:  [20-41] 20  BP: ()/(50-62) 93/62  SpO2:  [86 %-97 %] 95 %    Physical Exam  Vitals signs reviewed.   Constitutional:       Appearance: Normal appearance. She is not ill-appearing.   HENT:      Head: Normocephalic and atraumatic.      Nose: Nose normal.      Mouth/Throat:      Mouth: Mucous membranes are dry.   Eyes:      General: No scleral icterus.        Right eye: No discharge.         Left eye: No discharge.      Extraocular Movements: Extraocular movements intact.      Conjunctiva/sclera: Conjunctivae normal.   Neck:      Musculoskeletal: Normal range of motion.   Cardiovascular:      Rate and Rhythm: Normal rate and regular rhythm.       Pulses:           Radial pulses are 2+ on the right side and 2+ on the left side.        Dorsalis pedis pulses are 2+ on the right side and 2+ on the left side.      Heart sounds: Normal heart sounds. No murmur.   Pulmonary:      Effort: Pulmonary effort is normal. No respiratory distress.      Breath sounds: Normal breath sounds. No wheezing.   Abdominal:      General: There is no distension.      Palpations: Abdomen is soft. There is no mass.      Tenderness: There is no tenderness.   Musculoskeletal:         General: Swelling present. No tenderness or deformity.   Skin:     Coloration: Skin is pale. Skin is not jaundiced.   Neurological:      Mental Status: She is alert and oriented to person, place, and time.      Cranial Nerves: No cranial nerve deficit.   Psychiatric:         Mood and Affect: Mood normal.         Behavior: Behavior normal.         Thought Content: Thought content normal.         Fluids    Intake/Output Summary (Last 24 hours) at 12/20/2019 2039  Last data filed at 12/20/2019 1800  Gross per 24 hour   Intake 3378.67 ml   Output 610 ml   Net 2768.67 ml       Laboratory  Recent Labs     12/18/19  0416 12/19/19  0400 12/20/19  0516   WBC 4.9 4.3* 11.2*   RBC 2.99* 2.81* 3.05*   HEMOGLOBIN 7.5* 7.1* 7.7*   HEMATOCRIT 25.2* 23.9* 25.9*   MCV 84.3 85.1 84.9   MCH 25.1* 25.3* 25.2*   MCHC 29.8* 29.7* 29.7*   RDW 52.6* 53.1* 54.0*   PLATELETCT 271 240 290   MPV 8.8* 8.8* 8.9*     Recent Labs     12/18/19  0416 12/19/19  0400 12/20/19  0516   SODIUM 140 140 140   POTASSIUM 3.8 3.8 4.0   CHLORIDE 107 109 108   CO2 27 26 26   GLUCOSE 97 100* 123*   BUN 11 12 12   CREATININE 0.65 0.70 0.64   CALCIUM 8.1* 7.8* 8.1*     Recent Labs     12/19/19  0400 12/19/19  1036 12/20/19  1040   APTT 91.7* 81.2* 106.6*               Imaging  DX-PELVIS-1 OR 2 VIEWS   Final Result      1.  START NUMBER RIGHT HIP DISLOCATION NO LONGER PRESENT INVOLVING THE ARTHROPLASTY.      2.  NO ACUTE FRACTURE IDENTIFIED.       US-EXTREMITY VENOUS LOWER BILAT   Final Result      DX-CHEST-FOR LINE PLACEMENT Perform procedure in: Patient's Room   Final Result      1.  New right IJV central line tip projects in satisfactory position. No pneumothorax.      2.  New linear atelectasis in the right midlung medially.      3.  Large hiatal hernia again noted.      DX-CHEST-PORTABLE (1 VIEW)   Final Result         1.  Findings on chest radiograph appear stable since the prior radiograph.  No new abnormalities are identified.      2.  Large hiatal hernia is again identified.      IR-PULMONARY ANGIOGRAM-BILATERAL    (Results Pending)        Assessment/Plan  * Shock (HCC)  Assessment & Plan  As needed vasopressors to keep MAP >65 and SBP >90  midodrine 5mg TID initiated 12/17/19 and increased to 10mg TID 12/20/19  Mildly elevated TSH with normal free thyroxine  PT/OT and mobilization as tolerates    Fever  Assessment & Plan  Monitor vitals.  UTI with negative urine culture  Negative MRSA swab  Blood cultures negative to date  On Zosyn since 12/15 and discontinued 12/19/2019      Acute pulmonary embolism with acute cor pulmonale (HCC)  Assessment & Plan  Eliquis started 12/20/19  Continue supportive care and wean off pressor as tolerated  Continue oxygen and O2 protocol      Acute respiratory failure (HCC)- (present on admission)  Assessment & Plan  Likely secondary to her pulmonary emboli  Continue oxygen wean as tolerates  RT per protocol  12/20 started Eliquis BID       Closed dislocation of right hip (HCC)  Assessment & Plan  Unable to be reduced in the emergency department  Orthopedics consulting, and reduce hip into place 12/19.  PT/OT and placement  Pain management  PRN IV fentanyl    COPD (chronic obstructive pulmonary disease) (HCC)- (present on admission)  Assessment & Plan  Continue Symbicort oxygen and bronchodilators  RT per protocol  Supplemental oxygen    Elevated troponin- (present on admission)  Assessment & Plan  Demand ischemia  secondary to pulmonary emboli    Gastroesophageal reflux disease- (present on admission)  Assessment & Plan  Tums as needed  Oral omeprazole 20 mg daily    Obesity (BMI 30-39.9)- (present on admission)  Assessment & Plan  Body mass index is 38.01 kg/m².    Dyslipidemia- (present on admission)  Assessment & Plan  Statin therapy    Normocytic anemia  Assessment & Plan  Monitor CBC  12/20 Hgb:7.7  Status post transfusion packed red blood cells  No sign of acute blood loss       VTE prophylaxis: heparin

## 2019-12-20 NOTE — PROGRESS NOTES
"Orthopaedic Progress Note    Author: Chapito Inman Date & Time created: 12/19/2019   6:23 PM     Interval Events:  Pod#0  ROS  Hemodynamics:  /63   Pulse 69   Temp 37.8 °C (100 °F) (Bladder)   Resp (!) 24   Ht 1.448 m (4' 9.01\")   Wt 76.5 kg (168 lb 10.4 oz)   SpO2 96%      No Active Precaution Orders    Respiratory:    Respiration: (!) 24, Pulse Oximetry: 96 %, O2 Daily Delivery Respiratory : Silicone Nasal Cannula     PEP/CPT Method: Positive Airway Pressure Device, Work Of Breathing / Effort: Mild  RUL Breath Sounds: Clear, RML Breath Sounds: Clear, RLL Breath Sounds: Diminished, HAJA Breath Sounds: Clear, LLL Breath Sounds: Diminished  Fluids:    Intake/Output Summary (Last 24 hours) at 12/19/2019 1823  Last data filed at 12/19/2019 1600  Gross per 24 hour   Intake 2134.23 ml   Output 605 ml   Net 1529.23 ml     Admit Weight: 65.4 kg (144 lb 2.9 oz)  Current      Physical Exam  Labs:  Recent Labs     12/17/19  0833 12/18/19  0416 12/19/19  0400   WBC 6.6 4.9 4.3*   RBC 3.06* 2.99* 2.81*   HEMOGLOBIN 7.8* 7.5* 7.1*   HEMATOCRIT 25.9* 25.2* 23.9*   MCV 84.6 84.3 85.1   MCH 25.5* 25.1* 25.3*   MCHC 30.1* 29.8* 29.7*   RDW 51.8* 52.6* 53.1*   PLATELETCT 277 271 240   MPV 8.8* 8.8* 8.8*     Recent Labs     12/17/19  0500 12/18/19  0416 12/19/19  0400   SODIUM 140 140 140   POTASSIUM 4.2 3.8 3.8   CHLORIDE 108 107 109   CO2 27 27 26   GLUCOSE 132* 97 100*   BUN 17 11 12   CREATININE 0.70 0.65 0.70   CALCIUM 8.1* 8.1* 7.8*       Medical Decision Making/Problem List:    Active Hospital Problems    Diagnosis   • *Shock (HCC) [R57.9]   • Fever [R50.9]   • Acute pulmonary embolism with acute cor pulmonale (HCC) [I26.09]   • Acute respiratory failure (HCC) [J96.00]   • Closed dislocation of right hip (HCC) [S73.004A]   • COPD (chronic obstructive pulmonary disease) (HCC) [J44.9]   • Elevated troponin [R79.89]   • Gastroesophageal reflux disease [K21.9]   • Obesity (BMI 30-39.9) [E66.9]   • Dyslipidemia " [E78.5]   • Normocytic anemia [D64.9]     Core Measures & Quality Metrics:  Current DVT prophylaxis: heparin drip  Discussed patient condition with Hospitalist, Family, RN, Patient and orthopedics.  Clearance for lovenox/heparin: ok  Weight Bearing Status:   As tolerated with pain, adduction pillow while in bed  Wounds & Drains: wound completley healed no infection  Disposition and Follow-up:   Pt right hip back in place or reduced by Dr. Delgado with help of medications admin by ice doctor.  Will follow up in approx 2 weeks.   measures

## 2019-12-20 NOTE — PROGRESS NOTES
Called Dr. Gonda about patient's current  Temperature of 102.9. Last dose of acetaminophen 500 mg was at 2104.   Dr. Gonzalez orders: Continue to monitor, if greater than 104 place cooling blanket.

## 2019-12-20 NOTE — DIETARY
Nutrition Services: Update   Day 6 of admit.  Guillermina Recio is a 79 y.o. female with admitting DX of NSTEMI, PE, dislocated Rt hip prosthetic    Pt is currently on regular diet. Pt states her appetite hasn't been. Pt was requesting orange juice, added with meals. Pt states she drinks a Boost at home, only 1. Requested while here, added Boost Plus once a day with cup of ice. Pt likes her Boost cold. Pt states she will drink it. Discussed snack options, added pineapple as a snack per pt preference. Per chart pt PO 0-50%. Wt 12/19: 79.7 kg via bed scale - wt significantly increased question accuracy of wt.     Malnutrition Risk: Potential risk pt with variable intake since admit (4 days). No other criteria noted at this time.     Recommendations/Plan:  1. Encourage intake of meals  2. Document intake of all meals as % taken in ADL's to provide interdisciplinary communication across all shifts.   3. Monitor weight.  4. Nutrition rep will continue to see patient for ongoing meal and snack preferences.    RD following

## 2019-12-21 ENCOUNTER — APPOINTMENT (OUTPATIENT)
Dept: RADIOLOGY | Facility: MEDICAL CENTER | Age: 79
DRG: 299 | End: 2019-12-21
Attending: INTERNAL MEDICINE
Payer: MEDICARE

## 2019-12-21 LAB
ABO GROUP BLD: ABNORMAL
ALBUMIN SERPL BCP-MCNC: 2.3 G/DL (ref 3.2–4.9)
ANION GAP SERPL CALC-SCNC: 3 MMOL/L (ref 0–11.9)
ANISOCYTOSIS BLD QL SMEAR: ABNORMAL
APPEARANCE UR: ABNORMAL
APTT PPP: 46.8 SEC (ref 24.7–36)
BACTERIA #/AREA URNS HPF: NEGATIVE /HPF
BASOPHILS # BLD AUTO: 0 % (ref 0–1.8)
BASOPHILS # BLD AUTO: 0.1 % (ref 0–1.8)
BASOPHILS # BLD: 0 K/UL (ref 0–0.12)
BASOPHILS # BLD: 0.01 K/UL (ref 0–0.12)
BILIRUB UR QL STRIP.AUTO: NEGATIVE
BLD GP AB SCN SERPL QL: ABNORMAL
BUN SERPL-MCNC: 13 MG/DL (ref 8–22)
CALCIUM SERPL-MCNC: 7.8 MG/DL (ref 8.5–10.5)
CFT BLD TEG: 7.6 MIN (ref 5–10)
CHLORIDE SERPL-SCNC: 106 MMOL/L (ref 96–112)
CLOT ANGLE BLD TEG: 70.7 DEGREES (ref 53–72)
CLOT LYSIS 30M P MA LENFR BLD TEG: 0 % (ref 0–8)
CO2 SERPL-SCNC: 27 MMOL/L (ref 20–33)
COLOR UR: YELLOW
CORTIS SERPL-MCNC: 17.3 UG/DL (ref 0–23)
CREAT SERPL-MCNC: 0.58 MG/DL (ref 0.5–1.4)
CT.EXTRINSIC BLD ROTEM: 1.3 MIN (ref 1–3)
EOSINOPHIL # BLD AUTO: 0 K/UL (ref 0–0.51)
EOSINOPHIL # BLD AUTO: 0.02 K/UL (ref 0–0.51)
EOSINOPHIL NFR BLD: 0 % (ref 0–6.9)
EOSINOPHIL NFR BLD: 0.2 % (ref 0–6.9)
EPI CELLS #/AREA URNS HPF: ABNORMAL /HPF
ERYTHROCYTE [DISTWIDTH] IN BLOOD BY AUTOMATED COUNT: 53.4 FL (ref 35.9–50)
ERYTHROCYTE [DISTWIDTH] IN BLOOD BY AUTOMATED COUNT: 54.6 FL (ref 35.9–50)
GLUCOSE SERPL-MCNC: 145 MG/DL (ref 65–99)
GLUCOSE UR STRIP.AUTO-MCNC: NEGATIVE MG/DL
HCT VFR BLD AUTO: 23.1 % (ref 37–47)
HCT VFR BLD AUTO: 29.8 % (ref 37–47)
HEMOCCULT STL QL: NEGATIVE
HGB BLD-MCNC: 6.9 G/DL (ref 12–16)
HGB BLD-MCNC: 9.2 G/DL (ref 12–16)
HYALINE CASTS #/AREA URNS LPF: ABNORMAL /LPF
IMM GRANULOCYTES # BLD AUTO: 0.35 K/UL (ref 0–0.11)
IMM GRANULOCYTES NFR BLD AUTO: 3.6 % (ref 0–0.9)
KETONES UR STRIP.AUTO-MCNC: NEGATIVE MG/DL
LACTATE BLD-SCNC: 1.1 MMOL/L (ref 0.5–2)
LEUKOCYTE ESTERASE UR QL STRIP.AUTO: ABNORMAL
LG PLATELETS BLD QL SMEAR: NORMAL
LYMPHOCYTES # BLD AUTO: 0.46 K/UL (ref 1–4.8)
LYMPHOCYTES # BLD AUTO: 1.16 K/UL (ref 1–4.8)
LYMPHOCYTES NFR BLD: 11.9 % (ref 22–41)
LYMPHOCYTES NFR BLD: 3.5 % (ref 22–41)
MANUAL DIFF BLD: NORMAL
MCF BLD TEG: 78.7 MM (ref 50–70)
MCH RBC QN AUTO: 25.5 PG (ref 27–33)
MCH RBC QN AUTO: 26.1 PG (ref 27–33)
MCHC RBC AUTO-ENTMCNC: 29.9 G/DL (ref 33.6–35)
MCHC RBC AUTO-ENTMCNC: 30.9 G/DL (ref 33.6–35)
MCV RBC AUTO: 84.7 FL (ref 81.4–97.8)
MCV RBC AUTO: 85.2 FL (ref 81.4–97.8)
METAMYELOCYTES NFR BLD MANUAL: 1.7 %
MICRO URNS: ABNORMAL
MICROCYTES BLD QL SMEAR: ABNORMAL
MONOCYTES # BLD AUTO: 0.7 K/UL (ref 0–0.85)
MONOCYTES # BLD AUTO: 1.15 K/UL (ref 0–0.85)
MONOCYTES NFR BLD AUTO: 11.8 % (ref 0–13.4)
MONOCYTES NFR BLD AUTO: 5.3 % (ref 0–13.4)
MORPHOLOGY BLD-IMP: NORMAL
MYELOCYTES NFR BLD MANUAL: 0.9 %
NEUTROPHILS # BLD AUTO: 11.7 K/UL (ref 2–7.15)
NEUTROPHILS # BLD AUTO: 7.08 K/UL (ref 2–7.15)
NEUTROPHILS NFR BLD: 72.4 % (ref 44–72)
NEUTROPHILS NFR BLD: 88.6 % (ref 44–72)
NITRITE UR QL STRIP.AUTO: NEGATIVE
NRBC # BLD AUTO: 0.04 K/UL
NRBC # BLD AUTO: 0.04 K/UL
NRBC BLD-RTO: 0.3 /100 WBC
NRBC BLD-RTO: 0.4 /100 WBC
NT-PROBNP SERPL IA-MCNC: ABNORMAL PG/ML (ref 0–125)
OVALOCYTES BLD QL SMEAR: NORMAL
PA AA BLD-ACNC: 0 %
PA ADP BLD-ACNC: 0 %
PH UR STRIP.AUTO: 5.5 [PH] (ref 5–8)
PLATELET # BLD AUTO: 268 K/UL (ref 164–446)
PLATELET # BLD AUTO: 347 K/UL (ref 164–446)
PLATELET BLD QL SMEAR: NORMAL
PMV BLD AUTO: 8.7 FL (ref 9–12.9)
PMV BLD AUTO: 8.8 FL (ref 9–12.9)
POIKILOCYTOSIS BLD QL SMEAR: NORMAL
POLYCHROMASIA BLD QL SMEAR: NORMAL
POTASSIUM SERPL-SCNC: 3.9 MMOL/L (ref 3.6–5.5)
PROCALCITONIN SERPL-MCNC: 0.72 NG/ML
PROT UR QL STRIP: NEGATIVE MG/DL
RBC # BLD AUTO: 2.71 M/UL (ref 4.2–5.4)
RBC # BLD AUTO: 3.52 M/UL (ref 4.2–5.4)
RBC # URNS HPF: ABNORMAL /HPF
RBC BLD AUTO: PRESENT
RBC UR QL AUTO: ABNORMAL
RH BLD: ABNORMAL
SODIUM SERPL-SCNC: 136 MMOL/L (ref 135–145)
SP GR UR STRIP.AUTO: 1.01
TEG ALGORITHM TGALG: ABNORMAL
UROBILINOGEN UR STRIP.AUTO-MCNC: 0.2 MG/DL
WBC # BLD AUTO: 13.2 K/UL (ref 4.8–10.8)
WBC # BLD AUTO: 9.8 K/UL (ref 4.8–10.8)
WBC #/AREA URNS HPF: ABNORMAL /HPF

## 2019-12-21 PROCEDURE — 86922 COMPATIBILITY TEST ANTIGLOB: CPT | Mod: 91

## 2019-12-21 PROCEDURE — A9270 NON-COVERED ITEM OR SERVICE: HCPCS | Performed by: HOSPITALIST

## 2019-12-21 PROCEDURE — 99233 SBSQ HOSP IP/OBS HIGH 50: CPT | Performed by: HOSPITALIST

## 2019-12-21 PROCEDURE — 94669 MECHANICAL CHEST WALL OSCILL: CPT

## 2019-12-21 PROCEDURE — A9270 NON-COVERED ITEM OR SERVICE: HCPCS | Performed by: INTERNAL MEDICINE

## 2019-12-21 PROCEDURE — 71045 X-RAY EXAM CHEST 1 VIEW: CPT

## 2019-12-21 PROCEDURE — 85384 FIBRINOGEN ACTIVITY: CPT

## 2019-12-21 PROCEDURE — 700102 HCHG RX REV CODE 250 W/ 637 OVERRIDE(OP): Performed by: INTERNAL MEDICINE

## 2019-12-21 PROCEDURE — 85730 THROMBOPLASTIN TIME PARTIAL: CPT

## 2019-12-21 PROCEDURE — 84145 PROCALCITONIN (PCT): CPT

## 2019-12-21 PROCEDURE — 85025 COMPLETE CBC W/AUTO DIFF WBC: CPT

## 2019-12-21 PROCEDURE — 86850 RBC ANTIBODY SCREEN: CPT

## 2019-12-21 PROCEDURE — 92610 EVALUATE SWALLOWING FUNCTION: CPT

## 2019-12-21 PROCEDURE — 85027 COMPLETE CBC AUTOMATED: CPT

## 2019-12-21 PROCEDURE — 85007 BL SMEAR W/DIFF WBC COUNT: CPT

## 2019-12-21 PROCEDURE — 82272 OCCULT BLD FECES 1-3 TESTS: CPT

## 2019-12-21 PROCEDURE — P9016 RBC LEUKOCYTES REDUCED: HCPCS

## 2019-12-21 PROCEDURE — 83880 ASSAY OF NATRIURETIC PEPTIDE: CPT

## 2019-12-21 PROCEDURE — 97165 OT EVAL LOW COMPLEX 30 MIN: CPT

## 2019-12-21 PROCEDURE — 82040 ASSAY OF SERUM ALBUMIN: CPT

## 2019-12-21 PROCEDURE — 770022 HCHG ROOM/CARE - ICU (200)

## 2019-12-21 PROCEDURE — 700111 HCHG RX REV CODE 636 W/ 250 OVERRIDE (IP): Performed by: HOSPITALIST

## 2019-12-21 PROCEDURE — 86900 BLOOD TYPING SEROLOGIC ABO: CPT

## 2019-12-21 PROCEDURE — 36430 TRANSFUSION BLD/BLD COMPNT: CPT

## 2019-12-21 PROCEDURE — 700111 HCHG RX REV CODE 636 W/ 250 OVERRIDE (IP): Performed by: INTERNAL MEDICINE

## 2019-12-21 PROCEDURE — 85347 COAGULATION TIME ACTIVATED: CPT

## 2019-12-21 PROCEDURE — 80048 BASIC METABOLIC PNL TOTAL CA: CPT

## 2019-12-21 PROCEDURE — 700102 HCHG RX REV CODE 250 W/ 637 OVERRIDE(OP): Performed by: HOSPITALIST

## 2019-12-21 PROCEDURE — 83605 ASSAY OF LACTIC ACID: CPT

## 2019-12-21 PROCEDURE — 81001 URINALYSIS AUTO W/SCOPE: CPT

## 2019-12-21 PROCEDURE — 86901 BLOOD TYPING SEROLOGIC RH(D): CPT

## 2019-12-21 PROCEDURE — 82533 TOTAL CORTISOL: CPT

## 2019-12-21 PROCEDURE — 85576 BLOOD PLATELET AGGREGATION: CPT | Mod: 91

## 2019-12-21 PROCEDURE — 94668 MNPJ CHEST WALL SBSQ: CPT

## 2019-12-21 PROCEDURE — 99233 SBSQ HOSP IP/OBS HIGH 50: CPT | Performed by: INTERNAL MEDICINE

## 2019-12-21 RX ORDER — POTASSIUM CHLORIDE 20 MEQ/1
40 TABLET, EXTENDED RELEASE ORAL ONCE
Status: DISCONTINUED | OUTPATIENT
Start: 2019-12-21 | End: 2019-12-21

## 2019-12-21 RX ORDER — FUROSEMIDE 10 MG/ML
40 INJECTION INTRAMUSCULAR; INTRAVENOUS
Status: DISCONTINUED | OUTPATIENT
Start: 2019-12-21 | End: 2019-12-21

## 2019-12-21 RX ORDER — FUROSEMIDE 10 MG/ML
20 INJECTION INTRAMUSCULAR; INTRAVENOUS EVERY 12 HOURS
Status: COMPLETED | OUTPATIENT
Start: 2019-12-21 | End: 2019-12-25

## 2019-12-21 RX ORDER — POTASSIUM CHLORIDE 20 MEQ/1
40 TABLET, EXTENDED RELEASE ORAL 2 TIMES DAILY
Status: DISPENSED | OUTPATIENT
Start: 2019-12-21 | End: 2019-12-25

## 2019-12-21 RX ORDER — POTASSIUM CHLORIDE 20 MEQ/1
40 TABLET, EXTENDED RELEASE ORAL ONCE
Status: COMPLETED | OUTPATIENT
Start: 2019-12-21 | End: 2019-12-21

## 2019-12-21 RX ORDER — SODIUM CHLORIDE 9 MG/ML
INJECTION, SOLUTION INTRAVENOUS CONTINUOUS
Status: DISCONTINUED | OUTPATIENT
Start: 2019-12-21 | End: 2019-12-21

## 2019-12-21 RX ADMIN — APIXABAN 10 MG: 5 TABLET, FILM COATED ORAL at 05:27

## 2019-12-21 RX ADMIN — FUROSEMIDE 20 MG: 10 INJECTION, SOLUTION INTRAMUSCULAR; INTRAVENOUS at 17:12

## 2019-12-21 RX ADMIN — POTASSIUM CHLORIDE 40 MEQ: 20 TABLET, EXTENDED RELEASE ORAL at 10:15

## 2019-12-21 RX ADMIN — MIDODRINE HYDROCHLORIDE 10 MG: 5 TABLET ORAL at 12:25

## 2019-12-21 RX ADMIN — FERROUS SULFATE TAB 325 MG (65 MG ELEMENTAL FE) 325 MG: 325 (65 FE) TAB at 17:10

## 2019-12-21 RX ADMIN — ACETAMINOPHEN 500 MG: 500 TABLET ORAL at 15:16

## 2019-12-21 RX ADMIN — FENTANYL CITRATE 50 MCG: 0.05 INJECTION, SOLUTION INTRAMUSCULAR; INTRAVENOUS at 10:33

## 2019-12-21 RX ADMIN — OXYCODONE HYDROCHLORIDE 5 MG: 5 TABLET ORAL at 17:07

## 2019-12-21 RX ADMIN — BUDESONIDE AND FORMOTEROL FUMARATE DIHYDRATE 2 PUFF: 160; 4.5 AEROSOL RESPIRATORY (INHALATION) at 17:19

## 2019-12-21 RX ADMIN — MIDODRINE HYDROCHLORIDE 10 MG: 5 TABLET ORAL at 17:11

## 2019-12-21 RX ADMIN — OMEPRAZOLE 20 MG: 20 CAPSULE, DELAYED RELEASE ORAL at 05:27

## 2019-12-21 RX ADMIN — SENNOSIDES AND DOCUSATE SODIUM 2 TABLET: 8.6; 5 TABLET ORAL at 05:29

## 2019-12-21 RX ADMIN — LOVASTATIN 40 MG: 20 TABLET ORAL at 21:04

## 2019-12-21 RX ADMIN — BUDESONIDE AND FORMOTEROL FUMARATE DIHYDRATE 2 PUFF: 160; 4.5 AEROSOL RESPIRATORY (INHALATION) at 05:28

## 2019-12-21 RX ADMIN — ACETAMINOPHEN 500 MG: 500 TABLET ORAL at 05:32

## 2019-12-21 RX ADMIN — POTASSIUM CHLORIDE 40 MEQ: 1500 TABLET, EXTENDED RELEASE ORAL at 12:25

## 2019-12-21 RX ADMIN — APIXABAN 10 MG: 5 TABLET, FILM COATED ORAL at 17:11

## 2019-12-21 RX ADMIN — ALTEPLASE 2 MG: 2.2 INJECTION, POWDER, LYOPHILIZED, FOR SOLUTION INTRAVENOUS at 15:19

## 2019-12-21 RX ADMIN — MIDODRINE HYDROCHLORIDE 10 MG: 5 TABLET ORAL at 09:15

## 2019-12-21 RX ADMIN — FUROSEMIDE 40 MG: 10 INJECTION, SOLUTION INTRAMUSCULAR; INTRAVENOUS at 12:25

## 2019-12-21 RX ADMIN — FERROUS SULFATE TAB 325 MG (65 MG ELEMENTAL FE) 325 MG: 325 (65 FE) TAB at 09:15

## 2019-12-21 ASSESSMENT — ENCOUNTER SYMPTOMS
COUGH: 0
ORTHOPNEA: 0
SORE THROAT: 0
DIZZINESS: 0
BLOOD IN STOOL: 0
SHORTNESS OF BREATH: 0
SPEECH CHANGE: 0
SINUS PAIN: 0
VOMITING: 0
CHILLS: 1
DEPRESSION: 0
NERVOUS/ANXIOUS: 0
BRUISES/BLEEDS EASILY: 0
CHILLS: 0
FOCAL WEAKNESS: 0
HEMOPTYSIS: 0
FLANK PAIN: 0
MYALGIAS: 1
NAUSEA: 0
BACK PAIN: 0
BLURRED VISION: 0
NECK PAIN: 0
DIARRHEA: 0
PND: 0
ABDOMINAL PAIN: 0
SENSORY CHANGE: 0
FEVER: 1
INSOMNIA: 1
PALPITATIONS: 0
SPUTUM PRODUCTION: 0
DOUBLE VISION: 0
COUGH: 1
STRIDOR: 0
HEADACHES: 0

## 2019-12-21 ASSESSMENT — COGNITIVE AND FUNCTIONAL STATUS - GENERAL
SUGGESTED CMS G CODE MODIFIER DAILY ACTIVITY: CK
DRESSING REGULAR LOWER BODY CLOTHING: TOTAL
PERSONAL GROOMING: A LITTLE
DRESSING REGULAR UPPER BODY CLOTHING: A LITTLE
TOILETING: A LOT
DAILY ACTIVITIY SCORE: 15
HELP NEEDED FOR BATHING: A LOT

## 2019-12-21 ASSESSMENT — ACTIVITIES OF DAILY LIVING (ADL): TOILETING: INDEPENDENT

## 2019-12-21 NOTE — CARE PLAN
Problem: Mobility  Goal: Risk for activity intolerance will decrease  Outcome: PROGRESSING AS EXPECTED  Patient to edge of bed and briefly standing with 2 person assist (RN and OT).     Problem: Psychosocial Needs:  Goal: Level of anxiety will decrease  Outcome: PROGRESSING AS EXPECTED     Patient frequently expressing anxiety. Comfort measures and reassurance provided.

## 2019-12-21 NOTE — PROGRESS NOTES
Patient noted to have dark black melena. Dr. Shin notified. Order received and placed for occult stool lab. MD to come to bedside to evaluate.

## 2019-12-21 NOTE — CARE PLAN
Problem: Oxygenation: 3.5L NC  Goal: Maintain adequate oxygenation dependent on patient condition  Outcome: PROGRESSING AS EXPECTED     Problem: Hyperinflation: PEP QID, 250 mL on IS. 900 mL 60% predicted.   Goal: Prevent or improve atelectasis  Outcome: PROGRESSING AS EXPECTED

## 2019-12-21 NOTE — CARE PLAN
Problem: Oxygenation:  Goal: Maintain adequate oxygenation dependent on patient condition  Outcome: PROGRESSING AS EXPECTED     Problem: Hyperinflation:  Goal: Prevent or improve atelectasis  Outcome: PROGRESSING AS EXPECTED     PEP QID   IS- 500 ml

## 2019-12-21 NOTE — PROGRESS NOTES
[facility] Medicine Daily Progress Note    Date of Service  12/21/2019    Chief Complaint  Passed out while going down stairs    Hospital Course    This is a legally blind 79 y.o. 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  She required ICU status due to associated hypoxia and hypotension.      Interval Problem Update  Hgb:6.9 this am and receiving 1u pRBCs  Alert and awake.  NSR  Lower bp  Tmax:102  Urine analysis ordered and unremarkable today.    Consultants/Specialty  Intensivist  Orthopedist Dr. Delgado    Code Status  DNR but intubation okay     Disposition  Monitor in ICU    Review of Systems  Review of Systems   Constitutional: Positive for chills, fever and malaise/fatigue.   HENT: Negative for sore throat.    Respiratory: Positive for cough. Negative for sputum production, shortness of breath and stridor.    Cardiovascular: Positive for leg swelling. Negative for chest pain.   Gastrointestinal: Negative for abdominal pain, diarrhea and nausea.   Genitourinary: Negative for dysuria.   Musculoskeletal: Negative for joint pain and neck pain.   Neurological: Negative for dizziness, speech change and headaches.   Psychiatric/Behavioral: The patient is not nervous/anxious.         Physical Exam  Temp:  [37.4 °C (99.3 °F)] 37.4 °C (99.3 °F)  Pulse:  [] 96  Resp:  [18-57] 47  BP: ()/(50-83) 124/78  SpO2:  [90 %-99 %] 93 %    Physical Exam  Vitals signs reviewed.   Constitutional:       Appearance: Normal appearance. She is not ill-appearing.   HENT:      Head: Normocephalic and atraumatic.      Nose: Nose normal.      Mouth/Throat:      Mouth: Mucous membranes are dry.   Eyes:      General: No scleral icterus.        Right eye: No discharge.         Left eye: No discharge.      Extraocular Movements: Extraocular movements intact.      Conjunctiva/sclera: Conjunctivae normal.   Neck:      Musculoskeletal: Normal  range of motion.   Cardiovascular:      Rate and Rhythm: Normal rate and regular rhythm.      Pulses:           Radial pulses are 2+ on the right side and 2+ on the left side.        Dorsalis pedis pulses are 2+ on the right side and 2+ on the left side.      Heart sounds: Normal heart sounds. No murmur.   Pulmonary:      Effort: Pulmonary effort is normal. No respiratory distress.      Breath sounds: Rhonchi present. No wheezing.   Abdominal:      General: There is no distension.      Palpations: Abdomen is soft. There is no mass.      Tenderness: There is no tenderness.   Musculoskeletal:         General: Swelling present. No tenderness or deformity.   Lymphadenopathy:      Cervical: No cervical adenopathy.   Skin:     Coloration: Skin is pale. Skin is not jaundiced.   Neurological:      Mental Status: She is alert and oriented to person, place, and time.      Cranial Nerves: No cranial nerve deficit.   Psychiatric:         Mood and Affect: Mood normal.         Behavior: Behavior normal.         Thought Content: Thought content normal.         Fluids    Intake/Output Summary (Last 24 hours) at 12/21/2019 1643  Last data filed at 12/21/2019 1600  Gross per 24 hour   Intake 2850 ml   Output 1980 ml   Net 870 ml       Laboratory  Recent Labs     12/20/19  0516 12/21/19  0310 12/21/19  1347   WBC 11.2* 9.8 13.2*   RBC 3.05* 2.71* 3.52*   HEMOGLOBIN 7.7* 6.9* 9.2*   HEMATOCRIT 25.9* 23.1* 29.8*   MCV 84.9 85.2 84.7   MCH 25.2* 25.5* 26.1*   MCHC 29.7* 29.9* 30.9*   RDW 54.0* 54.6* 53.4*   PLATELETCT 290 268 347   MPV 8.9* 8.8* 8.7*     Recent Labs     12/19/19  0400 12/20/19  0516 12/21/19  0310   SODIUM 140 140 136   POTASSIUM 3.8 4.0 3.9   CHLORIDE 109 108 106   CO2 26 26 27   GLUCOSE 100* 123* 145*   BUN 12 12 13   CREATININE 0.70 0.64 0.58   CALCIUM 7.8* 8.1* 7.8*     Recent Labs     12/19/19  1036 12/20/19  1040 12/21/19  1232   APTT 81.2* 106.6* 46.8*               Imaging  DX-CHEST-PORTABLE (1 VIEW)   Final  Result      1.  Stable right IJ central catheter.   2.  Interval worsening of left basilar opacity, atelectasis versus consolidation. It is likely exaggerated by hiatal hernia.   3.  Right basilar opacity, likely atelectasis.      DX-PELVIS-1 OR 2 VIEWS   Final Result      1.  START NUMBER RIGHT HIP DISLOCATION NO LONGER PRESENT INVOLVING THE ARTHROPLASTY.      2.  NO ACUTE FRACTURE IDENTIFIED.      US-EXTREMITY VENOUS LOWER BILAT   Final Result      DX-CHEST-FOR LINE PLACEMENT Perform procedure in: Patient's Room   Final Result      1.  New right IJV central line tip projects in satisfactory position. No pneumothorax.      2.  New linear atelectasis in the right midlung medially.      3.  Large hiatal hernia again noted.      DX-CHEST-PORTABLE (1 VIEW)   Final Result         1.  Findings on chest radiograph appear stable since the prior radiograph.  No new abnormalities are identified.      2.  Large hiatal hernia is again identified.      IR-PULMONARY ANGIOGRAM-BILATERAL    (Results Pending)        Assessment/Plan  * Shock (HCC)  Assessment & Plan  As needed vasopressors to keep MAP >65 and SBP >90  midodrine 5mg TID initiated 12/17/19 and increased to 10mg TID 12/20/19  Mildly elevated TSH with normal free thyroxine  PT/OT and mobilization as tolerates    Fever  Assessment & Plan  Monitor vitals.  Original urine analysis showed UTI and she was treated with anti-biotics  12/21 had fever repeat urine analysis was unremarkable.  Negative MRSA swab  Blood cultures negative to date  On Zosyn since 12/15 and discontinued 12/19/2019 12/21 question of fever secondary to pulmonary embolism.  Question if developing pneumonia.  Monitor blood cultures, CBC and vitals      Acute pulmonary embolism with acute cor pulmonale (HCC)  Assessment & Plan  Eliquis started 12/20/19  Continue supportive care and wean off pressor as tolerated  Continue oxygen and O2 protocol      Acute respiratory failure (HCC)- (present on  admission)  Assessment & Plan  Likely secondary to her pulmonary emboli  Continue oxygen wean as tolerates  RT per protocol  12/20 started Eliquis BID       Closed dislocation of right hip (HCC)  Assessment & Plan  Unable to be reduced in the emergency department  Orthopedics consulting, and reduce hip into place 12/19.  PT/OT and placement  Pain management  PRN IV fentanyl    COPD (chronic obstructive pulmonary disease) (HCC)- (present on admission)  Assessment & Plan  Continue Symbicort oxygen and bronchodilators  RT per protocol  Supplemental oxygen    Elevated troponin- (present on admission)  Assessment & Plan  Demand ischemia secondary to pulmonary emboli    Gastroesophageal reflux disease- (present on admission)  Assessment & Plan  Tums as needed  Oral omeprazole 20 mg daily    Obesity (BMI 30-39.9)- (present on admission)  Assessment & Plan  Body mass index is 37.92 kg/m².    Dyslipidemia- (present on admission)  Assessment & Plan  Statin therapy    Normocytic anemia  Assessment & Plan  Monitor CBC  12/20 Hgb:7.7  12/21 Hgb: 6.9 transfused 1 unit of packed red blood cells  12/21 guaiac stools negative for blood  Status post transfusion packed red blood cells  No sign of acute blood loss       VTE prophylaxis: heparin

## 2019-12-21 NOTE — PROGRESS NOTES
Patient coughing when swallowing water and pills. Patient made NPO and order placed for clinical swallow evaluation.

## 2019-12-21 NOTE — THERAPY
"Speech Language Therapy Clinical Swallow Evaluation completed.    Functional Status: Patient was seen on this date for a clinical swallow evaluation. Patient AAOx4 and coughing and WOB with use of accessory muscles noted prior to PO intake. Vocal quality clear. Oral motor examination revealed missing dentition. Otherwise, WNL. PO consisted of single ice chips, NTL, puree, and thin liquids. Significant increase in WOB following all PO trials which places patient at HIGH risk for aspiration. Patient presented with s/sx concerning for aspiration as seen by immediate coughing with single ice chip, delayed coughing with purees, and increase wheezing following trials of thin liquids. Trials were limited due to concerns for aspiration.     Recommendations - Diet: At this time, patient is at high risk for aspiration given compromised respiratory status and would recommend continue NPO with reassessment tomorrow AM.                             Strategies: To be determined                            Medication Administration: Per RN, pt has PO meds. OK to crush in puree but would consider non-oral source with continued difficulty.     Plan of Care: Will benefit from Speech Therapy 3 times per week    Post-Acute Therapy: Recommend inpatient transitional care services for continued speech therapy services.      \"Rehab Therapy-Acute\" Patient Summary Report for complete documentation. Thank you for the consult.         "

## 2019-12-21 NOTE — PROGRESS NOTES
Progress Note               Author: Khang Delgado Date & Time created: 2019  10:39 AM     Interval History:  Pt doing well but with low hemogolbin  Review of Systems:  ROS    Physical Exam:  Physical Exam  Musculoskeletal:      Comments: Wound essentially completely healed  No evidence of bleeding or hematoma  I dont see r hip wound as the cause of declining hemoglobin   Neurological:      Comments: Sensation and motor intact b le at baseline         Labs:          Recent Labs     19  0400 19  0516 19  0310   SODIUM 140 140 136   POTASSIUM 3.8 4.0 3.9   CHLORIDE 109 108 106   CO2 26 26 27   BUN 12 12 13   CREATININE 0.70 0.64 0.58   CALCIUM 7.8* 8.1* 7.8*     Recent Labs     19  0400 19  0516 19  0310   GLUCOSE 100* 123* 145*     Recent Labs     19  0400 19  1036 19  0516 19  1040 19  0310   RBC 2.81*  --  3.05*  --  2.71*   HEMOGLOBIN 7.1*  --  7.7*  --  6.9*   HEMATOCRIT 23.9*  --  25.9*  --  23.1*   PLATELETCT 240  --  290  --  268   APTT 91.7* 81.2*  --  106.6*  --      Recent Labs     19  0400 19  0516 19  0310   WBC 4.3* 11.2* 9.8   NEUTSPOLYS 62.40 78.90* 72.40*   LYMPHOCYTES 17.90* 8.50* 11.90*   MONOCYTES 15.10* 10.30 11.80   EOSINOPHILS 1.20 0.00 0.20   BASOPHILS 0.20 0.20 0.10     Hemodynamics:  Temp (24hrs), Av.4 °C (99.3 °F), Min:37.4 °C (99.3 °F), Max:37.4 °C (99.3 °F)  Temperature: 37.4 °C (99.3 °F), Monitored Temp: 37.5 °C (99.5 °F)  Pulse  Av.8  Min: 59  Max: 114   Blood Pressure : (!) 90/51     Respiratory:    Respiration: 18, Pulse Oximetry: 95 %, O2 Daily Delivery Respiratory : Silicone Nasal Cannula     PEP/CPT Method: Positive Airway Pressure Device, Work Of Breathing / Effort: Mild  RUL Breath Sounds: Clear, RML Breath Sounds: Clear, RLL Breath Sounds: Diminished, HAJA Breath Sounds: Clear, LLL Breath Sounds: Diminished  Fluids:    Intake/Output Summary (Last 24 hours) at 2019 1039  Last  data filed at 12/21/2019 0600  Gross per 24 hour   Intake 2838.67 ml   Output 450 ml   Net 2388.67 ml     Weight: 79.5 kg (175 lb 4.3 oz)  GI/Nutrition:  Orders Placed This Encounter   Procedures   • Diet Order Regular     Standing Status:   Standing     Number of Occurrences:   1     Order Specific Question:   Diet:     Answer:   Regular [1]     Medical Decision Making, by Problem:  Active Hospital Problems    Diagnosis   • *Shock (HCC) [R57.9]   • Fever [R50.9]   • Acute pulmonary embolism with acute cor pulmonale (HCC) [I26.09]   • Acute respiratory failure (HCC) [J96.00]   • Closed dislocation of right hip (HCC) [S73.004A]   • COPD (chronic obstructive pulmonary disease) (HCC) [J44.9]   • Elevated troponin [R79.89]   • Gastroesophageal reflux disease [K21.9]   • Obesity (BMI 30-39.9) [E66.9]   • Dyslipidemia [E78.5]   • Normocytic anemia [D64.9]       Plan:  wbat b le with r post approach total hip precautions  No further recs    Quality-Core Measures

## 2019-12-21 NOTE — THERAPY
"Occupational Therapy Evaluation completed.   Functional Status:  Max A with ADLs and txfs  Plan of Care: Will benefit from Occupational Therapy 4 times per week  Discharge Recommendations: Discharge to a transitional care facility for continued therapy services.    See \"Rehab Therapy-Acute\" Patient Summary Report for complete documentation.    "

## 2019-12-21 NOTE — CARE PLAN
Problem: Safety  Goal: Will remain free from injury: hospital clothing on patient, bed alarm on and, safety precautions in place.  Outcome: PROGRESSING AS EXPECTED     Problem: Knowledge Deficit  Goal: Knowledge of disease process/condition, treatment plan, diagnostic tests, and medications will improve.  Explained to patient the reason for CXR and MOA of Symbicort, that she will be taking in the morning.   Outcome: PROGRESSING AS EXPECTED

## 2019-12-21 NOTE — FLOWSHEET NOTE
Respiratory Therapy Update    Interdisciplinary Plan of Care-Goals (Indications)  Bronchodilator Indications: History / Diagnosis (12/16/19 1826)  Hyperinflation Protocol Indications: Atelectasis Documented by Chest X-Ray (12/20/19 1610)  Interdisciplinary Plan of Care-Outcomes   Bronchodilator Outcome: Patient at Stable Baseline (12/16/19 1826)  Hyperinflation Protocol Goals/Outcome: Absences of or Improvement in Signs of Atelectasis;Stable Vital Capacity x24 hrs and Patient Understands / uses I.S. (12/20/19 1610)    #PEP/CPT (Manual) Initial: Initial (12/18/19 1846)     #SVN Performed: Yes (12/16/19 1826)    Cough: Non Productive (12/20/19 1610)  Sputum Amount: Unable to Evaluate (12/20/19 1610)  Sputum Color: Unable to Evaluate (12/20/19 1610)  Sputum Consistency: Unable to Evaluate (12/20/19 1610)    Heated Hi Flow Nasal Cannula (HHFNC): Yes (12/16/19 0712)  FiO2 (HHFNC): 40 (12/16/19 0712)  Flowrate (HHFNC): 40 (12/16/19 0712)    FiO2%: 40 % (12/16/19 1000)  O2 (LPM): 3 (12/20/19 1610)  O2 Daily Delivery Respiratory : Silicone Nasal Cannula (12/20/19 1610)    Breath Sounds  Pre/Post Intervention: Pre Intervention Assessment (12/20/19 1610)  RUL Breath Sounds: Clear (12/20/19 1610)  RML Breath Sounds: Diminished (12/20/19 1610)  RLL Breath Sounds: Diminished (12/20/19 1610)  HAJA Breath Sounds: Clear (12/20/19 1610)  LLL Breath Sounds: Diminished (12/20/19 1610)        Events/Summary/Plan: PEP/IS done with much coaching and a poor effort.  No distress at this time. (12/20/19 1610)

## 2019-12-21 NOTE — PROGRESS NOTES
Critical Care Progress Note    Date of admission  12/14/2019    Chief Complaint  79 y.o. female admitted 12/14/2019 with transfer for PE    Hospital Course    79 y.o. female who presented 12/14/2019 as a transfer from [facility] where she was admitted for syncope due to a PE and complicated by right hip dislocation.  On 11/21 she had a right JAIME.  She was transferred to [facility] for reduction of the right hip dislocation, but today while on the floor developed worsening hypoxemia and hypotension so was transferred to the ICU.  She also developed fever.  [place] reports she is currently quite dyspneic, denies chest pain or abdominal pain.  She was previously quite healthy other than a history of COPD from secondhand smoke.  Her daughter reports that her mom does well at home except for when she gets up without help and her daughter attributes her syncope and fall to getting up without assistance.  Her PTTs have been therapeutic.  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  Considered clot in transit however it is more typical of a Chiari network and clot.       Interval Problem Update  Reviewed last 24 hour events:    A&O x4  Feels better  Wants to go back to the rehab hospital, her family is bringing in a full [employer] dinner!  Hgb 6.9 -> blood ordered  ID ROS neg  Tm 102.0  Off ABX  WBC  SR 60s  SBp 90-120s  Midodrine 10 tid  Poor PO  UO low  CXR wet/atelectasis, slight worse hernia still present, line looks okay, no PTX  Eliquis  -500  3 lpm NC, unchanged    UA, if abnormal sent for culture  If spikes fever repeat blood cultures  Follow-up x-ray as needed  Cathflo  Mobility per Ortho, okay out of bed apparently will push this help with lungs  PT/OT  Replete K  PEP, May need IPV  Keep in ICU one more day    Follow-up  Black/green stool, RN concern of melena,  not classic appearance but Hemoccult pending  Recheck CBC at a completeness    UA from earlier in the day negative, 2-5 white cells and no bacteria with negative nitrite and only trace leukocyte esterase  Repeat CBC reveals hemoglobin 9.2 up from 6.9 after 1 unit of blood  Monitoring for need to stop or reverse Eliquis  TEG with mapping out of completeness pending     YESTERDAY  A&O x4  Hip reduce yesterday  Follows  SR 90s  SBp 80-90s  UO adequate  Midodrine    No CXR  O2 3 lpm NC    PEP QID  T 38.8  WBC 11.2  Zosyn complete  Loose BMs  Heparin drip    NOAC - Eliquis  Fever eval?  Mobility per Ortho  Transfer?  Rehab next week?    Serial follow-up  Hemodynamics unchanged, blood pressure soft on midodrine  Respiratory status similar with work of breathing still minimal but O2 requirements slight up as O2 titrated to 3.5 L  Patient still with fever, T-max this afternoon 102, ID ROS negative and patient is just completing 5 days of Zosyn for pneumonia/UTI  Central line site looks good, patient not having diarrhea, surgical wound on right hip actually looks pretty good    Serial follow-up  Patient a bit more tachypneic this afternoon, blood pressure better, febrile to 101.8  Few rales and wheezes on exam at this time with diminished breath sounds at base  Blood cultures x2, lactic acid/BNP/pro calcitonin, IPV, follow-up x-ray in a.m.,   Good response to Lasix this morning will give Lasix twice daily for a couple of days, on review of I's and O she is very positive over the whole course of her stay    Review of Systems  Review of Systems   Constitutional: Positive for fever (Persisting, does return to normal during the day). Negative for chills and malaise/fatigue.   HENT: Negative for congestion, sinus pain and sore throat.    Eyes: Negative for blurred vision and double vision.   Respiratory: Negative for cough, hemoptysis, sputum production and shortness of breath (Denies today).    Cardiovascular:  Positive for leg swelling (Unchanged). Negative for chest pain, palpitations, orthopnea and PND.   Gastrointestinal: Positive for melena (Possible melena, nurse described black/green stool, Hemoccult pending). Negative for abdominal pain, blood in stool, diarrhea, nausea and vomiting.   Genitourinary: Negative for dysuria, flank pain and hematuria.   Musculoskeletal: Positive for joint pain (Right hip pain improved after reduction) and myalgias (Resolving). Negative for back pain.   Neurological: Negative for sensory change, speech change, focal weakness and headaches.   Endo/Heme/Allergies: Does not bruise/bleed easily.   Psychiatric/Behavioral: Negative for depression. The patient has insomnia. The patient is not nervous/anxious (Resolved for now).       Vital Signs for last 24 hours   Temp:  [37.4 °C (99.3 °F)] 37.4 °C (99.3 °F)  Pulse:  [] 96  Resp:  [18-57] 47  BP: ()/(50-83) 124/78  SpO2:  [90 %-99 %] 95 %    Hemodynamic parameters for last 24 hours       Respiratory Information for the last 24 hours       Physical Exam   Physical Exam  Vitals signs reviewed.   Constitutional:       Appearance: She is obese.      Interventions: Nasal cannula in place.   HENT:      Head: Normocephalic and atraumatic.      Mouth/Throat:      Mouth: Mucous membranes are moist.   Eyes:      General: No scleral icterus.     Extraocular Movements: Extraocular movements intact.      Pupils: Pupils are equal, round, and reactive to light.   Neck:      Musculoskeletal: Neck supple.   Cardiovascular:      Rate and Rhythm: Normal rate and regular rhythm.      Pulses: Normal pulses.      Heart sounds: No murmur. No friction rub. No gallop.    Pulmonary:      Effort: No respiratory distress.      Breath sounds: No wheezing, rhonchi (Resolved) or rales.      Comments: Work of breathing acceptable, tachypneic at times  Abdominal:      General: Abdomen is flat. There is no distension.      Palpations: Abdomen is soft.       Tenderness: There is no tenderness. There is no right CVA tenderness, left CVA tenderness or guarding.   Musculoskeletal:         General: Swelling (Improved?) present.      Right lower leg: Edema (Improved?) present.   Skin:     General: Skin is warm and dry.      Capillary Refill: Capillary refill takes less than 2 seconds.      Coloration: Skin is not cyanotic.      Nails: There is no clubbing.        Comments: Right hip incision remains clean and dry with large Steri-Strip   Neurological:      General: No focal deficit present.      Mental Status: She is alert and oriented to person, place, and time.      Cranial Nerves: No cranial nerve deficit.      Sensory: No sensory deficit.      Motor: No weakness.   Psychiatric:         Attention and Perception: Attention normal.         Mood and Affect: Mood is not anxious (Less anxious today, in good mood).         Speech: Speech normal.         Behavior: Behavior is not agitated. Behavior is cooperative.         Cognition and Memory: Cognition normal.         Medications  Current Facility-Administered Medications   Medication Dose Route Frequency Provider Last Rate Last Dose   • alteplase (CATHFLO) syringe 2 mg  2 mg Intracatheter Once Cash Shin M.D.   Stopped at 12/21/19 1600   • furosemide (LASIX) injection 20 mg  20 mg Intravenous Q12HRS Cash Shin M.D.       • potassium bicarbonate (KLYTE) effervescent tablet 25 mEq  25 mEq Enteral Tube BID Cash Shin M.D.       • midodrine (PROAMATINE) tablet 10 mg  10 mg Oral TID WITH MEALS Cash Shin M.D.   10 mg at 12/21/19 1225   • apixaban (ELIQUIS) tablet 10 mg  10 mg Oral BID Chencho Asencio D.O.   10 mg at 12/21/19 0527   • [START ON 12/27/2019] apixaban (ELIQUIS) tablet 5 mg  5 mg Oral BID Chencho Asencio D.O.       • oxyCODONE immediate-release (ROXICODONE) tablet 5 mg  5 mg Oral Q4HRS PRN Cash Shin M.D.   5 mg at 12/17/19 1810    Or   • oxyCODONE immediate release (ROXICODONE)  tablet 10 mg  10 mg Oral Q4HRS PRN Cash Shin M.D.   10 mg at 12/20/19 2020   • fentaNYL (SUBLIMAZE) injection 25-50 mcg  25-50 mcg Intravenous Q HOUR PRN Cash Shin M.D.   50 mcg at 12/21/19 1033   • acetaminophen (TYLENOL) tablet 500 mg  500 mg Oral Q6HRS PRN Cash Shin M.D.   500 mg at 12/21/19 1516   • ipratropium-albuterol (DUONEB) nebulizer solution  3 mL Nebulization Q4H PRN (RT) Sasha Zimmerman M.D.       • norepinephrine (LEVOPHED) 8 mg in  mL Infusion  0-30 mcg/min Intravenous Continuous Sasha Zimmerman M.D.   Stopped at 12/18/19 0430   • ferrous sulfate tablet 325 mg  325 mg Oral BID WITH MEALS Katie Vera M.D.   325 mg at 12/21/19 0915   • enalaprilat (VASOTEC) injection 1.25 mg  1.25 mg Intravenous Q6HRS PRN Katie Vera M.D.       • ondansetron (ZOFRAN ODT) dispertab 4 mg  4 mg Oral Q4HRS PRN Katie Vera M.D.       • ondansetron (ZOFRAN) syringe/vial injection 4 mg  4 mg Intravenous Q4HRS PRN Katie Vera M.D.       • senna-docusate (PERICOLACE or SENOKOT S) 8.6-50 MG per tablet 2 Tab  2 Tab Oral BID Katie Vera M.D.   2 Tab at 12/21/19 0529    And   • polyethylene glycol/lytes (MIRALAX) PACKET 1 Packet  1 Packet Oral QDAY PRN Katie Vera M.D.        And   • magnesium hydroxide (MILK OF MAGNESIA) suspension 30 mL  30 mL Oral QDAY PRN Katie Vera M.D.   30 mL at 12/17/19 2318    And   • bisacodyl (DULCOLAX) suppository 10 mg  10 mg Rectal QDAY PRN Katie Vera M.D.       • Respiratory Care per Protocol   Nebulization Continuous RT Katie Vera M.D.       • budesonide-formoterol (SYMBICORT) 160-4.5 MCG/ACT inhaler 2 Puff  2 Puff Inhalation BID Katie Vera M.D.   2 Puff at 12/21/19 0528   • calcium carbonate (TUMS) chewable tab 500 mg  500 mg Oral QDAY PRN Katie Vera M.D.       • lovastatin (MEVACOR) tablet 40 mg  40 mg Oral QHS Katie Vera M.D.   40 mg at 12/20/19 2020   • omeprazole (PRILOSEC) capsule 20 mg  20 mg Oral DAILY  Katie Vera M.D.   20 mg at 12/21/19 0527   • haloperidol lactate (HALDOL) injection 1 mg  1 mg Intravenous Q4HRS PRN Earlene Jarvis M.D.   1 mg at 12/14/19 2346       Fluids    Intake/Output Summary (Last 24 hours) at 12/21/2019 1605  Last data filed at 12/21/2019 1600  Gross per 24 hour   Intake 2850 ml   Output 1980 ml   Net 870 ml       Laboratory          Recent Labs     12/19/19  0400 12/20/19  0516 12/21/19  0310   SODIUM 140 140 136   POTASSIUM 3.8 4.0 3.9   CHLORIDE 109 108 106   CO2 26 26 27   BUN 12 12 13   CREATININE 0.70 0.64 0.58   CALCIUM 7.8* 8.1* 7.8*     Recent Labs     12/19/19  0400 12/20/19  0516 12/21/19  0310   GLUCOSE 100* 123* 145*     Recent Labs     12/20/19  0516 12/21/19  0310 12/21/19  1347   WBC 11.2* 9.8 13.2*   NEUTSPOLYS 78.90* 72.40* 88.60*   LYMPHOCYTES 8.50* 11.90* 3.50*   MONOCYTES 10.30 11.80 5.30   EOSINOPHILS 0.00 0.20 0.00   BASOPHILS 0.20 0.10 0.00     Recent Labs     12/19/19  1036 12/20/19  0516 12/20/19  1040 12/21/19  0310 12/21/19  1232 12/21/19  1347   RBC  --  3.05*  --  2.71*  --  3.52*   HEMOGLOBIN  --  7.7*  --  6.9*  --  9.2*   HEMATOCRIT  --  25.9*  --  23.1*  --  29.8*   PLATELETCT  --  290  --  268  --  347   APTT 81.2*  --  106.6*  --  46.8*  --        Imaging  X-Ray:  I have personally reviewed the images and compared with prior images.  EKG:  I have personally reviewed the images and compared with prior images.  CT:    Reviewed  Echo:   Reviewed    Assessment/Plan  * Shock (HCC)  Assessment & Plan  Initial shock and admit felt to be multifactorial, primarily pulmonary embolus with relative hypovolemia, GI bleed never clearly identified although patient has been anemic and possible component of sepsis from UTI or pneumonia now status post antibiotics  Patient status post IV pressors  Soft blood pressure ongoing, treated with fluids, albumin and most recently midodrine  For balance fairly positive, appears ready for Lasix trial  Echocardiogram with good LV  function and elevated right-sided pressures consistent with her PE  Monitor for recurrence of hypotension      Fever  Assessment & Plan  Due to PE versus infection, possibly both, persisting  Antibiotics for pneumonia/UTI complete 12/19-status post 5 days Zosyn  Blood cultures negative so far  UA consistent with urinary tract infection classically, urine culture positive for pansensitive E. Coli  Repeat UA 12/21-negative  Initial CXR with left lower lobe atelectasis versus new infiltrate.    She has a large left hiatal hernia which is obscuring the film  Repeat chest x-ray consistent with atelectasis/effusions, hiatal hernia still present  MRSA nares negative  IV sites look good, reassess daily  No significant diarrhea, abdomen benign, monitor for complication of antibiotic use  Reassess daily with clinical pharmacist Re: Drug fever  Check blood cultures x2, lactic acid and procalcitonin  Low threshold to resume antibiotics, Zyvox and cefepime?      Acute pulmonary embolism with acute cor pulmonale (HCC)  Assessment & Plan  S/p submassive PE, improved after fluid resuscitation, off IV pressures, on oral midodrine ongoing  Echo noted, EF 65%, moderate pulmonary hypertension and mild RV dilation with acute event  Oxygenation unchanged, on 3.5 L NC - WOB low  TPA contraindicated initially, has been treated with heparin, transitioning to Eliquis  Continue to monitor hemoglobin, if bleeding becomes significant may need IVC filter and reversal of heparin/Eliquis anticoagulation      Acute respiratory failure (HCC)- (present on admission)  Assessment & Plan  Hypoxemia due to PE versus aspiration versus pneumonia both, clinically minimal change, remains on 3.5 L nasal cannula O2  Status post high flow nasal cannula  Patient would want trial of intubation if needed, not necessary so far, monitoring  Atelectasis/small effusions contributing  Mobilize as orthopedics will allow with her hip that was recently reduced  Add PEP or  IPV since I-S is low  Diuresis with furosemide  5-day course of Zosyn for UTI/pneumonia complete 12/19- E. coli in urine, no positive respiratory cultures  Repeat chest x-ray in a.m. as needed      Closed dislocation of right hip (HCC)  Assessment & Plan  Reviewed with orthopedics 12/16 and again 12/19  Close reduction with sedation for Dr. Delgado at the bedside in ICU 12/19 with successful reduction by x-ray    COPD (chronic obstructive pulmonary disease) (HCC)- (present on admission)  Assessment & Plan  No wheezing on exam, continue monitor  Continue RT protocols, continue DuoNeb every 6 hours, every 2 hours as needed  Hold on IV steroids for now, consider more of atelectasis, infection and volume overload than bronchospasm  Update vaccines prior to discharge    Elevated troponin- (present on admission)  Assessment & Plan  Due to PE,  demand ischemia clinically, secondary to pulmonary embolus, pulmonary hypertension and strain noted on echo  Echocardiogram noted, LV function well-preserved  Monitor    Gastroesophageal reflux disease- (present on admission)  Assessment & Plan  Continue PPI  Antireflux measures  Monitor for bleeding    Obesity (BMI 30-39.9)- (present on admission)  Assessment & Plan  Mobilize  Incentive spirometry  Weight loss to be encouraged    Dyslipidemia- (present on admission)  Assessment & Plan  Statin, weight loss, low-fat diet long-term    Normocytic anemia  Assessment & Plan  --Blood loss versus chronic versus dilutional  --Negative FAST exam, no blood on rectal exam, no ecchymosis at hip dislocation site  --Every 6 hour hemoglobin         VTE:  NOAC  Ulcer: PPI  Lines: Central Line  Ongoing indication addressed    I have performed a physical exam and reviewed and updated ROS and Plan today (12/21/2019). In review of yesterday's note (12/20/2019), there are no changes except as documented above.     Discussed patient condition and risk of morbidity and/or mortality with Hospitalist, RN, RT,  Pharmacy, Charge nurse / hot rounds and Patient.

## 2019-12-21 NOTE — PROGRESS NOTES
Dr. Richeson called to bedside to evaluate patient. Notified of increased respiratory rate, work of breathing, and fever. RT to start PEP therapy.

## 2019-12-22 ENCOUNTER — APPOINTMENT (OUTPATIENT)
Dept: RADIOLOGY | Facility: MEDICAL CENTER | Age: 79
DRG: 299 | End: 2019-12-22
Attending: INTERNAL MEDICINE
Payer: MEDICARE

## 2019-12-22 LAB
ANION GAP SERPL CALC-SCNC: 6 MMOL/L (ref 0–11.9)
ANISOCYTOSIS BLD QL SMEAR: ABNORMAL
BASOPHILS # BLD AUTO: 0 % (ref 0–1.8)
BASOPHILS # BLD: 0 K/UL (ref 0–0.12)
BUN SERPL-MCNC: 14 MG/DL (ref 8–22)
CALCIUM SERPL-MCNC: 8.1 MG/DL (ref 8.5–10.5)
CHLORIDE SERPL-SCNC: 106 MMOL/L (ref 96–112)
CO2 SERPL-SCNC: 30 MMOL/L (ref 20–33)
CREAT SERPL-MCNC: 0.63 MG/DL (ref 0.5–1.4)
EOSINOPHIL # BLD AUTO: 0 K/UL (ref 0–0.51)
EOSINOPHIL NFR BLD: 0 % (ref 0–6.9)
ERYTHROCYTE [DISTWIDTH] IN BLOOD BY AUTOMATED COUNT: 54.6 FL (ref 35.9–50)
GLUCOSE SERPL-MCNC: 108 MG/DL (ref 65–99)
HCT VFR BLD AUTO: 26.9 % (ref 37–47)
HGB BLD-MCNC: 8.1 G/DL (ref 12–16)
LYMPHOCYTES # BLD AUTO: 1.14 K/UL (ref 1–4.8)
LYMPHOCYTES NFR BLD: 10.6 % (ref 22–41)
MANUAL DIFF BLD: NORMAL
MCH RBC QN AUTO: 25.8 PG (ref 27–33)
MCHC RBC AUTO-ENTMCNC: 30.1 G/DL (ref 33.6–35)
MCV RBC AUTO: 85.7 FL (ref 81.4–97.8)
MICROCYTES BLD QL SMEAR: ABNORMAL
MONOCYTES # BLD AUTO: 0.67 K/UL (ref 0–0.85)
MONOCYTES NFR BLD AUTO: 6.2 % (ref 0–13.4)
MORPHOLOGY BLD-IMP: NORMAL
MYELOCYTES NFR BLD MANUAL: 1.8 %
NEUTROPHILS # BLD AUTO: 8.79 K/UL (ref 2–7.15)
NEUTROPHILS NFR BLD: 77 % (ref 44–72)
NEUTS BAND NFR BLD MANUAL: 4.4 % (ref 0–10)
NRBC # BLD AUTO: 0.02 K/UL
NRBC BLD-RTO: 0.2 /100 WBC
OVALOCYTES BLD QL SMEAR: NORMAL
PLATELET # BLD AUTO: 319 K/UL (ref 164–446)
PLATELET BLD QL SMEAR: NORMAL
PMV BLD AUTO: 8.7 FL (ref 9–12.9)
POIKILOCYTOSIS BLD QL SMEAR: NORMAL
POTASSIUM SERPL-SCNC: 4.2 MMOL/L (ref 3.6–5.5)
RBC # BLD AUTO: 3.14 M/UL (ref 4.2–5.4)
RBC BLD AUTO: PRESENT
SODIUM SERPL-SCNC: 142 MMOL/L (ref 135–145)
WBC # BLD AUTO: 10.8 K/UL (ref 4.8–10.8)

## 2019-12-22 PROCEDURE — A9270 NON-COVERED ITEM OR SERVICE: HCPCS | Performed by: HOSPITALIST

## 2019-12-22 PROCEDURE — 770022 HCHG ROOM/CARE - ICU (200)

## 2019-12-22 PROCEDURE — A9270 NON-COVERED ITEM OR SERVICE: HCPCS | Performed by: INTERNAL MEDICINE

## 2019-12-22 PROCEDURE — 99233 SBSQ HOSP IP/OBS HIGH 50: CPT | Performed by: INTERNAL MEDICINE

## 2019-12-22 PROCEDURE — 85007 BL SMEAR W/DIFF WBC COUNT: CPT

## 2019-12-22 PROCEDURE — 700102 HCHG RX REV CODE 250 W/ 637 OVERRIDE(OP): Performed by: INTERNAL MEDICINE

## 2019-12-22 PROCEDURE — 94669 MECHANICAL CHEST WALL OSCILL: CPT

## 2019-12-22 PROCEDURE — 700102 HCHG RX REV CODE 250 W/ 637 OVERRIDE(OP): Performed by: HOSPITALIST

## 2019-12-22 PROCEDURE — 700111 HCHG RX REV CODE 636 W/ 250 OVERRIDE (IP): Performed by: INTERNAL MEDICINE

## 2019-12-22 PROCEDURE — 85027 COMPLETE CBC AUTOMATED: CPT

## 2019-12-22 PROCEDURE — 80048 BASIC METABOLIC PNL TOTAL CA: CPT

## 2019-12-22 PROCEDURE — 92526 ORAL FUNCTION THERAPY: CPT

## 2019-12-22 PROCEDURE — 94762 N-INVAS EAR/PLS OXIMTRY CONT: CPT

## 2019-12-22 PROCEDURE — 99232 SBSQ HOSP IP/OBS MODERATE 35: CPT | Performed by: HOSPITALIST

## 2019-12-22 PROCEDURE — 71045 X-RAY EXAM CHEST 1 VIEW: CPT

## 2019-12-22 RX ORDER — ASCORBIC ACID 500 MG
500 TABLET ORAL DAILY
Status: DISCONTINUED | OUTPATIENT
Start: 2019-12-22 | End: 2019-12-28 | Stop reason: HOSPADM

## 2019-12-22 RX ADMIN — FERROUS SULFATE TAB 325 MG (65 MG ELEMENTAL FE) 325 MG: 325 (65 FE) TAB at 17:47

## 2019-12-22 RX ADMIN — OXYCODONE HYDROCHLORIDE AND ACETAMINOPHEN 500 MG: 500 TABLET ORAL at 12:22

## 2019-12-22 RX ADMIN — OXYCODONE HYDROCHLORIDE 10 MG: 10 TABLET ORAL at 23:12

## 2019-12-22 RX ADMIN — ACETAMINOPHEN 500 MG: 500 TABLET ORAL at 00:53

## 2019-12-22 RX ADMIN — POTASSIUM CHLORIDE 20 MEQ: 1500 TABLET, EXTENDED RELEASE ORAL at 17:48

## 2019-12-22 RX ADMIN — BUDESONIDE AND FORMOTEROL FUMARATE DIHYDRATE 2 PUFF: 160; 4.5 AEROSOL RESPIRATORY (INHALATION) at 17:56

## 2019-12-22 RX ADMIN — OXYCODONE HYDROCHLORIDE 5 MG: 5 TABLET ORAL at 00:53

## 2019-12-22 RX ADMIN — BUDESONIDE AND FORMOTEROL FUMARATE DIHYDRATE 2 PUFF: 160; 4.5 AEROSOL RESPIRATORY (INHALATION) at 05:12

## 2019-12-22 RX ADMIN — LOVASTATIN 40 MG: 20 TABLET ORAL at 20:09

## 2019-12-22 RX ADMIN — POTASSIUM CHLORIDE 40 MEQ: 1500 TABLET, EXTENDED RELEASE ORAL at 05:11

## 2019-12-22 RX ADMIN — ACETAMINOPHEN 500 MG: 500 TABLET ORAL at 09:13

## 2019-12-22 RX ADMIN — APIXABAN 10 MG: 5 TABLET, FILM COATED ORAL at 17:51

## 2019-12-22 RX ADMIN — FENTANYL CITRATE 50 MCG: 0.05 INJECTION, SOLUTION INTRAMUSCULAR; INTRAVENOUS at 14:45

## 2019-12-22 RX ADMIN — FERROUS SULFATE TAB 325 MG (65 MG ELEMENTAL FE) 325 MG: 325 (65 FE) TAB at 08:08

## 2019-12-22 RX ADMIN — FUROSEMIDE 20 MG: 10 INJECTION, SOLUTION INTRAMUSCULAR; INTRAVENOUS at 05:12

## 2019-12-22 RX ADMIN — MIDODRINE HYDROCHLORIDE 10 MG: 5 TABLET ORAL at 12:22

## 2019-12-22 RX ADMIN — FUROSEMIDE 20 MG: 10 INJECTION, SOLUTION INTRAMUSCULAR; INTRAVENOUS at 17:53

## 2019-12-22 RX ADMIN — MIDODRINE HYDROCHLORIDE 10 MG: 5 TABLET ORAL at 17:46

## 2019-12-22 RX ADMIN — MIDODRINE HYDROCHLORIDE 10 MG: 5 TABLET ORAL at 08:07

## 2019-12-22 RX ADMIN — OMEPRAZOLE 20 MG: 20 CAPSULE, DELAYED RELEASE ORAL at 05:11

## 2019-12-22 RX ADMIN — APIXABAN 10 MG: 5 TABLET, FILM COATED ORAL at 05:11

## 2019-12-22 ASSESSMENT — ENCOUNTER SYMPTOMS
SHORTNESS OF BREATH: 0
COUGH: 0
CHILLS: 0
BACK PAIN: 0
SORE THROAT: 0
FOCAL WEAKNESS: 0
HEMOPTYSIS: 0
ORTHOPNEA: 0
INSOMNIA: 1
ABDOMINAL PAIN: 0
DEPRESSION: 0
BLURRED VISION: 0
DIARRHEA: 0
VOMITING: 0
NERVOUS/ANXIOUS: 0
SPEECH CHANGE: 0
MYALGIAS: 1
PND: 0
FLANK PAIN: 0
NAUSEA: 0
SENSORY CHANGE: 0
SPUTUM PRODUCTION: 0
BLOOD IN STOOL: 0
DIAPHORESIS: 0
FEVER: 1
SINUS PAIN: 0
HEADACHES: 0
DOUBLE VISION: 0
PALPITATIONS: 0
BRUISES/BLEEDS EASILY: 0
NECK PAIN: 0
STRIDOR: 0
DIZZINESS: 0

## 2019-12-22 NOTE — CARE PLAN
Problem: Communication  Goal: The ability to communicate needs accurately and effectively will improve. Encouraged patient to verbalize feelings about current medical prognosis.   Outcome: PROGRESSING AS EXPECTED     Problem: Safety  Goal: Will remain free from injury: hospital clothing on, bed alarm set, bed at lowest level.   Outcome: PROGRESSING AS EXPECTED

## 2019-12-22 NOTE — FLOWSHEET NOTE
12/21/19 1535   Events/Summary/Plan   Events/Summary/Plan Began IPV therapy   Interdisciplinary Plan of Care-Goals (Indications)   Hyperinflation Protocol Indications Atelectasis Documented by Chest X-Ray   Interdisciplinary Plan of Care-Outcomes    Hyperinflation Protocol Goals/Outcome Stable Vital Capacity x24 hrs and Patient Understands / uses I.S.   Education   Education Yes - Pt. / Family has been Instructed in use of Respiratory Equipment   IPV/Metaneb Group   # IPV/Metaneb Performed Yes   Device Type IPV (red)   IPV (red)   Frequency 9 O'Clock Position;Other interval (see comment)  (pt couldn't tolerate much therapy.)   Date Circuit Last Changed 12/21/19   Circuit Change Next Due Date (Q 30 Days) 01/20/20   Respiratory WDL   Respiratory (WDL) X   Chest Exam   Work Of Breathing / Effort Mild;Shallow   Respiration (!) 36   Pulse 89   Oximetry   Continuous Oximetry Yes   Oxygen   Pulse Oximetry 95 %   O2 (LPM) 3.5   O2 Daily Delivery Respiratory  Silicone Nasal Cannula

## 2019-12-22 NOTE — PROGRESS NOTES
[facility] Medicine Daily Progress Note    Date of Service  12/22/2019    Chief Complaint  Passed out while going down stairs    Hospital Course    This is a legally blind 79 y.o. 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  She required ICU status due to associated hypoxia and hypotension.      Interval Problem Update  Hgb:8.1  Increase work of breathing  Tmax:101.3  UOP: 3L in past 24hrs  Two [facility]'s overnight      Consultants/Specialty  Intensivist  Orthopedist Dr. Delgado    Code Status  DNR but intubation okay     Disposition  Transfer to orthopedics    Review of Systems  Review of Systems   Constitutional: Positive for fever and malaise/fatigue. Negative for chills.   HENT: Negative for sore throat.    Respiratory: Negative for sputum production, shortness of breath and stridor.    Cardiovascular: Positive for leg swelling. Negative for chest pain.   Gastrointestinal: Negative for abdominal pain and nausea.   Musculoskeletal: Positive for joint pain (right hip). Negative for neck pain.   Neurological: Negative for dizziness, speech change and headaches.   Psychiatric/Behavioral: The patient is not nervous/anxious.         Physical Exam  Pulse:  [] 74  Resp:  [20-64] 61  BP: ()/(54-77) 114/56  SpO2:  [90 %-96 %] 95 %    Physical Exam  Vitals signs reviewed.   Constitutional:       Appearance: Normal appearance. She is not ill-appearing.   HENT:      Head: Normocephalic and atraumatic.      Nose: Nose normal.      Mouth/Throat:      Mouth: Mucous membranes are dry.   Eyes:      General: No scleral icterus.        Right eye: No discharge.         Left eye: No discharge.      Extraocular Movements: Extraocular movements intact.      Conjunctiva/sclera: Conjunctivae normal.   Neck:      Musculoskeletal: Normal range of motion.   Cardiovascular:      Rate and Rhythm: Normal rate and regular rhythm.      Pulses:            Radial pulses are 2+ on the right side and 2+ on the left side.        Dorsalis pedis pulses are 2+ on the right side and 2+ on the left side.      Heart sounds: Normal heart sounds. No murmur.   Pulmonary:      Effort: Pulmonary effort is normal. No respiratory distress.      Breath sounds: Normal breath sounds. No wheezing.   Abdominal:      General: There is no distension.      Palpations: Abdomen is soft. There is no mass.      Tenderness: There is no tenderness.   Musculoskeletal:         General: No tenderness or deformity.      Right lower leg: Edema present.      Left lower leg: Edema present.   Lymphadenopathy:      Cervical: No cervical adenopathy.   Skin:     Coloration: Skin is pale. Skin is not jaundiced.   Neurological:      Mental Status: She is alert and oriented to person, place, and time. Mental status is at baseline.      Cranial Nerves: No cranial nerve deficit.   Psychiatric:         Mood and Affect: Mood normal.         Behavior: Behavior normal.         Thought Content: Thought content normal.         Fluids    Intake/Output Summary (Last 24 hours) at 12/22/2019 1705  Last data filed at 12/22/2019 1600  Gross per 24 hour   Intake 200 ml   Output 2285 ml   Net -2085 ml       Laboratory  Recent Labs     12/21/19  0310 12/21/19  1347 12/22/19  0336   WBC 9.8 13.2* 10.8   RBC 2.71* 3.52* 3.14*   HEMOGLOBIN 6.9* 9.2* 8.1*   HEMATOCRIT 23.1* 29.8* 26.9*   MCV 85.2 84.7 85.7   MCH 25.5* 26.1* 25.8*   MCHC 29.9* 30.9* 30.1*   RDW 54.6* 53.4* 54.6*   PLATELETCT 268 347 319   MPV 8.8* 8.7* 8.7*     Recent Labs     12/20/19  0516 12/21/19  0310 12/22/19  0336   SODIUM 140 136 142   POTASSIUM 4.0 3.9 4.2   CHLORIDE 108 106 106   CO2 26 27 30   GLUCOSE 123* 145* 108*   BUN 12 13 14   CREATININE 0.64 0.58 0.63   CALCIUM 8.1* 7.8* 8.1*     Recent Labs     12/20/19  1040 12/21/19  1232   APTT 106.6* 46.8*               Imaging  DX-CHEST-PORTABLE (1 VIEW)   Final Result         1. Stable right IJ  central catheter.   2. Stable retrocardiac atelectasis versus consolidation and suspected left pleural effusion.   3. Ill-defined right basilar opacity, atelectasis versus consolidation. No significant right effusion.      DX-CHEST-PORTABLE (1 VIEW)   Final Result      1.  Stable right IJ central catheter.   2.  Interval worsening of left basilar opacity, atelectasis versus consolidation. It is likely exaggerated by hiatal hernia.   3.  Right basilar opacity, likely atelectasis.      DX-PELVIS-1 OR 2 VIEWS   Final Result      1.  START NUMBER RIGHT HIP DISLOCATION NO LONGER PRESENT INVOLVING THE ARTHROPLASTY.      2.  NO ACUTE FRACTURE IDENTIFIED.      US-EXTREMITY VENOUS LOWER BILAT   Final Result      DX-CHEST-FOR LINE PLACEMENT Perform procedure in: Patient's Room   Final Result      1.  New right IJV central line tip projects in satisfactory position. No pneumothorax.      2.  New linear atelectasis in the right midlung medially.      3.  Large hiatal hernia again noted.      DX-CHEST-PORTABLE (1 VIEW)   Final Result         1.  Findings on chest radiograph appear stable since the prior radiograph.  No new abnormalities are identified.      2.  Large hiatal hernia is again identified.      IR-PULMONARY ANGIOGRAM-BILATERAL    (Results Pending)        Assessment/Plan  * Shock (HCC)  Assessment & Plan  As needed vasopressors to keep MAP >65 and SBP >90  midodrine 5mg TID initiated 12/17/19 and increased to 10mg TID 12/20/19  Mildly elevated TSH with normal free thyroxine  PT/OT and mobilization as tolerates    Fever  Assessment & Plan  Monitor vitals.  Possibly secondary to pulmonary embolism  Original urine analysis showed UTI and she was treated with anti-biotics x5 days with Zosyn  12/21 had fever repeat urine analysis was unremarkable.  Negative MRSA swab  Blood cultures negative to date  On Zosyn since 12/15 and discontinued 12/19/2019 12/21 question of fever secondary to pulmonary embolism.  Question if  developing pneumonia.  Monitor blood cultures, CBC and vitals      Acute pulmonary embolism with acute cor pulmonale (HCC)  Assessment & Plan  Eliquis started 12/20/19  Continue supportive care and wean off pressor as tolerated  Continue oxygen and O2 protocol      Acute respiratory failure (HCC)- (present on admission)  Assessment & Plan  Likely secondary to her pulmonary emboli  Continue oxygen wean as tolerates  RT per protocol  12/20 started Eliquis BID   Encourage incentive spirometer  Mobilize      Closed dislocation of right hip (HCC)  Assessment & Plan  Unable to be reduced in the emergency department  Orthopedics consulting, and reduce hip into place 12/19.  PT/OT and placement  Pain management  PRN IV fentanyl    COPD (chronic obstructive pulmonary disease) (HCC)- (present on admission)  Assessment & Plan  Continue Symbicort oxygen and bronchodilators  RT per protocol  Supplemental oxygen    Elevated troponin- (present on admission)  Assessment & Plan  Demand ischemia secondary to pulmonary emboli    Gastroesophageal reflux disease- (present on admission)  Assessment & Plan  Tums as needed  Oral omeprazole 20 mg daily    Obesity (BMI 30-39.9)- (present on admission)  Assessment & Plan  Body mass index is 37.92 kg/m².    Dyslipidemia- (present on admission)  Assessment & Plan  Statin therapy    Normocytic anemia  Assessment & Plan  Monitor CBC  12/20 Hgb:7.7  12/21 Hgb: 6.9 transfused 1 unit of packed red blood cells  12/21 guaiac stools negative for blood  Status post transfusion packed red blood cells  No sign of acute blood loss       VTE prophylaxis: heparin

## 2019-12-22 NOTE — PROGRESS NOTES
Critical Care Progress Note    Date of admission  12/14/2019    Chief Complaint  79 y.o. female admitted 12/14/2019 with transfer for PE    Hospital Course    79 y.o. female who presented 12/14/2019 as a transfer from [facility] where she was admitted for syncope due to a PE and complicated by right hip dislocation.  On 11/21 she had a right JAIME.  She was transferred to [facility] for reduction of the right hip dislocation, but today while on the floor developed worsening hypoxemia and hypotension so was transferred to the ICU.  She also developed fever.  Guillermina reports she is currently quite dyspneic, denies chest pain or abdominal pain.  She was previously quite healthy other than a history of COPD from secondhand smoke.  Her daughter reports that her mom does well at home except for when she gets up without help and her daughter attributes her syncope and fall to getting up without assistance.  Her PTTs have been therapeutic.  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  Considered clot in transit however it is more typical of a Chiari network and clot.       Interval Problem Update  Reviewed last 24 hour events:    A&O x4  Follows well  SOB better  SR/ST 60-100s  SBp 100-130s  UO good  I/Os neg 2020cc/24 hours, ARB still +14 L  Tm 102  WBC 10.8  Off ABX  LA normal  Procal 0.72  BNP 13K  CXR better atx/effusion/CM  -500  5 pm NC O2  IPV Q4  Apixiban  Hgb 8.1  EOB with therapy    Continue forced diuresis  Continue to monitor for need for antibiotic therapy  Pushing mobility and IPV, not performing I-S well or PEP  Hemodynamics acceptable    Case reviewed at great length with daughter and her  along with children at the bedside, reviewed diagnosis, trends, prognosis and current treatment regimen, daughter is a CNA and had multiple excellent  "questions.  Children and singing [employer] some patient's mood significantly improved  Oxygenation slightly worse than yesterday but work of breathing dramatically better and has remained so throughout the day  Did tolerate some mobilization with therapy to side of bed and actually stood up well with assistance  Fever better today, oxygenation improving in afternoon and white count returned to normal    Patient requesting to go back to rehab earlier this week because she has plans to \"have Rescue dinner with the staff at the Rehab hospital next to [employer]\"     YESTERDAY  A&O x4  Feels better  Wants to go back to the rehab hospital, her family is bringing in a full [employer] dinner!  Hgb 6.9 -> blood ordered  ID ROS neg  Tm 102.0  Off ABX  WBC  SR 60s  SBp 90-120s  Midodrine 10 tid  Poor PO  UO low  CXR wet/atelectasis, slight worse hernia still present, line looks okay, no PTX  Eliquis  -500  3 lpm NC, unchanged    UA, if abnormal sent for culture  If spikes fever repeat blood cultures  Follow-up x-ray as needed  Cathflo  Mobility per Ortho, okay out of bed apparently will push this help with lungs  PT/OT  Replete K  PEP, May need IPV  Keep in ICU one more day    Follow-up  Black/green stool, RN concern of melena, not classic appearance but Hemoccult pending  Recheck CBC at a completeness    UA from earlier in the day negative, 2-5 white cells and no bacteria with negative nitrite and only trace leukocyte esterase  Repeat CBC reveals hemoglobin 9.2 up from 6.9 after 1 unit of blood  Monitoring for need to stop or reverse Eliquis  TEG with mapping out of completeness pending    Review of Systems  Review of Systems   Constitutional: Positive for fever (Improved). Negative for chills, diaphoresis and malaise/fatigue.   HENT: Negative for congestion, sinus pain and sore throat.    Eyes: Negative for blurred vision and double vision.   Respiratory: Negative for cough, hemoptysis, sputum production and " shortness of breath (Denies today).    Cardiovascular: Positive for leg swelling (Improved). Negative for chest pain, palpitations, orthopnea and PND.   Gastrointestinal: Negative for abdominal pain, blood in stool, diarrhea, melena (Pain negative, related to iron repletion?), nausea and vomiting.   Genitourinary: Negative for dysuria, flank pain and hematuria.   Musculoskeletal: Positive for joint pain (Significantly improved since right hip reduced) and myalgias (Improved). Negative for back pain.   Neurological: Negative for sensory change, speech change, focal weakness and headaches.   Endo/Heme/Allergies: Does not bruise/bleed easily.   Psychiatric/Behavioral: Negative for depression. The patient has insomnia (Improved). The patient is not nervous/anxious (Resolved for now).       Vital Signs for last 24 hours   Pulse:  [73-97] 84  Resp:  [20-64] 30  BP: ()/(54-77) 113/61  SpO2:  [90 %-96 %] 92 %    Hemodynamic parameters for last 24 hours       Respiratory Information for the last 24 hours       Physical Exam   Physical Exam  Vitals signs reviewed.   Constitutional:       Appearance: She is obese.      Interventions: Nasal cannula in place.   HENT:      Head: Normocephalic and atraumatic.      Mouth/Throat:      Mouth: Mucous membranes are moist.   Eyes:      General: No scleral icterus.     Extraocular Movements: Extraocular movements intact.      Pupils: Pupils are equal, round, and reactive to light.   Neck:      Musculoskeletal: Neck supple.   Cardiovascular:      Rate and Rhythm: Normal rate and regular rhythm.      Pulses: Normal pulses.      Heart sounds: No murmur. No friction rub. No gallop.    Pulmonary:      Effort: No respiratory distress.      Breath sounds: No wheezing, rhonchi (Resolved) or rales.      Comments: Work of breathing improved, respiratory rate better  Abdominal:      General: Abdomen is flat. There is no distension.      Palpations: Abdomen is soft.      Tenderness: There is  no tenderness. There is no right CVA tenderness, left CVA tenderness or guarding.   Musculoskeletal:         General: Swelling (Improved) present.      Right lower leg: Edema (Improved?) present.      Left lower leg: Edema present.   Skin:     General: Skin is warm and dry.      Capillary Refill: Capillary refill takes less than 2 seconds.      Coloration: Skin is not cyanotic.      Nails: There is no clubbing.        Comments: Right hip incision remains clean and dry with large Steri-Strip   Neurological:      General: No focal deficit present.      Mental Status: She is alert and oriented to person, place, and time.      Cranial Nerves: No cranial nerve deficit.      Sensory: No sensory deficit.      Motor: No weakness.   Psychiatric:         Attention and Perception: Attention normal.         Mood and Affect: Mood is not anxious (Less anxious today, in good mood).         Speech: Speech normal.         Behavior: Behavior is not agitated. Behavior is cooperative.         Cognition and Memory: Cognition normal.      Comments: Mood and spirits improved especially with family and today including grandchildren who are singing [employer]         Medications  Current Facility-Administered Medications   Medication Dose Route Frequency Provider Last Rate Last Dose   • ascorbic acid tablet 500 mg  500 mg Oral DAILY Chencho Asencio D.O.   500 mg at 12/22/19 1222   • furosemide (LASIX) injection 20 mg  20 mg Intravenous Q12HRS Cash Shin M.D.   20 mg at 12/22/19 1753   • potassium chloride SA (Kdur) tablet 40 mEq  40 mEq Oral BID Cash Shin M.D.   20 mEq at 12/22/19 1748   • midodrine (PROAMATINE) tablet 10 mg  10 mg Oral TID WITH MEALS Cash Shin M.D.   10 mg at 12/22/19 1746   • apixaban (ELIQUIS) tablet 10 mg  10 mg Oral BID WALLY Krause.O.   10 mg at 12/22/19 1751   • [START ON 12/27/2019] apixaban (ELIQUIS) tablet 5 mg  5 mg Oral BID Chencho Asencio, D.O.       • oxyCODONE  immediate-release (ROXICODONE) tablet 5 mg  5 mg Oral Q4HRS PRN Cash Shin M.D.   5 mg at 12/22/19 0053    Or   • oxyCODONE immediate release (ROXICODONE) tablet 10 mg  10 mg Oral Q4HRS PRN Cash Shin M.D.   10 mg at 12/22/19 2312   • fentaNYL (SUBLIMAZE) injection 25-50 mcg  25-50 mcg Intravenous Q HOUR PRN Cash Shin M.D.   50 mcg at 12/22/19 1445   • acetaminophen (TYLENOL) tablet 500 mg  500 mg Oral Q6HRS PRN Cash Shin M.D.   500 mg at 12/22/19 0913   • ipratropium-albuterol (DUONEB) nebulizer solution  3 mL Nebulization Q4H PRN (RT) Sasha Zimmerman M.D.       • ferrous sulfate tablet 325 mg  325 mg Oral BID WITH MEALS Katie Vera M.D.   325 mg at 12/22/19 1747   • enalaprilat (VASOTEC) injection 1.25 mg  1.25 mg Intravenous Q6HRS PRN Katie Vera M.D.       • ondansetron (ZOFRAN ODT) dispertab 4 mg  4 mg Oral Q4HRS PRN Katie Vera M.D.       • ondansetron (ZOFRAN) syringe/vial injection 4 mg  4 mg Intravenous Q4HRS PRN Katie Vera M.D.       • senna-docusate (PERICOLACE or SENOKOT S) 8.6-50 MG per tablet 2 Tab  2 Tab Oral BID Katie Vera M.D.   Stopped at 12/21/19 1800    And   • polyethylene glycol/lytes (MIRALAX) PACKET 1 Packet  1 Packet Oral QDAY PRN Katie Vera M.D.        And   • magnesium hydroxide (MILK OF MAGNESIA) suspension 30 mL  30 mL Oral QDAY PRN Katie Vera M.D.   30 mL at 12/17/19 2318    And   • bisacodyl (DULCOLAX) suppository 10 mg  10 mg Rectal QDAY PRN Katie Vera M.D.       • Respiratory Care per Protocol   Nebulization Continuous RT Katie Vera M.D.       • budesonide-formoterol (SYMBICORT) 160-4.5 MCG/ACT inhaler 2 Puff  2 Puff Inhalation BID Katie Vera M.D.   2 Puff at 12/22/19 1756   • calcium carbonate (TUMS) chewable tab 500 mg  500 mg Oral QDAY PRN Katie Vera M.D.       • lovastatin (MEVACOR) tablet 40 mg  40 mg Oral QHS Katie Vera M.D.   40 mg at 12/22/19 2009   • omeprazole (PRILOSEC) capsule 20 mg   20 mg Oral DAILY Katie Vera M.D.   20 mg at 12/22/19 0511   • haloperidol lactate (HALDOL) injection 1 mg  1 mg Intravenous Q4HRS PRN Earlene Jarvis M.D.   1 mg at 12/14/19 2346       Fluids    Intake/Output Summary (Last 24 hours) at 12/22/2019 2313  Last data filed at 12/22/2019 2200  Gross per 24 hour   Intake 440 ml   Output 2285 ml   Net -1845 ml       Laboratory          Recent Labs     12/20/19  0516 12/21/19  0310 12/22/19  0336   SODIUM 140 136 142   POTASSIUM 4.0 3.9 4.2   CHLORIDE 108 106 106   CO2 26 27 30   BUN 12 13 14   CREATININE 0.64 0.58 0.63   CALCIUM 8.1* 7.8* 8.1*     Recent Labs     12/20/19  0516 12/21/19  0310 12/22/19  0336   GLUCOSE 123* 145* 108*     Recent Labs     12/21/19  0310 12/21/19  1347 12/22/19  0336   WBC 9.8 13.2* 10.8   NEUTSPOLYS 72.40* 88.60* 77.00*   LYMPHOCYTES 11.90* 3.50* 10.60*   MONOCYTES 11.80 5.30 6.20   EOSINOPHILS 0.20 0.00 0.00   BASOPHILS 0.10 0.00 0.00     Recent Labs     12/20/19  1040 12/21/19  0310 12/21/19  1232 12/21/19  1347 12/22/19  0336   RBC  --  2.71*  --  3.52* 3.14*   HEMOGLOBIN  --  6.9*  --  9.2* 8.1*   HEMATOCRIT  --  23.1*  --  29.8* 26.9*   PLATELETCT  --  268  --  347 319   APTT 106.6*  --  46.8*  --   --        Imaging  X-Ray:  I have personally reviewed the images and compared with prior images.  EKG:  I have personally reviewed the images and compared with prior images.  CT:    Reviewed  Echo:   Reviewed    Assessment/Plan  * Shock (HCC)  Assessment & Plan  Initial shock and admit felt to be multifactorial, primarily pulmonary embolus with relative hypovolemia, GI bleed never clearly identified although patient has been anemic and possible component of sepsis from UTI or pneumonia now status post antibiotics  Patient status post IV pressors  Soft blood pressure ongoing, treated with fluids, albumin and most recently midodrine  For balance fairly positive, appears ready for Lasix trial  Echocardiogram with good LV function and elevated  right-sided pressures consistent with her PE  Monitor for recurrence of hypotension      Fever  Assessment & Plan  Due to PE versus infection, possibly both, persisting but finally improved  Antibiotics for pneumonia/UTI complete 12/19-status post 5 days Zosyn-monitoring for need to resume  Blood cultures negative so far- repeat if blood cultures positive and patient looks toxic  UA consistent with urinary tract infection classically, urine culture positive for pansensitive E. Coli  Repeat UA 12/21-negative  ID ROS negative and exam/inspection of IV sites okay so far on daily review  Initial CXR with left lower lobe atelectasis versus new infiltrate-current films with effusion/atelectasis  She has a large left hiatal hernia which is obscuring the film in part as well  Repeat chest x-ray as needed  MRSA nares negative  IV sites look good, continue to reassess daily, consider pulling central line- current line 7 days old, may need midline  No significant diarrhea, abdomen benign, monitor for complication of antibiotic use  Reassess daily with clinical pharmacist Re: Drug fever  Check blood cultures x2, lactic acid and procalcitonin as needed  Low threshold to resume antibiotics, Zyvox and cefepime?      Acute pulmonary embolism with acute cor pulmonale (HCC)  Assessment & Plan  S/p submassive PE, improved after fluid resuscitation, off IV pressures, on oral midodrine ongoing, off norepinephrine  Echo noted, EF 65%, moderate pulmonary hypertension and mild RV dilation with acute event  Oxygenation unchanged, on 3.5 L NC - WOB low  TPA contraindicated initially, has been treated with heparin, transitioning to Eliquis, stop heparin drip per protocols after adequately initiating Eliquis, reviewed with pharmacy  Continue to monitor hemoglobin, if bleeding becomes significant may need IVC filter and reversal of heparin/Eliquis anticoagulation      Acute respiratory failure (HCC)- (present on admission)  Assessment &  Plan  Hypoxemia due to PE versus aspiration versus pneumonia both, clinically minimal change, remains on 3.5-5 L nasal cannula O2, continue to titrate  Status post high flow nasal cannula  Patient would want trial of intubation if needed, not necessary so far, monitoring-not the best BiPAP candidate  Atelectasis/small effusions contributing-responding to pulmonary toilet  Mobilize as orthopedics will allow with her hip that was recently reduced-okay to mobilize with assistance  Add PEP or IPV since I-S is low  Diuresis with furosemide ongoing and will continue, volume overload clinically continue treating a couple more days  Replete potassium  5-day course of Zosyn for UTI/pneumonia complete 12/19- E. coli in urine, no positive respiratory cultures  Monitoring for need to resume antibiotic therapy, no for now  Repeat chest x-ray in a.m. as needed      Closed dislocation of right hip (HCC)  Assessment & Plan  Reviewed with orthopedics 12/16 and again 12/19  Close reduction with sedation for Dr. Delgado at the bedside in ICU 12/19 with successful reduction by x-ray, provided bedside sedation with ketamine/medazepam and fentanyl    COPD (chronic obstructive pulmonary disease) (HCC)- (present on admission)  Assessment & Plan  No wheezing on exam, continue monitor  Continue RT protocols, continue DuoNeb via IPV every 6 hours, every 2 hours as needed  Hold on IV steroids for now, treating atelectasis and volume overload than bronchospasm, monitoring for infection  Update vaccines prior to discharge    Elevated troponin- (present on admission)  Assessment & Plan  Due to PE,  demand ischemia clinically, secondary to pulmonary embolus, pulmonary hypertension and strain noted on echo  Echocardiogram noted, LV function well-preserved  Monitor    Gastroesophageal reflux disease- (present on admission)  Assessment & Plan  Continue PPI, okay to give enterally  Antireflux measures ongoing  Monitor for bleeding, clinically no so  far    Obesity (BMI 30-39.9)- (present on admission)  Assessment & Plan  Mobilize  Incentive spirometry  Weight loss to be encouraged    Dyslipidemia- (present on admission)  Assessment & Plan  Statin, weight loss, low-fat diet long-term  Dietary education prior to discharge    Normocytic anemia  Assessment & Plan  --Blood loss versus chronic versus dilutional  --Negative FAST exam, no blood on rectal exam, no ecchymosis at hip dislocation site  --Every 6 hour hemoglobin         VTE:  NOAC  Ulcer: PPI  Lines: Central Line  Ongoing indication addressed    I have performed a physical exam and reviewed and updated ROS and Plan today (12/22/2019). In review of yesterday's note (12/21/2019), there are no changes except as documented above.     Discussed patient condition and risk of morbidity and/or mortality with Hospitalist, Family, RN, RT, Pharmacy, Charge nurse / hot rounds and Patient.

## 2019-12-22 NOTE — THERAPY
"Speech Language Therapy dysphagia treatment completed.   Functional Status:  Pt seen on this date for a repeat swallow evaluation. Per RN, pt's breathing better today compared to when SLP saw pt yesterday, however, respiratory rate still between 30-40. Pt awake and agreeable to therapy. No overt s/sx of aspiration appreciated with NTL via teaspoon and cup sip, purees, and sips of thins x2, however, immediate belching appreciated with thins. Pt reports a hx of reflux managed by \"an orange pill that's shaped like an eye\". During PO trials, respiratory rate increased from 30's to high 40's, which puts pt at high risk of aspiration. Increased WOB and use of accessory muscles appreciated especially as pt was feeding self, however, better with 1:1 feeding from clinician. Although no s/sx of aspiration appreciated with trials as noted during assessment yesterday, pt is still at high risk given WOB and respiratory rate. Discussed results of repeat swallow evaluation with MD including high respiratory rate and no overt s/sx of aspiration and MD okay to carefully begin a nectar thick full liquid diet with 1:1 feeding from RN with implementation of swallowing strategies including sitting up at 90*. Dysphagia education presented to pt and she verbalized understanding. SLP to follow.    Recommendations: okay per MD to begin a NTFL diet with 1:1 feeding for all PO and strict implementation of swallowing compensatory strategies    Plan of Care: Will benefit from Speech Therapy 3 times per week  Post-Acute Therapy: Recommend inpatient transitional care services for continued speech therapy services.        See \"Rehab Therapy-Acute\" Patient Summary Report for complete documentation.     "

## 2019-12-23 LAB
ANION GAP SERPL CALC-SCNC: 5 MMOL/L (ref 0–11.9)
BASOPHILS # BLD AUTO: 0.2 % (ref 0–1.8)
BASOPHILS # BLD: 0.02 K/UL (ref 0–0.12)
BUN SERPL-MCNC: 14 MG/DL (ref 8–22)
CALCIUM SERPL-MCNC: 8.6 MG/DL (ref 8.5–10.5)
CHLORIDE SERPL-SCNC: 105 MMOL/L (ref 96–112)
CO2 SERPL-SCNC: 31 MMOL/L (ref 20–33)
CREAT SERPL-MCNC: 0.61 MG/DL (ref 0.5–1.4)
EOSINOPHIL # BLD AUTO: 0.02 K/UL (ref 0–0.51)
EOSINOPHIL NFR BLD: 0.2 % (ref 0–6.9)
ERYTHROCYTE [DISTWIDTH] IN BLOOD BY AUTOMATED COUNT: 55.7 FL (ref 35.9–50)
GLUCOSE SERPL-MCNC: 95 MG/DL (ref 65–99)
HCT VFR BLD AUTO: 27.6 % (ref 37–47)
HGB BLD-MCNC: 8.2 G/DL (ref 12–16)
IMM GRANULOCYTES # BLD AUTO: 0.28 K/UL (ref 0–0.11)
IMM GRANULOCYTES NFR BLD AUTO: 3 % (ref 0–0.9)
LYMPHOCYTES # BLD AUTO: 1.21 K/UL (ref 1–4.8)
LYMPHOCYTES NFR BLD: 13 % (ref 22–41)
MCH RBC QN AUTO: 25.5 PG (ref 27–33)
MCHC RBC AUTO-ENTMCNC: 29.7 G/DL (ref 33.6–35)
MCV RBC AUTO: 86 FL (ref 81.4–97.8)
MONOCYTES # BLD AUTO: 0.99 K/UL (ref 0–0.85)
MONOCYTES NFR BLD AUTO: 10.6 % (ref 0–13.4)
NEUTROPHILS # BLD AUTO: 6.8 K/UL (ref 2–7.15)
NEUTROPHILS NFR BLD: 73 % (ref 44–72)
NRBC # BLD AUTO: 0.02 K/UL
NRBC BLD-RTO: 0.2 /100 WBC
PLATELET # BLD AUTO: 350 K/UL (ref 164–446)
PMV BLD AUTO: 8.6 FL (ref 9–12.9)
POTASSIUM SERPL-SCNC: 4.3 MMOL/L (ref 3.6–5.5)
RBC # BLD AUTO: 3.21 M/UL (ref 4.2–5.4)
SODIUM SERPL-SCNC: 141 MMOL/L (ref 135–145)
WBC # BLD AUTO: 9.3 K/UL (ref 4.8–10.8)

## 2019-12-23 PROCEDURE — A9270 NON-COVERED ITEM OR SERVICE: HCPCS | Performed by: INTERNAL MEDICINE

## 2019-12-23 PROCEDURE — 99233 SBSQ HOSP IP/OBS HIGH 50: CPT | Performed by: HOSPITALIST

## 2019-12-23 PROCEDURE — 80048 BASIC METABOLIC PNL TOTAL CA: CPT

## 2019-12-23 PROCEDURE — 97162 PT EVAL MOD COMPLEX 30 MIN: CPT

## 2019-12-23 PROCEDURE — 86902 BLOOD TYPE ANTIGEN DONOR EA: CPT | Mod: 91

## 2019-12-23 PROCEDURE — A9270 NON-COVERED ITEM OR SERVICE: HCPCS | Performed by: HOSPITALIST

## 2019-12-23 PROCEDURE — 700102 HCHG RX REV CODE 250 W/ 637 OVERRIDE(OP): Performed by: HOSPITALIST

## 2019-12-23 PROCEDURE — 700111 HCHG RX REV CODE 636 W/ 250 OVERRIDE (IP): Performed by: INTERNAL MEDICINE

## 2019-12-23 PROCEDURE — 99233 SBSQ HOSP IP/OBS HIGH 50: CPT | Performed by: INTERNAL MEDICINE

## 2019-12-23 PROCEDURE — 770001 HCHG ROOM/CARE - MED/SURG/GYN PRIV*

## 2019-12-23 PROCEDURE — 700102 HCHG RX REV CODE 250 W/ 637 OVERRIDE(OP): Performed by: INTERNAL MEDICINE

## 2019-12-23 PROCEDURE — 92526 ORAL FUNCTION THERAPY: CPT

## 2019-12-23 PROCEDURE — 85025 COMPLETE CBC W/AUTO DIFF WBC: CPT

## 2019-12-23 PROCEDURE — 94669 MECHANICAL CHEST WALL OSCILL: CPT

## 2019-12-23 RX ADMIN — MIDODRINE HYDROCHLORIDE 10 MG: 5 TABLET ORAL at 08:19

## 2019-12-23 RX ADMIN — APIXABAN 10 MG: 5 TABLET, FILM COATED ORAL at 04:52

## 2019-12-23 RX ADMIN — MIDODRINE HYDROCHLORIDE 10 MG: 5 TABLET ORAL at 12:45

## 2019-12-23 RX ADMIN — POTASSIUM CHLORIDE 40 MEQ: 1500 TABLET, EXTENDED RELEASE ORAL at 04:52

## 2019-12-23 RX ADMIN — LOVASTATIN 40 MG: 20 TABLET ORAL at 21:31

## 2019-12-23 RX ADMIN — FUROSEMIDE 20 MG: 10 INJECTION, SOLUTION INTRAMUSCULAR; INTRAVENOUS at 04:53

## 2019-12-23 RX ADMIN — MIDODRINE HYDROCHLORIDE 10 MG: 5 TABLET ORAL at 17:55

## 2019-12-23 RX ADMIN — APIXABAN 10 MG: 5 TABLET, FILM COATED ORAL at 17:56

## 2019-12-23 RX ADMIN — FERROUS SULFATE TAB 325 MG (65 MG ELEMENTAL FE) 325 MG: 325 (65 FE) TAB at 08:19

## 2019-12-23 RX ADMIN — FUROSEMIDE 20 MG: 10 INJECTION, SOLUTION INTRAMUSCULAR; INTRAVENOUS at 17:56

## 2019-12-23 RX ADMIN — FERROUS SULFATE TAB 325 MG (65 MG ELEMENTAL FE) 325 MG: 325 (65 FE) TAB at 17:56

## 2019-12-23 RX ADMIN — OXYCODONE HYDROCHLORIDE 10 MG: 10 TABLET ORAL at 12:45

## 2019-12-23 RX ADMIN — SENNOSIDES AND DOCUSATE SODIUM 2 TABLET: 8.6; 5 TABLET ORAL at 04:53

## 2019-12-23 RX ADMIN — POTASSIUM CHLORIDE 40 MEQ: 1500 TABLET, EXTENDED RELEASE ORAL at 17:56

## 2019-12-23 RX ADMIN — BUDESONIDE AND FORMOTEROL FUMARATE DIHYDRATE 2 PUFF: 160; 4.5 AEROSOL RESPIRATORY (INHALATION) at 17:56

## 2019-12-23 RX ADMIN — OXYCODONE HYDROCHLORIDE AND ACETAMINOPHEN 500 MG: 500 TABLET ORAL at 04:53

## 2019-12-23 RX ADMIN — BUDESONIDE AND FORMOTEROL FUMARATE DIHYDRATE 2 PUFF: 160; 4.5 AEROSOL RESPIRATORY (INHALATION) at 04:52

## 2019-12-23 RX ADMIN — SENNOSIDES AND DOCUSATE SODIUM 2 TABLET: 8.6; 5 TABLET ORAL at 17:56

## 2019-12-23 RX ADMIN — OMEPRAZOLE 20 MG: 20 CAPSULE, DELAYED RELEASE ORAL at 04:52

## 2019-12-23 ASSESSMENT — ENCOUNTER SYMPTOMS
FOCAL WEAKNESS: 0
DIARRHEA: 0
COUGH: 1
DOUBLE VISION: 0
MYALGIAS: 0
BLURRED VISION: 0
NECK PAIN: 0
SHORTNESS OF BREATH: 1
HEMOPTYSIS: 0
NERVOUS/ANXIOUS: 0
DEPRESSION: 0
PND: 0
DIAPHORESIS: 0
NAUSEA: 0
SPEECH CHANGE: 0
INSOMNIA: 0
BLOOD IN STOOL: 0
FLANK PAIN: 0
BRUISES/BLEEDS EASILY: 0
CHILLS: 0
WEAKNESS: 1
VOMITING: 0
DIZZINESS: 0
BACK PAIN: 0
FEVER: 0
FEVER: 1
SHORTNESS OF BREATH: 0
ABDOMINAL PAIN: 0
SINUS PAIN: 0
SPUTUM PRODUCTION: 1
HEADACHES: 0
SORE THROAT: 0
SENSORY CHANGE: 0
ORTHOPNEA: 0
COUGH: 0
PALPITATIONS: 0
SPUTUM PRODUCTION: 0

## 2019-12-23 ASSESSMENT — COGNITIVE AND FUNCTIONAL STATUS - GENERAL
MOVING TO AND FROM BED TO CHAIR: UNABLE
SUGGESTED CMS G CODE MODIFIER MOBILITY: CN
MOBILITY SCORE: 6
STANDING UP FROM CHAIR USING ARMS: TOTAL
CLIMB 3 TO 5 STEPS WITH RAILING: TOTAL
TURNING FROM BACK TO SIDE WHILE IN FLAT BAD: UNABLE
WALKING IN HOSPITAL ROOM: TOTAL
MOVING FROM LYING ON BACK TO SITTING ON SIDE OF FLAT BED: UNABLE

## 2019-12-23 ASSESSMENT — GAIT ASSESSMENTS: GAIT LEVEL OF ASSIST: UNABLE TO PARTICIPATE

## 2019-12-23 NOTE — FLOWSHEET NOTE
Respiratory Therapy Update    Interdisciplinary Plan of Care-Goals (Indications)  Bronchodilator Indications: History / Diagnosis (12/16/19 1826)  Hyperinflation Protocol Indications: Atelectasis Documented by Chest X-Ray (12/22/19 1650)  Interdisciplinary Plan of Care-Outcomes   Bronchodilator Outcome: Patient at Stable Baseline (12/16/19 1826)  Hyperinflation Protocol Goals/Outcome: Greater Than 60% of Predicted I.S. Volume x 24 hrs;Stable Vital Capacity x24 hrs and Patient Understands / uses I.S. (12/22/19 1650)    #PEP/CPT (Manual) Initial: Initial (12/18/19 1846)     #SVN Performed: Yes (12/16/19 1826)    Cough: Congested;Non Productive (12/22/19 1650)  Sputum Amount: Unable to Evaluate (12/22/19 1650)  Sputum Color: Unable to Evaluate (12/22/19 1650)  Sputum Consistency: Unable to Evaluate (12/22/19 1650)    Heated Hi Flow Nasal Cannula (HHFNC): Yes (12/16/19 0712)  FiO2 (HHFNC): 40 (12/16/19 0712)  Flowrate (HHFNC): 40 (12/16/19 0712)    FiO2%: 40 % (12/16/19 1000)  O2 (LPM): 3 (12/22/19 1650)  O2 Daily Delivery Respiratory : Silicone Nasal Cannula (12/22/19 1650)    Breath Sounds  Pre/Post Intervention: Pre Intervention Assessment (12/22/19 1650)  RUL Breath Sounds: Clear (12/22/19 1650)  RML Breath Sounds: Clear;Diminished (12/22/19 1650)  RLL Breath Sounds: Diminished (12/22/19 1650)  HAJA Breath Sounds: Clear (12/22/19 1650)  LLL Breath Sounds: Diminished (12/22/19 1650)        Events/Summary/Plan: IPV done in fowlers via m/p.  F/B IS with fair effort.  Pt. talking w/o difficulty. (12/22/19 1650)

## 2019-12-23 NOTE — CARE PLAN
Problem: Communication  Goal: The ability to communicate needs accurately and effectively will improve: Patient encouraged to verbalize feelings about illness.   Outcome: PROGRESSING AS EXPECTED     Problem: Safety  Goal: Will remain free from injury: Hospital clothing on, bed alarm on and  bed is at lowest position,   Outcome: PROGRESSING AS EXPECTED

## 2019-12-23 NOTE — THERAPY
"Speech Language Therapy dysphagia treatment completed.   Functional Status:  Patient was seen as a high follow up for dysphagia tx with breakfast meal after starting a nectar thick full liquid diet yesterday with direct supervision. Pt was repositioned upright in bed. Pt is currently on 4L of O2 via NC and O2 sats remained 93-95% throughout the session. RR was between 30-40 while eating. Safe swallow strategy training conducted as pt was able to self-feed independently today. Min verbal cues provided for pt to reduce bite/sip size, implement pacing breaks to allow RR to slow down/reduce WOB and reduce risk of aspiration. Pt reporting she does not like cream of wheat but always eats oatmeal for breakfast. Oatmeal trialed today with pt demo'ing timely swallow response, complete hyolaryngeal excursion. No s/sx of aspiration occurred with cup/straw sips of NTL, thin/thick purees via tsp. Recommend lateral diet change to Dysphagia 1/NTL with direct supervision for meals. SLP will reassess with advanced textures as respiratory status improves.     Recommendations: Dysphagia 1/NTL with direct supervision  Plan of Care: Will benefit from Speech Therapy 4 times per week  Post-Acute Therapy: Recommend inpatient transitional care services for continued speech therapy services.        See \"Rehab Therapy-Acute\" Patient Summary Report for complete documentation.     "

## 2019-12-23 NOTE — PROGRESS NOTES
"Hospital Medicine Daily Progress Note    Date of Service  12/23/2019    Chief Complaint  Passed out while going down stairs    Hospital Course    This is a legally blind 79 y.o. 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  She required ICU status due to associated hypoxia and hypotension.  Her blood pressures is slowly improved on midodrine.  Dr. Khang Delgado performed closed reduction of right hip dislocation on 12/19/2019.       Interval Problem Update  A+Ox3  Moves all ext  Stable BP  Tmax 100  NTL diet  UOP via ferreira      Consultants/Specialty  Intensivist  Orthopedist Dr. Delgado    Code Status  DNR but intubation okay     Disposition  Transfer to orthopedics    Review of Systems  Review of Systems   Constitutional: Positive for malaise/fatigue. Negative for chills and fever.   HENT: Negative for sore throat.    Eyes:        \"I am legally blind\"   Respiratory: Negative for cough, sputum production and shortness of breath.    Cardiovascular: Positive for leg swelling. Negative for chest pain.   Gastrointestinal: Negative for abdominal pain and nausea.   Musculoskeletal: Positive for joint pain (right hip). Negative for neck pain.   Neurological: Positive for weakness. Negative for dizziness, speech change and headaches.   Psychiatric/Behavioral: The patient is not nervous/anxious.         Physical Exam  Pulse:  [] 88  Resp:  [20-64] 22  BP: (103-145)/(56-80) 145/75  SpO2:  [90 %-95 %] 94 %    Physical Exam  Vitals signs reviewed.   Constitutional:       Appearance: Normal appearance. She is not ill-appearing.   HENT:      Head: Normocephalic and atraumatic.      Nose: Nose normal.      Mouth/Throat:      Mouth: Mucous membranes are dry.   Eyes:      General: No scleral icterus.        Right eye: No discharge.         Left eye: No discharge.      Extraocular Movements: Extraocular movements intact.      " Conjunctiva/sclera: Conjunctivae normal.   Neck:      Musculoskeletal: Normal range of motion.   Cardiovascular:      Rate and Rhythm: Normal rate and regular rhythm.      Pulses:           Radial pulses are 2+ on the right side and 2+ on the left side.      Heart sounds: Normal heart sounds. No murmur.   Pulmonary:      Effort: Pulmonary effort is normal. No respiratory distress.      Breath sounds: Normal breath sounds.   Abdominal:      General: There is no distension.      Palpations: Abdomen is soft. There is no mass.      Tenderness: There is no tenderness.   Musculoskeletal:         General: No tenderness or deformity.      Right lower leg: Edema present.      Left lower leg: Edema present.   Lymphadenopathy:      Cervical: No cervical adenopathy.   Skin:     Coloration: Skin is pale. Skin is not jaundiced.   Neurological:      Mental Status: She is alert and oriented to person, place, and time. Mental status is at baseline.      Cranial Nerves: No cranial nerve deficit.   Psychiatric:         Mood and Affect: Mood normal.         Behavior: Behavior normal.         Thought Content: Thought content normal.         Fluids    Intake/Output Summary (Last 24 hours) at 12/23/2019 1409  Last data filed at 12/23/2019 1300  Gross per 24 hour   Intake 930 ml   Output 2370 ml   Net -1440 ml       Laboratory  Recent Labs     12/21/19  1347 12/22/19  0336 12/23/19  0315   WBC 13.2* 10.8 9.3   RBC 3.52* 3.14* 3.21*   HEMOGLOBIN 9.2* 8.1* 8.2*   HEMATOCRIT 29.8* 26.9* 27.6*   MCV 84.7 85.7 86.0   MCH 26.1* 25.8* 25.5*   MCHC 30.9* 30.1* 29.7*   RDW 53.4* 54.6* 55.7*   PLATELETCT 347 319 350   MPV 8.7* 8.7* 8.6*     Recent Labs     12/21/19  0310 12/22/19  0336 12/23/19  0315   SODIUM 136 142 141   POTASSIUM 3.9 4.2 4.3   CHLORIDE 106 106 105   CO2 27 30 31   GLUCOSE 145* 108* 95   BUN 13 14 14   CREATININE 0.58 0.63 0.61   CALCIUM 7.8* 8.1* 8.6     Recent Labs     12/21/19  1232   APTT 46.8*                Imaging  DX-CHEST-PORTABLE (1 VIEW)   Final Result         1. Stable right IJ central catheter.   2. Stable retrocardiac atelectasis versus consolidation and suspected left pleural effusion.   3. Ill-defined right basilar opacity, atelectasis versus consolidation. No significant right effusion.      DX-CHEST-PORTABLE (1 VIEW)   Final Result      1.  Stable right IJ central catheter.   2.  Interval worsening of left basilar opacity, atelectasis versus consolidation. It is likely exaggerated by hiatal hernia.   3.  Right basilar opacity, likely atelectasis.      DX-PELVIS-1 OR 2 VIEWS   Final Result      1.  START NUMBER RIGHT HIP DISLOCATION NO LONGER PRESENT INVOLVING THE ARTHROPLASTY.      2.  NO ACUTE FRACTURE IDENTIFIED.      US-EXTREMITY VENOUS LOWER BILAT   Final Result      DX-CHEST-FOR LINE PLACEMENT Perform procedure in: Patient's Room   Final Result      1.  New right IJV central line tip projects in satisfactory position. No pneumothorax.      2.  New linear atelectasis in the right midlung medially.      3.  Large hiatal hernia again noted.      DX-CHEST-PORTABLE (1 VIEW)   Final Result         1.  Findings on chest radiograph appear stable since the prior radiograph.  No new abnormalities are identified.      2.  Large hiatal hernia is again identified.      IR-PULMONARY ANGIOGRAM-BILATERAL    (Results Pending)        Assessment/Plan  * Shock (HCC)  Assessment & Plan  As needed vasopressors to keep MAP >65 and SBP >90  midodrine 5mg TID initiated 12/17/19 and increased to 10mg TID 12/20/19  Mildly elevated TSH with normal free thyroxine  PT/OT and mobilization as tolerates    Fever  Assessment & Plan  Monitor vitals.  Possibly secondary to pulmonary embolism  Original urine analysis showed UTI and she was treated with anti-biotics x5 days with Zosyn  12/21 had fever repeat urine analysis was unremarkable.  Negative MRSA swab  Blood cultures negative to date  On Zosyn since 12/15 and discontinued  12/19/2019 12/21 question of fever secondary to pulmonary embolism.  Question if developing pneumonia.  Monitor blood cultures, CBC and vitals      Acute pulmonary embolism with acute cor pulmonale (HCC)  Assessment & Plan  Eliquis started 12/20/19  Continue supportive care and wean off pressor as tolerated  Continue oxygen and O2 protocol      Acute respiratory failure (HCC)- (present on admission)  Assessment & Plan  Likely secondary to her pulmonary emboli  Continue oxygen wean as tolerates  RT per protocol  12/20 started Eliquis BID   Encourage incentive spirometer  Mobilize      Closed dislocation of right hip (HCC)  Assessment & Plan  Unable to be reduced in the emergency department  Orthopedics consulting, and reduce hip into place 12/19.  PT/OT and placement  Pain management  PRN IV fentanyl    COPD (chronic obstructive pulmonary disease) (HCC)- (present on admission)  Assessment & Plan  Continue Symbicort oxygen and bronchodilators  RT per protocol  Supplemental oxygen    Elevated troponin- (present on admission)  Assessment & Plan  Demand ischemia secondary to pulmonary emboli    Gastroesophageal reflux disease- (present on admission)  Assessment & Plan  Tums as needed  Oral omeprazole 20 mg daily    Obesity (BMI 30-39.9)- (present on admission)  Assessment & Plan  Body mass index is 37.92 kg/m².    Dyslipidemia- (present on admission)  Assessment & Plan  Statin therapy    Normocytic anemia  Assessment & Plan  Monitor CBC  12/20 Hgb:7.7  12/21 Hgb: 6.9 transfused 1 unit of packed red blood cells  12/21 guaiac stools negative for blood  Status post transfusion packed red blood cells  No sign of acute blood loss       VTE prophylaxis: heparin

## 2019-12-23 NOTE — DISCHARGE PLANNING
Care Transition Team Assessment  In the case of an emergency, pt's legal NOK is Mendy Kern     RNCM met with pt at bedside and obtained the information used in this assessment. Pt verified accuracy of facesheet. Pt lives in a single story apt alone, daughter and son staying with pt in early Jan..  Pt uses [email or web address] on [address]. Prior to current hospitalization, pt was  independent in ADLS/IADLS, needs jose angel assist with bathing. Uses [facility], is also sight impaired.   Pt earns approximately $740.00/mo SSI.  Pt has a good support system. Pt denies any hx of substance use and denies any dx of mh .Pt is adamant about not going to SNF. Is agreeable to [facility].    Information Source:ptOrientation : Oriented x 4  Information Given By: Patient  Informant's Name: (Guillermina)  Who is responsible for making decisions for patient? : Patient         Elopement Risk  Legal Hold: No  Ambulatory or Self Mobile in Wheelchair: No-Not an Elopement Risk  Elopement Risk: Not at Risk for Elopement    Interdisciplinary Discharge Planning  Does Admitting Nurse Feel This Could be a Complex Discharge?: No  Primary Care Physician: (Dr. Molina)  Lives with - Patient's Self Care Capacity: Alone and Able to Care For Self  Patient or legal guardian wants to designate a caregiver (see row info): No  Caregiver name: Charity Kern  Caregiver relationship to patient: Daughter  Caregiver contact info: [phone]  ([facility] [place]) Authorization for Release of Health Information has been completed: (patient currently confused )  Support Systems: Children  Housing / Facility: 1 Story Apartment / Condo  Do You Take your Prescribed Medications Regularly: Yes  Mobility Issues: Yes  Prior Services: Intermittent Physical Support for ADL Per Family  Durable Medical Equipment: Walker    Discharge Preparedness  What are your discharge supports?: Child  Difficulity with ADLs: Bathing, Walking  Difficulity with IADLs: Cooking, Driving, Laundry, Shopping          Finances  Financial Barriers to Discharge: No  Prescription Coverage: Yes    Vision / Hearing Impairment  Right Eye Vision: Blind, Wears Glasses  Left Eye Vision: Blind, Wears Glasses  Hearing Impairment: Both Ears, Hearing Device Not Available              Domestic Abuse  Have you ever been the victim of abuse or violence?: No  Physical Abuse or Sexual Abuse: No  Verbal Abuse or Emotional Abuse: No  Possible Abuse Reported to:: Not Applicable    Psychological Assessment  History of Substance Abuse: None  History of Psychiatric Problems: No         Anticipated Discharge Information  Anticipated discharge disposition: Acute rehab

## 2019-12-23 NOTE — PROGRESS NOTES
Critical Care Progress Note    Date of admission  12/14/2019    Chief Complaint  79 y.o. female admitted 12/14/2019 with transfer for PE    Hospital Course    79 y.o. female who presented 12/14/2019 as a transfer from [facility] where she was admitted for syncope due to a PE and complicated by right hip dislocation.  On 11/21 she had a right JAIME.  She was transferred to [facility] for reduction of the right hip dislocation, but today while on the floor developed worsening hypoxemia and hypotension so was transferred to the ICU.  She also developed fever.  Guillermina reports she is currently quite dyspneic, denies chest pain or abdominal pain.  She was previously quite healthy other than a history of COPD from secondhand smoke.  Her daughter reports that her mom does well at home except for when she gets up without help and her daughter attributes her syncope and fall to getting up without assistance.  Her PTTs have been therapeutic.  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  Considered clot in transit however it is more typical of a Chiari network and clot.       Interval Problem Update  Reviewed last 24 hour events:  Neuro: aox4 numbness and tinling moves all ext  HR: snr to tach 70-80's  SBP: 100-110  Tmax: 100  GI: regular diet liquid nectar thick  UOP: ferreira  Lines: right IJ triple  Resp: 4l , IPV QID mouth piece IS 250ml diminished  Vte: pe apixaban  PPI/H2:pepcid  Antibx: zosyn finished zosyn 12/19  Midodrine  Lasix  Edge of bed   Pt/ot   Floor transfer  Central line d/c      Review of Systems  Review of Systems   Constitutional: Positive for fever. Negative for chills, diaphoresis and malaise/fatigue.   HENT: Negative for congestion, sinus pain and sore throat.    Eyes: Negative for blurred vision and double vision.   Respiratory: Positive for cough, sputum  production and shortness of breath. Negative for hemoptysis.    Cardiovascular: Positive for leg swelling. Negative for chest pain, palpitations, orthopnea and PND.   Gastrointestinal: Negative for abdominal pain, blood in stool, diarrhea, melena (Pain negative, related to iron repletion?), nausea and vomiting.   Genitourinary: Negative for dysuria, flank pain and hematuria.   Musculoskeletal: Positive for joint pain. Negative for back pain and myalgias.   Neurological: Negative for sensory change, speech change, focal weakness and headaches.   Endo/Heme/Allergies: Does not bruise/bleed easily.   Psychiatric/Behavioral: Negative for depression. The patient is not nervous/anxious and does not have insomnia.       Vital Signs for last 24 hours   Pulse:  [] 88  Resp:  [20-50] 22  BP: (103-145)/(56-80) 145/75  SpO2:  [90 %-94 %] 94 %    Hemodynamic parameters for last 24 hours       Respiratory Information for the last 24 hours       Physical Exam   Physical Exam  Constitutional:       Appearance: She is obese. She is not ill-appearing.      Interventions: Nasal cannula in place.   HENT:      Head: Normocephalic and atraumatic.      Mouth/Throat:      Mouth: Mucous membranes are moist.   Eyes:      General: No scleral icterus.     Extraocular Movements: Extraocular movements intact.      Pupils: Pupils are equal, round, and reactive to light.   Neck:      Musculoskeletal: Neck supple.   Cardiovascular:      Rate and Rhythm: Normal rate and regular rhythm.      Pulses: Normal pulses.      Heart sounds: No murmur. No friction rub. No gallop.    Pulmonary:      Effort: No respiratory distress.      Breath sounds: Rales present. No wheezing or rhonchi (Resolved).      Comments: Work of breathing improved, respiratory rate better  Abdominal:      General: Abdomen is flat.      Palpations: Abdomen is soft.      Tenderness: There is no tenderness. There is no right CVA tenderness, left CVA tenderness or guarding.    Musculoskeletal:         General: Swelling present.      Right lower leg: Edema present.      Left lower leg: Edema present.      Comments: Pillow b/w leg for hip dislocation   Skin:     General: Skin is warm and dry.      Capillary Refill: Capillary refill takes less than 2 seconds.      Coloration: Skin is not cyanotic.      Nails: There is no clubbing.        Comments: Right hip incision remains clean and dry with large Steri-Strip   Neurological:      General: No focal deficit present.      Mental Status: She is alert and oriented to person, place, and time.      Cranial Nerves: No cranial nerve deficit.      Sensory: No sensory deficit.      Motor: No weakness.   Psychiatric:         Attention and Perception: Attention normal.         Mood and Affect: Mood is not anxious (Less anxious today, in good mood).         Speech: Speech normal.         Behavior: Behavior is not agitated. Behavior is cooperative.         Cognition and Memory: Cognition normal.      Comments: Good spirits         Medications  Current Facility-Administered Medications   Medication Dose Route Frequency Provider Last Rate Last Dose   • ascorbic acid tablet 500 mg  500 mg Oral DAILY GABE [email or web address]   500 mg at 12/23/19 0453   • furosemide (LASIX) injection 20 mg  20 mg Intravenous Q12HRS Cash Shin M.D.   20 mg at 12/23/19 0453   • potassium chloride SA (Kdur) tablet 40 mEq  40 mEq Oral BID Cash Shin M.D.   40 mEq at 12/23/19 0452   • midodrine (PROAMATINE) tablet 10 mg  10 mg Oral TID WITH MEALS Cash Shin M.D.   10 mg at 12/23/19 1245   • apixaban (ELIQUIS) tablet 10 mg  10 mg Oral BID WALLY [email or web address]   10 mg at 12/23/19 0452   • [START ON 12/27/2019] apixaban (ELIQUIS) tablet 5 mg  5 mg Oral BID WALLY Krause.O.       • oxyCODONE immediate-release (ROXICODONE) tablet 5 mg  5 mg Oral Q4HRS PRN Cash Shin M.D.   5 mg at 12/22/19 0053    Or   • oxyCODONE immediate release (ROXICODONE) tablet  10 mg  10 mg Oral Q4HRS PRN Cash Shin M.D.   10 mg at 12/23/19 1245   • fentaNYL (SUBLIMAZE) injection 25-50 mcg  25-50 mcg Intravenous Q HOUR PRN Cash Shin M.D.   50 mcg at 12/22/19 1445   • acetaminophen (TYLENOL) tablet 500 mg  500 mg Oral Q6HRS PRN Cash Shin M.D.   500 mg at 12/22/19 0913   • ipratropium-albuterol (DUONEB) nebulizer solution  3 mL Nebulization Q4H PRN (RT) Sasha Zimmerman M.D.       • ferrous sulfate tablet 325 mg  325 mg Oral BID WITH MEALS Katie Vear M.D.   325 mg at 12/23/19 0819   • enalaprilat (VASOTEC) injection 1.25 mg  1.25 mg Intravenous Q6HRS PRN Katie Vera M.D.       • ondansetron (ZOFRAN ODT) dispertab 4 mg  4 mg Oral Q4HRS PRN Katie Vera M.D.       • ondansetron (ZOFRAN) syringe/vial injection 4 mg  4 mg Intravenous Q4HRS PRN Katie Vera M.D.       • senna-docusate (PERICOLACE or SENOKOT S) 8.6-50 MG per tablet 2 Tab  2 Tab Oral BID Katie Vera M.D.   2 Tab at 12/23/19 0453    And   • polyethylene glycol/lytes (MIRALAX) PACKET 1 Packet  1 Packet Oral QDAY PRN Katie Vera M.D.        And   • magnesium hydroxide (MILK OF MAGNESIA) suspension 30 mL  30 mL Oral QDAY PRN Katie Vera M.D.   30 mL at 12/17/19 2318    And   • bisacodyl (DULCOLAX) suppository 10 mg  10 mg Rectal QDAY PRN Katie Vera M.D.       • Respiratory Care per Protocol   Nebulization Continuous RT Katie Vera M.D.       • budesonide-formoterol (SYMBICORT) 160-4.5 MCG/ACT inhaler 2 Puff  2 Puff Inhalation BID Katie Vera M.D.   2 Puff at 12/23/19 0452   • calcium carbonate (TUMS) chewable tab 500 mg  500 mg Oral QDAY PRN Katie Vera M.D.       • lovastatin (MEVACOR) tablet 40 mg  40 mg Oral QHS Katie Vera M.D.   40 mg at 12/22/19 2009   • omeprazole (PRILOSEC) capsule 20 mg  20 mg Oral DAILY Katie Vera M.D.   20 mg at 12/23/19 0452   • haloperidol lactate (HALDOL) injection 1 mg  1 mg Intravenous Q4HRS PRN Earlene Jarvis M.D.   1 mg at  12/14/19 2346       Fluids    Intake/Output Summary (Last 24 hours) at 12/23/2019 1608  Last data filed at 12/23/2019 1300  Gross per 24 hour   Intake 910 ml   Output 2310 ml   Net -1400 ml       Laboratory          Recent Labs     12/21/19  0310 12/22/19  0336 12/23/19  0315   SODIUM 136 142 141   POTASSIUM 3.9 4.2 4.3   CHLORIDE 106 106 105   CO2 27 30 31   BUN 13 14 14   CREATININE 0.58 0.63 0.61   CALCIUM 7.8* 8.1* 8.6     Recent Labs     12/21/19  0310 12/22/19  0336 12/23/19  0315   GLUCOSE 145* 108* 95     Recent Labs     12/21/19  1347 12/22/19  0336 12/23/19  0315   WBC 13.2* 10.8 9.3   NEUTSPOLYS 88.60* 77.00* 73.00*   LYMPHOCYTES 3.50* 10.60* 13.00*   MONOCYTES 5.30 6.20 10.60   EOSINOPHILS 0.00 0.00 0.20   BASOPHILS 0.00 0.00 0.20     Recent Labs     12/21/19  1232 12/21/19  1347 12/22/19  0336 12/23/19  0315   RBC  --  3.52* 3.14* 3.21*   HEMOGLOBIN  --  9.2* 8.1* 8.2*   HEMATOCRIT  --  29.8* 26.9* 27.6*   PLATELETCT  --  347 319 350   APTT 46.8*  --   --   --        Imaging  X-Ray:  I have personally reviewed the images and compared with prior images. and My impression is: right effusion and significant cardiomegaly  EKG:  I have personally reviewed the images and compared with prior images.  CT:    Reviewed  Echo:   Reviewed    Assessment/Plan  * Shock (HCC)  Assessment & Plan  Initial shock and admit felt to be multifactorial, primarily pulmonary embolus with relative hypovolemia  Off pressor on oral midodrine continue to titrate    Fever  Assessment & Plan  Due to PE versus infection, possibly both, persisting but curve seems to improving  Antibiotics for pneumonia/UTI complete 12/19-status post 5 days Zosyn-monitoring for need to resume  Blood cultures negative so far- repeat if blood cultures positive and patient looks toxic  UA consistent with urinary tract infection, urine culture positive for pansensitive E. Coli  Repeat UA 12/21-negative  CXR with left retrocardiac opacity lower lobe  atelectasis versus new infiltrate vs volume overload  She has a large left hiatal hernia which is obscuring the film in part as well  Repeat chest x-ray as needed  MRSA nares negative  No significant diarrhea, abdomen benign  Reassess daily with clinical pharmacist Re: Drug fever  Check blood cultures x2, lactic acid and procalcitonin as needed  Low threshold to resume antibiotics      Acute pulmonary embolism with acute cor pulmonale (HCC)  Assessment & Plan  S/p submassive PE, improved after fluid resuscitation, off IV pressures, on oral midodrine ongoing, off norepinephrine  Echo noted, EF 65%, moderate pulmonary hypertension and mild RV dilation with acute event  Oxygenation unchanged, on 3.5 L NC - WOB low  TPA contraindicated initially, has been treated with heparin, transitioning to Eliquis  Continue to monitor hemoglobin, if bleeding becomes significant may need IVC filter and reversal of Eliquis anticoagulation      Acute respiratory failure (HCC)- (present on admission)  Assessment & Plan  Hypoxemia due to PE versus aspiration versus pneumonia both, clinically minimal change, remains on 3.5-5 L nasal cannula O2, continue to titrate Status post high flow nasal cannula  Atelectasis/small effusions contributing-responding to pulmonary toilet  Mobilize as much as allowed by orthopedics will allow with her hip that was recently reduced-okay to mobilize with assistance  Add PEP or IPV since I-S is low  Diuresis with furosemide ongoing and will continue, significant positive fluid balance and volume overload clinically   Replete potassium  5-day course of Zosyn for UTI/pneumonia complete 12/19- E. coli in urine, no positive respiratory cultures  Monitoring for need to resume antibiotic therapy    Continue to mobilize and aggressive diuresis     Closed dislocation of right hip (HCC)  Assessment & Plan  Close reduction with sedation for Dr. Delgado at the bedside in ICU 12/19 with successful reduction by  x-ray    COPD (chronic obstructive pulmonary disease) (HCC)- (present on admission)  Assessment & Plan  No wheezing on exam, continue monitor  Continue RT protocols, continue DuoNeb via IPV every 6 hours, every 2 hours as needed  Hold on IV steroids for now, treating atelectasis and volume overload than bronchospasm, monitoring for infection  Update vaccines prior to discharge    Elevated troponin- (present on admission)  Assessment & Plan  Due to PE,  demand ischemia clinically, secondary to pulmonary embolus, pulmonary hypertension and strain noted on echo  Echocardiogram noted, LV function well-preserved  Monitor    Gastroesophageal reflux disease- (present on admission)  Assessment & Plan  Continue PPI, okay to give enterally  Antireflux measures ongoing  Monitor for bleeding, clinically no so far    Obesity (BMI 30-39.9)- (present on admission)  Assessment & Plan  Mobilize  Incentive spirometry  Weight loss to be encouraged    Dyslipidemia- (present on admission)  Assessment & Plan  Statin, weight loss, low-fat diet long-term  Dietary education prior to discharge    Normocytic anemia  Assessment & Plan  Stable continue to serially monitor        VTE:  NOAC  Ulcer: PPI  Lines: Kimbrough Catheter  Ongoing indication addressed    I have performed a physical exam and reviewed and updated ROS and Plan today (12/23/2019). In review of yesterday's note (12/22/2019), there are no changes except as documented above.     Discussed patient condition and risk of morbidity and/or mortality with Hospitalist, Family, RN, RT, Pharmacy, Charge nurse / hot rounds and Patient.

## 2019-12-23 NOTE — CARE PLAN
Problem: Respiratory:  Goal: Respiratory status will improve  Outcome: PROGRESSING AS EXPECTED    Respiratory assessment. Supplemental oxygen titrated per patient need. Continuous pulse ox. PIP as ordered.      Problem: Pain Management  Goal: Pain level will decrease to patient's comfort goal  Outcome: PROGRESSING AS EXPECTED     Pharmacologic and non-pharmacologic comfort measures in place. Mobilization and repositioning.

## 2019-12-24 LAB
ANION GAP SERPL CALC-SCNC: 5 MMOL/L (ref 0–11.9)
BASOPHILS # BLD AUTO: 0.4 % (ref 0–1.8)
BASOPHILS # BLD: 0.03 K/UL (ref 0–0.12)
BUN SERPL-MCNC: 14 MG/DL (ref 8–22)
CALCIUM SERPL-MCNC: 8.5 MG/DL (ref 8.5–10.5)
CHLORIDE SERPL-SCNC: 103 MMOL/L (ref 96–112)
CO2 SERPL-SCNC: 33 MMOL/L (ref 20–33)
CREAT SERPL-MCNC: 0.58 MG/DL (ref 0.5–1.4)
EOSINOPHIL # BLD AUTO: 0.05 K/UL (ref 0–0.51)
EOSINOPHIL NFR BLD: 0.6 % (ref 0–6.9)
ERYTHROCYTE [DISTWIDTH] IN BLOOD BY AUTOMATED COUNT: 57.5 FL (ref 35.9–50)
GLUCOSE SERPL-MCNC: 113 MG/DL (ref 65–99)
HCT VFR BLD AUTO: 29 % (ref 37–47)
HGB BLD-MCNC: 8.4 G/DL (ref 12–16)
IMM GRANULOCYTES # BLD AUTO: 0.17 K/UL (ref 0–0.11)
IMM GRANULOCYTES NFR BLD AUTO: 2 % (ref 0–0.9)
LYMPHOCYTES # BLD AUTO: 1.34 K/UL (ref 1–4.8)
LYMPHOCYTES NFR BLD: 15.9 % (ref 22–41)
MCH RBC QN AUTO: 25.2 PG (ref 27–33)
MCHC RBC AUTO-ENTMCNC: 29 G/DL (ref 33.6–35)
MCV RBC AUTO: 87.1 FL (ref 81.4–97.8)
MONOCYTES # BLD AUTO: 0.84 K/UL (ref 0–0.85)
MONOCYTES NFR BLD AUTO: 10 % (ref 0–13.4)
NEUTROPHILS # BLD AUTO: 5.99 K/UL (ref 2–7.15)
NEUTROPHILS NFR BLD: 71.1 % (ref 44–72)
NRBC # BLD AUTO: 0 K/UL
NRBC BLD-RTO: 0 /100 WBC
PLATELET # BLD AUTO: 370 K/UL (ref 164–446)
PMV BLD AUTO: 8.5 FL (ref 9–12.9)
POTASSIUM SERPL-SCNC: 4.5 MMOL/L (ref 3.6–5.5)
RBC # BLD AUTO: 3.33 M/UL (ref 4.2–5.4)
SODIUM SERPL-SCNC: 141 MMOL/L (ref 135–145)
WBC # BLD AUTO: 8.4 K/UL (ref 4.8–10.8)

## 2019-12-24 PROCEDURE — 700102 HCHG RX REV CODE 250 W/ 637 OVERRIDE(OP): Performed by: HOSPITALIST

## 2019-12-24 PROCEDURE — A9270 NON-COVERED ITEM OR SERVICE: HCPCS | Performed by: HOSPITALIST

## 2019-12-24 PROCEDURE — 700102 HCHG RX REV CODE 250 W/ 637 OVERRIDE(OP): Performed by: INTERNAL MEDICINE

## 2019-12-24 PROCEDURE — 700111 HCHG RX REV CODE 636 W/ 250 OVERRIDE (IP): Performed by: INTERNAL MEDICINE

## 2019-12-24 PROCEDURE — A9270 NON-COVERED ITEM OR SERVICE: HCPCS | Performed by: INTERNAL MEDICINE

## 2019-12-24 PROCEDURE — 99232 SBSQ HOSP IP/OBS MODERATE 35: CPT | Performed by: HOSPITALIST

## 2019-12-24 PROCEDURE — 80048 BASIC METABOLIC PNL TOTAL CA: CPT

## 2019-12-24 PROCEDURE — 85025 COMPLETE CBC W/AUTO DIFF WBC: CPT

## 2019-12-24 PROCEDURE — 770001 HCHG ROOM/CARE - MED/SURG/GYN PRIV*

## 2019-12-24 RX ADMIN — FUROSEMIDE 20 MG: 10 INJECTION, SOLUTION INTRAMUSCULAR; INTRAVENOUS at 05:48

## 2019-12-24 RX ADMIN — APIXABAN 10 MG: 5 TABLET, FILM COATED ORAL at 05:48

## 2019-12-24 RX ADMIN — FERROUS SULFATE TAB 325 MG (65 MG ELEMENTAL FE) 325 MG: 325 (65 FE) TAB at 08:14

## 2019-12-24 RX ADMIN — SENNOSIDES AND DOCUSATE SODIUM 2 TABLET: 8.6; 5 TABLET ORAL at 17:31

## 2019-12-24 RX ADMIN — APIXABAN 10 MG: 5 TABLET, FILM COATED ORAL at 17:30

## 2019-12-24 RX ADMIN — OXYCODONE HYDROCHLORIDE 5 MG: 5 TABLET ORAL at 01:50

## 2019-12-24 RX ADMIN — BUDESONIDE AND FORMOTEROL FUMARATE DIHYDRATE 2 PUFF: 160; 4.5 AEROSOL RESPIRATORY (INHALATION) at 17:32

## 2019-12-24 RX ADMIN — SENNOSIDES AND DOCUSATE SODIUM 2 TABLET: 8.6; 5 TABLET ORAL at 05:47

## 2019-12-24 RX ADMIN — OMEPRAZOLE 20 MG: 20 CAPSULE, DELAYED RELEASE ORAL at 05:48

## 2019-12-24 RX ADMIN — FUROSEMIDE 20 MG: 10 INJECTION, SOLUTION INTRAMUSCULAR; INTRAVENOUS at 17:31

## 2019-12-24 RX ADMIN — MIDODRINE HYDROCHLORIDE 10 MG: 5 TABLET ORAL at 12:20

## 2019-12-24 RX ADMIN — FERROUS SULFATE TAB 325 MG (65 MG ELEMENTAL FE) 325 MG: 325 (65 FE) TAB at 17:31

## 2019-12-24 RX ADMIN — MIDODRINE HYDROCHLORIDE 10 MG: 5 TABLET ORAL at 08:14

## 2019-12-24 RX ADMIN — OXYCODONE HYDROCHLORIDE 5 MG: 5 TABLET ORAL at 13:22

## 2019-12-24 RX ADMIN — POTASSIUM CHLORIDE 40 MEQ: 1500 TABLET, EXTENDED RELEASE ORAL at 05:48

## 2019-12-24 RX ADMIN — BUDESONIDE AND FORMOTEROL FUMARATE DIHYDRATE 2 PUFF: 160; 4.5 AEROSOL RESPIRATORY (INHALATION) at 06:09

## 2019-12-24 RX ADMIN — OXYCODONE HYDROCHLORIDE AND ACETAMINOPHEN 500 MG: 500 TABLET ORAL at 05:48

## 2019-12-24 RX ADMIN — ACETAMINOPHEN 500 MG: 500 TABLET ORAL at 18:31

## 2019-12-24 RX ADMIN — MIDODRINE HYDROCHLORIDE 10 MG: 5 TABLET ORAL at 17:31

## 2019-12-24 RX ADMIN — LOVASTATIN 40 MG: 20 TABLET ORAL at 20:37

## 2019-12-24 RX ADMIN — POTASSIUM CHLORIDE 40 MEQ: 1500 TABLET, EXTENDED RELEASE ORAL at 17:31

## 2019-12-24 RX ADMIN — ACETAMINOPHEN 500 MG: 500 TABLET ORAL at 05:47

## 2019-12-24 RX ADMIN — OXYCODONE HYDROCHLORIDE 5 MG: 5 TABLET ORAL at 20:37

## 2019-12-24 ASSESSMENT — ENCOUNTER SYMPTOMS
HEMOPTYSIS: 0
PALPITATIONS: 0
VOMITING: 0
DIZZINESS: 0
SPUTUM PRODUCTION: 0
COUGH: 0
CHILLS: 0
NAUSEA: 0
ORTHOPNEA: 0
SHORTNESS OF BREATH: 1

## 2019-12-24 ASSESSMENT — PATIENT HEALTH QUESTIONNAIRE - PHQ9
SUM OF ALL RESPONSES TO PHQ9 QUESTIONS 1 AND 2: 1
1. LITTLE INTEREST OR PLEASURE IN DOING THINGS: SEVERAL DAYS

## 2019-12-24 NOTE — PROGRESS NOTES
Progress Note               Author: Khang Delgado Date & Time created: 2019  10:01 AM     Interval History:  No new complaints awaiting bed on floor  Review of Systems:  ROS    Physical Exam:  Physical Exam  Musculoskeletal:      Comments: Leg lengths appropriate  Wound healed         Labs:          Recent Labs     19  03319  0600   SODIUM 142 141 141   POTASSIUM 4.2 4.3 4.5   CHLORIDE 106 105 103   CO2 30 31 33   BUN 14 14 14   CREATININE 0.63 0.61 0.58   CALCIUM 8.1* 8.6 8.5     Recent Labs     19  0336 19  0600   GLUCOSE 108* 95 113*     Recent Labs     19  1232  19  0600   RBC  --    < > 3.14* 3.21* 3.33*   HEMOGLOBIN  --    < > 8.1* 8.2* 8.4*   HEMATOCRIT  --    < > 26.9* 27.6* 29.0*   PLATELETCT  --    < > 319 350 370   APTT 46.8*  --   --   --   --     < > = values in this interval not displayed.     Recent Labs     19  03319  0600   WBC 10.8 9.3 8.4   NEUTSPOLYS 77.00* 73.00* 71.10   LYMPHOCYTES 10.60* 13.00* 15.90*   MONOCYTES 6.20 10.60 10.00   EOSINOPHILS 0.00 0.20 0.60   BASOPHILS 0.00 0.20 0.40     Hemodynamics:  No data recorded.  Monitored Temp: 37.6 °C (99.7 °F)  Pulse  Av.4  Min: 59  Max: 128   Blood Pressure : 106/65     Respiratory:    Respiration: (!) 24, Pulse Oximetry: 94 %, O2 Daily Delivery Respiratory : Silicone Nasal Cannula     Work Of Breathing / Effort: Mild  RUL Breath Sounds: Fine Crackles, RML Breath Sounds: Diminished, RLL Breath Sounds: Diminished, HAJA Breath Sounds: Fine Crackles, LLL Breath Sounds: Diminished  Fluids:    Intake/Output Summary (Last 24 hours) at 2019 1001  Last data filed at 2019 0800  Gross per 24 hour   Intake 1380 ml   Output 2100 ml   Net -720 ml        GI/Nutrition:  Orders Placed This Encounter   Procedures   • Diet Order Regular     Standing Status:   Standing     Number of Occurrences:   1     Order Specific  Question:   Diet:     Answer:   Regular [1]     Order Specific Question:   Texture/Fiber modifications:     Answer:   Dysphagia 1(Pureed)specify fluid consistency(question 6) [1]     Order Specific Question:   Consistency/Fluid modifications:     Answer:   Nectar Thick [2]     Comments:   no straws     Order Specific Question:   Miscellaneous modifications:     Answer:   SLP - 1:1 Supervision by Nursing [21]     Order Specific Question:   Miscellaneous modifications:     Answer:   SLP - Deliver to Nursing Station [22]     Medical Decision Making, by Problem:  Active Hospital Problems    Diagnosis   • *Shock (HCC) [R57.9]   • Fever [R50.9]   • Acute pulmonary embolism with acute cor pulmonale (HCC) [I26.09]   • Acute respiratory failure (HCC) [J96.00]   • Closed dislocation of right hip (HCC) [S73.004A]   • COPD (chronic obstructive pulmonary disease) (HCC) [J44.9]   • Elevated troponin [R79.89]   • Gastroesophageal reflux disease [K21.9]   • Obesity (BMI 30-39.9) [E66.9]   • Dyslipidemia [E78.5]   • Normocytic anemia [D64.9]       Plan:  No new res  Doing well  wbat with post approach total hip precautions    Quality-Core Measures

## 2019-12-24 NOTE — PROGRESS NOTES
[facility] Medicine Daily Progress Note    Date of Service  12/24/2019    Chief Complaint  79 y.o. female admitted 12/14/2019 with right hip pain.    Hospital Course    Ms Recio has a history of recent right hip arthroplasty.  She percent on 12/12/2019 after a syncopal episode and fall and was found to have a right hip prosthetic dislocation.  IN addition she complained of chest pain and had an elevated troponin.  She was diagnosed with a pulmonary embolism      Interval Problem Update  AOx4, depressed about hospital stay  SBP 90's-120's, -120's  On 4L NC, feels short of breath with any movement  Tolerating dysphagia diet, no BM since 12/21   Positive fluid balance, greater than 10 L since admission    Consultants/Specialty  Critical Care, I discussed the patient's condition with Dr. Ponce today on ICU Rounds  Orthopedics    Code Status  DNAR, I OK    Disposition  Transfer out of ICU, likely to need post acute care    Review of Systems  Review of Systems   Constitutional: Negative for chills and malaise/fatigue.   Respiratory: Positive for shortness of breath. Negative for cough, hemoptysis and sputum production.    Cardiovascular: Positive for leg swelling. Negative for chest pain, palpitations and orthopnea.   Gastrointestinal: Negative for nausea and vomiting.   Skin: Negative for itching and rash.   Neurological: Negative for dizziness.   All other systems reviewed and are negative.       Physical Exam  Pulse:  [74-94] 74  Resp:  [18-73] 24  BP: ()/(54-77) 106/65  SpO2:  [91 %-95 %] 94 %    Physical Exam  Constitutional:       General: She is not in acute distress.     Appearance: Normal appearance. She is normal weight.   HENT:      Head: Normocephalic and atraumatic.      Right Ear: External ear normal.      Left Ear: External ear normal.      Nose: Nose normal.      Mouth/Throat:      Mouth: Mucous membranes are moist.      Pharynx: Oropharynx is clear.   Eyes:      Extraocular Movements:  Extraocular movements intact.      Conjunctiva/sclera: Conjunctivae normal.      Pupils: Pupils are equal, round, and reactive to light.   Neck:      Musculoskeletal: Normal range of motion and neck supple.   Cardiovascular:      Rate and Rhythm: Normal rate and regular rhythm.      Pulses: Normal pulses.   Pulmonary:      Effort: Pulmonary effort is normal.      Breath sounds: Rales present.   Abdominal:      General: Abdomen is flat. Bowel sounds are normal.      Palpations: Abdomen is soft.   Musculoskeletal: Normal range of motion.      Right lower leg: Edema present.      Left lower leg: Edema present.   Skin:     General: Skin is warm and dry.      Capillary Refill: Capillary refill takes less than 2 seconds.      Coloration: Skin is not jaundiced.   Neurological:      General: No focal deficit present.      Mental Status: She is alert and oriented to person, place, and time.      Cranial Nerves: No cranial nerve deficit.      Gait: Gait normal.   Psychiatric:         Mood and Affect: Mood normal.         Behavior: Behavior normal.         Fluids    Intake/Output Summary (Last 24 hours) at 12/24/2019 1027  Last data filed at 12/24/2019 0800  Gross per 24 hour   Intake 1380 ml   Output 2100 ml   Net -720 ml       Laboratory  Recent Labs     12/22/19  0336 12/23/19  0315 12/24/19  0600   WBC 10.8 9.3 8.4   RBC 3.14* 3.21* 3.33*   HEMOGLOBIN 8.1* 8.2* 8.4*   HEMATOCRIT 26.9* 27.6* 29.0*   MCV 85.7 86.0 87.1   MCH 25.8* 25.5* 25.2*   MCHC 30.1* 29.7* 29.0*   RDW 54.6* 55.7* 57.5*   PLATELETCT 319 350 370   MPV 8.7* 8.6* 8.5*     Recent Labs     12/22/19  0336 12/23/19  0315 12/24/19  0600   SODIUM 142 141 141   POTASSIUM 4.2 4.3 4.5   CHLORIDE 106 105 103   CO2 30 31 33   GLUCOSE 108* 95 113*   BUN 14 14 14   CREATININE 0.63 0.61 0.58   CALCIUM 8.1* 8.6 8.5     Recent Labs     12/21/19  1232   APTT 46.8*               Imaging  DX-CHEST-PORTABLE (1 VIEW)   Final Result         1. Stable right IJ central catheter.    2. Stable retrocardiac atelectasis versus consolidation and suspected left pleural effusion.   3. Ill-defined right basilar opacity, atelectasis versus consolidation. No significant right effusion.      DX-CHEST-PORTABLE (1 VIEW)   Final Result      1.  Stable right IJ central catheter.   2.  Interval worsening of left basilar opacity, atelectasis versus consolidation. It is likely exaggerated by hiatal hernia.   3.  Right basilar opacity, likely atelectasis.      DX-PELVIS-1 OR 2 VIEWS   Final Result      1.  START NUMBER RIGHT HIP DISLOCATION NO LONGER PRESENT INVOLVING THE ARTHROPLASTY.      2.  NO ACUTE FRACTURE IDENTIFIED.      US-EXTREMITY VENOUS LOWER BILAT   Final Result      DX-CHEST-FOR LINE PLACEMENT Perform procedure in: Patient's Room   Final Result      1.  New right IJV central line tip projects in satisfactory position. No pneumothorax.      2.  New linear atelectasis in the right midlung medially.      3.  Large hiatal hernia again noted.      DX-CHEST-PORTABLE (1 VIEW)   Final Result         1.  Findings on chest radiograph appear stable since the prior radiograph.  No new abnormalities are identified.      2.  Large hiatal hernia is again identified.      IR-PULMONARY ANGIOGRAM-BILATERAL    (Results Pending)        Assessment/Plan  * Acute respiratory failure (HCC)- (present on admission)  Assessment & Plan  Likely secondary to her pulmonary emboli  Continue oxygen wean as tolerates  Diuresis    Shock (HCC)  Assessment & Plan  Improved  Cautious diuresis    Fever  Assessment & Plan  Improved      Acute pulmonary embolism with acute cor pulmonale (HCC)- (present on admission)  Assessment & Plan  Continue Eliquis  Supplemental oxygen, wean as tolerated      Closed dislocation of right hip (HCC)  Assessment & Plan  Unable to be reduced in the emergency department  Orthopedics consulting, and reduce hip into place 12/19.  PT/OT and placement  Pain management  PRN IV fentanyl    COPD (chronic  obstructive pulmonary disease) (HCC)- (present on admission)  Assessment & Plan  No acute exacerbation  Continue Symbicort oxygen and bronchodilators  RT per protocol  Supplemental oxygen    Elevated troponin- (present on admission)  Assessment & Plan  Demand ischemia secondary to pulmonary emboli    Gastroesophageal reflux disease- (present on admission)  Assessment & Plan  Tums as needed  Oral omeprazole 20 mg daily    Obesity (BMI 30-39.9)- (present on admission)  Assessment & Plan  Body mass index is 37.92 kg/m².    Dyslipidemia- (present on admission)  Assessment & Plan  Statin therapy    Normocytic anemia  Assessment & Plan  Monitor CBC  12/21 guaiac stools negative for blood       VTE prophylaxis: Eliquis

## 2019-12-24 NOTE — THERAPY
"Physical Therapy Evaluation completed.   Bed Mobility:  Supine to Sit: Maximal Assist  Transfers: Sit to Stand: Moderate Assist  Gait: Level Of Assist: Unable to Participate with No Equipment Needed       Plan of Care: Will benefit from Physical Therapy 4 times per week  Discharge Recommendations: Equipment: Will Continue to Assess for Equipment Needs. Post-acute therapy Discharge to a transitional care facility for continued skilled therapy services.    Pt admitted for R hip dislocation which has now been reduced and appears to be most limited by discomfort and fear of falling. She required max A to achieve upright at EOB at mod A with FWW to initiate sit to stand. Attempted transfer, but pt became too fearful of falling to complete. Pt reports feeling adamant that she does not want to go to a facility, however, she is well below her functional baseline and is a high fall risk. Recommend placement. While here, PT will follow to address gait deviations, poor activity tolerance, and instability.     See \"Rehab Therapy-Acute\" Patient Summary Report for complete documentation.     "

## 2019-12-24 NOTE — CARE PLAN
Problem: Nutritional:  Goal: Achieve adequate nutritional intake  Description  Patient will start diet and consume 50% of meals   Outcome: PROGRESSING AS EXPECTED  Per chart pt PO yesterday 50-75% 1 meal and % 1 supplements, 1 meal documented on 12/22 at 0%. Pt receiving Boost Plus once/day. Please document intake of all meals as % taken in ADL's to provide interdisciplinary communication across all shifts.  RD following.

## 2019-12-24 NOTE — CARE PLAN
Problem: Psychosocial Needs:  Goal: Level of anxiety will decrease  Outcome: NOT MET  Note:   Patient continues to be anxious and withdrawn. Spoke with patient about cause and patient stated that it is due to being in the hospital for the holidays. Family currently with patient and Cj music turned on.      Problem: Pain Management  Goal: Pain level will decrease to patient's comfort goal  Outcome: PROGRESSING AS EXPECTED  Note:   Pain remains at patient's comfort level.

## 2019-12-24 NOTE — RESPIRATORY CARE
Conscious Sedation Respiratory Update    FiO2%: 40 % (12/16/19 1000)  O2 (LPM): 4 (12/23/19 1620)  O2 Daily Delivery Respiratory : Silicone Nasal Cannula (12/23/19 1620)    Events/Summary/Plan: IPV done f/b IS with good effort.  Pt. alert and verbal. (12/23/19 1620)

## 2019-12-25 LAB
ANION GAP SERPL CALC-SCNC: 2 MMOL/L (ref 0–11.9)
BARCODED ABORH UBTYP: 5100
BARCODED ABORH UBTYP: 5100
BARCODED PRD CODE UBPRD: NORMAL
BARCODED PRD CODE UBPRD: NORMAL
BARCODED UNIT NUM UBUNT: NORMAL
BARCODED UNIT NUM UBUNT: NORMAL
BASOPHILS # BLD AUTO: 0.3 % (ref 0–1.8)
BASOPHILS # BLD: 0.02 K/UL (ref 0–0.12)
BUN SERPL-MCNC: 14 MG/DL (ref 8–22)
CALCIUM SERPL-MCNC: 8.7 MG/DL (ref 8.5–10.5)
CHLORIDE SERPL-SCNC: 100 MMOL/L (ref 96–112)
CO2 SERPL-SCNC: 37 MMOL/L (ref 20–33)
COMPONENT R 8504R: NORMAL
COMPONENT R 8504R: NORMAL
CREAT SERPL-MCNC: 0.65 MG/DL (ref 0.5–1.4)
EOSINOPHIL # BLD AUTO: 0.07 K/UL (ref 0–0.51)
EOSINOPHIL NFR BLD: 1 % (ref 0–6.9)
ERYTHROCYTE [DISTWIDTH] IN BLOOD BY AUTOMATED COUNT: 57.3 FL (ref 35.9–50)
GLUCOSE SERPL-MCNC: 102 MG/DL (ref 65–99)
HCT VFR BLD AUTO: 29.2 % (ref 37–47)
HGB BLD-MCNC: 8.6 G/DL (ref 12–16)
IMM GRANULOCYTES # BLD AUTO: 0.13 K/UL (ref 0–0.11)
IMM GRANULOCYTES NFR BLD AUTO: 1.9 % (ref 0–0.9)
LYMPHOCYTES # BLD AUTO: 1.49 K/UL (ref 1–4.8)
LYMPHOCYTES NFR BLD: 21.3 % (ref 22–41)
MCH RBC QN AUTO: 25.7 PG (ref 27–33)
MCHC RBC AUTO-ENTMCNC: 29.5 G/DL (ref 33.6–35)
MCV RBC AUTO: 87.2 FL (ref 81.4–97.8)
MONOCYTES # BLD AUTO: 0.68 K/UL (ref 0–0.85)
MONOCYTES NFR BLD AUTO: 9.7 % (ref 0–13.4)
NEUTROPHILS # BLD AUTO: 4.62 K/UL (ref 2–7.15)
NEUTROPHILS NFR BLD: 65.8 % (ref 44–72)
NRBC # BLD AUTO: 0 K/UL
NRBC BLD-RTO: 0 /100 WBC
PLATELET # BLD AUTO: 397 K/UL (ref 164–446)
PMV BLD AUTO: 8.8 FL (ref 9–12.9)
POTASSIUM SERPL-SCNC: 5 MMOL/L (ref 3.6–5.5)
PRODUCT TYPE UPROD: NORMAL
PRODUCT TYPE UPROD: NORMAL
RBC # BLD AUTO: 3.35 M/UL (ref 4.2–5.4)
SODIUM SERPL-SCNC: 139 MMOL/L (ref 135–145)
UNIT STATUS USTAT: NORMAL
UNIT STATUS USTAT: NORMAL
WBC # BLD AUTO: 7 K/UL (ref 4.8–10.8)

## 2019-12-25 PROCEDURE — 85025 COMPLETE CBC W/AUTO DIFF WBC: CPT

## 2019-12-25 PROCEDURE — 700111 HCHG RX REV CODE 636 W/ 250 OVERRIDE (IP): Performed by: HOSPITALIST

## 2019-12-25 PROCEDURE — 770001 HCHG ROOM/CARE - MED/SURG/GYN PRIV*

## 2019-12-25 PROCEDURE — A9270 NON-COVERED ITEM OR SERVICE: HCPCS | Performed by: INTERNAL MEDICINE

## 2019-12-25 PROCEDURE — 700111 HCHG RX REV CODE 636 W/ 250 OVERRIDE (IP): Performed by: INTERNAL MEDICINE

## 2019-12-25 PROCEDURE — 99233 SBSQ HOSP IP/OBS HIGH 50: CPT | Performed by: HOSPITALIST

## 2019-12-25 PROCEDURE — 700102 HCHG RX REV CODE 250 W/ 637 OVERRIDE(OP): Performed by: INTERNAL MEDICINE

## 2019-12-25 PROCEDURE — 80048 BASIC METABOLIC PNL TOTAL CA: CPT

## 2019-12-25 PROCEDURE — A9270 NON-COVERED ITEM OR SERVICE: HCPCS | Performed by: HOSPITALIST

## 2019-12-25 PROCEDURE — 94668 MNPJ CHEST WALL SBSQ: CPT

## 2019-12-25 PROCEDURE — 700102 HCHG RX REV CODE 250 W/ 637 OVERRIDE(OP): Performed by: HOSPITALIST

## 2019-12-25 RX ORDER — FUROSEMIDE 10 MG/ML
20 INJECTION INTRAMUSCULAR; INTRAVENOUS
Status: DISCONTINUED | OUTPATIENT
Start: 2019-12-25 | End: 2019-12-26

## 2019-12-25 RX ADMIN — MIDODRINE HYDROCHLORIDE 10 MG: 5 TABLET ORAL at 17:29

## 2019-12-25 RX ADMIN — FUROSEMIDE 20 MG: 10 INJECTION, SOLUTION INTRAMUSCULAR; INTRAVENOUS at 06:27

## 2019-12-25 RX ADMIN — MIDODRINE HYDROCHLORIDE 10 MG: 5 TABLET ORAL at 13:02

## 2019-12-25 RX ADMIN — LOVASTATIN 40 MG: 20 TABLET ORAL at 20:31

## 2019-12-25 RX ADMIN — APIXABAN 10 MG: 5 TABLET, FILM COATED ORAL at 17:29

## 2019-12-25 RX ADMIN — APIXABAN 10 MG: 5 TABLET, FILM COATED ORAL at 06:26

## 2019-12-25 RX ADMIN — FERROUS SULFATE TAB 325 MG (65 MG ELEMENTAL FE) 325 MG: 325 (65 FE) TAB at 09:44

## 2019-12-25 RX ADMIN — FUROSEMIDE 20 MG: 10 INJECTION, SOLUTION INTRAMUSCULAR; INTRAVENOUS at 17:30

## 2019-12-25 RX ADMIN — POTASSIUM CHLORIDE 40 MEQ: 1500 TABLET, EXTENDED RELEASE ORAL at 06:27

## 2019-12-25 RX ADMIN — OXYCODONE HYDROCHLORIDE AND ACETAMINOPHEN 500 MG: 500 TABLET ORAL at 06:26

## 2019-12-25 RX ADMIN — SENNOSIDES AND DOCUSATE SODIUM 2 TABLET: 8.6; 5 TABLET ORAL at 06:26

## 2019-12-25 RX ADMIN — FERROUS SULFATE TAB 325 MG (65 MG ELEMENTAL FE) 325 MG: 325 (65 FE) TAB at 17:29

## 2019-12-25 RX ADMIN — BUDESONIDE AND FORMOTEROL FUMARATE DIHYDRATE 2 PUFF: 160; 4.5 AEROSOL RESPIRATORY (INHALATION) at 17:30

## 2019-12-25 RX ADMIN — FUROSEMIDE 20 MG: 10 INJECTION, SOLUTION INTRAMUSCULAR; INTRAVENOUS at 13:02

## 2019-12-25 RX ADMIN — OMEPRAZOLE 20 MG: 20 CAPSULE, DELAYED RELEASE ORAL at 06:26

## 2019-12-25 RX ADMIN — BUDESONIDE AND FORMOTEROL FUMARATE DIHYDRATE 2 PUFF: 160; 4.5 AEROSOL RESPIRATORY (INHALATION) at 06:27

## 2019-12-25 RX ADMIN — MIDODRINE HYDROCHLORIDE 10 MG: 5 TABLET ORAL at 09:44

## 2019-12-25 RX ADMIN — ACETAMINOPHEN 500 MG: 500 TABLET ORAL at 22:35

## 2019-12-25 ASSESSMENT — ENCOUNTER SYMPTOMS
BACK PAIN: 0
PALPITATIONS: 0
VOMITING: 0
COUGH: 0
SHORTNESS OF BREATH: 1
NAUSEA: 0
HEMOPTYSIS: 0
NECK PAIN: 0
SPUTUM PRODUCTION: 0
ORTHOPNEA: 0
DIZZINESS: 0
CHILLS: 0

## 2019-12-25 ASSESSMENT — LIFESTYLE VARIABLES: EVER_SMOKED: NEVER

## 2019-12-25 ASSESSMENT — COPD QUESTIONNAIRES
DURING THE PAST 4 WEEKS HOW MUCH DID YOU FEEL SHORT OF BREATH: SOME OF THE TIME
COPD SCREENING SCORE: 4
DO YOU EVER COUGH UP ANY MUCUS OR PHLEGM?: NO/ONLY WITH OCCASIONAL COLDS OR INFECTIONS
HAVE YOU SMOKED AT LEAST 100 CIGARETTES IN YOUR ENTIRE LIFE: NO/DON'T KNOW

## 2019-12-25 NOTE — PROGRESS NOTES
Assumed care of patient. Bedside report received from ABBI Aden. POC reviewed with patient. All questions answered.

## 2019-12-25 NOTE — CARE PLAN
Problem: Safety  Goal: Will remain free from injury  Outcome: PROGRESSING AS EXPECTED  Note:   Bed in lowest position, call light within reach, bed alarm set, hourly rounding completed.   Goal: Will remain free from falls  Outcome: PROGRESSING AS EXPECTED     Problem: Skin Integrity  Goal: Risk for impaired skin integrity will decrease  Outcome: PROGRESSING AS EXPECTED  Note:   Patient repositioned every 2 hours     Problem: Pain Management  Goal: Pain level will decrease to patient's comfort goal  Outcome: PROGRESSING AS EXPECTED     Problem: Psychosocial Needs:  Goal: Level of anxiety will decrease  Outcome: PROGRESSING AS EXPECTED

## 2019-12-25 NOTE — CARE PLAN
Problem: Skin Integrity  Goal: Risk for impaired skin integrity will decrease  Outcome: PROGRESSING AS EXPECTED  Intervention: Assess risk factors for impaired skin integrity and/or pressure ulcers  Note:   Skin assessed during mobilization and bath. Pillows in use for positioning. Patient turned q 2 hours. Devices rotated.      Problem: Mobility  Goal: Risk for activity intolerance will decrease  Note:   Patient educated on the importance of mobilization. Patient stated understanding and mobilized to edge of bed.

## 2019-12-25 NOTE — PROGRESS NOTES
[facility] Medicine Daily Progress Note    Date of Service  12/25/2019    Chief Complaint  79 y.o. female admitted 12/14/2019 with right hip pain.    Hospital Course    Ms Recio has a history of recent elective right hip arthroplasty that was performed on 11/21/2019, post operative course was complicated by SBO that was treated conservatively and resolved, she was discharged to rehab, eventually being discharged home on 12/10/2019.  On 12/12/2019 she suffered a syncopal episode and fall, she was brought to the emergency room at [facility] and was found to have a right hip prosthetic dislocation.  Additionally, she complained of chest pain and had an elevated troponin.  She was diagnosed with a pulmonary embolism and started on anticoagulation.  She was transferred to [facility] and on 12/19/2019 she has closed reduction of the right hip dislocation.       Interval Problem Update  Patient says her breathing is about the same as yesterday, still feels short of breath, she is fatigued, she is on 4 L by nasal cannula at baseline  Responded to Lasix with nearly 2 L of urine output over the last 24 hours, her SBP is marginal ranging from 80s to 90s, not dizzy or lightheaded  SR with intermittent SVT  Pain is well controlled at rest    Consultants/Specialty  Critical Care, I discussed the patient's condition with Dr. Ponce today on ICU Rounds  Orthopedics    Code Status  DNAR, I OK    Disposition  Transfer out of ICU, likely to need post acute care    Review of Systems  Review of Systems   Constitutional: Positive for malaise/fatigue. Negative for chills.   Respiratory: Positive for shortness of breath. Negative for cough, hemoptysis and sputum production.    Cardiovascular: Positive for leg swelling. Negative for chest pain, palpitations and orthopnea.   Gastrointestinal: Negative for nausea and vomiting.   Musculoskeletal: Negative for back pain and neck pain.   Skin: Negative for itching and rash.    Neurological: Negative for dizziness.   All other systems reviewed and are negative.       Physical Exam  Pulse:  [] 103  Resp:  [21-65] 47  BP: (88-98)/(54-62) 98/62  SpO2:  [82 %-95 %] 82 %    Physical Exam  Constitutional:       General: She is not in acute distress.     Appearance: Normal appearance. She is normal weight. She is not ill-appearing, toxic-appearing or diaphoretic.   HENT:      Head: Normocephalic and atraumatic.      Right Ear: External ear normal.      Left Ear: External ear normal.      Nose: Nose normal.      Mouth/Throat:      Mouth: Mucous membranes are moist.      Pharynx: Oropharynx is clear. No oropharyngeal exudate or posterior oropharyngeal erythema.   Eyes:      Extraocular Movements: Extraocular movements intact.      Conjunctiva/sclera: Conjunctivae normal.      Pupils: Pupils are equal, round, and reactive to light.   Neck:      Musculoskeletal: Normal range of motion and neck supple.   Cardiovascular:      Rate and Rhythm: Normal rate and regular rhythm.      Pulses: Normal pulses.   Pulmonary:      Effort: Pulmonary effort is normal.      Breath sounds: Rales present.   Abdominal:      General: Abdomen is flat. Bowel sounds are normal. There is no distension.      Palpations: Abdomen is soft.      Tenderness: There is no tenderness.   Musculoskeletal: Normal range of motion.      Right lower leg: Edema present.      Left lower leg: Edema present.   Skin:     General: Skin is warm and dry.      Capillary Refill: Capillary refill takes less than 2 seconds.      Coloration: Skin is not jaundiced or pale.   Neurological:      General: No focal deficit present.      Mental Status: She is alert. She is disoriented.      Cranial Nerves: No cranial nerve deficit.      Gait: Gait normal.   Psychiatric:         Mood and Affect: Mood normal.         Behavior: Behavior normal.         Fluids    Intake/Output Summary (Last 24 hours) at 12/25/2019 1004  Last data filed at 12/25/2019  0400  Gross per 24 hour   Intake 420 ml   Output 1275 ml   Net -855 ml       Laboratory  Recent Labs     12/23/19  0315 12/24/19  0600 12/25/19  0441   WBC 9.3 8.4 7.0   RBC 3.21* 3.33* 3.35*   HEMOGLOBIN 8.2* 8.4* 8.6*   HEMATOCRIT 27.6* 29.0* 29.2*   MCV 86.0 87.1 87.2   MCH 25.5* 25.2* 25.7*   MCHC 29.7* 29.0* 29.5*   RDW 55.7* 57.5* 57.3*   PLATELETCT 350 370 397   MPV 8.6* 8.5* 8.8*     Recent Labs     12/23/19 0315 12/24/19  0600 12/25/19  0441   SODIUM 141 141 139   POTASSIUM 4.3 4.5 5.0   CHLORIDE 105 103 100   CO2 31 33 37*   GLUCOSE 95 113* 102*   BUN 14 14 14   CREATININE 0.61 0.58 0.65   CALCIUM 8.6 8.5 8.7                   Imaging  DX-CHEST-PORTABLE (1 VIEW)   Final Result         1. Stable right IJ central catheter.   2. Stable retrocardiac atelectasis versus consolidation and suspected left pleural effusion.   3. Ill-defined right basilar opacity, atelectasis versus consolidation. No significant right effusion.      DX-CHEST-PORTABLE (1 VIEW)   Final Result      1.  Stable right IJ central catheter.   2.  Interval worsening of left basilar opacity, atelectasis versus consolidation. It is likely exaggerated by hiatal hernia.   3.  Right basilar opacity, likely atelectasis.      DX-PELVIS-1 OR 2 VIEWS   Final Result      1.  START NUMBER RIGHT HIP DISLOCATION NO LONGER PRESENT INVOLVING THE ARTHROPLASTY.      2.  NO ACUTE FRACTURE IDENTIFIED.      US-EXTREMITY VENOUS LOWER BILAT   Final Result      DX-CHEST-FOR LINE PLACEMENT Perform procedure in: Patient's Room   Final Result      1.  New right IJV central line tip projects in satisfactory position. No pneumothorax.      2.  New linear atelectasis in the right midlung medially.      3.  Large hiatal hernia again noted.      DX-CHEST-PORTABLE (1 VIEW)   Final Result         1.  Findings on chest radiograph appear stable since the prior radiograph.  No new abnormalities are identified.      2.  Large hiatal hernia is again identified.      IR-PULMONARY  ANGIOGRAM-BILATERAL    (Results Pending)        Assessment/Plan  * Acute respiratory failure (HCC)- (present on admission)  Assessment & Plan  Due to pulmonary emboli, at this point there is likely some component of pulmonary edema as well  Continue oxygen wean as tolerates  Diuresis, I have ordered additional doses of Lasix, I have ordered labs to follow her potassium level while on lasix  Treat PE with eliquis    Shock (HCC)  Assessment & Plan  Resolved  Continue cautious diuresis    Fever  Assessment & Plan  Improved      Acute pulmonary embolism with acute cor pulmonale (HCC)- (present on admission)  Assessment & Plan  Continue Eliquis  Supplemental oxygen, wean as tolerated      Closed dislocation of right hip (HCC)  Assessment & Plan  Reduced in the operating room by Dr. Delgado  PT/OT and placement  Pain management    COPD (chronic obstructive pulmonary disease) (HCC)- (present on admission)  Assessment & Plan  No acute exacerbation  Continue Symbicort oxygen and bronchodilators  RT per protocol  Supplemental oxygen    Elevated troponin- (present on admission)  Assessment & Plan  Demand ischemia secondary to pulmonary emboli    Gastroesophageal reflux disease- (present on admission)  Assessment & Plan  Tums as needed  Oral omeprazole 20 mg daily    Obesity (BMI 30-39.9)- (present on admission)  Assessment & Plan  Body mass index is 37.92 kg/m².    Dyslipidemia- (present on admission)  Assessment & Plan  Statin therapy    Normocytic anemia- (present on admission)  Assessment & Plan  Monitor CBC  12/21 guaiac stools negative for blood       VTE prophylaxis: Eliquis

## 2019-12-26 ENCOUNTER — APPOINTMENT (OUTPATIENT)
Dept: RADIOLOGY | Facility: MEDICAL CENTER | Age: 79
DRG: 299 | End: 2019-12-26
Attending: HOSPITALIST
Payer: MEDICARE

## 2019-12-26 PROBLEM — I27.20 PULMONARY HYPERTENSION (HCC): Status: ACTIVE | Noted: 2019-12-26

## 2019-12-26 PROBLEM — I95.9 HYPOTENSION: Status: ACTIVE | Noted: 2019-12-26

## 2019-12-26 LAB
ANION GAP SERPL CALC-SCNC: 5 MMOL/L (ref 0–11.9)
BUN SERPL-MCNC: 14 MG/DL (ref 8–22)
CALCIUM SERPL-MCNC: 8.7 MG/DL (ref 8.5–10.5)
CHLORIDE SERPL-SCNC: 97 MMOL/L (ref 96–112)
CO2 SERPL-SCNC: 36 MMOL/L (ref 20–33)
CREAT SERPL-MCNC: 0.76 MG/DL (ref 0.5–1.4)
GLUCOSE SERPL-MCNC: 103 MG/DL (ref 65–99)
MAGNESIUM SERPL-MCNC: 2.2 MG/DL (ref 1.5–2.5)
PHOSPHATE SERPL-MCNC: 3 MG/DL (ref 2.5–4.5)
POTASSIUM SERPL-SCNC: 4.5 MMOL/L (ref 3.6–5.5)
SODIUM SERPL-SCNC: 138 MMOL/L (ref 135–145)

## 2019-12-26 PROCEDURE — 71045 X-RAY EXAM CHEST 1 VIEW: CPT

## 2019-12-26 PROCEDURE — 700102 HCHG RX REV CODE 250 W/ 637 OVERRIDE(OP): Performed by: INTERNAL MEDICINE

## 2019-12-26 PROCEDURE — A9270 NON-COVERED ITEM OR SERVICE: HCPCS | Performed by: HOSPITALIST

## 2019-12-26 PROCEDURE — 80048 BASIC METABOLIC PNL TOTAL CA: CPT

## 2019-12-26 PROCEDURE — 94669 MECHANICAL CHEST WALL OSCILL: CPT

## 2019-12-26 PROCEDURE — 99233 SBSQ HOSP IP/OBS HIGH 50: CPT | Performed by: HOSPITALIST

## 2019-12-26 PROCEDURE — 83735 ASSAY OF MAGNESIUM: CPT

## 2019-12-26 PROCEDURE — 36415 COLL VENOUS BLD VENIPUNCTURE: CPT

## 2019-12-26 PROCEDURE — 94668 MNPJ CHEST WALL SBSQ: CPT

## 2019-12-26 PROCEDURE — 700102 HCHG RX REV CODE 250 W/ 637 OVERRIDE(OP): Performed by: HOSPITALIST

## 2019-12-26 PROCEDURE — A9270 NON-COVERED ITEM OR SERVICE: HCPCS | Performed by: INTERNAL MEDICINE

## 2019-12-26 PROCEDURE — 770001 HCHG ROOM/CARE - MED/SURG/GYN PRIV*

## 2019-12-26 PROCEDURE — 84100 ASSAY OF PHOSPHORUS: CPT

## 2019-12-26 RX ORDER — GABAPENTIN 300 MG/1
300 CAPSULE ORAL 3 TIMES DAILY PRN
Status: DISCONTINUED | OUTPATIENT
Start: 2019-12-26 | End: 2019-12-28 | Stop reason: HOSPADM

## 2019-12-26 RX ORDER — FUROSEMIDE 20 MG/1
20 TABLET ORAL
Status: DISCONTINUED | OUTPATIENT
Start: 2019-12-26 | End: 2019-12-28 | Stop reason: HOSPADM

## 2019-12-26 RX ADMIN — GABAPENTIN 300 MG: 300 CAPSULE ORAL at 23:33

## 2019-12-26 RX ADMIN — MIDODRINE HYDROCHLORIDE 10 MG: 5 TABLET ORAL at 07:52

## 2019-12-26 RX ADMIN — FUROSEMIDE 20 MG: 20 TABLET ORAL at 15:47

## 2019-12-26 RX ADMIN — ACETAMINOPHEN 500 MG: 500 TABLET ORAL at 17:44

## 2019-12-26 RX ADMIN — APIXABAN 10 MG: 5 TABLET, FILM COATED ORAL at 17:44

## 2019-12-26 RX ADMIN — APIXABAN 10 MG: 5 TABLET, FILM COATED ORAL at 04:29

## 2019-12-26 RX ADMIN — LOVASTATIN 40 MG: 20 TABLET ORAL at 19:44

## 2019-12-26 RX ADMIN — OMEPRAZOLE 20 MG: 20 CAPSULE, DELAYED RELEASE ORAL at 04:29

## 2019-12-26 RX ADMIN — OXYCODONE HYDROCHLORIDE AND ACETAMINOPHEN 500 MG: 500 TABLET ORAL at 04:29

## 2019-12-26 RX ADMIN — FERROUS SULFATE TAB 325 MG (65 MG ELEMENTAL FE) 325 MG: 325 (65 FE) TAB at 17:44

## 2019-12-26 RX ADMIN — MIDODRINE HYDROCHLORIDE 10 MG: 5 TABLET ORAL at 11:37

## 2019-12-26 RX ADMIN — MIDODRINE HYDROCHLORIDE 10 MG: 5 TABLET ORAL at 17:44

## 2019-12-26 RX ADMIN — ACETAMINOPHEN 500 MG: 500 TABLET ORAL at 09:40

## 2019-12-26 RX ADMIN — BUDESONIDE AND FORMOTEROL FUMARATE DIHYDRATE 2 PUFF: 160; 4.5 AEROSOL RESPIRATORY (INHALATION) at 04:29

## 2019-12-26 RX ADMIN — FERROUS SULFATE TAB 325 MG (65 MG ELEMENTAL FE) 325 MG: 325 (65 FE) TAB at 07:52

## 2019-12-26 RX ADMIN — BUDESONIDE AND FORMOTEROL FUMARATE DIHYDRATE 2 PUFF: 160; 4.5 AEROSOL RESPIRATORY (INHALATION) at 17:44

## 2019-12-26 ASSESSMENT — ENCOUNTER SYMPTOMS
BACK PAIN: 0
COUGH: 0
NAUSEA: 0
HEMOPTYSIS: 0
ORTHOPNEA: 0
SHORTNESS OF BREATH: 1
VOMITING: 0
PALPITATIONS: 0
DIZZINESS: 0
CHILLS: 0
NECK PAIN: 0
SPUTUM PRODUCTION: 0

## 2019-12-26 NOTE — ASSESSMENT & PLAN NOTE
Noted on echo 2/2 COPD and PE.  Lasix 20mg po daily for CXR with pulmonary edema  ISS ordered.  eliquis for PE  COPD meds.

## 2019-12-26 NOTE — PROGRESS NOTES
Two RN Skin check completed    Dressing to R hip, CDI  Bruising to upper back.  Kimbrough in place, patent.  Adductor pillow in place.   Sacrum red, blanchable.

## 2019-12-26 NOTE — CARE PLAN
Problem: Oxygenation:  Goal: Maintain adequate oxygenation dependent on patient condition  Outcome: PROGRESSING SLOWER THAN EXPECTED     Problem: Hyperinflation:  Goal: Prevent or improve atelectasis  Outcome: PROGRESSING SLOWER THAN EXPECTED   PEP QID. Pt achieving 250 ml on IS. 4LPM n/c.

## 2019-12-26 NOTE — PROGRESS NOTES
2 RN skin check completed with Keiko GO  Devices in place abductor pillow in place. Silicone oxygen tubing. Kimbrough catheter.  Skin assessed under devices yes, skin is intact no evidence of skin breakdown noted. However they did appear to be on a little tight despite patient stating that they were comfortable. Mepilex applied to areas of concern. Patients overall skin is intact. She does have generalized bruising. Her sacrum is very red but blanching, barrier paste applied.  Confirmed pressure ulcers found on none.  New potential pressure ulcers noted on none. Wound consult placed n/a.  The following interventions in place pillows in use for support and positioning, waffle overlay applied to mattress, Q2H turns in place.

## 2019-12-26 NOTE — CARE PLAN
Problem: Communication  Goal: The ability to communicate needs accurately and effectively will improve  Outcome: PROGRESSING AS EXPECTED  Intervention: Educate patient and significant other/support system about the plan of care, procedures, treatments, medications and allow for questions  Note:   Patient is alert and oriented, has been educated about plan of care. Answered questions accordingly. Patient verbalizes understanding.      Problem: Respiratory:  Goal: Respiratory status will improve  Outcome: PROGRESSING SLOWER THAN EXPECTED  Intervention: Educate and encourage incentive spirometry usage  Note:   Assessed patient's ability to perform deep breathing exercises, patient requires prompting and reinforcement of direction. Provided education about importance of using incentive spirometry, at a minimum of ten times per hour. Patient verbalizes understanding, respiratory team on board and doing rounds.

## 2019-12-26 NOTE — PROGRESS NOTES
Hospital Medicine Daily Progress Note    Date of Service  12/26/2019    Chief Complaint  79 y.o. female admitted 12/14/2019 with right hip pain.    Hospital Course    Ms Recio has a history of recent elective right hip arthroplasty that was performed on 11/21/2019, post operative course was complicated by SBO that was treated conservatively and resolved, she was discharged to rehab, eventually being discharged home on 12/10/2019.  On 12/12/2019 she suffered a syncopal episode and fall, she was brought to the emergency room at [facility] and was found to have a right hip prosthetic dislocation.  Additionally, she complained of chest pain and had an elevated troponin.  She was diagnosed with a pulmonary embolism and started on anticoagulation.  She was transferred to [facility] and on 12/19/2019 she has closed reduction of the right hip dislocation.  12/26: s/p ICU day #1:  Patient remains on 5 LPM NC, but no respiratory distress.  SBP remains soft 90s and therefore did not get IV lasix since yesterday.  CXR ordered and with pulmonary edema remaining about the same as 2 weeks ago when sent to ICU for respiratoy distress and ALOC.  Remains on NTL 1:1 diet.  No new infiltrates seen.  Remains on midodrine 10 tid.  dc'd IV lasix, start lasix 20mg daily instead.  C/o right hip pain with nausea.  Ordered neurontin 300 tid prn pain.  Kaylan remains for now.        Consultants/Specialty  Critical Care  Orthopedics    Code Status  DNAR, I OK    Disposition  PT/OT rec transitional care.  RR consulted.  Not medically cleared at this time.    Review of Systems  Review of Systems   Constitutional: Positive for malaise/fatigue. Negative for chills.   Respiratory: Positive for shortness of breath. Negative for cough, hemoptysis and sputum production.    Cardiovascular: Negative for chest pain, palpitations, orthopnea and leg swelling.   Gastrointestinal: Negative for nausea and vomiting.   Musculoskeletal:  Positive for joint pain (right hip pain). Negative for back pain and neck pain.   Skin: Negative for itching and rash.   Neurological: Negative for dizziness.   All other systems reviewed and are negative.       Physical Exam  Temp:  [36.2 °C (97.1 °F)-37.6 °C (99.7 °F)] 37.2 °C (98.9 °F)  Pulse:  [] 73  Resp:  [16-40] 17  BP: ()/(51-63) 99/59  SpO2:  [90 %-95 %] 90 %    Physical Exam  Constitutional:       General: She is not in acute distress.     Appearance: Normal appearance. She is normal weight. She is not ill-appearing, toxic-appearing or diaphoretic.   HENT:      Head: Normocephalic and atraumatic.      Right Ear: External ear normal.      Left Ear: External ear normal.      Nose: Nose normal.      Mouth/Throat:      Mouth: Mucous membranes are moist.      Pharynx: Oropharynx is clear. No oropharyngeal exudate or posterior oropharyngeal erythema.   Eyes:      Extraocular Movements: Extraocular movements intact.      Conjunctiva/sclera: Conjunctivae normal.      Pupils: Pupils are equal, round, and reactive to light.   Neck:      Musculoskeletal: Normal range of motion and neck supple.   Cardiovascular:      Rate and Rhythm: Normal rate and regular rhythm.      Pulses: Normal pulses.   Pulmonary:      Effort: Pulmonary effort is normal.      Breath sounds: No rales.   Abdominal:      General: Abdomen is flat. Bowel sounds are normal. There is no distension.      Palpations: Abdomen is soft.      Tenderness: There is no tenderness.   Musculoskeletal: Normal range of motion.         General: Signs of injury (right hip bandage CD&I, equal length of legs.) present.      Right lower leg: Edema present.      Left lower leg: Edema present.   Skin:     General: Skin is warm and dry.      Capillary Refill: Capillary refill takes less than 2 seconds.      Coloration: Skin is not jaundiced or pale.   Neurological:      General: No focal deficit present.      Mental Status: She is alert. She is disoriented.       Cranial Nerves: No cranial nerve deficit.      Gait: Gait normal.   Psychiatric:         Mood and Affect: Mood normal.         Behavior: Behavior normal.         Fluids    Intake/Output Summary (Last 24 hours) at 12/26/2019 1438  Last data filed at 12/26/2019 0459  Gross per 24 hour   Intake 120 ml   Output 2550 ml   Net -2430 ml       Laboratory  Recent Labs     12/24/19  0600 12/25/19  0441   WBC 8.4 7.0   RBC 3.33* 3.35*   HEMOGLOBIN 8.4* 8.6*   HEMATOCRIT 29.0* 29.2*   MCV 87.1 87.2   MCH 25.2* 25.7*   MCHC 29.0* 29.5*   RDW 57.5* 57.3*   PLATELETCT 370 397   MPV 8.5* 8.8*     Recent Labs     12/24/19  0600 12/25/19  0441 12/26/19  0253   SODIUM 141 139 138   POTASSIUM 4.5 5.0 4.5   CHLORIDE 103 100 97   CO2 33 37* 36*   GLUCOSE 113* 102* 103*   BUN 14 14 14   CREATININE 0.58 0.65 0.76   CALCIUM 8.5 8.7 8.7                   Imaging  DX-CHEST-PORTABLE (1 VIEW)   Final Result      Large hiatal hernia.   Bilateral lung base atelectasis/edema. Pneumonitis/pneumonia not excluded.   Small bilateral pleural effusions.      DX-CHEST-PORTABLE (1 VIEW)   Final Result         1. Stable right IJ central catheter.   2. Stable retrocardiac atelectasis versus consolidation and suspected left pleural effusion.   3. Ill-defined right basilar opacity, atelectasis versus consolidation. No significant right effusion.      DX-CHEST-PORTABLE (1 VIEW)   Final Result      1.  Stable right IJ central catheter.   2.  Interval worsening of left basilar opacity, atelectasis versus consolidation. It is likely exaggerated by hiatal hernia.   3.  Right basilar opacity, likely atelectasis.      DX-PELVIS-1 OR 2 VIEWS   Final Result      1.  START NUMBER RIGHT HIP DISLOCATION NO LONGER PRESENT INVOLVING THE ARTHROPLASTY.      2.  NO ACUTE FRACTURE IDENTIFIED.      US-EXTREMITY VENOUS LOWER BILAT   Final Result      DX-CHEST-FOR LINE PLACEMENT Perform procedure in: Patient's Room   Final Result      1.  New right IJV central line tip  projects in satisfactory position. No pneumothorax.      2.  New linear atelectasis in the right midlung medially.      3.  Large hiatal hernia again noted.      DX-CHEST-PORTABLE (1 VIEW)   Final Result         1.  Findings on chest radiograph appear stable since the prior radiograph.  No new abnormalities are identified.      2.  Large hiatal hernia is again identified.      IR-PULMONARY ANGIOGRAM-BILATERAL    (Results Pending)        Assessment/Plan  * Acute respiratory failure (HCC)- (present on admission)  Assessment & Plan  Due to pulmonary emboli, at this point there is likely some component of pulmonary edema as well  Continue oxygen wean as tolerates  Treat PE with eliquis  12/26:  Changed IV lasix to po lasix since SBP 90s and not given lasix today.  CXR remains with pulmonary edema noted.   EF 65% moderate pulmonary htn with right ventricle dilation.    Shock (HCC)  Assessment & Plan  Resolved  Continue cautious diuresis    Fever  Assessment & Plan  Improved      Acute pulmonary embolism with acute cor pulmonale (HCC)- (present on admission)  Assessment & Plan  Continue Eliquis  Supplemental oxygen, wean as tolerated      Closed dislocation of right hip (HCC)- (present on admission)  Assessment & Plan  12/12 Reduced in the operating room by Dr. Delgado  PT/OT and placement  Pain management  Rt hip arthroplasty 11/21  Multiple complications post operatively including SBO, and PE.    COPD (chronic obstructive pulmonary disease) (HCC)- (present on admission)  Assessment & Plan  No acute exacerbation  Continue Symbicort oxygen and bronchodilators  RT per protocol  Supplemental oxygen    Pulmonary hypertension (HCC)  Assessment & Plan  Noted on echo 2/2 COPD and PE.  Lasix 20mg po daily for CXR with pulmonary edema  ISS ordered.  eliquis for PE  COPD meds.    Hypotension  Assessment & Plan  On midodrine 10 tid  12/26:  Changed IV lasix to po lasix 20mg po daily since hypotensive and pulmonary edema remains on  CXR.      Elevated troponin- (present on admission)  Assessment & Plan  Demand ischemia secondary to pulmonary emboli    Gastroesophageal reflux disease- (present on admission)  Assessment & Plan  Tums as needed  Oral omeprazole 20 mg daily    Obesity (BMI 30-39.9)- (present on admission)  Assessment & Plan  Body mass index is 37.92 kg/m².    Dyslipidemia- (present on admission)  Assessment & Plan  Statin therapy    Normocytic anemia- (present on admission)  Assessment & Plan  Monitor CBC  12/21 guaiac stools negative for blood       VTE prophylaxis: Eliquis

## 2019-12-26 NOTE — DISCHARGE PLANNING
[facility] Transitional Care Coordination     Referral from:  Dr. Howard    Facesheet indicates: SCP/ARMEN FFS    Potential Rehab Diagnosis:  recent elective right hip arthroplasty that was performed on 11/21/2019, post operative course was complicated by SBO that was treated conservatively and resolved, she was discharged to rehab, eventually being discharged home on 12/10/2019.  On 12/12/2019 she suffered a syncopal episode and fall, she was brought to the emergency room at [facility] and was found to have a right hip prosthetic dislocation.     Chart review indicates patient may have on going medical management and may have therapy needs to possibly meet inpatient rehab facility criteria with the goal of returning to community.    D/C support: Alone     Physiatry consultation pended per protocol.      Would welcome updated PT/OT evals once appropriate.      Thank you for the referral.

## 2019-12-26 NOTE — ASSESSMENT & PLAN NOTE
On midodrine 10 tid  12/26:  Changed IV lasix to po lasix 20mg po daily since hypotensive and pulmonary edema remains on CXR.  12/27: Patient with persistent hypotension with systolic blood pressure in the 70s.  I have ordered to increase Midrin to 15 mg 3 times daily in order for patient to be able to take the p.o. Lasix for her shortness of breath pulmonary edema.

## 2019-12-27 ENCOUNTER — APPOINTMENT (OUTPATIENT)
Dept: RADIOLOGY | Facility: MEDICAL CENTER | Age: 79
DRG: 299 | End: 2019-12-27
Attending: HOSPITALIST
Payer: MEDICARE

## 2019-12-27 LAB
ANION GAP SERPL CALC-SCNC: 4 MMOL/L (ref 0–11.9)
BASOPHILS # BLD AUTO: 0.4 % (ref 0–1.8)
BASOPHILS # BLD: 0.03 K/UL (ref 0–0.12)
BUN SERPL-MCNC: 15 MG/DL (ref 8–22)
CALCIUM SERPL-MCNC: 8.5 MG/DL (ref 8.5–10.5)
CHLORIDE SERPL-SCNC: 98 MMOL/L (ref 96–112)
CO2 SERPL-SCNC: 35 MMOL/L (ref 20–33)
CREAT SERPL-MCNC: 0.69 MG/DL (ref 0.5–1.4)
EOSINOPHIL # BLD AUTO: 0.05 K/UL (ref 0–0.51)
EOSINOPHIL NFR BLD: 0.7 % (ref 0–6.9)
ERYTHROCYTE [DISTWIDTH] IN BLOOD BY AUTOMATED COUNT: 56.5 FL (ref 35.9–50)
GLUCOSE SERPL-MCNC: 97 MG/DL (ref 65–99)
HCT VFR BLD AUTO: 31.6 % (ref 37–47)
HEMOCCULT STL QL: NEGATIVE
HGB BLD-MCNC: 9.3 G/DL (ref 12–16)
IMM GRANULOCYTES # BLD AUTO: 0.1 K/UL (ref 0–0.11)
IMM GRANULOCYTES NFR BLD AUTO: 1.4 % (ref 0–0.9)
LYMPHOCYTES # BLD AUTO: 1.53 K/UL (ref 1–4.8)
LYMPHOCYTES NFR BLD: 21.5 % (ref 22–41)
MCH RBC QN AUTO: 25.1 PG (ref 27–33)
MCHC RBC AUTO-ENTMCNC: 29.4 G/DL (ref 33.6–35)
MCV RBC AUTO: 85.4 FL (ref 81.4–97.8)
MONOCYTES # BLD AUTO: 0.75 K/UL (ref 0–0.85)
MONOCYTES NFR BLD AUTO: 10.5 % (ref 0–13.4)
NEUTROPHILS # BLD AUTO: 4.66 K/UL (ref 2–7.15)
NEUTROPHILS NFR BLD: 65.5 % (ref 44–72)
NRBC # BLD AUTO: 0 K/UL
NRBC BLD-RTO: 0 /100 WBC
PLATELET # BLD AUTO: 437 K/UL (ref 164–446)
PMV BLD AUTO: 8.4 FL (ref 9–12.9)
POTASSIUM SERPL-SCNC: 4.3 MMOL/L (ref 3.6–5.5)
RBC # BLD AUTO: 3.7 M/UL (ref 4.2–5.4)
SODIUM SERPL-SCNC: 137 MMOL/L (ref 135–145)
WBC # BLD AUTO: 7.1 K/UL (ref 4.8–10.8)

## 2019-12-27 PROCEDURE — 700102 HCHG RX REV CODE 250 W/ 637 OVERRIDE(OP): Performed by: HOSPITALIST

## 2019-12-27 PROCEDURE — 770001 HCHG ROOM/CARE - MED/SURG/GYN PRIV*

## 2019-12-27 PROCEDURE — 700102 HCHG RX REV CODE 250 W/ 637 OVERRIDE(OP): Performed by: INTERNAL MEDICINE

## 2019-12-27 PROCEDURE — A9270 NON-COVERED ITEM OR SERVICE: HCPCS | Performed by: INTERNAL MEDICINE

## 2019-12-27 PROCEDURE — A9270 NON-COVERED ITEM OR SERVICE: HCPCS | Performed by: HOSPITALIST

## 2019-12-27 PROCEDURE — 80048 BASIC METABOLIC PNL TOTAL CA: CPT

## 2019-12-27 PROCEDURE — 92526 ORAL FUNCTION THERAPY: CPT

## 2019-12-27 PROCEDURE — 36415 COLL VENOUS BLD VENIPUNCTURE: CPT

## 2019-12-27 PROCEDURE — 85025 COMPLETE CBC W/AUTO DIFF WBC: CPT

## 2019-12-27 PROCEDURE — 94668 MNPJ CHEST WALL SBSQ: CPT

## 2019-12-27 PROCEDURE — 71045 X-RAY EXAM CHEST 1 VIEW: CPT

## 2019-12-27 PROCEDURE — 99233 SBSQ HOSP IP/OBS HIGH 50: CPT | Performed by: HOSPITALIST

## 2019-12-27 PROCEDURE — 306015 LOCK STAT FOLEY: Performed by: HOSPITALIST

## 2019-12-27 PROCEDURE — 82272 OCCULT BLD FECES 1-3 TESTS: CPT

## 2019-12-27 RX ORDER — MIDODRINE HYDROCHLORIDE 5 MG/1
15 TABLET ORAL
Status: DISCONTINUED | OUTPATIENT
Start: 2019-12-27 | End: 2019-12-28 | Stop reason: HOSPADM

## 2019-12-27 RX ORDER — MIDODRINE HYDROCHLORIDE 5 MG/1
5 TABLET ORAL ONCE
Status: COMPLETED | OUTPATIENT
Start: 2019-12-27 | End: 2019-12-27

## 2019-12-27 RX ADMIN — FERROUS SULFATE TAB 325 MG (65 MG ELEMENTAL FE) 325 MG: 325 (65 FE) TAB at 07:47

## 2019-12-27 RX ADMIN — FUROSEMIDE 20 MG: 20 TABLET ORAL at 04:27

## 2019-12-27 RX ADMIN — ACETAMINOPHEN 500 MG: 500 TABLET ORAL at 07:46

## 2019-12-27 RX ADMIN — LOVASTATIN 40 MG: 20 TABLET ORAL at 19:39

## 2019-12-27 RX ADMIN — APIXABAN 5 MG: 5 TABLET, FILM COATED ORAL at 04:27

## 2019-12-27 RX ADMIN — BUDESONIDE AND FORMOTEROL FUMARATE DIHYDRATE 2 PUFF: 160; 4.5 AEROSOL RESPIRATORY (INHALATION) at 17:34

## 2019-12-27 RX ADMIN — APIXABAN 5 MG: 5 TABLET, FILM COATED ORAL at 17:35

## 2019-12-27 RX ADMIN — MIDODRINE HYDROCHLORIDE 15 MG: 5 TABLET ORAL at 17:36

## 2019-12-27 RX ADMIN — BUDESONIDE AND FORMOTEROL FUMARATE DIHYDRATE 2 PUFF: 160; 4.5 AEROSOL RESPIRATORY (INHALATION) at 04:26

## 2019-12-27 RX ADMIN — OXYCODONE HYDROCHLORIDE AND ACETAMINOPHEN 500 MG: 500 TABLET ORAL at 04:27

## 2019-12-27 RX ADMIN — OMEPRAZOLE 20 MG: 20 CAPSULE, DELAYED RELEASE ORAL at 04:27

## 2019-12-27 RX ADMIN — MELATONIN 2000 UNITS: at 10:49

## 2019-12-27 RX ADMIN — FERROUS SULFATE TAB 325 MG (65 MG ELEMENTAL FE) 325 MG: 325 (65 FE) TAB at 17:35

## 2019-12-27 RX ADMIN — SENNOSIDES AND DOCUSATE SODIUM 2 TABLET: 8.6; 5 TABLET ORAL at 04:27

## 2019-12-27 RX ADMIN — MIDODRINE HYDROCHLORIDE 10 MG: 5 TABLET ORAL at 07:47

## 2019-12-27 RX ADMIN — MIDODRINE HYDROCHLORIDE 5 MG: 5 TABLET ORAL at 10:52

## 2019-12-27 RX ADMIN — MIDODRINE HYDROCHLORIDE 10 MG: 5 TABLET ORAL at 10:49

## 2019-12-27 ASSESSMENT — PATIENT HEALTH QUESTIONNAIRE - PHQ9
1. LITTLE INTEREST OR PLEASURE IN DOING THINGS: SEVERAL DAYS
5. POOR APPETITE OR OVEREATING: NOT AT ALL
SUM OF ALL RESPONSES TO PHQ9 QUESTIONS 1 AND 2: 3
2. FEELING DOWN, DEPRESSED, IRRITABLE, OR HOPELESS: MORE THAN HALF THE DAYS
9. THOUGHTS THAT YOU WOULD BE BETTER OFF DEAD, OR OF HURTING YOURSELF: NOT AT ALL
3. TROUBLE FALLING OR STAYING ASLEEP OR SLEEPING TOO MUCH: NOT AT ALL
4. FEELING TIRED OR HAVING LITTLE ENERGY: NOT AT ALL
8. MOVING OR SPEAKING SO SLOWLY THAT OTHER PEOPLE COULD HAVE NOTICED. OR THE OPPOSITE, BEING SO FIGETY OR RESTLESS THAT YOU HAVE BEEN MOVING AROUND A LOT MORE THAN USUAL: SEVERAL DAYS
6. FEELING BAD ABOUT YOURSELF - OR THAT YOU ARE A FAILURE OR HAVE LET YOURSELF OR YOUR FAMILY DOWN: SEVERAL DAYS
7. TROUBLE CONCENTRATING ON THINGS, SUCH AS READING THE NEWSPAPER OR WATCHING TELEVISION: MORE THAN HALF THE DAYS
SUM OF ALL RESPONSES TO PHQ QUESTIONS 1-9: 7

## 2019-12-27 ASSESSMENT — ENCOUNTER SYMPTOMS
PALPITATIONS: 0
SPUTUM PRODUCTION: 0
ORTHOPNEA: 0
VOMITING: 0
COUGH: 0
BACK PAIN: 0
NAUSEA: 0
CHILLS: 0
HEMOPTYSIS: 0
DIZZINESS: 0
NECK PAIN: 0
SHORTNESS OF BREATH: 1

## 2019-12-27 NOTE — CARE PLAN
Problem: Communication  Goal: The ability to communicate needs accurately and effectively will improve  Outcome: PROGRESSING AS EXPECTED  Intervention: [place] patient and significant other/support system to call light to alert staff of needs  Note:   Patient alert and oriented, uses call light to communicate needs to staff. Hourly rounding in place.     Problem: Respiratory:  Goal: Respiratory status will improve  Outcome: PROGRESSING AS EXPECTED  Intervention: Educate and encourage incentive spirometry usage  Note:   Patient has IS at bedside, encourage to perform exercise when rounding in place. Although weak effort patient does perform task

## 2019-12-27 NOTE — THERAPY
Attempted therapy follow up session. Bp assessed supine 78/55 and 80/54. Hr varied from  all supine. Rn notified deferred therapy at this time due to BP. Will follow up as able.

## 2019-12-27 NOTE — PROGRESS NOTES
"/52   Pulse 67   Temp 37.2 °C (99 °F) (Temporal)   Resp 20   Ht 1.448 m (4' 9.01\")   Wt 70.8 kg (156 lb 1.4 oz)   SpO2 93%   Vitals improving with midodrine.  Oxygen and RR improved with titration of O2.  CXR was done await results.  Encourage deep breathing, turn, cough, and IS use.   "

## 2019-12-27 NOTE — PROGRESS NOTES
"BP (!) 86/56   Pulse 69   Temp 37.2 °C (99 °F) (Temporal)   Resp (!) 24   Ht 1.448 m (4' 9.01\")   Wt 70.8 kg (156 lb 1.4 oz)   SpO2 93%   Patient with asymptomatic hypotension giving midodrine, now increased to 15 mg TID first dose now as per Dr. Paz.    Stat portable chest XR notified radiology [phone] spoke with Leona will come to perform.    Charge RN updated on patient status.    "

## 2019-12-27 NOTE — THERAPY
SPEECH PATHOLOGY:  Came to see patient for swallow reassessment; however, PT just walking in room to see patient.  Will attempt to see patient later today if possible.

## 2019-12-27 NOTE — CARE PLAN
Problem: Nutritional:  Goal: Achieve adequate nutritional intake  Description  Patient will start diet and consume 50% of meals   Outcome: PROGRESSING SLOWER THAN EXPECTED    See RD note.

## 2019-12-27 NOTE — DISCHARGE PLANNING
Spoke by phone with daughter Charity Kern to discuss IRF referral/clarify discharge support. Reviewed specifics of inpatient rehab, answered questions/concerns. Mendy tells me she has been staying with her Mother since d/c from [facility]; planning indefinite stay to assist as needed when pt returns home. She is hopeful her Mother will meet criteria for [facility] when medically appropriate.    Case forwarded to Physiatry Dr. Tejada for review. Will follow for PMR recommendations.    Contusion of right hand, initial encounter

## 2019-12-27 NOTE — DISCHARGE PLANNING
This RN CM spoke with Lucy of Therapy that pt needs PT/OT eval notes for referral to [facility].

## 2019-12-27 NOTE — THERAPY
Occupational Therapy Contact Note    Attempted OT treatment, pt hypotensive supine in bed. BP taken twice, first time 78/55. Second time 80/54. HR from 80s-129 in supine. RN notified. Will defer therapy at this time due to hypotension, and will attempt later as able.    Leonor Mendoza, OTR/L

## 2019-12-27 NOTE — PROGRESS NOTES
XR done at bedside.     Patient incontient or large loose green dark bm, incontinent care rendered.

## 2019-12-27 NOTE — DIETARY
Nutrition Service: update    Pt is eating 0-50% of meals per ADLs. RN states pt is on and off refusing meal with poor PO intake and likely drinking minimal of Boost shakes but has not seen her drink shakes today.     Pending possible referral to inpatient rehab.    RD to monitor PO intake.

## 2019-12-27 NOTE — PROGRESS NOTES
"Patient noted to have symptomatic hypotension felt dizzy when seen by PT.    Repeat improved, /55   Pulse 72   Temp 37.2 °C (99 °F) (Temporal)   Resp (!) 24   Ht 1.448 m (4' 9.01\")   Wt 70.8 kg (156 lb 1.4 oz)   SpO2 91%     Patient also had tachycardia now improved.   However patient has some moderate dyspnea attempted to use IS poor effort.  Increased oxygen from 4L to 6L as was having desaturations into 82-86 on 4L NC, now on 6L satruation improved to 90-93%.        "

## 2019-12-27 NOTE — CARE PLAN
Problem: Communication  Goal: The ability to communicate needs accurately and effectively will improve  Outcome: PROGRESSING AS EXPECTED   Patinet able to communicate needs as needed, and effectively.     Problem: Safety  Goal: Will remain free from falls  Outcome: PROGRESSING AS EXPECTED    Patient has had no falls this admission

## 2019-12-27 NOTE — CONSULTS
Medical chart review completed.     Patient is a 79 y.o. female  with a past medical history of COPD, legally blind, recent right hip arthroplasty complicated by small bowel obstruction, admitted to [facility] [place] 2[place] on 12/12, with syncopal episode and fall.  He was found to have a right hip prosthetic dislocation, complained of chest pain and was transferred from [facility] from [facility].  Was found to have pulmonary emboli for which she was started on anticoagulation with Eliquis.  He had an echocardiogram which showed moderate pulmonary hypertension. She had closed reduction of her right hip dislocation on 12/19 with Dr. Delgado.  She is on Midodrine for hypotension. Her elevated troponin was thought secondary to demand ischemia from her pulmonary emboli.    Patient with multiple co-morbidities(including but not limited to respiratory insufficiency, anemia, hyperglycemia pleural effusions, pulmonary emboli, midodrine); with cognitive deficits and functional deficits in mobility/self-cares/swallowing, and Severe de-conditioning.     Pre-morbidly, this patient lived in a single level home with unknown steps to enter.  Her daughter plans on staying with her after discharge indefinitely to assist with her care.  The patient was evaluated by acute care Physical Therapy, Occupational Therapy and Speech Language Pathology; currently requiring moderate to maximum assistance for mobility and moderate to maximum assistance for ADLs, also with ongoin  g swallowing deficits.     6 clicks score 6 mobility, 15 ADLs    The patient is a very good candidate for an acute inpatient rehabilitation program with a coordinated program of care at an intensity and frequency not available at a lower level of care.     Note: if the patient continues to progress while waiting for medical clearance, and no longer requires 2 out of 3 therapy services (PT/OT/SLP), then the patient would no longer meet the criteria for acute inpatient  rehabilitation.    This recommendation is substantiated by the patient's current medical condition with intervention and assessment of medical issues requiring an acute level of care for patient's safety and maximum outcome. A coordinated program of care will be provided by an interdisciplinary team including physical therapy, occupational therapy, speech language pathology, hospitalist, physiatry, rehab nursing and rehab psychology. Rehab goals include improved cognition and swallowing, mobility, self-care management, strength and conditioning/endurance, pain management, bowel and bladder management, mood and affect, and safety with independent home management including caregiver training.    Estimated length of stay is approximately 14-21 days. Rehab potential: Very Good. Disposition: to pre-morbid independent living setting with supportive care of patient's community resources. We will continue to follow with you in anticipation of discharge to acute inpatient rehabilitation when medically stable to do so at the discretion of her attending physician.     Thank you for allowing us to participate in her care.    Salma Tejada M.D.  Physical Medicine and Rehabilitation

## 2019-12-27 NOTE — THERAPY
"Speech Language Therapy dysphagia treatment completed.   Functional Status:  Pt seen on this date for dysphagia therapy per RN request as pt refusing to eat pureed diet. Pt reported that she loves mashed potatoes and gravy and wanted to receive that for lunch and dinner. Weak coughing noted prior to PO when pt laying down almost flat. Pt initially refusing HOB to be raised past 35*, however, with encouragement, agreed to HOB being raised to ~60-70*. No overt s/sx of aspiration appreciated with trials of NTL via cup sip or purees. Upon palpation, laryngeal elevation was weak and swallow trigger was timely. Trials of soft solids were presented to be and no overt s/sx of aspiration noted. Mastication is slightly prolonged given edentulous state, however, pt endorsed that she has been edentulous for ~14 years. Pt appeared to be chewing adequately before swallowing and did not talk with PO in mouth. At this time, would recommend upgrading diet to dysphagia II/NTL with implementation of posted swallowing strategies. Dysphagia education and recommendations provided to pt and she verbalized understanding. SLP to follow.    Recommendations: upgrade to dysphagia II/NTL with implementation of strategies   Plan of Care: Will benefit from Speech Therapy 4 times per week  Post-Acute Therapy: Recommend inpatient transitional care services for continued speech therapy services.        See \"Rehab Therapy-Acute\" Patient Summary Report for complete documentation.     "

## 2019-12-28 ENCOUNTER — HOSPITAL ENCOUNTER (INPATIENT)
Facility: REHABILITATION | Age: 79
LOS: 11 days | DRG: 175 | End: 2020-01-08
Attending: PHYSICAL MEDICINE & REHABILITATION | Admitting: PHYSICAL MEDICINE & REHABILITATION
Payer: MEDICARE

## 2019-12-28 ENCOUNTER — PATIENT OUTREACH (OUTPATIENT)
Dept: HEALTH INFORMATION MANAGEMENT | Facility: OTHER | Age: 79
End: 2019-12-28

## 2019-12-28 VITALS
DIASTOLIC BLOOD PRESSURE: 62 MMHG | TEMPERATURE: 97.9 F | OXYGEN SATURATION: 96 % | WEIGHT: 156.09 LBS | SYSTOLIC BLOOD PRESSURE: 101 MMHG | BODY MASS INDEX: 33.67 KG/M2 | RESPIRATION RATE: 20 BRPM | HEART RATE: 69 BPM | HEIGHT: 57 IN

## 2019-12-28 PROBLEM — R79.89 ELEVATED TROPONIN: Status: RESOLVED | Noted: 2019-12-14 | Resolved: 2019-12-28

## 2019-12-28 PROBLEM — R50.9 FEVER: Status: RESOLVED | Noted: 2019-12-15 | Resolved: 2019-12-28

## 2019-12-28 PROBLEM — R57.9 SHOCK (HCC): Status: RESOLVED | Noted: 2019-12-15 | Resolved: 2019-12-28

## 2019-12-28 LAB
ANION GAP SERPL CALC-SCNC: 8 MMOL/L (ref 0–11.9)
BASOPHILS # BLD AUTO: 0.4 % (ref 0–1.8)
BASOPHILS # BLD: 0.03 K/UL (ref 0–0.12)
BUN SERPL-MCNC: 16 MG/DL (ref 8–22)
CALCIUM SERPL-MCNC: 8.7 MG/DL (ref 8.5–10.5)
CHLORIDE SERPL-SCNC: 100 MMOL/L (ref 96–112)
CO2 SERPL-SCNC: 33 MMOL/L (ref 20–33)
CREAT SERPL-MCNC: 0.76 MG/DL (ref 0.5–1.4)
EOSINOPHIL # BLD AUTO: 0.02 K/UL (ref 0–0.51)
EOSINOPHIL NFR BLD: 0.3 % (ref 0–6.9)
ERYTHROCYTE [DISTWIDTH] IN BLOOD BY AUTOMATED COUNT: 56.1 FL (ref 35.9–50)
FERRITIN SERPL-MCNC: 640.8 NG/ML (ref 10–291)
FOLATE SERPL-MCNC: >24 NG/ML
GLUCOSE SERPL-MCNC: 102 MG/DL (ref 65–99)
HCT VFR BLD AUTO: 31 % (ref 37–47)
HGB BLD-MCNC: 9.3 G/DL (ref 12–16)
IMM GRANULOCYTES # BLD AUTO: 0.08 K/UL (ref 0–0.11)
IMM GRANULOCYTES NFR BLD AUTO: 1.2 % (ref 0–0.9)
IRON SATN MFR SERPL: 7 % (ref 15–55)
IRON SERPL-MCNC: 16 UG/DL (ref 40–170)
LYMPHOCYTES # BLD AUTO: 1.58 K/UL (ref 1–4.8)
LYMPHOCYTES NFR BLD: 23.7 % (ref 22–41)
MAGNESIUM SERPL-MCNC: 2.3 MG/DL (ref 1.5–2.5)
MCH RBC QN AUTO: 25.4 PG (ref 27–33)
MCHC RBC AUTO-ENTMCNC: 30 G/DL (ref 33.6–35)
MCV RBC AUTO: 84.7 FL (ref 81.4–97.8)
MONOCYTES # BLD AUTO: 0.71 K/UL (ref 0–0.85)
MONOCYTES NFR BLD AUTO: 10.6 % (ref 0–13.4)
NEUTROPHILS # BLD AUTO: 4.25 K/UL (ref 2–7.15)
NEUTROPHILS NFR BLD: 63.8 % (ref 44–72)
NRBC # BLD AUTO: 0 K/UL
NRBC BLD-RTO: 0 /100 WBC
PLATELET # BLD AUTO: 420 K/UL (ref 164–446)
PMV BLD AUTO: 8.4 FL (ref 9–12.9)
POTASSIUM SERPL-SCNC: 4.4 MMOL/L (ref 3.6–5.5)
RBC # BLD AUTO: 3.66 M/UL (ref 4.2–5.4)
SODIUM SERPL-SCNC: 141 MMOL/L (ref 135–145)
TIBC SERPL-MCNC: 214 UG/DL (ref 250–450)
VIT B12 SERPL-MCNC: 706 PG/ML (ref 211–911)
WBC # BLD AUTO: 6.7 K/UL (ref 4.8–10.8)

## 2019-12-28 PROCEDURE — 99239 HOSP IP/OBS DSCHRG MGMT >30: CPT | Performed by: HOSPITALIST

## 2019-12-28 PROCEDURE — 83550 IRON BINDING TEST: CPT

## 2019-12-28 PROCEDURE — 36415 COLL VENOUS BLD VENIPUNCTURE: CPT

## 2019-12-28 PROCEDURE — A9270 NON-COVERED ITEM OR SERVICE: HCPCS | Performed by: HOSPITALIST

## 2019-12-28 PROCEDURE — 85025 COMPLETE CBC W/AUTO DIFF WBC: CPT

## 2019-12-28 PROCEDURE — 82607 VITAMIN B-12: CPT

## 2019-12-28 PROCEDURE — 700102 HCHG RX REV CODE 250 W/ 637 OVERRIDE(OP): Performed by: HOSPITALIST

## 2019-12-28 PROCEDURE — 83540 ASSAY OF IRON: CPT

## 2019-12-28 PROCEDURE — A9270 NON-COVERED ITEM OR SERVICE: HCPCS | Performed by: PHYSICAL MEDICINE & REHABILITATION

## 2019-12-28 PROCEDURE — 99223 1ST HOSP IP/OBS HIGH 75: CPT | Mod: AI | Performed by: PHYSICAL MEDICINE & REHABILITATION

## 2019-12-28 PROCEDURE — 700102 HCHG RX REV CODE 250 W/ 637 OVERRIDE(OP): Performed by: INTERNAL MEDICINE

## 2019-12-28 PROCEDURE — 94760 N-INVAS EAR/PLS OXIMETRY 1: CPT

## 2019-12-28 PROCEDURE — 700102 HCHG RX REV CODE 250 W/ 637 OVERRIDE(OP): Performed by: PHYSICAL MEDICINE & REHABILITATION

## 2019-12-28 PROCEDURE — 80048 BASIC METABOLIC PNL TOTAL CA: CPT

## 2019-12-28 PROCEDURE — 83735 ASSAY OF MAGNESIUM: CPT

## 2019-12-28 PROCEDURE — 82728 ASSAY OF FERRITIN: CPT

## 2019-12-28 PROCEDURE — 82746 ASSAY OF FOLIC ACID SERUM: CPT

## 2019-12-28 PROCEDURE — A9270 NON-COVERED ITEM OR SERVICE: HCPCS | Performed by: INTERNAL MEDICINE

## 2019-12-28 PROCEDURE — 97530 THERAPEUTIC ACTIVITIES: CPT

## 2019-12-28 PROCEDURE — 770010 HCHG ROOM/CARE - REHAB SEMI PRIVAT*

## 2019-12-28 RX ORDER — LOVASTATIN 20 MG/1
40 TABLET ORAL
Status: DISCONTINUED | OUTPATIENT
Start: 2019-12-28 | End: 2020-01-08 | Stop reason: HOSPADM

## 2019-12-28 RX ORDER — LOVASTATIN 20 MG/1
40 TABLET ORAL
Status: CANCELLED | OUTPATIENT
Start: 2019-12-28

## 2019-12-28 RX ORDER — FERROUS SULFATE 325(65) MG
325 TABLET ORAL 2 TIMES DAILY WITH MEALS
Qty: 30 TAB
Start: 2019-12-28 | End: 2021-06-09

## 2019-12-28 RX ORDER — CALCIUM CARBONATE 500 MG/1
500 TABLET, CHEWABLE ORAL
Status: DISCONTINUED | OUTPATIENT
Start: 2019-12-28 | End: 2020-01-08 | Stop reason: HOSPADM

## 2019-12-28 RX ORDER — GABAPENTIN 300 MG/1
300 CAPSULE ORAL 3 TIMES DAILY PRN
Status: CANCELLED | OUTPATIENT
Start: 2019-12-28

## 2019-12-28 RX ORDER — FUROSEMIDE 20 MG/1
20 TABLET ORAL
Status: DISCONTINUED | OUTPATIENT
Start: 2019-12-29 | End: 2020-01-06

## 2019-12-28 RX ORDER — IPRATROPIUM BROMIDE AND ALBUTEROL SULFATE 2.5; .5 MG/3ML; MG/3ML
3 SOLUTION RESPIRATORY (INHALATION)
Status: CANCELLED | OUTPATIENT
Start: 2019-12-28

## 2019-12-28 RX ORDER — OXYCODONE HYDROCHLORIDE 5 MG/1
5 TABLET ORAL
Status: DISCONTINUED | OUTPATIENT
Start: 2019-12-28 | End: 2020-01-08 | Stop reason: HOSPADM

## 2019-12-28 RX ORDER — TRAMADOL HYDROCHLORIDE 50 MG/1
50 TABLET ORAL EVERY 4 HOURS PRN
Status: DISCONTINUED | OUTPATIENT
Start: 2019-12-28 | End: 2020-01-08 | Stop reason: HOSPADM

## 2019-12-28 RX ORDER — BUDESONIDE AND FORMOTEROL FUMARATE DIHYDRATE 160; 4.5 UG/1; UG/1
2 AEROSOL RESPIRATORY (INHALATION) 2 TIMES DAILY
Status: CANCELLED | OUTPATIENT
Start: 2019-12-28

## 2019-12-28 RX ORDER — ONDANSETRON 2 MG/ML
4 INJECTION INTRAMUSCULAR; INTRAVENOUS 4 TIMES DAILY PRN
Status: DISCONTINUED | OUTPATIENT
Start: 2019-12-28 | End: 2020-01-08 | Stop reason: HOSPADM

## 2019-12-28 RX ORDER — ACETAMINOPHEN 500 MG
500 TABLET ORAL EVERY 6 HOURS PRN
Qty: 30 TAB | Refills: 0
Start: 2019-12-28 | End: 2020-01-09

## 2019-12-28 RX ORDER — FUROSEMIDE 20 MG/1
20 TABLET ORAL DAILY
Qty: 60 TAB
Start: 2019-12-29 | End: 2023-06-26

## 2019-12-28 RX ORDER — ASCORBIC ACID 500 MG
500 TABLET ORAL DAILY
Status: DISCONTINUED | OUTPATIENT
Start: 2019-12-29 | End: 2020-01-08 | Stop reason: HOSPADM

## 2019-12-28 RX ORDER — IPRATROPIUM BROMIDE AND ALBUTEROL SULFATE 2.5; .5 MG/3ML; MG/3ML
3 SOLUTION RESPIRATORY (INHALATION)
Status: DISCONTINUED | OUTPATIENT
Start: 2019-12-28 | End: 2020-01-08 | Stop reason: HOSPADM

## 2019-12-28 RX ORDER — AMOXICILLIN 250 MG
2 CAPSULE ORAL 2 TIMES DAILY
Status: DISCONTINUED | OUTPATIENT
Start: 2019-12-28 | End: 2020-01-08 | Stop reason: HOSPADM

## 2019-12-28 RX ORDER — TRAZODONE HYDROCHLORIDE 50 MG/1
50 TABLET ORAL
Status: DISCONTINUED | OUTPATIENT
Start: 2019-12-28 | End: 2020-01-08 | Stop reason: HOSPADM

## 2019-12-28 RX ORDER — BISACODYL 10 MG
10 SUPPOSITORY, RECTAL RECTAL
Status: DISCONTINUED | OUTPATIENT
Start: 2019-12-28 | End: 2020-01-08 | Stop reason: HOSPADM

## 2019-12-28 RX ORDER — ONDANSETRON 4 MG/1
4 TABLET, ORALLY DISINTEGRATING ORAL 4 TIMES DAILY PRN
Status: DISCONTINUED | OUTPATIENT
Start: 2019-12-28 | End: 2020-01-08 | Stop reason: HOSPADM

## 2019-12-28 RX ORDER — FERROUS SULFATE 325(65) MG
325 TABLET ORAL 2 TIMES DAILY WITH MEALS
Status: CANCELLED | OUTPATIENT
Start: 2019-12-28

## 2019-12-28 RX ORDER — MIDODRINE HYDROCHLORIDE 5 MG/1
15 TABLET ORAL
Status: CANCELLED | OUTPATIENT
Start: 2019-12-28

## 2019-12-28 RX ORDER — BUDESONIDE AND FORMOTEROL FUMARATE DIHYDRATE 160; 4.5 UG/1; UG/1
2 AEROSOL RESPIRATORY (INHALATION) 2 TIMES DAILY
Status: DISCONTINUED | OUTPATIENT
Start: 2019-12-28 | End: 2020-01-08 | Stop reason: HOSPADM

## 2019-12-28 RX ORDER — GABAPENTIN 300 MG/1
300 CAPSULE ORAL 3 TIMES DAILY
Qty: 90 CAP
Start: 2019-12-28 | End: 2021-10-05

## 2019-12-28 RX ORDER — OMEPRAZOLE 20 MG/1
20 CAPSULE, DELAYED RELEASE ORAL DAILY
Qty: 30 CAP
Start: 2019-12-29 | End: 2021-06-09

## 2019-12-28 RX ORDER — FUROSEMIDE 20 MG/1
20 TABLET ORAL
Status: CANCELLED | OUTPATIENT
Start: 2019-12-29

## 2019-12-28 RX ORDER — POLYETHYLENE GLYCOL 3350 17 G/17G
1 POWDER, FOR SOLUTION ORAL
Status: DISCONTINUED | OUTPATIENT
Start: 2019-12-28 | End: 2020-01-08 | Stop reason: HOSPADM

## 2019-12-28 RX ORDER — GABAPENTIN 300 MG/1
300 CAPSULE ORAL 3 TIMES DAILY PRN
Status: DISCONTINUED | OUTPATIENT
Start: 2019-12-28 | End: 2019-12-30

## 2019-12-28 RX ORDER — MIDODRINE HYDROCHLORIDE 5 MG/1
15 TABLET ORAL
Qty: 60 TAB
Start: 2019-12-28 | End: 2020-01-09

## 2019-12-28 RX ORDER — POLYVINYL ALCOHOL 14 MG/ML
1 SOLUTION/ DROPS OPHTHALMIC PRN
Status: DISCONTINUED | OUTPATIENT
Start: 2019-12-28 | End: 2020-01-08 | Stop reason: HOSPADM

## 2019-12-28 RX ORDER — ASCORBIC ACID 500 MG
500 TABLET ORAL DAILY
Status: CANCELLED | OUTPATIENT
Start: 2019-12-29

## 2019-12-28 RX ORDER — ALUMINA, MAGNESIA, AND SIMETHICONE 2400; 2400; 240 MG/30ML; MG/30ML; MG/30ML
20 SUSPENSION ORAL
Status: DISCONTINUED | OUTPATIENT
Start: 2019-12-28 | End: 2020-01-08 | Stop reason: HOSPADM

## 2019-12-28 RX ORDER — ECHINACEA PURPUREA EXTRACT 125 MG
2 TABLET ORAL PRN
Status: DISCONTINUED | OUTPATIENT
Start: 2019-12-28 | End: 2020-01-08 | Stop reason: HOSPADM

## 2019-12-28 RX ORDER — HYDRALAZINE HYDROCHLORIDE 25 MG/1
25 TABLET, FILM COATED ORAL EVERY 8 HOURS PRN
Status: DISCONTINUED | OUTPATIENT
Start: 2019-12-28 | End: 2020-01-08 | Stop reason: HOSPADM

## 2019-12-28 RX ORDER — CALCIUM CARBONATE 500 MG/1
500 TABLET, CHEWABLE ORAL
Status: CANCELLED | OUTPATIENT
Start: 2019-12-28

## 2019-12-28 RX ORDER — ASCORBIC ACID 500 MG
500 TABLET ORAL DAILY
Qty: 30 TAB | Refills: 3
Start: 2019-12-29

## 2019-12-28 RX ORDER — OXYCODONE HYDROCHLORIDE 5 MG/1
2.5 TABLET ORAL
Status: DISCONTINUED | OUTPATIENT
Start: 2019-12-28 | End: 2020-01-08 | Stop reason: HOSPADM

## 2019-12-28 RX ORDER — ACETAMINOPHEN 325 MG/1
650 TABLET ORAL EVERY 4 HOURS PRN
Status: DISCONTINUED | OUTPATIENT
Start: 2019-12-28 | End: 2019-12-30

## 2019-12-28 RX ORDER — FERROUS SULFATE 325(65) MG
325 TABLET ORAL 2 TIMES DAILY WITH MEALS
Status: DISCONTINUED | OUTPATIENT
Start: 2019-12-28 | End: 2020-01-08 | Stop reason: HOSPADM

## 2019-12-28 RX ADMIN — LOVASTATIN 40 MG: 20 TABLET ORAL at 21:58

## 2019-12-28 RX ADMIN — APIXABAN 5 MG: 5 TABLET, FILM COATED ORAL at 21:58

## 2019-12-28 RX ADMIN — OXYCODONE HYDROCHLORIDE AND ACETAMINOPHEN 500 MG: 500 TABLET ORAL at 05:16

## 2019-12-28 RX ADMIN — MIDODRINE HYDROCHLORIDE 15 MG: 10 TABLET ORAL at 18:12

## 2019-12-28 RX ADMIN — FUROSEMIDE 20 MG: 20 TABLET ORAL at 05:17

## 2019-12-28 RX ADMIN — FERROUS SULFATE TAB 325 MG (65 MG ELEMENTAL FE) 325 MG: 325 (65 FE) TAB at 08:04

## 2019-12-28 RX ADMIN — OMEPRAZOLE 20 MG: 20 CAPSULE, DELAYED RELEASE ORAL at 05:16

## 2019-12-28 RX ADMIN — ACETAMINOPHEN 650 MG: 325 TABLET, FILM COATED ORAL at 21:58

## 2019-12-28 RX ADMIN — MIDODRINE HYDROCHLORIDE 15 MG: 5 TABLET ORAL at 08:04

## 2019-12-28 RX ADMIN — FERROUS SULFATE TAB 325 MG (65 MG ELEMENTAL FE) 325 MG: 325 (65 FE) TAB at 18:12

## 2019-12-28 RX ADMIN — APIXABAN 5 MG: 5 TABLET, FILM COATED ORAL at 05:16

## 2019-12-28 RX ADMIN — BUDESONIDE AND FORMOTEROL FUMARATE DIHYDRATE 2 PUFF: 160; 4.5 AEROSOL RESPIRATORY (INHALATION) at 05:21

## 2019-12-28 RX ADMIN — BUDESONIDE AND FORMOTEROL FUMARATE DIHYDRATE 2 PUFF: 160; 4.5 AEROSOL RESPIRATORY (INHALATION) at 22:04

## 2019-12-28 RX ADMIN — MIDODRINE HYDROCHLORIDE 15 MG: 5 TABLET ORAL at 12:31

## 2019-12-28 RX ADMIN — MELATONIN 2000 UNITS: at 05:16

## 2019-12-28 RX ADMIN — GABAPENTIN 300 MG: 300 CAPSULE ORAL at 08:04

## 2019-12-28 RX ADMIN — SENNOSIDES AND DOCUSATE SODIUM 2 TABLET: 8.6; 5 TABLET ORAL at 21:58

## 2019-12-28 ASSESSMENT — GAIT ASSESSMENTS: GAIT LEVEL OF ASSIST: UNABLE TO PARTICIPATE

## 2019-12-28 ASSESSMENT — PATIENT HEALTH QUESTIONNAIRE - PHQ9
2. FEELING DOWN, DEPRESSED, IRRITABLE, OR HOPELESS: NOT AT ALL
1. LITTLE INTEREST OR PLEASURE IN DOING THINGS: NOT AT ALL
SUM OF ALL RESPONSES TO PHQ9 QUESTIONS 1 AND 2: 0

## 2019-12-28 ASSESSMENT — COGNITIVE AND FUNCTIONAL STATUS - GENERAL
TURNING FROM BACK TO SIDE WHILE IN FLAT BAD: UNABLE
SUGGESTED CMS G CODE MODIFIER MOBILITY: CN
WALKING IN HOSPITAL ROOM: TOTAL
MOBILITY SCORE: 6
CLIMB 3 TO 5 STEPS WITH RAILING: TOTAL
STANDING UP FROM CHAIR USING ARMS: TOTAL
MOVING TO AND FROM BED TO CHAIR: UNABLE
MOVING FROM LYING ON BACK TO SITTING ON SIDE OF FLAT BED: UNABLE

## 2019-12-28 ASSESSMENT — LIFESTYLE VARIABLES: EVER_SMOKED: NEVER

## 2019-12-28 ASSESSMENT — COPD QUESTIONNAIRES
DURING THE PAST 4 WEEKS HOW MUCH DID YOU FEEL SHORT OF BREATH: SOME OF THE TIME
DO YOU EVER COUGH UP ANY MUCUS OR PHLEGM?: NO/ONLY WITH OCCASIONAL COLDS OR INFECTIONS
COPD SCREENING SCORE: 4
HAVE YOU SMOKED AT LEAST 100 CIGARETTES IN YOUR ENTIRE LIFE: NO/DON'T KNOW

## 2019-12-28 NOTE — DISCHARGE PLANNING
Anticipated Discharge Disposition: IRF-[facility]    Action: RN CM obtained signatures for transfer form; pt notified her daughters personally of intent to transfer today.  Daughter Richland to arrive shortly to transfer her belongings home.    Barriers to Discharge: None    Plan: Discharge to IRF at 1430.

## 2019-12-28 NOTE — DISCHARGE PLANNING
Approved transfer to [facility] Dr. Murry accepting. CM notified [phone] via voice mail and TT.  MD has medically cleared. Bedside nurse is aware.

## 2019-12-28 NOTE — PROGRESS NOTES
Hospital Medicine Daily Progress Note    Date of Service  12/27/2019    Chief Complaint  79 y.o. female admitted 12/14/2019 with right hip pain.    Hospital Course    Ms Recio has a history of recent elective right hip arthroplasty that was performed on 11/21/2019, post operative course was complicated by SBO that was treated conservatively and resolved, she was discharged to rehab, eventually being discharged home on 12/10/2019.  On 12/12/2019 she suffered a syncopal episode and fall, she was brought to the emergency room at [facility] and was found to have a right hip prosthetic dislocation.  Additionally, she complained of chest pain and had an elevated troponin.  She was diagnosed with a pulmonary embolism and started on anticoagulation.  She was transferred to [facility] and on 12/19/2019 she has closed reduction of the right hip dislocation.  12/26: s/p ICU day #1:  Patient remains on 5 LPM NC, but no respiratory distress.  SBP remains soft 90s and therefore did not get IV lasix since yesterday.  CXR ordered and with pulmonary edema remaining about the same as 2 weeks ago when sent to ICU for respiratoy distress and ALOC.  Remains on NTL 1:1 diet.  No new infiltrates seen.  Remains on midodrine 10 tid.  dc'd IV lasix, start lasix 20mg daily instead.  C/o right hip pain with nausea.  Ordered neurontin 300 tid prn pain.  Kimbrough remains for now.   12/27: Patient continues to have shortness of breath.  She had increased shortness of breath noted today on exam with her systolic blood pressure in the 70s.  Repeat chest x-ray showed improvement of pulmonary edema from yesterday's chest x-ray.  No wheezing noted on exam.  Patient is refusing to eat for nectar thick liquid diet no no new aspiration is suspected.  I have increased Midrin from 10 3 times daily to 15 mg 3 times daily.  Patient did receive Lasix p.o. yesterday and will continue daily as tolerated with her blood pressures.  Vitamin D  ordered hemoglobin improved from 8.3-9.6.  Occult blood stool negative.       Consultants/Specialty  Critical Care  Orthopedics    Code Status  DNAR, I OK    Disposition  PT/OT rec transitional care.  RR consulted and have accepted patient.  Not medically cleared at this time.    Review of Systems  Review of Systems   Constitutional: Positive for malaise/fatigue. Negative for chills.   Respiratory: Positive for shortness of breath. Negative for cough, hemoptysis and sputum production.    Cardiovascular: Negative for chest pain, palpitations, orthopnea and leg swelling.   Gastrointestinal: Negative for nausea and vomiting.   Musculoskeletal: Positive for joint pain (right hip pain). Negative for back pain and neck pain.   Skin: Negative for itching and rash.   Neurological: Negative for dizziness.   All other systems reviewed and are negative.       Physical Exam  Temp:  [36.1 °C (97 °F)-37.2 °C (99 °F)] 37 °C (98.6 °F)  Pulse:  [] 86  Resp:  [15-24] 16  BP: ()/(52-67) 91/59  SpO2:  [91 %-98 %] 93 %    Physical Exam  Constitutional:       General: She is not in acute distress.     Appearance: Normal appearance. She is normal weight. She is not ill-appearing, toxic-appearing or diaphoretic.   HENT:      Head: Normocephalic and atraumatic.      Right Ear: External ear normal.      Left Ear: External ear normal.      Nose: Nose normal.      Mouth/Throat:      Mouth: Mucous membranes are moist.      Pharynx: Oropharynx is clear. No oropharyngeal exudate or posterior oropharyngeal erythema.   Eyes:      Extraocular Movements: Extraocular movements intact.      Conjunctiva/sclera: Conjunctivae normal.      Pupils: Pupils are equal, round, and reactive to light.   Neck:      Musculoskeletal: Normal range of motion and neck supple.   Cardiovascular:      Rate and Rhythm: Normal rate and regular rhythm.      Pulses: Normal pulses.   Pulmonary:      Effort: Pulmonary effort is normal.      Breath sounds: No rales.    Abdominal:      General: Abdomen is flat. Bowel sounds are normal. There is no distension.      Palpations: Abdomen is soft.      Tenderness: There is no tenderness.   Musculoskeletal: Normal range of motion.         General: Signs of injury (right hip bandage CD&I, equal length of legs.) present.      Right lower leg: Edema present.      Left lower leg: Edema present.   Skin:     General: Skin is warm and dry.      Capillary Refill: Capillary refill takes less than 2 seconds.      Coloration: Skin is not jaundiced or pale.   Neurological:      General: No focal deficit present.      Mental Status: She is alert. She is disoriented.      Cranial Nerves: No cranial nerve deficit.      Gait: Gait normal.   Psychiatric:         Mood and Affect: Mood normal.         Behavior: Behavior normal.         Fluids    Intake/Output Summary (Last 24 hours) at 12/27/2019 1837  Last data filed at 12/27/2019 1800  Gross per 24 hour   Intake 240 ml   Output 2140 ml   Net -1900 ml       Laboratory  Recent Labs     12/25/19  0441 12/27/19  0424   WBC 7.0 7.1   RBC 3.35* 3.70*   HEMOGLOBIN 8.6* 9.3*   HEMATOCRIT 29.2* 31.6*   MCV 87.2 85.4   MCH 25.7* 25.1*   MCHC 29.5* 29.4*   RDW 57.3* 56.5*   PLATELETCT 397 437   MPV 8.8* 8.4*     Recent Labs     12/25/19  0441 12/26/19  0253 12/27/19  0424   SODIUM 139 138 137   POTASSIUM 5.0 4.5 4.3   CHLORIDE 100 97 98   CO2 37* 36* 35*   GLUCOSE 102* 103* 97   BUN 14 14 15   CREATININE 0.65 0.76 0.69   CALCIUM 8.7 8.7 8.5                   Imaging  DX-CHEST-PORTABLE (1 VIEW)   Final Result      1.  Slight interval decrease in pulmonary edema and central vascular congestion   2.  Hiatal hernia   3.  Small BILATERAL pleural effusions   4.  Persistently enlarged cardiac silhouette      DX-CHEST-PORTABLE (1 VIEW)   Final Result      Large hiatal hernia.   Bilateral lung base atelectasis/edema. Pneumonitis/pneumonia not excluded.   Small bilateral pleural effusions.      DX-CHEST-PORTABLE (1 VIEW)    Final Result         1. Stable right IJ central catheter.   2. Stable retrocardiac atelectasis versus consolidation and suspected left pleural effusion.   3. Ill-defined right basilar opacity, atelectasis versus consolidation. No significant right effusion.      DX-CHEST-PORTABLE (1 VIEW)   Final Result      1.  Stable right IJ central catheter.   2.  Interval worsening of left basilar opacity, atelectasis versus consolidation. It is likely exaggerated by hiatal hernia.   3.  Right basilar opacity, likely atelectasis.      DX-PELVIS-1 OR 2 VIEWS   Final Result      1.  START NUMBER RIGHT HIP DISLOCATION NO LONGER PRESENT INVOLVING THE ARTHROPLASTY.      2.  NO ACUTE FRACTURE IDENTIFIED.      US-EXTREMITY VENOUS LOWER BILAT   Final Result      DX-CHEST-FOR LINE PLACEMENT Perform procedure in: Patient's Room   Final Result      1.  New right IJV central line tip projects in satisfactory position. No pneumothorax.      2.  New linear atelectasis in the right midlung medially.      3.  Large hiatal hernia again noted.      DX-CHEST-PORTABLE (1 VIEW)   Final Result         1.  Findings on chest radiograph appear stable since the prior radiograph.  No new abnormalities are identified.      2.  Large hiatal hernia is again identified.      IR-PULMONARY ANGIOGRAM-BILATERAL    (Results Pending)        Assessment/Plan  * Acute respiratory failure (HCC)- (present on admission)  Assessment & Plan  Due to pulmonary emboli, at this point there is likely some component of pulmonary edema as well  Continue oxygen wean as tolerates  Treat PE with eliquis  12/26:  Changed IV lasix to po lasix since SBP 90s and not given lasix today.  CXR remains with pulmonary edema noted.   EF 65% moderate pulmonary htn with right ventricle dilation.    Shock (HCC)  Assessment & Plan  Resolved  Continue cautious diuresis    Fever  Assessment & Plan  Improved      Acute pulmonary embolism with acute cor pulmonale (HCC)- (present on  admission)  Assessment & Plan  Continue Eliquis  Supplemental oxygen, wean as tolerated      Closed dislocation of right hip (HCC)- (present on admission)  Assessment & Plan  12/12 Reduced in the operating room by Dr. Delgado  PT/OT and placement  Pain management  Rt hip arthroplasty 11/21  Multiple complications post operatively including SBO, and PE.    COPD (chronic obstructive pulmonary disease) (HCC)- (present on admission)  Assessment & Plan  No acute exacerbation  Continue Symbicort oxygen and bronchodilators  RT per protocol  Supplemental oxygen    Pulmonary hypertension (HCC)  Assessment & Plan  Noted on echo 2/2 COPD and PE.  Lasix 20mg po daily for CXR with pulmonary edema  ISS ordered.  eliquis for PE  COPD meds.    Hypotension  Assessment & Plan  On midodrine 10 tid  12/26:  Changed IV lasix to po lasix 20mg po daily since hypotensive and pulmonary edema remains on CXR.  12/27: Patient with persistent hypotension with systolic blood pressure in the 70s.  I have ordered to increase Midrin to 15 mg 3 times daily in order for patient to be able to take the p.o. Lasix for her shortness of breath pulmonary edema.      Elevated troponin- (present on admission)  Assessment & Plan  Demand ischemia secondary to pulmonary emboli    Gastroesophageal reflux disease- (present on admission)  Assessment & Plan  Tums as needed  Oral omeprazole 20 mg daily    Obesity (BMI 30-39.9)- (present on admission)  Assessment & Plan  Body mass index is 37.92 kg/m².    Dyslipidemia- (present on admission)  Assessment & Plan  Statin therapy    Normocytic anemia- (present on admission)  Assessment & Plan  Monitor CBC  12/21 guaiac stools negative for blood       VTE prophylaxis: Eliquis

## 2019-12-28 NOTE — DISCHARGE PLANNING
Email sent to Medical director for insurance for consideration for IRF level of care.  Will follow.

## 2019-12-28 NOTE — DISCHARGE PLANNING
Follow up for rehab Dr. Tejada recommending IRF level of care. SCP CM notified requesting consideration or authorization. If medically cleared potential weekend transfer.

## 2019-12-28 NOTE — DISCHARGE INSTRUCTIONS
Discharge Instructions    Discharged to other by [facility] with self. Discharged via ambulance, hospital escort: Yes.  Special equipment needed: Walker    Be sure to schedule a follow-up appointment with your primary care doctor or any specialists as instructed.     Discharge Plan:   Influenza Vaccine Indication: Not indicated: Previously immunized this influenza season and > 8 years of age    I understand that a diet low in cholesterol, fat, and sodium is recommended for good health. Unless I have been given specific instructions below for another diet, I accept this instruction as my diet prescription.   Other diet: Dysphagia 2 nectar thick liquds    Special Instructions: None    · Is patient discharged on Warfarin / Coumadin?   No     Depression / Suicide Risk    As you are discharged from this Critical access hospital facility, it is important to learn how to keep safe from harming yourself.    Recognize the warning signs:  · Abrupt changes in personality, positive or negative- including increase in energy   · Giving away possessions  · Change in eating patterns- significant weight changes-  positive or negative  · Change in sleeping patterns- unable to sleep or sleeping all the time   · Unwillingness or inability to communicate  · Depression  · Unusual sadness, discouragement and loneliness  · Talk of wanting to die  · Neglect of personal appearance   · Rebelliousness- reckless behavior  · Withdrawal from people/activities they love  · Confusion- inability to concentrate     If you or a loved one observes any of these behaviors or has concerns about self-harm, here's what you can do:  · Talk about it- your feelings and reasons for harming yourself  · Remove any means that you might use to hurt yourself (examples: pills, rope, extension cords, firearm)  · Get professional help from the community (Mental Health, Substance Abuse, psychological counseling)  · Do not be alone:Call your Safe Contact- someone whom you trust who  will be there for you.  · Call your local CRISIS HOTLINE 930-2407 or 663-788-1872  · Call your local Children's Mobile Crisis Response Team [place] (898) 442-7472 or www.Needly  · Call the toll free National Suicide Prevention Hotlines   · National Suicide Prevention Lifeline 191-017-CCEH (0054)  · National [employer] Line Network 800-SUICIDE (786-1491)

## 2019-12-28 NOTE — PREADMISSION SCREENING NOTE
Pre-Admission Screening Form    Patient Information:   Name: Guillermina Recio     MRN: [id number]       : 1940      Age: 79 y.o.   Gender: female      Race: White [7]       Marital Status:  [5]  Family Contact: Ebony Spangler, Cathy Kern, Debora Peck        Relationship: Friend [5]  Friend [5]  Daughter [2]  Other [10]  Home Phone: [phone]                 Cell Phone:   [phone]  Advanced Directives: None  Code Status:  DNAR, I OK  Current Attending Provider: Corazon Paz M.D.  Referring Physician: Dr. Howard      Physiatrist Consult: Dr. Tejada        Referral Date: 2019  Primary Payor Source:  [employer]  Secondary Payor Source:  MEDICAID FFS    Medical Information:   Date of Admission to Acute Care [place]2019  Room Number: T326/00  Rehabilitation Diagnosis: 10.9 Other Pulmonary  Immunization History   Administered Date(s) Administered   • Influenza (IM) Preservative Free 2015   • Influenza Seasonal Injectable 2012   • Influenza Vaccine Adult HD 10/07/2014, 10/31/2016, 10/30/2017, 08/15/2018, 10/01/2019   • Influenza Vaccine Pediatric Preserv Free 2008   • Influenza Vaccine Pediatric Split 2006   • Influenza Vaccine Quad Inj (Pf) 2006, 2015   • Pneumococcal Conjugate Vaccine (Prevnar/PCV-13) 03/15/2017, 08/15/2018   • Tdap Vaccine 08/15/2018     Allergies   Allergen Reactions   • [place]      Past Medical History:   Diagnosis Date   • Arthritis    • Bowel habit changes     diarrhea   • Breath shortness     COPD   • Cataract     bilateral IOL   • Chronic pain 2019    hips   • Dyslipidemia 2016   • Emphysema of lung (HCC)    • GERD (gastroesophageal reflux disease)    • Heart burn    • Hepatitis 1970's    A based on labs done    • Hiatus hernia syndrome    • High cholesterol    • History of total hip replacement     L   • Hyperlipidemia    • Macular degeneration    • Memory loss     • OA (osteoarthritis)     back, hip, shoulds, knees and hands   • Psychiatric problem     depression   • S/P cataract extraction     bilat   • Snoring    • stomach flu 11/08/2019   • Urinary incontinence    • Wears dentures      Past Surgical History:   Procedure Laterality Date   • PB TOTAL HIP ARTHROPLASTY Right 11/21/2019    Procedure: ARTHROPLASTY, HIP, TOTAL;  Surgeon: Khang Delgado M.D.;  Location: SURGERY [facility] ORS;  Service: Orthopedics   • BLEPHAROPLASTY Bilateral 7/14/2015    Procedure: BLEPHAROPLASTY - UPPER;  Surgeon: Antonio Verma M.D.;  Location: SURGERY [facility] ORS;  Service:    • CATARACT PHACO WITH IOL  3/13/2012    Performed by ANTONIO VERMA at SURGERY SAME DAY [facility] ORS   • CATARACT PHACO WITH IOL  2/28/2012    Performed by ANTONIO VERMA at SURGERY SAME DAY [facility] ORS   • HYSTERECTOMY, TOTAL ABDOMINAL     • OTHER ORTHOPEDIC SURGERY      left hip replacement   • TUBAL LIGATION         History Leading to Admission, Conditions that Caused the Need for Rehab (CMS):     Katie Vera M.D.   Physician   [facility]   H&P   Signed   Date of Service:  12/14/2019  2:53 PM                          Hospital Medicine History & Physical Note     Date of Service  12/14/2019     Primary Care Physician  Elizabeth Molina M.D.     Consultants  Orthopedic surgery Dr. Delgado  Cardiology Dr. Julian     Code Status  full     Chief Complaint  Right hip pain     History of Presenting Illness  79 y.o. female who had a right hip arthroplasty on 11/21. Tatianna presented 12/12/2019 with a syncopal episode while walking down stairs at home. She had subsequent hip pain. While in the ER she was found to have a right hip prosthetic dislocation. She then developed chest pain in the ER with EKG changes and an elevated troponin. She has been seen by cardiology and heparin was started. CT scan of the chest shows pulmonary emboli so heparin drip is continued. These results were discussed with orthopedic  surgery Dr. Delgado who states the hip relocation should not require any cutting so the patient should resume anticoagulation therapy although her hip will need to be reduced in the operating room at [facility].        Assessment/Plan:  I anticipate this patient will require at least two midnights for appropriate medical management, necessitating inpatient admission.     Elevated troponin  Assessment & Plan  Due to pulmonary embolus, stable, cardiology is consulted and recommends no other intervention     Closed dislocation of right hip (HCC)  Assessment & Plan  Unable to be reduced in the emergency department  Dr Delgado is consulted as right hip arthroplasty was done 11/21 and plans for OR reduction 12/15     Acute pulmonary embolism with acute cor pulmonale (HCC)  Assessment & Plan  Continue heparin drip, Dr. Delgado is aware and hip relocation should require anaesthesia but no cutting  Echocardiogram confirms increased right heart pressure     Acute respiratory failure (HCC)- (present on admission)  Assessment & Plan  Continue supplemental oxygen for hypoxia  Respiratory therapy and encourage mobility after surgery     Normocytic anemia  Assessment & Plan  Monitor labs on heparin drip           VTE prophylaxis: heparin drip        Khang Delgado M.D.   Physician   Surgery Orthopedic   H&P   Signed   Date of Service:  12/15/2019 12:00 AM                          DATE OF SCHEDULED SURGERY:  12/15/2019     HISTORY AND PHYSICAL AND CONSULTATION           ASSESSMENT AND PLAN:  At this point, we are going to try to reduce the right   hip in the OR when we get her under anesthesia and get the muscles loosen from   muscle relaxers, more likely we will reduce the right hip.  If we are unable   to reduce it, she is going to have an open surgery at a later date, which   would be a poly exchange with a constrained component on the right side.  I am   hoping to try and avoid an open operation.  More than likely  we would do a   closed reduction of the right hip due to dislocation ___ they were unable to   reduce it in the emergency room.  I will do the OR reduction today on   12/15/2019.  Again, she does have an acute pulmonary embolism.  She has a   pulmonary drip.  She also has acute respiratory failure due to hypoxia and she   has a normal baseline anemia and elevated troponin more likely due to the   pulmonary embolism as well.  All questions and concerns were answered.  We are   going to the OR and hopefully will not have any problems and we can get her   hip back in place and hopefully she can recover from a pulmonary embolism,   possibly deep venous thrombosis.                          ____________________________________     JOSE LUIS Arroyo / RONN     DD:  12/15/2019 12:30:38  DT:  12/15/2019 15:04:06     D#:  [id number]  Job#:  [id number]         Last signed by: Khang Delgado M.D. at 12/19/2019 11:10 AM       Salma Tejada M.D.   Physician   Physical Medicine & Rehab   Consults   Signed   Date of Service:  12/27/2019  3:55 PM            Consult Orders   IP CONSULT FOR PHYSIATRY [[id number]] ordered by Romain Howard M.D. at 12/27/19 1421               []Hide copied text    []Kitty for details  Medical chart review completed.      Patient is a 79 y.o. female  with a past medical history of COPD, legally blind, recent right hip arthroplasty complicated by small bowel obstruction, admitted to [facility] on 12/12, with syncopal episode and fall.  He was found to have a right hip prosthetic dislocation, complained of chest pain and was transferred from [facility] from [facility].  Was found to have pulmonary emboli for which she was started on anticoagulation with Eliquis.  He had an echocardiogram which showed moderate pulmonary hypertension. She had closed reduction of her right hip dislocation on 12/19 with Dr. Delgado.  She is on Midodrine for hypotension. Her elevated troponin was thought  secondary to demand ischemia from her pulmonary emboli.     Patient with multiple co-morbidities(including but not limited to respiratory insufficiency, anemia, hyperglycemia pleural effusions, pulmonary emboli, midodrine); with cognitive deficits and functional deficits in mobility/self-cares/swallowing, and Severe de-conditioning.      Pre-morbidly, this patient lived in a single level home with unknown steps to enter.  Her daughter plans on staying with her after discharge indefinitely to assist with her care.  The patient was evaluated by acute care Physical Therapy, Occupational Therapy and Speech Language Pathology; currently requiring moderate to maximum assistance for mobility and moderate to maximum assistance for ADLs, also with ongoin  g swallowing deficits.      6 clicks score 6 mobility, 15 ADLs     The patient is a very good candidate for an acute inpatient rehabilitation program with a coordinated program of care at an intensity and frequency not available at a lower level of care.      Note: if the patient continues to progress while waiting for medical clearance, and no longer requires 2 out of 3 therapy services (PT/OT/SLP), then the patient would no longer meet the criteria for acute inpatient rehabilitation.     This recommendation is substantiated by the patient's current medical condition with intervention and assessment of medical issues requiring an acute level of care for patient's safety and maximum outcome. A coordinated program of care will be provided by an interdisciplinary team including physical therapy, occupational therapy, speech language pathology, hospitalist, physiatry, rehab nursing and rehab psychology. Rehab goals include improved cognition and swallowing, mobility, self-care management, strength and conditioning/endurance, pain management, bowel and bladder management, mood and affect, and safety with independent home management including caregiver training.     Estimated  length of stay is approximately 14-21 days. Rehab potential: Very Good. Disposition: to pre-morbid independent living setting with supportive care of patient's community resources. We will continue to follow with you in anticipation of discharge to acute inpatient rehabilitation when medically stable to do so at the discretion of her attending physician.      Thank you for allowing us to participate in her care.     Salma Tejada M.D.  Physical Medicine and Rehabilitation                          Co-morbidities: as listed above   Potential Risk - Complications: Contractures, Deep Vein Thrombosis, Dysphagia, Incontinence, Malnutrition, Pain, Perceptual Impairment, Pneumonia and leaglly blind  Level of Risk: High    Ongoing Medical Management Needed (Medical/Nursing Needs):   Patient Active Problem List    Diagnosis Date Noted   • Fever 12/15/2019     Priority: High   • Shock ([facility]) 12/15/2019     Priority: High   • Acute pulmonary embolism with acute cor pulmonale ([facility]) 12/14/2019     Priority: High   • Acute respiratory failure ([facility]) 12/12/2019     Priority: High   • Ileus ([facility]) 11/25/2019     Priority: High   • Closed dislocation of right hip ([facility]) 12/14/2019     Priority: Medium   • COPD (chronic obstructive pulmonary disease) ([facility]) 11/25/2019     Priority: Medium   • NSTEMI (non-ST elevated myocardial infarction) ([facility]) 09/12/2017     Priority: Medium   • Abnormal stress test 12/16/2016     Priority: Medium   • HTN (hypertension) 06/25/2009     Priority: Medium   • Elevated troponin 12/14/2019     Priority: Low   • Chronic use of opiate drugs therapeutic purposes 07/07/2017     Priority: Low   • Chronic bilateral low back pain without sciatica 07/07/2017     Priority: Low   • Obesity (BMI 30-39.9) 12/16/2016     Priority: Low   • Daytime somnolence 12/16/2016     Priority: Low   • Insomnia 12/16/2016     Priority: Low   • Gastroesophageal reflux disease 12/16/2016     Priority: Low   • Vitamin D deficiency  "12/16/2016     Priority: Low   • Chronic narcotic use 12/16/2016     Priority: Low   • Controlled substance agreement signed 12/16/2016     Priority: Low   • Chronic pain of both shoulders 12/16/2016     Priority: Low   • Chronic left-sided low back pain without sciatica 12/16/2016     Priority: Low   • Dyslipidemia 08/01/2016     Priority: Low   • Scalp itch 06/29/2016     Priority: Low   • Polypharmacy 06/29/2016     Priority: Low   • Dermatochalasis 07/14/2015     Priority: Low   • VAGINAL LESION 11/10/2009     Priority: Low   • Pelvic pain 11/10/2009     Priority: Low   • Osteopenia 06/25/2009     Priority: Low   • Dysuria 06/25/2009     Priority: Low   • Normocytic anemia 06/25/2009     Priority: Low   • Hip pain 06/25/2009     Priority: Low   • Elbow pain 06/25/2009     Priority: Low   • Hypotension 12/26/2019   • Pulmonary hypertension (HCC) 12/26/2019   • Hip dislocation, right (HCC) 12/12/2019   • Syncope 12/12/2019   • Lung nodule 11/28/2019   • Impaired mobility and ADLs 11/27/2019   • LFT elevation 11/25/2019   • Status post right hip replacement 11/25/2019   • Hyperglycemia 11/25/2019   • Impaired vision 05/28/2019   • 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 and Joselint 05/28/2019   • Neck pain on right side 02/27/2019   • Hip pain, chronic, right 04/12/2018   • Hearing difficulty of both ears 04/12/2018   • Preventative health care 10/30/2017       Current Vital Signs:   Temperature: 37.1 °C (98.7 °F) Pulse: 77 Respiration: 20 Blood Pressure : 102/63  Weight: 70.8 kg (156 lb 1.4 oz) Height: 144.8 cm (4' 9.01\")  Pulse Oximetry: 94 % O2 (LPM): 4      Completed Laboratory Reports:  Recent Labs     12/26/19  0253 12/27/19  0424 12/28/19  0244   WBC  --  7.1 6.7   HEMOGLOBIN  --  9.3* 9.3*   HEMATOCRIT  --  31.6* 31.0*   PLATELETCT  --  437 420   SODIUM 138 137 141   POTASSIUM 4.5 4.3 4.4   BUN 14 15 16   CREATININE 0.76 0.69 0.76   GLUCOSE 103* 97 102*             Additional Labs: Not " Applicable    Prior Living Situation:   Housing / Facility: 1 Story Apartment / Condo  Steps Into Home: 1  Steps In Home: 0  Lives with - Patient's Self Care Capacity: Alone and Able to Care For Self  Equipment Owned: Front-Wheel Walker, Blu    Prior Level of Function / Living Situation:   Physical Therapy: Prior Services: None  Housing / Facility: 1 Story Apartment / Condo  Steps Into Home: 1  Steps In Home: 0  Equipment Owned: Front-Wheel WalkerBlu  Lives with - Patient's Self Care Capacity: Alone and Able to Care For Self  Bed Mobility: Independent  Transfer Status: Independent  Ambulation: Independent  Distance Ambulation (Feet): (community)  Assistive Devices Used: Front-Wheel Walker  Current Level of Function:   Level Of Assist: Unable to Participate  Supine to Sit: Maximal Assist  Sit to Supine: Maximal Assist  Scooting: Maximal Assist  Sit to Stand: Moderate Assist  Bed, Chair, Wheelchair Transfer: Unable to Participate  Sitting in Chair: NT  Sitting Edge of Bed: 15mins  Standin-2mins x4  Occupational Therapy:   Self Feeding: Independent  Grooming / Hygiene: Independent  Bathing: Independent  Dressing: Independent  Toileting: Independent  Medication Management: Independent  Laundry: Independent  Kitchen Mobility: Independent  Finances: Independent  Home Management: Independent  Shopping: Independent  Prior Level Of Mobility: Independent With Device in Community  Prior Services: None  Housing / Facility: 1 Story Apartment / Condo  Current Level of Function:   Upper Body Dressing: Minimal Assist  Lower Body Dressing: Maximal Assist  Toileting: Maximal Assist  Speech Language Pathology:   Assessment : Patient was seen on this date for a clinical swallow evaluation. Patient AAOx4 and coughing and WOB with use of accessory muscles noted prior to PO intake. Vocal quality clear. Oral motor examination revealed missing dentition. Otherwise, WNL. PO consisted of single ice chips, NTL, puree, and thin  liquids. Significant increase in WOB following all PO trials which places patient at HIGH risk for aspiration. Patient presented with s/sx concerning for aspiration as seen by immediate coughing with single ice chip, delayed coughing with purees, and increase wheezing following trials of thin liquids. Trials were limited due to concerns for aspiration. At this time, patient is at high risk for aspiration given compromised respiratory status and would recommend continue NPO with reassessment tomorrow AM.   Problem List: Dysphagia  Diet / Liquid Recommendation: Dysphagia II, Nectar Thick Liquid  Comments:  Pt seen on this date for dysphagia therapy per RN request as pt refusing to eat pureed diet. Pt reported that she loves mashed potatoes and gravy and wanted to receive that for lunch and dinner. Weak coughing noted prior to PO when pt laying down almost flat. Pt initially refusing HOB to be raised past 35*, however, with encouragement, agreed to HOB being raised to ~60-70*. No overt s/sx of aspiration appreciated with trials of NTL via cup sip or purees. Upon palpation, laryngeal elevation was weak and swallow trigger was timely. Trials of soft solids were presented to be and no overt s/sx of aspiration noted. Mastication is slightly prolonged given edentulous state, however, pt endorsed that she has been edentulous for ~14 years. Pt appeared to be chewing adequately before swallowing and did not talk with PO in mouth. At this time, would recommend upgrading diet to dysphagia II/NTL with implementation of posted swallowing strategies. Dysphagia education and recommendations provided to pt and she verbalized understanding. SLP to follow.  Rehabilitation Prognosis/Potential: Good  Estimated Length of Stay: 14-21 days    Nursing:   Orientation : Oriented x 4  Continent    Scope/Intensity of Services Recommended:  Physical Therapy: 1 hr / day  5 days / week. Therapeutic Interventions Required: Maximize Endurance,  Mobility, Strength and Safety  Occupational Therapy: 1 hr / day 5 days / week. Therapeutic Interventions Required: Maximize Self Care, ADLs, IADLs and Energy Conservation  Speech & Language Pathology: 1 hr / day 5 days / week. Therapeutic Interventions Required: Maximize Cognition, Swallowing and Safety  Rehabilitation Nursin/7. Therapeutic Interventions Required: Monitor Pain, Skin, Vital Signs, Intake and Output, Labs, Safety, Aspiration Risk and Family Training  Rehabilitation Physician: 3 - 5 days / week. Therapeutic Interventions Required: Medical Management  Respiratory Care: consult . Therapeutic Interventions Required: Pulmonary Toileting, O2 Weaning and Aspiration Risk  Dietician: consult . Therapeutic Interventions Required: nutritional need     Rehabilitation Goals and Plan (Expected frequency & duration of treatment in the IRF):   Return to the Community, Supervised Level of Care, Modified Independent Level of Care and Minimal Assist Level of Care  Anticipated Date of Rehabilitation Admission: 2019  Patient/Family oriented IRF level of care/facility/plan: Yes  Patient/Family willing to participate in IRF care/facility/plan: Yes  Patient able to tolerate IRF level of care proposed: Yes  Patient has potential to benefit IRF level of care proposed: Yes  Comments: Not Applicable    Special Needs or Precautions - Medical Necessity:  Safety Concerns/Precautions:  Fall Risk / High Risk for Falls, Balance and Blind  Pain Management  Requires Oxygen  Total Hip Precaution: Posterior Post approach total hip precautions wbat   Cardiac Precautions  Current Medications:    Current Facility-Administered Medications Ordered in Epic   Medication Dose Route Frequency Provider Last Rate Last Dose   • vitamin D (cholecalciferol) 1000 UNIT tablet 2,000 Units  2,000 Units Oral DAILY Corazon Paz M.D.   2,000 Units at 19 0516   • midodrine (PROAMATINE) tablet 15 mg  15 mg Oral TID WITH MEALS Corazon Paz  M.D.   15 mg at 12/28/19 0804   • furosemide (LASIX) tablet 20 mg  20 mg Oral Q DAY Corazon Paz M.D.   20 mg at 12/28/19 0517   • gabapentin (NEURONTIN) capsule 300 mg  300 mg Oral TID PRN Corazon Paz M.D.   300 mg at 12/28/19 0804   • ascorbic acid tablet 500 mg  500 mg Oral DAILY Chencho Asencio DYeimyOYeimy   500 mg at 12/28/19 0516   • apixaban (ELIQUIS) tablet 5 mg  5 mg Oral BID Chencho Asencio D.OYeimy   5 mg at 12/28/19 0516   • acetaminophen (TYLENOL) tablet 500 mg  500 mg Oral Q6HRS PRN Cash Shin M.D.   500 mg at 12/27/19 0746   • ipratropium-albuterol (DUONEB) nebulizer solution  3 mL Nebulization Q4H PRN (RT) Sasha Zimmerman M.D.       • ferrous sulfate tablet 325 mg  325 mg Oral BID WITH MEALS Katie Vera M.D.   325 mg at 12/28/19 0804   • ondansetron (ZOFRAN ODT) dispertab 4 mg  4 mg Oral Q4HRS PRN Katie Vera M.D.       • ondansetron (ZOFRAN) syringe/vial injection 4 mg  4 mg Intravenous Q4HRS PRN Katie Vera M.D.       • senna-docusate (PERICOLACE or SENOKOT S) 8.6-50 MG per tablet 2 Tab  2 Tab Oral BID Katie Vera M.D.   Stopped at 12/27/19 1800    And   • polyethylene glycol/lytes (MIRALAX) PACKET 1 Packet  1 Packet Oral QDAY PRN Katie Vera M.D.        And   • magnesium hydroxide (MILK OF MAGNESIA) suspension 30 mL  30 mL Oral QDAY PRN Katie Vera M.D.   30 mL at 12/17/19 2318    And   • bisacodyl (DULCOLAX) suppository 10 mg  10 mg Rectal QDAY PRN Katie Vera M.D.       • Respiratory Care per Protocol   Nebulization Continuous RT Katie Vera M.D.       • budesonide-formoterol (SYMBICORT) 160-4.5 MCG/ACT inhaler 2 Puff  2 Puff Inhalation BID Katie Vera M.D.   2 Puff at 12/28/19 0521   • calcium carbonate (TUMS) chewable tab 500 mg  500 mg Oral QDAY PRN Katie Vera M.D.       • lovastatin (MEVACOR) tablet 40 mg  40 mg Oral QHS Katie Vera M.D.   40 mg at 12/27/19 1939   • omeprazole (PRILOSEC) capsule 20 mg  20 mg Oral DAILY Katie Vera M.D.   20 mg  at 12/28/19 0516     No current Epic-ordered outpatient medications on file.     Diet:   DIET ORDERS (From admission to next 24h)     Start     Ordered    12/27/19 1540  Diet Order Regular  ALL MEALS     Question Answer Comment   Diet: Regular    Texture/Fiber modifications: Dysphagia 2(Pureed/Chopped)specify fluid consistency(question 6) mashed potatos with extra gravy for all lunches and dinners please   Consistency/Fluid modifications: Nectar Thick    Miscellaneous modifications: SLP - Deliver to Nursing Station        12/27/19 1539    12/20/19 1129  Supplements  ONCE     Question:  Which Supplement  Answer:  Per RD    12/20/19 1131                Anticipated Discharge Destination / Patient/Family Goal:  Destination: Home with Assistance Support System: Family   Anticipated home health services: OT, PT, SLP, Nursing, Social Work and Aide  Previously used HH service/ provider: Not Applicable  Anticipated DME Needs: Oxygen and Walker  Outpatient Services: OT, PT and SLP  Alternative resources to address additional identified needs:     Pre-Screen Completed: 12/28/2019 12:06 PM Sivakumar Olivares

## 2019-12-28 NOTE — DISCHARGE SUMMARY
Discharge Summary    CHIEF COMPLAINT ON ADMISSION  Difficulty breathing, PE, right hip dislocation    Reason for Admission  PE, NIDHI    CODE STATUS  DNAR, I OK    HPI & HOSPITAL COURSE  This is a 79 y.o. female here with NIDHI.   Ms Recio has a history of recent elective right hip arthroplasty that was performed on 11/21/2019, post operative course was complicated by SBO that was treated conservatively and resolved, she was discharged to rehab, eventually being discharged home on 12/10/2019.  On 12/12/2019 she suffered a syncopal episode and fall, she was brought to the emergency room at [facility] and was found to have a right hip prosthetic dislocation.  Additionally, she complained of chest pain and had an elevated troponin.  She was diagnosed with a pulmonary embolism and started on anticoagulation.  She was transferred to [facility] and on 12/19/2019 she has closed reduction of the right hip dislocation.  12/26: s/p ICU day #1:  Patient remains on 5 LPM NC, but no respiratory distress.  SBP remains soft 90s and therefore did not get IV lasix since yesterday.  CXR ordered and with pulmonary edema remaining about the same as 2 weeks ago when sent to ICU for respiratoy distress and ALOC.  Remains on NTL 1:1 diet.  No new infiltrates seen.  Remains on midodrine 10 tid.  dc'd IV lasix, start lasix 20mg daily instead.  C/o right hip pain with nausea.  Ordered neurontin 300 tid prn pain.  Kimbrough remains for now.   12/27: Patient continues to have shortness of breath.  She had increased shortness of breath noted today on exam with her systolic blood pressure in the 70s.  Repeat chest x-ray showed improvement of pulmonary edema from yesterday's chest x-ray.  No wheezing noted on exam.  Patient is refusing to eat for nectar thick liquid diet no no new aspiration is suspected.  I have increased Midrin from 10 3 times daily to 15 mg 3 times daily.  Patient did receive Lasix p.o. yesterday and will  continue daily as tolerated with her blood pressures.  Vitamin D ordered hemoglobin improved from 8.3-9.6.  Occult blood stool negative.        Hg stable, BP stable on increased midodrine dose of 15mg tid in order to tolerate lasix 20mg  Po daily for her pulmonary edema related to her PE/elevated pulmonary hypertension.  She remains on Eliquis for her PE.  WBAT for her right total hip s/p dislocation and reduction on 12/19/19 by Dr. Delgado.    Therefore, she is discharged in good and stable condition to home with close outpatient follow-up.    The patient met 2-midnight criteria for an inpatient stay at the time of discharge.      FOLLOW UP ITEMS POST DISCHARGE  Follow up with PCP after discharge from [facility].    DISCHARGE DIAGNOSES  Principal Problem:    Acute respiratory failure (HCC) POA: Yes  Active Problems:    Acute pulmonary embolism with acute cor pulmonale (HCC) POA: Yes    COPD (chronic obstructive pulmonary disease) (HCC) POA: Yes    Closed dislocation of right hip (HCC) POA: Yes    Hypotension POA: Yes    Pulmonary hypertension (HCC) POA: Yes    Iron deficiency anemia POA: Yes    Dyslipidemia POA: Yes    Gastroesophageal reflux disease POA: Yes  Resolved Problems:    Fever POA: No    Shock (HCC) POA: Yes    Obesity (BMI 30-39.9) POA: Yes    Elevated troponin POA: Yes      FOLLOW UP  No future appointments.  No follow-up provider specified.    MEDICATIONS ON DISCHARGE     Medication List      START taking these medications      Instructions   apixaban 5mg Tabs  Commonly known as:  ELIQUIS   Take 1 Tab by mouth 2 Times a Day.  Dose:  5 mg     ascorbic acid 500 MG tablet  Start taking on:  December 29, 2019  Commonly known as:  VITAMIN C   Take 1 Tab by mouth every day.  Dose:  500 mg     Cholecalciferol 2000 UNIT Tabs  Start taking on:  December 29, 2019  Replaces:  Vitamin D (Cholecalciferol) 400 units Caps   Take 1 Tab by mouth every day.  Dose:  2,000 Units     ferrous sulfate 325 (65 Fe) MG tablet   Take  1 Tab by mouth 2 times a day, with meals.  Dose:  325 mg     furosemide 20 MG Tabs  Start taking on:  December 29, 2019  Commonly known as:  LASIX   Take 1 Tab by mouth every day.  Dose:  20 mg     gabapentin 300 MG Caps  Commonly known as:  NEURONTIN   Take 1 Cap by mouth 3 times a day.  Dose:  300 mg     midodrine 5 MG Tabs  Commonly known as:  PROAMATINE   Take 3 Tabs by mouth 3 times a day, with meals.  Dose:  15 mg     omeprazole 20 MG delayed-release capsule  Start taking on:  December 29, 2019  Commonly known as:  PRILOSEC   Take 1 Cap by mouth every day.  Dose:  20 mg        CHANGE how you take these medications      Instructions   acetaminophen 500 MG Tabs  What changed:  when to take this  Commonly known as:  TYLENOL   Take 1 Tab by mouth every 6 hours as needed.  Dose:  500 mg        CONTINUE taking these medications      Instructions   budesonide-formoterol 160-4.5 MCG/ACT Aero  Commonly known as:  SYMBICORT   Inhale 2 Puffs by mouth 2 Times a Day.  Dose:  2 Puff     calcium carbonate 500 MG Chew  Commonly known as:  TUMS   Take 500 mg by mouth 1 time daily as needed.  Dose:  500 mg     lovastatin 40 MG tablet  Commonly known as:  MEVACOR   Take 1 Tab by mouth every bedtime.  Dose:  40 mg        STOP taking these medications    Aspirin 325 MG Tbec     CENTRUM SILVER PO     OCUVITE ADULT 50+ Caps     pantoprazole 20 MG tablet  Commonly known as:  PROTONIX     senna-docusate 8.6-50 MG Tabs  Commonly known as:  PERICOLACE or SENOKOT S     tramadol 50 MG Tabs  Commonly known as:  ULTRAM     Vitamin D (Cholecalciferol) 400 units Caps  Replaced by:  Cholecalciferol 2000 UNIT Tabs            Allergies  Allergies   Allergen Reactions   • [place]        DIET  Orders Placed This Encounter   Procedures   • Diet Order Regular     Standing Status:   Standing     Number of Occurrences:   1     Order Specific Question:   Diet:     Answer:   Regular [1]     Order Specific Question:   Texture/Fiber modifications:      Answer:   Dysphagia 2(Pureed/Chopped)specify fluid consistency(question 6) [2]     Comments:   mashed potatos with extra gravy for all lunches and dinners please     Order Specific Question:   Consistency/Fluid modifications:     Answer:   Nectar Thick [2]     Order Specific Question:   Miscellaneous modifications:     Answer:   SLP - Deliver to Nursing Station [22]       ACTIVITY  As tolerated.  Weight bearing as tolerated    LINES, DRAINS, AND WOUNDS  This is an automated list. Peripheral IVs will be removed prior to discharge.  Peripheral IV 12/24/19 20 G Right Upper arm (Active)   Site Assessment Clean;Dry;Intact 12/28/2019  8:11 AM   Dressing Type Transparent;Occlusive 12/28/2019  8:11 AM   Line Status Scrubbed the hub prior to access;Flushed;Saline locked 12/28/2019  8:11 AM   Dressing Status Clean;Dry;Intact 12/28/2019  8:11 AM   Dressing Intervention N/A 12/27/2019  8:00 PM   Infiltration Grading ([facility], [facility]) 0 12/28/2019  8:11 AM   Phlebitis Scale ([facility] Only) 0 12/28/2019  8:11 AM     Urethral Catheter Non-latex;Temperature probe 16 Fr. (Active)   Site Assessment Clean;Skin intact 12/27/2019  8:00 PM   Collection Container Standard drainage bag 12/27/2019  8:00 PM   Urinary Catheter Care Tamper Evident Seal in Place;Drainage Tube Extended;Drainage Bag Not Overfilled;Drainage Tubing Properly Secured;Drainage Bag Below Bladder Level and Not on Floor 12/27/2019  8:00 PM   Securement Method Securing device (Describe) 12/27/2019  8:00 PM   Output (mL) 420 mL 12/27/2019  3:29 PM         Peripheral IV 12/24/19 20 G Right Upper arm (Active)   Site Assessment Clean;Dry;Intact 12/28/2019  8:11 AM   Dressing Type Transparent;Occlusive 12/28/2019  8:11 AM   Line Status Scrubbed the hub prior to access;Flushed;Saline locked 12/28/2019  8:11 AM   Dressing Status Clean;Dry;Intact 12/28/2019  8:11 AM   Dressing Intervention N/A 12/27/2019  8:00 PM   Infiltration Grading ([facility]) 0 12/28/2019  8:11 AM   Phlebitis  Scale ([facility] Only) 0 12/28/2019  8:11 AM           Urethral Catheter Non-latex;Temperature probe 16 Fr. (Active)   Site Assessment Clean;Skin intact 12/27/2019  8:00 PM   Collection Container Standard drainage bag 12/27/2019  8:00 PM   Urinary Catheter Care Tamper Evident Seal in Place;Drainage Tube Extended;Drainage Bag Not Overfilled;Drainage Tubing Properly Secured;Drainage Bag Below Bladder Level and Not on Floor 12/27/2019  8:00 PM   Securement Method Securing device (Describe) 12/27/2019  8:00 PM   Output (mL) 420 mL 12/27/2019  3:29 PM        MENTAL STATUS ON TRANSFER  Level of Consciousness: Alert  Orientation : Oriented x 4  Speech: Speech Clear    CONSULTATIONS      PROCEDURES  DATE OF SERVICE:  12/19/2019     SERVICE:  Orthopedic surgery.     PREOPERATIVE DIAGNOSIS:  Dislocated right total hip arthroplasty after a   syncopal event.     POSTOPERATIVE DIAGNOSIS:   Dislocated right total hip arthroplasty after a   syncopal event.     PROCEDURE:  Closed reduction of right hip dislocation.     SURGEON:  Khang Delgado MD     ASSISTANT SURGEON:  Chapito Inman PA-C, [facility]     ANESTHETIC:  IV sedation with fentanyl, Versed, and ketamine.     ANESTHESIOLOGIST:  Pulmonary intensive care specialist, Cash Shin MD     COMPLICATIONS:  None.     BLOOD LOSS:  Zero.     HISTORY AND INDICATIONS:  The patient is an 79-year-old female who   approximately a month ago had a right total hip arthroplasty done by myself.    She has previously had a left total hip arthroplasty done several years ago.    Several weeks out of the operation, she had a syncopal episode, which resulted   in a hip dislocation of her right hip.  She was hospitalized and diagnosed   with pulmonary embolism and placed in intensive care.  Because of her   condition, reduction of the hip was precluded by her intensive care doctors   who were struggling with blood pressure and other physiological parameters.    Because of this, the reduction of  the hip had to be delayed until she was   cleared by the pulmonary intensive care team.  Once she was cleared, today's   procedure was performed.     DESCRIPTION OF PROCEDURE:  After informed consent was obtained and a consent   was signed, IV induction of anesthesia was performed.  Dr. Shin supervised   this and it included IV fentanyl, Versed, and ketamine.  Once satisfactory   anesthesia was induced, I then performed a longitudinal traction maneuver on   the right hip.  There was a sensation of relocation of the hip.  The limb   suddenly became much longer to its length and it was thought that the hip was   reduced.  Pillows were placed between the patient's legs including an   abduction pillow from the operating room and post-procedure x-rays were done,   which confirmed that the hip had been relocated.  No complications were   experienced.  The patient tolerated the procedure well and did not appear to   have any distress during the procedure.        ____________________________________     ANASTASIA VERA MD      Immediate Post OP Note     PreOp Diagnosis: r hip oa djd     PostOp Diagnosis: same     Procedure(s):R  ARTHROPLASTY, HIP, TOTAL - Wound Class: Clean     Surgeon(s):  Anastasia Vera M.D.     Anesthesiologist/Type of Anesthesia:  Anesthesiologist: Kristy De La O M.D./General     Surgical Staff:  Assistant: WILLIAMS Cervantes  Circulator: Cyndi Ayala R.N.  Limb Payne: Jose Nava  Relief Circulator: Shiela Villalpando R.N.  Scrub Person: Mirza Garber     Specimens removed if any:  none     Estimated Blood Loss: 140cc     Findings: none     Complications: none           11/21/2019 11:16 AM Anastasia Vera M.D.    LABORATORY  Lab Results   Component Value Date    SODIUM 141 12/28/2019    POTASSIUM 4.4 12/28/2019    CHLORIDE 100 12/28/2019    CO2 33 12/28/2019    GLUCOSE 102 (H) 12/28/2019    BUN 16 12/28/2019    CREATININE 0.76 12/28/2019    CREATININE 0.9 01/16/2009        Lab  Results   Component Value Date    WBC 6.7 12/28/2019    HEMOGLOBIN 9.3 (L) 12/28/2019    HEMATOCRIT 31.0 (L) 12/28/2019    PLATELETCT 420 12/28/2019        Total time of the discharge process exceeds 42 minutes.

## 2019-12-28 NOTE — CARE PLAN
Problem: Safety  Goal: Will remain free from falls  Outcome: MET     Problem: Urinary Elimination:  Goal: Ability to reestablish a normal urinary elimination pattern will improve  Outcome: MET   Kaylan ARAMBULA'ed  Problem: Skin Integrity  Goal: Risk for impaired skin integrity will decrease  Outcome: MET   Skin intact  Problem: Pain Management  Goal: Pain level will decrease to patient's comfort goal  Outcome: MET

## 2019-12-28 NOTE — PROGRESS NOTES
IV removed, Kimbrough removed, [facility]. Pt left floor via wheelchair.

## 2019-12-28 NOTE — CARE PLAN
Problem: Communication  Goal: The ability to communicate needs accurately and effectively will improve  Outcome: PROGRESSING AS EXPECTED     Problem: Safety  Goal: Will remain free from injury  Outcome: PROGRESSING AS EXPECTED     Problem: Infection  Goal: Will remain free from infection  Outcome: PROGRESSING AS EXPECTED       Patient is able to communicate need effectively with use of call light, she alerts staff for needs. Afebrile, vital signs stable at this moment. Hourly rounding in progress.

## 2019-12-29 LAB
25(OH)D3 SERPL-MCNC: 20 NG/ML (ref 30–100)
ALBUMIN SERPL BCP-MCNC: 3.1 G/DL (ref 3.2–4.9)
ALBUMIN/GLOB SERPL: 1.1 G/DL
ALP SERPL-CCNC: 69 U/L (ref 30–99)
ALT SERPL-CCNC: 15 U/L (ref 2–50)
ANION GAP SERPL CALC-SCNC: 7 MMOL/L (ref 0–11.9)
ANISOCYTOSIS BLD QL SMEAR: ABNORMAL
AST SERPL-CCNC: 25 U/L (ref 12–45)
BASOPHILS # BLD AUTO: 0.5 % (ref 0–1.8)
BASOPHILS # BLD: 0.03 K/UL (ref 0–0.12)
BILIRUB SERPL-MCNC: 0.5 MG/DL (ref 0.1–1.5)
BUN SERPL-MCNC: 16 MG/DL (ref 8–22)
CALCIUM SERPL-MCNC: 8.9 MG/DL (ref 8.5–10.5)
CHLORIDE SERPL-SCNC: 102 MMOL/L (ref 96–112)
CO2 SERPL-SCNC: 32 MMOL/L (ref 20–33)
COMMENT 1642: NORMAL
CREAT SERPL-MCNC: 0.72 MG/DL (ref 0.5–1.4)
EOSINOPHIL # BLD AUTO: 0.01 K/UL (ref 0–0.51)
EOSINOPHIL NFR BLD: 0.2 % (ref 0–6.9)
ERYTHROCYTE [DISTWIDTH] IN BLOOD BY AUTOMATED COUNT: 57.7 FL (ref 35.9–50)
GLOBULIN SER CALC-MCNC: 2.9 G/DL (ref 1.9–3.5)
GLUCOSE SERPL-MCNC: 95 MG/DL (ref 65–99)
HCT VFR BLD AUTO: 32.7 % (ref 37–47)
HGB BLD-MCNC: 9.4 G/DL (ref 12–16)
HYPOCHROMIA BLD QL SMEAR: ABNORMAL
IMM GRANULOCYTES # BLD AUTO: 0.07 K/UL (ref 0–0.11)
IMM GRANULOCYTES NFR BLD AUTO: 1.2 % (ref 0–0.9)
LYMPHOCYTES # BLD AUTO: 1.68 K/UL (ref 1–4.8)
LYMPHOCYTES NFR BLD: 28 % (ref 22–41)
MCH RBC QN AUTO: 24.9 PG (ref 27–33)
MCHC RBC AUTO-ENTMCNC: 28.7 G/DL (ref 33.6–35)
MCV RBC AUTO: 86.5 FL (ref 81.4–97.8)
MICROCYTES BLD QL SMEAR: ABNORMAL
MONOCYTES # BLD AUTO: 0.71 K/UL (ref 0–0.85)
MONOCYTES NFR BLD AUTO: 11.9 % (ref 0–13.4)
MORPHOLOGY BLD-IMP: NORMAL
NEUTROPHILS # BLD AUTO: 3.49 K/UL (ref 2–7.15)
NEUTROPHILS NFR BLD: 58.2 % (ref 44–72)
NRBC # BLD AUTO: 0 K/UL
NRBC BLD-RTO: 0 /100 WBC
OVALOCYTES BLD QL SMEAR: NORMAL
PLATELET # BLD AUTO: 443 K/UL (ref 164–446)
PLATELET BLD QL SMEAR: NORMAL
PMV BLD AUTO: 8.4 FL (ref 9–12.9)
POIKILOCYTOSIS BLD QL SMEAR: NORMAL
POLYCHROMASIA BLD QL SMEAR: NORMAL
POTASSIUM SERPL-SCNC: 4.4 MMOL/L (ref 3.6–5.5)
PROT SERPL-MCNC: 6 G/DL (ref 6–8.2)
RBC # BLD AUTO: 3.78 M/UL (ref 4.2–5.4)
RBC BLD AUTO: PRESENT
SODIUM SERPL-SCNC: 141 MMOL/L (ref 135–145)
TSH SERPL DL<=0.005 MIU/L-ACNC: 3.73 UIU/ML (ref 0.38–5.33)
WBC # BLD AUTO: 6 K/UL (ref 4.8–10.8)

## 2019-12-29 PROCEDURE — 99223 1ST HOSP IP/OBS HIGH 75: CPT | Performed by: HOSPITALIST

## 2019-12-29 PROCEDURE — 700102 HCHG RX REV CODE 250 W/ 637 OVERRIDE(OP)

## 2019-12-29 PROCEDURE — 84443 ASSAY THYROID STIM HORMONE: CPT

## 2019-12-29 PROCEDURE — 82306 VITAMIN D 25 HYDROXY: CPT

## 2019-12-29 PROCEDURE — 99233 SBSQ HOSP IP/OBS HIGH 50: CPT | Performed by: PHYSICAL MEDICINE & REHABILITATION

## 2019-12-29 PROCEDURE — 80053 COMPREHEN METABOLIC PANEL: CPT

## 2019-12-29 PROCEDURE — 36415 COLL VENOUS BLD VENIPUNCTURE: CPT

## 2019-12-29 PROCEDURE — 85025 COMPLETE CBC W/AUTO DIFF WBC: CPT

## 2019-12-29 PROCEDURE — 97535 SELF CARE MNGMENT TRAINING: CPT

## 2019-12-29 PROCEDURE — 97530 THERAPEUTIC ACTIVITIES: CPT

## 2019-12-29 PROCEDURE — 770010 HCHG ROOM/CARE - REHAB SEMI PRIVAT*

## 2019-12-29 PROCEDURE — 700102 HCHG RX REV CODE 250 W/ 637 OVERRIDE(OP): Performed by: PHYSICAL MEDICINE & REHABILITATION

## 2019-12-29 PROCEDURE — A9270 NON-COVERED ITEM OR SERVICE: HCPCS

## 2019-12-29 PROCEDURE — 700102 HCHG RX REV CODE 250 W/ 637 OVERRIDE(OP): Performed by: HOSPITALIST

## 2019-12-29 PROCEDURE — 94760 N-INVAS EAR/PLS OXIMETRY 1: CPT

## 2019-12-29 PROCEDURE — 97166 OT EVAL MOD COMPLEX 45 MIN: CPT

## 2019-12-29 PROCEDURE — A9270 NON-COVERED ITEM OR SERVICE: HCPCS | Performed by: PHYSICAL MEDICINE & REHABILITATION

## 2019-12-29 PROCEDURE — 92610 EVALUATE SWALLOWING FUNCTION: CPT

## 2019-12-29 PROCEDURE — A9270 NON-COVERED ITEM OR SERVICE: HCPCS | Performed by: HOSPITALIST

## 2019-12-29 PROCEDURE — 83036 HEMOGLOBIN GLYCOSYLATED A1C: CPT

## 2019-12-29 PROCEDURE — 97162 PT EVAL MOD COMPLEX 30 MIN: CPT

## 2019-12-29 RX ORDER — MONTELUKAST SODIUM 10 MG/1
10 TABLET ORAL NIGHTLY
Status: DISCONTINUED | OUTPATIENT
Start: 2019-12-29 | End: 2020-01-08 | Stop reason: HOSPADM

## 2019-12-29 RX ORDER — SIMETHICONE 80 MG
TABLET,CHEWABLE ORAL
Status: ACTIVE
Start: 2019-12-29 | End: 2019-12-30

## 2019-12-29 RX ORDER — SIMETHICONE 80 MG
120 TABLET,CHEWABLE ORAL
Status: DISCONTINUED | OUTPATIENT
Start: 2019-12-29 | End: 2020-01-08 | Stop reason: HOSPADM

## 2019-12-29 RX ORDER — OMEPRAZOLE 20 MG/1
CAPSULE, DELAYED RELEASE ORAL
Status: COMPLETED
Start: 2019-12-29 | End: 2019-12-29

## 2019-12-29 RX ORDER — OMEPRAZOLE 20 MG/1
20 CAPSULE, DELAYED RELEASE ORAL DAILY
Status: DISCONTINUED | OUTPATIENT
Start: 2019-12-29 | End: 2020-01-08 | Stop reason: HOSPADM

## 2019-12-29 RX ORDER — MONTELUKAST SODIUM 10 MG/1
TABLET ORAL
Status: ACTIVE
Start: 2019-12-29 | End: 2019-12-30

## 2019-12-29 RX ADMIN — MONTELUKAST 10 MG: 10 TABLET, FILM COATED ORAL at 20:02

## 2019-12-29 RX ADMIN — LOVASTATIN 40 MG: 20 TABLET ORAL at 19:54

## 2019-12-29 RX ADMIN — MIDODRINE HYDROCHLORIDE 15 MG: 10 TABLET ORAL at 08:13

## 2019-12-29 RX ADMIN — FERROUS SULFATE TAB 325 MG (65 MG ELEMENTAL FE) 325 MG: 325 (65 FE) TAB at 17:47

## 2019-12-29 RX ADMIN — APIXABAN 5 MG: 5 TABLET, FILM COATED ORAL at 08:14

## 2019-12-29 RX ADMIN — SIMETHICONE CHEW TAB 80 MG 120 MG: 80 TABLET ORAL at 20:03

## 2019-12-29 RX ADMIN — APIXABAN 5 MG: 5 TABLET, FILM COATED ORAL at 19:54

## 2019-12-29 RX ADMIN — OXYCODONE HYDROCHLORIDE AND ACETAMINOPHEN 500 MG: 500 TABLET ORAL at 08:13

## 2019-12-29 RX ADMIN — MIDODRINE HYDROCHLORIDE 15 MG: 10 TABLET ORAL at 11:22

## 2019-12-29 RX ADMIN — OMEPRAZOLE 20 MG: 20 CAPSULE, DELAYED RELEASE ORAL at 17:48

## 2019-12-29 RX ADMIN — FUROSEMIDE 20 MG: 20 TABLET ORAL at 05:52

## 2019-12-29 RX ADMIN — OXYCODONE HYDROCHLORIDE 5 MG: 5 TABLET ORAL at 13:15

## 2019-12-29 RX ADMIN — FERROUS SULFATE TAB 325 MG (65 MG ELEMENTAL FE) 325 MG: 325 (65 FE) TAB at 08:13

## 2019-12-29 RX ADMIN — VITAMIN D, TAB 1000IU (100/BT) 2000 UNITS: 25 TAB at 08:14

## 2019-12-29 RX ADMIN — BUDESONIDE AND FORMOTEROL FUMARATE DIHYDRATE 2 PUFF: 160; 4.5 AEROSOL RESPIRATORY (INHALATION) at 08:11

## 2019-12-29 RX ADMIN — MIDODRINE HYDROCHLORIDE 15 MG: 10 TABLET ORAL at 17:46

## 2019-12-29 RX ADMIN — OXYCODONE HYDROCHLORIDE 5 MG: 5 TABLET ORAL at 20:07

## 2019-12-29 ASSESSMENT — PATIENT HEALTH QUESTIONNAIRE - PHQ9
SUM OF ALL RESPONSES TO PHQ9 QUESTIONS 1 AND 2: 0
1. LITTLE INTEREST OR PLEASURE IN DOING THINGS: NOT AT ALL
1. LITTLE INTEREST OR PLEASURE IN DOING THINGS: NOT AT ALL
SUM OF ALL RESPONSES TO PHQ9 QUESTIONS 1 AND 2: 0
2. FEELING DOWN, DEPRESSED, IRRITABLE, OR HOPELESS: NOT AT ALL
2. FEELING DOWN, DEPRESSED, IRRITABLE, OR HOPELESS: NOT AT ALL

## 2019-12-29 ASSESSMENT — BRIEF INTERVIEW FOR MENTAL STATUS (BIMS)
WHAT MONTH IS IT: ACCURATE WITHIN 5 DAYS
WHAT DAY OF THE WEEK IS IT: CORRECT
ASKED TO RECALL BLUE: YES, NO CUE REQUIRED
ASKED TO RECALL BED: YES, AFTER CUEING (A PIECE OF FURNITURE")"
ASKED TO RECALL SOCK: YES, NO CUE REQUIRED
WHAT YEAR IS IT: CORRECT
BIMS SUMMARY SCORE: 14
INITIAL REPETITION OF BED BLUE SOCK - FIRST ATTEMPT: 3

## 2019-12-29 ASSESSMENT — ENCOUNTER SYMPTOMS
CHILLS: 0
FEVER: 0
SHORTNESS OF BREATH: 1
BRUISES/BLEEDS EASILY: 0
EYES NEGATIVE: 1
COUGH: 0
PALPITATIONS: 0
VOMITING: 0
NAUSEA: 0
ABDOMINAL PAIN: 0

## 2019-12-29 ASSESSMENT — ACTIVITIES OF DAILY LIVING (ADL): TOILETING: INDEPENDENT

## 2019-12-29 NOTE — THERAPY
"Occupational Therapy   Initial Evaluation     Patient Name: Guillermina Recio  Age:  79 y.o., Sex:  female  Medical Record #: [id number]  Today's Date: 12/29/2019     Subjective    \"I'm so afraid of falling again, it really shook me up\"     Objective       12/29/19 0931   Prior Living Situation   Prior Services Continuous (24 Hour) Care Giving Family   Housing / Facility 1 Story House   Steps Into Home 1   Steps In Home 0   Bathroom Set up Tub Transfer Bench;Bathtub / Shower Combination;Shower Curtain   Equipment Owned Front-Wheel Walker;Tub Transfer Bench;Raised Toilet Seat With Arms;Sock Aid;Reacher;Oxygen   Lives with - Patient's Self Care Capacity Alone and Able to Care For Self;Adult Children  (see below)   Comments pt living alone prior to initial hip replacement, daughter has been staying with her since recent d/c from his rehab center to assist   Prior Level of ADL Function   Self Feeding Independent   Grooming / Hygiene Independent   Bathing Independent   Dressing Independent   Toileting Independent   Comments Pt modified independent to supervised with FWW for ADLs and mobility at recent d/c from this rehab center   Prior Level of IADL Function   Medication Management Independent   Laundry Requires Assist   Kitchen Mobility Independent   Home Management Requires Assist   Shopping Requires Assist   Prior Level Of Mobility Supervision With Device in Home   Driving / Transportation Relatives / Others Provide Transportation   Leisure Interests Other (Comments)  ([place], socializing)   IRF-LAKEISHA:  Prior Functioning: Everyday Activities   Self Care Independent   Indoor Mobility (Ambulation) Independent   Stairs Needed some help   Functional Cognition Independent   Prior Device Use Walker   Vitals   Patient BP Position Sitting   Blood Pressure  127/42   Vitals Comments BP up to 147/87 in standing   Cognition    Level of Consciousness Alert   Vision Screen   Vision Not tested  (Pt legally blind, vision is better in L " eye than R)   Passive ROM Upper Body   Passive ROM Upper Body WDL   Active ROM Upper Body   Active ROM Upper Body  WDL   Dominant Hand Right   Strength Upper Body   Upper Body Strength  WDL   Sensation Upper Body   Upper Extremity Sensation  WDL   Upper Body Muscle Tone   Upper Body Muscle Tone  WDL   Balance Assessment   Sitting Balance (Static) Fair   Sitting Balance (Dynamic) Fair -   Standing Balance (Static) Trace +   Standing Balance (Dynamic) Trace +   Weight Shift Sitting Fair   Weight Shift Standing Poor   Bed Mobility    Sit to Stand Moderate Assist   Coordination Upper Body   Coordination WDL   IRF-LAKEISHA:  Oral Hygiene   Assistance Needed Set-up / clean-up   Physical Assistance Level No physical assistance   CARE Score 5   Discharge Goal:  Assistance Needed Independent   Discharge Goal:  Physical Assistance Level No physical assistance or only touching/steadying assist   Discharge Goal:  Score 6   IRF-LAKEISHA:  Shower/Bathe Self   Reason if not Attempted Refused to perform   CARE Score 7   Discharge Goal:  Assistance Needed Supervision   Discharge Goal:  Physical Assistance Level No physical assistance or only touching/steadying assist   Discharge Goal Score 4   IRF-LAKEISHA:  Upper Body Dressing   Assistance Needed Set-up / clean-up   Physical Assistance Level No physical assistance or only touching/steadying assist   CARE Score 5   Discharge Goal:  Assistance Needed Independent   Discharge Goal:  Physical Assistance Level No physical assistance or only touching/steadying assist   Dischage Goal:  Score 6   IRF-LAKEISHA:  Lower Body Dressing   Assistance Needed Physical assistance   Physical Assistance Level Total assistance   CARE Score 1   Discharge Goal:  Assistance Needed Adaptive equipment   Discharge Goal:  Physical Assistance Level No physical assistance or only touching/steadying assist   Discharge Goal:  Score 6   IRF LAKEISHA:  Putting On/Taking Off Footwear   Assistance Needed Physical assistance   Physical  Assistance Level Total assistance   CARE Score 1   Discharge Goal:  Assistance Needed Adaptive equipment   Discharge Goal:  Physical Assistance Level No physical assistance or only touching/steadying assist   Discharge Goal:  Score 6   IRF-LAKEISHA:  Toileting Hygiene   Assistance Needed Physical assistance   Physical Assistance Level Total assistance   CARE Score 1   Discharge Goal:  Assistance Needed Adaptive equipment   Discharge Goal:  Physical Assistance Level No physical assistance or only touching/steadying assist   Discharge Goal:  Score 6   IRF-LAKEISHA:  Toilet Transfer   Assistance Needed Physical assistance   Physical Assistance Level Total assistance   CARE Score 1   Discharge Goal:  Assistance Needed Adaptive equipment   Discharge Goal:  Physical Assistance Level No physical assistance or only touching/steadying assist   Discahrge Goal:  Score 6   IRF-LAKEISHA:  Hearing, Speech, and Vision   Expression of Ideas and Wants 4   Understanding Verbal and Non-Verbal Content 4   Problem List   Problem List Decreased Active Daily Living Skills;Decreased Functional Mobility;Decreased Activity Tolerance;Impaired Posture / Trunk Alignment;Limited Knowledge of Post Op Precautions;Impaired Postural Control / Balance   Precautions   Precautions Fall Risk   Comments R hip WBAT, post hip precautions   Current Discharge Plan   Current Discharge Plan Return to Prior Living Situation   Benefit    Therapy Benefit Patient Would Benefit from Inpatient Rehab Occupational Therapy to Maximize [place] with ADLs, IADLs and Functional Mobility.   OT Total Time Spent   OT Individual Total Time Spent (Mins) 60   OT Charge Group   Charges Yes   OT Self Care / ADL 1   OT Evaluation OT Evaluation Mod       FIM Grooming Score:  5 - Standby Prompting/Supervision or Set-up  Grooming Description:  Supervision for safety    FIM Bathing Score:  0 - Not tested, patient refused  Bathing Description:       FIM Upper Body Dressin - Standby  Prompting/Supervision or Set-up  Upper Body Dressing Description:  Set-up of equipment    FIM Lower Body Dressing Score:  1 - Total Assistance  Lower Body Dressing Description:       FIM Toileting Body Dressin - Total Assist per CNA report  Toileting Description:       FIM Bed/Chair/Wheelchair Transfers Score:    Bed/Chair/Wheelchair Transfers Description:       FIM Toilet Transfer Score:   1 - Total Assist per CNA report  Toilet Transfer Description:       FIM Tub/Shower Transfers Score:  0 - Not tested, patient refused  Tub/Shower Transfers Description:       Assessment  Patient is 79 y.o. female admitted after sustaining a right hip dislocation from a mechanical fall though to be syncopal in nature, she is now s/p closed reduction of right hip dislocation on  with rec for WBAT and posterior hip precautions. She was found to have pulmonary emboli and pulmonary hypertension. Additional factors influencing patient status / progress (ie: cognitive factors, co-morbidities, social support, etc): patient is legally blind.  Patient known to this OT from recent rehab stay for R JAIME, she discharged home at supervised to modified independent level for mobility with FWW and for ADLs, daughter has been staying with her. At time of OT evaluation patient presents below recent baseline requiring max to total assist for mobility and ADLs, she is impacted by R hip pain, decreased RLE strength, high anxiety and fear of falling, generalized weakness and decreased endurance.     Plan  Recommend Occupational Therapy  minutes per day 5-7 days per week for 10-14 days for the following treatments:  OT Group Therapy, OT Self Care/ADL, OT Cognitive Skill Dev, OT Community Reintegration, OT Manual Ther Technique, OT Neuro Re-Ed/Balance, OT Sensory Int Techniques, OT Therapeutic Activity, OT Evaluation and OT Therapeutic Exercise.    Goals:  Long term and short term goals have been discussed with patient and they are in  agreement.    Occupational Therapy Goals     Problem: Bathing     Dates: Start: 12/29/19       Description:     Goal: STG-Within one week, patient will bathe     Dates: Start: 12/29/19       Description: 1) Individualized Goal:  With min assist  2) Interventions:  OT Group Therapy, OT Self Care/ADL, OT Cognitive Skill Dev, OT Community Reintegration, OT Manual Ther Technique, OT Neuro Re-Ed/Balance, OT Therapeutic Activity, OT Evaluation and OT Therapeutic Exercise                   Problem: Dressing     Dates: Start: 12/29/19       Description:     Goal: STG-Within one week, patient will dress LB     Dates: Start: 12/29/19       Description: 1) Individualized Goal:  With mod assist  2) Interventions:  OT Group Therapy, OT Self Care/ADL, OT Cognitive Skill Dev, OT Community Reintegration, OT Manual Ther Technique, OT Neuro Re-Ed/Balance, OT Therapeutic Activity, OT Evaluation and OT Therapeutic Exercise                   Problem: Functional Transfers     Dates: Start: 12/29/19       Description:     Goal: STG-Within one week, patient will transfer to toilet     Dates: Start: 12/29/19       Description: 1) Individualized Goal:  With mod assist  2) Interventions:  OT Group Therapy, OT Self Care/ADL, OT Cognitive Skill Dev, OT Community Reintegration, OT Manual Ther Technique, OT Neuro Re-Ed/Balance, OT Therapeutic Activity, OT Evaluation and OT Therapeutic Exercise                   Problem: OT Long Term Goals     Dates: Start: 12/29/19       Description:     Goal: LTG-By discharge, patient will complete basic self care tasks     Dates: Start: 12/29/19       Description: 1) Individualized Goal:  With supervision to modified independence  2) Interventions:  OT Group Therapy, OT Self Care/ADL, OT Cognitive Skill Dev, OT Community Reintegration, OT Manual Ther Technique, OT Neuro Re-Ed/Balance, OT Therapeutic Activity, OT Evaluation and OT Therapeutic Exercise             Goal: LTG-By discharge, patient will perform  bathroom transfers     Dates: Start: 12/29/19       Description: 1) Individualized Goal:  With supervision to modified independence  2) Interventions:  OT Group Therapy, OT Self Care/ADL, OT Cognitive Skill Dev, OT Community Reintegration, OT Manual Ther Technique, OT Neuro Re-Ed/Balance, OT Therapeutic Activity, OT Evaluation and OT Therapeutic Exercise                 Problem: Toileting     Dates: Start: 12/29/19       Description:     Goal: STG-Within one week, patient will complete toileting tasks     Dates: Start: 12/29/19       Description: 1) Individualized Goal:  With mod assist  2) Interventions:  OT Group Therapy, OT Self Care/ADL, OT Cognitive Skill Dev, OT Community Reintegration, OT Manual Ther Technique, OT Neuro Re-Ed/Balance, OT Therapeutic Activity, OT Evaluation and OT Therapeutic Exercise

## 2019-12-29 NOTE — PROGRESS NOTES
Patient admitted to facility at 1556 via WC; accompanied by hospital transport.  Patient assisted to room and positioned in bed for comfort and safety; call light within reach.  Patient assisted with stowing belongings and oriented to room and facility.  Admission assessment performed and documented in computer. 2 RN skin check complete with RAFA Carvalho.  Admission paperwork completed; signed copies placed in chart.  Will continue to monitor.

## 2019-12-29 NOTE — H&P
REHABILITATION HISTORY AND PHYSICAL/POST ADMISSION PHYSICAL EVALUATION    Date of Admission: 12/28/2019  Guillermina Recio  RH24/02    [facility] Code / Diagnosis to Support: 10.9 Other Pulmonary  Etiologic Diagnosis: Acute pulmonary embolism with acute cor pulmonale (HCC)    CC: SOB, hip pain    HPI:  Patient is a 79 y.o. female  with a past medical history of COPD, legally blind, recent right hip arthroplasty complicated by small bowel obstruction, admitted to [facility] on 12/12, with syncopal episode and fall.  She was found to have a right hip prosthetic dislocation, complained of chest pain and was transferred from [facility] from [facility] to [facility] on 12/14/19.  Was found to have pulmonary emboli for which she was started on anticoagulation with Eliquis.  She had an echocardiogram which showed moderate pulmonary hypertension. She had closed reduction of her right hip dislocation on 12/19 with Dr. Delgado.  She is on Midodrine for hypotension. Her elevated troponin was thought secondary to demand ischemia from her pulmonary emboli. Patient is reportedly WBAT for right LE per Dr. Dunne. Kimbrough removed prior to transfer to [facility]. Patient on Dysphagia 2 with NTL.     Patient sitting up in bed. She reports pain in right hip. She reports it also itches which she has a cream for. She reports she is very motivated to get out of bed but that has been limited by her low blood pressure. She reports when she sits up she is nauseated. She is practicing deep breathing for when she gets up so that the nausea is not as overwhelming. Discussed about orthostatic hypotension. Denies SOB at rest. Denies chest pain. Denies NVD. Denies easy bruising or bleeding. Patient reports being completely blind in one eye.     REVIEW OF SYSTEMS:     Comprehensive 14 point ROS was reviewed and all were negative except as noted elsewhere in this document.     PMH:  Past Medical History:   Diagnosis Date   • Arthritis    • Bowel habit changes      diarrhea   • Breath shortness     COPD   • Cataract     bilateral IOL   • Chronic pain 11/2019    hips   • Dyslipidemia 8/1/2016   • Emphysema of lung (HCC)    • GERD (gastroesophageal reflux disease)    • Heart burn    • Hepatitis 1970's    A based on labs done 2016   • Hiatus hernia syndrome    • High cholesterol    • History of total hip replacement     L   • Hyperlipidemia    • Macular degeneration    • Memory loss    • OA (osteoarthritis)     back, hip, shoulds, knees and hands   • Psychiatric problem     depression   • S/P cataract extraction     bilat   • Snoring    • stomach flu 11/08/2019   • Urinary incontinence    • Wears dentures        PSH:  Past Surgical History:   Procedure Laterality Date   • PB TOTAL HIP ARTHROPLASTY Right 11/21/2019    Procedure: ARTHROPLASTY, HIP, TOTAL;  Surgeon: Khang Delgado M.D.;  Location: SURGERY [facility];  Service: Orthopedics   • BLEPHAROPLASTY Bilateral 7/14/2015    Procedure: BLEPHAROPLASTY - UPPER;  Surgeon: Antonio Garcia M.D.;  Location: SURGERY [facility] ORS;  Service:    • CATARACT PHACO WITH IOL  3/13/2012    Performed by ANTONIO GARCIA at SURGERY SAME DAY [facility] ORS   • CATARACT PHACO WITH IOL  2/28/2012    Performed by ANTONIO GARCIA at SURGERY SAME DAY [facility] ORS   • HYSTERECTOMY, TOTAL ABDOMINAL     • OTHER ORTHOPEDIC SURGERY      left hip replacement   • TUBAL LIGATION         FAMILY HISTORY:  Family History   Problem Relation Age of Onset   • Heart Disease Other    • Cancer Other        MEDICATIONS:  Current Facility-Administered Medications   Medication Dose   • apixaban (ELIQUIS) tablet 5 mg  5 mg   • [START ON 12/29/2019] ascorbic acid tablet 500 mg  500 mg   • budesonide-formoterol (SYMBICORT) 160-4.5 MCG/ACT inhaler 2 Puff  2 Puff   • calcium carbonate (TUMS) chewable tab 500 mg  500 mg   • ferrous sulfate tablet 325 mg  325 mg   • [START ON 12/29/2019] furosemide (LASIX) tablet 20 mg  20 mg   • gabapentin (NEURONTIN) capsule 300  mg  300 mg   • ipratropium-albuterol (DUONEB) nebulizer solution  3 mL   • lovastatin (MEVACOR) tablet 40 mg  40 mg   • midodrine (PROAMATINE) tablet 15 mg  15 mg   • [START ON 12/29/2019] vitamin D (cholecalciferol) 1000 UNIT tablet 2,000 Units  2,000 Units   • Respiratory Care per Protocol     • Pharmacy Consult Request ...Pain Management Review 1 Each  1 Each   • oxyCODONE immediate-release (ROXICODONE) tablet 2.5 mg  2.5 mg   • oxyCODONE immediate-release (ROXICODONE) tablet 5 mg  5 mg   • tramadol (ULTRAM) 50 MG tablet 50 mg  50 mg   • hydrALAZINE (APRESOLINE) tablet 25 mg  25 mg   • acetaminophen (TYLENOL) tablet 650 mg  650 mg   • senna-docusate (PERICOLACE or SENOKOT S) 8.6-50 MG per tablet 2 Tab  2 Tab    And   • polyethylene glycol/lytes (MIRALAX) PACKET 1 Packet  1 Packet    And   • magnesium hydroxide (MILK OF MAGNESIA) suspension 30 mL  30 mL    And   • bisacodyl (DULCOLAX) suppository 10 mg  10 mg   • artificial tears ophthalmic solution 1 Drop  1 Drop   • benzocaine-menthol (CEPACOL) lozenge 1 Lozenge  1 Lozenge   • mag hydrox-al hydrox-simeth (MAALOX PLUS ES or MYLANTA DS) suspension 20 mL  20 mL   • ondansetron (ZOFRAN ODT) dispertab 4 mg  4 mg    Or   • ondansetron (ZOFRAN) syringe/vial injection 4 mg  4 mg   • traZODone (DESYREL) tablet 50 mg  50 mg   • sodium chloride (OCEAN) 0.65 % nasal spray 2 Spray  2 Spray       ALLERGIES:  [place]    PSYCHOSOCIAL HISTORY:  Housing / Facility: 1 Story Apartment / Condo  Steps Into Home: 1  Steps In Home: 0  Lives with - Patient's Self Care Capacity: Alone and Able to Care For Self  Equipment Owned: Front-Wheel WalkerBlu     Prior Level of Function / Living Situation:   Physical Therapy: Prior Services: None  Housing / Facility: 1 Story Apartment / Condo  Steps Into Home: 1  Steps In Home: 0  Equipment Owned: Front-Wheel Walker, [place]  Lives with - Patient's Self Care Capacity: Alone and Able to Care For Self  Bed Mobility: Independent  Transfer  Status: Independent  Ambulation: Independent  Distance Ambulation (Feet): (community)  Assistive Devices Used: Front-Wheel Walker  Current Level of Function:   Level Of Assist: Unable to Participate  Supine to Sit: Maximal Assist  Sit to Supine: Maximal Assist  Scooting: Maximal Assist  Sit to Stand: Moderate Assist  Bed, Chair, Wheelchair Transfer: Unable to Participate  Sitting in Chair: NT  Sitting Edge of Bed: 15mins  Standin-2mins x4  Occupational Therapy:   Self Feeding: Independent  Grooming / Hygiene: Independent  Bathing: Independent  Dressing: Independent  Toileting: Independent  Medication Management: Independent  Laundry: Independent  Kitchen Mobility: Independent  Finances: Independent  Home Management: Independent  Shopping: Independent  Prior Level Of Mobility: Independent With Device in Community  Prior Services: None  Housing / Facility: 1 Story Apartment / Condo  Current Level of Function:   Upper Body Dressing: Minimal Assist  Lower Body Dressing: Maximal Assist  Toileting: Maximal Assist  Speech Language Pathology:   Assessment : Patient was seen on this date for a clinical swallow evaluation. Patient AAOx4 and coughing and WOB with use of accessory muscles noted prior to PO intake. Vocal quality clear. Oral motor examination revealed missing dentition. Otherwise, WNL. PO consisted of single ice chips, NTL, puree, and thin liquids. Significant increase in WOB following all PO trials which places patient at HIGH risk for aspiration. Patient presented with s/sx concerning for aspiration as seen by immediate coughing with single ice chip, delayed coughing with purees, and increase wheezing following trials of thin liquids. Trials were limited due to concerns for aspiration. At this time, patient is at high risk for aspiration given compromised respiratory status and would recommend continue NPO with reassessment tomorrow AM.   Problem List: Dysphagia  Diet / Liquid Recommendation: Dysphagia II,  "Nectar Thick Liquid  Comments:  Pt seen on this date for dysphagia therapy per RN request as pt refusing to eat pureed diet. Pt reported that she loves mashed potatoes and gravy and wanted to receive that for lunch and dinner. Weak coughing noted prior to PO when pt laying down almost flat. Pt initially refusing HOB to be raised past 35*, however, with encouragement, agreed to HOB being raised to ~60-70*. No overt s/sx of aspiration appreciated with trials of NTL via cup sip or purees. Upon palpation, laryngeal elevation was weak and swallow trigger was timely. Trials of soft solids were presented to be and no overt s/sx of aspiration noted. Mastication is slightly prolonged given edentulous state, however, pt endorsed that she has been edentulous for ~14 years. Pt appeared to be chewing adequately before swallowing and did not talk with PO in mouth. At this time, would recommend upgrading diet to dysphagia II/NTL with implementation of posted swallowing strategies. Dysphagia education and recommendations provided to pt and she verbalized understanding. SLP to follow.  Rehabilitation Prognosis/Potential: Good  Estimated Length of Stay: 14-21 days    CURRENT LEVEL OF FUNCTION:   Same as level of function prior to admission to [facility]    PHYSICAL EXAM:     VITAL SIGNS:   height is 1.494 m (4' 10.8\") and weight is 65.1 kg (143 lb 8 oz). Her oral temperature is 37.1 °C (98.7 °F). Her blood pressure is 108/63 and her pulse is 81. Her respiration is 20 and oxygen saturation is 94%.     GENERAL: No apparent distress  HEENT: Normocephalic/atraumatic, PERRL  CARDIAC: Regular rate and rhythm, normal S1, S2   LUNGS: Diminished BLL, no upper airway sounds  ABDOMINAL: bowel sounds present, soft, nontender and nondistended    EXTREMITIES: no contractures, spasticity, or edema.  No calf tenderness bilaterally. Right hip incision bandage removed, well healing, small 1x1 cm skin tear at top of incision from " bandage removal.   NEURO:  Mental status: answers questions appropriately follows commands  Speech: fluent, no aphasia or dysarthria  CRANIAL NERVES: CN 2-12 intact except poor hearing and poor vision  Motor:  Limited from transfer. 5/5 BUE  5/5 LLE at bed level. RLE at least antigravity.  Sensory: intact to light touch through out      RADIOLOGY:   CXR 12/27/19  IMPRESSION:     1.  Slight interval decrease in pulmonary edema and central vascular congestion  2.  Hiatal hernia  3.  Small BILATERAL pleural effusions  4.  Persistently enlarged cardiac silhouette    XR Pelvis 12/19/19  IMPRESSION:     1.  START NUMBER RIGHT HIP DISLOCATION NO LONGER PRESENT INVOLVING THE ARTHROPLASTY.     2.  NO ACUTE FRACTURE IDENTIFIED.    US BLE 12/16/19  CONCLUSIONS   Acute LEFT DVT   Normal right lower extremity superficial and deep venous examination.       No prior study is available for comparison.     XR Pelvis 12/12/19  IMPRESSION:     Right hip arthroplasty dislocation     Unchanged left hip arthroplasty    TTE 12/12/19  CONCLUSIONS  Normal left ventricular systolic function.  Left ventricular ejection fraction is visually estimated to be 65-70%.  Aortic sclerosis without stenosis.  Mild aortic insufficiency.  Mildly dilated right ventricle.  Reduced right ventricular systolic function.  Hypokinetic/akinetic right ventricular free wall.  Right heart pressures are consistent with moderate pulmonary   hypertension.    LABS:  Recent Labs     12/26/19  0253 12/27/19  0424 12/28/19  0244   SODIUM 138 137 141   POTASSIUM 4.5 4.3 4.4   CHLORIDE 97 98 100   CO2 36* 35* 33   GLUCOSE 103* 97 102*   BUN 14 15 16   CREATININE 0.76 0.69 0.76   CALCIUM 8.7 8.5 8.7     Recent Labs     12/27/19  0424 12/28/19  0244   WBC 7.1 6.7   RBC 3.70* 3.66*   HEMOGLOBIN 9.3* 9.3*   HEMATOCRIT 31.6* 31.0*   MCV 85.4 84.7   MCH 25.1* 25.4*   MCHC 29.4* 30.0*   RDW 56.5* 56.1*   PLATELETCT 437 420   MPV 8.4* 8.4*             PRIMARY REHAB DIAGNOSIS:     This patient is a 79 y.o. female admitted for acute inpatient rehabilitation with Acute pulmonary embolism with acute cor pulmonale (HCC).    IMPAIRMENTS:   ADLs/IADLs  Mobility  Swallow    SECONDARY DIAGNOSIS/MEDICAL CO-MORBIDITIES AFFECTING FUNCTION:  COPD, legally blind, recent right hip arthroplasty complicated by small bowel obstruction,  DVT/PE  Hip dislocation s/p relocation  Anemia, Iron deficient    RELEVANT CHANGES SINCE PREADMISSION EVALUATION:    Status unchanged    The patient's rehabilitation potential is Good  The patient's medical prognosis is fair    PLAN:   Discussion and Recommendations:   1. The patient requires an acute inpatient rehabilitation program with a coordinated program of care at an intensity and frequency not available at a lower level of care. This recommendation is substantiated by the patient's medical physicians who recommend that the patient's intervention and assessment of medical issues needs to be done at an acute level of care for patient's safety and maximum outcome.   2. A coordinated program of care will be supplied by an interdisciplinary team of physical therapy, occupational therapy, rehab physician, rehab nursing, and, if needed, speech therapy and rehab psychology. Rehab team presents a patient-specific rehabilitation and education program concentrating on prevention of future problems related to accessibility, mobility, skin, bowel, bladder, sexuality, and psychosocial and medical/surgical problems.   3. Need for Rehabilitation Physician: The rehab physician will be evaluating the patient on a multi-weekly basis to help coordinate the program of care. The rehab physician communicates between medical physicians, therapists, and nurses to maximize the patient's potential outcome. Specific areas in which the rehab physician will be providing daily assessment include the following:   A. Assessing the patient's heart rate and blood pressure response (vitals monitoring)  to activity and making adjustments in medications or conservative measures as needed.   B. The rehab physician will be assessing the frequency at which the program can be increased to allow the patient to reach optimal functional outcome.   C. The rehab physician will also provide assessments in daily skin care, especially in light of patient's impairments in mobility.   D. The rehab physician will provide special expertise in understanding how to work with functional impairment and recommend appropriate interventions, compensatory techniques, and education that will facilitate the patient's outcome.   4. Rehab R.N.   The rehab RN will be working with patient to carry over in room mobility and activities of daily living when the patient is not in 3 hours of skilled therapy. Rehab nursing will be working in conjunction with rehab physician to address all the medical issues above and continue to assess laboratory work and discuss abnormalities with the treating physicians, assess vitals, and response to activity, and discuss and report abnormalities with the rehab physician. Rehab RN will also continue daily skin care, supervise bladder/bowel program, instruct in medication administration, and ensure patient safety.   5. Rehab Therapy: Therapies to treat at intensity and frequency of (may change after completion of evaluation by all therapeutic disciplines):       PT:  Physical therapy to address mobility, transfer, gait training and evaluation for adaptive equipment needs 1 hour/day at least 5 days/week for the duration of the ELOS (see below)       OT:  Occupational therapy to address ADLs, self-care, home management training, functional mobility/transfers and assistive device evaluation, and community re-integration 1  hour/day at least 5 days/week for the duration of the ELOS (see below).        ST/Dysphagia:  Speech therapy to address speech, language, and cognitive deficits as well as swallowing difficulties  with retraining/dysphagia management and community re-integration with comprehension, expression, cognitive training 1  hour/day at least 5 days/week for the duration of the ELOS (see below).     Medical management / Rehabilitation Issues/ Adverse Potential as part of rehabilitation plan     REHABILITATION ISSUES/ADVERSE POTENTIAL:  1.  Acute PE with syncopy - Patient with fall down stairs with right hip dislocation s/p relocation with Dr. Delgado on 12/19/19. Patient started on Eliquis. Found on BLE dopplers to have RLE DVT. Patient demonstrates functional deficits in strength, balance, coordination, and ADL's. Patient is admitted to [facility] for comprehensive rehabilitation therapy as described below.   Rehabilitation nursing monitors bowel and bladder control, educates on medication administration, co-morbidities and monitors patient safety.    2.  Neurostimulants: None at this time but continue to assess daily for need to initiate should status change.    3.  DVT prophylaxis:  Patient is on Eliquis for anticoagulation upon transfer. Encourage OOB. Monitor daily for signs and symptoms of DVT including but not limited to swelling and pain to prevent the development of DVT that may interfere with therapies.    4.  GI prophylaxis:  On prilosec to prevent gastritis/dyspepsia which may interfere with therapies.    5.  Pain: No issues with pain currently / Controlled with APAP/Oxycodone    6.  Nutrition/Dysphagia: Dietician monitors nutrient intake, recommend supplements prn and provide nutrition education to pt/family to promote optimal nutrition for wound healing/recovery.     7.  Bladder/bowel:  Start bowel and bladder program as described below, to prevent constipation, urinary retention (which may lead to UTI), and urinary incontinence (which will impact upon pt's functional independence).   - Post void bladder scans, I&O cath for PVRs >400  - up to commode after meal     8.  Skin/dermal ulcer  prophylaxis: Monitor for new skin conditions with q.2 h. turns as required to prevent the development of skin breakdown.     9.  Cognition/Behavior:  Psychologist Dr. Burnham provides adjustment counseling to illness and psychosocial barriers that may be potential barriers to rehabilitation.     10. Respiratory therapy: RT performs O2 management prn, breathing retraining, pulmonary hygiene and bronchospasm management prn to optimize participation in therapies.     MEDICAL CO-MORBIDITIES/ADVERSE POTENTIAL AFFECTING FUNCTION:  Acute PE with syncopy - Patient with fall down stairs with right hip dislocation s/p relocation with Dr. Delgado on 12/19/19. Patient started on Eliquis. Found on BLE dopplers to have RLE DVT.   -Continue Eliquis.  -PT and OT for mobility and ADLs    Dysphagia - Unknown cause but evaluated by SLP  -SLP for swallow    Hypotension - Most likely 2/2 PE with cor pulmonale.  -Continue Midodrine 15 mg TID.     Fe deficient anemia - Patient started on Fe tablets prior to transfer.    COPD/Pulmonary edema - Patient on Lasix 20 mg daily.  Continue Symbicort    HLD - Patient on Lovastatin 40 mg QHS.     Vitamin D deficiency - Continue 2000 U daily.     DVT Ppx - Patient on Eliquis 5 mg BID.     I personally performed a complete drug regimen review and no potential clinically significant medication issues were identified.     Pt was seen today for 77 min, and entire time spent in face-to-face contact was >50% in counseling and coordination of care as detailed in A/P above.        GOALS/EXPECTED LEVEL OF FUNCTION BASED ON CURRENT MEDICAL AND FUNCTIONAL STATUS (may change based on patient's medical status and rate of impairment recovery):  Transfers:   Modified Independent  Mobility/Gait:   Modified Independent  ADL's:   Modified Independent  Swallowing:  Regular diet    DISPOSITION: Discharge to pre-morbid independent living setting with the supportive care of patient's community resources.    ELOS: 10 - 14  days

## 2019-12-29 NOTE — FLOWSHEET NOTE
12/28/19 1934   Type of Assessment   Assessment Yes   Patient History   Pulmonary Diagnosis Emphasema   Surgical Procedures Total hip Arthroplasty   Home O2 Yes   Oxygen liter flow   (4 lpm day, 5 lpm noc)   Oxygen at night only No   Nocturnal CPAP No   Home Treatments/Frequency Yes   SVN 1/Frequency Duoneb prn   MDI 1/Frequency Symbicort 2 puffs Bid   COPD Risk Screening   Do you have a history of COPD? Yes   Do you have a Pulmonologist? No   COPD Population Screener   During the past 4 weeks, how much did you feel short of breath? 1   Do you ever cough up any mucus or phlegm? 0   In the past 12 months, you do less than you used to because of your breathing problems 1   Have you smoked at least 100 cigarettes in your entire life? 0   How old are you? 2   COPD Screening Score 4   Smoking History   Have you ever smoked Never   Level Of Consciousness   Level of Consciousness Alert   Respiratory WDL   Respiratory (WDL) X   Chest Exam   Work Of Breathing / Effort Moderate   Respiration 20   Pulse 85   Breath Sounds   RUL Breath Sounds Clear   RML Breath Sounds Clear   RLL Breath Sounds Fine Crackles   HAJA Breath Sounds Clear   LLL Breath Sounds Fine Crackles   Secretions   Cough Non Productive   Oximetry   #Pulse Oximetry (Single Determination) Yes   Oxygen   Home O2 Use Prior To Admission? Yes   Home O2 LPM Flow   (4 lpm day, 5 lpm noc)   Home O2 Delivery Method Nasal Cannula   Home O2 Frequency of Use Continuous   Pulse Oximetry 94 %   O2 (LPM) 4   O2 Daily Delivery Respiratory  Silicone Nasal Cannula   Protocol Pathways   Protocol Pathways Yes   Bronchodilator Protocol   Med Order With RCP Yes - Initial Order by MD for Therapy - Follow Order for 24 Hours, Reassess Patient   Is this an exacerbation of COPD/Asthma? No   Bronchodilator Indications History / Diagnosis   Breath Sounds Criteria 1    Benefit Criteria 1    Pulse Criteria 0    Respiratory Rate Criteria 1    S.O.B. Criteria 0    Criteria Total 3   Point  Values 0-3 - PRN   Bronchodilator Goals/Outcome Patient at Stable Baseline   O2 Protocol   O2 Protocol Indications Room Air SpO2 Less Than 90%   Continuous oxygen initiated for: SpO2 Less Than 90% by ABG or Oximetry in Last 24 hrs   O2 Protocol Goals/Outcome Normoxemia on Oxygen, SpO2 greater than 90%

## 2019-12-29 NOTE — PROGRESS NOTES
"Rehab Progress Note     Encounter Date: 12/29/2019    CC: PE, right hip injury    Interval Events (Subjective)  Patient sitting up in the hallway. She reports she is happy to be back and working on her function. She reports \"if you stay in bed you end up dead.\" Reviewed admission labs including anemia, low iron panel from prior day and low vitamin D. Patient with low SBP this morning, had staff recheck and improved.     Objective:  VITAL SIGNS: /62   Pulse 71   Temp 36.8 °C (98.2 °F) (Oral)   Resp 20   Ht 1.494 m (4' 10.8\")   Wt 65.1 kg (143 lb 8 oz)   SpO2 92%   BMI 29.18 kg/m²   Gen: NAD  Psych: Mood and affect appropriate  CV: RRR, 1+ edema   Resp: CTAB, no upper airway sounds  Abd: NTND  Neuro: AOx3, poor vision, following simple commands    Recent Results (from the past 72 hour(s))   BASIC METABOLIC PANEL    Collection Time: 12/27/19  4:24 AM   Result Value Ref Range    Sodium 137 135 - 145 mmol/L    Potassium 4.3 3.6 - 5.5 mmol/L    Chloride 98 96 - 112 mmol/L    Co2 35 (H) 20 - 33 mmol/L    Glucose 97 65 - 99 mg/dL    Bun 15 8 - 22 mg/dL    Creatinine 0.69 0.50 - 1.40 mg/dL    Calcium 8.5 8.5 - 10.5 mg/dL    Anion Gap 4.0 0.0 - 11.9   CBC WITH DIFFERENTIAL    Collection Time: 12/27/19  4:24 AM   Result Value Ref Range    WBC 7.1 4.8 - 10.8 K/uL    RBC 3.70 (L) 4.20 - 5.40 M/uL    Hemoglobin 9.3 (L) 12.0 - 16.0 g/dL    Hematocrit 31.6 (L) 37.0 - 47.0 %    MCV 85.4 81.4 - 97.8 fL    MCH 25.1 (L) 27.0 - 33.0 pg    MCHC 29.4 (L) 33.6 - 35.0 g/dL    RDW 56.5 (H) 35.9 - 50.0 fL    Platelet Count 437 164 - 446 K/uL    MPV 8.4 (L) 9.0 - 12.9 fL    Neutrophils-Polys 65.50 44.00 - 72.00 %    Lymphocytes 21.50 (L) 22.00 - 41.00 %    Monocytes 10.50 0.00 - 13.40 %    Eosinophils 0.70 0.00 - 6.90 %    Basophils 0.40 0.00 - 1.80 %    Immature Granulocytes 1.40 (H) 0.00 - 0.90 %    Nucleated RBC 0.00 /100 WBC    Neutrophils (Absolute) 4.66 2.00 - 7.15 K/uL    Lymphs (Absolute) 1.53 1.00 - 4.80 K/uL    Monos " (Absolute) 0.75 0.00 - 0.85 K/uL    Eos (Absolute) 0.05 0.00 - 0.51 K/uL    Baso (Absolute) 0.03 0.00 - 0.12 K/uL    Immature Granulocytes (abs) 0.10 0.00 - 0.11 K/uL    NRBC (Absolute) 0.00 K/uL   ESTIMATED GFR    Collection Time: 12/27/19  4:24 AM   Result Value Ref Range    GFR If African American >60 >60 mL/min/1.73 m 2    GFR If Non African American >60 >60 mL/min/1.73 m 2   OCCULT BLOOD STOOL    Collection Time: 12/27/19 10:36 AM   Result Value Ref Range    Occult Blood Feces Negative Negative   BASIC METABOLIC PANEL    Collection Time: 12/28/19  2:44 AM   Result Value Ref Range    Sodium 141 135 - 145 mmol/L    Potassium 4.4 3.6 - 5.5 mmol/L    Chloride 100 96 - 112 mmol/L    Co2 33 20 - 33 mmol/L    Glucose 102 (H) 65 - 99 mg/dL    Bun 16 8 - 22 mg/dL    Creatinine 0.76 0.50 - 1.40 mg/dL    Calcium 8.7 8.5 - 10.5 mg/dL    Anion Gap 8.0 0.0 - 11.9   CBC WITH DIFFERENTIAL    Collection Time: 12/28/19  2:44 AM   Result Value Ref Range    WBC 6.7 4.8 - 10.8 K/uL    RBC 3.66 (L) 4.20 - 5.40 M/uL    Hemoglobin 9.3 (L) 12.0 - 16.0 g/dL    Hematocrit 31.0 (L) 37.0 - 47.0 %    MCV 84.7 81.4 - 97.8 fL    MCH 25.4 (L) 27.0 - 33.0 pg    MCHC 30.0 (L) 33.6 - 35.0 g/dL    RDW 56.1 (H) 35.9 - 50.0 fL    Platelet Count 420 164 - 446 K/uL    MPV 8.4 (L) 9.0 - 12.9 fL    Neutrophils-Polys 63.80 44.00 - 72.00 %    Lymphocytes 23.70 22.00 - 41.00 %    Monocytes 10.60 0.00 - 13.40 %    Eosinophils 0.30 0.00 - 6.90 %    Basophils 0.40 0.00 - 1.80 %    Immature Granulocytes 1.20 (H) 0.00 - 0.90 %    Nucleated RBC 0.00 /100 WBC    Neutrophils (Absolute) 4.25 2.00 - 7.15 K/uL    Lymphs (Absolute) 1.58 1.00 - 4.80 K/uL    Monos (Absolute) 0.71 0.00 - 0.85 K/uL    Eos (Absolute) 0.02 0.00 - 0.51 K/uL    Baso (Absolute) 0.03 0.00 - 0.12 K/uL    Immature Granulocytes (abs) 0.08 0.00 - 0.11 K/uL    NRBC (Absolute) 0.00 K/uL   VITAMIN B12    Collection Time: 12/28/19  2:44 AM   Result Value Ref Range    Vitamin B12 -True Cobalamin 706 211  - 911 pg/mL   IRON/TOTAL IRON BIND    Collection Time: 12/28/19  2:44 AM   Result Value Ref Range    Iron 16 (L) 40 - 170 ug/dL    Total Iron Binding 214 (L) 250 - 450 ug/dL    % Saturation 7 (L) 15 - 55 %   FERRITIN    Collection Time: 12/28/19  2:44 AM   Result Value Ref Range    Ferritin 640.8 (H) 10.0 - 291.0 ng/mL   FOLATE    Collection Time: 12/28/19  2:44 AM   Result Value Ref Range    Folate -Folic Acid >24.0 >4.0 ng/mL   MAGNESIUM    Collection Time: 12/28/19  2:44 AM   Result Value Ref Range    Magnesium 2.3 1.5 - 2.5 mg/dL   ESTIMATED GFR    Collection Time: 12/28/19  2:44 AM   Result Value Ref Range    GFR If African American >60 >60 mL/min/1.73 m 2    GFR If Non African American >60 >60 mL/min/1.73 m 2   Comp Metabolic Panel (CMP)    Collection Time: 12/29/19  5:34 AM   Result Value Ref Range    Sodium 141 135 - 145 mmol/L    Potassium 4.4 3.6 - 5.5 mmol/L    Chloride 102 96 - 112 mmol/L    Co2 32 20 - 33 mmol/L    Anion Gap 7.0 0.0 - 11.9    Glucose 95 65 - 99 mg/dL    Bun 16 8 - 22 mg/dL    Creatinine 0.72 0.50 - 1.40 mg/dL    Calcium 8.9 8.5 - 10.5 mg/dL    AST(SGOT) 25 12 - 45 U/L    ALT(SGPT) 15 2 - 50 U/L    Alkaline Phosphatase 69 30 - 99 U/L    Total Bilirubin 0.5 0.1 - 1.5 mg/dL    Albumin 3.1 (L) 3.2 - 4.9 g/dL    Total Protein 6.0 6.0 - 8.2 g/dL    Globulin 2.9 1.9 - 3.5 g/dL    A-G Ratio 1.1 g/dL   CBC with Differential    Collection Time: 12/29/19  5:34 AM   Result Value Ref Range    WBC 6.0 4.8 - 10.8 K/uL    RBC 3.78 (L) 4.20 - 5.40 M/uL    Hemoglobin 9.4 (L) 12.0 - 16.0 g/dL    Hematocrit 32.7 (L) 37.0 - 47.0 %    MCV 86.5 81.4 - 97.8 fL    MCH 24.9 (L) 27.0 - 33.0 pg    MCHC 28.7 (L) 33.6 - 35.0 g/dL    RDW 57.7 (H) 35.9 - 50.0 fL    Platelet Count 443 164 - 446 K/uL    MPV 8.4 (L) 9.0 - 12.9 fL    Neutrophils-Polys 58.20 44.00 - 72.00 %    Lymphocytes 28.00 22.00 - 41.00 %    Monocytes 11.90 0.00 - 13.40 %    Eosinophils 0.20 0.00 - 6.90 %    Basophils 0.50 0.00 - 1.80 %    Immature  Granulocytes 1.20 (H) 0.00 - 0.90 %    Nucleated RBC 0.00 /100 WBC    Neutrophils (Absolute) 3.49 2.00 - 7.15 K/uL    Lymphs (Absolute) 1.68 1.00 - 4.80 K/uL    Monos (Absolute) 0.71 0.00 - 0.85 K/uL    Eos (Absolute) 0.01 0.00 - 0.51 K/uL    Baso (Absolute) 0.03 0.00 - 0.12 K/uL    Immature Granulocytes (abs) 0.07 0.00 - 0.11 K/uL    NRBC (Absolute) 0.00 K/uL    Hypochromia 1+     Anisocytosis 2+     Microcytosis 2+    TSH with Reflex to FT4    Collection Time: 12/29/19  5:34 AM   Result Value Ref Range    TSH 3.730 0.380 - 5.330 uIU/mL   Vitamin D, 25-hydroxy (blood)    Collection Time: 12/29/19  5:34 AM   Result Value Ref Range    25-Hydroxy   Vitamin D 25 20 (L) 30 - 100 ng/mL   ESTIMATED GFR    Collection Time: 12/29/19  5:34 AM   Result Value Ref Range    GFR If African American >60 >60 mL/min/1.73 m 2    GFR If Non African American >60 >60 mL/min/1.73 m 2   PERIPHERAL SMEAR REVIEW    Collection Time: 12/29/19  5:34 AM   Result Value Ref Range    Peripheral Smear Review see below    PLATELET ESTIMATE    Collection Time: 12/29/19  5:34 AM   Result Value Ref Range    Plt Estimation Normal    MORPHOLOGY    Collection Time: 12/29/19  5:34 AM   Result Value Ref Range    RBC Morphology Present     Polychromia 2+     Poikilocytosis 1+     Ovalocytes 1+    DIFFERENTIAL COMMENT    Collection Time: 12/29/19  5:34 AM   Result Value Ref Range    Comments-Diff see below        Current Facility-Administered Medications   Medication Frequency   • apixaban (ELIQUIS) tablet 5 mg BID   • ascorbic acid tablet 500 mg DAILY   • budesonide-formoterol (SYMBICORT) 160-4.5 MCG/ACT inhaler 2 Puff BID   • calcium carbonate (TUMS) chewable tab 500 mg QDAY PRN   • ferrous sulfate tablet 325 mg BID WITH MEALS   • furosemide (LASIX) tablet 20 mg Q DAY   • gabapentin (NEURONTIN) capsule 300 mg TID PRN   • ipratropium-albuterol (DUONEB) nebulizer solution Q4H PRN (RT)   • lovastatin (MEVACOR) tablet 40 mg QHS   • midodrine (PROAMATINE) tablet  15 mg TID WITH MEALS   • vitamin D (cholecalciferol) 1000 UNIT tablet 2,000 Units DAILY   • Respiratory Care per Protocol Continuous RT   • Pharmacy Consult Request ...Pain Management Review 1 Each PHARMACY TO DOSE   • oxyCODONE immediate-release (ROXICODONE) tablet 2.5 mg Q3HRS PRN   • oxyCODONE immediate-release (ROXICODONE) tablet 5 mg Q3HRS PRN   • tramadol (ULTRAM) 50 MG tablet 50 mg Q4HRS PRN   • hydrALAZINE (APRESOLINE) tablet 25 mg Q8HRS PRN   • acetaminophen (TYLENOL) tablet 650 mg Q4HRS PRN   • senna-docusate (PERICOLACE or SENOKOT S) 8.6-50 MG per tablet 2 Tab BID    And   • polyethylene glycol/lytes (MIRALAX) PACKET 1 Packet QDAY PRN    And   • magnesium hydroxide (MILK OF MAGNESIA) suspension 30 mL QDAY PRN    And   • bisacodyl (DULCOLAX) suppository 10 mg QDAY PRN   • artificial tears ophthalmic solution 1 Drop PRN   • benzocaine-menthol (CEPACOL) lozenge 1 Lozenge Q2HRS PRN   • mag hydrox-al hydrox-simeth (MAALOX PLUS ES or MYLANTA DS) suspension 20 mL Q2HRS PRN   • ondansetron (ZOFRAN ODT) dispertab 4 mg 4X/DAY PRN    Or   • ondansetron (ZOFRAN) syringe/vial injection 4 mg 4X/DAY PRN   • traZODone (DESYREL) tablet 50 mg QHS PRN   • sodium chloride (OCEAN) 0.65 % nasal spray 2 Spray PRN       Orders Placed This Encounter   Procedures   • Diet Order Regular     Standing Status:   Standing     Number of Occurrences:   1     Order Specific Question:   Diet:     Answer:   Regular [1]     Order Specific Question:   Texture/Fiber modifications:     Answer:   Dysphagia 2(Pureed/Chopped)specify fluid consistency(question 6) [2]     Order Specific Question:   Consistency/Fluid modifications:     Answer:   Thin Liquids [3]       Assessment:  Active Hospital Problems    Diagnosis   • *Acute pulmonary embolism with acute cor pulmonale (HCC)   • Acute respiratory failure (HCC)   • Closed dislocation of right hip (HCC)   • COPD (chronic obstructive pulmonary disease) (HCC)   • Gastroesophageal reflux disease   •  Dyslipidemia   • Iron deficiency anemia   • Status post right hip replacement       Medical Decision Making and Plan:  Acute PE with syncopy - Patient with fall down stairs with right hip dislocation s/p relocation with Dr. Delgado on 12/19/19. Patient started on Eliquis. Found on BLE dopplers to have RLE DVT.   -Continue Eliquis.  -PT and OT for mobility and ADLs     Dysphagia - Unknown cause but evaluated by SLP  -SLP for swallow     Hypotension - Most likely 2/2 PE with cor pulmonale.  -Continue Midodrine 15 mg TID. Consult hospitalist     Fe deficient anemia - Patient started on Fe tablets prior to transfer.  -Stable on admission. Consult hospitalist     COPD/Pulmonary edema - Patient on Lasix 20 mg daily.  Continue Symbicort     HLD - Patient on Lovastatin 40 mg QHS.      Vitamin D deficiency - Continue 2000 U daily. 20 on admission. Increase to 4000 U for 2 weeks     DVT Ppx - Patient on Eliquis 5 mg BID.     Total time:  36 minutes.  I spent greater than 50% of the time for patient care and coordination on this date, including unit/floor time, and face-to-face time with the patient as per assessment and plan above.  Discussion included admission labs, home oxygen discussion with family, increase Vitamin D and consult hospitalist. Discussed patient's care face to face with Hospitalist on [facility] floor.     Yann Murry M.D.

## 2019-12-29 NOTE — FLOWSHEET NOTE
12/29/19 1357   Events/Summary/Plan   Events/Summary/Plan 02 spot check   Interdisciplinary Plan of Care-Goals (Indications)   Bronchodilator Indications History / Diagnosis   Interdisciplinary Plan of Care-Outcomes    Bronchodilator Outcome Patient at Stable Baseline   Education   Education Yes - Pt. / Family has been Instructed in use of Respiratory Equipment   Respiratory WDL   Respiratory (WDL) X   Chest Exam   Work Of Breathing / Effort Accessory Muscle Use   Respiration (!) 22  (after getting back in bed)   Pulse 78   Oximetry   #Pulse Oximetry (Single Determination) Yes   Oxygen   Home O2 Use Prior To Admission? Yes   Home O2 Delivery Method Nasal Cannula   Home O2 Frequency of Use Continuous   Pulse Oximetry 94 %   O2 (LPM) 4   O2 Daily Delivery Respiratory  Silicone Nasal Cannula

## 2019-12-29 NOTE — THERAPY
"Speech Language Pathology   Initial Assessment     Patient Name: Guillermina Recio  AGE:  79 y.o., SEX:  female  Medical Record #: [id number]  Today's Date: 12/29/2019     Subjective    Pt pleasant and cooperative during evaluation.      Objective       12/29/19 0801   Prior Living Situation   Prior Services Continuous (24 Hour) Care Giving Family   Housing / Facility 1 Story House   Lives with - Patient's Self Care Capacity Alone and Able to Care For Self;Adult Children   Prior Level Of Function   Communication Within Functional Limits   Swallow Impaired   Dentition Edentulous   Dentures None   Hearing Within Functional Limits for Evaluation   Hearing Aid None   Vision Wears Corrective Lenses  (legally blind)   Patient's Primary Language English   Education Some College or Trade School   Occupation (Pre-Hospital Vocational) Retired Due To Age   IRF-LAKEISHA:  Cognitive Pattern Assessment   Cognitive Pattern Assessment Used BIMS   IRF-LAKEISHA:  Brief Interview for Mental Status (BIMS)   Repetition of Three Words (First Attempt) 3   Temporal Orientation: Able to Report the Correct Year Correct   Temporal Orientation: Able to Report the Correct Month Accurate within 5 days   Temporal Orientation: Able to Report the Correct Day of the Week Correct   Able to Recall \"Sock\" Yes, no cue required   Able to Recall \"Blue\" Yes, no cue required   Able to Recall \"Bed\" Yes, after cueing (\"a piece of furniture\")   BIMS Summary Score 14   Speech Mechanisms / Voice Production   Voice Quality Hoarse;Reduced Intensity   Labial Function   Labial Function (WDL) WDL   Lingual Function   Lingual Function (WDL) WDL   Velar Function   Velar Function (WDL) WDL   Laryngeal Function   Voice Quality Minimal   Volutional Cough Minimal   Swallowing   Thick Nectar / Honey / Liquid Within Functional Limits   Puree Within Functional Limits   Thin Liquid Within Functional Limits   Masticated Foods Minimal   Dysphagia Strategies / Recommendations   Compensatory " Strategies Alternate Solids / Liquids;Single Sips / Bites;Multiple Swallows;No Talking During Eating / Drinking   Diet / Liquid Recommendation Dysphagia II;Thin Liquid   Medication Administration  Float Whole with Puree   IRF-LAKEISHA:  Swallowing/Nutritional Status   Swallowing/Nutritional Status Modified food consistency   IRF-LAKEISHA:  Eating   Assistance Needed Set-up / clean-up   [place] Physical Assistance Level No physical assistance or only touching/steadying assist   CARE Score 5   Discharge Goal:  Assistance Needed Independent   Discharge Goal:  Physical Assistance Level No physical assistance or only touching/steadying assist   Discharge Goal:  Score 6   Problem List   Problem List Dysphagia   Current Discharge Plan   Current Discharge Plan Return to Prior Living Situation   Benefit   Therapy Benefit Patient would benefit from Inpatient Rehab Speech-Language Pathology to address above identified deficits.   Interdisciplinary Plan of Care Collaboration   IDT Collaboration with  Physician;Nursing   Collaboration Comments regarding current level of function.    SLP Total Time Spent   SLP Individual Total Time Spent (Mins) 60   SLP Charge Group   SLP Oral Pharyngeal Evaluation Oral Pharyngeal Evaluation       FIM Eating Score:  5 - Standby Prompting/Supervision or Set-up  Eating Description:  Modified diet, Increased time, Supervision for safety, Verbal cueing    FIM Comprehension Score:  6 - Modified Independent  Comprehension Description:  Glasses(legally blind)    FIM Expression Score:  6 - Modified Independent  Expression Description:  Verbal cueing    FIM Social Interaction Score:  5 - Stand-by Prompting/Supervision or Set-up  Social Interaction Description:  Increased time, Verbal cues    FIM Problem Solving Score:  5 - Standby Prompting/Supervision or Set-up  Problem Solving Description:  Verbal cueing, Increased time    FIM Memory Score:  6 - Modified Independent  Memory Description:  Therapy  schedule    Assessment    Patient is 79 y.o. female with a diagnosis of acute pulmonary embolism with acute core pulmonale.  Additional factors influencing patient status/progress (ie: cognitive factors, co-morbidities, social support, etc): mild oral, and suspect minimal pharyngeal dysphagia.  Oral motor examination was completed, and was unremarkable with exception to Pt's edentulous state.  Pt tolerated thin, and NTL, by cup without overt s/s of aspiration.  Pt tolerated pureed and dys2 textures without oral residue.  Pt presented with cough x1 following dys2 textures.  Pt attempted to talk while eating, and bite size was large.  Recommend dys2/thin, meds whole floated in applesauce.  Recommend dysphagia tx to assess diet tolerance, use of strategies, and if pt is appropriate for upgrade.      Plan  Recommend Speech Therapy 30-60 minutes per day 5-6 days per week for 4 weeks for the following treatments:  SLP Speech Language Treatment, SLP Swallowing Dysfunction Treatment and SLP Group Treatment.    Goals:  Long term and short term goals have been discussed with patient and they are in agreement.    Speech Therapy Problems     Problem: Speech/Swallowing LTGs     Dates: Start: 12/29/19       Description:     Goal: LTG-By discharge, patient will safely swallow     Dates: Start: 12/29/19       Description: 1) Individualized goal:  Regular textures and thin liquids without aspiration.    2) Interventions:  SLP Swallowing Dysfunction Treatment, SLP Oral Pharyngeal Evaluation and SLP Group Treatment                   Problem: Swallowing STGs     Dates: Start: 12/29/19       Description:     Goal: STG-Within one week, patient will     Dates: Start: 12/29/19       Description: 1) Individualized goal:  Tolerate dys2 textures and thin liquids, without overt s/s of aspiration over 2 meals.    2) Interventions:  SLP Swallowing Dysfunction Treatment and SLP Group Treatment             Goal: STG-Within one week, patient will      Dates: Start: 12/29/19       Description: 1) Individualized goal:  Tolerate dys3 trials without overt s/s of aspiration or oral residue.    2) Interventions:  SLP Swallowing Dysfunction Treatment and SLP Group Treatment

## 2019-12-29 NOTE — DOCUMENTATION QUERY
"                                                                         [facility]                                                                       Query Response Note      PATIENT:               ERIN JADE  ACCT #:                  [id number]  MRN:                     [id number]  :                      1940  ADMIT DATE:       2019 7:00 PM  DISCH DATE:        2019 3:56 PM  RESPONDING  PROVIDER #:        [id number]           QUERY TEXT:    Please clarify the term ?post operative? related to the \"PE\" documented in the Medical Record    NOTE:  If an appropriate response is not listed below, please respond with a new note.    Documentation indicators that raised basis for question:   H&P: Right hip arthroplasty on . Presented 2019 with a syncopal episode. Found to have a right hip prosthetic dislocation. Chest pain in the ER, EKG changes, elevated troponin. CT chest shows pulmonary emboli.    MD Note: Multiple complications post operatively including SBO, and PE.   Treatment: Heparin drip; eliquis; pulmonary consult; RT protocol; oxygen  lab/imaging  Risk Factors: Advanced age; recent hip arthroplasty; COPD; pulmonary hypertension; obesity  Options provided:   -- Condition PE (pulmonary embolism) is due to surgery   -- Condition  PE (pulmonary embolism) is not due to surgery   -- Condition  PE (pulmonary embolism) is incidental to surgery   -- Unable to determine      Query created by: Cari Beatty on 2019 11:22 AM    RESPONSE TEXT:    Condition PE (pulmonary embolism) is due to surgery          Electronically signed by:  MAICOL TOMLIN 2019 2:49 PM              "

## 2019-12-29 NOTE — THERAPY
Physical Therapy   Initial Evaluation     Patient Name: Guillermina Recio  Age:  79 y.o., Sex:  female  Medical Record #: [id number]  Today's Date: 12/29/2019     Subjective    Pt was seated in w/c upon arrival and agreeable to treatment.  Pt's daughter present at beginning of session.  Pt reported high levels of pain and fearfulness from her recent fall.       Objective       12/29/19 1301   Prior Living Situation   Prior Services Continuous (24 Hour) Care Giving Family   Housing / Facility 1 Story House;1 Story Apartment / Condo   Steps Into Home 1   Steps In Home 0   Rail None   Elevator No   Equipment Owned Front-Wheel Walker;Wheelchair   Lives with - Patient's Self Care Capacity Alone and Able to Care For Self;Adult Children   Comments Pt was living alone prior to previous admission, but pt's daughter has been staying with her   Prior Level of Functional Mobility   Bed Mobility Independent   Transfer Status Independent   Ambulation Independent   Distance Ambulation (Feet)   (limited household)   Assistive Devices Used Front-Wheel Walker   Wheelchair Independent   Stairs Required Assist   IRF-LAKEISHA:  Prior Functioning: Everyday Activities   Self Care Independent   Indoor Mobility (Ambulation) Independent   Stairs Needed some help   Functional Cognition Independent   Prior Device Use Walker   Vitals   Pulse 81   Pulse Oximetry 96 %   O2 (LPM) 4   O2 Delivery Nasal Cannula   Pain 0 - 10 Group   Location Back;Hip   Therapist Pain Assessment Post Activity Pain Same as Prior to Activity;Nurse Notified;6   Cognition    Level of Consciousness Alert   Passive ROM Lower Body   Passive ROM Lower Body X   Comments RLE limited secondary to pain and hip precautions   Active ROM Lower Body    Active ROM Lower Body  X   Comments RLE limited secondary to pain and hip precautions   Strength Lower Body   Rt Hip Flexion Strength 2+ (P+)   Rt Knee Flexion Strength 3+ (F+)   Rt Knee Extension Strength 3+ (F+)   Rt Ankle Dorsiflexion  Strength 4 (G)   Lt Hip Flexion Strength 3- (F-)   Lt Knee Flexion Strength 4 (G)   Lt Knee Extension Strength 4 (G)   Lt Ankle Dorsiflexion Strength 4 (G)   Sensation Lower Body   Lower Extremity Sensation   WDL   Comments Intact light touch sensation   Bed Mobility    Supine to Sit Maximal Assist   Sit to Supine Moderate Assist   Sit to Stand Moderate Assist   Scooting Minimal Assist   Rolling Moderate Assist to Rt.;Moderate Assist to Lt.   Comments use of bed rail, HOBE   IRF-LAKEISHA:  Roll Left and Right   Assistance Needed Physical assistance   Physical Assistance Level 50%-74%   CARE Score 2   Discharge Goal:  Assistance Needed Independent;Adaptive equipment   Discharge Goal:  Score 6   IRF-LAKEISHA:  Sit to Lying   Assistance Needed Physical assistance   Physical Assistance Level 50%-74%   CARE Score 2   Discharge Goal:  Assistance Needed Independent;Adaptive equipment   Discharge Goal:  Score 6   IRF-LAKEISHA:  Lying to Sitting on Side of Bed   Assistance Needed Physical assistance   Physical Assistance Level 50%-74%   CARE Score 2   Discharge Goal:  Assistance Needed Independent;Adaptive equipment   Discharge Goal:  Score 6   IRF-LAKEISHA:  Sit to Stand   Assistance Needed Physical assistance   Physical Assistance Level 50%-74%   CARE Score 2   Discharge Goal:  Assistance Needed Adaptive equipment;Supervision   Discahrge Goal:  Score 4   IRF-LAKEISHA:  Chair/Bed-to-Chair Transfer   Assistance Needed Physical assistance   Physical Assistance Level 50%-74%   CARE Score 2   Discharge Goal:  Assistance Needed Adaptive equipment;Supervision   Discharge Goal:  Score 4   IRF-LAKEISHA:  Car Transfer   Reason if not Attempted Safety concerns   CARE Score 88   Discharge Goal:  Assistance Needed Adaptive equipment;Supervision   Discharge Goal:  Score 4   IRF LAKEISHA:  Walking   Does the Patient Walk? No   Is Walking a Clinically Indicated Goal? Yes   Uses a Wheelchair/Scooter? Yes   IRF LAKEISHA:  Walk 10 Feet   Reason if not Attempted Refused to perform    CARE Score 7   Discharge Goal:  Assistance Needed Adaptive equipment;Supervision   Discharge Goal:  Score 4   IRF-LAKEISHA:  Walk 50 Feet with Two Turns   Reason if not Attempted Refused to perform   CARE Score 7   Discharge Goal:  Assistance Needed Adaptive equipment;Supervision   Discharge Goal:  Score 4   IRF-LAKEISHA:  Walk 150 Feet   Reason if not Attempted Refused to perform   CARE Score 7   Discharge Goal:  Assistance Needed Physical assistance   Discharge Goal:  Physical Assistance Level Total assistance   Discharge Goal:  Score 1   IRF LAKEISHA:  Walking 10 Feet on Uneven Surfaces   Reason if not Attempted Safety concerns   CARE Score 88   Discharge Goal:  Assistance Needed Supervision;Adaptive equipment   Discharge Goal:  Score 4   IRF LAKEISHA:  1 Step (Curb)   Reason if not Attempted Safety concerns   CARE Score 88   Discharge Goal:  Assistance Needed Incidental touching;Supervision;Adaptive equipment   Discharge Goal:  Score 4   IRF-LAKEISHA:  4 Steps   Reason if not Attempted Safety concerns   CARE Score 88   Discharge Goal:  Assistance Needed Physical assistance   Discharge Goal:  Physical Assistance Level Total assistance   Discharge Goal:  Score 1   IRF LAKEISHA:  12 Steps   Reason if not Attempted Safety concerns   CARE Score 88   Discharge Goal:  Assistance Needed Physical assistance   Discharge Goal:  Physical Assistance Level Total assistance   Discharge Goal:  Score 1   IRF LAKEISHA:  Picking Up Object   Reason if not Attempted Safety concerns   CARE Score 88   Discharge Goal:  Assistance Needed Supervision;Adaptive equipment   Discharge Goal:  Score 4   IRF-LAKEISHA:  Wheel 50 Feet with Two Turns   Indicate the Type of Wheelchair or Scooter Used Manual   Assistance Needed Supervision;Adaptive equipment;Physical assistance   Physical Assistance Level 25%-49%   CARE Score 3   Discharge Goal:  Assistance Needed Independent;Adaptive equipment   Discharge Goal:  Score 6   IRF-LAKEISHA:  Wheel 150 Feet   Reason if not Attempted Medical  concerns   CARE Score 88   Discharge Goal:  Assistance Needed Adaptive equipment;Independent   Discharge Goal:  Score 6   Problem List    Problems Pain;Impaired Bed Mobility;Impaired Transfers;Impaired Ambulation;Functional ROM Deficit;Functional Strength Deficit;Decreased Activity Tolerance;Limited Knowledge of Post-Op Precautions   Precautions   Precautions Posterior Hip Precautions;Fall Risk;Weight Bearing As Tolerated Right Lower Extremity   Current Discharge Plan   Current Discharge Plan Return to Prior Living Situation   Interdisciplinary Plan of Care Collaboration   IDT Collaboration with  Certified Nursing Assistant   Patient Position at End of Therapy In Bed;Call Light within Reach;Tray Table within Reach;Phone within Reach   Collaboration Comments CLOF   Benefit   Therapy Benefit Patient Would Benefit from Inpatient Rehabilitation Physical Therapy to Maximize Functional [place] with ADLs, IADLs and Mobility.   PT Total Time Spent   PT Individual Total Time Spent (Mins) 60   PT Charge Group   PT Therapeutic Activities 1   PT Evaluation PT Evaluation Mod       FIM Bed/Chair/Wheelchair Transfers Score: 3 - Moderate Assistance  Bed/Chair/Wheelchair Transfers Description:  Adaptive equipment, Increased time, Supervision for safety, Set-up of equipment, Verbal cueing, Assist with two limbs(Bed mobility and SPT with mod A)    FIM Walking Score:  0 - Not tested, patient refused  Walking Description:       FIM Wheelchair Score:  2 - Max Assistance  Wheelchair Description:  Adaptive equipment, Extra time, Safety concerns, Supervision for safety, Verbal cueing, Requires incidental assist(x50 feet with BUE and min A for steering)    FIM Stairs Score:  0 - Not tested,unsafe activity  Stairs Description:       Assessment  Patient is 79 y.o. female with a diagnosis of R posterior hip dislocation s/p fall (recent JAIME 11/21/2019) and pulmonary embolisms.  Additional factors influencing patient status / progress (ie:  cognitive factors, co-morbidities, social support, etc): good social support, required some assistance prior to recent admission, PMH of COPD, legally blind, recent right hip arthroplasty complicated by small bowel obstruction.      Plan  Recommend Physical Therapy  minutes per day 5-7 days per week for 10-14 days for the following treatments:  PT Group Therapy, PT E Stim Attended, PT Gait Training, PT Therapeutic Exercises, PT Neuro Re-Ed/Balance, PT Aquatic Therapy, PT Therapeutic Activity, PT Manual Therapy and PT Evaluation.    Goals:  Long term and short term goals have been discussed with patient and they are in agreement.    Physical Therapy Problems     Problem: Mobility     Dates: Start: 12/29/19       Description:     Goal: STG-Within one week, patient will propel wheelchair household distances     Dates: Start: 12/29/19       Description: 1) Individualized goal:  W/C mobility x 100 feet with BUE and SBA  2) Interventions:  PT Group Therapy, PT E Stim Attended, PT Gait Training, PT Therapeutic Exercises, PT Neuro Re-Ed/Balance, PT Aquatic Therapy, PT Therapeutic Activity, PT Manual Therapy and PT Evaluation             Goal: STG-Within one week, patient will ambulate household distance     Dates: Start: 12/29/19       Description: 1) Individualized goal: AMB x 10 feet with FWW and min A  2) Interventions:  PT Group Therapy, PT E Stim Attended, PT Gait Training, PT Therapeutic Exercises, PT Neuro Re-Ed/Balance, PT Aquatic Therapy, PT Therapeutic Activity, PT Manual Therapy and PT Evaluation                 Problem: Mobility Transfers     Dates: Start: 12/29/19       Description:     Goal: STG-Within one week, patient will transfer bed to chair     Dates: Start: 12/29/19       Description: 1) Individualized goal:  SPT with FWW and min A  2) Interventions:  PT Group Therapy, PT E Stim Attended, PT Gait Training, PT Therapeutic Exercises, PT Neuro Re-Ed/Balance, PT Aquatic Therapy, PT Therapeutic  "Activity, PT Manual Therapy and PT Evaluation                   Problem: PT-Long Term Goals     Dates: Start: 12/29/19       Description:     Goal: LTG-By discharge, patient will ambulate     Dates: Start: 12/29/19       Description: 1) Individualized goal: AMB x 50 feet with FWW with SPV  2) Interventions:  PT Group Therapy, PT E Stim Attended, PT Gait Training, PT Therapeutic Exercises, PT Neuro Re-Ed/Balance, PT Aquatic Therapy, PT Therapeutic Activity, PT Manual Therapy and PT Evaluation             Goal: LTG-By discharge, patient will transfer one surface to another     Dates: Start: 12/29/19       Description: 1) Individualized goal:  SPT with FWW, mod I   2) Interventions:  PT Group Therapy, PT E Stim Attended, PT Gait Training, PT Therapeutic Exercises, PT Neuro Re-Ed/Balance, PT Aquatic Therapy, PT Therapeutic Activity, PT Manual Therapy and PT Evaluation             Goal: LTG-By discharge, patient will transfer in/out of a car     Dates: Start: 12/29/19       Description: 1) Individualized goal:  Car transfer with FWW and SPV  2) Interventions:  PT Group Therapy, PT E Stim Attended, PT Gait Training, PT Therapeutic Exercises, PT Neuro Re-Ed/Balance, PT Aquatic Therapy, PT Therapeutic Activity, PT Manual Therapy and PT Evaluation             Goal: LTG-By discharge, patient will     Dates: Start: 12/29/19       Description: 1) Individualized goal:  Up/down 6\" curb with FWW and CGA  2) Interventions:  PT Group Therapy, PT E Stim Attended, PT Gait Training, PT Therapeutic Exercises, PT Neuro Re-Ed/Balance, PT Aquatic Therapy, PT Therapeutic Activity, PT Manual Therapy and PT Evaluation                        "

## 2019-12-29 NOTE — CARE PLAN
Problem: Safety  Goal: Will remain free from injury  Outcome: PROGRESSING AS EXPECTED  Note:   Patient demonstrates good safety technique this shift.  Asks for assistance when needed and does not attempt self transfer.  Able to verbalize needs.  Will continue to monitor.      Problem: Bowel/Gastric:  Goal: Normal bowel function is maintained or improved  Outcome: PROGRESSING SLOWER THAN EXPECTED  Note:   Pt reporting fatigue today due to getting up to the bathroom multiple times last night and this morning for bowel movements. Pt states she was given laxatives at previous facility yesterday. Held morning dose of Senna.

## 2019-12-30 ENCOUNTER — APPOINTMENT (OUTPATIENT)
Dept: RADIOLOGY | Facility: REHABILITATION | Age: 79
DRG: 175 | End: 2019-12-30
Attending: HOSPITALIST
Payer: MEDICARE

## 2019-12-30 PROBLEM — J90 PLEURAL EFFUSION: Status: ACTIVE | Noted: 2019-12-30

## 2019-12-30 LAB
EST. AVERAGE GLUCOSE BLD GHB EST-MCNC: 117 MG/DL
HBA1C MFR BLD: 5.7 % (ref 0–5.6)
NT-PROBNP SERPL IA-MCNC: 3720 PG/ML (ref 0–125)

## 2019-12-30 PROCEDURE — 36415 COLL VENOUS BLD VENIPUNCTURE: CPT

## 2019-12-30 PROCEDURE — 700102 HCHG RX REV CODE 250 W/ 637 OVERRIDE(OP): Performed by: PHYSICAL MEDICINE & REHABILITATION

## 2019-12-30 PROCEDURE — 83880 ASSAY OF NATRIURETIC PEPTIDE: CPT

## 2019-12-30 PROCEDURE — 99233 SBSQ HOSP IP/OBS HIGH 50: CPT | Performed by: PHYSICAL MEDICINE & REHABILITATION

## 2019-12-30 PROCEDURE — 92526 ORAL FUNCTION THERAPY: CPT

## 2019-12-30 PROCEDURE — 99233 SBSQ HOSP IP/OBS HIGH 50: CPT | Performed by: HOSPITALIST

## 2019-12-30 PROCEDURE — 97535 SELF CARE MNGMENT TRAINING: CPT

## 2019-12-30 PROCEDURE — 71045 X-RAY EXAM CHEST 1 VIEW: CPT

## 2019-12-30 PROCEDURE — 770010 HCHG ROOM/CARE - REHAB SEMI PRIVAT*

## 2019-12-30 PROCEDURE — A9270 NON-COVERED ITEM OR SERVICE: HCPCS | Performed by: PHYSICAL MEDICINE & REHABILITATION

## 2019-12-30 PROCEDURE — 97530 THERAPEUTIC ACTIVITIES: CPT

## 2019-12-30 PROCEDURE — 74018 RADEX ABDOMEN 1 VIEW: CPT

## 2019-12-30 PROCEDURE — 97110 THERAPEUTIC EXERCISES: CPT

## 2019-12-30 PROCEDURE — 700102 HCHG RX REV CODE 250 W/ 637 OVERRIDE(OP): Performed by: HOSPITALIST

## 2019-12-30 PROCEDURE — 97116 GAIT TRAINING THERAPY: CPT

## 2019-12-30 PROCEDURE — 94760 N-INVAS EAR/PLS OXIMETRY 1: CPT

## 2019-12-30 PROCEDURE — 700111 HCHG RX REV CODE 636 W/ 250 OVERRIDE (IP): Performed by: PHYSICAL MEDICINE & REHABILITATION

## 2019-12-30 PROCEDURE — A9270 NON-COVERED ITEM OR SERVICE: HCPCS | Performed by: HOSPITALIST

## 2019-12-30 RX ORDER — ACETAMINOPHEN 325 MG/1
650 TABLET ORAL 3 TIMES DAILY
Status: DISCONTINUED | OUTPATIENT
Start: 2019-12-30 | End: 2020-01-08 | Stop reason: HOSPADM

## 2019-12-30 RX ORDER — TIOTROPIUM BROMIDE 18 UG/1
CAPSULE ORAL; RESPIRATORY (INHALATION)
Status: COMPLETED
Start: 2019-12-30 | End: 2019-12-30

## 2019-12-30 RX ADMIN — LOVASTATIN 40 MG: 20 TABLET ORAL at 21:03

## 2019-12-30 RX ADMIN — FERROUS SULFATE TAB 325 MG (65 MG ELEMENTAL FE) 325 MG: 325 (65 FE) TAB at 17:46

## 2019-12-30 RX ADMIN — MIDODRINE HYDROCHLORIDE 15 MG: 10 TABLET ORAL at 09:32

## 2019-12-30 RX ADMIN — BUDESONIDE AND FORMOTEROL FUMARATE DIHYDRATE 2 PUFF: 160; 4.5 AEROSOL RESPIRATORY (INHALATION) at 08:34

## 2019-12-30 RX ADMIN — GLYCOPYRROLATE 1 CAPSULE: 15.6 CAPSULE RESPIRATORY (INHALATION) at 08:54

## 2019-12-30 RX ADMIN — APIXABAN 5 MG: 5 TABLET, FILM COATED ORAL at 21:03

## 2019-12-30 RX ADMIN — SIMETHICONE CHEW TAB 80 MG 120 MG: 80 TABLET ORAL at 17:30

## 2019-12-30 RX ADMIN — SIMETHICONE CHEW TAB 80 MG 120 MG: 80 TABLET ORAL at 13:03

## 2019-12-30 RX ADMIN — MIDODRINE HYDROCHLORIDE 15 MG: 10 TABLET ORAL at 13:04

## 2019-12-30 RX ADMIN — ONDANSETRON 4 MG: 4 TABLET, ORALLY DISINTEGRATING ORAL at 14:43

## 2019-12-30 RX ADMIN — OXYCODONE HYDROCHLORIDE AND ACETAMINOPHEN 500 MG: 500 TABLET ORAL at 09:34

## 2019-12-30 RX ADMIN — MONTELUKAST 10 MG: 10 TABLET, FILM COATED ORAL at 21:05

## 2019-12-30 RX ADMIN — OXYCODONE HYDROCHLORIDE 5 MG: 5 TABLET ORAL at 09:33

## 2019-12-30 RX ADMIN — SENNOSIDES AND DOCUSATE SODIUM 2 TABLET: 8.6; 5 TABLET ORAL at 09:34

## 2019-12-30 RX ADMIN — APIXABAN 5 MG: 5 TABLET, FILM COATED ORAL at 09:34

## 2019-12-30 RX ADMIN — ACETAMINOPHEN 650 MG: 325 TABLET, FILM COATED ORAL at 21:03

## 2019-12-30 RX ADMIN — OMEPRAZOLE 20 MG: 20 CAPSULE, DELAYED RELEASE ORAL at 09:33

## 2019-12-30 RX ADMIN — VITAMIN D, TAB 1000IU (100/BT) 4000 UNITS: 25 TAB at 09:33

## 2019-12-30 RX ADMIN — SIMETHICONE CHEW TAB 80 MG 120 MG: 80 TABLET ORAL at 09:38

## 2019-12-30 RX ADMIN — FUROSEMIDE 20 MG: 20 TABLET ORAL at 05:28

## 2019-12-30 RX ADMIN — MIDODRINE HYDROCHLORIDE 15 MG: 10 TABLET ORAL at 17:46

## 2019-12-30 RX ADMIN — FERROUS SULFATE TAB 325 MG (65 MG ELEMENTAL FE) 325 MG: 325 (65 FE) TAB at 09:34

## 2019-12-30 RX ADMIN — GLYCOPYRROLATE 1 CAPSULE: 15.6 CAPSULE RESPIRATORY (INHALATION) at 21:07

## 2019-12-30 RX ADMIN — SENNOSIDES AND DOCUSATE SODIUM 2 TABLET: 8.6; 5 TABLET ORAL at 21:05

## 2019-12-30 RX ADMIN — ACETAMINOPHEN 650 MG: 325 TABLET, FILM COATED ORAL at 14:43

## 2019-12-30 RX ADMIN — BUDESONIDE AND FORMOTEROL FUMARATE DIHYDRATE 2 PUFF: 160; 4.5 AEROSOL RESPIRATORY (INHALATION) at 21:07

## 2019-12-30 ASSESSMENT — ENCOUNTER SYMPTOMS
VOMITING: 0
CHILLS: 0
COUGH: 0
SHORTNESS OF BREATH: 0
ABDOMINAL PAIN: 0
FEVER: 0
EYES NEGATIVE: 1
NAUSEA: 0
BRUISES/BLEEDS EASILY: 0
PALPITATIONS: 0

## 2019-12-30 ASSESSMENT — PATIENT HEALTH QUESTIONNAIRE - PHQ9
SUM OF ALL RESPONSES TO PHQ9 QUESTIONS 1 AND 2: 0
1. LITTLE INTEREST OR PLEASURE IN DOING THINGS: NOT AT ALL
2. FEELING DOWN, DEPRESSED, IRRITABLE, OR HOPELESS: NOT AT ALL

## 2019-12-30 NOTE — ASSESSMENT & PLAN NOTE
CTA 12/14/19 +bilat PE w/ right heart strain  Echo EF 65-70% w/ mod PHTN  Anticoagulated on Eliquis  Elevated Trop and BNP most likely 2/2 increased right heart pressures from PE  BNP improving

## 2019-12-30 NOTE — PROGRESS NOTES
[facility] Medicine Daily Progress Note    Date of Service  12/30/2019    Chief Complaint  Pulmonary Embolism  Hypotension    Interval Problem Update  Shortness of breath better today.  Denies chest pain or palpitations.  Labs and imaging reviewed.    Review of Systems  Review of Systems   Constitutional: Negative for chills and fever.   HENT: Negative.    Eyes: Negative.    Respiratory: Negative for cough and shortness of breath.    Cardiovascular: Negative for chest pain and palpitations.   Gastrointestinal: Negative for abdominal pain, nausea and vomiting.   Genitourinary: Negative.    Musculoskeletal: Positive for joint pain.   Skin: Negative for itching and rash.   Endo/Heme/Allergies: Does not bruise/bleed easily.        Physical Exam  Temp:  [36.8 °C (98.2 °F)-37.1 °C (98.8 °F)] 37.1 °C (98.8 °F)  Pulse:  [65-89] 82  Resp:  [18-22] 20  BP: ()/(40-62) 97/56  SpO2:  [92 %-96 %] 96 %    Physical Exam  Vitals signs reviewed.   Constitutional:       General: She is not in acute distress.     Appearance: Normal appearance. She is not ill-appearing.   HENT:      Head: Normocephalic and atraumatic.      Right Ear: External ear normal.      Left Ear: External ear normal.      Nose: Nose normal.   Eyes:      General:         Right eye: No discharge.         Left eye: No discharge.      Conjunctiva/sclera: Conjunctivae normal.   Neck:      Musculoskeletal: Normal range of motion and neck supple.   Cardiovascular:      Rate and Rhythm: Normal rate and regular rhythm.   Pulmonary:      Effort: No respiratory distress.      Breath sounds: No wheezing.      Comments: Decreased BS  Abdominal:      General: Bowel sounds are normal. There is no distension.      Palpations: Abdomen is soft.      Tenderness: There is no tenderness.   Musculoskeletal:      Comments: Trace edema   Skin:     General: Skin is warm and dry.   Neurological:      Mental Status: She is alert and oriented to person, place, and time.          Fluids    Intake/Output Summary (Last 24 hours) at 12/30/2019 1347  Last data filed at 12/30/2019 1300  Gross per 24 hour   Intake 450 ml   Output --   Net 450 ml       Laboratory  Recent Labs     12/28/19  0244 12/29/19  0534   WBC 6.7 6.0   RBC 3.66* 3.78*   HEMOGLOBIN 9.3* 9.4*   HEMATOCRIT 31.0* 32.7*   MCV 84.7 86.5   MCH 25.4* 24.9*   MCHC 30.0* 28.7*   RDW 56.1* 57.7*   PLATELETCT 420 443   MPV 8.4* 8.4*     Recent Labs     12/28/19  0244 12/29/19  0534   SODIUM 141 141   POTASSIUM 4.4 4.4   CHLORIDE 100 102   CO2 33 32   GLUCOSE 102* 95   BUN 16 16   CREATININE 0.76 0.72   CALCIUM 8.7 8.9                 Assessment/Plan  * Acute pulmonary embolism with acute cor pulmonale (HCC)- (present on admission)  Assessment & Plan  CTA 12/14/19 +bilate PE w/ right heart strain  Echo EF 65-70% w/ mod PHTN  Anticoagulated on Eliquis  Elevated Trop and BNP most likely 2/2 increased right heart pressures from PE  BNP improving    Closed dislocation of right hip (HCC)- (present on admission)  Assessment & Plan  2/2 syncopal episode w/ fall  S/P closed reduction of prosthetic hip dislocation on 12/19/19 by Dr. Delgado  Pain control    COPD (chronic obstructive pulmonary disease) ([facility])- (present on admission)  Assessment & Plan  On Symbicort  Seebri and Singulair added to optimize management  Monitor for acute exacerbation  RT protocol    Pleural effusion  Assessment & Plan  CXR shows small bilat effusions  Continue low dose Lasix as tolerated    Hypotension- (present on admission)  Assessment & Plan  Goal is to wean off Midodrine once Lasix is able to be tapered off    Vitamin D deficiency- (present on admission)  Assessment & Plan  Vit D level 20  On supplementation    Gastroesophageal reflux disease- (present on admission)  Assessment & Plan  PPI started given h/o hiatal hernia and on anticoagulation  Resumed Simethicone for chronic issues w/ gas  KUB negative acute    Dyslipidemia- (present on admission)  Assessment  & Plan  On Mevacor    Iron deficiency anemia- (present on admission)  Assessment & Plan  On Fe and Vit C supplementation for low iron stores    DNR

## 2019-12-30 NOTE — CARE PLAN
Problem: Safety  Goal: Will remain free from injury  Outcome: PROGRESSING AS EXPECTED     Problem: Bowel/Gastric:  Goal: Normal bowel function is maintained or improved  Outcome: PROGRESSING AS EXPECTED     Problem: Pain Management  Goal: Pain level will decrease to patient's comfort goal  Outcome: PROGRESSING AS EXPECTED     Problem: Respiratory:  Goal: Respiratory status will improve  Outcome: PROGRESSING AS EXPECTED  Note:   Pt continues on oxygen at 4 lpm via n/c. No acute respiratory distress noted.

## 2019-12-30 NOTE — CARE PLAN
Problem: Communication  Goal: The ability to communicate needs accurately and effectively will improve  Outcome: PROGRESSING AS EXPECTED  Note:   Patient able to verbalize needs.  Will continue to monitor.      Problem: Bowel/Gastric:  Goal: Normal bowel function is maintained or improved  Outcome: PROGRESSING SLOWER THAN EXPECTED  Note:   Pt had an episode on incontinent 12/29. Scheduled bowel medication held. Charge nurse notified. Will continue to monitor.      Problem: Pain Management  Goal: Pain level will decrease to patient's comfort goal  Outcome: PROGRESSING AS EXPECTED  Note:   Patient able to verbalize pain level and verbalize an acceptable level of pain.

## 2019-12-30 NOTE — ASSESSMENT & PLAN NOTE
2/2 syncopal episode w/ fall  S/P closed reduction of prosthetic hip dislocation on 12/19/19 by Dr. Delgado

## 2019-12-30 NOTE — THERAPY
Physical Therapy   Daily Treatment     Patient Name: Guillermina Recio  Age:  79 y.o., Sex:  female  Medical Record #: [id number]  Today's Date: 12/30/2019     Precautions  Precautions: (P) Posterior Hip Precautions, Weight Bearing As Tolerated Right Lower Extremity, Fall Risk  Comments: R hip WBAT, post hip precautions    Subjective    Patient reported nausea, HA pain, and feeling cold. Patient was agreeable to supine therapeutic activities/ exercise.     Objective       12/30/19 1431   Precautions   Precautions Posterior Hip Precautions;Weight Bearing As Tolerated Right Lower Extremity;Fall Risk   Vitals   O2 (LPM) 4   O2 Delivery Nasal Cannula   Supine Lower Body Exercise   Short Arc Quad 1 set of 10;Bilateral   Heel Slide 1 set of 10;Bilateral   Ankle Pumps 1 set of 10;Bilateral   Other Exercises Quad sets BLE 10x, Glute squeezes 10x BLE   Bed Mobility    Scooting Moderate Assist  (up in bed, with bed rails, and 1 legged bridges)   Neuro-Muscular Treatments   Neuro-Muscular Treatments Sequencing;Verbal Cuing;Weight Shift Right;Weight Shift Left   PT Total Time Spent   PT Individual Total Time Spent (Mins) 30   PT Charge Group   PT Therapeutic Exercise 1   PT Therapeutic Activities 1     RN/ CNA notified of complaints.  Nausea and pain meds provided.    Assessment    Patient was limited by nausea and HA pain, but willing to participate from a supine position. AAROM was provided for LLE heel slides.  Patient continues to require mod A to scoot in bed.     Plan    Progress activity tolerance, weight acceptance/ pre-gait, HEP, bed mobility, and transfers.
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
## 2019-12-30 NOTE — THERAPY
Occupational Therapy  Daily Treatment     Patient Name: Guillermina Recio  Age:  79 y.o., Sex:  female  Medical Record #: [id number]  Today's Date: 2019     Precautions  Precautions: Posterior Hip Precautions, Weight Bearing As Tolerated Right Lower Extremity  Comments: (P) R hip WBAT, post hip precautions         Subjective    Pt received up in chair, agreeable to shower     Objective       19 0831   Precautions   Precautions Posterior Hip Precautions;Weight Bearing As Tolerated Right Lower Extremity   Comments R hip WBAT, post hip precautions   OT Total Time Spent   OT Individual Total Time Spent (Mins) 60   OT Charge Group   OT Self Care / ADL 4       FIM Bathing Score:  4 - Minimal Assistance  Bathing Description:       FIM Upper Body Dressin - Standby Prompting/Supervision or Set-up  Upper Body Dressing Description:       FIM Lower Body Dressing Score:  3 - Moderate Assistance  Lower Body Dressing Description:  Reacher, Sock aid, Increased time(min A for reacher use, total A to pull pants up, SBA for sock aid)    FIM Toiletin - Max Assistance  Toileting Description:  Grab bar, Increased time, Supervision for safety, Verbal cueing    FIM Toilet Transfer Score:  3 - Moderate Assistance  Toilet Transfer Description:  Grab bar(stand pivote w/c <> toilet via grab bar)    FIM Tub/Shower Transfers Score:  3 - Moderate Assistance  Tub/Shower Transfers Description:  Grab bar(stand pivot w/c <> shower chair via grab bar)      Assessment    Pt tolerated session well, demos improved activity tolerance this session relative to yesterday, still very anxious with all mobility. Good recall of use of AE for LB dressing from prior stay, however requires increased assist.     Plan    Integration of hip precautions into ADLs, standing tolerance/balance, transfers

## 2019-12-30 NOTE — FLOWSHEET NOTE
12/30/19 0743   Events/Summary/Plan   Events/Summary/Plan 02 spot check,  will give resp meds when they are available   Interdisciplinary Plan of Care-Goals (Indications)   Bronchodilator Indications History / Diagnosis   Interdisciplinary Plan of Care-Outcomes    Bronchodilator Outcome Patient at Stable Baseline   Education   Education Yes - Pt. / Family has been Instructed in use of Respiratory Equipment;Yes - Pt. / Family has been Instructed in use of Respiratory Medications and Adverse Reactions   Respiratory WDL   Respiratory (WDL) X   Chest Exam   Work Of Breathing / Effort Moderate   Respiration 20   Pulse 89   Secretions   Cough Non Productive   Oximetry   #Pulse Oximetry (Single Determination) Yes   Oxygen   Home O2 Use Prior To Admission? Yes   Home O2 LPM Flow   (4 lpm days, 5 lpm nocs)   Home O2 Delivery Method Nasal Cannula   Home O2 Frequency of Use Continuous   Pulse Oximetry 93 %   O2 (LPM) 4   O2 Daily Delivery Respiratory  Silicone Nasal Cannula

## 2019-12-30 NOTE — THERAPY
Physical Therapy   Daily Treatment     Patient Name: Guillermina Recio  Age:  79 y.o., Sex:  female  Medical Record #: [id number]  Today's Date: 12/30/2019     Precautions  Precautions: (P) Posterior Hip Precautions, Weight Bearing As Tolerated Right Lower Extremity  Comments: R hip WBAT, post hip precautions    Subjective    Patient was agreeable to POC, but frequently needed reassurance that the wheelchair was behind her.      Objective       12/30/19 1001   Precautions   Precautions Posterior Hip Precautions;Weight Bearing As Tolerated Right Lower Extremity   Bed Mobility    Sit to Stand Moderate Assist   Neuro-Muscular Treatments   Neuro-Muscular Treatments Weight Shift Right;Weight Shift Left;Verbal Cuing;Tactile Cuing;Sequencing;Postural Facilitation;Postural Changes;Joint Approximation;Facilitation   Comments Backrest insert added to support sitting posture   Interdisciplinary Plan of Care Collaboration   IDT Collaboration with  Therapy Tech   Collaboration Comments wc follow    PT Total Time Spent   PT Individual Total Time Spent (Mins) 30   PT Charge Group   PT Gait Training 1   PT Therapeutic Activities 1     Standing tolerance: ~1 min increments in // bars static and dyanmic  Cues to relax elevated shoulders, inc thoracic ext, and recruit gluteals, for improved standing posture.    FIM Walking Score:  1 - Total Assistance  Walking Description:  Two hand rails, Requires wheelchair follow(5 ft x 2 with wc follow in // bars min A )      Assessment    Patient is limited by fear of falling, elevated respiratory rate when standing, and poor activity tolerance. Patient progressed to gait assessment.     Plan    Progress activity tolerance, pregait, transfer safety, and bed mobility.

## 2019-12-30 NOTE — THERAPY
Speech Language Pathology  Daily Treatment     Patient Name: Guillermina Recio  Age:  79 y.o., Sex:  female  Medical Record #: [id number]  Today's Date: 12/30/2019     Subjective    Pt refused to eat breakfast in t-dine stating that she only eats in her room.      Objective       12/30/19 0801   SLP Total Time Spent   SLP Individual Total Time Spent (Mins) 30   Charge Group   SLP Swallowing Dysfunction Treatment Swallowing Dysfunction Treatment       Assessment    Pt tolerated dysphagia 2 textures, a small trial of dysphagia 3 (toast) and thin liquids without any overt s/sx of aspiration/penetration.  Pt currently does not have dentures, but described that her baseline diet is somewhere between a dysphagia 3 and regular texture diet (pt does omit some foods from her diet at home d/t difficulty chewing such as steak and apples).  Pt verbalized that her goal is to be upgraded to a regular texture diet and be able to choose what she is able to chew and what she cannot chew.      Plan    Trials of dysphagia 3 textures to continue during lunch.

## 2019-12-30 NOTE — REHAB-OT IDT TEAM NOTE
Occupational Therapy   Activities of Daily Living  Eating Initial:  5 - Standby Prompting/Supervision or Set-up  Eating Current:  5 - Standby Prompting/Supervision or Set-up   Eating Description:  Modified diet, Increased time, Supervision for safety, Verbal cueing  Grooming Initial:  5 - Standby Prompting/Supervision or Set-up  Grooming Current:  5 - Standby Prompting/Supervision or Set-up   Grooming Description:  Supervision for safety  Bathing Initial:  0 - Not tested, patient refused  Bathing Current:  4 - Minimal Assistance   Bathing Description:  Long handled bath tool(completed in sitting, LH sponge for feet)  Upper Body Dressing Initial:  5 - Standby Prompting/Supervision or Set-up  Upper Body Dressing Current:  5 - Standby Prompting/Supervision or Set-up   Upper Body Dressing Description:  Set-up of equipment  Lower Body Dressing Initial:  1 - Total Assistance  Lower Body Dressing Current:  3 - Moderate Assistance   Lower Body Dressing Description:  3 - Moderate Assistance  Toileting Initial:  1 - Total Assistance  Toileting Current:  2 - Max Assistance   Toileting Description:  Grab bar, Increased time, Supervision for safety, Verbal cueing  Toilet Transfer Initial:  4 - Minimal Assistance  Toilet Transfer Current:  3 - Moderate Assistance   Toilet Transfer Description:  3 - Moderate Assistance  Tub / Shower Transfer Initial:  0 - Not tested, patient refused  Tub / Shower Transfer Current:  3 - Moderate Assistance   Tub / Shower Transfer Description:  Grab bar(stand pivot w/c <> shower chair via grab bar)  IADL:  Not yet addressed   Family Training/Education:  Not yet addressed   DME/DC Recommendations:  Patient has DME needs in place, has tub transfer bench, FWW, w/c, reacher, sock aid     Strengths:  Able to follow instructions, Motivated for self care and independence, Pleasant and cooperative, Supportive family and Willingly participates in therapeutic activities  Barriers:  Decreased endurance,  Generalized weakness, Limited mobility, Pain poorly managed, Poor activity tolerance, Poor balance and Other: high anxiety with mobility and standing     # of short term goals set= 4    # of short term goals met= 3     Occupational Therapy Goals     Problem: OT Long Term Goals     Dates: Start: 12/29/19       Description:     Goal: LTG-By discharge, patient will complete basic self care tasks     Dates: Start: 12/29/19       Description: 1) Individualized Goal:  With supervision to modified independence  2) Interventions:  OT Group Therapy, OT Self Care/ADL, OT Cognitive Skill Dev, OT Community Reintegration, OT Manual Ther Technique, OT Neuro Re-Ed/Balance, OT Therapeutic Activity, OT Evaluation and OT Therapeutic Exercise             Goal: LTG-By discharge, patient will perform bathroom transfers     Dates: Start: 12/29/19       Description: 1) Individualized Goal:  With supervision to modified independence  2) Interventions:  OT Group Therapy, OT Self Care/ADL, OT Cognitive Skill Dev, OT Community Reintegration, OT Manual Ther Technique, OT Neuro Re-Ed/Balance, OT Therapeutic Activity, OT Evaluation and OT Therapeutic Exercise                 Problem: Toileting     Dates: Start: 12/29/19       Description:     Goal: STG-Within one week, patient will complete toileting tasks     Dates: Start: 12/29/19       Description: 1) Individualized Goal:  With mod assist  2) Interventions:  OT Group Therapy, OT Self Care/ADL, OT Cognitive Skill Dev, OT Community Reintegration, OT Manual Ther Technique, OT Neuro Re-Ed/Balance, OT Therapeutic Activity, OT Evaluation and OT Therapeutic Exercise                         Section completed by:  Nelly Escalona MS,OTR/L

## 2019-12-30 NOTE — CARE PLAN
Problem: Toileting  Goal: STG-Within one week, patient will complete toileting tasks  Description  1) Individualized Goal:  With mod assist  2) Interventions:  OT Group Therapy, OT Self Care/ADL, OT Cognitive Skill Dev, OT Community Reintegration, OT Manual Ther Technique, OT Neuro Re-Ed/Balance, OT Therapeutic Activity, OT Evaluation and OT Therapeutic Exercise     Outcome: PROGRESSING AS EXPECTED     Problem: Bathing  Goal: STG-Within one week, patient will bathe  Description  1) Individualized Goal:  With min assist  2) Interventions:  OT Group Therapy, OT Self Care/ADL, OT Cognitive Skill Dev, OT Community Reintegration, OT Manual Ther Technique, OT Neuro Re-Ed/Balance, OT Therapeutic Activity, OT Evaluation and OT Therapeutic Exercise     Outcome: MET     Problem: Dressing  Goal: STG-Within one week, patient will dress LB  Description  1) Individualized Goal:  With mod assist  2) Interventions:  OT Group Therapy, OT Self Care/ADL, OT Cognitive Skill Dev, OT Community Reintegration, OT Manual Ther Technique, OT Neuro Re-Ed/Balance, OT Therapeutic Activity, OT Evaluation and OT Therapeutic Exercise     Outcome: MET     Problem: Functional Transfers  Goal: STG-Within one week, patient will transfer to toilet  Description  1) Individualized Goal:  With mod assist  2) Interventions:  OT Group Therapy, OT Self Care/ADL, OT Cognitive Skill Dev, OT Community Reintegration, OT Manual Ther Technique, OT Neuro Re-Ed/Balance, OT Therapeutic Activity, OT Evaluation and OT Therapeutic Exercise     Outcome: MET

## 2019-12-30 NOTE — ASSESSMENT & PLAN NOTE
On Symbicort and Singulair  Off Seebri due to concern of contributing to hypotension  Monitor for acute exacerbation  RT protocol

## 2019-12-31 PROCEDURE — 700102 HCHG RX REV CODE 250 W/ 637 OVERRIDE(OP): Performed by: PHYSICAL MEDICINE & REHABILITATION

## 2019-12-31 PROCEDURE — 99232 SBSQ HOSP IP/OBS MODERATE 35: CPT | Performed by: HOSPITALIST

## 2019-12-31 PROCEDURE — 97530 THERAPEUTIC ACTIVITIES: CPT

## 2019-12-31 PROCEDURE — 700102 HCHG RX REV CODE 250 W/ 637 OVERRIDE(OP): Performed by: HOSPITALIST

## 2019-12-31 PROCEDURE — 92526 ORAL FUNCTION THERAPY: CPT

## 2019-12-31 PROCEDURE — A9270 NON-COVERED ITEM OR SERVICE: HCPCS | Performed by: PHYSICAL MEDICINE & REHABILITATION

## 2019-12-31 PROCEDURE — 770010 HCHG ROOM/CARE - REHAB SEMI PRIVAT*

## 2019-12-31 PROCEDURE — 94760 N-INVAS EAR/PLS OXIMETRY 1: CPT

## 2019-12-31 PROCEDURE — 99233 SBSQ HOSP IP/OBS HIGH 50: CPT | Performed by: PHYSICAL MEDICINE & REHABILITATION

## 2019-12-31 PROCEDURE — A9270 NON-COVERED ITEM OR SERVICE: HCPCS | Performed by: HOSPITALIST

## 2019-12-31 RX ADMIN — MIDODRINE HYDROCHLORIDE 15 MG: 10 TABLET ORAL at 11:17

## 2019-12-31 RX ADMIN — FERROUS SULFATE TAB 325 MG (65 MG ELEMENTAL FE) 325 MG: 325 (65 FE) TAB at 17:46

## 2019-12-31 RX ADMIN — MIDODRINE HYDROCHLORIDE 15 MG: 10 TABLET ORAL at 08:45

## 2019-12-31 RX ADMIN — MIDODRINE HYDROCHLORIDE 15 MG: 10 TABLET ORAL at 17:46

## 2019-12-31 RX ADMIN — SIMETHICONE CHEW TAB 80 MG 120 MG: 80 TABLET ORAL at 11:18

## 2019-12-31 RX ADMIN — ACETAMINOPHEN 650 MG: 325 TABLET, FILM COATED ORAL at 08:44

## 2019-12-31 RX ADMIN — OMEPRAZOLE 20 MG: 20 CAPSULE, DELAYED RELEASE ORAL at 08:45

## 2019-12-31 RX ADMIN — SENNOSIDES AND DOCUSATE SODIUM 2 TABLET: 8.6; 5 TABLET ORAL at 08:49

## 2019-12-31 RX ADMIN — BUDESONIDE AND FORMOTEROL FUMARATE DIHYDRATE 2 PUFF: 160; 4.5 AEROSOL RESPIRATORY (INHALATION) at 08:48

## 2019-12-31 RX ADMIN — FERROUS SULFATE TAB 325 MG (65 MG ELEMENTAL FE) 325 MG: 325 (65 FE) TAB at 08:45

## 2019-12-31 RX ADMIN — GLYCOPYRROLATE 1 CAPSULE: 15.6 CAPSULE RESPIRATORY (INHALATION) at 08:47

## 2019-12-31 RX ADMIN — SIMETHICONE CHEW TAB 80 MG 120 MG: 80 TABLET ORAL at 20:46

## 2019-12-31 RX ADMIN — OXYCODONE HYDROCHLORIDE AND ACETAMINOPHEN 500 MG: 500 TABLET ORAL at 08:45

## 2019-12-31 RX ADMIN — OXYCODONE HYDROCHLORIDE 5 MG: 5 TABLET ORAL at 02:00

## 2019-12-31 RX ADMIN — MONTELUKAST 10 MG: 10 TABLET, FILM COATED ORAL at 20:46

## 2019-12-31 RX ADMIN — APIXABAN 5 MG: 5 TABLET, FILM COATED ORAL at 20:46

## 2019-12-31 RX ADMIN — SENNOSIDES AND DOCUSATE SODIUM 2 TABLET: 8.6; 5 TABLET ORAL at 20:45

## 2019-12-31 RX ADMIN — SIMETHICONE CHEW TAB 80 MG 120 MG: 80 TABLET ORAL at 17:46

## 2019-12-31 RX ADMIN — LOVASTATIN 40 MG: 20 TABLET ORAL at 20:45

## 2019-12-31 RX ADMIN — APIXABAN 5 MG: 5 TABLET, FILM COATED ORAL at 08:45

## 2019-12-31 RX ADMIN — ACETAMINOPHEN 650 MG: 325 TABLET, FILM COATED ORAL at 14:08

## 2019-12-31 RX ADMIN — ACETAMINOPHEN 650 MG: 325 TABLET, FILM COATED ORAL at 20:46

## 2019-12-31 RX ADMIN — FUROSEMIDE 20 MG: 20 TABLET ORAL at 05:32

## 2019-12-31 RX ADMIN — VITAMIN D, TAB 1000IU (100/BT) 4000 UNITS: 25 TAB at 08:45

## 2019-12-31 RX ADMIN — SIMETHICONE CHEW TAB 80 MG 120 MG: 80 TABLET ORAL at 08:46

## 2019-12-31 ASSESSMENT — ENCOUNTER SYMPTOMS
NAUSEA: 0
DIARRHEA: 0
VOMITING: 0
ABDOMINAL PAIN: 0
SHORTNESS OF BREATH: 0
NERVOUS/ANXIOUS: 0
FEVER: 0
CHILLS: 0

## 2019-12-31 ASSESSMENT — PATIENT HEALTH QUESTIONNAIRE - PHQ9
SUM OF ALL RESPONSES TO PHQ9 QUESTIONS 1 AND 2: 0
2. FEELING DOWN, DEPRESSED, IRRITABLE, OR HOPELESS: NOT AT ALL
1. LITTLE INTEREST OR PLEASURE IN DOING THINGS: NOT AT ALL

## 2019-12-31 NOTE — DISCHARGE PLANNING
CASE MANAGEMENT INITIAL ASSESSMENT    Admit Date:  12/28/2019     I met with patient, her daughter, Mary and her son, David (here from [place]) to discuss role of case management / discharge planning / team conference.   Patient is a  79 y.o. female transferred from [facility]. She was inpatient at [facility] 12/12 to 12/14/19 and at [facility] from 12/14 to 12/28/2019.  Patient was admitted to [facility] on 12/28/2019.  The admitting physician is Dr. Salma Tejada.     Diagnosis: 10.9 Other Pulmonary  Pulmonary embolism ([facility])  Dislocation of right hip, initial encounter ([facility])    Co-morbidities:   Patient Active Problem List    Diagnosis Date Noted   • Acute pulmonary embolism with acute cor pulmonale ([facility]) 12/14/2019     Priority: High   • Acute respiratory failure ([facility]) 12/12/2019     Priority: High   • Ileus ([facility]) 11/25/2019     Priority: High   • Closed dislocation of right hip ([facility]) 12/14/2019     Priority: Medium   • COPD (chronic obstructive pulmonary disease) ([facility]) 11/25/2019     Priority: Medium   • NSTEMI (non-ST elevated myocardial infarction) ([facility]) 09/12/2017     Priority: Medium   • Abnormal stress test 12/16/2016     Priority: Medium   • HTN (hypertension) 06/25/2009     Priority: Medium   • Chronic use of opiate drugs therapeutic purposes 07/07/2017     Priority: Low   • Chronic bilateral low back pain without sciatica 07/07/2017     Priority: Low   • Daytime somnolence 12/16/2016     Priority: Low   • Insomnia 12/16/2016     Priority: Low   • Gastroesophageal reflux disease 12/16/2016     Priority: Low   • Vitamin D deficiency 12/16/2016     Priority: Low   • Chronic narcotic use 12/16/2016     Priority: Low   • Controlled substance agreement signed 12/16/2016     Priority: Low   • Chronic pain of both shoulders 12/16/2016     Priority: Low   • Chronic left-sided low back pain without sciatica 12/16/2016     Priority: Low   • Dyslipidemia 08/01/2016     Priority: Low   • Scalp itch 06/29/2016      Priority: Low   • Polypharmacy 06/29/2016     Priority: Low   • Dermatochalasis 07/14/2015     Priority: Low   • VAGINAL LESION 11/10/2009     Priority: Low   • Pelvic pain 11/10/2009     Priority: Low   • Osteopenia 06/25/2009     Priority: Low   • Dysuria 06/25/2009     Priority: Low   • Iron deficiency anemia 06/25/2009     Priority: Low   • Hip pain 06/25/2009     Priority: Low   • Elbow pain 06/25/2009     Priority: Low   • Pleural effusion 12/30/2019   • Hypotension 12/26/2019   • Pulmonary hypertension (HCC) 12/26/2019   • Hip dislocation, right (HCC) 12/12/2019   • Syncope 12/12/2019   • Lung nodule 11/28/2019   • Impaired mobility and ADLs 11/27/2019   • LFT elevation 11/25/2019   • Status post right hip replacement 11/25/2019   • Hyperglycemia 11/25/2019   • Impaired vision 05/28/2019   • 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 05/28/2019   • Neck pain on right side 02/27/2019   • Hip pain, chronic, right 04/12/2018   • Hearing difficulty of both ears 04/12/2018   • Preventative health care 10/30/2017     Prior Living Situation:  Housing / Facility: Ground level apartment with 1 step to get into home  Lives with - Patient's Self Care Capacity: Alone and Unable to Care For Self, Adult Children    Prior Level of Function:  Medication Management: Independent  Home Management: Requires Assist  Shopping: Requires Assist  Prior Level Of Mobility: Supervision With Device in Home  Driving / Transportation: Relatives / Others Provide Transportation    Support Systems:  Primary : Mary, Daughter, [phone]  Other support systems: David Pichardo, Son, [phone]       Previous Services Utilized:   Equipment Owned: Front-Wheel Walker, Wheelchair  Prior Services: Continuous (24 Hour) Care Giving Family     Other Information:  Occupation (Pre-Hospital Vocational): Retired Due To Age     Primary Payor Source: [employer]  Secondary Payor Source: Other (Comments)(Medicaid  FF[employer])  Primary Care Practitioner : Dr. Molina  Other MDs: Dr. Zimmerman, Pulmonary; Dr. Ferrer, General Surgery; Dr. Delgado, Orthopedic Surgery    Patient / Family Goal:  Patient / Family Goal: To go home. Wants portable concentrator or lighter form of portable oxygen than she currently has.  DC Disposition: To ground level apartment with daughter's support.  DC Needs: MD f/u appointments. Anticipate home health care. Additional DME needs TBD. Has a FWW, WC and oxygen concentrator plus portable.   Strengths: Motivated. Supportive family.    Additional Case Management Questions:  Have you ever received case management services for yourself or a family member? yes    Do you feel you have and an understanding of what services  provide? yes    Do you have any additional questions regarding case management?    No - wants lighter portable oxygen.       CASE MANAGEMENT PLAN OF CARE   Individualized Goals:   1. Improve functional status to return home with daughter's support  2. F/U therapies ordered  3. MD appointments in place    Barriers:   1. Functional deficits.  2. COPD - has home oxygen.     Plan:  1. Continue to follow patient through hospitalization and provide discharge planning in collaboration with patient, family, physicians and ancillary services.     2. Utilize community resources to ensure a safe discharge.

## 2019-12-31 NOTE — REHAB-PT IDT TEAM NOTE
Physical Therapy   Mobility  Bed mobility:   Mod A   Bed /Chair/Wheelchair Transfer Initial:  1 - Total Assistance  Bed /Chair/Wheelchair Transfer Current:  3 - Moderate Assistance   Bed/Chair/Wheelchair Transfer Description:  Increased time, Supervision for safety, Verbal cueing, Set-up of equipment, Initial preparation for task, Assist with two limbs  Walk Initial:  0 - Not tested, patient refused  Walk Current:  1 - Total Assistance   Walk Description:  Two hand rails, Requires wheelchair follow(5 ft x 2 with wc follow in // bars min A )  Wheelchair Initial:  2 - Max Assistance  Wheelchair Current:  2 - Max Assistance   Wheelchair Description:  Adaptive equipment, Extra time, Safety concerns, Supervision for safety, Verbal cueing, Requires incidental assist(x50 feet with BUE and min A for steering)  Stairs Initial:  0 - Not tested,unsafe activity  Stairs Current: 0 - Not tested,unsafe activity   Stairs Description:    Patient/Family Training/Education:  TBD  DME/DC Recommendations:  TBD   Strengths:  Alert and oriented, Pleasant and cooperative, Supportive family and Willingly participates in therapeutic activities  Barriers:   Decreased endurance, Generalized weakness, Limited mobility, Poor activity tolerance, Poor balance and Other: anxiety, visual deficits   # of short term goals set= 3  # of short term goals met= 0 (annal completed 12/29)  Physical Therapy Problems     Problem: Mobility     Dates: Start: 12/29/19       Description:     Goal: STG-Within one week, patient will propel wheelchair household distances     Dates: Start: 12/29/19       Description: 1) Individualized goal:  W/C mobility x 100 feet with BUE and SBA  2) Interventions:  PT Group Therapy, PT E Stim Attended, PT Gait Training, PT Therapeutic Exercises, PT Neuro Re-Ed/Balance, PT Aquatic Therapy, PT Therapeutic Activity, PT Manual Therapy and PT Evaluation       Note:     Goal Note filed on 12/31/19 0829 by Kala Hall, PT    Dustin completed  12/29                  Goal: STG-Within one week, patient will ambulate household distance     Dates: Start: 12/29/19       Description: 1) Individualized goal: AMB x 10 feet with FWW and min A  2) Interventions:  PT Group Therapy, PT E Stim Attended, PT Gait Training, PT Therapeutic Exercises, PT Neuro Re-Ed/Balance, PT Aquatic Therapy, PT Therapeutic Activity, PT Manual Therapy and PT Evaluation     Note:     Goal Note filed on 12/31/19 0829 by Kala Hall, PT    Eval completed 12/29                        Problem: Mobility Transfers     Dates: Start: 12/29/19       Description:     Goal: STG-Within one week, patient will transfer bed to chair     Dates: Start: 12/29/19       Description: 1) Individualized goal:  SPT with FWW and min A  2) Interventions:  PT Group Therapy, PT E Stim Attended, PT Gait Training, PT Therapeutic Exercises, PT Neuro Re-Ed/Balance, PT Aquatic Therapy, PT Therapeutic Activity, PT Manual Therapy and PT Evaluation       Note:     Goal Note filed on 12/31/19 0829 by Kala Hall, PT    Eval completed 12/29                        Problem: PT-Long Term Goals     Dates: Start: 12/29/19       Description:     Goal: LTG-By discharge, patient will ambulate     Dates: Start: 12/29/19       Description: 1) Individualized goal: AMB x 50 feet with FWW with SPV  2) Interventions:  PT Group Therapy, PT E Stim Attended, PT Gait Training, PT Therapeutic Exercises, PT Neuro Re-Ed/Balance, PT Aquatic Therapy, PT Therapeutic Activity, PT Manual Therapy and PT Evaluation             Goal: LTG-By discharge, patient will transfer one surface to another     Dates: Start: 12/29/19       Description: 1) Individualized goal:  SPT with FWW, mod I   2) Interventions:  PT Group Therapy, PT E Stim Attended, PT Gait Training, PT Therapeutic Exercises, PT Neuro Re-Ed/Balance, PT Aquatic Therapy, PT Therapeutic Activity, PT Manual Therapy and PT Evaluation             Goal: LTG-By discharge, patient will transfer in/out of  "a car     Dates: Start: 12/29/19       Description: 1) Individualized goal:  Car transfer with FWW and SPV  2) Interventions:  PT Group Therapy, PT E Stim Attended, PT Gait Training, PT Therapeutic Exercises, PT Neuro Re-Ed/Balance, PT Aquatic Therapy, PT Therapeutic Activity, PT Manual Therapy and PT Evaluation             Goal: LTG-By discharge, patient will     Dates: Start: 12/29/19       Description: 1) Individualized goal:  Up/down 6\" curb with FWW and CGA  2) Interventions:  PT Group Therapy, PT E Stim Attended, PT Gait Training, PT Therapeutic Exercises, PT Neuro Re-Ed/Balance, PT Aquatic Therapy, PT Therapeutic Activity, PT Manual Therapy and PT Evaluation                            Section completed by:  Kala Hall, PT, DPT     "

## 2019-12-31 NOTE — REHAB-RESPIRATORY IDT TEAM NOTE
Respiratory Therapy   Respiratory Therapy    Pt is using 4 lpm oxygen most of the time and 5 lpm with sleep and during activity.  This is her new home regimen.  Her SATS have been in the low 90s.    Section completed by:  Kate Lim, RRT

## 2019-12-31 NOTE — FLOWSHEET NOTE
12/31/19 0914   Events/Summary/Plan   Events/Summary/Plan 02 spot check   Interdisciplinary Plan of Care-Goals (Indications)   Bronchodilator Indications History / Diagnosis   Interdisciplinary Plan of Care-Outcomes    Bronchodilator Outcome Patient at Stable Baseline   Education   Education Yes - Pt. / Family has been Instructed in use of Respiratory Equipment   Respiratory WDL   Respiratory (WDL) X   Chest Exam   Work Of Breathing / Effort Accessory Muscle Use   Respiration 20   Pulse 74   Oximetry   #Pulse Oximetry (Single Determination) Yes   Oxygen   Home O2 Use Prior To Admission? Yes   Home O2 LPM Flow   (4-5 lpm)   Home O2 Delivery Method Nasal Cannula   Home O2 Frequency of Use Continuous   Pulse Oximetry 93 %   O2 (LPM) 4   O2 Daily Delivery Respiratory  Silicone Nasal Cannula

## 2019-12-31 NOTE — REHAB-CM IDT TEAM NOTE
Case Management    DC Planning  DC destination/dispostion:  To ground level apartment with daughter's support.    DC Needs: MD f/u appointments. Anticipate home health care. Additional DME needs TBD. Has a FWW, WC and oxygen concentrator plus portable.     Barriers to discharge:   Functional deficits. COPD - on oxygen. Dysphagia.    Strengths: Motivated. Supportive family.    Section completed by:  Irma Jose R.N.

## 2019-12-31 NOTE — DISCHARGE PLANNING
Met with patient following Team Conference to relay progress and anticipated discharge date of 1/14/2020. No questions at this time.

## 2019-12-31 NOTE — REHAB-PHARMACY IDT TEAM NOTE
Pharmacy   Pharmacy  Antibiotics/Antifungals/Antivirals:  Medication:      Active Orders (From admission, onward)    None        Route:         n/a  Stop Date:  n/a  Reason:   Antihypertensives/Cardiac:  Medication:    Orders (72h ago, onward)     Start     Ordered    12/29/19 0600  furosemide (LASIX) tablet 20 mg  Q DAY      12/28/19 1606    12/28/19 2100  lovastatin (MEVACOR) tablet 40 mg  EVERY BEDTIME      12/28/19 1606    12/28/19 1845  midodrine (PROAMATINE) tablet 15 mg  3 TIMES DAILY WITH MEALS      12/28/19 1606    12/28/19 1612  hydrALAZINE (APRESOLINE) tablet 25 mg  EVERY 8 HOURS PRN      12/28/19 1612              Patient Vitals for the past 24 hrs:   BP Pulse   12/30/19 1322 (!) 97/56 82   12/30/19 0900 100/58 --   12/30/19 0743 -- 89   12/30/19 0623 (!) 96/40 77   12/29/19 1825 100/58 65     Anticoagulation:  Medication:  Eliquis  INR:      Other key medications: furosemide, gabapentin, midodrine, montelukast, omeprazole   A review of the medication list reveals no issues at this time.  Section completed by:  Henrietta Caldwell RPH

## 2019-12-31 NOTE — THERAPY
Speech Language Pathology  Daily Treatment     Patient Name: Guillermina Recio  Age:  79 y.o., Sex:  female  Medical Record #: [id number]  Today's Date: 12/31/2019     Subjective    Pt was pleasant and cooperative during this ST session.       Objective       12/31/19 1131   SLP Total Time Spent   SLP Individual Total Time Spent (Mins) 30   Charge Group   SLP Swallowing Dysfunction Treatment Swallowing Dysfunction Treatment       Assessment    Pt consumed dysphagia 3 textures and a very small regular texture trial (2 cherry tomatoes).  Pt refused salad d/t difficulty chewing lettuce.  Pt tolerated all textures without any overt s/sx of aspiration/penetration.      Plan    Recommend pt remain on a dysphagia 3 diet d/t difficulty chewing regular textures (pt does not have dentures) and discharge from t-dine and ST at this time.

## 2019-12-31 NOTE — THERAPY
Speech Language Pathology  Daily Treatment     Patient Name: Guillermina Recio  Age:  79 y.o., Sex:  female  Medical Record #: [id number]  Today's Date: 12/31/2019     Subjective    Patient arrived on time to ST with assistance.      Objective       12/31/19 0802   Dysphagia    Diet / Liquid Recommendation Dysphagia III;Thin Liquid   Nutritional Liquid Intake Rating Scale 3   Nutritional Food Intake Rating Scale 6   Nursing Communication Swallow Precaution Sign Posted at Head of Bed   SLP Total Time Spent   SLP Individual Total Time Spent (Mins) 30   Charge Group   SLP Swallowing Dysfunction Treatment Swallowing Dysfunction Treatment         Assessment    Patient was assessed with regular diet textures. No overt s/sx of aspiration were noted. Follows safe swallow strategies independently.     Plan    Continue trials and upgrade as appropriate. Consider d/dc from speech once upgraded.

## 2019-12-31 NOTE — REHAB-SLP IDT TEAM NOTE
Speech Therapy   Cognitive Linquistic Functions  Comprehension Initial:  6 - Modified Independent  Comprehension Current:  6 - Modified Independent   Comprehension Description:  Glasses(legally blind)  Expression Initial:  6 - Modified Independent  Expression Current:  6 - Modified Independent   Expression Description:  Verbal cueing  Social Interaction Initial:  5 - Stand-by Prompting/Supervision or Set-up  Social Interaction Current:  5 - Stand-by Prompting/Supervision or Set-up   Social Interaction Description:  Increased time, Verbal cues  Problem Solving Initial:  5 - Standby Prompting/Supervision or Set-up  Problem Solving Current:  5 - Standby Prompting/Supervision or Set-up   Problem Solving Description:  Verbal cueing, Increased time  Memory Initial:  6 - Modified Independent  Memory Current:  5 - Standby Prompting/Supervision or Set-up   Memory Description:  Therapy schedule  Executive Functioning / Organization Initial:     Executive Functioning / Organization Current:      Executive Functioning Desciption:  TBA  Swallowing  Swallowing Status:  Dysphagia III/thin liquid diet. Patient describes dysphagia III textures as baseline diet d/t poor dentition. Likely d/c from t-dine after lunch today.   Orders Placed This Encounter   Procedures   • Diet Order Regular     Standing Status:   Standing     Number of Occurrences:   1     Order Specific Question:   Diet:     Answer:   Regular [1]     Order Specific Question:   Texture/Fiber modifications:     Answer:   Dysphagia 3(Mechanical Soft)specify fluid consistency(question 6) [3]     Order Specific Question:   Consistency/Fluid modifications:     Answer:   Thin Liquids [3]     Behavior Modification Plan  Keep instructions simple/concrete, Give clear feedback, Set clear goals and Redirect to task/topic  Assistive Technology  Low/Impaired vision equipment and Low tech: Calendar, planner, schedule, alarms/timers, pill organizer, post-it notes, lists  Family  Training/Education:  Not completed  DC Recommendations:   Further ST is not indicated at this time. D/C speech therapy.   Strengths:  Able to follow instructions and Making steady progress towards goals  Barriers:  Other: motivation  # of short term goals set=2  # of short term goals met=2  Speech Therapy Problems     Problem: Speech/Swallowing LTGs     Dates: Start: 12/29/19       Description:     Goal: LTG-By discharge, patient will safely swallow     Dates: Start: 12/29/19       Description: 1) Individualized goal:  Regular textures and thin liquids without aspiration.    2) Interventions:  SLP Swallowing Dysfunction Treatment, SLP Oral Pharyngeal Evaluation and SLP Group Treatment                   Problem: Swallowing STGs     Dates: Start: 12/31/19       Description:     Goal: STG-Within one week, patient will safely swallow     Dates: Start: 12/31/19       Description: 1) Individualized goal:  Dysphagia III and thin liquids without overt s/sx of aspiration   2) Interventions:  SLP Swallowing Dysfunction Treatment, SLP Oral Pharyngeal Evaluation, SLP Video Swallow Evaluation, SLP Self Care / ADL Training  and SLP Group Treatment                          Section completed by:  Inez Wells MS,CCC-SLP

## 2019-12-31 NOTE — PROGRESS NOTES
"Rehab Progress Note     Date of Service: 12/31/2019  Chief Complaint: hip pain    Interval Events (Subjective)    Patient seen and examined in her bed today.  She is resting in between therapies.  She currently is denying any pain.  Staff reports she just has incredible anxiety and fear of falling which is limiting her mobility the most.  Reports she is sleeping okay.  She has no new complaints.    Objective:  VITAL SIGNS: BP (!) 92/56   Pulse 74   Temp 36.6 °C (97.9 °F) (Oral)   Resp 20   Ht 1.494 m (4' 10.8\")   Wt 65.1 kg (143 lb 8 oz)   SpO2 93%   BMI 29.18 kg/m²   Gen: alert, no apparent distress  CV: regular rate and rhythm, no murmurs, no peripheral edema  Resp: clear to ascultation bilaterally, normal respiratory effort, on oxygen  GI: soft, non-tender abdomen, bowel sounds present  Neuro: notable for chronic visual impairments      Recent Results (from the past 72 hour(s))   Comp Metabolic Panel (CMP)    Collection Time: 12/29/19  5:34 AM   Result Value Ref Range    Sodium 141 135 - 145 mmol/L    Potassium 4.4 3.6 - 5.5 mmol/L    Chloride 102 96 - 112 mmol/L    Co2 32 20 - 33 mmol/L    Anion Gap 7.0 0.0 - 11.9    Glucose 95 65 - 99 mg/dL    Bun 16 8 - 22 mg/dL    Creatinine 0.72 0.50 - 1.40 mg/dL    Calcium 8.9 8.5 - 10.5 mg/dL    AST(SGOT) 25 12 - 45 U/L    ALT(SGPT) 15 2 - 50 U/L    Alkaline Phosphatase 69 30 - 99 U/L    Total Bilirubin 0.5 0.1 - 1.5 mg/dL    Albumin 3.1 (L) 3.2 - 4.9 g/dL    Total Protein 6.0 6.0 - 8.2 g/dL    Globulin 2.9 1.9 - 3.5 g/dL    A-G Ratio 1.1 g/dL   CBC with Differential    Collection Time: 12/29/19  5:34 AM   Result Value Ref Range    WBC 6.0 4.8 - 10.8 K/uL    RBC 3.78 (L) 4.20 - 5.40 M/uL    Hemoglobin 9.4 (L) 12.0 - 16.0 g/dL    Hematocrit 32.7 (L) 37.0 - 47.0 %    MCV 86.5 81.4 - 97.8 fL    MCH 24.9 (L) 27.0 - 33.0 pg    MCHC 28.7 (L) 33.6 - 35.0 g/dL    RDW 57.7 (H) 35.9 - 50.0 fL    Platelet Count 443 164 - 446 K/uL    MPV 8.4 (L) 9.0 - 12.9 fL    " Neutrophils-Polys 58.20 44.00 - 72.00 %    Lymphocytes 28.00 22.00 - 41.00 %    Monocytes 11.90 0.00 - 13.40 %    Eosinophils 0.20 0.00 - 6.90 %    Basophils 0.50 0.00 - 1.80 %    Immature Granulocytes 1.20 (H) 0.00 - 0.90 %    Nucleated RBC 0.00 /100 WBC    Neutrophils (Absolute) 3.49 2.00 - 7.15 K/uL    Lymphs (Absolute) 1.68 1.00 - 4.80 K/uL    Monos (Absolute) 0.71 0.00 - 0.85 K/uL    Eos (Absolute) 0.01 0.00 - 0.51 K/uL    Baso (Absolute) 0.03 0.00 - 0.12 K/uL    Immature Granulocytes (abs) 0.07 0.00 - 0.11 K/uL    NRBC (Absolute) 0.00 K/uL    Hypochromia 1+     Anisocytosis 2+     Microcytosis 2+    HEMOGLOBIN A1C    Collection Time: 12/29/19  5:34 AM   Result Value Ref Range    Glycohemoglobin 5.7 (H) 0.0 - 5.6 %    Est Avg Glucose 117 mg/dL   TSH with Reflex to FT4    Collection Time: 12/29/19  5:34 AM   Result Value Ref Range    TSH 3.730 0.380 - 5.330 uIU/mL   Vitamin D, 25-hydroxy (blood)    Collection Time: 12/29/19  5:34 AM   Result Value Ref Range    25-Hydroxy   Vitamin D 25 20 (L) 30 - 100 ng/mL   ESTIMATED GFR    Collection Time: 12/29/19  5:34 AM   Result Value Ref Range    GFR If African American >60 >60 mL/min/1.73 m 2    GFR If Non African American >60 >60 mL/min/1.73 m 2   PERIPHERAL SMEAR REVIEW    Collection Time: 12/29/19  5:34 AM   Result Value Ref Range    Peripheral Smear Review see below    PLATELET ESTIMATE    Collection Time: 12/29/19  5:34 AM   Result Value Ref Range    Plt Estimation Normal    MORPHOLOGY    Collection Time: 12/29/19  5:34 AM   Result Value Ref Range    RBC Morphology Present     Polychromia 2+     Poikilocytosis 1+     Ovalocytes 1+    DIFFERENTIAL COMMENT    Collection Time: 12/29/19  5:34 AM   Result Value Ref Range    Comments-Diff see below    proBrain Natriuretic Peptide, NT    Collection Time: 12/30/19  6:43 AM   Result Value Ref Range    NT-proBNP 3720 (H) 0 - 125 pg/mL       Current Facility-Administered Medications   Medication Frequency   • glycopyrrolate  (Seebri) 15.6 mcg inhalation capsule 1 Cap BID   • acetaminophen (TYLENOL) tablet 650 mg TID   • vitamin D (cholecalciferol) 1000 UNIT tablet 4,000 Units DAILY   • montelukast (SINGULAIR) tablet 10 mg Nightly   • omeprazole (PRILOSEC) capsule 20 mg DAILY   • simethicone (MYLICON) chewable tab 120 mg 4X/DAY WITH MEALS + NIGHTLY   • apixaban (ELIQUIS) tablet 5 mg BID   • ascorbic acid tablet 500 mg DAILY   • budesonide-formoterol (SYMBICORT) 160-4.5 MCG/ACT inhaler 2 Puff BID   • calcium carbonate (TUMS) chewable tab 500 mg QDAY PRN   • ferrous sulfate tablet 325 mg BID WITH MEALS   • furosemide (LASIX) tablet 20 mg Q DAY   • ipratropium-albuterol (DUONEB) nebulizer solution Q4H PRN (RT)   • lovastatin (MEVACOR) tablet 40 mg QHS   • midodrine (PROAMATINE) tablet 15 mg TID WITH MEALS   • Respiratory Care per Protocol Continuous RT   • Pharmacy Consult Request ...Pain Management Review 1 Each PHARMACY TO DOSE   • oxyCODONE immediate-release (ROXICODONE) tablet 2.5 mg Q3HRS PRN   • oxyCODONE immediate-release (ROXICODONE) tablet 5 mg Q3HRS PRN   • tramadol (ULTRAM) 50 MG tablet 50 mg Q4HRS PRN   • hydrALAZINE (APRESOLINE) tablet 25 mg Q8HRS PRN   • senna-docusate (PERICOLACE or SENOKOT S) 8.6-50 MG per tablet 2 Tab BID    And   • polyethylene glycol/lytes (MIRALAX) PACKET 1 Packet QDAY PRN    And   • magnesium hydroxide (MILK OF MAGNESIA) suspension 30 mL QDAY PRN    And   • bisacodyl (DULCOLAX) suppository 10 mg QDAY PRN   • artificial tears ophthalmic solution 1 Drop PRN   • benzocaine-menthol (CEPACOL) lozenge 1 Lozenge Q2HRS PRN   • mag hydrox-al hydrox-simeth (MAALOX PLUS ES or MYLANTA DS) suspension 20 mL Q2HRS PRN   • ondansetron (ZOFRAN ODT) dispertab 4 mg 4X/DAY PRN    Or   • ondansetron (ZOFRAN) syringe/vial injection 4 mg 4X/DAY PRN   • traZODone (DESYREL) tablet 50 mg QHS PRN   • sodium chloride (OCEAN) 0.65 % nasal spray 2 Spray PRN       Orders Placed This Encounter   Procedures   • Diet Order Regular      Standing Status:   Standing     Number of Occurrences:   1     Order Specific Question:   Diet:     Answer:   Regular [1]     Order Specific Question:   Texture/Fiber modifications:     Answer:   Dysphagia 3(Mechanical Soft)specify fluid consistency(question 6) [3]     Order Specific Question:   Consistency/Fluid modifications:     Answer:   Thin Liquids [3]       Assessment:  Active Hospital Problems    Diagnosis   • *Acute pulmonary embolism with acute cor pulmonale (HCC)   • Acute respiratory failure (HCC)   • Closed dislocation of right hip (HCC)   • COPD (chronic obstructive pulmonary disease) (HCC)   • Gastroesophageal reflux disease   • Vitamin D deficiency   • Dyslipidemia   • Iron deficiency anemia   • Pleural effusion   • Hypotension   • Status post right hip replacement     79 yr old female with history of recent right JAIME, admitted to acute inpatient rehab on 12/28 with debility and respiratory insufficiency with PEs.    I led and attended the weekly conference today, and agree with the IDT conference documentation and plan of care as noted below.    Date of conference: 12/31/2019    Goals and barriers: See IDT note.    Biggest barriers: anxiety, fear of falling, visual impairments, decreased endurance    Goals in next week: improved tolerance, decreased anxiety    Therapy update 12/31/2019  Supervision eating  Supervision grooming  Min assist bathing  Supervision upper body dressing  Mod assist lower body dressing  Max assist toileting  Max assistbladder  Mod assist bowel  Min assist bed/chair transfer  Mod assist toilet transfer  Mod assist tub/shower transfer  Total assist ambulation  Max assist wheelchair propulsion  Not tested stairs  Modified Independent comprehension  Modified Independent expression  Supervision social interaction  Supervision problem solving  Supervision memory    CM/social support: daughter supportive, will care for her at discharge    Anticipated DC date: 1/14/2019    Home  health: PT/OT/SLP/RN    Equip: none needed    Follow up: PCP, surgery     Medical Decision Making and Plan:    Recent right JAIME, dislocation  S/p relocation 12/19  Continue full rehab program  PT/OT, 1.5 hr each discipline, 5 days per week  Outpatient follow up with orthopedics    Dysphagia, resolved    Pain Management  Schedule tylenol  PRN oxycodone    Bowel  Meds as needed  Last BM 12/29    Bladder  Check PVRs - 62  ICP for over 400 cc  Scheduled toileting    DVT prophylaxis  Eliquis    Appreciate the assistance of the hospitalist with her medical co-morbidities:    Acute bilateral PE  COPD  Pleural effusion  Hypotension  GERD  Iron def anemia    Total time:  40 minutes.  I spent greater than 50% of the time for patient care, counseling, and coordination on this date, including patient face-to face time, unit/floor time with review of records/pertinent lab data and studies, as well as discussing diagnostic evaluation/work up, planned therapeutic interventions, and future disposition of care, as per the interval events/subjective and the assessment and plan as noted above.      Salma Tejada M.D.   Physical Medicine and Rehabilitation

## 2019-12-31 NOTE — THERAPY
"Occupational Therapy  Daily Treatment     Patient Name: Guillermina Recio  Age:  79 y.o., Sex:  female  Medical Record #: [id number]  Today's Date: 12/31/2019     Precautions  Precautions: Posterior Hip Precautions, Weight Bearing As Tolerated Right Lower Extremity, Fall Risk  Comments: R hip WBAT, post hip precautions         Subjective    \"Its so hard to stand up straight\"     Objective       12/31/19 1401   Precautions   Precautions Posterior Hip Precautions;Weight Bearing As Tolerated Right Lower Extremity;Fall Risk   Comments R hip WBAT, post hip precautions   Sitting Upper Body Exercises   Sitting Upper Body Exercises Yes   Upper Extremity Bike Level 3 Resistance  (motomed 7 min clockwise, 7 min counterclockwise)   Sitting Lower Body Exercises   Sitting Lower Body Exercises Yes   Sit to Stand 1 set of 15  (mat to FWW, min A to lift)   OT Total Time Spent   OT Individual Total Time Spent (Mins) 60   OT Charge Group   Charges Yes   OT Therapy Activity 4     Min A stand pivot w/c <> mat with FWW    Assessment    Pt tolerated session well, continues to be limited by decreased strength/endurance, anxiety improved this session but still present. O2 stable on 4L with activity.     Plan    Integration of hip precautions into ADLs, standing tolerance/balance, transfers    "

## 2019-12-31 NOTE — THERAPY
Physical Therapy   Daily Treatment     Patient Name: Guillermina Recio  Age:  79 y.o., Sex:  female  Medical Record #: [id number]  Today's Date: 12/31/2019     Precautions  Precautions: (P) Posterior Hip Precautions, Weight Bearing As Tolerated Right Lower Extremity, Fall Risk  Comments: (P) R hip WBAT, post hip precautions    Subjective    Pt was sitting in w/c upon arrival ; agreeable to tx. Asked to use the restroom.     Objective       12/31/19 0831   Precautions   Precautions Posterior Hip Precautions;Weight Bearing As Tolerated Right Lower Extremity;Fall Risk   Comments R hip WBAT, post hip precautions   Vitals   O2 (LPM) 6   O2 Delivery Nasal Cannula   Vitals Comments SpO2 88 post bed mobility, recovers to 93% in <1 min   Interdisciplinary Plan of Care Collaboration   IDT Collaboration with  Respiratory Therapist;Nursing   Patient Position at End of Therapy Seated;Call Light within Reach;Tray Table within Reach;Phone within Reach   Collaboration Comments re: O2 stats with activity, level of O2; med pass/ CLOF   PT Total Time Spent   PT Individual Total Time Spent (Mins) 60   PT Charge Group   PT Therapeutic Activities 4       FIM Bed/Chair/Wheelchair Transfers Score: 4 - Minimal Assistance  Bed/Chair/Wheelchair Transfers Description:  (bed mob: CGA with leg : transfer: SPT with FWW Maryan from w/c, CGA from standard bed height)    Bed mobility completed x 4 from standard bed with O2 monitored, use of bed rail (1x with no leg  requiring modA for B LE support and 3 x with leg  progressing to CGA); repeated to hospital bed with CGA for R LE management with use of leg      SPT with FWW x 4 with min-modA from w/c and CGA from standard bed height  Provided leg  for in room performance    Toilet transfer:Maryan with grab bar, modA for hygiene    Assessment    Pt limited by endurance, respiratory capacity and R hip pain. Improved performance with use of leg  and good  motivation    Plan    Progress activity tolerance, weight acceptance/ pre-gait, HEP, bed mobility, and transfers.

## 2019-12-31 NOTE — REHAB-NURSING IDT TEAM NOTE
Nursing   Nursing  Diet/Nutrition:  Regular, dysphagia III  Medication Administration:  Float Whole with Puree  % consumed at meals in last 24 hours:  Consumed 25-50% of meals per documentation.  Meal/Snack Consumption for the past 24 hrs:   Oral Nutrition   12/30/19 1300 Lunch;Between 25-50% Consumed   12/30/19 0900 Breakfast;Between 25-50% Consumed     Snack schedule:  None  Supplement:  Other: none  Appetite:  Poor  Fluid Intake/Output in past 24 hours: In: 450 [P.O.:450]  Out: -   Admit Weight:  Weight: 65.1 kg (143 lb 8 oz)  Weight Last 7 Days   [65.1 kg (143 lb 8 oz)] 65.1 kg (143 lb 8 oz) (12/28 1600)    Pain Issues:    Location:  --  --         Severity:  Moderate   Scheduled pain medications:  None     PRN pain medications used in last 24 hours:  oxycodone immediate release (ROXICODONE)    Non Pharmacologic Interventions:  cold pack, heat, relaxation, repositioned and rest  Effectiveness of pain management plan:  good=patient states acceptable comfort after interventions    Bowel:    Bowel Assist Initial Score:  1 - Total Assistance  Bowel Assist Current Score:  3 - Moderate Assistance  Bowl Accidents in last 7 days:     Last bowel movement: 12/29/19  Stool Description: Medium, Incontinence, Loose     Usual bowel pattern:  every other day  Scheduled bowel medications:  senna-docusate (PERICOLACE or SENOKOT S)   PRN bowel medications used in last 24 hours:  None  Nursing Interventions:  Increased time, Scheduled medication, Positioning on commode/toilet, Set-up, Verbal cueing  Effectiveness of bowel program:   fair=occasional unpredictable, incontinent emptying of bowel (less than 1 time day)  Bladder:    Bladder Assist Initial Score:  1 - Total Assistance  Bladder Assist Current Score:  2 - Max Assistance  Bladder Accidents in last 7 days:     PVR range for past 24-48 hours:  [62]  ()  Intermittent Catheterization:  na  Medications affecting bladder:  None    Time void schedule/voiding pattern:  Voiding  every 2-4 hours  Interventions:  Increased time, Verbal cueing, Brief  Effectiveness of bladder training:  Good=regular, predictable, emptying of bladder, patient initiates time voiding    Kimbrough:    Type:     Patient Lines/Drains/Airways Status    Active Catheter     None              Date placed:          Justification:    Diabetes:  Blood Sugar Frequency:  None    Range of BS for last 48 hours:       Scheduled diabetic medications:  None  Sliding scale usage in past 24 hours:  No  Total Short acting insulin in the past 24 hours:  None  Total Long acting insulin in the past 24 hours:  None    Wound:         Patient Lines/Drains/Airways Status    Active Current Wounds     R- hip incision                    Interventions:  Q shift assessment, PRN site care,  PRN dressing change   Effectiveness of intervention:  wound is improving     Wound Vac Location:  Not applicable  Dressing last changed:  Not applicable  Pump Mode Pressure Setting       Description of drainage:  Not applicable    Sleep/wake cycle:   Average hours slept:  Sleeps 3-4 hours without waking  Sleep medication usage:  None    Patient/Family Training/Education:  Bowel Management/Training, Diet/Nutrition, Fall Prevention, General Self Care, Safety and Wound Care  Discharge Supply Recommendations:  Wound Care Supplies  Strengths: Alert and oriented, Able to follow instructions, Pleasant and cooperative, Effective communication skills, Good carryover of learning and Motivated for self care and independence   Barriers:   Fatigue, Generalized weakness, Limited mobility and Poor sleep pattern       Nursing Problems     Problem: Bowel/Gastric:     Description:     Goal: Normal bowel function is maintained or improved     Description:           Goal: Will not experience complications related to bowel motility     Description:                 Problem: Communication     Description:     Goal: The ability to communicate needs accurately and effectively will  improve     Description:                 Problem: Discharge Barriers/Planning     Description:     Goal: Patient's continuum of care needs will be met     Description:                 Problem: Infection     Description:     Goal: Will remain free from infection     Description:                 Problem: Knowledge Deficit     Description:     Goal: Knowledge of disease process/condition, treatment plan, diagnostic tests, and medications will improve     Description:           Goal: Knowledge of the prescribed therapeutic regimen will improve     Description:                 Problem: Pain Management     Description:     Goal: Pain level will decrease to patient's comfort goal     Description:                 Problem: Respiratory:     Description:     Goal: Respiratory status will improve     Description:                 Problem: Safety     Description:     Goal: Will remain free from injury     Description:           Goal: Will remain free from falls     Description:                 Problem: Skin Integrity     Description:     Goal: Risk for impaired skin integrity will decrease     Description:                 Problem: Venous Thromboembolism (VTW)/Deep Vein Thrombosis (DVT) Prevention:     Description:     Goal: Patient will participate in Venous Thrombosis (VTE)/Deep Vein Thrombosis (DVT)Prevention Measures     Description:                        Long Term Goals:   At discharge patient will be able to function safely at home and in the community with support.    Section completed by:  Tal Basilio R.N.

## 2019-12-31 NOTE — PROGRESS NOTES
[facility] Medicine Daily Progress Note    Date of Service  12/31/2019    Chief Complaint:  Hypotension  Fluid overload  PE    Interval History:  No significant events or changes since last visit    Review of Systems  Review of Systems   Constitutional: Negative for chills and fever.   Respiratory: Negative for shortness of breath.    Cardiovascular: Negative for chest pain.   Gastrointestinal: Negative for abdominal pain, diarrhea, nausea and vomiting.   Psychiatric/Behavioral: The patient is not nervous/anxious.         Physical Exam  Temp:  [36.6 °C (97.9 °F)-37.1 °C (98.8 °F)] 36.6 °C (97.9 °F)  Pulse:  [68-82] 74  Resp:  [18-20] 20  BP: (87-97)/(48-56) 92/56  SpO2:  [92 %-96 %] 93 %    Physical Exam  Vitals signs and nursing note reviewed.   Constitutional:       Appearance: Normal appearance.   HENT:      Head: Atraumatic.   Eyes:      Conjunctiva/sclera: Conjunctivae normal.      Pupils: Pupils are equal, round, and reactive to light.   Neck:      Musculoskeletal: Normal range of motion and neck supple.   Cardiovascular:      Rate and Rhythm: Normal rate and regular rhythm.      Heart sounds: No murmur.   Pulmonary:      Effort: Pulmonary effort is normal.      Breath sounds: No wheezing or rales.      Comments: Has diminished breath sounds  Abdominal:      General: There is no distension.      Palpations: Abdomen is soft.      Tenderness: There is no tenderness.   Musculoskeletal:      Right lower leg: No edema.      Left lower leg: No edema.   Skin:     General: Skin is warm and dry.      Findings: No rash.   Neurological:      Mental Status: She is alert and oriented to person, place, and time.   Psychiatric:         Mood and Affect: Mood normal.         Behavior: Behavior normal.         Fluids    Intake/Output Summary (Last 24 hours) at 12/31/2019 1104  Last data filed at 12/31/2019 0800  Gross per 24 hour   Intake 390 ml   Output --   Net 390 ml       Laboratory  Recent Labs     12/29/19  0534   WBC 6.0    RBC 3.78*   HEMOGLOBIN 9.4*   HEMATOCRIT 32.7*   MCV 86.5   MCH 24.9*   MCHC 28.7*   RDW 57.7*   PLATELETCT 443   MPV 8.4*     Recent Labs     12/29/19  0534   SODIUM 141   POTASSIUM 4.4   CHLORIDE 102   CO2 32   GLUCOSE 95   BUN 16   CREATININE 0.72   CALCIUM 8.9                 Assessment/Plan  * Acute pulmonary embolism with acute cor pulmonale (HCC)- (present on admission)  Assessment & Plan  CTA (12/14): shows B?L PE with right heart strain  Echo: EF 65-70% with mod PHTN  On Eliquis  Note: elevated Trop and BNP most likely 2nd to increased right heart pressures from PE  Monitor    Closed dislocation of right hip (HCC)- (present on admission)  Assessment & Plan  2nd to syncopy with GLF  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)    COPD (chronic obstructive pulmonary disease) (HCC)- (present on admission)  Assessment & Plan  On Symbicort  Seebri and Singulair added to optimize management  Monitor for acute exacerbation  RT protocol    Pleural effusion  Assessment & Plan  CXR shows small B/L pleural effusions  On Lasix: 20 mg daily  Note: not on any K+ supplements  Monitor K+ levels: 4.4 (12/29)     Hypotension- (present on admission)  Assessment & Plan  SBP   Cortisol: 17.3 (12/19)  ? 2nd to Lasix  Recent PE may be a component  On Midodrine: 15 mg tid  Will consider d/c Lasix  Cont to monitor    Vitamin D deficiency- (present on admission)  Assessment & Plan  Vit D level 20  On supplementation    Gastroesophageal reflux disease- (present on admission)  Assessment & Plan  Has hx of hiatal hernia  On Prilosec  On Simethicone for chronic issues w/ gas    Dyslipidemia- (present on admission)  Assessment & Plan  On Mevacor    Iron deficiency anemia- (present on admission)  Assessment & Plan  H&H stable  On Fe & Vit C supplements    DNR

## 2019-12-31 NOTE — PROGRESS NOTES
"Rehab Progress Note     Date of Service: 12/30/2019  Chief Complaint: hip pain    Interval Events (Subjective)    Patient seen and examined in her hospital bed today. She is known to me from her previous admission. She reports when she first feel she had severe pain in her hip. This has improved since it was re-located, but still bothers her intermittently. She wants to know why her blood pressure is dropping. She is on oxygen, but denies being short of breath. Because of her hospitalization, she missed her family's [employer] dinner, and would like to do it here this weekend. She has no other complaints.    Objective:  VITAL SIGNS: BP (!) 97/56   Pulse 82   Temp 37.1 °C (98.8 °F) (Oral)   Resp 20   Ht 1.494 m (4' 10.8\")   Wt 65.1 kg (143 lb 8 oz)   SpO2 96%   BMI 29.18 kg/m²   Gen: alert, no apparent distress  CV: regular rate and rhythm, no murmurs, mild 1_ bilateral shin peripheral edema  Resp: clear to ascultation bilaterally, normal respiratory effort  GI: soft, non-tender abdomen, bowel sounds present  Neuro: notable for chronic visual impairment      Recent Results (from the past 72 hour(s))   BASIC METABOLIC PANEL    Collection Time: 12/28/19  2:44 AM   Result Value Ref Range    Sodium 141 135 - 145 mmol/L    Potassium 4.4 3.6 - 5.5 mmol/L    Chloride 100 96 - 112 mmol/L    Co2 33 20 - 33 mmol/L    Glucose 102 (H) 65 - 99 mg/dL    Bun 16 8 - 22 mg/dL    Creatinine 0.76 0.50 - 1.40 mg/dL    Calcium 8.7 8.5 - 10.5 mg/dL    Anion Gap 8.0 0.0 - 11.9   CBC WITH DIFFERENTIAL    Collection Time: 12/28/19  2:44 AM   Result Value Ref Range    WBC 6.7 4.8 - 10.8 K/uL    RBC 3.66 (L) 4.20 - 5.40 M/uL    Hemoglobin 9.3 (L) 12.0 - 16.0 g/dL    Hematocrit 31.0 (L) 37.0 - 47.0 %    MCV 84.7 81.4 - 97.8 fL    MCH 25.4 (L) 27.0 - 33.0 pg    MCHC 30.0 (L) 33.6 - 35.0 g/dL    RDW 56.1 (H) 35.9 - 50.0 fL    Platelet Count 420 164 - 446 K/uL    MPV 8.4 (L) 9.0 - 12.9 fL    Neutrophils-Polys 63.80 44.00 - 72.00 %    " Lymphocytes 23.70 22.00 - 41.00 %    Monocytes 10.60 0.00 - 13.40 %    Eosinophils 0.30 0.00 - 6.90 %    Basophils 0.40 0.00 - 1.80 %    Immature Granulocytes 1.20 (H) 0.00 - 0.90 %    Nucleated RBC 0.00 /100 WBC    Neutrophils (Absolute) 4.25 2.00 - 7.15 K/uL    Lymphs (Absolute) 1.58 1.00 - 4.80 K/uL    Monos (Absolute) 0.71 0.00 - 0.85 K/uL    Eos (Absolute) 0.02 0.00 - 0.51 K/uL    Baso (Absolute) 0.03 0.00 - 0.12 K/uL    Immature Granulocytes (abs) 0.08 0.00 - 0.11 K/uL    NRBC (Absolute) 0.00 K/uL   VITAMIN B12    Collection Time: 12/28/19  2:44 AM   Result Value Ref Range    Vitamin B12 -True Cobalamin 706 211 - 911 pg/mL   IRON/TOTAL IRON BIND    Collection Time: 12/28/19  2:44 AM   Result Value Ref Range    Iron 16 (L) 40 - 170 ug/dL    Total Iron Binding 214 (L) 250 - 450 ug/dL    % Saturation 7 (L) 15 - 55 %   FERRITIN    Collection Time: 12/28/19  2:44 AM   Result Value Ref Range    Ferritin 640.8 (H) 10.0 - 291.0 ng/mL   FOLATE    Collection Time: 12/28/19  2:44 AM   Result Value Ref Range    Folate -Folic Acid >24.0 >4.0 ng/mL   MAGNESIUM    Collection Time: 12/28/19  2:44 AM   Result Value Ref Range    Magnesium 2.3 1.5 - 2.5 mg/dL   ESTIMATED GFR    Collection Time: 12/28/19  2:44 AM   Result Value Ref Range    GFR If African American >60 >60 mL/min/1.73 m 2    GFR If Non African American >60 >60 mL/min/1.73 m 2   Comp Metabolic Panel (CMP)    Collection Time: 12/29/19  5:34 AM   Result Value Ref Range    Sodium 141 135 - 145 mmol/L    Potassium 4.4 3.6 - 5.5 mmol/L    Chloride 102 96 - 112 mmol/L    Co2 32 20 - 33 mmol/L    Anion Gap 7.0 0.0 - 11.9    Glucose 95 65 - 99 mg/dL    Bun 16 8 - 22 mg/dL    Creatinine 0.72 0.50 - 1.40 mg/dL    Calcium 8.9 8.5 - 10.5 mg/dL    AST(SGOT) 25 12 - 45 U/L    ALT(SGPT) 15 2 - 50 U/L    Alkaline Phosphatase 69 30 - 99 U/L    Total Bilirubin 0.5 0.1 - 1.5 mg/dL    Albumin 3.1 (L) 3.2 - 4.9 g/dL    Total Protein 6.0 6.0 - 8.2 g/dL    Globulin 2.9 1.9 - 3.5 g/dL     A-G Ratio 1.1 g/dL   CBC with Differential    Collection Time: 12/29/19  5:34 AM   Result Value Ref Range    WBC 6.0 4.8 - 10.8 K/uL    RBC 3.78 (L) 4.20 - 5.40 M/uL    Hemoglobin 9.4 (L) 12.0 - 16.0 g/dL    Hematocrit 32.7 (L) 37.0 - 47.0 %    MCV 86.5 81.4 - 97.8 fL    MCH 24.9 (L) 27.0 - 33.0 pg    MCHC 28.7 (L) 33.6 - 35.0 g/dL    RDW 57.7 (H) 35.9 - 50.0 fL    Platelet Count 443 164 - 446 K/uL    MPV 8.4 (L) 9.0 - 12.9 fL    Neutrophils-Polys 58.20 44.00 - 72.00 %    Lymphocytes 28.00 22.00 - 41.00 %    Monocytes 11.90 0.00 - 13.40 %    Eosinophils 0.20 0.00 - 6.90 %    Basophils 0.50 0.00 - 1.80 %    Immature Granulocytes 1.20 (H) 0.00 - 0.90 %    Nucleated RBC 0.00 /100 WBC    Neutrophils (Absolute) 3.49 2.00 - 7.15 K/uL    Lymphs (Absolute) 1.68 1.00 - 4.80 K/uL    Monos (Absolute) 0.71 0.00 - 0.85 K/uL    Eos (Absolute) 0.01 0.00 - 0.51 K/uL    Baso (Absolute) 0.03 0.00 - 0.12 K/uL    Immature Granulocytes (abs) 0.07 0.00 - 0.11 K/uL    NRBC (Absolute) 0.00 K/uL    Hypochromia 1+     Anisocytosis 2+     Microcytosis 2+    HEMOGLOBIN A1C    Collection Time: 12/29/19  5:34 AM   Result Value Ref Range    Glycohemoglobin 5.7 (H) 0.0 - 5.6 %    Est Avg Glucose 117 mg/dL   TSH with Reflex to FT4    Collection Time: 12/29/19  5:34 AM   Result Value Ref Range    TSH 3.730 0.380 - 5.330 uIU/mL   Vitamin D, 25-hydroxy (blood)    Collection Time: 12/29/19  5:34 AM   Result Value Ref Range    25-Hydroxy   Vitamin D 25 20 (L) 30 - 100 ng/mL   ESTIMATED GFR    Collection Time: 12/29/19  5:34 AM   Result Value Ref Range    GFR If African American >60 >60 mL/min/1.73 m 2    GFR If Non African American >60 >60 mL/min/1.73 m 2   PERIPHERAL SMEAR REVIEW    Collection Time: 12/29/19  5:34 AM   Result Value Ref Range    Peripheral Smear Review see below    PLATELET ESTIMATE    Collection Time: 12/29/19  5:34 AM   Result Value Ref Range    Plt Estimation Normal    MORPHOLOGY    Collection Time: 12/29/19  5:34 AM   Result Value  Ref Range    RBC Morphology Present     Polychromia 2+     Poikilocytosis 1+     Ovalocytes 1+    DIFFERENTIAL COMMENT    Collection Time: 12/29/19  5:34 AM   Result Value Ref Range    Comments-Diff see below    proBrain Natriuretic Peptide, NT    Collection Time: 12/30/19  6:43 AM   Result Value Ref Range    NT-proBNP 3720 (H) 0 - 125 pg/mL       Current Facility-Administered Medications   Medication Frequency   • glycopyrrolate (Seebri) 15.6 mcg inhalation capsule 1 Cap BID   • vitamin D (cholecalciferol) 1000 UNIT tablet 4,000 Units DAILY   • montelukast (SINGULAIR) tablet 10 mg Nightly   • omeprazole (PRILOSEC) capsule 20 mg DAILY   • simethicone (MYLICON) chewable tab 120 mg 4X/DAY WITH MEALS + NIGHTLY   • apixaban (ELIQUIS) tablet 5 mg BID   • ascorbic acid tablet 500 mg DAILY   • budesonide-formoterol (SYMBICORT) 160-4.5 MCG/ACT inhaler 2 Puff BID   • calcium carbonate (TUMS) chewable tab 500 mg QDAY PRN   • ferrous sulfate tablet 325 mg BID WITH MEALS   • furosemide (LASIX) tablet 20 mg Q DAY   • gabapentin (NEURONTIN) capsule 300 mg TID PRN   • ipratropium-albuterol (DUONEB) nebulizer solution Q4H PRN (RT)   • lovastatin (MEVACOR) tablet 40 mg QHS   • midodrine (PROAMATINE) tablet 15 mg TID WITH MEALS   • Respiratory Care per Protocol Continuous RT   • Pharmacy Consult Request ...Pain Management Review 1 Each PHARMACY TO DOSE   • oxyCODONE immediate-release (ROXICODONE) tablet 2.5 mg Q3HRS PRN   • oxyCODONE immediate-release (ROXICODONE) tablet 5 mg Q3HRS PRN   • tramadol (ULTRAM) 50 MG tablet 50 mg Q4HRS PRN   • hydrALAZINE (APRESOLINE) tablet 25 mg Q8HRS PRN   • acetaminophen (TYLENOL) tablet 650 mg Q4HRS PRN   • senna-docusate (PERICOLACE or SENOKOT S) 8.6-50 MG per tablet 2 Tab BID    And   • polyethylene glycol/lytes (MIRALAX) PACKET 1 Packet QDAY PRN    And   • magnesium hydroxide (MILK OF MAGNESIA) suspension 30 mL QDAY PRN    And   • bisacodyl (DULCOLAX) suppository 10 mg QDAY PRN   • artificial  tears ophthalmic solution 1 Drop PRN   • benzocaine-menthol (CEPACOL) lozenge 1 Lozenge Q2HRS PRN   • mag hydrox-al hydrox-simeth (MAALOX PLUS ES or MYLANTA DS) suspension 20 mL Q2HRS PRN   • ondansetron (ZOFRAN ODT) dispertab 4 mg 4X/DAY PRN    Or   • ondansetron (ZOFRAN) syringe/vial injection 4 mg 4X/DAY PRN   • traZODone (DESYREL) tablet 50 mg QHS PRN   • sodium chloride (OCEAN) 0.65 % nasal spray 2 Spray PRN       Orders Placed This Encounter   Procedures   • Diet Order Regular     Standing Status:   Standing     Number of Occurrences:   1     Order Specific Question:   Diet:     Answer:   Regular [1]     Order Specific Question:   Texture/Fiber modifications:     Answer:   Dysphagia 3(Mechanical Soft)specify fluid consistency(question 6) [3]     Order Specific Question:   Consistency/Fluid modifications:     Answer:   Thin Liquids [3]       Assessment:  Active Hospital Problems    Diagnosis   • *Acute pulmonary embolism with acute cor pulmonale (HCC)   • Acute respiratory failure (HCC)   • Closed dislocation of right hip (HCC)   • COPD (chronic obstructive pulmonary disease) (HCC)   • Gastroesophageal reflux disease   • Vitamin D deficiency   • Dyslipidemia   • Iron deficiency anemia   • Pleural effusion   • Hypotension   • Status post right hip replacement     79 yr old female with history of recent right JAIME, admitted to acute inpatient rehab on 12/28 with debility and respiratory insufficiency with PEs.    We will have her first weekly conference tomorrow to discuss her discharge date.     Medical Decision Making and Plan:    Recent right JAIME, dislocation  S/p relocation 12/19  Continue full rehab program  PT/OT/SLP, 1 hr each discipline, 5 days per week  Outpatient follow up with orthopedics    Dysphagia  Speech therapy to advance diet    Pain Management  Schedule tylenol  PRN oxycodone    Bowel  Meds as needed  Last BM 12/29    Bladder  Check PVRs - 62  ICP for over 400 cc  Scheduled toileting    DVT  prophylaxis  Eliquis    Appreciate the assistance of the hospitalist with her medical co-morbidities:    Acute bilateral PE  COPD  Pleural effusion  Hypotension  GERD  Iron def anemia    Total time:  35 minutes.  I spent greater than 50% of the time for patient care, counseling, and coordination on this date, including patient face-to face time, unit/floor time with review of records/pertinent lab data and studies, as well as discussing diagnostic evaluation/work up, planned therapeutic interventions, and future disposition of care, as per the interval events/subjective and the assessment and plan as noted above.    Salma Tejada M.D.   Physical Medicine and Rehabilitation

## 2019-12-31 NOTE — REHAB-COLLABORATIVE ONGOING IDT TEAM NOTE
Weekly Interdisciplinary Team Conference Note    Weekly Interdisciplinary Team Conference # 1  Date:  12/31/2019    Clinicians present and reporting at team conference include the following:   MD: Salma Tejada MD   RN:  Ana Maria Aparicio RN   PT:   Kala Hall PT, DPT  OT:  Nelly Escalona MS, OTR/L    ST:  Inez Wells MS, CCC-SLP  CM:  Irma Jose RN [facility]  REC:  Not Applicable  RT:  Kate Lim, RRT  RPh:  Henrietta Caldwell RP  Other:   None  All reporting clinicians have a working knowledge of this patient's plan of care.    Targeted DC Date:  1/14/2020     Medical    Patient Active Problem List    Diagnosis Date Noted   • Acute pulmonary embolism with acute cor pulmonale (HCC) 12/14/2019     Priority: High   • Acute respiratory failure (HCC) 12/12/2019     Priority: High   • Ileus ([facility]) 11/25/2019     Priority: High   • Closed dislocation of right hip ([facility]) 12/14/2019     Priority: Medium   • COPD (chronic obstructive pulmonary disease) ([facility]) 11/25/2019     Priority: Medium   • NSTEMI (non-ST elevated myocardial infarction) ([facility]) 09/12/2017     Priority: Medium   • Abnormal stress test 12/16/2016     Priority: Medium   • HTN (hypertension) 06/25/2009     Priority: Medium   • Chronic use of opiate drugs therapeutic purposes 07/07/2017     Priority: Low   • Chronic bilateral low back pain without sciatica 07/07/2017     Priority: Low   • Daytime somnolence 12/16/2016     Priority: Low   • Insomnia 12/16/2016     Priority: Low   • Gastroesophageal reflux disease 12/16/2016     Priority: Low   • Vitamin D deficiency 12/16/2016     Priority: Low   • Chronic narcotic use 12/16/2016     Priority: Low   • Controlled substance agreement signed 12/16/2016     Priority: Low   • Chronic pain of both shoulders 12/16/2016     Priority: Low   • Chronic left-sided low back pain without sciatica 12/16/2016     Priority: Low   • Dyslipidemia 08/01/2016     Priority: Low   • Scalp itch 06/29/2016     Priority: Low   • Polypharmacy  06/29/2016     Priority: Low   • Dermatochalasis 07/14/2015     Priority: Low   • VAGINAL LESION 11/10/2009     Priority: Low   • Pelvic pain 11/10/2009     Priority: Low   • Osteopenia 06/25/2009     Priority: Low   • Dysuria 06/25/2009     Priority: Low   • Iron deficiency anemia 06/25/2009     Priority: Low   • Hip pain 06/25/2009     Priority: Low   • Elbow pain 06/25/2009     Priority: Low   • Pleural effusion 12/30/2019   • Hypotension 12/26/2019   • Pulmonary hypertension (HCC) 12/26/2019   • Hip dislocation, right (HCC) 12/12/2019   • Syncope 12/12/2019   • Lung nodule 11/28/2019   • Impaired mobility and ADLs 11/27/2019   • LFT elevation 11/25/2019   • Status post right hip replacement 11/25/2019   • Hyperglycemia 11/25/2019   • Impaired vision 05/28/2019   • 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 05/28/2019   • Neck pain on right side 02/27/2019   • Hip pain, chronic, right 04/12/2018   • Hearing difficulty of both ears 04/12/2018   • Preventative health care 10/30/2017     Results     Procedure Component Value Ref Range Date/Time    HEMOGLOBIN A1C [[id number]]  (Abnormal) Collected:  12/29/19 0534    Order Status:  Completed Lab Status:  Final result Updated:  12/30/19 1219    Specimen:  Blood      Glycohemoglobin 5.7 0.0 - 5.6 %      Comment: Increased risk for diabetes:  5.7 -6.4%  Diabetes:  >6.4%  Glycemic control for adults with diabetes:  <7.0%  The above interpretations are per ADA guidelines.  Diagnosis  of diabetes mellitus on the basis of elevated Hemoglobin A1c  should be confirmed by repeating the Hb A1c test.          Est Avg Glucose 117 mg/dL      Comment: The eAG calculation is based on the A1c-Derived Daily Glucose  (ADAG) study.  See the ADA's website for additional information.             Nursing  Diet/Nutrition:  Regular, dysphagia III  Medication Administration:  Float Whole with Puree  % consumed at meals in last 24 hours:  Consumed 25-50% of meals per  documentation.  Meal/Snack Consumption for the past 24 hrs:   Oral Nutrition   12/30/19 1300 Lunch;Between 25-50% Consumed   12/30/19 0900 Breakfast;Between 25-50% Consumed     Snack schedule:  None  Supplement:  Other: none  Appetite:  Poor  Fluid Intake/Output in past 24 hours: In: 450 [P.O.:450]  Out: -   Admit Weight:  Weight: 65.1 kg (143 lb 8 oz)  Weight Last 7 Days   [65.1 kg (143 lb 8 oz)] 65.1 kg (143 lb 8 oz) (12/28 1600)    Pain Issues:    Location:  --  --         Severity:  Moderate   Scheduled pain medications:  None     PRN pain medications used in last 24 hours:  oxycodone immediate release (ROXICODONE)    Non Pharmacologic Interventions:  cold pack, heat, relaxation, repositioned and rest  Effectiveness of pain management plan:  good=patient states acceptable comfort after interventions    Bowel:    Bowel Assist Initial Score:  1 - Total Assistance  Bowel Assist Current Score:  3 - Moderate Assistance  Bowl Accidents in last 7 days:     Last bowel movement: 12/29/19  Stool Description: Medium, Incontinence, Loose     Usual bowel pattern:  every other day  Scheduled bowel medications:  senna-docusate (PERICOLACE or SENOKOT S)   PRN bowel medications used in last 24 hours:  None  Nursing Interventions:  Increased time, Scheduled medication, Positioning on commode/toilet, Set-up, Verbal cueing  Effectiveness of bowel program:   fair=occasional unpredictable, incontinent emptying of bowel (less than 1 time day)  Bladder:    Bladder Assist Initial Score:  1 - Total Assistance  Bladder Assist Current Score:  2 - Max Assistance  Bladder Accidents in last 7 days:     PVR range for past 24-48 hours:  [62]  ()  Intermittent Catheterization:  na  Medications affecting bladder:  None    Time void schedule/voiding pattern:  Voiding every 2-4 hours  Interventions:  Increased time, Verbal cueing, Brief  Effectiveness of bladder training:  Good=regular, predictable, emptying of bladder, patient initiates time  voiding    Kimbrough:    Type:     Patient Lines/Drains/Airways Status    Active Catheter     None              Date placed:          Justification:    Diabetes:  Blood Sugar Frequency:  None    Range of BS for last 48 hours:       Scheduled diabetic medications:  None  Sliding scale usage in past 24 hours:  No  Total Short acting insulin in the past 24 hours:  None  Total Long acting insulin in the past 24 hours:  None    Wound:         Patient Lines/Drains/Airways Status    Active Current Wounds     R- hip incision                    Interventions:  Q shift assessment, PRN site care,  PRN dressing change   Effectiveness of intervention:  wound is improving     Wound Vac Location:  Not applicable  Dressing last changed:  Not applicable  Pump Mode Pressure Setting       Description of drainage:  Not applicable    Sleep/wake cycle:   Average hours slept:  Sleeps 3-4 hours without waking  Sleep medication usage:  None    Patient/Family Training/Education:  Bowel Management/Training, Diet/Nutrition, Fall Prevention, General Self Care, Safety and Wound Care  Discharge Supply Recommendations:  Wound Care Supplies  Strengths: Alert and oriented, Able to follow instructions, Pleasant and cooperative, Effective communication skills, Good carryover of learning and Motivated for self care and independence   Barriers:   Fatigue, Generalized weakness, Limited mobility and Poor sleep pattern       Nursing Problems     Problem: Bowel/Gastric:     Description:     Goal: Normal bowel function is maintained or improved     Description:           Goal: Will not experience complications related to bowel motility     Description:                 Problem: Communication     Description:     Goal: The ability to communicate needs accurately and effectively will improve     Description:                 Problem: Discharge Barriers/Planning     Description:     Goal: Patient's continuum of care needs will be met     Description:                  Problem: Infection     Description:     Goal: Will remain free from infection     Description:                 Problem: Knowledge Deficit     Description:     Goal: Knowledge of disease process/condition, treatment plan, diagnostic tests, and medications will improve     Description:           Goal: Knowledge of the prescribed therapeutic regimen will improve     Description:                 Problem: Pain Management     Description:     Goal: Pain level will decrease to patient's comfort goal     Description:                 Problem: Respiratory:     Description:     Goal: Respiratory status will improve     Description:                 Problem: Safety     Description:     Goal: Will remain free from injury     Description:           Goal: Will remain free from falls     Description:                 Problem: Skin Integrity     Description:     Goal: Risk for impaired skin integrity will decrease     Description:                 Problem: Venous Thromboembolism (VTW)/Deep Vein Thrombosis (DVT) Prevention:     Description:     Goal: Patient will participate in Venous Thrombosis (VTE)/Deep Vein Thrombosis (DVT)Prevention Measures     Description:                        Long Term Goals:   At discharge patient will be able to function safely at home and in the community with support.    Section completed by:  Tal Basilio R.N.        Respiratory Therapy    Pt is using 4 lpm oxygen most of the time and 5 lpm with sleep and during activity.  This is her new home regimen.  Her SATS have been in the low 90s.    Section completed by:  Kate Lim, RRT    Mobility  Bed mobility:   Mod A   Bed /Chair/Wheelchair Transfer Initial:  1 - Total Assistance  Bed /Chair/Wheelchair Transfer Current:  3 - Moderate Assistance   Bed/Chair/Wheelchair Transfer Description:  Increased time, Supervision for safety, Verbal cueing, Set-up of equipment, Initial preparation for task, Assist with two limbs  Walk Initial:  0 -  Not tested, patient refused  Walk Current:  1 - Total Assistance   Walk Description:  Two hand rails, Requires wheelchair follow(5 ft x 2 with wc follow in // bars min A )  Wheelchair Initial:  2 - Max Assistance  Wheelchair Current:  2 - Max Assistance   Wheelchair Description:  Adaptive equipment, Extra time, Safety concerns, Supervision for safety, Verbal cueing, Requires incidental assist(x50 feet with BUE and min A for steering)  Stairs Initial:  0 - Not tested,unsafe activity  Stairs Current: 0 - Not tested,unsafe activity   Stairs Description:    Patient/Family Training/Education:  TBD  DME/DC Recommendations:  TBD   Strengths:  Alert and oriented, Pleasant and cooperative, Supportive family and Willingly participates in therapeutic activities  Barriers:   Decreased endurance, Generalized weakness, Limited mobility, Poor activity tolerance, Poor balance and Other: anxiety, visual deficits   # of short term goals set= 3  # of short term goals met= 0 (eval completed 12/29)  Physical Therapy Problems     Problem: Mobility     Dates: Start: 12/29/19       Description:     Goal: STG-Within one week, patient will propel wheelchair household distances     Dates: Start: 12/29/19       Description: 1) Individualized goal:  W/C mobility x 100 feet with BUE and SBA  2) Interventions:  PT Group Therapy, PT E Stim Attended, PT Gait Training, PT Therapeutic Exercises, PT Neuro Re-Ed/Balance, PT Aquatic Therapy, PT Therapeutic Activity, PT Manual Therapy and PT Evaluation       Note:     Goal Note filed on 12/31/19 0829 by Kala Hall, PT    Dustin completed 12/29                  Goal: STG-Within one week, patient will ambulate household distance     Dates: Start: 12/29/19       Description: 1) Individualized goal: AMB x 10 feet with FWW and min A  2) Interventions:  PT Group Therapy, PT E Stim Attended, PT Gait Training, PT Therapeutic Exercises, PT Neuro Re-Ed/Balance, PT Aquatic Therapy, PT Therapeutic Activity, PT Manual  Therapy and PT Evaluation     Note:     Goal Note filed on 12/31/19 0829 by Kala Hall, PT    Eval completed 12/29                        Problem: Mobility Transfers     Dates: Start: 12/29/19       Description:     Goal: STG-Within one week, patient will transfer bed to chair     Dates: Start: 12/29/19       Description: 1) Individualized goal:  SPT with FWW and min A  2) Interventions:  PT Group Therapy, PT E Stim Attended, PT Gait Training, PT Therapeutic Exercises, PT Neuro Re-Ed/Balance, PT Aquatic Therapy, PT Therapeutic Activity, PT Manual Therapy and PT Evaluation       Note:     Goal Note filed on 12/31/19 0829 by Kala Hall, PT    Eval completed 12/29                        Problem: PT-Long Term Goals     Dates: Start: 12/29/19       Description:     Goal: LTG-By discharge, patient will ambulate     Dates: Start: 12/29/19       Description: 1) Individualized goal: AMB x 50 feet with FWW with SPV  2) Interventions:  PT Group Therapy, PT E Stim Attended, PT Gait Training, PT Therapeutic Exercises, PT Neuro Re-Ed/Balance, PT Aquatic Therapy, PT Therapeutic Activity, PT Manual Therapy and PT Evaluation             Goal: LTG-By discharge, patient will transfer one surface to another     Dates: Start: 12/29/19       Description: 1) Individualized goal:  SPT with FWW, mod I   2) Interventions:  PT Group Therapy, PT E Stim Attended, PT Gait Training, PT Therapeutic Exercises, PT Neuro Re-Ed/Balance, PT Aquatic Therapy, PT Therapeutic Activity, PT Manual Therapy and PT Evaluation             Goal: LTG-By discharge, patient will transfer in/out of a car     Dates: Start: 12/29/19       Description: 1) Individualized goal:  Car transfer with FWW and SPV  2) Interventions:  PT Group Therapy, PT E Stim Attended, PT Gait Training, PT Therapeutic Exercises, PT Neuro Re-Ed/Balance, PT Aquatic Therapy, PT Therapeutic Activity, PT Manual Therapy and PT Evaluation             Goal: LTG-By discharge, patient will     Dates:  "Start: 12/29/19       Description: 1) Individualized goal:  Up/down 6\" curb with FWW and CGA  2) Interventions:  PT Group Therapy, PT E Stim Attended, PT Gait Training, PT Therapeutic Exercises, PT Neuro Re-Ed/Balance, PT Aquatic Therapy, PT Therapeutic Activity, PT Manual Therapy and PT Evaluation                            Section completed by:  Kala Hall, PT, DPT    Activities of Daily Living  Eating Initial:  5 - Standby Prompting/Supervision or Set-up  Eating Current:  5 - Standby Prompting/Supervision or Set-up   Eating Description:  Modified diet, Increased time, Supervision for safety, Verbal cueing  Grooming Initial:  5 - Standby Prompting/Supervision or Set-up  Grooming Current:  5 - Standby Prompting/Supervision or Set-up   Grooming Description:  Supervision for safety  Bathing Initial:  0 - Not tested, patient refused  Bathing Current:  4 - Minimal Assistance   Bathing Description:  Long handled bath tool(completed in sitting, LH sponge for feet)  Upper Body Dressing Initial:  5 - Standby Prompting/Supervision or Set-up  Upper Body Dressing Current:  5 - Standby Prompting/Supervision or Set-up   Upper Body Dressing Description:  Set-up of equipment  Lower Body Dressing Initial:  1 - Total Assistance  Lower Body Dressing Current:  3 - Moderate Assistance   Lower Body Dressing Description:  3 - Moderate Assistance  Toileting Initial:  1 - Total Assistance  Toileting Current:  2 - Max Assistance   Toileting Description:  Grab bar, Increased time, Supervision for safety, Verbal cueing  Toilet Transfer Initial:  4 - Minimal Assistance  Toilet Transfer Current:  3 - Moderate Assistance   Toilet Transfer Description:  3 - Moderate Assistance  Tub / Shower Transfer Initial:  0 - Not tested, patient refused  Tub / Shower Transfer Current:  3 - Moderate Assistance   Tub / Shower Transfer Description:  Grab bar(stand pivot w/c <> shower chair via grab bar)  IADL:  Not yet addressed   Family Training/Education:  " Not yet addressed   DME/DC Recommendations:  Patient has DME needs in place, has tub transfer bench, FWW, w/c, reacher, sock aid     Strengths:  Able to follow instructions, Motivated for self care and independence, Pleasant and cooperative, Supportive family and Willingly participates in therapeutic activities  Barriers:  Decreased endurance, Generalized weakness, Limited mobility, Pain poorly managed, Poor activity tolerance, Poor balance and Other: high anxiety with mobility and standing     # of short term goals set= 4    # of short term goals met= 3     Occupational Therapy Goals     Problem: OT Long Term Goals     Dates: Start: 12/29/19       Description:     Goal: LTG-By discharge, patient will complete basic self care tasks     Dates: Start: 12/29/19       Description: 1) Individualized Goal:  With supervision to modified independence  2) Interventions:  OT Group Therapy, OT Self Care/ADL, OT Cognitive Skill Dev, OT Community Reintegration, OT Manual Ther Technique, OT Neuro Re-Ed/Balance, OT Therapeutic Activity, OT Evaluation and OT Therapeutic Exercise             Goal: LTG-By discharge, patient will perform bathroom transfers     Dates: Start: 12/29/19       Description: 1) Individualized Goal:  With supervision to modified independence  2) Interventions:  OT Group Therapy, OT Self Care/ADL, OT Cognitive Skill Dev, OT Community Reintegration, OT Manual Ther Technique, OT Neuro Re-Ed/Balance, OT Therapeutic Activity, OT Evaluation and OT Therapeutic Exercise                 Problem: Toileting     Dates: Start: 12/29/19       Description:     Goal: STG-Within one week, patient will complete toileting tasks     Dates: Start: 12/29/19       Description: 1) Individualized Goal:  With mod assist  2) Interventions:  OT Group Therapy, OT Self Care/ADL, OT Cognitive Skill Dev, OT Community Reintegration, OT Manual Ther Technique, OT Neuro Re-Ed/Balance, OT Therapeutic Activity, OT Evaluation and OT Therapeutic  Exercise                         Section completed by:  Nelly Escalona MS,OTR/L    Cognitive Linquistic Functions  Comprehension Initial:  6 - Modified Independent  Comprehension Current:  6 - Modified Independent   Comprehension Description:  Glasses(legally blind)  Expression Initial:  6 - Modified Independent  Expression Current:  6 - Modified Independent   Expression Description:  Verbal cueing  Social Interaction Initial:  5 - Stand-by Prompting/Supervision or Set-up  Social Interaction Current:  5 - Stand-by Prompting/Supervision or Set-up   Social Interaction Description:  Increased time, Verbal cues  Problem Solving Initial:  5 - Standby Prompting/Supervision or Set-up  Problem Solving Current:  5 - Standby Prompting/Supervision or Set-up   Problem Solving Description:  Verbal cueing, Increased time  Memory Initial:  6 - Modified Independent  Memory Current:  5 - Standby Prompting/Supervision or Set-up   Memory Description:  Therapy schedule  Executive Functioning / Organization Initial:     Executive Functioning / Organization Current:      Executive Functioning Desciption:  TBA  Swallowing  Swallowing Status:  Dysphagia III/thin liquid diet. Patient describes dysphagia III textures as baseline diet d/t poor dentition. Likely d/c from t-dine after lunch today.   Orders Placed This Encounter   Procedures   • Diet Order Regular     Standing Status:   Standing     Number of Occurrences:   1     Order Specific Question:   Diet:     Answer:   Regular [1]     Order Specific Question:   Texture/Fiber modifications:     Answer:   Dysphagia 3(Mechanical Soft)specify fluid consistency(question 6) [3]     Order Specific Question:   Consistency/Fluid modifications:     Answer:   Thin Liquids [3]     Behavior Modification Plan  Keep instructions simple/concrete, Give clear feedback, Set clear goals and Redirect to task/topic  Assistive Technology  Low/Impaired vision equipment and Low tech: Calendar, planner, schedule,  alarms/timers, pill organizer, post-it notes, lists  Family Training/Education:  Not completed  DC Recommendations:   Further ST is not indicated at this time. D/C speech therapy.   Strengths:  Able to follow instructions and Making steady progress towards goals  Barriers:  Other: motivation  # of short term goals set=2  # of short term goals met=2  Speech Therapy Problems     Problem: Speech/Swallowing LTGs     Dates: Start: 12/29/19       Description:     Goal: LTG-By discharge, patient will safely swallow     Dates: Start: 12/29/19       Description: 1) Individualized goal:  Regular textures and thin liquids without aspiration.    2) Interventions:  SLP Swallowing Dysfunction Treatment, SLP Oral Pharyngeal Evaluation and SLP Group Treatment                   Problem: Swallowing STGs     Dates: Start: 12/31/19       Description:     Goal: STG-Within one week, patient will safely swallow     Dates: Start: 12/31/19       Description: 1) Individualized goal:  Dysphagia III and thin liquids without overt s/sx of aspiration   2) Interventions:  SLP Swallowing Dysfunction Treatment, SLP Oral Pharyngeal Evaluation, SLP Video Swallow Evaluation, SLP Self Care / ADL Training  and SLP Group Treatment                          Section completed by:  Inez Wells MS,CCC-SLP          REHAB-Pharmacy IDT Team Note by Henrietta Caldwell RPH at 12/30/2019  4:01 PM  Version 1 of 1    Author:  Henrietta Caldwell RPH Service:  -- Author Type:  Pharmacist    Filed:  12/30/2019  4:02 PM Date of Service:  12/30/2019  4:01 PM Status:  Signed    :  Henrietta Caldwell RPH (Pharmacist)         Pharmacy   Pharmacy  Antibiotics/Antifungals/Antivirals:  Medication:      Active Orders (From admission, onward)    None        Route:         n/a  Stop Date:  n/a  Reason:   Antihypertensives/Cardiac:  Medication:    Orders (72h ago, onward)     Start     Ordered    12/29/19 0600  furosemide (LASIX) tablet 20 mg  Q DAY      12/28/19 2791     12/28/19 2100  lovastatin (MEVACOR) tablet 40 mg  EVERY BEDTIME      12/28/19 1606    12/28/19 1845  midodrine (PROAMATINE) tablet 15 mg  3 TIMES DAILY WITH MEALS      12/28/19 1606    12/28/19 1612  hydrALAZINE (APRESOLINE) tablet 25 mg  EVERY 8 HOURS PRN      12/28/19 1612              Patient Vitals for the past 24 hrs:   BP Pulse   12/30/19 1322 (!) 97/56 82   12/30/19 0900 100/58 --   12/30/19 0743 -- 89   12/30/19 0623 (!) 96/40 77   12/29/19 1825 100/58 65     Anticoagulation:  Medication:  Eliquis  INR:      Other key medications: furosemide, gabapentin, midodrine, montelukast, omeprazole   A review of the medication list reveals no issues at this time.  Section completed by:  Henrietta Caldwell [facility][[email or web address]]        Attribution Key     TK.1 - Henrietta Caldwell [facility] on 12/30/2019  4:01 PM                  DC Planning  DC destination/dispostion:  To ground level apartment with daughter's support.    DC Needs: MD f/u appointments. Anticipate home health care. Additional DME needs TBD. Has a FWW, WC and oxygen concentrator plus portable.     Barriers to discharge:   Functional deficits. COPD - on oxygen. Dysphagia.    Strengths: Motivated. Supportive family.    Section completed by:  Irma Jose R.N.  Recommendation:  Update IPOC to address therapy service delivery:  PT_60/90____ min/day alternating with OT, OT __90/60___ min/day alternating with PT, ST _0____ min/day on 5/7 days per week for at least 15 hours per week.    Physician Summary  Salma Tejada MD participated and led team conference discussion.

## 2020-01-01 PROCEDURE — A9270 NON-COVERED ITEM OR SERVICE: HCPCS | Performed by: HOSPITALIST

## 2020-01-01 PROCEDURE — 97530 THERAPEUTIC ACTIVITIES: CPT

## 2020-01-01 PROCEDURE — A9270 NON-COVERED ITEM OR SERVICE: HCPCS | Performed by: PHYSICAL MEDICINE & REHABILITATION

## 2020-01-01 PROCEDURE — 94760 N-INVAS EAR/PLS OXIMETRY 1: CPT

## 2020-01-01 PROCEDURE — 97116 GAIT TRAINING THERAPY: CPT

## 2020-01-01 PROCEDURE — 97110 THERAPEUTIC EXERCISES: CPT

## 2020-01-01 PROCEDURE — 700102 HCHG RX REV CODE 250 W/ 637 OVERRIDE(OP): Performed by: PHYSICAL MEDICINE & REHABILITATION

## 2020-01-01 PROCEDURE — 770010 HCHG ROOM/CARE - REHAB SEMI PRIVAT*

## 2020-01-01 PROCEDURE — 99232 SBSQ HOSP IP/OBS MODERATE 35: CPT | Performed by: HOSPITALIST

## 2020-01-01 PROCEDURE — 99231 SBSQ HOSP IP/OBS SF/LOW 25: CPT | Performed by: PHYSICAL MEDICINE & REHABILITATION

## 2020-01-01 PROCEDURE — 700102 HCHG RX REV CODE 250 W/ 637 OVERRIDE(OP): Performed by: HOSPITALIST

## 2020-01-01 RX ADMIN — SIMETHICONE CHEW TAB 80 MG 120 MG: 80 TABLET ORAL at 08:33

## 2020-01-01 RX ADMIN — FERROUS SULFATE TAB 325 MG (65 MG ELEMENTAL FE) 325 MG: 325 (65 FE) TAB at 17:32

## 2020-01-01 RX ADMIN — MONTELUKAST 10 MG: 10 TABLET, FILM COATED ORAL at 19:32

## 2020-01-01 RX ADMIN — MIDODRINE HYDROCHLORIDE 15 MG: 10 TABLET ORAL at 17:32

## 2020-01-01 RX ADMIN — APIXABAN 5 MG: 5 TABLET, FILM COATED ORAL at 19:32

## 2020-01-01 RX ADMIN — ACETAMINOPHEN 650 MG: 325 TABLET, FILM COATED ORAL at 14:12

## 2020-01-01 RX ADMIN — FUROSEMIDE 20 MG: 20 TABLET ORAL at 05:43

## 2020-01-01 RX ADMIN — APIXABAN 5 MG: 5 TABLET, FILM COATED ORAL at 08:32

## 2020-01-01 RX ADMIN — MIDODRINE HYDROCHLORIDE 15 MG: 10 TABLET ORAL at 08:32

## 2020-01-01 RX ADMIN — SIMETHICONE CHEW TAB 80 MG 120 MG: 80 TABLET ORAL at 17:32

## 2020-01-01 RX ADMIN — SENNOSIDES AND DOCUSATE SODIUM 2 TABLET: 8.6; 5 TABLET ORAL at 08:32

## 2020-01-01 RX ADMIN — SIMETHICONE CHEW TAB 80 MG 120 MG: 80 TABLET ORAL at 19:32

## 2020-01-01 RX ADMIN — ACETAMINOPHEN 650 MG: 325 TABLET, FILM COATED ORAL at 08:31

## 2020-01-01 RX ADMIN — OXYCODONE HYDROCHLORIDE AND ACETAMINOPHEN 500 MG: 500 TABLET ORAL at 08:32

## 2020-01-01 RX ADMIN — SIMETHICONE CHEW TAB 80 MG 120 MG: 80 TABLET ORAL at 11:01

## 2020-01-01 RX ADMIN — BUDESONIDE AND FORMOTEROL FUMARATE DIHYDRATE 2 PUFF: 160; 4.5 AEROSOL RESPIRATORY (INHALATION) at 08:37

## 2020-01-01 RX ADMIN — OMEPRAZOLE 20 MG: 20 CAPSULE, DELAYED RELEASE ORAL at 08:32

## 2020-01-01 RX ADMIN — OXYCODONE HYDROCHLORIDE 5 MG: 5 TABLET ORAL at 09:16

## 2020-01-01 RX ADMIN — SENNOSIDES AND DOCUSATE SODIUM 2 TABLET: 8.6; 5 TABLET ORAL at 19:32

## 2020-01-01 RX ADMIN — MIDODRINE HYDROCHLORIDE 15 MG: 10 TABLET ORAL at 11:01

## 2020-01-01 RX ADMIN — VITAMIN D, TAB 1000IU (100/BT) 4000 UNITS: 25 TAB at 08:32

## 2020-01-01 RX ADMIN — OXYCODONE HYDROCHLORIDE 5 MG: 5 TABLET ORAL at 02:17

## 2020-01-01 RX ADMIN — GLYCOPYRROLATE 1 CAPSULE: 15.6 CAPSULE RESPIRATORY (INHALATION) at 08:37

## 2020-01-01 RX ADMIN — LOVASTATIN 40 MG: 20 TABLET ORAL at 19:31

## 2020-01-01 RX ADMIN — FERROUS SULFATE TAB 325 MG (65 MG ELEMENTAL FE) 325 MG: 325 (65 FE) TAB at 08:32

## 2020-01-01 RX ADMIN — ACETAMINOPHEN 650 MG: 325 TABLET, FILM COATED ORAL at 19:32

## 2020-01-01 ASSESSMENT — ENCOUNTER SYMPTOMS
HEADACHES: 0
DIZZINESS: 0
NAUSEA: 0
FEVER: 0
BLURRED VISION: 0
SHORTNESS OF BREATH: 0
PALPITATIONS: 0
HALLUCINATIONS: 0
VOMITING: 0

## 2020-01-01 ASSESSMENT — PATIENT HEALTH QUESTIONNAIRE - PHQ9
1. LITTLE INTEREST OR PLEASURE IN DOING THINGS: NOT AT ALL
SUM OF ALL RESPONSES TO PHQ9 QUESTIONS 1 AND 2: 0
2. FEELING DOWN, DEPRESSED, IRRITABLE, OR HOPELESS: NOT AT ALL

## 2020-01-01 NOTE — CARE PLAN
Problem: Communication  Goal: The ability to communicate needs accurately and effectively will improve  Outcome: PROGRESSING AS EXPECTED  Note:   Patient able to verbalize needs.  Will continue to monitor.      Problem: Bowel/Gastric:  Goal: Normal bowel function is maintained or improved  Outcome: PROGRESSING SLOWER THAN EXPECTED  Note:   Pt's last BM 12/29. Pt to receive PRN bowel medication in the AM.       Problem: Pain Management  Goal: Pain level will decrease to patient's comfort goal  Outcome: PROGRESSING AS EXPECTED  Note:   Patient able to verbalize pain level and verbalize an acceptable level of pain.

## 2020-01-01 NOTE — FLOWSHEET NOTE
01/01/20 0815   Events/Summary/Plan   Events/Summary/Plan 02 spot check   Interdisciplinary Plan of Care-Goals (Indications)   Bronchodilator Indications History / Diagnosis   Interdisciplinary Plan of Care-Outcomes    Bronchodilator Outcome Patient at Stable Baseline   Education   Education Yes - Pt. / Family has been Instructed in use of Respiratory Equipment   Incentive Spirometry Group   Incentive Spirometry Instruction Yes   Breathing Exercises Yes   Respiratory WDL   Respiratory (WDL) X   Chest Exam   Work Of Breathing / Effort Accessory Muscle Use   Respiration 20   Pulse 77   Oximetry   #Pulse Oximetry (Single Determination) Yes   Oxygen   Home O2 Use Prior To Admission? Yes   Home O2 LPM Flow   (4-5 lpm)   Home O2 Delivery Method Nasal Cannula   Home O2 Frequency of Use Continuous   Pulse Oximetry 94 %   O2 (LPM) 4   O2 Daily Delivery Respiratory  Silicone Nasal Cannula

## 2020-01-01 NOTE — THERAPY
Physical Therapy   Daily Treatment     Patient Name: Guillermina Recio  Age:  79 y.o., Sex:  female  Medical Record #: [id number]  Today's Date: 1/1/2020     Precautions  Precautions: Posterior Hip Precautions, Weight Bearing As Tolerated Right Lower Extremity, Fall Risk  Comments: R hip WBAT, post hip precautions    Subjective  Pt was sitting in w/c upon arrival and agreeable to tx. Requested to use restroom. Pt declines AMB with FWW at this time but agreeable to AMB in // bars     Objective       01/01/20 0831   Precautions   Precautions Posterior Hip Precautions;Weight Bearing As Tolerated Right Lower Extremity;Fall Risk   Comments R hip WBAT, post hip precautions   Vitals   O2 (LPM) 6   O2 Delivery Nasal Cannula   Vitals Comments SpO2 post AMB 86%, returns to 94% in <60sec   Pain 0 - 10 Group   Location Back;Hip;Head   Pain Rating Scale (NPRS) 7   Therapist Pain Assessment Post Activity Pain Same as Prior to Activity;Nurse Notified   Sitting Lower Body Exercises   Ankle Pumps 3 sets of 15;Bilateral   Hip Abduction 2 sets of 15;Bilateral;Medium Resistance Theraband   Long Arc Quad 3 sets of 15;Bilateral   Marching 3 sets of 15;Reciprocal   Hamstring Curl 2 sets of 15;Bilateral;Medium Resistance Theraband   Sit to Stand   (4 x from w/c level in // bars with Maryan)   Neuro-Muscular Treatments   Comments B LE coordination exercises 3 minutes with toe/heel taps to given beat   Interdisciplinary Plan of Care Collaboration   IDT Collaboration with  Nursing   Patient Position at End of Therapy Seated;Self Releasing Lap Belt Applied;Call Light within Reach;Tray Table within Reach;Phone within Reach   Collaboration Comments re: pt request for pain meds   PT Total Time Spent   PT Individual Total Time Spent (Mins) 60   PT Charge Group   PT Gait Training 1   PT Therapeutic Exercise 1   PT Therapeutic Activities 2       FIM Walking Score:  1 - Total Assistance  Walking Description:  (5ft x 4 in // bars, O2 management, B UE  support, CGA)    Toilet transfer: CGA with grab bar; Maryan for pulling brief/pants up/down; pt request for totalA with hygiene    Assessment    Pt limited in AMB by complaints of back pain at this time and presents with impaired standing tolerance. Good carryover of sequencing for STS and progressing in this skill. Pt limited by hip abduction weakness    Plan    Core/hip strengthening, standing tolerance with functional reaching activities, endurance in AMB progressing to stance/ gait with FWW, bed mobility, transfers, monitor spO2 with exercise, endurance

## 2020-01-01 NOTE — PROGRESS NOTES
[facility] Medicine Daily Progress Note    Date of Service  1/1/2020    Chief Complaint:  Hypotension  Fluid overload  PE    Interval History:  No significant events or changes since last visit    Review of Systems  Review of Systems   Constitutional: Negative for fever.   Eyes: Negative for blurred vision.   Respiratory: Negative for shortness of breath.    Cardiovascular: Negative for palpitations.   Gastrointestinal: Negative for nausea and vomiting.   Neurological: Negative for dizziness and headaches.   Psychiatric/Behavioral: Negative for hallucinations.        Physical Exam  Temp:  [36.8 °C (98.2 °F)-36.9 °C (98.4 °F)] 36.9 °C (98.4 °F)  Pulse:  [71-85] 77  Resp:  [17-20] 20  BP: ()/(46-62) 84/46  SpO2:  [90 %-94 %] 94 %    Physical Exam  Vitals signs and nursing note reviewed.   Constitutional:       General: She is not in acute distress.  HENT:      Mouth/Throat:      Mouth: Mucous membranes are moist.      Pharynx: Oropharynx is clear.   Eyes:      General: No scleral icterus.  Neck:      Musculoskeletal: No neck rigidity.   Cardiovascular:      Rate and Rhythm: Normal rate and regular rhythm.      Heart sounds: No murmur.   Pulmonary:      Effort: Pulmonary effort is normal.      Breath sounds: Normal breath sounds.      Comments: Has diminished breath sounds  Abdominal:      General: There is no distension.      Palpations: Abdomen is soft.      Tenderness: There is no tenderness.   Musculoskeletal:      Right lower leg: No edema.      Left lower leg: No edema.   Skin:     General: Skin is warm and dry.      Findings: No rash.   Neurological:      Mental Status: She is alert and oriented to person, place, and time.   Psychiatric:         Mood and Affect: Mood normal.         Behavior: Behavior normal.         Fluids    Intake/Output Summary (Last 24 hours) at 1/1/2020 0953  Last data filed at 1/1/2020 0520  Gross per 24 hour   Intake 1160 ml   Output --   Net 1160 ml       Laboratory                       Assessment/Plan  * Acute pulmonary embolism with acute cor pulmonale (HCC)- (present on admission)  Assessment & Plan  CTA (12/14): shows B/L PE with right heart strain  Echo: EF 65-70% with mod PHTN  On Eliquis  Note: elevated Trop and BNP most likely 2nd to increased right heart pressures from PE  Monitor    Closed dislocation of right hip (HCC)- (present on admission)  Assessment & Plan  2nd to syncopy with GLF  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 2nd to Seebri -- started on 12/30 right before BP started dropping --> will d/c 2nd to possible SE of hypotension (last dose 1/1 am)  ? 2nd to Lasix  ? 2nd to recent PE -- on Eliquis  On Midodrine: 15 mg tid  Will consider d/c Lasix  Cont to monitor    Vitamin D deficiency- (present on admission)  Assessment & Plan  Vit D: 20  On supplements    Gastroesophageal reflux disease- (present on admission)  Assessment & Plan  Has hx of hiatal hernia  On Prilosec  On Simethicone for chronic issues with gas    Dyslipidemia- (present on admission)  Assessment & Plan  On Mevacor    Iron deficiency anemia- (present on admission)  Assessment & Plan  H&H stable  On Fe & Vit C supplements

## 2020-01-01 NOTE — THERAPY
"Occupational Therapy  Daily Treatment     Patient Name: Guillermina Recio  Age:  79 y.o., Sex:  female  Medical Record #: [id number]  Today's Date: 1/1/2020     Precautions  Precautions: (P) Posterior Hip Precautions, Weight Bearing As Tolerated Right Lower Extremity, Fall Risk  Comments: (P) monitor BP         Subjective    \"You would be so proud of me! I've been doing my kicks, and my breathing exercise. I want to get better\"     Objective       01/01/20 1001   Precautions   Precautions Posterior Hip Precautions;Weight Bearing As Tolerated Right Lower Extremity;Fall Risk   Comments monitor BP   Sitting Upper Body Exercises   Sitting Upper Body Exercises Yes   Front Arm Raise 2 sets of 10;Medium Resistance Theraband   Bicep Curls 2 sets of 10;Medium Resistance Theraband   Elbow Extension 2 sets of 10;Medium Resistance Theraband   Other Exercise horizontal abduction 2x10 with medium resistance theraband   Hand Strengthening   Hand Strengthening Gross Grasp Left;Gross Grasp Right;Theraputty (Comment on Resistance)   Comment issued theraputty level 3 resistance, completed gross grasp 2x10 each side, bilateral grasp and pull 2x10 each side   OT Total Time Spent   OT Individual Total Time Spent (Mins) 60   OT Charge Group   OT Therapy Activity 4     Pt completed w/c <> mat transfers x3, requires mod assist to stand from lower surface, min from high surface, once in standing requires contact guard for stand pivot with FWW    Assessment    Pt tolerated session well with focus on progression of UE strength, issued UE HEP for general strengthening, pt able to return demo appropriately, will benefit from follow up to ensure carryover. Unable to issue printed instructions due to low vision. Pt continues with high anxiety during mobility though slowly improving, she reports her anxiety is most related to the FWW. Reported nausea and lightheadedness during 3rd transfer, BP taken and recorded at 113/87.     Plan    Integration of " hip precautions into ADLs, standing tolerance/balance, increased comfort with transfers and mobility using FWW

## 2020-01-01 NOTE — THERAPY
Physical Therapy   Daily Treatment     Patient Name: Guillermina Recio  Age:  79 y.o., Sex:  female  Medical Record #: [id number]  Today's Date: 1/1/2020     Precautions  Precautions: Posterior Hip Precautions, Weight Bearing As Tolerated Right Lower Extremity, Fall Risk  Comments: monitor BP and SpO2    Subjective    Pt was sitting in w/c upon arrival and agreeable to tx     Objective       01/01/20 1301   Precautions   Precautions Posterior Hip Precautions;Weight Bearing As Tolerated Right Lower Extremity;Fall Risk   Comments monitor BP and SpO2   Vitals   O2 (LPM) 4   O2 Delivery Nasal Cannula   Interdisciplinary Plan of Care Collaboration   IDT Collaboration with  Family / Caregiver   Patient Position at End of Therapy Seated;Call Light within Reach;Tray Table within Reach;Phone within Reach;Family / Friend in Room;Self Releasing Lap Belt Applied   Collaboration Comments re: family training with close friendNolvia   PT Total Time Spent   PT Individual Total Time Spent (Mins) 30   PT Charge Group   PT Therapeutic Activities 2       FIM Bed/Chair/Wheelchair Transfers Score: 4 - Minimal Assistance  Bed/Chair/Wheelchair Transfers Description:  (bed mob: CGA for R LE with leg  sit to supine, SBA with leg  supine to sit; STS from w/c with FWW Maryan, SBA from standard bed; CGA for SPT with FWW)    Education with Nolvia and Guillermina regarding proper fit with FWW, bed rail purchase vs use of FWW as bed rail, resources from care chest, hip precaution adherence during bed mobility with demo/ practice (rolling R/ L, repositioning in bed with bridging), use of leg      Assessment    Nolvia very receptive to education. Pt with improvements in bed mobility. Will require increased practice with leg     Plan    Consider home evaluation, endurance, standing tolerance/ balance, STS, transfers, bed mobility, review of issued HEP

## 2020-01-01 NOTE — CARE PLAN
Problem: Safety  Goal: Will remain free from injury  Outcome: PROGRESSING AS EXPECTED     Problem: Bowel/Gastric:  Goal: Normal bowel function is maintained or improved  Outcome: PROGRESSING AS EXPECTED     Problem: Pain Management  Goal: Pain level will decrease to patient's comfort goal  Outcome: PROGRESSING AS EXPECTED

## 2020-01-02 PROCEDURE — 97535 SELF CARE MNGMENT TRAINING: CPT

## 2020-01-02 PROCEDURE — 97110 THERAPEUTIC EXERCISES: CPT | Mod: CQ

## 2020-01-02 PROCEDURE — A9270 NON-COVERED ITEM OR SERVICE: HCPCS | Performed by: PHYSICAL MEDICINE & REHABILITATION

## 2020-01-02 PROCEDURE — 700102 HCHG RX REV CODE 250 W/ 637 OVERRIDE(OP): Performed by: PHYSICAL MEDICINE & REHABILITATION

## 2020-01-02 PROCEDURE — 97530 THERAPEUTIC ACTIVITIES: CPT

## 2020-01-02 PROCEDURE — A9270 NON-COVERED ITEM OR SERVICE: HCPCS | Performed by: HOSPITALIST

## 2020-01-02 PROCEDURE — 770010 HCHG ROOM/CARE - REHAB SEMI PRIVAT*

## 2020-01-02 PROCEDURE — 99232 SBSQ HOSP IP/OBS MODERATE 35: CPT | Performed by: HOSPITALIST

## 2020-01-02 PROCEDURE — 99232 SBSQ HOSP IP/OBS MODERATE 35: CPT | Performed by: PHYSICAL MEDICINE & REHABILITATION

## 2020-01-02 PROCEDURE — 97116 GAIT TRAINING THERAPY: CPT

## 2020-01-02 PROCEDURE — 700102 HCHG RX REV CODE 250 W/ 637 OVERRIDE(OP): Performed by: HOSPITALIST

## 2020-01-02 PROCEDURE — 97530 THERAPEUTIC ACTIVITIES: CPT | Mod: CQ

## 2020-01-02 RX ADMIN — SIMETHICONE CHEW TAB 80 MG 120 MG: 80 TABLET ORAL at 08:26

## 2020-01-02 RX ADMIN — MIDODRINE HYDROCHLORIDE 15 MG: 10 TABLET ORAL at 17:30

## 2020-01-02 RX ADMIN — APIXABAN 5 MG: 5 TABLET, FILM COATED ORAL at 21:36

## 2020-01-02 RX ADMIN — LOVASTATIN 40 MG: 20 TABLET ORAL at 21:35

## 2020-01-02 RX ADMIN — SENNOSIDES AND DOCUSATE SODIUM 2 TABLET: 8.6; 5 TABLET ORAL at 21:36

## 2020-01-02 RX ADMIN — OXYCODONE HYDROCHLORIDE AND ACETAMINOPHEN 500 MG: 500 TABLET ORAL at 08:26

## 2020-01-02 RX ADMIN — SIMETHICONE CHEW TAB 80 MG 120 MG: 80 TABLET ORAL at 17:30

## 2020-01-02 RX ADMIN — SIMETHICONE CHEW TAB 80 MG 120 MG: 80 TABLET ORAL at 11:38

## 2020-01-02 RX ADMIN — MIDODRINE HYDROCHLORIDE 15 MG: 10 TABLET ORAL at 11:38

## 2020-01-02 RX ADMIN — BUDESONIDE AND FORMOTEROL FUMARATE DIHYDRATE 2 PUFF: 160; 4.5 AEROSOL RESPIRATORY (INHALATION) at 08:28

## 2020-01-02 RX ADMIN — FERROUS SULFATE TAB 325 MG (65 MG ELEMENTAL FE) 325 MG: 325 (65 FE) TAB at 08:26

## 2020-01-02 RX ADMIN — VITAMIN D, TAB 1000IU (100/BT) 4000 UNITS: 25 TAB at 08:25

## 2020-01-02 RX ADMIN — SIMETHICONE CHEW TAB 80 MG 120 MG: 80 TABLET ORAL at 21:36

## 2020-01-02 RX ADMIN — ACETAMINOPHEN 650 MG: 325 TABLET, FILM COATED ORAL at 08:25

## 2020-01-02 RX ADMIN — BUDESONIDE AND FORMOTEROL FUMARATE DIHYDRATE 2 PUFF: 160; 4.5 AEROSOL RESPIRATORY (INHALATION) at 21:43

## 2020-01-02 RX ADMIN — FERROUS SULFATE TAB 325 MG (65 MG ELEMENTAL FE) 325 MG: 325 (65 FE) TAB at 17:30

## 2020-01-02 RX ADMIN — MIDODRINE HYDROCHLORIDE 15 MG: 10 TABLET ORAL at 08:26

## 2020-01-02 RX ADMIN — ACETAMINOPHEN 650 MG: 325 TABLET, FILM COATED ORAL at 15:35

## 2020-01-02 RX ADMIN — TRAZODONE HYDROCHLORIDE 50 MG: 50 TABLET ORAL at 21:36

## 2020-01-02 RX ADMIN — MONTELUKAST 10 MG: 10 TABLET, FILM COATED ORAL at 21:35

## 2020-01-02 RX ADMIN — FUROSEMIDE 20 MG: 20 TABLET ORAL at 05:20

## 2020-01-02 RX ADMIN — ACETAMINOPHEN 650 MG: 325 TABLET, FILM COATED ORAL at 21:35

## 2020-01-02 RX ADMIN — APIXABAN 5 MG: 5 TABLET, FILM COATED ORAL at 08:26

## 2020-01-02 RX ADMIN — OMEPRAZOLE 20 MG: 20 CAPSULE, DELAYED RELEASE ORAL at 08:26

## 2020-01-02 ASSESSMENT — ENCOUNTER SYMPTOMS
FEVER: 0
DIARRHEA: 0
COUGH: 0
NERVOUS/ANXIOUS: 0
DIZZINESS: 0
BLURRED VISION: 0

## 2020-01-02 NOTE — THERAPY
Physical Therapy   Daily Treatment     Patient Name: Guillermina Recio  Age:  79 y.o., Sex:  female  Medical Record #: [id number]  Today's Date: 1/2/2020     Precautions  Precautions: (P) Posterior Hip Precautions, Weight Bearing As Tolerated Right Lower Extremity, Fall Risk  Comments: (P) monitor BP and SpO2, fear of falling    Subjective    Pt agreeable to PT. Pt's daughter and son are present for preliminary family training session     Objective       01/02/20 1431   Precautions   Precautions Posterior Hip Precautions;Weight Bearing As Tolerated Right Lower Extremity;Fall Risk   Comments monitor BP and SpO2, fear of falling   Bed Mobility    Sit to Supine Moderate Assist   Sit to Stand Minimal Assist   Neuro-Muscular Treatments   Neuro-Muscular Treatments Sequencing;Verbal Cuing   Interdisciplinary Plan of Care Collaboration   IDT Collaboration with  Family / Caregiver;Occupational Therapist   Patient Position at End of Therapy In Bed;Call Light within Reach;Tray Table within Reach   Collaboration Comments FT with son and daughter   PT Total Time Spent   PT Individual Total Time Spent (Mins) 30   PT Charge Group   PT Gait Training 1   PT Therapeutic Activities 1   Supervising Physical Therapist toma bullock       FIM Walking Score:  1 - Total Assistance  Walking Description:  Walker, Oxygen, Supervision for safety, Verbal cueing, Safety concerns, Extra time, Requires incidental assist(15' x 1 and 5' x 1  FWW wc follow for safety-vc for R hip ER)    Pt amb with FWW CGA, as indicated above-her daughter Olman provided a wc follow. Reviewed posterior hip precautions with patient and caregiver    Assessment    Family training session completed with focus on gait training with FWW and education on posterior hip precautions. Pt requires continued reinforcement and verbal cuing to maintain neutral hip rotation with standing/amb/transfers/bed mobility.    Plan    Consider home evaluation, endurance, standing tolerance/  balance, STS, transfers, bed mobility, review of issued HEP

## 2020-01-02 NOTE — THERAPY
"Physical Therapy   Daily Treatment     Patient Name: Guillermina Recio  Age:  79 y.o., Sex:  female  Medical Record #: [id number]  Today's Date: 2020     Precautions  Precautions: (P) Posterior Hip Precautions, Weight Bearing As Tolerated Right Lower Extremity, Fall Risk  Comments: (P) monitor BP and SpO2, fear of falling    Subjective    \"can you get me some batteries for my flashlight\"  \"im gonna need to go potty first\"     Objective       20 1031   Precautions   Precautions Posterior Hip Precautions;Weight Bearing As Tolerated Right Lower Extremity;Fall Risk   Comments monitor BP and SpO2, fear of falling   Sitting Lower Body Exercises   Sitting Lower Body Exercises Yes  (AROM)   Ankle Pumps 2 sets of 15   Hip Abduction 2 sets of 15   Hip Adduction 2 sets of 15   Long Arc Quad 2 sets of 15   Hamstring Curl 2 sets of 15   Bed Mobility    Sit to Stand Minimal Assist  (wc <> FWW)   Interdisciplinary Plan of Care Collaboration   Patient Position at End of Therapy Seated;Self Releasing Lap Belt Applied;Call Light within Reach;Tray Table within Reach   PT Total Time Spent   PT Individual Total Time Spent (Mins) 60   PT Charge Group   PT Gait Training 1   PT Therapeutic Exercise 1   PT Therapeutic Activities 2   Supervising Physical Therapist toma tierney       FIM Bed/Chair/Wheelchair Transfers Score:    Bed/Chair/Wheelchair Transfers Description:       FIM Walking Score:  1 - Total Assistance  Walking Description:  Walker, Oxygen, Extra time, Requires incidental assist, Supervision for safety, Verbal cueing, Safety concerns(8' x 1 FWW wc follow Maryan/CGA)    FIM Wheelchair Score:  2 - Max Assistance  Wheelchair Description:  Verbal cueing, Supervision for safety, Extra time(90' x 1 B UE SBA vc for obstacle negotiation)    FIM Stairs Score:  0 - Not tested,unsafe activity  Stairs Description:       FIM Toiletin - Total Assistance  Toileting Description:  Grab bar, Increased time, Verbal cueing, Set-up of " equipment, Supervision for safety    FIM Toilet Transfer Score:  4 - Minimal Assistance  Toilet Transfer Description:  Grab bar, Increased time, Supervision for safety, Verbal cueing, Set-up of equipment    There ex- UE mita med x 5 minutes for strength/enduarnce    Assessment    Pt tolerated session well with focus on functional mobility and mobilization of LE's. Pt is making slow progress toward stated therapy goals, she requires cuing for pursed lip breathing technique    Plan    Consider home evaluation, endurance, standing tolerance/ balance, STS, transfers, bed mobility, review of issued HEP

## 2020-01-02 NOTE — THERAPY
"Occupational Therapy  Daily Treatment     Patient Name: Guillermina Recio  Age:  79 y.o., Sex:  female  Medical Record #: [id number]  Today's Date: 2020     Precautions  Precautions: (P) Posterior Hip Precautions, Weight Bearing As Tolerated Right Lower Extremity, Fall Risk  Comments: (P) monitor BP and SpO2         Subjective    \"Let me just finish my cheesecake\"     Objective       20 0831   Precautions   Precautions Posterior Hip Precautions;Weight Bearing As Tolerated Right Lower Extremity;Fall Risk   Comments monitor BP and SpO2   OT Total Time Spent   OT Individual Total Time Spent (Mins) 60   OT Charge Group   OT Self Care / ADL 2   OT Therapy Activity 2       FIM Toiletin - Minimal Assistance  Toileting Description:  Grab bar(min A for standing balance)    FIM Toilet Transfer Score:  4 - Minimal Assistance  Toilet Transfer Description:  Grab bar(stand pivot w/c <> toilet via grab bar)    In // bars:  Sit to stands x4 with min A  Walking fwd/back 2 reps with extended rest between  Standing w/o UE support with functional reach up/out with 5 second holds x2 trials of 3 minutes each    Assessment    Pt tolerated session well, demos improved tolerance for standing and mobility this session, continues with anxiety requiring cues for pursed lip breathing. Improved independence during toileting this session    Plan    Family training this afternoon, Shower/ADLs tomorrow AM  Integration of hip precautions into ADLs, standing tolerance/balance, increased comfort with transfers and mobility using FWW, possible home eval next week.   "

## 2020-01-02 NOTE — PROGRESS NOTES
[facility] Medicine Daily Progress Note    Date of Service  1/2/2020    Chief Complaint:  Hypotension  Fluid overload  PE    Interval History:  No significant events or changes since last visit    Review of Systems  Review of Systems   Constitutional: Negative for fever.   Eyes: Negative for blurred vision.   Respiratory: Negative for cough.    Cardiovascular: Negative for chest pain.   Gastrointestinal: Negative for diarrhea.   Musculoskeletal: Negative for joint pain.   Neurological: Negative for dizziness.   Psychiatric/Behavioral: The patient is not nervous/anxious.         Physical Exam  Temp:  [36.8 °C (98.3 °F)-36.9 °C (98.4 °F)] 36.9 °C (98.4 °F)  Pulse:  [57-78] 76  Resp:  [17-24] 24  BP: ()/(49-61) 110/51  SpO2:  [91 %-94 %] 91 %    Physical Exam  Vitals signs and nursing note reviewed.   Constitutional:       Appearance: She is not diaphoretic.   HENT:      Mouth/Throat:      Pharynx: No oropharyngeal exudate or posterior oropharyngeal erythema.   Eyes:      Extraocular Movements: Extraocular movements intact.   Neck:      Vascular: No carotid bruit.   Cardiovascular:      Rate and Rhythm: Normal rate and regular rhythm.      Heart sounds: No murmur.   Pulmonary:      Effort: Pulmonary effort is normal.      Breath sounds: Normal breath sounds. No stridor.      Comments: Has diminished breath sounds  Abdominal:      General: Bowel sounds are normal.      Palpations: Abdomen is soft.   Musculoskeletal:      Right lower leg: No edema.      Left lower leg: No edema.   Skin:     General: Skin is warm and dry.      Findings: No rash.   Neurological:      Mental Status: She is alert and oriented to person, place, and time.   Psychiatric:         Mood and Affect: Mood normal.         Behavior: Behavior normal.         Fluids    Intake/Output Summary (Last 24 hours) at 1/2/2020 0979  Last data filed at 1/2/2020 0800  Gross per 24 hour   Intake 840 ml   Output --   Net 840 ml       Laboratory                       Assessment/Plan  * Acute pulmonary embolism with acute cor pulmonale (HCC)- (present on admission)  Assessment & Plan  CTA (12/14): shows B/L PE with right heart strain  Echo: EF 65-70% with mod PHTN  On Eliquis  Note: elevated Trop and BNP most likely 2nd to increased right heart pressures from PE  Monitor    Closed dislocation of right hip (HCC)- (present on admission)  Assessment & Plan  2nd to syncopy with GLF  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 2nd to Seebri -- d/c'd 2nd to possible SE of hypotension (last dose 1/1 am)  ? 2nd to Lasix  ? 2nd to recent PE -- on Eliquis  On Midodrine: 15 mg tid  Will consider d/c Lasix  Cont to monitor    Vitamin D deficiency- (present on admission)  Assessment & Plan  Vit D: 20  On supplements    Gastroesophageal reflux disease- (present on admission)  Assessment & Plan  Has hx of hiatal hernia  On Prilosec  On Simethicone for chronic issues with gas    Dyslipidemia- (present on admission)  Assessment & Plan  On Mevacor    Iron deficiency anemia- (present on admission)  Assessment & Plan  H&H stable  On Fe & Vit C supplements

## 2020-01-02 NOTE — PROGRESS NOTES
"Rehab Progress Note     Date of Service: 1/2/2020  Chief Complaint: hip pain    Interval Events (Subjective)    Patient seen and examined in her room today. She thought that her home evaluation was today, but it's actually next Thursday. She reports urinary frequency, is on Lasix. Denies any dysuria. Also is having difficulty with her bowel movements, hard stool, already gone to the bathroom 4 times today. She is having some mild pain in her right hip. Thinks her leg is a little swollen. She has no other complaints.    Objective:  VITAL SIGNS: /51   Pulse 76   Temp 36.9 °C (98.4 °F) (Oral)   Resp 19   Ht 1.494 m (4' 10.8\")   Wt 65.1 kg (143 lb 8 oz)   SpO2 91%   BMI 29.18 kg/m²   Gen: alert, no apparent distress  CV: regular rate and rhythm, no murmurs, 1+ peripheral edema in right lower leg  Resp: mild crackles in right base, otherwise clear, normal respiratory effort  GI: soft, non-tender abdomen, bowel sounds present  Neuro: notable for visual impairments      No results found for this or any previous visit (from the past 72 hour(s)).    Current Facility-Administered Medications   Medication Frequency   • acetaminophen (TYLENOL) tablet 650 mg TID   • vitamin D (cholecalciferol) 1000 UNIT tablet 4,000 Units DAILY   • montelukast (SINGULAIR) tablet 10 mg Nightly   • omeprazole (PRILOSEC) capsule 20 mg DAILY   • simethicone (MYLICON) chewable tab 120 mg 4X/DAY WITH MEALS + NIGHTLY   • apixaban (ELIQUIS) tablet 5 mg BID   • ascorbic acid tablet 500 mg DAILY   • budesonide-formoterol (SYMBICORT) 160-4.5 MCG/ACT inhaler 2 Puff BID   • calcium carbonate (TUMS) chewable tab 500 mg QDAY PRN   • ferrous sulfate tablet 325 mg BID WITH MEALS   • furosemide (LASIX) tablet 20 mg Q DAY   • ipratropium-albuterol (DUONEB) nebulizer solution Q4H PRN (RT)   • lovastatin (MEVACOR) tablet 40 mg QHS   • midodrine (PROAMATINE) tablet 15 mg TID WITH MEALS   • Respiratory Care per Protocol Continuous RT   • Pharmacy Consult " Request ...Pain Management Review 1 Each PHARMACY TO DOSE   • oxyCODONE immediate-release (ROXICODONE) tablet 2.5 mg Q3HRS PRN   • oxyCODONE immediate-release (ROXICODONE) tablet 5 mg Q3HRS PRN   • tramadol (ULTRAM) 50 MG tablet 50 mg Q4HRS PRN   • hydrALAZINE (APRESOLINE) tablet 25 mg Q8HRS PRN   • senna-docusate (PERICOLACE or SENOKOT S) 8.6-50 MG per tablet 2 Tab BID    And   • polyethylene glycol/lytes (MIRALAX) PACKET 1 Packet QDAY PRN    And   • magnesium hydroxide (MILK OF MAGNESIA) suspension 30 mL QDAY PRN    And   • bisacodyl (DULCOLAX) suppository 10 mg QDAY PRN   • artificial tears ophthalmic solution 1 Drop PRN   • benzocaine-menthol (CEPACOL) lozenge 1 Lozenge Q2HRS PRN   • mag hydrox-al hydrox-simeth (MAALOX PLUS ES or MYLANTA DS) suspension 20 mL Q2HRS PRN   • ondansetron (ZOFRAN ODT) dispertab 4 mg 4X/DAY PRN    Or   • ondansetron (ZOFRAN) syringe/vial injection 4 mg 4X/DAY PRN   • traZODone (DESYREL) tablet 50 mg QHS PRN   • sodium chloride (OCEAN) 0.65 % nasal spray 2 Spray PRN       Orders Placed This Encounter   Procedures   • Diet Order Regular     Standing Status:   Standing     Number of Occurrences:   1     Order Specific Question:   Diet:     Answer:   Regular [1]     Order Specific Question:   Texture/Fiber modifications:     Answer:   Dysphagia 3(Mechanical Soft)specify fluid consistency(question 6) [3]     Order Specific Question:   Consistency/Fluid modifications:     Answer:   Thin Liquids [3]       Assessment:  Active Hospital Problems    Diagnosis   • *Acute pulmonary embolism with acute cor pulmonale (HCC)   • Acute respiratory failure (HCC)   • Closed dislocation of right hip (HCC)   • COPD (chronic obstructive pulmonary disease) (HCC)   • Gastroesophageal reflux disease   • Vitamin D deficiency   • Dyslipidemia   • Iron deficiency anemia   • Pleural effusion   • Hypotension   • Status post right hip replacement     79 yr old female with history of recent right JAIME, admitted to  acute inpatient rehab on 12/28 with debility and respiratory insufficiency with PEs.    I led and attended the weekly conference, and agree with the IDT conference documentation and plan of care as noted below.    Date of conference: 12/31/2019    Goals and barriers: See IDT note.    Biggest barriers: anxiety, fear of falling, visual impairments, decreased endurance    Goals in next week: improved tolerance, decreased anxiety    CM/social support: daughter supportive, will care for her at discharge    Anticipated DC date: 1/14/2019    Home health: PT/OT/SLP/RN    Equip: none needed    Follow up: PCP, surgery     Therapy update 1/2/2020  Supervision eating  Supervision grooming  Min assist bathing  Supervision upper body dressing  Mod assist lower body dressing  Mod assist toileting  Mod assistbladder  Mod assist bowel  Min assist bed/chair transfer  Min assist toilet transfer  Mod assist tub/shower transfer  Total assist ambulation  Max assist wheelchair propulsion  Not tested stairs  Modified Independent comprehension  Modified Independent expression  Supervision social interaction  Supervision problem solving  Supervision memory      Medical Decision Making and Plan:    Recent right JAIME, dislocation  S/p relocation 12/19  Continue full rehab program  PT/OT, 1.5 hr each discipline, 5 days per week  Outpatient follow up with orthopedics    Dysphagia, resolved    Pain Management  Schedule tylenol  PRN oxycodone  PRN ice    Bowel  Continue bowel meds  Last BM 1/2    Bladder  Check PVRs - 62  ICP for over 400 cc  Scheduled toileting    DVT prophylaxis  Eliquis    Appreciate the assistance of the hospitalist with her medical co-morbidities:    Acute bilateral PE  COPD, was on oxygen at home  Pleural effusion, Lasix  Hypotension, likely from PEs, midodrine  GERD  Iron def anemia  Mild RLE edema, likely from hip surgery/relocation, already on Eliquis    Total time:  26 minutes.  I spent greater than 50% of the time for  patient care, counseling, and coordination on this date, including patient face-to face time, unit/floor time with review of records/pertinent lab data and studies, as well as discussing diagnostic evaluation/work up, planned therapeutic interventions, and future disposition of care, as per the interval events/subjective and the assessment and plan as noted above.    I have performed a physical exam, reviewed and updated ROS, as well as the assessment and plan today 1/2/2020. In review of note from 1/1/2020 there are no new changes except as documented above.          Salma Tejada M.D.   Physical Medicine and Rehabilitation

## 2020-01-02 NOTE — CARE PLAN
Problem: Safety  Goal: Will remain free from injury  Outcome: PROGRESSING AS EXPECTED  Goal: Will remain free from falls  Outcome: PROGRESSING AS EXPECTED     Problem: Pain Management  Goal: Pain level will decrease to patient's comfort goal  Outcome: PROGRESSING AS EXPECTED     Problem: Respiratory:  Goal: Respiratory status will improve  Outcome: PROGRESSING AS EXPECTED

## 2020-01-02 NOTE — CARE PLAN
Problem: Safety  Goal: Will remain free from injury  Outcome: PROGRESSING AS EXPECTED     Problem: Bowel/Gastric:  Goal: Normal bowel function is maintained or improved  Outcome: PROGRESSING AS EXPECTED     Problem: Pain Management  Goal: Pain level will decrease to patient's comfort goal  Outcome: PROGRESSING AS EXPECTED  Note:   Pt taking scheduled Tylenol and PRN Oxycodone during therapy. Pt using ice packs to hip for pain relief. Pt able to participate in therapies and activities this shift.      Problem: Skin Integrity  Goal: Risk for impaired skin integrity will decrease  Outcome: PROGRESSING AS EXPECTED

## 2020-01-02 NOTE — PROGRESS NOTES
"Rehab Progress Note     Date of Service: 1/1/2020  Chief Complaint: hip pain    Interval Events (Subjective)    Patient seen and evaluated today in the therapy gym.  She continues to have some dizziness associated with her orthostasis.  She is also slightly nauseous and constipated today.  She is working with occupational therapy with arm exercises.  She has no other complaints.    Objective:  VITAL SIGNS: BP (!) 98/49   Pulse (!) 57   Temp 36.9 °C (98.4 °F) (Oral)   Resp 17   Ht 1.494 m (4' 10.8\")   Wt 65.1 kg (143 lb 8 oz)   SpO2 93%   BMI 29.18 kg/m²   Gen: alert, no apparent distress  CV: regular rate and rhythm, no murmurs, no peripheral edema  Resp: clear to ascultation bilaterally, normal respiratory effort  GI: soft, non-tender abdomen, bowel sounds present  Neuro: notable for chronic visual impairments      Recent Results (from the past 72 hour(s))   proBrain Natriuretic Peptide, NT    Collection Time: 12/30/19  6:43 AM   Result Value Ref Range    NT-proBNP 3720 (H) 0 - 125 pg/mL       Current Facility-Administered Medications   Medication Frequency   • acetaminophen (TYLENOL) tablet 650 mg TID   • vitamin D (cholecalciferol) 1000 UNIT tablet 4,000 Units DAILY   • montelukast (SINGULAIR) tablet 10 mg Nightly   • omeprazole (PRILOSEC) capsule 20 mg DAILY   • simethicone (MYLICON) chewable tab 120 mg 4X/DAY WITH MEALS + NIGHTLY   • apixaban (ELIQUIS) tablet 5 mg BID   • ascorbic acid tablet 500 mg DAILY   • budesonide-formoterol (SYMBICORT) 160-4.5 MCG/ACT inhaler 2 Puff BID   • calcium carbonate (TUMS) chewable tab 500 mg QDAY PRN   • ferrous sulfate tablet 325 mg BID WITH MEALS   • furosemide (LASIX) tablet 20 mg Q DAY   • ipratropium-albuterol (DUONEB) nebulizer solution Q4H PRN (RT)   • lovastatin (MEVACOR) tablet 40 mg QHS   • midodrine (PROAMATINE) tablet 15 mg TID WITH MEALS   • Respiratory Care per Protocol Continuous RT   • Pharmacy Consult Request ...Pain Management Review 1 Each PHARMACY TO " DOSE   • oxyCODONE immediate-release (ROXICODONE) tablet 2.5 mg Q3HRS PRN   • oxyCODONE immediate-release (ROXICODONE) tablet 5 mg Q3HRS PRN   • tramadol (ULTRAM) 50 MG tablet 50 mg Q4HRS PRN   • hydrALAZINE (APRESOLINE) tablet 25 mg Q8HRS PRN   • senna-docusate (PERICOLACE or SENOKOT S) 8.6-50 MG per tablet 2 Tab BID    And   • polyethylene glycol/lytes (MIRALAX) PACKET 1 Packet QDAY PRN    And   • magnesium hydroxide (MILK OF MAGNESIA) suspension 30 mL QDAY PRN    And   • bisacodyl (DULCOLAX) suppository 10 mg QDAY PRN   • artificial tears ophthalmic solution 1 Drop PRN   • benzocaine-menthol (CEPACOL) lozenge 1 Lozenge Q2HRS PRN   • mag hydrox-al hydrox-simeth (MAALOX PLUS ES or MYLANTA DS) suspension 20 mL Q2HRS PRN   • ondansetron (ZOFRAN ODT) dispertab 4 mg 4X/DAY PRN    Or   • ondansetron (ZOFRAN) syringe/vial injection 4 mg 4X/DAY PRN   • traZODone (DESYREL) tablet 50 mg QHS PRN   • sodium chloride (OCEAN) 0.65 % nasal spray 2 Spray PRN       Orders Placed This Encounter   Procedures   • Diet Order Regular     Standing Status:   Standing     Number of Occurrences:   1     Order Specific Question:   Diet:     Answer:   Regular [1]     Order Specific Question:   Texture/Fiber modifications:     Answer:   Dysphagia 3(Mechanical Soft)specify fluid consistency(question 6) [3]     Order Specific Question:   Consistency/Fluid modifications:     Answer:   Thin Liquids [3]       Assessment:  Active Hospital Problems    Diagnosis   • *Acute pulmonary embolism with acute cor pulmonale (HCC)   • Acute respiratory failure (HCC)   • Closed dislocation of right hip (HCC)   • COPD (chronic obstructive pulmonary disease) (HCC)   • Gastroesophageal reflux disease   • Vitamin D deficiency   • Dyslipidemia   • Iron deficiency anemia   • Pleural effusion   • Hypotension   • Status post right hip replacement     79 yr old female with history of recent right JAIME, admitted to acute inpatient rehab on 12/28 with debility and  respiratory insufficiency with PEs.    I led and attended the weekly conference, and agree with the IDT conference documentation and plan of care as noted below.    Date of conference: 12/31/2019    Goals and barriers: See IDT note.    Biggest barriers: anxiety, fear of falling, visual impairments, decreased endurance    Goals in next week: improved tolerance, decreased anxiety    CM/social support: daughter supportive, will care for her at discharge    Anticipated DC date: 1/14/2019    Home health: PT/OT/SLP/RN    Equip: none needed    Follow up: PCP, surgery     Medical Decision Making and Plan:    Recent right JAIME, dislocation  S/p relocation 12/19  Continue full rehab program  PT/OT, 1.5 hr each discipline, 5 days per week  Outpatient follow up with orthopedics    Dysphagia, resolved    Pain Management  Schedule tylenol  PRN oxycodone    Bowel  Meds as needed  Last BM 12/31    Bladder  Check PVRs - 62  ICP for over 400 cc  Scheduled toileting    DVT prophylaxis  Eliquis    Appreciate the assistance of the hospitalist with her medical co-morbidities:    Acute bilateral PE  COPD  Pleural effusion  Hypotension  GERD  Iron def anemia    Total time:  18 minutes.  I spent greater than 50% of the time for patient care, counseling, and coordination on this date, including patient face-to face time, unit/floor time with review of records/pertinent lab data and studies, as well as discussing diagnostic evaluation/work up, planned therapeutic interventions, and future disposition of care, as per the interval events/subjective and the assessment and plan as noted above.    I have performed a physical exam, reviewed and updated ROS, as well as the assessment and plan today 1/1/2020. In review of note from 12/31/2019 there are no new changes except as documented above.        Salma Tejada M.D.   Physical Medicine and Rehabilitation

## 2020-01-02 NOTE — CARE PLAN
Problem: Safety  Goal: Will remain free from injury  Outcome: PROGRESSING AS EXPECTED  Note:   Pt uses call light consistently and appropriately. Waits for assistance does not attempt self transfer this shift. Able to verbalize needs.      Problem: Bowel/Gastric:  Goal: Normal bowel function is maintained or improved  Outcome: PROGRESSING AS EXPECTED  Note:   Patient having regular bowel movements; last BM 12/31.  Denies s/s constipation; bowel meds available if needed.  Will continue to monitor.      Problem: Pain Management  Goal: Pain level will decrease to patient's comfort goal  Outcome: PROGRESSING AS EXPECTED  Note:   Patient able to verbalize pain level and verbalize an acceptable level of pain.

## 2020-01-03 PROBLEM — J18.9 PNEUMONIA: Status: ACTIVE | Noted: 2020-01-03

## 2020-01-03 LAB
ANION GAP SERPL CALC-SCNC: 6 MMOL/L (ref 0–11.9)
APPEARANCE UR: CLEAR
BASOPHILS # BLD AUTO: 0.4 % (ref 0–1.8)
BASOPHILS # BLD: 0.02 K/UL (ref 0–0.12)
BILIRUB UR QL STRIP.AUTO: NEGATIVE
BUN SERPL-MCNC: 21 MG/DL (ref 8–22)
CALCIUM SERPL-MCNC: 9.5 MG/DL (ref 8.5–10.5)
CHLORIDE SERPL-SCNC: 104 MMOL/L (ref 96–112)
CO2 SERPL-SCNC: 30 MMOL/L (ref 20–33)
COLOR UR: YELLOW
CREAT SERPL-MCNC: 0.71 MG/DL (ref 0.5–1.4)
EOSINOPHIL # BLD AUTO: 0.01 K/UL (ref 0–0.51)
EOSINOPHIL NFR BLD: 0.2 % (ref 0–6.9)
ERYTHROCYTE [DISTWIDTH] IN BLOOD BY AUTOMATED COUNT: 61.6 FL (ref 35.9–50)
GLUCOSE SERPL-MCNC: 91 MG/DL (ref 65–99)
GLUCOSE UR STRIP.AUTO-MCNC: NEGATIVE MG/DL
HCT VFR BLD AUTO: 34.2 % (ref 37–47)
HGB BLD-MCNC: 9.6 G/DL (ref 12–16)
IMM GRANULOCYTES # BLD AUTO: 0.02 K/UL (ref 0–0.11)
IMM GRANULOCYTES NFR BLD AUTO: 0.4 % (ref 0–0.9)
KETONES UR STRIP.AUTO-MCNC: NEGATIVE MG/DL
LEUKOCYTE ESTERASE UR QL STRIP.AUTO: NEGATIVE
LYMPHOCYTES # BLD AUTO: 1.64 K/UL (ref 1–4.8)
LYMPHOCYTES NFR BLD: 36.2 % (ref 22–41)
MAGNESIUM SERPL-MCNC: 2.3 MG/DL (ref 1.5–2.5)
MCH RBC QN AUTO: 24.9 PG (ref 27–33)
MCHC RBC AUTO-ENTMCNC: 28.1 G/DL (ref 33.6–35)
MCV RBC AUTO: 88.6 FL (ref 81.4–97.8)
MICRO URNS: NORMAL
MONOCYTES # BLD AUTO: 0.55 K/UL (ref 0–0.85)
MONOCYTES NFR BLD AUTO: 12.1 % (ref 0–13.4)
NEUTROPHILS # BLD AUTO: 2.29 K/UL (ref 2–7.15)
NEUTROPHILS NFR BLD: 50.7 % (ref 44–72)
NITRITE UR QL STRIP.AUTO: NEGATIVE
NRBC # BLD AUTO: 0 K/UL
NRBC BLD-RTO: 0 /100 WBC
NT-PROBNP SERPL IA-MCNC: 3512 PG/ML (ref 0–125)
PH UR STRIP.AUTO: 7.5 [PH] (ref 5–8)
PHOSPHATE SERPL-MCNC: 2.8 MG/DL (ref 2.5–4.5)
PLATELET # BLD AUTO: 394 K/UL (ref 164–446)
PMV BLD AUTO: 8.5 FL (ref 9–12.9)
POTASSIUM SERPL-SCNC: 4.8 MMOL/L (ref 3.6–5.5)
PROT UR QL STRIP: NEGATIVE MG/DL
RBC # BLD AUTO: 3.86 M/UL (ref 4.2–5.4)
RBC UR QL AUTO: NEGATIVE
SODIUM SERPL-SCNC: 140 MMOL/L (ref 135–145)
SP GR UR STRIP.AUTO: 1.01
UROBILINOGEN UR STRIP.AUTO-MCNC: 0.2 MG/DL
WBC # BLD AUTO: 4.5 K/UL (ref 4.8–10.8)

## 2020-01-03 PROCEDURE — 700102 HCHG RX REV CODE 250 W/ 637 OVERRIDE(OP): Performed by: PHYSICAL MEDICINE & REHABILITATION

## 2020-01-03 PROCEDURE — A9270 NON-COVERED ITEM OR SERVICE: HCPCS | Performed by: HOSPITALIST

## 2020-01-03 PROCEDURE — 85025 COMPLETE CBC W/AUTO DIFF WBC: CPT

## 2020-01-03 PROCEDURE — A9270 NON-COVERED ITEM OR SERVICE: HCPCS | Performed by: PHYSICAL MEDICINE & REHABILITATION

## 2020-01-03 PROCEDURE — 770010 HCHG ROOM/CARE - REHAB SEMI PRIVAT*

## 2020-01-03 PROCEDURE — 97116 GAIT TRAINING THERAPY: CPT

## 2020-01-03 PROCEDURE — 700102 HCHG RX REV CODE 250 W/ 637 OVERRIDE(OP): Performed by: HOSPITALIST

## 2020-01-03 PROCEDURE — 97530 THERAPEUTIC ACTIVITIES: CPT

## 2020-01-03 PROCEDURE — 97535 SELF CARE MNGMENT TRAINING: CPT

## 2020-01-03 PROCEDURE — 81003 URINALYSIS AUTO W/O SCOPE: CPT

## 2020-01-03 PROCEDURE — 94760 N-INVAS EAR/PLS OXIMETRY 1: CPT

## 2020-01-03 PROCEDURE — 83735 ASSAY OF MAGNESIUM: CPT

## 2020-01-03 PROCEDURE — 99231 SBSQ HOSP IP/OBS SF/LOW 25: CPT | Performed by: PHYSICAL MEDICINE & REHABILITATION

## 2020-01-03 PROCEDURE — 36415 COLL VENOUS BLD VENIPUNCTURE: CPT

## 2020-01-03 PROCEDURE — 83880 ASSAY OF NATRIURETIC PEPTIDE: CPT

## 2020-01-03 PROCEDURE — 80048 BASIC METABOLIC PNL TOTAL CA: CPT

## 2020-01-03 PROCEDURE — 84100 ASSAY OF PHOSPHORUS: CPT

## 2020-01-03 PROCEDURE — 99232 SBSQ HOSP IP/OBS MODERATE 35: CPT | Performed by: HOSPITALIST

## 2020-01-03 RX ORDER — MIDODRINE HYDROCHLORIDE 10 MG/1
10 TABLET ORAL
Status: DISCONTINUED | OUTPATIENT
Start: 2020-01-03 | End: 2020-01-05

## 2020-01-03 RX ORDER — LEVOFLOXACIN 250 MG/1
500 TABLET, FILM COATED ORAL DAILY
Status: DISCONTINUED | OUTPATIENT
Start: 2020-01-03 | End: 2020-01-07

## 2020-01-03 RX ADMIN — MIDODRINE HYDROCHLORIDE 15 MG: 10 TABLET ORAL at 07:51

## 2020-01-03 RX ADMIN — BUDESONIDE AND FORMOTEROL FUMARATE DIHYDRATE 2 PUFF: 160; 4.5 AEROSOL RESPIRATORY (INHALATION) at 07:57

## 2020-01-03 RX ADMIN — MIDODRINE HYDROCHLORIDE 10 MG: 10 TABLET ORAL at 11:42

## 2020-01-03 RX ADMIN — BUDESONIDE AND FORMOTEROL FUMARATE DIHYDRATE 2 PUFF: 160; 4.5 AEROSOL RESPIRATORY (INHALATION) at 21:17

## 2020-01-03 RX ADMIN — SIMETHICONE CHEW TAB 80 MG 120 MG: 80 TABLET ORAL at 11:42

## 2020-01-03 RX ADMIN — LOVASTATIN 40 MG: 20 TABLET ORAL at 21:14

## 2020-01-03 RX ADMIN — MONTELUKAST 10 MG: 10 TABLET, FILM COATED ORAL at 21:16

## 2020-01-03 RX ADMIN — FERROUS SULFATE TAB 325 MG (65 MG ELEMENTAL FE) 325 MG: 325 (65 FE) TAB at 17:38

## 2020-01-03 RX ADMIN — OXYCODONE HYDROCHLORIDE AND ACETAMINOPHEN 500 MG: 500 TABLET ORAL at 07:51

## 2020-01-03 RX ADMIN — FERROUS SULFATE TAB 325 MG (65 MG ELEMENTAL FE) 325 MG: 325 (65 FE) TAB at 07:51

## 2020-01-03 RX ADMIN — TRAZODONE HYDROCHLORIDE 50 MG: 50 TABLET ORAL at 21:17

## 2020-01-03 RX ADMIN — OXYCODONE HYDROCHLORIDE 5 MG: 5 TABLET ORAL at 13:17

## 2020-01-03 RX ADMIN — SIMETHICONE CHEW TAB 80 MG 120 MG: 80 TABLET ORAL at 21:15

## 2020-01-03 RX ADMIN — APIXABAN 5 MG: 5 TABLET, FILM COATED ORAL at 21:16

## 2020-01-03 RX ADMIN — APIXABAN 5 MG: 5 TABLET, FILM COATED ORAL at 07:52

## 2020-01-03 RX ADMIN — ACETAMINOPHEN 650 MG: 325 TABLET, FILM COATED ORAL at 07:51

## 2020-01-03 RX ADMIN — VITAMIN D, TAB 1000IU (100/BT) 4000 UNITS: 25 TAB at 07:51

## 2020-01-03 RX ADMIN — LEVOFLOXACIN 500 MG: 250 TABLET, FILM COATED ORAL at 11:42

## 2020-01-03 RX ADMIN — SIMETHICONE CHEW TAB 80 MG 120 MG: 80 TABLET ORAL at 17:30

## 2020-01-03 RX ADMIN — FUROSEMIDE 20 MG: 20 TABLET ORAL at 06:19

## 2020-01-03 RX ADMIN — SIMETHICONE CHEW TAB 80 MG 120 MG: 80 TABLET ORAL at 07:52

## 2020-01-03 RX ADMIN — MIDODRINE HYDROCHLORIDE 10 MG: 10 TABLET ORAL at 17:39

## 2020-01-03 RX ADMIN — OMEPRAZOLE 20 MG: 20 CAPSULE, DELAYED RELEASE ORAL at 07:52

## 2020-01-03 RX ADMIN — SENNOSIDES AND DOCUSATE SODIUM 2 TABLET: 8.6; 5 TABLET ORAL at 21:15

## 2020-01-03 RX ADMIN — ACETAMINOPHEN 650 MG: 325 TABLET, FILM COATED ORAL at 21:14

## 2020-01-03 RX ADMIN — ACETAMINOPHEN 650 MG: 325 TABLET, FILM COATED ORAL at 14:50

## 2020-01-03 ASSESSMENT — ENCOUNTER SYMPTOMS
CHILLS: 0
ABDOMINAL PAIN: 0
DIARRHEA: 0
NAUSEA: 0
VOMITING: 0
SHORTNESS OF BREATH: 0
FEVER: 0
NERVOUS/ANXIOUS: 0

## 2020-01-03 ASSESSMENT — PATIENT HEALTH QUESTIONNAIRE - PHQ9
2. FEELING DOWN, DEPRESSED, IRRITABLE, OR HOPELESS: NOT AT ALL
SUM OF ALL RESPONSES TO PHQ9 QUESTIONS 1 AND 2: 0
1. LITTLE INTEREST OR PLEASURE IN DOING THINGS: NOT AT ALL

## 2020-01-03 NOTE — FLOWSHEET NOTE
01/03/20 1218   Events/Summary/Plan   Events/Summary/Plan SpO2 check   Chest Exam   Respiration 18   Pulse 76   Oximetry   #Pulse Oximetry (Single Determination) Yes   Oxygen   Home O2 Use Prior To Admission? Yes   Home O2 LPM Flow 4 LPM   Home O2 Delivery Method Nasal Cannula   Home O2 Frequency of Use Continuous   Pulse Oximetry 96 %   O2 (LPM) 4   O2 Daily Delivery Respiratory  Silicone Nasal Cannula

## 2020-01-03 NOTE — THERAPY
Physical Therapy   Daily Treatment     Patient Name: Guillermina Recio  Age:  79 y.o., Sex:  female  Medical Record #: [id number]  Today's Date: 1/3/2020     Precautions  Precautions: Posterior Hip Precautions, Weight Bearing As Tolerated Right Lower Extremity, Fall Risk  Comments: monitor BP and SpO2, fear of falling    Subjective    Pt seated in room with family members visiting, agreeable to PT.      Objective       01/03/20 1431   Precautions   Precautions Posterior Hip Precautions;Weight Bearing As Tolerated Right Lower Extremity;Fall Risk   Comments monitor BP and SpO2, fear of falling   Interdisciplinary Plan of Care Collaboration   IDT Collaboration with  Nursing   Patient Position at End of Therapy In Bed;Call Light within Reach;Tray Table within Reach   Collaboration Comments RN administered tylenol during PT session    PT Total Time Spent   PT Individual Total Time Spent (Mins) 30   PT Charge Group   PT Therapeutic Activities 2       FIM Bed/Chair/Wheelchair Transfers Score: 4 - Minimal Assistance  Bed/Chair/Wheelchair Transfers Description:  Increased time, Set-up of equipment, Adaptive equipment(WC > bed, CGA with use of bed rail and leg , extra time to complete and verbal cuing for technique)    FIM Walking Score:  1 - Total Assistance  Walking Description:  Supervision for safety, Walker, Extra time, Oxygen(5 ft x3 with FWW and CGA- SBA, PT managing O2 and WC )      Assessment    Pt participatory in session, despite reports of fatigue at beginning of session.     Plan    Consider home evaluation, endurance, standing tolerance/ balance, STS, transfers, bed mobility, review of issued HEP

## 2020-01-03 NOTE — CARE PLAN
Problem: Safety  Goal: Will remain free from injury  Outcome: PROGRESSING AS EXPECTED     Problem: Infection  Goal: Will remain free from infection  Outcome: PROGRESSING AS EXPECTED  Note:   Started on oral Abx. Urine collected for UA.      Problem: Pain Management  Goal: Pain level will decrease to patient's comfort goal  Outcome: PROGRESSING AS EXPECTED

## 2020-01-03 NOTE — REHAB-CM IDT TEAM NOTE
Case Management    DC Planning  DC destination/dispostion: To ground level apartment with daughter's support.     DC Needs:  MD f/u appointments - PCP, Surgery. Anticipate home health care - RN/PT/OT/ST. Has a FWW, WC and oxygen concentrator plus portable. No additional DME needs identified at this time.      Barriers to discharge: Functional deficits. Fear of falling.       Strengths: Motivated. Supportive son and daughter.     Section completed by:  Irma Jose R.N.

## 2020-01-03 NOTE — PROGRESS NOTES
"Rehab Progress Note     Date of Service: 1/3/2020  Chief Complaint: hip pain    Interval Events (Subjective)    Patient seen and examined in her bathroom this morning. She is putting curlers in her hair, in preparation for her party tomorrow, which is going to happen at 2 pm. She tells me I can have a slice of pumpkin cheesecake with real whipping cream if I want.    She was able to sleep well last night with a pain pill and a sleeping pill. She got about 6 hours, which is an improvement. She currently is denying any pain. She is moving her bowels. She has no new complaints.    Objective:  VITAL SIGNS: /64   Pulse 74   Temp 37.4 °C (99.3 °F) (Oral)   Resp 18   Ht 1.494 m (4' 10.8\")   Wt 65.1 kg (143 lb 8 oz)   SpO2 93%   BMI 29.18 kg/m²   Gen: alert, no apparent distress  CV: regular rate and rhythm, no murmurs, mild right LE peripheral edema  Resp: clear to ascultation bilaterally, normal respiratory effort  GI: soft, non-tender abdomen, bowel sounds present  Neuro: notable for chronic visual impairments      Recent Results (from the past 72 hour(s))   Basic Metabolic Panel    Collection Time: 01/03/20  5:32 AM   Result Value Ref Range    Sodium 140 135 - 145 mmol/L    Potassium 4.8 3.6 - 5.5 mmol/L    Chloride 104 96 - 112 mmol/L    Co2 30 20 - 33 mmol/L    Glucose 91 65 - 99 mg/dL    Bun 21 8 - 22 mg/dL    Creatinine 0.71 0.50 - 1.40 mg/dL    Calcium 9.5 8.5 - 10.5 mg/dL    Anion Gap 6.0 0.0 - 11.9   MAGNESIUM    Collection Time: 01/03/20  5:32 AM   Result Value Ref Range    Magnesium 2.3 1.5 - 2.5 mg/dL   PHOSPHORUS    Collection Time: 01/03/20  5:32 AM   Result Value Ref Range    Phosphorus 2.8 2.5 - 4.5 mg/dL   CBC WITH DIFFERENTIAL    Collection Time: 01/03/20  5:32 AM   Result Value Ref Range    WBC 4.5 (L) 4.8 - 10.8 K/uL    RBC 3.86 (L) 4.20 - 5.40 M/uL    Hemoglobin 9.6 (L) 12.0 - 16.0 g/dL    Hematocrit 34.2 (L) 37.0 - 47.0 %    MCV 88.6 81.4 - 97.8 fL    MCH 24.9 (L) 27.0 - 33.0 pg    MCHC " 28.1 (L) 33.6 - 35.0 g/dL    RDW 61.6 (H) 35.9 - 50.0 fL    Platelet Count 394 164 - 446 K/uL    MPV 8.5 (L) 9.0 - 12.9 fL    Neutrophils-Polys 50.70 44.00 - 72.00 %    Lymphocytes 36.20 22.00 - 41.00 %    Monocytes 12.10 0.00 - 13.40 %    Eosinophils 0.20 0.00 - 6.90 %    Basophils 0.40 0.00 - 1.80 %    Immature Granulocytes 0.40 0.00 - 0.90 %    Nucleated RBC 0.00 /100 WBC    Neutrophils (Absolute) 2.29 2.00 - 7.15 K/uL    Lymphs (Absolute) 1.64 1.00 - 4.80 K/uL    Monos (Absolute) 0.55 0.00 - 0.85 K/uL    Eos (Absolute) 0.01 0.00 - 0.51 K/uL    Baso (Absolute) 0.02 0.00 - 0.12 K/uL    Immature Granulocytes (abs) 0.02 0.00 - 0.11 K/uL    NRBC (Absolute) 0.00 K/uL   proBrain Natriuretic Peptide, NT    Collection Time: 01/03/20  5:32 AM   Result Value Ref Range    NT-proBNP 3512 (H) 0 - 125 pg/mL   ESTIMATED GFR    Collection Time: 01/03/20  5:32 AM   Result Value Ref Range    GFR If African American >60 >60 mL/min/1.73 m 2    GFR If Non African American >60 >60 mL/min/1.73 m 2       Current Facility-Administered Medications   Medication Frequency   • levoFLOXacin (LEVAQUIN) tablet 500 mg DAILY   • midodrine (PROAMATINE) tablet 10 mg TID WITH MEALS   • acetaminophen (TYLENOL) tablet 650 mg TID   • vitamin D (cholecalciferol) 1000 UNIT tablet 4,000 Units DAILY   • montelukast (SINGULAIR) tablet 10 mg Nightly   • omeprazole (PRILOSEC) capsule 20 mg DAILY   • simethicone (MYLICON) chewable tab 120 mg 4X/DAY WITH MEALS + NIGHTLY   • apixaban (ELIQUIS) tablet 5 mg BID   • ascorbic acid tablet 500 mg DAILY   • budesonide-formoterol (SYMBICORT) 160-4.5 MCG/ACT inhaler 2 Puff BID   • calcium carbonate (TUMS) chewable tab 500 mg QDAY PRN   • ferrous sulfate tablet 325 mg BID WITH MEALS   • furosemide (LASIX) tablet 20 mg Q DAY   • ipratropium-albuterol (DUONEB) nebulizer solution Q4H PRN (RT)   • lovastatin (MEVACOR) tablet 40 mg QHS   • Respiratory Care per Protocol Continuous RT   • Pharmacy Consult Request ...Pain  Management Review 1 Each PHARMACY TO DOSE   • oxyCODONE immediate-release (ROXICODONE) tablet 2.5 mg Q3HRS PRN   • oxyCODONE immediate-release (ROXICODONE) tablet 5 mg Q3HRS PRN   • tramadol (ULTRAM) 50 MG tablet 50 mg Q4HRS PRN   • hydrALAZINE (APRESOLINE) tablet 25 mg Q8HRS PRN   • senna-docusate (PERICOLACE or SENOKOT S) 8.6-50 MG per tablet 2 Tab BID    And   • polyethylene glycol/lytes (MIRALAX) PACKET 1 Packet QDAY PRN    And   • magnesium hydroxide (MILK OF MAGNESIA) suspension 30 mL QDAY PRN    And   • bisacodyl (DULCOLAX) suppository 10 mg QDAY PRN   • artificial tears ophthalmic solution 1 Drop PRN   • benzocaine-menthol (CEPACOL) lozenge 1 Lozenge Q2HRS PRN   • mag hydrox-al hydrox-simeth (MAALOX PLUS ES or MYLANTA DS) suspension 20 mL Q2HRS PRN   • ondansetron (ZOFRAN ODT) dispertab 4 mg 4X/DAY PRN    Or   • ondansetron (ZOFRAN) syringe/vial injection 4 mg 4X/DAY PRN   • traZODone (DESYREL) tablet 50 mg QHS PRN   • sodium chloride (OCEAN) 0.65 % nasal spray 2 Spray PRN       Orders Placed This Encounter   Procedures   • Diet Order Regular     Standing Status:   Standing     Number of Occurrences:   1     Order Specific Question:   Diet:     Answer:   Regular [1]     Order Specific Question:   Texture/Fiber modifications:     Answer:   Dysphagia 3(Mechanical Soft)specify fluid consistency(question 6) [3]     Order Specific Question:   Consistency/Fluid modifications:     Answer:   Thin Liquids [3]       Assessment:  Active Hospital Problems    Diagnosis   • *Acute pulmonary embolism with acute cor pulmonale (HCC)   • Acute respiratory failure (HCC)   • Closed dislocation of right hip (HCC)   • COPD (chronic obstructive pulmonary disease) (HCC)   • Gastroesophageal reflux disease   • Vitamin D deficiency   • Dyslipidemia   • Iron deficiency anemia   • Pleural effusion   • Hypotension   • Status post right hip replacement     79 yr old female with history of recent right JAIME, admitted to acute inpatient  rehab on 12/28 with debility and respiratory insufficiency with PEs.    I led and attended the weekly conference, and agree with the IDT conference documentation and plan of care as noted below.    Date of conference: 12/31/2019    Goals and barriers: See IDT note.    Biggest barriers: anxiety, fear of falling, visual impairments, decreased endurance    Goals in next week: improved tolerance, decreased anxiety    CM/social support: daughter supportive, will care for her at discharge    Anticipated DC date: 1/14/2019    Home health: PT/OT/SLP/RN    Equip: none needed    Follow up: PCP, surgery       Medical Decision Making and Plan:    Recent right JAIME, dislocation  S/p relocation 12/19  Continue full rehab program  PT/OT, 1.5 hr each discipline, 5 days per week  Outpatient follow up with orthopedics    Dysphagia, resolved    Pain Management  Schedule tylenol  PRN oxycodone  PRN ice  Currently well controlled    Bowel  Continue bowel meds  Last BM 1/2    Bladder  Check PVRs - 62  ICP for over 400 cc  Scheduled toileting    DVT prophylaxis  Eliquis    Appreciate the assistance of the hospitalist with her medical co-morbidities:    Acute bilateral PE  COPD, was on oxygen at home  Pleural effusion, Lasix  Hypotension, likely from PEs, midodrine  GERD  Iron def anemia, on supplementation  Mild RLE edema, likely from hip surgery/relocation, already on Eliquis  Possible pneumonia, started on antibiotics  Dyslipidemia    Total time:  19 minutes.  I spent greater than 50% of the time for patient care, counseling, and coordination on this date, including patient face-to face time, unit/floor time with review of records/pertinent lab data and studies, as well as discussing diagnostic evaluation/work up, planned therapeutic interventions, and future disposition of care, as per the interval events/subjective and the assessment and plan as noted above.    I have performed a physical exam, reviewed and updated ROS, as well as the  assessment and plan today 1/3/2020. In review of note from 1/2/2020 there are no new changes except as documented above.                Salma Tejada M.D.   Physical Medicine and Rehabilitation

## 2020-01-03 NOTE — THERAPY
"Occupational Therapy  Daily Treatment     Patient Name: Guillermina Recio  Age:  79 y.o., Sex:  female  Medical Record #: [id number]  Today's Date: 1/3/2020     Precautions  Precautions: (P) Posterior Hip Precautions, Weight Bearing As Tolerated Right Lower Extremity, Fall Risk  Comments: (P) monitor BP and SpO2, fear of falling         Subjective    \"I haven't been this clean in ages\"     Objective       20 0901   Precautions   Precautions Posterior Hip Precautions;Weight Bearing As Tolerated Right Lower Extremity;Fall Risk   Comments monitor BP and SpO2, fear of falling   OT Total Time Spent   OT Individual Total Time Spent (Mins) 90   OT Charge Group   OT Self Care / ADL 6       FIM Grooming Score:  5 - Standby Prompting/Supervision or Set-up  Grooming Description:  (setup at sink to apply curlers, requires increased time due to visual deficits)    FIM Bathing Score:  5 - Standby Prompting/Supervision or Set-up  Bathing Description:       FIM Upper Body Dressin - Standby Prompting/Supervision or Set-up  Upper Body Dressing Description:       FIM Lower Body Dressing Score:  4 - Minimal Assistance  Lower Body Dressing Description:  Reacher, Sock aid, Increased time, Verbal cueing, Supervision for safety(min A to manage R sock aid, ues to adjust angle of foot)    FIM Toiletin - Standby Prompting/Supervision or Set-up  Toileting Description:  Grab bar, Increased time, Supervision for safety, Verbal cueing    FIM Toilet Transfer Score:  5 - Standby Prompting/Supervision or Set-up  Toilet Transfer Description:  (stand pivot w/c <> toilet via grab bar)    FIM Tub/Shower Transfers Score:  5 - Standby Prompting/Supervision or Set-up  Tub/Shower Transfers Description:  (stand pivot w/c <> shower chair via grab bar)      Assessment    Pt tolerated session well, demos significant improvement in ADL independence since initial eval, improved use of AE for LB dressing though has difficulty managing R sock, will " benefit from blocked practice. Requires increased time for all tasks due to impaired strength/endurance and visual deficits. Has progressed to stand by assist for stand pivot transfers with grab bar and for standing tasks, ready to progress to completing ADL routine at FWW level.     Plan    Integration of hip precautions into ADLs, progress to ADLs with FWW, standing tolerance/balance, increased comfort with transfers and mobility using FWW, home eval next week Thursday

## 2020-01-03 NOTE — PROGRESS NOTES
[facility] Medicine Daily Progress Note    Date of Service  1/3/2020    Chief Complaint:  Hypotension  Fluid overload  PE    Interval History:  No significant events or changes since last visit    Review of Systems  Review of Systems   Constitutional: Negative for chills and fever.   Respiratory: Negative for shortness of breath.    Cardiovascular: Negative for chest pain.   Gastrointestinal: Negative for abdominal pain, diarrhea, nausea and vomiting.   Psychiatric/Behavioral: The patient is not nervous/anxious.         Physical Exam  Temp:  [36.7 °C (98 °F)-37.4 °C (99.3 °F)] 37.4 °C (99.3 °F)  Pulse:  [68-74] 74  Resp:  [17-20] 18  BP: (108-120)/(64-68) 108/64  SpO2:  [93 %-94 %] 93 %    Physical Exam  Vitals signs and nursing note reviewed.   Constitutional:       Appearance: Normal appearance.   HENT:      Head: Atraumatic.   Eyes:      Conjunctiva/sclera: Conjunctivae normal.      Pupils: Pupils are equal, round, and reactive to light.   Neck:      Musculoskeletal: Normal range of motion and neck supple.   Cardiovascular:      Rate and Rhythm: Normal rate and regular rhythm.   Pulmonary:      Effort: Pulmonary effort is normal.      Breath sounds: Normal breath sounds.      Comments: Has diminished breath sounds  Abdominal:      General: Bowel sounds are normal.      Palpations: Abdomen is soft.   Skin:     General: Skin is warm and dry.      Findings: No rash.   Neurological:      Mental Status: She is alert and oriented to person, place, and time.   Psychiatric:         Mood and Affect: Mood normal.         Behavior: Behavior normal.         Fluids    Intake/Output Summary (Last 24 hours) at 1/3/2020 1005  Last data filed at 1/3/2020 0900  Gross per 24 hour   Intake 480 ml   Output --   Net 480 ml       Laboratory  Recent Labs     01/03/20  0532   WBC 4.5*   RBC 3.86*   HEMOGLOBIN 9.6*   HEMATOCRIT 34.2*   MCV 88.6   MCH 24.9*   MCHC 28.1*   RDW 61.6*   PLATELETCT 394   MPV 8.5*     Recent Labs      01/03/20  0532   SODIUM 140   POTASSIUM 4.8   CHLORIDE 104   CO2 30   GLUCOSE 91   BUN 21   CREATININE 0.71   CALCIUM 9.5                 Assessment/Plan  * Acute pulmonary embolism with acute cor pulmonale (HCC)- (present on admission)  Assessment & Plan  CTA (12/14): shows B/L PE with right heart strain  Echo: EF 65-70% with mod PHTN  On Eliquis  Note: recent elevated Trop & BNP likely 2nd to increased right heart pressures from PE  Monitor    Closed dislocation of right hip (HCC)- (present on admission)  Assessment & Plan  2nd to syncopy with GLF  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)    COPD (chronic obstructive pulmonary disease) (HCC)- (present on admission)  Assessment & Plan  Off Seebri -- 2nd to SE of hypotension (last dose 1/1 am)  On Symbicort  On Singular  Monitor for acute exacerbation  RT protocol    Pneumonia  Assessment & Plan  Temp 99.3  Has lower BP requiring Midodrine  No leukocytosis but has leukopenia  CXRs over the last week suggest possible pneumonia  Will check U/A  Will start Levaquin (1/3)    Pleural effusion  Assessment & Plan  CXR shows small B/L pleural effusions  Elevated BNP (likley from PE with heart strain): 12,922 (12/21) --> 3720 (12/30) --> 3512 (1/3)  On Lasix: 20 mg daily  Note: not on any K+ supplements  Monitor K+ levels: 4.4 (12/29) --> 4.8 (1/3)    Hypotension- (present on admission)  Assessment & Plan  BP better after Seebri stopped  Asymptomatic  Cortisol: 17.3 (12/19)  TSH wnl  ? 2nd to Seebri -- d/c'd 2nd to possible SE of hypotension (last dose 1/1 am)  ? 2nd to Lasix  ? recent PE a contributory factor -- therapeutic on Eliquis  On Midodrine: 15 mg tid --> will decrease to 10 mg tid (1/3)  Cont to monitor    Vitamin D deficiency- (present on admission)  Assessment & Plan  Vit D: 20  On supplements    Gastroesophageal reflux disease- (present on admission)  Assessment & Plan  Has hx of hiatal hernia  On Prilosec  On Simethicone for chronic issues  with gas    Dyslipidemia- (present on admission)  Assessment & Plan  On Mevacor    Iron deficiency anemia- (present on admission)  Assessment & Plan  H&H stable  On Fe & Vit C supplements

## 2020-01-03 NOTE — PROGRESS NOTES
Received report from previous Nurse at shift change. Patient lying in bed, no complaints voiced and denied pain at that time. Cooperative with medication administration and assessment. Will continue to monitor.

## 2020-01-04 PROCEDURE — 700102 HCHG RX REV CODE 250 W/ 637 OVERRIDE(OP): Performed by: PHYSICAL MEDICINE & REHABILITATION

## 2020-01-04 PROCEDURE — 700102 HCHG RX REV CODE 250 W/ 637 OVERRIDE(OP): Performed by: HOSPITALIST

## 2020-01-04 PROCEDURE — 770010 HCHG ROOM/CARE - REHAB SEMI PRIVAT*

## 2020-01-04 PROCEDURE — A9270 NON-COVERED ITEM OR SERVICE: HCPCS | Performed by: HOSPITALIST

## 2020-01-04 PROCEDURE — A9270 NON-COVERED ITEM OR SERVICE: HCPCS | Performed by: PHYSICAL MEDICINE & REHABILITATION

## 2020-01-04 PROCEDURE — 94760 N-INVAS EAR/PLS OXIMETRY 1: CPT

## 2020-01-04 PROCEDURE — 99231 SBSQ HOSP IP/OBS SF/LOW 25: CPT | Performed by: PHYSICAL MEDICINE & REHABILITATION

## 2020-01-04 PROCEDURE — 99232 SBSQ HOSP IP/OBS MODERATE 35: CPT | Performed by: HOSPITALIST

## 2020-01-04 RX ORDER — VITS A,C,E/LUTEIN/MINERALS 300MCG-200
1 TABLET ORAL DAILY
Status: DISCONTINUED | OUTPATIENT
Start: 2020-01-04 | End: 2020-01-08 | Stop reason: HOSPADM

## 2020-01-04 RX ADMIN — LEVOFLOXACIN 500 MG: 250 TABLET, FILM COATED ORAL at 08:01

## 2020-01-04 RX ADMIN — BUDESONIDE AND FORMOTEROL FUMARATE DIHYDRATE 2 PUFF: 160; 4.5 AEROSOL RESPIRATORY (INHALATION) at 08:05

## 2020-01-04 RX ADMIN — LOVASTATIN 40 MG: 20 TABLET ORAL at 21:28

## 2020-01-04 RX ADMIN — ACETAMINOPHEN 650 MG: 325 TABLET, FILM COATED ORAL at 21:28

## 2020-01-04 RX ADMIN — OXYCODONE HYDROCHLORIDE 5 MG: 5 TABLET ORAL at 15:04

## 2020-01-04 RX ADMIN — OXYCODONE HYDROCHLORIDE AND ACETAMINOPHEN 500 MG: 500 TABLET ORAL at 08:01

## 2020-01-04 RX ADMIN — SIMETHICONE CHEW TAB 80 MG 120 MG: 80 TABLET ORAL at 08:01

## 2020-01-04 RX ADMIN — MIDODRINE HYDROCHLORIDE 10 MG: 10 TABLET ORAL at 11:46

## 2020-01-04 RX ADMIN — OXYCODONE HYDROCHLORIDE 5 MG: 5 TABLET ORAL at 21:29

## 2020-01-04 RX ADMIN — FERROUS SULFATE TAB 325 MG (65 MG ELEMENTAL FE) 325 MG: 325 (65 FE) TAB at 17:57

## 2020-01-04 RX ADMIN — BUDESONIDE AND FORMOTEROL FUMARATE DIHYDRATE 2 PUFF: 160; 4.5 AEROSOL RESPIRATORY (INHALATION) at 21:59

## 2020-01-04 RX ADMIN — SIMETHICONE CHEW TAB 80 MG 120 MG: 80 TABLET ORAL at 17:57

## 2020-01-04 RX ADMIN — APIXABAN 5 MG: 5 TABLET, FILM COATED ORAL at 08:01

## 2020-01-04 RX ADMIN — SIMETHICONE CHEW TAB 80 MG 120 MG: 80 TABLET ORAL at 21:28

## 2020-01-04 RX ADMIN — SIMETHICONE CHEW TAB 80 MG 120 MG: 80 TABLET ORAL at 11:46

## 2020-01-04 RX ADMIN — FUROSEMIDE 20 MG: 20 TABLET ORAL at 06:03

## 2020-01-04 RX ADMIN — APIXABAN 5 MG: 5 TABLET, FILM COATED ORAL at 21:29

## 2020-01-04 RX ADMIN — ACETAMINOPHEN 650 MG: 325 TABLET, FILM COATED ORAL at 08:01

## 2020-01-04 RX ADMIN — OMEPRAZOLE 20 MG: 20 CAPSULE, DELAYED RELEASE ORAL at 08:01

## 2020-01-04 RX ADMIN — MONTELUKAST 10 MG: 10 TABLET, FILM COATED ORAL at 21:29

## 2020-01-04 RX ADMIN — TRAZODONE HYDROCHLORIDE 50 MG: 50 TABLET ORAL at 21:59

## 2020-01-04 RX ADMIN — MIDODRINE HYDROCHLORIDE 10 MG: 10 TABLET ORAL at 17:57

## 2020-01-04 RX ADMIN — ACETAMINOPHEN 650 MG: 325 TABLET, FILM COATED ORAL at 14:26

## 2020-01-04 RX ADMIN — VITAMIN D, TAB 1000IU (100/BT) 4000 UNITS: 25 TAB at 08:01

## 2020-01-04 RX ADMIN — FERROUS SULFATE TAB 325 MG (65 MG ELEMENTAL FE) 325 MG: 325 (65 FE) TAB at 08:01

## 2020-01-04 RX ADMIN — I-VITE, TAB 1000-60-2MG (60/BT) 1 TABLET: TAB at 11:46

## 2020-01-04 RX ADMIN — MIDODRINE HYDROCHLORIDE 10 MG: 10 TABLET ORAL at 08:01

## 2020-01-04 ASSESSMENT — ENCOUNTER SYMPTOMS
BLURRED VISION: 0
SHORTNESS OF BREATH: 0
DIZZINESS: 0
VOMITING: 0
NAUSEA: 0
FEVER: 0
PALPITATIONS: 0
HEADACHES: 0
HALLUCINATIONS: 0

## 2020-01-04 NOTE — THERAPY
"Physical Therapy   Daily Treatment     Patient Name: Guillerimna Recio  Age:  79 y.o., Sex:  female  Medical Record #: [id number]  Today's Date: 1/3/2020     Precautions  Precautions: Posterior Hip Precautions, Weight Bearing As Tolerated Right Lower Extremity, Fall Risk  Comments: monitor BP and SpO2, fear of falling    Subjective    Pt was sitting in w/c upon arrival and agreeable to tx     Objective       01/03/20 1231   Precautions   Precautions Posterior Hip Precautions;Weight Bearing As Tolerated Right Lower Extremity;Fall Risk   Comments monitor BP and SpO2, fear of falling   Interdisciplinary Plan of Care Collaboration   Patient Position at End of Therapy Seated;Call Light within Reach;Tray Table within Reach;Phone within Reach   PT Total Time Spent   PT Individual Total Time Spent (Mins) 60   PT Charge Group   PT Gait Training 3   PT Therapeutic Activities 1     STS in // bars from w/c height : 3 x 5   Step up to 6\" step in // bars with B UE support: 3 x 5 and CGA  Pt attempted to perform 6\" curb with FWW but was unable to complete task d/t impaired strength, balance.     2x 5 step ups on 4\" steps with B UE support and CGA  2x 5 step ups on 6\" steps with B UE support and CGA    FIM Bed/Chair/Wheelchair Transfers Score: 5 - Standby Prompting/Supervision or Set-up  Bed/Chair/Wheelchair Transfers Description:  (bed mob: Vcs with use of leg ; transfer: Maryan with fww from w/c, SBA with FWW from standard bed height)    Assessment    Pt requires rest breaks between each step performance. Pt with improve independence in STS and in bed mobility with use of leg . Impaired endurance and respiratory capacity    Plan    Bed mobility, STS from decreasing height, 2\"-4\" curb training to prepare for 6\" curb to mimic home environment, transfers with FWW, endurance, AMB with FWW    "

## 2020-01-04 NOTE — PROGRESS NOTES
"Rehab Progress Note     Date of Service: 1/4/2020  Chief Complaint: hip pain    Interval Events (Subjective)    Seen and examined today in her room.  She is already for a party which is going to happen this afternoon.  Her only complaint is that she should be taking her Ocuvite vitamins which she has not had since she has been here.  She reports a very mild headache and she wonders if this is from not getting her vitamins.  She has no other complaints.    Objective:  VITAL SIGNS: /72   Pulse 71   Temp 37 °C (98.6 °F) (Oral)   Resp 18   Ht 1.494 m (4' 10.8\")   Wt 65.1 kg (143 lb 8 oz)   SpO2 94%   BMI 29.18 kg/m²   Gen: alert, no apparent distress  CV: regular rate and rhythm, no murmurs, mild RLE peripheral edema  Resp: clear to ascultation bilaterally, normal respiratory effort  GI: soft, non-tender abdomen, bowel sounds present  Neuro: notable for chronic visual impairments        Recent Results (from the past 72 hour(s))   Basic Metabolic Panel    Collection Time: 01/03/20  5:32 AM   Result Value Ref Range    Sodium 140 135 - 145 mmol/L    Potassium 4.8 3.6 - 5.5 mmol/L    Chloride 104 96 - 112 mmol/L    Co2 30 20 - 33 mmol/L    Glucose 91 65 - 99 mg/dL    Bun 21 8 - 22 mg/dL    Creatinine 0.71 0.50 - 1.40 mg/dL    Calcium 9.5 8.5 - 10.5 mg/dL    Anion Gap 6.0 0.0 - 11.9   MAGNESIUM    Collection Time: 01/03/20  5:32 AM   Result Value Ref Range    Magnesium 2.3 1.5 - 2.5 mg/dL   PHOSPHORUS    Collection Time: 01/03/20  5:32 AM   Result Value Ref Range    Phosphorus 2.8 2.5 - 4.5 mg/dL   CBC WITH DIFFERENTIAL    Collection Time: 01/03/20  5:32 AM   Result Value Ref Range    WBC 4.5 (L) 4.8 - 10.8 K/uL    RBC 3.86 (L) 4.20 - 5.40 M/uL    Hemoglobin 9.6 (L) 12.0 - 16.0 g/dL    Hematocrit 34.2 (L) 37.0 - 47.0 %    MCV 88.6 81.4 - 97.8 fL    MCH 24.9 (L) 27.0 - 33.0 pg    MCHC 28.1 (L) 33.6 - 35.0 g/dL    RDW 61.6 (H) 35.9 - 50.0 fL    Platelet Count 394 164 - 446 K/uL    MPV 8.5 (L) 9.0 - 12.9 fL    " Neutrophils-Polys 50.70 44.00 - 72.00 %    Lymphocytes 36.20 22.00 - 41.00 %    Monocytes 12.10 0.00 - 13.40 %    Eosinophils 0.20 0.00 - 6.90 %    Basophils 0.40 0.00 - 1.80 %    Immature Granulocytes 0.40 0.00 - 0.90 %    Nucleated RBC 0.00 /100 WBC    Neutrophils (Absolute) 2.29 2.00 - 7.15 K/uL    Lymphs (Absolute) 1.64 1.00 - 4.80 K/uL    Monos (Absolute) 0.55 0.00 - 0.85 K/uL    Eos (Absolute) 0.01 0.00 - 0.51 K/uL    Baso (Absolute) 0.02 0.00 - 0.12 K/uL    Immature Granulocytes (abs) 0.02 0.00 - 0.11 K/uL    NRBC (Absolute) 0.00 K/uL   proBrain Natriuretic Peptide, NT    Collection Time: 01/03/20  5:32 AM   Result Value Ref Range    NT-proBNP 3512 (H) 0 - 125 pg/mL   ESTIMATED GFR    Collection Time: 01/03/20  5:32 AM   Result Value Ref Range    GFR If African American >60 >60 mL/min/1.73 m 2    GFR If Non African American >60 >60 mL/min/1.73 m 2   URINALYSIS    Collection Time: 01/03/20 11:50 AM   Result Value Ref Range    Color Yellow     Character Clear     Specific Gravity 1.012 <1.035    Ph 7.5 5.0 - 8.0    Glucose Negative Negative mg/dL    Ketones Negative Negative mg/dL    Protein Negative Negative mg/dL    Bilirubin Negative Negative    Urobilinogen, Urine 0.2 Negative    Nitrite Negative Negative    Leukocyte Esterase Negative Negative    Occult Blood Negative Negative    Micro Urine Req see below        Current Facility-Administered Medications   Medication Frequency   • OCUVITE-LUTEIN tablet 1 tablet DAILY   • levoFLOXacin (LEVAQUIN) tablet 500 mg DAILY   • midodrine (PROAMATINE) tablet 10 mg TID WITH MEALS   • acetaminophen (TYLENOL) tablet 650 mg TID   • vitamin D (cholecalciferol) 1000 UNIT tablet 4,000 Units DAILY   • montelukast (SINGULAIR) tablet 10 mg Nightly   • omeprazole (PRILOSEC) capsule 20 mg DAILY   • simethicone (MYLICON) chewable tab 120 mg 4X/DAY WITH MEALS + NIGHTLY   • apixaban (ELIQUIS) tablet 5 mg BID   • ascorbic acid tablet 500 mg DAILY   • budesonide-formoterol (SYMBICORT)  160-4.5 MCG/ACT inhaler 2 Puff BID   • calcium carbonate (TUMS) chewable tab 500 mg QDAY PRN   • ferrous sulfate tablet 325 mg BID WITH MEALS   • furosemide (LASIX) tablet 20 mg Q DAY   • ipratropium-albuterol (DUONEB) nebulizer solution Q4H PRN (RT)   • lovastatin (MEVACOR) tablet 40 mg QHS   • Respiratory Care per Protocol Continuous RT   • Pharmacy Consult Request ...Pain Management Review 1 Each PHARMACY TO DOSE   • oxyCODONE immediate-release (ROXICODONE) tablet 2.5 mg Q3HRS PRN   • oxyCODONE immediate-release (ROXICODONE) tablet 5 mg Q3HRS PRN   • tramadol (ULTRAM) 50 MG tablet 50 mg Q4HRS PRN   • hydrALAZINE (APRESOLINE) tablet 25 mg Q8HRS PRN   • senna-docusate (PERICOLACE or SENOKOT S) 8.6-50 MG per tablet 2 Tab BID    And   • polyethylene glycol/lytes (MIRALAX) PACKET 1 Packet QDAY PRN    And   • magnesium hydroxide (MILK OF MAGNESIA) suspension 30 mL QDAY PRN    And   • bisacodyl (DULCOLAX) suppository 10 mg QDAY PRN   • artificial tears ophthalmic solution 1 Drop PRN   • benzocaine-menthol (CEPACOL) lozenge 1 Lozenge Q2HRS PRN   • mag hydrox-al hydrox-simeth (MAALOX PLUS ES or MYLANTA DS) suspension 20 mL Q2HRS PRN   • ondansetron (ZOFRAN ODT) dispertab 4 mg 4X/DAY PRN    Or   • ondansetron (ZOFRAN) syringe/vial injection 4 mg 4X/DAY PRN   • traZODone (DESYREL) tablet 50 mg QHS PRN   • sodium chloride (OCEAN) 0.65 % nasal spray 2 Spray PRN       Orders Placed This Encounter   Procedures   • Diet Order Regular     Standing Status:   Standing     Number of Occurrences:   1     Order Specific Question:   Diet:     Answer:   Regular [1]     Order Specific Question:   Texture/Fiber modifications:     Answer:   Dysphagia 3(Mechanical Soft)specify fluid consistency(question 6) [3]     Order Specific Question:   Consistency/Fluid modifications:     Answer:   Thin Liquids [3]       Assessment:  Active Hospital Problems    Diagnosis   • *Acute pulmonary embolism with acute cor pulmonale (HCC)   • Acute respiratory  failure (HCC)   • Closed dislocation of right hip (HCC)   • COPD (chronic obstructive pulmonary disease) (HCC)   • Gastroesophageal reflux disease   • Vitamin D deficiency   • Dyslipidemia   • Iron deficiency anemia   • Pleural effusion   • Hypotension   • Status post right hip replacement     79 yr old female with history of recent right JAIME, admitted to acute inpatient rehab on 12/28 with debility and respiratory insufficiency with PEs.    I led and attended the weekly conference, and agree with the IDT conference documentation and plan of care as noted below.    Date of conference: 12/31/2019    Goals and barriers: See IDT note.    Biggest barriers: anxiety, fear of falling, visual impairments, decreased endurance    Goals in next week: improved tolerance, decreased anxiety    CM/social support: daughter supportive, will care for her at discharge    Anticipated DC date: 1/14/2019    Home health: PT/OT/SLP/RN    Equip: none needed    Follow up: PCP, surgery       Medical Decision Making and Plan:    Recent right JAIME, dislocation  S/p relocation 12/19  Continue full rehab program  PT/OT, 1.5 hr each discipline, 5 days per week  Outpatient follow up with orthopedics    Dysphagia, resolved    Pain Management  Schedule tylenol  PRN oxycodone  PRN ice  Currently well controlled    Bowel  Continue bowel meds  Last BM 1/4    Bladder  Checked PVRs - 62  Not retaining  ICP for over 400 cc  Scheduled toileting    DVT prophylaxis  Eliquis    Appreciate the assistance of the hospitalist with her medical co-morbidities:    Acute bilateral PE  COPD, was on oxygen at home  Pleural effusion, Lasix  Hypotension, likely from PEs, midodrine  GERD  Iron def anemia, on supplementation  Mild RLE edema, likely from hip surgery/relocation, already on Eliquis  Possible pneumonia, continue antibiotics  Dyslipidemia  Chronic visual impairments, Add Ocuvite     Total time:  15 minutes.  I spent greater than 50% of the time for patient care,  counseling, and coordination on this date, including patient face-to face time, unit/floor time with review of records/pertinent lab data and studies, as well as discussing diagnostic evaluation/work up, planned therapeutic interventions, and future disposition of care, as per the interval events/subjective and the assessment and plan as noted above.    I have performed a physical exam, reviewed and updated ROS, as well as the assessment and plan today 1/4/2020. In review of note from 1/3/2020 there are no new changes except as documented above.      Salma Tejada M.D.   Physical Medicine and Rehabilitation

## 2020-01-04 NOTE — REHAB-PHARMACY IDT TEAM NOTE
Pharmacy   Pharmacy  Antibiotics/Antifungals/Antivirals:  Medication:      Active Orders (From admission, onward)    Ordered     Ordering Provider       Fri Pepe 3, 2020 10:22 AM    01/03/20 1022  levoFLOXacin (LEVAQUIN) tablet 500 mg  DAILY      Nicolas Nicolas M.D.        Route:         po  Stop Date:  1/10/2020  Reason: Pneumonia  Antihypertensives/Cardiac:  Medication:    Orders (72h ago, onward)     Start     Ordered    01/03/20 1130  midodrine (PROAMATINE) tablet 10 mg  3 TIMES DAILY WITH MEALS      01/03/20 1022    12/29/19 0600  furosemide (LASIX) tablet 20 mg  Q DAY      12/28/19 1606    12/28/19 2100  lovastatin (MEVACOR) tablet 40 mg  EVERY BEDTIME      12/28/19 1606    12/28/19 1845  midodrine (PROAMATINE) tablet 15 mg  3 TIMES DAILY WITH MEALS,   Status:  Discontinued      12/28/19 1606    12/28/19 1612  hydrALAZINE (APRESOLINE) tablet 25 mg  EVERY 8 HOURS PRN      12/28/19 1612              Patient Vitals for the past 24 hrs:   BP Pulse   01/03/20 1400 112/72 98   01/03/20 1218 -- 76   01/03/20 0658 108/64 74   01/02/20 1855 120/65 73     Anticoagulation:  Medication: Eliquis    Other key medications: A review of the medication list reveals no issues at this time.    Section completed by: Isidro Venegas [facility]

## 2020-01-04 NOTE — FLOWSHEET NOTE
01/04/20 0930   Events/Summary/Plan   Events/Summary/Plan 02 check   Interdisciplinary Plan of Care-Goals (Indications)   Bronchodilator Indications History / Diagnosis   Interdisciplinary Plan of Care-Outcomes    Bronchodilator Outcome Patient at Stable Baseline   Education   Education Yes - Pt. / Family has been Instructed in use of Respiratory Equipment   Chest Exam   Respiration 18   Pulse 93   Oximetry   #Pulse Oximetry (Single Determination) Yes   Oxygen   Home O2 Use Prior To Admission? Yes   Home O2 LPM Flow 4 LPM   Home O2 Delivery Method Nasal Cannula   Home O2 Frequency of Use Continuous   Pulse Oximetry 98 %   O2 (LPM) 4   O2 Daily Delivery Respiratory  Nasal Cannula

## 2020-01-04 NOTE — PROGRESS NOTES
[facility] Medicine Daily Progress Note    Date of Service  1/4/2020    Chief Complaint:  Hypotension  Fluid overload  PE    Interval History:  No significant events or changes since last visit    Review of Systems  Review of Systems   Constitutional: Negative for fever.   Eyes: Negative for blurred vision.   Respiratory: Negative for shortness of breath.    Cardiovascular: Negative for palpitations.   Gastrointestinal: Negative for nausea and vomiting.   Neurological: Negative for dizziness and headaches.   Psychiatric/Behavioral: Negative for hallucinations.        Physical Exam  Temp:  [36.7 °C (98 °F)-37 °C (98.6 °F)] 36.7 °C (98 °F)  Pulse:  [76-98] 85  Resp:  [18-20] 18  BP: (102-130)/(59-82) 130/82  SpO2:  [94 %-96 %] 95 %    Physical Exam  Vitals signs and nursing note reviewed.   Constitutional:       General: She is not in acute distress.  HENT:      Mouth/Throat:      Mouth: Mucous membranes are moist.      Pharynx: Oropharynx is clear.   Eyes:      General: No scleral icterus.  Neck:      Musculoskeletal: No neck rigidity.   Cardiovascular:      Rate and Rhythm: Normal rate and regular rhythm.   Pulmonary:      Effort: Pulmonary effort is normal.      Breath sounds: No wheezing or rales.      Comments: Has diminished breath sounds  Abdominal:      General: Bowel sounds are normal.      Palpations: Abdomen is soft.   Skin:     General: Skin is warm and dry.      Findings: No rash.   Neurological:      Mental Status: She is alert and oriented to person, place, and time.   Psychiatric:         Mood and Affect: Mood normal.         Behavior: Behavior normal.         Fluids    Intake/Output Summary (Last 24 hours) at 1/4/2020 0932  Last data filed at 1/3/2020 1600  Gross per 24 hour   Intake 360 ml   Output 200 ml   Net 160 ml       Laboratory  Recent Labs     01/03/20  0532   WBC 4.5*   RBC 3.86*   HEMOGLOBIN 9.6*   HEMATOCRIT 34.2*   MCV 88.6   MCH 24.9*   MCHC 28.1*   RDW 61.6*   PLATELETCT 394   MPV 8.5*      Recent Labs     01/03/20  0532   SODIUM 140   POTASSIUM 4.8   CHLORIDE 104   CO2 30   GLUCOSE 91   BUN 21   CREATININE 0.71   CALCIUM 9.5                 Assessment/Plan  * Acute pulmonary embolism with acute cor pulmonale (HCC)- (present on admission)  Assessment & Plan  CTA (12/14): shows B/L PE with right heart strain  Echo: EF 65-70% with mod PHTN  On Eliquis  Note: recent elevated Trop & BNP likely 2nd to increased right heart pressures from PE  Monitor    Closed dislocation of right hip (HCC)- (present on admission)  Assessment & Plan  2nd to syncopy with GLF  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)    COPD (chronic obstructive pulmonary disease) (HCC)- (present on admission)  Assessment & Plan  Off [place]ri -- 2nd to SE of hypotension (last dose 1/1 am)  On Symbicort  On Singular  Resp & O2 protocols as needed    Pneumonia  Assessment & Plan  Temp 99.3  No leukocytosis but has leukopenia  CXRs over the last week suggest possible pneumonia  U/A neg  On Levaquin (1/3)    Pleural effusion  Assessment & Plan  CXR shows small B/L pleural effusions  Elevated BNP (likley from PE with heart strain): 12,922 (12/21) --> 3720 (12/30) --> 3512 (1/3)  On Lasix: 20 mg daily  Note: not on any K+ supplements  Monitor K+ levels: 4.4 (12/29) --> 4.8 (1/3)    Hypotension- (present on admission)  Assessment & Plan  BP ok after Midodrine decreased  Asymptomatic  Cortisol: 17.3 (12/19)  TSH wnl  ? 2nd to [place] -- d/c'd 2nd to possible SE of hypotension (last dose 1/1 am)  ? 2nd to Lasix  ? recent PE a contributory factor -- therapeutic on Eliquis  On Midodrine: 15 mg tid --> 10 mg tid (1/3 at 11 am)  Cont to monitor    Vitamin D deficiency- (present on admission)  Assessment & Plan  Vit D: 20  On supplements    Gastroesophageal reflux disease- (present on admission)  Assessment & Plan  Has hx of hiatal hernia  On Prilosec  On Simethicone for chronic issues with gas    Dyslipidemia- (present on  admission)  Assessment & Plan  On Mevacor    Iron deficiency anemia- (present on admission)  Assessment & Plan  H&H stable  On Fe & Vit C supplements

## 2020-01-05 PROCEDURE — A9270 NON-COVERED ITEM OR SERVICE: HCPCS | Performed by: PHYSICAL MEDICINE & REHABILITATION

## 2020-01-05 PROCEDURE — 700102 HCHG RX REV CODE 250 W/ 637 OVERRIDE(OP): Performed by: PHYSICAL MEDICINE & REHABILITATION

## 2020-01-05 PROCEDURE — 94760 N-INVAS EAR/PLS OXIMETRY 1: CPT

## 2020-01-05 PROCEDURE — 700102 HCHG RX REV CODE 250 W/ 637 OVERRIDE(OP): Performed by: HOSPITALIST

## 2020-01-05 PROCEDURE — A9270 NON-COVERED ITEM OR SERVICE: HCPCS | Performed by: HOSPITALIST

## 2020-01-05 PROCEDURE — 99232 SBSQ HOSP IP/OBS MODERATE 35: CPT | Performed by: HOSPITALIST

## 2020-01-05 PROCEDURE — 97116 GAIT TRAINING THERAPY: CPT

## 2020-01-05 PROCEDURE — 770010 HCHG ROOM/CARE - REHAB SEMI PRIVAT*

## 2020-01-05 PROCEDURE — 97530 THERAPEUTIC ACTIVITIES: CPT

## 2020-01-05 RX ORDER — MIDODRINE HYDROCHLORIDE 10 MG/1
10 TABLET ORAL 2 TIMES DAILY
Status: DISCONTINUED | OUTPATIENT
Start: 2020-01-05 | End: 2020-01-07

## 2020-01-05 RX ADMIN — OMEPRAZOLE 20 MG: 20 CAPSULE, DELAYED RELEASE ORAL at 08:33

## 2020-01-05 RX ADMIN — TRAZODONE HYDROCHLORIDE 50 MG: 50 TABLET ORAL at 22:35

## 2020-01-05 RX ADMIN — ACETAMINOPHEN 650 MG: 325 TABLET, FILM COATED ORAL at 08:34

## 2020-01-05 RX ADMIN — ACETAMINOPHEN 650 MG: 325 TABLET, FILM COATED ORAL at 22:33

## 2020-01-05 RX ADMIN — SIMETHICONE CHEW TAB 80 MG 120 MG: 80 TABLET ORAL at 08:34

## 2020-01-05 RX ADMIN — OXYCODONE HYDROCHLORIDE 5 MG: 5 TABLET ORAL at 11:16

## 2020-01-05 RX ADMIN — LEVOFLOXACIN 500 MG: 250 TABLET, FILM COATED ORAL at 08:34

## 2020-01-05 RX ADMIN — ACETAMINOPHEN 650 MG: 325 TABLET, FILM COATED ORAL at 14:01

## 2020-01-05 RX ADMIN — FUROSEMIDE 20 MG: 20 TABLET ORAL at 05:07

## 2020-01-05 RX ADMIN — OXYCODONE HYDROCHLORIDE 5 MG: 5 TABLET ORAL at 22:39

## 2020-01-05 RX ADMIN — LOVASTATIN 40 MG: 20 TABLET ORAL at 22:34

## 2020-01-05 RX ADMIN — FERROUS SULFATE TAB 325 MG (65 MG ELEMENTAL FE) 325 MG: 325 (65 FE) TAB at 16:57

## 2020-01-05 RX ADMIN — SIMETHICONE CHEW TAB 80 MG 120 MG: 80 TABLET ORAL at 11:14

## 2020-01-05 RX ADMIN — OXYCODONE HYDROCHLORIDE AND ACETAMINOPHEN 500 MG: 500 TABLET ORAL at 08:35

## 2020-01-05 RX ADMIN — FERROUS SULFATE TAB 325 MG (65 MG ELEMENTAL FE) 325 MG: 325 (65 FE) TAB at 08:35

## 2020-01-05 RX ADMIN — APIXABAN 5 MG: 5 TABLET, FILM COATED ORAL at 08:35

## 2020-01-05 RX ADMIN — SENNOSIDES AND DOCUSATE SODIUM 2 TABLET: 8.6; 5 TABLET ORAL at 08:35

## 2020-01-05 RX ADMIN — MIDODRINE HYDROCHLORIDE 10 MG: 10 TABLET ORAL at 08:34

## 2020-01-05 RX ADMIN — BUDESONIDE AND FORMOTEROL FUMARATE DIHYDRATE 2 PUFF: 160; 4.5 AEROSOL RESPIRATORY (INHALATION) at 08:36

## 2020-01-05 RX ADMIN — SIMETHICONE CHEW TAB 80 MG 120 MG: 80 TABLET ORAL at 22:34

## 2020-01-05 RX ADMIN — VITAMIN D, TAB 1000IU (100/BT) 4000 UNITS: 25 TAB at 08:33

## 2020-01-05 RX ADMIN — MONTELUKAST 10 MG: 10 TABLET, FILM COATED ORAL at 22:35

## 2020-01-05 RX ADMIN — SIMETHICONE CHEW TAB 80 MG 120 MG: 80 TABLET ORAL at 16:57

## 2020-01-05 RX ADMIN — MIDODRINE HYDROCHLORIDE 10 MG: 10 TABLET ORAL at 14:01

## 2020-01-05 RX ADMIN — BUDESONIDE AND FORMOTEROL FUMARATE DIHYDRATE 2 PUFF: 160; 4.5 AEROSOL RESPIRATORY (INHALATION) at 22:35

## 2020-01-05 RX ADMIN — I-VITE, TAB 1000-60-2MG (60/BT) 1 TABLET: TAB at 08:35

## 2020-01-05 RX ADMIN — APIXABAN 5 MG: 5 TABLET, FILM COATED ORAL at 22:33

## 2020-01-05 ASSESSMENT — ENCOUNTER SYMPTOMS
DIZZINESS: 0
BLURRED VISION: 0
FEVER: 0
DIARRHEA: 0
NERVOUS/ANXIOUS: 0
COUGH: 0

## 2020-01-05 NOTE — PROGRESS NOTES
Received patient on bed. Conscious coherent not in cp distress. Patient on chair. Patient on o2 at 4lpm per nc. Patient verbalized no severe pain. Safety and fall precaution reinforced.

## 2020-01-05 NOTE — FLOWSHEET NOTE
01/05/20 1052   Events/Summary/Plan   Events/Summary/Plan Pt found to have 02 connected to empty tank on back of wheel chair.  SATS were 85%.  Reconnected to wall oxygen and SATS came up to 90% with 15 sec.   Interdisciplinary Plan of Care-Goals (Indications)   Bronchodilator Indications History / Diagnosis   Interdisciplinary Plan of Care-Outcomes    Bronchodilator Outcome Patient at Stable Baseline   Education   Education Yes - Pt. / Family has been Instructed in use of Respiratory Equipment   Respiratory WDL   Respiratory (WDL) X   Chest Exam   Work Of Breathing / Effort Accessory Muscle Use   Respiration 20   Pulse 89   Oximetry   #Pulse Oximetry (Single Determination) Yes   Oxygen   Home O2 Use Prior To Admission? Yes   Home O2 LPM Flow 4 LPM   Home O2 Delivery Method Nasal Cannula   Home O2 Frequency of Use Continuous   Pulse Oximetry (!) 85 %  (increased to 90 )   O2 (LPM) 4   O2 Daily Delivery Respiratory  Silicone Nasal Cannula

## 2020-01-05 NOTE — PROGRESS NOTES
[facility] Medicine Daily Progress Note    Date of Service  1/5/2020    Chief Complaint:  Hypotension  Fluid overload  PE    Interval History:  No significant events or changes since last visit    Review of Systems  Review of Systems   Constitutional: Negative for fever.   Eyes: Negative for blurred vision.   Respiratory: Negative for cough.    Cardiovascular: Negative for chest pain.   Gastrointestinal: Negative for diarrhea.   Musculoskeletal: Negative for joint pain.   Neurological: Negative for dizziness.   Psychiatric/Behavioral: The patient is not nervous/anxious.         Physical Exam  Temp:  [36.7 °C (98 °F)-37 °C (98.6 °F)] 36.7 °C (98 °F)  Pulse:  [71-81] 81  Resp:  [18] 18  BP: (105-118)/(60-74) 105/60  SpO2:  [94 %-98 %] 98 %    Physical Exam  Vitals signs and nursing note reviewed.   Constitutional:       Appearance: She is not diaphoretic.   HENT:      Mouth/Throat:      Pharynx: No oropharyngeal exudate or posterior oropharyngeal erythema.   Eyes:      Extraocular Movements: Extraocular movements intact.   Neck:      Vascular: No carotid bruit.   Cardiovascular:      Rate and Rhythm: Normal rate and regular rhythm.   Pulmonary:      Effort: Pulmonary effort is normal.      Breath sounds: No wheezing or rales.      Comments: Has diminished breath sounds  Abdominal:      General: There is no distension.      Palpations: Abdomen is soft.      Tenderness: There is no tenderness.   Skin:     General: Skin is warm and dry.   Neurological:      Mental Status: She is alert and oriented to person, place, and time.   Psychiatric:         Mood and Affect: Mood normal.         Behavior: Behavior normal.         Fluids    Intake/Output Summary (Last 24 hours) at 1/5/2020 0989  Last data filed at 1/4/2020 1200  Gross per 24 hour   Intake 120 ml   Output --   Net 120 ml       Laboratory  Recent Labs     01/03/20  0532   WBC 4.5*   RBC 3.86*   HEMOGLOBIN 9.6*   HEMATOCRIT 34.2*   MCV 88.6   MCH 24.9*   MCHC 28.1*   RDW  61.6*   PLATELETCT 394   MPV 8.5*     Recent Labs     01/03/20  0532   SODIUM 140   POTASSIUM 4.8   CHLORIDE 104   CO2 30   GLUCOSE 91   BUN 21   CREATININE 0.71   CALCIUM 9.5                 Assessment/Plan  * Acute pulmonary embolism with acute cor pulmonale (HCC)- (present on admission)  Assessment & Plan  CTA (12/14): shows B/L PE with right heart strain  Echo: EF 65-70% with mod PHTN  On Eliquis  Note: recent elevated Trop & BNP likely 2nd to increased right heart pressures from PE  Monitor    Closed dislocation of right hip (HCC)- (present on admission)  Assessment & Plan  2nd to syncopy with GLF  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)    COPD (chronic obstructive pulmonary disease) (HCC)- (present on admission)  Assessment & Plan  Off [place] -- 2nd to SE of hypotension (last dose 1/1 am)  On Symbicort  On Singular  Resp & O2 protocols as needed    Pneumonia  Assessment & Plan  Temp 99.3  No leukocytosis but has leukopenia  CXRs over the last week suggest possible pneumonia  U/A neg  On Levaquin (thru 1/9)    Pleural effusion  Assessment & Plan  CXR shows small B/L pleural effusions  Elevated BNP (likley from PE with heart strain): 12,922 (12/21) --> 3720 (12/30) --> 3512 (1/3)  On Lasix: 20 mg daily  Note: not on any K+ supplements  Monitor K+ levels: 4.4 (12/29) --> 4.8 (1/3)    Hypotension- (present on admission)  Assessment & Plan  BP ok after Midodrine decreased  Asymptomatic  Cortisol: 17.3 (12/19)  TSH wnl  ? 2nd to [place] -- d/c'd 2nd to possible SE of hypotension (last dose 1/1 am)  ? 2nd to Lasix  ? recent PE a contributory factor -- therapeutic on Eliquis  On Midodrine: 15 mg tid --> 10 mg tid (1/3 at 11 am) --> will decrease to 10 mg bid (1/5)  Cont to monitor    Vitamin D deficiency- (present on admission)  Assessment & Plan  Vit D: 20  On supplements    Gastroesophageal reflux disease- (present on admission)  Assessment & Plan  Has hx of hiatal hernia  On Prilosec  On  Simethicone for chronic issues with gas    Dyslipidemia- (present on admission)  Assessment & Plan  On Mevacor    Iron deficiency anemia- (present on admission)  Assessment & Plan  H&H stable  On Fe & Vit C supplements

## 2020-01-05 NOTE — THERAPY
Physical Therapy   Daily Treatment     Patient Name: Guillermina Recio  Age:  79 y.o., Sex:  female  Medical Record #: [id number]  Today's Date: 1/5/2020     Precautions  Precautions: (P) Posterior Hip Precautions, Weight Bearing As Tolerated Right Lower Extremity, Fall Risk  Comments: (P) monitor BP and SpO2, fear of falling    Subjective    Patient was agreeable to POC; but admitted fatigue.      Objective       01/05/20 1501   Precautions   Precautions Posterior Hip Precautions;Weight Bearing As Tolerated Right Lower Extremity;Fall Risk   Comments monitor BP and SpO2, fear of falling   Vitals   Pulse 92   Blood Pressure  (!) 98/64   O2 (LPM) 4   O2 Delivery Nasal Cannula   Bed Mobility    Supine to Sit Stand by Assist   Sit to Supine Stand by Assist  (AE)   Sit to Stand Minimal Assist  (FWW 5x Min A -> CGA)   Scooting Stand by Assist   Rolling Supervised   Neuro-Muscular Treatments   Neuro-Muscular Treatments Weight Shift Left;Weight Shift Right;Verbal Cuing;Sequencing;Tactile Cuing;Postural Changes;Facilitation   Interdisciplinary Plan of Care Collaboration   IDT Collaboration with  Certified Nursing Assistant   Patient Position at End of Therapy In Bed;Call Light within Reach;Tray Table within Reach   Collaboration Comments Vitals   PT Total Time Spent   PT Individual Total Time Spent (Mins) 30   PT Charge Group   PT Gait Training 1   PT Therapeutic Activities 1       FIM Bed/Chair/Wheelchair Transfers Score: 5 - Standby Prompting/Supervision or Set-up  Bed/Chair/Wheelchair Transfers Description:  Adaptive equipment(SBA SPT FWW, SBA bed mob with AE)    FIM Walking Score:  1 - Total Assistance  Walking Description:  Walker, Oxygen, Extra time, Safety concerns(5-12 ft x 3 indoors with FWW; PT managed O2)      Assessment    Patient continues to be limited by fear of falling, dec weight acceptance through RLE, and impaired activity tolerance.     Plan    Continue STS training with FWW from various heights,  progressing gait endurance, and weight acceptance.

## 2020-01-06 PROCEDURE — 99233 SBSQ HOSP IP/OBS HIGH 50: CPT | Performed by: PHYSICAL MEDICINE & REHABILITATION

## 2020-01-06 PROCEDURE — 97530 THERAPEUTIC ACTIVITIES: CPT

## 2020-01-06 PROCEDURE — A9270 NON-COVERED ITEM OR SERVICE: HCPCS | Performed by: PHYSICAL MEDICINE & REHABILITATION

## 2020-01-06 PROCEDURE — A9270 NON-COVERED ITEM OR SERVICE: HCPCS | Performed by: HOSPITALIST

## 2020-01-06 PROCEDURE — 97116 GAIT TRAINING THERAPY: CPT

## 2020-01-06 PROCEDURE — 700102 HCHG RX REV CODE 250 W/ 637 OVERRIDE(OP): Performed by: PHYSICAL MEDICINE & REHABILITATION

## 2020-01-06 PROCEDURE — 700102 HCHG RX REV CODE 250 W/ 637 OVERRIDE(OP): Performed by: HOSPITALIST

## 2020-01-06 PROCEDURE — 770010 HCHG ROOM/CARE - REHAB SEMI PRIVAT*

## 2020-01-06 PROCEDURE — 99232 SBSQ HOSP IP/OBS MODERATE 35: CPT | Performed by: HOSPITALIST

## 2020-01-06 PROCEDURE — 94760 N-INVAS EAR/PLS OXIMETRY 1: CPT

## 2020-01-06 RX ADMIN — ACETAMINOPHEN 650 MG: 325 TABLET, FILM COATED ORAL at 20:38

## 2020-01-06 RX ADMIN — APIXABAN 5 MG: 5 TABLET, FILM COATED ORAL at 20:39

## 2020-01-06 RX ADMIN — FERROUS SULFATE TAB 325 MG (65 MG ELEMENTAL FE) 325 MG: 325 (65 FE) TAB at 08:18

## 2020-01-06 RX ADMIN — ACETAMINOPHEN 650 MG: 325 TABLET, FILM COATED ORAL at 08:18

## 2020-01-06 RX ADMIN — SIMETHICONE CHEW TAB 80 MG 120 MG: 80 TABLET ORAL at 08:17

## 2020-01-06 RX ADMIN — FUROSEMIDE 20 MG: 20 TABLET ORAL at 05:41

## 2020-01-06 RX ADMIN — LOVASTATIN 40 MG: 20 TABLET ORAL at 20:39

## 2020-01-06 RX ADMIN — FERROUS SULFATE TAB 325 MG (65 MG ELEMENTAL FE) 325 MG: 325 (65 FE) TAB at 16:59

## 2020-01-06 RX ADMIN — BUDESONIDE AND FORMOTEROL FUMARATE DIHYDRATE 2 PUFF: 160; 4.5 AEROSOL RESPIRATORY (INHALATION) at 20:47

## 2020-01-06 RX ADMIN — MIDODRINE HYDROCHLORIDE 10 MG: 10 TABLET ORAL at 14:18

## 2020-01-06 RX ADMIN — BUDESONIDE AND FORMOTEROL FUMARATE DIHYDRATE 2 PUFF: 160; 4.5 AEROSOL RESPIRATORY (INHALATION) at 08:19

## 2020-01-06 RX ADMIN — SIMETHICONE CHEW TAB 80 MG 120 MG: 80 TABLET ORAL at 20:39

## 2020-01-06 RX ADMIN — SIMETHICONE CHEW TAB 80 MG 120 MG: 80 TABLET ORAL at 11:10

## 2020-01-06 RX ADMIN — APIXABAN 5 MG: 5 TABLET, FILM COATED ORAL at 08:17

## 2020-01-06 RX ADMIN — MONTELUKAST 10 MG: 10 TABLET, FILM COATED ORAL at 20:39

## 2020-01-06 RX ADMIN — OXYCODONE HYDROCHLORIDE AND ACETAMINOPHEN 500 MG: 500 TABLET ORAL at 08:17

## 2020-01-06 RX ADMIN — OMEPRAZOLE 20 MG: 20 CAPSULE, DELAYED RELEASE ORAL at 08:18

## 2020-01-06 RX ADMIN — I-VITE, TAB 1000-60-2MG (60/BT) 1 TABLET: TAB at 08:18

## 2020-01-06 RX ADMIN — OXYCODONE HYDROCHLORIDE 5 MG: 5 TABLET ORAL at 20:45

## 2020-01-06 RX ADMIN — SIMETHICONE CHEW TAB 80 MG 120 MG: 80 TABLET ORAL at 16:59

## 2020-01-06 RX ADMIN — LEVOFLOXACIN 500 MG: 250 TABLET, FILM COATED ORAL at 08:18

## 2020-01-06 RX ADMIN — SENNOSIDES AND DOCUSATE SODIUM 2 TABLET: 8.6; 5 TABLET ORAL at 20:39

## 2020-01-06 RX ADMIN — ACETAMINOPHEN 650 MG: 325 TABLET, FILM COATED ORAL at 14:18

## 2020-01-06 RX ADMIN — MAGNESIUM HYDROXIDE 30 ML: 400 SUSPENSION ORAL at 16:59

## 2020-01-06 RX ADMIN — VITAMIN D, TAB 1000IU (100/BT) 4000 UNITS: 25 TAB at 08:16

## 2020-01-06 RX ADMIN — MIDODRINE HYDROCHLORIDE 10 MG: 10 TABLET ORAL at 05:41

## 2020-01-06 ASSESSMENT — PATIENT HEALTH QUESTIONNAIRE - PHQ9
SUM OF ALL RESPONSES TO PHQ9 QUESTIONS 1 AND 2: 0
2. FEELING DOWN, DEPRESSED, IRRITABLE, OR HOPELESS: NOT AT ALL
2. FEELING DOWN, DEPRESSED, IRRITABLE, OR HOPELESS: NOT AT ALL
SUM OF ALL RESPONSES TO PHQ9 QUESTIONS 1 AND 2: 0
1. LITTLE INTEREST OR PLEASURE IN DOING THINGS: NOT AT ALL
1. LITTLE INTEREST OR PLEASURE IN DOING THINGS: NOT AT ALL

## 2020-01-06 ASSESSMENT — ENCOUNTER SYMPTOMS
VOMITING: 0
FEVER: 0
NERVOUS/ANXIOUS: 0
DIARRHEA: 0
ABDOMINAL PAIN: 0
CHILLS: 0
SHORTNESS OF BREATH: 0
NAUSEA: 0

## 2020-01-06 NOTE — PROGRESS NOTES
"Rehab Progress Note     Date of Service: 1/6/2020  Chief Complaint: hip pain    Interval Events (Subjective)    Patient seen and examined in the therapy gym today. She is in the parallel bars. She continues to have orthostasis. Dr. Nicolas aware. Pain currently well controlled. She has no new complaints.     Objective:  VITAL SIGNS: BP (!) 98/67   Pulse 82   Temp 36.8 °C (98.3 °F) (Temporal)   Resp 20   Ht 1.494 m (4' 10.8\")   Wt 64 kg (141 lb 1.5 oz)   SpO2 97%   BMI 28.69 kg/m²   Gen: alert, no apparent distress  CV: regular rate and rhythm, no murmurs, right LE mild peripheral edema  Resp: clear to ascultation bilaterally, normal respiratory effort  GI: soft, non-tender abdomen, bowel sounds present    No results found for this or any previous visit (from the past 72 hour(s)).    Current Facility-Administered Medications   Medication Frequency   • midodrine (PROAMATINE) tablet 10 mg BID   • OCUVITE-LUTEIN tablet 1 tablet DAILY   • levoFLOXacin (LEVAQUIN) tablet 500 mg DAILY   • acetaminophen (TYLENOL) tablet 650 mg TID   • vitamin D (cholecalciferol) 1000 UNIT tablet 4,000 Units DAILY   • montelukast (SINGULAIR) tablet 10 mg Nightly   • omeprazole (PRILOSEC) capsule 20 mg DAILY   • simethicone (MYLICON) chewable tab 120 mg 4X/DAY WITH MEALS + NIGHTLY   • apixaban (ELIQUIS) tablet 5 mg BID   • ascorbic acid tablet 500 mg DAILY   • budesonide-formoterol (SYMBICORT) 160-4.5 MCG/ACT inhaler 2 Puff BID   • calcium carbonate (TUMS) chewable tab 500 mg QDAY PRN   • ferrous sulfate tablet 325 mg BID WITH MEALS   • ipratropium-albuterol (DUONEB) nebulizer solution Q4H PRN (RT)   • lovastatin (MEVACOR) tablet 40 mg QHS   • Respiratory Care per Protocol Continuous RT   • Pharmacy Consult Request ...Pain Management Review 1 Each PHARMACY TO DOSE   • oxyCODONE immediate-release (ROXICODONE) tablet 2.5 mg Q3HRS PRN   • oxyCODONE immediate-release (ROXICODONE) tablet 5 mg Q3HRS PRN   • tramadol (ULTRAM) 50 MG tablet 50 mg " Q4HRS PRN   • hydrALAZINE (APRESOLINE) tablet 25 mg Q8HRS PRN   • senna-docusate (PERICOLACE or SENOKOT S) 8.6-50 MG per tablet 2 Tab BID    And   • polyethylene glycol/lytes (MIRALAX) PACKET 1 Packet QDAY PRN    And   • magnesium hydroxide (MILK OF MAGNESIA) suspension 30 mL QDAY PRN    And   • bisacodyl (DULCOLAX) suppository 10 mg QDAY PRN   • artificial tears ophthalmic solution 1 Drop PRN   • benzocaine-menthol (CEPACOL) lozenge 1 Lozenge Q2HRS PRN   • mag hydrox-al hydrox-simeth (MAALOX PLUS ES or MYLANTA DS) suspension 20 mL Q2HRS PRN   • ondansetron (ZOFRAN ODT) dispertab 4 mg 4X/DAY PRN    Or   • ondansetron (ZOFRAN) syringe/vial injection 4 mg 4X/DAY PRN   • traZODone (DESYREL) tablet 50 mg QHS PRN   • sodium chloride (OCEAN) 0.65 % nasal spray 2 Spray PRN       Orders Placed This Encounter   Procedures   • Diet Order Regular     Standing Status:   Standing     Number of Occurrences:   1     Order Specific Question:   Diet:     Answer:   Regular [1]     Order Specific Question:   Texture/Fiber modifications:     Answer:   Dysphagia 3(Mechanical Soft)specify fluid consistency(question 6) [3]     Order Specific Question:   Consistency/Fluid modifications:     Answer:   Thin Liquids [3]       Assessment:  Active Hospital Problems    Diagnosis   • *Acute pulmonary embolism with acute cor pulmonale (HCC)   • Acute respiratory failure (HCC)   • Closed dislocation of right hip (HCC)   • COPD (chronic obstructive pulmonary disease) (HCC)   • Gastroesophageal reflux disease   • Vitamin D deficiency   • Dyslipidemia   • Iron deficiency anemia   • Pleural effusion   • Hypotension   • Status post right hip replacement     79 yr old female with history of recent right JAIME, admitted to acute inpatient rehab on 12/28 with debility and respiratory insufficiency with PEs.    I led and attended the weekly conference, and agree with the IDT conference documentation and plan of care as noted below.    Date of conference:  1/6/2020    Goals and barriers: See IDT note.    Biggest barriers: orthostasis, shortness of breath    Goals in next week: home evaluation    Therapy update 1/6/2020  Supervision eating  Supervision grooming  Supervision bathing  Supervision upper body dressing  Supervision lower body dressing  Min assist toileting  Mod assistbladder  Mod assist bowel  Supervision bed/chair transfer  Supervision toilet transfer  Supervision tub/shower transfer  Total assist ambulation  Max assist wheelchair propulsion  Not tested stairs  Modified Independent comprehension  Modified Independent expression  Supervision social interaction  Supervision problem solving  Supervision memory    CM/social support: daughter supportive, will care for her at discharge    Anticipated DC date: 1/14/2019    Home health: PT/OT/RN    Equip: none needed    Follow up: PCP, surgery       Medical Decision Making and Plan:    Recent right JAIME, dislocation  S/p relocation 12/19  Continue full rehab program  PT/OT, 1.5 hr each discipline, 5 days per week  Outpatient follow up with orthopedics    Dysphagia, resolved    Pain Management  Schedule tylenol  PRN oxycodone  PRN ice  Currently well controlled    Bowel  Continue bowel meds  Last BM 1/4    Bladder  Checked PVRs - 62  Not retaining  ICP for over 400 cc  Scheduled toileting    DVT prophylaxis  Eliquis    Appreciate the assistance of the hospitalist with her medical co-morbidities:    Acute bilateral PE  COPD, was on oxygen at home  Pleural effusion, Lasix  Hypotension, likely from PEs, midodrine  GERD  Iron def anemia, on supplementation  Mild RLE edema, likely from hip surgery/relocation, already on Eliquis  Possible pneumonia, continue antibiotics  Dyslipidemia  Chronic visual impairments, Add Ocuvite     Total time:  40 minutes.  I spent greater than 50% of the time for patient care, counseling, and coordination on this date, including patient face-to face time, unit/floor time with review of  records/pertinent lab data and studies, as well as discussing diagnostic evaluation/work up, planned therapeutic interventions, and future disposition of care, as per the interval events/subjective and the assessment and plan as noted above.        Salma Tejada M.D.   Physical Medicine and Rehabilitation

## 2020-01-06 NOTE — THERAPY
"Occupational Therapy  Daily Treatment     Patient Name: Guillermina Recio  Age:  79 y.o., Sex:  female  Medical Record #: [id number]  Today's Date: 1/6/2020     Precautions  Precautions: (P) Posterior Hip Precautions, Weight Bearing As Tolerated Right Lower Extremity, Fall Risk  Comments: (P) Monitor BP and O2 sat, fear of falling         Subjective    \"I just wore myself out this weekend\"     Objective       01/06/20 1031   Precautions   Precautions Posterior Hip Precautions;Weight Bearing As Tolerated Right Lower Extremity;Fall Risk   Comments Monitor BP and O2 sat, fear of falling   Sitting Upper Body Exercises   Upper Extremity Bike Level 3 Resistance  (motomed 10 minutes, 2 rest breaks)   Sitting Lower Body Exercises   Sit to Stand 1 set of 10  (in // bars)   Balance   Comments standing in // bars; walking fwd/back with bilateral UE support   OT Total Time Spent   OT Individual Total Time Spent (Mins) 60   OT Charge Group   OT Therapy Activity 4     Assessment    Pt tolerated session fair, reported fatigue today from weekend activity. BP dropped from 113/65 to 92/55 with activity in // bars. Continues to be limited by decreased endurance, decreased RLE strength, anxiety with mobility.     Plan    Integration of hip precautions into ADLs, progress to ADLs with FWW, standing tolerance/balance, increased comfort with transfers and mobility using FWW, home eval next week Thursday    "

## 2020-01-06 NOTE — REHAB-OT IDT TEAM NOTE
Occupational Therapy   Activities of Daily Living  Eating Initial:  5 - Standby Prompting/Supervision or Set-up  Eating Current:  5 - Standby Prompting/Supervision or Set-up   Eating Description:  Modified diet, Increased time, Supervision for safety, Verbal cueing  Grooming Initial:  5 - Standby Prompting/Supervision or Set-up  Grooming Current:  5 - Standby Prompting/Supervision or Set-up   Grooming Description:  (setup at sink to apply curlers, requires increased time due to visual deficits)  Bathing Initial:  0 - Not tested, patient refused  Bathing Current:  5 - Standby Prompting/Supervision or Set-up   Bathing Description:  Tub bench, Hand held shower, Supervision for safety, Verbal cueing(setup to complete in seated, requires increased time, verbal cues to locate needed items due to visual deficits)  Upper Body Dressing Initial:  5 - Standby Prompting/Supervision or Set-up  Upper Body Dressing Current:  5 - Standby Prompting/Supervision or Set-up   Upper Body Dressing Description:  Set-up of equipment  Lower Body Dressing Initial:  1 - Total Assistance  Lower Body Dressing Current:  4 - Minimal Assistance   Lower Body Dressing Description:  4 - Minimal Assistance  Toileting Initial:  1 - Total Assistance  Toileting Current:  5 - Standby Prompting/Supervision or Set-up   Toileting Description:  Grab bar, Increased time, Supervision for safety, Verbal cueing  Toilet Transfer Initial:  4 - Minimal Assistance  Toilet Transfer Current:  5 - Standby Prompting/Supervision or Set-up   Toilet Transfer Description:  5 - Standby Prompting/Supervision or Set-up  Tub / Shower Transfer Initial:  0 - Not tested, patient refused  Tub / Shower Transfer Current:  5 - Standby Prompting/Supervision or Set-up   Tub / Shower Transfer Description:  (stand pivot w/c <> shower chair via grab bar)  IADL:  Not yet addressed   Family Training/Education:  Initial family training completed with daughter Nestor and son David, home evaluation  scheduled for Thursday @ 1:30   DME/DC Recommendations:  Patient has DME needs in place, has tub transfer bench, FWW, w/c, reacher, sock aid      Strengths:  Making steady progress towards goals, Motivated for self care and independence, Pleasant and cooperative, Supportive family and Willingly participates in therapeutic activities  Barriers:  Decreased endurance, Generalized weakness, Limited mobility, Pain poorly managed, Poor activity tolerance, Poor balance and Other: anxiety     # of short term goals set= 4    # of short term goals met= 4     Occupational Therapy Goals     Problem: OT Long Term Goals     Dates: Start: 12/29/19       Description:     Goal: LTG-By discharge, patient will complete basic self care tasks     Dates: Start: 12/29/19       Description: 1) Individualized Goal:  With supervision to modified independence  2) Interventions:  OT Group Therapy, OT Self Care/ADL, OT Cognitive Skill Dev, OT Community Reintegration, OT Manual Ther Technique, OT Neuro Re-Ed/Balance, OT Therapeutic Activity, OT Evaluation and OT Therapeutic Exercise             Goal: LTG-By discharge, patient will perform bathroom transfers     Dates: Start: 12/29/19       Description: 1) Individualized Goal:  With supervision to modified independence  2) Interventions:  OT Group Therapy, OT Self Care/ADL, OT Cognitive Skill Dev, OT Community Reintegration, OT Manual Ther Technique, OT Neuro Re-Ed/Balance, OT Therapeutic Activity, OT Evaluation and OT Therapeutic Exercise                       Section completed by:  Nelly Escalona MS,OTR/L

## 2020-01-06 NOTE — PROGRESS NOTES
Received patient on bed. Conscious coherent not in cp distress. Patient on chair. Patient on o2 at 4lpm per nc. Patient verbalized no severe pain. Safety and fall precaution reinforced. Patient on hip precaution.

## 2020-01-06 NOTE — REHAB-RESPIRATORY IDT TEAM NOTE
Respiratory Therapy   Respiratory Therapy    Pt is stable on her home oxygen levels of 4 lpm during the day and 5 lpm at night.  Her SATS are in the high 90s.  Section completed by:  Kate Lim, RRT

## 2020-01-06 NOTE — REHAB-PT IDT TEAM NOTE
Physical Therapy   Mobility  Bed mobility:   SBA supine to/from sit  Bed /Chair/Wheelchair Transfer Initial:  1 - Total Assistance  Bed /Chair/Wheelchair Transfer Current:  5 - Standby Prompting/Supervision    FWW CGA   Bed/Chair/Wheelchair Transfer Description:  Increased time, Adaptive equipment, Supervision for safety(FWW)  Walk Initial:  0 - Not tested, patient refused  Walk Current:  1 - Total Assistance   parallel bars 16 FT, CGA,  FWW 12 FT Quin   Walk Description:  Two hand rails, Walker, Oxygen, Extra time, Safety concerns, Assist device/equipment(Parallel bars 16 FT, CGA,  FWW 12 FT Quin )  Wheelchair Initial:  2 - Max Assistance  Wheelchair Current:  2 - Max Assistance   Wheelchair Description:  Verbal cueing, Supervision for safety, Extra time(90' x 1 B UE SBA vc for obstacle negotiation)  Stairs Initial:  0 - Not tested,unsafe activity  Stairs Current: 0 - Not tested,unsafe activity   Stairs Description:    Patient/Family Training/Education: In progress  DME/DC Recommendations:  TBD  Strengths:  Making steady progress towards goals, Motivated for self care and independence, Pleasant and cooperative, Supportive family and Willingly participates in therapeutic activities  Barriers:   Other: Low blood pressure, impaired tolerance for activity, fear of falling, impaired gait, balance, transfers  # of short term goals set= 3  # of short term goals met=1  Physical Therapy Problems     Problem: Mobility     Dates: Start: 12/29/19       Description:     Goal: STG-Within one week, patient will propel wheelchair household distances     Dates: Start: 12/29/19       Description: 1) Individualized goal:  W/C mobility x 100 feet with BUE and SBA  2) Interventions:  PT Group Therapy, PT E Stim Attended, PT Gait Training, PT Therapeutic Exercises, PT Neuro Re-Ed/Balance, PT Aquatic Therapy, PT Therapeutic Activity, PT Manual Therapy and PT Evaluation       Note:     Goal Note filed on 12/31/19 0829 by Kala Hall, PT     Dustin completed 12/29                  Goal: STG-Within one week, patient will ambulate household distance     Dates: Start: 12/29/19       Description: 1) Individualized goal: AMB x 10 feet with FWW and min A  2) Interventions:  PT Group Therapy, PT E Stim Attended, PT Gait Training, PT Therapeutic Exercises, PT Neuro Re-Ed/Balance, PT Aquatic Therapy, PT Therapeutic Activity, PT Manual Therapy and PT Evaluation     Note:     Goal Note filed on 12/31/19 0829 by Kala Hall, PT    Dustin completed 12/29                        Problem: Mobility Transfers     Dates: Start: 01/06/20       Description:     Goal: STG-Within one week, patient will transfer bed to chair     Dates: Start: 01/06/20       Description: 1) Individualized goal: Transfer bed to/from chair SBA, FWW, 1 week  2) Interventions:  PT Gait Training, PT Therapeutic Exercises, PT Neuro Re-Ed/Balance and PT Therapeutic Activity                   Problem: PT-Long Term Goals     Dates: Start: 12/29/19       Description:     Goal: LTG-By discharge, patient will ambulate     Dates: Start: 12/29/19       Description: 1) Individualized goal: AMB x 50 feet with FWW with SPV  2) Interventions:  PT Group Therapy, PT E Stim Attended, PT Gait Training, PT Therapeutic Exercises, PT Neuro Re-Ed/Balance, PT Aquatic Therapy, PT Therapeutic Activity, PT Manual Therapy and PT Evaluation             Goal: LTG-By discharge, patient will transfer one surface to another     Dates: Start: 12/29/19       Description: 1) Individualized goal:  SPT with FWW, mod I   2) Interventions:  PT Group Therapy, PT E Stim Attended, PT Gait Training, PT Therapeutic Exercises, PT Neuro Re-Ed/Balance, PT Aquatic Therapy, PT Therapeutic Activity, PT Manual Therapy and PT Evaluation             Goal: LTG-By discharge, patient will transfer in/out of a car     Dates: Start: 12/29/19       Description: 1) Individualized goal:  Car transfer with FWW and SPV  2) Interventions:  PT Group Therapy, PT E  "Stim Attended, PT Gait Training, PT Therapeutic Exercises, PT Neuro Re-Ed/Balance, PT Aquatic Therapy, PT Therapeutic Activity, PT Manual Therapy and PT Evaluation             Goal: LTG-By discharge, patient will     Dates: Start: 12/29/19       Description: 1) Individualized goal:  Up/down 6\" curb with FWW and CGA  2) Interventions:  PT Group Therapy, PT E Stim Attended, PT Gait Training, PT Therapeutic Exercises, PT Neuro Re-Ed/Balance, PT Aquatic Therapy, PT Therapeutic Activity, PT Manual Therapy and PT Evaluation                            Section completed by:  BIANCA Moss     "

## 2020-01-06 NOTE — REHAB-COLLABORATIVE ONGOING IDT TEAM NOTE
Weekly Interdisciplinary Team Conference Note    Weekly Interdisciplinary Team Conference # 2  Date:  1/6/2020    Clinicians present and reporting at team conference include the following:   MD: Salma Tejada MD   RN:  Clair Becker RN   PT:   Steve Hoyt PT, MPT, MEd  OT:  Nelly Escalona MS, OTR/L    ST:  Not Applicable.   CM:  Irma Jose RN [facility]  REC:  Not Applicable  RT:  Kate Lim RRT  RPh:  Isidro Venegas [facility]  Other:   None  All reporting clinicians have a working knowledge of this patient's plan of care.    Targeted DC Date:  1/14/2020     Medical    Patient Active Problem List    Diagnosis Date Noted   • Acute pulmonary embolism with acute cor pulmonale (HCC) 12/14/2019     Priority: High   • Acute respiratory failure (HCC) 12/12/2019     Priority: High   • Ileus ([facility]) 11/25/2019     Priority: High   • Closed dislocation of right hip ([facility]) 12/14/2019     Priority: Medium   • COPD (chronic obstructive pulmonary disease) ([facility]) 11/25/2019     Priority: Medium   • NSTEMI (non-ST elevated myocardial infarction) ([facility]) 09/12/2017     Priority: Medium   • Abnormal stress test 12/16/2016     Priority: Medium   • HTN (hypertension) 06/25/2009     Priority: Medium   • Chronic use of opiate drugs therapeutic purposes 07/07/2017     Priority: Low   • Chronic bilateral low back pain without sciatica 07/07/2017     Priority: Low   • Daytime somnolence 12/16/2016     Priority: Low   • Insomnia 12/16/2016     Priority: Low   • Gastroesophageal reflux disease 12/16/2016     Priority: Low   • Vitamin D deficiency 12/16/2016     Priority: Low   • Chronic narcotic use 12/16/2016     Priority: Low   • Controlled substance agreement signed 12/16/2016     Priority: Low   • Chronic pain of both shoulders 12/16/2016     Priority: Low   • Chronic left-sided low back pain without sciatica 12/16/2016     Priority: Low   • Dyslipidemia 08/01/2016     Priority: Low   • Scalp itch 06/29/2016     Priority: Low   • Polypharmacy  06/29/2016     Priority: Low   • Dermatochalasis 07/14/2015     Priority: Low   • VAGINAL LESION 11/10/2009     Priority: Low   • Pelvic pain 11/10/2009     Priority: Low   • Osteopenia 06/25/2009     Priority: Low   • Dysuria 06/25/2009     Priority: Low   • Iron deficiency anemia 06/25/2009     Priority: Low   • Hip pain 06/25/2009     Priority: Low   • Elbow pain 06/25/2009     Priority: Low   • Pneumonia 01/03/2020   • Pleural effusion 12/30/2019   • Hypotension 12/26/2019   • Pulmonary hypertension (HCC) 12/26/2019   • Hip dislocation, right (HCC) 12/12/2019   • Syncope 12/12/2019   • Lung nodule 11/28/2019   • Impaired mobility and ADLs 11/27/2019   • LFT elevation 11/25/2019   • Status post right hip replacement 11/25/2019   • Hyperglycemia 11/25/2019   • Impaired vision 05/28/2019   • 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 **        Nursing  Diet/Nutrition:  Dysphagia III-Mechanical Soft and Thin Liquids  Medication Administration:  Float Whole with Puree  % consumed at meals in last 24 hours:  Consumed % of meals per documentation.  Meal/Snack Consumption for the past 24 hrs:   Oral Nutrition   01/05/20 1900 Dinner;Between % Consumed     Snack schedule:  None  Supplement:  Other: none  Appetite:  Good  Fluid Intake/Output in past 24 hours: No intake/output data recorded.  Admit Weight:  Weight: 65.1 kg (143 lb 8 oz)  Weight Last 7 Days   [64 kg (141 lb 1.5 oz)] 64 kg (141 lb 1.5 oz) (01/05 1800)    Pain Issues:    Location:  Back;Hip;Head (01/05 2239)  Right (01/05 2239)         Severity:  Moderate   Scheduled pain medications:  Scheduled Tylenol     PRN pain medications used in last 24 hours:  oxycodone immediate release (ROXICODONE)     Non Pharmacologic Interventions:  emotional support, environmental changes, relaxation, repositioned and rest  Effectiveness of pain management plan:  good=patient states acceptable comfort after interventions    Bowel:    Bowel Assist Initial Score:  1 - Total Assistance  Bowel Assist Current Score:  3 - Moderate Assistance  Bowl Accidents in last 7 days:     Last bowel movement: 01/04/20  Stool Description: Brown, Medium     Usual bowel pattern:  every 2-3 days  Scheduled bowel medications:  senna-docusate (PERICOLACE or SENOKOT S)   PRN bowel medications used in last 24 hours:  None  Nursing Interventions:  Increased time, Supervision, Verbal cueing, Set-up, Positioning on commode/toilet, Pad, Scheduled medication, PRN medication  Effectiveness of bowel program:   poor=greater than 2 days with no bowel movement  Bladder:    Bladder Assist Initial Score:  1 - Total Assistance  Bladder Assist Current Score:  3 - Moderate Assistance  Bladder Accidents in last 7 days:     PVR range for past 24-48 hours: -- ()  Medications affecting bladder:  None    Time void schedule/voiding pattern:  Voiding every 2-4 hours  Interventions:  Increased time, Supervision, Verbal cueing, Time void patient initiated  Effectiveness of bladder training:  Good=regular, predictable, emptying of bladder, patient initiates time voiding    Wound:         Patient Lines/Drains/Airways Status    Active Current Wounds     None                   Sleep/wake cycle:   Average hours slept:  Sleeps 4-6 hours without waking  Sleep medication usage:  Other trazodone PO PRN    Patient/Family Training/Education:  Fall Prevention, General Self Care, Medication Management, Pain Management, Respiratory Hygiene, Safe Transfers, Safety and Wound Care  Discharge Supply Recommendations:  Wound Care Supplies  Strengths: Alert and oriented, Willingly participates in therapeutic activities, Able to follow instructions, Supportive family, Pleasant and cooperative,  Effective communication skills, Good carryover of learning, Motivated for self care and independence and Good endurance   Barriers:   Constipation, Generalized weakness and Limited mobility       Nursing Problems     Problem: Bowel/Gastric:     Description:     Goal: Normal bowel function is maintained or improved     Description:           Goal: Will not experience complications related to bowel motility     Description:                 Problem: Communication     Description:     Goal: The ability to communicate needs accurately and effectively will improve     Description:                 Problem: Discharge Barriers/Planning     Description:     Goal: Patient's continuum of care needs will be met     Description:                 Problem: Infection     Description:     Goal: Will remain free from infection     Description:                 Problem: Knowledge Deficit     Description:     Goal: Knowledge of disease process/condition, treatment plan, diagnostic tests, and medications will improve     Description:           Goal: Knowledge of the prescribed therapeutic regimen will improve     Description:                 Problem: Pain Management     Description:     Goal: Pain level will decrease to patient's comfort goal     Description:                 Problem: Respiratory:     Description:     Goal: Respiratory status will improve     Description:                 Problem: Safety     Description:     Goal: Will remain free from injury     Description:           Goal: Will remain free from falls     Description:                 Problem: Skin Integrity     Description:     Goal: Risk for impaired skin integrity will decrease     Description:                 Problem: Venous Thromboembolism (VTW)/Deep Vein Thrombosis (DVT) Prevention:     Description:     Goal: Patient will participate in Venous Thrombosis (VTE)/Deep Vein Thrombosis (DVT)Prevention Measures     Description:                        Long Term Goals:   At  discharge patient will be able to function safely at home and in the community with support.    Section completed by:  Felipa Baker R.N.        Respiratory Therapy    Pt is stable on her home oxygen levels of 4 lpm during the day and 5 lpm at night.  Her SATS are in the high 90s.  Section completed by:  Kate Lim, RRT    Mobility  Bed mobility:   SBA supine to/from sit  Bed /Chair/Wheelchair Transfer Initial:  1 - Total Assistance  Bed /Chair/Wheelchair Transfer Current:  5 - Standby Prompting/Supervision    FWW CGA   Bed/Chair/Wheelchair Transfer Description:  Increased time, Adaptive equipment, Supervision for safety(FWW)  Walk Initial:  0 - Not tested, patient refused  Walk Current:  1 - Total Assistance   parallel bars 16 FT, CGA,  FWW 12 FT Quin   Walk Description:  Two hand rails, Walker, Oxygen, Extra time, Safety concerns, Assist device/equipment(Parallel bars 16 FT, CGA,  FWW 12 FT Quin )  Wheelchair Initial:  2 - Max Assistance  Wheelchair Current:  2 - Max Assistance   Wheelchair Description:  Verbal cueing, Supervision for safety, Extra time(90' x 1 B UE SBA vc for obstacle negotiation)  Stairs Initial:  0 - Not tested,unsafe activity  Stairs Current: 0 - Not tested,unsafe activity   Stairs Description:    Patient/Family Training/Education: In progress  DME/DC Recommendations:  TBD  Strengths:  Making steady progress towards goals, Motivated for self care and independence, Pleasant and cooperative, Supportive family and Willingly participates in therapeutic activities  Barriers:   Other: Low blood pressure, impaired tolerance for activity, fear of falling, impaired gait, balance, transfers  # of short term goals set= 3  # of short term goals met=1  Physical Therapy Problems     Problem: Mobility     Dates: Start: 12/29/19       Description:     Goal: STG-Within one week, patient will propel wheelchair household distances     Dates: Start: 12/29/19       Description: 1) Individualized goal:   W/C mobility x 100 feet with BUE and SBA  2) Interventions:  PT Group Therapy, PT E Stim Attended, PT Gait Training, PT Therapeutic Exercises, PT Neuro Re-Ed/Balance, PT Aquatic Therapy, PT Therapeutic Activity, PT Manual Therapy and PT Evaluation       Note:     Goal Note filed on 12/31/19 0829 by Kala Hall, PT    Eval completed 12/29                  Goal: STG-Within one week, patient will ambulate household distance     Dates: Start: 12/29/19       Description: 1) Individualized goal: AMB x 10 feet with FWW and min A  2) Interventions:  PT Group Therapy, PT E Stim Attended, PT Gait Training, PT Therapeutic Exercises, PT Neuro Re-Ed/Balance, PT Aquatic Therapy, PT Therapeutic Activity, PT Manual Therapy and PT Evaluation     Note:     Goal Note filed on 12/31/19 0829 by Kala Hall, PT    Dustin completed 12/29                        Problem: Mobility Transfers     Dates: Start: 01/06/20       Description:     Goal: STG-Within one week, patient will transfer bed to chair     Dates: Start: 01/06/20       Description: 1) Individualized goal: Transfer bed to/from chair SBA, FWW, 1 week  2) Interventions:  PT Gait Training, PT Therapeutic Exercises, PT Neuro Re-Ed/Balance and PT Therapeutic Activity                   Problem: PT-Long Term Goals     Dates: Start: 12/29/19       Description:     Goal: LTG-By discharge, patient will ambulate     Dates: Start: 12/29/19       Description: 1) Individualized goal: AMB x 50 feet with FWW with SPV  2) Interventions:  PT Group Therapy, PT E Stim Attended, PT Gait Training, PT Therapeutic Exercises, PT Neuro Re-Ed/Balance, PT Aquatic Therapy, PT Therapeutic Activity, PT Manual Therapy and PT Evaluation             Goal: LTG-By discharge, patient will transfer one surface to another     Dates: Start: 12/29/19       Description: 1) Individualized goal:  SPT with FWW, mod I   2) Interventions:  PT Group Therapy, PT E Stim Attended, PT Gait Training, PT Therapeutic Exercises, PT Neuro  "Re-Ed/Balance, PT Aquatic Therapy, PT Therapeutic Activity, PT Manual Therapy and PT Evaluation             Goal: LTG-By discharge, patient will transfer in/out of a car     Dates: Start: 12/29/19       Description: 1) Individualized goal:  Car transfer with FWW and SPV  2) Interventions:  PT Group Therapy, PT E Stim Attended, PT Gait Training, PT Therapeutic Exercises, PT Neuro Re-Ed/Balance, PT Aquatic Therapy, PT Therapeutic Activity, PT Manual Therapy and PT Evaluation             Goal: LTG-By discharge, patient will     Dates: Start: 12/29/19       Description: 1) Individualized goal:  Up/down 6\" curb with FWW and CGA  2) Interventions:  PT Group Therapy, PT E Stim Attended, PT Gait Training, PT Therapeutic Exercises, PT Neuro Re-Ed/Balance, PT Aquatic Therapy, PT Therapeutic Activity, PT Manual Therapy and PT Evaluation                            Section completed by:  Steve Hoyt MSPTYRONE    Activities of Daily Living  Eating Initial:  5 - Standby Prompting/Supervision or Set-up  Eating Current:  5 - Standby Prompting/Supervision or Set-up   Eating Description:  Modified diet, Increased time, Supervision for safety, Verbal cueing  Grooming Initial:  5 - Standby Prompting/Supervision or Set-up  Grooming Current:  5 - Standby Prompting/Supervision or Set-up   Grooming Description:  (setup at sink to apply curlers, requires increased time due to visual deficits)  Bathing Initial:  0 - Not tested, patient refused  Bathing Current:  5 - Standby Prompting/Supervision or Set-up   Bathing Description:  Tub bench, Hand held shower, Supervision for safety, Verbal cueing(setup to complete in seated, requires increased time, verbal cues to locate needed items due to visual deficits)  Upper Body Dressing Initial:  5 - Standby Prompting/Supervision or Set-up  Upper Body Dressing Current:  5 - Standby Prompting/Supervision or Set-up   Upper Body Dressing Description:  Set-up of equipment  Lower Body Dressing Initial:  1 - " Total Assistance  Lower Body Dressing Current:  4 - Minimal Assistance   Lower Body Dressing Description:  4 - Minimal Assistance  Toileting Initial:  1 - Total Assistance  Toileting Current:  5 - Standby Prompting/Supervision or Set-up   Toileting Description:  Grab bar, Increased time, Supervision for safety, Verbal cueing  Toilet Transfer Initial:  4 - Minimal Assistance  Toilet Transfer Current:  5 - Standby Prompting/Supervision or Set-up   Toilet Transfer Description:  5 - Standby Prompting/Supervision or Set-up  Tub / Shower Transfer Initial:  0 - Not tested, patient refused  Tub / Shower Transfer Current:  5 - Standby Prompting/Supervision or Set-up   Tub / Shower Transfer Description:  (stand pivot w/c <> shower chair via grab bar)  IADL:  Not yet addressed   Family Training/Education:  Initial family training completed with daughter Nestor and son David, home evaluation scheduled for Thursday @ 1:30   DME/DC Recommendations:  Patient has DME needs in place, has tub transfer bench, FWW, w/c, reacher, sock aid       Strengths:  Making steady progress towards goals, Motivated for self care and independence, Pleasant and cooperative, Supportive family and Willingly participates in therapeutic activities  Barriers:  Decreased endurance, Generalized weakness, Limited mobility, Pain poorly managed, Poor activity tolerance, Poor balance and Other: anxiety     # of short term goals set= 4    # of short term goals met= 4     Occupational Therapy Goals     Problem: OT Long Term Goals     Dates: Start: 12/29/19       Description:     Goal: LTG-By discharge, patient will complete basic self care tasks     Dates: Start: 12/29/19       Description: 1) Individualized Goal:  With supervision to modified independence  2) Interventions:  OT Group Therapy, OT Self Care/ADL, OT Cognitive Skill Dev, OT Community Reintegration, OT Manual Ther Technique, OT Neuro Re-Ed/Balance, OT Therapeutic Activity, OT Evaluation and OT  Therapeutic Exercise             Goal: LTG-By discharge, patient will perform bathroom transfers     Dates: Start: 12/29/19       Description: 1) Individualized Goal:  With supervision to modified independence  2) Interventions:  OT Group Therapy, OT Self Care/ADL, OT Cognitive Skill Dev, OT Community Reintegration, OT Manual Ther Technique, OT Neuro Re-Ed/Balance, OT Therapeutic Activity, OT Evaluation and OT Therapeutic Exercise                       Section completed by:  Nelly Escalona MS,OTR/L    Cognitive Linquistic Functions  Comprehension Initial:  6 - Modified Independent  Comprehension Current:  6 - Modified Independent   Comprehension Description:  Glasses(legally blind)  Expression Initial:  6 - Modified Independent  Expression Current:  6 - Modified Independent   Expression Description:  Verbal cueing  Social Interaction Initial:  5 - Stand-by Prompting/Supervision or Set-up  Social Interaction Current:  5 - Stand-by Prompting/Supervision or Set-up   Social Interaction Description:  Increased time, Verbal cues  Problem Solving Initial:  5 - Standby Prompting/Supervision or Set-up  Problem Solving Current:  5 - Standby Prompting/Supervision or Set-up   Problem Solving Description:  Verbal cueing, Increased time  Memory Initial:  6 - Modified Independent  Memory Current:  5 - Standby Prompting/Supervision or Set-up   Memory Description:  Therapy schedule  Executive Functioning / Organization Initial:     Executive Functioning / Organization Current:      Executive Functioning Desciption:  TBA  Swallowing  Swallowing Status:  Dysphagia III/thin liquid diet. Patient describes dysphagia III textures as baseline diet d/t poor dentition. Likely d/c from t-dine after lunch today.   Orders Placed This Encounter   Procedures   • Diet Order Regular     Standing Status:   Standing     Number of Occurrences:   1     Order Specific Question:   Diet:     Answer:   Regular [1]     Order Specific Question:   Texture/Fiber  modifications:     Answer:   Dysphagia 3(Mechanical Soft)specify fluid consistency(question 6) [3]     Order Specific Question:   Consistency/Fluid modifications:     Answer:   Thin Liquids [3]     Behavior Modification Plan  Keep instructions simple/concrete, Give clear feedback, Set clear goals and Redirect to task/topic  Assistive Technology  Low/Impaired vision equipment and Low tech: Calendar, planner, schedule, alarms/timers, pill organizer, post-it notes, lists  Family Training/Education:  Not completed  DC Recommendations:   Further ST is not indicated at this time. D/C speech therapy.   Strengths:  Able to follow instructions and Making steady progress towards goals  Barriers:  Other: motivation  # of short term goals set=2  # of short term goals met=2  Speech Therapy Problems     Problem: Speech/Swallowing LTGs     Dates: Start: 12/29/19       Description:     Goal: LTG-By discharge, patient will safely swallow     Dates: Start: 12/29/19       Description: 1) Individualized goal:  Regular textures and thin liquids without aspiration.    2) Interventions:  SLP Swallowing Dysfunction Treatment, SLP Oral Pharyngeal Evaluation and SLP Group Treatment                   Problem: Swallowing STGs     Dates: Start: 12/31/19       Description:     Goal: STG-Within one week, patient will safely swallow     Dates: Start: 12/31/19       Description: 1) Individualized goal:  Dysphagia III and thin liquids without overt s/sx of aspiration   2) Interventions:  SLP Swallowing Dysfunction Treatment, SLP Oral Pharyngeal Evaluation, SLP Video Swallow Evaluation, SLP Self Care / ADL Training  and SLP Group Treatment                          Section completed by:  Inez Wells MS,CCC-SLP          REHAB-Pharmacy IDT Team Note by Isidro Venegas RPH at 1/3/2020  5:03 PM  Version 1 of 1    Author:  Isidro Venegas RPH Service:  -- Author Type:  Pharmacist    Filed:  1/3/2020  5:04 PM Date of Service:  1/3/2020  5:03  PM Status:  Signed    :  Isidro Venegas [facility] (Pharmacist)         Pharmacy   Pharmacy  Antibiotics/Antifungals/Antivirals:  Medication:      Active Orders (From admission, onward)    Ordered     Ordering Provider       Fri Pepe 3, 2020 10:22 AM    01/03/20 1022  levoFLOXacin (LEVAQUIN) tablet 500 mg  DAILY      Nicolas Nicolas M.D.        Route:         po  Stop Date:  1/10/2020  Reason: Pneumonia  Antihypertensives/Cardiac:  Medication:    Orders (72h ago, onward)     Start     Ordered    01/03/20 1130  midodrine (PROAMATINE) tablet 10 mg  3 TIMES DAILY WITH MEALS      01/03/20 1022    12/29/19 0600  furosemide (LASIX) tablet 20 mg  Q DAY      12/28/19 1606    12/28/19 2100  lovastatin (MEVACOR) tablet 40 mg  EVERY BEDTIME      12/28/19 1606    12/28/19 1845  midodrine (PROAMATINE) tablet 15 mg  3 TIMES DAILY WITH MEALS,   Status:  Discontinued      12/28/19 1606    12/28/19 1612  hydrALAZINE (APRESOLINE) tablet 25 mg  EVERY 8 HOURS PRN      12/28/19 1612              Patient Vitals for the past 24 hrs:   BP Pulse   01/03/20 1400 112/72 98   01/03/20 1218 -- 76   01/03/20 0658 108/64 74   01/02/20 1855 120/65 73     Anticoagulation:  Medication: Eliquis    Other key medications: A review of the medication list reveals no issues at this time.    Section completed by: Isidro Venegas [facility][[email or web address]]     Attribution Key     AW.1 - Isidro Venegas [facility] on 1/3/2020  5:03 PM                  DC Planning  DC destination/dispostion: To ground level apartment with daughter's support.     DC Needs:  MD f/u appointments - PCP, Surgery. Anticipate home health care - RN/PT/OT/ST. Has a FWW, WC and oxygen concentrator plus portable. No additional DME needs identified at this time.      Barriers to discharge: Functional deficits. Fear of falling.       Strengths: Motivated. Supportive son and daughter.     Section completed by:  Irma Jose R.N.      Physician Summary  Salma Tejada MD participated and led team conference  discussion.

## 2020-01-06 NOTE — REHAB-NURSING IDT TEAM NOTE
Nursing   Nursing  Diet/Nutrition:  Dysphagia III-Mechanical Soft and Thin Liquids  Medication Administration:  Float Whole with Puree  % consumed at meals in last 24 hours:  Consumed % of meals per documentation.  Meal/Snack Consumption for the past 24 hrs:   Oral Nutrition   01/05/20 1900 Dinner;Between % Consumed     Snack schedule:  None  Supplement:  Other: none  Appetite:  Good  Fluid Intake/Output in past 24 hours: No intake/output data recorded.  Admit Weight:  Weight: 65.1 kg (143 lb 8 oz)  Weight Last 7 Days   [64 kg (141 lb 1.5 oz)] 64 kg (141 lb 1.5 oz) (01/05 1800)    Pain Issues:    Location:  Back;Hip;Head (01/05 2239)  Right (01/05 2239)         Severity:  Moderate   Scheduled pain medications:  Scheduled Tylenol     PRN pain medications used in last 24 hours:  oxycodone immediate release (ROXICODONE)    Non Pharmacologic Interventions:  emotional support, environmental changes, relaxation, repositioned and rest  Effectiveness of pain management plan:  good=patient states acceptable comfort after interventions    Bowel:    Bowel Assist Initial Score:  1 - Total Assistance  Bowel Assist Current Score:  3 - Moderate Assistance  Bowl Accidents in last 7 days:     Last bowel movement: 01/04/20  Stool Description: Brown, Medium     Usual bowel pattern:  every 2-3 days  Scheduled bowel medications:  senna-docusate (PERICOLACE or SENOKOT S)   PRN bowel medications used in last 24 hours:  None  Nursing Interventions:  Increased time, Supervision, Verbal cueing, Set-up, Positioning on commode/toilet, Pad, Scheduled medication, PRN medication  Effectiveness of bowel program:   poor=greater than 2 days with no bowel movement  Bladder:    Bladder Assist Initial Score:  1 - Total Assistance  Bladder Assist Current Score:  3 - Moderate Assistance  Bladder Accidents in last 7 days:     PVR range for past 24-48 hours: -- ()  Medications affecting bladder:  None    Time void schedule/voiding pattern:   Voiding every 2-4 hours  Interventions:  Increased time, Supervision, Verbal cueing, Time void patient initiated  Effectiveness of bladder training:  Good=regular, predictable, emptying of bladder, patient initiates time voiding    Wound:         Patient Lines/Drains/Airways Status    Active Current Wounds     None                   Sleep/wake cycle:   Average hours slept:  Sleeps 4-6 hours without waking  Sleep medication usage:  Other trazodone PO PRN    Patient/Family Training/Education:  Fall Prevention, General Self Care, Medication Management, Pain Management, Respiratory Hygiene, Safe Transfers, Safety and Wound Care  Discharge Supply Recommendations:  Wound Care Supplies  Strengths: Alert and oriented, Willingly participates in therapeutic activities, Able to follow instructions, Supportive family, Pleasant and cooperative, Effective communication skills, Good carryover of learning, Motivated for self care and independence and Good endurance   Barriers:   Constipation, Generalized weakness and Limited mobility       Nursing Problems     Problem: Bowel/Gastric:     Description:     Goal: Normal bowel function is maintained or improved     Description:           Goal: Will not experience complications related to bowel motility     Description:                 Problem: Communication     Description:     Goal: The ability to communicate needs accurately and effectively will improve     Description:                 Problem: Discharge Barriers/Planning     Description:     Goal: Patient's continuum of care needs will be met     Description:                 Problem: Infection     Description:     Goal: Will remain free from infection     Description:                 Problem: Knowledge Deficit     Description:     Goal: Knowledge of disease process/condition, treatment plan, diagnostic tests, and medications will improve     Description:           Goal: Knowledge of the prescribed therapeutic regimen will improve      Description:                 Problem: Pain Management     Description:     Goal: Pain level will decrease to patient's comfort goal     Description:                 Problem: Respiratory:     Description:     Goal: Respiratory status will improve     Description:                 Problem: Safety     Description:     Goal: Will remain free from injury     Description:           Goal: Will remain free from falls     Description:                 Problem: Skin Integrity     Description:     Goal: Risk for impaired skin integrity will decrease     Description:                 Problem: Venous Thromboembolism (VTW)/Deep Vein Thrombosis (DVT) Prevention:     Description:     Goal: Patient will participate in Venous Thrombosis (VTE)/Deep Vein Thrombosis (DVT)Prevention Measures     Description:                        Long Term Goals:   At discharge patient will be able to function safely at home and in the community with support.    Section completed by:  Felipa Baker R.N.

## 2020-01-06 NOTE — CARE PLAN
Problem: Bathing  Goal: STG-Within one week, patient will bathe  Description  1) Individualized Goal:  With supervision  2) Interventions:  OT Group Therapy, OT Self Care/ADL, OT Cognitive Skill Dev, OT Community Reintegration, OT Manual Ther Technique, OT Neuro Re-Ed/Balance, OT Therapeutic Activity, OT Evaluation and OT Therapeutic Exercise      1/6/2020 0820 by Nelly Escalona MS,OTR/L  Outcome: MET  1/6/2020 0819 by Nelly Escalona MS,OTR/L  Reactivated     Problem: Dressing  Goal: STG-Within one week, patient will dress LB  Description  1) Individualized Goal:  With min assist  2) Interventions:  OT Group Therapy, OT Self Care/ADL, OT Cognitive Skill Dev, OT Community Reintegration, OT Manual Ther Technique, OT Neuro Re-Ed/Balance, OT Therapeutic Activity, OT Evaluation and OT Therapeutic Exercise      1/6/2020 0820 by Nelly Escalona MS,OTR/L  Outcome: MET  1/6/2020 0819 by Nelly Escalona MS,OTR/L  Reactivated     Problem: Toileting  Goal: STG-Within one week, patient will complete toileting tasks  Description  1) Individualized Goal:  With mod assist  2) Interventions:  OT Group Therapy, OT Self Care/ADL, OT Cognitive Skill Dev, OT Community Reintegration, OT Manual Ther Technique, OT Neuro Re-Ed/Balance, OT Therapeutic Activity, OT Evaluation and OT Therapeutic Exercise     Outcome: MET     Problem: Functional Transfers  Goal: STG-Within one week, patient will transfer to toilet  Description  1) Individualized Goal:  With supervision  2) Interventions:  OT Group Therapy, OT Self Care/ADL, OT Cognitive Skill Dev, OT Community Reintegration, OT Manual Ther Technique, OT Neuro Re-Ed/Balance, OT Therapeutic Activity, OT Evaluation and OT Therapeutic Exercise      1/6/2020 0820 by Nelly Escalona MS,OTR/L  Outcome: MET  1/6/2020 0819 by Nelly Escalona MS,OTR/L  Reactivated

## 2020-01-06 NOTE — PROGRESS NOTES
[facility] Medicine Daily Progress Note    Date of Service  1/6/2020    Chief Complaint:  Hypotension  Fluid overload  PE    Interval History:  No significant events or changes since last visit    Review of Systems  Review of Systems   Constitutional: Negative for chills and fever.   Respiratory: Negative for shortness of breath.    Cardiovascular: Negative for chest pain.   Gastrointestinal: Negative for abdominal pain, diarrhea, nausea and vomiting.   Psychiatric/Behavioral: The patient is not nervous/anxious.         Physical Exam  Temp:  [36.6 °C (97.8 °F)-36.8 °C (98.3 °F)] 36.8 °C (98.3 °F)  Pulse:  [] 105  Resp:  [18-20] 20  BP: ()/(39-64) 78/39  SpO2:  [85 %-93 %] 93 %    Physical Exam  Vitals signs and nursing note reviewed.   Constitutional:       Appearance: Normal appearance.   HENT:      Head: Atraumatic.   Eyes:      Conjunctiva/sclera: Conjunctivae normal.      Pupils: Pupils are equal, round, and reactive to light.   Neck:      Musculoskeletal: Normal range of motion and neck supple.   Cardiovascular:      Rate and Rhythm: Normal rate and regular rhythm.   Pulmonary:      Effort: Pulmonary effort is normal.      Breath sounds: No stridor. No wheezing or rales.      Comments: Has diminished breath sounds  Abdominal:      General: There is no distension.      Palpations: Abdomen is soft.      Tenderness: There is no tenderness.   Musculoskeletal:      Right lower leg: No edema.      Left lower leg: No edema.   Skin:     General: Skin is warm and dry.      Findings: No rash.   Neurological:      Mental Status: She is alert and oriented to person, place, and time.   Psychiatric:         Mood and Affect: Mood normal.         Behavior: Behavior normal.         Fluids    Intake/Output Summary (Last 24 hours) at 1/6/2020 0956  Last data filed at 1/6/2020 0900  Gross per 24 hour   Intake 480 ml   Output --   Net 480 ml       Laboratory                      Assessment/Plan  * Acute pulmonary embolism  with acute cor pulmonale (HCC)- (present on admission)  Assessment & Plan  CTA (12/14): shows B/L PE with right heart strain  Echo: EF 65-70% with mod PHTN  On Eliquis  Note: recent elevated Trop & BNP likely 2nd to increased right heart pressures from PE  Monitor    Closed dislocation of right hip (HCC)- (present on admission)  Assessment & Plan  2nd to syncopy with GLF  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)    COPD (chronic obstructive pulmonary disease) (HCC)- (present on admission)  Assessment & Plan  Off Seebri -- 2nd to SE of hypotension (last dose 1/1 am)  On Symbicort  On Singular  Resp & O2 protocols as needed    Pneumonia  Assessment & Plan  S/P low grade temp  No leukocytosis but has leukopenia  CXRs over the last week suggest possible pneumonia  U/A neg  On Levaquin (thru 1/9)    Pleural effusion  Assessment & Plan  CXR (12/30): shows small B/L pleural effusions  No edema  No lung crackles  Elevated BNP (likley from PE with heart strain): 12,922 (12/21) --> 3720 (12/30) --> 3512 (1/3)  On Lasix: 20 mg daily --> will d/c 2nd to lower BP  Note: not on any K+ supplements    Hypotension- (present on admission)  Assessment & Plan  BP dropped lower this am after given Midodrine earlier (1/6)  Asymptomatic  Cortisol: 17.3 (12/19)  TSH wnl  ? 2nd to Seebri -- d/c'd 2nd to possible SE of hypotension (1/1)  ? 2nd to Lasix -- will d/c (1/6)  ? recent PE a contributory factor -- therapeutic on Eliquis  Suspect recent drop 2nd to Lasix since pt had her normal Midodrine this am before BP dropped (1/6)  On Midodrine: 15 mg tid --> 10 mg tid (1/3 at 11 am) --> 10 mg bid (1/5)  Cont to monitor    Vitamin D deficiency- (present on admission)  Assessment & Plan  Vit D: 20  On supplements    Gastroesophageal reflux disease- (present on admission)  Assessment & Plan  Has hx of hiatal hernia  On Prilosec  On Simethicone for chronic issues with gas    Dyslipidemia- (present on admission)  Assessment &  Plan  On Mevacor    Iron deficiency anemia- (present on admission)  Assessment & Plan  H&H stable  On Fe & Vit C supplements

## 2020-01-06 NOTE — THERAPY
Occupational Therapy  Daily Treatment     Patient Name: Guillermina Recio  Age:  79 y.o., Sex:  female  Medical Record #: [id number]  Today's Date: 1/6/2020     Precautions  Precautions: (P) Posterior Hip Precautions, Weight Bearing As Tolerated Right Lower Extremity, Fall Risk  Comments: (P) Monitor BP and O2 sat, fear of falling         Subjective    Pt received seated in chair, agreeable to OT     Objective       01/06/20 1431   Precautions   Precautions Posterior Hip Precautions;Weight Bearing As Tolerated Right Lower Extremity;Fall Risk   Comments Monitor BP and O2 sat, fear of falling   Sitting Lower Body Exercises   Sit to Stand 2 sets of 10  (from mat to w/c, progressively lowering mat, SBA)   OT Total Time Spent   OT Individual Total Time Spent (Mins) 30   OT Charge Group   OT Therapy Activity 2     Txed to toilet at end of session with SBA using grab bar, CNA notified    Assessment    Pt tolerated session well, demos improved stability during sit to stands from mat with progressively lowered height. Educated on avoiding leaning on mat for increased safety standing from less supportive surfaces, pt with improved mechanics after initial education.     Plan    Integration of hip precautions into ADLs, progress to ADLs with FWW, standing tolerance/balance, increased comfort with transfers and mobility using FWW, home eval next week Thursday

## 2020-01-06 NOTE — THERAPY
01/06/20 0959   Precautions   Precautions Posterior Hip Precautions;Weight Bearing As Tolerated Right Lower Extremity;Fall Risk   Comments Monitor BP and O2 sat, fear of falling   Pain 0 - 10 Group   Therapist Pain Assessment 0   Cognition    Cognition / Consciousness WDL   Level of Consciousness Alert   Sitting Lower Body Exercises   Sit to Stand   (1 set of 8, parallel bars)   Bed Mobility    Supine to Sit Stand by Assist   Sit to Supine Stand by Assist   Sit to Stand Contact Guard Assist   Scooting Stand by Assist   Rolling Supervised   Neuro-Muscular Treatments   Neuro-Muscular Treatments Sequencing;Tactile Cuing;Verbal Cuing;Weight Shift Right;Weight Shift Left   Comments Sequencing seated scooting, sit to/from stand, sequencing gait   PT Total Time Spent   PT Individual Total Time Spent (Mins) 60   PT Charge Group   PT Gait Training 2   PT Therapeutic Activities 2   Physical Therapy   Daily Treatment     Patient Name: Guillermina Recio  Age:  79 y.o., Sex:  female  Medical Record #: [id number]  Today's Date: 1/6/2020     Precautions  Precautions: Posterior Hip Precautions, Weight Bearing As Tolerated Right Lower Extremity, Fall Risk  Comments: Monitor BP and O2 sat, fear of falling    Subjective    The patient agreed to PT.  She had no complaints of significant pain or lightheadedness.     Objective    The patient participated in practicing sequencing sit to/from stand, standing tolerance and gait in the parallel bars.  Gait was tolerated at 12 to16 FT CGA parallel bars.  Sitting and standing blood pressures were measured and oxygen saturation indicated above.    FIM Bed/Chair/Wheelchair Transfers Score: 5 - Standby Prompting/Supervision or Set-up  Bed/Chair/Wheelchair Transfers Description:  Increased time, Adaptive equipment, Supervision for safety(FWW)    FIM Walking Score:  1 - Total Assistance  Walking Description:  Two hand rails, Walker, Oxygen, Extra time, Safety concerns, Assist  device/equipment(Parallel bars 16 FT, CGA,  FWW 12 FT Quin )    FIM Wheelchair Score:     Wheelchair Description:       FIM Stairs Score:  0 - Not tested,unsafe activity  Stairs Description:         Assessment    The patient's primary barriers are impaired tolerance for activity, low blood pressure, fall risk.  She is motivated to participate in order to recover and discharged to home.    Plan    Monitor BP and O2 saturation, bed mobility and transfer training, gait training as tolerated, therapeutic exercise, safety education, posterior hip precautions

## 2020-01-07 ENCOUNTER — HOME HEALTH ADMISSION (OUTPATIENT)
Dept: HOME HEALTH SERVICES | Facility: HOME HEALTHCARE | Age: 80
End: 2020-01-07
Payer: MEDICARE

## 2020-01-07 LAB
ANION GAP SERPL CALC-SCNC: 8 MMOL/L (ref 0–11.9)
ANISOCYTOSIS BLD QL SMEAR: ABNORMAL
BASOPHILS # BLD AUTO: 0.6 % (ref 0–1.8)
BASOPHILS # BLD: 0.02 K/UL (ref 0–0.12)
BUN SERPL-MCNC: 26 MG/DL (ref 8–22)
CALCIUM SERPL-MCNC: 9.4 MG/DL (ref 8.5–10.5)
CHLORIDE SERPL-SCNC: 105 MMOL/L (ref 96–112)
CO2 SERPL-SCNC: 30 MMOL/L (ref 20–33)
COMMENT 1642: NORMAL
CREAT SERPL-MCNC: 0.74 MG/DL (ref 0.5–1.4)
EOSINOPHIL # BLD AUTO: 0.01 K/UL (ref 0–0.51)
EOSINOPHIL NFR BLD: 0.3 % (ref 0–6.9)
ERYTHROCYTE [DISTWIDTH] IN BLOOD BY AUTOMATED COUNT: 62.1 FL (ref 35.9–50)
GLUCOSE SERPL-MCNC: 108 MG/DL (ref 65–99)
HCT VFR BLD AUTO: 34.3 % (ref 37–47)
HGB BLD-MCNC: 9.9 G/DL (ref 12–16)
HYPOCHROMIA BLD QL SMEAR: ABNORMAL
IMM GRANULOCYTES # BLD AUTO: 0.05 K/UL (ref 0–0.11)
IMM GRANULOCYTES NFR BLD AUTO: 1.5 % (ref 0–0.9)
LYMPHOCYTES # BLD AUTO: 1.22 K/UL (ref 1–4.8)
LYMPHOCYTES NFR BLD: 36.7 % (ref 22–41)
MCH RBC QN AUTO: 25.5 PG (ref 27–33)
MCHC RBC AUTO-ENTMCNC: 28.9 G/DL (ref 33.6–35)
MCV RBC AUTO: 88.4 FL (ref 81.4–97.8)
MICROCYTES BLD QL SMEAR: ABNORMAL
MONOCYTES # BLD AUTO: 0.51 K/UL (ref 0–0.85)
MONOCYTES NFR BLD AUTO: 15.4 % (ref 0–13.4)
MORPHOLOGY BLD-IMP: NORMAL
NEUTROPHILS # BLD AUTO: 1.51 K/UL (ref 2–7.15)
NEUTROPHILS NFR BLD: 45.5 % (ref 44–72)
NRBC # BLD AUTO: 0 K/UL
NRBC BLD-RTO: 0 /100 WBC
NT-PROBNP SERPL IA-MCNC: 1413 PG/ML (ref 0–125)
OVALOCYTES BLD QL SMEAR: NORMAL
PLATELET # BLD AUTO: 299 K/UL (ref 164–446)
PLATELET BLD QL SMEAR: NORMAL
PMV BLD AUTO: 8.6 FL (ref 9–12.9)
POIKILOCYTOSIS BLD QL SMEAR: NORMAL
POLYCHROMASIA BLD QL SMEAR: NORMAL
POTASSIUM SERPL-SCNC: 4.1 MMOL/L (ref 3.6–5.5)
RBC # BLD AUTO: 3.88 M/UL (ref 4.2–5.4)
RBC BLD AUTO: PRESENT
SCHISTOCYTES BLD QL SMEAR: NORMAL
SODIUM SERPL-SCNC: 143 MMOL/L (ref 135–145)
WBC # BLD AUTO: 3.3 K/UL (ref 4.8–10.8)

## 2020-01-07 PROCEDURE — 36415 COLL VENOUS BLD VENIPUNCTURE: CPT

## 2020-01-07 PROCEDURE — 770010 HCHG ROOM/CARE - REHAB SEMI PRIVAT*

## 2020-01-07 PROCEDURE — 700102 HCHG RX REV CODE 250 W/ 637 OVERRIDE(OP): Performed by: PHYSICAL MEDICINE & REHABILITATION

## 2020-01-07 PROCEDURE — 83880 ASSAY OF NATRIURETIC PEPTIDE: CPT

## 2020-01-07 PROCEDURE — 97110 THERAPEUTIC EXERCISES: CPT

## 2020-01-07 PROCEDURE — A9270 NON-COVERED ITEM OR SERVICE: HCPCS | Performed by: HOSPITALIST

## 2020-01-07 PROCEDURE — 97530 THERAPEUTIC ACTIVITIES: CPT

## 2020-01-07 PROCEDURE — 94760 N-INVAS EAR/PLS OXIMETRY 1: CPT

## 2020-01-07 PROCEDURE — 700102 HCHG RX REV CODE 250 W/ 637 OVERRIDE(OP): Performed by: HOSPITALIST

## 2020-01-07 PROCEDURE — 85025 COMPLETE CBC W/AUTO DIFF WBC: CPT

## 2020-01-07 PROCEDURE — 99232 SBSQ HOSP IP/OBS MODERATE 35: CPT | Performed by: HOSPITALIST

## 2020-01-07 PROCEDURE — 97116 GAIT TRAINING THERAPY: CPT

## 2020-01-07 PROCEDURE — A9270 NON-COVERED ITEM OR SERVICE: HCPCS | Performed by: PHYSICAL MEDICINE & REHABILITATION

## 2020-01-07 PROCEDURE — 99232 SBSQ HOSP IP/OBS MODERATE 35: CPT | Performed by: PHYSICAL MEDICINE & REHABILITATION

## 2020-01-07 PROCEDURE — 97535 SELF CARE MNGMENT TRAINING: CPT

## 2020-01-07 PROCEDURE — 80048 BASIC METABOLIC PNL TOTAL CA: CPT

## 2020-01-07 RX ORDER — MIDODRINE HYDROCHLORIDE 2.5 MG/1
5 TABLET ORAL
Status: DISCONTINUED | OUTPATIENT
Start: 2020-01-08 | End: 2020-01-08 | Stop reason: HOSPADM

## 2020-01-07 RX ADMIN — FERROUS SULFATE TAB 325 MG (65 MG ELEMENTAL FE) 325 MG: 325 (65 FE) TAB at 07:56

## 2020-01-07 RX ADMIN — APIXABAN 5 MG: 5 TABLET, FILM COATED ORAL at 20:19

## 2020-01-07 RX ADMIN — SENNOSIDES AND DOCUSATE SODIUM 2 TABLET: 8.6; 5 TABLET ORAL at 07:56

## 2020-01-07 RX ADMIN — FERROUS SULFATE TAB 325 MG (65 MG ELEMENTAL FE) 325 MG: 325 (65 FE) TAB at 17:00

## 2020-01-07 RX ADMIN — SIMETHICONE CHEW TAB 80 MG 120 MG: 80 TABLET ORAL at 11:25

## 2020-01-07 RX ADMIN — MIDODRINE HYDROCHLORIDE 10 MG: 10 TABLET ORAL at 05:24

## 2020-01-07 RX ADMIN — I-VITE, TAB 1000-60-2MG (60/BT) 1 TABLET: TAB at 07:56

## 2020-01-07 RX ADMIN — SIMETHICONE CHEW TAB 80 MG 120 MG: 80 TABLET ORAL at 17:00

## 2020-01-07 RX ADMIN — MIDODRINE HYDROCHLORIDE 10 MG: 10 TABLET ORAL at 13:58

## 2020-01-07 RX ADMIN — LOVASTATIN 40 MG: 20 TABLET ORAL at 20:19

## 2020-01-07 RX ADMIN — OXYCODONE HYDROCHLORIDE AND ACETAMINOPHEN 500 MG: 500 TABLET ORAL at 07:56

## 2020-01-07 RX ADMIN — BUDESONIDE AND FORMOTEROL FUMARATE DIHYDRATE 2 PUFF: 160; 4.5 AEROSOL RESPIRATORY (INHALATION) at 20:22

## 2020-01-07 RX ADMIN — SIMETHICONE CHEW TAB 80 MG 120 MG: 80 TABLET ORAL at 20:19

## 2020-01-07 RX ADMIN — APIXABAN 5 MG: 5 TABLET, FILM COATED ORAL at 07:56

## 2020-01-07 RX ADMIN — ACETAMINOPHEN 650 MG: 325 TABLET, FILM COATED ORAL at 13:58

## 2020-01-07 RX ADMIN — SIMETHICONE CHEW TAB 80 MG 120 MG: 80 TABLET ORAL at 07:55

## 2020-01-07 RX ADMIN — LEVOFLOXACIN 500 MG: 250 TABLET, FILM COATED ORAL at 07:56

## 2020-01-07 RX ADMIN — BUDESONIDE AND FORMOTEROL FUMARATE DIHYDRATE 2 PUFF: 160; 4.5 AEROSOL RESPIRATORY (INHALATION) at 07:57

## 2020-01-07 RX ADMIN — ACETAMINOPHEN 650 MG: 325 TABLET, FILM COATED ORAL at 20:19

## 2020-01-07 RX ADMIN — ACETAMINOPHEN 650 MG: 325 TABLET, FILM COATED ORAL at 07:55

## 2020-01-07 RX ADMIN — VITAMIN D, TAB 1000IU (100/BT) 4000 UNITS: 25 TAB at 07:56

## 2020-01-07 RX ADMIN — MONTELUKAST 10 MG: 10 TABLET, FILM COATED ORAL at 20:19

## 2020-01-07 RX ADMIN — OMEPRAZOLE 20 MG: 20 CAPSULE, DELAYED RELEASE ORAL at 07:55

## 2020-01-07 ASSESSMENT — PATIENT HEALTH QUESTIONNAIRE - PHQ9
1. LITTLE INTEREST OR PLEASURE IN DOING THINGS: NOT AT ALL
2. FEELING DOWN, DEPRESSED, IRRITABLE, OR HOPELESS: NOT AT ALL
1. LITTLE INTEREST OR PLEASURE IN DOING THINGS: NOT AT ALL
SUM OF ALL RESPONSES TO PHQ9 QUESTIONS 1 AND 2: 0
SUM OF ALL RESPONSES TO PHQ9 QUESTIONS 1 AND 2: 0
2. FEELING DOWN, DEPRESSED, IRRITABLE, OR HOPELESS: NOT AT ALL

## 2020-01-07 ASSESSMENT — ENCOUNTER SYMPTOMS
ABDOMINAL PAIN: 0
BRUISES/BLEEDS EASILY: 0
VOMITING: 0
COUGH: 0
SHORTNESS OF BREATH: 0
PALPITATIONS: 0
CHILLS: 0
NAUSEA: 0
FEVER: 0
EYES NEGATIVE: 1

## 2020-01-07 NOTE — CARE PLAN
Problem: Safety  Goal: Will remain free from falls  Intervention: Implement fall precautions  Note:   Use of call light encouraged to make needs known.Fall precautions and frequent rounding in place for safety.Call light within reach.Will continue to monitor and assess needs and safety.     Problem: Bowel/Gastric:  Goal: Normal bowel function is maintained or improved  Intervention: Educate patient and significant other/support system about signs and symptoms of constipation and interventions to implement  Note:   Pt is continent of bowel per report.Scheduled senna given at hs.LBM 01/06.Will continue to monitor.     Problem: Pain Management  Goal: Pain level will decrease to patient's comfort goal  Intervention: Follow pain managment plan developed in collaboration with patient and Interdisciplinary Team  Note:   Medicated per request for pain with good effect.Repositioned with pillows for comfort.Will continue to monitor and assess pain level and medicate as needed.

## 2020-01-07 NOTE — THERAPY
01/07/20 1129   Precautions   Precautions Posterior Hip Precautions;Weight Bearing As Tolerated Right Lower Extremity;Fall Risk   Comments Monitor BP and O2 sat, fear of falling   Pain 0 - 10 Group   Location Hip;Buttock   Location Orientation Right;Posterior;Lateral   Therapist Pain Assessment 2;Prior to Activity;During Activity   PT Total Time Spent   PT Individual Total Time Spent (Mins) 60   PT Charge Group   PT Gait Training 3   PT Therapeutic Activities 1   Physical Therapy   Daily Treatment     Patient Name: Guillermina Recio  Age:  79 y.o., Sex:  female  Medical Record #: [id number]  Today's Date: 1/7/2020     Precautions  Precautions: Posterior Hip Precautions, Weight Bearing As Tolerated Right Lower Extremity, Fall Risk  Comments: Monitor BP and O2 sat, fear of falling    Subjective    The patient agreed to PT.  She had no complaints of pain but indicated that her hip and right buttock were achy.     Objective    The patient participated in practicing sit to/from stand with a proper anterior weight shift to stand up.  She also participated in gait training with a fww and increased her distance to 35 FT x8.  She had no difficulty with blood pressure today as she did yesterday.    FIM Bed/Chair/Wheelchair Transfers Score: 5 - Standby Prompting/Supervision or Set-up  Bed/Chair/Wheelchair Transfers Description:       FIM Walking Score:  1 - Total Assistance  Walking Description:  Extra time, Verbal cueing, Supervision for safety, Oxygen, Walker(FWW 35 FT x8 CGA)    FIM Wheelchair Score:     Wheelchair Description:       FIM Stairs Score:  0 - Not tested,unsafe activity  Stairs Description:         Assessment    Making slow but consistent progress in improving tolerance for activity/endurance, gait distance, pain control and safety.  She needs verbal and tactile cues to remember her procedures for standing up and sitting down.    Plan    Sit to/from stand, gait training, safety education, up/down platform with  fww, posterior hip precautions

## 2020-01-07 NOTE — DISCHARGE PLANNING
Met with patient following Team Conference to relay progress, discuss plan for home evaluation on Thursday, and inform anticipated discharge date continues to be 1/14/20. Patient stated that her daughter wants to stay with her.   Patient's son is leaving later this week. Patient stated that she and her daughter are looking for a smaller apartment.  Choice for home health care received from [facility].

## 2020-01-07 NOTE — THERAPY
"Occupational Therapy  Daily Treatment     Patient Name: Guillermina Recio  Age:  79 y.o., Sex:  female  Medical Record #: [id number]  Today's Date: 1/7/2020     Precautions  Precautions: Posterior Hip Precautions, Weight Bearing As Tolerated Right Lower Extremity, Fall Risk  Comments: Monitor BP and O2 sat, fear of falling         Subjective    \"I'm just so tired\"     Objective       01/07/20 1431   Precautions   Precautions Posterior Hip Precautions;Weight Bearing As Tolerated Right Lower Extremity;Fall Risk   Comments Monitor BP and O2 sat, fear of falling   Sitting Upper Body Exercises   Front Arm Raise 2 sets of 10;Medium Resistance Theraband   Bilateral Row 2 sets of 10;Medium Resistance Theraband   Bicep Curls 2 sets of 10;Medium Resistance Theraband   Elbow Extension 2 sets of 10;Medium Resistance Theraband   Sitting Lower Body Exercises   Hip Abduction 2 sets of 10;Medium Resistance Theraband   Hamstring Curl 2 sets of 10;Medium Resistance Theraband   OT Total Time Spent   OT Individual Total Time Spent (Mins) 60   OT Charge Group   OT Therapy Activity 2   OT Therapeutic Exercise  2     Functional mobility with FWW in gym; 4x25 feet with SBA    Assessment    Pt tolerated session well, reported fatigue but participated to best of ability. Demos increased tolerance for mobility with FWW though continues to require consistent seated rest breaks and not yet functional to meet household mobility needs.     Plan    Integration of hip precautions into ADLs, progress to ADLs with FWW, standing tolerance/balance, increased comfort with transfers and mobility using FWW, home eval Thursday    "

## 2020-01-07 NOTE — PROGRESS NOTES
"Rehab Progress Note     Encounter Date: 1/7/2020    CC: Right hip pain    Interval Events (Subjective)    Hip pain is significantly improved since admission, she is looking forward to going home.    She denies any shortness of breath, she is concerned about the pneumonia.    No episodes of dizziness with change in position    Last BM: 01/07/20      ROS:  Gen: No fatigue, confusion, significant weight loss  Eyes: no blurry vision, double vision or pain in eyes  ENT: no changes in hearing, runny nose, nose bleeds, sinus pain  CV: No CP, palpitations  Lungs: no shortness of breath, changes in secretions, changes in cough, pain with coughing  Abd: No abd pain, nausea, vomiting, pain with eating  : no blood in urine, suprapubic pain  Ext: No swelling in legs, asymmetric atrophy  Neuro: no changes in strength or sensation  Skin: no new ulcers/skin breakdown appreciated by patient  Mood: No changes in mood today, increase in depression or anxiety  Heme: no bruising, or bleeding  Rest of 14 point review of systems is negative    Objective:  Vitals: /68   Pulse 83   Temp 36.3 °C (97.4 °F) (Oral)   Resp 18   Ht 1.494 m (4' 10.8\")   Wt 64 kg (141 lb 1.5 oz)   SpO2 98%   Gen: NAD, Body mass index is 28.69 kg/m².  HEENT:  NC/AT, PERRLA, moist mucous membranes, nasal cannula in place with supplemental oxygen  Cardio: RRR, no mumurs  Pulm: CTAB, with normal respiratory effort  Abd: Soft NTND, active bowel sounds  Ext: No peripheral edema. No calf tenderness. No clubbing/cyanosis. +dorsal pedalis pulses bilaterally.      Recent Results (from the past 72 hour(s))   Basic Metabolic Panel    Collection Time: 01/07/20  5:54 AM   Result Value Ref Range    Sodium 143 135 - 145 mmol/L    Potassium 4.1 3.6 - 5.5 mmol/L    Chloride 105 96 - 112 mmol/L    Co2 30 20 - 33 mmol/L    Glucose 108 (H) 65 - 99 mg/dL    Bun 26 (H) 8 - 22 mg/dL    Creatinine 0.74 0.50 - 1.40 mg/dL    Calcium 9.4 8.5 - 10.5 mg/dL    Anion Gap 8.0 0.0 - " 11.9   CBC WITH DIFFERENTIAL    Collection Time: 01/07/20  5:54 AM   Result Value Ref Range    WBC 3.3 (L) 4.8 - 10.8 K/uL    RBC 3.88 (L) 4.20 - 5.40 M/uL    Hemoglobin 9.9 (L) 12.0 - 16.0 g/dL    Hematocrit 34.3 (L) 37.0 - 47.0 %    MCV 88.4 81.4 - 97.8 fL    MCH 25.5 (L) 27.0 - 33.0 pg    MCHC 28.9 (L) 33.6 - 35.0 g/dL    RDW 62.1 (H) 35.9 - 50.0 fL    Platelet Count 299 164 - 446 K/uL    MPV 8.6 (L) 9.0 - 12.9 fL    Neutrophils-Polys 45.50 44.00 - 72.00 %    Lymphocytes 36.70 22.00 - 41.00 %    Monocytes 15.40 (H) 0.00 - 13.40 %    Eosinophils 0.30 0.00 - 6.90 %    Basophils 0.60 0.00 - 1.80 %    Immature Granulocytes 1.50 (H) 0.00 - 0.90 %    Nucleated RBC 0.00 /100 WBC    Neutrophils (Absolute) 1.51 (L) 2.00 - 7.15 K/uL    Lymphs (Absolute) 1.22 1.00 - 4.80 K/uL    Monos (Absolute) 0.51 0.00 - 0.85 K/uL    Eos (Absolute) 0.01 0.00 - 0.51 K/uL    Baso (Absolute) 0.02 0.00 - 0.12 K/uL    Immature Granulocytes (abs) 0.05 0.00 - 0.11 K/uL    NRBC (Absolute) 0.00 K/uL    Hypochromia 1+     Anisocytosis 1+     Microcytosis 1+    proBrain Natriuretic Peptide, NT    Collection Time: 01/07/20  5:54 AM   Result Value Ref Range    NT-proBNP 1413 (H) 0 - 125 pg/mL   ESTIMATED GFR    Collection Time: 01/07/20  5:54 AM   Result Value Ref Range    GFR If African American >60 >60 mL/min/1.73 m 2    GFR If Non African American >60 >60 mL/min/1.73 m 2   PERIPHERAL SMEAR REVIEW    Collection Time: 01/07/20  5:54 AM   Result Value Ref Range    Peripheral Smear Review see below    PLATELET ESTIMATE    Collection Time: 01/07/20  5:54 AM   Result Value Ref Range    Plt Estimation Normal    MORPHOLOGY    Collection Time: 01/07/20  5:54 AM   Result Value Ref Range    RBC Morphology Present     Polychromia 1+     Poikilocytosis 1+     Ovalocytes 1+     Schistocytes 1+    DIFFERENTIAL COMMENT    Collection Time: 01/07/20  5:54 AM   Result Value Ref Range    Comments-Diff see below        Current Facility-Administered Medications    Medication Frequency   • midodrine (PROAMATINE) tablet 10 mg BID   • OCUVITE-LUTEIN tablet 1 tablet DAILY   • levoFLOXacin (LEVAQUIN) tablet 500 mg DAILY   • acetaminophen (TYLENOL) tablet 650 mg TID   • vitamin D (cholecalciferol) 1000 UNIT tablet 4,000 Units DAILY   • montelukast (SINGULAIR) tablet 10 mg Nightly   • omeprazole (PRILOSEC) capsule 20 mg DAILY   • simethicone (MYLICON) chewable tab 120 mg 4X/DAY WITH MEALS + NIGHTLY   • apixaban (ELIQUIS) tablet 5 mg BID   • ascorbic acid tablet 500 mg DAILY   • budesonide-formoterol (SYMBICORT) 160-4.5 MCG/ACT inhaler 2 Puff BID   • calcium carbonate (TUMS) chewable tab 500 mg QDAY PRN   • ferrous sulfate tablet 325 mg BID WITH MEALS   • ipratropium-albuterol (DUONEB) nebulizer solution Q4H PRN (RT)   • lovastatin (MEVACOR) tablet 40 mg QHS   • Respiratory Care per Protocol Continuous RT   • Pharmacy Consult Request ...Pain Management Review 1 Each PHARMACY TO DOSE   • oxyCODONE immediate-release (ROXICODONE) tablet 2.5 mg Q3HRS PRN   • oxyCODONE immediate-release (ROXICODONE) tablet 5 mg Q3HRS PRN   • tramadol (ULTRAM) 50 MG tablet 50 mg Q4HRS PRN   • hydrALAZINE (APRESOLINE) tablet 25 mg Q8HRS PRN   • senna-docusate (PERICOLACE or SENOKOT S) 8.6-50 MG per tablet 2 Tab BID    And   • polyethylene glycol/lytes (MIRALAX) PACKET 1 Packet QDAY PRN    And   • magnesium hydroxide (MILK OF MAGNESIA) suspension 30 mL QDAY PRN    And   • bisacodyl (DULCOLAX) suppository 10 mg QDAY PRN   • artificial tears ophthalmic solution 1 Drop PRN   • benzocaine-menthol (CEPACOL) lozenge 1 Lozenge Q2HRS PRN   • mag hydrox-al hydrox-simeth (MAALOX PLUS ES or MYLANTA DS) suspension 20 mL Q2HRS PRN   • ondansetron (ZOFRAN ODT) dispertab 4 mg 4X/DAY PRN    Or   • ondansetron (ZOFRAN) syringe/vial injection 4 mg 4X/DAY PRN   • traZODone (DESYREL) tablet 50 mg QHS PRN   • sodium chloride (OCEAN) 0.65 % nasal spray 2 Spray PRN       Orders Placed This Encounter   Procedures   • Diet Order  Regular     Standing Status:   Standing     Number of Occurrences:   1     Order Specific Question:   Diet:     Answer:   Regular [1]     Order Specific Question:   Texture/Fiber modifications:     Answer:   Dysphagia 3(Mechanical Soft)specify fluid consistency(question 6) [3]     Order Specific Question:   Consistency/Fluid modifications:     Answer:   Thin Liquids [3]       Assessment:  Active Hospital Problems    Diagnosis   • *Acute pulmonary embolism with acute cor pulmonale (HCC)   • Acute respiratory failure (HCC)   • Closed dislocation of right hip (HCC)   • COPD (chronic obstructive pulmonary disease) (HCC)   • Gastroesophageal reflux disease   • Vitamin D deficiency   • Dyslipidemia   • Iron deficiency anemia   • Pneumonia   • Pleural effusion   • Hypotension   • Status post right hip replacement       Medical Decision Making and Plan:    Right JAIME dislocation: Status post relocation 12/19  - Continue comprehensive acute inpatient rehabilitation    Acute bilateral PE:  -Eliquis 5 mg twice daily, at least 3 months    COPD:  -Symbicort 2 puffs twice daily    CHF: BNP 1413, significant improvement from 1/3    Postoperative pain:  -Only required single dose of the oxycodone as needed in last 24 hours      Leukopenia: WBC of 3.3, neutrophil count of 1.51.  Most likely from drug induced  - Discontinue Levaquin, total of 5 days completed  -Discussed with hospitalist    Total time:  28 minutes.  I spent greater than 50% of the time for patient care and coordination on this date, including unit/floor time, and face-to-face time with the patient as per assessment and plan above including leukopenia with WBC of 3.3, check CBC in a.m., discussed with hospitalist, TYESHA Cazares, pneumonia, TYESHA Cazares.    GABE TristanO.

## 2020-01-07 NOTE — DISCHARGE PLANNING
ATTN: Case Management  RE: Referral for Home Health    As of 01/07/2020, we have accepted the Home Health referral for the patient listed above.    A [facility] clinician will be out to see the patient within 48 hours. If you have any questions or concerns regarding the patient's transition to Home Health, please do not hesitate to contact us at [phone].      We look forward to collaborating with you,  [facility] Team

## 2020-01-07 NOTE — THERAPY
01/06/20 1329   Precautions   Precautions Posterior Hip Precautions;Weight Bearing As Tolerated Right Lower Extremity;Fall Risk   Comments Monitor BP and O2 sat, fear of falling   Pain 0 - 10 Group   Location Hip;Back   Location Orientation Right   Therapist Pain Assessment 2   PT Total Time Spent   PT Individual Total Time Spent (Mins) 30   PT Charge Group   PT Gait Training 2   Physical Therapy   Daily Treatment     Patient Name: Guillermina Recio  Age:  79 y.o., Sex:  female  Medical Record #: [id number]  Today's Date: 1/6/2020     Precautions  Precautions: Posterior Hip Precautions, Weight Bearing As Tolerated Right Lower Extremity, Fall Risk  Comments: Monitor BP and O2 sat, fear of falling    Subjective    The patient agreed to PT.  She said she wants to walk with a walker to overcome her fear.     Objective    The patient participated in practicing sequencing sit to/from stand with a fww followed by gait training.  She used a fww and tolerated 25 FT x4.    FIM Bed/Chair/Wheelchair Transfers Score: 5 - Standby Prompting/Supervision or Set-up  Bed/Chair/Wheelchair Transfers Description:  Increased time, Adaptive equipment, Supervision for safety(FWW)    FIM Walking Score:  1 - Total Assistance  Walking Description:  Extra time, Supervision for safety, Verbal cueing, Oxygen, Walker(FWW, CGA, 25 FT x4)    FIM Wheelchair Score:     Wheelchair Description:       FIM Stairs Score:  0 - Not tested,unsafe activity  Stairs Description:         Assessment    Rated gait with a fww, CGA and tolerated 25 FT x4.  She was short of breath and did not complain of lightheadedness.  However, her complexion was pale each time she sat down.    Plan    Safety education, posterior hip precautions, therapeutic exercise, gait training fww, up/down 1 step with a fww.

## 2020-01-07 NOTE — FLOWSHEET NOTE
01/07/20 0820   Events/Summary/Plan   Events/Summary/Plan 02 spot check, pt denies SOB   Interdisciplinary Plan of Care-Goals (Indications)   Bronchodilator Indications History / Diagnosis   Interdisciplinary Plan of Care-Outcomes    Bronchodilator Outcome Patient at Stable Baseline   Education   Education Yes - Pt. / Family has been Instructed in use of Respiratory Equipment   Respiratory WDL   Respiratory (WDL) X   Chest Exam   Work Of Breathing / Effort Moderate   Respiration 20   Pulse 71   Oximetry   #Pulse Oximetry (Single Determination) Yes   Oxygen   Home O2 Use Prior To Admission? Yes   Home O2 LPM Flow   (4-5)   Home O2 Delivery Method Nasal Cannula   Home O2 Frequency of Use Continuous   Pulse Oximetry 97 %   O2 (LPM) 4   O2 Daily Delivery Respiratory  Silicone Nasal Cannula

## 2020-01-07 NOTE — THERAPY
"Occupational Therapy  Daily Treatment     Patient Name: Guillermina Recio  Age:  79 y.o., Sex:  female  Medical Record #: [id number]  Today's Date: 2020     Precautions  Precautions: (P) Posterior Hip Precautions, Weight Bearing As Tolerated Right Lower Extremity, Fall Risk  Comments: (P) Monitor BP and O2 sat, fear of falling         Subjective    \"Can I get into the bar thingys?\"     Objective       20 1331   Precautions   Precautions Posterior Hip Precautions;Weight Bearing As Tolerated Right Lower Extremity;Fall Risk   Comments Monitor BP and O2 sat, fear of falling   OT Total Time Spent   OT Individual Total Time Spent (Mins) 30   OT Charge Group   OT Self Care / ADL 1   OT Therapy Activity 1       FIM Toiletin - Standby Prompting/Supervision or Set-up  Toileting Description:  Grab bar, Supervision for safety, Verbal cueing    FIM Toilet Transfer Score:  5 - Standby Prompting/Supervision or Set-up  Toilet Transfer Description:  Grab bar    Pt ambulated in //  Bars over bolsters x2 - 5 reps back/forth with seated rest break between. Required initial cues for foot placement to clear bolsters but good carryover within session.     Assessment    Pt tolerated session well, continues to become fatigued and SOB with mobility and standing tasks. C/o left low back pain during this session, ice pack applied toward end of session in prep for afternoon therapy.     Plan    Integration of hip precautions into ADLs, progress to ADLs with FWW, standing tolerance/balance, increased comfort with transfers and mobility using FWW, home eval next week Thursday    "

## 2020-01-07 NOTE — FLOWSHEET NOTE
01/06/20 1245   Events/Summary/Plan   Events/Summary/Plan 02 spot check   Interdisciplinary Plan of Care-Goals (Indications)   Bronchodilator Indications History / Diagnosis   Interdisciplinary Plan of Care-Outcomes    Bronchodilator Outcome Patient at Stable Baseline   Education   Education Yes - Pt. / Family has been Instructed in use of Respiratory Equipment   Chest Exam   Respiration 18   Pulse (!) 101   Oximetry   #Pulse Oximetry (Single Determination) Yes   Oxygen   Home O2 Use Prior To Admission? Yes   Home O2 LPM Flow 4 LPM   Home O2 Delivery Method Nasal Cannula   Home O2 Frequency of Use Continuous   Pulse Oximetry 98 %   O2 (LPM) 4   O2 Daily Delivery Respiratory  Silicone Nasal Cannula

## 2020-01-08 ENCOUNTER — HOSPITAL ENCOUNTER (INPATIENT)
Facility: REHABILITATION | Age: 80
LOS: 1 days | DRG: 175 | End: 2020-01-09
Attending: PHYSICAL MEDICINE & REHABILITATION | Admitting: PHYSICAL MEDICINE & REHABILITATION
Payer: MEDICARE

## 2020-01-08 ENCOUNTER — APPOINTMENT (OUTPATIENT)
Dept: RADIOLOGY | Facility: REHABILITATION | Age: 80
DRG: 175 | End: 2020-01-08
Attending: PHYSICAL MEDICINE & REHABILITATION
Payer: MEDICARE

## 2020-01-08 ENCOUNTER — HOSPITAL ENCOUNTER (EMERGENCY)
Facility: MEDICAL CENTER | Age: 80
DRG: 561 | End: 2020-01-08
Attending: EMERGENCY MEDICINE
Payer: MEDICARE

## 2020-01-08 ENCOUNTER — APPOINTMENT (OUTPATIENT)
Dept: RADIOLOGY | Facility: MEDICAL CENTER | Age: 80
DRG: 561 | End: 2020-01-08
Attending: EMERGENCY MEDICINE
Payer: MEDICARE

## 2020-01-08 VITALS
WEIGHT: 141.09 LBS | TEMPERATURE: 97.7 F | DIASTOLIC BLOOD PRESSURE: 76 MMHG | HEART RATE: 78 BPM | SYSTOLIC BLOOD PRESSURE: 115 MMHG | OXYGEN SATURATION: 92 % | RESPIRATION RATE: 20 BRPM | HEIGHT: 59 IN | BODY MASS INDEX: 28.44 KG/M2

## 2020-01-08 VITALS
WEIGHT: 140 LBS | RESPIRATION RATE: 16 BRPM | HEART RATE: 69 BPM | BODY MASS INDEX: 31.49 KG/M2 | OXYGEN SATURATION: 97 % | HEIGHT: 56 IN | DIASTOLIC BLOOD PRESSURE: 58 MMHG | TEMPERATURE: 98.1 F | SYSTOLIC BLOOD PRESSURE: 109 MMHG

## 2020-01-08 DIAGNOSIS — S73.004A DISLOCATION OF RIGHT HIP, INITIAL ENCOUNTER (HCC): ICD-10-CM

## 2020-01-08 LAB
BASOPHILS # BLD AUTO: 0.6 % (ref 0–1.8)
BASOPHILS # BLD: 0.02 K/UL (ref 0–0.12)
EOSINOPHIL # BLD AUTO: 0.02 K/UL (ref 0–0.51)
EOSINOPHIL NFR BLD: 0.6 % (ref 0–6.9)
ERYTHROCYTE [DISTWIDTH] IN BLOOD BY AUTOMATED COUNT: 62.2 FL (ref 35.9–50)
HCT VFR BLD AUTO: 34.6 % (ref 37–47)
HGB BLD-MCNC: 9.9 G/DL (ref 12–16)
IMM GRANULOCYTES # BLD AUTO: 0.02 K/UL (ref 0–0.11)
IMM GRANULOCYTES NFR BLD AUTO: 0.6 % (ref 0–0.9)
LYMPHOCYTES # BLD AUTO: 1.36 K/UL (ref 1–4.8)
LYMPHOCYTES NFR BLD: 38.1 % (ref 22–41)
MCH RBC QN AUTO: 25.4 PG (ref 27–33)
MCHC RBC AUTO-ENTMCNC: 28.6 G/DL (ref 33.6–35)
MCV RBC AUTO: 88.9 FL (ref 81.4–97.8)
MONOCYTES # BLD AUTO: 0.56 K/UL (ref 0–0.85)
MONOCYTES NFR BLD AUTO: 15.7 % (ref 0–13.4)
NEUTROPHILS # BLD AUTO: 1.59 K/UL (ref 2–7.15)
NEUTROPHILS NFR BLD: 44.4 % (ref 44–72)
NRBC # BLD AUTO: 0 K/UL
NRBC BLD-RTO: 0 /100 WBC
PLATELET # BLD AUTO: 277 K/UL (ref 164–446)
PMV BLD AUTO: 8.6 FL (ref 9–12.9)
RBC # BLD AUTO: 3.89 M/UL (ref 4.2–5.4)
WBC # BLD AUTO: 3.6 K/UL (ref 4.8–10.8)

## 2020-01-08 PROCEDURE — 0SW9XJZ REVISION OF SYNTHETIC SUBSTITUTE IN RIGHT HIP JOINT, EXTERNAL APPROACH: ICD-10-PCS | Performed by: EMERGENCY MEDICINE

## 2020-01-08 PROCEDURE — A9270 NON-COVERED ITEM OR SERVICE: HCPCS | Performed by: PHYSICAL MEDICINE & REHABILITATION

## 2020-01-08 PROCEDURE — 700102 HCHG RX REV CODE 250 W/ 637 OVERRIDE(OP): Performed by: HOSPITALIST

## 2020-01-08 PROCEDURE — 700111 HCHG RX REV CODE 636 W/ 250 OVERRIDE (IP): Performed by: EMERGENCY MEDICINE

## 2020-01-08 PROCEDURE — 97110 THERAPEUTIC EXERCISES: CPT

## 2020-01-08 PROCEDURE — A9270 NON-COVERED ITEM OR SERVICE: HCPCS | Performed by: HOSPITALIST

## 2020-01-08 PROCEDURE — 99232 SBSQ HOSP IP/OBS MODERATE 35: CPT | Performed by: HOSPITALIST

## 2020-01-08 PROCEDURE — 97116 GAIT TRAINING THERAPY: CPT

## 2020-01-08 PROCEDURE — 85025 COMPLETE CBC W/AUTO DIFF WBC: CPT

## 2020-01-08 PROCEDURE — 73501 X-RAY EXAM HIP UNI 1 VIEW: CPT | Mod: RT

## 2020-01-08 PROCEDURE — 94760 N-INVAS EAR/PLS OXIMETRY 1: CPT

## 2020-01-08 PROCEDURE — 99285 EMERGENCY DEPT VISIT HI MDM: CPT

## 2020-01-08 PROCEDURE — A9270 NON-COVERED ITEM OR SERVICE: HCPCS

## 2020-01-08 PROCEDURE — 36415 COLL VENOUS BLD VENIPUNCTURE: CPT

## 2020-01-08 PROCEDURE — 97530 THERAPEUTIC ACTIVITIES: CPT

## 2020-01-08 PROCEDURE — 770010 HCHG ROOM/CARE - REHAB SEMI PRIVAT*

## 2020-01-08 PROCEDURE — 96375 TX/PRO/DX INJ NEW DRUG ADDON: CPT

## 2020-01-08 PROCEDURE — 72170 X-RAY EXAM OF PELVIS: CPT

## 2020-01-08 PROCEDURE — 27265 TREAT HIP DISLOCATION: CPT

## 2020-01-08 PROCEDURE — 700102 HCHG RX REV CODE 250 W/ 637 OVERRIDE(OP): Performed by: PHYSICAL MEDICINE & REHABILITATION

## 2020-01-08 PROCEDURE — 96374 THER/PROPH/DIAG INJ IV PUSH: CPT

## 2020-01-08 PROCEDURE — 700102 HCHG RX REV CODE 250 W/ 637 OVERRIDE(OP)

## 2020-01-08 PROCEDURE — 99233 SBSQ HOSP IP/OBS HIGH 50: CPT | Performed by: PHYSICAL MEDICINE & REHABILITATION

## 2020-01-08 RX ORDER — ACETAMINOPHEN 325 MG/1
650 TABLET ORAL 3 TIMES DAILY
Status: CANCELLED | OUTPATIENT
Start: 2020-01-08

## 2020-01-08 RX ORDER — SENNOSIDES A AND B 8.6 MG/1
TABLET, FILM COATED ORAL
Status: ACTIVE
Start: 2020-01-08 | End: 2020-01-09

## 2020-01-08 RX ORDER — FERROUS SULFATE 325(65) MG
325 TABLET ORAL 2 TIMES DAILY WITH MEALS
Status: CANCELLED | OUTPATIENT
Start: 2020-01-08

## 2020-01-08 RX ORDER — POLYVINYL ALCOHOL 14 MG/ML
1 SOLUTION/ DROPS OPHTHALMIC PRN
Status: CANCELLED | OUTPATIENT
Start: 2020-01-08

## 2020-01-08 RX ORDER — ASCORBIC ACID 500 MG
500 TABLET ORAL DAILY
Status: CANCELLED | OUTPATIENT
Start: 2020-01-09

## 2020-01-08 RX ORDER — POLYETHYLENE GLYCOL 3350 17 G/17G
1 POWDER, FOR SOLUTION ORAL
Status: DISCONTINUED | OUTPATIENT
Start: 2020-01-08 | End: 2020-01-09 | Stop reason: HOSPADM

## 2020-01-08 RX ORDER — VITS A,C,E/LUTEIN/MINERALS 300MCG-200
1 TABLET ORAL DAILY
Status: CANCELLED | OUTPATIENT
Start: 2020-01-09

## 2020-01-08 RX ORDER — SIMETHICONE 80 MG
120 TABLET,CHEWABLE ORAL
Status: CANCELLED | OUTPATIENT
Start: 2020-01-08

## 2020-01-08 RX ORDER — TRAMADOL HYDROCHLORIDE 50 MG/1
50 TABLET ORAL EVERY 4 HOURS PRN
Status: CANCELLED | OUTPATIENT
Start: 2020-01-08

## 2020-01-08 RX ORDER — MONTELUKAST SODIUM 10 MG/1
10 TABLET ORAL NIGHTLY
Status: CANCELLED | OUTPATIENT
Start: 2020-01-08

## 2020-01-08 RX ORDER — OMEPRAZOLE 20 MG/1
20 CAPSULE, DELAYED RELEASE ORAL DAILY
Status: CANCELLED | OUTPATIENT
Start: 2020-01-09

## 2020-01-08 RX ORDER — OXYCODONE HYDROCHLORIDE 5 MG/1
5 TABLET ORAL
Status: DISCONTINUED | OUTPATIENT
Start: 2020-01-08 | End: 2020-01-09 | Stop reason: HOSPADM

## 2020-01-08 RX ORDER — CALCIUM CARBONATE 500 MG/1
500 TABLET, CHEWABLE ORAL
Status: CANCELLED | OUTPATIENT
Start: 2020-01-08

## 2020-01-08 RX ORDER — ONDANSETRON 2 MG/ML
4 INJECTION INTRAMUSCULAR; INTRAVENOUS 4 TIMES DAILY PRN
Status: DISCONTINUED | OUTPATIENT
Start: 2020-01-08 | End: 2020-01-09 | Stop reason: HOSPADM

## 2020-01-08 RX ORDER — ONDANSETRON 4 MG/1
4 TABLET, ORALLY DISINTEGRATING ORAL 4 TIMES DAILY PRN
Status: CANCELLED | OUTPATIENT
Start: 2020-01-08

## 2020-01-08 RX ORDER — FERROUS SULFATE 325(65) MG
325 TABLET ORAL 2 TIMES DAILY WITH MEALS
Status: DISCONTINUED | OUTPATIENT
Start: 2020-01-09 | End: 2020-01-09 | Stop reason: HOSPADM

## 2020-01-08 RX ORDER — LOVASTATIN 20 MG/1
TABLET ORAL
Status: COMPLETED
Start: 2020-01-08 | End: 2020-01-08

## 2020-01-08 RX ORDER — ONDANSETRON 2 MG/ML
4 INJECTION INTRAMUSCULAR; INTRAVENOUS 4 TIMES DAILY PRN
Status: CANCELLED | OUTPATIENT
Start: 2020-01-08

## 2020-01-08 RX ORDER — VITS A,C,E/LUTEIN/MINERALS 300MCG-200
1 TABLET ORAL DAILY
Status: DISCONTINUED | OUTPATIENT
Start: 2020-01-09 | End: 2020-01-09 | Stop reason: HOSPADM

## 2020-01-08 RX ORDER — SIMETHICONE 80 MG
120 TABLET,CHEWABLE ORAL
Status: DISCONTINUED | OUTPATIENT
Start: 2020-01-09 | End: 2020-01-09 | Stop reason: HOSPADM

## 2020-01-08 RX ORDER — LOVASTATIN 20 MG/1
40 TABLET ORAL
Status: CANCELLED | OUTPATIENT
Start: 2020-01-08

## 2020-01-08 RX ORDER — MONTELUKAST SODIUM 10 MG/1
10 TABLET ORAL NIGHTLY
Status: DISCONTINUED | OUTPATIENT
Start: 2020-01-08 | End: 2020-01-09 | Stop reason: HOSPADM

## 2020-01-08 RX ORDER — TRAMADOL HYDROCHLORIDE 50 MG/1
50 TABLET ORAL EVERY 4 HOURS PRN
Status: DISCONTINUED | OUTPATIENT
Start: 2020-01-08 | End: 2020-01-09 | Stop reason: HOSPADM

## 2020-01-08 RX ORDER — HYDRALAZINE HYDROCHLORIDE 25 MG/1
25 TABLET, FILM COATED ORAL EVERY 8 HOURS PRN
Status: DISCONTINUED | OUTPATIENT
Start: 2020-01-08 | End: 2020-01-09 | Stop reason: HOSPADM

## 2020-01-08 RX ORDER — ONDANSETRON 4 MG/1
4 TABLET, ORALLY DISINTEGRATING ORAL 4 TIMES DAILY PRN
Status: DISCONTINUED | OUTPATIENT
Start: 2020-01-08 | End: 2020-01-09 | Stop reason: HOSPADM

## 2020-01-08 RX ORDER — LOVASTATIN 20 MG/1
40 TABLET ORAL
Status: DISCONTINUED | OUTPATIENT
Start: 2020-01-08 | End: 2020-01-09 | Stop reason: HOSPADM

## 2020-01-08 RX ORDER — ACETAMINOPHEN 325 MG/1
TABLET ORAL
Status: COMPLETED
Start: 2020-01-08 | End: 2020-01-08

## 2020-01-08 RX ORDER — AMOXICILLIN 250 MG
2 CAPSULE ORAL 2 TIMES DAILY
Status: CANCELLED | OUTPATIENT
Start: 2020-01-08

## 2020-01-08 RX ORDER — CALCIUM CARBONATE 500 MG/1
500 TABLET, CHEWABLE ORAL
Status: DISCONTINUED | OUTPATIENT
Start: 2020-01-08 | End: 2020-01-09 | Stop reason: HOSPADM

## 2020-01-08 RX ORDER — ECHINACEA PURPUREA EXTRACT 125 MG
2 TABLET ORAL PRN
Status: DISCONTINUED | OUTPATIENT
Start: 2020-01-08 | End: 2020-01-09 | Stop reason: HOSPADM

## 2020-01-08 RX ORDER — AMOXICILLIN 250 MG
CAPSULE ORAL
Status: COMPLETED
Start: 2020-01-08 | End: 2020-01-08

## 2020-01-08 RX ORDER — OXYCODONE HYDROCHLORIDE 5 MG/1
2.5 TABLET ORAL
Status: CANCELLED | OUTPATIENT
Start: 2020-01-08

## 2020-01-08 RX ORDER — ALUMINA, MAGNESIA, AND SIMETHICONE 2400; 2400; 240 MG/30ML; MG/30ML; MG/30ML
20 SUSPENSION ORAL
Status: DISCONTINUED | OUTPATIENT
Start: 2020-01-08 | End: 2020-01-09 | Stop reason: HOSPADM

## 2020-01-08 RX ORDER — BUDESONIDE AND FORMOTEROL FUMARATE DIHYDRATE 160; 4.5 UG/1; UG/1
2 AEROSOL RESPIRATORY (INHALATION) 2 TIMES DAILY
Status: DISCONTINUED | OUTPATIENT
Start: 2020-01-08 | End: 2020-01-09 | Stop reason: HOSPADM

## 2020-01-08 RX ORDER — AMOXICILLIN 250 MG
2 CAPSULE ORAL 2 TIMES DAILY
Status: DISCONTINUED | OUTPATIENT
Start: 2020-01-08 | End: 2020-01-09 | Stop reason: HOSPADM

## 2020-01-08 RX ORDER — PROPOFOL 10 MG/ML
INJECTION, EMULSION INTRAVENOUS
Status: COMPLETED | OUTPATIENT
Start: 2020-01-08 | End: 2020-01-08

## 2020-01-08 RX ORDER — BUDESONIDE AND FORMOTEROL FUMARATE DIHYDRATE 160; 4.5 UG/1; UG/1
2 AEROSOL RESPIRATORY (INHALATION) 2 TIMES DAILY
Status: CANCELLED | OUTPATIENT
Start: 2020-01-08

## 2020-01-08 RX ORDER — TRAZODONE HYDROCHLORIDE 50 MG/1
50 TABLET ORAL
Status: DISCONTINUED | OUTPATIENT
Start: 2020-01-08 | End: 2020-01-09 | Stop reason: HOSPADM

## 2020-01-08 RX ORDER — ASCORBIC ACID 500 MG
500 TABLET ORAL DAILY
Status: DISCONTINUED | OUTPATIENT
Start: 2020-01-09 | End: 2020-01-09 | Stop reason: HOSPADM

## 2020-01-08 RX ORDER — OXYCODONE HYDROCHLORIDE 5 MG/1
2.5 TABLET ORAL
Status: DISCONTINUED | OUTPATIENT
Start: 2020-01-08 | End: 2020-01-09 | Stop reason: HOSPADM

## 2020-01-08 RX ORDER — POLYVINYL ALCOHOL 14 MG/ML
1 SOLUTION/ DROPS OPHTHALMIC PRN
Status: DISCONTINUED | OUTPATIENT
Start: 2020-01-08 | End: 2020-01-09 | Stop reason: HOSPADM

## 2020-01-08 RX ORDER — OXYCODONE HYDROCHLORIDE 5 MG/1
5 TABLET ORAL
Status: CANCELLED | OUTPATIENT
Start: 2020-01-08

## 2020-01-08 RX ORDER — POLYETHYLENE GLYCOL 3350 17 G/17G
1 POWDER, FOR SOLUTION ORAL
Status: CANCELLED | OUTPATIENT
Start: 2020-01-08

## 2020-01-08 RX ORDER — BISACODYL 10 MG
10 SUPPOSITORY, RECTAL RECTAL
Status: DISCONTINUED | OUTPATIENT
Start: 2020-01-08 | End: 2020-01-09 | Stop reason: HOSPADM

## 2020-01-08 RX ORDER — HYDRALAZINE HYDROCHLORIDE 25 MG/1
25 TABLET, FILM COATED ORAL EVERY 8 HOURS PRN
Status: CANCELLED | OUTPATIENT
Start: 2020-01-08

## 2020-01-08 RX ORDER — MIDODRINE HYDROCHLORIDE 10 MG/1
TABLET ORAL
Status: COMPLETED
Start: 2020-01-08 | End: 2020-01-08

## 2020-01-08 RX ORDER — ACETAMINOPHEN 325 MG/1
650 TABLET ORAL 3 TIMES DAILY
Status: DISCONTINUED | OUTPATIENT
Start: 2020-01-08 | End: 2020-01-09 | Stop reason: HOSPADM

## 2020-01-08 RX ORDER — MIDODRINE HYDROCHLORIDE 2.5 MG/1
5 TABLET ORAL
Status: DISCONTINUED | OUTPATIENT
Start: 2020-01-08 | End: 2020-01-09 | Stop reason: HOSPADM

## 2020-01-08 RX ORDER — ECHINACEA PURPUREA EXTRACT 125 MG
2 TABLET ORAL PRN
Status: CANCELLED | OUTPATIENT
Start: 2020-01-08

## 2020-01-08 RX ORDER — MIDODRINE HYDROCHLORIDE 2.5 MG/1
5 TABLET ORAL
Status: CANCELLED | OUTPATIENT
Start: 2020-01-08

## 2020-01-08 RX ORDER — OMEPRAZOLE 20 MG/1
20 CAPSULE, DELAYED RELEASE ORAL DAILY
Status: DISCONTINUED | OUTPATIENT
Start: 2020-01-09 | End: 2020-01-09 | Stop reason: HOSPADM

## 2020-01-08 RX ORDER — ALUMINA, MAGNESIA, AND SIMETHICONE 2400; 2400; 240 MG/30ML; MG/30ML; MG/30ML
20 SUSPENSION ORAL
Status: CANCELLED | OUTPATIENT
Start: 2020-01-08

## 2020-01-08 RX ORDER — IPRATROPIUM BROMIDE AND ALBUTEROL SULFATE 2.5; .5 MG/3ML; MG/3ML
3 SOLUTION RESPIRATORY (INHALATION)
Status: CANCELLED | OUTPATIENT
Start: 2020-01-08

## 2020-01-08 RX ORDER — BISACODYL 10 MG
10 SUPPOSITORY, RECTAL RECTAL
Status: CANCELLED | OUTPATIENT
Start: 2020-01-08

## 2020-01-08 RX ORDER — IPRATROPIUM BROMIDE AND ALBUTEROL SULFATE 2.5; .5 MG/3ML; MG/3ML
3 SOLUTION RESPIRATORY (INHALATION)
Status: DISCONTINUED | OUTPATIENT
Start: 2020-01-08 | End: 2020-01-09 | Stop reason: HOSPADM

## 2020-01-08 RX ORDER — MONTELUKAST SODIUM 10 MG/1
TABLET ORAL
Status: COMPLETED
Start: 2020-01-08 | End: 2020-01-08

## 2020-01-08 RX ORDER — TRAZODONE HYDROCHLORIDE 50 MG/1
50 TABLET ORAL
Status: CANCELLED | OUTPATIENT
Start: 2020-01-08

## 2020-01-08 RX ADMIN — MONTELUKAST 10 MG: 10 TABLET, FILM COATED ORAL at 21:54

## 2020-01-08 RX ADMIN — PROPOFOL 40 MG: 10 INJECTION, EMULSION INTRAVENOUS at 14:33

## 2020-01-08 RX ADMIN — APIXABAN 5 MG: 5 TABLET, FILM COATED ORAL at 08:04

## 2020-01-08 RX ADMIN — MONTELUKAST SODIUM 10 MG: 10 TABLET ORAL at 21:54

## 2020-01-08 RX ADMIN — MIDODRINE HYDROCHLORIDE 5 MG: 2.5 TABLET ORAL at 21:52

## 2020-01-08 RX ADMIN — MIDODRINE HYDROCHLORIDE 5 MG: 10 TABLET ORAL at 21:52

## 2020-01-08 RX ADMIN — SENNOSIDES AND DOCUSATE SODIUM 2 TABLET: 8.6; 5 TABLET ORAL at 21:51

## 2020-01-08 RX ADMIN — OMEPRAZOLE 20 MG: 20 CAPSULE, DELAYED RELEASE ORAL at 08:05

## 2020-01-08 RX ADMIN — ACETAMINOPHEN 650 MG: 325 TABLET, FILM COATED ORAL at 08:04

## 2020-01-08 RX ADMIN — LOVASTATIN 40 MG: 20 TABLET ORAL at 21:51

## 2020-01-08 RX ADMIN — I-VITE, TAB 1000-60-2MG (60/BT) 1 TABLET: TAB at 08:08

## 2020-01-08 RX ADMIN — BUDESONIDE AND FORMOTEROL FUMARATE DIHYDRATE 2 PUFF: 160; 4.5 AEROSOL RESPIRATORY (INHALATION) at 09:00

## 2020-01-08 RX ADMIN — VITAMIN D, TAB 1000IU (100/BT) 4000 UNITS: 25 TAB at 08:05

## 2020-01-08 RX ADMIN — OXYCODONE HYDROCHLORIDE 5 MG: 5 TABLET ORAL at 13:03

## 2020-01-08 RX ADMIN — APIXABAN 5 MG: 5 TABLET, FILM COATED ORAL at 21:57

## 2020-01-08 RX ADMIN — FENTANYL CITRATE 50 MCG: 0.05 INJECTION, SOLUTION INTRAMUSCULAR; INTRAVENOUS at 19:49

## 2020-01-08 RX ADMIN — MIDODRINE HYDROCHLORIDE 5 MG: 2.5 TABLET ORAL at 10:57

## 2020-01-08 RX ADMIN — ACETAMINOPHEN 650 MG: 325 TABLET, FILM COATED ORAL at 21:44

## 2020-01-08 RX ADMIN — SIMETHICONE CHEW TAB 80 MG 120 MG: 80 TABLET ORAL at 10:57

## 2020-01-08 RX ADMIN — SIMETHICONE CHEW TAB 80 MG 120 MG: 80 TABLET ORAL at 07:58

## 2020-01-08 RX ADMIN — SENNOSIDES AND DOCUSATE SODIUM 2 TABLET: 8.6; 5 TABLET ORAL at 08:04

## 2020-01-08 RX ADMIN — FERROUS SULFATE TAB 325 MG (65 MG ELEMENTAL FE) 325 MG: 325 (65 FE) TAB at 07:58

## 2020-01-08 RX ADMIN — PROPOFOL 20 MG: 10 INJECTION, EMULSION INTRAVENOUS at 14:35

## 2020-01-08 RX ADMIN — OXYCODONE HYDROCHLORIDE AND ACETAMINOPHEN 500 MG: 500 TABLET ORAL at 08:06

## 2020-01-08 RX ADMIN — MIDODRINE HYDROCHLORIDE 5 MG: 2.5 TABLET ORAL at 08:04

## 2020-01-08 ASSESSMENT — ENCOUNTER SYMPTOMS
ABDOMINAL PAIN: 0
FEVER: 0
VOMITING: 0
CHILLS: 0
BRUISES/BLEEDS EASILY: 0
PALPITATIONS: 0
COUGH: 0
EYES NEGATIVE: 1
NAUSEA: 0
SHORTNESS OF BREATH: 0

## 2020-01-08 ASSESSMENT — LIFESTYLE VARIABLES: DO YOU DRINK ALCOHOL: NO

## 2020-01-08 NOTE — THERAPY
01/08/20 0959   Precautions   Precautions Posterior Hip Precautions;Weight Bearing As Tolerated Right Lower Extremity;Fall Risk;Other (See Comments)   Comments Monitor BP and O2 sat, fear of falling   Vitals   Pulse 81   Patient BP Position Sitting   Blood Pressure  105/55   O2 (LPM) 4   O2 Delivery Nasal Cannula   Pain 0 - 10 Group   Location Hip   Location Orientation Right   Therapist Pain Assessment 2;Prior to Activity;During Activity;Post Activity   Cognition    Cognition / Consciousness WDL   Level of Consciousness Alert   Bed Mobility    Supine to Sit Stand by Assist   Sit to Stand Contact Guard Assist   Scooting Stand by Assist   Rolling Supervised   Neuro-Muscular Treatments   Neuro-Muscular Treatments Sequencing;Tactile Cuing;Tapping;Verbal Cuing   Comments Sequencing bed mobility with hip precautions supine to sit and transfer with a fww bed to wheelchair, sequencing sit to/from stand and sequencing gait with a fww, sequencing up/down 6 inch platform with a fww   PT Total Time Spent   PT Individual Total Time Spent (Mins) 60   PT Charge Group   PT Gait Training 2   PT Therapeutic Activities 2   Physical Therapy   Daily Treatment     Patient Name: Guillermina Recio  Age:  79 y.o., Sex:  female  Medical Record #: [id number]  Today's Date: 1/8/2020     Precautions  Precautions: Posterior Hip Precautions, Weight Bearing As Tolerated Right Lower Extremity, Fall Risk  Comments: Monitor BP and O2 sat, fear of falling    Subjective    The patient agreed to PT.  During the session she had complaints of lightheadedness.     Objective    The patient participated in gait training with a fww and tolerated 36 FT x4 CGA with a gait belt.  She also practiced stepping up/down a platform/curb similar to her entrance to her home.  She required CGA with a gait belt and intermittent verbal cues for sequencing with a walker.  Lightheadedness was the result of low blood pressure indicated above    FIM Bed/Chair/Wheelchair  Transfers Score: 5 - Standby Prompting/Supervision or Set-up  Bed/Chair/Wheelchair Transfers Description:  Increased time, Supervision for safety, Verbal cueing, Adaptive equipment(FWW)    FIM Walking Score:  1 - Total Assistance  Walking Description:  Extra time, Safety concerns, Verbal cueing, Supervision for safety, Oxygen, Walker(FWW CGA, verbal cues, 36 FT x4)    FIM Wheelchair Score:     Wheelchair Description:       FIM Stairs Score:  0 - Not tested,unsafe activity  Stairs Description:         Assessment    The patient continues to make improvement in tolerance for activity, gait and safety awareness.  Her primary barriers are shortness of breath and fluctuating blood pressure.    Plan    Continue therapeutic exercise, gait training as tolerated, stepping up and down a curb/platform with fww, safety education, pursed lip breathing, neutral rotation RLE during gait, posterior hip precautions

## 2020-01-08 NOTE — THERAPY
Missed Therapy     Patient Name: Guillermina Recio  Age:  79 y.o., Sex:  female  Medical Record #: [id number]  Today's Date: 1/8/2020    Discussed missed therapy with MD       01/08/20 1231   Therapy Missed   Missed Therapy (Minutes) 60   Reason For Missed Therapy Medical - Patient on Hold from Therapy  (txed to acute 2/2 hip dislocation)

## 2020-01-08 NOTE — ED TRIAGE NOTES
"Pt BIB EMS from [facility]. Pt was doing rehab for previous R hip dislocation when she stood up from stool and felt a \"pop\" and pain in same hip. XR from [facility] confirmed dislocation. Pt received 5mg oxycodone at facility and 100mcg fentanyl IVP and 4mg zofran from EMS pta.  "

## 2020-01-08 NOTE — PROGRESS NOTES
Hospital Medicine Daily Progress Note      Chief Complaint  Pulmonary Embolism  Hypotension    Interval Problem Update  Pt denies chest pain, shortness of breath, or palpitations.  Labs reviewed.    Review of Systems  Review of Systems   Constitutional: Negative for chills and fever.   HENT: Negative.    Eyes: Negative.    Respiratory: Negative for cough and shortness of breath.    Cardiovascular: Negative for chest pain and palpitations.   Gastrointestinal: Negative for abdominal pain, nausea and vomiting.   Genitourinary: Negative.    Musculoskeletal: Positive for joint pain.   Skin: Negative for itching and rash.   Endo/Heme/Allergies: Does not bruise/bleed easily.        Physical Exam  Temp:  [36.3 °C (97.4 °F)-36.7 °C (98 °F)] 36.5 °C (97.7 °F)  Pulse:  [76-85] 78  Resp:  [17-20] 18  BP: (105-114)/(55-72) 105/55  SpO2:  [96 %-98 %] 97 %    Physical Exam  Vitals signs reviewed.   Constitutional:       General: She is not in acute distress.     Appearance: Normal appearance. She is not ill-appearing.   HENT:      Head: Normocephalic and atraumatic.      Right Ear: External ear normal.      Left Ear: External ear normal.      Nose: Nose normal.   Eyes:      General:         Right eye: No discharge.         Left eye: No discharge.      Extraocular Movements: Extraocular movements intact.      Conjunctiva/sclera: Conjunctivae normal.   Neck:      Musculoskeletal: Normal range of motion and neck supple.   Cardiovascular:      Rate and Rhythm: Normal rate and regular rhythm.   Pulmonary:      Effort: No respiratory distress.      Breath sounds: No wheezing.      Comments: Decreased BS  Abdominal:      General: Bowel sounds are normal. There is no distension.      Palpations: Abdomen is soft.      Tenderness: There is no tenderness.   Musculoskeletal:      Comments: Trace edema   Skin:     General: Skin is warm and dry.   Neurological:      Mental Status: She is alert and oriented to person, place, and time.          Fluids    Intake/Output Summary (Last 24 hours) at 1/8/2020 1231  Last data filed at 1/7/2020 2024  Gross per 24 hour   Intake 840 ml   Output --   Net 840 ml       Laboratory  Recent Labs     01/07/20  0554 01/08/20  0544   WBC 3.3* 3.6*   RBC 3.88* 3.89*   HEMOGLOBIN 9.9* 9.9*   HEMATOCRIT 34.3* 34.6*   MCV 88.4 88.9   MCH 25.5* 25.4*   MCHC 28.9* 28.6*   RDW 62.1* 62.2*   PLATELETCT 299 277   MPV 8.6* 8.6*     Recent Labs     01/07/20  0554   SODIUM 143   POTASSIUM 4.1   CHLORIDE 105   CO2 30   GLUCOSE 108*   BUN 26*   CREATININE 0.74   CALCIUM 9.4                 Assessment/Plan  * Acute pulmonary embolism with acute cor pulmonale (HCC)- (present on admission)  Assessment & Plan  CTA 12/14/19 +bilat PE w/ right heart strain  Echo EF 65-70% w/ mod PHTN  Anticoagulated on Eliquis  Elevated Trop and BNP most likely 2/2 increased right heart pressures from PE  BNP improving    Closed dislocation of right hip (HCC)- (present on admission)  Assessment & Plan  2/2 syncopal episode w/ fall  S/P closed reduction of prosthetic hip dislocation on 12/19/19 by Dr. Delgado    COPD (chronic obstructive pulmonary disease) ([facility])- (present on admission)  Assessment & Plan  On Symbicort and Singulair  Off [place] due to concern of contributing to hypotension  Monitor for acute exacerbation  RT protocol    Pneumonia  Assessment & Plan  On Levaquin through 1/9/20 per Dr. Nicolas  Has been on abx x 5 days  Agree w/ Dr. Kearney in discontinuing Levaquin given worsening leukopenia    Pleural effusion  Assessment & Plan  Now off Lasix  Monitor need for thoracentesis    Hypotension- (present on admission)  Assessment & Plan  Low blood pressures improving off Lasix  Start tapering Midodrine    Vitamin D deficiency- (present on admission)  Assessment & Plan  Vit D level 20  On supplementation    Gastroesophageal reflux disease- (present on admission)  Assessment & Plan  Continue PPI for h/o hiatal hernia  Simethicone for chronic issues w/  gas    Dyslipidemia- (present on admission)  Assessment & Plan  On Mevacor    Iron deficiency anemia- (present on admission)  Assessment & Plan  On Fe and Vit C supplementation for low iron stores  Follow H/H    DNR

## 2020-01-08 NOTE — THERAPY
"Occupational Therapy  Daily Treatment     Patient Name: Guillermina Recio  Age:  79 y.o., Sex:  female  Medical Record #: [id number]  Today's Date: 2020     Precautions  Precautions: Posterior Hip Precautions, Weight Bearing As Tolerated Right Lower Extremity, Fall Risk  Comments: Monitor BP and O2 sat, fear of falling         Subjective    \" I'm so tired. I only slept about 3 hours.\"     Objective       20 1101   Precautions   Precautions Posterior Hip Precautions;Weight Bearing As Tolerated Right Lower Extremity;Fall Risk   Comments Monitor BP and O2 sat, fear of falling   Sitting Upper Body Exercises   Shoulder Press 1 set of 10;Bilateral   Internal Shoulder Rotation 2 sets of 10;Right ;Left   External Shoulder Rotation 2 sets of 10;Right ;Left   Bicep Curls 2 sets of 10;Right ;Left   Pronation / Supination 2 sets of 10;Right ;Left   Other Exercise 2 sets of 10 bilateral    Interdisciplinary Plan of Care Collaboration   Patient Position at End of Therapy Seated;Self Releasing Lap Belt Applied;Call Light within Reach;Tray Table within Reach   OT Total Time Spent   OT Individual Total Time Spent (Mins) 30   OT Charge Group   OT Therapeutic Exercise  2       FIM Upper Body Dressin - Standby Prompting/Supervision or Set-up  Upper Body Dressing Description:  ( setup to don  jacket  did not zip up )      Assessment    Reported moderate fatigue through out tx activity    cues for technique with exercise to minimize \" pulling\" on her back     Plan     Integration of hip precautions into ADLs, progress to ADLs with FWW, standing tolerance/balance, increased comfort with transfers and mobility using FWW, home eval Thursday       "

## 2020-01-08 NOTE — FLOWSHEET NOTE
01/08/20 1025   Events/Summary/Plan   Events/Summary/Plan 02 spot check   Interdisciplinary Plan of Care-Goals (Indications)   Bronchodilator Indications History / Diagnosis   Interdisciplinary Plan of Care-Outcomes    Bronchodilator Outcome Patient at Stable Baseline   Education   Education Yes - Pt. / Family has been Instructed in use of Respiratory Equipment   Respiratory WDL   Respiratory (WDL) X   Chest Exam   Respiration 18   Pulse 78   Secretions   Cough Non Productive   Oximetry   #Pulse Oximetry (Single Determination) Yes   Oxygen   Home O2 Use Prior To Admission? Yes   Home O2 LPM Flow   (4-5 lpm)   Home O2 Delivery Method Nasal Cannula   Home O2 Frequency of Use Continuous   Pulse Oximetry 97 %   O2 (LPM) 4   O2 Daily Delivery Respiratory  Silicone Nasal Cannula

## 2020-01-08 NOTE — PROGRESS NOTES
Pt was transferring with OT to get in to the shower when she felt a pop. Immediately she had 10/10 pain, she was placed back into bed and x-rays were taken showing a dislocated hip.  ordered for transfer to ER at [facility], JAVID called. Daughter Mendy notified.

## 2020-01-08 NOTE — THERAPY
Missed Therapy     Patient Name: Guillermina Recio  Age:  79 y.o., Sex:  female  Medical Record #: [id number]  Today's Date: 1/8/2020    Discussed missed therapy with Dr. Tejada    Pt transferred acute for further management
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
## 2020-01-08 NOTE — PROGRESS NOTES
Hospital Medicine Daily Progress Note      Chief Complaint  Pulmonary Embolism  Hypotension    Interval Problem Update  No overnight problems.  F/U WBC stable.    Review of Systems  Review of Systems   Constitutional: Negative for chills and fever.   HENT: Negative.    Eyes: Negative.    Respiratory: Negative for cough and shortness of breath.    Cardiovascular: Negative for chest pain and palpitations.   Gastrointestinal: Negative for abdominal pain, nausea and vomiting.   Genitourinary: Negative.    Musculoskeletal: Positive for joint pain.   Skin: Negative for itching and rash.   Endo/Heme/Allergies: Does not bruise/bleed easily.        Physical Exam  Temp:  [36.3 °C (97.4 °F)-36.7 °C (98 °F)] 36.5 °C (97.7 °F)  Pulse:  [76-85] 78  Resp:  [17-20] 18  BP: (105-114)/(55-72) 105/55  SpO2:  [96 %-98 %] 97 %    Physical Exam  Vitals signs reviewed.   Constitutional:       General: She is not in acute distress.     Appearance: Normal appearance. She is not ill-appearing.   HENT:      Head: Normocephalic and atraumatic.      Right Ear: External ear normal.      Left Ear: External ear normal.      Nose: Nose normal.   Eyes:      General:         Right eye: No discharge.         Left eye: No discharge.      Extraocular Movements: Extraocular movements intact.      Conjunctiva/sclera: Conjunctivae normal.   Neck:      Musculoskeletal: Normal range of motion and neck supple.   Cardiovascular:      Rate and Rhythm: Normal rate and regular rhythm.   Pulmonary:      Effort: No respiratory distress.      Breath sounds: No wheezing.      Comments: Decreased BS  Abdominal:      General: Bowel sounds are normal. There is no distension.      Palpations: Abdomen is soft.      Tenderness: There is no tenderness.   Musculoskeletal:      Right lower leg: No edema.      Left lower leg: No edema.   Skin:     General: Skin is warm and dry.   Neurological:      Mental Status: She is alert and oriented to person, place, and time.          Fluids    Intake/Output Summary (Last 24 hours) at 1/8/2020 1239  Last data filed at 1/7/2020 2024  Gross per 24 hour   Intake 840 ml   Output --   Net 840 ml       Laboratory  Recent Labs     01/07/20  0554 01/08/20  0544   WBC 3.3* 3.6*   RBC 3.88* 3.89*   HEMOGLOBIN 9.9* 9.9*   HEMATOCRIT 34.3* 34.6*   MCV 88.4 88.9   MCH 25.5* 25.4*   MCHC 28.9* 28.6*   RDW 62.1* 62.2*   PLATELETCT 299 277   MPV 8.6* 8.6*     Recent Labs     01/07/20  0554   SODIUM 143   POTASSIUM 4.1   CHLORIDE 105   CO2 30   GLUCOSE 108*   BUN 26*   CREATININE 0.74   CALCIUM 9.4                 Assessment/Plan  * Acute pulmonary embolism with acute cor pulmonale (HCC)- (present on admission)  Assessment & Plan  CTA 12/14/19 +bilat PE w/ right heart strain  Echo EF 65-70% w/ mod PHTN  Anticoagulated on Eliquis  Elevated Trop and BNP most likely 2/2 increased right heart pressures from PE  BNP improving    Closed dislocation of right hip ([facility])- (present on admission)  Assessment & Plan  2/2 syncopal episode w/ fall  S/P closed reduction of prosthetic hip dislocation on 12/19/19 by Dr. Delgado    COPD (chronic obstructive pulmonary disease) ([facility])- (present on admission)  Assessment & Plan  On Symbicort and Singulair  Off [place] due to concern of contributing to hypotension  Monitor for acute exacerbation  RT protocol    Pneumonia  Assessment & Plan  S/P Levaquin  Leukopenia stable    Pleural effusion  Assessment & Plan  Now off Lasix  Monitor need for thoracentesis    Hypotension- (present on admission)  Assessment & Plan  Low blood pressures improving off Lasix  Tapering off Midodrine    Vitamin D deficiency- (present on admission)  Assessment & Plan  Vit D level 20  On supplementation    Gastroesophageal reflux disease- (present on admission)  Assessment & Plan  Continue PPI for h/o hiatal hernia  Simethicone for chronic issues w/ gas    Dyslipidemia- (present on admission)  Assessment & Plan  On Mevacor    Iron deficiency anemia-  (present on admission)  Assessment & Plan  On Fe and Vit C supplementation for low iron stores  Follow H/H    DNR

## 2020-01-08 NOTE — CARE PLAN
Problem: Bowel/Gastric:  Goal: Normal bowel function is maintained or improved  Intervention: Educate patient and significant other/support system about signs and symptoms of constipation and interventions to implement  Note:   Pt refused scheduled senna at hs.Continent of multiple bowel movement per report.LBM 01/07.Will continue to monitor.     Problem: Pain Management  Goal: Pain level will decrease to patient's comfort goal  Intervention: Follow pain managment plan developed in collaboration with patient and Interdisciplinary Team  Note:   Pain is manageable with scheduled medication at this time.Repositioned with pillows for comfort.Will continue to monitor and assess pain level and medicate as needed.

## 2020-01-09 ENCOUNTER — HOSPITAL ENCOUNTER (INPATIENT)
Facility: MEDICAL CENTER | Age: 80
LOS: 6 days | DRG: 561 | End: 2020-01-15
Attending: EMERGENCY MEDICINE | Admitting: INTERNAL MEDICINE
Payer: MEDICARE

## 2020-01-09 ENCOUNTER — APPOINTMENT (OUTPATIENT)
Dept: RADIOLOGY | Facility: MEDICAL CENTER | Age: 80
DRG: 561 | End: 2020-01-09
Attending: EMERGENCY MEDICINE
Payer: MEDICARE

## 2020-01-09 ENCOUNTER — APPOINTMENT (OUTPATIENT)
Dept: RADIOLOGY | Facility: REHABILITATION | Age: 80
DRG: 175 | End: 2020-01-09
Attending: PHYSICAL MEDICINE & REHABILITATION
Payer: MEDICARE

## 2020-01-09 VITALS
HEART RATE: 85 BPM | TEMPERATURE: 98.1 F | DIASTOLIC BLOOD PRESSURE: 65 MMHG | OXYGEN SATURATION: 96 % | SYSTOLIC BLOOD PRESSURE: 106 MMHG | RESPIRATION RATE: 16 BRPM

## 2020-01-09 DIAGNOSIS — J44.9 CHRONIC OBSTRUCTIVE PULMONARY DISEASE, UNSPECIFIED COPD TYPE (HCC): ICD-10-CM

## 2020-01-09 DIAGNOSIS — M25.551 PAIN OF RIGHT HIP JOINT: ICD-10-CM

## 2020-01-09 DIAGNOSIS — I95.9 HYPOTENSION, UNSPECIFIED HYPOTENSION TYPE: ICD-10-CM

## 2020-01-09 DIAGNOSIS — S73.004A DISLOCATION OF RIGHT HIP, INITIAL ENCOUNTER (HCC): ICD-10-CM

## 2020-01-09 PROBLEM — I26.99 PULMONARY EMBOLI (HCC): Status: ACTIVE | Noted: 2019-12-14

## 2020-01-09 PROCEDURE — 700111 HCHG RX REV CODE 636 W/ 250 OVERRIDE (IP): Performed by: EMERGENCY MEDICINE

## 2020-01-09 PROCEDURE — A9270 NON-COVERED ITEM OR SERVICE: HCPCS | Performed by: INTERNAL MEDICINE

## 2020-01-09 PROCEDURE — 99233 SBSQ HOSP IP/OBS HIGH 50: CPT | Performed by: HOSPITALIST

## 2020-01-09 PROCEDURE — 73501 X-RAY EXAM HIP UNI 1 VIEW: CPT | Mod: RT

## 2020-01-09 PROCEDURE — 0SS9XZZ REPOSITION RIGHT HIP JOINT, EXTERNAL APPROACH: ICD-10-PCS | Performed by: EMERGENCY MEDICINE

## 2020-01-09 PROCEDURE — A9270 NON-COVERED ITEM OR SERVICE: HCPCS | Performed by: PHYSICAL MEDICINE & REHABILITATION

## 2020-01-09 PROCEDURE — 27265 TREAT HIP DISLOCATION: CPT

## 2020-01-09 PROCEDURE — 700102 HCHG RX REV CODE 250 W/ 637 OVERRIDE(OP): Performed by: INTERNAL MEDICINE

## 2020-01-09 PROCEDURE — 97530 THERAPEUTIC ACTIVITIES: CPT

## 2020-01-09 PROCEDURE — 73502 X-RAY EXAM HIP UNI 2-3 VIEWS: CPT | Mod: RT

## 2020-01-09 PROCEDURE — 97535 SELF CARE MNGMENT TRAINING: CPT

## 2020-01-09 PROCEDURE — 97110 THERAPEUTIC EXERCISES: CPT

## 2020-01-09 PROCEDURE — 99285 EMERGENCY DEPT VISIT HI MDM: CPT

## 2020-01-09 PROCEDURE — 700102 HCHG RX REV CODE 250 W/ 637 OVERRIDE(OP): Performed by: PHYSICAL MEDICINE & REHABILITATION

## 2020-01-09 PROCEDURE — 96374 THER/PROPH/DIAG INJ IV PUSH: CPT

## 2020-01-09 PROCEDURE — 99232 SBSQ HOSP IP/OBS MODERATE 35: CPT | Performed by: PHYSICAL MEDICINE & REHABILITATION

## 2020-01-09 PROCEDURE — 94760 N-INVAS EAR/PLS OXIMETRY 1: CPT

## 2020-01-09 PROCEDURE — 2W3LX1Z IMMOBILIZATION OF RIGHT LOWER EXTREMITY USING SPLINT: ICD-10-PCS | Performed by: EMERGENCY MEDICINE

## 2020-01-09 PROCEDURE — 99223 1ST HOSP IP/OBS HIGH 75: CPT | Performed by: INTERNAL MEDICINE

## 2020-01-09 PROCEDURE — 770001 HCHG ROOM/CARE - MED/SURG/GYN PRIV*

## 2020-01-09 RX ORDER — BISACODYL 10 MG
10 SUPPOSITORY, RECTAL RECTAL
Status: DISCONTINUED | OUTPATIENT
Start: 2020-01-09 | End: 2020-01-15 | Stop reason: HOSPADM

## 2020-01-09 RX ORDER — SIMETHICONE 80 MG
120 TABLET,CHEWABLE ORAL
COMMUNITY
End: 2022-06-08

## 2020-01-09 RX ORDER — ACETAMINOPHEN 325 MG/1
650 TABLET ORAL EVERY 6 HOURS PRN
Status: DISCONTINUED | OUTPATIENT
Start: 2020-01-09 | End: 2020-01-15 | Stop reason: HOSPADM

## 2020-01-09 RX ORDER — AMOXICILLIN 250 MG
2 CAPSULE ORAL 2 TIMES DAILY
COMMUNITY
End: 2021-10-04

## 2020-01-09 RX ORDER — OXYCODONE HYDROCHLORIDE 5 MG/1
5 TABLET ORAL
Status: DISCONTINUED | OUTPATIENT
Start: 2020-01-09 | End: 2020-01-15 | Stop reason: HOSPADM

## 2020-01-09 RX ORDER — OMEPRAZOLE 20 MG/1
20 CAPSULE, DELAYED RELEASE ORAL DAILY
Status: DISCONTINUED | OUTPATIENT
Start: 2020-01-10 | End: 2020-01-15 | Stop reason: HOSPADM

## 2020-01-09 RX ORDER — OXYCODONE HYDROCHLORIDE 10 MG/1
10 TABLET ORAL
Status: DISCONTINUED | OUTPATIENT
Start: 2020-01-09 | End: 2020-01-15 | Stop reason: HOSPADM

## 2020-01-09 RX ORDER — POLYETHYLENE GLYCOL 3350 17 G/17G
1 POWDER, FOR SOLUTION ORAL
Status: DISCONTINUED | OUTPATIENT
Start: 2020-01-09 | End: 2020-01-15 | Stop reason: HOSPADM

## 2020-01-09 RX ORDER — GABAPENTIN 300 MG/1
300 CAPSULE ORAL 3 TIMES DAILY
Status: DISCONTINUED | OUTPATIENT
Start: 2020-01-09 | End: 2020-01-09

## 2020-01-09 RX ORDER — MIDODRINE HYDROCHLORIDE 5 MG/1
5 TABLET ORAL 3 TIMES DAILY
Status: DISCONTINUED | OUTPATIENT
Start: 2020-01-09 | End: 2020-01-15 | Stop reason: HOSPADM

## 2020-01-09 RX ORDER — LEVOFLOXACIN 500 MG/1
500 TABLET, FILM COATED ORAL DAILY
COMMUNITY
Start: 2020-01-03 | End: 2020-06-03

## 2020-01-09 RX ORDER — FERROUS SULFATE 325(65) MG
325 TABLET ORAL 2 TIMES DAILY WITH MEALS
Status: DISCONTINUED | OUTPATIENT
Start: 2020-01-09 | End: 2020-01-15 | Stop reason: HOSPADM

## 2020-01-09 RX ORDER — AMOXICILLIN 250 MG
2 CAPSULE ORAL 2 TIMES DAILY
Status: DISCONTINUED | OUTPATIENT
Start: 2020-01-09 | End: 2020-01-15 | Stop reason: HOSPADM

## 2020-01-09 RX ORDER — ONDANSETRON 2 MG/ML
4 INJECTION INTRAMUSCULAR; INTRAVENOUS EVERY 4 HOURS PRN
Status: DISCONTINUED | OUTPATIENT
Start: 2020-01-09 | End: 2020-01-15 | Stop reason: HOSPADM

## 2020-01-09 RX ORDER — FUROSEMIDE 20 MG/1
20 TABLET ORAL DAILY
Status: DISCONTINUED | OUTPATIENT
Start: 2020-01-10 | End: 2020-01-15 | Stop reason: HOSPADM

## 2020-01-09 RX ORDER — SENNOSIDES 8.6 MG
650 CAPSULE ORAL 3 TIMES DAILY
COMMUNITY
End: 2023-02-28

## 2020-01-09 RX ORDER — MIDODRINE HYDROCHLORIDE 5 MG/1
5 TABLET ORAL 3 TIMES DAILY
COMMUNITY
End: 2021-06-09

## 2020-01-09 RX ORDER — SIMETHICONE 80 MG
120 TABLET,CHEWABLE ORAL 4 TIMES DAILY PRN
Status: DISCONTINUED | OUTPATIENT
Start: 2020-01-09 | End: 2020-01-15 | Stop reason: HOSPADM

## 2020-01-09 RX ORDER — ENALAPRILAT 1.25 MG/ML
1.25 INJECTION INTRAVENOUS EVERY 6 HOURS PRN
Status: DISCONTINUED | OUTPATIENT
Start: 2020-01-09 | End: 2020-01-15 | Stop reason: HOSPADM

## 2020-01-09 RX ORDER — OXYCODONE HYDROCHLORIDE 5 MG/1
2.5-5 TABLET ORAL
Status: ON HOLD | COMMUNITY
End: 2020-01-15

## 2020-01-09 RX ORDER — ONDANSETRON 4 MG/1
4 TABLET, ORALLY DISINTEGRATING ORAL EVERY 4 HOURS PRN
Status: DISCONTINUED | OUTPATIENT
Start: 2020-01-09 | End: 2020-01-15 | Stop reason: HOSPADM

## 2020-01-09 RX ORDER — LOVASTATIN 20 MG/1
40 TABLET ORAL
Status: DISCONTINUED | OUTPATIENT
Start: 2020-01-09 | End: 2020-01-15 | Stop reason: HOSPADM

## 2020-01-09 RX ORDER — MONTELUKAST SODIUM 10 MG/1
10 TABLET ORAL EVERY EVENING
COMMUNITY
End: 2023-03-06 | Stop reason: SDUPTHER

## 2020-01-09 RX ORDER — HYDROMORPHONE HYDROCHLORIDE 1 MG/ML
0.5 INJECTION, SOLUTION INTRAMUSCULAR; INTRAVENOUS; SUBCUTANEOUS
Status: DISCONTINUED | OUTPATIENT
Start: 2020-01-09 | End: 2020-01-15 | Stop reason: HOSPADM

## 2020-01-09 RX ORDER — VITS A,C,E/LUTEIN/MINERALS 300MCG-200
1 TABLET ORAL EVERY MORNING
COMMUNITY

## 2020-01-09 RX ADMIN — OXYCODONE HYDROCHLORIDE 5 MG: 5 TABLET ORAL at 21:18

## 2020-01-09 RX ADMIN — BUDESONIDE AND FORMOTEROL FUMARATE DIHYDRATE 2 PUFF: 160; 4.5 AEROSOL RESPIRATORY (INHALATION) at 09:14

## 2020-01-09 RX ADMIN — MIDODRINE HYDROCHLORIDE 5 MG: 2.5 TABLET ORAL at 12:14

## 2020-01-09 RX ADMIN — FERROUS SULFATE TAB 325 MG (65 MG ELEMENTAL FE) 325 MG: 325 (65 FE) TAB at 23:17

## 2020-01-09 RX ADMIN — APIXABAN 5 MG: 5 TABLET, FILM COATED ORAL at 09:02

## 2020-01-09 RX ADMIN — SIMETHICONE CHEW TAB 80 MG 120 MG: 80 TABLET ORAL at 12:13

## 2020-01-09 RX ADMIN — MIDODRINE HYDROCHLORIDE 5 MG: 2.5 TABLET ORAL at 09:03

## 2020-01-09 RX ADMIN — PROPOFOL 60 MG: 10 INJECTION, EMULSION INTRAVENOUS at 19:12

## 2020-01-09 RX ADMIN — OXYCODONE HYDROCHLORIDE AND ACETAMINOPHEN 500 MG: 500 TABLET ORAL at 09:02

## 2020-01-09 RX ADMIN — LOVASTATIN 40 MG: 20 TABLET ORAL at 23:17

## 2020-01-09 RX ADMIN — OXYCODONE HYDROCHLORIDE 5 MG: 5 TABLET ORAL at 15:24

## 2020-01-09 RX ADMIN — VITAMIN D, TAB 1000IU (100/BT) 4000 UNITS: 25 TAB at 09:02

## 2020-01-09 RX ADMIN — OMEPRAZOLE 20 MG: 20 CAPSULE, DELAYED RELEASE ORAL at 09:01

## 2020-01-09 RX ADMIN — SIMETHICONE CHEW TAB 80 MG 120 MG: 80 TABLET ORAL at 09:02

## 2020-01-09 RX ADMIN — FERROUS SULFATE TAB 325 MG (65 MG ELEMENTAL FE) 325 MG: 325 (65 FE) TAB at 09:03

## 2020-01-09 RX ADMIN — ACETAMINOPHEN 650 MG: 325 TABLET, FILM COATED ORAL at 15:24

## 2020-01-09 RX ADMIN — ACETAMINOPHEN 650 MG: 325 TABLET, FILM COATED ORAL at 09:02

## 2020-01-09 RX ADMIN — I-VITE, TAB 1000-60-2MG (60/BT) 1 TABLET: TAB at 09:14

## 2020-01-09 RX ADMIN — OXYCODONE HYDROCHLORIDE 5 MG: 5 TABLET ORAL at 09:14

## 2020-01-09 RX ADMIN — SENNOSIDES AND DOCUSATE SODIUM 2 TABLET: 8.6; 5 TABLET ORAL at 09:00

## 2020-01-09 ASSESSMENT — ACTIVITIES OF DAILY LIVING (ADL)
TOILET_TRANSFER_LEVEL_OF_ASSIST: REQUIRES PHYSICAL ASSIST WITH TOILET TRANSFER
TOILETING_LEVEL_OF_ASSIST: REQUIRES PHYSICAL ASSIST WITH TOILETING
SHOWER_TRANSFER_LEVEL_OF_ASSIST: REQUIRES SUPERVISION WITH SHOWER TRANSFER

## 2020-01-09 ASSESSMENT — ENCOUNTER SYMPTOMS
PALPITATIONS: 0
FEVER: 0
ABDOMINAL PAIN: 0
FEVER: 0
BRUISES/BLEEDS EASILY: 0
EYES NEGATIVE: 1
DIZZINESS: 0
NAUSEA: 0
VOMITING: 0
TINGLING: 0
FALLS: 0
SHORTNESS OF BREATH: 0
NAUSEA: 0
COUGH: 0
LOSS OF CONSCIOUSNESS: 0
CHILLS: 0
VOMITING: 0
DIARRHEA: 0
WEAKNESS: 0
CHILLS: 0
SHORTNESS OF BREATH: 0
COUGH: 0
ABDOMINAL PAIN: 0
HEADACHES: 0
MYALGIAS: 0
CONSTIPATION: 0
PALPITATIONS: 0
SPUTUM PRODUCTION: 0
DEPRESSION: 0
STRIDOR: 0

## 2020-01-09 NOTE — DISCHARGE PLANNING
Requested to arrange [facility] transport back to [facility] for patient.  Pickup time is 1900, RN informed.  Daughter at bedside, both patient and daughter informed.  Patient requests pain medication prior to transport, RN was told of this request.

## 2020-01-09 NOTE — FLOWSHEET NOTE
01/09/20 0933   Events/Summary/Plan   Events/Summary/Plan SpO2 check   Chest Exam   Respiration 16   Pulse 92   Oximetry   #Pulse Oximetry (Single Determination) Yes   Oxygen   Home O2 Use Prior To Admission? Yes   Home O2 LPM Flow   (4-5 lpm)   Home O2 Delivery Method Nasal Cannula   Home O2 Frequency of Use Continuous   Pulse Oximetry 96 %   O2 (LPM) 4   O2 Daily Delivery Respiratory  Silicone Nasal Cannula

## 2020-01-09 NOTE — PROGRESS NOTES
Rehab Progress Note     Date of Service: 1/9/2020  Chief Complaint: hip pain    Interval Events (Subjective)    Patient seen and examined in her room this morning.  She reports she feels much better today.  She remembers being in the emergency room and asking the nurse when they were going to relocate her hip and they told her it had already been done.  She is working with occupational therapy right now.  They are coming up with some adjustments to be made to her wheelchair as well as plans for her to use a ball in between her legs when she stands up to prevent another hip dislocation.  Patient reports her hip is very sore today but the pain is much better than it was yesterday.  Unfortunately now she is very fearful to stand up now that her hip is been dislocated twice.  We discussed the need to move her discharge date out as we are supposed to do home evaluation today.  Patient is in agreement.  Discussed with case management we will reassess at Monday's weekly conference.    Objective:  VITAL SIGNS: /65   Pulse 85   Temp 36.7 °C (98.1 °F) (Temporal)   Resp 16   SpO2 96%   Gen: alert, no apparent distress  CV: regular rate and rhythm, no murmurs, no peripheral edema  Resp: clear to ascultation bilaterally, normal respiratory effort  GI: soft, non-tender abdomen, bowel sounds present  Neuro: notable for chronic visual impairments    Recent Results (from the past 72 hour(s))   Basic Metabolic Panel    Collection Time: 01/07/20  5:54 AM   Result Value Ref Range    Sodium 143 135 - 145 mmol/L    Potassium 4.1 3.6 - 5.5 mmol/L    Chloride 105 96 - 112 mmol/L    Co2 30 20 - 33 mmol/L    Glucose 108 (H) 65 - 99 mg/dL    Bun 26 (H) 8 - 22 mg/dL    Creatinine 0.74 0.50 - 1.40 mg/dL    Calcium 9.4 8.5 - 10.5 mg/dL    Anion Gap 8.0 0.0 - 11.9   CBC WITH DIFFERENTIAL    Collection Time: 01/07/20  5:54 AM   Result Value Ref Range    WBC 3.3 (L) 4.8 - 10.8 K/uL    RBC 3.88 (L) 4.20 - 5.40 M/uL    Hemoglobin 9.9 (L)  12.0 - 16.0 g/dL    Hematocrit 34.3 (L) 37.0 - 47.0 %    MCV 88.4 81.4 - 97.8 fL    MCH 25.5 (L) 27.0 - 33.0 pg    MCHC 28.9 (L) 33.6 - 35.0 g/dL    RDW 62.1 (H) 35.9 - 50.0 fL    Platelet Count 299 164 - 446 K/uL    MPV 8.6 (L) 9.0 - 12.9 fL    Neutrophils-Polys 45.50 44.00 - 72.00 %    Lymphocytes 36.70 22.00 - 41.00 %    Monocytes 15.40 (H) 0.00 - 13.40 %    Eosinophils 0.30 0.00 - 6.90 %    Basophils 0.60 0.00 - 1.80 %    Immature Granulocytes 1.50 (H) 0.00 - 0.90 %    Nucleated RBC 0.00 /100 WBC    Neutrophils (Absolute) 1.51 (L) 2.00 - 7.15 K/uL    Lymphs (Absolute) 1.22 1.00 - 4.80 K/uL    Monos (Absolute) 0.51 0.00 - 0.85 K/uL    Eos (Absolute) 0.01 0.00 - 0.51 K/uL    Baso (Absolute) 0.02 0.00 - 0.12 K/uL    Immature Granulocytes (abs) 0.05 0.00 - 0.11 K/uL    NRBC (Absolute) 0.00 K/uL    Hypochromia 1+     Anisocytosis 1+     Microcytosis 1+    proBrain Natriuretic Peptide, NT    Collection Time: 01/07/20  5:54 AM   Result Value Ref Range    NT-proBNP 1413 (H) 0 - 125 pg/mL   ESTIMATED GFR    Collection Time: 01/07/20  5:54 AM   Result Value Ref Range    GFR If African American >60 >60 mL/min/1.73 m 2    GFR If Non African American >60 >60 mL/min/1.73 m 2   PERIPHERAL SMEAR REVIEW    Collection Time: 01/07/20  5:54 AM   Result Value Ref Range    Peripheral Smear Review see below    PLATELET ESTIMATE    Collection Time: 01/07/20  5:54 AM   Result Value Ref Range    Plt Estimation Normal    MORPHOLOGY    Collection Time: 01/07/20  5:54 AM   Result Value Ref Range    RBC Morphology Present     Polychromia 1+     Poikilocytosis 1+     Ovalocytes 1+     Schistocytes 1+    DIFFERENTIAL COMMENT    Collection Time: 01/07/20  5:54 AM   Result Value Ref Range    Comments-Diff see below    CBC WITH DIFFERENTIAL    Collection Time: 01/08/20  5:44 AM   Result Value Ref Range    WBC 3.6 (L) 4.8 - 10.8 K/uL    RBC 3.89 (L) 4.20 - 5.40 M/uL    Hemoglobin 9.9 (L) 12.0 - 16.0 g/dL    Hematocrit 34.6 (L) 37.0 - 47.0 %    MCV  88.9 81.4 - 97.8 fL    MCH 25.4 (L) 27.0 - 33.0 pg    MCHC 28.6 (L) 33.6 - 35.0 g/dL    RDW 62.2 (H) 35.9 - 50.0 fL    Platelet Count 277 164 - 446 K/uL    MPV 8.6 (L) 9.0 - 12.9 fL    Neutrophils-Polys 44.40 44.00 - 72.00 %    Lymphocytes 38.10 22.00 - 41.00 %    Monocytes 15.70 (H) 0.00 - 13.40 %    Eosinophils 0.60 0.00 - 6.90 %    Basophils 0.60 0.00 - 1.80 %    Immature Granulocytes 0.60 0.00 - 0.90 %    Nucleated RBC 0.00 /100 WBC    Neutrophils (Absolute) 1.59 (L) 2.00 - 7.15 K/uL    Lymphs (Absolute) 1.36 1.00 - 4.80 K/uL    Monos (Absolute) 0.56 0.00 - 0.85 K/uL    Eos (Absolute) 0.02 0.00 - 0.51 K/uL    Baso (Absolute) 0.02 0.00 - 0.12 K/uL    Immature Granulocytes (abs) 0.02 0.00 - 0.11 K/uL    NRBC (Absolute) 0.00 K/uL       Current Facility-Administered Medications   Medication Frequency   • acetaminophen (TYLENOL) tablet 650 mg TID   • artificial tears ophthalmic solution 1 Drop PRN   • benzocaine-menthol (CEPACOL) lozenge 1 Lozenge Q2HRS PRN   • hydrALAZINE (APRESOLINE) tablet 25 mg Q8HRS PRN   • mag hydrox-al hydrox-simeth (MAALOX PLUS ES or MYLANTA DS) suspension 20 mL Q2HRS PRN   • ondansetron (ZOFRAN ODT) dispertab 4 mg 4X/DAY PRN    Or   • ondansetron (ZOFRAN) syringe/vial injection 4 mg 4X/DAY PRN   • senna-docusate (PERICOLACE or SENOKOT S) 8.6-50 MG per tablet 2 Tab BID    And   • polyethylene glycol/lytes (MIRALAX) PACKET 1 Packet QDAY PRN    And   • magnesium hydroxide (MILK OF MAGNESIA) suspension 30 mL QDAY PRN    And   • bisacodyl (DULCOLAX) suppository 10 mg QDAY PRN   • sodium chloride (OCEAN) 0.65 % nasal spray 2 Spray PRN   • traZODone (DESYREL) tablet 50 mg QHS PRN   • Pharmacy Consult Request ...Pain Management Review 1 Each PHARMACY TO DOSE   • oxyCODONE immediate-release (ROXICODONE) tablet 2.5 mg Q3HRS PRN   • oxyCODONE immediate-release (ROXICODONE) tablet 5 mg Q3HRS PRN   • tramadol (ULTRAM) 50 MG tablet 50 mg Q4HRS PRN   • Respiratory Care per Protocol Continuous RT   •  apixaban (ELIQUIS) tablet 5 mg BID   • ascorbic acid tablet 500 mg DAILY   • budesonide-formoterol (SYMBICORT) 160-4.5 MCG/ACT inhaler 2 Puff BID   • calcium carbonate (TUMS) chewable tab 500 mg QDAY PRN   • ferrous sulfate tablet 325 mg BID WITH MEALS   • ipratropium-albuterol (DUONEB) nebulizer solution Q4H PRN (RT)   • lovastatin (MEVACOR) tablet 40 mg QHS   • midodrine (PROAMATINE) tablet 5 mg TID WITH MEALS   • montelukast (SINGULAIR) tablet 10 mg Nightly   • OCUVITE-LUTEIN tablet 1 tablet DAILY   • omeprazole (PRILOSEC) capsule 20 mg DAILY   • simethicone (MYLICON) chewable tab 120 mg 4X/DAY WITH MEALS + NIGHTLY   • vitamin D (cholecalciferol) 1000 UNIT tablet 4,000 Units DAILY       Orders Placed This Encounter   Procedures   • Diet Order Regular     Standing Status:   Standing     Number of Occurrences:   1     Order Specific Question:   Diet:     Answer:   Regular [1]     Order Specific Question:   Texture/Fiber modifications:     Answer:   Dysphagia 3(Mechanical Soft)specify fluid consistency(question 6) [3]     Order Specific Question:   Consistency/Fluid modifications:     Answer:   Thin Liquids [3]       Assessment:  Active Hospital Problems    Diagnosis   • *Acute pulmonary embolism with acute cor pulmonale (HCC)   • Acute respiratory failure (HCC)   • Closed dislocation of right hip (HCC)   • COPD (chronic obstructive pulmonary disease) (HCC)   • Gastroesophageal reflux disease   • Vitamin D deficiency   • Dyslipidemia   • Iron deficiency anemia   • Pleural effusion   • Hypotension   • Status post right hip replacement     79 yr old female with history of recent right JAIME, admitted to acute inpatient rehab on 12/28 with debility and respiratory insufficiency with PEs.    I led and attended the weekly conference, and agree with the IDT conference documentation and plan of care as noted below.    Date of conference: 1/6/2020    Goals and barriers: See IDT note.    Biggest barriers: orthostasis,  shortness of breath    Goals in next week: home evaluation    CM/social support: daughter supportive, will care for her at discharge    Anticipated DC date: 1/14/2019, reassess due to set back with her hip disclocation    Home health: PT/OT/RN    Equip: none needed    Follow up: PCP, surgery       Medical Decision Making and Plan:    Recent right JAIME, dislocation  S/p relocation 12/19  Continue full rehab program  PT/OT, 1.5 hr each discipline, 5 days per week  Outpatient follow up with orthopedics    Dislocation on right hip 1/8  After standing up with OT  Sent to ED for relocation  Discussed with therapy strategies to avoid occurring again - WC adjustments, ball in between legs so that she maintains her precautions    Dysphagia, resolved    Pain Management  Schedule tylenol  PRN oxycodone  PRN ice    Bowel  Continue bowel meds  Last BM 1/7    Bladder  Checked PVRs - 62  Not retaining  ICP for over 400 cc  Scheduled toileting    DVT prophylaxis  Eliquis    Appreciate the assistance of the hospitalist with her medical co-morbidities:    Acute bilateral PE  COPD, was on oxygen at home  Pleural effusion, Lasix  Hypotension, likely from PEs, midodrine  GERD  Iron def anemia, on supplementation  Mild RLE edema, likely from hip surgery/relocation, already on Eliquis  Possible pneumonia, s/p antibiotics  Dyslipidemia  Chronic visual impairments, vitamins     Total time:  25 minutes.  I spent greater than 50% of the time for patient care, counseling, and coordination on this date, including patient face-to face time, unit/floor time with review of records/pertinent lab data and studies, as well as discussing diagnostic evaluation/work up, planned therapeutic interventions, and future disposition of care, as per the interval events/subjective and the assessment and plan as noted above.    I have performed a physical exam, reviewed and updated ROS, as well as the assessment and plan today 1/9/2020. In review of note from  1/8/2020 there are no new changes except as documented above.    Salma Tejada M.D.   Physical Medicine and Rehabilitation

## 2020-01-09 NOTE — ED NOTES
RN spoke with social work in regards to assistance with DC transport. Non-emergent [facility] transport to be arranged. RN notified charge RN at [facility] of plan.

## 2020-01-09 NOTE — ED NOTES
Assumed care of pt at this time. Pt awaiting [facility] transport at this time. Family remains at bedside, water given to pt at her request. No further requests at this time.

## 2020-01-09 NOTE — PROGRESS NOTES
"Rehab Progress Note     Date of Service: 1/8/2020  Chief Complaint: hip pain    Interval Events (Subjective)    Patient initially seen in the morning doing therapy with physical therapy in the parallel bars.  Blood pressure has improved today.  She tells me she brought me in a piece of cheesecake.    Later in the afternoon patient had the acute onset of pain in her right hip with a pop after she stood up with occupational therapy.  X rays confirm dislocated hip for which she was transferred to the ER for relocation.  Discussed with therapy she has a tendency to internally rotate and adduct her leg when she stands up.  We discussed strategies to prevent this occurring again.    Objective:  VITAL SIGNS: /76   Pulse 78   Temp 36.5 °C (97.7 °F) (Oral)   Resp 20   Ht 1.494 m (4' 10.8\")   Wt 64 kg (141 lb 1.5 oz)   SpO2 92%   BMI 28.69 kg/m²   Gen: alert, severe distress, in pain  CV: regular rate and rhythm, no murmurs, no peripheral edema  Resp: clear to ascultation bilaterally, normal respiratory effort  GI: soft, non-tender abdomen, bowel sounds present    Recent Results (from the past 72 hour(s))   Basic Metabolic Panel    Collection Time: 01/07/20  5:54 AM   Result Value Ref Range    Sodium 143 135 - 145 mmol/L    Potassium 4.1 3.6 - 5.5 mmol/L    Chloride 105 96 - 112 mmol/L    Co2 30 20 - 33 mmol/L    Glucose 108 (H) 65 - 99 mg/dL    Bun 26 (H) 8 - 22 mg/dL    Creatinine 0.74 0.50 - 1.40 mg/dL    Calcium 9.4 8.5 - 10.5 mg/dL    Anion Gap 8.0 0.0 - 11.9   CBC WITH DIFFERENTIAL    Collection Time: 01/07/20  5:54 AM   Result Value Ref Range    WBC 3.3 (L) 4.8 - 10.8 K/uL    RBC 3.88 (L) 4.20 - 5.40 M/uL    Hemoglobin 9.9 (L) 12.0 - 16.0 g/dL    Hematocrit 34.3 (L) 37.0 - 47.0 %    MCV 88.4 81.4 - 97.8 fL    MCH 25.5 (L) 27.0 - 33.0 pg    MCHC 28.9 (L) 33.6 - 35.0 g/dL    RDW 62.1 (H) 35.9 - 50.0 fL    Platelet Count 299 164 - 446 K/uL    MPV 8.6 (L) 9.0 - 12.9 fL    Neutrophils-Polys 45.50 44.00 - 72.00 " %    Lymphocytes 36.70 22.00 - 41.00 %    Monocytes 15.40 (H) 0.00 - 13.40 %    Eosinophils 0.30 0.00 - 6.90 %    Basophils 0.60 0.00 - 1.80 %    Immature Granulocytes 1.50 (H) 0.00 - 0.90 %    Nucleated RBC 0.00 /100 WBC    Neutrophils (Absolute) 1.51 (L) 2.00 - 7.15 K/uL    Lymphs (Absolute) 1.22 1.00 - 4.80 K/uL    Monos (Absolute) 0.51 0.00 - 0.85 K/uL    Eos (Absolute) 0.01 0.00 - 0.51 K/uL    Baso (Absolute) 0.02 0.00 - 0.12 K/uL    Immature Granulocytes (abs) 0.05 0.00 - 0.11 K/uL    NRBC (Absolute) 0.00 K/uL    Hypochromia 1+     Anisocytosis 1+     Microcytosis 1+    proBrain Natriuretic Peptide, NT    Collection Time: 01/07/20  5:54 AM   Result Value Ref Range    NT-proBNP 1413 (H) 0 - 125 pg/mL   ESTIMATED GFR    Collection Time: 01/07/20  5:54 AM   Result Value Ref Range    GFR If African American >60 >60 mL/min/1.73 m 2    GFR If Non African American >60 >60 mL/min/1.73 m 2   PERIPHERAL SMEAR REVIEW    Collection Time: 01/07/20  5:54 AM   Result Value Ref Range    Peripheral Smear Review see below    PLATELET ESTIMATE    Collection Time: 01/07/20  5:54 AM   Result Value Ref Range    Plt Estimation Normal    MORPHOLOGY    Collection Time: 01/07/20  5:54 AM   Result Value Ref Range    RBC Morphology Present     Polychromia 1+     Poikilocytosis 1+     Ovalocytes 1+     Schistocytes 1+    DIFFERENTIAL COMMENT    Collection Time: 01/07/20  5:54 AM   Result Value Ref Range    Comments-Diff see below    CBC WITH DIFFERENTIAL    Collection Time: 01/08/20  5:44 AM   Result Value Ref Range    WBC 3.6 (L) 4.8 - 10.8 K/uL    RBC 3.89 (L) 4.20 - 5.40 M/uL    Hemoglobin 9.9 (L) 12.0 - 16.0 g/dL    Hematocrit 34.6 (L) 37.0 - 47.0 %    MCV 88.9 81.4 - 97.8 fL    MCH 25.4 (L) 27.0 - 33.0 pg    MCHC 28.6 (L) 33.6 - 35.0 g/dL    RDW 62.2 (H) 35.9 - 50.0 fL    Platelet Count 277 164 - 446 K/uL    MPV 8.6 (L) 9.0 - 12.9 fL    Neutrophils-Polys 44.40 44.00 - 72.00 %    Lymphocytes 38.10 22.00 - 41.00 %    Monocytes 15.70  (H) 0.00 - 13.40 %    Eosinophils 0.60 0.00 - 6.90 %    Basophils 0.60 0.00 - 1.80 %    Immature Granulocytes 0.60 0.00 - 0.90 %    Nucleated RBC 0.00 /100 WBC    Neutrophils (Absolute) 1.59 (L) 2.00 - 7.15 K/uL    Lymphs (Absolute) 1.36 1.00 - 4.80 K/uL    Monos (Absolute) 0.56 0.00 - 0.85 K/uL    Eos (Absolute) 0.02 0.00 - 0.51 K/uL    Baso (Absolute) 0.02 0.00 - 0.12 K/uL    Immature Granulocytes (abs) 0.02 0.00 - 0.11 K/uL    NRBC (Absolute) 0.00 K/uL       No current facility-administered medications for this encounter.      Current Outpatient Medications   Medication   • ascorbic acid (VITAMIN C) 500 MG tablet   • apixaban (ELIQUIS) 5mg Tab   • ferrous sulfate 325 (65 Fe) MG tablet   • furosemide (LASIX) 20 MG Tab   • gabapentin (NEURONTIN) 300 MG Cap   • midodrine (PROAMATINE) 5 MG Tab   • omeprazole (PRILOSEC) 20 MG delayed-release capsule   • vitamin D 2000 UNIT Tab   • acetaminophen (TYLENOL) 500 MG Tab   • lovastatin (MEVACOR) 40 MG tablet   • budesonide-formoterol (SYMBICORT) 160-4.5 MCG/ACT Aerosol   • calcium carbonate (TUMS) 500 MG Chew Tab       Orders Placed This Encounter   Procedures   • Diet Order Regular     Standing Status:   Standing     Number of Occurrences:   1     Order Specific Question:   Diet:     Answer:   Regular [1]     Order Specific Question:   Texture/Fiber modifications:     Answer:   Dysphagia 3(Mechanical Soft)specify fluid consistency(question 6) [3]     Order Specific Question:   Consistency/Fluid modifications:     Answer:   Thin Liquids [3]       Assessment:  Active Hospital Problems    Diagnosis   • *Acute pulmonary embolism with acute cor pulmonale (HCC)   • Acute respiratory failure (HCC)   • Closed dislocation of right hip (HCC)   • COPD (chronic obstructive pulmonary disease) (HCC)   • Gastroesophageal reflux disease   • Vitamin D deficiency   • Dyslipidemia   • Iron deficiency anemia   • Pleural effusion   • Hypotension   • Status post right hip replacement     79 yr  old female with history of recent right JAIME, admitted to acute inpatient rehab on 12/28 with debility and respiratory insufficiency with PEs.    I led and attended the weekly conference, and agree with the IDT conference documentation and plan of care as noted below.    Date of conference: 1/6/2020    Goals and barriers: See IDT note.    Biggest barriers: orthostasis, shortness of breath    Goals in next week: home evaluation    CM/social support: daughter supportive, will care for her at discharge    Anticipated DC date: 1/14/2019    Home health: PT/OT/RN    Equip: none needed    Follow up: PCP, surgery       Medical Decision Making and Plan:    Recent right JAIME, dislocation  S/p relocation 12/19  Continue full rehab program  PT/OT, 1.5 hr each discipline, 5 days per week  Outpatient follow up with orthopedics    Dislocation on right hip today  After standing up with OT  Sent to ED for relocation  Discussed with therapy strategies to avoid occurring again    Dysphagia, resolved    Pain Management  Schedule tylenol  PRN oxycodone  PRN ice    Bowel  Continue bowel meds  Last BM 1/7    Bladder  Checked PVRs - 62  Not retaining  ICP for over 400 cc  Scheduled toileting    DVT prophylaxis  Eliquis    Appreciate the assistance of the hospitalist with her medical co-morbidities:    Acute bilateral PE  COPD, was on oxygen at home  Pleural effusion, Lasix  Hypotension, likely from PEs, midodrine  GERD  Iron def anemia, on supplementation  Mild RLE edema, likely from hip surgery/relocation, already on Eliquis  Possible pneumonia, continue antibiotics  Dyslipidemia  Chronic visual impairments, vitamins     Total time:  38 minutes.  I spent greater than 50% of the time for patient care, counseling, and coordination on this date, including patient face-to face time, unit/floor time with review of records/pertinent lab data and studies, as well as discussing diagnostic evaluation/work up, planned therapeutic interventions, and  future disposition of care, as per the interval events/subjective and the assessment and plan as noted above.    I have performed a physical exam, reviewed and updated ROS, as well as the assessment and plan today 1/8/2020. In review of note from 1/6/2020 there are no new changes except as documented above.    Salma Tejada M.D.   Physical Medicine and Rehabilitation

## 2020-01-09 NOTE — THERAPY
"Physical Therapy   Daily Treatment     Patient Name: Guillermina Recio  Age:  79 y.o., Sex:  female  Medical Record #: [id number]  Today's Date: 1/9/2020     Precautions  Precautions: Posterior Hip Precautions, Weight Bearing As Tolerated Right Lower Extremity, Fall Risk  Comments: Monitor BP and O2 sat, fear of falling    Subjective    Pt was sitting in w/c upon arrival and agreeable to tx  \"well I was watching this tv show....\"  Power goes out  \"well I guess that means I should do therapy\"     Objective       01/09/20 1301   Precautions   Precautions Posterior Hip Precautions;Weight Bearing As Tolerated Right Lower Extremity;Fall Risk   Comments Monitor BP and O2 sat, fear of falling   Sitting Lower Body Exercises   Other Exercises Motomed: 7min x 3 18rpm, 3 level resistance, 2.2miles total B LE   Interdisciplinary Plan of Care Collaboration   IDT Collaboration with  Physician   Patient Position at End of Therapy In Bed;Call Light within Reach;Tray Table within Reach;Phone within Reach   Collaboration Comments re: CLOF   PT Total Time Spent   PT Individual Total Time Spent (Mins) 30   PT Charge Group   PT Therapeutic Exercise 2     4L O2 on tank, SpO2 908% monitored throughout exercise    FIM Bed/Chair/Wheelchair Transfers Score: 4 - Minimal Assistance  Bed/Chair/Wheelchair Transfers Description:  (Transfer: reach pivot transfer CGA; bed mob: Maryan for R LE management )    Assessment    Pt limited by fear of standing, AMB at this time requesting motomed. Pt has maintained previous endurance gains.    Plan    Integration of hip precautions into ADLs, progress to ADLs with FWW, standing tolerance/balance, increased comfort with transfers and mobility using FWW, reschedule home eval for next week    "

## 2020-01-09 NOTE — THERAPY
Physical Therapy   Daily Treatment     Patient Name: Guillermina Recio  Age:  79 y.o., Sex:  female  Medical Record #: [id number]  Today's Date: 1/9/2020     Precautions  Precautions: (P) Posterior Hip Precautions, Weight Bearing As Tolerated Right Lower Extremity, Fall Risk  Comments: (P) Monitor BP and O2 sat, fear of falling    Subjective    Pt was sitting in w/c upon arrival and agreeable to tx. Requested not to walk today as she is a little fearful after yesterday.     Objective       01/09/20 1001   Precautions   Precautions Posterior Hip Precautions;Weight Bearing As Tolerated Right Lower Extremity;Fall Risk   Comments Monitor BP and O2 sat, fear of falling   Interdisciplinary Plan of Care Collaboration   IDT Collaboration with  Occupational Therapist   Patient Position at End of Therapy Call Light within Reach;Tray Table within Reach;Phone within Reach;Seated;Self Releasing Lap Belt Applied   Collaboration Comments re: wheelchair changes   PT Total Time Spent   PT Individual Total Time Spent (Mins) 60   PT Charge Group   PT Therapeutic Exercise 2   PT Therapeutic Activities 2       Review of STS mechanics from wc to FWW for adherence to hip precautions x5 with CGA from 20inch high chair x 2 from 18 inch chair with modA. Requested to use small ball to prevent hip abduction    Provided pt with 20inch high (taller w/c, more firm cushion with wooden board underneath to prevent hip adduction/IR and reduce hip flexion necessary to perform STS and transfers    Motomed speed 16rpm gear 2: ( [id number]  .25miles ; [id number] .45miles)     Assessment    Pt with significantly improved adherence to precautions and decreased level of assist need with modifications to w/c set up. Pt continues to be limited by glute, core weakness. Small ball effectively prevents hip adduction with STS    Plan       Continue therapeutic exercise, gait training as tolerated, stepping up and down a curb/platform with fww, safety education,  pursed lip breathing, neutral rotation RLE during gait, posterior hip precautions

## 2020-01-09 NOTE — THERAPY
Occupational Therapy  Daily Treatment     Patient Name: Guillermina Recio  Age:  79 y.o., Sex:  female  Medical Record #: [id number]  Today's Date: 2020     Precautions  Precautions: Posterior Hip Precautions, Weight Bearing As Tolerated Right Lower Extremity, Fall Risk  Comments: Monitor BP and O2 sat, fear of falling         Subjective    Pt received seated in w/c, reported anxiety about moving due to dislocation yesterday     Objective       20 0931   Precautions   Precautions Posterior Hip Precautions;Weight Bearing As Tolerated Right Lower Extremity;Fall Risk   Comments Monitor BP and O2 sat, fear of falling   OT Total Time Spent   OT Individual Total Time Spent (Mins) 30   OT Charge Group   OT Self Care / ADL 2       FIM Toiletin - Max Assistance  Toileting Description:  Grab bar, Increased time, Supervision for safety(req increased assist due to anxiety)    FIM Toilet Transfer Score:  4 - Minimal Assistance  Toilet Transfer Description:  (stand pivot w/c <> toilet via grab bar)      Assessment    Pt tolerated session well, re-emphasized importance of hip precautions, mechanics that may have contributed to dislocation, and strategies to improve mechanics. Therapy ball used between legs for sit <> stand during toilet transfer to prevent internal rotation. Pt required increased assist to manage toileting needs due to anxiety, but tolerated well given the circumstances. Direct hand-off to PT at end of session with collaboration on w/c modification.     Plan    Integration of hip precautions into ADLs, progress to ADLs with FWW, standing tolerance/balance, increased comfort with transfers and mobility using FWW, reschedule home eval for next week

## 2020-01-09 NOTE — PROGRESS NOTES
Patient arrived back to [facility] from ED s/p hip dislocation. Pt was AAOX4, comfortable on arrival,  medicated for pain in ambulance, VSS. Denied any concerns and was in happy to be back on the unit, medicated X1 w/ tylenol. Mepalex dressing applied to coccyx for blanching redness, pt c/o itching in area. Patient slept w/o issues most of the night.

## 2020-01-10 LAB
ANION GAP SERPL CALC-SCNC: 7 MMOL/L (ref 0–11.9)
BUN SERPL-MCNC: 20 MG/DL (ref 8–22)
CALCIUM SERPL-MCNC: 9 MG/DL (ref 8.5–10.5)
CHLORIDE SERPL-SCNC: 107 MMOL/L (ref 96–112)
CO2 SERPL-SCNC: 27 MMOL/L (ref 20–33)
CREAT SERPL-MCNC: 0.74 MG/DL (ref 0.5–1.4)
ERYTHROCYTE [DISTWIDTH] IN BLOOD BY AUTOMATED COUNT: 61.4 FL (ref 35.9–50)
GLUCOSE SERPL-MCNC: 111 MG/DL (ref 65–99)
HCT VFR BLD AUTO: 32.8 % (ref 37–47)
HGB BLD-MCNC: 9.3 G/DL (ref 12–16)
MCH RBC QN AUTO: 25 PG (ref 27–33)
MCHC RBC AUTO-ENTMCNC: 28.4 G/DL (ref 33.6–35)
MCV RBC AUTO: 88.2 FL (ref 81.4–97.8)
PLATELET # BLD AUTO: 256 K/UL (ref 164–446)
PMV BLD AUTO: 8.6 FL (ref 9–12.9)
POTASSIUM SERPL-SCNC: 4.3 MMOL/L (ref 3.6–5.5)
RBC # BLD AUTO: 3.72 M/UL (ref 4.2–5.4)
SODIUM SERPL-SCNC: 141 MMOL/L (ref 135–145)
WBC # BLD AUTO: 3.7 K/UL (ref 4.8–10.8)

## 2020-01-10 PROCEDURE — 700102 HCHG RX REV CODE 250 W/ 637 OVERRIDE(OP): Performed by: INTERNAL MEDICINE

## 2020-01-10 PROCEDURE — A9270 NON-COVERED ITEM OR SERVICE: HCPCS | Performed by: INTERNAL MEDICINE

## 2020-01-10 PROCEDURE — 99233 SBSQ HOSP IP/OBS HIGH 50: CPT | Performed by: INTERNAL MEDICINE

## 2020-01-10 PROCEDURE — 85027 COMPLETE CBC AUTOMATED: CPT

## 2020-01-10 PROCEDURE — 80048 BASIC METABOLIC PNL TOTAL CA: CPT

## 2020-01-10 PROCEDURE — 36415 COLL VENOUS BLD VENIPUNCTURE: CPT

## 2020-01-10 PROCEDURE — 770001 HCHG ROOM/CARE - MED/SURG/GYN PRIV*

## 2020-01-10 PROCEDURE — 306637 HCHG MISC ORTHO ITEM RC 0274

## 2020-01-10 PROCEDURE — L1686 HO POST-OP HIP ABDUCTION: HCPCS

## 2020-01-10 PROCEDURE — 306481 GARMENT,FOOT IMPAD VASO: Performed by: INTERNAL MEDICINE

## 2020-01-10 RX ADMIN — MIDODRINE HYDROCHLORIDE 5 MG: 5 TABLET ORAL at 06:51

## 2020-01-10 RX ADMIN — MIDODRINE HYDROCHLORIDE 5 MG: 5 TABLET ORAL at 12:02

## 2020-01-10 RX ADMIN — APIXABAN 5 MG: 5 TABLET, FILM COATED ORAL at 17:40

## 2020-01-10 RX ADMIN — MIDODRINE HYDROCHLORIDE 5 MG: 5 TABLET ORAL at 00:00

## 2020-01-10 RX ADMIN — APIXABAN 5 MG: 5 TABLET, FILM COATED ORAL at 12:02

## 2020-01-10 RX ADMIN — SENNOSIDES AND DOCUSATE SODIUM 2 TABLET: 8.6; 5 TABLET ORAL at 06:00

## 2020-01-10 RX ADMIN — OMEPRAZOLE 20 MG: 20 CAPSULE, DELAYED RELEASE ORAL at 06:51

## 2020-01-10 RX ADMIN — LOVASTATIN 40 MG: 20 TABLET ORAL at 20:12

## 2020-01-10 RX ADMIN — FERROUS SULFATE TAB 325 MG (65 MG ELEMENTAL FE) 325 MG: 325 (65 FE) TAB at 17:40

## 2020-01-10 RX ADMIN — SENNOSIDES AND DOCUSATE SODIUM 2 TABLET: 8.6; 5 TABLET ORAL at 17:40

## 2020-01-10 RX ADMIN — OXYCODONE HYDROCHLORIDE 10 MG: 10 TABLET ORAL at 06:53

## 2020-01-10 RX ADMIN — MIDODRINE HYDROCHLORIDE 5 MG: 5 TABLET ORAL at 17:40

## 2020-01-10 RX ADMIN — FUROSEMIDE 20 MG: 20 TABLET ORAL at 06:51

## 2020-01-10 SDOH — ECONOMIC STABILITY: TRANSPORTATION INSECURITY
IN THE PAST 12 MONTHS, HAS THE LACK OF TRANSPORTATION KEPT YOU FROM MEDICAL APPOINTMENTS OR FROM GETTING MEDICATIONS?: NO

## 2020-01-10 SDOH — ECONOMIC STABILITY: FOOD INSECURITY: WITHIN THE PAST 12 MONTHS, YOU WORRIED THAT YOUR FOOD WOULD RUN OUT BEFORE YOU GOT MONEY TO BUY MORE.: NEVER TRUE

## 2020-01-10 SDOH — ECONOMIC STABILITY: FOOD INSECURITY: WITHIN THE PAST 12 MONTHS, THE FOOD YOU BOUGHT JUST DIDN'T LAST AND YOU DIDN'T HAVE MONEY TO GET MORE.: NEVER TRUE

## 2020-01-10 SDOH — ECONOMIC STABILITY: TRANSPORTATION INSECURITY
IN THE PAST 12 MONTHS, HAS LACK OF TRANSPORTATION KEPT YOU FROM MEETINGS, WORK, OR FROM GETTING THINGS NEEDED FOR DAILY LIVING?: NO

## 2020-01-10 ASSESSMENT — LIFESTYLE VARIABLES
CONSUMPTION TOTAL: NEGATIVE
HOW MANY TIMES IN THE PAST YEAR HAVE YOU HAD 5 OR MORE DRINKS IN A DAY: 0
EVER FELT BAD OR GUILTY ABOUT YOUR DRINKING: NO
TOTAL SCORE: 0
EVER HAD A DRINK FIRST THING IN THE MORNING TO STEADY YOUR NERVES TO GET RID OF A HANGOVER: NO
TOTAL SCORE: 0
TOTAL SCORE: 0
DOES PATIENT WANT TO STOP DRINKING: CANNOT ASSESS
ALCOHOL_USE: NO
AVERAGE NUMBER OF DAYS PER WEEK YOU HAVE A DRINK CONTAINING ALCOHOL: 0
HAVE YOU EVER FELT YOU SHOULD CUT DOWN ON YOUR DRINKING: NO
EVER_SMOKED: NEVER
ON A TYPICAL DAY WHEN YOU DRINK ALCOHOL HOW MANY DRINKS DO YOU HAVE: 0
HAVE PEOPLE ANNOYED YOU BY CRITICIZING YOUR DRINKING: NO

## 2020-01-10 ASSESSMENT — ENCOUNTER SYMPTOMS
COUGH: 0
NERVOUS/ANXIOUS: 0
CHILLS: 0
LOSS OF CONSCIOUSNESS: 0
TINGLING: 0
DEPRESSION: 0
PALPITATIONS: 0
SPUTUM PRODUCTION: 0
DIZZINESS: 0
HEADACHES: 0
BLURRED VISION: 0
NAUSEA: 0
BLOOD IN STOOL: 0
SORE THROAT: 0
CONSTIPATION: 0
DIARRHEA: 0
SEIZURES: 0
WHEEZING: 0
ABDOMINAL PAIN: 0
SINUS PAIN: 0
VOMITING: 0
FEVER: 0
SHORTNESS OF BREATH: 0
FALLS: 0
WEAKNESS: 0
HEMOPTYSIS: 0

## 2020-01-10 ASSESSMENT — COGNITIVE AND FUNCTIONAL STATUS - GENERAL
MOVING FROM LYING ON BACK TO SITTING ON SIDE OF FLAT BED: A LITTLE
DAILY ACTIVITIY SCORE: 17
TOILETING: A LOT
DRESSING REGULAR LOWER BODY CLOTHING: A LOT
MOVING TO AND FROM BED TO CHAIR: A LOT
HELP NEEDED FOR BATHING: A LOT
CLIMB 3 TO 5 STEPS WITH RAILING: TOTAL
WALKING IN HOSPITAL ROOM: A LOT
SUGGESTED CMS G CODE MODIFIER MOBILITY: CL
DRESSING REGULAR UPPER BODY CLOTHING: A LITTLE
STANDING UP FROM CHAIR USING ARMS: A LOT
MOBILITY SCORE: 13
TURNING FROM BACK TO SIDE WHILE IN FLAT BAD: A LITTLE
SUGGESTED CMS G CODE MODIFIER DAILY ACTIVITY: CK

## 2020-01-10 ASSESSMENT — COPD QUESTIONNAIRES
COPD SCREENING SCORE: 3
DO YOU EVER COUGH UP ANY MUCUS OR PHLEGM?: NO/ONLY WITH OCCASIONAL COLDS OR INFECTIONS
HAVE YOU SMOKED AT LEAST 100 CIGARETTES IN YOUR ENTIRE LIFE: NO/DON'T KNOW
DURING THE PAST 4 WEEKS HOW MUCH DID YOU FEEL SHORT OF BREATH: SOME OF THE TIME
IN THE PAST 12 MONTHS DO YOU DO LESS THAN YOU USED TO BECAUSE OF YOUR BREATHING PROBLEMS: DISAGREE/UNSURE

## 2020-01-10 ASSESSMENT — PATIENT HEALTH QUESTIONNAIRE - PHQ9
6. FEELING BAD ABOUT YOURSELF - OR THAT YOU ARE A FAILURE OR HAVE LET YOURSELF OR YOUR FAMILY DOWN: NOT AL ALL
5. POOR APPETITE OR OVEREATING: NOT AT ALL
7. TROUBLE CONCENTRATING ON THINGS, SUCH AS READING THE NEWSPAPER OR WATCHING TELEVISION: NOT AT ALL
SUM OF ALL RESPONSES TO PHQ9 QUESTIONS 1 AND 2: 0
3. TROUBLE FALLING OR STAYING ASLEEP OR SLEEPING TOO MUCH: NOT AT ALL
SUM OF ALL RESPONSES TO PHQ QUESTIONS 1-9: 0
1. LITTLE INTEREST OR PLEASURE IN DOING THINGS: NOT AT ALL
2. FEELING DOWN, DEPRESSED, IRRITABLE, OR HOPELESS: NOT AT ALL
8. MOVING OR SPEAKING SO SLOWLY THAT OTHER PEOPLE COULD HAVE NOTICED. OR THE OPPOSITE, BEING SO FIGETY OR RESTLESS THAT YOU HAVE BEEN MOVING AROUND A LOT MORE THAN USUAL: NOT AT ALL
4. FEELING TIRED OR HAVING LITTLE ENERGY: NOT AT ALL
9. THOUGHTS THAT YOU WOULD BE BETTER OFF DEAD, OR OF HURTING YOURSELF: NOT AT ALL

## 2020-01-10 NOTE — ASSESSMENT & PLAN NOTE
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    Followed by Ortho Dr. Delgado who recommends hip abduction brace and physical therapy.  No plan for surgery.  She is a poor candidate  Pain scale  Physical therapy recommends return to rehab after medical clearance  1/14. Pain control, bowel regimen  Rehab vs SNF planned.

## 2020-01-10 NOTE — PROGRESS NOTES
Spoke with Chapito SAUNDERS. No surgery planned. Hip abductor pillow while in bed. Okay to be weight bearing as tolerated with hip abduction brace once delivered. Okay to resume anticoagulation. Dr. Sandoval notified.

## 2020-01-10 NOTE — ED TRIAGE NOTES
"Name and  Verified for triage    Pt here via medic from [facility] with c/o dislocated right hip. Here yesterday with same episode. Today was working with PT when she felt \"pop\". Confirmed dislocated at rehab with xray.     PTA had 75 mcg fentanyl. Pain tolerable at this time. VSS.  "

## 2020-01-10 NOTE — DISCHARGE PLANNING
This RN MICHEAL spoke with pt and her two daughters earlier today because of the order for Home Health. I explained to them that it is better to hold off for now because per pt and her two daughters she will have Surgery today and are waiting for his MD to come and do rounds.

## 2020-01-10 NOTE — RESPIRATORY CARE
COPD EDUCATION by COPD CLINICAL EDUCATOR  1/10/2020 at 1:22 PM by Tami Lama     Patient reviewed by COPD education team. Per PFT results patient does not have a diagnosis of COPD and has never smoked, therefore does not qualify for the COPD program.

## 2020-01-10 NOTE — PROCEDURES
Patient seen per request of Dr Baker for reduction of her right hip prosthetic joint. Films were reviewed with Dr. Warren. Dr. Dunne was called but did not respond.  The indications, risks, benefits, and alternatives of reduction were presented to the patient.  Understanding this the patient wished to proceed. Dr. Baker administered conscious sedation and I reduced the joint. Post reduction imaging confirmed appropriate reduction. The right lower extremity was then placed in a knee immobilizer. Dr. Baker notified the trauma team that Dr. Dunne and the hospitalists will assume care for her orthopedic issues and admit to the medicine service.

## 2020-01-10 NOTE — ASSESSMENT & PLAN NOTE
No sign of gross bleeding  Continue home iron supplementation  Repeat CBC in the morning  Continue apixaban for recent pulmonary embolism

## 2020-01-10 NOTE — ASSESSMENT & PLAN NOTE
Patient had a pulmonary embolism in December 2019.  No procedure planned by Ortho.  Continue apixaban.

## 2020-01-10 NOTE — THERAPY
Missed Therapy     Patient Name: Guillermina Recio  Age:  79 y.o., Sex:  female  Medical Record #: [id number]  Today's Date: 1/9/2020    Discussed missed therapy with MD, RN       01/09/20 1501   Therapy Missed   Missed Therapy (Minutes) 60   Reason For Missed Therapy Medical - Patient on Hold from Therapy  (acute dislocation of hip)

## 2020-01-10 NOTE — PROGRESS NOTES
"Orthopaedic Progress Note    Author: Chapito Inman Date & Time created: 1/9/2020   7:53 PM     Interval Events:  3 time dislocation right hip  ROS  Hemodynamics:  BP (!) 92/54   Pulse 66   Temp 36.8 °C (98.3 °F) (Temporal)   Resp 16   Ht 1.448 m (4' 9\")   Wt 63.5 kg (140 lb)   SpO2 98%      No Active Precaution Orders    Respiratory:    Respiration: 16, Pulse Oximetry: 98 %        RUL Breath Sounds: Clear, RML Breath Sounds: Diminished, RLL Breath Sounds: Diminished, HAJA Breath Sounds: Clear, LLL Breath Sounds: Diminished  Fluids:  No intake or output data in the 24 hours ending 01/09/20 1953  Admit Weight: 63.5 kg (140 lb)  Current Weight: 63.5 kg (140 lb)    Physical Exam  Labs:  Recent Labs     01/07/20  0554 01/08/20  0544   WBC 3.3* 3.6*   RBC 3.88* 3.89*   HEMOGLOBIN 9.9* 9.9*   HEMATOCRIT 34.3* 34.6*   MCV 88.4 88.9   MCH 25.5* 25.4*   MCHC 28.9* 28.6*   RDW 62.1* 62.2*   PLATELETCT 299 277   MPV 8.6* 8.6*     Recent Labs     01/07/20  0554   SODIUM 143   POTASSIUM 4.1   CHLORIDE 105   CO2 30   GLUCOSE 108*   BUN 26*   CREATININE 0.74   CALCIUM 9.4       Medical Decision Making/Problem List:    There are no active hospital problems to display for this patient.    Core Measures & Quality Metrics:  Current DVT prophylaxis: per Hospitalist  Discussed patient condition with RN and orthopedics and ER doctor.  Clearance for lovenox/heparin: okay  Weight Bearing Status: non-weight bearing until right hip abduction brace arrives, abduction pillow on while in bed until brace arrives  Wounds & Drains: wound healed, no infection  Disposition and Follow-up:   orderd brace and pillow  Consult tomorrow  Call Chapito [phone] for questions or concerns   measures  "

## 2020-01-10 NOTE — THERAPY
consult received; per chart okay to be weight bearing as tolerated with hip abduction brace (custom order); currently awaiting brace arrival prior to PT eval

## 2020-01-10 NOTE — PROGRESS NOTES
Faxed custom hip abduction brace order over to [facility]  If you have any questions please call [facility] pro at [phone]

## 2020-01-10 NOTE — PROGRESS NOTES
"Hospital Medicine Daily Progress Note      Chief Complaint  Pulmonary Embolism  Hypotension    Interval Problem Update  Last 24 hour events reviewed.  Pt dislocated hip prosthesis during therapies yesterday, had closed reduction in ER, and returned to Rehab.  Today, pt reports she felt a \"grinding sensation\" in her hip w/ minimal activity and has been having worsening pain.  Repeat X-ray ordered by Dr. Tejada.    Review of Systems  Review of Systems   Constitutional: Negative for chills and fever.   HENT: Negative.    Eyes: Negative.    Respiratory: Negative for cough and shortness of breath.    Cardiovascular: Negative for chest pain and palpitations.   Gastrointestinal: Negative for abdominal pain, nausea and vomiting.   Genitourinary: Negative.    Musculoskeletal: Positive for joint pain.   Skin: Negative for itching and rash.   Endo/Heme/Allergies: Does not bruise/bleed easily.        Physical Exam  Temp:  [36.3 °C (97.3 °F)-36.8 °C (98.3 °F)] 36.7 °C (98.1 °F)  Pulse:  [60-92] 85  Resp:  [16-18] 16  BP: (100-110)/(58-66) 106/65  SpO2:  [96 %] 96 %    Physical Exam  Vitals signs reviewed.   Constitutional:       General: She is not in acute distress.     Appearance: Normal appearance. She is not ill-appearing.   HENT:      Head: Normocephalic and atraumatic.      Right Ear: External ear normal.      Left Ear: External ear normal.      Nose: Nose normal.   Eyes:      General:         Right eye: No discharge.         Left eye: No discharge.      Extraocular Movements: Extraocular movements intact.      Conjunctiva/sclera: Conjunctivae normal.   Neck:      Musculoskeletal: Normal range of motion and neck supple.   Cardiovascular:      Rate and Rhythm: Normal rate and regular rhythm.   Pulmonary:      Effort: No respiratory distress.      Breath sounds: No wheezing.      Comments: Decreased BS  Abdominal:      General: Bowel sounds are normal. There is no distension.      Palpations: Abdomen is soft.      " Tenderness: There is no tenderness.   Musculoskeletal:      Right lower leg: No edema.      Left lower leg: No edema.   Skin:     General: Skin is warm and dry.   Neurological:      Mental Status: She is alert and oriented to person, place, and time.         Fluids    Intake/Output Summary (Last 24 hours) at 1/9/2020 1624  Last data filed at 1/9/2020 1230  Gross per 24 hour   Intake 240 ml   Output --   Net 240 ml       Laboratory  Recent Labs     01/07/20  0554 01/08/20  0544   WBC 3.3* 3.6*   RBC 3.88* 3.89*   HEMOGLOBIN 9.9* 9.9*   HEMATOCRIT 34.3* 34.6*   MCV 88.4 88.9   MCH 25.5* 25.4*   MCHC 28.9* 28.6*   RDW 62.1* 62.2*   PLATELETCT 299 277   MPV 8.6* 8.6*     Recent Labs     01/07/20  0554   SODIUM 143   POTASSIUM 4.1   CHLORIDE 105   CO2 30   GLUCOSE 108*   BUN 26*   CREATININE 0.74   CALCIUM 9.4                 Assessment/Plan  Acute pulmonary embolism with acute cor pulmonale (HCC)- (present on admission)  Assessment & Plan  CTA 12/14/19 +bilat PE w/ right heart strain  Echo EF 65-70% w/ mod PHTN  Anticoagulated on Eliquis  Elevated Trop and BNP most likely 2/2 increased right heart pressures from PE  BNP improving     Closed dislocation of right hip (HCC)- (present on admission)  Assessment & Plan  2/2 syncopal episode w/ fall  S/P closed reduction of prosthetic hip dislocation on 12/19/19 by Dr. Delgado  S/P closed reduction for recurrent dislocation on 1/8/19 by ERP  X-ray reviewed, has recurrent dislocation again today, will transfer back to [facility]  May need surgery for definitive treatment     COPD (chronic obstructive pulmonary disease) (HCC)- (present on admission)  Assessment & Plan  On Symbicort and Singulair  Off [place] due to concern of contributing to hypotension  Monitor for acute exacerbation  RT protocol     Pneumonia  Assessment & Plan  S/P Levaquin  Leukopenia stable     Pleural effusion  Assessment & Plan  Now off Lasix  Monitor need for thoracentesis     Hypotension- (present on  admission)  Assessment & Plan  Low blood pressures improving off Lasix  Tapering off Midodrine     Vitamin D deficiency- (present on admission)  Assessment & Plan  Vit D level 20  On supplementation     Gastroesophageal reflux disease- (present on admission)  Assessment & Plan  Continue PPI for h/o hiatal hernia  Simethicone for chronic issues w/ gas     Dyslipidemia- (present on admission)  Assessment & Plan  On Mevacor     Iron deficiency anemia- (present on admission)  Assessment & Plan  On Fe and Vit C supplementation for low iron stores  Follow H/H    DNR    Pt w/ recurrent right prosthetic hip dislocation.  Discussed w/ ERP, Dr. Baker, who has kindly accepted the pt in transfer to the ER.  Also reviewed w/ pt, family, Dr. Tejada, and Staff.

## 2020-01-10 NOTE — CARE PLAN
Problem: Pain Management  Goal: Pain level will decrease to patient's comfort goal  Outcome: PROGRESSING SLOWER THAN EXPECTED  New hip dislocation. Oxycodone 5mg given until [facility] can take to the ER.

## 2020-01-10 NOTE — ED NOTES
Received beside report form Day shift RN    Patient is AOx4, family at bedside, in NAD, on monitor.

## 2020-01-10 NOTE — PROGRESS NOTES
Notified by OT working with patient that she complained of Rt hip/groin pain when doing light exercises on the side of bed. Xray ordered stat and was read by Dr. Murry. Notified charge RN that the patient's hip was dislocated. Family was at bedside when patient notified of need to go to the ER again tonight for evaluation.

## 2020-01-10 NOTE — PROGRESS NOTES
At 1600, charge RN notified that repeat xray of patient's right hip confirms redislocation. Dr. Tejada notified and orders received to send patient to ED for re-evaluation and requiring evaluation by orthopedic surgeon before being re-admitted to rehab. Dr. Madrigal notified ER MD. Transport to ED arranged via non-emergent [facility] with bed bound status.

## 2020-01-10 NOTE — ED NOTES
ABductor pillow placed, spoke with traction, order sent by traction for brace and pillow.     Patient in NAD, tolerating position, in no discomfort family at bedside.

## 2020-01-10 NOTE — ED NOTES
Med rec complete per pt's MAR from [facility]  Allergies reviewed  Pt finished a 5 day course of Levaquin on 1/7/20

## 2020-01-10 NOTE — DISCHARGE PLANNING
Patient admitted to acute care after hip dislocation. Discharging from case management for acute rehab.   During hospitalization, I have provided support and education and have been available for questions and information during hours of operation, communicated with therapy team and MD.

## 2020-01-10 NOTE — H&P
"Hospital Medicine History & Physical Note    Date of Service  1/9/2020    Primary Care Physician  Elizabeth Molina M.D.    Consultants  Orthopedic surgery    Code Status  Full    Chief Complaint  Right hip pain    History of Presenting Illness  79 y.o. female who presented 1/9/2020 with right hip pain.  Patient states she was getting ready to go to physical therapy, moved her leg over to get ready to stand up.  She states it felt like \"grating and then in excruciating pain\".  Patient states she was unable to stand, PCP was called and patient was told to come to the emergency department.  Patient has had 2 previous dislocations of this hip.  The first was approximately a month ago whenever she fell.  She was here yesterday with hip dislocation and it was put back in place.  I did discuss the case including labs and imaging with the ER physician.    Review of Systems  Review of Systems   Constitutional: Negative for chills, fever and malaise/fatigue.   HENT: Negative for congestion.    Respiratory: Negative for cough, sputum production, shortness of breath and stridor.    Cardiovascular: Negative for chest pain, palpitations and leg swelling.   Gastrointestinal: Negative for abdominal pain, constipation, diarrhea, nausea and vomiting.   Genitourinary: Negative for dysuria and urgency.   Musculoskeletal: Positive for joint pain (right hip ). Negative for falls and myalgias.   Neurological: Negative for dizziness, tingling, loss of consciousness, weakness and headaches.   Psychiatric/Behavioral: Negative for depression and suicidal ideas.   All other systems reviewed and are negative.      Past Medical History   has a past medical history of Arthritis, Bowel habit changes, Breath shortness, Cataract, Chronic pain (11/2019), Dyslipidemia (8/1/2016), Emphysema of lung (HCC), GERD (gastroesophageal reflux disease), Heart burn, Hepatitis (1970's), Hiatus hernia syndrome, High cholesterol, History of total hip replacement, " Hyperlipidemia, Macular degeneration, Memory loss, OA (osteoarthritis), Psychiatric problem, S/P cataract extraction, Snoring, stomach flu (11/08/2019), Urinary incontinence, and Wears dentures.    Surgical History   has a past surgical history that includes other orthopedic surgery; cataract phaco with iol (2/28/2012); cataract phaco with iol (3/13/2012); tubal ligation; hysterectomy, total abdominal; blepharoplasty (Bilateral, 7/14/2015); and pr total hip arthroplasty (Right, 11/21/2019).     Family History  family history includes Cancer in an other family member; Heart Disease in an other family member.     Social History   reports that she has never smoked. She has never used smokeless tobacco. She reports previous alcohol use. She reports that she does not use drugs.    Allergies  Allergies   Allergen Reactions   • [place]        Medications  Prior to Admission Medications   Prescriptions Last Dose Informant Patient Reported? Taking?   apixaban (ELIQUIS) 5mg Tab   No No   Sig: Take 1 Tab by mouth 2 Times a Day.   ascorbic acid (VITAMIN C) 500 MG tablet   No No   Sig: Take 1 Tab by mouth every day.   budesonide-formoterol (SYMBICORT) 160-4.5 MCG/ACT Aerosol  Patient No No   Sig: Inhale 2 Puffs by mouth 2 Times a Day.   ferrous sulfate 325 (65 Fe) MG tablet   No No   Sig: Take 1 Tab by mouth 2 times a day, with meals.   furosemide (LASIX) 20 MG Tab   No No   Sig: Take 1 Tab by mouth every day.   gabapentin (NEURONTIN) 300 MG Cap   No No   Sig: Take 1 Cap by mouth 3 times a day.   lovastatin (MEVACOR) 40 MG tablet  Patient No No   Sig: Take 1 Tab by mouth every bedtime.   omeprazole (PRILOSEC) 20 MG delayed-release capsule   No No   Sig: Take 1 Cap by mouth every day.   vitamin D 2000 UNIT Tab   No No   Sig: Take 1 Tab by mouth every day.      Facility-Administered Medications: None       Physical Exam  Temp:  [36.8 °C (98.3 °F)] 36.8 °C (98.3 °F)  Pulse:  [60-72] 66  Resp:  [12-20] 16  BP: ()/(50-67)  92/54  SpO2:  [96 %-99 %] 98 %    Physical Exam  Vitals signs and nursing note reviewed.   Constitutional:       General: She is not in acute distress.     Appearance: She is well-developed. She is not diaphoretic.   HENT:      Head: Normocephalic and atraumatic.      Right Ear: External ear normal.      Left Ear: External ear normal.      Nose: Nose normal. No congestion or rhinorrhea.      Mouth/Throat:      Mouth: Mucous membranes are moist.      Pharynx: No oropharyngeal exudate.   Eyes:      General: No scleral icterus.        Right eye: No discharge.         Left eye: No discharge.      Extraocular Movements: Extraocular movements intact.   Neck:      Musculoskeletal: Normal range of motion and neck supple.      Trachea: No tracheal deviation.   Cardiovascular:      Rate and Rhythm: Normal rate and regular rhythm.      Pulses: Normal pulses.      Heart sounds: No murmur. No friction rub. No gallop.    Pulmonary:      Effort: Pulmonary effort is normal. No respiratory distress.      Breath sounds: Normal breath sounds. No stridor. No wheezing or rales.   Chest:      Chest wall: No tenderness.   Abdominal:      General: Bowel sounds are normal. There is no distension.      Palpations: Abdomen is soft.      Tenderness: There is no tenderness. There is no guarding or rebound.   Musculoskeletal:      Right hip: She exhibits decreased range of motion, decreased strength and tenderness.      Right lower leg: No edema.      Left lower leg: No edema.   Lymphadenopathy:      Cervical: No cervical adenopathy.   Skin:     General: Skin is warm and dry.      Coloration: Skin is not jaundiced.      Findings: No erythema or rash.   Neurological:      General: No focal deficit present.      Mental Status: She is alert and oriented to person, place, and time.      Cranial Nerves: No cranial nerve deficit.      Sensory: No sensory deficit.   Psychiatric:         Mood and Affect: Mood normal.         Behavior: Behavior normal.          Thought Content: Thought content normal.         Judgment: Judgment normal.         Laboratory:  Recent Labs     01/07/20  0554 01/08/20  0544   WBC 3.3* 3.6*   RBC 3.88* 3.89*   HEMOGLOBIN 9.9* 9.9*   HEMATOCRIT 34.3* 34.6*   MCV 88.4 88.9   MCH 25.5* 25.4*   MCHC 28.9* 28.6*   RDW 62.1* 62.2*   PLATELETCT 299 277   MPV 8.6* 8.6*     Recent Labs     01/07/20  0554   SODIUM 143   POTASSIUM 4.1   CHLORIDE 105   CO2 30   GLUCOSE 108*   BUN 26*   CREATININE 0.74   CALCIUM 9.4     Recent Labs     01/07/20  0554   GLUCOSE 108*         Recent Labs     01/07/20  0554   NTPROBNP 1413*         No results for input(s): TROPONINT in the last 72 hours.    Urinalysis:    No results found     Imaging:  DX-HIP-UNILATERAL-WITH PELVIS-1 VIEW RIGHT   Final Result      Reduction of right hip prosthesis dislocation.            Assessment/Plan:  I anticipate this patient will require at least two midnights for appropriate medical management, necessitating inpatient admission.    Hip dislocation, right (HCC)- (present on admission)  Assessment & Plan  -This is her third dislocation of this hip, she did come yesterday and had it put back in place, occurred again today  -Orthopedic surgery has been consulted, keep n.p.o. at midnight  -Narcotics for pain management in the meantime  -I did personally review her hip x-ray, noted profound right hip dislocation    COPD (chronic obstructive pulmonary disease) (HCC)- (present on admission)  Assessment & Plan  -No acute exacerbation    Hypotension- (present on admission)  Assessment & Plan  -Chronic, continue home midodrine    Pulmonary emboli (HCC)- (present on admission)  Assessment & Plan  -History, patient continues to be on Eliquis  -Eliquis needs to be hold currently for procedure tomorrow, this should be restarted when cleared by orthopedic surgery  -Next dose of Eliquis would be tomorrow morning, no need for heparin drip at this time    Gastroesophageal reflux disease- (present on  admission)  Assessment & Plan  -Continue home PPI    Dyslipidemia- (present on admission)  Assessment & Plan  -Continue home statin    Anemia of chronic disease- (present on admission)  Assessment & Plan  -No sign of gross bleeding  -Continue home iron supplementation  -Repeat CBC in the morning      VTE prophylaxis: SCDs, patient is on Eliquis at home which will need to be restarted after her procedure

## 2020-01-10 NOTE — ED PROVIDER NOTES
ED Provider Note    Scribed for Lisa Baker M.D. by Salina Bravo. 1/9/2020  6:27 PM    Primary care provider: Elizabeth Molina M.D.  Means of arrival: Ambulance  History obtained from: Patient  History limited by: None     CHIEF COMPLAINT  Chief Complaint   Patient presents with   • Hip Injury       HPI  Guillermina Recio is a 79 y.o. female who presents to the Emergency Department for right hip injury onset today. The patient is 1.5 months status post right hip arthroplasty done by Dr. Delgado. She dislocated her hip about one month ago and was admitted to the hospital for relocation and has been at rehab since. She was seen in the ED yesterday following another dislocation incident and while at rehab.  She was relocated and sent back to rehab and returns today with the same problem. The patient was in physical therapy today when she dislocated her hip again while bending over. The patient denies numbness or tingling.  She cannot remember when her last meal was.      REVIEW OF SYSTEMS  Neuro: no tingling or numbness  Musculoskeletal: positive for right hip injury.     See history of present illness. All other systems are negative. C.    PAST MEDICAL HISTORY   has a past medical history of Arthritis, Bowel habit changes, Breath shortness, Cataract, Chronic pain (11/2019), Dyslipidemia (8/1/2016), Emphysema of lung (HCC), GERD (gastroesophageal reflux disease), Heart burn, Hepatitis (1970's), Hiatus hernia syndrome, High cholesterol, History of total hip replacement, Hyperlipidemia, Macular degeneration, Memory loss, OA (osteoarthritis), Psychiatric problem, S/P cataract extraction, Snoring, stomach flu (11/08/2019), Urinary incontinence, and Wears dentures.    SURGICAL HISTORY   has a past surgical history that includes other orthopedic surgery; cataract phaco with iol (2/28/2012); cataract phaco with iol (3/13/2012); tubal ligation; hysterectomy, total abdominal; blepharoplasty (Bilateral, 7/14/2015); and total  hip arthroplasty (Right, 11/21/2019).    SOCIAL HISTORY  Social History     Tobacco Use   • Smoking status: Never Smoker   • Smokeless tobacco: Never Used   Substance Use Topics   • Alcohol use: Not Currently     Alcohol/week: 0.0 oz     Comment: 3 per year   • Drug use: No     Comment: In the Distant Past, but not since 32 y.o.  (prior - Pink heart,[employer], crosstops - stimulants for working double shifts, but not for several decades)      Social History     Substance and Sexual Activity   Drug Use No    Comment: In the Distant Past, but not since 32 y.o.  (prior - Pink heart,[employer], crosstops - stimulants for working double shifts, but not for several decades)       FAMILY HISTORY  Family History   Problem Relation Age of Onset   • Heart Disease Other    • Cancer Other        CURRENT MEDICATIONS  Home Medications     Reviewed by Pau Lopez (Pharmacy Tech) on 01/09/20 at 2022  Med List Status: Complete   Medication Last Dose Status   acetaminophen (TYLENOL) 650 MG CR tablet 1/9/2020 Active   apixaban (ELIQUIS) 5mg Tab 1/9/2020 Active   ascorbic acid (VITAMIN C) 500 MG tablet 1/9/2020 Active   budesonide-formoterol (SYMBICORT) 160-4.5 MCG/ACT Aerosol 1/9/2020 Active   ferrous sulfate 325 (65 Fe) MG tablet 1/9/2020 Active   furosemide (LASIX) 20 MG Tab 1/6/2020 Active   gabapentin (NEURONTIN) 300 MG Cap 12/30/2019 Active   levoFLOXacin (LEVAQUIN) 500 MG tablet 1/7/2020 Active   lovastatin (MEVACOR) 40 MG tablet 1/8/2020 Active   midodrine (PROAMATINE) 5 MG Tab 1/9/2020 Active   montelukast (SINGULAIR) 10 MG Tab 1/8/2020 Active   Multiple Vitamins-Minerals (OCUVITE-LUTEIN) Tab 1/9/2020 Active   omeprazole (PRILOSEC) 20 MG delayed-release capsule 1/9/2020 Active   oxyCODONE immediate-release (ROXICODONE) 5 MG Tab 1/9/2020 Active   senna-docusate (PERICOLACE OR SENOKOT S) 8.6-50 MG Tab 1/9/2020 Active   simethicone (MYLICON) 80 MG Chew Tab 1/9/2020 Active   vitamin D 2000 UNIT Tab 1/9/2020 Active  "               ALLERGIES  Allergies   Allergen Reactions   • [place]        PHYSICAL EXAM  VITAL SIGNS: BP (!) 93/62   Pulse 68   Temp 36.8 °C (98.3 °F) (Temporal)   Resp 20   Ht 1.448 m (4' 9\")   Wt 63.5 kg (140 lb)   SpO2 96%   BMI 30.30 kg/m²     Constitutional: Well developed, Well nourished, No acute distress, Non-toxic appearance.   HEENT: Normocephalic, Atraumatic,  external ears normal, pharynx pink,  Mucous  Membranes moist, No rhinorrhea or mucosal edema  Eyes: PERRL, EOMI, Conjunctiva normal, No discharge.   Neck: Normal range of motion, No tenderness, Supple, No stridor.   Lymphatic: No lymphadenopathy    Cardiovascular: Regular Rate and Rhythm, No murmurs,  rubs, or gallops.   Thorax & Lungs: Lungs clear to auscultation bilaterally, No respiratory distress, No wheezes, rhales or rhonchi, No chest wall tenderness.   Abdomen: Bowel sounds normal, Soft, non tender, non distended,  No pulsatile masses., no rebound guarding or peritoneal signs.   Skin: Warm, Dry, No erythema, No rash,   Back:  No CVA tenderness,  No spinal tenderness, bony crepitance, step offs, or instability.   Neurologic: Alert & oriented x 3, decreased range of motion of right hip with tenderness to the right hip and right leg shortening will dose of pedal posterior tibial pulses normal sensory function, No focal deficits noted. Normal reflexes. Normal Cranial Nerves.  Extremities: Equal, intact distal pulses, No cyanosis, clubbing or edema,  No tenderness.   Musculoskeletal: Right hip dislocated and decreased range of motion as per neurologic exam no leg edema no erythema no cyanosis    DIAGNOSTIC STUDIES / PROCEDURES    I Reviewed the x-ray from rehab which showed a superior right prosthetic hip dislocation       RADIOLOGY  DX-HIP-UNILATERAL-WITH PELVIS-1 VIEW RIGHT   Final Result      Reduction of right hip prosthesis dislocation.        The radiologist's interpretation of all radiological studies have been reviewed by " me.    Joint Reduction Procedure Note    Indication: Joint dislocation    Consent: The patient was counseled regarding the procedure, it's indications, risks, potential complications and alternatives and any questions were answered. Consent was obtained.    Procedure: The pre-reduction exam showed distal perfusion & neurologic function to be normal. The patient was placed in the appropriate position. Anesthesia/pain control was obtained using conscious sedation with propofol intravenously. Reduction of the right hip was performed by traction and counter traction. Post reduction films were obtained and revealed satisfactory reduction. A post-reduction exam revealed distal perfusion & neurologic function to be normal. The affected area was immobilized with a knee immobilizer     The patient tolerated the procedure well.    Complications: None      COURSE & MEDICAL DECISION MAKING  Nursing notes, VS, PMSFHx reviewed in chart.    6:27 PM Patient seen and examined at bedside. Ordered Dx-Hip Unilateral-with Pelvis right to evaluate her symptoms.     7:12 PM - Hip reduction procedure performed by myself as outlined above.     7:29 PM - I discussed the patient's case and the above findings with Dr. Doyle (Hosptialist) who agrees to evaluate the patient for admission.       DISPOSITION:  Patient will be hospitalized by Dr. Doyle in guarded condition.      FINAL IMPRESSION  1. Dislocation of right hip, initial encounter (HCC)          Salina ARCHIBALD (Scribe), am scribing for, and in the presence of, Lisa Baker M.D..    Electronically signed by: Salina Bravo (Scribe), 1/9/2020    Lisa ARCHIBALD M.D. personally performed the services described in this documentation, as scribed by Salina Bravo in my presence, and it is both accurate and complete.  C  The note accurately reflects work and decisions made by me.  Lisa Baker  1/9/2020  9:13 PM

## 2020-01-11 LAB
ANION GAP SERPL CALC-SCNC: 6 MMOL/L (ref 0–11.9)
ANISOCYTOSIS BLD QL SMEAR: ABNORMAL
BASOPHILS # BLD AUTO: 0.3 % (ref 0–1.8)
BASOPHILS # BLD: 0.01 K/UL (ref 0–0.12)
BUN SERPL-MCNC: 20 MG/DL (ref 8–22)
CALCIUM SERPL-MCNC: 9.6 MG/DL (ref 8.5–10.5)
CHLORIDE SERPL-SCNC: 102 MMOL/L (ref 96–112)
CO2 SERPL-SCNC: 30 MMOL/L (ref 20–33)
COMMENT 1642: NORMAL
CREAT SERPL-MCNC: 0.76 MG/DL (ref 0.5–1.4)
EOSINOPHIL # BLD AUTO: 0.02 K/UL (ref 0–0.51)
EOSINOPHIL NFR BLD: 0.5 % (ref 0–6.9)
ERYTHROCYTE [DISTWIDTH] IN BLOOD BY AUTOMATED COUNT: 60.7 FL (ref 35.9–50)
GLUCOSE SERPL-MCNC: 93 MG/DL (ref 65–99)
HCT VFR BLD AUTO: 35.7 % (ref 37–47)
HGB BLD-MCNC: 10.2 G/DL (ref 12–16)
IMM GRANULOCYTES # BLD AUTO: 0.02 K/UL (ref 0–0.11)
IMM GRANULOCYTES NFR BLD AUTO: 0.5 % (ref 0–0.9)
LYMPHOCYTES # BLD AUTO: 1.48 K/UL (ref 1–4.8)
LYMPHOCYTES NFR BLD: 39.4 % (ref 22–41)
MAGNESIUM SERPL-MCNC: 2.2 MG/DL (ref 1.5–2.5)
MCH RBC QN AUTO: 24.7 PG (ref 27–33)
MCHC RBC AUTO-ENTMCNC: 28.6 G/DL (ref 33.6–35)
MCV RBC AUTO: 86.4 FL (ref 81.4–97.8)
MICROCYTES BLD QL SMEAR: ABNORMAL
MONOCYTES # BLD AUTO: 0.59 K/UL (ref 0–0.85)
MONOCYTES NFR BLD AUTO: 15.7 % (ref 0–13.4)
MORPHOLOGY BLD-IMP: NORMAL
NEUTROPHILS # BLD AUTO: 1.64 K/UL (ref 2–7.15)
NEUTROPHILS NFR BLD: 43.6 % (ref 44–72)
NRBC # BLD AUTO: 0 K/UL
NRBC BLD-RTO: 0 /100 WBC
OVALOCYTES BLD QL SMEAR: NORMAL
PHOSPHATE SERPL-MCNC: 3.9 MG/DL (ref 2.5–4.5)
PLATELET # BLD AUTO: 239 K/UL (ref 164–446)
PLATELET BLD QL SMEAR: NORMAL
PMV BLD AUTO: 8.6 FL (ref 9–12.9)
POIKILOCYTOSIS BLD QL SMEAR: NORMAL
POLYCHROMASIA BLD QL SMEAR: NORMAL
POTASSIUM SERPL-SCNC: 4.7 MMOL/L (ref 3.6–5.5)
RBC # BLD AUTO: 4.13 M/UL (ref 4.2–5.4)
RBC BLD AUTO: PRESENT
SODIUM SERPL-SCNC: 138 MMOL/L (ref 135–145)
WBC # BLD AUTO: 3.8 K/UL (ref 4.8–10.8)

## 2020-01-11 PROCEDURE — 36415 COLL VENOUS BLD VENIPUNCTURE: CPT

## 2020-01-11 PROCEDURE — 80048 BASIC METABOLIC PNL TOTAL CA: CPT

## 2020-01-11 PROCEDURE — 97162 PT EVAL MOD COMPLEX 30 MIN: CPT

## 2020-01-11 PROCEDURE — 700102 HCHG RX REV CODE 250 W/ 637 OVERRIDE(OP): Performed by: INTERNAL MEDICINE

## 2020-01-11 PROCEDURE — 97535 SELF CARE MNGMENT TRAINING: CPT

## 2020-01-11 PROCEDURE — 97165 OT EVAL LOW COMPLEX 30 MIN: CPT

## 2020-01-11 PROCEDURE — 99233 SBSQ HOSP IP/OBS HIGH 50: CPT | Performed by: INTERNAL MEDICINE

## 2020-01-11 PROCEDURE — A9270 NON-COVERED ITEM OR SERVICE: HCPCS | Performed by: INTERNAL MEDICINE

## 2020-01-11 PROCEDURE — 84100 ASSAY OF PHOSPHORUS: CPT

## 2020-01-11 PROCEDURE — 85025 COMPLETE CBC W/AUTO DIFF WBC: CPT

## 2020-01-11 PROCEDURE — 770001 HCHG ROOM/CARE - MED/SURG/GYN PRIV*

## 2020-01-11 PROCEDURE — 700111 HCHG RX REV CODE 636 W/ 250 OVERRIDE (IP): Performed by: INTERNAL MEDICINE

## 2020-01-11 PROCEDURE — 83735 ASSAY OF MAGNESIUM: CPT

## 2020-01-11 RX ADMIN — OXYCODONE HYDROCHLORIDE 10 MG: 10 TABLET ORAL at 11:21

## 2020-01-11 RX ADMIN — OXYCODONE HYDROCHLORIDE 5 MG: 5 TABLET ORAL at 17:22

## 2020-01-11 RX ADMIN — FERROUS SULFATE TAB 325 MG (65 MG ELEMENTAL FE) 325 MG: 325 (65 FE) TAB at 17:18

## 2020-01-11 RX ADMIN — OMEPRAZOLE 20 MG: 20 CAPSULE, DELAYED RELEASE ORAL at 06:10

## 2020-01-11 RX ADMIN — MIDODRINE HYDROCHLORIDE 5 MG: 5 TABLET ORAL at 06:09

## 2020-01-11 RX ADMIN — HYDROMORPHONE HYDROCHLORIDE 0.5 MG: 1 INJECTION, SOLUTION INTRAMUSCULAR; INTRAVENOUS; SUBCUTANEOUS at 11:11

## 2020-01-11 RX ADMIN — APIXABAN 5 MG: 5 TABLET, FILM COATED ORAL at 06:08

## 2020-01-11 RX ADMIN — MIDODRINE HYDROCHLORIDE 5 MG: 5 TABLET ORAL at 17:18

## 2020-01-11 RX ADMIN — SENNOSIDES AND DOCUSATE SODIUM 2 TABLET: 8.6; 5 TABLET ORAL at 06:10

## 2020-01-11 RX ADMIN — SENNOSIDES AND DOCUSATE SODIUM 2 TABLET: 8.6; 5 TABLET ORAL at 17:18

## 2020-01-11 RX ADMIN — APIXABAN 5 MG: 5 TABLET, FILM COATED ORAL at 17:18

## 2020-01-11 RX ADMIN — LOVASTATIN 40 MG: 20 TABLET ORAL at 20:35

## 2020-01-11 RX ADMIN — FERROUS SULFATE TAB 325 MG (65 MG ELEMENTAL FE) 325 MG: 325 (65 FE) TAB at 08:14

## 2020-01-11 RX ADMIN — FUROSEMIDE 20 MG: 20 TABLET ORAL at 06:08

## 2020-01-11 RX ADMIN — MIDODRINE HYDROCHLORIDE 5 MG: 5 TABLET ORAL at 11:21

## 2020-01-11 ASSESSMENT — ENCOUNTER SYMPTOMS
DEPRESSION: 0
CONSTIPATION: 0
SINUS PAIN: 0
NERVOUS/ANXIOUS: 0
LOSS OF CONSCIOUSNESS: 0
HEADACHES: 0
BLOOD IN STOOL: 0
COUGH: 0
ABDOMINAL PAIN: 0
WHEEZING: 0
VOMITING: 0
CHILLS: 0
SORE THROAT: 0
DIARRHEA: 0
HEMOPTYSIS: 0
BLURRED VISION: 0
FEVER: 0
SEIZURES: 0
WEAKNESS: 0
SHORTNESS OF BREATH: 0
DIZZINESS: 0
PALPITATIONS: 0
NAUSEA: 0
TINGLING: 0
SPUTUM PRODUCTION: 0
FALLS: 0

## 2020-01-11 ASSESSMENT — GAIT ASSESSMENTS
ASSISTIVE DEVICE: FRONT WHEEL WALKER
DISTANCE (FEET): 20
GAIT LEVEL OF ASSIST: MINIMAL ASSIST
DEVIATION: OTHER (COMMENT);DECREASED HEEL STRIKE;DECREASED TOE OFF;DECREASED BASE OF SUPPORT

## 2020-01-11 ASSESSMENT — COGNITIVE AND FUNCTIONAL STATUS - GENERAL
TURNING FROM BACK TO SIDE WHILE IN FLAT BAD: A LOT
MOBILITY SCORE: 12
CLIMB 3 TO 5 STEPS WITH RAILING: A LOT
DRESSING REGULAR LOWER BODY CLOTHING: A LOT
STANDING UP FROM CHAIR USING ARMS: A LITTLE
TOILETING: A LOT
SUGGESTED CMS G CODE MODIFIER DAILY ACTIVITY: CK
MOVING FROM LYING ON BACK TO SITTING ON SIDE OF FLAT BED: UNABLE
SUGGESTED CMS G CODE MODIFIER MOBILITY: CL
HELP NEEDED FOR BATHING: A LOT
MOVING TO AND FROM BED TO CHAIR: UNABLE
DAILY ACTIVITIY SCORE: 18
WALKING IN HOSPITAL ROOM: A LITTLE

## 2020-01-11 ASSESSMENT — ACTIVITIES OF DAILY LIVING (ADL): TOILETING: INDEPENDENT

## 2020-01-11 NOTE — PROGRESS NOTES
[facility] Medicine Daily Progress Note    Date of Service  1/11/2020    Chief Complaint  79 y.o. female admitted 1/9/2020 with recurrent right hip dislocation    Hospital Course    Ms. Guillermina Recio is a 79 y.o. female with recent right hip arthroplasty from [facility] inpatient rehab who presented with severe right hip pain secondary to her third acute hip dislocation requiring closed reduction.  Patient has been followed by orthopedic surgery Dr. Delgado who recommended a hip abduction brace and physical therapy.  Patient was fitted with a brace and physical therapy and Occupational Therapy recommend return to inpatient rehab.        Interval Problem Update  Patient was seen and examined at bedside.  Daughter and caretaker at bedside.  There were no acute events overnight.  I have personally reviewed vitals, labs, and imaging.  Patient denies fever, chills, chest pain, shortness of breath.  Reports right hip pain is well controlled.  Was able to work with physical therapy this morning.  Patient is upset that she was put on a cardiac diet and cannot have salt packets.  She does have a history of an NSTEMI and hypertension, but is recently been hypotensive and on Midodrin.  She has had poor p.o. intake recently.  Agreed with patient that using a little bit of salt to make her food more palatable is fine.  Patient placed back on regular diet.  Likely discharge back to inpatient rehab on Monday.  Referral placed for physiatry.        Consultants/Specialty  Orthopedic surgery    Code Status  Full    Disposition  Likely back to [facility] inpatient rehab    Review of Systems  Review of Systems   Constitutional: Negative for chills and fever.   HENT: Negative for congestion, sinus pain and sore throat.    Eyes: Negative for blurred vision.   Respiratory: Negative for cough, hemoptysis, sputum production, shortness of breath and wheezing.    Cardiovascular: Negative for chest pain, palpitations and leg swelling.    Gastrointestinal: Negative for abdominal pain, blood in stool, constipation, diarrhea, nausea and vomiting.   Genitourinary: Negative for dysuria, frequency, hematuria and urgency.   Musculoskeletal: Positive for joint pain (Right hip). Negative for falls.   Skin: Negative for rash.   Neurological: Negative for dizziness, tingling, seizures, loss of consciousness, weakness and headaches.   Psychiatric/Behavioral: Negative for depression and suicidal ideas. The patient is not nervous/anxious.    All other systems reviewed and are negative.       Physical Exam  Temp:  [36.2 °C (97.2 °F)-36.7 °C (98 °F)] 36.2 °C (97.2 °F)  Pulse:  [64-77] 75  Resp:  [16-18] 16  BP: ()/(44-68) 91/59  SpO2:  [93 %-99 %] 97 %    Physical Exam  Vitals signs and nursing note reviewed.   Constitutional:       General: She is not in acute distress.     Appearance: Normal appearance.   HENT:      Head: Normocephalic and atraumatic.      Nose: Nose normal.      Mouth/Throat:      Mouth: Mucous membranes are moist.      Pharynx: Oropharynx is clear. No oropharyngeal exudate or posterior oropharyngeal erythema.   Eyes:      Extraocular Movements: Extraocular movements intact.      Conjunctiva/sclera: Conjunctivae normal.   Neck:      Musculoskeletal: Normal range of motion and neck supple.   Cardiovascular:      Rate and Rhythm: Normal rate and regular rhythm.      Pulses: Normal pulses.      Heart sounds: Normal heart sounds. No murmur.   Pulmonary:      Effort: Pulmonary effort is normal. No respiratory distress.      Breath sounds: Normal breath sounds. No stridor. No wheezing or rales.   Abdominal:      General: Abdomen is flat. Bowel sounds are normal. There is no distension.      Palpations: Abdomen is soft. There is no mass.      Tenderness: There is no tenderness.   Musculoskeletal:      Right lower leg: Edema (2+ pitting edema) present.      Comments: Patient with brace around both hips.   Skin:     General: Skin is warm.       Capillary Refill: Capillary refill takes less than 2 seconds.   Neurological:      General: No focal deficit present.      Mental Status: She is alert and oriented to person, place, and time. Mental status is at baseline.      Cranial Nerves: No cranial nerve deficit.   Psychiatric:         Mood and Affect: Mood normal.         Behavior: Behavior normal.         Fluids    Intake/Output Summary (Last 24 hours) at 1/11/2020 1526  Last data filed at 1/10/2020 2030  Gross per 24 hour   Intake 120 ml   Output 700 ml   Net -580 ml       Laboratory  Recent Labs     01/10/20  0045 01/11/20  0515   WBC 3.7* 3.8*   RBC 3.72* 4.13*   HEMOGLOBIN 9.3* 10.2*   HEMATOCRIT 32.8* 35.7*   MCV 88.2 86.4   MCH 25.0* 24.7*   MCHC 28.4* 28.6*   RDW 61.4* 60.7*   PLATELETCT 256 239   MPV 8.6* 8.6*     Recent Labs     01/10/20  0045 01/11/20  0515   SODIUM 141 138   POTASSIUM 4.3 4.7   CHLORIDE 107 102   CO2 27 30   GLUCOSE 111* 93   BUN 20 20   CREATININE 0.74 0.76   CALCIUM 9.0 9.6                   Imaging  DX-HIP-UNILATERAL-WITH PELVIS-1 VIEW RIGHT   Final Result      Reduction of right hip prosthesis dislocation.           Assessment/Plan  Hypotension- (present on admission)  Assessment & Plan  Chronic, continue home midodrine    Hip dislocation, right (HCC)- (present on admission)  Assessment & Plan  Recent right hip arthroplasty complicated by recurrent hip dislocation requiring multiple closed reductions.    Followed by Ortho Dr. Delgado who recommends hip abduction brace and physical therapy.  No plan for surgery.  Pain scale  Physical therapy is awaiting custom brace prior to working with patient.      COPD (chronic obstructive pulmonary disease) (HCC)- (present on admission)  Assessment & Plan  No acute exacerbation    Pulmonary emboli (HCC)- (present on admission)  Assessment & Plan  Patient had a pulmonary embolism in December 2019.  No procedure planned by Ortho.  Continue apixaban.      Gastroesophageal reflux disease-  (present on admission)  Assessment & Plan  Continue outpatient omeprazole    Dyslipidemia- (present on admission)  Assessment & Plan  Continue outpatient lovastatin    Anemia of chronic disease- (present on admission)  Assessment & Plan  No sign of gross bleeding  Continue home iron supplementation  Repeat CBC in the morning  Continue apixaban for recent pulmonary embolism     Gastrointestinal prophylaxis: Omeprazole  Antibiotics: None  Diet: Cardiac  Code status: Full  Prognosis: Guarded  Risk: The Patient is at HIGH risk for complications and decompensation secondary to her multiple cormorbidities including acute hip dislocation requiring IV pain medication, pulmonary embolism on apixaban, hypotension requiring Midodrin    I have personally reviewed notes, labs, vitals, imaging.  I discussed the plan of care with bedside RN as well as on multidisciplinary rounds    Discussed case with orthopedic surgery    I have performed a physical exam and reviewed and updated ROS and Plan today (1/11/2020). In review of yesterday's note (1/10/2020), there are no changes except as documented above.       VTE prophylaxis: Therapeutic anticoagulation on apixaban

## 2020-01-11 NOTE — CARE PLAN
Problem: Safety  Goal: Will remain free from falls  Outcome: PROGRESSING AS EXPECTED   Patient calls for assistance, makes no unsafe attempts at ambulation  Problem: Venous Thromboembolism (VTW)/Deep Vein Thrombosis (DVT) Prevention:  Goal: Patient will participate in Venous Thrombosis (VTE)/Deep Vein Thrombosis (DVT)Prevention Measures  Outcome: PROGRESSING AS EXPECTED   SCDs in use. Apixaban restarted    Problem: Mobility  Goal: Risk for activity intolerance will decrease  Outcome: PROGRESSING AS EXPECTED  Turning @2hrs, out of bed with abduction brace

## 2020-01-11 NOTE — THERAPY
"Physical Therapy Evaluation completed.   Bed Mobility:  Supine to Sit: Moderate Assist  Transfers: Sit to Stand: Minimal Assist  Gait: Level Of Assist: Minimal Assist with Front-Wheel Walker       Plan of Care: Will benefit from Physical Therapy 4 times per week  Discharge Recommendations: Equipment: Will Continue to Assess for Equipment Needs. See below    Pt presents to PT s/p R JAIME dislocation and reduction. Noted pt has had multiple dislocations since initial surgery. She is now to utilize an abductor brace for WBAT RLE during ambulatory activity. She was able to demonstraet bed mobiltiy with mod A, sit<>stands with min A and short distance ambulation with Maryan. She was given education re: abductor brace, precuations and positioning to prevent additional dislocation/complications. She is very pleasant, cooperative and receptive to education and therapy and will benefit from continued acute PT while here. Would recommend return to rehab once medically cleared for continued progression.     See \"Rehab Therapy-Acute\" Patient Summary Report for complete documentation.     "

## 2020-01-11 NOTE — CARE PLAN
Patient is on a waffle mattress.  Pillows are in use for support. Patient is repositioned every two hours.

## 2020-01-11 NOTE — THERAPY
"Occupational Therapy Evaluation completed.   Functional Status:    Pleasant 78 y/o female with initial R hip replacement (posterior approach) in October of last year, unfortunately has had multiple dislocations since then. Prior to this admit, patient was at [facility] when it dislocated again. Now s/p reduction, WBAT with hip abduction brace on. Pt required Mod A for bed mobility, Max A to don brace and for LB dressing, Min A STS. Patient ambulates steadily with FWW, though was incontinent of urine on the way to the bathroom. She completed toilet xfer Min A and Max A for pericare. Will continue working with patient in this setting.     Plan of Care: Will benefit from Occupational Therapy 4 times per week  Discharge Recommendations:  Equipment: Will Continue to Assess for Equipment Needs. Post-acute therapy Recommend post-acute placement for additional occupational therapy services prior to discharge home. Patient can tolerate post-acute therapies at a 5x/week frequency.      See \"Rehab Therapy-Acute\" Patient Summary Report for complete documentation.    "

## 2020-01-11 NOTE — PROGRESS NOTES
"Orthopaedic Progress Note    Author: Chapito Inman Date & Time created: 1/11/2020   3:42 PM     Interval Events:  1 day since ER admit right hip dislocation  ROS  Hemodynamics:  BP (!) 91/59   Pulse 75   Temp 36.2 °C (97.2 °F) (Temporal)   Resp 16   Ht 1.448 m (4' 9\")   Wt 61 kg (134 lb 7.7 oz)   SpO2 97%      Current Precaution Orders   Procedures   • Fall Precautions     Standing Status:   Standing     Number of Occurrences:   1     Respiratory:    Respiration: 16, Pulse Oximetry: 97 %     Work Of Breathing / Effort: Mild  RUL Breath Sounds: Diminished, RML Breath Sounds: Diminished, RLL Breath Sounds: Diminished, HAJA Breath Sounds: Diminished, LLL Breath Sounds: Diminished  Fluids:    Intake/Output Summary (Last 24 hours) at 1/11/2020 1542  Last data filed at 1/10/2020 2030  Gross per 24 hour   Intake 120 ml   Output 700 ml   Net -580 ml     Admit Weight: 63.5 kg (140 lb)  Current      Physical Exam  Labs:  Recent Labs     01/10/20  0045 01/11/20  0515   WBC 3.7* 3.8*   RBC 3.72* 4.13*   HEMOGLOBIN 9.3* 10.2*   HEMATOCRIT 32.8* 35.7*   MCV 88.2 86.4   MCH 25.0* 24.7*   MCHC 28.4* 28.6*   RDW 61.4* 60.7*   PLATELETCT 256 239   MPV 8.6* 8.6*     Recent Labs     01/10/20  0045 01/11/20  0515   SODIUM 141 138   POTASSIUM 4.3 4.7   CHLORIDE 107 102   CO2 27 30   GLUCOSE 111* 93   BUN 20 20   CREATININE 0.74 0.76   CALCIUM 9.0 9.6       Medical Decision Making/Problem List:    Active Hospital Problems    Diagnosis   • Hypotension [I95.9]   • Hip dislocation, right (HCC) [S73.004A]   • COPD (chronic obstructive pulmonary disease) (HCC) [J44.9]   • Pulmonary emboli (HCC) [I26.99]   • Gastroesophageal reflux disease [K21.9]   • Dyslipidemia [E78.5]   • Anemia of chronic disease [D63.8]     Core Measures & Quality Metrics:  Current DVT prophylaxis: heparin  Discussed patient condition with Hospitalist, RN, Patient and orthopedics.  Clearance for lovenox/heparin: yes  Weight Bearing Status: as tolerated with pain, " in right hip abduction brace at all times.  Wounds & Drains: none, healed completely no signs infection  Disposition and Follow-up:   Routine follow up, brace correctly fitted, patient is cleared by orthopedics, 3 months of bracing, pt high risk for surgery   measures

## 2020-01-11 NOTE — DISCHARGE SUMMARY
Rehab Discharge Note    Admission: 12/28/2019    Discharge: 1/9/2020    Admission Diagnosis:   Active Hospital Problems    Diagnosis   • Closed dislocation of right hip (HCC)   • COPD (chronic obstructive pulmonary disease) (HCC)   • HTN (hypertension)   • Pulmonary emboli (HCC)   • Vitamin D deficiency   • Gastroesophageal reflux disease   • Dyslipidemia   • Pneumonia   • Lung nodule   • Impaired mobility and ADLs   • Status post right hip replacement       Discharge Diagnosis:  Active Hospital Problems    Diagnosis   • Closed dislocation of right hip (HCC)   • COPD (chronic obstructive pulmonary disease) (HCC)   • HTN (hypertension)   • Pulmonary emboli (HCC)   • Vitamin D deficiency   • Gastroesophageal reflux disease   • Dyslipidemia   • Pneumonia   • Lung nodule   • Impaired mobility and ADLs   • Status post right hip replacement       HPI prior to discharge  Patient is a 79 y.o. female  with a past medical history of COPD, legally blind, recent right hip arthroplasty complicated by small bowel obstruction, admitted to [facility] on 12/12, with syncopal episode and fall.  She was found to have a right hip prosthetic dislocation, complained of chest pain and was transferred from [facility] from [facility] to [facility] on 12/14/19.  Was found to have pulmonary emboli for which she was started on anticoagulation with Eliquis.  She had an echocardiogram which showed moderate pulmonary hypertension. She had closed reduction of her right hip dislocation on 12/19 with Dr. Delgado.  She is on Midodrine for hypotension. Her elevated troponin was thought secondary to demand ischemia from her pulmonary emboli. Patient is reportedly WBAT for right LE per Dr. Dunne. Kimbrough removed prior to transfer to [facility]. Patient on Dysphagia 2 with NTL.      Patient sitting up in bed. She reports pain in right hip. She reports it also itches which she has a cream for. She reports she is very motivated to get out of bed but that has been  limited by her low blood pressure. She reports when she sits up she is nauseated. She is practicing deep breathing for when she gets up so that the nausea is not as overwhelming. Discussed about orthostatic hypotension. Denies SOB at rest. Denies chest pain. Denies NVD. Denies easy bruising or bleeding. Patient reports being completely blind in one eye.    Hospital Course    Recent right JAIME, dislocation  S/p relocation 12/19  Dislocation on right hip 1/8  Sent to ED for relocation  Dislocation again, 1/9, even with only doing transfers with therapy/wedge in between legs  Sent to ED again for relocation    Dysphagia, resolved     Pain Management  Schedule tylenol  PRN oxycodone  PRN ice     Bowel  Continue bowel meds  Last BM 1/8     Bladder  Checked PVRs - 62  Not retaining  ICP for over 400 cc  Scheduled toileting     DVT prophylaxis  Eliquis     Appreciated the assistance of the hospitalist with her medical co-morbidities:     Acute bilateral PE  COPD, was on oxygen at home  Pleural effusion, Lasix  Hypotension, likely from PEs, midodrine  GERD  Iron def anemia, on supplementation  Mild RLE edema, likely from hip surgery/relocation, already on Eliquis  Possible pneumonia, s/p antibiotics  Dyslipidemia  Chronic visual impairments, vitamins        Functional Status at Discharge  Eating:     Eating Description:     Grooming:     Grooming Description:     Bathing:     Bathing Description:     Upper Body Dressing:     Upper Body Dressing Description:     Lower Body Dressing:     Lower Body Dressing Description:     Discharge Location : [facility]  Level of Supervision Required: 24 Hour Supervision  Long Term Goals Met: 0  Long Term Goals Not Met: 2  Reason(s) for Goals Not Met: transfer to acute hospital due to acute dislocation of R hip  Criteria for Termination of Services: Patient Transferred to Acute Care for Medical Purposes  Walk:  1 - Total Assistance  Distance Walked:  Walks less than 50 feet  Walk  Description:  Extra time, Verbal cueing, Supervision for safety, Oxygen, Walker  Wheelchair:  2 - Max Assistance  Distance Propelled:  Propels  feet   Wheelchair Description:     Stairs 0 - Not tested,unsafe activity  Stairs Description   Discharge Location: [facility]  Patient Discharging with Assist of: Family;Friend  Level of Supervision Required Upon Discharge: Intermittent Supervision  Recommended Equipment for Discharge: Front-Wheeled Walker;Manual Wheelchair  Recommeded Services Upon Discharge: Home Health Physical Therapy;Outpatient Physical Therapy  Long Term Goals Met: No long-term goals were met  Long Term Goals Not Met: No long-term goals were met  Reason(s) for Goals Not Met: Right hip dislocation and discharged to acute care  Criteria for Termination of Services: Maximum Function Achieved for Inpatient Rehabilitation  Comprehension Mode:     Comprehension:     Comprehension Description:     Expression Mode:     Expression:     Expression Description:     Social Interaction:     Social Interaction Description:     Problem Solving:     Problem Solving Description:     Memory:     Memory Description:        Condition on Discharge:  Guarded.

## 2020-01-11 NOTE — PROGRESS NOTES
[facility] Medicine Daily Progress Note    Date of Service  1/10/2020    Chief Complaint  79 y.o. female admitted 1/9/2020 with recurrent right hip dislocation    Hospital Course    Ms. Guillermina Recio is a 79 y.o. female with recent right hip arthroplasty from [facility] inpatient rehab who presented with severe right hip pain secondary to her third acute hip dislocation requiring closed reduction.  Patient has been followed by orthopedic surgery Dr. Delgado who recommended a hip abduction brace and physical therapy.        Interval Problem Update  Patient was seen and examined at bedside.  Daughter and caretaker at bedside.  There were no acute events overnight.  I have personally reviewed vitals, labs, and imaging.  Patient denies fever, chills, chest pain, shortness of breath.  Complains of right hip pain.  Counseled about conservative management for hip dislocation.  Patient was started on diet and restarted apixaban.    Discussed case with orthopedic surgery.      Consultants/Specialty  Orthopedic surgery    Code Status  Full    Disposition  Likely back to [facility] inpatient rehab    Review of Systems  Review of Systems   Constitutional: Negative for chills and fever.   HENT: Negative for congestion, sinus pain and sore throat.    Eyes: Negative for blurred vision.   Respiratory: Negative for cough, hemoptysis, sputum production, shortness of breath and wheezing.    Cardiovascular: Negative for chest pain, palpitations and leg swelling.   Gastrointestinal: Negative for abdominal pain, blood in stool, constipation, diarrhea, nausea and vomiting.   Genitourinary: Negative for dysuria, frequency, hematuria and urgency.   Musculoskeletal: Positive for joint pain (Right hip). Negative for falls.   Skin: Negative for rash.   Neurological: Negative for dizziness, tingling, seizures, loss of consciousness, weakness and headaches.   Psychiatric/Behavioral: Negative for depression and suicidal ideas. The patient is not  nervous/anxious.    All other systems reviewed and are negative.       Physical Exam  Temp:  [36.3 °C (97.3 °F)-37.4 °C (99.4 °F)] 36.7 °C (98 °F)  Pulse:  [60-83] 72  Resp:  [12-18] 16  BP: ()/(44-71) 109/44  SpO2:  [90 %-99 %] 93 %    Physical Exam  Vitals signs and nursing note reviewed.   Constitutional:       General: She is not in acute distress.     Appearance: Normal appearance.   HENT:      Head: Normocephalic and atraumatic.      Nose: Nose normal.      Mouth/Throat:      Mouth: Mucous membranes are moist.      Pharynx: Oropharynx is clear. No oropharyngeal exudate or posterior oropharyngeal erythema.   Eyes:      Extraocular Movements: Extraocular movements intact.      Conjunctiva/sclera: Conjunctivae normal.   Neck:      Musculoskeletal: Normal range of motion and neck supple.   Cardiovascular:      Rate and Rhythm: Normal rate and regular rhythm.      Pulses: Normal pulses.      Heart sounds: Normal heart sounds. No murmur.   Pulmonary:      Effort: Pulmonary effort is normal. No respiratory distress.      Breath sounds: Normal breath sounds. No stridor. No wheezing or rales.   Abdominal:      General: Abdomen is flat. Bowel sounds are normal. There is no distension.      Palpations: Abdomen is soft. There is no mass.      Tenderness: There is no tenderness.   Musculoskeletal:      Right lower leg: Edema (2+ pitting edema) present.      Comments: Patient with brace around both hips.   Skin:     General: Skin is warm.      Capillary Refill: Capillary refill takes less than 2 seconds.   Neurological:      General: No focal deficit present.      Mental Status: She is alert and oriented to person, place, and time. Mental status is at baseline.      Cranial Nerves: No cranial nerve deficit.   Psychiatric:         Mood and Affect: Mood normal.         Behavior: Behavior normal.         Fluids    Intake/Output Summary (Last 24 hours) at 1/10/2020 1842  Last data filed at 1/10/2020 1800  Gross per 24  hour   Intake --   Output 1600 ml   Net -1600 ml       Laboratory  Recent Labs     01/08/20  0544 01/10/20  0045   WBC 3.6* 3.7*   RBC 3.89* 3.72*   HEMOGLOBIN 9.9* 9.3*   HEMATOCRIT 34.6* 32.8*   MCV 88.9 88.2   MCH 25.4* 25.0*   MCHC 28.6* 28.4*   RDW 62.2* 61.4*   PLATELETCT 277 256   MPV 8.6* 8.6*     Recent Labs     01/10/20  0045   SODIUM 141   POTASSIUM 4.3   CHLORIDE 107   CO2 27   GLUCOSE 111*   BUN 20   CREATININE 0.74   CALCIUM 9.0                   Imaging  DX-HIP-UNILATERAL-WITH PELVIS-1 VIEW RIGHT   Final Result      Reduction of right hip prosthesis dislocation.           Assessment/Plan  Hip dislocation, right (HCC)- (present on admission)  Assessment & Plan  Recent right hip arthroplasty complicated by recurrent hip dislocation requiring multiple closed reductions.    Followed by Ortho Dr. eDlgado who recommends hip abduction brace and physical therapy.  No plan for surgery.  Pain scale  Physical therapy is awaiting custom brace prior to working with patient.      COPD (chronic obstructive pulmonary disease) (HCC)- (present on admission)  Assessment & Plan  -No acute exacerbation    Hypotension- (present on admission)  Assessment & Plan  -Chronic, continue home midodrine    Pulmonary emboli (HCC)- (present on admission)  Assessment & Plan  Patient had a pulmonary embolism in December 2019.  No procedure planned by Ortho.  Continue apixaban.      Gastroesophageal reflux disease- (present on admission)  Assessment & Plan  -Continue outpatient omeprazole    Dyslipidemia- (present on admission)  Assessment & Plan  -Continue outpatient lovastatin    Anemia of chronic disease- (present on admission)  Assessment & Plan  -No sign of gross bleeding  -Continue home iron supplementation  -Repeat CBC in the morning  Continue apixaban for recent PE       Gastrointestinal prophylaxis: Omeprazole  Antibiotics: None  Diet: Cardiac  Code status: Full  Prognosis: Guarded  Risk: The Patient is at HIGH risk for  complications and decompensation secondary to her multiple cormorbidities including acute hip dislocation requiring IV pain medication, pulmonary embolism on apixaban, hypotension requiring Midodrin    I have personally reviewed notes, labs, vitals, imaging.  I discussed the plan of care with bedside RN as well as on multidisciplinary rounds    Time spent was 32 minutes.  > 50% of the time was spend providing counseling and coordination of care    Discussed case with orthopedic surgery      VTE prophylaxis: Therapeutic anticoagulation on apixaban

## 2020-01-11 NOTE — CARE PLAN
Patient's room  is located near nurses station.  Patient is compliant with use of call light and waits for staff assistance.  Frequent rounding. Bed alarm in use.

## 2020-01-12 LAB
ANION GAP SERPL CALC-SCNC: 10 MMOL/L (ref 0–11.9)
BASOPHILS # BLD AUTO: 0.5 % (ref 0–1.8)
BASOPHILS # BLD: 0.02 K/UL (ref 0–0.12)
BUN SERPL-MCNC: 25 MG/DL (ref 8–22)
CALCIUM SERPL-MCNC: 9.8 MG/DL (ref 8.5–10.5)
CHLORIDE SERPL-SCNC: 101 MMOL/L (ref 96–112)
CO2 SERPL-SCNC: 29 MMOL/L (ref 20–33)
CREAT SERPL-MCNC: 0.86 MG/DL (ref 0.5–1.4)
EOSINOPHIL # BLD AUTO: 0.01 K/UL (ref 0–0.51)
EOSINOPHIL NFR BLD: 0.2 % (ref 0–6.9)
ERYTHROCYTE [DISTWIDTH] IN BLOOD BY AUTOMATED COUNT: 59.8 FL (ref 35.9–50)
GLUCOSE SERPL-MCNC: 141 MG/DL (ref 65–99)
HCT VFR BLD AUTO: 39.8 % (ref 37–47)
HGB BLD-MCNC: 11.6 G/DL (ref 12–16)
IMM GRANULOCYTES # BLD AUTO: 0.02 K/UL (ref 0–0.11)
IMM GRANULOCYTES NFR BLD AUTO: 0.5 % (ref 0–0.9)
LYMPHOCYTES # BLD AUTO: 1.34 K/UL (ref 1–4.8)
LYMPHOCYTES NFR BLD: 32.5 % (ref 22–41)
MAGNESIUM SERPL-MCNC: 2.1 MG/DL (ref 1.5–2.5)
MCH RBC QN AUTO: 25.4 PG (ref 27–33)
MCHC RBC AUTO-ENTMCNC: 29.1 G/DL (ref 33.6–35)
MCV RBC AUTO: 87.1 FL (ref 81.4–97.8)
MONOCYTES # BLD AUTO: 0.55 K/UL (ref 0–0.85)
MONOCYTES NFR BLD AUTO: 13.3 % (ref 0–13.4)
NEUTROPHILS # BLD AUTO: 2.18 K/UL (ref 2–7.15)
NEUTROPHILS NFR BLD: 53 % (ref 44–72)
NRBC # BLD AUTO: 0 K/UL
NRBC BLD-RTO: 0 /100 WBC
PHOSPHATE SERPL-MCNC: 3.9 MG/DL (ref 2.5–4.5)
PLATELET # BLD AUTO: 267 K/UL (ref 164–446)
PMV BLD AUTO: 8.7 FL (ref 9–12.9)
POTASSIUM SERPL-SCNC: 4.2 MMOL/L (ref 3.6–5.5)
RBC # BLD AUTO: 4.57 M/UL (ref 4.2–5.4)
SODIUM SERPL-SCNC: 140 MMOL/L (ref 135–145)
WBC # BLD AUTO: 4.1 K/UL (ref 4.8–10.8)

## 2020-01-12 PROCEDURE — 700102 HCHG RX REV CODE 250 W/ 637 OVERRIDE(OP): Performed by: INTERNAL MEDICINE

## 2020-01-12 PROCEDURE — 84100 ASSAY OF PHOSPHORUS: CPT

## 2020-01-12 PROCEDURE — 85025 COMPLETE CBC W/AUTO DIFF WBC: CPT

## 2020-01-12 PROCEDURE — A9270 NON-COVERED ITEM OR SERVICE: HCPCS | Performed by: INTERNAL MEDICINE

## 2020-01-12 PROCEDURE — 99233 SBSQ HOSP IP/OBS HIGH 50: CPT | Performed by: INTERNAL MEDICINE

## 2020-01-12 PROCEDURE — 36415 COLL VENOUS BLD VENIPUNCTURE: CPT

## 2020-01-12 PROCEDURE — 770001 HCHG ROOM/CARE - MED/SURG/GYN PRIV*

## 2020-01-12 PROCEDURE — 80048 BASIC METABOLIC PNL TOTAL CA: CPT

## 2020-01-12 PROCEDURE — 83735 ASSAY OF MAGNESIUM: CPT

## 2020-01-12 RX ORDER — DIPHENHYDRAMINE HCL 25 MG
25 TABLET ORAL NIGHTLY PRN
Status: DISCONTINUED | OUTPATIENT
Start: 2020-01-12 | End: 2020-01-15 | Stop reason: HOSPADM

## 2020-01-12 RX ADMIN — MIDODRINE HYDROCHLORIDE 5 MG: 5 TABLET ORAL at 17:49

## 2020-01-12 RX ADMIN — FUROSEMIDE 20 MG: 20 TABLET ORAL at 05:30

## 2020-01-12 RX ADMIN — APIXABAN 5 MG: 5 TABLET, FILM COATED ORAL at 17:49

## 2020-01-12 RX ADMIN — ACETAMINOPHEN 650 MG: 325 TABLET, FILM COATED ORAL at 05:30

## 2020-01-12 RX ADMIN — FERROUS SULFATE TAB 325 MG (65 MG ELEMENTAL FE) 325 MG: 325 (65 FE) TAB at 17:49

## 2020-01-12 RX ADMIN — SENNOSIDES AND DOCUSATE SODIUM 2 TABLET: 8.6; 5 TABLET ORAL at 05:31

## 2020-01-12 RX ADMIN — SIMETHICONE CHEW TAB 80 MG 120 MG: 80 TABLET ORAL at 20:44

## 2020-01-12 RX ADMIN — OMEPRAZOLE 20 MG: 20 CAPSULE, DELAYED RELEASE ORAL at 05:30

## 2020-01-12 RX ADMIN — FERROUS SULFATE TAB 325 MG (65 MG ELEMENTAL FE) 325 MG: 325 (65 FE) TAB at 09:02

## 2020-01-12 RX ADMIN — MIDODRINE HYDROCHLORIDE 5 MG: 5 TABLET ORAL at 05:30

## 2020-01-12 RX ADMIN — DIPHENHYDRAMINE HYDROCHLORIDE 25 MG: 25 TABLET ORAL at 00:37

## 2020-01-12 RX ADMIN — SENNOSIDES AND DOCUSATE SODIUM 2 TABLET: 8.6; 5 TABLET ORAL at 17:49

## 2020-01-12 RX ADMIN — LOVASTATIN 40 MG: 20 TABLET ORAL at 20:44

## 2020-01-12 RX ADMIN — APIXABAN 5 MG: 5 TABLET, FILM COATED ORAL at 05:31

## 2020-01-12 RX ADMIN — MIDODRINE HYDROCHLORIDE 5 MG: 5 TABLET ORAL at 11:39

## 2020-01-12 ASSESSMENT — ENCOUNTER SYMPTOMS
FALLS: 0
SHORTNESS OF BREATH: 0
NERVOUS/ANXIOUS: 0
VOMITING: 0
TINGLING: 0
HEADACHES: 0
CONSTIPATION: 0
NAUSEA: 0
SEIZURES: 0
SPUTUM PRODUCTION: 0
WHEEZING: 0
ABDOMINAL PAIN: 0
COUGH: 0
DEPRESSION: 0
DIZZINESS: 0
BLOOD IN STOOL: 0
SORE THROAT: 0
WEAKNESS: 0
BLURRED VISION: 0
CHILLS: 0
HEMOPTYSIS: 0
PALPITATIONS: 0
LOSS OF CONSCIOUSNESS: 0
SINUS PAIN: 0
DIARRHEA: 0
FEVER: 0

## 2020-01-12 NOTE — PROGRESS NOTES
[facility] Medicine Daily Progress Note    Date of Service  1/12/2020    Chief Complaint  79 y.o. female admitted 1/9/2020 with recurrent right hip dislocation    Hospital Course    Ms. Guillermina Recio is a 79 y.o. female with recent right hip arthroplasty from [facility] inpatient rehab who presented with severe right hip pain secondary to her third acute hip dislocation requiring closed reduction.  Patient has been followed by orthopedic surgery Dr. Delgado who recommended a hip abduction brace and physical therapy.  Patient is a poor candidate for surgery.  Patient was fitted with a brace and physical therapy and Occupational Therapy recommend return to inpatient rehab.        Interval Problem Update  Patient was seen and examined at bedside.  There were no acute events overnight.  I have personally reviewed vitals, labs, and imaging.  Patient denies fever, chills, chest pain, shortness of breath.  Reports right hip pain is well controlled when in bed with hip abductor.  Reports better p.o. intake with salt packets.  Hopefully she will be accepted back to inpatient rehab tomorrow.      Consultants/Specialty  Orthopedic surgery    Code Status  Full    Disposition  Likely back to [facility] inpatient rehab    Review of Systems  Review of Systems   Constitutional: Negative for chills and fever.   HENT: Negative for congestion, sinus pain and sore throat.    Eyes: Negative for blurred vision.   Respiratory: Negative for cough, hemoptysis, sputum production, shortness of breath and wheezing.    Cardiovascular: Negative for chest pain, palpitations and leg swelling.   Gastrointestinal: Negative for abdominal pain, blood in stool, constipation, diarrhea, nausea and vomiting.   Genitourinary: Negative for dysuria, frequency, hematuria and urgency.   Musculoskeletal: Positive for joint pain (Right hip). Negative for falls.   Skin: Negative for rash.   Neurological: Negative for dizziness, tingling, seizures, loss of consciousness,  weakness and headaches.   Psychiatric/Behavioral: Negative for depression and suicidal ideas. The patient is not nervous/anxious.    All other systems reviewed and are negative.       Physical Exam  Temp:  [36.1 °C (96.9 °F)-37.3 °C (99.1 °F)] 36.2 °C (97.2 °F)  Pulse:  [69-86] 78  Resp:  [15-18] 18  BP: ()/(50-70) 88/50  SpO2:  [95 %-98 %] 96 %    Physical Exam  Vitals signs and nursing note reviewed.   Constitutional:       General: She is not in acute distress.     Appearance: Normal appearance.   HENT:      Head: Normocephalic and atraumatic.      Nose: Nose normal.      Mouth/Throat:      Mouth: Mucous membranes are moist.      Pharynx: Oropharynx is clear. No oropharyngeal exudate or posterior oropharyngeal erythema.   Eyes:      Extraocular Movements: Extraocular movements intact.      Conjunctiva/sclera: Conjunctivae normal.   Neck:      Musculoskeletal: Normal range of motion and neck supple.   Cardiovascular:      Rate and Rhythm: Normal rate and regular rhythm.      Pulses: Normal pulses.      Heart sounds: Normal heart sounds. No murmur.   Pulmonary:      Effort: Pulmonary effort is normal. No respiratory distress.      Breath sounds: Normal breath sounds. No stridor. No wheezing or rales.   Abdominal:      General: Abdomen is flat. Bowel sounds are normal. There is no distension.      Palpations: Abdomen is soft. There is no mass.      Tenderness: There is no tenderness.   Musculoskeletal:      Right lower leg: Edema (2+ pitting edema) present.      Comments: Patient with brace around both hips.   Skin:     General: Skin is warm.      Capillary Refill: Capillary refill takes less than 2 seconds.   Neurological:      General: No focal deficit present.      Mental Status: She is alert and oriented to person, place, and time. Mental status is at baseline.      Cranial Nerves: No cranial nerve deficit.   Psychiatric:         Mood and Affect: Mood normal.         Behavior: Behavior normal.          Fluids    Intake/Output Summary (Last 24 hours) at 1/12/2020 1536  Last data filed at 1/12/2020 0532  Gross per 24 hour   Intake 200 ml   Output 1250 ml   Net -1050 ml       Laboratory  Recent Labs     01/10/20  0045 01/11/20  0515 01/12/20  1025   WBC 3.7* 3.8* 4.1*   RBC 3.72* 4.13* 4.57   HEMOGLOBIN 9.3* 10.2* 11.6*   HEMATOCRIT 32.8* 35.7* 39.8   MCV 88.2 86.4 87.1   MCH 25.0* 24.7* 25.4*   MCHC 28.4* 28.6* 29.1*   RDW 61.4* 60.7* 59.8*   PLATELETCT 256 239 267   MPV 8.6* 8.6* 8.7*     Recent Labs     01/10/20  0045 01/11/20 0515 01/12/20  1025   SODIUM 141 138 140   POTASSIUM 4.3 4.7 4.2   CHLORIDE 107 102 101   CO2 27 30 29   GLUCOSE 111* 93 141*   BUN 20 20 25*   CREATININE 0.74 0.76 0.86   CALCIUM 9.0 9.6 9.8                   Imaging  DX-HIP-UNILATERAL-WITH PELVIS-1 VIEW RIGHT   Final Result      Reduction of right hip prosthesis dislocation.           Assessment/Plan  Hypotension- (present on admission)  Assessment & Plan  Chronic, continue home midodrine    Hip dislocation, right (HCC)- (present on admission)  Assessment & Plan  Recent right hip arthroplasty complicated by recurrent hip dislocation requiring multiple closed reductions.    Followed by Ortho Dr. Delgado who recommends hip abduction brace and physical therapy.  No plan for surgery.  Pain scale  Physical therapy recommends return to rehab after medical clearance      COPD (chronic obstructive pulmonary disease) (HCC)- (present on admission)  Assessment & Plan  No acute exacerbation    Pulmonary emboli (HCC)- (present on admission)  Assessment & Plan  Patient had a pulmonary embolism in December 2019.  No procedure planned by Ortho.  Continue apixaban.      Gastroesophageal reflux disease- (present on admission)  Assessment & Plan  Continue outpatient omeprazole    Dyslipidemia- (present on admission)  Assessment & Plan  Continue outpatient lovastatin    Anemia of chronic disease- (present on admission)  Assessment & Plan  No sign of  gross bleeding  Continue home iron supplementation  Repeat CBC in the morning  Continue apixaban for recent pulmonary embolism     Gastrointestinal prophylaxis: Omeprazole  Antibiotics: None  Diet: Regular  Code status: Full  Prognosis: Guarded  Risk: The Patient is at HIGH risk for complications and decompensation secondary to her multiple cormorbidities including acute hip dislocation requiring IV pain medication, pulmonary embolism on apixaban, hypotension requiring Midodrin    I have personally reviewed notes, labs, vitals, imaging.  I discussed the plan of care with bedside RN as well as on multidisciplinary rounds    I have performed a physical exam and reviewed and updated ROS and Plan today (1/12/2020). In review of yesterday's note (1/11/2020), there are no changes except as documented above.       VTE prophylaxis: Therapeutic anticoagulation on apixaban

## 2020-01-12 NOTE — CARE PLAN
Problem: Safety  Goal: Will remain free from injury  Outcome: PROGRESSING AS EXPECTED     Problem: Infection  Goal: Will remain free from infection  Outcome: PROGRESSING AS EXPECTED     Problem: Venous Thromboembolism (VTW)/Deep Vein Thrombosis (DVT) Prevention:  Goal: Patient will participate in Venous Thrombosis (VTE)/Deep Vein Thrombosis (DVT)Prevention Measures  Outcome: PROGRESSING AS EXPECTED     Problem: Knowledge Deficit  Goal: Knowledge of disease process/condition, treatment plan, diagnostic tests, and medications will improve  Outcome: PROGRESSING AS EXPECTED     Problem: Discharge Barriers/Planning  Goal: Patient's continuum of care needs will be met  Outcome: PROGRESSING AS EXPECTED     Problem: Pain Management  Goal: Pain level will decrease to patient's comfort goal  Outcome: PROGRESSING AS EXPECTED     Problem: Respiratory:  Goal: Respiratory status will improve  Outcome: PROGRESSING AS EXPECTED  Note:   Weaned down to 3L NC (baseline)     Problem: Skin Integrity  Goal: Risk for impaired skin integrity will decrease  Outcome: PROGRESSING AS EXPECTED  Note:   Brace removed for night     Problem: Bowel/Gastric:  Goal: Normal bowel function is maintained or improved  Outcome: PROGRESSING SLOWER THAN EXPECTED  Note:   Passing gas, bowel protocol, will continue to monitor     Problem: Urinary Elimination:  Goal: Ability to reestablish a normal urinary elimination pattern will improve  Outcome: PROGRESSING SLOWER THAN EXPECTED  Flowsheets (Taken 1/11/2020 2745)  Urinary Elimination: Incontinence  Note:   Purewick changed and in place

## 2020-01-12 NOTE — DISCHARGE PLANNING
[facility] Rehabilitation Transitional Care Coordination     Referral from:  Dr. Sandoval    Facesheet indicates: SCP/Medicaid FFS Insurance    Potential Rehab Diagnosis: Other Ortho    Chart review indicates patient has ongoing medical management and therapy needs to possibly meet inpatient rehab facility criteria, with the goal of returning to community.    D/C support: Adult children     Physiatry consultation pended per protocol.      Waiting for additional information to determine appropriateness for acute inpatient rehab. Out acute from [facility] for hip dislocation.  Will review with rehab admissions team for recommendations regarding potential return to IRF given 3 episodes hip dislocation and currently not good candidate for surgical stablization. TCC following.    Thank you for the referral.

## 2020-01-13 PROCEDURE — A9270 NON-COVERED ITEM OR SERVICE: HCPCS | Performed by: INTERNAL MEDICINE

## 2020-01-13 PROCEDURE — 770001 HCHG ROOM/CARE - MED/SURG/GYN PRIV*

## 2020-01-13 PROCEDURE — 700102 HCHG RX REV CODE 250 W/ 637 OVERRIDE(OP): Performed by: INTERNAL MEDICINE

## 2020-01-13 PROCEDURE — 99233 SBSQ HOSP IP/OBS HIGH 50: CPT | Performed by: INTERNAL MEDICINE

## 2020-01-13 PROCEDURE — 97530 THERAPEUTIC ACTIVITIES: CPT

## 2020-01-13 PROCEDURE — 97535 SELF CARE MNGMENT TRAINING: CPT

## 2020-01-13 RX ADMIN — FERROUS SULFATE TAB 325 MG (65 MG ELEMENTAL FE) 325 MG: 325 (65 FE) TAB at 16:34

## 2020-01-13 RX ADMIN — APIXABAN 5 MG: 5 TABLET, FILM COATED ORAL at 16:34

## 2020-01-13 RX ADMIN — OXYCODONE HYDROCHLORIDE 10 MG: 10 TABLET ORAL at 20:50

## 2020-01-13 RX ADMIN — FUROSEMIDE 20 MG: 20 TABLET ORAL at 05:24

## 2020-01-13 RX ADMIN — OMEPRAZOLE 20 MG: 20 CAPSULE, DELAYED RELEASE ORAL at 05:24

## 2020-01-13 RX ADMIN — SENNOSIDES AND DOCUSATE SODIUM 2 TABLET: 8.6; 5 TABLET ORAL at 05:24

## 2020-01-13 RX ADMIN — MIDODRINE HYDROCHLORIDE 5 MG: 5 TABLET ORAL at 05:24

## 2020-01-13 RX ADMIN — LOVASTATIN 40 MG: 20 TABLET ORAL at 20:50

## 2020-01-13 RX ADMIN — APIXABAN 5 MG: 5 TABLET, FILM COATED ORAL at 05:24

## 2020-01-13 RX ADMIN — MIDODRINE HYDROCHLORIDE 5 MG: 5 TABLET ORAL at 13:11

## 2020-01-13 RX ADMIN — OXYCODONE HYDROCHLORIDE 5 MG: 5 TABLET ORAL at 16:34

## 2020-01-13 RX ADMIN — MIDODRINE HYDROCHLORIDE 5 MG: 5 TABLET ORAL at 16:34

## 2020-01-13 RX ADMIN — FERROUS SULFATE TAB 325 MG (65 MG ELEMENTAL FE) 325 MG: 325 (65 FE) TAB at 09:12

## 2020-01-13 ASSESSMENT — ENCOUNTER SYMPTOMS
SPUTUM PRODUCTION: 0
HEMOPTYSIS: 0
FEVER: 0
BLOOD IN STOOL: 0
WHEEZING: 0
DEPRESSION: 0
FALLS: 0
BLURRED VISION: 0
CONSTIPATION: 0
PALPITATIONS: 0
VOMITING: 0
COUGH: 0
SORE THROAT: 0
CHILLS: 0
LOSS OF CONSCIOUSNESS: 0
HEADACHES: 0
SHORTNESS OF BREATH: 0
SINUS PAIN: 0
DIARRHEA: 0
TINGLING: 0
WEAKNESS: 0
NAUSEA: 0
NERVOUS/ANXIOUS: 0
DIZZINESS: 0
ABDOMINAL PAIN: 0
SEIZURES: 0

## 2020-01-13 ASSESSMENT — COGNITIVE AND FUNCTIONAL STATUS - GENERAL
CLIMB 3 TO 5 STEPS WITH RAILING: A LOT
TURNING FROM BACK TO SIDE WHILE IN FLAT BAD: A LOT
MOVING TO AND FROM BED TO CHAIR: UNABLE
WALKING IN HOSPITAL ROOM: A LITTLE
STANDING UP FROM CHAIR USING ARMS: A LITTLE
MOVING FROM LYING ON BACK TO SITTING ON SIDE OF FLAT BED: UNABLE
SUGGESTED CMS G CODE MODIFIER MOBILITY: CL
MOBILITY SCORE: 12

## 2020-01-13 ASSESSMENT — GAIT ASSESSMENTS
ASSISTIVE DEVICE: FRONT WHEEL WALKER
GAIT LEVEL OF ASSIST: MINIMAL ASSIST
DISTANCE (FEET): 10

## 2020-01-13 NOTE — CARE PLAN
Problem: Safety  Goal: Will remain free from injury  Outcome: PROGRESSING AS EXPECTED     Problem: Venous Thromboembolism (VTW)/Deep Vein Thrombosis (DVT) Prevention:  Goal: Patient will participate in Venous Thrombosis (VTE)/Deep Vein Thrombosis (DVT)Prevention Measures  Outcome: PROGRESSING AS EXPECTED     Problem: Bowel/Gastric:  Goal: Normal bowel function is maintained or improved  Outcome: PROGRESSING AS EXPECTED     Problem: Knowledge Deficit  Goal: Knowledge of disease process/condition, treatment plan, diagnostic tests, and medications will improve  Outcome: PROGRESSING AS EXPECTED     Problem: Discharge Barriers/Planning  Goal: Patient's continuum of care needs will be met  Outcome: PROGRESSING AS EXPECTED     Problem: Pain Management  Goal: Pain level will decrease to patient's comfort goal  Outcome: PROGRESSING AS EXPECTED     Problem: Respiratory:  Goal: Respiratory status will improve  Outcome: PROGRESSING AS EXPECTED     Problem: Mobility  Goal: Risk for activity intolerance will decrease  Outcome: PROGRESSING AS EXPECTED

## 2020-01-13 NOTE — DISCHARGE PLANNING
Call out to daughter to explain reason for denial of IRF level of care. Daughter understand and is agreeable to skilled nursing referral.

## 2020-01-13 NOTE — DISCHARGE PLANNING
Acute Rehab Hospital/ Transitional Care Coordination  3 episodes hip dislocation and currently not good candidate for surgical stablization.   PT fitted with T-scope Hip Brace.       This referral will not be forwarded to Physiatry as recommendations made by physiatry to transition to a skilled nursing facility.   I reg raoed Chayo Bingham informing her of above.

## 2020-01-13 NOTE — PROGRESS NOTES
Pt and dtrDebora, expressed concern about pt discharging on Monday. Pt did not ambulate today b/c staff nurses did not know how to properly place brace. Brace is a new device being used at this facility. Pt concerned that rehab staff will not ambulate pt as necessary due to not knowing how to use brace properly.Traction team unable to place brace on pt. Contact information for T-scope Hip Brace [phone] and [employer]/Educator - Chencho Barrios [phone] for additional education on placement of brace for staff and rehab staff.     Charge Nurse, who did receive the brace education, was able to assist this nurse with placing the brace and CNA assisted with ambulating pt to bathroom. Pt tolerated ambulating very well.

## 2020-01-13 NOTE — THERAPY
"Physical Therapy Treatment completed.   Bed Mobility:  Supine to Sit: Moderate Assist(HOB flat and use of railing from the Rt side)  Transfers: Sit to Stand: Minimal Assist(from EOB/BSC->FWW)  Gait: Level Of Assist: Minimal Assist with Front-Wheel Walker       Plan of Care: Will benefit from Physical Therapy 4 times per week  Discharge Recommendations: Equipment: Will Continue to Assess for Equipment Needs. Post-acute therapy Discharge to a transitional care facility for continued skilled therapy services.    Hip Abductor brace needs to be on when OOB.       See \"Rehab Therapy-Acute\" Patient Summary Report for complete documentation.       "

## 2020-01-13 NOTE — DISCHARGE PLANNING
"Care Transition Team Assessment  NOK: Charity Kern  [phone]  LSW met with pt and daughter at bedside. LSW verified demographic information. Pt stated she does not want to go to a SNF. Pt stated that \"people go there to die\" and became very distressed. [facility] denied pt. Pt is not independent with her ADLs and needs assistance. No AD, booklet given. PCP on file. Pt does not have a clear discharge plan at this time due to extreme distress about SNF.     Information Source  Orientation : Oriented x 4  Information Given By: Patient  Informant's Name: Guillermina Recio   Who is responsible for making decisions for patient? : Patient    Readmission Evaluation  Is this a readmission?: No    Elopement Risk  Legal Hold: No  Ambulatory or Self Mobile in Wheelchair: No-Not an Elopement Risk  Disoriented: No  Psychiatric Symptoms: None  History of Wandering: No  Elopement this Admit: No  Vocalizing Wanting to Leave: No  Displays Behaviors, Body Language Wanting to Leave: No-Not at Risk for Elopement  Elopement Risk: Not at Risk for Elopement    Interdisciplinary Discharge Planning  Does Admitting Nurse Feel This Could be a Complex Discharge?: Yes  Lives with - Patient's Self Care Capacity: Adult Children  Patient or legal guardian wants to designate a caregiver (see row info): No  Support Systems: Children  Housing / Facility: [address]  Do You Take your Prescribed Medications Regularly: Yes  Able to Return to Previous ADL's: Future Time w/Therapy  Mobility Issues: No  Prior Services: Continuous (24 Hour) Care Giving Per Service, Other (Comments)(was at [facility] prior to admit)  Assistance Needed: Unknown at this Time  Durable Medical Equipment: Walker    Discharge Preparedness  What is your plan after discharge?: Home health care  What are your discharge supports?: Child  Prior Functional Level: Needs Assist with Activities of Daily Living, Needs Assist with Medication Management  Difficulity with ADLs: Dressing, " Toileting, Walking    Functional Assesment  Prior Functional Level: Needs Assist with Activities of Daily Living, Needs Assist with Medication Management    Finances  Financial Barriers to Discharge: No  Prescription Coverage: Yes    Vision / Hearing Impairment  Vision Impairment : Yes  Right Eye Vision: Blind  Left Eye Vision: Impaired  Hearing Impairment : Yes  Hearing Impairment: Both Ears  Does Pt Need Special Equipment for the Hearing Impaired?: No         Advance Directive  Advance Directive?: None  Advance Directive offered?: AD Booklet given    Domestic Abuse  Have you ever been the victim of abuse or violence?: No  Physical Abuse or Sexual Abuse: No  Verbal Abuse or Emotional Abuse: No  Possible Abuse Reported to:: Not Applicable    Psychological Assessment  History of Substance Abuse: None  History of Psychiatric Problems: No  Non-compliant with Treatment: No  Newly Diagnosed Illness: No         Anticipated Discharge Information  Anticipated discharge disposition: Acute rehab

## 2020-01-13 NOTE — PROGRESS NOTES
[facility] Medicine Daily Progress Note    Date of Service  1/13/2020    Chief Complaint  79 y.o. female admitted 1/9/2020 with recurrent right hip dislocation    Hospital Course    Ms. Guillermina Recio is a 79 y.o. female with recent right hip arthroplasty from [facility] inpatient rehab who presented with severe right hip pain secondary to her third acute hip dislocation requiring closed reduction.  Patient has been followed by orthopedic surgery Dr. Delgado who recommended a hip abduction brace and physical therapy.  Patient is a poor candidate for surgery.  Patient was fitted with a brace and physical therapy and Occupational Therapy recommend return to subacute rehab.  Patient was not a good candidate for inpatient rehab because of recurrent hip dislocations, need to wear brace, and limited rehab potential.  Referral sent for SNF.        Interval Problem Update  Patient was seen and examined at bedside.  There were no acute events overnight.  I have personally reviewed vitals, labs, and imaging.  Patient denies fever, chills, chest pain, shortness of breath.      Daughter is at bedside.  There was concerned that the brace was not fitting well and kept signing off.  Orthp/traction team came to fit the brace and demonstrate to nursing staff and inpatient rehab staff how to fit brace.  Hip pain is well controlled in bed.  Patient was declined from inpatient rehab.  SNF referral sent.    Consultants/Specialty  Orthopedic surgery    Code Status  Full    Disposition  Likely back to [facility] inpatient rehab    Review of Systems  Review of Systems   Constitutional: Negative for chills and fever.   HENT: Negative for congestion, sinus pain and sore throat.    Eyes: Negative for blurred vision.   Respiratory: Negative for cough, hemoptysis, sputum production, shortness of breath and wheezing.    Cardiovascular: Negative for chest pain, palpitations and leg swelling.   Gastrointestinal: Negative for abdominal pain, blood in  stool, constipation, diarrhea, nausea and vomiting.   Genitourinary: Negative for dysuria, frequency, hematuria and urgency.   Musculoskeletal: Positive for joint pain (Right hip). Negative for falls.   Skin: Negative for rash.   Neurological: Negative for dizziness, tingling, seizures, loss of consciousness, weakness and headaches.   Psychiatric/Behavioral: Negative for depression and suicidal ideas. The patient is not nervous/anxious.    All other systems reviewed and are negative.       Physical Exam  Temp:  [36.2 °C (97.1 °F)-37.1 °C (98.8 °F)] 36.8 °C (98.3 °F)  Pulse:  [60-90] 78  Resp:  [17-18] 18  BP: ()/(56-73) 104/70  SpO2:  [95 %-98 %] 96 %    Physical Exam  Vitals signs and nursing note reviewed.   Constitutional:       General: She is not in acute distress.     Appearance: Normal appearance.   HENT:      Head: Normocephalic and atraumatic.      Nose: Nose normal.      Mouth/Throat:      Mouth: Mucous membranes are moist.      Pharynx: Oropharynx is clear. No oropharyngeal exudate or posterior oropharyngeal erythema.   Eyes:      Extraocular Movements: Extraocular movements intact.      Conjunctiva/sclera: Conjunctivae normal.   Neck:      Musculoskeletal: Normal range of motion and neck supple.   Cardiovascular:      Rate and Rhythm: Normal rate and regular rhythm.      Pulses: Normal pulses.      Heart sounds: Normal heart sounds. No murmur.   Pulmonary:      Effort: Pulmonary effort is normal. No respiratory distress.      Breath sounds: Normal breath sounds. No stridor. No wheezing or rales.   Abdominal:      General: Abdomen is flat. Bowel sounds are normal. There is no distension.      Palpations: Abdomen is soft. There is no mass.      Tenderness: There is no tenderness.   Musculoskeletal:      Right lower leg: Edema (2+ pitting edema) present.      Comments: Patient with brace around both hips.   Skin:     General: Skin is warm.      Capillary Refill: Capillary refill takes less than 2  seconds.   Neurological:      General: No focal deficit present.      Mental Status: She is alert and oriented to person, place, and time. Mental status is at baseline.      Cranial Nerves: No cranial nerve deficit.   Psychiatric:         Mood and Affect: Mood normal.         Behavior: Behavior normal.         Fluids    Intake/Output Summary (Last 24 hours) at 1/13/2020 1506  Last data filed at 1/13/2020 1400  Gross per 24 hour   Intake 360 ml   Output 1 ml   Net 359 ml       Laboratory  Recent Labs     01/11/20  0515 01/12/20  1025   WBC 3.8* 4.1*   RBC 4.13* 4.57   HEMOGLOBIN 10.2* 11.6*   HEMATOCRIT 35.7* 39.8   MCV 86.4 87.1   MCH 24.7* 25.4*   MCHC 28.6* 29.1*   RDW 60.7* 59.8*   PLATELETCT 239 267   MPV 8.6* 8.7*     Recent Labs     01/11/20  0515 01/12/20  1025   SODIUM 138 140   POTASSIUM 4.7 4.2   CHLORIDE 102 101   CO2 30 29   GLUCOSE 93 141*   BUN 20 25*   CREATININE 0.76 0.86   CALCIUM 9.6 9.8                   Imaging  DX-HIP-UNILATERAL-WITH PELVIS-1 VIEW RIGHT   Final Result      Reduction of right hip prosthesis dislocation.           Assessment/Plan  Hypotension- (present on admission)  Assessment & Plan  Chronic, continue home midodrine    Hip dislocation, right (HCC)- (present on admission)  Assessment & Plan  Recent right hip arthroplasty complicated by recurrent hip dislocation requiring multiple closed reductions.    Followed by Ortho Dr. Delgado who recommends hip abduction brace and physical therapy.  No plan for surgery.  She is a poor candidate  Pain scale  Physical therapy recommends return to rehab after medical clearance      COPD (chronic obstructive pulmonary disease) (HCC)- (present on admission)  Assessment & Plan  No acute exacerbation    Pulmonary emboli (HCC)- (present on admission)  Assessment & Plan  Patient had a pulmonary embolism in December 2019.  No procedure planned by Ortho.  Continue apixaban.      Gastroesophageal reflux disease- (present on admission)  Assessment &  Plan  Continue outpatient omeprazole    Dyslipidemia- (present on admission)  Assessment & Plan  Continue outpatient lovastatin    Anemia of chronic disease- (present on admission)  Assessment & Plan  No sign of gross bleeding  Continue home iron supplementation  Repeat CBC in the morning  Continue apixaban for recent pulmonary embolism     Gastrointestinal prophylaxis: Omeprazole  Antibiotics: None  Diet: Regular  Code status: Full  Prognosis: Guarded  Risk: The Patient is at HIGH risk for complications and decompensation secondary to her multiple cormorbidities including acute hip dislocation requiring IV pain medication, pulmonary embolism on apixaban, hypotension requiring Midodrin    I have personally reviewed notes, labs, vitals, imaging.  I discussed the plan of care with bedside RN as well as on multidisciplinary rounds    I have performed a physical exam and reviewed and updated ROS and Plan today (1/13/2020). In review of yesterday's note (1/12/2020), there are no changes except as documented above.       VTE prophylaxis: Therapeutic anticoagulation on apixaban

## 2020-01-14 PROCEDURE — A9270 NON-COVERED ITEM OR SERVICE: HCPCS | Performed by: INTERNAL MEDICINE

## 2020-01-14 PROCEDURE — 99233 SBSQ HOSP IP/OBS HIGH 50: CPT | Performed by: INTERNAL MEDICINE

## 2020-01-14 PROCEDURE — 770001 HCHG ROOM/CARE - MED/SURG/GYN PRIV*

## 2020-01-14 PROCEDURE — 700102 HCHG RX REV CODE 250 W/ 637 OVERRIDE(OP): Performed by: INTERNAL MEDICINE

## 2020-01-14 RX ADMIN — APIXABAN 5 MG: 5 TABLET, FILM COATED ORAL at 04:45

## 2020-01-14 RX ADMIN — MIDODRINE HYDROCHLORIDE 5 MG: 5 TABLET ORAL at 18:04

## 2020-01-14 RX ADMIN — OXYCODONE HYDROCHLORIDE 5 MG: 5 TABLET ORAL at 04:45

## 2020-01-14 RX ADMIN — LOVASTATIN 40 MG: 20 TABLET ORAL at 21:25

## 2020-01-14 RX ADMIN — MIDODRINE HYDROCHLORIDE 5 MG: 5 TABLET ORAL at 12:15

## 2020-01-14 RX ADMIN — DIPHENHYDRAMINE HYDROCHLORIDE 25 MG: 25 TABLET ORAL at 21:31

## 2020-01-14 RX ADMIN — OMEPRAZOLE 20 MG: 20 CAPSULE, DELAYED RELEASE ORAL at 04:45

## 2020-01-14 RX ADMIN — FERROUS SULFATE TAB 325 MG (65 MG ELEMENTAL FE) 325 MG: 325 (65 FE) TAB at 09:01

## 2020-01-14 RX ADMIN — APIXABAN 5 MG: 5 TABLET, FILM COATED ORAL at 17:49

## 2020-01-14 RX ADMIN — MIDODRINE HYDROCHLORIDE 5 MG: 5 TABLET ORAL at 04:45

## 2020-01-14 RX ADMIN — FUROSEMIDE 20 MG: 20 TABLET ORAL at 04:45

## 2020-01-14 RX ADMIN — FERROUS SULFATE TAB 325 MG (65 MG ELEMENTAL FE) 325 MG: 325 (65 FE) TAB at 18:04

## 2020-01-14 ASSESSMENT — ENCOUNTER SYMPTOMS
SINUS PAIN: 0
LOSS OF CONSCIOUSNESS: 0
WHEEZING: 0
HEMOPTYSIS: 0
PALPITATIONS: 0
DIZZINESS: 0
ABDOMINAL PAIN: 0
HEADACHES: 0
FEVER: 0
NAUSEA: 0
COUGH: 0
SEIZURES: 0
SORE THROAT: 0
TINGLING: 0
DEPRESSION: 0
SPUTUM PRODUCTION: 0
SHORTNESS OF BREATH: 0
NERVOUS/ANXIOUS: 0
FALLS: 0
BLURRED VISION: 0
DIARRHEA: 0
CHILLS: 0
VOMITING: 0
CONSTIPATION: 0
WEAKNESS: 0
BLOOD IN STOOL: 0

## 2020-01-14 NOTE — CARE PLAN
Problem: Communication  Goal: The ability to communicate needs accurately and effectively will improve  Outcome: PROGRESSING AS EXPECTED  Note:   Patient able to communicate needs at this time     Problem: Safety  Goal: Will remain free from falls  Outcome: PROGRESSING AS EXPECTED  Note:   Educated on fall risk and ensured fall precautions in place. Bed in lowest position, treaded socks in place, call light in reach, bed alarm in place, room free of clutter.      Problem: Infection  Goal: Will remain free from infection  Outcome: PROGRESSING AS EXPECTED  Note:   Standard precautions in place     Problem: Venous Thromboembolism (VTW)/Deep Vein Thrombosis (DVT) Prevention:  Goal: Patient will participate in Venous Thrombosis (VTE)/Deep Vein Thrombosis (DVT)Prevention Measures  Outcome: PROGRESSING AS EXPECTED  Note:   Apixaban ordered     Problem: Pain Management  Goal: Pain level will decrease to patient's comfort goal  Outcome: PROGRESSING AS EXPECTED  Note:   Patient rates pain as a 7/10. Medicated per MAR

## 2020-01-14 NOTE — DISCHARGE PLANNING
Anticipated Disposition:  SNF    Action:   Per Chart Review, Pt declined by [facility] SNF, SNF choices provided to Pt, [facility] 3-[facility].   Choice form signed and faxed to [facility].   Pt has good support system, Pt lives with daughter, she will provide all the care Pt needs.             Barriers to Discharge:   SNF acceptance.       Plan:  Follow up with [facility] for acceptance.       addendum  [id number]

## 2020-01-14 NOTE — DISCHARGE PLANNING
Agency/Facility Name: [facility]  Spoke To: Vicki  Outcome: Patient accepted pending bed availability.

## 2020-01-14 NOTE — DOCUMENTATION QUERY
[facility]                                                                       Query Response Note      PATIENT:               ERIN JADE  ACCT #:                  [id number]  MRN:                     [id number]  :                      1940  ADMIT DATE:       2019 7:00 PM  DISCH DATE:        2019 3:56 PM  RESPONDING  PROVIDER #:        [id number]           QUERY TEXT:    It is unclear if pneumonia is a current diagnosis or has been ruled out.  Please clarify status of this condition:    NOTE:  If an appropriate response is not listed below, please respond with a new note.     The patient's Clinical Indicators include:  H&P: Elevated troponin; dislocation R hip; acute pulmonary embolism w/ cor pulmonale; acute respiratory failure   CT - Bilateral pulmonary emboli. Probable consolidative atelectasis in the left lung base.  12/15 CXR: New linear atelectasis in the right midlung medially.  12/15 &  MD Note: Fever, no pneumonia on chest imaging.  12/15 Pulmonary Note: Fever, empiric treatment of pneumonia with Vanco/Zosyn/azithromycin as she has been in the hospital since  so is high risk for resistant bugs. Acute respiratory failure, hypoxemia due to PE versus aspiration versus pneumonia both, clinically improved.   Pulmonary Note: Treat pneumonia with Zosyn, probable 5-day course for possible pneumonic complication Discontinue azithromycin, doubt atypical pneumonic process. Discontinue vancomycin, MRSA nares negative   Treatment: Zosyn; vancomycin; zithromax; nebulizer; RT protocol; lab/imaging  Risk Factors: Advanced age; acute pulmonary embolism; acute respiratory failure; extended hospitalization  Options provided:   -- Pneumonia is ruled in   -- Pneumonia is ruled out   -- Unable to determine      Query created by: Cari Beatty on 2020 6:57 AM    RESPONSE TEXT:    Pneumonia is ruled in           Electronically signed by:  ISA BRIGGS MD 1/14/2020 10:34 AM

## 2020-01-14 NOTE — PROGRESS NOTES
[facility] Medicine Daily Progress Note    Date of Service  1/14/2020    Chief Complaint  79 y.o. female admitted 1/9/2020 with recurrent right hip dislocation    Hospital Course    Ms. Guillermina Recio is a 79 y.o. female with recent right hip arthroplasty from [facility] inpatient rehab who presented with severe right hip pain secondary to her third acute hip dislocation requiring closed reduction.  Patient has been followed by orthopedic surgery Dr. Delgado who recommended a hip abduction brace and physical therapy.  Patient is a poor candidate for surgery.  Patient was fitted with a brace and physical therapy and Occupational Therapy recommend return to subacute rehab.  Patient was not a good candidate for inpatient rehab because of recurrent hip dislocations, need to wear brace, and limited rehab potential.  Referral sent for SNF.        Interval Problem Update  Patient was seen and examined at bedside.  There were no acute events overnight.  I have personally reviewed vitals, labs, and imaging.  Patient denies fever, chills, chest pain, shortness of breath.      Daughter is at bedside.  There was concerned that the brace was not fitting well and kept signing off.  Orthp/traction team came to fit the brace and demonstrate to nursing staff and inpatient rehab staff how to fit brace.  Hip pain is well controlled in bed.  Patient was declined from inpatient rehab.  SNF referral sent.  1/14. She is open to rehab  She does not want surgery because she was told it is high risk  Despite above, she is a fighter and wants to be FULL CODE  Family at bedside.    Consultants/Specialty  Orthopedic surgery    Code Status  Full    Disposition  Likely back to [facility] inpatient rehab    Review of Systems  Review of Systems   Constitutional: Negative for chills and fever.   HENT: Negative for congestion, sinus pain and sore throat.    Eyes: Negative for blurred vision.   Respiratory: Negative for cough, hemoptysis, sputum production,  shortness of breath and wheezing.    Cardiovascular: Negative for chest pain, palpitations and leg swelling.   Gastrointestinal: Negative for abdominal pain, blood in stool, constipation, diarrhea, nausea and vomiting.   Genitourinary: Negative for dysuria, frequency, hematuria and urgency.   Musculoskeletal: Positive for joint pain (Right hip). Negative for falls.   Skin: Negative for rash.   Neurological: Negative for dizziness, tingling, seizures, loss of consciousness, weakness and headaches.   Psychiatric/Behavioral: Negative for depression and suicidal ideas. The patient is not nervous/anxious.    All other systems reviewed and are negative.       Physical Exam  Temp:  [36.4 °C (97.6 °F)-36.9 °C (98.5 °F)] 36.9 °C (98.5 °F)  Pulse:  [72-90] 72  Resp:  [16-19] 19  BP: (92-97)/(56-63) 92/56  SpO2:  [93 %-95 %] 93 %    Physical Exam  Vitals signs and nursing note reviewed.   Constitutional:       General: She is not in acute distress.     Appearance: Normal appearance.   HENT:      Head: Normocephalic and atraumatic.      Nose: Nose normal.      Mouth/Throat:      Mouth: Mucous membranes are moist.      Pharynx: Oropharynx is clear. No oropharyngeal exudate or posterior oropharyngeal erythema.   Eyes:      Extraocular Movements: Extraocular movements intact.      Conjunctiva/sclera: Conjunctivae normal.   Neck:      Musculoskeletal: Normal range of motion and neck supple.   Cardiovascular:      Rate and Rhythm: Normal rate and regular rhythm.      Pulses: Normal pulses.      Heart sounds: Normal heart sounds. No murmur.   Pulmonary:      Effort: Pulmonary effort is normal. No respiratory distress.      Breath sounds: Normal breath sounds. No stridor. No wheezing or rales.   Abdominal:      General: Abdomen is flat. Bowel sounds are normal. There is no distension.      Palpations: Abdomen is soft. There is no mass.      Tenderness: There is no tenderness.   Musculoskeletal:         General: Tenderness (hip)  present.      Right lower leg: Edema (2+ pitting edema) present.      Comments: Patient with brace around both hips.   Skin:     General: Skin is warm.      Capillary Refill: Capillary refill takes less than 2 seconds.   Neurological:      General: No focal deficit present.      Mental Status: She is alert and oriented to person, place, and time. Mental status is at baseline.      Cranial Nerves: No cranial nerve deficit.   Psychiatric:         Mood and Affect: Mood normal.         Behavior: Behavior normal.      Comments: Not agitated except when nursing home is mentioned.         Fluids    Intake/Output Summary (Last 24 hours) at 1/14/2020 1009  Last data filed at 1/13/2020 1400  Gross per 24 hour   Intake 360 ml   Output 1 ml   Net 359 ml       Laboratory  Recent Labs     01/12/20  1025   WBC 4.1*   RBC 4.57   HEMOGLOBIN 11.6*   HEMATOCRIT 39.8   MCV 87.1   MCH 25.4*   MCHC 29.1*   RDW 59.8*   PLATELETCT 267   MPV 8.7*     Recent Labs     01/12/20  1025   SODIUM 140   POTASSIUM 4.2   CHLORIDE 101   CO2 29   GLUCOSE 141*   BUN 25*   CREATININE 0.86   CALCIUM 9.8                   Imaging  DX-HIP-UNILATERAL-WITH PELVIS-1 VIEW RIGHT   Final Result      Reduction of right hip prosthesis dislocation.           Assessment/Plan  Hypotension- (present on admission)  Assessment & Plan  Chronic, continue home midodrine    Hip dislocation, right (HCC)- (present on admission)  Assessment & Plan  Recent right hip arthroplasty complicated by recurrent hip dislocation requiring multiple closed reductions.    Followed by Ortho Dr. Delgado who recommends hip abduction brace and physical therapy.  No plan for surgery.  She is a poor candidate  Pain scale  Physical therapy recommends return to rehab after medical clearance  1/14. Pain control, bowel regimen  Rehab vs SNF planned.      COPD (chronic obstructive pulmonary disease) (HCC)- (present on admission)  Assessment & Plan  No acute exacerbation    Pulmonary emboli (HCC)-  (present on admission)  Assessment & Plan  Patient had a pulmonary embolism in December 2019.  No procedure planned by Ortho.  Continue apixaban.      Gastroesophageal reflux disease- (present on admission)  Assessment & Plan  Continue outpatient omeprazole    Dyslipidemia- (present on admission)  Assessment & Plan  Continue outpatient lovastatin    Anemia of chronic disease- (present on admission)  Assessment & Plan  No sign of gross bleeding  Continue home iron supplementation  Repeat CBC in the morning  Continue apixaban for recent pulmonary embolism     Gastrointestinal prophylaxis: Omeprazole  Antibiotics: None  Diet: Regular  Code status: Full  Prognosis: Guarded  Risk: The Patient is at HIGH risk for complications and decompensation secondary to her multiple cormorbidities including acute hip dislocation requiring IV pain medication, pulmonary embolism on apixaban, hypotension requiring Midodrin    I have personally reviewed notes, labs, vitals, imaging.  I discussed the plan of care with bedside RN as well as on multidisciplinary rounds    I have performed a physical exam and reviewed and updated ROS and Plan today (1/14/2020). In review of yesterday's note (1/13/2020), there are no changes except as documented above.       VTE prophylaxis: Therapeutic anticoagulation on apixaban

## 2020-01-14 NOTE — DISCHARGE PLANNING
Received Choice form at [phone]  Agency/Facility Name: [facility]  Referral sent per Choice form @ [phone]

## 2020-01-14 NOTE — DISCHARGE PLANNING
Received Choice form at [phone]  Agency/Facility Name: 1) [facility], 2) [facility]. 3) [facility]  Referral sent per Choice form @ [phone]

## 2020-01-15 VITALS
TEMPERATURE: 97.5 F | RESPIRATION RATE: 18 BRPM | DIASTOLIC BLOOD PRESSURE: 61 MMHG | OXYGEN SATURATION: 92 % | HEIGHT: 57 IN | HEART RATE: 67 BPM | SYSTOLIC BLOOD PRESSURE: 100 MMHG | BODY MASS INDEX: 29.01 KG/M2 | WEIGHT: 134.48 LBS

## 2020-01-15 PROCEDURE — A9270 NON-COVERED ITEM OR SERVICE: HCPCS | Performed by: INTERNAL MEDICINE

## 2020-01-15 PROCEDURE — 700102 HCHG RX REV CODE 250 W/ 637 OVERRIDE(OP): Performed by: INTERNAL MEDICINE

## 2020-01-15 PROCEDURE — 99239 HOSP IP/OBS DSCHRG MGMT >30: CPT | Performed by: INTERNAL MEDICINE

## 2020-01-15 RX ORDER — POLYETHYLENE GLYCOL 3350 17 G/17G
17 POWDER, FOR SOLUTION ORAL
Refills: 3
Start: 2020-01-15

## 2020-01-15 RX ORDER — AMOXICILLIN 250 MG
2 CAPSULE ORAL 2 TIMES DAILY
Qty: 30 TAB | Refills: 0 | Status: SHIPPED | OUTPATIENT
Start: 2020-01-15 | End: 2022-06-08

## 2020-01-15 RX ORDER — OXYCODONE HYDROCHLORIDE 5 MG/1
5 TABLET ORAL EVERY 6 HOURS PRN
Qty: 12 TAB | Refills: 0 | Status: SHIPPED | OUTPATIENT
Start: 2020-01-15 | End: 2020-01-18

## 2020-01-15 RX ORDER — DIPHENHYDRAMINE HCL 25 MG
25 TABLET ORAL NIGHTLY PRN
Qty: 30 TAB | Refills: 0 | Status: SHIPPED | OUTPATIENT
Start: 2020-01-15 | End: 2021-06-09

## 2020-01-15 RX ADMIN — APIXABAN 5 MG: 5 TABLET, FILM COATED ORAL at 05:46

## 2020-01-15 RX ADMIN — SENNOSIDES AND DOCUSATE SODIUM 2 TABLET: 8.6; 5 TABLET ORAL at 05:46

## 2020-01-15 RX ADMIN — FERROUS SULFATE TAB 325 MG (65 MG ELEMENTAL FE) 325 MG: 325 (65 FE) TAB at 05:46

## 2020-01-15 RX ADMIN — OMEPRAZOLE 20 MG: 20 CAPSULE, DELAYED RELEASE ORAL at 05:46

## 2020-01-15 RX ADMIN — FUROSEMIDE 20 MG: 20 TABLET ORAL at 05:46

## 2020-01-15 RX ADMIN — MIDODRINE HYDROCHLORIDE 5 MG: 5 TABLET ORAL at 05:46

## 2020-01-15 NOTE — DISCHARGE PLANNING
Agency/Facility Name: [facility]  Spoke To: Richelle  Outcome: Bed available 1/15. Need transport.    RN MICHEAL informed

## 2020-01-15 NOTE — DISCHARGE PLANNING
This RN CM spoke with pt's daughter, Corinne and per Corinne she spoke with [facility] and conformed that pt has been accepted. Will set up transport     Pt has a completed PASRR and the number is [id number].  Pt has LOC and the screen number is [id number].    Pt's [employer]/transfer packet prepared and endorsed to [facility] [place].

## 2020-01-15 NOTE — DISCHARGE SUMMARY
Discharge Summary    CHIEF COMPLAINT ON ADMISSION  Chief Complaint   Patient presents with   • Hip Injury       Reason for Admission  EMS     CODE STATUS  Full Code    HPI & HOSPITAL COURSE  This is a 79 y.o. female here with Hip Injury    Please review Dr. Cash Doyle D.O. notes for further details of history of present illness, past medical/social/family histories, allergies and medications. Please review [facility] consultation notes.  Chronic hypotension, was placed on midodrine, reason for her falls/syncopal events.  S/p R JAIME b Dr. Delgado on 11/21  S/p closed reduction of right hip dislocation of R hip dislocation after a fall by Dr. Delgado on 12/19, was at rehab.  Recurrent R hip dislocations.  Presented with Hip Injury  She was getting ready to do more physical therapy when she felt pain after standing up.  At the ED, afebrile, normotensive albeit on the lower side of normal.  XRs showed R hip dislocation  Mild anemia on labs.  Orthopedics consulted.  Patient has been followed by orthopedic surgery Dr. Delgado who recommended a hip abduction brace and physical therapy.  Per orthopedics, she does not want another surgery and she is a poor candidate for surgery.  Patient was fitted with a brace and physical therapy and Occupational Therapy recommend return to subacute rehab.  Patient was not a good candidate for inpatient rehab because of recurrent hip dislocations, need to wear brace, and limited rehab potential.  Referral sent for SNF. She was accepted to SNF.    She will continue midodrine for low normal blood pressures and fall risk reduction. Blood pressures can be checked at SNF. She will continue apixaban for recent diagnosis of PE. She will continue statin for HPL. No active bleeding. Further work-up for anemia can be done outpatient. Last iron panel was done 12/28 consistent with mixed iron deficiency hence she will continue her PO iron supplementation.     At  discharge date, Guillermina Recio afebrile and hemodynamically stable.  Guillermina Recio wanted to be discharged today.    Discharge Physical Exam  Vitals signs and nursing note reviewed.   Constitutional:       General: She is not in acute distress.     Appearance: Normal appearance.   HENT:      Head: Normocephalic and atraumatic.      Nose: Nose normal.      Mouth/Throat:      Mouth: Mucous membranes are moist.      Pharynx: Oropharynx is clear. No oropharyngeal exudate or posterior oropharyngeal erythema.   Eyes:      Extraocular Movements: Extraocular movements intact.      Conjunctiva/sclera: Conjunctivae normal.   Neck:      Musculoskeletal: Normal range of motion and neck supple.   Cardiovascular:      Rate and Rhythm: Normal rate and regular rhythm.      Pulses: Normal pulses.      Heart sounds: Normal heart sounds. No murmur.   Pulmonary:      Effort: Pulmonary effort is normal. No respiratory distress.      Breath sounds: Normal breath sounds. No stridor. No wheezing or rales.   Abdominal:      General: Abdomen is flat. Bowel sounds are normal. There is no distension.      Palpations: Abdomen is soft. There is no mass.      Tenderness: There is no tenderness.   Musculoskeletal:         General: Tenderness (hip) present.      Right lower leg: Edema (2+ pitting edema) present.      Comments: Patient with brace around both hips.   Skin:     General: Skin is warm.      Capillary Refill: Capillary refill takes less than 2 seconds.   Neurological:      General: No focal deficit present.      Mental Status: She is alert and oriented to person, place, and time. Mental status is at baseline.      Cranial Nerves: No cranial nerve deficit.   Psychiatric:         Mood and Affect: Mood normal.         Behavior: Behavior normal.      Comments: Not agitated except when nursing home is mentioned.        Imaging  DX-HIP-UNILATERAL-WITH PELVIS-1 VIEW RIGHT   Final Result      Reduction of right hip prosthesis  dislocation.              Therefore, she is discharged in good and stable condition to skilled nursing facility.    The patient met 2-midnight criteria for an inpatient stay at the time of discharge.      FOLLOW UP ITEMS POST DISCHARGE      DISCHARGE DIAGNOSES  Active Problems:    Hip dislocation, right (HCC) POA: Yes    Hypotension POA: Yes    COPD (chronic obstructive pulmonary disease) (HCC) POA: Yes    Anemia of chronic disease POA: Yes    Dyslipidemia POA: Yes    Gastroesophageal reflux disease POA: Yes    Pulmonary emboli (HCC) POA: Yes        FOLLOW UP  Follow up with Elizabeth Molina M.D. in 1 week  Follow up with Dr. Delgado, Orthopedics in 1 week  Continue Pulmonary rehab and rehab at SNF  MEDICATIONS ON DISCHARGE     Medication List      START taking these medications      Instructions   diphenhydrAMINE 25 MG Tabs  Commonly known as:  BENADRYL   Take 1 tablet by mouth at bedtime as needed for Sleep.  Dose:  25 mg     polyethylene glycol/lytes Pack  Commonly known as:  MIRALAX   Take 1 Packet by mouth 1 time daily as needed (if sennosides and docusate ineffective after 24 hours).  Dose:  17 g        CHANGE how you take these medications      Instructions   oxyCODONE immediate-release 5 MG Tabs  What changed:    · how much to take  · when to take this  · reasons to take this  Commonly known as:  ROXICODONE   Take 1 Tab by mouth every 6 hours as needed for up to 3 days.  Dose:  5 mg     * senna-docusate 8.6-50 MG Tabs  What changed:  Another medication with the same name was added. Make sure you understand how and when to take each.  Commonly known as:  PERICOLACE or SENOKOT S   Take 2 Tabs by mouth 2 times a day.  Dose:  2 Tab     * senna-docusate 8.6-50 MG Tabs  What changed:  You were already taking a medication with the same name, and this prescription was added. Make sure you understand how and when to take each.  Commonly known as:  PERICOLACE or SENOKOT S   Take 2 Tabs by mouth 2 Times a Day.  Dose:  2  Tab         * This list has 2 medication(s) that are the same as other medications prescribed for you. Read the directions carefully, and ask your doctor or other care provider to review them with you.            CONTINUE taking these medications      Instructions   acetaminophen 650 MG CR tablet  Commonly known as:  TYLENOL   Take 650 mg by mouth 3 times a day.  Dose:  650 mg     apixaban 5mg Tabs  Commonly known as:  ELIQUIS   Take 1 Tab by mouth 2 Times a Day.  Dose:  5 mg     ascorbic acid 500 MG tablet  Commonly known as:  VITAMIN C   Take 1 Tab by mouth every day.  Dose:  500 mg     budesonide-formoterol 160-4.5 MCG/ACT Aero  Commonly known as:  SYMBICORT   Inhale 2 Puffs by mouth 2 Times a Day.  Dose:  2 Puff     Cholecalciferol 2000 UNIT Tabs   Take 1 Tab by mouth every day.  Dose:  2,000 Units     ferrous sulfate 325 (65 Fe) MG tablet   Take 1 Tab by mouth 2 times a day, with meals.  Dose:  325 mg     furosemide 20 MG Tabs  Commonly known as:  LASIX   Take 1 Tab by mouth every day.  Dose:  20 mg     gabapentin 300 MG Caps  Commonly known as:  NEURONTIN   Take 1 Cap by mouth 3 times a day.  Dose:  300 mg     levoFLOXacin 500 MG tablet  Commonly known as:  LEVAQUIN   Take 500 mg by mouth every day. 1/3/20-1/7/20  Dose:  500 mg     lovastatin 40 MG tablet  Commonly known as:  MEVACOR   Take 1 Tab by mouth every bedtime.  Dose:  40 mg     midodrine 5 MG Tabs  Commonly known as:  PROAMATINE   Take 5 mg by mouth 3 times a day.  Dose:  5 mg     montelukast 10 MG Tabs  Commonly known as:  SINGULAIR   Take 10 mg by mouth every evening.  Dose:  10 mg     OCUVITE-LUTEIN Tabs   Take 1 tablet by mouth every morning.  Dose:  1 tablet     omeprazole 20 MG delayed-release capsule  Commonly known as:  PRILOSEC   Take 1 Cap by mouth every day.  Dose:  20 mg     simethicone 80 MG Chew  Commonly known as:  MYLICON   Take 120 mg by mouth 4 Times a Day,Before Meals and at Bedtime.  Dose:  120 mg        In prescribing controlled  substances to this patient, I certify that I have obtained and reviewed the medical history of Guillermina Recio. I have also made a good skye effort to obtain applicable records from other providers who have treated the patient and records did not demonstrate any increased risk of substance abuse that would prevent me from prescribing controlled substances.     I have conducted a physical exam and documented it. I have reviewed Ms. Recio’s prescription history as maintained by the [place] Prescription Monitoring Program.     I have assessed the patient’s risk for abuse, dependency, and addiction using the validated Opioid Risk Tool available at https://www.mdcalc.com/aaaxdj-vfum-nxba-ort-narcotic-abuse.     Given the above, I believe the benefits of controlled substance therapy outweigh the risks. The reasons for prescribing controlled substances include non-narcotic, oral analgesic alternatives have been inadequate for pain control. Accordingly, I have discussed the risk and benefits, treatment plan, and alternative therapies with the patient.         Allergies  Allergies   Allergen Reactions   • [place]        DIET  Orders Placed This Encounter   Procedures   • Diet Order Regular     Standing Status:   Standing     Number of Occurrences:   1     Order Specific Question:   Diet:     Answer:   Regular [1]       ACTIVITY  As per SNF  LINES, DRAINS, AND WOUNDS  This is an automated list. Peripheral IVs will be removed prior to discharge.                     MENTAL STATUS ON TRANSFER  Level of Consciousness: Alert  Orientation : Oriented x 4  Speech: Speech Clear    CONSULTATIONS  Orthopedics    PROCEDURES  [id number] View    Result Date: 12/30/2019 12/30/2019 12:40 PM HISTORY/REASON FOR EXAM:  Distention. TECHNIQUE/EXAM DESCRIPTION AND NUMBER OF VIEWS:  1 view(s) of the abdomen. COMPARISON: December 1, 2019 FINDINGS: No bowel dilatation identified. There again appears to be a large hiatal hernia. There are  small bilateral pleural effusions.     No evidence of bowel dilatation. Hiatal hernia. Small bilateral pleural effusions.    Dx-chest-portable (1 View)    Result Date: 12/30/2019 12/30/2019 12:40 PM HISTORY/REASON FOR EXAM:  F/U pulm edema. Shortness of breath. TECHNIQUE/EXAM DESCRIPTION AND NUMBER OF VIEWS: Single portable view of the chest. COMPARISON: 12/27/2019 FINDINGS: Cardiac contour is grossly stable. Lungs show worsening hypoinflation. Large hiatal hernia, increased from prior exam. Increased opacity again seen in both lung bases with blunting of the costophrenic angles. No pneumothorax. No major bony abnormality is seen.     1.  Worsening hypoinflation with persistent bibasilar consolidation and small effusions. 2.  Large hiatal hernia, increased from prior exam.    Dx-chest-portable (1 View)    Result Date: 12/27/2019 12/27/2019 10:49 AM HISTORY/REASON FOR EXAM:  Shortness of Breath Hypertension, tachycardia, dyspnea TECHNIQUE/EXAM DESCRIPTION AND NUMBER OF VIEWS: Single portable view of the chest. COMPARISON: Yesterday FINDINGS: HEART: Stable size. Retrocardiac opacity is again noted. LUNGS: There is peripheral interstitial prominence and central vascular congestion. There are bibasilar opacities. PLEURA: There is blunting of each costophrenic sulcus.     1.  Slight interval decrease in pulmonary edema and central vascular congestion 2.  Hiatal hernia 3.  Small BILATERAL pleural effusions 4.  Persistently enlarged cardiac silhouette    Dx-chest-portable (1 View)    Result Date: 12/26/2019 12/26/2019 9:43 AM HISTORY/REASON FOR EXAM:  Shortness of Breath. TECHNIQUE/EXAM DESCRIPTION AND NUMBER OF VIEWS: Single portable view of the chest. COMPARISON: 12/22/2019 FINDINGS: Large air-filled retrocardiac structure consistent with large hiatal hernia. Bilateral lung base opacities. Small bilateral pleural effusions. Cardiomegaly.     Large hiatal hernia. Bilateral lung base atelectasis/edema.  Pneumonitis/pneumonia not excluded. Small bilateral pleural effusions.    Dx-chest-portable (1 View)    Result Date: 12/22/2019 12/22/2019 12:48 AM HISTORY/REASON FOR EXAM:  Fever. TECHNIQUE/EXAM DESCRIPTION AND NUMBER OF VIEWS: Single portable view of the chest. COMPARISON: 12/21/2019 FINDINGS: LUNGS: Unchanged retrocardiac opacity. Suspected left pleural effusion are stable. Ill-defined right basilar opacity. No significant right effusion. PNEUMOTHORAX: None. LINES AND TUBES: Stable right IJ central catheter position. MEDIASTINUM: Stable cardiac silhouette. MUSCULOSKELETAL STRUCTURES: Unchanged.     1. Stable right IJ central catheter. 2. Stable retrocardiac atelectasis versus consolidation and suspected left pleural effusion. 3. Ill-defined right basilar opacity, atelectasis versus consolidation. No significant right effusion.    Dx-chest-portable (1 View)    Result Date: 12/21/2019 12/21/2019 12:14 AM HISTORY/REASON FOR EXAM:  Fever; Follow-up pneumonia. TECHNIQUE/EXAM DESCRIPTION AND NUMBER OF VIEWS: Single portable view of the chest. COMPARISON: 12/15/2019 FINDINGS: LUNGS: Left retrocardiac opacity is more pronounced compared to prior study. Suspected left pleural effusion. Right basilar opacity, likely atelectasis. PNEUMOTHORAX: None. LINES AND TUBES: Stable right IJ central catheter. MEDIASTINUM: Stable cardiac silhouette. MUSCULOSKELETAL STRUCTURES: Unchanged. Unchanged hiatal hernia.     1.  Stable right IJ central catheter. 2.  Interval worsening of left basilar opacity, atelectasis versus consolidation. It is likely exaggerated by hiatal hernia. 3.  Right basilar opacity, likely atelectasis.    Dx-pelvis-1 Or 2 Views    Result Date: 1/8/2020 1/8/2020 1:00 PM HISTORY/REASON FOR EXAM:  severe pain, history of hip replacement and dislocation. Right hip pain, probable dislocation TECHNIQUE/EXAM DESCRIPTION AND NUMBER OF VIEWS:  1 view(s) of the pelvis. COMPARISON:  1 view pelvis 12/19/2019 FINDINGS: There  is dislocation of the femoral component of right hip arthroplasty. No fracture is present. Left hip arthroplasty appears within normal limits. 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 TECHNIQUE/EXAM DESCRIPTION AND NUMBER OF VIEWS:  1 view(s) of the pelvis. COMPARISON:  12/12/2019 FINDINGS: Right hip arthroplasty is again noted in place. Lateral dislocation of the femoral head relative to the acetabulum is no longer present. The right femur appears abducted relative to the pelvis. Left hip arthroplasty is again noted in place. No acute fracture is identified. .     1.  START NUMBER RIGHT HIP DISLOCATION NO LONGER PRESENT INVOLVING THE ARTHROPLASTY. 2.  NO ACUTE FRACTURE IDENTIFIED.    Dx-hip-unilateral-with Pelvis-1 View Right    Result Date: 1/9/2020 1/9/2020 7:20 PM HISTORY/REASON FOR EXAM:  post reduction. TECHNIQUE/EXAM DESCRIPTION AND NUMBER OF VIEWS:  2 views of the RIGHT hip. COMPARISON: Exam from 2:46 PM FINDINGS: Right hip prosthesis dislocation has been reduced. No fracture identified. Patient is also status post left total hip replacement.     Reduction of right hip prosthesis dislocation.    Dx-hip-complete - Unilateral 2+ Right    Result Date: 1/9/2020 1/9/2020 3:45 PM HISTORY/REASON FOR EXAM:  pain s/p dislocation. TECHNIQUE/EXAM DESCRIPTION AND NUMBER OF VIEWS:  2 views of the RIGHT hip. COMPARISON: 1/8/2020 FINDINGS: There is been redislocation of patient's right hip arthroplasty with superior displacement of the right femoral component. Total left hip arthroplasty is again noted and appears intact. The generalized osteopenia. No acute fracture is seen. Lower lumbar degenerative changes.     Superior dislocation of right hip arthroplasty.    Dx-hip-unilateral-without Pelvis-1 View Right    Result  Date: 1/8/2020 1/8/2020 3:13 PM HISTORY/REASON FOR EXAM:  Right hip pain post reduction.. TECHNIQUE/EXAM DESCRIPTION AND NUMBER OF VIEWS:  1 views of the RIGHT hip. COMPARISON: Same day FINDINGS: There is interval reduction of a previously seen dislocated right hip arthroplasty.     Interval reduction of a previously seen dislocated right hip arthroplasty.      LABORATORY  Lab Results   Component Value Date    SODIUM 140 01/12/2020    POTASSIUM 4.2 01/12/2020    CHLORIDE 101 01/12/2020    CO2 29 01/12/2020    GLUCOSE 141 (H) 01/12/2020    BUN 25 (H) 01/12/2020    CREATININE 0.86 01/12/2020    CREATININE 0.9 01/16/2009        Lab Results   Component Value Date    WBC 4.1 (L) 01/12/2020    HEMOGLOBIN 11.6 (L) 01/12/2020    HEMATOCRIT 39.8 01/12/2020    PLATELETCT 267 01/12/2020        Total time of the discharge process exceeds 35 minutes.

## 2020-01-15 NOTE — DISCHARGE PLANNING
Received Transport Form @ 1001  Spoke to Rosemary @ [facility]    Transport is scheduled for 1/15 @1200 going to [facility](building on the left)    RN CM informed.

## 2020-01-15 NOTE — CARE PLAN
Problem: Safety  Goal: Will remain free from falls  Outcome: PROGRESSING AS EXPECTED   Bed alarm in use.    Problem: Venous Thromboembolism (VTW)/Deep Vein Thrombosis (DVT) Prevention:  Goal: Patient will participate in Venous Thrombosis (VTE)/Deep Vein Thrombosis (DVT)Prevention Measures  Outcome: PROGRESSING AS EXPECTED   SCDs in use  Problem: Pain Management  Goal: Pain level will decrease to patient's comfort goal  Outcome: PROGRESSING AS EXPECTED   Pain well controlled with oral medications

## 2020-01-15 NOTE — DISCHARGE INSTRUCTIONS
Discharge Instructions    Discharged to other by medical transportation with escort. Discharged via wheelchair, hospital escort: Yes.  Special equipment needed: Oxygen    Be sure to schedule a follow-up appointment with your primary care doctor or any specialists as instructed.     Discharge Plan:   Diet Plan: Discussed  Activity Level: Discussed  Confirmed Follow up Appointment: Patient to Call and Schedule Appointment  Confirmed Symptoms Management: Discussed  Medication Reconciliation Updated: Yes  Influenza Vaccine Indication: Not indicated: Previously immunized this influenza season and > 8 years of age    I understand that a diet low in cholesterol, fat, and sodium is recommended for good health. Unless I have been given specific instructions below for another diet, I accept this instruction as my diet prescription.   Other diet: Regular diet    Special Instructions: Discharge instructions for the Orthopedic Patient    Follow up with Primary Care Physician within 2 weeks of discharge to home, regarding:  Review of medications and diagnostic testing.  Surveillance for medical complications.  Workup and treatment of osteoporosis, if appropriate.     -Is this a Joint Replacement patient? No    -Is this patient being discharged with medication to prevent blood clots?  No  Patient on Eliquis.    · Is patient discharged on Warfarin / Coumadin?   No     Depression / Suicide Risk    As you are discharged from this [facility] facility, it is important to learn how to keep safe from harming yourself.    Recognize the warning signs:  · Abrupt changes in personality, positive or negative- including increase in energy   · Giving away possessions  · Change in eating patterns- significant weight changes-  positive or negative  · Change in sleeping patterns- unable to sleep or sleeping all the time   · Unwillingness or inability to communicate  · Depression  · Unusual sadness, discouragement and loneliness  · Talk of  wanting to die  · Neglect of personal appearance   · Rebelliousness- reckless behavior  · Withdrawal from people/activities they love  · Confusion- inability to concentrate     If you or a loved one observes any of these behaviors or has concerns about self-harm, here's what you can do:  · Talk about it- your feelings and reasons for harming yourself  · Remove any means that you might use to hurt yourself (examples: pills, rope, extension cords, firearm)  · Get professional help from the community (Mental Health, Substance Abuse, psychological counseling)  · Do not be alone:Call your Safe Contact- someone whom you trust who will be there for you.  · Call your local CRISIS HOTLINE 336-1453 or 906-385-2388  · Call your local Children's Mobile Crisis Response Team [place] (030) 039-4304 or www.VideoPros  · Call the toll free National Suicide Prevention Hotlines   · National Suicide Prevention Lifeline 905-469-HCUI (7664)  · [employer] Network 800-SUICIDE (778-4272)      Hip Dislocation  Introduction  Hip dislocation happens when the “ball” at the top of the thigh bone (femur) moves out of its normal position in the socket of the hip bone (pelvis). This happens because of severe force to the hip or the area that surrounds it. The hip may dislocate in a forward or backward direction. Backward dislocation (posterior dislocation) is most common.  Hip dislocation may involve a broken (fractured) bone. It can also involve damage to nerves, tissues that connect bones together (ligaments), or tissues that connect muscles to bones (tendons). Hip dislocation is an emergency that requires immediate medical treatment.  What are the causes?  This condition is often caused by a severe, direct hit (blow) to a bent knee. The blow forces the femur back against the hip joint. This type of injury is most commonly caused by a car crash. This injury may also be caused by:  · Falls, especially falls from a  height.  · Contact sports.  · Industrial accidents.  What increases the risk?  The following factors may make you more likely to dislocate your hip:  · Having an artificial (prosthetic) hip.  · Being overweight or obese.  · Lacking hip strength and flexibility.  · Having a defect of the hip joint that is present at birth (congenital).  · Having an increased risk for falling.  · Participating in contact sports.  What are the signs or symptoms?  Symptoms of this condition may include:  · Severe pain in the hip area. Pain may get worse when you move or when you try to use your hip to support (bear) your weight.  · Inability to move the hip.  · Having the leg on the side of the dislocated hip appearing shorter than the other leg.  · Inward turning of the foot on the side of the dislocated hip.  · Loss of feeling in the lower leg, foot, or ankle.  How is this diagnosed?  This condition is diagnosed based on your medical history and a physical exam. You may have X-rays to confirm the diagnosis. You may be examined by a health care provider who specializes in bone disorders (orthopedist).  How is this treated?  This condition is treated by moving the ball of your femur back into your hip socket (reduction). Your health care provider may perform a reduction by hand (manual reduction) or surgically.  Your health care provider may perform surgical reduction if:  · You have a fracture.  · You have bone fragments in your joint.  · You have damaged blood vessels or nerves.  · Manual reduction was not successful.  After your reduction, treatment may include:  · Crutches.  · A splint.  · Physical therapy to help strengthen the muscles that hold your hip joint in place.  Follow these instructions at home:  If you have a splint:  · Do not put pressure on any part of the splint until it is fully hardened. This may take several hours.  · Wear the splint as told by your health care provider. Remove it only as told by your health care  provider.  · Loosen the splint if your toes tingle, become numb, or turn cold and blue.  · Do not let your splint get wet if it is not waterproof.  ¨ Do not take baths, swim, or use a hot tub until your health care provider approves. Ask your health care provider if you can take showers. You may only be allowed to take sponge baths for bathing.  ¨ If your splint is not waterproof, cover it with a watertight plastic bag when you take a bath or a shower.  · Keep the splint clean.  Managing pain, stiffness, and swelling  · If directed, put ice on the injured area:  ¨ Put ice in a plastic bag.  ¨ Place a towel between your skin and the bag.  ¨ Leave the ice on for 20 minutes, 2-3 times a day.  · Wear compression stockings or wraps as told by your health care provider.  · Move your toes often to avoid stiffness and to lessen swelling.  Driving  · Do not drive or operate heavy machinery while taking prescription pain medicine, or as told by your health care provider.  · Ask your health care provider when it is safe to drive if you have a splint on your hip.  Activity  · Return to your normal activities as told by your health care provider. Ask your health care provider what activities are safe for you.  · If physical therapy was prescribed, do exercises as told by your health care provider.  Safety  · Do not use your hip to bear weight until your health care provider says that you can. Use crutches or a walker as told by your health care provider.  General instructions  · Do not any use tobacco products, such as cigarettes, chewing tobacco, and e-cigarettes. Tobacco can delay bone healing. If you need help quitting, ask your health care provider.  · Take over-the-counter and prescription medicines only as told by your health care provider.  · Keep all follow-up visits as told by your health care provider. This is important.  How is this prevented?  · If you have difficulty walking or keeping your balance, try using a cane  or a walker for stability. If you feel unstable, sit down right away.  · Exercise regularly, as told by your health care provider.  · Warm up and stretch before being active.  · Cool down and stretch after being active.  Contact a health care provider if:  · You have pain that gets worse or does not get better with medicine.  · You have swelling or red skin on your hip area or your leg.  · You cannot move any part of your hip or leg.  · You have a tingling sensation in any part of your hip, leg, or foot.  Get help right away if:  · You feel like your hip has dislocated again.  · You have severe pain in your hip or groin.  · You have numbness or weakness in your leg.  If you have symptoms of a hip dislocation, do not wait to see if the symptoms will go away. Get medical help right away. Call your local emergency services (911 in the [place] Do not drive yourself to the hospital.   This information is not intended to replace advice given to you by your health care provider. Make sure you discuss any questions you have with your health care provider.  Document Released: 09/12/2002 Document Revised: 05/25/2017 Document Reviewed: 07/19/2016  © 2017 Elsevier

## 2020-02-01 NOTE — DOCUMENTATION QUERY
"                                                                         [facility]                                                                       Query Response Note      PATIENT:               ERIN JADE  ACCT #:                  [id number]  MRN:                     [id number]  :                      1940  ADMIT DATE:       2019 7:00 PM  DISCH DATE:        2019 3:56 PM  RESPONDING  PROVIDER #:        [id number]           QUERY TEXT:    \"Sepsis\" is documented under \"Shock\" in the Pulmonary Notes.  SIRS per other notes.  Please clarify status of this condition:    NOTE:  If an appropriate response is not listed below, please respond with a new note.     The patient's Clinical Indicators include:  H&P: Elevated troponin; dislocation R hip; acute pulmonary embolism w/ cor pulmonale; acute respiratory failure  Admit SIRS /4 (HR: 104) T: 99.4; RR: 19; SIRS within 5 hours of admit  (HR: 112; RR: 24)   12/15 SIRS (HR: 82 - 113; RR: 12 - 51: T: 101.1 F; WBC's: 12.7); Lactic Acid: 2.2  12/15 UA: Positive - Leukocyte esterase; WBC's, bacteria; Urine Culture: Negative; Blood Cultures x 2: Negative  12/15 Pulmonary Note: Shock, d/t sepsis given fever versus obstructive from PE vs hemorrhagic/hypovolemic given dropping Hgb. Treat pneumonia with Zosyn (vs UTI -UA consistent with UTI, urine culture pending?  12/15 &  MD Note: Possible UTI   Treatment: IVF; zosyn; vancomycin; zithromax; lab/imaging  Risk Factors: Advanced age; possible UTI; possible pneumonia; extended hospitalization  Options provided:   -- Severe sepsis with septic shock ruled in   -- Sepsis ruled in, without septic shock   -- Sepsis ruled out   -- Unable to determine      Query created by: Keyana Harris on 2020 3:40 PM    RESPONSE TEXT:    Severe sepsis with septic shock ruled in          Electronically signed by:  MAICOL TOMLIN MD 2020 12:29 PM              "

## 2020-03-10 ENCOUNTER — HOSPITAL ENCOUNTER (OUTPATIENT)
Dept: RADIOLOGY | Facility: MEDICAL CENTER | Age: 80
End: 2020-03-10
Attending: ORTHOPAEDIC SURGERY
Payer: MEDICARE

## 2020-03-10 DIAGNOSIS — M25.551 RIGHT HIP PAIN: ICD-10-CM

## 2020-03-10 PROCEDURE — 73501 X-RAY EXAM HIP UNI 1 VIEW: CPT | Mod: RT

## 2020-03-26 ENCOUNTER — PATIENT OUTREACH (OUTPATIENT)
Dept: HEALTH INFORMATION MANAGEMENT | Facility: OTHER | Age: 80
End: 2020-03-26

## 2020-03-26 NOTE — PROGRESS NOTES
An 79-year-old female was a local transfer admission to [facility] from 1/15/2020 to 2/25/2020 to treat Unspecified dislocation of right hip, initial encounter. [facility] did not visit the patient bedside. The patient was discharged Home w/ Home Health. The patient was not under clinical case management.    The patient has no medication orders.     The patient was ordered to follow-up with Home Health. The patient had the following appointments:     1) 2/28/2020 @ 12:00 Home Health - Appointment Kept     2) [APPOINTMENT DATE] Elizabeth Molina, geriatric medicine - PATIENT DECLINED APPOINTMENT  The patient has no future appointments scheduled.     [facility] followed the patient for a total of 76 days and Patient  was receptive to services.Patient successfully recovered or avoided further complications.

## 2020-04-02 ENCOUNTER — HOSPITAL ENCOUNTER (OUTPATIENT)
Dept: RADIOLOGY | Facility: MEDICAL CENTER | Age: 80
End: 2020-04-02
Attending: ORTHOPAEDIC SURGERY
Payer: MEDICARE

## 2020-04-02 DIAGNOSIS — M25.552 LEFT HIP PAIN: ICD-10-CM

## 2020-04-02 DIAGNOSIS — M25.551 RIGHT HIP PAIN: ICD-10-CM

## 2020-04-02 PROCEDURE — 73521 X-RAY EXAM HIPS BI 2 VIEWS: CPT

## 2020-06-03 ENCOUNTER — TELEPHONE (OUTPATIENT)
Dept: VASCULAR LAB | Facility: MEDICAL CENTER | Age: 80
End: 2020-06-03

## 2020-06-03 ENCOUNTER — ANTICOAGULATION VISIT (OUTPATIENT)
Dept: VASCULAR LAB | Facility: MEDICAL CENTER | Age: 80
End: 2020-06-03
Attending: PHYSICAL MEDICINE & REHABILITATION
Payer: MEDICARE

## 2020-06-03 ENCOUNTER — HOSPITAL ENCOUNTER (EMERGENCY)
Facility: MEDICAL CENTER | Age: 80
End: 2020-06-03
Attending: EMERGENCY MEDICINE
Payer: MEDICARE

## 2020-06-03 ENCOUNTER — HOSPITAL ENCOUNTER (OUTPATIENT)
Dept: RADIOLOGY | Facility: MEDICAL CENTER | Age: 80
End: 2020-06-03
Attending: PHYSICAL MEDICINE & REHABILITATION
Payer: MEDICARE

## 2020-06-03 ENCOUNTER — APPOINTMENT (OUTPATIENT)
Dept: RADIOLOGY | Facility: MEDICAL CENTER | Age: 80
End: 2020-06-03
Attending: EMERGENCY MEDICINE
Payer: MEDICARE

## 2020-06-03 VITALS
BODY MASS INDEX: 32.79 KG/M2 | HEIGHT: 57 IN | SYSTOLIC BLOOD PRESSURE: 120 MMHG | OXYGEN SATURATION: 98 % | HEART RATE: 71 BPM | RESPIRATION RATE: 15 BRPM | TEMPERATURE: 97.5 F | DIASTOLIC BLOOD PRESSURE: 72 MMHG | WEIGHT: 152 LBS

## 2020-06-03 DIAGNOSIS — R60.1 GENERALIZED EDEMA: ICD-10-CM

## 2020-06-03 DIAGNOSIS — N39.0 ACUTE URINARY TRACT INFECTION: ICD-10-CM

## 2020-06-03 DIAGNOSIS — R09.89 SUSPECTED DEEP VEIN THROMBOSIS (DVT): ICD-10-CM

## 2020-06-03 DIAGNOSIS — I82.90 DEEP VEIN THROMBOSIS (DVT) OF NON-EXTREMITY VEIN, UNSPECIFIED CHRONICITY: ICD-10-CM

## 2020-06-03 PROBLEM — I82.409 DEEP VEIN THROMBOSIS (HCC): Status: ACTIVE | Noted: 2020-06-03

## 2020-06-03 LAB
APPEARANCE UR: ABNORMAL
BACTERIA #/AREA URNS HPF: ABNORMAL /HPF
BILIRUB UR QL STRIP.AUTO: NEGATIVE
COLOR UR: YELLOW
EPI CELLS #/AREA URNS HPF: NEGATIVE /HPF
GLUCOSE UR STRIP.AUTO-MCNC: NEGATIVE MG/DL
HYALINE CASTS #/AREA URNS LPF: ABNORMAL /LPF
KETONES UR STRIP.AUTO-MCNC: NEGATIVE MG/DL
LEUKOCYTE ESTERASE UR QL STRIP.AUTO: ABNORMAL
MICRO URNS: ABNORMAL
NITRITE UR QL STRIP.AUTO: NEGATIVE
PH UR STRIP.AUTO: 6 [PH] (ref 5–8)
PROT UR QL STRIP: NEGATIVE MG/DL
RBC # URNS HPF: ABNORMAL /HPF
RBC UR QL AUTO: NEGATIVE
SP GR UR STRIP.AUTO: 1.02
UROBILINOGEN UR STRIP.AUTO-MCNC: 0.2 MG/DL
WBC #/AREA URNS HPF: ABNORMAL /HPF

## 2020-06-03 PROCEDURE — 93971 EXTREMITY STUDY: CPT | Mod: RT

## 2020-06-03 PROCEDURE — 71045 X-RAY EXAM CHEST 1 VIEW: CPT

## 2020-06-03 PROCEDURE — 81001 URINALYSIS AUTO W/SCOPE: CPT

## 2020-06-03 PROCEDURE — 99284 EMERGENCY DEPT VISIT MOD MDM: CPT

## 2020-06-03 PROCEDURE — 87086 URINE CULTURE/COLONY COUNT: CPT

## 2020-06-03 PROCEDURE — 93971 EXTREMITY STUDY: CPT | Mod: 26,RT | Performed by: INTERNAL MEDICINE

## 2020-06-03 PROCEDURE — 99213 OFFICE O/P EST LOW 20 MIN: CPT | Performed by: PHARMACIST

## 2020-06-03 PROCEDURE — 87077 CULTURE AEROBIC IDENTIFY: CPT

## 2020-06-03 PROCEDURE — 87186 SC STD MICRODIL/AGAR DIL: CPT

## 2020-06-03 RX ORDER — DOXYCYCLINE 100 MG/1
100 CAPSULE ORAL 2 TIMES DAILY
Qty: 14 CAP | Refills: 0 | Status: SHIPPED | OUTPATIENT
Start: 2020-06-03 | End: 2020-06-10

## 2020-06-03 RX ORDER — NITROFURANTOIN 25; 75 MG/1; MG/1
100 CAPSULE ORAL 2 TIMES DAILY
Qty: 14 CAP | Refills: 0 | Status: SHIPPED | OUTPATIENT
Start: 2020-06-03 | End: 2020-06-10

## 2020-06-03 ASSESSMENT — FIBROSIS 4 INDEX: FIB4 SCORE: 1.98

## 2020-06-03 NOTE — Clinical Note
Dr. Molina - pt seemed very confused about her medications, and seems to have self stopped most of them, though she was unable to tell me which ones. Please call pt to set appt.

## 2020-06-03 NOTE — LETTER
6/7/2020               Guillermina Recio  [address]  [facility] [place] [facility] [address]        Dear Guillermina (MR#[id number])    This letter is sent in regards to your, recent visit to the [facility] Emergency Department on 6/3/2020.  During the visit, tests were performed to assist the physician in a medical diagnosis.  A review of those tests requires that we notify you of the following:    Your urine culture was POSITIVE for a bacteria called Klebsiella pneumoniae. The antibiotic prescribed for you (nitrofurantoin) should be active to treat this bacteria. You were also prescribed doxycycline. IT IS IMPORTANT THAT YOU CONTINUE TAKING YOUR ANTIBIOTICS UNTIL THEY ARE FINISHED.      Please feel free to contact me at the number below if you have any questions or concerns. Thank you for your cooperation in the matter.    Sincerely,  ED Culture Follow-Up Staff  Mildred Pal, PharmD, PharmD    [facility], Emergency Department  [facility] [place], [place] [id number] [phone]

## 2020-06-03 NOTE — PROGRESS NOTES
"NEW DOAC   .   Anticoagulation Patient Findings    PCP: Elizabeth Molina M.D.  Dx: Pt was found to have a NEW DVT via US today, along with enlargement of previous one.  HAS-BLED = 1 (age)    Pt Hx:   Pt was Dx with PE in December 2019. Was treated with Eliquis. Pt was admitted to SNF on Eliquis on 1/15/20 for rehab post Hip Dislocation. Per pt she self STOPPED Eliquis after she left the Rehab SNF. She believes she left sometime in March. Was referred to US today by [facility] due to edema on her RIGHT leg. US was Positive for DVT and DVT protocol was started.     Pt was seen alone in clinic today as her daughter who is her caretaker was at her own MD appt. Pt is \"blind and deaf\", so part of the visit was conducted with her Orthodox friend who was her ride, Alexandra.     Labs:  Lab Results   Component Value Date/Time    WBC 4.1 (L) 01/12/2020 10:25 AM    RBC 4.57 01/12/2020 10:25 AM    HEMOGLOBIN 11.6 (L) 01/12/2020 10:25 AM    HEMATOCRIT 39.8 01/12/2020 10:25 AM    MCV 87.1 01/12/2020 10:25 AM    MCH 25.4 (L) 01/12/2020 10:25 AM    MCHC 29.1 (L) 01/12/2020 10:25 AM    MPV 8.7 (L) 01/12/2020 10:25 AM    NEUTSPOLYS 53.00 01/12/2020 10:25 AM    LYMPHOCYTES 32.50 01/12/2020 10:25 AM    MONOCYTES 13.30 01/12/2020 10:25 AM    EOSINOPHILS 0.20 01/12/2020 10:25 AM    BASOPHILS 0.50 01/12/2020 10:25 AM    HYPOCHROMIA 1+ 01/07/2020 05:54 AM    ANISOCYTOSIS 1+ 01/11/2020 05:15 AM      Lab Results   Component Value Date/Time    SODIUM 140 01/12/2020 10:25 AM    POTASSIUM 4.2 01/12/2020 10:25 AM    CHLORIDE 101 01/12/2020 10:25 AM    CO2 29 01/12/2020 10:25 AM    GLUCOSE 141 (H) 01/12/2020 10:25 AM    BUN 25 (H) 01/12/2020 10:25 AM    CREATININE 0.86 01/12/2020 10:25 AM    CREATININE 0.9 01/16/2009 11:34 AM        Pt is NOT new to Eliquis but new to [facility]. Pt declined education.     Pt DENIES any issues in the past with Eliquis, in fact she states that she has stopped most of her medications, though couldn't tell me which ones. Pt having " SOB during visit.    PT DECLINED TO GO TO ER FOR FURTHER EVALUATION.    Start with Eliquis 10mg taken 2 times a day for 1 week and then decrease to 5mg taken 2 times daily thereafter.    Samples provided: yes - #28 of the Eliquis 5mg tablets - pt to take 10mg 2 times a day for 1 week.     Went and verbally went over pt's medication plan with her friend in the lobby, gave written instructions. Ordered Eliquis 5mg take 1 tablet 2 times a day to [facility] so patient can  prior to running out of her 1 week supply of samples. Explained this to Alexandra.     F/U - in clinic in 1 week. Asked Daughter to come to assist patient.     Will also send to PCP - appears that pt needs to be seen for help with her other disease state management.     Joceline King, PharmD

## 2020-06-03 NOTE — PATIENT INSTRUCTIONS
1. You have a new blood clot in your groin and the one in your leg has grown. Due to this you we are having RESTART the blood thinner medication Eliquis (Apixaban).     2. You will start with ELIQUIS 10mg taken 2 times a day - this will be 2 tablets of the 5mg samples given to you, taken together at once, in the morning and evening. You will do this for a week, that's the amount of samples given.     3. After you finish the samples, you will start the ELIQUIS 5mg taken 2 times a day that was sent to [facility] pharmacy.

## 2020-06-03 NOTE — TELEPHONE ENCOUNTER
Pt's daughter called asking about DVT and reported pt has worsening SOB.  Advised her that DVTs are managed outpatient, but if she has worsening SOB, then she may need to be evaluated in the ER.  Mildred Pal, AmberD

## 2020-06-04 NOTE — ED PROVIDER NOTES
ED Provider Note    Scribed for Corey Alaniz M.D. by Corey Alaniz M.D.. 6/3/2020  8:32 PM    CHIEF COMPLAINT  Chief Complaint   Patient presents with   • Leg Pain     RLE DVT, had outpatient ultrasound today.        HPI  Guillermina Recio is a 80 y.o. female who presents to the Emergency Room due to right leg pain and shortness of breath.  She was started on Eliquis just today.  The patient is speaking in a fairly rapid and pressured fashion, and says that she called her doctor about some concerns after starting this medication, and was told that if she was feeling short of breath she should come to the emergency department.  She clarifies that she does not think that the shortness of breath is actually related to the DVT or PE, and that she has seasonal allergies, with a special difficulty at this time of year usually because of heavy fever, and that is why she thinks she is short of breath.  Yet, she follow the advice given on the phone, but that seems to have been confusion.  She was complaining of leg pain prior to being roomed, but again clarifies that it is because she was sitting on her leg in the waiting room, and it was becoming more painful.  She does asked me why her leg is swollen, which she seems not to understand is related to her DVT, and also expresses concern about cloudy urine.  She denies painful urination or fevers chills, nausea or vomiting or chest pain.    I reviewed her most recent anticoagulation note.  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  Dr. Montes suspects that the DVT found today is actually chronic, rather than acute.     REVIEW OF SYSTEMS  See HPI for further details.    PAST MEDICAL HISTORY   has a past medical history of Arthritis, Bowel habit changes, Breath shortness, Cataract, Chronic pain (11/2019), Dyslipidemia (8/1/2016), Emphysema of lung (HCC), GERD (gastroesophageal reflux disease), Heart burn, Hepatitis (1970's), Hiatus hernia syndrome, High  cholesterol, History of total hip replacement, Hyperlipidemia, Macular degeneration, Memory loss, OA (osteoarthritis), Psychiatric problem, S/P cataract extraction, Snoring, stomach flu (11/08/2019), Urinary incontinence, and Wears dentures.    SOCIAL HISTORY  Social History     Tobacco Use   • Smoking status: Never Smoker   • Smokeless tobacco: Never Used   Substance and Sexual Activity   • Alcohol use: Not Currently     Alcohol/week: 0.0 oz     Comment: 3 per year   • Drug use: No     Comment: In the Distant Past, but not since 32 y.o.  (prior - Pink heart,Cross beauty, crosstops - stimulants for working double shifts, but not for several decades)   • Sexual activity: Not Currently       SURGICAL HISTORY   has a past surgical history that includes other orthopedic surgery; cataract phaco with iol (2/28/2012); cataract phaco with iol (3/13/2012); tubal ligation; hysterectomy, total abdominal; blepharoplasty (Bilateral, 7/14/2015); and total hip arthroplasty (Right, 11/21/2019).    CURRENT MEDICATIONS  Home Medications     Reviewed by Sabas Smith R.N. (Registered Nurse) on 06/03/20 at 1745  Med List Status: <None>   Medication Last Dose Status   acetaminophen (TYLENOL) 650 MG CR tablet  Active   apixaban (ELIQUIS) 5mg Tab  Active   ascorbic acid (VITAMIN C) 500 MG tablet  Active   budesonide-formoterol (SYMBICORT) 160-4.5 MCG/ACT Aerosol  Active   diphenhydrAMINE (BENADRYL) 25 MG Tab  Active   ferrous sulfate 325 (65 Fe) MG tablet  Active   furosemide (LASIX) 20 MG Tab  Active   gabapentin (NEURONTIN) 300 MG Cap  Active   levoFLOXacin (LEVAQUIN) 500 MG tablet  Active   lovastatin (MEVACOR) 40 MG tablet  Active   midodrine (PROAMATINE) 5 MG Tab  Active   montelukast (SINGULAIR) 10 MG Tab  Active   Multiple Vitamins-Minerals (OCUVITE-LUTEIN) Tab  Active   omeprazole (PRILOSEC) 20 MG delayed-release capsule  Active   polyethylene glycol/lytes (MIRALAX) Pack  Active   senna-docusate (PERICOLACE OR SENOKOT S) 8.6-50  "MG Tab  Active   senna-docusate (PERICOLACE OR SENOKOT S) 8.6-50 MG Tab  Active   simethicone (MYLICON) 80 MG Chew Tab  Active   vitamin D 2000 UNIT Tab  Active                ALLERGIES  Allergies   Allergen Reactions   • Sagebrush        PHYSICAL EXAM  VITAL SIGNS: /72   Pulse 71   Temp 36.4 °C (97.5 °F) (Tympanic)   Resp 15   Ht 1.448 m (4' 9\")   Wt 68.9 kg (152 lb)   SpO2 98%   BMI 32.89 kg/m²   Pulse ox interpretation: I interpret this pulse ox as normal.  Constitutional: Alert in no apparent distress.  HENT: Normocephalic, Atraumatic, Bilateral external ears normal. Nose normal.   Eyes: Conjunctiva normal, non-icteric.   Heart: Regular rate and rythm, no murmurs.    Lungs: Clear to auscultation bilaterally.  Skin: Warm, Dry, No erythema, No rash.   Musculoskeletal: Mild swelling to the right lower extremity, consistent with DVT.  No color change.  Neurologic: Alert, Grossly non-focal.   Psychiatric: Affect normal, Judgment normal, Mood normal, Appears appropriate and not intoxicated.     DX-CHEST-LIMITED (1 VIEW)   Final Result         1.  Pulmonary edema and/or infiltrates.   2.  Cardiomegaly   3.  Retrocardiac air-fluid level, appearance favors large hiatal hernia.          Labs Reviewed   URINALYSIS,CULTURE IF INDICATED - Abnormal; Notable for the following components:       Result Value    Character Cloudy (*)     Leukocyte Esterase Trace (*)     All other components within normal limits   URINE MICROSCOPIC (W/UA) - Abnormal; Notable for the following components:    WBC 20-50 (*)     Bacteria Many (*)     All other components within normal limits   URINE CULTURE(NEW)       COURSE & MEDICAL DECISION MAKING  The patient's VS, Nurses notes reviewed. (See chart for details)    8:32 PM Patient seen and examined at bedside.  There seems to have been significant confusion about the patient's shortness of breath, though she followed directions given over the phone to come to the emergency department.  " She herself does not believe that the shortness of breath is related to anything other than her hayfever.  She has not had cough or hemoptysis.  She is not tachycardic or hypoxic or tachypneic.  I think pretest probability for PE is extremely low, and there is almost no risk of a submassive or massive PE E.  She is already on Eliquis, so I do not think a CT pulmonary angiogram is indicated.  I think a chest x-ray is a reasonable, given her report of shortness of breath.  The patient has an unrelated complaint of cloudy urine, so urinalysis will be sent.    This patient's chest x-ray shows pulmonary edema with possible infiltrate.  She needs antibiotics for her urinary tract infection.  We discussed all of this at the bedside.  I will start her on doxycycline and Macrobid.  She will continue with her outpatient appointments and other medications.  We reviewed return precautions for severe shortness of breath, or worsening/severe pain in her leg.     The patient will return for new or worsening symptoms and is stable at the time of discharge.    The patient is referred to a primary physician for blood pressure management, diabetic screening, and for all other preventative health concerns.    DISPOSITION:  Patient will be discharged home in stable condition.    FOLLOW UP:  Elizabeth Molina M.D.  [address] [id number] [phone]    Call in 1 day      [facility], Emergency Dept  [address] [facility] [address] [facility] [id number] [phone]    If symptoms worsen      OUTPATIENT MEDICATIONS:  Discharge Medication List as of 6/3/2020 10:45 PM      START taking these medications    Details   nitrofurantoin (MACROBID) 100 MG Cap Take 1 Cap by mouth 2 times a day for 7 days., Disp-14 Cap,R-0, Normal      doxycycline (MONODOX) 100 MG capsule Take 1 Cap by mouth 2 times a day for 7 days., Disp-14 Cap,R-0, Normal               FINAL IMPRESSION  1. Acute urinary tract infection

## 2020-06-04 NOTE — TELEPHONE ENCOUNTER
Initial anticoagulation clinic note and most recent discharge summary and imaging reviewed    Patient started on anticoagulation today due to DVT seen on imaging.  She had a DVT and PE in December 2019 provoked by hip injury.  It appears that she was on anticoagulation for 3 months and then it was discontinued    Based on review of her imaging my suspicion is that this is actually chronic rather than acute DVT.    The risks and benefits of extended anticoagulation in the setting are unclear given the patient's multiple medical issues    Pending further recommendations from PCP, we will continue with another 3 months of oral anticoagulation and then check back with PCP to determine whether or not it could be discontinued.  If PCP desires, we can always see her in vascular medicine clinic as well    Will defer any indicated age appropriate screening for occult malignancy to pcp.    Michael Bloch, MD  Anticoagulation Clinic    Cc:  WESLEY Molina

## 2020-06-04 NOTE — ED NOTES
Patient discharged home to self care with her daughter. All questions answered and concerns alleviated.

## 2020-06-04 NOTE — ED TRIAGE NOTES
Chief Complaint   Patient presents with   • Leg Pain     RLE DVT, had outpatient ultrasound today.      She began taking eliquis today. Explained triage process, to waiting room. Asked to inform RN if questions or concerns arise.

## 2020-06-04 NOTE — ED NOTES
Pt's family member ambulated up to the  with complaints of worsening pain and discomfort. Pt concerned over wait time, triage process was explained to patient along with wait time. Apologized for the wait time.

## 2020-06-04 NOTE — DISCHARGE INSTRUCTIONS
You do have a urinary tract infection, take the antibiotics that we have prescribed.  Continue the Eliquis that was prescribed today for your DVT.  Call to schedule follow-up with your primary care doctor, and make sure to continue your appointments with Dr. Montes at the anticoagulation clinic.  Return here for severe or worsening symptoms, instead of gradual improvement.

## 2020-06-06 LAB
BACTERIA UR CULT: ABNORMAL
BACTERIA UR CULT: ABNORMAL
SIGNIFICANT IND 70042: ABNORMAL
SITE SITE: ABNORMAL
SOURCE SOURCE: ABNORMAL

## 2020-06-07 NOTE — ED NOTES
"ED Positive Culture Follow-up/Notification Note:    Date: 6/7/2020     Patient seen in the ED on 6/3/2020 for RLE pain, SOB, cloudy urine. She was diagnosed with RLE DVT prior to visit, but pt's daughter was concerned about pt's increased SOB. CXR revealed pulmonary edema and possible infiltrate.     1. Acute urinary tract infection       Discharge Medication List as of 6/3/2020 10:45 PM      START taking these medications    Details   nitrofurantoin (MACROBID) 100 MG Cap Take 1 Cap by mouth 2 times a day for 7 days., Disp-14 Cap,R-0, Normal      doxycycline (MONODOX) 100 MG capsule Take 1 Cap by mouth 2 times a day for 7 days., Disp-14 Cap,R-0, Normal             Allergies: Sagebrush     Vitals:    06/03/20 1734 06/03/20 1743 06/03/20 2200 06/03/20 2244   BP: 114/81  128/87 120/72   Pulse: 65  86 71   Resp: 16  (!) 98 15   Temp: 36.3 °C (97.3 °F)   36.4 °C (97.5 °F)   TempSrc: Temporal   Tympanic   SpO2: 92%  96% 98%   Weight:  68.9 kg (152 lb)     Height:  1.448 m (4' 9\")         Final cultures:   Results     Procedure Component Value Units Date/Time    URINE CULTURE(NEW) [[id number]]  (Abnormal)  (Susceptibility) Collected:  06/03/20 2124    Order Status:  Completed Specimen:  Urine Updated:  06/06/20 0822     Significant Indicator POS     Source UR     Site -     Culture Result -      Klebsiella pneumoniae  >100,000 cfu/mL      Susceptibility     Klebsiella pneumoniae (1)     Antibiotic Interpretation Microscan Method Status    Ampicillin Resistant >16 mcg/mL EMEKA Final    Ceftriaxone Sensitive <=1 mcg/mL EMEKA Final    Ceftazidime Sensitive <=1 mcg/mL EMEKA Final    Cefotaxime Sensitive <=2 mcg/mL EMEKA Final    Cefazolin Sensitive <=2 mcg/mL EMEKA Final    Ciprofloxacin Sensitive <=1 mcg/mL EMEKA Final    Ampicillin/sulbactam Sensitive <=8/4 mcg/mL EMEKA Final    Cefepime Sensitive <=2 mcg/mL EMEKA Final    Tobramycin Sensitive <=4 mcg/mL EMEKA Final    Cefotetan Sensitive <=16 mcg/mL EMEKA Final    Nitrofurantoin Sensitive <=32 " mcg/mL EMEKA Final    Gentamicin Sensitive <=4 mcg/mL EMEKA Final    Levofloxacin Sensitive <=2 mcg/mL EMEKA Final    Pip/Tazobactam Sensitive <=16 mcg/mL EMEKA Final    Trimeth/Sulfa Sensitive <=2/38 mcg/mL EMEKA Final                   URINALYSIS,CULTURE IF INDICATED [[id number]]  (Abnormal) Collected:  06/03/20 2124    Order Status:  Completed Specimen:  Urine, Clean Catch Updated:  06/03/20 2211     Color Yellow     Character Cloudy     Specific Gravity 1.019     Ph 6.0     Glucose Negative mg/dL      Ketones Negative mg/dL      Protein Negative mg/dL      Bilirubin Negative     Urobilinogen, Urine 0.2     Nitrite Negative     Leukocyte Esterase Trace     Occult Blood Negative     Micro Urine Req Microscopic          Plan:   Appropriate antibiotic therapy prescribed. No changes required based upon culture result.  Sent letter to patient to notify of positive culture result and encourage compliance with prescribed antibiotics.     Mildred Pal, AmberD

## 2020-06-10 ENCOUNTER — ANTICOAGULATION VISIT (OUTPATIENT)
Dept: VASCULAR LAB | Facility: MEDICAL CENTER | Age: 80
End: 2020-06-10
Attending: INTERNAL MEDICINE
Payer: MEDICARE

## 2020-06-10 DIAGNOSIS — I82.90 DEEP VEIN THROMBOSIS (DVT) OF NON-EXTREMITY VEIN, UNSPECIFIED CHRONICITY: ICD-10-CM

## 2020-06-10 PROCEDURE — 99211 OFF/OP EST MAY X REQ PHY/QHP: CPT | Performed by: PHARMACIST

## 2020-06-10 NOTE — PROGRESS NOTES
Target end date: 9/3/20     Indication: VTE     Drug: Eliquis    Health Status Since Last Assessment   Patient denies any new relevant medical problems, ED visits or hospitalizations   Patient denies any embolic events (stroke/tia/systemic embolism)    Adherence with DOAC Therapy   Pt has NOT missed any doses in the average week    Bleeding Risk Assessment     Denies - Epistaxis   Pt denies any excessive or unusual bleeding/hematomas.  Pt denies any GI bleeds or hematemesis.  Pt denies any concerning daily headache or sub dural hematoma symptoms.     Pt denies any hematuria or abnormal vaginal bleeding.   ETOH overuse denies  Results for ERIN JADE (MRN [id number]) as of 6/10/2020 16:07   Ref. Range 1/12/2020 10:25   Hemoglobin Latest Ref Range: 12.0 - 16.0 g/dL 11.6 (L)   Hematocrit Latest Ref Range: 37.0 - 47.0 % 39.8     Results for ERIN JADE (MRN [id number]) as of 6/10/2020 16:07   Ref. Range 1/12/2020 10:25   Platelet Count Latest Ref Range: 164 - 446 K/uL 267        Creatinine Clearance/Renal Function     Results for ERIN JADE (MRN [id number]) as of 6/10/2020 16:07   Ref. Range 1/12/2020 10:25   Creatinine Latest Ref Range: 0.50 - 1.40 mg/dL 0.86   GFR If  Latest Ref Range: >60 mL/min/1.73 m 2 >60   GFR If Non  Latest Ref Range: >60 mL/min/1.73 m 2 >60       Hepatic function   Results for ERIN JADE (MRN [id number]) as of 6/10/2020 16:07   Ref. Range 12/29/2019 05:34   AST(SGOT) Latest Ref Range: 12 - 45 U/L 25   ALT(SGPT) Latest Ref Range: 2 - 50 U/L 15   Alkaline Phosphatase Latest Ref Range: 30 - 99 U/L 69   Total Bilirubin Latest Ref Range: 0.1 - 1.5 mg/dL 0.5      Pt denies any history of liver dysfunction      Drug Interactions   ASA/other antiplatelets none   NSAID none   Other drug interactions none    Verified no anticonvulsant or azole therapy, education provided for future use.       Final Assessment and  Recommendations:   Patient appears stable from the anticoagulation staindpoint.     Benefits of continued DOAC therapy outweigh risks for this patient   Recommend pt continue with current DOAC therapy     Unfortunately pt's daughter passed away this week, so pt was seen with other daughter. Pt already picked up the Eliquis 5mg from [facility] and will be transitioning to 5mg BID starting tomorrow morning. Denies any issues at this time, has PCP next week, which daughter is attending with her     Other Actions: cmp/ cbc hemogram ordered prior to next visit    Follow up:   Will follow up with patient 1  Month.    Joceline King, PharmD

## 2020-07-10 ENCOUNTER — ANTICOAGULATION VISIT (OUTPATIENT)
Dept: VASCULAR LAB | Facility: MEDICAL CENTER | Age: 80
End: 2020-07-10
Attending: INTERNAL MEDICINE
Payer: MEDICARE

## 2020-07-10 VITALS — DIASTOLIC BLOOD PRESSURE: 69 MMHG | SYSTOLIC BLOOD PRESSURE: 124 MMHG | OXYGEN SATURATION: 93 % | HEART RATE: 75 BPM

## 2020-07-10 DIAGNOSIS — I82.90 DEEP VEIN THROMBOSIS (DVT) OF NON-EXTREMITY VEIN, UNSPECIFIED CHRONICITY: ICD-10-CM

## 2020-07-10 PROCEDURE — 99212 OFFICE O/P EST SF 10 MIN: CPT | Performed by: PHARMACIST

## 2020-07-10 NOTE — PROGRESS NOTES
Target end date:9/3/20     Indication: DVT      Drug: Eliquis 5 mg BID         Health Status Since Last Assessment   Patient denies any new relevant medical problems, ED visits or hospitalizations   Patient denies any embolic events (stroke/tia/systemic embolism)    Adherence with DOAC Therapy   Pt has not missed any doses in the average week    Bleeding Risk Assessment     Denies Epistaxis   Pt denies any excessive or unusual bleeding/hematomas.  Pt denies any GI bleeds or hematemesis.  Pt denies any concerning daily headache or sub dural hematoma symptoms.        Latest Hemoglobin 11.6   ETOH overuse No     Creatinine Clearance/Renal Function     Latest ClCr WNL    Hepatic function   Latest LFTs WNL   Pt denies any history of liver dysfunction      Drug Interactions   Platelets: 267   ASA/other antiplatelets NA   NSAID NA   Other drug interactions NA   Have Verified no anticonvulsant or azole therapy, education provided for future use.     Examination   Blood Pressure 124/69   Symptomatic hypotension No   Significant gait impairment/imbalance/high risk for falls? Uses matute, visually impaired    Final Assessment and Recommendations:   Patient appears stable from the anticoagulation staindpoint.     Benefits of continued DOAC therapy outweigh risks for this patient   Recommend pt continue with current DOAC therapy       Follow up:   Will follow up with patient 2  Months for potential end of therapy.     Huan Bee, PharmD

## 2020-09-04 ENCOUNTER — ANTICOAGULATION VISIT (OUTPATIENT)
Dept: VASCULAR LAB | Facility: MEDICAL CENTER | Age: 80
End: 2020-09-04
Attending: INTERNAL MEDICINE
Payer: MEDICARE

## 2020-09-04 DIAGNOSIS — I82.90 DEEP VEIN THROMBOSIS (DVT) OF NON-EXTREMITY VEIN, UNSPECIFIED CHRONICITY: ICD-10-CM

## 2020-09-04 PROCEDURE — 99211 OFF/OP EST MAY X REQ PHY/QHP: CPT

## 2020-09-04 NOTE — PROGRESS NOTES
Target end date:TBD    Indication: DVT    Drug: Eliquis 5mg BID    Health Status Since Last Assessment  Patient denies any new relevant medical problems, ED visits or hospitalizations  Patient denies any embolic events (stroke/tia/systemic embolism)  none    Adherence with DOAC Therapy   Pt has not missed any doses in the average week    Bleeding Risk Assessment     Denies Epistaxis   Pt denies any excessive or unusual bleeding/hematomas.  Pt denies any GI bleeds or hematemesis.  Pt denies any concerning daily headache or sub dural hematoma symptoms.     Pt denies any hematuria or abnormal vaginal bleeding.   Latest Hemoglobin 11.6   ETOH overuse none     Creatinine Clearance/Renal Function     Latest ClCr WNL    Hepatic function   Latest LFTs WNL   Pt denies any history of liver dysfunction      Drug Interactions   Platelets: 267   ASA/other antiplatelets N/A   NSAID N/A   Other drug interactions N/A  Have Verified no anticonvulsant or azole therapy, education provided for future use.     Examination   Blood Pressure 112/64   Symptomatic hypotension No   Significant gait impairment/imbalance/high risk for falls? Uses matute, visually  impaired    Final Assessment and Recommendations:   Patient appears stable from the anticoagulation standpoint.     Benefits of continued DOAC therapy outweigh risks for this patient   Recommend pt continue with current DOAC therapy       Follow up:  Will follow up with patient in 3 months pending PCP appointment  to determine LOT.  Order put in for pt to obtain labs (CBC and CMP)      Dena White, AmberD

## 2020-09-21 ENCOUNTER — HOSPITAL ENCOUNTER (OUTPATIENT)
Dept: LAB | Facility: MEDICAL CENTER | Age: 80
End: 2020-09-21
Attending: NURSE PRACTITIONER
Payer: MEDICARE

## 2020-09-21 ENCOUNTER — HOSPITAL ENCOUNTER (OUTPATIENT)
Dept: LAB | Facility: MEDICAL CENTER | Age: 80
End: 2020-09-21
Attending: INTERNAL MEDICINE
Payer: MEDICARE

## 2020-09-21 DIAGNOSIS — I82.90 DEEP VEIN THROMBOSIS (DVT) OF NON-EXTREMITY VEIN, UNSPECIFIED CHRONICITY: ICD-10-CM

## 2020-09-26 NOTE — OR NURSING
Spoke to Rhonda  At Dr Delgado's office regarding pt reporting:    Recent stomach flu 11/8/19    Diarrhea with incontinence    Possible yeast infection in lower abdominal skin folds    H/O bed bugs, per patient none since Aug/Sept    Pt stated using nasal Mupirocin about every 2 hours, Rhonda said that she would contact patient and give correct instructions    Rhonda stated that Dr Delgado will be requesting a surgical clearance from the patient's PCP             Shoulder Immobilizer

## 2020-09-28 ENCOUNTER — HOSPITAL ENCOUNTER (OUTPATIENT)
Dept: LAB | Facility: MEDICAL CENTER | Age: 80
End: 2020-09-28
Attending: NURSE PRACTITIONER
Payer: MEDICARE

## 2020-09-28 ENCOUNTER — HOSPITAL ENCOUNTER (OUTPATIENT)
Dept: LAB | Facility: MEDICAL CENTER | Age: 80
End: 2020-09-28
Attending: INTERNAL MEDICINE
Payer: MEDICARE

## 2020-09-28 LAB
25(OH)D3 SERPL-MCNC: 44 NG/ML (ref 30–100)
ALBUMIN SERPL BCP-MCNC: 3.8 G/DL (ref 3.2–4.9)
ALBUMIN/GLOB SERPL: 1.5 G/DL
ALP SERPL-CCNC: 77 U/L (ref 30–99)
ALT SERPL-CCNC: 14 U/L (ref 2–50)
ANION GAP SERPL CALC-SCNC: 13 MMOL/L (ref 7–16)
AST SERPL-CCNC: 26 U/L (ref 12–45)
BILIRUB SERPL-MCNC: 0.3 MG/DL (ref 0.1–1.5)
BUN SERPL-MCNC: 24 MG/DL (ref 8–22)
CALCIUM SERPL-MCNC: 9.7 MG/DL (ref 8.5–10.5)
CHLORIDE SERPL-SCNC: 103 MMOL/L (ref 96–112)
CHOLEST SERPL-MCNC: 194 MG/DL (ref 100–199)
CO2 SERPL-SCNC: 24 MMOL/L (ref 20–33)
CREAT SERPL-MCNC: 0.99 MG/DL (ref 0.5–1.4)
EST. AVERAGE GLUCOSE BLD GHB EST-MCNC: 131 MG/DL
FASTING STATUS PATIENT QL REPORTED: NORMAL
FASTING STATUS PATIENT QL REPORTED: NORMAL
FOLATE SERPL-MCNC: >40 NG/ML
GLOBULIN SER CALC-MCNC: 2.5 G/DL (ref 1.9–3.5)
GLUCOSE SERPL-MCNC: 101 MG/DL (ref 65–99)
HBA1C MFR BLD: 6.2 % (ref 0–5.6)
HDLC SERPL-MCNC: 48 MG/DL
LDLC SERPL CALC-MCNC: 108 MG/DL
POTASSIUM SERPL-SCNC: 4.5 MMOL/L (ref 3.6–5.5)
PROT SERPL-MCNC: 6.3 G/DL (ref 6–8.2)
SODIUM SERPL-SCNC: 140 MMOL/L (ref 135–145)
TRIGL SERPL-MCNC: 192 MG/DL (ref 0–149)
TSH SERPL DL<=0.005 MIU/L-ACNC: 3.71 UIU/ML (ref 0.38–5.33)
VIT B12 SERPL-MCNC: 944 PG/ML (ref 211–911)

## 2020-09-28 PROCEDURE — 82746 ASSAY OF FOLIC ACID SERUM: CPT

## 2020-09-28 PROCEDURE — 82607 VITAMIN B-12: CPT

## 2020-09-28 PROCEDURE — 80053 COMPREHEN METABOLIC PANEL: CPT

## 2020-09-28 PROCEDURE — 36415 COLL VENOUS BLD VENIPUNCTURE: CPT

## 2020-09-28 PROCEDURE — 83036 HEMOGLOBIN GLYCOSYLATED A1C: CPT

## 2020-09-28 PROCEDURE — 82306 VITAMIN D 25 HYDROXY: CPT

## 2020-09-28 PROCEDURE — 80061 LIPID PANEL: CPT

## 2020-09-28 PROCEDURE — 84443 ASSAY THYROID STIM HORMONE: CPT

## 2020-10-05 ENCOUNTER — ANTICOAGULATION VISIT (OUTPATIENT)
Dept: VASCULAR LAB | Facility: MEDICAL CENTER | Age: 80
End: 2020-10-05
Attending: INTERNAL MEDICINE
Payer: MEDICARE

## 2020-10-05 VITALS — HEART RATE: 63 BPM | DIASTOLIC BLOOD PRESSURE: 75 MMHG | SYSTOLIC BLOOD PRESSURE: 114 MMHG

## 2020-10-05 DIAGNOSIS — I82.90 DEEP VEIN THROMBOSIS (DVT) OF NON-EXTREMITY VEIN, UNSPECIFIED CHRONICITY: ICD-10-CM

## 2020-10-05 PROCEDURE — 99212 OFFICE O/P EST SF 10 MIN: CPT | Performed by: NURSE PRACTITIONER

## 2020-10-05 PROCEDURE — 99213 OFFICE O/P EST LOW 20 MIN: CPT | Performed by: NURSE PRACTITIONER

## 2020-10-05 RX ORDER — ALBUTEROL SULFATE 90 UG/1
1-2 AEROSOL, METERED RESPIRATORY (INHALATION) EVERY 4 HOURS PRN
COMMUNITY

## 2020-10-05 RX ORDER — BIOTIN 10 MG
TABLET ORAL
COMMUNITY

## 2020-10-05 RX ORDER — KETOCONAZOLE 20 MG/G
CREAM TOPICAL DAILY
COMMUNITY
End: 2022-06-08

## 2020-10-05 RX ORDER — HYDROCODONE BITARTRATE AND ACETAMINOPHEN 5; 325 MG/1; MG/1
1 TABLET ORAL EVERY 4 HOURS PRN
COMMUNITY
End: 2023-09-06

## 2020-10-05 ASSESSMENT — ENCOUNTER SYMPTOMS
LOSS OF CONSCIOUSNESS: 0
SHORTNESS OF BREATH: 0
FALLS: 0
HEMOPTYSIS: 0
FEVER: 0
BLOOD IN STOOL: 0
HEADACHES: 0
MYALGIAS: 0
DIZZINESS: 0
BRUISES/BLEEDS EASILY: 0

## 2020-10-05 NOTE — PROGRESS NOTES
"VASCULAR ANTI-COAGULATION VISIT  Subjective:   Guillermina Recio is a 80 y.o. female who presents today 10/5/2020 for   No chief complaint on file.      HPI: Patient here for evaluation of length of therapy in regards to her anticoagulation therapy.  Pt had DVT in June 2020 - \" Positive sub-acute occlusive thrombus in the distal common femoral vein into the proximal popliteal vein of the left lower extremity.\"  Patient and her caregiver say with 100% certainty that leg swelling and duplex were to her RIGHT leg even though US results indicate LEFT leg. Note from ER and 1st anticoag visit mention RIGHT LE swelling.  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 Hx of right THR in 11/21/19, then closed reduction of R hip dislocation after a fall while in physical therapy 12/19/19.  Was on Eliquis for 3 months then stopped in March.  No recent provoking factors: recent sx, injuries or extended travel.  No HRT use  Doesn't smoke or use tobacco.  No hx of cancer.   Currently taking Eliquis 5 mg BID; she can afford.  No current leg swelling or pain.  No SOB or chest pain.  No problems with bleeding or excessive bruising.  Has completed 3 months of therapy since restarting Eliquis.      Social History     Tobacco Use   • Smoking status: Never Smoker   • Smokeless tobacco: Never Used   Substance Use Topics   • Alcohol use: Not Currently     Alcohol/week: 0.0 oz     Comment: 3 per year   • Drug use: No     Comment: In the Distant Past, but not since 32 y.o.  (prior - [employer], [employer]stops - stimulants for working double shifts, but not for several decades)     DIET AND EXERCISE:  Weight Change:none  Diet: common adult  Exercise: no regular exercise program     Review of Systems   Constitutional: Negative for fever.   HENT: Negative for nosebleeds.    Respiratory: Negative for hemoptysis and shortness of breath.    Cardiovascular: Negative for leg swelling.   Gastrointestinal: Negative for blood in stool and " melena.   Genitourinary: Negative for hematuria.   Musculoskeletal: Negative for falls and myalgias.   Neurological: Negative for dizziness, loss of consciousness and headaches.   Endo/Heme/Allergies: Does not bruise/bleed easily.      Objective:     Vitals:    10/05/20 1506   BP: 114/75   Pulse: 63     There is no height or weight on file to calculate BMI.  Physical Exam  Constitutional:       Appearance: Normal appearance. She is obese.   Cardiovascular:      Rate and Rhythm: Normal rate and regular rhythm.      Pulses: Normal pulses.      Heart sounds: Normal heart sounds.   Pulmonary:      Effort: Pulmonary effort is normal.      Breath sounds: Normal breath sounds.   Abdominal:      General: Bowel sounds are normal.      Palpations: Abdomen is soft.   Musculoskeletal: Normal range of motion.   Skin:     General: Skin is warm and dry.   Neurological:      Mental Status: She is alert and oriented to person, place, and time.   Psychiatric:         Mood and Affect: Mood normal.         Behavior: Behavior normal.         Thought Content: Thought content normal.         Judgment: Judgment normal.       Lab Results   Component Value Date    CHOLSTRLTOT 194 09/28/2020     (H) 09/28/2020    HDL 48 09/28/2020    TRIGLYCERIDE 192 (H) 09/28/2020      Lab Results   Component Value Date    PROTHROMBTM 13.6 12/12/2019    INR 1.03 12/12/2019       Lab Results   Component Value Date    HBA1C 6.2 (H) 09/28/2020      Lab Results   Component Value Date    SODIUM 140 09/28/2020    POTASSIUM 4.5 09/28/2020    CHLORIDE 103 09/28/2020    CO2 24 09/28/2020    GLUCOSE 101 (H) 09/28/2020    BUN 24 (H) 09/28/2020    CREATININE 0.99 09/28/2020        Lab Results   Component Value Date    WBC 4.1 (L) 01/12/2020    RBC 4.57 01/12/2020    HEMOGLOBIN 11.6 (L) 01/12/2020    HEMATOCRIT 39.8 01/12/2020    MCV 87.1 01/12/2020    MCH 25.4 (L) 01/12/2020    MCHC 29.1 (L) 01/12/2020    MPV 8.7 (L) 01/12/2020      6/3/20 LLE US   FINDINGS:    Left lower extremity -   Thrombus, sub-acute & occlusive, visualized from the distal common femoral    vein into the femoral vein and proximal popliteal vein.   The peroneal and posterior tibial veins are difficult to assess for    compressibility, but flow response to augmentation is demonstrated.     12/16/20 LE US   FINDINGS:   Right lower extremity -   Difficult to compress vessels due to patient pain level.   Complete color filling with normal venous flow dynamics including    spontaneous flow and respiratory phasicity.    No deep venous thrombosis.    Peroneal veins are unable to be seen due to patient's leg position.   Interstitial fluid consistent with edema is observed in the thigh and below    the knee.    Left lower extremity -   An acute to subacute non occlusive thrombus is seen throughout leg from    common femoral vein through superficial femoral, popliteal vein, extending    into posterior tibial and peroneal veins.   The greater saphenous and profunda femoral vein appear free from thrombus.   Interstitial fluid consistent with edema is observed in the thigh and below    the knee.     12/14/19 CTA  IMPRESSION:   1.  Bilateral pulmonary emboli are identified involving the main pulmonary arteries and more peripheral arteries as listed above.     2.  Abnormally elevated RV LV ratio consistent with right heart strain.     3.  Large hiatal hernia extends in the left hemithorax.     4.  Probable consolidative atelectasis in the left lung base.    Medical Decision Making:  Today's Assessment / Status / Plan:     1. Deep vein thrombosis (DVT) of non-extremity vein, unspecified chronicity  CBC WITHOUT DIFFERENTIAL    Comp Metabolic Panel     Indication for anticoagulation: Questionable RLE DVT vs LLE DVT    Anti-Platelet/Anti-Coagulant Tx: Eliquis 5 mg BID    Anti-Coagulation Plan: Pt with hx of LLE DVT and PE in Dec 2019, provoked by hip surgery. Now with DVT to possibly RLE though chart says LLE. If DVT was  in RLE then this is a new occurrence. If it was in the LLE then possibly chronic clot and not a new finding.    Long discussion with patient and her caregiver regarding the risks and benefits of stopping or continuing anticoagulation in this setting. I discussed the differences between an acute and chronic DVT and discussed the option of a kerline LE duplex. I let her know that the ACCP guidelines recommend indefinite therapy for recurrent VTEs and that her risk of recurrence can by 25% in 5 years assuming the DVT was actually in her RLE. Her risk of bleeding is 3.4% yearly. We talked about the option of a hypercoag w/u but given her age, not likely that she has a predisposing genetic factor for increased VTE risk/    After much discussion, the patient prefers to stay on Eliquis as she feels she had a 2nd VTE occurrence.     Smoking: continued complete abstinence    Physical Activity: frequency : goal is walking 3-4 times a week    Weight Management and Nutrition:exercise counseling and nutrition counseling    Instructed to follow-up with PCP for remainder of adult medical needs: yes  We will partner with other provider in the management of established vascular disease and cardiometabolic risk factors    Studies to Be Obtained: none  Labs to Be Obtained: CBC and CMP in 6 months    Follow up in: 6 months    JOSTIN Alvarez.    CC:   Lucy M Potter, M.D. Bloch, Jaret TOBAR M.D.      .jamie

## 2020-10-05 NOTE — Clinical Note
Hi Dr Bloch,  For this LOT patient, she is sure her US and DVT in June were in her RLE even though duplex says LLE. She had kerline PEs and LLE DVT in Dec 2019. I don't actually know if she had a DVT in her RLE.  I offered to re-scan legs but she wants to stay on indefinite therapy so I didn't order.  Sound okay? This was such a weird visit!

## 2020-10-06 NOTE — PATIENT INSTRUCTIONS
- Continue taking Eliquis 5 mg by mouth twice daily  -- go to the ER for shortness of breath, chest pain, pain with deep inhalation, worsening leg swelling and/or pain in calf or leg   - avoid sedentary periods  - let all your providers know you take this medication  - don't stop this medication without permission from your doctor or our clinic  -  refills before you run out  - continue complete avoidance of tobacco products  - to avoid Aspirin and anti-inflammatories (eg. Advil, ibuprofen, Aleve, naproxen, etc) while anticoagulated   - Avoid skiing or other dangerous activities to reduce risk of head injury and brain bleeds  - elevate legs as much as possible, use compression stockings/socks if directed by your provider  - if any bleeding lasting 30min without stopping, please seek care with your PCP, urgent care, or ED  - if having any invasive procedure, please make sure the doctor knows of your history of blood clots and current anticoagulation status  - avoid taking any hormones  - let us know of any medication changes  - be aware that as a small % of blood clots may be caused by an underlying malignancy

## 2020-11-13 DIAGNOSIS — I82.90 DEEP VEIN THROMBOSIS (DVT) OF NON-EXTREMITY VEIN, UNSPECIFIED CHRONICITY: ICD-10-CM

## 2020-12-18 ENCOUNTER — HOSPITAL ENCOUNTER (OUTPATIENT)
Dept: CARDIOLOGY | Facility: MEDICAL CENTER | Age: 80
End: 2020-12-18
Attending: INTERNAL MEDICINE
Payer: MEDICARE

## 2020-12-18 DIAGNOSIS — I25.119 CORONARY ARTERY DISEASE WITH ANGINA PECTORIS, UNSPECIFIED VESSEL OR LESION TYPE, UNSPECIFIED WHETHER NATIVE OR TRANSPLANTED HEART (HCC): ICD-10-CM

## 2020-12-18 LAB
LV EJECT FRACT  99904: 65
LV EJECT FRACT MOD 2C 99903: 72.71
LV EJECT FRACT MOD 4C 99902: 58.65
LV EJECT FRACT MOD BP 99901: 67.81

## 2020-12-18 PROCEDURE — 93306 TTE W/DOPPLER COMPLETE: CPT | Mod: 26 | Performed by: INTERNAL MEDICINE

## 2020-12-18 PROCEDURE — 93306 TTE W/DOPPLER COMPLETE: CPT

## 2021-01-11 DIAGNOSIS — Z23 NEED FOR VACCINATION: ICD-10-CM

## 2021-01-26 ENCOUNTER — HOSPITAL ENCOUNTER (OUTPATIENT)
Dept: LAB | Facility: MEDICAL CENTER | Age: 81
End: 2021-01-26
Attending: NURSE PRACTITIONER
Payer: MEDICARE

## 2021-01-26 ENCOUNTER — HOSPITAL ENCOUNTER (OUTPATIENT)
Dept: LAB | Facility: MEDICAL CENTER | Age: 81
End: 2021-01-26
Attending: STUDENT IN AN ORGANIZED HEALTH CARE EDUCATION/TRAINING PROGRAM
Payer: MEDICARE

## 2021-01-26 DIAGNOSIS — I82.90 DEEP VEIN THROMBOSIS (DVT) OF NON-EXTREMITY VEIN, UNSPECIFIED CHRONICITY: ICD-10-CM

## 2021-01-26 LAB
ALBUMIN SERPL BCP-MCNC: 4.3 G/DL (ref 3.2–4.9)
ALBUMIN SERPL BCP-MCNC: 4.4 G/DL (ref 3.2–4.9)
ALBUMIN/GLOB SERPL: 1.5 G/DL
ALBUMIN/GLOB SERPL: 1.6 G/DL
ALP SERPL-CCNC: 68 U/L (ref 30–99)
ALP SERPL-CCNC: 69 U/L (ref 30–99)
ALT SERPL-CCNC: 13 U/L (ref 2–50)
ALT SERPL-CCNC: 17 U/L (ref 2–50)
ANION GAP SERPL CALC-SCNC: 10 MMOL/L (ref 7–16)
ANION GAP SERPL CALC-SCNC: 8 MMOL/L (ref 7–16)
AST SERPL-CCNC: 18 U/L (ref 12–45)
AST SERPL-CCNC: 20 U/L (ref 12–45)
BASOPHILS # BLD AUTO: 0 % (ref 0–1.8)
BASOPHILS # BLD: 0 K/UL (ref 0–0.12)
BILIRUB SERPL-MCNC: 0.3 MG/DL (ref 0.1–1.5)
BILIRUB SERPL-MCNC: 0.3 MG/DL (ref 0.1–1.5)
BUN SERPL-MCNC: 29 MG/DL (ref 8–22)
BUN SERPL-MCNC: 30 MG/DL (ref 8–22)
CALCIUM SERPL-MCNC: 9.7 MG/DL (ref 8.5–10.5)
CALCIUM SERPL-MCNC: 9.7 MG/DL (ref 8.5–10.5)
CHLORIDE SERPL-SCNC: 110 MMOL/L (ref 96–112)
CHLORIDE SERPL-SCNC: 110 MMOL/L (ref 96–112)
CO2 SERPL-SCNC: 21 MMOL/L (ref 20–33)
CO2 SERPL-SCNC: 23 MMOL/L (ref 20–33)
CREAT SERPL-MCNC: 0.77 MG/DL (ref 0.5–1.4)
CREAT SERPL-MCNC: 0.79 MG/DL (ref 0.5–1.4)
EOSINOPHIL # BLD AUTO: 0.01 K/UL (ref 0–0.51)
EOSINOPHIL NFR BLD: 0.2 % (ref 0–6.9)
ERYTHROCYTE [DISTWIDTH] IN BLOOD BY AUTOMATED COUNT: 55.4 FL (ref 35.9–50)
ERYTHROCYTE [DISTWIDTH] IN BLOOD BY AUTOMATED COUNT: 55.5 FL (ref 35.9–50)
FASTING STATUS PATIENT QL REPORTED: NORMAL
FASTING STATUS PATIENT QL REPORTED: NORMAL
GLOBULIN SER CALC-MCNC: 2.7 G/DL (ref 1.9–3.5)
GLOBULIN SER CALC-MCNC: 2.8 G/DL (ref 1.9–3.5)
GLUCOSE SERPL-MCNC: 100 MG/DL (ref 65–99)
GLUCOSE SERPL-MCNC: 99 MG/DL (ref 65–99)
HCT VFR BLD AUTO: 46.2 % (ref 37–47)
HCT VFR BLD AUTO: 46.9 % (ref 37–47)
HGB BLD-MCNC: 14.1 G/DL (ref 12–16)
HGB BLD-MCNC: 14.2 G/DL (ref 12–16)
IMM GRANULOCYTES # BLD AUTO: 0.02 K/UL (ref 0–0.11)
IMM GRANULOCYTES NFR BLD AUTO: 0.5 % (ref 0–0.9)
LYMPHOCYTES # BLD AUTO: 1.11 K/UL (ref 1–4.8)
LYMPHOCYTES NFR BLD: 25 % (ref 22–41)
MCH RBC QN AUTO: 25.4 PG (ref 27–33)
MCH RBC QN AUTO: 26 PG (ref 27–33)
MCHC RBC AUTO-ENTMCNC: 30.1 G/DL (ref 33.6–35)
MCHC RBC AUTO-ENTMCNC: 30.7 G/DL (ref 33.6–35)
MCV RBC AUTO: 84.5 FL (ref 81.4–97.8)
MCV RBC AUTO: 84.6 FL (ref 81.4–97.8)
MONOCYTES # BLD AUTO: 0.59 K/UL (ref 0–0.85)
MONOCYTES NFR BLD AUTO: 13.3 % (ref 0–13.4)
NEUTROPHILS # BLD AUTO: 2.71 K/UL (ref 2–7.15)
NEUTROPHILS NFR BLD: 61 % (ref 44–72)
NRBC # BLD AUTO: 0 K/UL
NRBC BLD-RTO: 0 /100 WBC
PLATELET # BLD AUTO: 275 K/UL (ref 164–446)
PLATELET # BLD AUTO: 275 K/UL (ref 164–446)
PMV BLD AUTO: 10 FL (ref 9–12.9)
PMV BLD AUTO: 10.3 FL (ref 9–12.9)
POTASSIUM SERPL-SCNC: 4.5 MMOL/L (ref 3.6–5.5)
POTASSIUM SERPL-SCNC: 4.5 MMOL/L (ref 3.6–5.5)
PROT SERPL-MCNC: 7.1 G/DL (ref 6–8.2)
PROT SERPL-MCNC: 7.1 G/DL (ref 6–8.2)
RBC # BLD AUTO: 5.46 M/UL (ref 4.2–5.4)
RBC # BLD AUTO: 5.55 M/UL (ref 4.2–5.4)
SODIUM SERPL-SCNC: 141 MMOL/L (ref 135–145)
SODIUM SERPL-SCNC: 141 MMOL/L (ref 135–145)
WBC # BLD AUTO: 4.4 K/UL (ref 4.8–10.8)
WBC # BLD AUTO: 4.5 K/UL (ref 4.8–10.8)

## 2021-01-26 PROCEDURE — 80053 COMPREHEN METABOLIC PANEL: CPT

## 2021-01-26 PROCEDURE — 36415 COLL VENOUS BLD VENIPUNCTURE: CPT

## 2021-01-26 PROCEDURE — 85027 COMPLETE CBC AUTOMATED: CPT

## 2021-01-26 PROCEDURE — 85025 COMPLETE CBC W/AUTO DIFF WBC: CPT

## 2021-01-26 PROCEDURE — 80053 COMPREHEN METABOLIC PANEL: CPT | Mod: 91

## 2021-04-05 ENCOUNTER — ANTICOAGULATION VISIT (OUTPATIENT)
Dept: VASCULAR LAB | Facility: MEDICAL CENTER | Age: 81
End: 2021-04-05
Attending: INTERNAL MEDICINE
Payer: MEDICARE

## 2021-04-05 VITALS — HEART RATE: 73 BPM | SYSTOLIC BLOOD PRESSURE: 126 MMHG | DIASTOLIC BLOOD PRESSURE: 74 MMHG

## 2021-04-05 DIAGNOSIS — I82.90 DEEP VEIN THROMBOSIS (DVT) OF NON-EXTREMITY VEIN, UNSPECIFIED CHRONICITY: ICD-10-CM

## 2021-04-05 PROCEDURE — 99212 OFFICE O/P EST SF 10 MIN: CPT | Performed by: NURSE PRACTITIONER

## 2021-04-05 NOTE — PROGRESS NOTES
Diagnosis: PE + DVT  Drug: Eliquis 5 mg BID  LOT:  Indefinite  CHADSVASC: n/a  HAS-BLED: 1 (age)    Health Status Since Last Assessment  She presents with her caregiver. Doing well on Eliquis. No problems with bleeding or excessive bruising. No s/sx of recurrent VTE. She can afford. No upcoming procedures.    Adherence with DOAC Therapy  0 missed dose(s)  BLEEDING RISK ASSESSMENT NB:    Bleeding Risk Assessment  Severe epistaxis - no   Hemoptysis - no  Excessive or unusual bruising / hematomas - no  GIB/melena/BRBPR/hematemesis - no  Hematuria - no   Abnormal vaginal bleeding - no  Concerning daily headache or subdural hematoma symptoms - no  Decreasing hemoglobin or new anemia - no  Falls, presyncope, syncope, or seizures - none  Platelets: 275  Latest hemoglobin:     Lab Results   Component Value Date/Time    WBC 4.5 (L) 01/26/2021 11:54 AM    WBC 4.4 (L) 01/26/2021 11:54 AM    RBC 5.46 (H) 01/26/2021 11:54 AM    RBC 5.55 (H) 01/26/2021 11:54 AM    HEMOGLOBIN 14.2 01/26/2021 11:54 AM    HEMOGLOBIN 14.1 01/26/2021 11:54 AM    HEMATOCRIT 46.2 01/26/2021 11:54 AM    HEMATOCRIT 46.9 01/26/2021 11:54 AM    MCV 84.6 01/26/2021 11:54 AM    MCV 84.5 01/26/2021 11:54 AM    MCH 26.0 (L) 01/26/2021 11:54 AM    MCH 25.4 (L) 01/26/2021 11:54 AM    MCHC 30.7 (L) 01/26/2021 11:54 AM    MCHC 30.1 (L) 01/26/2021 11:54 AM    MPV 10.0 01/26/2021 11:54 AM    MPV 10.3 01/26/2021 11:54 AM    NEUTSPOLYS 61.00 01/26/2021 11:54 AM    LYMPHOCYTES 25.00 01/26/2021 11:54 AM    MONOCYTES 13.30 01/26/2021 11:54 AM    EOSINOPHILS 0.20 01/26/2021 11:54 AM    BASOPHILS 0.00 01/26/2021 11:54 AM    HYPOCHROMIA 1+ 01/07/2020 05:54 AM    ANISOCYTOSIS 1+ 01/11/2020 05:15 AM        HAS-BLED:  Hypertension (uncontrolled, >160 mmHg systolic) - no  Renal disease (dialysis, transplant, Cr >2.26 mg/dL or >200 µmol/L) - no  Liver disease (cirrhosis or bilirubin >2x normal with AST/ALT/AP >3x normal) - no  Stroke history - no  Prior major bleeding or  predisposition to bleeding - no  Labile INR Unstable/high INRs, time in therapeutic range <60% - no  Age >65 - yes  Medication usage predisposing to bleeding (aspirin, clopidogrel, NSAIDs) - no  Alcohol use  ?8 drinks/week - no      Creatinine Clearance/Renal Function  Latest eGFR / creatinine:  Lab Results   Component Value Date/Time    SODIUM 141 01/26/2021 11:54 AM    SODIUM 141 01/26/2021 11:54 AM    POTASSIUM 4.5 01/26/2021 11:54 AM    POTASSIUM 4.5 01/26/2021 11:54 AM    CHLORIDE 110 01/26/2021 11:54 AM    CHLORIDE 110 01/26/2021 11:54 AM    CO2 23 01/26/2021 11:54 AM    CO2 21 01/26/2021 11:54 AM    GLUCOSE 99 01/26/2021 11:54 AM    GLUCOSE 100 (H) 01/26/2021 11:54 AM    BUN 29 (H) 01/26/2021 11:54 AM    BUN 30 (H) 01/26/2021 11:54 AM    CREATININE 0.77 01/26/2021 11:54 AM    CREATININE 0.79 01/26/2021 11:54 AM    CREATININE 0.9 01/16/2009 11:34 AM      • Is eGFR less than 50ml/min  - no  Cr cl ~58 ml/min    If YES, calculate CrCl (see back)  Any recent dehydrating illness or medications added/changed? i.e. Diuretics      Drug Interactions  ASA/antiplatelets - avoids  NSAID - avoids  Other drug interactions -  (Review med list / OTCs;)  Current Outpatient Medications on File Prior to Visit   Medication Sig Dispense Refill   • apixaban (ELIQUIS) 5mg Tab Take 1 Tab by mouth 2 Times a Day. 180 Tab 1   • albuterol 108 (90 Base) MCG/ACT Aero Soln inhalation aerosol Inhale 1-2 Puffs by mouth every four hours as needed for Shortness of Breath.     • Melatonin (VITAJOY GUMMIES PO) Take  by mouth.     • Biotin 10 MG Tab Take  by mouth.     • HYDROcodone-acetaminophen (NORCO) 5-325 MG Tab per tablet Take 1 Tab by mouth every four hours as needed.     • ketoconazole (NIZORAL) 2 % Cream Apply  to affected area(s) every day.     • diphenhydrAMINE (BENADRYL) 25 MG Tab Take 1 tablet by mouth at bedtime as needed for Sleep. 30 Tab 0   • senna-docusate (PERICOLACE OR SENOKOT S) 8.6-50 MG Tab Take 2 Tabs by mouth 2 Times a  Day. 30 Tab 0   • polyethylene glycol/lytes (MIRALAX) Pack Take 1 Packet by mouth 1 time daily as needed (if sennosides and docusate ineffective after 24 hours).  3   • acetaminophen (TYLENOL) 650 MG CR tablet Take 650 mg by mouth 3 times a day.     • senna-docusate (PERICOLACE OR SENOKOT S) 8.6-50 MG Tab Take 2 Tabs by mouth 2 times a day.     • midodrine (PROAMATINE) 5 MG Tab Take 5 mg by mouth 3 times a day.     • montelukast (SINGULAIR) 10 MG Tab Take 10 mg by mouth every evening.     • Multiple Vitamins-Minerals (OCUVITE-LUTEIN) Tab Take 1 tablet by mouth every morning.     • simethicone (MYLICON) 80 MG Chew Tab Take 120 mg by mouth 4 Times a Day,Before Meals and at Bedtime.     • ascorbic acid (VITAMIN C) 500 MG tablet Take 1 Tab by mouth every day. 30 Tab 3   • ferrous sulfate 325 (65 Fe) MG tablet Take 1 Tab by mouth 2 times a day, with meals. 30 Tab    • furosemide (LASIX) 20 MG Tab Take 1 Tab by mouth every day. 60 Tab    • gabapentin (NEURONTIN) 300 MG Cap Take 1 Cap by mouth 3 times a day. 90 Cap    • omeprazole (PRILOSEC) 20 MG delayed-release capsule Take 1 Cap by mouth every day. 30 Cap    • vitamin D 2000 UNIT Tab Take 1 Tab by mouth every day. 60 Tab    • lovastatin (MEVACOR) 40 MG tablet Take 1 Tab by mouth every bedtime. 30 Tab 0   • budesonide-formoterol (SYMBICORT) 160-4.5 MCG/ACT Aerosol Inhale 2 Puffs by mouth 2 Times a Day. 1 Inhaler 0     No current facility-administered medications on file prior to visit.     Verified no anticonvulsant or azole therapy, education provided for future use.      Examination  Blood pressure: 126/74  • Elevated BP - no (sBP greater than 160 mmHg)  • Symptomatic hypotension - no  Significant gait impairment / imbalance / high risk for falls - age is a risk    Final Assessment and Recommendations:  Patient appears stable from the anticoagulation standpoint  Benefits of continued DOAC therapy outweigh risks for this patient  Recommend continue current DOAC at same  dose    Continue taking Eliquis 5 mg by mouth twice daily.    Other Actions:   CBC, CMP prior to next visit.    Follow up:  Will follow up with patient in 6 months        Corazon BUTLER

## 2021-04-26 ENCOUNTER — PATIENT MESSAGE (OUTPATIENT)
Dept: HEALTH INFORMATION MANAGEMENT | Facility: OTHER | Age: 81
End: 2021-04-26

## 2021-04-28 ENCOUNTER — IMMUNIZATION (OUTPATIENT)
Dept: FAMILY PLANNING/WOMEN'S HEALTH CLINIC | Facility: IMMUNIZATION CENTER | Age: 81
End: 2021-04-28
Payer: MEDICARE

## 2021-04-28 DIAGNOSIS — Z23 ENCOUNTER FOR VACCINATION: Primary | ICD-10-CM

## 2021-04-28 PROCEDURE — 0001A PFIZER SARS-COV-2 VACCINE: CPT | Performed by: INTERNAL MEDICINE

## 2021-04-28 PROCEDURE — 91300 PFIZER SARS-COV-2 VACCINE: CPT | Performed by: INTERNAL MEDICINE

## 2021-05-07 ENCOUNTER — PATIENT OUTREACH (OUTPATIENT)
Dept: HEALTH INFORMATION MANAGEMENT | Facility: OTHER | Age: 81
End: 2021-05-07

## 2021-05-07 NOTE — PROGRESS NOTES
Outcome: scheuled Comprehensive Geriatric Assessment  Monday, June 07, 2021  1:00 pm  Dr. Torres - [address]  Please transfer to Patient Outreach Team at [phone] when patient returns call.    Attempt # 1

## 2021-05-25 DIAGNOSIS — Z79.01 CHRONIC ANTICOAGULATION: ICD-10-CM

## 2021-05-27 ENCOUNTER — IMMUNIZATION (OUTPATIENT)
Dept: FAMILY PLANNING/WOMEN'S HEALTH CLINIC | Facility: IMMUNIZATION CENTER | Age: 81
End: 2021-05-27
Attending: INTERNAL MEDICINE
Payer: MEDICARE

## 2021-05-27 DIAGNOSIS — Z23 ENCOUNTER FOR VACCINATION: Primary | ICD-10-CM

## 2021-05-27 PROCEDURE — 0002A PFIZER SARS-COV-2 VACCINE: CPT | Performed by: INTERNAL MEDICINE

## 2021-05-27 PROCEDURE — 91300 PFIZER SARS-COV-2 VACCINE: CPT | Performed by: INTERNAL MEDICINE

## 2021-06-07 PROBLEM — J44.9 CHRONIC OBSTRUCTIVE PULMONARY DISEASE (HCC): Status: ACTIVE | Noted: 2019-11-15

## 2021-06-07 PROBLEM — K58.9 IRRITABLE BOWEL SYNDROME: Status: ACTIVE | Noted: 2019-03-06

## 2021-06-07 PROBLEM — M19.90 OSTEOARTHROSIS: Status: ACTIVE | Noted: 2021-06-07

## 2021-06-07 PROBLEM — F32.4 MAJOR DEPRESSIVE DISORDER IN PARTIAL REMISSION (HCC): Status: ACTIVE | Noted: 2021-06-07

## 2021-06-07 PROBLEM — R91.8 LUNG MASS: Status: ACTIVE | Noted: 2019-11-28

## 2021-06-07 PROBLEM — R60.9 EDEMA: Status: ACTIVE | Noted: 2020-11-10

## 2021-06-07 PROBLEM — G89.29 OTHER CHRONIC PAIN: Status: ACTIVE | Noted: 2021-06-07

## 2021-06-07 PROBLEM — F32.A DEPRESSION: Status: ACTIVE | Noted: 2021-06-07

## 2021-06-07 PROBLEM — R73.01 IMPAIRED FASTING GLUCOSE: Status: ACTIVE | Noted: 2020-09-17

## 2021-06-07 PROBLEM — H35.30 MACULAR DEGENERATION: Status: ACTIVE | Noted: 2019-03-06

## 2021-06-07 PROBLEM — Z98.890 HISTORY OF OPERATIVE PROCEDURE ON HIP: Status: ACTIVE | Noted: 2019-11-25

## 2021-06-07 PROBLEM — K44.9 HIATAL HERNIA: Status: ACTIVE | Noted: 2021-06-07

## 2021-06-07 PROBLEM — R09.02 HYPOXIA: Status: ACTIVE | Noted: 2020-02-27

## 2021-06-07 PROBLEM — R41.9 UNSPECIFIED SYMPTOMS AND SIGNS INVOLVING COGNITIVE FUNCTIONS AND AWARENESS: Status: ACTIVE | Noted: 2020-11-20

## 2021-06-07 PROBLEM — H91.93 HEARING LOSS, BILATERAL: Status: ACTIVE | Noted: 2019-03-06

## 2021-06-08 PROBLEM — D72.819 LEUKOPENIA: Status: ACTIVE | Noted: 2020-09-17

## 2021-06-09 PROBLEM — F11.20 OPIOID DEPENDENCE, DAILY USE (HCC): Status: ACTIVE | Noted: 2021-06-09

## 2021-06-09 PROBLEM — I26.99 OTHER PULMONARY EMBOLISM WITHOUT ACUTE COR PULMONALE (HCC): Status: RESOLVED | Noted: 2019-12-14 | Resolved: 2021-06-09

## 2021-06-09 PROBLEM — I25.10 ARTERIOSCLEROSIS OF CORONARY ARTERY: Status: ACTIVE | Noted: 2020-09-17

## 2021-06-09 PROBLEM — I21.4 NSTEMI (NON-ST ELEVATED MYOCARDIAL INFARCTION) (HCC): Status: RESOLVED | Noted: 2017-09-12 | Resolved: 2021-06-09

## 2021-06-09 PROBLEM — J96.10 CHRONIC RESPIRATORY FAILURE (HCC): Status: ACTIVE | Noted: 2021-06-09

## 2021-06-09 PROBLEM — F32.A DEPRESSION: Status: RESOLVED | Noted: 2021-06-07 | Resolved: 2021-06-09

## 2021-06-11 NOTE — FLOWSHEET NOTE
11/29/19 1022   Events/Summary/Plan   Events/Summary/Plan o2 check done pt sitting in wheelchair doing well    Chest Exam   Work Of Breathing / Effort Mild   Respiration 18   Pulse 83   Oxygen   Home O2 Use Prior To Admission? No   Pulse Oximetry 93 %   O2 (LPM) 2   O2 Daily Delivery Respiratory  Silicone Nasal Cannula      diarrhea

## 2021-07-16 ENCOUNTER — HOSPITAL ENCOUNTER (OUTPATIENT)
Dept: PULMONOLOGY | Facility: MEDICAL CENTER | Age: 81
End: 2021-07-16
Attending: STUDENT IN AN ORGANIZED HEALTH CARE EDUCATION/TRAINING PROGRAM
Payer: MEDICARE

## 2021-07-16 ENCOUNTER — HOSPITAL ENCOUNTER (OUTPATIENT)
Dept: LAB | Facility: MEDICAL CENTER | Age: 81
End: 2021-07-16
Attending: CLINICAL NURSE SPECIALIST
Payer: MEDICARE

## 2021-07-16 LAB
ALBUMIN SERPL BCP-MCNC: 4.1 G/DL (ref 3.2–4.9)
ALBUMIN/GLOB SERPL: 1.2 G/DL
ALP SERPL-CCNC: 75 U/L (ref 30–99)
ALT SERPL-CCNC: 12 U/L (ref 2–50)
ANION GAP SERPL CALC-SCNC: 10 MMOL/L (ref 7–16)
APPEARANCE UR: ABNORMAL
AST SERPL-CCNC: 20 U/L (ref 12–45)
BACTERIA #/AREA URNS HPF: ABNORMAL /HPF
BASOPHILS # BLD AUTO: 0.2 % (ref 0–1.8)
BASOPHILS # BLD: 0.01 K/UL (ref 0–0.12)
BILIRUB SERPL-MCNC: 0.2 MG/DL (ref 0.1–1.5)
BILIRUB UR QL STRIP.AUTO: NEGATIVE
BUN SERPL-MCNC: 24 MG/DL (ref 8–22)
CALCIUM SERPL-MCNC: 9.7 MG/DL (ref 8.4–10.2)
CHLORIDE SERPL-SCNC: 107 MMOL/L (ref 96–112)
CO2 SERPL-SCNC: 24 MMOL/L (ref 20–33)
COLOR UR: YELLOW
CREAT SERPL-MCNC: 1 MG/DL (ref 0.5–1.4)
EOSINOPHIL # BLD AUTO: 0 K/UL (ref 0–0.51)
EOSINOPHIL NFR BLD: 0 % (ref 0–6.9)
EPI CELLS #/AREA URNS HPF: ABNORMAL /HPF
ERYTHROCYTE [DISTWIDTH] IN BLOOD BY AUTOMATED COUNT: 53.9 FL (ref 35.9–50)
ERYTHROCYTE [SEDIMENTATION RATE] IN BLOOD BY WESTERGREN METHOD: 18 MM/HOUR (ref 0–25)
GLOBULIN SER CALC-MCNC: 3.4 G/DL (ref 1.9–3.5)
GLUCOSE SERPL-MCNC: 96 MG/DL (ref 65–99)
GLUCOSE UR STRIP.AUTO-MCNC: NEGATIVE MG/DL
HCT VFR BLD AUTO: 47.8 % (ref 37–47)
HCYS SERPL-SCNC: 13.4 UMOL/L
HGB BLD-MCNC: 14.5 G/DL (ref 12–16)
IMM GRANULOCYTES # BLD AUTO: 0.03 K/UL (ref 0–0.11)
IMM GRANULOCYTES NFR BLD AUTO: 0.5 % (ref 0–0.9)
KETONES UR STRIP.AUTO-MCNC: ABNORMAL MG/DL
LEUKOCYTE ESTERASE UR QL STRIP.AUTO: ABNORMAL
LYMPHOCYTES # BLD AUTO: 1.62 K/UL (ref 1–4.8)
LYMPHOCYTES NFR BLD: 28.2 % (ref 22–41)
MCH RBC QN AUTO: 26.1 PG (ref 27–33)
MCHC RBC AUTO-ENTMCNC: 30.3 G/DL (ref 33.6–35)
MCV RBC AUTO: 86.1 FL (ref 81.4–97.8)
MICRO URNS: ABNORMAL
MONOCYTES # BLD AUTO: 0.68 K/UL (ref 0–0.85)
MONOCYTES NFR BLD AUTO: 11.8 % (ref 0–13.4)
NEUTROPHILS # BLD AUTO: 3.4 K/UL (ref 2–7.15)
NEUTROPHILS NFR BLD: 59.3 % (ref 44–72)
NITRITE UR QL STRIP.AUTO: POSITIVE
NRBC # BLD AUTO: 0 K/UL
NRBC BLD-RTO: 0 /100 WBC
PH UR STRIP.AUTO: 5.5 [PH] (ref 5–8)
PLATELET # BLD AUTO: 249 K/UL (ref 164–446)
PMV BLD AUTO: 8.7 FL (ref 9–12.9)
POTASSIUM SERPL-SCNC: 5.3 MMOL/L (ref 3.6–5.5)
PROT SERPL-MCNC: 7.5 G/DL (ref 6–8.2)
PROT UR QL STRIP: NEGATIVE MG/DL
RBC # BLD AUTO: 5.55 M/UL (ref 4.2–5.4)
RBC # URNS HPF: ABNORMAL /HPF
RBC UR QL AUTO: NEGATIVE
SODIUM SERPL-SCNC: 141 MMOL/L (ref 135–145)
SP GR UR STRIP.AUTO: 1.01
T4 FREE SERPL-MCNC: 0.95 NG/DL (ref 0.93–1.7)
TREPONEMA PALLIDUM IGG+IGM AB [PRESENCE] IN SERUM OR PLASMA BY IMMUNOASSAY: NORMAL
TSH SERPL DL<=0.005 MIU/L-ACNC: 4.64 UIU/ML (ref 0.38–5.33)
VIT B12 SERPL-MCNC: 908 PG/ML (ref 211–911)
WBC # BLD AUTO: 5.7 K/UL (ref 4.8–10.8)
WBC #/AREA URNS HPF: ABNORMAL /HPF

## 2021-07-16 PROCEDURE — 87077 CULTURE AEROBIC IDENTIFY: CPT

## 2021-07-16 PROCEDURE — 82746 ASSAY OF FOLIC ACID SERUM: CPT

## 2021-07-16 PROCEDURE — 94729 DIFFUSING CAPACITY: CPT

## 2021-07-16 PROCEDURE — 94729 DIFFUSING CAPACITY: CPT | Mod: 26 | Performed by: INTERNAL MEDICINE

## 2021-07-16 PROCEDURE — 85652 RBC SED RATE AUTOMATED: CPT

## 2021-07-16 PROCEDURE — 36415 COLL VENOUS BLD VENIPUNCTURE: CPT

## 2021-07-16 PROCEDURE — 87186 SC STD MICRODIL/AGAR DIL: CPT

## 2021-07-16 PROCEDURE — 85025 COMPLETE CBC W/AUTO DIFF WBC: CPT

## 2021-07-16 PROCEDURE — 86780 TREPONEMA PALLIDUM: CPT

## 2021-07-16 PROCEDURE — 94060 EVALUATION OF WHEEZING: CPT

## 2021-07-16 PROCEDURE — 81001 URINALYSIS AUTO W/SCOPE: CPT

## 2021-07-16 PROCEDURE — 82607 VITAMIN B-12: CPT

## 2021-07-16 PROCEDURE — 94726 PLETHYSMOGRAPHY LUNG VOLUMES: CPT | Mod: 26 | Performed by: INTERNAL MEDICINE

## 2021-07-16 PROCEDURE — 83090 ASSAY OF HOMOCYSTEINE: CPT

## 2021-07-16 PROCEDURE — 94726 PLETHYSMOGRAPHY LUNG VOLUMES: CPT

## 2021-07-16 PROCEDURE — 80053 COMPREHEN METABOLIC PANEL: CPT

## 2021-07-16 PROCEDURE — 84443 ASSAY THYROID STIM HORMONE: CPT

## 2021-07-16 PROCEDURE — 87086 URINE CULTURE/COLONY COUNT: CPT

## 2021-07-16 PROCEDURE — 94060 EVALUATION OF WHEEZING: CPT | Mod: 26 | Performed by: INTERNAL MEDICINE

## 2021-07-16 PROCEDURE — 84439 ASSAY OF FREE THYROXINE: CPT

## 2021-07-16 RX ORDER — ALBUTEROL SULFATE 90 UG/1
2 AEROSOL, METERED RESPIRATORY (INHALATION)
Status: DISCONTINUED | OUTPATIENT
Start: 2021-07-16 | End: 2021-07-17 | Stop reason: HOSPADM

## 2021-07-16 ASSESSMENT — PULMONARY FUNCTION TESTS
FEV1/FVC_PERCENT_PREDICTED: 112
FEV1/FVC: 85.89
FEV1/FVC_PERCENT_PREDICTED: 110
FEV1/FVC_PERCENT_CHANGE: 175
FVC_PERCENT_PREDICTED: 70
FEV1/FVC_PERCENT_PREDICTED: 119
FEV1_PREDICTED: 1.51
FEV1/FVC_PERCENT_LLN: 64.72
FEV1/FVC: 90.45
FEV1/FVC_PERCENT_PREDICTED: 77
FEV1: 1.1
FEV1/FVC_PERCENT_CHANGE: 5
FEV1_PERCENT_CHANGE: 14
FEV1_PERCENT_CHANGE: 8
FVC: 1.28
FVC_LLN: 1.64
FVC_PREDICTED: 1.97
FEV1/FVC: 86
FEV1_LLN: 1.216
FEV1_PERCENT_PREDICTED: 73
FEV1_LLN: 1.26
FEV1_PERCENT_PREDICTED: 83
FEV1/FVC_PREDICTED: 77.51
FVC_LLN: 1.64
FEV1/FVC_PERCENT_PREDICTED: 116
FEV1/FVC_PERCENT_LLN: 64.72
FVC_PERCENT_PREDICTED: 65
FEV1: 1.26
FVC: 1.4
FEV1/FVC: 90

## 2021-07-17 LAB — FOLATE SERPL-MCNC: >40 NG/ML

## 2021-07-17 NOTE — PROCEDURES
DATE OF SERVICE:  07/16/2021     PULMONARY FUNCTION TEST INTERPRETATION    This exam is performed with a primary diagnosis of COPD to help establish a pre-test probability of the patient’s diagnostic findings.    The Flow Volume Loop is of sufficient quality and the volume-time graph demonstrates an adequate exhalation to provide a confident interpretation.    The FEV1/FVC ratio is normal by GOLD criteria and confidence interval data indicating an absence of irreversible obstructive lung disease (i.e. no evidence of COPD).  This is accompanied by a normal FEV1 and a reduced FVC based on predicted values. The patient was administered a bronchodilator challenge and demonstrated an increase in FVC by 120 mL with an increase in FEV1 by 160 mL.  Given the percent and absolute volume change in these values, the response is considered moderate unambiguously positive despite the small absolute change in volume but should be correlated clinically for subjective response.    Total lung capacity is within the limits of predicted values. Residual volume is elevated indicating air trapping. There is a mild (60-79% percent predicted) reduction in DLCO.      Impression:   1. No evidence of airflow obstruction   2. There is modest response to bronchodilator. In the proper clinical setting of cough, wheeze, chest pressure, and/or dyspnea, this study may be supportive of a diagnosis of asthma. Clinical correlation recommended.   3. Air trapping   4. Mild reduction in diffusion capacity       ______________________________  MD VIOLET Robledo/RAG/SOT    DD:  07/17/2021 09:06  DT:  07/17/2021 09:34    Job#:  [id number]

## 2021-08-18 ENCOUNTER — APPOINTMENT (OUTPATIENT)
Dept: RADIOLOGY | Facility: MEDICAL CENTER | Age: 81
End: 2021-08-18
Attending: CLINICAL NURSE SPECIALIST
Payer: MEDICARE

## 2021-08-18 DIAGNOSIS — R41.1 ANTEROGRADE AMNESIA: ICD-10-CM

## 2021-08-18 PROCEDURE — A9576 INJ PROHANCE MULTIPACK: HCPCS | Performed by: CLINICAL NURSE SPECIALIST

## 2021-08-18 PROCEDURE — 70553 MRI BRAIN STEM W/O & W/DYE: CPT | Mod: MH

## 2021-08-18 PROCEDURE — 700117 HCHG RX CONTRAST REV CODE 255: Performed by: CLINICAL NURSE SPECIALIST

## 2021-08-18 RX ADMIN — GADOTERIDOL 15 ML: 279.3 INJECTION, SOLUTION INTRAVENOUS at 14:59

## 2021-09-27 ENCOUNTER — HOSPITAL ENCOUNTER (OUTPATIENT)
Dept: LAB | Facility: MEDICAL CENTER | Age: 81
End: 2021-09-27
Attending: NURSE PRACTITIONER
Payer: MEDICARE

## 2021-09-27 DIAGNOSIS — I82.90 DEEP VEIN THROMBOSIS (DVT) OF NON-EXTREMITY VEIN, UNSPECIFIED CHRONICITY: ICD-10-CM

## 2021-09-27 LAB
ALBUMIN SERPL BCP-MCNC: 4 G/DL (ref 3.2–4.9)
ALBUMIN/GLOB SERPL: 1.4 G/DL
ALP SERPL-CCNC: 74 U/L (ref 30–99)
ALT SERPL-CCNC: 10 U/L (ref 2–50)
ANION GAP SERPL CALC-SCNC: 10 MMOL/L (ref 7–16)
AST SERPL-CCNC: 16 U/L (ref 12–45)
BILIRUB SERPL-MCNC: 0.2 MG/DL (ref 0.1–1.5)
BUN SERPL-MCNC: 24 MG/DL (ref 8–22)
CALCIUM SERPL-MCNC: 9.5 MG/DL (ref 8.5–10.5)
CHLORIDE SERPL-SCNC: 110 MMOL/L (ref 96–112)
CO2 SERPL-SCNC: 23 MMOL/L (ref 20–33)
CREAT SERPL-MCNC: 0.89 MG/DL (ref 0.5–1.4)
ERYTHROCYTE [DISTWIDTH] IN BLOOD BY AUTOMATED COUNT: 55.1 FL (ref 35.9–50)
GLOBULIN SER CALC-MCNC: 2.8 G/DL (ref 1.9–3.5)
GLUCOSE SERPL-MCNC: 95 MG/DL (ref 65–99)
HCT VFR BLD AUTO: 45.7 % (ref 37–47)
HGB BLD-MCNC: 13.9 G/DL (ref 12–16)
MCH RBC QN AUTO: 26.4 PG (ref 27–33)
MCHC RBC AUTO-ENTMCNC: 30.4 G/DL (ref 33.6–35)
MCV RBC AUTO: 86.9 FL (ref 81.4–97.8)
PLATELET # BLD AUTO: 233 K/UL (ref 164–446)
PMV BLD AUTO: 9.5 FL (ref 9–12.9)
POTASSIUM SERPL-SCNC: 4.8 MMOL/L (ref 3.6–5.5)
PROT SERPL-MCNC: 6.8 G/DL (ref 6–8.2)
RBC # BLD AUTO: 5.26 M/UL (ref 4.2–5.4)
SODIUM SERPL-SCNC: 143 MMOL/L (ref 135–145)
WBC # BLD AUTO: 4.8 K/UL (ref 4.8–10.8)

## 2021-09-27 PROCEDURE — 80053 COMPREHEN METABOLIC PANEL: CPT

## 2021-09-27 PROCEDURE — 36415 COLL VENOUS BLD VENIPUNCTURE: CPT

## 2021-09-27 PROCEDURE — 85027 COMPLETE CBC AUTOMATED: CPT

## 2021-10-04 ENCOUNTER — ANTICOAGULATION VISIT (OUTPATIENT)
Dept: VASCULAR LAB | Facility: MEDICAL CENTER | Age: 81
End: 2021-10-04
Attending: INTERNAL MEDICINE
Payer: MEDICARE

## 2021-10-04 VITALS — HEART RATE: 67 BPM | SYSTOLIC BLOOD PRESSURE: 167 MMHG | DIASTOLIC BLOOD PRESSURE: 81 MMHG

## 2021-10-04 DIAGNOSIS — Z79.01 CHRONIC ANTICOAGULATION: ICD-10-CM

## 2021-10-04 DIAGNOSIS — I82.409 DEEP VEIN THROMBOSIS (DVT) OF LOWER EXTREMITY, UNSPECIFIED CHRONICITY, UNSPECIFIED LATERALITY, UNSPECIFIED VEIN (HCC): ICD-10-CM

## 2021-10-04 PROCEDURE — 99212 OFFICE O/P EST SF 10 MIN: CPT

## 2021-10-04 NOTE — PROGRESS NOTES
"           Target end date: Indefinite     Indication: PE and DVT     Drug: Eliquis 5mg BID     CHADsVASC = n/a    Health Status Since Last Assessment   Patient denies any new relevant medical problems, ED visits or hospitalizations   Patient denies any embolic events (stroke/tia/systemic embolism)    Adherence with DOAC Therapy   Pt has 0 missed any doses in the average week    Bleeding Risk Assessment     Denies Epistaxis   Pt denies any excessive or unusual bleeding/hematomas.  Pt denies any GI bleeds or hematemesis.  Pt denies any concerning daily headache or sub dural hematoma symptoms.     Pt denies any hematuria or abnormal vaginal bleeding.   Latest Hemoglobin 13.9   ETOH overuse occasionally      Creatinine Clearance/Renal Function     Latest ClCr 51 ml/min  Results for ERIN JADE (MRN [id number]) as of 10/4/2021 13:38   Ref. Range 9/27/2021 08:47   Creatinine Latest Ref Range: 0.50 - 1.40 mg/dL 0.89   GFR If  Latest Ref Range: >60 mL/min/1.73 m 2 >60   GFR If Non  Latest Ref Range: >60 mL/min/1.73 m 2 >60     Hepatic function   Latest LFTs WNL   Results for ERIN JADE (MRN [id number]) as of 10/4/2021 13:38   Ref. Range 9/27/2021 08:47   AST(SGOT) Latest Ref Range: 12 - 45 U/L 16   ALT(SGPT) Latest Ref Range: 2 - 50 U/L 10      Pt denies any history of liver dysfunction      Drug Interactions   Platelets: 233   ASA/other antiplatelets none   NSAID none   Other drug interactions none   Verified no anticonvulsant or azole therapy, education provided for future use.     Examination   Blood Pressure 167/81 (pt had to park a ways away and \"had to hoof it here a long way\")   Symptomatic hypotension denies   Significant gait impairment/imbalance/high risk for falls? Walks with olive but stable    Final Assessment and Recommendations:   Patient appears stable from the anticoagulation standpoint.     Benefits of continued DOAC therapy outweigh risks for this " patient   Recommend pt continue with current DOAC therapy Eliquis 5mg BID    Other Actions: cmp/ cbc hemogram ordered prior to next visit    Follow up:   Will follow up with patient in 6 months.        Jitendra ClarkD

## 2021-10-05 ENCOUNTER — OFFICE VISIT (OUTPATIENT)
Dept: INTERNAL MEDICINE | Facility: OTHER | Age: 81
End: 2021-10-05
Payer: MEDICARE

## 2021-10-05 VITALS
DIASTOLIC BLOOD PRESSURE: 74 MMHG | SYSTOLIC BLOOD PRESSURE: 127 MMHG | HEIGHT: 58 IN | BODY MASS INDEX: 32.2 KG/M2 | TEMPERATURE: 98.9 F | HEART RATE: 66 BPM | WEIGHT: 153.4 LBS | OXYGEN SATURATION: 91 %

## 2021-10-05 DIAGNOSIS — S73.004S CLOSED DISLOCATION OF RIGHT HIP, SEQUELA: ICD-10-CM

## 2021-10-05 DIAGNOSIS — H35.30 MACULAR DEGENERATION OF BOTH EYES, UNSPECIFIED TYPE: ICD-10-CM

## 2021-10-05 DIAGNOSIS — J45.30 MILD PERSISTENT ASTHMA WITHOUT COMPLICATION: ICD-10-CM

## 2021-10-05 DIAGNOSIS — G57.93 NEUROPATHIC PAIN OF BOTH FEET: ICD-10-CM

## 2021-10-05 DIAGNOSIS — J96.10 CHRONIC RESPIRATORY FAILURE, UNSPECIFIED WHETHER WITH HYPOXIA OR HYPERCAPNIA (HCC): ICD-10-CM

## 2021-10-05 DIAGNOSIS — J96.11 CHRONIC RESPIRATORY FAILURE WITH HYPOXIA (HCC): ICD-10-CM

## 2021-10-05 PROBLEM — K08.102: Status: ACTIVE | Noted: 2021-06-16

## 2021-10-05 PROBLEM — R79.89 LFT ELEVATION: Status: RESOLVED | Noted: 2019-11-25 | Resolved: 2021-10-05

## 2021-10-05 PROBLEM — M54.2 NECK PAIN ON RIGHT SIDE: Status: RESOLVED | Noted: 2019-02-27 | Resolved: 2021-10-05

## 2021-10-05 PROBLEM — R73.01 IMPAIRED FASTING GLUCOSE: Status: RESOLVED | Noted: 2020-09-17 | Resolved: 2021-10-05

## 2021-10-05 PROBLEM — H91.93 HEARING DIFFICULTY OF BOTH EARS: Status: RESOLVED | Noted: 2018-04-12 | Resolved: 2021-10-05

## 2021-10-05 PROBLEM — J18.9 PNEUMONIA: Status: RESOLVED | Noted: 2020-01-03 | Resolved: 2021-10-05

## 2021-10-05 PROBLEM — G89.29 OTHER CHRONIC PAIN: Status: RESOLVED | Noted: 2021-06-07 | Resolved: 2021-10-05

## 2021-10-05 PROBLEM — R60.9 EDEMA: Status: RESOLVED | Noted: 2020-11-10 | Resolved: 2021-10-05

## 2021-10-05 PROBLEM — R55 SYNCOPE: Status: RESOLVED | Noted: 2019-12-12 | Resolved: 2021-10-05

## 2021-10-05 PROBLEM — R09.02 HYPOXIA: Status: RESOLVED | Noted: 2020-02-27 | Resolved: 2021-10-05

## 2021-10-05 PROBLEM — K56.7 ILEUS (HCC): Status: RESOLVED | Noted: 2019-11-25 | Resolved: 2021-10-05

## 2021-10-05 PROBLEM — J96.00 ACUTE RESPIRATORY FAILURE (HCC): Status: RESOLVED | Noted: 2019-12-12 | Resolved: 2021-10-05

## 2021-10-05 PROBLEM — H54.7 IMPAIRED VISION: Status: RESOLVED | Noted: 2019-05-28 | Resolved: 2021-10-05

## 2021-10-05 PROBLEM — R35.0 INCREASED FREQUENCY OF URINATION: Status: ACTIVE | Noted: 2021-09-03

## 2021-10-05 PROBLEM — F11.20 OPIOID DEPENDENCE, DAILY USE (HCC): Status: RESOLVED | Noted: 2021-06-09 | Resolved: 2021-10-05

## 2021-10-05 PROBLEM — R05.9 COUGH: Status: ACTIVE | Noted: 2021-09-03

## 2021-10-05 PROBLEM — J90 PLEURAL EFFUSION: Status: RESOLVED | Noted: 2019-12-30 | Resolved: 2021-10-05

## 2021-10-05 PROBLEM — J44.9 CHRONIC OBSTRUCTIVE PULMONARY DISEASE (HCC): Status: RESOLVED | Noted: 2019-11-15 | Resolved: 2021-10-05

## 2021-10-05 PROCEDURE — 99214 OFFICE O/P EST MOD 30 MIN: CPT | Mod: GC | Performed by: INTERNAL MEDICINE

## 2021-10-05 RX ORDER — GABAPENTIN 300 MG/1
300 CAPSULE ORAL 3 TIMES DAILY
Qty: 90 CAPSULE | Refills: 3 | Status: SHIPPED | OUTPATIENT
Start: 2021-10-05 | End: 2023-02-28

## 2021-10-05 RX ORDER — GABAPENTIN 100 MG/1
CAPSULE ORAL
COMMUNITY
Start: 2021-09-27 | End: 2022-01-17

## 2021-10-05 RX ORDER — ESCITALOPRAM OXALATE 10 MG/1
TABLET ORAL
COMMUNITY
Start: 2021-09-10 | End: 2023-02-28 | Stop reason: SDUPTHER

## 2021-10-05 RX ORDER — BUDESONIDE AND FORMOTEROL FUMARATE DIHYDRATE 160; 4.5 UG/1; UG/1
60 AEROSOL RESPIRATORY (INHALATION)
COMMUNITY
Start: 2021-08-29 | End: 2022-02-09

## 2021-10-05 ASSESSMENT — FIBROSIS 4 INDEX: FIB4 SCORE: 1.8

## 2021-10-05 ASSESSMENT — ENCOUNTER SYMPTOMS
HEADACHES: 0
VOMITING: 0
NAUSEA: 0
SORE THROAT: 1
DEPRESSION: 0
WHEEZING: 1
NERVOUS/ANXIOUS: 0
SEIZURES: 0
FEVER: 0
SPEECH CHANGE: 0
SHORTNESS OF BREATH: 1
DOUBLE VISION: 0
PALPITATIONS: 0
DIARRHEA: 0
FALLS: 0
CONSTIPATION: 0
ABDOMINAL PAIN: 0
COUGH: 1
CHILLS: 0
DIZZINESS: 0
BLURRED VISION: 0
MYALGIAS: 0

## 2021-10-05 ASSESSMENT — LIFESTYLE VARIABLES: SUBSTANCE_ABUSE: 0
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
## 2021-10-05 NOTE — ASSESSMENT & PLAN NOTE
Patient reports taking gabapentin 600 mg qHS, although still has residual neuropathic pain.  -Encouraged pt to take gabapentin 900 mg total daily: 300 mg in AM and 600 mg in PM

## 2021-10-05 NOTE — PATIENT INSTRUCTIONS
Hello! Today we saw you for:    Asthma: Please remember to get an air purifier (can buy and look at different models at [employer]). Please remember to change your air filter at least every 3 months or more frequently.  --> I am referring you to Pulmonary (lung doctor) for a second opinion  --> Please use your portable oxygen when you are walkin liters. Can decrease to 1 liter if you feel comfortable    Macular degeneration: Follow up with your eye doctor in December    Neuropathic pain: Take your gabapentin as prescribed (1 pill in morning and 2 at night) instead of only at night    Hip pain: If you need/want more physical therapy sessions, let me know    -Be sure to obtain your PPSV23 (Pneumovax) and Shingrix vaccines, as well as your COVID booster.      See me in 6 months.    Thank you!      Asthma, Adult    Asthma is a long-term (chronic) condition in which the airways get tight and narrow. The airways are the breathing passages that lead from the nose and mouth down into the lungs. A person with asthma will have times when symptoms get worse. These are called asthma attacks. They can cause coughing, whistling sounds when you breathe (wheezing), shortness of breath, and chest pain. They can make it hard to breathe. There is no cure for asthma, but medicines and lifestyle changes can help control it.  There are many things that can bring on an asthma attack or make asthma symptoms worse (triggers). Common triggers include:  · Mold.  · Dust.  · Cigarette smoke.  · Cockroaches.  · Things that can cause allergy symptoms (allergens). These include animal skin flakes (dander) and pollen from trees or grass.  · Things that pollute the air. These may include household , wood smoke, smog, or chemical odors.  · Cold air, weather changes, and wind.  · Crying or laughing hard.  · Stress.  · Certain medicines or drugs.  · Certain foods such as dried fruit, potato chips, and grape juice.  · Infections, such as a  cold or the flu.  · Certain medical conditions or diseases.  · Exercise or tiring activities.  Asthma may be treated with medicines and by staying away from the things that cause asthma attacks. Types of medicines may include:  · Controller medicines. These help prevent asthma symptoms. They are usually taken every day.  · Fast-acting reliever or rescue medicines. These quickly relieve asthma symptoms. They are used as needed and provide short-term relief.  · Allergy medicines if your attacks are brought on by allergens.  · Medicines to help control the body's defense (immune) system.  Follow these instructions at home:  Avoiding triggers in your home  · Change your heating and air conditioning filter often.  · Limit your use of fireplaces and wood stoves.  · Get rid of pests (such as roaches and mice) and their droppings.  · Throw away plants if you see mold on them.  · Clean your floors. Dust regularly. Use cleaning products that do not smell.  · Have someone vacuum when you are not home. Use a vacuum  with a HEPA filter if possible.  · Replace carpet with wood, tile, or vinyl albertina. Carpet can trap animal skin flakes and dust.  · Use allergy-proof pillows, mattress covers, and box spring covers.  · Wash bed sheets and blankets every week in hot water. Dry them in a dryer.  · Keep your bedroom free of any triggers.  · Avoid pets and keep windows closed when things that cause allergy symptoms are in the air.  · Use blankets that are made of polyester or cotton.  · Clean bathrooms and sherly with bleach. If possible, have someone repaint the walls in these rooms with mold-resistant paint. Keep out of the rooms that are being cleaned and painted.  · Wash your hands often with soap and water. If soap and water are not available, use hand .  · Do not allow anyone to smoke in your home.  General instructions  · Take over-the-counter and prescription medicines only as told by your doctor.  ? Talk  with your doctor if you have questions about how or when to take your medicines.  ? Make note if you need to use your medicines more often than usual.  · Do not use any products that contain nicotine or tobacco, such as cigarettes and e-cigarettes. If you need help quitting, ask your doctor.  · Stay away from secondhand smoke.  · Avoid doing things outdoors when allergen counts are high and when air quality is low.  · Wear a ski mask when doing outdoor activities in the winter. The mask should cover your nose and mouth. Exercise indoors on cold days if you can.  · Warm up before you exercise. Take time to cool down after exercise.  · Use a peak flow meter as told by your doctor. A peak flow meter is a tool that measures how well the lungs are working.  · Keep track of the peak flow meter's readings. Write them down.  · Follow your asthma action plan. This is a written plan for taking care of your asthma and treating your attacks.  · Make sure you get all the shots (vaccines) that your doctor recommends. Ask your doctor about a flu shot and a pneumonia shot.  · Keep all follow-up visits as told by your doctor. This is important.  Contact a doctor if:  · You have wheezing, shortness of breath, or a cough even while taking medicine to prevent attacks.  · The mucus you cough up (sputum) is thicker than usual.  · The mucus you cough up changes from clear or white to yellow, green, gray, or bloody.  · You have problems from the medicine you are taking, such as:  ? A rash.  ? Itching.  ? Swelling.  ? Trouble breathing.  · You need reliever medicines more than 2-3 times a week.  · Your peak flow reading is still at 50-79% of your personal best after following the action plan for 1 hour.  · You have a fever.  Get help right away if:  · You seem to be worse and are not responding to medicine during an asthma attack.  · You are short of breath even at rest.  · You get short of breath when doing very little activity.  · You  have trouble eating, drinking, or talking.  · You have chest pain or tightness.  · You have a fast heartbeat.  · Your lips or fingernails start to turn blue.  · You are light-headed or dizzy, or you faint.  · Your peak flow is less than 50% of your personal best.  · You feel too tired to breathe normally.  Summary  · Asthma is a long-term (chronic) condition in which the airways get tight and narrow. An asthma attack can make it hard to breathe.  · Asthma cannot be cured, but medicines and lifestyle changes can help control it.  · Make sure you understand how to avoid triggers and how and when to use your medicines.  This information is not intended to replace advice given to you by your health care provider. Make sure you discuss any questions you have with your health care provider.  Document Released: 06/05/2009 Document Revised: 02/20/2020 Document Reviewed: 01/22/2018  Elsevier Patient Education © 2020 [employer]

## 2021-10-06 NOTE — ASSESSMENT & PLAN NOTE
Currently actively participating in physical therapy.  Patient has 3 visits scheduled upcoming.    -If needed, will prescribe further physical therapy visits  -Patient to continue physical therapy exercises while at home  -Ambulate with cane as needed
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
## 2021-10-06 NOTE — ASSESSMENT & PLAN NOTE
This is a severe issue affecting her eyesight.  Patient sees ophthalmologist regularly.    -Continue following with ophthalmologist, next appointment this December  -Continue ocular multivitamin

## 2021-10-06 NOTE — ASSESSMENT & PLAN NOTE
This is a worsening problem.  Patient has issues upon ambulation.  Patient uses oxygen at night and has multiple portable oxygen concentrator's at home.  I have independently reviewed her PFT records.    This is reflected in her 6-minute walk test that was performed in clinic today.    -Continue Ellipta, albuterol inhaler, Symbicort, montelukast  -Patient to use 2 L of oxygen during the day when ambulating  -Continue oxygen use at night when sleeping  -Referral to pulmonology for further work-up

## 2021-12-17 ENCOUNTER — NON-PROVIDER VISIT (OUTPATIENT)
Dept: SLEEP MEDICINE | Facility: MEDICAL CENTER | Age: 81
End: 2021-12-17
Payer: MEDICARE

## 2021-12-17 PROCEDURE — 94618 PULMONARY STRESS TESTING: CPT | Performed by: INTERNAL MEDICINE

## 2021-12-17 ASSESSMENT — 6 MINUTE WALK TEST (6MWT)
PERCEIVED FATIGUE AT 2 MIN: 4
HEART RATE AT 2 MIN: 85
TOTAL REST TIME: 180
PERCEIVED FATIGUE AT 1 MIN: 3
PERCEIVED FATIGUE AT 1 MIN: 4
HEART RATE AT 2 MIN: 86
HEART RATE AT 1 MIN: 90
PERCEIVED FATIGUE AT 6 MIN: 4
COMMENTS: 2 LPM 02
SAO2 AT 2 MIN: 92
AMBULATES WITH O2: WITHOUT O2
PERCEIVED BREATHLESSNESS AT 1 MIN: 1
HEART RATE AT 5 MIN: 86
COMMENTS: 2 LPM 02
SAO2 AT 1 MIN: 91
SAO2 AT 4 MIN: 85
COMMENTS: ADDED 02 2 LPM
PERCEIVED BREATHLESSNESS AT 3 MIN: 1
PERCEIVED BREATHLESSNESS AT 2 MIN: 1
PERCEIVED BREATHLESSNESS AT 6 MIN: 1
SAO2 AT 2 MIN: 93
PERCEIVED FATIGUE AT 5 MIN: 4
SAO2 AT 3 MIN: 88
PERCEIVED FATIGUE AT 3 MIN: 4
HEART RATE AT 6 MIN: 86
SAO2 AT 6 MIN: 93
PERCEIVED BREATHLESSNESS AT 4 MIN: 1
HEART RATE: 67
PERCEIVED BREATHLESSNESS AT 5 MIN: 1
PERCEIVED FATIGUE AT 4 MIN: 4
HEART RATE AT 3 MIN: 90
SAO2 AT 1 MIN: 90
PERCEIVED BREATHLESSNESS AT 2 MIN: 1
HEART RATE AT 4 MIN: 97
SAO2 AT 5 MIN: 94
BLOOD PRESSURE: LEFT ARM
PERCEIVED FATIGUE AT 2 MIN: 3
O2 SAT PERCENT ROOM AIR: 93
HEART RATE AT 1 MIN: 88
SITTING BLOOD PRESSURE: 124/78
NUMBER OF RESTS: 3
PERCEIVED BREATHLESSNESS AT 1 MIN: 1
BLOOD PRESSURE AT 1 MIN: 126/80
PERCENT OF NORMAL WALKED: 44

## 2021-12-17 NOTE — PROCEDURES
Test started on Room Air in minute 4  Added 02 to 2 lpm patient finished test on 2 lpm 02.    Interpretation;  There is abnormal desaturation from 90% on room air at rest to 85% at 4 minutes.  This was corrected with supplemental oxygen at 2 L/min.  Distance walked was 480 feet are 44% predicted.  Based on this study I would recommend supplemental oxygen at 2 L/min with activity.  Exercise tolerance is reduced.

## 2021-12-20 ENCOUNTER — TELEPHONE (OUTPATIENT)
Dept: INTERNAL MEDICINE | Facility: OTHER | Age: 81
End: 2021-12-20

## 2021-12-20 NOTE — TELEPHONE ENCOUNTER
Please call the patient and let her know I have received the results of her pulmonary function tests. I would like her to come in for an appointment sometime so we can discuss possibly starting to use supplemental oxygen.    Sri Escudero MD, MPH  [facility], PGY-2

## 2022-01-17 ENCOUNTER — OFFICE VISIT (OUTPATIENT)
Dept: INTERNAL MEDICINE | Facility: OTHER | Age: 82
End: 2022-01-17
Payer: MEDICARE

## 2022-01-17 VITALS
HEART RATE: 62 BPM | HEIGHT: 58 IN | DIASTOLIC BLOOD PRESSURE: 74 MMHG | TEMPERATURE: 98.3 F | BODY MASS INDEX: 32.79 KG/M2 | OXYGEN SATURATION: 92 % | SYSTOLIC BLOOD PRESSURE: 130 MMHG | WEIGHT: 156.2 LBS

## 2022-01-17 DIAGNOSIS — G89.29 CHRONIC BILATERAL LOW BACK PAIN WITHOUT SCIATICA: ICD-10-CM

## 2022-01-17 DIAGNOSIS — G57.93 NEUROPATHIC PAIN OF BOTH FEET: ICD-10-CM

## 2022-01-17 DIAGNOSIS — G89.29 CHRONIC NECK PAIN: ICD-10-CM

## 2022-01-17 DIAGNOSIS — M54.2 CHRONIC NECK PAIN: ICD-10-CM

## 2022-01-17 DIAGNOSIS — E55.9 VITAMIN D DEFICIENCY: ICD-10-CM

## 2022-01-17 DIAGNOSIS — I82.90 DEEP VEIN THROMBOSIS (DVT) OF NON-EXTREMITY VEIN, UNSPECIFIED CHRONICITY: ICD-10-CM

## 2022-01-17 DIAGNOSIS — M54.50 CHRONIC BILATERAL LOW BACK PAIN WITHOUT SCIATICA: ICD-10-CM

## 2022-01-17 DIAGNOSIS — J96.11 CHRONIC RESPIRATORY FAILURE WITH HYPOXIA (HCC): ICD-10-CM

## 2022-01-17 DIAGNOSIS — E78.5 DYSLIPIDEMIA: ICD-10-CM

## 2022-01-17 PROBLEM — S73.004A CLOSED DISLOCATION OF RIGHT HIP (HCC): Status: RESOLVED | Noted: 2019-12-14 | Resolved: 2022-01-17

## 2022-01-17 PROBLEM — I95.9 HYPOTENSION: Status: RESOLVED | Noted: 2019-12-26 | Resolved: 2022-01-17

## 2022-01-17 PROCEDURE — 99213 OFFICE O/P EST LOW 20 MIN: CPT | Mod: GE | Performed by: STUDENT IN AN ORGANIZED HEALTH CARE EDUCATION/TRAINING PROGRAM

## 2022-01-17 ASSESSMENT — ENCOUNTER SYMPTOMS
FEVER: 0
SORE THROAT: 0
FALLS: 0
ABDOMINAL PAIN: 0
CHILLS: 0
WHEEZING: 0
PALPITATIONS: 0
DIZZINESS: 0
COUGH: 0
BACK PAIN: 1
SEIZURES: 0
VOMITING: 0
NECK PAIN: 1
NERVOUS/ANXIOUS: 0
BLURRED VISION: 0
DOUBLE VISION: 0
MYALGIAS: 1
DIARRHEA: 0
CONSTIPATION: 0
HEADACHES: 0
SPEECH CHANGE: 0
MEMORY LOSS: 0
NAUSEA: 0
SHORTNESS OF BREATH: 0

## 2022-01-17 ASSESSMENT — LIFESTYLE VARIABLES: SUBSTANCE_ABUSE: 0

## 2022-01-17 ASSESSMENT — FIBROSIS 4 INDEX: FIB4 SCORE: 1.8

## 2022-01-17 NOTE — PATIENT INSTRUCTIONS
Hello! Today we saw you for:    Chronic respiratory failure: Need for Oxygen: I have ordered. Please call the oxygen tank company to make sure that you have refills.  [facility]  [phone]  Model #[id number]    Hip pain: Go see Dr. Anoop Call  Neck pain: I have ordered a neck xray, go get it done    See me in 10 weeks.    Thank You!

## 2022-01-17 NOTE — PROGRESS NOTES
"Established Patient    Guillermina Recio is a 81 y.o. female who presents today with the following:    CC:   Chief Complaint   Patient presents with   • Follow-Up     chronic respiratory   • Hip Pain     bilateral, right side worse   • Neck Pain     night time       HPI:     Pt is an 82 y/o F with PMH significant for h/o DVT (on Eliquis), DLD, pulmonary HTN, chronic respiratory failure/asthma, IBS, MDD, chronic neck pain, and chronic hip pain who presents for multiple issues:    Oxygen use, chronic respiratory failure/asthma: Recently got PFTs done in Dec 2021 showing she needs 2L with activity. She has an oxygenation concentrator that she uses at night, which is normally set to 2 L but she feels she needs more, up to 3L at night. She needs a portable oxygen tank that she can wheel around. At night, when the air is heavier or the air is cold, she gets a slight headache and she has to use her inhaler to feel better. She originally got her oxygen from [place].    DME order: 3L at night, 2L during day.    [facility]  [phone]  Model #[id number]      Hip pain: Pt had a hip replacement (with Dr. Dunne) on R hip in Nov 2019. She fell afterward and had an MI in Dec 2019. She still has pain in bilateral hips. She went to a PT therapist who told her her hips were \"frozen\". She does leg lifts at home and also uses a stationary bike. Anoop Call, a PA in Dr. Hernández's office, will obtain xrays in her bilateral hips and work it up. She states she has a very calcified back. Pain is helped by [employer] CBD oil and TENS unit in the past.    Neck pain: Ever since her 30s she has had a large amount of pain, in her R neck from an injury, during which she may have had a R shoulder dislocation which was temporarily fixed by her boss but never got worked up fully. Is constantly aching, and occasionally swells up. She uses a heating pad at times which helps somewhat. She says in the past a TENS unit has helped as well. She " "is also using a [employer] CBD oil which helps as well. She has to use Norco when walking around due to pain. She would like to see a specialist to see if \"I have a vertebrae out of place\". No issues with swallowing (after being asked multiple times).    Depression: She lost one child in 2020 and one child in 2021. No SI/HI, taking lexapro regularly.      Review of Systems   Constitutional: Negative for chills, fever and malaise/fatigue.   HENT: Negative for congestion and sore throat.    Eyes: Negative for blurred vision and double vision.   Respiratory: Negative for cough, shortness of breath and wheezing.    Cardiovascular: Negative for chest pain, palpitations and leg swelling.   Gastrointestinal: Negative for abdominal pain, constipation, diarrhea, nausea and vomiting.   Genitourinary: Negative for dysuria, frequency and urgency.   Musculoskeletal: Positive for back pain, joint pain, myalgias and neck pain. Negative for falls.   Neurological: Negative for dizziness, speech change, seizures and headaches.   Psychiatric/Behavioral: Negative for memory loss, substance abuse and suicidal ideas. The patient is not nervous/anxious.        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 ([facility]) 06/07/2021   • Osteoarthrosis 06/07/2021   • Hiatal hernia 06/07/2021   • Unspecified symptoms and signs involving cognitive functions and awareness 11/20/2020   • Leukopenia 09/17/2020   • Arteriosclerosis of coronary artery 09/17/2020   • Deep vein thrombosis (DVT) ([facility]) 06/03/2020   • Pulmonary hypertension ([facility]) 12/26/2019   • Hip dislocation, right ([facility]) 12/12/2019   • Lung " mass 11/28/2019   • Impaired mobility and ADLs 11/27/2019   • History of operative procedure on hip 11/25/2019   • Hyperglycemia 11/25/2019   • Nodular elastosis with cysts and comedones of [place] and [place] 05/28/2019   • Hearing loss, bilateral 03/06/2019   • Irritable bowel syndrome 03/06/2019   • Chronic neck pain 02/27/2019   • Pain of right hip joint 04/12/2018   • Preventative health care 10/30/2017   • Chronic use of opiate drug for therapeutic purpose 07/07/2017   • Chronic bilateral low back pain without sciatica 07/07/2017   • Gastroesophageal reflux disease 12/16/2016   • Vitamin D deficiency 12/16/2016   • Controlled substance agreement signed 12/16/2016   • Chronic pain of both shoulders 12/16/2016   • Polypharmacy 06/29/2016   • Dermatochalasis 07/14/2015   • Benign positional vertigo 01/08/2015   • Urinary incontinence 10/09/2014   • Dyslipidemia 10/03/2014   • HTN (hypertension) 06/25/2009   • Osteopenia 06/25/2009       Social History     Tobacco Use   • Smoking status: Never Smoker   • Smokeless tobacco: Never Used   Vaping Use   • Vaping Use: Never used   Substance Use Topics   • Alcohol use: Not Currently     Alcohol/week: 0.0 oz     Comment: 3 per year   • Drug use: No     Comment: In the Distant Past, but not since 32 y.o.  (prior - [employer], crosstops - stimulants for working double shifts, but not for several decades)       Current Outpatient Medications   Medication Sig Dispense Refill   • Multiple Vitamins-Minerals (CENTRUM SILVER 50+WOMEN) Tab Take  by mouth.     • gabapentin (NEURONTIN) 300 MG Cap Take 1 Capsule by mouth 3 times a day. Take 1 capsule in the morning and 2 in the evening 90 Capsule 3   • SYMBICORT 160-4.5 MCG/ACT Aerosol 60 Puffs.     • escitalopram (LEXAPRO) 10 MG Tab TAKE 1 TABLET ORAL EVERY MORNING WITH MEALS     • ELIQUIS 5 MG Tab TAKE 1 TABLET BY MOUTH TWICE A  Tablet 1   • famotidine (PEPCID) 20 MG Tab TAKE 1 TABLET BY MOUTH EVERY DAY 30MINS  "PRIOR TO FOOD     • Fluticasone Furoate-Vilanterol (BREO ELLIPTA) 100-25 MCG/INH AEROSOL POWDER, BREATH ACTIVATED BREO ELLIPTA 100-25 MCG/INH AEPB     • loperamide (IMODIUM A-D) 2 MG tablet LOPERAMIDE HCL 2 MG TABS     • albuterol 108 (90 Base) MCG/ACT Aero Soln inhalation aerosol Inhale 1-2 Puffs by mouth every four hours as needed for Shortness of Breath.     • Melatonin (VITAJOY GUMMIES PO) Take  by mouth.     • Biotin 10 MG Tab Take  by mouth.     • HYDROcodone-acetaminophen (NORCO) 5-325 MG Tab per tablet Take 1 Tab by mouth every four hours as needed.     • ketoconazole (NIZORAL) 2 % Cream Apply  to affected area(s) every day.     • senna-docusate (PERICOLACE OR SENOKOT S) 8.6-50 MG Tab Take 2 Tabs by mouth 2 Times a Day. 30 Tab 0   • polyethylene glycol/lytes (MIRALAX) Pack Take 1 Packet by mouth 1 time daily as needed (if sennosides and docusate ineffective after 24 hours).  3   • acetaminophen (TYLENOL) 650 MG CR tablet Take 650 mg by mouth 3 times a day.     • montelukast (SINGULAIR) 10 MG Tab Take 10 mg by mouth every evening.     • Multiple Vitamins-Minerals (OCUVITE-LUTEIN) Tab Take 1 tablet by mouth every morning.     • simethicone (MYLICON) 80 MG Chew Tab Take 120 mg by mouth 4 Times a Day,Before Meals and at Bedtime.     • ascorbic acid (VITAMIN C) 500 MG tablet Take 1 Tab by mouth every day. 30 Tab 3   • furosemide (LASIX) 20 MG Tab Take 1 Tab by mouth every day. 60 Tab    • vitamin D 2000 UNIT Tab Take 1 Tab by mouth every day. 60 Tab    • lovastatin (MEVACOR) 40 MG tablet Take 1 Tab by mouth every bedtime. 30 Tab 0     No current facility-administered medications for this visit.       /74 (BP Location: Left arm, Patient Position: Sitting, BP Cuff Size: Adult)   Pulse 62   Temp 36.8 °C (98.3 °F) (Temporal)   Ht 1.473 m (4' 10\")   Wt 70.9 kg (156 lb 3.2 oz)   SpO2 92%   BMI 32.65 kg/m²     PHYSICAL EXAM:  Physical Exam  Vitals and nursing note reviewed. Exam conducted with a chaperone " present (May present).   Constitutional:       General: She is not in acute distress.     Appearance: Normal appearance. She is obese. She is not ill-appearing or toxic-appearing.   HENT:      Head: Normocephalic and atraumatic.      Right Ear: External ear normal.      Left Ear: External ear normal.      Nose: Nose normal. No congestion or rhinorrhea.      Mouth/Throat:      Mouth: Mucous membranes are moist.      Pharynx: Oropharynx is clear. No oropharyngeal exudate.      Comments: edentulous  Eyes:      General:         Right eye: No discharge.         Left eye: No discharge.      Extraocular Movements: Extraocular movements intact.      Conjunctiva/sclera: Conjunctivae normal.      Pupils: Pupils are equal, round, and reactive to light.   Cardiovascular:      Rate and Rhythm: Normal rate and regular rhythm.      Pulses: Normal pulses.   Pulmonary:      Effort: Pulmonary effort is normal. No respiratory distress.      Breath sounds: Normal breath sounds. No wheezing or rales.   Abdominal:      General: Bowel sounds are normal. There is no distension.      Palpations: Abdomen is soft.      Tenderness: There is no abdominal tenderness. There is no right CVA tenderness or left CVA tenderness.      Comments: protuberant   Musculoskeletal:         General: No swelling or tenderness. Normal range of motion.      Cervical back: Normal range of motion and neck supple. No rigidity or tenderness.      Right lower leg: No edema.      Left lower leg: No edema.   Skin:     General: Skin is warm and dry.      Capillary Refill: Capillary refill takes less than 2 seconds.      Findings: No bruising.   Neurological:      Mental Status: She is alert and oriented to person, place, and time. Mental status is at baseline.      Cranial Nerves: No cranial nerve deficit.      Sensory: No sensory deficit.      Coordination: Coordination normal.      Comments: Ambulates with cane   Psychiatric:         Mood and Affect: Mood normal.          Behavior: Behavior normal.         Thought Content: Thought content normal.         Cognition and Memory: Memory is impaired.         Judgment: Judgment normal.      Comments: Wandering historian           Assessment and Plan  Chronic bilateral low back pain without sciatica  Will see Dr. Anoop Call to get this worked up  Ordered cervical neck XR    Chronic respiratory failure (HCC)  This is a worsening problem. Patient uses oxygen at night and has multiple portable oxygen concentrator's at home.  I have independently reviewed her PFT records from Dec 2021.     -Continue Ellipta, albuterol inhaler, Symbicort, montelukast  -Patient to use 2 L of oxygen during the day when ambulating and 3L at night - I have ordered via DME    Chronic neck pain  Ordered cervical neck XR    Deep vein thrombosis (DVT) (HCC)  Continue using Eliquis    Dyslipidemia  Continue lovastatin    Neuropathic pain of both feet  Stable. Continue gabapentin as prescribed      Signed by: Sri Escudero M.D.

## 2022-01-18 NOTE — ASSESSMENT & PLAN NOTE
This is a worsening problem. Patient uses oxygen at night and has multiple portable oxygen concentrator's at home.  I have independently reviewed her PFT records from Dec 2021.     -Continue Ellipta, albuterol inhaler, Symbicort, montelukast  -Patient to use 2 L of oxygen during the day when ambulating and 3L at night - I have ordered via DME

## 2022-01-21 ENCOUNTER — HOSPITAL ENCOUNTER (OUTPATIENT)
Dept: RADIOLOGY | Facility: MEDICAL CENTER | Age: 82
End: 2022-01-21
Attending: STUDENT IN AN ORGANIZED HEALTH CARE EDUCATION/TRAINING PROGRAM
Payer: MEDICARE

## 2022-01-21 DIAGNOSIS — M54.50 CHRONIC BILATERAL LOW BACK PAIN WITHOUT SCIATICA: ICD-10-CM

## 2022-01-21 DIAGNOSIS — G89.29 CHRONIC BILATERAL LOW BACK PAIN WITHOUT SCIATICA: ICD-10-CM

## 2022-01-21 PROBLEM — M51.36 DDD (DEGENERATIVE DISC DISEASE), LUMBAR: Status: ACTIVE | Noted: 2022-01-21

## 2022-01-21 PROBLEM — M51.369 DDD (DEGENERATIVE DISC DISEASE), LUMBAR: Status: ACTIVE | Noted: 2022-01-21

## 2022-01-21 PROBLEM — I50.9 CHF (CONGESTIVE HEART FAILURE) (HCC): Status: ACTIVE | Noted: 2022-01-21

## 2022-01-21 PROBLEM — G63 POLYNEUROPATHY IN OTHER DISEASES CLASSIFIED ELSEWHERE (HCC): Status: ACTIVE | Noted: 2022-01-21

## 2022-01-21 PROBLEM — D68.69 OTHER THROMBOPHILIA (HCC): Status: ACTIVE | Noted: 2022-01-21

## 2022-01-21 PROCEDURE — 72040 X-RAY EXAM NECK SPINE 2-3 VW: CPT

## 2022-01-24 ENCOUNTER — TELEPHONE (OUTPATIENT)
Dept: INTERNAL MEDICINE | Facility: OTHER | Age: 82
End: 2022-01-24

## 2022-01-24 DIAGNOSIS — K08.109 EDENTULOUS: ICD-10-CM

## 2022-01-24 DIAGNOSIS — R13.10 DYSPHAGIA, UNSPECIFIED TYPE: ICD-10-CM

## 2022-01-24 NOTE — TELEPHONE ENCOUNTER
----- Message from Sri Escudero M.D. sent at 1/21/2022  5:52 PM PST -----  I have written a letter to the patient discussing the results. Please print and mail the letter to the patient, thank you

## 2022-01-25 NOTE — TELEPHONE ENCOUNTER
"I have called the patient's caregiver, Alexandra, and explained the results. Patient has severe hard of hearing over the phone; therefore, this is the originally reason why I wrote the letter. However, Alexandra states that she explained the results to the patient.    Alexandra stated she read the letter to the patient, the patient was able to understand that she did not have any fractures, and there was some slippage of her vertebrae.    Patient went to dinner on Saturday night and the patient had difficulty eating rolls - \"I just have to spit it up\".    This could be a combination of not having dentures (patient is edentulous as noted per last physical exam on 1/18/2022 and perhaps a mechanical issue regarding her esophagus.    The next steps for the plan are:  1) Referral to dentistry for dentures  2) Referral for barium swallow study    Alexandra expressed understanding of the plan via the teach-back method. I have put in the orders for the dentist referral and barium swallow study. I stated I looked forward to seeing her at her next appointment. Alexandra thanked me for the call.    Sri Escudero MD, MPH  [employer] [facility], PGY-2    "

## 2022-02-21 ASSESSMENT — ENCOUNTER SYMPTOMS
CHEST TIGHTNESS: 0
HEMOPTYSIS: 0
DYSPNEA AT REST: 0
WHEEZING: 0
SHORTNESS OF BREATH: 1
RESPIRATORY SYMPTOMS COMMENTS: YES

## 2022-02-23 NOTE — TELEPHONE ENCOUNTER
I reviewed your letter. Patient will not be able to understand result letter. I think for this patient it will be better if you communicate this verbally rather than her trying to understand that result letter   54.4 54.4

## 2022-02-24 ENCOUNTER — OFFICE VISIT (OUTPATIENT)
Dept: SLEEP MEDICINE | Facility: MEDICAL CENTER | Age: 82
End: 2022-02-24
Payer: MEDICARE

## 2022-02-24 VITALS
WEIGHT: 156 LBS | TEMPERATURE: 97.7 F | HEIGHT: 58 IN | OXYGEN SATURATION: 91 % | DIASTOLIC BLOOD PRESSURE: 70 MMHG | BODY MASS INDEX: 32.75 KG/M2 | SYSTOLIC BLOOD PRESSURE: 130 MMHG | RESPIRATION RATE: 18 BRPM | HEART RATE: 70 BPM

## 2022-02-24 DIAGNOSIS — I26.99 PULMONARY EMBOLISM, OTHER, UNSPECIFIED CHRONICITY, UNSPECIFIED WHETHER ACUTE COR PULMONALE PRESENT (HCC): ICD-10-CM

## 2022-02-24 DIAGNOSIS — J96.91 RESPIRATORY FAILURE WITH HYPOXIA, UNSPECIFIED CHRONICITY (HCC): ICD-10-CM

## 2022-02-24 DIAGNOSIS — J98.4 RESTRICTIVE LUNG DISEASE: ICD-10-CM

## 2022-02-24 PROCEDURE — 99204 OFFICE O/P NEW MOD 45 MIN: CPT | Performed by: INTERNAL MEDICINE

## 2022-02-24 PROCEDURE — 94761 N-INVAS EAR/PLS OXIMETRY MLT: CPT | Performed by: INTERNAL MEDICINE

## 2022-02-24 ASSESSMENT — ENCOUNTER SYMPTOMS
WEIGHT LOSS: 0
DIZZINESS: 0
DIARRHEA: 0
NECK PAIN: 0
HEARTBURN: 0
EYE DISCHARGE: 0
HEADACHES: 0
FOCAL WEAKNESS: 0
TREMORS: 0
NAUSEA: 0
ABDOMINAL PAIN: 0
EYE PAIN: 0
FEVER: 0
SINUS PAIN: 0
FALLS: 0
STRIDOR: 0
BLURRED VISION: 0
HEMOPTYSIS: 0
PND: 0
DIAPHORESIS: 0
WEAKNESS: 0
CONSTIPATION: 0
DEPRESSION: 0
COUGH: 0
WHEEZING: 0
DOUBLE VISION: 0
SPUTUM PRODUCTION: 0
SORE THROAT: 0
ORTHOPNEA: 0
PALPITATIONS: 0
PHOTOPHOBIA: 0
MYALGIAS: 0
SPEECH CHANGE: 0
CLAUDICATION: 0
BACK PAIN: 0
CHILLS: 0
SHORTNESS OF BREATH: 1
EYE REDNESS: 0
VOMITING: 0

## 2022-02-24 ASSESSMENT — FIBROSIS 4 INDEX: FIB4 SCORE: 1.8

## 2022-02-24 NOTE — PROCEDURES
Multi-Ox Readings  Multi Ox #1 Room air   O2 sat % at rest 94   O2 sat % on exertion 88   O2 sat average on exertion     Multi Ox #2 2 LPM   O2 sat % at rest 96   O2 sat % on exertion 90   O2 sat average on exertion       Oxygen Use     Oxygen Frequency     Duration of need     Is the patient mobile within the home?     CPAP Use?     BIPAP Use?     Servo Titration
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
## 2022-03-02 ENCOUNTER — HOSPITAL ENCOUNTER (OUTPATIENT)
Dept: LAB | Facility: MEDICAL CENTER | Age: 82
End: 2022-03-02
Attending: STUDENT IN AN ORGANIZED HEALTH CARE EDUCATION/TRAINING PROGRAM
Payer: MEDICARE

## 2022-03-02 ENCOUNTER — HOSPITAL ENCOUNTER (OUTPATIENT)
Dept: LAB | Facility: MEDICAL CENTER | Age: 82
End: 2022-03-02
Attending: NURSE PRACTITIONER
Payer: MEDICARE

## 2022-03-02 DIAGNOSIS — I82.409 DEEP VEIN THROMBOSIS (DVT) OF LOWER EXTREMITY, UNSPECIFIED CHRONICITY, UNSPECIFIED LATERALITY, UNSPECIFIED VEIN (HCC): ICD-10-CM

## 2022-03-02 DIAGNOSIS — Z79.01 CHRONIC ANTICOAGULATION: ICD-10-CM

## 2022-03-02 LAB
25(OH)D3 SERPL-MCNC: 25 NG/ML (ref 30–100)
ALBUMIN SERPL BCP-MCNC: 4.3 G/DL (ref 3.2–4.9)
ALBUMIN/GLOB SERPL: 1.4 G/DL
ALP SERPL-CCNC: 82 U/L (ref 30–99)
ALT SERPL-CCNC: 8 U/L (ref 2–50)
ANION GAP SERPL CALC-SCNC: 12 MMOL/L (ref 7–16)
AST SERPL-CCNC: 19 U/L (ref 12–45)
BILIRUB SERPL-MCNC: 0.2 MG/DL (ref 0.1–1.5)
BUN SERPL-MCNC: 24 MG/DL (ref 8–22)
CALCIUM SERPL-MCNC: 9.7 MG/DL (ref 8.5–10.5)
CHLORIDE SERPL-SCNC: 110 MMOL/L (ref 96–112)
CO2 SERPL-SCNC: 21 MMOL/L (ref 20–33)
CREAT SERPL-MCNC: 1.03 MG/DL (ref 0.5–1.4)
ERYTHROCYTE [DISTWIDTH] IN BLOOD BY AUTOMATED COUNT: 53.9 FL (ref 35.9–50)
FASTING STATUS PATIENT QL REPORTED: NORMAL
GLOBULIN SER CALC-MCNC: 3 G/DL (ref 1.9–3.5)
GLUCOSE SERPL-MCNC: 108 MG/DL (ref 65–99)
HCT VFR BLD AUTO: 44.5 % (ref 37–47)
HGB BLD-MCNC: 12.8 G/DL (ref 12–16)
MCH RBC QN AUTO: 22.2 PG (ref 27–33)
MCHC RBC AUTO-ENTMCNC: 28.8 G/DL (ref 33.6–35)
MCV RBC AUTO: 77.3 FL (ref 81.4–97.8)
PLATELET # BLD AUTO: 283 K/UL (ref 164–446)
PMV BLD AUTO: 9.2 FL (ref 9–12.9)
POTASSIUM SERPL-SCNC: 4.8 MMOL/L (ref 3.6–5.5)
PROT SERPL-MCNC: 7.3 G/DL (ref 6–8.2)
RBC # BLD AUTO: 5.76 M/UL (ref 4.2–5.4)
SODIUM SERPL-SCNC: 143 MMOL/L (ref 135–145)
WBC # BLD AUTO: 5.5 K/UL (ref 4.8–10.8)

## 2022-03-02 PROCEDURE — 85027 COMPLETE CBC AUTOMATED: CPT

## 2022-03-02 PROCEDURE — 36415 COLL VENOUS BLD VENIPUNCTURE: CPT

## 2022-03-02 PROCEDURE — 82306 VITAMIN D 25 HYDROXY: CPT

## 2022-03-02 PROCEDURE — 80053 COMPREHEN METABOLIC PANEL: CPT

## 2022-03-07 ENCOUNTER — HOSPITAL ENCOUNTER (OUTPATIENT)
Dept: RADIOLOGY | Facility: MEDICAL CENTER | Age: 82
End: 2022-03-07
Attending: STUDENT IN AN ORGANIZED HEALTH CARE EDUCATION/TRAINING PROGRAM
Payer: MEDICARE

## 2022-03-07 ENCOUNTER — HOSPITAL ENCOUNTER (OUTPATIENT)
Dept: RADIOLOGY | Facility: MEDICAL CENTER | Age: 82
End: 2022-03-07
Attending: INTERNAL MEDICINE
Payer: MEDICARE

## 2022-03-07 DIAGNOSIS — J98.4 RESTRICTIVE LUNG DISEASE: ICD-10-CM

## 2022-03-07 DIAGNOSIS — R13.10 DYSPHAGIA, UNSPECIFIED TYPE: ICD-10-CM

## 2022-03-07 DIAGNOSIS — J96.91 RESPIRATORY FAILURE WITH HYPOXIA, UNSPECIFIED CHRONICITY (HCC): ICD-10-CM

## 2022-03-07 DIAGNOSIS — K08.109 EDENTULOUS: ICD-10-CM

## 2022-03-07 PROCEDURE — 74221 X-RAY XM ESOPHAGUS 2CNTRST: CPT

## 2022-03-07 PROCEDURE — 71250 CT THORAX DX C-: CPT

## 2022-03-21 DIAGNOSIS — K21.9 GASTROESOPHAGEAL REFLUX DISEASE WITHOUT ESOPHAGITIS: ICD-10-CM

## 2022-03-21 RX ORDER — FAMOTIDINE 20 MG/1
TABLET, FILM COATED ORAL
Qty: 90 TABLET | Refills: 3 | Status: SHIPPED | OUTPATIENT
Start: 2022-03-21

## 2022-03-21 NOTE — TELEPHONE ENCOUNTER
Last seen: 01/17/22 by Dr. Escudero  Next appt: 03/30/22 with Dr. Escudero    Was the patient seen in the last year in this department? Yes   Does patient have an active prescription for medications requested? No   Received Request Via: Pharmacy

## 2022-03-30 ENCOUNTER — OFFICE VISIT (OUTPATIENT)
Dept: INTERNAL MEDICINE | Facility: OTHER | Age: 82
End: 2022-03-30
Payer: MEDICARE

## 2022-03-30 VITALS
WEIGHT: 153 LBS | OXYGEN SATURATION: 93 % | HEIGHT: 58 IN | SYSTOLIC BLOOD PRESSURE: 133 MMHG | DIASTOLIC BLOOD PRESSURE: 87 MMHG | BODY MASS INDEX: 32.12 KG/M2 | HEART RATE: 66 BPM | TEMPERATURE: 98.9 F

## 2022-03-30 DIAGNOSIS — K21.9 GASTROESOPHAGEAL REFLUX DISEASE WITHOUT ESOPHAGITIS: ICD-10-CM

## 2022-03-30 DIAGNOSIS — E66.9 OBESITY (BMI 30-39.9): ICD-10-CM

## 2022-03-30 DIAGNOSIS — K44.9 LARGE HIATAL HERNIA: ICD-10-CM

## 2022-03-30 DIAGNOSIS — N18.30 STAGE 3 CHRONIC KIDNEY DISEASE, UNSPECIFIED WHETHER STAGE 3A OR 3B CKD: ICD-10-CM

## 2022-03-30 PROCEDURE — 99214 OFFICE O/P EST MOD 30 MIN: CPT | Mod: GC | Performed by: STUDENT IN AN ORGANIZED HEALTH CARE EDUCATION/TRAINING PROGRAM

## 2022-03-30 RX ORDER — GABAPENTIN 100 MG/1
100 CAPSULE ORAL 3 TIMES DAILY
COMMUNITY
Start: 2022-02-10

## 2022-03-30 ASSESSMENT — ENCOUNTER SYMPTOMS
MEMORY LOSS: 0
DIARRHEA: 0
SHORTNESS OF BREATH: 0
SPEECH CHANGE: 0
BLURRED VISION: 0
WHEEZING: 0
VOMITING: 0
SORE THROAT: 0
ABDOMINAL PAIN: 0
NAUSEA: 0
FALLS: 0
NECK PAIN: 1
CONSTIPATION: 0
DOUBLE VISION: 0
PALPITATIONS: 0
BACK PAIN: 1
COUGH: 0
HEADACHES: 0
CHILLS: 0
FEVER: 0
NERVOUS/ANXIOUS: 0
DIZZINESS: 0
SEIZURES: 0

## 2022-03-30 ASSESSMENT — FIBROSIS 4 INDEX: FIB4 SCORE: 1.99

## 2022-03-30 ASSESSMENT — LIFESTYLE VARIABLES: SUBSTANCE_ABUSE: 0

## 2022-03-30 NOTE — PROGRESS NOTES
Established Patient    Guillermina Recio is a 82 y.o. female who presents today with the following:    CC:   Chief Complaint   Patient presents with   • Follow-Up     Dysphagia        HPI:     Patient is a pleasant 81 y/o F with a PMH significant for h/o DVT (on Eliquis), DLD, pulmonary HTN, chronic respiratory failure/asthma, IBS, MDD, chronic neck pain, and chronic hip pain who presents for follow-up of multiple issues:    Patient is here with caregiver, May.    Follow-up of difficulty swallowing: Patient has large hiatal hernia which was noted on barium swallow study as well as CT ordered by Dr. Sheffield.  She is currently having difficulty swallowing very solid foods such as bread and biscuits.  She is trying to take smaller bites of food.  She still does not have teeth but has a list of dentists from a referral previously to get dentures.  She is open to getting a second opinion from gastroenterology but would not like surgery.6    Chronic respiratory failure: Patient sees Dr. Sheffield.  Is currently on oxygen around the house and has been breathing better with it.  She recently had her high-resolution CT done and brought the results in clinic.         Review of Systems   Constitutional: Negative for chills, fever and malaise/fatigue.   HENT: Negative for congestion and sore throat.    Eyes: Negative for blurred vision and double vision.   Respiratory: Negative for cough, shortness of breath and wheezing.    Cardiovascular: Negative for chest pain, palpitations and leg swelling.   Gastrointestinal: Negative for abdominal pain, constipation, diarrhea, nausea and vomiting.        +difficulty swallowing   Genitourinary: Negative for dysuria, frequency and urgency.   Musculoskeletal: Positive for back pain, joint pain and neck pain. Negative for falls.   Neurological: Negative for dizziness, speech change, seizures and headaches.   Psychiatric/Behavioral: Negative for memory loss, substance abuse and suicidal  ideas. The patient is not nervous/anxious.        Patient Active Problem List    Diagnosis Date Noted   • Mild persistent asthma 10/05/2021   • Chronic respiratory failure, unsp w hypoxia or hypercapnia ([facility]) 06/09/2021   • Macular degeneration 03/06/2019   • Neuropathic pain of both feet 10/05/2021   • Obesity (BMI 30-39.9) 03/30/2022   • BMI 32.0-32.9,adult 01/21/2022   • Other thrombophilia ([facility]) 01/21/2022   • CHF (congestive heart failure) ([facility]) 01/21/2022   • Polyneuropathy in other diseases classified elsewhere ([facility]) 01/21/2022   • DDD (degenerative disc disease), lumbar 01/21/2022   • Cough 09/03/2021   • Increased frequency of urination 09/03/2021   • Complete loss of teeth, unspecified cause, class II 06/16/2021   • Uncomplicated opioid dependence ([facility]) 06/09/2021   • Major depressive disorder in partial remission ([facility]) 06/07/2021   • Osteoarthrosis 06/07/2021   • Large hiatal hernia 06/07/2021   • Unspecified symptoms and signs involving cognitive functions and awareness 11/20/2020   • Leukopenia 09/17/2020   • Arteriosclerosis of coronary artery 09/17/2020   • Deep vein thrombosis (DVT) ([facility]) 06/03/2020   • Pulmonary hypertension ([facility]) 12/26/2019   • Hip dislocation, right ([facility]) 12/12/2019   • Lung mass 11/28/2019   • Impaired mobility and ADLs 11/27/2019   • History of operative procedure on hip 11/25/2019   • Hyperglycemia 11/25/2019   • COPD (chronic obstructive pulmonary disease) ([facility]) 11/15/2019   • 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 05/28/2019   • Hearing loss, bilateral 03/06/2019   • Irritable bowel syndrome 03/06/2019   • Chronic neck pain 02/27/2019   • Pain of right hip joint 04/12/2018   • Preventative health care 10/30/2017   • Chronic use of opiate drug for therapeutic purpose 07/07/2017   • Chronic bilateral low back pain without sciatica 07/07/2017   • Gastroesophageal reflux disease 12/16/2016   • Vitamin D deficiency 12/16/2016   • Controlled substance  agreement signed 12/16/2016   • Polypharmacy 06/29/2016   • Dermatochalasis 07/14/2015   • Benign positional vertigo 01/08/2015   • Urinary incontinence 10/09/2014   • Dyslipidemia 10/03/2014   • HTN (hypertension) 06/25/2009   • Osteopenia 06/25/2009       Social History     Tobacco Use   • Smoking status: Passive Smoke Exposure - Never Smoker   • Smokeless tobacco: Never Used   • Tobacco comment: lived with smokers   Vaping Use   • Vaping Use: Never used   • Passive vaping exposure: Yes   Substance Use Topics   • Alcohol use: Not Currently     Alcohol/week: 0.0 oz     Comment: 3 per year   • Drug use: No     Comment: In the Distant Past, but not since 32 y.o.  (prior - Pink heart,[employer], crosstops - stimulants for working double shifts, but not for several decades)       Current Outpatient Medications   Medication Sig Dispense Refill   • gabapentin (NEURONTIN) 100 MG Cap 100 mg 3 times a day.     • famotidine (PEPCID) 20 MG Tab TAKE 1 TABLET BY MOUTH EVERY DAY 30MINS PRIOR TO FOOD 90 Tablet 3   • SYMBICORT 160-4.5 MCG/ACT Aerosol INHALE 1 PUFF BY MOUTH TWICE A DAY 10.2 g 5   • Multiple Vitamins-Minerals (CENTRUM SILVER 50+WOMEN) Tab Take  by mouth.     • escitalopram (LEXAPRO) 10 MG Tab TAKE 1 TABLET ORAL EVERY MORNING WITH MEALS     • ELIQUIS 5 MG Tab TAKE 1 TABLET BY MOUTH TWICE A  Tablet 1   • Fluticasone Furoate-Vilanterol (BREO ELLIPTA) 100-25 MCG/INH AEROSOL POWDER, BREATH ACTIVATED BREO ELLIPTA 100-25 MCG/INH AEPB     • loperamide (IMODIUM A-D) 2 MG tablet LOPERAMIDE HCL 2 MG TABS     • albuterol 108 (90 Base) MCG/ACT Aero Soln inhalation aerosol Inhale 1-2 Puffs by mouth every four hours as needed for Shortness of Breath.     • Melatonin (VITAJOY GUMMIES PO) Take  by mouth.     • Biotin 10 MG Tab Take  by mouth.     • HYDROcodone-acetaminophen (NORCO) 5-325 MG Tab per tablet Take 1 Tab by mouth every four hours as needed.     • ketoconazole (NIZORAL) 2 % Cream Apply  to affected area(s) every  "day.     • polyethylene glycol/lytes (MIRALAX) Pack Take 1 Packet by mouth 1 time daily as needed (if sennosides and docusate ineffective after 24 hours).  3   • acetaminophen (TYLENOL) 650 MG CR tablet Take 650 mg by mouth 3 times a day.     • montelukast (SINGULAIR) 10 MG Tab Take 10 mg by mouth every evening.     • Multiple Vitamins-Minerals (OCUVITE-LUTEIN) Tab Take 1 tablet by mouth every morning.     • ascorbic acid (VITAMIN C) 500 MG tablet Take 1 Tab by mouth every day. 30 Tab 3   • furosemide (LASIX) 20 MG Tab Take 1 Tab by mouth every day. 60 Tab    • vitamin D 2000 UNIT Tab Take 1 Tab by mouth every day. 60 Tab    • lovastatin (MEVACOR) 40 MG tablet Take 1 Tab by mouth every bedtime. 30 Tab 0   • gabapentin (NEURONTIN) 300 MG Cap Take 1 Capsule by mouth 3 times a day. Take 1 capsule in the morning and 2 in the evening (Patient not taking: Reported on 3/30/2022) 90 Capsule 3   • senna-docusate (PERICOLACE OR SENOKOT S) 8.6-50 MG Tab Take 2 Tabs by mouth 2 Times a Day. (Patient not taking: Reported on 3/30/2022) 30 Tab 0   • simethicone (MYLICON) 80 MG Chew Tab Take 120 mg by mouth 4 Times a Day,Before Meals and at Bedtime.       No current facility-administered medications for this visit.       /87 (BP Location: Left arm, Patient Position: Sitting, BP Cuff Size: Adult)   Pulse 66   Temp 37.2 °C (98.9 °F) (Temporal)   Ht 1.473 m (4' 10\")   Wt 69.4 kg (153 lb)   SpO2 93%   BMI 31.98 kg/m²     PHYSICAL EXAM:  Physical Exam  Vitals and nursing note reviewed. Exam conducted with a chaperone present (May present).   Constitutional:       General: She is not in acute distress.     Appearance: Normal appearance. She is obese. She is not ill-appearing or toxic-appearing.   HENT:      Head: Normocephalic and atraumatic.      Right Ear: External ear normal.      Left Ear: External ear normal.      Nose: Nose normal. No congestion or rhinorrhea.      Mouth/Throat:      Mouth: Mucous membranes are moist. "      Pharynx: Oropharynx is clear. No oropharyngeal exudate.      Comments: edentulous  Eyes:      General:         Right eye: No discharge.         Left eye: No discharge.      Extraocular Movements: Extraocular movements intact.      Conjunctiva/sclera: Conjunctivae normal.      Pupils: Pupils are equal, round, and reactive to light.   Cardiovascular:      Rate and Rhythm: Normal rate and regular rhythm.      Pulses: Normal pulses.   Pulmonary:      Effort: Pulmonary effort is normal. No respiratory distress.      Breath sounds: Normal breath sounds. No wheezing or rales.   Abdominal:      General: Bowel sounds are normal. There is no distension.      Palpations: Abdomen is soft.      Tenderness: There is no abdominal tenderness. There is no right CVA tenderness or left CVA tenderness.      Comments: protuberant   Musculoskeletal:         General: No swelling or tenderness. Normal range of motion.      Cervical back: Normal range of motion and neck supple. No rigidity or tenderness.      Right lower leg: No edema.      Left lower leg: No edema.   Skin:     General: Skin is warm and dry.      Capillary Refill: Capillary refill takes less than 2 seconds.      Findings: No bruising.   Neurological:      Mental Status: She is alert and oriented to person, place, and time. Mental status is at baseline.      Cranial Nerves: No cranial nerve deficit.      Sensory: No sensory deficit.      Coordination: Coordination normal.      Comments: Ambulates with cane   Psychiatric:         Mood and Affect: Mood normal.         Behavior: Behavior normal.         Thought Content: Thought content normal.         Cognition and Memory: Memory is impaired.         Judgment: Judgment normal.      Comments: Jokes appropriately           Assessment and Plan    1. Stage 3 chronic kidney disease, unspecified whether stage 3a or 3b CKD ([facility])  Continue to monitor baseline labs only as needed. Creatinine of 1.03 from 3/2022, stable.    2.  Gastroesophageal reflux disease without esophagitis  Refer to gastroenterology for second opinion in the context of difficulty swallowing due to hiatal hernia. Continue famotidine.  - Referral to Gastroenterology    3. Large hiatal hernia  Refer to gastroenterology for second opinion in the context of difficulty swallowing due to hiatal hernia. Patient does not wish for surgery but wishes for a second opinion as to whether there could be another medication or non-surgical intervention to help her symptomatically have less difficulty swallowing. Encouraged patient to take smaller bites and also obtain teeth to help with chewing process.  - Referral to Gastroenterology    4. Obesity (BMI 30-39.9)  Lifestyle modification and diet changes given    Return to clinic in 3-4 months for annual wellness visit.    Signed by: Sri Escudero M.D.

## 2022-03-30 NOTE — PATIENT INSTRUCTIONS
Hello!    Today we saw you for:    -Follow up of difficulty swallowing, hiatal hernia: I have referred you to GI for a second opinion. In the mean time, keep on taking smaller bites of food. Be sure to get your teeth done so you can chew better.  -Keep on using your oxygen!    See me in the beginning of July/end of June.    Thank you!

## 2022-04-01 ENCOUNTER — TELEPHONE (OUTPATIENT)
Dept: INTERNAL MEDICINE | Facility: OTHER | Age: 82
End: 2022-04-01
Payer: MEDICARE

## 2022-04-01 DIAGNOSIS — M85.89 OSTEOPENIA OF MULTIPLE SITES: ICD-10-CM

## 2022-04-01 NOTE — TELEPHONE ENCOUNTER
Called May to discuss getting a DEXA scan in light of the information from [facility] (Ortho and Spine surgery referral); no answer - left voice mail. Will attempt to call again later    Sri Escudero MD, MPH  [employer] [facility], PGY-2

## 2022-04-01 NOTE — TELEPHONE ENCOUNTER
I talked to May on the phone; discussed the results from the [facility]. Will order DEXA scan for the patient. Gave the patient the phone number to schedule. Will follow up on results.    Also put the print out of the DEXA order on Brittney's desk to ask her to mail to the patient for reference.    Thank you!    Sri Escudero MD, MPH  [employer] [facility], PGY-2

## 2022-04-04 ENCOUNTER — ANTICOAGULATION VISIT (OUTPATIENT)
Dept: VASCULAR LAB | Facility: MEDICAL CENTER | Age: 82
End: 2022-04-04
Attending: INTERNAL MEDICINE
Payer: MEDICARE

## 2022-04-04 VITALS — DIASTOLIC BLOOD PRESSURE: 76 MMHG | HEART RATE: 68 BPM | SYSTOLIC BLOOD PRESSURE: 142 MMHG

## 2022-04-04 DIAGNOSIS — I82.4Y9 DEEP VEIN THROMBOSIS (DVT) OF PROXIMAL LOWER EXTREMITY, UNSPECIFIED CHRONICITY, UNSPECIFIED LATERALITY (HCC): ICD-10-CM

## 2022-04-04 PROCEDURE — 99212 OFFICE O/P EST SF 10 MIN: CPT | Performed by: NURSE PRACTITIONER

## 2022-04-04 NOTE — PROGRESS NOTES
Diagnosis: DVT, PE  Drug: Eliquis 5 mg BID  LOT: Indefinite  CHADSVASC: n/a  HAS-BLED: 1    Health Status Since Last Assessment  Any new relevant medical problems, ED visits/hospitalizations - no  Any embolic events (stroke / TIA / systemic embolism) - no    Adherence with DOAC Therapy  0 missed dose(s)  BLEEDING RISK ASSESSMENT NB:    Bleeding Risk Assessment  Severe epistaxis - no   Hemoptysis - no  Excessive or unusual bruising / hematomas - no  GIB/melena/BRBPR/hematemesis - no  Hematuria -  no   Abnormal vaginal bleeding -  no  Concerning daily headache or subdural hematoma symptoms - no  Decreasing hemoglobin or new anemia - no  Falls, presyncope, syncope, or seizures - no  Platelets: 283  Latest hemoglobin:     Lab Results   Component Value Date/Time    WBC 5.5 03/02/2022 10:13 AM    RBC 5.76 (H) 03/02/2022 10:13 AM    HEMOGLOBIN 12.8 03/02/2022 10:13 AM    HEMATOCRIT 44.5 03/02/2022 10:13 AM    MCV 77.3 (L) 03/02/2022 10:13 AM    MCH 22.2 (L) 03/02/2022 10:13 AM    MCHC 28.8 (L) 03/02/2022 10:13 AM    MPV 9.2 03/02/2022 10:13 AM    NEUTSPOLYS 59.30 07/16/2021 03:57 PM    LYMPHOCYTES 28.20 07/16/2021 03:57 PM    MONOCYTES 11.80 07/16/2021 03:57 PM    EOSINOPHILS 0.00 07/16/2021 03:57 PM    BASOPHILS 0.20 07/16/2021 03:57 PM    HYPOCHROMIA 1+ 01/07/2020 05:54 AM    ANISOCYTOSIS 1+ 01/11/2020 05:15 AM        HAS-BLED:  Hypertension (uncontrolled, >160 mmHg systolic) - no  Renal disease (dialysis, transplant, Cr >2.26 mg/dL or >200 µmol/L) - no  Liver disease (cirrhosis or bilirubin >2x normal with AST/ALT/AP >3x normal) - no  Stroke history - no  Prior major bleeding or predisposition to bleeding - no  Labile INR Unstable/high INRs, time in therapeutic range <60% - no  Age >65 - yes  Medication usage predisposing to bleeding (aspirin, clopidogrel, NSAIDs) - no  Alcohol use  ?8 drinks/week - no      Creatinine Clearance/Renal Function  Latest eGFR / creatinine:  Lab Results   Component Value Date/Time    SODIUM  143 03/02/2022 10:13 AM    POTASSIUM 4.8 03/02/2022 10:13 AM    CHLORIDE 110 03/02/2022 10:13 AM    CO2 21 03/02/2022 10:13 AM    GLUCOSE 108 (H) 03/02/2022 10:13 AM    BUN 24 (H) 03/02/2022 10:13 AM    CREATININE 1.03 03/02/2022 10:13 AM    CREATININE 0.9 01/16/2009 11:34 AM      • Is eGFR less than 50ml/min  no  Cr cl ~45 ml/min    If YES, calculate CrCl (see back)  Any recent dehydrating illness or medications added/changed? i.e. Diuretics      Drug Interactions  ASA/antiplatelets - avoids  NSAID - avoids  Other drug interactions - SSRI (Review med list / OTCs;)  Current Outpatient Medications on File Prior to Visit   Medication Sig Dispense Refill   • gabapentin (NEURONTIN) 100 MG Cap 100 mg 3 times a day.     • famotidine (PEPCID) 20 MG Tab TAKE 1 TABLET BY MOUTH EVERY DAY 30MINS PRIOR TO FOOD 90 Tablet 3   • SYMBICORT 160-4.5 MCG/ACT Aerosol INHALE 1 PUFF BY MOUTH TWICE A DAY 10.2 g 5   • Multiple Vitamins-Minerals (CENTRUM SILVER 50+WOMEN) Tab Take  by mouth.     • gabapentin (NEURONTIN) 300 MG Cap Take 1 Capsule by mouth 3 times a day. Take 1 capsule in the morning and 2 in the evening (Patient not taking: Reported on 3/30/2022) 90 Capsule 3   • escitalopram (LEXAPRO) 10 MG Tab TAKE 1 TABLET ORAL EVERY MORNING WITH MEALS     • ELIQUIS 5 MG Tab TAKE 1 TABLET BY MOUTH TWICE A  Tablet 1   • Fluticasone Furoate-Vilanterol (BREO ELLIPTA) 100-25 MCG/INH AEROSOL POWDER, BREATH ACTIVATED BREO ELLIPTA 100-25 MCG/INH AEPB     • loperamide (IMODIUM A-D) 2 MG tablet LOPERAMIDE HCL 2 MG TABS     • albuterol 108 (90 Base) MCG/ACT Aero Soln inhalation aerosol Inhale 1-2 Puffs by mouth every four hours as needed for Shortness of Breath.     • Melatonin (VITAJOY GUMMIES PO) Take  by mouth.     • Biotin 10 MG Tab Take  by mouth.     • HYDROcodone-acetaminophen (NORCO) 5-325 MG Tab per tablet Take 1 Tab by mouth every four hours as needed.     • ketoconazole (NIZORAL) 2 % Cream Apply  to affected area(s) every day.      • senna-docusate (PERICOLACE OR SENOKOT S) 8.6-50 MG Tab Take 2 Tabs by mouth 2 Times a Day. (Patient not taking: Reported on 3/30/2022) 30 Tab 0   • polyethylene glycol/lytes (MIRALAX) Pack Take 1 Packet by mouth 1 time daily as needed (if sennosides and docusate ineffective after 24 hours).  3   • acetaminophen (TYLENOL) 650 MG CR tablet Take 650 mg by mouth 3 times a day.     • montelukast (SINGULAIR) 10 MG Tab Take 10 mg by mouth every evening.     • Multiple Vitamins-Minerals (OCUVITE-LUTEIN) Tab Take 1 tablet by mouth every morning.     • simethicone (MYLICON) 80 MG Chew Tab Take 120 mg by mouth 4 Times a Day,Before Meals and at Bedtime.     • ascorbic acid (VITAMIN C) 500 MG tablet Take 1 Tab by mouth every day. 30 Tab 3   • furosemide (LASIX) 20 MG Tab Take 1 Tab by mouth every day. 60 Tab    • vitamin D 2000 UNIT Tab Take 1 Tab by mouth every day. 60 Tab    • lovastatin (MEVACOR) 40 MG tablet Take 1 Tab by mouth every bedtime. 30 Tab 0     No current facility-administered medications on file prior to visit.     Verified no anticonvulsant or azole therapy, education provided for future use.      Examination  Blood pressure:  142/76   • Elevated BP - no (sBP greater than 160 mmHg)  • Symptomatic hypotension - no  Significant gait impairment / imbalance / high risk for falls - age is a risk, uses a cane    Final Assessment and Recommendations:  Patient appears stable from the anticoagulation standpoint  Benefits of continued DOAC therapy outweigh risks for this patient  Recommend continue current DOAC at same dose    - continue taking Eliquis 5 mg by mouth twice daily    Other Actions:   The rationale for continued DOAC therapy  The potential for minor, major or life-threatening bleeding  Dosing instructions, adherence, risks of non-adherence, handling missed doses  Avoiding OTC ASA & NSAIDs & minimizing EtOH to reduce bleeding risks  Dosing around upcoming procedure / surgery if applicable (see  back)    Follow up:  Will follow up with patient in 6 months      [facility] BUTLER

## 2022-04-11 ENCOUNTER — HOSPITAL ENCOUNTER (OUTPATIENT)
Dept: RADIOLOGY | Facility: MEDICAL CENTER | Age: 82
End: 2022-04-11
Attending: STUDENT IN AN ORGANIZED HEALTH CARE EDUCATION/TRAINING PROGRAM
Payer: MEDICARE

## 2022-04-11 DIAGNOSIS — M85.89 OSTEOPENIA OF MULTIPLE SITES: ICD-10-CM

## 2022-04-11 PROCEDURE — 77080 DXA BONE DENSITY AXIAL: CPT

## 2022-04-12 ENCOUNTER — TELEPHONE (OUTPATIENT)
Dept: INTERNAL MEDICINE | Facility: OTHER | Age: 82
End: 2022-04-12
Payer: MEDICARE

## 2022-04-12 DIAGNOSIS — M81.0 AGE-RELATED OSTEOPOROSIS WITHOUT CURRENT PATHOLOGICAL FRACTURE: ICD-10-CM

## 2022-04-12 PROCEDURE — RXMED WILLOW AMBULATORY MEDICATION CHARGE: Performed by: STUDENT IN AN ORGANIZED HEALTH CARE EDUCATION/TRAINING PROGRAM

## 2022-04-12 NOTE — TELEPHONE ENCOUNTER
Outpatient infusion order needs to be filled out and ordered , pls reach out to provider that will help to fill out

## 2022-04-18 ENCOUNTER — PHARMACY VISIT (OUTPATIENT)
Dept: PHARMACY | Facility: MEDICAL CENTER | Age: 82
End: 2022-04-18
Payer: COMMERCIAL

## 2022-05-10 DIAGNOSIS — Z79.01 CHRONIC ANTICOAGULATION: ICD-10-CM

## 2022-06-08 ENCOUNTER — OFFICE VISIT (OUTPATIENT)
Dept: INTERNAL MEDICINE | Facility: OTHER | Age: 82
End: 2022-06-08
Payer: MEDICARE

## 2022-06-08 VITALS
HEART RATE: 71 BPM | WEIGHT: 152.8 LBS | BODY MASS INDEX: 32.07 KG/M2 | SYSTOLIC BLOOD PRESSURE: 153 MMHG | HEIGHT: 58 IN | OXYGEN SATURATION: 90 % | TEMPERATURE: 98.3 F | DIASTOLIC BLOOD PRESSURE: 78 MMHG

## 2022-06-08 DIAGNOSIS — I10 PRIMARY HYPERTENSION: ICD-10-CM

## 2022-06-08 DIAGNOSIS — K58.2 IRRITABLE BOWEL SYNDROME WITH BOTH CONSTIPATION AND DIARRHEA: ICD-10-CM

## 2022-06-08 DIAGNOSIS — K08.102: ICD-10-CM

## 2022-06-08 DIAGNOSIS — M85.80 OSTEOPENIA, UNSPECIFIED LOCATION: ICD-10-CM

## 2022-06-08 DIAGNOSIS — G89.29 CHRONIC BILATERAL LOW BACK PAIN WITHOUT SCIATICA: ICD-10-CM

## 2022-06-08 DIAGNOSIS — G57.93 NEUROPATHIC PAIN OF BOTH FEET: ICD-10-CM

## 2022-06-08 DIAGNOSIS — E66.9 OBESITY (BMI 30-39.9): ICD-10-CM

## 2022-06-08 DIAGNOSIS — M54.50 CHRONIC BILATERAL LOW BACK PAIN WITHOUT SCIATICA: ICD-10-CM

## 2022-06-08 DIAGNOSIS — K21.9 GASTROESOPHAGEAL REFLUX DISEASE, UNSPECIFIED WHETHER ESOPHAGITIS PRESENT: ICD-10-CM

## 2022-06-08 DIAGNOSIS — F33.41 RECURRENT MAJOR DEPRESSIVE DISORDER, IN PARTIAL REMISSION (HCC): ICD-10-CM

## 2022-06-08 DIAGNOSIS — K44.9 LARGE HIATAL HERNIA: ICD-10-CM

## 2022-06-08 DIAGNOSIS — R73.03 PREDIABETES: ICD-10-CM

## 2022-06-08 PROBLEM — D72.819 LEUKOPENIA: Status: RESOLVED | Noted: 2020-09-17 | Resolved: 2022-06-08

## 2022-06-08 PROBLEM — G63 POLYNEUROPATHY IN OTHER DISEASES CLASSIFIED ELSEWHERE (HCC): Status: RESOLVED | Noted: 2022-01-21 | Resolved: 2022-06-08

## 2022-06-08 PROBLEM — F11.20 UNCOMPLICATED OPIOID DEPENDENCE (HCC): Status: RESOLVED | Noted: 2021-06-09 | Resolved: 2022-06-08

## 2022-06-08 PROBLEM — R05.9 COUGH: Status: RESOLVED | Noted: 2021-09-03 | Resolved: 2022-06-08

## 2022-06-08 LAB
HBA1C MFR BLD: 6 % (ref 0–5.6)
INT CON NEG: NEGATIVE
INT CON POS: POSITIVE

## 2022-06-08 PROCEDURE — 99214 OFFICE O/P EST MOD 30 MIN: CPT | Mod: GC | Performed by: STUDENT IN AN ORGANIZED HEALTH CARE EDUCATION/TRAINING PROGRAM

## 2022-06-08 PROCEDURE — 83036 HEMOGLOBIN GLYCOSYLATED A1C: CPT | Performed by: STUDENT IN AN ORGANIZED HEALTH CARE EDUCATION/TRAINING PROGRAM

## 2022-06-08 ASSESSMENT — FIBROSIS 4 INDEX: FIB4 SCORE: 1.99

## 2022-06-08 NOTE — LETTER
[facility]  Sri Escudero M.D.  [address] [place] [address]  Fax: [phone]   Authorization for Release/Disclosure of   Protected Health Information   Name: ERIN JADE : 1940 SSN: [id number]   Address: [address]  [place] [address] Phone:    [phone] (home)    I authorize the entity listed below to release/disclose the PHI below to:   [facility]/Sri Escudero M.D. and Sri Escudero M.D.   Provider or Entity Name:[facility]     Address   City, [facility]   Phone: [phone]      Fax:[phone]     Reason for request: continuity of care   Information to be released:    [  ] LAST COLONOSCOPY,  including any PATH REPORT and follow-up  [  ] LAST FIT/COLOGUARD RESULT [  ] LAST DEXA  [  ] LAST MAMMOGRAM  [  ] LAST PAP  [  ] LAST LABS [  ] RETINA EXAM REPORT  [  ] IMMUNIZATION RECORDS  [  ] Release all info      [  ] Check here and initial the line next to each item to release ALL health information INCLUDING  _____ Care and treatment for drug and / or alcohol abuse  _____ HIV testing, infection status, or AIDS  _____ Genetic Testing    DATES OF SERVICE OR TIME PERIOD TO BE DISCLOSED: _____________  I understand and acknowledge that:  * This Authorization may be revoked at any time by you in writing, except if your health information has already been used or disclosed.  * Your health information that will be used or disclosed as a result of you signing this authorization could be re-disclosed by the recipient. If this occurs, your re-disclosed health information may no longer be protected by State or Federal laws.  * You may refuse to sign this Authorization. Your refusal will not affect your ability to obtain treatment.  * This Authorization becomes effective upon signing and will  on (date) __________.      If no date is indicated, this Authorization will  one (1) year from the signature date.    Name: Erin Jade    Signature:   Date:     2022        PLEASE FAX REQUESTED RECORDS BACK TO: [phone]

## 2022-06-08 NOTE — PROGRESS NOTES
"Established Patient    Guillermina Recio is a 82 y.o. female who presents today with the following:    CC:   Chief Complaint   Patient presents with   • Follow-Up     Went to the GI yesterday.    • Back Pain       HPI:     Patient is a pleasant 81 y/o F with a PMH significant for HTN/DLD< macular degeneration, COPD (due to secondhand smoking), chronic low back pain (on opioid use, following Pain Management), h/o DVT (on chronic Eliquis use), CHF (following cardiology), MDD, osteopenia, and GERD who presents for follow up of various issues. She is here with Alexandra, her caregiver and DPOA.    Large hiatal hernia: Has been having esophageal dysphagia in the past. [facility] and was found to have a large hiatal hernia - \"stomach is above my diaphragm and was twisted\". She was told to see a surgeon immediately to get surgery. She will fill out a [facility] form for [facility]. She doesn't have anymore swallowing issues because she has lack of appetite. She is drinking Ensure every day. She is currently on a soft foods diet per GI. Denies chest pain and shortness of breath.    Chronic LBP: Dr. John Hernández prescribes her narcotics for chronic pain medications for her chronic LBP. She is going to get corticosteroid shots during her next visit in 2 weeks (sees him 1x/month). PDMP reviewed.    Macular degeneration: She is going to see her ophthalmologist in 2 weeks. Taking Ocuvite-Lutein.    IBS: Has accidents occasionally but much improved since the past. Uses Imodium PRN.    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[facility]) 01/21/2022   • CHF (congestive heart failure) ([facility]) " 01/21/2022   • DDD (degenerative disc disease), lumbar 01/21/2022   • Increased frequency of urination 09/03/2021   • Complete loss of teeth, unspecified cause, class II 06/16/2021   • Major depressive disorder in partial remission ([facility]) 06/07/2021   • Osteoarthrosis 06/07/2021   • Large hiatal hernia 06/07/2021   • Unspecified symptoms and signs involving cognitive functions and awareness 11/20/2020   • Arteriosclerosis of coronary artery 09/17/2020   • Deep vein thrombosis (DVT) ([facility]) 06/03/2020   • Pulmonary hypertension ([facility]) 12/26/2019   • Hip dislocation, right ([facility]) 12/12/2019   • Lung mass 11/28/2019   • Impaired mobility and ADLs 11/27/2019   • History of operative procedure on hip 11/25/2019   • Prediabetes 11/25/2019   • COPD (chronic obstructive pulmonary disease) ([facility]) 11/15/2019   • 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 05/28/2019   • Hearing loss, bilateral 03/06/2019   • Irritable bowel syndrome 03/06/2019   • Chronic neck pain 02/27/2019   • Pain of right hip joint 04/12/2018   • Preventative health care 10/30/2017   • Chronic use of opiate drug for therapeutic purpose 07/07/2017   • Chronic bilateral low back pain without sciatica 07/07/2017   • Gastroesophageal reflux disease 12/16/2016   • Vitamin D deficiency 12/16/2016   • Controlled substance agreement signed 12/16/2016   • Polypharmacy 06/29/2016   • Dermatochalasis 07/14/2015   • Benign positional vertigo 01/08/2015   • Urinary incontinence 10/09/2014   • Dyslipidemia 10/03/2014   • HTN (hypertension) 06/25/2009   • Osteopenia 06/25/2009       Social History     Tobacco Use   • Smoking status: Passive Smoke Exposure - Never Smoker   • Smokeless tobacco: Never Used   • Tobacco comment: lived with smokers   Vaping Use   • Vaping Use: Never used   • Passive vaping exposure: Yes   Substance Use Topics   • Alcohol use: Not Currently     Alcohol/week: 0.0 oz     Comment: 3 per year   • Drug use: No     Comment: In the  Distant Past, but not since 32 y.o.  (prior - [employer], crosstops - stimulants for working double shifts, but not for several decades)       Current Outpatient Medications   Medication Sig Dispense Refill   • denosumab (PROLIA) 60 MG/ML Solution Prefilled Syringe injection Inject 1 mL under the skin every 6 months. 1 mL 0   • gabapentin (NEURONTIN) 100 MG Cap 100 mg 3 times a day.     • famotidine (PEPCID) 20 MG Tab TAKE 1 TABLET BY MOUTH EVERY DAY 30MINS PRIOR TO FOOD 90 Tablet 3   • SYMBICORT 160-4.5 MCG/ACT Aerosol INHALE 1 PUFF BY MOUTH TWICE A DAY 10.2 g 5   • Multiple Vitamins-Minerals (CENTRUM SILVER 50+WOMEN) Tab Take  by mouth.     • gabapentin (NEURONTIN) 300 MG Cap Take 1 Capsule by mouth 3 times a day. Take 1 capsule in the morning and 2 in the evening 90 Capsule 3   • escitalopram (LEXAPRO) 10 MG Tab TAKE 1 TABLET ORAL EVERY MORNING WITH MEALS     • ELIQUIS 5 MG Tab TAKE 1 TABLET BY MOUTH TWICE A  Tablet 1   • loperamide (IMODIUM A-D) 2 MG tablet LOPERAMIDE HCL 2 MG TABS     • albuterol 108 (90 Base) MCG/ACT Aero Soln inhalation aerosol Inhale 1-2 Puffs by mouth every four hours as needed for Shortness of Breath.     • Melatonin (VITAJOY GUMMIES PO) Take  by mouth.     • Biotin 10 MG Tab Take  by mouth.     • HYDROcodone-acetaminophen (NORCO) 5-325 MG Tab per tablet Take 1 Tab by mouth every four hours as needed.     • polyethylene glycol/lytes (MIRALAX) Pack Take 1 Packet by mouth 1 time daily as needed (if sennosides and docusate ineffective after 24 hours).  3   • acetaminophen (TYLENOL) 650 MG CR tablet Take 650 mg by mouth 3 times a day.     • montelukast (SINGULAIR) 10 MG Tab Take 10 mg by mouth every evening.     • Multiple Vitamins-Minerals (OCUVITE-LUTEIN) Tab Take 1 tablet by mouth every morning.     • ascorbic acid (VITAMIN C) 500 MG tablet Take 1 Tab by mouth every day. 30 Tab 3   • furosemide (LASIX) 20 MG Tab Take 1 Tab by mouth every day. 60 Tab    • vitamin D 2000  "UNIT Tab Take 1 Tab by mouth every day. 60 Tab    • lovastatin (MEVACOR) 40 MG tablet Take 1 Tab by mouth every bedtime. 30 Tab 0     No current facility-administered medications for this visit.       BP (!) 153/78 (BP Location: Left arm, Patient Position: Sitting, BP Cuff Size: Adult)   Pulse 71   Temp 36.8 °C (98.3 °F) (Temporal)   Ht 1.473 m (4' 10\")   Wt 69.3 kg (152 lb 12.8 oz)   SpO2 90%   BMI 31.94 kg/m²      Repeat BP: 144/76 (manual)    PHYSICAL EXAM:  Physical Exam  Vitals and nursing note reviewed. Exam conducted with a chaperone present (May present, MS-3 Andthierno Boothe present).   Constitutional:       General: She is not in acute distress.     Appearance: Normal appearance. She is obese. She is not ill-appearing or toxic-appearing.   HENT:      Head: Normocephalic and atraumatic.      Right Ear: External ear normal.      Left Ear: External ear normal.      Nose: Nose normal. No congestion or rhinorrhea.      Mouth/Throat:      Mouth: Mucous membranes are moist.      Pharynx: Oropharynx is clear. No oropharyngeal exudate.      Comments: edentulous  Eyes:      General:         Right eye: No discharge.         Left eye: No discharge.      Extraocular Movements: Extraocular movements intact.      Conjunctiva/sclera: Conjunctivae normal.      Pupils: Pupils are equal, round, and reactive to light.   Cardiovascular:      Rate and Rhythm: Normal rate and regular rhythm.      Pulses: Normal pulses.   Pulmonary:      Effort: Pulmonary effort is normal. No respiratory distress.      Breath sounds: Rhonchi present. No wheezing or rales.      Comments: Diffuse rhonchi present in all lobes  Abdominal:      General: Bowel sounds are normal. There is no distension.      Palpations: Abdomen is soft.      Tenderness: There is no abdominal tenderness. There is no right CVA tenderness or left CVA tenderness.      Comments: protuberant   Musculoskeletal:         General: No swelling or tenderness. Normal range " "of motion.      Cervical back: Normal range of motion and neck supple. No rigidity or tenderness.      Right lower leg: No edema.      Left lower leg: No edema.   Skin:     General: Skin is warm and dry.      Capillary Refill: Capillary refill takes less than 2 seconds.      Findings: No bruising.   Neurological:      Mental Status: She is alert and oriented to person, place, and time. Mental status is at baseline.      Cranial Nerves: No cranial nerve deficit.      Sensory: No sensory deficit.      Coordination: Coordination normal.      Comments: Ambulates with cane   Psychiatric:         Mood and Affect: Mood normal.         Behavior: Behavior normal.         Thought Content: Thought content normal.         Cognition and Memory: Memory is impaired.         Judgment: Judgment normal.      Comments: Jokes appropriately           Assessment and Plan  Large hiatal hernia  Was evaluated by [facility] yesterday. Was told to get surgery. Will defer to general surgery and GI. Will follow up with patient in 5 weeks.    HTN (hypertension)  Patient's BP was 153/78 upon arrival. Repeat was 146/78. Per her visit on 4/4/2022 her BP was 132/74. Continue on furosemide. Discussed low-salt diet (DASH diet) and continuing exercise with regular walking with cane.    Osteopenia  Continue Prolia injections per specialist    Prediabetes  Ordered POC A1c lab: Hemoglobin A1c 6.0% today in clinic. Counseled patient on healthy diet. Patient states \"I try to only eat fruits and vegetables more\". Also counseled on low salt diet    Irritable bowel syndrome  Improved. Continue Imodium PRN. Encourage high-fiber diet    Complete loss of teeth, unspecified cause, class II  Patient to go to dentist in near future to obtain dentures    Major depressive disorder in partial remission (HCC)  Stable on Lexapro, continue and will refill as needed    Neuropathic pain of both feet  Stable, continue gabapentin 100 mg TID    Obesity (BMI " 30-39.9)  Counseled the patient on diet and exercise today. Encouraged patient to continue walks.    Gastroesophageal reflux disease  Was previously on omeprazole. Switched to famotidine due to increased risk of C diff, candida esophagitis, and acute interstitial nephritis/diarrhea. Continue famotidine for control of symptoms.     Chronic bilateral low back pain without sciatica  Continue treatment with Dr. Hernández. Has appt in 2 weeks      Signed by: Sri Escudero M.D.

## 2022-06-09 NOTE — PATIENT INSTRUCTIONS
Hello! Today we discussed:    -GI issues - go get your surgery  -Elevated glucose: Obtained Hemoglobin A1c  -Elevated blood pressure/Hypertension: Please follow a low salt diet and increase water intake. Also remember to increase your exercise with a friend.  -Go to your appointment with Dr. Hernández and your eye doctor.  -Get your 2nd COVID booster.    Make an appointment with me in 5 weeks.    Thank you!

## 2022-06-09 NOTE — ASSESSMENT & PLAN NOTE
Was previously on omeprazole. Switched to famotidine due to increased risk of C diff, candida esophagitis, and acute interstitial nephritis/diarrhea. Continue famotidine for control of symptoms.

## 2022-06-09 NOTE — ASSESSMENT & PLAN NOTE
Patient's BP was 153/78 upon arrival. Repeat was 146/78. Per her visit on 4/4/2022 her BP was 132/74. Continue on furosemide. Discussed low-salt diet (DASH diet) and continuing exercise with regular walking with cane.

## 2022-06-09 NOTE — ASSESSMENT & PLAN NOTE
Was evaluated by [facility] yesterday. Was told to get surgery. Will defer to general surgery and GI. Will follow up with patient in 5 weeks.

## 2022-06-09 NOTE — ASSESSMENT & PLAN NOTE
"Ordered POC A1c lab: Hemoglobin A1c 6.0% today in clinic. Counseled patient on healthy diet. Patient states \"I try to only eat fruits and vegetables more\". Also counseled on low salt diet  "

## 2022-08-11 ENCOUNTER — OFFICE VISIT (OUTPATIENT)
Dept: SLEEP MEDICINE | Facility: MEDICAL CENTER | Age: 82
End: 2022-08-11
Payer: MEDICARE

## 2022-08-11 VITALS
SYSTOLIC BLOOD PRESSURE: 122 MMHG | HEIGHT: 58 IN | DIASTOLIC BLOOD PRESSURE: 74 MMHG | WEIGHT: 150 LBS | BODY MASS INDEX: 31.49 KG/M2 | RESPIRATION RATE: 16 BRPM | OXYGEN SATURATION: 92 % | HEART RATE: 69 BPM

## 2022-08-11 DIAGNOSIS — J98.4 RESTRICTIVE LUNG DISEASE: ICD-10-CM

## 2022-08-11 DIAGNOSIS — J96.91 RESPIRATORY FAILURE WITH HYPOXIA, UNSPECIFIED CHRONICITY (HCC): ICD-10-CM

## 2022-08-11 DIAGNOSIS — I26.99 PULMONARY EMBOLISM, OTHER, UNSPECIFIED CHRONICITY, UNSPECIFIED WHETHER ACUTE COR PULMONALE PRESENT (HCC): ICD-10-CM

## 2022-08-11 PROCEDURE — 99214 OFFICE O/P EST MOD 30 MIN: CPT | Performed by: INTERNAL MEDICINE

## 2022-08-11 ASSESSMENT — ENCOUNTER SYMPTOMS
EYE DISCHARGE: 0
HEARTBURN: 0
DOUBLE VISION: 0
DEPRESSION: 0
BLURRED VISION: 0
BACK PAIN: 0
CONSTIPATION: 0
FOCAL WEAKNESS: 0
VOMITING: 0
NAUSEA: 0
WEAKNESS: 0
SPUTUM PRODUCTION: 0
DIZZINESS: 0
CHILLS: 0
COUGH: 1
HEMOPTYSIS: 0
TREMORS: 0
CLAUDICATION: 0
STRIDOR: 0
EYE REDNESS: 0
FEVER: 0
EYE PAIN: 0
HEADACHES: 0
PHOTOPHOBIA: 0
FALLS: 0
SHORTNESS OF BREATH: 1
WEIGHT LOSS: 0
MYALGIAS: 0
NECK PAIN: 0
WHEEZING: 0
SINUS PAIN: 0
PALPITATIONS: 0
PND: 0
DIARRHEA: 0
SORE THROAT: 0
SPEECH CHANGE: 0
ORTHOPNEA: 0
DIAPHORESIS: 0
ABDOMINAL PAIN: 0

## 2022-08-11 ASSESSMENT — FIBROSIS 4 INDEX: FIB4 SCORE: 1.99
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
## 2022-08-25 ENCOUNTER — OFFICE VISIT (OUTPATIENT)
Dept: INTERNAL MEDICINE | Facility: OTHER | Age: 82
End: 2022-08-25
Payer: MEDICARE

## 2022-08-25 VITALS
HEIGHT: 58 IN | WEIGHT: 153.6 LBS | HEART RATE: 68 BPM | DIASTOLIC BLOOD PRESSURE: 65 MMHG | BODY MASS INDEX: 32.24 KG/M2 | TEMPERATURE: 98 F | SYSTOLIC BLOOD PRESSURE: 131 MMHG | OXYGEN SATURATION: 94 %

## 2022-08-25 DIAGNOSIS — G89.29 CHRONIC BILATERAL LOW BACK PAIN WITHOUT SCIATICA: ICD-10-CM

## 2022-08-25 DIAGNOSIS — I82.90 DEEP VEIN THROMBOSIS (DVT) OF NON-EXTREMITY VEIN, UNSPECIFIED CHRONICITY: ICD-10-CM

## 2022-08-25 DIAGNOSIS — R73.03 PREDIABETES: ICD-10-CM

## 2022-08-25 DIAGNOSIS — K44.9 LARGE HIATAL HERNIA: ICD-10-CM

## 2022-08-25 DIAGNOSIS — K08.102: ICD-10-CM

## 2022-08-25 DIAGNOSIS — J96.10 CHRONIC RESPIRATORY FAILURE, UNSP W HYPOXIA OR HYPERCAPNIA (HCC): ICD-10-CM

## 2022-08-25 DIAGNOSIS — H35.30 MACULAR DEGENERATION OF BOTH EYES, UNSPECIFIED TYPE: ICD-10-CM

## 2022-08-25 DIAGNOSIS — M54.50 CHRONIC BILATERAL LOW BACK PAIN WITHOUT SCIATICA: ICD-10-CM

## 2022-08-25 DIAGNOSIS — R41.9 UNSPECIFIED SYMPTOMS AND SIGNS INVOLVING COGNITIVE FUNCTIONS AND AWARENESS: ICD-10-CM

## 2022-08-25 DIAGNOSIS — K58.2 IRRITABLE BOWEL SYNDROME WITH BOTH CONSTIPATION AND DIARRHEA: ICD-10-CM

## 2022-08-25 PROBLEM — S73.004A HIP DISLOCATION, RIGHT (HCC): Status: RESOLVED | Noted: 2019-12-12 | Resolved: 2022-08-25

## 2022-08-25 PROBLEM — E78.00 HYPERCHOLESTEROLEMIA: Status: ACTIVE | Noted: 2022-06-21

## 2022-08-25 PROCEDURE — 99214 OFFICE O/P EST MOD 30 MIN: CPT | Mod: GC | Performed by: STUDENT IN AN ORGANIZED HEALTH CARE EDUCATION/TRAINING PROGRAM

## 2022-08-25 RX ORDER — BUDESONIDE AND FORMOTEROL FUMARATE DIHYDRATE 160; 4.5 UG/1; UG/1
AEROSOL RESPIRATORY (INHALATION)
COMMUNITY
End: 2022-09-06

## 2022-08-25 ASSESSMENT — FIBROSIS 4 INDEX: FIB4 SCORE: 1.99

## 2022-08-25 NOTE — PATIENT INSTRUCTIONS
Hello! Today we saw you for:    -Memory - really really good!  -Hiatal hernia  -Respiratory issues - seeing Dr. Sheffield  -Macular degeneration    Continue eating a soft foods diet  Watch for nausea/vomiting  Continue to dance for exercise    See me in 3 months.    Thank you!

## 2022-08-25 NOTE — PROGRESS NOTES
"Established Patient    Guillermina Recio is a 82 y.o. female who presents today with the following:    CC:   Chief Complaint   Patient presents with    Follow-Up       HPI:     Patient is a pleasant 83 y/o F with a PMH significant for HTN/DLD, macular degeneration, COPD (due to secondhand smoking), chronic low back pain (on opioid use, following Pain Management), h/o DVT (on chronic Eliquis use), CHF (following cardiology), MDD, osteopenia, large hiatal hernia and GERD here for follow up of the issues listed below. She is here with her caregiver, friend, and DPOA - May.    Memory: She states that her memory is not as good as it was previously. She states she is forgetting more things than before and \"I am tired of it\". She wants to know if there are any medications that could help stop this. She was told by Dr. Sheffield (pulmonary doctor) who told her she did not have Alzheimer's or dementia. She states she has difficulties with recent memory (e.g. sometimes she feels that she will be wanting to talk to someone about something but then will forget what she wanted to talk about afterward). She can give someone directions to her house from [employer]. She does cook at home; she has left the stove on and the water on the past. May, her friend, caregiver, and DPOA, pays for her bills for her. The patient lives with her son, who is 57 years old. She has chronic difficulties with vision (severe macular degeneration). Her last MOCA test was in 2019. She states she has been having headaches but states this has been happening \"since I started going blind\". She had an MRI of the brain in 2021 which showed age-related changes.    LBP: Dr. Hernández from [facility] takes care of her chronic LBP. Reviewed PDMP.    Large hiatal hernia: Was worked up by GI and [facility], causing difficulty with swallowing. She currently does not eat any bread and has been eating less and watching what she eats " "(well-chewed food). She does not have any dentures. She states she has been throwing up black bile which has been \"dark\".    Chronic hypoxic respiratory failure: Sees Dr. Sheffield, last visit 8/11/2022. She is going to go for more PFTs and pulmonary rehab. She currently uses oxygen at night and during ambulation/activity. She has a pulse oximeter and her son has been testing her which are usually around 91-92%.    ROS See HPI    Patient Active Problem List    Diagnosis Date Noted    Mild persistent asthma 10/05/2021    Chronic respiratory failure, unsp w hypoxia or hypercapnia (formerly [facility]) 06/09/2021    Macular degeneration 03/06/2019    Neuropathic pain of both feet 10/05/2021    Obesity (BMI 30-39.9) 03/30/2022    BMI 32.0-32.9,adult 01/21/2022    Other thrombophilia (formerly [facility]) 01/21/2022    CHF (congestive heart failure) (formerly [facility]) 01/21/2022    DDD (degenerative disc disease), lumbar 01/21/2022    Increased frequency of urination 09/03/2021    Complete loss of teeth, unspecified cause, class II 06/16/2021    Major depressive disorder in partial remission (formerly [facility]) 06/07/2021    Osteoarthrosis 06/07/2021    Large hiatal hernia 06/07/2021    Unspecified symptoms and signs involving cognitive functions and awareness 11/20/2020    Arteriosclerosis of coronary artery 09/17/2020    Deep vein thrombosis (DVT) (formerly [facility]) 06/03/2020    Pulmonary hypertension (formerly [facility]) 12/26/2019    Hip dislocation, right (formerly [facility]) 12/12/2019    Lung mass 11/28/2019    Impaired mobility and ADLs 11/27/2019    History of operative procedure on hip 11/25/2019    Prediabetes 11/25/2019    COPD (chronic obstructive pulmonary disease) (formerly [facility]) 11/15/2019    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 05/28/2019    Hearing loss, bilateral 03/06/2019    Irritable bowel syndrome 03/06/2019    Chronic neck pain 02/27/2019    Pain of right hip joint 04/12/2018    Preventative health care 10/30/2017    Chronic use of opiate drug for therapeutic purpose 07/07/2017    " Chronic bilateral low back pain without sciatica 07/07/2017    Gastroesophageal reflux disease 12/16/2016    Vitamin D deficiency 12/16/2016    Controlled substance agreement signed 12/16/2016    Polypharmacy 06/29/2016    Dermatochalasis 07/14/2015    Benign positional vertigo 01/08/2015    Urinary incontinence 10/09/2014    Dyslipidemia 10/03/2014    HTN (hypertension) 06/25/2009    Osteopenia 06/25/2009       Social History     Tobacco Use    Smoking status: Never     Passive exposure: Yes    Smokeless tobacco: Never    Tobacco comments:     lived with smokers   Vaping Use    Vaping Use: Never used    Passive vaping exposure: Yes   Substance Use Topics    Alcohol use: Not Currently     Alcohol/week: 0.0 oz     Comment: 3 per year    Drug use: No     Comment: In the Distant Past, but not since 32 y.o.  (prior - [employer], crosstops - stimulants for working double shifts, but not for several decades)       Current Outpatient Medications   Medication Sig Dispense Refill    ELIQUIS 5 MG Tab TAKE 1 TABLET BY MOUTH TWICE A  Tablet 1    denosumab (PROLIA) 60 MG/ML Solution Prefilled Syringe injection Inject 1 mL under the skin every 6 months. 1 mL 0    gabapentin (NEURONTIN) 100 MG Cap 100 mg 3 times a day.      famotidine (PEPCID) 20 MG Tab TAKE 1 TABLET BY MOUTH EVERY DAY 30MINS PRIOR TO FOOD 90 Tablet 3    SYMBICORT 160-4.5 MCG/ACT Aerosol INHALE 1 PUFF BY MOUTH TWICE A DAY 10.2 g 5    Multiple Vitamins-Minerals (CENTRUM SILVER 50+WOMEN) Tab Take  by mouth.      gabapentin (NEURONTIN) 300 MG Cap Take 1 Capsule by mouth 3 times a day. Take 1 capsule in the morning and 2 in the evening 90 Capsule 3    escitalopram (LEXAPRO) 10 MG Tab TAKE 1 TABLET ORAL EVERY MORNING WITH MEALS      loperamide (IMODIUM A-D) 2 MG tablet LOPERAMIDE HCL 2 MG TABS      albuterol 108 (90 Base) MCG/ACT Aero Soln inhalation aerosol Inhale 1-2 Puffs by mouth every four hours as needed for Shortness of Breath.      Melatonin  "(VITAJOY GUMMIES PO) Take  by mouth.      Biotin 10 MG Tab Take  by mouth.      HYDROcodone-acetaminophen (NORCO) 5-325 MG Tab per tablet Take 1 Tab by mouth every four hours as needed.      polyethylene glycol/lytes (MIRALAX) Pack Take 1 Packet by mouth 1 time daily as needed (if sennosides and docusate ineffective after 24 hours).  3    acetaminophen (TYLENOL) 650 MG CR tablet Take 650 mg by mouth 3 times a day.      montelukast (SINGULAIR) 10 MG Tab Take 10 mg by mouth every evening.      Multiple Vitamins-Minerals (OCUVITE-LUTEIN) Tab Take 1 tablet by mouth every morning.      ascorbic acid (VITAMIN C) 500 MG tablet Take 1 Tab by mouth every day. 30 Tab 3    furosemide (LASIX) 20 MG Tab Take 1 Tab by mouth every day. 60 Tab     vitamin D 2000 UNIT Tab Take 1 Tab by mouth every day. 60 Tab     lovastatin (MEVACOR) 40 MG tablet Take 1 Tab by mouth every bedtime. 30 Tab 0     No current facility-administered medications for this visit.       /65 (BP Location: Left arm, Patient Position: Sitting, BP Cuff Size: Small adult)   Pulse 68   Temp 36.7 °C (98 °F) (Temporal)   Ht 1.473 m (4' 10\")   Wt 69.7 kg (153 lb 9.6 oz)   SpO2 94%   BMI 32.10 kg/m²     PHYSICAL EXAM:  Physical Exam  Vitals and nursing note reviewed. Exam conducted with a chaperone present (May present, MS-3 Wilbur Frye present).   Constitutional:       General: She is not in acute distress.     Appearance: Normal appearance. She is obese. She is not ill-appearing or toxic-appearing.   HENT:      Head: Normocephalic and atraumatic.      Right Ear: External ear normal.      Left Ear: External ear normal.      Nose: Nose normal. No congestion or rhinorrhea.      Mouth/Throat:      Mouth: Mucous membranes are moist.      Pharynx: Oropharynx is clear. No oropharyngeal exudate.      Comments: edentulous  Eyes:      General:         Right eye: No discharge.         Left eye: No discharge.      Extraocular Movements: Extraocular movements " intact.      Conjunctiva/sclera: Conjunctivae normal.      Pupils: Pupils are equal, round, and reactive to light.   Cardiovascular:      Rate and Rhythm: Normal rate and regular rhythm.      Pulses: Normal pulses.   Pulmonary:      Effort: Pulmonary effort is normal. No respiratory distress.      Breath sounds: Rhonchi present. No wheezing or rales.      Comments: Diffuse rhonchi present in all lobes  Abdominal:      General: Bowel sounds are normal. There is no distension.      Palpations: Abdomen is soft.      Tenderness: There is no abdominal tenderness. There is no right CVA tenderness or left CVA tenderness.      Comments: protuberant   Musculoskeletal:         General: No swelling or tenderness. Normal range of motion.      Cervical back: Normal range of motion and neck supple. No rigidity or tenderness.      Right lower leg: No edema.      Left lower leg: No edema.   Skin:     General: Skin is warm and dry.      Capillary Refill: Capillary refill takes less than 2 seconds.      Findings: No bruising.   Neurological:      Mental Status: She is alert and oriented to person, place, and time. Mental status is at baseline.      Cranial Nerves: No cranial nerve deficit.      Sensory: No sensory deficit.      Coordination: Coordination normal.      Comments: Ambulates with cane   Psychiatric:         Mood and Affect: Mood normal.         Behavior: Behavior normal.         Thought Content: Thought content normal.         Cognition and Memory: Memory is impaired.         Judgment: Judgment normal.      Comments: Jokes appropriately         Assessment and Plan    1. Unspecified symptoms and signs involving cognitive functions and awareness  Per our review, patient's memory is intact and good for her age.  Continue to monitor.  Reassured the patient regarding memory.    2. Macular degeneration of both eyes, unspecified type  This continue seeing is likely the contributor of her headaches.  Patient will need to continue  to see an eye doctor.    3. Chronic respiratory failure, unsp w hypoxia or hypercapnia (HCC)  Dr. Sheffield to continue following    4. Chronic bilateral low back pain without sciatica  Dr. Delarosa to continue following    5. Prediabetes  Continue with healthy diet    6. Deep vein thrombosis (DVT) of non-extremity vein, unspecified chronicity  Continue Eliquis    7. Irritable bowel syndrome with both constipation and diarrhea  Continue Imodium as needed    8. Complete loss of teeth, unspecified cause, class II  Patient to obtain dentures    9. Large hiatal hernia  Continue to eat a soft GI diet.  Patient is not a candidate for surgery, has been evaluated by [facility]    Signed by: Sri Escudero M.D.

## 2022-09-06 DIAGNOSIS — J96.10 CHRONIC RESPIRATORY FAILURE, UNSP W HYPOXIA OR HYPERCAPNIA (HCC): ICD-10-CM

## 2022-09-06 DIAGNOSIS — J45.30 MILD PERSISTENT ASTHMA WITHOUT COMPLICATION: ICD-10-CM

## 2022-09-06 RX ORDER — BUDESONIDE AND FORMOTEROL FUMARATE DIHYDRATE 160; 4.5 UG/1; UG/1
AEROSOL RESPIRATORY (INHALATION)
Qty: 10.2 EACH | Refills: 5 | Status: SHIPPED | OUTPATIENT
Start: 2022-09-06 | End: 2023-06-26 | Stop reason: SDUPTHER

## 2022-09-06 NOTE — TELEPHONE ENCOUNTER
Symbicort Refill    Last seen: 8/25/22 by Dr. Escudero  Next appt: 12/19/22 with Dr. Escudero    Was the patient seen in the last year in this department? Yes   Does patient have an active prescription for medications requested? No   Received Request Via: Pharmacy

## 2022-09-14 ENCOUNTER — HOSPITAL ENCOUNTER (OUTPATIENT)
Dept: LAB | Facility: MEDICAL CENTER | Age: 82
End: 2022-09-14
Attending: NURSE PRACTITIONER
Payer: MEDICARE

## 2022-09-14 DIAGNOSIS — I82.4Y9 DEEP VEIN THROMBOSIS (DVT) OF PROXIMAL LOWER EXTREMITY, UNSPECIFIED CHRONICITY, UNSPECIFIED LATERALITY (HCC): ICD-10-CM

## 2022-09-14 LAB
ALBUMIN SERPL BCP-MCNC: 4.4 G/DL (ref 3.2–4.9)
ALBUMIN/GLOB SERPL: 1.7 G/DL
ALP SERPL-CCNC: 62 U/L (ref 30–99)
ALT SERPL-CCNC: 13 U/L (ref 2–50)
ANION GAP SERPL CALC-SCNC: 10 MMOL/L (ref 7–16)
AST SERPL-CCNC: 20 U/L (ref 12–45)
BILIRUB SERPL-MCNC: 0.2 MG/DL (ref 0.1–1.5)
BUN SERPL-MCNC: 24 MG/DL (ref 8–22)
CALCIUM SERPL-MCNC: 9.1 MG/DL (ref 8.5–10.5)
CHLORIDE SERPL-SCNC: 111 MMOL/L (ref 96–112)
CO2 SERPL-SCNC: 19 MMOL/L (ref 20–33)
CREAT SERPL-MCNC: 0.89 MG/DL (ref 0.5–1.4)
ERYTHROCYTE [DISTWIDTH] IN BLOOD BY AUTOMATED COUNT: 56 FL (ref 35.9–50)
GFR SERPLBLD CREATININE-BSD FMLA CKD-EPI: 64 ML/MIN/1.73 M 2
GLOBULIN SER CALC-MCNC: 2.6 G/DL (ref 1.9–3.5)
GLUCOSE SERPL-MCNC: 106 MG/DL (ref 65–99)
HCT VFR BLD AUTO: 47 % (ref 37–47)
HGB BLD-MCNC: 14.3 G/DL (ref 12–16)
MCH RBC QN AUTO: 24.6 PG (ref 27–33)
MCHC RBC AUTO-ENTMCNC: 30.4 G/DL (ref 33.6–35)
MCV RBC AUTO: 80.9 FL (ref 81.4–97.8)
PLATELET # BLD AUTO: 268 K/UL (ref 164–446)
PMV BLD AUTO: 9.2 FL (ref 9–12.9)
POTASSIUM SERPL-SCNC: 4.6 MMOL/L (ref 3.6–5.5)
PROT SERPL-MCNC: 7 G/DL (ref 6–8.2)
RBC # BLD AUTO: 5.81 M/UL (ref 4.2–5.4)
SODIUM SERPL-SCNC: 140 MMOL/L (ref 135–145)
WBC # BLD AUTO: 5 K/UL (ref 4.8–10.8)

## 2022-09-14 PROCEDURE — 80053 COMPREHEN METABOLIC PANEL: CPT

## 2022-09-14 PROCEDURE — 36415 COLL VENOUS BLD VENIPUNCTURE: CPT

## 2022-09-14 PROCEDURE — 85027 COMPLETE CBC AUTOMATED: CPT

## 2022-10-03 ENCOUNTER — ANTICOAGULATION VISIT (OUTPATIENT)
Dept: VASCULAR LAB | Facility: MEDICAL CENTER | Age: 82
End: 2022-10-03
Attending: INTERNAL MEDICINE
Payer: MEDICARE

## 2022-10-03 VITALS — DIASTOLIC BLOOD PRESSURE: 59 MMHG | SYSTOLIC BLOOD PRESSURE: 115 MMHG | HEART RATE: 63 BPM

## 2022-10-03 DIAGNOSIS — I82.90 DEEP VEIN THROMBOSIS (DVT) OF NON-EXTREMITY VEIN, UNSPECIFIED CHRONICITY: ICD-10-CM

## 2022-10-03 PROCEDURE — 99212 OFFICE O/P EST SF 10 MIN: CPT

## 2022-10-03 NOTE — PROGRESS NOTES
Diagnosis: DVT, PE  Drug: Eliquis 5 mg BID  LOT: Indefinite  CHADSVASC: n/a  HAS-BLED: 1    Health Status Since Last Assessment   Patient denies any new relevant medical problems, ED visits or hospitalizations   Patient denies any embolic events (stroke/tia/systemic embolism)    Adherence with DOAC Therapy   Pt has Denies missed any doses in the average week    Bleeding Risk Assessment     Denies Epistaxis   Pt denies any excessive or unusual bleeding/hematomas.  Pt denies any GI bleeds or hematemesis.  Pt denies any concerning daily headache or sub dural hematoma symptoms.     Pt denies any hematuria Denies   Latest Hemoglobin 14   ETOH overuse Denies     Creatinine Clearance/Renal Function     Latest ClCr >30 mL/min    Hepatic function   Latest LFTs WNL   Pt denies any history of liver dysfunction      Drug Interactions   Platelets: 268   ASA/other antiplatelets none   NSAID none   Other drug interactions none    X Verified no anticonvulsant or azole therapy, education provided for future use.     Examination   Blood Pressure 115/59   Symptomatic hypotension none   Significant gait impairment/imbalance/high risk for falls? Uses a cane.     Final Assessment and Recommendations:   Patient appears stable from the anticoagulation standpoint.     Benefits of continued DOAC therapy outweigh risks for this patient   Recommend pt continue with current DOAC therapy  (with dose adjustment)   DOAC is  affordable per pt and her friend.      Other Actions: cmp/ cbc hemogram ordered prior to next visit    Follow up:   Will follow up with patient 6  months.     Karlos Alexis, PharmD

## 2022-11-15 ENCOUNTER — NON-PROVIDER VISIT (OUTPATIENT)
Dept: SLEEP MEDICINE | Facility: MEDICAL CENTER | Age: 82
End: 2022-11-15
Attending: INTERNAL MEDICINE
Payer: MEDICARE

## 2022-11-15 VITALS — HEIGHT: 58 IN | WEIGHT: 150 LBS | BODY MASS INDEX: 31.49 KG/M2

## 2022-11-15 DIAGNOSIS — J98.4 RESTRICTIVE LUNG DISEASE: ICD-10-CM

## 2022-11-15 DIAGNOSIS — I26.99 PULMONARY EMBOLISM, OTHER, UNSPECIFIED CHRONICITY, UNSPECIFIED WHETHER ACUTE COR PULMONALE PRESENT (HCC): ICD-10-CM

## 2022-11-15 DIAGNOSIS — J96.91 RESPIRATORY FAILURE WITH HYPOXIA, UNSPECIFIED CHRONICITY (HCC): ICD-10-CM

## 2022-11-15 PROCEDURE — 94726 PLETHYSMOGRAPHY LUNG VOLUMES: CPT | Performed by: INTERNAL MEDICINE

## 2022-11-15 PROCEDURE — 94060 EVALUATION OF WHEEZING: CPT | Performed by: INTERNAL MEDICINE

## 2022-11-15 PROCEDURE — 94729 DIFFUSING CAPACITY: CPT | Performed by: INTERNAL MEDICINE

## 2022-11-15 ASSESSMENT — PULMONARY FUNCTION TESTS
FEV1/FVC_PREDICTED: 77
FEV1/FVC_PERCENT_PREDICTED: 109
FVC_PERCENT_PREDICTED: 52
FEV1_LLN: 1.24
FVC_PREDICTED: 1.95
FEV1/FVC: 84.75
FEV1/FVC_PERCENT_PREDICTED: 76
FEV1/FVC: 85
FEV1/FVC_PERCENT_CHANGE: -3
FVC: 1.18
FEV1/FVC_PERCENT_PREDICTED: 114
FEV1/FVC_PERCENT_LLN: 65
FVC_PERCENT_PREDICTED: 60
FVC_LLN: 1.63
FEV1_PERCENT_PREDICTED: 67
FEV1_PERCENT_CHANGE: 16
FVC: 1.02
FEV1/FVC_PERCENT_PREDICTED: 112
FEV1_PERCENT_CHANGE: 11
FEV1/FVC: 88
FEV1/FVC: 88
FEV1_PERCENT_PREDICTED: 60
FEV1_PREDICTED: 1.49
FEV1/FVC_PERCENT_CHANGE: 69
FEV1/FVC_PERCENT_PREDICTED: 115
FEV1: 1
FEV1: 0.9

## 2022-11-15 ASSESSMENT — FIBROSIS 4 INDEX: FIB4 SCORE: 1.74

## 2022-11-15 NOTE — PROCEDURES
Technician: KUNAL David    Technician Comment:  Good patient effort & cooperation.  Except for the FVCs, The results of this test meet the ATS/ERS standards for acceptability & reproducibility.  Test was performed on the Bycler Body Plethysmograph-Elite DX system.  Predicted values were GLI-2012 for spirometry, GLI-2017 for DLCO, ITS for Lung Volumes.  The DLCO was uncorrected for Hgb.  A bronchodilator of Ventolin HFA -2puffs via spacer administered.  DLCO performed during dilation period.    Interpretation:   Acceptable and Reproducible  FEV1 0.9 l (60 %), FVC 1.02l (52%), ratio 88%  Flow Volume loops c/w obstruction and restriction  TLC 3.18 l (76%)  DLCO 10.24 ml/min/mmHg (66%)    Impression:  Mild restriction with mild reduction in gas transfer, FVC showed improvement with bronchodilator 1.18 l (16% improvement)

## 2023-01-11 ENCOUNTER — PATIENT MESSAGE (OUTPATIENT)
Dept: HEALTH INFORMATION MANAGEMENT | Facility: OTHER | Age: 83
End: 2023-01-11

## 2023-01-12 DIAGNOSIS — I82.90 DEEP VEIN THROMBOSIS (DVT) OF NON-EXTREMITY VEIN, UNSPECIFIED CHRONICITY: ICD-10-CM

## 2023-01-12 RX ORDER — APIXABAN 5 MG/1
TABLET, FILM COATED ORAL
Qty: 180 TABLET | Refills: 1 | Status: SHIPPED | OUTPATIENT
Start: 2023-01-12 | End: 2023-08-30

## 2023-01-20 ENCOUNTER — HOSPITAL ENCOUNTER (OUTPATIENT)
Dept: LAB | Facility: MEDICAL CENTER | Age: 83
End: 2023-01-20
Attending: NURSE PRACTITIONER
Payer: MEDICARE

## 2023-01-20 DIAGNOSIS — I82.90 DEEP VEIN THROMBOSIS (DVT) OF NON-EXTREMITY VEIN, UNSPECIFIED CHRONICITY: ICD-10-CM

## 2023-01-20 LAB
ALBUMIN SERPL BCP-MCNC: 4.2 G/DL (ref 3.2–4.9)
ALBUMIN/GLOB SERPL: 1.4 G/DL
ALP SERPL-CCNC: 69 U/L (ref 30–99)
ALT SERPL-CCNC: 11 U/L (ref 2–50)
ANION GAP SERPL CALC-SCNC: 11 MMOL/L (ref 7–16)
AST SERPL-CCNC: 18 U/L (ref 12–45)
BILIRUB SERPL-MCNC: 0.3 MG/DL (ref 0.1–1.5)
BUN SERPL-MCNC: 28 MG/DL (ref 8–22)
CALCIUM ALBUM COR SERPL-MCNC: 9.8 MG/DL (ref 8.5–10.5)
CALCIUM SERPL-MCNC: 10 MG/DL (ref 8.5–10.5)
CHLORIDE SERPL-SCNC: 109 MMOL/L (ref 96–112)
CO2 SERPL-SCNC: 24 MMOL/L (ref 20–33)
CREAT SERPL-MCNC: 1.05 MG/DL (ref 0.5–1.4)
ERYTHROCYTE [DISTWIDTH] IN BLOOD BY AUTOMATED COUNT: 50.4 FL (ref 35.9–50)
GFR SERPLBLD CREATININE-BSD FMLA CKD-EPI: 52 ML/MIN/1.73 M 2
GLOBULIN SER CALC-MCNC: 2.9 G/DL (ref 1.9–3.5)
GLUCOSE SERPL-MCNC: 90 MG/DL (ref 65–99)
HCT VFR BLD AUTO: 44.1 % (ref 37–47)
HGB BLD-MCNC: 13.2 G/DL (ref 12–16)
MCH RBC QN AUTO: 25.6 PG (ref 27–33)
MCHC RBC AUTO-ENTMCNC: 29.9 G/DL (ref 33.6–35)
MCV RBC AUTO: 85.6 FL (ref 81.4–97.8)
PLATELET # BLD AUTO: 266 K/UL (ref 164–446)
PMV BLD AUTO: 9.8 FL (ref 9–12.9)
POTASSIUM SERPL-SCNC: 4.9 MMOL/L (ref 3.6–5.5)
PROT SERPL-MCNC: 7.1 G/DL (ref 6–8.2)
RBC # BLD AUTO: 5.15 M/UL (ref 4.2–5.4)
SODIUM SERPL-SCNC: 144 MMOL/L (ref 135–145)
WBC # BLD AUTO: 4.3 K/UL (ref 4.8–10.8)

## 2023-01-20 PROCEDURE — 85027 COMPLETE CBC AUTOMATED: CPT

## 2023-01-20 PROCEDURE — 80053 COMPREHEN METABOLIC PANEL: CPT

## 2023-01-20 PROCEDURE — 36415 COLL VENOUS BLD VENIPUNCTURE: CPT

## 2023-01-24 ENCOUNTER — OFFICE VISIT (OUTPATIENT)
Dept: MEDICAL GROUP | Facility: PHYSICIAN GROUP | Age: 83
End: 2023-01-24
Payer: MEDICARE

## 2023-01-24 ENCOUNTER — PATIENT OUTREACH (OUTPATIENT)
Dept: HEALTH INFORMATION MANAGEMENT | Facility: OTHER | Age: 83
End: 2023-01-24

## 2023-01-24 VITALS
DIASTOLIC BLOOD PRESSURE: 62 MMHG | BODY MASS INDEX: 31.93 KG/M2 | SYSTOLIC BLOOD PRESSURE: 118 MMHG | HEART RATE: 67 BPM | TEMPERATURE: 98.4 F | HEIGHT: 58 IN | OXYGEN SATURATION: 93 % | WEIGHT: 152.1 LBS

## 2023-01-24 DIAGNOSIS — I25.119 ATHEROSCLEROSIS OF NATIVE CORONARY ARTERY OF NATIVE HEART WITH ANGINA PECTORIS (HCC): ICD-10-CM

## 2023-01-24 DIAGNOSIS — I50.9 CONGESTIVE HEART FAILURE, UNSPECIFIED HF CHRONICITY, UNSPECIFIED HEART FAILURE TYPE (HCC): ICD-10-CM

## 2023-01-24 DIAGNOSIS — I10 PRIMARY HYPERTENSION: ICD-10-CM

## 2023-01-24 DIAGNOSIS — H35.30 MACULAR DEGENERATION OF BOTH EYES, UNSPECIFIED TYPE: ICD-10-CM

## 2023-01-24 DIAGNOSIS — K21.9 GASTROESOPHAGEAL REFLUX DISEASE, UNSPECIFIED WHETHER ESOPHAGITIS PRESENT: ICD-10-CM

## 2023-01-24 DIAGNOSIS — J96.10 CHRONIC RESPIRATORY FAILURE, UNSP W HYPOXIA OR HYPERCAPNIA (HCC): ICD-10-CM

## 2023-01-24 DIAGNOSIS — F33.41 RECURRENT MAJOR DEPRESSIVE DISORDER, IN PARTIAL REMISSION (HCC): ICD-10-CM

## 2023-01-24 DIAGNOSIS — M85.80 OSTEOPENIA, UNSPECIFIED LOCATION: ICD-10-CM

## 2023-01-24 DIAGNOSIS — G89.29 CHRONIC BILATERAL LOW BACK PAIN WITHOUT SCIATICA: ICD-10-CM

## 2023-01-24 DIAGNOSIS — J44.9 CHRONIC OBSTRUCTIVE PULMONARY DISEASE, UNSPECIFIED COPD TYPE (HCC): ICD-10-CM

## 2023-01-24 DIAGNOSIS — M54.50 CHRONIC BILATERAL LOW BACK PAIN WITHOUT SCIATICA: ICD-10-CM

## 2023-01-24 DIAGNOSIS — I27.20 PULMONARY HYPERTENSION (HCC): ICD-10-CM

## 2023-01-24 DIAGNOSIS — Z78.0 POSTMENOPAUSAL ESTROGEN DEFICIENCY: ICD-10-CM

## 2023-01-24 DIAGNOSIS — D68.69 OTHER THROMBOPHILIA (HCC): ICD-10-CM

## 2023-01-24 PROCEDURE — 99490 CHRNC CARE MGMT STAFF 1ST 20: CPT | Performed by: FAMILY MEDICINE

## 2023-01-24 PROCEDURE — 99204 OFFICE O/P NEW MOD 45 MIN: CPT | Performed by: FAMILY MEDICINE

## 2023-01-24 PROCEDURE — 99439 CHRNC CARE MGMT STAF EA ADDL: CPT | Performed by: FAMILY MEDICINE

## 2023-01-24 SDOH — ECONOMIC STABILITY: INCOME INSECURITY: IN THE LAST 12 MONTHS, WAS THERE A TIME WHEN YOU WERE NOT ABLE TO PAY THE MORTGAGE OR RENT ON TIME?: NO

## 2023-01-24 SDOH — ECONOMIC STABILITY: HOUSING INSECURITY: IN THE LAST 12 MONTHS, HOW MANY PLACES HAVE YOU LIVED?: 1

## 2023-01-24 SDOH — ECONOMIC STABILITY: FOOD INSECURITY: WITHIN THE PAST 12 MONTHS, YOU WORRIED THAT YOUR FOOD WOULD RUN OUT BEFORE YOU GOT MONEY TO BUY MORE.: NEVER TRUE

## 2023-01-24 SDOH — ECONOMIC STABILITY: HOUSING INSECURITY
IN THE LAST 12 MONTHS, WAS THERE A TIME WHEN YOU DID NOT HAVE A STEADY PLACE TO SLEEP OR SLEPT IN A SHELTER (INCLUDING NOW)?: NO

## 2023-01-24 SDOH — SOCIAL STABILITY: SOCIAL NETWORK: HOW OFTEN DO YOU ATTEND CHURCH OR RELIGIOUS SERVICES?: MORE THAN 4 TIMES PER YEAR

## 2023-01-24 SDOH — HEALTH STABILITY: MENTAL HEALTH
STRESS IS WHEN SOMEONE FEELS TENSE, NERVOUS, ANXIOUS, OR CAN'T SLEEP AT NIGHT BECAUSE THEIR MIND IS TROUBLED. HOW STRESSED ARE YOU?: NOT AT ALL

## 2023-01-24 SDOH — HEALTH STABILITY: MENTAL HEALTH: HOW OFTEN DO YOU HAVE A DRINK CONTAINING ALCOHOL?: NEVER

## 2023-01-24 SDOH — SOCIAL STABILITY: SOCIAL NETWORK
DO YOU BELONG TO ANY CLUBS OR ORGANIZATIONS SUCH AS CHURCH GROUPS UNIONS, FRATERNAL OR ATHLETIC GROUPS, OR SCHOOL GROUPS?: YES

## 2023-01-24 SDOH — HEALTH STABILITY: PHYSICAL HEALTH: ON AVERAGE, HOW MANY MINUTES DO YOU ENGAGE IN EXERCISE AT THIS LEVEL?: 0 MIN

## 2023-01-24 SDOH — HEALTH STABILITY: MENTAL HEALTH: HOW OFTEN DO YOU HAVE 6 OR MORE DRINKS ON ONE OCCASION?: NEVER

## 2023-01-24 SDOH — ECONOMIC STABILITY: FOOD INSECURITY: WITHIN THE PAST 12 MONTHS, THE FOOD YOU BOUGHT JUST DIDN'T LAST AND YOU DIDN'T HAVE MONEY TO GET MORE.: NEVER TRUE

## 2023-01-24 SDOH — HEALTH STABILITY: PHYSICAL HEALTH: ON AVERAGE, HOW MANY DAYS PER WEEK DO YOU ENGAGE IN MODERATE TO STRENUOUS EXERCISE (LIKE A BRISK WALK)?: 0 DAYS

## 2023-01-24 SDOH — ECONOMIC STABILITY: INCOME INSECURITY: HOW HARD IS IT FOR YOU TO PAY FOR THE VERY BASICS LIKE FOOD, HOUSING, MEDICAL CARE, AND HEATING?: NOT HARD AT ALL

## 2023-01-24 SDOH — SOCIAL STABILITY: SOCIAL NETWORK: HOW OFTEN DO YOU GET TOGETHER WITH FRIENDS OR RELATIVES?: MORE THAN THREE TIMES A WEEK

## 2023-01-24 SDOH — SOCIAL STABILITY: SOCIAL NETWORK: ARE YOU MARRIED, WIDOWED, DIVORCED, SEPARATED, NEVER MARRIED, OR LIVING WITH A PARTNER?: WIDOWED

## 2023-01-24 SDOH — HEALTH STABILITY: MENTAL HEALTH: HOW MANY STANDARD DRINKS CONTAINING ALCOHOL DO YOU HAVE ON A TYPICAL DAY?: PATIENT DOES NOT DRINK

## 2023-01-24 SDOH — SOCIAL STABILITY: SOCIAL NETWORK: HOW OFTEN DO YOU ATTENT MEETINGS OF THE CLUB OR ORGANIZATION YOU BELONG TO?: MORE THAN 4 TIMES PER YEAR

## 2023-01-24 SDOH — SOCIAL STABILITY: SOCIAL NETWORK
IN A TYPICAL WEEK, HOW MANY TIMES DO YOU TALK ON THE PHONE WITH FAMILY, FRIENDS, OR NEIGHBORS?: MORE THAN THREE TIMES A WEEK

## 2023-01-24 ASSESSMENT — FIBROSIS 4 INDEX: FIB4 SCORE: 1.71

## 2023-01-24 ASSESSMENT — LIFESTYLE VARIABLES
SKIP TO QUESTIONS 9-10: 1
AUDIT-C TOTAL SCORE: 0

## 2023-01-24 ASSESSMENT — PATIENT HEALTH QUESTIONNAIRE - PHQ9
CLINICAL INTERPRETATION OF PHQ2 SCORE: 0
CLINICAL INTERPRETATION OF PHQ2 SCORE: 0
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
## 2023-01-24 NOTE — PROGRESS NOTES
"INITIAL CARE MANAGEMENT CARE PLAN/ASSESSMENT     Guillermina is an 82 year old in office today to establish care with Dr. Brenda Valenzuela as PCP. She is in office with 2 members of her Care Team, May who is a friend who DPOA as well as Brenda who is an RN and has DPOA as well. Brenda was added to Guillermina's contact list while she is in office today per Guillermina's request. Guillermina lives in an apartment with her son on the first floor. They do not have any stairs or steps. Guillermina and her care team deny any issues with stairs or curbs as long as there is a rail for her to hold on to. Neither Guillermina or her son drive and rely on her care team or other friends for rides. She denies transportation needs at this time. Guillermina says she is financially stable at this time and do not currently need financial resources. Guillermina is mostly independent in her ADL/IADL's. However, she does have declining vision and therefore does need assistance with some tasks such as cooking, and medication set up. She is at a fall risk per the STEADI fall assessment and I will send her resources for fall safety. She does know what medications she is on and \"how many pills\" are to be taken in the AM and PM and will question if she is missing a pill. Her son does help her set up her weekly pill box. Guillermina denies often thoughts of sadness or depression but she does have situational sadness when she thinks of her children who has passed. Both Guillermina and her caregivers say she is doing well though with everything she has been through. Guillermina has goals to become more active and possibly start going to [employer] again. We did discuss how to find the gym memberships that [facility] covers in her area. She would like to go to the gym her friend goes to so that she has a ride. Her care team will look over which gym she can use. She would also like to increase her socialization within the community. We discussed some at home exercises as well and will mail out " some resources for her. Address on file verified.     Medication Self-Management Goals:     Reviewed medications listed below with patient.      Current Outpatient Medications:     ELIQUIS 5 MG Tab, TAKE 1 TABLET BY MOUTH TWICE A DAY, Disp: 180 Tablet, Rfl: 1    SYMBICORT 160-4.5 MCG/ACT Aerosol, TAKE 1 PUFF BY MOUTH TWICE A DAY, Disp: 10.2 Each, Rfl: 5    B Complex Vitamins (VITAMIN B COMPLEX 100 INJ), vitamin B complex, Disp: , Rfl:     diclofenac sodium (VOLTAREN) 1 % Gel, diclofenac 1 % topical gel  APPLY 2-3 GRAMS TO AFFECTED AREA 4 TIMES A DAY AS NEEDED FOR PAIN, Disp: , Rfl:     denosumab (PROLIA) 60 MG/ML Solution Prefilled Syringe injection, Inject 1 mL under the skin every 6 months., Disp: 1 mL, Rfl: 0    gabapentin (NEURONTIN) 100 MG Cap, 100 mg 3 times a day., Disp: , Rfl:     famotidine (PEPCID) 20 MG Tab, TAKE 1 TABLET BY MOUTH EVERY DAY 30MINS PRIOR TO FOOD, Disp: 90 Tablet, Rfl: 3    Multiple Vitamins-Minerals (CENTRUM SILVER 50+WOMEN) Tab, Take  by mouth., Disp: , Rfl:     gabapentin (NEURONTIN) 300 MG Cap, Take 1 Capsule by mouth 3 times a day. Take 1 capsule in the morning and 2 in the evening, Disp: 90 Capsule, Rfl: 3    escitalopram (LEXAPRO) 10 MG Tab, TAKE 1 TABLET ORAL EVERY MORNING WITH MEALS, Disp: , Rfl:     loperamide (IMODIUM A-D) 2 MG tablet, LOPERAMIDE HCL 2 MG TABS, Disp: , Rfl:     albuterol 108 (90 Base) MCG/ACT Aero Soln inhalation aerosol, Inhale 1-2 Puffs by mouth every four hours as needed for Shortness of Breath., Disp: , Rfl:     Melatonin (VITAJOY GUMMIES PO), Take  by mouth., Disp: , Rfl:     Biotin 10 MG Tab, Take  by mouth., Disp: , Rfl:     HYDROcodone-acetaminophen (NORCO) 5-325 MG Tab per tablet, Take 1 Tab by mouth every four hours as needed., Disp: , Rfl:     polyethylene glycol/lytes (MIRALAX) Pack, Take 1 Packet by mouth 1 time daily as needed (if sennosides and docusate ineffective after 24 hours)., Disp: , Rfl: 3    acetaminophen (TYLENOL) 650 MG CR tablet, Take  650 mg by mouth 3 times a day., Disp: , Rfl:     montelukast (SINGULAIR) 10 MG Tab, Take 10 mg by mouth every evening., Disp: , Rfl:     Multiple Vitamins-Minerals (OCUVITE-LUTEIN) Tab, Take 1 tablet by mouth every morning., Disp: , Rfl:     ascorbic acid (VITAMIN C) 500 MG tablet, Take 1 Tab by mouth every day., Disp: 30 Tab, Rfl: 3    furosemide (LASIX) 20 MG Tab, Take 1 Tab by mouth every day., Disp: 60 Tab, Rfl:     vitamin D 2000 UNIT Tab, Take 1 Tab by mouth every day., Disp: 60 Tab, Rfl:     lovastatin (MEVACOR) 40 MG tablet, Take 1 Tab by mouth every bedtime., Disp: 30 Tab, Rfl: 0     Goal: Continue medication adherence     Physical/Functional/Environmental Status:    Guillermina is independent in the majority of her ADL's, but does require some help from her son with her IADL's as she has declining vision and does not see very well, especially in dark environments. She has a care team that she works with to assist with her needs as well. She is at a moderate fall risk through STEADI assessment for falls.      Activities of Daily Living:  Bathing: independent   Dressing: independent  Grooming: independent  Mouth Care: independent  Toileting: independent  Climbing a Flight of Stairs: independent    Independent Activities of Daily Living:  Shopping: independent  Cooking: needs assistance  Managing Medications: needs assistance  Using the phone and looking up numbers: needs assistance  Driving or using public transportation: total dependence  Managing Finances: total dependence        1/24/2023   STEADI Fall Risk   STEADI Risk for Falling Score 6   One or more falls in the last year No   Advised to use a cane or walker to get around safely Yes   Feels unsteady when walking No   Steadies self on furniture while walking at home Yes       occasional   Worried about falling Yes   Needs to push with hands when rising from a chair No   Has trouble stepping up onto a curb / using stairs No   Often has to rush to the  toilet Yes   Has lost some feeling in feet Yes   Takes medicine that makes him/her feel lightheaded or more tired than usual No   Takes medicine to sleep or improve mood No   Often feels sad or depressed No       situational sadness       Multiple values from one day are sorted in reverse-chronological order     Goal: Decrease Fall risk      Financial Status:     Guillermina and her care team say she is financially stable at this time, deny the need for financial resources at this time.     Goal: N/A      Transportation Status:    Guillermina nor her son she lives with drive. However, Guillermina's care team does drive her to her appointments and to the places she needs to go. They do not need transportation resources at this time.     Goal: N/A    Mental/Behavioral/Psychosocial Status:    Guillermina denies often thoughts of sadness or depression. However, she does have situation sadness when she thinks about her children who have passed or other family and friends who have passed. She says overall she is doing ok        6/7/2021 1/21/2022 1/24/2023   Depression Screen (PHQ-2/PHQ-9)   PHQ-2 Total Score 1 1 0    0   PHQ-9 Total Score 3 4        Multiple values from one day are sorted in reverse-chronological order       Interpretation of PHQ-9 Total Score   Score Severity   1-4 No Depression   5-9 Mild Depression   10-14 Moderate Depression   15-19 Moderately Severe Depression   20-27 Severe Depression       Goal: N/A       Chronic Care Management Care Plan      Social Determinants of Health: Socialization    Goal: Increase social activities  Barriers: age, knowledge, lack of transportation, DME,   Interventions: Education, motivation, resources    Start Date: 1/24/2023  End Date:      Exercise    Goal: Increase Exercise and activity level, increase strength and mobility   Barriers: age, knowledge, DME,   Interventions: Education, motivation, resources    Start Date: 1/24/2023   End Date:      Fall Risk- Moderate    Goal: Decrease  Fall Risk   Barriers: age, knowledge, DME resources  Interventions: Education, motivation, resources    Start Date: 1/24/2023  End Date:      Discussion: Personal Care Management Program, CCM services, Resources    Goals: Created      Next Scheduled patient outreach: 2/28/2023

## 2023-02-22 ENCOUNTER — PATIENT OUTREACH (OUTPATIENT)
Dept: HEALTH INFORMATION MANAGEMENT | Facility: OTHER | Age: 83
End: 2023-02-22
Payer: MEDICARE

## 2023-02-22 DIAGNOSIS — I10 PRIMARY HYPERTENSION: ICD-10-CM

## 2023-02-28 ENCOUNTER — PATIENT OUTREACH (OUTPATIENT)
Dept: HEALTH INFORMATION MANAGEMENT | Facility: OTHER | Age: 83
End: 2023-02-28

## 2023-02-28 ENCOUNTER — OFFICE VISIT (OUTPATIENT)
Dept: MEDICAL GROUP | Facility: PHYSICIAN GROUP | Age: 83
End: 2023-02-28
Payer: MEDICARE

## 2023-02-28 VITALS
TEMPERATURE: 98.5 F | HEART RATE: 79 BPM | HEIGHT: 58 IN | DIASTOLIC BLOOD PRESSURE: 74 MMHG | RESPIRATION RATE: 18 BRPM | BODY MASS INDEX: 31.95 KG/M2 | OXYGEN SATURATION: 92 % | WEIGHT: 152.2 LBS | SYSTOLIC BLOOD PRESSURE: 128 MMHG

## 2023-02-28 DIAGNOSIS — J44.9 CHRONIC OBSTRUCTIVE PULMONARY DISEASE, UNSPECIFIED COPD TYPE (HCC): ICD-10-CM

## 2023-02-28 DIAGNOSIS — F33.41 RECURRENT MAJOR DEPRESSIVE DISORDER, IN PARTIAL REMISSION (HCC): ICD-10-CM

## 2023-02-28 DIAGNOSIS — I10 PRIMARY HYPERTENSION: ICD-10-CM

## 2023-02-28 DIAGNOSIS — M85.80 OSTEOPENIA, UNSPECIFIED LOCATION: ICD-10-CM

## 2023-02-28 DIAGNOSIS — K44.9 LARGE HIATAL HERNIA: ICD-10-CM

## 2023-02-28 DIAGNOSIS — J96.10 CHRONIC RESPIRATORY FAILURE, UNSP W HYPOXIA OR HYPERCAPNIA (HCC): ICD-10-CM

## 2023-02-28 DIAGNOSIS — M80.00XG AGE-RELATED OSTEOPOROSIS WITH CURRENT PATHOLOGICAL FRACTURE WITH DELAYED HEALING: ICD-10-CM

## 2023-02-28 PROCEDURE — 99214 OFFICE O/P EST MOD 30 MIN: CPT | Performed by: FAMILY MEDICINE

## 2023-02-28 RX ORDER — ESCITALOPRAM OXALATE 10 MG/1
10 TABLET ORAL DAILY
Qty: 100 TABLET | Refills: 1 | Status: SHIPPED | OUTPATIENT
Start: 2023-02-28 | End: 2023-06-08

## 2023-02-28 ASSESSMENT — FIBROSIS 4 INDEX: FIB4 SCORE: 1.71

## 2023-02-28 NOTE — PROGRESS NOTES
Annual Health Assessment Questions:    1.  Are you currently engaging in any exercise or physical activity? Yes    2.  How would you describe your mood or emotional well-being today? good    3.  Have you had any falls in the last year? No    4.  Have you noticed any problems with your balance or had difficulty walking? Yes    5.  In the last six months have you experienced any leakage of urine? Yes    6. DPA/Advanced Directive: Patient has Durable Power of , but it is not on file. Instructed to bring in a copy to scan into their chart.

## 2023-02-28 NOTE — PROGRESS NOTES
Subjective:     CC:   Chief Complaint   Patient presents with    Follow-Up       HPI:   Guillermina presents today for follow-up.  Patient did get a bone density done just last year that shows osteoporosis.  When her friends has been given her Prolia injections but she was concerned about the location to give it to her.  I will just go ahead and write orders so she can go to infusion and get it done which her friend was very relieved so she would not have to do it anymore.  Patient may have had her last Prolia injection about a year ago and that should be every 6 months.    Past Medical History:   Diagnosis Date    Arthritis     Asthma     Back pain     Bowel habit changes     diarrhea    Breath shortness     COPD    Cataract     bilateral IOL    Chronic pain 11/2019    hips    Dyslipidemia 8/1/2016    Emphysema of lung (formerly [facility])     GERD (gastroesophageal reflux disease)     [place] measles     Heart burn     Hepatitis 1970's    A based on labs done 2016    Hiatus hernia syndrome     High cholesterol     History of total hip replacement     L    Hyperlipidemia     Influenza     Macular degeneration     Memory loss     Mumps     NSTEMI (non-ST elevated myocardial infarction) (formerly [facility]) 9/12/2017    OA (osteoarthritis)     back, hip, shoulds, knees and hands    Other pulmonary embolism without acute cor pulmonale (formerly [facility]) 12/14/2019    Painful joint     Psychiatric problem     depression    S/P cataract extraction     bilat    Shortness of breath     Snoring     stomach flu 11/08/2019    Urinary incontinence     Vision loss     Wears dentures     Wears glasses        Social History     Tobacco Use    Smoking status: Never     Passive exposure: Yes    Smokeless tobacco: Never    Tobacco comments:     lived with smokers   Vaping Use    Vaping Use: Never used    Passive vaping exposure: Yes   Substance Use Topics    Alcohol use: Not Currently     Alcohol/week: 0.0 oz     Comment: 3 per year    Drug use: No     Comment: In the Distant  Past, but not since 32 y.o.  (prior - [employer] heart,[employer], crosstops - stimulants for working double shifts, but not for several decades)       Current Outpatient Medications Ordered in Epic   Medication Sig Dispense Refill    escitalopram (LEXAPRO) 10 MG Tab Take 1 Tablet by mouth every day for 100 days. 100 Tablet 1    ELIQUIS 5 MG Tab TAKE 1 TABLET BY MOUTH TWICE A  Tablet 1    SYMBICORT 160-4.5 MCG/ACT Aerosol TAKE 1 PUFF BY MOUTH TWICE A DAY 10.2 Each 5    B Complex Vitamins (VITAMIN B COMPLEX 100 INJ) vitamin B complex      diclofenac sodium (VOLTAREN) 1 % Gel diclofenac 1 % topical gel   APPLY 2-3 GRAMS TO AFFECTED AREA 4 TIMES A DAY AS NEEDED FOR PAIN      denosumab (PROLIA) 60 MG/ML Solution Prefilled Syringe injection Inject 1 mL under the skin every 6 months. 1 mL 0    gabapentin (NEURONTIN) 100 MG Cap 100 mg 3 times a day.      famotidine (PEPCID) 20 MG Tab TAKE 1 TABLET BY MOUTH EVERY DAY 30MINS PRIOR TO FOOD 90 Tablet 3    Multiple Vitamins-Minerals (CENTRUM SILVER 50+WOMEN) Tab Take  by mouth.      loperamide (IMODIUM A-D) 2 MG tablet LOPERAMIDE HCL 2 MG TABS      albuterol 108 (90 Base) MCG/ACT Aero Soln inhalation aerosol Inhale 1-2 Puffs by mouth every four hours as needed for Shortness of Breath.      Melatonin (VITAJOY GUMMIES PO) Take  by mouth.      Biotin 10 MG Tab Take  by mouth.      HYDROcodone-acetaminophen (NORCO) 5-325 MG Tab per tablet Take 1 Tab by mouth every four hours as needed.      polyethylene glycol/lytes (MIRALAX) Pack Take 1 Packet by mouth 1 time daily as needed (if sennosides and docusate ineffective after 24 hours).  3    montelukast (SINGULAIR) 10 MG Tab Take 10 mg by mouth every evening.      Multiple Vitamins-Minerals (OCUVITE-LUTEIN) Tab Take 1 tablet by mouth every morning.      ascorbic acid (VITAMIN C) 500 MG tablet Take 1 Tab by mouth every day. 30 Tab 3    furosemide (LASIX) 20 MG Tab Take 1 Tab by mouth every day. 60 Tab     vitamin D 2000 UNIT Tab Take  "1 Tab by mouth every day. 60 Tab     lovastatin (MEVACOR) 40 MG tablet Take 1 Tab by mouth every bedtime. 30 Tab 0     No current Epic-ordered facility-administered medications on file.       Allergies:  Sagebrush    Health Maintenance: Completed    ROS:  Gen: no fevers/chills, no changes in weight  Eyes: no changes in vision  ENT: no sore throat, no hearing loss, no bloody nose  Pulm: no sob, no cough  CV: no chest pain, no palpitations  GI: no nausea/vomiting, no diarrhea  Neuro: no headaches, no numbness/tingling  Heme/Lymph: no easy bruising    Objective:     Exam:  /74 (BP Location: Left arm, Patient Position: Sitting, BP Cuff Size: Adult)   Pulse 79   Temp 36.9 °C (98.5 °F) (Temporal)   Resp 18   Ht 1.473 m (4' 10\")   Wt 69 kg (152 lb 3.2 oz)   SpO2 92%   BMI 31.81 kg/m²  Body mass index is 31.81 kg/m².    Gen: Alert and oriented, No apparent distress.  Skin: Warm and dry.  No obvious lesions.  Eyes: Sclera wnl Pupils normal in size  Lungs: Normal effort, CTA bilaterally, no wheezes, rhonchi, or rales  CV: Regular rate and rhythm. No murmurs, rubs, or gallops.  Musculoskeletal: Normal gait. No extremity cyanosis, clubbing, or edema.  Neuro: Oriented to person, place and time  Psych: Mood is wnl       Assessment & Plan:     82 y.o. female with the following -     1. Osteopenia, unspecified location  Reviewing patient's bone density she does have osteoporosis see below  2. Chronic obstructive pulmonary disease, unspecified COPD type (HCC)  Patient does see pulmonary regularly is a chronic problem    3. Recurrent major depressive disorder, in partial remission (HCC)  Patient did see psychiatry she was put on an antidepressant her caregivers would like me to continue to refill this which I well it appears it is working well for patient this is a chronic problem    4. Large hiatal hernia  Patient does have a large hiatal hernia she did see a surgeon about 6 months ago at this time they feel that she is " not a surgical candidate we will continue to watch this this is a chronic problem    5. Age-related osteoporosis with current pathological fracture with delayed healing  Patient concerned about getting the Prolia injections in certain areas of her abdomen we will go ahead and write a referral to infusion clinic so they can give it since caregiver would like that done instead of her having to do it every 6 months.    Other orders  - escitalopram (LEXAPRO) 10 MG Tab; Take 1 Tablet by mouth every day for 100 days.  Dispense: 100 Tablet; Refill: 1    Return in about 3 months (around 5/28/2023), or if symptoms worsen or fail to improve.    Please note that this dictation was created using voice recognition software. I have made every reasonable attempt to correct obvious errors, but I expect that there are errors of grammar and possibly content that I did not discover before finalizing the note.

## 2023-02-28 NOTE — PROGRESS NOTES
Attempted to see Guillermina while in office for her appointment and PCP follow up. PCP let patient go following appointment. Attempted to call both mobile and home number listed. LVM on Alexandra's number listed as mobile and home number does not have a  set up. Will wait for call back and attempt to follow up next month.

## 2023-03-03 ENCOUNTER — PATIENT OUTREACH (OUTPATIENT)
Dept: HEALTH INFORMATION MANAGEMENT | Facility: OTHER | Age: 83
End: 2023-03-03
Payer: MEDICARE

## 2023-03-03 DIAGNOSIS — J44.9 CHRONIC OBSTRUCTIVE PULMONARY DISEASE, UNSPECIFIED COPD TYPE (HCC): ICD-10-CM

## 2023-03-03 DIAGNOSIS — I10 PRIMARY HYPERTENSION: ICD-10-CM

## 2023-03-03 DIAGNOSIS — J96.10 CHRONIC RESPIRATORY FAILURE, UNSP W HYPOXIA OR HYPERCAPNIA (HCC): ICD-10-CM

## 2023-03-03 PROCEDURE — 99999 PR NO CHARGE: CPT | Performed by: FAMILY MEDICINE

## 2023-03-04 NOTE — PROGRESS NOTES
Assessment    Spoke Alexandra for Stephanie's [facility] monthly follow. She said Stephanie is overall doing well. She is Getting her teeth in about 3 weeks. They were able to get her a new walker through [facility] and that is working out good. She did question her medications as Dr. Valenzuela is a new PCP and gave them a list of her medications on file. Alexandra said she sat down with stephanie and all her medications and they do not have Lovastatin, Montelukast or Lasix. She is not sure if Stephanie is supposed to be continued on these medications or when they ran out as she was not notified of the medications being out. She would like a message to Dr. Valenzuela to be sent inquiring about the Lovasatin and Montelukast. She said she will follow up with vascular med at her next appointment for the lasix. No further needs or concerns at this time. Informed her I would follow up with her if there was further questions or needed information once I heard back from Dr. Valenzuela next week. She said that would be fine.     Education    Follow ups, medication review    Care Plan    Progressing    Progress:    Progressing    Next outreach: 4/5/2023

## 2023-03-06 RX ORDER — MONTELUKAST SODIUM 10 MG/1
10 TABLET ORAL EVERY EVENING
Qty: 100 TABLET | Refills: 1 | Status: SHIPPED | OUTPATIENT
Start: 2023-03-06 | End: 2023-06-29

## 2023-03-06 RX ORDER — LOVASTATIN 40 MG/1
40 TABLET ORAL
Qty: 100 TABLET | Refills: 1 | Status: SHIPPED | OUTPATIENT
Start: 2023-03-06 | End: 2023-06-29

## 2023-04-05 ENCOUNTER — PATIENT OUTREACH (OUTPATIENT)
Dept: HEALTH INFORMATION MANAGEMENT | Facility: OTHER | Age: 83
End: 2023-04-05

## 2023-04-05 ENCOUNTER — ANTICOAGULATION VISIT (OUTPATIENT)
Dept: VASCULAR LAB | Facility: MEDICAL CENTER | Age: 83
End: 2023-04-05
Attending: INTERNAL MEDICINE
Payer: MEDICARE

## 2023-04-05 VITALS — HEART RATE: 76 BPM | DIASTOLIC BLOOD PRESSURE: 87 MMHG | SYSTOLIC BLOOD PRESSURE: 138 MMHG

## 2023-04-05 DIAGNOSIS — J44.9 CHRONIC OBSTRUCTIVE PULMONARY DISEASE, UNSPECIFIED COPD TYPE (HCC): ICD-10-CM

## 2023-04-05 DIAGNOSIS — I82.90 DEEP VEIN THROMBOSIS (DVT) OF NON-EXTREMITY VEIN, UNSPECIFIED CHRONICITY: ICD-10-CM

## 2023-04-05 DIAGNOSIS — I10 PRIMARY HYPERTENSION: ICD-10-CM

## 2023-04-05 DIAGNOSIS — J96.10 CHRONIC RESPIRATORY FAILURE, UNSP W HYPOXIA OR HYPERCAPNIA (HCC): ICD-10-CM

## 2023-04-05 PROCEDURE — 99490 CHRNC CARE MGMT STAFF 1ST 20: CPT | Performed by: FAMILY MEDICINE

## 2023-04-05 PROCEDURE — 99212 OFFICE O/P EST SF 10 MIN: CPT | Performed by: PHARMACIST

## 2023-04-05 NOTE — PROGRESS NOTES
Target end date:indefinite     Indication: Hx of DVT     Drug: apixaban      CHADsVASC = n/a    Health Status Since Last Assessment   Patient denies any new relevant medical problems, ED visits or hospitalizations   Patient denies any embolic events (stroke/tia/systemic embolism)    Adherence with DOAC Therapy   Pt has no missed any doses in the average week    Bleeding Risk Assessment     no Epistaxis   Pt denies any excessive or unusual bleeding/hematomas.  Pt denies any GI bleeds or hematemesis.  Pt denies any concerning daily headache or sub dural hematoma symptoms.     Pt denies any hematuria y   Latest Hemoglobin 13.2   ETOH overuse no     Creatinine Clearance/Renal Function     Latest ClCr > 30 ml/min    Hepatic function   Latest LFTs 18/11   Pt denies any history of liver dysfunction      Drug Interactions   Platelets: 266   ASA/other antiplatelets no   NSAID no   Other drug interactions no    Verified no anticonvulsant or azole therapy, education provided for future use.     Examination   Blood Pressure   Vitals:    04/05/23 1412   BP: 138/87   Pulse: 76        Symptomatic hypotension no   Significant gait impairment/imbalance/high risk for falls? Yes, patient uses a cane.    Final Assessment and Recommendations:   Patient appears stable from the anticoagulation standpoint.     Benefits of continued DOAC therapy outweigh risks for this patient   Recommend pt continue with current DOAC therapy yes (with dose adjustment)   DOAC is affordable     Other Actions: cmp/ cbc hemogram ordered prior to next visit    Follow up:   Will follow up with patient 6  months.     Graham Smalls, pharmacy intern  Chapito Garza, PharmD, BCACP

## 2023-04-05 NOTE — PROGRESS NOTES
Assessment    Spoke with Alexandra Guillermina's power of  and caregiver. She said Guillermina is doing well. She is currently at an appointment at [facility]. She said they are aware of the appointment with PCP on 5/31 and will be in office. She denies any needs at this time. She said she would call if there were any needs that arise. Informed I would see them in clinic on 5/31 at her appointment.     Education    Care gaps, appointments    Care Plan    Progressing    Progress:    Progressing    Next outreach: 5/31/2023 in office    Quarterly outreach: Guillermina is doing well at this time. Her care team is taking good care of her and does not need resources at this time. Will continue to follow Guillermina for resources as needs arise.

## 2023-04-07 ENCOUNTER — TELEPHONE (OUTPATIENT)
Dept: HEALTH INFORMATION MANAGEMENT | Facility: OTHER | Age: 83
End: 2023-04-07
Payer: MEDICARE

## 2023-05-10 DIAGNOSIS — I82.90 DEEP VEIN THROMBOSIS (DVT) OF NON-EXTREMITY VEIN, UNSPECIFIED CHRONICITY: ICD-10-CM

## 2023-05-31 ENCOUNTER — PATIENT OUTREACH (OUTPATIENT)
Dept: HEALTH INFORMATION MANAGEMENT | Facility: OTHER | Age: 83
End: 2023-05-31
Payer: MEDICARE

## 2023-05-31 DIAGNOSIS — R73.03 PREDIABETES: ICD-10-CM

## 2023-05-31 DIAGNOSIS — J44.9 CHRONIC OBSTRUCTIVE PULMONARY DISEASE, UNSPECIFIED COPD TYPE (HCC): ICD-10-CM

## 2023-05-31 DIAGNOSIS — I10 PRIMARY HYPERTENSION: ICD-10-CM

## 2023-05-31 DIAGNOSIS — J96.10 CHRONIC RESPIRATORY FAILURE, UNSP W HYPOXIA OR HYPERCAPNIA (HCC): ICD-10-CM

## 2023-05-31 PROCEDURE — 99999 PR NO CHARGE: CPT | Performed by: FAMILY MEDICINE

## 2023-05-31 NOTE — PROGRESS NOTES
Assessment    Spoke with Guillermina and Alexandra, Guillermina's caregiver, for [facility] monthly follow up. Guillermina did miss her appointment with PCP today as she arrived late and needed to be rescheduled. Guillermina said she is not doing well as her throat hurts. She has had some recent issues with choking on her food, irritating her throat and causing her to cough. She said she did throw up some blood with coughing. This situation is irritating her throat and back of her neck and she is having trouble sleep due to it. She has seen a GI and they told her that she has a hernia and her stomach has moved but she is not a candidate for surgery.She said these issues are happening more frequently and causes her some troubles. They were hoping to speak to PCP about that today as they would like to get some imaging done on her abdomen and stomach to see what is going on if needed. Guillermina did get rescheduled for Monday 6/5/2023 to see PCP. Alexandra said she does not feel is at an emergent situation at this time as the coughing up blood only happened once and she was told it may be a broken blood vessel from coughing so hard. Guillermina said she is trying to eat soft food like yogurt and that does tend to settle better with her and doesn't cause as many problems. Discussed ER precautions and Alexandra said  she will take her to the ER if she continues to throw up blood or if she continues to have problems and she feels it becomes an emergent situation. They deny resource needs at this time. Will plan to see Guillermina in clinic on 6/5/2023 at her appointment.     Education    Appointments, care gaps    Plan of Care and Goals    Progressing: Goals: Increase exercise, fall risk, SDOH:  Social isolation    Barriers:    Age, habits, medical conditions, DME    Progress:    Progressing    Next outreach:6/5/20223 in office

## 2023-06-02 ENCOUNTER — APPOINTMENT (OUTPATIENT)
Dept: MEDICAL GROUP | Facility: PHYSICIAN GROUP | Age: 83
End: 2023-06-02
Payer: MEDICARE

## 2023-06-12 ENCOUNTER — HOSPITAL ENCOUNTER (OUTPATIENT)
Dept: LAB | Facility: MEDICAL CENTER | Age: 83
End: 2023-06-12
Attending: FAMILY MEDICINE
Payer: MEDICARE

## 2023-06-12 ENCOUNTER — OFFICE VISIT (OUTPATIENT)
Dept: MEDICAL GROUP | Facility: PHYSICIAN GROUP | Age: 83
End: 2023-06-12
Payer: MEDICARE

## 2023-06-12 VITALS
HEIGHT: 58 IN | OXYGEN SATURATION: 91 % | WEIGHT: 148.2 LBS | HEART RATE: 72 BPM | BODY MASS INDEX: 31.11 KG/M2 | DIASTOLIC BLOOD PRESSURE: 62 MMHG | SYSTOLIC BLOOD PRESSURE: 124 MMHG | TEMPERATURE: 98 F | RESPIRATION RATE: 18 BRPM

## 2023-06-12 DIAGNOSIS — K21.9 GASTROESOPHAGEAL REFLUX DISEASE, UNSPECIFIED WHETHER ESOPHAGITIS PRESENT: ICD-10-CM

## 2023-06-12 DIAGNOSIS — J02.9 SORE THROAT: ICD-10-CM

## 2023-06-12 DIAGNOSIS — K44.9 LARGE HIATAL HERNIA: ICD-10-CM

## 2023-06-12 DIAGNOSIS — I25.119 ATHEROSCLEROSIS OF NATIVE CORONARY ARTERY OF NATIVE HEART WITH ANGINA PECTORIS (HCC): ICD-10-CM

## 2023-06-12 DIAGNOSIS — R13.10 SWALLOWING PROBLEM: ICD-10-CM

## 2023-06-12 DIAGNOSIS — R79.89 ABNORMAL CBC: ICD-10-CM

## 2023-06-12 LAB
ALBUMIN SERPL BCP-MCNC: 3.8 G/DL (ref 3.2–4.9)
ALBUMIN/GLOB SERPL: 1.5 G/DL
ALP SERPL-CCNC: 72 U/L (ref 30–99)
ALT SERPL-CCNC: 14 U/L (ref 2–50)
ANION GAP SERPL CALC-SCNC: 12 MMOL/L (ref 7–16)
AST SERPL-CCNC: 20 U/L (ref 12–45)
BASOPHILS # BLD AUTO: 0.2 % (ref 0–1.8)
BASOPHILS # BLD: 0.01 K/UL (ref 0–0.12)
BILIRUB SERPL-MCNC: 0.2 MG/DL (ref 0.1–1.5)
BUN SERPL-MCNC: 25 MG/DL (ref 8–22)
CALCIUM ALBUM COR SERPL-MCNC: 9.8 MG/DL (ref 8.5–10.5)
CALCIUM SERPL-MCNC: 9.6 MG/DL (ref 8.5–10.5)
CHLORIDE SERPL-SCNC: 107 MMOL/L (ref 96–112)
CO2 SERPL-SCNC: 24 MMOL/L (ref 20–33)
CREAT SERPL-MCNC: 1.12 MG/DL (ref 0.5–1.4)
EOSINOPHIL # BLD AUTO: 0.01 K/UL (ref 0–0.51)
EOSINOPHIL NFR BLD: 0.2 % (ref 0–6.9)
ERYTHROCYTE [DISTWIDTH] IN BLOOD BY AUTOMATED COUNT: 52.2 FL (ref 35.9–50)
GFR SERPLBLD CREATININE-BSD FMLA CKD-EPI: 48 ML/MIN/1.73 M 2
GLOBULIN SER CALC-MCNC: 2.5 G/DL (ref 1.9–3.5)
GLUCOSE SERPL-MCNC: 129 MG/DL (ref 65–99)
HCT VFR BLD AUTO: 44.2 % (ref 37–47)
HGB BLD-MCNC: 13.3 G/DL (ref 12–16)
IMM GRANULOCYTES # BLD AUTO: 0.02 K/UL (ref 0–0.11)
IMM GRANULOCYTES NFR BLD AUTO: 0.4 % (ref 0–0.9)
LYMPHOCYTES # BLD AUTO: 1.39 K/UL (ref 1–4.8)
LYMPHOCYTES NFR BLD: 26.4 % (ref 22–41)
MCH RBC QN AUTO: 24.5 PG (ref 27–33)
MCHC RBC AUTO-ENTMCNC: 30.1 G/DL (ref 32.2–35.5)
MCV RBC AUTO: 81.5 FL (ref 81.4–97.8)
MONOCYTES # BLD AUTO: 0.7 K/UL (ref 0–0.85)
MONOCYTES NFR BLD AUTO: 13.3 % (ref 0–13.4)
NEUTROPHILS # BLD AUTO: 3.13 K/UL (ref 1.82–7.42)
NEUTROPHILS NFR BLD: 59.5 % (ref 44–72)
NRBC # BLD AUTO: 0 K/UL
NRBC BLD-RTO: 0 /100 WBC (ref 0–0.2)
PLATELET # BLD AUTO: 266 K/UL (ref 164–446)
PMV BLD AUTO: 9.7 FL (ref 9–12.9)
POTASSIUM SERPL-SCNC: 4.5 MMOL/L (ref 3.6–5.5)
PROT SERPL-MCNC: 6.3 G/DL (ref 6–8.2)
RBC # BLD AUTO: 5.42 M/UL (ref 4.2–5.4)
SODIUM SERPL-SCNC: 143 MMOL/L (ref 135–145)
WBC # BLD AUTO: 5.3 K/UL (ref 4.8–10.8)

## 2023-06-12 PROCEDURE — 36415 COLL VENOUS BLD VENIPUNCTURE: CPT

## 2023-06-12 PROCEDURE — 85025 COMPLETE CBC W/AUTO DIFF WBC: CPT

## 2023-06-12 PROCEDURE — 3074F SYST BP LT 130 MM HG: CPT | Performed by: FAMILY MEDICINE

## 2023-06-12 PROCEDURE — 3078F DIAST BP <80 MM HG: CPT | Performed by: FAMILY MEDICINE

## 2023-06-12 PROCEDURE — 99214 OFFICE O/P EST MOD 30 MIN: CPT | Performed by: FAMILY MEDICINE

## 2023-06-12 PROCEDURE — 80053 COMPREHEN METABOLIC PANEL: CPT

## 2023-06-12 RX ORDER — HYDROCODONE BITARTRATE AND ACETAMINOPHEN 10; 325 MG/1; MG/1
0.5 TABLET ORAL 2 TIMES DAILY
COMMUNITY
Start: 2023-05-19

## 2023-06-12 ASSESSMENT — FIBROSIS 4 INDEX: FIB4 SCORE: 1.73

## 2023-06-12 NOTE — PROGRESS NOTES
Subjective:     CC:   Chief Complaint   Patient presents with    Follow-Up       HPI:   Guillermina presents today for follow-up she is with her friend who is trying to coordinate all the services.  Patient states that sometimes her throat hurts her and she has some discomfort swallowing.  Patient does have a very complicated history of a very large hiatal hernia plus she has lung issues in the past.  Patient is seeing pulmonary this month.  Last time I saw her I did refer her to cardiology she wanted to see 1 particular provider but unfortunately her insurance does not cover for this provider so another provider was listed.  The person with her did not notice the name on patient's MyChart I did give her the name and phone number since patient is also concerned about her heart.  I looked last gastroenterology note was from 2010 and at that time patient may not have been willing to get a scope or a colonoscopy done.  Patient did see a surgeon in June 2022 and with the imaging studies she does have a very large hiatal hernia but at that time the surgeon felt that she would be a high risk surgical candidate and if she was not having that much problems to wait.    Past Medical History:   Diagnosis Date    Arthritis     Asthma     Back pain     Bowel habit changes     diarrhea    Breath shortness     COPD    Cataract     bilateral IOL    Chronic pain 11/2019    hips    Dyslipidemia 8/1/2016    Emphysema of lung (HCC)     GERD (gastroesophageal reflux disease)     Citizen of [place] measles     Heart burn     Hepatitis 1970's    A based on labs done 2016    Hiatus hernia syndrome     High cholesterol     History of total hip replacement     L    Hyperlipidemia     Influenza     Macular degeneration     Memory loss     Mumps     NSTEMI (non-ST elevated myocardial infarction) ([facility]) 9/12/2017    OA (osteoarthritis)     back, hip, shoulds, knees and hands    Other pulmonary embolism without acute cor pulmonale ([facility]) 12/14/2019    Painful  joint     Psychiatric problem     depression    S/P cataract extraction     bilat    Shortness of breath     Snoring     stomach flu 11/08/2019    Urinary incontinence     Vision loss     Wears dentures     Wears glasses        Social History     Tobacco Use    Smoking status: Never     Passive exposure: Yes    Smokeless tobacco: Never    Tobacco comments:     lived with smokers   Vaping Use    Vaping Use: Never used    Passive vaping exposure: Yes   Substance Use Topics    Alcohol use: Not Currently     Alcohol/week: 0.0 oz     Comment: 3 per year    Drug use: No     Comment: In the Distant Past, but not since 32 y.o.  (prior - [employer] heart,[employer], crosstops - stimulants for working double shifts, but not for several decades)       Current Outpatient Medications Ordered in Epic   Medication Sig Dispense Refill    HYDROcodone/acetaminophen (NORCO)  MG Tab Take 0.5 Tablets by mouth 2 times a day.      lovastatin (MEVACOR) 40 MG tablet Take 1 Tablet by mouth at bedtime. 100 Tablet 1    montelukast (SINGULAIR) 10 MG Tab Take 1 Tablet by mouth every evening. 100 Tablet 1    ELIQUIS 5 MG Tab TAKE 1 TABLET BY MOUTH TWICE A  Tablet 1    SYMBICORT 160-4.5 MCG/ACT Aerosol TAKE 1 PUFF BY MOUTH TWICE A DAY 10.2 Each 5    B Complex Vitamins (VITAMIN B COMPLEX 100 INJ) vitamin B complex      diclofenac sodium (VOLTAREN) 1 % Gel diclofenac 1 % topical gel   APPLY 2-3 GRAMS TO AFFECTED AREA 4 TIMES A DAY AS NEEDED FOR PAIN      denosumab (PROLIA) 60 MG/ML Solution Prefilled Syringe injection Inject 1 mL under the skin every 6 months. 1 mL 0    gabapentin (NEURONTIN) 100 MG Cap 100 mg 3 times a day.      famotidine (PEPCID) 20 MG Tab TAKE 1 TABLET BY MOUTH EVERY DAY 30MINS PRIOR TO FOOD 90 Tablet 3    Multiple Vitamins-Minerals (CENTRUM SILVER 50+WOMEN) Tab Take  by mouth.      loperamide (IMODIUM A-D) 2 MG tablet LOPERAMIDE HCL 2 MG TABS      albuterol 108 (90 Base) MCG/ACT Aero Soln inhalation aerosol Inhale 1-2  "Puffs by mouth every four hours as needed for Shortness of Breath.      Melatonin (VITAJOY GUMMIES PO) Take  by mouth.      Biotin 10 MG Tab Take  by mouth.      HYDROcodone-acetaminophen (NORCO) 5-325 MG Tab per tablet Take 1 Tab by mouth every four hours as needed.      polyethylene glycol/lytes (MIRALAX) Pack Take 1 Packet by mouth 1 time daily as needed (if sennosides and docusate ineffective after 24 hours).  3    Multiple Vitamins-Minerals (OCUVITE-LUTEIN) Tab Take 1 tablet by mouth every morning.      ascorbic acid (VITAMIN C) 500 MG tablet Take 1 Tab by mouth every day. 30 Tab 3    furosemide (LASIX) 20 MG Tab Take 1 Tab by mouth every day. 60 Tab     vitamin D 2000 UNIT Tab Take 1 Tab by mouth every day. 60 Tab      No current Epic-ordered facility-administered medications on file.       Allergies:  Sagebrush    Health Maintenance: Completed    ROS:  Gen: no fevers/chills, patient has lost 4 pounds in 4 months  Eyes: no changes in vision  ENT: no sore throat, no hearing loss, no bloody nose  Pulm: no sob, no cough  CV: no chest pain, no palpitations  GI: no nausea/vomiting, no diarrhea  Neuro: no headaches, no numbness/tingling  Heme/Lymph: no easy bruising    Objective:     Exam:  /62 (BP Location: Right arm, Patient Position: Sitting, BP Cuff Size: Adult)   Pulse 72   Temp 36.7 °C (98 °F) (Temporal)   Resp 18   Ht 1.473 m (4' 10\")   Wt 67.2 kg (148 lb 3.2 oz)   SpO2 91%   BMI 30.97 kg/m²  Body mass index is 30.97 kg/m².    Gen: Alert and oriented, No apparent distress.  Skin: Warm and dry.  No obvious lesions.  Eyes: Sclera wnl Pupils normal in size  ENT: Mouth negative redness or exudates noted  Lungs: Normal effort, CTA bilaterally, no wheezes, rhonchi, or rales  CV: Regular rate and rhythm. No murmurs, rubs, or gallops.  Musculoskeletal: Normal gait. No extremity cyanosis, clubbing, or edema.  Neuro: Oriented to person, place and time  Psych: Mood is wnl         Assessment & Plan:     83 " y.o. female with the following -     1. Swallowing problem  I did mention to patient having a hiatal hernia she may want to eat smaller portions cut the meat in small pieces so she will not have issues with choking.  Patient at this time did not want to see gastroenterology but wanted to see ear nose and throat we will go ahead and write a referral  - Comp Metabolic Panel; Future  - Referral to ENT    2. Abnormal CBC  I will recheck her CBC  - CBC WITH DIFFERENTIAL; Future    3. Sore throat  I will refer patient to ENT since patient wanted to make sure there was nothing wrong with her throat  - Referral to ENT    4. Atherosclerosis of native coronary artery of native heart with angina pectoris (HCC)  I did give the person with her the name phone number and address of the cardiologist    5. Gastroesophageal reflux disease, unspecified whether esophagitis present  At this point patient's not interested in seeing GI    6. Large hiatal hernia  I did recommend not eating and immediately laying down 4 to 5 hours after to avoid reflux and choking.  I will see her back in 2 months to see how her weight is doing    Return in about 2 months (around 8/12/2023), or if symptoms worsen or fail to improve.  I did review a note from GI from 2010 and also surgical note from 2022    Please note that this dictation was created using voice recognition software. I have made every reasonable attempt to correct obvious errors, but I expect that there are errors of grammar and possibly content that I did not discover before finalizing the note.

## 2023-06-16 DIAGNOSIS — R79.89 ELEVATED SERUM CREATININE: ICD-10-CM

## 2023-06-19 ENCOUNTER — PATIENT OUTREACH (OUTPATIENT)
Dept: HEALTH INFORMATION MANAGEMENT | Facility: OTHER | Age: 83
End: 2023-06-19
Payer: MEDICARE

## 2023-06-19 DIAGNOSIS — I10 PRIMARY HYPERTENSION: ICD-10-CM

## 2023-06-19 DIAGNOSIS — J44.9 CHRONIC OBSTRUCTIVE PULMONARY DISEASE, UNSPECIFIED COPD TYPE (HCC): ICD-10-CM

## 2023-06-19 DIAGNOSIS — R73.03 PREDIABETES: ICD-10-CM

## 2023-06-19 DIAGNOSIS — J96.10 CHRONIC RESPIRATORY FAILURE, UNSP W HYPOXIA OR HYPERCAPNIA (HCC): ICD-10-CM

## 2023-06-19 PROCEDURE — 99490 CHRNC CARE MGMT STAFF 1ST 20: CPT | Performed by: FAMILY MEDICINE

## 2023-06-19 NOTE — PROGRESS NOTES
Attempt to outreach for CCM monthly follow up. Primary contact is Alexandra. She said she is unable to talk at this time and to call tomorrow am at 830. Will plan to call tomorrow at requested time for referral information and CCM monthly follow up.

## 2023-06-20 DIAGNOSIS — R73.03 PREDIABETES: ICD-10-CM

## 2023-06-20 NOTE — PROGRESS NOTES
Assessment    Spoke to Alexandra today for Guillermina's CCM monthly follow up. She said she is doing well over all. She was currently looking at her lab results and is concerned about her GFR as it is 48. Informed her that Guillermina needs a repeat Comp metabolic panel in 3 weeks per PCP and that will show us another GFR so that it can be looked at as well. She also said that she noticed that her glucose is high and did say Guillermina is a pre-diabetic and Alexandra is a diabetic so they have been really working on nutrition with both Guillermina and her son who lives with her. 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 Requested an updated A1C to be ordered so it can be drawn with her repeat Comp Metabolic panel. Alexandra is going to be out of town the first week of July so we scheduled her labs for 7/17. Alexandra said Guillermina is planning a trip to [place] where she is from to see her oldest son and is looking forward to seeing him. She is trying to get a friend to drive her up there and back. Alexandra also said they are working on trying to get Guillermina's son who lived with her and helps her out on disability as he has a neurological disability from a fall he had several years ago. Alexandra and Brenda handle all Guillermina's financial, POA, and medical, and appointment needs. Referral for ENT Otolaryngology given as per below information from referral notes. There is no CCM needs at this time. Discussed discharge from CCM program as Alexandra and Brenda care for Guillermina and have her needs situated. Alexandra is ok with discharge of CCM program knowing if needs for resources or education do arise they can call and the CCM team will be happy to assist them. Care plan closed and CCM program closed    ARELY PAIZ  [address] [facility] [address]  Phone: [phone]    Education    Diet, labs, referrals,     Plan of Care and Goals    Progressing: Care plan  closed    Barriers:    Age, knowledge, habits,     Progress:    Progressing    Next outreach: CCM Program Closed

## 2023-06-26 ENCOUNTER — OFFICE VISIT (OUTPATIENT)
Dept: SLEEP MEDICINE | Facility: MEDICAL CENTER | Age: 83
End: 2023-06-26
Attending: INTERNAL MEDICINE
Payer: MEDICARE

## 2023-06-26 VITALS
HEIGHT: 58 IN | BODY MASS INDEX: 31.57 KG/M2 | DIASTOLIC BLOOD PRESSURE: 60 MMHG | OXYGEN SATURATION: 93 % | HEART RATE: 67 BPM | SYSTOLIC BLOOD PRESSURE: 120 MMHG | WEIGHT: 150.4 LBS

## 2023-06-26 DIAGNOSIS — I26.99 PULMONARY EMBOLISM, OTHER, UNSPECIFIED CHRONICITY, UNSPECIFIED WHETHER ACUTE COR PULMONALE PRESENT (HCC): ICD-10-CM

## 2023-06-26 DIAGNOSIS — J45.30 MILD PERSISTENT ASTHMA WITHOUT COMPLICATION: ICD-10-CM

## 2023-06-26 DIAGNOSIS — J96.91 RESPIRATORY FAILURE WITH HYPOXIA, UNSPECIFIED CHRONICITY (HCC): ICD-10-CM

## 2023-06-26 DIAGNOSIS — J98.4 RESTRICTIVE LUNG DISEASE: ICD-10-CM

## 2023-06-26 DIAGNOSIS — K44.9 HIATAL HERNIA: ICD-10-CM

## 2023-06-26 PROCEDURE — 3074F SYST BP LT 130 MM HG: CPT | Performed by: INTERNAL MEDICINE

## 2023-06-26 PROCEDURE — 99212 OFFICE O/P EST SF 10 MIN: CPT | Performed by: INTERNAL MEDICINE

## 2023-06-26 PROCEDURE — 3078F DIAST BP <80 MM HG: CPT | Performed by: INTERNAL MEDICINE

## 2023-06-26 PROCEDURE — 99214 OFFICE O/P EST MOD 30 MIN: CPT | Performed by: INTERNAL MEDICINE

## 2023-06-26 RX ORDER — BUDESONIDE AND FORMOTEROL FUMARATE DIHYDRATE 160; 4.5 UG/1; UG/1
2 AEROSOL RESPIRATORY (INHALATION) 2 TIMES DAILY
Qty: 10.2 EACH | Refills: 11 | Status: SHIPPED | OUTPATIENT
Start: 2023-06-26

## 2023-06-26 ASSESSMENT — ENCOUNTER SYMPTOMS
ABDOMINAL PAIN: 0
FOCAL WEAKNESS: 0
HEADACHES: 0
WEIGHT LOSS: 0
PHOTOPHOBIA: 0
COUGH: 1
WHEEZING: 0
PND: 0
SORE THROAT: 0
SPEECH CHANGE: 0
HEMOPTYSIS: 0
NAUSEA: 0
DIAPHORESIS: 0
TREMORS: 0
SHORTNESS OF BREATH: 0
STRIDOR: 0
EYE DISCHARGE: 0
DOUBLE VISION: 0
CLAUDICATION: 0
PALPITATIONS: 0
ORTHOPNEA: 0
EYE PAIN: 0
SINUS PAIN: 0
BACK PAIN: 0
SPUTUM PRODUCTION: 0
CONSTIPATION: 0
VOMITING: 0
EYE REDNESS: 0
NECK PAIN: 0
CHILLS: 0
FALLS: 0
HEARTBURN: 0
BLURRED VISION: 0
WEAKNESS: 0
FEVER: 0
MYALGIAS: 0
DIZZINESS: 0
DEPRESSION: 0
DIARRHEA: 0

## 2023-06-26 ASSESSMENT — FIBROSIS 4 INDEX: FIB4 SCORE: 1.71
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
## 2023-06-29 RX ORDER — LOVASTATIN 40 MG/1
TABLET ORAL
Qty: 100 TABLET | Refills: 1 | Status: SHIPPED | OUTPATIENT
Start: 2023-06-29 | End: 2024-03-13

## 2023-06-29 RX ORDER — MONTELUKAST SODIUM 10 MG/1
TABLET ORAL
Qty: 100 TABLET | Refills: 1 | Status: SHIPPED | OUTPATIENT
Start: 2023-06-29 | End: 2024-03-13

## 2023-07-17 ENCOUNTER — HOSPITAL ENCOUNTER (OUTPATIENT)
Dept: LAB | Facility: MEDICAL CENTER | Age: 83
End: 2023-07-17
Attending: FAMILY MEDICINE
Payer: MEDICARE

## 2023-07-17 DIAGNOSIS — R73.03 PREDIABETES: ICD-10-CM

## 2023-07-17 LAB
ALBUMIN SERPL BCP-MCNC: 3.9 G/DL (ref 3.2–4.9)
ALBUMIN/GLOB SERPL: 1.5 G/DL
ALP SERPL-CCNC: 70 U/L (ref 30–99)
ALT SERPL-CCNC: 14 U/L (ref 2–50)
ANION GAP SERPL CALC-SCNC: 10 MMOL/L (ref 7–16)
AST SERPL-CCNC: 23 U/L (ref 12–45)
BILIRUB SERPL-MCNC: 0.3 MG/DL (ref 0.1–1.5)
BUN SERPL-MCNC: 30 MG/DL (ref 8–22)
CALCIUM ALBUM COR SERPL-MCNC: 9.7 MG/DL (ref 8.5–10.5)
CALCIUM SERPL-MCNC: 9.6 MG/DL (ref 8.5–10.5)
CHLORIDE SERPL-SCNC: 107 MMOL/L (ref 96–112)
CO2 SERPL-SCNC: 24 MMOL/L (ref 20–33)
CREAT SERPL-MCNC: 1.11 MG/DL (ref 0.5–1.4)
EST. AVERAGE GLUCOSE BLD GHB EST-MCNC: 134 MG/DL
FASTING STATUS PATIENT QL REPORTED: NORMAL
GFR SERPLBLD CREATININE-BSD FMLA CKD-EPI: 49 ML/MIN/1.73 M 2
GLOBULIN SER CALC-MCNC: 2.6 G/DL (ref 1.9–3.5)
GLUCOSE SERPL-MCNC: 130 MG/DL (ref 65–99)
HBA1C MFR BLD: 6.3 % (ref 4–5.6)
POTASSIUM SERPL-SCNC: 4.6 MMOL/L (ref 3.6–5.5)
PROT SERPL-MCNC: 6.5 G/DL (ref 6–8.2)
SODIUM SERPL-SCNC: 141 MMOL/L (ref 135–145)

## 2023-07-17 PROCEDURE — 83036 HEMOGLOBIN GLYCOSYLATED A1C: CPT

## 2023-07-17 PROCEDURE — 80053 COMPREHEN METABOLIC PANEL: CPT

## 2023-07-17 PROCEDURE — 36415 COLL VENOUS BLD VENIPUNCTURE: CPT

## 2023-08-04 ENCOUNTER — TELEPHONE (OUTPATIENT)
Dept: SLEEP MEDICINE | Facility: MEDICAL CENTER | Age: 83
End: 2023-08-04
Payer: MEDICARE

## 2023-08-04 DIAGNOSIS — J98.4 RESTRICTIVE LUNG DISEASE: ICD-10-CM

## 2023-08-04 DIAGNOSIS — J96.11 CHRONIC RESPIRATORY FAILURE WITH HYPOXIA (HCC): ICD-10-CM

## 2023-08-04 NOTE — TELEPHONE ENCOUNTER
Patient called requesting a extra small oxygen tanks. She is going on vacation on Monday and would like some extra tanks to have on hand.      Called [facility], all they need is an updated order and chart notes from 6/26/23

## 2023-08-30 DIAGNOSIS — I82.90 DEEP VEIN THROMBOSIS (DVT) OF NON-EXTREMITY VEIN, UNSPECIFIED CHRONICITY: ICD-10-CM

## 2023-08-30 RX ORDER — APIXABAN 5 MG/1
TABLET, FILM COATED ORAL
Qty: 180 TABLET | Refills: 1 | Status: SHIPPED | OUTPATIENT
Start: 2023-08-30

## 2023-09-06 ENCOUNTER — OFFICE VISIT (OUTPATIENT)
Dept: MEDICAL GROUP | Facility: PHYSICIAN GROUP | Age: 83
End: 2023-09-06
Payer: MEDICARE

## 2023-09-06 VITALS
HEART RATE: 69 BPM | TEMPERATURE: 99.2 F | SYSTOLIC BLOOD PRESSURE: 128 MMHG | HEIGHT: 58 IN | DIASTOLIC BLOOD PRESSURE: 72 MMHG | RESPIRATION RATE: 20 BRPM | WEIGHT: 151.2 LBS | OXYGEN SATURATION: 92 % | BODY MASS INDEX: 31.74 KG/M2

## 2023-09-06 DIAGNOSIS — G31.9 DEGENERATIVE DISEASE OF NERVOUS SYSTEM, UNSPECIFIED (HCC): ICD-10-CM

## 2023-09-06 DIAGNOSIS — F11.20 OPIOID DEPENDENCE, UNCOMPLICATED (HCC): ICD-10-CM

## 2023-09-06 DIAGNOSIS — N18.31 CHRONIC KIDNEY DISEASE, STAGE 3A: ICD-10-CM

## 2023-09-06 DIAGNOSIS — G63 POLYNEUROPATHY IN DISEASES CLASSIFIED ELSEWHERE (HCC): ICD-10-CM

## 2023-09-06 DIAGNOSIS — R13.10 SWALLOWING PROBLEM: ICD-10-CM

## 2023-09-06 DIAGNOSIS — M81.0 OSTEOPOROSIS, UNSPECIFIED OSTEOPOROSIS TYPE, UNSPECIFIED PATHOLOGICAL FRACTURE PRESENCE: ICD-10-CM

## 2023-09-06 PROCEDURE — 3078F DIAST BP <80 MM HG: CPT | Performed by: FAMILY MEDICINE

## 2023-09-06 PROCEDURE — 3074F SYST BP LT 130 MM HG: CPT | Performed by: FAMILY MEDICINE

## 2023-09-06 PROCEDURE — 99214 OFFICE O/P EST MOD 30 MIN: CPT | Performed by: FAMILY MEDICINE

## 2023-09-06 RX ORDER — ESCITALOPRAM OXALATE 10 MG/1
TABLET ORAL
COMMUNITY
End: 2023-09-06

## 2023-09-06 RX ORDER — ESCITALOPRAM OXALATE 10 MG/1
TABLET ORAL
COMMUNITY
Start: 2023-06-15 | End: 2024-02-07

## 2023-09-06 RX ORDER — OMEPRAZOLE 40 MG/1
40 CAPSULE, DELAYED RELEASE ORAL DAILY
Qty: 30 CAPSULE | Refills: 2 | Status: SHIPPED | OUTPATIENT
Start: 2023-09-06 | End: 2023-10-04

## 2023-09-06 RX ORDER — GABAPENTIN 300 MG/1
CAPSULE ORAL
COMMUNITY
End: 2023-09-06

## 2023-09-06 RX ORDER — BUDESONIDE AND FORMOTEROL FUMARATE DIHYDRATE 160; 4.5 UG/1; UG/1
AEROSOL RESPIRATORY (INHALATION)
COMMUNITY

## 2023-09-06 RX ORDER — MONTELUKAST SODIUM 10 MG/1
1 TABLET ORAL
COMMUNITY
End: 2023-09-06

## 2023-09-06 RX ORDER — HYDROCODONE BITARTRATE AND ACETAMINOPHEN 5; 325 MG/1; MG/1
1 TABLET ORAL 2 TIMES DAILY
COMMUNITY
End: 2023-09-06

## 2023-09-06 RX ORDER — LOVASTATIN 40 MG/1
1 TABLET ORAL
COMMUNITY
End: 2023-09-06

## 2023-09-06 ASSESSMENT — FIBROSIS 4 INDEX: FIB4 SCORE: 1.96

## 2023-09-06 NOTE — PROGRESS NOTES
Subjective:     CC:   Chief Complaint   Patient presents with    Follow-Up       HPI:   Guillermina presents today with friend to help care for her.  Patient is seeing pain management and taking some pain pills for her low back.  Patient is also seeing vascular/cardiology for her issues.  Patient is also scheduled for follow-up with pulmonary in January.  Her friend is talking to patient concerning a polst and consideration of being a no code.  Patient did get the swallowing evaluation and they felt everything was within normal limits.  Patient does state that her swallowing issue has gone away and is not is prevalent and she wants to wait for any other referrals  Past Medical History:   Diagnosis Date    Arthritis     Asthma     Back pain     Bowel habit changes     diarrhea    Breath shortness     COPD    Cataract     bilateral IOL    Chronic pain 11/2019    hips    Dyslipidemia 8/1/2016    Emphysema of lung (HCC)     GERD (gastroesophageal reflux disease)     [place] measles     Heart burn     Hepatitis 1970's    A based on labs done 2016    Hiatus hernia syndrome     High cholesterol     History of total hip replacement     L    Hyperlipidemia     Influenza     Macular degeneration     Memory loss     Mumps     NSTEMI (non-ST elevated myocardial infarction) ([facility]) 9/12/2017    OA (osteoarthritis)     back, hip, shoulds, knees and hands    Other pulmonary embolism without acute cor pulmonale ([facility]) 12/14/2019    Painful joint     Psychiatric problem     depression    S/P cataract extraction     bilat    Shortness of breath     Snoring     stomach flu 11/08/2019    Urinary incontinence     Vision loss     Wears dentures     Wears glasses        Social History     Tobacco Use    Smoking status: Never     Passive exposure: Yes    Smokeless tobacco: Never    Tobacco comments:     lived with smokers   Vaping Use    Vaping Use: Never used    Passive vaping exposure: Yes   Substance Use Topics    Alcohol use: Not Currently      Alcohol/week: 0.0 oz     Comment: 3 per year    Drug use: No     Comment: In the Distant Past, but not since 32 y.o.  (prior - Pink heart,Cross beauty, crosstops - stimulants for working double shifts, but not for several decades)       Current Outpatient Medications Ordered in Epic   Medication Sig Dispense Refill    omeprazole (PRILOSEC) 40 MG delayed-release capsule Take 1 Capsule by mouth every day. 30 Capsule 2    lovastatin (MEVACOR) 40 MG tablet TAKE 1 TABLET BY MOUTH EVERYDAY AT BEDTIME 100 Tablet 1    montelukast (SINGULAIR) 10 MG Tab TAKE 1 TABLET BY MOUTH EVERY DAY IN THE EVENING 100 Tablet 1    HYDROcodone/acetaminophen (NORCO)  MG Tab Take 0.5 Tablets by mouth 2 times a day.      gabapentin (NEURONTIN) 100 MG Cap 100 mg 3 times a day.      escitalopram (LEXAPRO) 10 MG Tab TAKE 1 TABLET ORAL EVERY MORNING WITH MEALS      budesonide-formoterol (SYMBICORT) 160-4.5 MCG/ACT Aerosol TAKE 1 PUFF BY MOUTH TWICE A DAY      apixaban (ELIQUIS) 5mg Tab Take 1 Tablet by mouth 2 times a day.      Cyanocobalamin (B-12 PO) Take  by mouth.      ELIQUIS 5 MG Tab TAKE 1 TABLET BY MOUTH TWICE A  Tablet 1    SYMBICORT 160-4.5 MCG/ACT Aerosol Inhale 2 Puffs 2 times a day. 10.2 Each 11    diclofenac sodium (VOLTAREN) 1 % Gel diclofenac 1 % topical gel   APPLY 2-3 GRAMS TO AFFECTED AREA 4 TIMES A DAY AS NEEDED FOR PAIN      denosumab (PROLIA) 60 MG/ML Solution Prefilled Syringe injection Inject 1 mL under the skin every 6 months. 1 mL 0    famotidine (PEPCID) 20 MG Tab TAKE 1 TABLET BY MOUTH EVERY DAY 30MINS PRIOR TO FOOD 90 Tablet 3    Multiple Vitamins-Minerals (CENTRUM SILVER 50+WOMEN) Tab Take  by mouth.      loperamide (IMODIUM A-D) 2 MG tablet LOPERAMIDE HCL 2 MG TABS      albuterol 108 (90 Base) MCG/ACT Aero Soln inhalation aerosol Inhale 1-2 Puffs by mouth every four hours as needed for Shortness of Breath.      Melatonin (VITAJOY GUMMIES PO) Take  by mouth.      Biotin 10 MG Tab Take  by mouth.       "polyethylene glycol/lytes (MIRALAX) Pack Take 1 Packet by mouth 1 time daily as needed (if sennosides and docusate ineffective after 24 hours).  3    Multiple Vitamins-Minerals (OCUVITE-LUTEIN) Tab Take 1 tablet by mouth every morning.      ascorbic acid (VITAMIN C) 500 MG tablet Take 1 Tab by mouth every day. 30 Tab 3    vitamin D 2000 UNIT Tab Take 1 Tab by mouth every day. 60 Tab      No current Epic-ordered facility-administered medications on file.       Allergies:  [place]    Health Maintenance: Completed    ROS:  Gen: no fevers/chills, patient has gained 1 pound  Eyes: no changes in vision  ENT: no sore throat, no hearing loss, no bloody nose  Pulm: no sob, no cough  CV: no chest pain, no palpitations  GI: no nausea/vomiting, no diarrhea  : no dysuria  Neuro: no headaches, no numbness/tingling  Heme/Lymph: no easy bruising    Objective:     Exam:  /72 (BP Location: Left arm, Patient Position: Sitting, BP Cuff Size: Adult)   Pulse 69   Temp 37.3 °C (99.2 °F) (Temporal)   Resp 20   Ht 1.473 m (4' 10\")   Wt 68.6 kg (151 lb 3.2 oz)   SpO2 92%   BMI 31.60 kg/m²  Body mass index is 31.6 kg/m².    Gen: Alert and oriented, No apparent distress.  Skin: Warm and dry.  No obvious lesions.  Eyes: Sclera wnl Pupils normal in size  Lungs: Normal effort, CTA bilaterally, no wheezes, rhonchi, or rales  CV: Regular rate and rhythm. No murmurs, rubs, or gallops.  Musculoskeletal: Normal gait. No extremity cyanosis, clubbing, or edema.  Neuro: Oriented to person, place and time  Psych: Mood is wnl         Assessment & Plan:     83 y.o. female with the following -     1. Chronic kidney disease, stage 3a (HCC)  Please see below    2. Degenerative disease of nervous system, unspecified (HCC)  Please see below    3. Opioid dependence, uncomplicated ([facility])    4. Polyneuropathy in diseases classified elsewhere ([facility])    5. Osteoporosis, unspecified osteoporosis type, unspecified pathological fracture presence  I will " refer to endocrinology to see if she should start back on the Prolia    6.  Swallowing problem  Patient is swallowing better we will just continue to monitor.  We will go ahead and try patient on omeprazole due to the fact she has been on it before in the past thought it helped her    - Referral to Endocrinology  [facility] Form    Diagnosis to address: N18.31 - Chronic kidney disease, stage 3a ([facility])  Assessment and plan: Chronic, stable. Continue with current defined treatment plan: We will continue to monitor. Follow-up at least annually.  Diagnosis: G31.9 - Degenerative disease of nervous system, unspecified ([facility])  Assessment and plan: Chronic, stable. Continue with current defined treatment plan: Patient is seen pain management for treatment. Follow-up at least annually.  Diagnosis: F11.20 - Opioid dependence, uncomplicated ([facility])  Assessment and plan: Chronic, stable. Continue with current defined treatment plan: Patient is receiving pain location from pain management. Follow-up at least annually.  Diagnosis: G63 - Polyneuropathy in diseases classified elsewhere ([facility])  Assessment and plan: Chronic, stable. Continue with current defined treatment plan: Patient is receiving gabapentin for this. Follow-up at least annually.  Last edited 09/06/23 16:53 PDT by Brenda Valenzuela M.D.         - omeprazole (PRILOSEC) 40 MG delayed-release capsule; Take 1 Capsule by mouth every day.  Dispense: 30 Capsule; Refill: 2       Return in about 2 months (around 11/6/2023).  Her friend is going to talk to her about no CODE STATUS I did give her the POLST so she can take it at home and discuss it with the patient to see if she feels agreeable to know resuscitation if something would happen to her.    Please note that this dictation was created using voice recognition software. I have made every reasonable attempt to correct obvious errors, but I expect that there are errors of grammar and possibly content that I did not discover before  finalizing the note.

## 2023-09-11 DIAGNOSIS — E55.9 VITAMIN D DEFICIENCY: ICD-10-CM

## 2023-09-15 NOTE — PROGRESS NOTES
Telemetry Shift Summary     Rhythm NSR  HR Range 80-90  Ectopy PVCs and PACs  Measurements .16/.08/.86         Normal Values  Rhythm SR  HR Range    Measurements 0.12-0.20 / 0.06-0.10  / 0.30-0.52    Ongoing care. No acute changes at this time. Hearin APTT at 0000 today. Possible DC to regional for surery still.   same name as above

## 2023-10-04 ENCOUNTER — ANTICOAGULATION VISIT (OUTPATIENT)
Dept: VASCULAR LAB | Facility: MEDICAL CENTER | Age: 83
End: 2023-10-04
Attending: INTERNAL MEDICINE
Payer: MEDICARE

## 2023-10-04 VITALS — HEART RATE: 64 BPM | SYSTOLIC BLOOD PRESSURE: 125 MMHG | DIASTOLIC BLOOD PRESSURE: 72 MMHG

## 2023-10-04 DIAGNOSIS — Z79.01 CHRONIC ANTICOAGULATION: ICD-10-CM

## 2023-10-04 PROCEDURE — 99211 OFF/OP EST MAY X REQ PHY/QHP: CPT

## 2023-10-04 RX ORDER — OMEPRAZOLE 40 MG/1
40 CAPSULE, DELAYED RELEASE ORAL DAILY
Qty: 30 CAPSULE | Refills: 2 | Status: SHIPPED | OUTPATIENT
Start: 2023-10-04 | End: 2024-01-04

## 2023-10-04 NOTE — PROGRESS NOTES
Target end date:indefinite     Indication: Hx of DVT     Drug: apixaban 5 mg BID     CHADsVASC = n/a    Health Status Since Last Assessment   Patient denies any new relevant medical problems, ED visits or hospitalizations   Patient denies any embolic events (stroke/tia/systemic embolism)    Adherence with DOAC Therapy   Pt has no missed any doses in the average week    Bleeding Risk Assessment     no Epistaxis   Pt denies any excessive or unusual bleeding/hematomas.  Pt denies any GI bleeds or hematemesis.  Pt denies any concerning daily headache or sub dural hematoma symptoms.     Pt denies any hematuria y   Latest Hemoglobin 13.3   ETOH overuse no     Creatinine Clearance/Renal Function     Latest ClCr > 39 ml/min    Hepatic function   Latest LFTs 23/14   Pt denies any history of liver dysfunction      Drug Interactions   Platelets: 266   ASA/other antiplatelets no   NSAID no   Other drug interactions no    Verified no anticonvulsant or azole therapy, education provided for future use.     Examination   Blood Pressure   Vitals:    10/04/23 1415   BP: 125/72   Pulse: 64          Symptomatic hypotension no   Significant gait impairment/imbalance/high risk for falls? Yes, patient uses a cane, vision is impaired    Final Assessment and Recommendations:   Patient appears stable from the anticoagulation standpoint.     Benefits of continued DOAC therapy outweigh risks for this patient   Recommend pt continue with current DOAC therapy yes (with dose adjustment)   DOAC is affordable     Other Actions: cmp/ cbc hemogram ordered prior to next visit    Follow up:   Will follow up with patient 6  months.       Sarah Springer, AmberD

## 2023-11-03 ENCOUNTER — HOSPITAL ENCOUNTER (OUTPATIENT)
Dept: LAB | Facility: MEDICAL CENTER | Age: 83
End: 2023-11-03
Attending: FAMILY MEDICINE
Payer: MEDICARE

## 2023-11-03 DIAGNOSIS — E55.9 VITAMIN D DEFICIENCY: ICD-10-CM

## 2023-11-03 LAB — 25(OH)D3 SERPL-MCNC: 27 NG/ML (ref 30–100)

## 2023-11-03 PROCEDURE — 36415 COLL VENOUS BLD VENIPUNCTURE: CPT

## 2023-11-03 PROCEDURE — 82306 VITAMIN D 25 HYDROXY: CPT

## 2023-11-06 ENCOUNTER — OFFICE VISIT (OUTPATIENT)
Dept: MEDICAL GROUP | Facility: PHYSICIAN GROUP | Age: 83
End: 2023-11-06
Payer: MEDICARE

## 2023-11-06 VITALS
RESPIRATION RATE: 18 BRPM | HEART RATE: 68 BPM | HEIGHT: 58 IN | OXYGEN SATURATION: 93 % | TEMPERATURE: 97.9 F | WEIGHT: 146.7 LBS | SYSTOLIC BLOOD PRESSURE: 124 MMHG | BODY MASS INDEX: 30.79 KG/M2 | DIASTOLIC BLOOD PRESSURE: 64 MMHG

## 2023-11-06 DIAGNOSIS — N18.31 CHRONIC KIDNEY DISEASE, STAGE 3A: ICD-10-CM

## 2023-11-06 DIAGNOSIS — E55.9 VITAMIN D DEFICIENCY: ICD-10-CM

## 2023-11-06 DIAGNOSIS — J96.10 CHRONIC RESPIRATORY FAILURE, UNSP W HYPOXIA OR HYPERCAPNIA (HCC): ICD-10-CM

## 2023-11-06 DIAGNOSIS — Z23 NEED FOR VACCINATION: ICD-10-CM

## 2023-11-06 DIAGNOSIS — M80.00XG AGE-RELATED OSTEOPOROSIS WITH CURRENT PATHOLOGICAL FRACTURE WITH DELAYED HEALING: ICD-10-CM

## 2023-11-06 DIAGNOSIS — E78.5 DYSLIPIDEMIA: ICD-10-CM

## 2023-11-06 PROCEDURE — 90677 PCV20 VACCINE IM: CPT | Performed by: FAMILY MEDICINE

## 2023-11-06 PROCEDURE — 99214 OFFICE O/P EST MOD 30 MIN: CPT | Mod: 25 | Performed by: FAMILY MEDICINE

## 2023-11-06 PROCEDURE — G0009 ADMIN PNEUMOCOCCAL VACCINE: HCPCS | Performed by: FAMILY MEDICINE

## 2023-11-06 PROCEDURE — 3074F SYST BP LT 130 MM HG: CPT | Performed by: FAMILY MEDICINE

## 2023-11-06 PROCEDURE — 90662 IIV NO PRSV INCREASED AG IM: CPT | Performed by: FAMILY MEDICINE

## 2023-11-06 PROCEDURE — G0008 ADMIN INFLUENZA VIRUS VAC: HCPCS | Performed by: FAMILY MEDICINE

## 2023-11-06 PROCEDURE — 3078F DIAST BP <80 MM HG: CPT | Performed by: FAMILY MEDICINE

## 2023-11-06 ASSESSMENT — FIBROSIS 4 INDEX: FIB4 SCORE: 1.96

## 2023-11-06 NOTE — PROGRESS NOTES
Subjective:     CC:   Chief Complaint   Patient presents with    Follow-Up       HPI:   Guillermina presents today with her friend for follow-up.  Apparently on discussion about the POLST form patient needs to talk more with her friend about her wishes.  Friend states that she will have a further discussion with her on this.  I did let them know that I did get her vitamin D level it is low patient not taking vitamin D recommend taking vitamin D3 2000 IUs/day her friend can go ahead and contact endocrinology since we have the blood work done.  Patient is doing well otherwise    Past Medical History:   Diagnosis Date    Arthritis     Asthma     Back pain     Bowel habit changes     diarrhea    Breath shortness     COPD    Cataract     bilateral IOL    Chronic pain 11/2019    hips    Dyslipidemia 8/1/2016    Emphysema of lung ([facility])     GERD (gastroesophageal reflux disease)     [place] measles     Heart burn     Hepatitis 1970's    A based on labs done 2016    Hiatus hernia syndrome     High cholesterol     History of total hip replacement     L    Hyperlipidemia     Influenza     Macular degeneration     Memory loss     Mumps     NSTEMI (non-ST elevated myocardial infarction) ([facility]) 9/12/2017    OA (osteoarthritis)     back, hip, shoulds, knees and hands    Other pulmonary embolism without acute cor pulmonale ([facility]) 12/14/2019    Painful joint     Psychiatric problem     depression    S/P cataract extraction     bilat    Shortness of breath     Snoring     stomach flu 11/08/2019    Urinary incontinence     Vision loss     Wears dentures     Wears glasses        Social History     Tobacco Use    Smoking status: Never     Passive exposure: Yes    Smokeless tobacco: Never    Tobacco comments:     lived with smokers   Vaping Use    Vaping Use: Never used    Passive vaping exposure: Yes   Substance Use Topics    Alcohol use: Not Currently     Alcohol/week: 0.0 oz     Comment: 3 per year    Drug use: No     Comment: In the Distant  Past, but not since 32 y.o.  (prior - Pink heart,[employer], crosstops - stimulants for working double shifts, but not for several decades)       Current Outpatient Medications Ordered in Epic   Medication Sig Dispense Refill    lovastatin (MEVACOR) 40 MG tablet TAKE 1 TABLET BY MOUTH EVERYDAY AT BEDTIME 100 Tablet 1    montelukast (SINGULAIR) 10 MG Tab TAKE 1 TABLET BY MOUTH EVERY DAY IN THE EVENING 100 Tablet 1    omeprazole (PRILOSEC) 40 MG delayed-release capsule TAKE 1 CAPSULE BY MOUTH EVERY DAY 30 Capsule 2    escitalopram (LEXAPRO) 10 MG Tab TAKE 1 TABLET ORAL EVERY MORNING WITH MEALS      budesonide-formoterol (SYMBICORT) 160-4.5 MCG/ACT Aerosol TAKE 1 PUFF BY MOUTH TWICE A DAY      apixaban (ELIQUIS) 5mg Tab Take 1 Tablet by mouth 2 times a day.      Cyanocobalamin (B-12 PO) Take  by mouth.      ELIQUIS 5 MG Tab TAKE 1 TABLET BY MOUTH TWICE A  Tablet 1    SYMBICORT 160-4.5 MCG/ACT Aerosol Inhale 2 Puffs 2 times a day. 10.2 Each 11    HYDROcodone/acetaminophen (NORCO)  MG Tab Take 0.5 Tablets by mouth 2 times a day.      diclofenac sodium (VOLTAREN) 1 % Gel diclofenac 1 % topical gel   APPLY 2-3 GRAMS TO AFFECTED AREA 4 TIMES A DAY AS NEEDED FOR PAIN      denosumab (PROLIA) 60 MG/ML Solution Prefilled Syringe injection Inject 1 mL under the skin every 6 months. 1 mL 0    gabapentin (NEURONTIN) 100 MG Cap 100 mg 3 times a day.      famotidine (PEPCID) 20 MG Tab TAKE 1 TABLET BY MOUTH EVERY DAY 30MINS PRIOR TO FOOD 90 Tablet 3    Multiple Vitamins-Minerals (CENTRUM SILVER 50+WOMEN) Tab Take  by mouth.      loperamide (IMODIUM A-D) 2 MG tablet LOPERAMIDE HCL 2 MG TABS      albuterol 108 (90 Base) MCG/ACT Aero Soln inhalation aerosol Inhale 1-2 Puffs by mouth every four hours as needed for Shortness of Breath.      Melatonin (VITAJOY GUMMIES PO) Take  by mouth.      Biotin 10 MG Tab Take  by mouth.      polyethylene glycol/lytes (MIRALAX) Pack Take 1 Packet by mouth 1 time daily as needed (if  "sennosides and docusate ineffective after 24 hours).  3    Multiple Vitamins-Minerals (OCUVITE-LUTEIN) Tab Take 1 tablet by mouth every morning.      ascorbic acid (VITAMIN C) 500 MG tablet Take 1 Tab by mouth every day. 30 Tab 3    vitamin D 2000 UNIT Tab Take 1 Tab by mouth every day. 60 Tab      No current Epic-ordered facility-administered medications on file.       Allergies:  [place]brush    Health Maintenance: Completed    ROS:  Gen: no fevers/chills, no changes in weight  Eyes: no changes in vision  ENT: no sore throat, no hearing loss, no bloody nose  Pulm: no sob, no cough  CV: no chest pain, no palpitations  GI: no nausea/vomiting, no diarrhea  : no dysuria  Neuro: no headaches, no numbness/tingling  Heme/Lymph: no easy bruising    Objective:     Exam:  /64 (BP Location: Left arm, Patient Position: Sitting, BP Cuff Size: Adult)   Pulse 68   Temp 36.6 °C (97.9 °F) (Temporal)   Resp 18   Ht 1.473 m (4' 10\")   Wt 66.5 kg (146 lb 11.2 oz)   SpO2 93%   BMI 30.66 kg/m²  Body mass index is 30.66 kg/m².    Gen: Alert and oriented, No apparent distress.  Skin: Warm and dry.  No obvious lesions.  Eyes: Sclera wnl Pupils normal in size  Lungs: Normal effort, CTA bilaterally, no wheezes, rhonchi, or rales  CV: Regular rate and rhythm. No murmurs, rubs, or gallops.  Musculoskeletal: Normal gait. No extremity cyanosis, clubbing, or edema.  Neuro: Oriented to person, place and time  Psych: Mood is wnl         Assessment & Plan:     83 y.o. female with the following -     1. Age-related osteoporosis with current pathological fracture with delayed healing  I did give patient's friend the phone number to call to schedule to see endocrinology.    2. Chronic kidney disease, stage 3a (HCC)  I will continue to monitor this.    3. Chronic respiratory failure, unsp w hypoxia or hypercapnia (HCC)  Patient does have appointment coming up with pulmonary in January.  Patient agreeable to get her pneumonia 20 and flu " vaccine today.  I would advise patient to consider getting the RSV vaccine also.    4. Vitamin D deficiency  Recommend patient start taking 2000 IUs of vitamin D3 per day I will recheck this when I see her back in January  - VITAMIN D,25 HYDROXY (DEFICIENCY); Future    5. Need for vaccination  - Influenza Vaccine, High Dose (65+ Only)  - Pneumococcal Conjugate Vaccine 20-Valent (6 wks+)    6. Dyslipidemia  Recommend checking her lipid panel when I see her back  - Comp Metabolic Panel; Future  - Lipid Profile; Future       Return in about 2 months (around 1/6/2024), or if symptoms worsen or fail to improve.    Please note that this dictation was created using voice recognition software. I have made every reasonable attempt to correct obvious errors, but I expect that there are errors of grammar and possibly content that I did not discover before finalizing the note.

## 2024-01-04 RX ORDER — OMEPRAZOLE 40 MG/1
40 CAPSULE, DELAYED RELEASE ORAL DAILY
Qty: 90 CAPSULE | Refills: 1 | Status: SHIPPED | OUTPATIENT
Start: 2024-01-04

## 2024-01-12 NOTE — THERAPY
Occupational Therapy  Daily Treatment     Patient Name: Guillermina Recio  Age:  79 y.o., Sex:  female  Medical Record #: [id number]  Today's Date: 1/2/2020     Precautions  Precautions: (P) Posterior Hip Precautions, Weight Bearing As Tolerated Right Lower Extremity, Fall Risk  Comments: (P) monitor BP and SpO2, fear of falling         Subjective    Pt agreeable to OT     Objective       01/02/20 1401   Precautions   Precautions Posterior Hip Precautions;Weight Bearing As Tolerated Right Lower Extremity;Fall Risk   Comments monitor BP and SpO2, fear of falling   Interdisciplinary Plan of Care Collaboration   IDT Collaboration with  Family / Caregiver   Patient Position at End of Therapy Seated;Family / Friend in Room   Collaboration Comments Daughter Nestor and son David present for family training, see assessment for details   OT Total Time Spent   OT Individual Total Time Spent (Mins) 30   OT Charge Group   OT Therapy Activity 30     Assessment    Pt's daughter and son present for family training, educated on continued need for posterior hip precautions, use of AE for LB dressing/bathing, and current functional status. Discussed current DME setup, safety issues surrounding fall that precipitated admit, and goals of care. Home evaluation planned for next week Thursday with daughter Nestor who is primary caregiver. Daughter engaged and supportive, demonstrated ability to support patient for stand pivot transfer using gait belt with no cues from OT.     Plan    Shower/ADLs tomorrow AM  Integration of hip precautions into ADLs, standing tolerance/balance, increased comfort with transfers and mobility using FWW, possible home eval next week.      yes

## 2024-01-17 ENCOUNTER — HOSPITAL ENCOUNTER (OUTPATIENT)
Dept: LAB | Facility: MEDICAL CENTER | Age: 84
End: 2024-01-17
Attending: INTERNAL MEDICINE
Payer: MEDICARE

## 2024-01-17 DIAGNOSIS — Z79.01 CHRONIC ANTICOAGULATION: ICD-10-CM

## 2024-01-17 LAB
ERYTHROCYTE [DISTWIDTH] IN BLOOD BY AUTOMATED COUNT: 50.8 FL (ref 35.9–50)
HCT VFR BLD AUTO: 44.7 % (ref 37–47)
HGB BLD-MCNC: 13.5 G/DL (ref 12–16)
MCH RBC QN AUTO: 25.6 PG (ref 27–33)
MCHC RBC AUTO-ENTMCNC: 30.2 G/DL (ref 32.2–35.5)
MCV RBC AUTO: 84.7 FL (ref 81.4–97.8)
PLATELET # BLD AUTO: 287 K/UL (ref 164–446)
PMV BLD AUTO: 9.7 FL (ref 9–12.9)
RBC # BLD AUTO: 5.28 M/UL (ref 4.2–5.4)
WBC # BLD AUTO: 5.7 K/UL (ref 4.8–10.8)

## 2024-01-17 PROCEDURE — 85027 COMPLETE CBC AUTOMATED: CPT

## 2024-01-17 PROCEDURE — 36415 COLL VENOUS BLD VENIPUNCTURE: CPT

## 2024-01-22 ENCOUNTER — OFFICE VISIT (OUTPATIENT)
Dept: SLEEP MEDICINE | Facility: MEDICAL CENTER | Age: 84
End: 2024-01-22
Attending: INTERNAL MEDICINE
Payer: MEDICARE

## 2024-01-22 VITALS
BODY MASS INDEX: 30.86 KG/M2 | HEART RATE: 79 BPM | OXYGEN SATURATION: 93 % | SYSTOLIC BLOOD PRESSURE: 120 MMHG | WEIGHT: 147 LBS | DIASTOLIC BLOOD PRESSURE: 62 MMHG | HEIGHT: 58 IN

## 2024-01-22 DIAGNOSIS — K44.9 HIATAL HERNIA: ICD-10-CM

## 2024-01-22 DIAGNOSIS — J96.11 CHRONIC RESPIRATORY FAILURE WITH HYPOXIA (HCC): ICD-10-CM

## 2024-01-22 DIAGNOSIS — I26.99 PULMONARY EMBOLISM, OTHER, UNSPECIFIED CHRONICITY, UNSPECIFIED WHETHER ACUTE COR PULMONALE PRESENT (HCC): ICD-10-CM

## 2024-01-22 DIAGNOSIS — J98.4 RESTRICTIVE LUNG DISEASE: ICD-10-CM

## 2024-01-22 DIAGNOSIS — J45.30 MILD PERSISTENT ASTHMA WITHOUT COMPLICATION: ICD-10-CM

## 2024-01-22 PROCEDURE — 3074F SYST BP LT 130 MM HG: CPT | Performed by: INTERNAL MEDICINE

## 2024-01-22 PROCEDURE — 99214 OFFICE O/P EST MOD 30 MIN: CPT | Performed by: INTERNAL MEDICINE

## 2024-01-22 PROCEDURE — 3078F DIAST BP <80 MM HG: CPT | Performed by: INTERNAL MEDICINE

## 2024-01-22 PROCEDURE — 99212 OFFICE O/P EST SF 10 MIN: CPT | Performed by: INTERNAL MEDICINE

## 2024-01-22 ASSESSMENT — ENCOUNTER SYMPTOMS
FOCAL WEAKNESS: 0
TREMORS: 0
WEAKNESS: 0
EYE PAIN: 0
SPUTUM PRODUCTION: 0
DEPRESSION: 0
NAUSEA: 0
BLURRED VISION: 0
CHILLS: 0
ORTHOPNEA: 0
ABDOMINAL PAIN: 0
DIARRHEA: 0
PND: 0
CONSTIPATION: 0
COUGH: 0
EYE REDNESS: 0
SPEECH CHANGE: 0
VOMITING: 0
SHORTNESS OF BREATH: 0
SINUS PAIN: 0
SORE THROAT: 0
HEADACHES: 0
WHEEZING: 0
FALLS: 0
NECK PAIN: 0
DIAPHORESIS: 0
DIZZINESS: 0
PALPITATIONS: 0
FEVER: 0
HEARTBURN: 0
HEMOPTYSIS: 0
MYALGIAS: 0
PHOTOPHOBIA: 0
BACK PAIN: 0
DOUBLE VISION: 0
CLAUDICATION: 0
STRIDOR: 0
WEIGHT LOSS: 0
EYE DISCHARGE: 0

## 2024-01-22 ASSESSMENT — FIBROSIS 4 INDEX: FIB4 SCORE: 1.82
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
## 2024-01-29 PROBLEM — J96.11 CHRONIC RESPIRATORY FAILURE WITH HYPOXIA (HCC): Status: ACTIVE | Noted: 2021-06-09

## 2024-01-29 PROBLEM — Z00.00 PREVENTATIVE HEALTH CARE: Status: RESOLVED | Noted: 2017-10-30 | Resolved: 2024-01-29

## 2024-01-30 ENCOUNTER — APPOINTMENT (OUTPATIENT)
Dept: MEDICAL GROUP | Facility: PHYSICIAN GROUP | Age: 84
End: 2024-01-30
Payer: MEDICARE

## 2024-02-07 ENCOUNTER — OFFICE VISIT (OUTPATIENT)
Dept: MEDICAL GROUP | Facility: PHYSICIAN GROUP | Age: 84
End: 2024-02-07
Payer: MEDICARE

## 2024-02-07 VITALS
HEART RATE: 76 BPM | TEMPERATURE: 97.7 F | BODY MASS INDEX: 31.02 KG/M2 | WEIGHT: 147.8 LBS | OXYGEN SATURATION: 90 % | RESPIRATION RATE: 20 BRPM | DIASTOLIC BLOOD PRESSURE: 62 MMHG | SYSTOLIC BLOOD PRESSURE: 124 MMHG | HEIGHT: 58 IN

## 2024-02-07 DIAGNOSIS — F33.41 RECURRENT MAJOR DEPRESSIVE DISORDER, IN PARTIAL REMISSION (HCC): ICD-10-CM

## 2024-02-07 DIAGNOSIS — R07.9 CHEST PAIN, UNSPECIFIED TYPE: ICD-10-CM

## 2024-02-07 DIAGNOSIS — I82.90 DEEP VEIN THROMBOSIS (DVT) OF NON-EXTREMITY VEIN, UNSPECIFIED CHRONICITY: ICD-10-CM

## 2024-02-07 DIAGNOSIS — N18.31 CHRONIC KIDNEY DISEASE, STAGE 3A: ICD-10-CM

## 2024-02-07 DIAGNOSIS — I27.20 PULMONARY HYPERTENSION (HCC): ICD-10-CM

## 2024-02-07 DIAGNOSIS — F11.20 OPIOID DEPENDENCE, UNCOMPLICATED (HCC): ICD-10-CM

## 2024-02-07 PROCEDURE — 3078F DIAST BP <80 MM HG: CPT | Performed by: FAMILY MEDICINE

## 2024-02-07 PROCEDURE — 99214 OFFICE O/P EST MOD 30 MIN: CPT | Performed by: FAMILY MEDICINE

## 2024-02-07 PROCEDURE — 3074F SYST BP LT 130 MM HG: CPT | Performed by: FAMILY MEDICINE

## 2024-02-07 PROCEDURE — 93000 ELECTROCARDIOGRAM COMPLETE: CPT | Performed by: FAMILY MEDICINE

## 2024-02-07 RX ORDER — ESCITALOPRAM OXALATE 10 MG/1
TABLET ORAL
Qty: 100 TABLET | Refills: 1 | Status: SHIPPED | OUTPATIENT
Start: 2024-02-07

## 2024-02-07 ASSESSMENT — PATIENT HEALTH QUESTIONNAIRE - PHQ9
SUM OF ALL RESPONSES TO PHQ QUESTIONS 1-9: 0
9. THOUGHTS THAT YOU WOULD BE BETTER OFF DEAD, OR OF HURTING YOURSELF: NOT AT ALL
3. TROUBLE FALLING OR STAYING ASLEEP OR SLEEPING TOO MUCH: NOT AT ALL
8. MOVING OR SPEAKING SO SLOWLY THAT OTHER PEOPLE COULD HAVE NOTICED. OR THE OPPOSITE, BEING SO FIGETY OR RESTLESS THAT YOU HAVE BEEN MOVING AROUND A LOT MORE THAN USUAL: NOT AT ALL
2. FEELING DOWN, DEPRESSED, IRRITABLE, OR HOPELESS: NOT AT ALL
SUM OF ALL RESPONSES TO PHQ9 QUESTIONS 1 AND 2: 0
4. FEELING TIRED OR HAVING LITTLE ENERGY: NOT AT ALL
6. FEELING BAD ABOUT YOURSELF - OR THAT YOU ARE A FAILURE OR HAVE LET YOURSELF OR YOUR FAMILY DOWN: NOT AL ALL
5. POOR APPETITE OR OVEREATING: NOT AT ALL
1. LITTLE INTEREST OR PLEASURE IN DOING THINGS: NOT AT ALL
7. TROUBLE CONCENTRATING ON THINGS, SUCH AS READING THE NEWSPAPER OR WATCHING TELEVISION: NOT AT ALL

## 2024-02-07 ASSESSMENT — FIBROSIS 4 INDEX: FIB4 SCORE: 1.82

## 2024-02-08 NOTE — PROGRESS NOTES
Subjective:     CC:   Chief Complaint   Patient presents with    Follow-Up       HPI:   Guillermina presents today with her legal guardian.  Patient just saw pulmonary the plan is to see her back in 6 months they also recommended possibility of pulmonary rehab.  Her legal guardian states it can be more than  a year before she can be seen.  I would recommend that she contact the pulmonologist to see if she can be seen sooner.  Patient has not had her lab work done that had ordered in November but patient did have lab work done by another provider and it was missed.  I did give her legal guardian copies of her lab so she can have patient get it done.  Patient does have appointment with in February to 21st with endocrinology question on what is she should be given for her osteoporosis.  Patient also is concerned because she has right-sided neck discomfort but she says sometimes falls asleep with her neck pull to the right side.  On discussion would recommend trying in 1 of those half donuts that people use on plane flights to see if they can stabilize her neck specially when she is sitting in the chair.  Patient did fill out with legal guardian POLST form patient just wants to be kept comfortable if something would happen to her.    Past Medical History:   Diagnosis Date    Arthritis     Asthma     Back pain     Bowel habit changes     diarrhea    Breath shortness     COPD    Cataract     bilateral IOL    Chronic pain 11/2019    hips    Dyslipidemia 8/1/2016    Emphysema of lung (HCC)     GERD (gastroesophageal reflux disease)     Maldivian measles     Heart burn     Hepatitis 1970's    A based on labs done 2016    Hiatus hernia syndrome     High cholesterol     History of total hip replacement     L    Hyperlipidemia     Influenza     Macular degeneration     Memory loss     Mumps     NSTEMI (non-ST elevated myocardial infarction) (HCC) 9/12/2017    OA (osteoarthritis)     back, hip, shoulds, knees and hands    Other  pulmonary embolism without acute cor pulmonale (HCC) 12/14/2019    Painful joint     Psychiatric problem     depression    S/P cataract extraction     bilat    Shortness of breath     Snoring     stomach flu 11/08/2019    Urinary incontinence     Vision loss     Wears dentures     Wears glasses        Social History     Tobacco Use    Smoking status: Never     Passive exposure: Yes    Smokeless tobacco: Never    Tobacco comments:     lived with smokers   Vaping Use    Vaping Use: Never used    Passive vaping exposure: Yes   Substance Use Topics    Alcohol use: Not Currently     Alcohol/week: 0.0 oz     Comment: 3 per year    Drug use: No     Comment: In the Distant Past, but not since 32 y.o.  (prior - Pink heart,Cross beauty, crosstops - stimulants for working double shifts, but not for several decades)       Current Outpatient Medications Ordered in Epic   Medication Sig Dispense Refill    lovastatin (MEVACOR) 40 MG tablet TAKE 1 TABLET BY MOUTH EVERYDAY AT BEDTIME 100 Tablet 1    escitalopram (LEXAPRO) 10 MG Tab TAKE 1 TABLET BY MOUTH EVERY DAY  DAYS. 100 Tablet 1    omeprazole (PRILOSEC) 40 MG delayed-release capsule TAKE 1 CAPSULE BY MOUTH EVERY DAY 90 Capsule 1    budesonide-formoterol (SYMBICORT) 160-4.5 MCG/ACT Aerosol TAKE 1 PUFF BY MOUTH TWICE A DAY      apixaban (ELIQUIS) 5mg Tab Take 1 Tablet by mouth 2 times a day.      Cyanocobalamin (B-12 PO) Take  by mouth.      ELIQUIS 5 MG Tab TAKE 1 TABLET BY MOUTH TWICE A  Tablet 1    montelukast (SINGULAIR) 10 MG Tab TAKE 1 TABLET BY MOUTH EVERY DAY IN THE EVENING 100 Tablet 1    SYMBICORT 160-4.5 MCG/ACT Aerosol Inhale 2 Puffs 2 times a day. 10.2 Each 11    HYDROcodone/acetaminophen (NORCO)  MG Tab Take 0.5 Tablets by mouth 2 times a day.      diclofenac sodium (VOLTAREN) 1 % Gel diclofenac 1 % topical gel   APPLY 2-3 GRAMS TO AFFECTED AREA 4 TIMES A DAY AS NEEDED FOR PAIN      denosumab (PROLIA) 60 MG/ML Solution Prefilled Syringe injection  "Inject 1 mL under the skin every 6 months. 1 mL 0    gabapentin (NEURONTIN) 100 MG Cap 100 mg 3 times a day.      famotidine (PEPCID) 20 MG Tab TAKE 1 TABLET BY MOUTH EVERY DAY 30MINS PRIOR TO FOOD 90 Tablet 3    Multiple Vitamins-Minerals (CENTRUM SILVER 50+WOMEN) Tab Take  by mouth.      loperamide (IMODIUM A-D) 2 MG tablet LOPERAMIDE HCL 2 MG TABS      albuterol 108 (90 Base) MCG/ACT Aero Soln inhalation aerosol Inhale 1-2 Puffs by mouth every four hours as needed for Shortness of Breath.      Melatonin (VITAJOY GUMMIES PO) Take  by mouth.      Biotin 10 MG Tab Take  by mouth.      polyethylene glycol/lytes (MIRALAX) Pack Take 1 Packet by mouth 1 time daily as needed (if sennosides and docusate ineffective after 24 hours).  3    Multiple Vitamins-Minerals (OCUVITE-LUTEIN) Tab Take 1 tablet by mouth every morning.      ascorbic acid (VITAMIN C) 500 MG tablet Take 1 Tab by mouth every day. 30 Tab 3    vitamin D 2000 UNIT Tab Take 1 Tab by mouth every day. 60 Tab      No current Epic-ordered facility-administered medications on file.       Allergies:  Sagebrush    Health Maintenance: Completed    ROS:  Gen: no fevers/chills, no changes in weight  Eyes: no changes in vision  ENT: no sore throat, no hearing loss, no bloody nose  Pulm: no sob, no cough  CV: no chest pain, no palpitations  GI: no nausea/vomiting, no diarrhea  : no dysuria  Neuro: no headaches, no numbness/tingling  Heme/Lymph: no easy bruising    Objective:     Exam:  /62 (BP Location: Right arm, Patient Position: Sitting, BP Cuff Size: Adult)   Pulse 76   Temp 36.5 °C (97.7 °F) (Temporal)   Resp 20   Ht 1.473 m (4' 10\")   Wt 67 kg (147 lb 12.8 oz)   SpO2 90%   BMI 30.89 kg/m²  Body mass index is 30.89 kg/m².    Gen: Alert and oriented, No apparent distress.  Skin: Warm and dry.  No obvious lesions.  Eyes: Sclera wnl Pupils normal in size  Neck: Has good range of motion to her neck  Lungs: Normal effort, CTA bilaterally, no wheezes, " rhonchi, or rales  CV: Regular rate and rhythm. No murmurs, rubs, or gallops.  EKG appears within normal limits  Musculoskeletal: Normal gait. No extremity cyanosis, clubbing, or edema.  Neuro: Oriented to person, place and time  Psych: Mood is wnl       Assessment & Plan:     83 y.o. female with the following -     1. Chest pain, unspecified type  I would recommend having patient evaluated by cardiology since patient is concerned about the chest discomfort we will go ahead and write referral patient and guardian was agreeable with the plan  - REFERRAL TO CARDIOLOGY    2. Chronic kidney disease, stage 3a (HCC)  I will continue monitoring her kidney functions  3. Opioid dependence, uncomplicated ([facility])  Patient is seeing chronic pain for this    4. Recurrent major depressive disorder, in partial remission ([facility])  Patient to continue with present medication    5. Pulmonary hypertension ([facility])  Patient's blood pressure looks at goal patient will be following up with pulmonary    6. Deep vein thrombosis (DVT) of non-extremity vein, unspecified chronicity  Patient is seeing pharmacotherapy for monitoring of the Eliquis.     [facility] Gap Form    Diagnosis to address: N18.31 - Chronic kidney disease, stage 3a (HCC)  Assessment and plan: Chronic, stable. Continue with current defined treatment plan: Will be getting her lab work done for follow-up. Follow-up at least annually.  Diagnosis: F11.20 - Opioid dependence, uncomplicated (HCC)  Assessment and plan: Chronic, stable. Continue with current defined treatment plan: Patient is seen in a chronic pain doctor. Follow-up at least annually.  Diagnosis: F33.41 - Recurrent major depressive disorder, in partial remission (HCC)  Assessment and plan: Chronic, stable. Continue with current defined treatment plan: Patient is on chronic medication and appears to be doing well. Follow-up at least annually.  Diagnosis: I27.20 - Pulmonary hypertension (HCC)  Assessment and plan: Chronic, stable.  Continue with current defined treatment plan: Patient is seeing pulmonary for this. Follow-up at least annually.  Last edited 02/07/24 16:57 PST by Brenda Valenzuela M.D.         Return in about 2 months (around 4/7/2024), or if symptoms worsen or fail to improve.  I did sign off on the POLTS form and gave the original back to the legal guardian will put a copy in patient's chart.  Did spent 30 minutes with patient    Please note that this dictation was created using voice recognition software. I have made every reasonable attempt to correct obvious errors, but I expect that there are errors of grammar and possibly content that I did not discover before finalizing the note.

## 2024-02-19 ENCOUNTER — TELEPHONE (OUTPATIENT)
Dept: HEALTH INFORMATION MANAGEMENT | Facility: OTHER | Age: 84
End: 2024-02-19
Payer: MEDICARE
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
## 2024-02-21 ENCOUNTER — OFFICE VISIT (OUTPATIENT)
Dept: ENDOCRINOLOGY | Facility: MEDICAL CENTER | Age: 84
End: 2024-02-21
Attending: STUDENT IN AN ORGANIZED HEALTH CARE EDUCATION/TRAINING PROGRAM
Payer: MEDICARE

## 2024-02-21 ENCOUNTER — HOSPITAL ENCOUNTER (OUTPATIENT)
Dept: RADIOLOGY | Facility: MEDICAL CENTER | Age: 84
End: 2024-02-21
Attending: STUDENT IN AN ORGANIZED HEALTH CARE EDUCATION/TRAINING PROGRAM
Payer: MEDICARE

## 2024-02-21 ENCOUNTER — HOSPITAL ENCOUNTER (OUTPATIENT)
Dept: LAB | Facility: MEDICAL CENTER | Age: 84
End: 2024-02-21
Attending: STUDENT IN AN ORGANIZED HEALTH CARE EDUCATION/TRAINING PROGRAM
Payer: MEDICARE

## 2024-02-21 VITALS
BODY MASS INDEX: 31.49 KG/M2 | OXYGEN SATURATION: 92 % | SYSTOLIC BLOOD PRESSURE: 137 MMHG | HEART RATE: 74 BPM | WEIGHT: 150 LBS | HEIGHT: 58 IN | DIASTOLIC BLOOD PRESSURE: 68 MMHG

## 2024-02-21 DIAGNOSIS — E55.9 VITAMIN D DEFICIENCY: ICD-10-CM

## 2024-02-21 DIAGNOSIS — M84.48XS PATHOLOGICAL FRACTURE OF LUMBAR VERTEBRA, SEQUELA: ICD-10-CM

## 2024-02-21 DIAGNOSIS — M81.6 LOCALIZED OSTEOPOROSIS WITHOUT CURRENT PATHOLOGICAL FRACTURE: ICD-10-CM

## 2024-02-21 LAB
25(OH)D3 SERPL-MCNC: 41 NG/ML (ref 30–100)
ALBUMIN SERPL BCP-MCNC: 4.4 G/DL (ref 3.2–4.9)
ALBUMIN/GLOB SERPL: 1.6 G/DL
ALP SERPL-CCNC: 82 U/L (ref 30–99)
ALT SERPL-CCNC: 12 U/L (ref 2–50)
ANION GAP SERPL CALC-SCNC: 13 MMOL/L (ref 7–16)
AST SERPL-CCNC: 20 U/L (ref 12–45)
BILIRUB SERPL-MCNC: 0.2 MG/DL (ref 0.1–1.5)
BUN SERPL-MCNC: 34 MG/DL (ref 8–22)
CALCIUM ALBUM COR SERPL-MCNC: 9.3 MG/DL (ref 8.5–10.5)
CALCIUM SERPL-MCNC: 9.6 MG/DL (ref 8.4–10.2)
CHLORIDE SERPL-SCNC: 107 MMOL/L (ref 96–112)
CO2 SERPL-SCNC: 25 MMOL/L (ref 20–33)
CREAT SERPL-MCNC: 1.21 MG/DL (ref 0.5–1.4)
GFR SERPLBLD CREATININE-BSD FMLA CKD-EPI: 44 ML/MIN/1.73 M 2
GLOBULIN SER CALC-MCNC: 2.8 G/DL (ref 1.9–3.5)
GLUCOSE SERPL-MCNC: 104 MG/DL (ref 65–99)
PHOSPHATE SERPL-MCNC: 3.2 MG/DL (ref 2.5–4.5)
POTASSIUM SERPL-SCNC: 5.4 MMOL/L (ref 3.6–5.5)
PROT SERPL-MCNC: 7.2 G/DL (ref 6–8.2)
PTH-INTACT SERPL-MCNC: 110 PG/ML (ref 14–72)
SODIUM SERPL-SCNC: 145 MMOL/L (ref 135–145)
TSH SERPL DL<=0.005 MIU/L-ACNC: 5.02 UIU/ML (ref 0.38–5.33)

## 2024-02-21 PROCEDURE — 83970 ASSAY OF PARATHORMONE: CPT

## 2024-02-21 PROCEDURE — 72070 X-RAY EXAM THORAC SPINE 2VWS: CPT

## 2024-02-21 PROCEDURE — 80053 COMPREHEN METABOLIC PANEL: CPT

## 2024-02-21 PROCEDURE — 72100 X-RAY EXAM L-S SPINE 2/3 VWS: CPT

## 2024-02-21 PROCEDURE — 3075F SYST BP GE 130 - 139MM HG: CPT | Performed by: STUDENT IN AN ORGANIZED HEALTH CARE EDUCATION/TRAINING PROGRAM

## 2024-02-21 PROCEDURE — 3078F DIAST BP <80 MM HG: CPT | Performed by: STUDENT IN AN ORGANIZED HEALTH CARE EDUCATION/TRAINING PROGRAM

## 2024-02-21 PROCEDURE — 99212 OFFICE O/P EST SF 10 MIN: CPT | Performed by: STUDENT IN AN ORGANIZED HEALTH CARE EDUCATION/TRAINING PROGRAM

## 2024-02-21 PROCEDURE — 36415 COLL VENOUS BLD VENIPUNCTURE: CPT

## 2024-02-21 PROCEDURE — 84443 ASSAY THYROID STIM HORMONE: CPT

## 2024-02-21 PROCEDURE — 82306 VITAMIN D 25 HYDROXY: CPT

## 2024-02-21 PROCEDURE — 99205 OFFICE O/P NEW HI 60 MIN: CPT | Performed by: STUDENT IN AN ORGANIZED HEALTH CARE EDUCATION/TRAINING PROGRAM

## 2024-02-21 PROCEDURE — 84100 ASSAY OF PHOSPHORUS: CPT

## 2024-02-21 ASSESSMENT — FIBROSIS 4 INDEX: FIB4 SCORE: 1.82

## 2024-02-22 ENCOUNTER — PATIENT MESSAGE (OUTPATIENT)
Dept: ENDOCRINOLOGY | Facility: MEDICAL CENTER | Age: 84
End: 2024-02-22
Payer: MEDICARE

## 2024-02-22 DIAGNOSIS — M81.6 LOCALIZED OSTEOPOROSIS WITHOUT CURRENT PATHOLOGICAL FRACTURE: ICD-10-CM

## 2024-02-22 NOTE — PROGRESS NOTES
Dear Alexandra,   Likewise, it was a pleasure meeting you and Guillermina. Thank you for your kind words. Vit D is only borderline low but I would like Guillermina double the dose of vit D she is taking now, repeat vit D level in 3 weeks and if > 30 -> proceed with urine test. I will put another order for vit D if it is ok with you and Guillermina.     Best regards,   Dr. Cruz

## 2024-03-13 RX ORDER — LOVASTATIN 40 MG/1
TABLET ORAL
Qty: 100 TABLET | Refills: 1 | Status: SHIPPED | OUTPATIENT
Start: 2024-03-13

## 2024-03-13 RX ORDER — MONTELUKAST SODIUM 10 MG/1
TABLET ORAL
Qty: 100 TABLET | Refills: 1 | Status: SHIPPED | OUTPATIENT
Start: 2024-03-13

## 2024-03-28 ENCOUNTER — TELEPHONE (OUTPATIENT)
Dept: CARDIOLOGY | Facility: MEDICAL CENTER | Age: 84
End: 2024-03-28
Payer: MEDICARE

## 2024-03-28 NOTE — TELEPHONE ENCOUNTER
Spoke to patients power of  in regards to obtaining records for NP appointment with Dr. Rdz. Per patients power of  has never been treated by a previous cardiologist. Confirmed all recent notes, labs, and cardiac imaging are in Epic. Confirmed appointment time and date.

## 2024-04-01 ENCOUNTER — TELEPHONE (OUTPATIENT)
Dept: PHARMACY | Facility: MEDICAL CENTER | Age: 84
End: 2024-04-01
Payer: MEDICARE

## 2024-04-01 DIAGNOSIS — I82.90 DEEP VEIN THROMBOSIS (DVT) OF NON-EXTREMITY VEIN, UNSPECIFIED CHRONICITY: ICD-10-CM

## 2024-04-01 NOTE — TELEPHONE ENCOUNTER
apixaban (ELIQUIS) 5mg Tab     Received Renewal PA request via [employer]  for Eliquis. (Quantity:180, Day Supply:90)     Insurance: [employer]  Member ID:  [id number]  BIN: [id number]  PCN: [id number]  Group: [facility]     Ran Test claim via [place] & medication Pays for a $11.20 copay. Will outreach to patient to offer specialty pharmacy services and or release to preferred pharmacy    $11.20. qty 60T/30DS

## 2024-04-01 NOTE — TELEPHONE ENCOUNTER
Received request via: Pharmacy    Was the patient seen in the last year in this department? Yes    Does the patient have an active prescription (recently filled or refills available) for medication(s) requested? No    [facility]    Does the patient have detention Plus and need 100 day supply (blood pressure, diabetes and cholesterol meds only)? Medication is not for cholesterol, blood pressure or diabetes

## 2024-04-03 ENCOUNTER — TELEPHONE (OUTPATIENT)
Dept: PHARMACY | Facility: MEDICAL CENTER | Age: 84
End: 2024-04-03

## 2024-04-03 ENCOUNTER — ANTICOAGULATION VISIT (OUTPATIENT)
Dept: VASCULAR LAB | Facility: MEDICAL CENTER | Age: 84
End: 2024-04-03
Attending: INTERNAL MEDICINE
Payer: MEDICARE

## 2024-04-03 VITALS — DIASTOLIC BLOOD PRESSURE: 73 MMHG | HEART RATE: 75 BPM | SYSTOLIC BLOOD PRESSURE: 120 MMHG

## 2024-04-03 DIAGNOSIS — I82.90 DEEP VEIN THROMBOSIS (DVT) OF NON-EXTREMITY VEIN, UNSPECIFIED CHRONICITY: ICD-10-CM

## 2024-04-03 PROCEDURE — 99212 OFFICE O/P EST SF 10 MIN: CPT

## 2024-04-03 NOTE — TELEPHONE ENCOUNTER
apixaban (ELIQUIS) 5mg Tab    Received Renewal PA request via [employer]  for Eliquis. (Quantity:180, Day Supply:90)     Insurance: Senior Care Plus  Member ID:  [id number]  BIN: [id number]  PCN: [id number]  [facility]     Ran Test claim via [place] & medication  $N/A. RTS. Next fill date 06/09/24

## 2024-04-03 NOTE — PROGRESS NOTES
Target end date: Indefinite     Indication: Hx of DVT     Drug: Eliquis 5 mg BID     CHADsVASC = N/a    Health Status Since Last Assessment   Patient denies any new relevant medical problems, ED visits or hospitalizations   Patient denies any embolic events (stroke/tia/systemic embolism)    Adherence with DOAC Therapy   Pt has no missed doses in the average week    Bleeding Risk Assessment   Denies Epistaxis   Pt denies any excessive or unusual bleeding/hematomas.  Pt denies any GI bleeds or hematemesis.  Pt denies any concerning daily headache or sub dural hematoma symptoms.   Did have some rectal bleeding d/t constipation - resolved upon resolution of constipation.   Pt denies any hematuria    Latest Hemoglobin: 13.5   Platelets: 287   ETOH overuse - Denies     Creatinine Clearance/Renal Function   Latest CrCl ~ 27 mL/min    Hepatic function   Latest LFTs WNL   Pt denies any history of liver dysfunction      Drug Interactions   ASA/other antiplatelets - None   NSAID - Avoids   Other drug interactions - No significant DDI noted   x Verified no anticonvulsant or azole therapy, education provided for future use.     Examination   Blood Pressure - Recorded in vitals   Symptomatic hypotension - Occasionally   Significant gait impairment/imbalance/high risk for falls? Ambulates w/ cane. Age related impairment.    Final Assessment and Recommendations:   Patient appears stable from the anticoagulation standpoint.     Benefits of continued DOAC therapy outweigh risks for this patient   Recommend pt continue with current DOAC therapy Eliquis 5 mg BID   DOAC is affordable     Other Actions: cmp/ cbc hemogram ordered prior to next visit    Follow up:   Will follow up with patient 6 months.     Nestor Guallpa, AmberD, BCACP

## 2024-04-08 ENCOUNTER — APPOINTMENT (OUTPATIENT)
Dept: MEDICAL GROUP | Facility: PHYSICIAN GROUP | Age: 84
End: 2024-04-08
Payer: MEDICARE

## 2024-04-09 ENCOUNTER — HOSPITAL ENCOUNTER (OUTPATIENT)
Facility: MEDICAL CENTER | Age: 84
End: 2024-04-09
Attending: STUDENT IN AN ORGANIZED HEALTH CARE EDUCATION/TRAINING PROGRAM
Payer: MEDICARE

## 2024-04-09 ENCOUNTER — OFFICE VISIT (OUTPATIENT)
Dept: CARDIOLOGY | Facility: MEDICAL CENTER | Age: 84
End: 2024-04-09
Attending: FAMILY MEDICINE
Payer: MEDICARE

## 2024-04-09 VITALS
OXYGEN SATURATION: 93 % | BODY MASS INDEX: 30.44 KG/M2 | RESPIRATION RATE: 14 BRPM | DIASTOLIC BLOOD PRESSURE: 62 MMHG | WEIGHT: 145 LBS | HEIGHT: 58 IN | HEART RATE: 70 BPM | SYSTOLIC BLOOD PRESSURE: 114 MMHG

## 2024-04-09 DIAGNOSIS — I25.119 ATHEROSCLEROSIS OF NATIVE CORONARY ARTERY OF NATIVE HEART WITH ANGINA PECTORIS (HCC): ICD-10-CM

## 2024-04-09 DIAGNOSIS — I27.20 PULMONARY HYPERTENSION (HCC): ICD-10-CM

## 2024-04-09 DIAGNOSIS — N18.32 STAGE 3B CHRONIC KIDNEY DISEASE: ICD-10-CM

## 2024-04-09 DIAGNOSIS — M81.6 LOCALIZED OSTEOPOROSIS WITHOUT CURRENT PATHOLOGICAL FRACTURE: ICD-10-CM

## 2024-04-09 DIAGNOSIS — R01.1 UNDIAGNOSED CARDIAC MURMURS: ICD-10-CM

## 2024-04-09 DIAGNOSIS — Z79.01 CHRONIC ANTICOAGULATION: ICD-10-CM

## 2024-04-09 DIAGNOSIS — R06.09 DYSPNEA ON EXERTION: ICD-10-CM

## 2024-04-09 DIAGNOSIS — R07.9 CHEST PAIN, UNSPECIFIED TYPE: ICD-10-CM

## 2024-04-09 PROBLEM — D68.69 OTHER THROMBOPHILIA (HCC): Status: ACTIVE | Noted: 2024-04-09

## 2024-04-09 LAB — EKG IMPRESSION: NORMAL

## 2024-04-09 PROCEDURE — 93005 ELECTROCARDIOGRAM TRACING: CPT | Performed by: INTERNAL MEDICINE

## 2024-04-09 PROCEDURE — 82570 ASSAY OF URINE CREATININE: CPT

## 2024-04-09 PROCEDURE — 3074F SYST BP LT 130 MM HG: CPT | Performed by: INTERNAL MEDICINE

## 2024-04-09 PROCEDURE — G2211 COMPLEX E/M VISIT ADD ON: HCPCS | Performed by: INTERNAL MEDICINE

## 2024-04-09 PROCEDURE — 93010 ELECTROCARDIOGRAM REPORT: CPT | Performed by: INTERNAL MEDICINE

## 2024-04-09 PROCEDURE — 99204 OFFICE O/P NEW MOD 45 MIN: CPT | Performed by: INTERNAL MEDICINE

## 2024-04-09 PROCEDURE — 99213 OFFICE O/P EST LOW 20 MIN: CPT | Performed by: INTERNAL MEDICINE

## 2024-04-09 PROCEDURE — 3078F DIAST BP <80 MM HG: CPT | Performed by: INTERNAL MEDICINE

## 2024-04-09 PROCEDURE — 81050 URINALYSIS VOLUME MEASURE: CPT

## 2024-04-09 PROCEDURE — 82340 ASSAY OF CALCIUM IN URINE: CPT

## 2024-04-09 ASSESSMENT — ENCOUNTER SYMPTOMS
DEPRESSION: 0
SENSORY CHANGE: 0
FALLS: 0
SHORTNESS OF BREATH: 0
FEVER: 0
NAUSEA: 0
COUGH: 0
HEADACHES: 0
WEIGHT LOSS: 0
MYALGIAS: 0
BLOOD IN STOOL: 0
BRUISES/BLEEDS EASILY: 0
EYE DISCHARGE: 0
ABDOMINAL PAIN: 0
SPEECH CHANGE: 0
PALPITATIONS: 1
CLAUDICATION: 0
DOUBLE VISION: 0
EYE PAIN: 0
BLURRED VISION: 0
PND: 0
LOSS OF CONSCIOUSNESS: 0
HALLUCINATIONS: 0
ORTHOPNEA: 0
VOMITING: 0
DIZZINESS: 0
CHILLS: 0

## 2024-04-09 ASSESSMENT — FIBROSIS 4 INDEX: FIB4 SCORE: 1.73
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
## 2024-04-10 ENCOUNTER — DOCUMENTATION (OUTPATIENT)
Dept: HEALTH INFORMATION MANAGEMENT | Facility: OTHER | Age: 84
End: 2024-04-10
Payer: MEDICARE

## 2024-04-10 ENCOUNTER — TELEPHONE (OUTPATIENT)
Dept: HEALTH INFORMATION MANAGEMENT | Facility: OTHER | Age: 84
End: 2024-04-10
Payer: MEDICARE

## 2024-04-10 LAB
CREAT 24H UR-MSRATE: 527 MG/24 HR (ref 800–1800)
CREAT UR-MCNC: 65.82 MG/DL
SPECIMEN VOL UR: 800 ML

## 2024-04-15 LAB
CALCIUM 24H UR-MCNC: 4.5 MG/DL
CALCIUM 24H UR-MRATE: 36 MG/D (ref 100–250)
CALCIUM/CREAT 24H UR: 69 MG/G (ref 20–300)
COLLECT DURATION TIME SPEC: 24 HRS
CREAT 24H UR-MCNC: 65 MG/DL
SPECIMEN VOL ?TM UR: ABNORMAL ML

## 2024-04-29 ENCOUNTER — HOSPITAL ENCOUNTER (OUTPATIENT)
Dept: LAB | Facility: MEDICAL CENTER | Age: 84
End: 2024-04-29
Attending: NURSE PRACTITIONER
Payer: MEDICARE

## 2024-04-29 ENCOUNTER — HOSPITAL ENCOUNTER (OUTPATIENT)
Dept: LAB | Facility: MEDICAL CENTER | Age: 84
End: 2024-04-29
Attending: STUDENT IN AN ORGANIZED HEALTH CARE EDUCATION/TRAINING PROGRAM
Payer: MEDICARE

## 2024-04-29 ENCOUNTER — HOSPITAL ENCOUNTER (OUTPATIENT)
Dept: LAB | Facility: MEDICAL CENTER | Age: 84
End: 2024-04-29
Attending: FAMILY MEDICINE
Payer: MEDICARE

## 2024-04-29 DIAGNOSIS — E78.5 DYSLIPIDEMIA: ICD-10-CM

## 2024-04-29 DIAGNOSIS — M81.6 LOCALIZED OSTEOPOROSIS WITHOUT CURRENT PATHOLOGICAL FRACTURE: ICD-10-CM

## 2024-04-29 DIAGNOSIS — I82.90 DEEP VEIN THROMBOSIS (DVT) OF NON-EXTREMITY VEIN, UNSPECIFIED CHRONICITY: ICD-10-CM

## 2024-04-29 LAB
25(OH)D3 SERPL-MCNC: 56 NG/ML (ref 30–100)
ALBUMIN SERPL BCP-MCNC: 4.1 G/DL (ref 3.2–4.9)
ALBUMIN/GLOB SERPL: 1.5 G/DL
ALP SERPL-CCNC: 67 U/L (ref 30–99)
ALT SERPL-CCNC: 9 U/L (ref 2–50)
ANION GAP SERPL CALC-SCNC: 12 MMOL/L (ref 7–16)
AST SERPL-CCNC: 17 U/L (ref 12–45)
BILIRUB SERPL-MCNC: 0.2 MG/DL (ref 0.1–1.5)
BUN SERPL-MCNC: 30 MG/DL (ref 8–22)
CALCIUM ALBUM COR SERPL-MCNC: 9.6 MG/DL (ref 8.5–10.5)
CALCIUM SERPL-MCNC: 9.7 MG/DL (ref 8.5–10.5)
CHLORIDE SERPL-SCNC: 107 MMOL/L (ref 96–112)
CHOLEST SERPL-MCNC: 173 MG/DL (ref 100–199)
CO2 SERPL-SCNC: 24 MMOL/L (ref 20–33)
CREAT SERPL-MCNC: 1.24 MG/DL (ref 0.5–1.4)
ERYTHROCYTE [DISTWIDTH] IN BLOOD BY AUTOMATED COUNT: 46.5 FL (ref 35.9–50)
GFR SERPLBLD CREATININE-BSD FMLA CKD-EPI: 42 ML/MIN/1.73 M 2
GLOBULIN SER CALC-MCNC: 2.8 G/DL (ref 1.9–3.5)
GLUCOSE SERPL-MCNC: 98 MG/DL (ref 65–99)
HCT VFR BLD AUTO: 36.4 % (ref 37–47)
HDLC SERPL-MCNC: 51 MG/DL
HGB BLD-MCNC: 10.1 G/DL (ref 12–16)
LDLC SERPL CALC-MCNC: 94 MG/DL
MCH RBC QN AUTO: 21.4 PG (ref 27–33)
MCHC RBC AUTO-ENTMCNC: 27.7 G/DL (ref 32.2–35.5)
MCV RBC AUTO: 77 FL (ref 81.4–97.8)
PLATELET # BLD AUTO: 314 K/UL (ref 164–446)
PMV BLD AUTO: 9.9 FL (ref 9–12.9)
POTASSIUM SERPL-SCNC: 5 MMOL/L (ref 3.6–5.5)
PROT SERPL-MCNC: 6.9 G/DL (ref 6–8.2)
PTH-INTACT SERPL-MCNC: 81.3 PG/ML (ref 14–72)
RBC # BLD AUTO: 4.73 M/UL (ref 4.2–5.4)
SODIUM SERPL-SCNC: 143 MMOL/L (ref 135–145)
TRIGL SERPL-MCNC: 139 MG/DL (ref 0–149)
WBC # BLD AUTO: 4.9 K/UL (ref 4.8–10.8)

## 2024-04-29 PROCEDURE — 80053 COMPREHEN METABOLIC PANEL: CPT

## 2024-04-29 PROCEDURE — 36415 COLL VENOUS BLD VENIPUNCTURE: CPT

## 2024-04-29 PROCEDURE — 85027 COMPLETE CBC AUTOMATED: CPT

## 2024-04-29 PROCEDURE — 82306 VITAMIN D 25 HYDROXY: CPT

## 2024-04-29 PROCEDURE — 83970 ASSAY OF PARATHORMONE: CPT

## 2024-04-29 PROCEDURE — 80061 LIPID PANEL: CPT

## 2024-05-01 ENCOUNTER — DOCUMENTATION (OUTPATIENT)
Dept: VASCULAR LAB | Facility: MEDICAL CENTER | Age: 84
End: 2024-05-01

## 2024-05-01 ENCOUNTER — APPOINTMENT (OUTPATIENT)
Dept: MEDICAL GROUP | Facility: PHYSICIAN GROUP | Age: 84
End: 2024-05-01
Payer: MEDICARE

## 2024-05-01 DIAGNOSIS — Z86.718 HISTORY OF DVT (DEEP VEIN THROMBOSIS): ICD-10-CM

## 2024-05-01 NOTE — PROGRESS NOTES
Reviewed labs for Eliquis. Her h/h came back low at 10.1/36.4. Previously normal. Spoke with pt. She denies any abnormal or prolonged bleeding or black/tarry stools. I also spoke with her family member, Alexandra. She is also not aware of any bleeding. No changes in her health since her last [facility] visit in April. Asked that she continue to monitor for any bleeding. Repeat CBC in 4 weeks. Continue taking Eliquis 5 mg BID.    Corazon BUTLER

## 2024-05-02 ENCOUNTER — APPOINTMENT (OUTPATIENT)
Dept: CARDIOLOGY | Facility: MEDICAL CENTER | Age: 84
End: 2024-05-02
Attending: INTERNAL MEDICINE
Payer: MEDICARE

## 2024-05-02 DIAGNOSIS — I27.20 PULMONARY HYPERTENSION (HCC): ICD-10-CM

## 2024-05-02 DIAGNOSIS — R06.09 DYSPNEA ON EXERTION: ICD-10-CM

## 2024-05-02 DIAGNOSIS — R01.1 UNDIAGNOSED CARDIAC MURMURS: ICD-10-CM

## 2024-05-02 LAB
LV EJECT FRACT  99904: 40
LV EJECT FRACT MOD 2C 99903: 53.11
LV EJECT FRACT MOD 4C 99902: 68.79
LV EJECT FRACT MOD BP 99901: 60.37

## 2024-05-02 PROCEDURE — 93306 TTE W/DOPPLER COMPLETE: CPT | Mod: 26 | Performed by: INTERNAL MEDICINE

## 2024-05-06 ENCOUNTER — TELEPHONE (OUTPATIENT)
Dept: CARDIOLOGY | Facility: MEDICAL CENTER | Age: 84
End: 2024-05-06
Payer: MEDICARE

## 2024-05-06 NOTE — TELEPHONE ENCOUNTER
Called and spoke with Alexandra (patients contact), Scheduled patient to follow up 5/31 at [facility].

## 2024-05-06 NOTE — RESULT ENCOUNTER NOTE
Dear Kesha,    Can you please let Guillermina Recio know that result is not entirely normal and I will see patient sooner than scheduled to discuss?    Thank you,  Nicolas.

## 2024-05-06 NOTE — TELEPHONE ENCOUNTER
----- Message from Yara Khan R.N. sent at 5/6/2024  1:24 PM PDT -----    ----- Message -----  From: Carmelo Rdz M.D.  Sent: 5/6/2024   9:24 AM PDT  To: Kesha Crespo R.N.    Dear Kesha,    Can you please let Guillermina Recio know that result is not entirely normal and I will see patient sooner than scheduled to discuss?    Thank you,  Nicolas.

## 2024-05-31 ENCOUNTER — OFFICE VISIT (OUTPATIENT)
Dept: CARDIOLOGY | Facility: MEDICAL CENTER | Age: 84
End: 2024-05-31
Attending: INTERNAL MEDICINE
Payer: MEDICARE

## 2024-05-31 VITALS
HEIGHT: 58 IN | HEART RATE: 76 BPM | SYSTOLIC BLOOD PRESSURE: 114 MMHG | WEIGHT: 142 LBS | BODY MASS INDEX: 29.81 KG/M2 | OXYGEN SATURATION: 94 % | DIASTOLIC BLOOD PRESSURE: 60 MMHG | RESPIRATION RATE: 14 BRPM

## 2024-05-31 DIAGNOSIS — I27.20 PULMONARY HYPERTENSION (HCC): ICD-10-CM

## 2024-05-31 DIAGNOSIS — R06.09 DYSPNEA ON EXERTION: ICD-10-CM

## 2024-05-31 DIAGNOSIS — I51.89 LEFT VENTRICULAR SYSTOLIC DYSFUNCTION, NYHA CLASS 3: ICD-10-CM

## 2024-05-31 DIAGNOSIS — N18.32 STAGE 3B CHRONIC KIDNEY DISEASE: ICD-10-CM

## 2024-05-31 DIAGNOSIS — I50.20 ACC/AHA STAGE C SYSTOLIC HEART FAILURE (HCC): ICD-10-CM

## 2024-05-31 DIAGNOSIS — D68.69 OTHER THROMBOPHILIA (HCC): ICD-10-CM

## 2024-05-31 DIAGNOSIS — I25.119 ATHEROSCLEROSIS OF NATIVE CORONARY ARTERY OF NATIVE HEART WITH ANGINA PECTORIS (HCC): ICD-10-CM

## 2024-05-31 RX ORDER — EMPAGLIFLOZIN 10 MG/1
10 TABLET, FILM COATED ORAL DAILY
Qty: 90 TABLET | Refills: 4 | Status: SHIPPED | OUTPATIENT
Start: 2024-05-31

## 2024-05-31 ASSESSMENT — ENCOUNTER SYMPTOMS
COUGH: 0
FEVER: 0
DIZZINESS: 0
EYE PAIN: 0
EYE DISCHARGE: 0
HALLUCINATIONS: 0
LOSS OF CONSCIOUSNESS: 0
ORTHOPNEA: 0
WEIGHT LOSS: 0
DOUBLE VISION: 0
SENSORY CHANGE: 0
BLOOD IN STOOL: 0
SPEECH CHANGE: 0
HEADACHES: 0
MYALGIAS: 0
FALLS: 0
CHILLS: 0
PALPITATIONS: 1
BRUISES/BLEEDS EASILY: 0
PND: 0
ABDOMINAL PAIN: 0
DEPRESSION: 0
BLURRED VISION: 0
CLAUDICATION: 0
SHORTNESS OF BREATH: 0
VOMITING: 0
NAUSEA: 0

## 2024-05-31 ASSESSMENT — FIBROSIS 4 INDEX: FIB4 SCORE: 1.55
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
## 2024-06-06 ENCOUNTER — APPOINTMENT (OUTPATIENT)
Dept: MEDICAL GROUP | Facility: PHYSICIAN GROUP | Age: 84
End: 2024-06-06
Payer: MEDICARE

## 2024-06-06 VITALS
HEART RATE: 75 BPM | TEMPERATURE: 98.5 F | SYSTOLIC BLOOD PRESSURE: 122 MMHG | OXYGEN SATURATION: 92 % | RESPIRATION RATE: 18 BRPM | HEIGHT: 58 IN | BODY MASS INDEX: 29.85 KG/M2 | DIASTOLIC BLOOD PRESSURE: 60 MMHG | WEIGHT: 142.2 LBS

## 2024-06-06 DIAGNOSIS — I27.20 PULMONARY HYPERTENSION (HCC): ICD-10-CM

## 2024-06-06 DIAGNOSIS — F11.20 OPIOID DEPENDENCE, UNCOMPLICATED (HCC): ICD-10-CM

## 2024-06-06 DIAGNOSIS — G31.9 DEGENERATIVE DISEASE OF NERVOUS SYSTEM, UNSPECIFIED (HCC): ICD-10-CM

## 2024-06-06 DIAGNOSIS — I25.119 ATHEROSCLEROSIS OF NATIVE CORONARY ARTERY OF NATIVE HEART WITH ANGINA PECTORIS (HCC): ICD-10-CM

## 2024-06-06 DIAGNOSIS — R54 FRAILTY: ICD-10-CM

## 2024-06-06 DIAGNOSIS — K44.9 LARGE HIATAL HERNIA: ICD-10-CM

## 2024-06-06 DIAGNOSIS — R73.03 PREDIABETES: ICD-10-CM

## 2024-06-06 PROBLEM — D68.69 OTHER THROMBOPHILIA (HCC): Status: RESOLVED | Noted: 2024-04-09 | Resolved: 2024-06-06

## 2024-06-06 LAB
HBA1C MFR BLD: 6.3 % (ref ?–5.8)
POCT INT CON NEG: NEGATIVE
POCT INT CON POS: POSITIVE

## 2024-06-06 PROCEDURE — 99213 OFFICE O/P EST LOW 20 MIN: CPT | Performed by: FAMILY MEDICINE

## 2024-06-06 PROCEDURE — 3074F SYST BP LT 130 MM HG: CPT | Performed by: FAMILY MEDICINE

## 2024-06-06 PROCEDURE — 3078F DIAST BP <80 MM HG: CPT | Performed by: FAMILY MEDICINE

## 2024-06-06 PROCEDURE — 83036 HEMOGLOBIN GLYCOSYLATED A1C: CPT | Performed by: FAMILY MEDICINE

## 2024-06-06 ASSESSMENT — FIBROSIS 4 INDEX: FIB4 SCORE: 1.55

## 2024-06-06 NOTE — PROGRESS NOTES
Subjective:     CC:   Chief Complaint   Patient presents with    Follow-Up       HPI:   Guillermina presents today with one of her caregivers for follow-up.  Patient is having some problems with her lower back but she does see pain clinic for this I would recommend she talk to them about it patient issues and CBD spray which has offered her relief for about 8 hours.  It sounds like patient is using a narcotic 1 tablet a day she takes a half a tablet twice a day she is complaining that she was having some constipation but she started drinking prune juice and then she had a bowel movement.  Patient does have appointment with endocrinology coming up Zaina 10 she is also seeing and sleep clinic in July and cardiology in September.  Reading the last cardiology note it was decided to stop the Eliquis due to high risk for bleeding on patient.  Cardiology did just start her on Jardiance because of her congestive heart failure patient does have a history of elevated glucose in the past.    Past Medical History:   Diagnosis Date    Arthritis     Asthma     Back pain     Bowel habit changes     diarrhea    Breath shortness     COPD    Cataract     bilateral IOL    Chronic pain 11/2019    hips    Dyslipidemia 8/1/2016    Emphysema of lung ([facility])     GERD (gastroesophageal reflux disease)     [place] measles     Heart burn     Hepatitis 1970's    A based on labs done 2016    Hiatus hernia syndrome     High cholesterol     History of total hip replacement     L    Hyperlipidemia     Influenza     Macular degeneration     Memory loss     Mumps     NSTEMI (non-ST elevated myocardial infarction) ([facility]) 9/12/2017    OA (osteoarthritis)     back, hip, shoulds, knees and hands    Other pulmonary embolism without acute cor pulmonale ([facility]) 12/14/2019    Painful joint     Psychiatric problem     depression    S/P cataract extraction     bilat    Shortness of breath     Snoring     stomach flu 11/08/2019    Urinary incontinence     Vision loss      Wears dentures     Wears glasses        Social History     Tobacco Use    Smoking status: Never     Passive exposure: Yes    Smokeless tobacco: Never    Tobacco comments:     lived with smokers   Vaping Use    Vaping status: Never Used    Passive vaping exposure: Yes   Substance Use Topics    Alcohol use: Not Currently     Alcohol/week: 0.0 oz     Comment: 3 per year    Drug use: No     Comment: In the Distant Past, but not since 32 y.o.  (prior - Pink heart,[employer], crosstops - stimulants for working double shifts, but not for several decades)       Current Outpatient Medications Ordered in Epic   Medication Sig Dispense Refill    Empagliflozin (JARDIANCE) 10 MG Tab tablet Take 1 Tablet by mouth every day. 90 Tablet 4    lovastatin (MEVACOR) 40 MG tablet TAKE 1 TABLET BY MOUTH EVERYDAY AT BEDTIME 100 Tablet 1    montelukast (SINGULAIR) 10 MG Tab TAKE 1 TABLET BY MOUTH EVERY DAY IN THE EVENING 100 Tablet 1    escitalopram (LEXAPRO) 10 MG Tab TAKE 1 TABLET BY MOUTH EVERY DAY  DAYS. 100 Tablet 1    omeprazole (PRILOSEC) 40 MG delayed-release capsule TAKE 1 CAPSULE BY MOUTH EVERY DAY 90 Capsule 1    budesonide-formoterol (SYMBICORT) 160-4.5 MCG/ACT Aerosol TAKE 1 PUFF BY MOUTH TWICE A DAY      Cyanocobalamin (B-12 PO) Take  by mouth.      SYMBICORT 160-4.5 MCG/ACT Aerosol Inhale 2 Puffs 2 times a day. 10.2 Each 11    HYDROcodone/acetaminophen (NORCO)  MG Tab Take 0.5 Tablets by mouth 2 times a day.      diclofenac sodium (VOLTAREN) 1 % Gel diclofenac 1 % topical gel   APPLY 2-3 GRAMS TO AFFECTED AREA 4 TIMES A DAY AS NEEDED FOR PAIN      famotidine (PEPCID) 20 MG Tab TAKE 1 TABLET BY MOUTH EVERY DAY 30MINS PRIOR TO FOOD 90 Tablet 3    Multiple Vitamins-Minerals (CENTRUM SILVER 50+WOMEN) Tab Take  by mouth.      loperamide (IMODIUM A-D) 2 MG tablet LOPERAMIDE HCL 2 MG TABS      albuterol 108 (90 Base) MCG/ACT Aero Soln inhalation aerosol Inhale 1-2 Puffs by mouth every four hours as needed for  "Shortness of Breath.      Biotin 10 MG Tab Take  by mouth.      polyethylene glycol/lytes (MIRALAX) Pack Take 1 Packet by mouth 1 time daily as needed (if sennosides and docusate ineffective after 24 hours).  3    Multiple Vitamins-Minerals (OCUVITE-LUTEIN) Tab Take 1 tablet by mouth every morning.      ascorbic acid (VITAMIN C) 500 MG tablet Take 1 Tab by mouth every day. 30 Tab 3    vitamin D 2000 UNIT Tab Take 1 Tab by mouth every day. 60 Tab      No current Epic-ordered facility-administered medications on file.       Allergies:  [place]    Health Maintenance: Completed    ROS:  Gen: no fevers/chills, no changes in weight  Eyes: no changes in vision  ENT: no sore throat, no hearing loss, no bloody nose  Pulm: no sob, no cough  CV: no chest pain, no palpitations  GI: no nausea/vomiting, no diarrhea  : no dysuria  Neuro: no headaches, no numbness/tingling  Heme/Lymph: no easy bruising    Objective:     Exam:  /60 (BP Location: Left arm, Patient Position: Sitting, BP Cuff Size: Adult)   Pulse 75   Temp 36.9 °C (98.5 °F) (Temporal)   Resp 18   Ht 1.473 m (4' 10\")   Wt 64.5 kg (142 lb 3.2 oz)   SpO2 92%   BMI 29.72 kg/m²  Body mass index is 29.72 kg/m².    Gen: Alert and oriented, No apparent distress.  Skin: Warm and dry.  No obvious lesions.  Eyes: Sclera wnl Pupils normal in size  Lungs: Normal effort, CTA bilaterally, no wheezes, rhonchi, or rales  CV: Regular rate and rhythm. No murmurs, rubs, or gallops.  Musculoskeletal: Normal gait. No extremity cyanosis, clubbing, or edema.  Neuro: Oriented to person, place and time  Psych: Mood is wnl       Assessment & Plan:     84 y.o. female with the following -     1. Prediabetes  Patient's hemoglobin A1c is 6.3 since she just got started on the Jardiance we will see what her hemoglobin A1c drops to will probably recheck it next time I see her.  - POCT  A1C    2. Atherosclerosis of native coronary artery of native heart with angina pectoris " (HCC)  Patient will be seeing cardiology in September    3. Frailty  Patient reports that she wants to die at home patient is a no code and she has informed all the caregivers and her son that lives with her of her wishes.    4. Degenerative disease of nervous system, unspecified (HCC)  Patient to follow-up with her pain doctor to discuss any other options for her lower back discomfort    5. Large hiatal hernia  Patient patiently has epigastric discomfort would recommend she not eat and go to bed wait about 4 hours since she has a large hiatal hernia.    6. Pulmonary hypertension (HCC)   Patient appears to be doing well she should follow-up with pulmonary as planned    7.  Opioid dependence  Patient is seeing the pain doctor for this  Return in about 3 months (around 9/6/2024), or if symptoms worsen or fail to improve.    Please note that this dictation was created using voice recognition software. I have made every reasonable attempt to correct obvious errors, but I expect that there are errors of grammar and possibly content that I did not discover before finalizing the note.

## 2024-06-07 DIAGNOSIS — Z86.718 HISTORY OF DVT (DEEP VEIN THROMBOSIS): ICD-10-CM

## 2024-06-30 NOTE — PROGRESS NOTES
Follow up Endocrinology Visit  Initial consult/last visit on: 2/21/24  Referred by: Brenda Valenzuela M.D.    Chief complaint:  Guillermina Recio, 84 y.o., female, who is here for follow up for osteoporosis management. Here with her caregiver Alexandra Burnett.    Interval history:   - since last visit    Osteoporosis:  - diagnosed based on  - history of low impact fractures: chronic compression of T12 seen in 2017, denies any other fractures  - has OS, s/p b/l hip replacements  - change in height: 5 0'' -> 4'10''  - positive history of falls/balance problems, walks with a cane, legally blind  - previous treatment: Prolia x 1 -2 years ago  - daily Ca intake - Centrum Silver vitamins  - daily vit D intake - 2,000 IU daily   RISK FACTORS:  - menopause in her 50s  - has CKD 3a  - denies history of Ca, phosphorus, vit D, thyroid/parathryoid disorders, kidney stones,  liver disease,  immobilization, smoking, alcohol abuse, chronic steroid use, FHx of fractures/osteoporosis  - intermittent constipation/diarrhea    Medications:  Current Outpatient Medications:     Empagliflozin (JARDIANCE) 10 MG Tab tablet, Take 1 Tablet by mouth every day., Disp: 90 Tablet, Rfl: 4    lovastatin (MEVACOR) 40 MG tablet, TAKE 1 TABLET BY MOUTH EVERYDAY AT BEDTIME, Disp: 100 Tablet, Rfl: 1    montelukast (SINGULAIR) 10 MG Tab, TAKE 1 TABLET BY MOUTH EVERY DAY IN THE EVENING, Disp: 100 Tablet, Rfl: 1    escitalopram (LEXAPRO) 10 MG Tab, TAKE 1 TABLET BY MOUTH EVERY DAY  DAYS., Disp: 100 Tablet, Rfl: 1    omeprazole (PRILOSEC) 40 MG delayed-release capsule, TAKE 1 CAPSULE BY MOUTH EVERY DAY, Disp: 90 Capsule, Rfl: 1    budesonide-formoterol (SYMBICORT) 160-4.5 MCG/ACT Aerosol, TAKE 1 PUFF BY MOUTH TWICE A DAY, Disp: , Rfl:     Cyanocobalamin (B-12 PO), Take  by mouth., Disp: , Rfl:     SYMBICORT 160-4.5 MCG/ACT Aerosol, Inhale 2 Puffs 2 times a day., Disp: 10.2 Each, Rfl: 11    HYDROcodone/acetaminophen (NORCO)  MG Tab, Take 0.5 Tablets  by mouth 2 times a day., Disp: , Rfl:     diclofenac sodium (VOLTAREN) 1 % Gel, diclofenac 1 % topical gel  APPLY 2-3 GRAMS TO AFFECTED AREA 4 TIMES A DAY AS NEEDED FOR PAIN, Disp: , Rfl:     famotidine (PEPCID) 20 MG Tab, TAKE 1 TABLET BY MOUTH EVERY DAY 30MINS PRIOR TO FOOD, Disp: 90 Tablet, Rfl: 3    Multiple Vitamins-Minerals (CENTRUM SILVER 50+WOMEN) Tab, Take  by mouth., Disp: , Rfl:     loperamide (IMODIUM A-D) 2 MG tablet, LOPERAMIDE HCL 2 MG TABS, Disp: , Rfl:     albuterol 108 (90 Base) MCG/ACT Aero Soln inhalation aerosol, Inhale 1-2 Puffs by mouth every four hours as needed for Shortness of Breath., Disp: , Rfl:     Biotin 10 MG Tab, Take  by mouth., Disp: , Rfl:     polyethylene glycol/lytes (MIRALAX) Pack, Take 1 Packet by mouth 1 time daily as needed (if sennosides and docusate ineffective after 24 hours)., Disp: , Rfl: 3    Multiple Vitamins-Minerals (OCUVITE-LUTEIN) Tab, Take 1 tablet by mouth every morning., Disp: , Rfl:     ascorbic acid (VITAMIN C) 500 MG tablet, Take 1 Tab by mouth every day., Disp: 30 Tab, Rfl: 3    vitamin D 2000 UNIT Tab, Take 1 Tab by mouth every day., Disp: 60 Tab, Rfl:     Physical Examination:   Vital signs: There were no vitals taken for this visit.  General: No distress, cooperative, well dressed and well nourished.  No cushingoid features.  Walks with a cane  Eyes: No scleral icterus or discharge.  Legally blind  Resp: Normal effort  CVS: Regular rate and rhythm  Neuro: Alert and oriented  Skin: No rash, No Ulcers  Psych: Normal mood and affect    Labs:  - reviewed    - Comp Metabolic Panel; Future  - PTH INTACT (PTH ONLY); Future  - URINE CREATININE 24 HR; Future  - Urine Calcium 24 HR or Random; Future  - PHOSPHORUS; Future  - TSH WITH REFLEX TO FT4; Future  - DX-LUMBAR SPINE-2 OR 3 VIEWS; Future  - DX-THORACIC SPINE-2 VIEWS; Future  - VITAMIN D,25 HYDROXY (DEFICIENCY); Future     Reference Range  06/12/23 07/17/23 11/03/23 01/17/24 02/21/24  04/29/24     Hemoglobin 12.0 - 16.0 g/dL 13.3   13.5  10.1 (L)   Hematocrit 37.0 - 47.0 % 44.2   44.7  36.4 (L)   Sodium 135 - 145 mmol/L 143 141   145 143   Creatinine 0.50 - 1.40 mg/dL 1.12 1.11   1.21 1.24   GFR (CKD-EPI) >60 mL/min/1.73 m 2 48 ! 49 !   44 ! 42 !   Calcium 8.5 - 10.5 mg/dL 9.6 9.6   9.6 9.7   Correct Calcium 8.5 - 10.5 mg/dL 9.8 9.7   9.3 9.6   AST(SGOT) 12 - 45 U/L 20 23   20 17   ALT(SGPT) 2 - 50 U/L 14 14   12 9   ALP 30 - 99 U/L 72 70   82 67   Albumin 3.2 - 4.9 g/dL 3.8 3.9   4.4 4.1   Phosphorus 2.5 - 4.5 mg/dL     3.2    Glycohemoglobin 4.0 - 5.6 %  6.3 (H)       25-OH vit D 30 - 100 ng/mL   27 (L)  41 56   TSH 0.380 - 5.330 uIU/mL     5.020    Pth, Intact 14.0 - 72.0 pg/mL     110.0 (H) 81.3 (H)     24 Hr Urine Reference Range 04/09/24    Total Volume mL 800   Creatinine  800 - 1800 mg/24 Hr 527 (L)   Calcium Urine 100 - 250 mg/d 36 (L)   Ca/creat ratio 20 - 300 mg/d 69      Imaging:  - reviewed  DEXA scan:  Date Machine L1- L4  BMD/T-score L proximal femur  BMD/ T-score R proximal femur  BMD/ T-score L distal forearm  BMD/ T-score FRAX Rx    12/18/03    0.930 g/cm2      NA  None    3/10/06  Hologic unit   0.917 g/cm2 / 1.2 (- 1.5%)  0.748 g/cm2 /1.6  Not done  Not done  NA  None    4/19/10  Hologic unit   0.937 g/cm2  / - 1.0 ?  Not done  0.734 g/cm2 / - 1.7 ?  Not done  NA  None    4/11/2022   [facility] unit   1.154 g/cm2  / - 0.1    0.606 g/cm2  / -3.1  NA  None               Thoracic XR on 2/21/24:  HISTORY/REASON FOR EXAM:  Back pain.  TECHNIQUE/EXAM DESCRIPTION AND NUMBER OF VIEWS:  Thoracic spine, 2 views.  COMPARISON:  5/18/2006  FINDINGS:  Osteopenia is present.  There is multilevel degenerative disc disease characterized by multilevel disc height loss and endplate spurring. There is kyphosis and scoliosis. There is mild chronic wedging of T12 and L1 vertebra. No definite acute compression fracture seen.  IMPRESSION:  1.  Mild chronic wedging of T12 and L1.  2.  No evidence of acute  compression fracture.  3.  Multilevel degenerative disc disease.    Lumbar spine XR on 2/21/24:  HISTORY/REASON FOR EXAM: Chronic back pain.  TECHNIQUE/ EXAM DESCRIPTION AND NUMBER OF VIEWS: 2 views of the lumbar spine.  COMPARISON: 11/10/2017   FINDINGS:  There is mild chronic wedging of T12 and L1. This is unchanged. There is prominent multilevel degenerative disc disease characterized by disc height loss with endplate spurring and vacuum disc change. There is multilevel facet degeneration. There is convex left scoliotic curvature. There is left hip arthroplasty. There is a vascular calcification.  IMPRESSION:  1.  Stable mild chronic wedging of T12 and L1 vertebra.   2.  Multilevel degenerative disc disease and facet degeneration.    Assessment and Plan:  1. Localized osteoporosis without current pathological fracture      History of L2 vertebral fracture  - Patient has severe osteoporosis given history of L2 vertebral fragility fracture  - Identifiable risk factors: advanced age, postmenopausal state,  ethnicity, vitamin D deficiency, CKD, history of diarrhea  - Will still need to rule out other possible secondary causes of osteoporosis such as hyperparathyroidism, hypophosphatemia, hyperparathyroidism, hypercalciuria, calcium malabsorption  - given discrepancy between lumbar spine and distal forearm BMD, prior history of vertebral fracture, change in height, we will obtain x-rays of thoracic and lumbar spine to rule out new compression vertebral fractures; degenerative changes in the spine also may cause falsely elevated BMD in the lumbar spine    Plan:  Continue vitamin D supplementation.  Continue calcium supplementation.  Fall precautions.  Obtain following labs: CMP, phosphorus, PTH, TSH, vitamin D.  If vitamin D within normal range => proceed with 24-hour urine calcium/creatinine  Obtain thoracic and lumbar x-ray.  Close follow-up after getting all the above evaluation    - Comp Metabolic Panel;  Future  - PTH INTACT (PTH ONLY); Future  - URINE CREATININE 24 HR; Future  - Urine Calcium 24 HR or Random; Future  - PHOSPHORUS; Future  - TSH WITH REFLEX TO FT4; Future  - DX-LUMBAR SPINE-2 OR 3 VIEWS; Future  - DX-THORACIC SPINE-2 VIEWS; Future  - VITAMIN D,25 HYDROXY (DEFICIENCY); Future    RTC: 2 to 3 weeks    Total time (face-to-face and non-face-to face time):  60 min - extensive discussion of diagnoses,  medical charts, lab, imaging review, documentation.  Plan reviewed with the patient and agreed with plan.  All questions answered to patient's satisfaction.  Thank you kindly for allowing me to participate in the care plan for this patient.    Alma Cruz MD    CC:   Brenda Valenzuela M.D.

## 2024-07-01 ENCOUNTER — APPOINTMENT (OUTPATIENT)
Dept: ENDOCRINOLOGY | Facility: MEDICAL CENTER | Age: 84
End: 2024-07-01
Attending: STUDENT IN AN ORGANIZED HEALTH CARE EDUCATION/TRAINING PROGRAM
Payer: MEDICARE

## 2024-07-01 ENCOUNTER — HOSPITAL ENCOUNTER (OUTPATIENT)
Dept: LAB | Facility: MEDICAL CENTER | Age: 84
End: 2024-07-01
Attending: STUDENT IN AN ORGANIZED HEALTH CARE EDUCATION/TRAINING PROGRAM
Payer: MEDICARE

## 2024-07-01 VITALS
HEART RATE: 64 BPM | WEIGHT: 140 LBS | OXYGEN SATURATION: 94 % | BODY MASS INDEX: 29.26 KG/M2 | DIASTOLIC BLOOD PRESSURE: 72 MMHG | SYSTOLIC BLOOD PRESSURE: 130 MMHG

## 2024-07-01 DIAGNOSIS — M81.6 LOCALIZED OSTEOPOROSIS WITHOUT CURRENT PATHOLOGICAL FRACTURE: ICD-10-CM

## 2024-07-01 DIAGNOSIS — D64.9 ANEMIA, UNSPECIFIED TYPE: ICD-10-CM

## 2024-07-01 DIAGNOSIS — M84.48XS PATHOLOGICAL FRACTURE OF THORACIC VERTEBRA, SEQUELA: ICD-10-CM

## 2024-07-01 DIAGNOSIS — M84.48XS PATHOLOGICAL FRACTURE OF LUMBAR VERTEBRA, SEQUELA: ICD-10-CM

## 2024-07-01 LAB
ALBUMIN SERPL BCP-MCNC: 4.4 G/DL (ref 3.2–4.9)
ALBUMIN/GLOB SERPL: 1.3 G/DL
ALP SERPL-CCNC: 72 U/L (ref 30–99)
ALT SERPL-CCNC: 11 U/L (ref 2–50)
ANION GAP SERPL CALC-SCNC: 10 MMOL/L (ref 7–16)
AST SERPL-CCNC: 22 U/L (ref 12–45)
BASOPHILS # BLD AUTO: 0.3 % (ref 0–1.8)
BASOPHILS # BLD: 0.02 K/UL (ref 0–0.12)
BILIRUB SERPL-MCNC: 0.3 MG/DL (ref 0.1–1.5)
BUN SERPL-MCNC: 34 MG/DL (ref 8–22)
CALCIUM ALBUM COR SERPL-MCNC: 9.6 MG/DL (ref 8.5–10.5)
CALCIUM SERPL-MCNC: 9.9 MG/DL (ref 8.4–10.2)
CHLORIDE SERPL-SCNC: 108 MMOL/L (ref 96–112)
CO2 SERPL-SCNC: 24 MMOL/L (ref 20–33)
CREAT SERPL-MCNC: 1.36 MG/DL (ref 0.5–1.4)
EOSINOPHIL # BLD AUTO: 0.03 K/UL (ref 0–0.51)
EOSINOPHIL NFR BLD: 0.5 % (ref 0–6.9)
ERYTHROCYTE [DISTWIDTH] IN BLOOD BY AUTOMATED COUNT: 47.4 FL (ref 35.9–50)
FASTING STATUS PATIENT QL REPORTED: NORMAL
FERRITIN SERPL-MCNC: 11.4 NG/ML (ref 10–291)
GFR SERPLBLD CREATININE-BSD FMLA CKD-EPI: 38 ML/MIN/1.73 M 2
GLOBULIN SER CALC-MCNC: 3.3 G/DL (ref 1.9–3.5)
GLUCOSE SERPL-MCNC: 90 MG/DL (ref 65–99)
HCT VFR BLD AUTO: 37 % (ref 37–47)
HGB BLD-MCNC: 10.1 G/DL (ref 12–16)
IMM GRANULOCYTES # BLD AUTO: 0.02 K/UL (ref 0–0.11)
IMM GRANULOCYTES NFR BLD AUTO: 0.3 % (ref 0–0.9)
IRON SATN MFR SERPL: 6 % (ref 15–55)
IRON SERPL-MCNC: 21 UG/DL (ref 40–170)
LYMPHOCYTES # BLD AUTO: 1.35 K/UL (ref 1–4.8)
LYMPHOCYTES NFR BLD: 23.2 % (ref 22–41)
MCH RBC QN AUTO: 18.9 PG (ref 27–33)
MCHC RBC AUTO-ENTMCNC: 27.3 G/DL (ref 32.2–35.5)
MCV RBC AUTO: 69.2 FL (ref 81.4–97.8)
MONOCYTES # BLD AUTO: 0.73 K/UL (ref 0–0.85)
MONOCYTES NFR BLD AUTO: 12.5 % (ref 0–13.4)
NEUTROPHILS # BLD AUTO: 3.68 K/UL (ref 1.82–7.42)
NEUTROPHILS NFR BLD: 63.2 % (ref 44–72)
NRBC # BLD AUTO: 0 K/UL
NRBC BLD-RTO: 0 /100 WBC (ref 0–0.2)
PLATELET # BLD AUTO: 307 K/UL (ref 164–446)
PMV BLD AUTO: 8.5 FL (ref 9–12.9)
POTASSIUM SERPL-SCNC: 5.3 MMOL/L (ref 3.6–5.5)
PROT SERPL-MCNC: 7.7 G/DL (ref 6–8.2)
RBC # BLD AUTO: 5.35 M/UL (ref 4.2–5.4)
SODIUM SERPL-SCNC: 142 MMOL/L (ref 135–145)
TIBC SERPL-MCNC: 363 UG/DL (ref 250–450)
UIBC SERPL-MCNC: 342 UG/DL (ref 110–370)
WBC # BLD AUTO: 5.8 K/UL (ref 4.8–10.8)

## 2024-07-01 PROCEDURE — 3078F DIAST BP <80 MM HG: CPT | Performed by: STUDENT IN AN ORGANIZED HEALTH CARE EDUCATION/TRAINING PROGRAM

## 2024-07-01 PROCEDURE — 84165 PROTEIN E-PHORESIS SERUM: CPT

## 2024-07-01 PROCEDURE — 99215 OFFICE O/P EST HI 40 MIN: CPT | Performed by: STUDENT IN AN ORGANIZED HEALTH CARE EDUCATION/TRAINING PROGRAM

## 2024-07-01 PROCEDURE — 82728 ASSAY OF FERRITIN: CPT

## 2024-07-01 PROCEDURE — 80053 COMPREHEN METABOLIC PANEL: CPT

## 2024-07-01 PROCEDURE — 99212 OFFICE O/P EST SF 10 MIN: CPT | Performed by: STUDENT IN AN ORGANIZED HEALTH CARE EDUCATION/TRAINING PROGRAM

## 2024-07-01 PROCEDURE — 85025 COMPLETE CBC W/AUTO DIFF WBC: CPT

## 2024-07-01 PROCEDURE — 36415 COLL VENOUS BLD VENIPUNCTURE: CPT

## 2024-07-01 PROCEDURE — 84155 ASSAY OF PROTEIN SERUM: CPT | Mod: XU

## 2024-07-01 PROCEDURE — 83540 ASSAY OF IRON: CPT

## 2024-07-01 PROCEDURE — 3075F SYST BP GE 130 - 139MM HG: CPT | Performed by: STUDENT IN AN ORGANIZED HEALTH CARE EDUCATION/TRAINING PROGRAM

## 2024-07-01 PROCEDURE — 83550 IRON BINDING TEST: CPT

## 2024-07-01 ASSESSMENT — FIBROSIS 4 INDEX: FIB4 SCORE: 1.55

## 2024-07-02 ENCOUNTER — TELEPHONE (OUTPATIENT)
Dept: ENDOCRINOLOGY | Facility: MEDICAL CENTER | Age: 84
End: 2024-07-02
Payer: MEDICARE

## 2024-07-04 LAB
ALBUMIN SERPL ELPH-MCNC: 4.06 G/DL (ref 3.75–5.01)
ALPHA1 GLOB SERPL ELPH-MCNC: 0.39 G/DL (ref 0.19–0.46)
ALPHA2 GLOB SERPL ELPH-MCNC: 0.97 G/DL (ref 0.48–1.05)
B-GLOBULIN SERPL ELPH-MCNC: 0.87 G/DL (ref 0.48–1.1)
GAMMA GLOB SERPL ELPH-MCNC: 0.91 G/DL (ref 0.62–1.51)
INTERPRETATION SERPL IFE-IMP: NORMAL
MONOCLON BAND OBS SERPL: NORMAL
MONOCLONAL PROTEIN NL11656: NORMAL G/DL
PATHOLOGY STUDY: NORMAL
PROT SERPL-MCNC: 7.2 G/DL (ref 6.3–8.2)

## 2024-07-05 RX ORDER — OMEPRAZOLE 40 MG/1
40 CAPSULE, DELAYED RELEASE ORAL DAILY
Qty: 90 CAPSULE | Refills: 1 | Status: SHIPPED | OUTPATIENT
Start: 2024-07-05

## 2024-07-07 DIAGNOSIS — M84.48XS PATHOLOGICAL FRACTURE OF LUMBAR VERTEBRA, SEQUELA: ICD-10-CM

## 2024-07-07 DIAGNOSIS — M81.6 LOCALIZED OSTEOPOROSIS WITHOUT CURRENT PATHOLOGICAL FRACTURE: ICD-10-CM

## 2024-07-07 DIAGNOSIS — M84.48XS PATHOLOGICAL FRACTURE OF THORACIC VERTEBRA, SEQUELA: ICD-10-CM

## 2024-07-22 ENCOUNTER — OFFICE VISIT (OUTPATIENT)
Dept: SLEEP MEDICINE | Facility: MEDICAL CENTER | Age: 84
End: 2024-07-22
Attending: INTERNAL MEDICINE
Payer: MEDICARE

## 2024-07-22 VITALS
HEART RATE: 71 BPM | HEIGHT: 59 IN | BODY MASS INDEX: 29.43 KG/M2 | SYSTOLIC BLOOD PRESSURE: 100 MMHG | WEIGHT: 146 LBS | OXYGEN SATURATION: 96 % | DIASTOLIC BLOOD PRESSURE: 58 MMHG

## 2024-07-22 DIAGNOSIS — J45.30 MILD PERSISTENT ASTHMA WITHOUT COMPLICATION: ICD-10-CM

## 2024-07-22 DIAGNOSIS — J98.4 RESTRICTIVE LUNG DISEASE: ICD-10-CM

## 2024-07-22 DIAGNOSIS — K44.9 HIATAL HERNIA: ICD-10-CM

## 2024-07-22 DIAGNOSIS — I26.99 PULMONARY EMBOLISM, OTHER, UNSPECIFIED CHRONICITY, UNSPECIFIED WHETHER ACUTE COR PULMONALE PRESENT (HCC): ICD-10-CM

## 2024-07-22 DIAGNOSIS — J96.11 CHRONIC RESPIRATORY FAILURE WITH HYPOXIA (HCC): ICD-10-CM

## 2024-07-22 PROCEDURE — 94761 N-INVAS EAR/PLS OXIMETRY MLT: CPT | Performed by: INTERNAL MEDICINE

## 2024-07-22 PROCEDURE — 99214 OFFICE O/P EST MOD 30 MIN: CPT | Performed by: INTERNAL MEDICINE

## 2024-07-22 PROCEDURE — 3078F DIAST BP <80 MM HG: CPT | Performed by: INTERNAL MEDICINE

## 2024-07-22 PROCEDURE — 99213 OFFICE O/P EST LOW 20 MIN: CPT | Mod: 25 | Performed by: INTERNAL MEDICINE

## 2024-07-22 PROCEDURE — 3074F SYST BP LT 130 MM HG: CPT | Performed by: INTERNAL MEDICINE

## 2024-07-22 RX ORDER — FLUTICASONE FUROATE AND VILANTEROL 100; 25 UG/1; UG/1
1 POWDER RESPIRATORY (INHALATION) DAILY
Qty: 1 EACH | Refills: 11 | Status: SHIPPED | OUTPATIENT
Start: 2024-07-22

## 2024-07-22 ASSESSMENT — ENCOUNTER SYMPTOMS
BACK PAIN: 1
DOUBLE VISION: 0
BLURRED VISION: 0
DIAPHORESIS: 0
FEVER: 0
SPUTUM PRODUCTION: 0
EYE DISCHARGE: 0
VOMITING: 0
EYE PAIN: 0
FALLS: 0
ORTHOPNEA: 0
NECK PAIN: 0
HEADACHES: 0
ABDOMINAL PAIN: 0
WEIGHT LOSS: 0
SINUS PAIN: 0
DEPRESSION: 0
DIZZINESS: 0
MYALGIAS: 0
STRIDOR: 0
CONSTIPATION: 0
CHILLS: 0
SORE THROAT: 0
FOCAL WEAKNESS: 0
CLAUDICATION: 0
PHOTOPHOBIA: 0
SPEECH CHANGE: 0
EYE REDNESS: 0
NAUSEA: 0
HEMOPTYSIS: 0
SHORTNESS OF BREATH: 1
COUGH: 0
HEARTBURN: 0
TREMORS: 0
WEAKNESS: 0
PND: 0
DIARRHEA: 0
PALPITATIONS: 0
WHEEZING: 0

## 2024-07-22 ASSESSMENT — FIBROSIS 4 INDEX: FIB4 SCORE: 1.86

## 2024-07-22 NOTE — ED TRIAGE NOTES
"Chief Complaint   Patient presents with   • Hip Pain     Rt hip pain following GLF. 3 weeks post rt hip replacement.    • Syncope     Possible syncopal event.  pt was walking to restroom, \" all of the sudden I was on ground\". Unwitnessed. Reports hitting head on dresser, denies head neck or back pain.    Denies SOB or CP.   Received 100 mcg fentanyl and 4 mg zofran PTA.   " Spoke with pt. States she's been having some brownish spotting and vaginal burning for the last 2 days. Pt's LMP is 07/03/24. Pt denies any odor, urgency and/or frequency with urination. Informed pt we would have to schedule an appt for her to have a vaginal swab done. Pt verbalized understanding.

## 2024-07-30 ENCOUNTER — HOSPITAL ENCOUNTER (OUTPATIENT)
Dept: RADIOLOGY | Facility: MEDICAL CENTER | Age: 84
End: 2024-07-30
Attending: STUDENT IN AN ORGANIZED HEALTH CARE EDUCATION/TRAINING PROGRAM
Payer: MEDICARE

## 2024-07-30 DIAGNOSIS — M84.48XS PATHOLOGICAL FRACTURE OF LUMBAR VERTEBRA, SEQUELA: ICD-10-CM

## 2024-07-30 DIAGNOSIS — M81.6 LOCALIZED OSTEOPOROSIS WITHOUT CURRENT PATHOLOGICAL FRACTURE: ICD-10-CM

## 2024-07-30 DIAGNOSIS — M84.48XS PATHOLOGICAL FRACTURE OF THORACIC VERTEBRA, SEQUELA: ICD-10-CM

## 2024-07-30 PROCEDURE — 77080 DXA BONE DENSITY AXIAL: CPT

## 2024-08-05 ENCOUNTER — APPOINTMENT (OUTPATIENT)
Dept: ENDOCRINOLOGY | Facility: MEDICAL CENTER | Age: 84
End: 2024-08-05
Attending: STUDENT IN AN ORGANIZED HEALTH CARE EDUCATION/TRAINING PROGRAM
Payer: MEDICARE

## 2024-09-10 ENCOUNTER — HOSPITAL ENCOUNTER (OUTPATIENT)
Dept: LAB | Facility: MEDICAL CENTER | Age: 84
End: 2024-09-10
Attending: INTERNAL MEDICINE
Payer: MEDICARE

## 2024-09-10 DIAGNOSIS — R06.09 DYSPNEA ON EXERTION: ICD-10-CM

## 2024-09-10 LAB
ANION GAP SERPL CALC-SCNC: 15 MMOL/L (ref 7–16)
BUN SERPL-MCNC: 22 MG/DL (ref 8–22)
CALCIUM SERPL-MCNC: 9.5 MG/DL (ref 8.5–10.5)
CHLORIDE SERPL-SCNC: 105 MMOL/L (ref 96–112)
CO2 SERPL-SCNC: 20 MMOL/L (ref 20–33)
CREAT SERPL-MCNC: 1.19 MG/DL (ref 0.5–1.4)
GFR SERPLBLD CREATININE-BSD FMLA CKD-EPI: 44 ML/MIN/1.73 M 2
GLUCOSE SERPL-MCNC: 122 MG/DL (ref 65–99)
NT-PROBNP SERPL IA-MCNC: 1452 PG/ML (ref 0–125)
POTASSIUM SERPL-SCNC: 4.6 MMOL/L (ref 3.6–5.5)
SODIUM SERPL-SCNC: 140 MMOL/L (ref 135–145)

## 2024-09-10 PROCEDURE — 36415 COLL VENOUS BLD VENIPUNCTURE: CPT

## 2024-09-10 PROCEDURE — 83880 ASSAY OF NATRIURETIC PEPTIDE: CPT

## 2024-09-10 PROCEDURE — 80048 BASIC METABOLIC PNL TOTAL CA: CPT

## 2024-09-11 ENCOUNTER — OFFICE VISIT (OUTPATIENT)
Dept: MEDICAL GROUP | Facility: PHYSICIAN GROUP | Age: 84
End: 2024-09-11
Payer: MEDICARE

## 2024-09-11 ENCOUNTER — HOSPITAL ENCOUNTER (OUTPATIENT)
Dept: LAB | Facility: MEDICAL CENTER | Age: 84
End: 2024-09-11
Attending: FAMILY MEDICINE
Payer: MEDICARE

## 2024-09-11 VITALS
TEMPERATURE: 97.6 F | WEIGHT: 133.2 LBS | BODY MASS INDEX: 26.85 KG/M2 | HEIGHT: 59 IN | HEART RATE: 71 BPM | OXYGEN SATURATION: 95 % | SYSTOLIC BLOOD PRESSURE: 106 MMHG | RESPIRATION RATE: 18 BRPM | DIASTOLIC BLOOD PRESSURE: 58 MMHG

## 2024-09-11 DIAGNOSIS — D50.9 IRON DEFICIENCY ANEMIA, UNSPECIFIED IRON DEFICIENCY ANEMIA TYPE: ICD-10-CM

## 2024-09-11 DIAGNOSIS — R63.4 WEIGHT LOSS: ICD-10-CM

## 2024-09-11 DIAGNOSIS — M80.00XG AGE-RELATED OSTEOPOROSIS WITH CURRENT PATHOLOGICAL FRACTURE WITH DELAYED HEALING: ICD-10-CM

## 2024-09-11 DIAGNOSIS — J96.11 CHRONIC RESPIRATORY FAILURE WITH HYPOXIA (HCC): ICD-10-CM

## 2024-09-11 DIAGNOSIS — Z79.891 CHRONIC USE OF OPIATE DRUG FOR THERAPEUTIC PURPOSE: ICD-10-CM

## 2024-09-11 LAB
ANISOCYTOSIS BLD QL SMEAR: ABNORMAL
APPEARANCE UR: ABNORMAL
BACTERIA #/AREA URNS HPF: ABNORMAL /HPF
BASOPHILS # BLD AUTO: 0.6 % (ref 0–1.8)
BASOPHILS # BLD: 0.03 K/UL (ref 0–0.12)
BILIRUB UR QL STRIP.AUTO: NEGATIVE
COLOR UR: ABNORMAL
COMMENT 1642: NORMAL
EOSINOPHIL # BLD AUTO: 0.01 K/UL (ref 0–0.51)
EOSINOPHIL NFR BLD: 0.2 % (ref 0–6.9)
EPI CELLS #/AREA URNS HPF: ABNORMAL /HPF
ERYTHROCYTE [DISTWIDTH] IN BLOOD BY AUTOMATED COUNT: 48.2 FL (ref 35.9–50)
FERRITIN SERPL-MCNC: 9.9 NG/ML (ref 10–291)
GLUCOSE UR STRIP.AUTO-MCNC: >=1000 MG/DL
HCT VFR BLD AUTO: 36.6 % (ref 37–47)
HGB BLD-MCNC: 9.7 G/DL (ref 12–16)
HGB RETIC QN AUTO: 17.6 PG/CELL (ref 29–35)
HYALINE CASTS #/AREA URNS LPF: ABNORMAL /LPF
HYPOCHROMIA BLD QL SMEAR: ABNORMAL
IMM GRANULOCYTES # BLD AUTO: 0.02 K/UL (ref 0–0.11)
IMM GRANULOCYTES NFR BLD AUTO: 0.4 % (ref 0–0.9)
IMM RETICS NFR: 19.7 % (ref 2.6–16.1)
IRON SATN MFR SERPL: 5 % (ref 15–55)
IRON SERPL-MCNC: 18 UG/DL (ref 40–170)
KETONES UR STRIP.AUTO-MCNC: NEGATIVE MG/DL
LEUKOCYTE ESTERASE UR QL STRIP.AUTO: ABNORMAL
LYMPHOCYTES # BLD AUTO: 1.25 K/UL (ref 1–4.8)
LYMPHOCYTES NFR BLD: 23.2 % (ref 22–41)
MCH RBC QN AUTO: 18.2 PG (ref 27–33)
MCHC RBC AUTO-ENTMCNC: 26.5 G/DL (ref 32.2–35.5)
MCV RBC AUTO: 68.5 FL (ref 81.4–97.8)
MICRO URNS: ABNORMAL
MICROCYTES BLD QL SMEAR: ABNORMAL
MONOCYTES # BLD AUTO: 0.66 K/UL (ref 0–0.85)
MONOCYTES NFR BLD AUTO: 12.3 % (ref 0–13.4)
MORPHOLOGY BLD-IMP: NORMAL
NEUTROPHILS # BLD AUTO: 3.41 K/UL (ref 1.82–7.42)
NEUTROPHILS NFR BLD: 63.3 % (ref 44–72)
NITRITE UR QL STRIP.AUTO: POSITIVE
NRBC # BLD AUTO: 0 K/UL
NRBC BLD-RTO: 0 /100 WBC (ref 0–0.2)
OVALOCYTES BLD QL SMEAR: NORMAL
PH UR STRIP.AUTO: 5.5 [PH] (ref 5–8)
PLATELET # BLD AUTO: 355 K/UL (ref 164–446)
PLATELET BLD QL SMEAR: NORMAL
PMV BLD AUTO: 9.1 FL (ref 9–12.9)
POIKILOCYTOSIS BLD QL SMEAR: NORMAL
PROT UR QL STRIP: NEGATIVE MG/DL
RBC # BLD AUTO: 5.34 M/UL (ref 4.2–5.4)
RBC # URNS HPF: ABNORMAL /HPF
RBC BLD AUTO: PRESENT
RBC UR QL AUTO: NEGATIVE
RETICS # AUTO: 0.08 M/UL (ref 0.04–0.12)
RETICS/RBC NFR: 1.4 % (ref 0.8–2.6)
SP GR UR STRIP.AUTO: 1.03
TIBC SERPL-MCNC: 355 UG/DL (ref 250–450)
UIBC SERPL-MCNC: 337 UG/DL (ref 110–370)
UROBILINOGEN UR STRIP.AUTO-MCNC: 0.2 MG/DL
WBC # BLD AUTO: 5.4 K/UL (ref 4.8–10.8)
WBC #/AREA URNS HPF: ABNORMAL /HPF

## 2024-09-11 PROCEDURE — 87086 URINE CULTURE/COLONY COUNT: CPT

## 2024-09-11 PROCEDURE — 3078F DIAST BP <80 MM HG: CPT | Performed by: FAMILY MEDICINE

## 2024-09-11 PROCEDURE — 81001 URINALYSIS AUTO W/SCOPE: CPT

## 2024-09-11 PROCEDURE — 83550 IRON BINDING TEST: CPT

## 2024-09-11 PROCEDURE — 36415 COLL VENOUS BLD VENIPUNCTURE: CPT

## 2024-09-11 PROCEDURE — 82728 ASSAY OF FERRITIN: CPT

## 2024-09-11 PROCEDURE — 85046 RETICYTE/HGB CONCENTRATE: CPT

## 2024-09-11 PROCEDURE — 85025 COMPLETE CBC W/AUTO DIFF WBC: CPT

## 2024-09-11 PROCEDURE — 99214 OFFICE O/P EST MOD 30 MIN: CPT | Performed by: FAMILY MEDICINE

## 2024-09-11 PROCEDURE — 83540 ASSAY OF IRON: CPT

## 2024-09-11 PROCEDURE — 3074F SYST BP LT 130 MM HG: CPT | Performed by: FAMILY MEDICINE

## 2024-09-11 ASSESSMENT — FIBROSIS 4 INDEX: FIB4 SCORE: 1.86

## 2024-09-11 NOTE — PROGRESS NOTES
Subjective:     CC:   Chief Complaint   Patient presents with    Follow-Up       HPI:   Guillermina presents today with her friend for follow-up.  The friend admits that patient is not eating as much and just taking an Ensure.  Patient did see pulmonary patient is supposed to see him back in January.  Patient does have appointment with cardiology October 3.  Patient did see endocrinology concerning her osteoporosis in early July.  Looks like there was suggestion of a follow-up appointment in about a month the provider wanted to check and see when she last had Prolia she was consider using Prolia or Reclast.  Past Medical History:   Diagnosis Date    Arthritis     Asthma     Back pain     Bowel habit changes     diarrhea    Breath shortness     COPD    Cataract     bilateral IOL    Chronic pain 11/2019    hips    Dyslipidemia 8/1/2016    Emphysema of lung (HCC)     GERD (gastroesophageal reflux disease)     [place] measles     Heart burn     Hepatitis 1970's    A based on labs done 2016    Hiatus hernia syndrome     High cholesterol     History of total hip replacement     L    Hyperlipidemia     Influenza     Macular degeneration     Memory loss     Mumps     NSTEMI (non-ST elevated myocardial infarction) ([facility]) 9/12/2017    OA (osteoarthritis)     back, hip, shoulds, knees and hands    Other pulmonary embolism without acute cor pulmonale ([facility]) 12/14/2019    Painful joint     Psychiatric problem     depression    S/P cataract extraction     bilat    Shortness of breath     Snoring     stomach flu 11/08/2019    Urinary incontinence     Vision loss     Wears dentures     Wears glasses        Social History     Tobacco Use    Smoking status: Never     Passive exposure: Yes    Smokeless tobacco: Never    Tobacco comments:     lived with smokers   Vaping Use    Vaping status: Never Used    Passive vaping exposure: Yes   Substance Use Topics    Alcohol use: Not Currently     Alcohol/week: 0.0 oz     Comment: 3 per year    Drug  use: No     Comment: In the Distant Past, but not since 32 y.o.  (prior - [employer], crosstops - stimulants for working double shifts, but not for several decades)       Current Outpatient Medications Ordered in Epic   Medication Sig Dispense Refill    fluticasone furoate-vilanterol (BREO ELLIPTA) 100-25 MCG/ACT AEROSOL POWDER, BREATH ACTIVATED Inhale 1 Puff every day. Rinse mouth after each use. 1 Each 11    omeprazole (PRILOSEC) 40 MG delayed-release capsule TAKE 1 CAPSULE BY MOUTH EVERY DAY 90 Capsule 1    Empagliflozin (JARDIANCE) 10 MG Tab tablet Take 1 Tablet by mouth every day. 90 Tablet 4    lovastatin (MEVACOR) 40 MG tablet TAKE 1 TABLET BY MOUTH EVERYDAY AT BEDTIME 100 Tablet 1    montelukast (SINGULAIR) 10 MG Tab TAKE 1 TABLET BY MOUTH EVERY DAY IN THE EVENING 100 Tablet 1    escitalopram (LEXAPRO) 10 MG Tab TAKE 1 TABLET BY MOUTH EVERY DAY  DAYS. 100 Tablet 1    budesonide-formoterol (SYMBICORT) 160-4.5 MCG/ACT Aerosol TAKE 1 PUFF BY MOUTH TWICE A DAY (Patient not taking: Reported on 7/22/2024)      Cyanocobalamin (B-12 PO) Take  by mouth.      HYDROcodone/acetaminophen (NORCO)  MG Tab Take 0.5 Tablets by mouth 2 times a day.      diclofenac sodium (VOLTAREN) 1 % Gel diclofenac 1 % topical gel   APPLY 2-3 GRAMS TO AFFECTED AREA 4 TIMES A DAY AS NEEDED FOR PAIN      famotidine (PEPCID) 20 MG Tab TAKE 1 TABLET BY MOUTH EVERY DAY 30MINS PRIOR TO FOOD (Patient not taking: Reported on 7/22/2024) 90 Tablet 3    Multiple Vitamins-Minerals (CENTRUM SILVER 50+WOMEN) Tab Take  by mouth.      albuterol 108 (90 Base) MCG/ACT Aero Soln inhalation aerosol Inhale 1-2 Puffs by mouth every four hours as needed for Shortness of Breath.      Biotin 10 MG Tab Take  by mouth.      polyethylene glycol/lytes (MIRALAX) Pack Take 1 Packet by mouth 1 time daily as needed (if sennosides and docusate ineffective after 24 hours). (Patient not taking: Reported on 7/22/2024)  3    Multiple Vitamins-Minerals  "(OCUVITE-LUTEIN) Tab Take 1 tablet by mouth every morning.      ascorbic acid (VITAMIN C) 500 MG tablet Take 1 Tab by mouth every day. (Patient taking differently: Take 500 mg by mouth 2 times a day.) 30 Tab 3    vitamin D 2000 UNIT Tab Take 1 Tab by mouth every day. 60 Tab      No current Epic-ordered facility-administered medications on file.       Allergies:  [place]    Health Maintenance: Completed    ROS:  Gen: no fevers/chills, looks like patient has lost 9 pounds in 3 months  Eyes: no changes in vision  ENT: no sore throat, no hearing loss, no bloody nose  Pulm: no sob, no cough  CV: no chest pain, no palpitations  GI: no nausea/vomiting, no diarrhea  : no dysuria  MSk: no myalgias  Skin: no rash  Neuro: no headaches, no numbness/tingling  Heme/Lymph: no easy bruising    Objective:     Exam:  /58 (BP Location: Left arm, Patient Position: Sitting, BP Cuff Size: Adult)   Pulse 71   Temp 36.4 °C (97.6 °F) (Temporal)   Resp 18   Ht 1.499 m (4' 11\")   Wt 60.4 kg (133 lb 3.2 oz)   SpO2 95%   BMI 26.90 kg/m²  Body mass index is 26.9 kg/m².    Gen: Alert and oriented, No apparent distress.  Skin: Warm and dry.  No obvious lesions.  Eyes: Sclera wnl Pupils normal in size  Lungs: Normal effort, CTA bilaterally, no wheezes, rhonchi, or rales  CV: Regular rate and rhythm. No murmurs, rubs, or gallops.  Musculoskeletal: Normal gait. No extremity cyanosis, clubbing, or edema.  Neuro: Oriented to person, place and time  Psych: Mood is wnl         Assessment & Plan:     84 y.o. female with the following -     1. Weight loss  I am concerned about her weight loss we will see patient back in 3 weeks for follow-up.  Will go ahead and get some lab work done.    2. Iron deficiency anemia, unspecified iron deficiency anemia type  Last CBC was done by endocrinology and was in July we will recheck this again.  Will also order urinalysis to check for blood along with stool analysis to check for blood.  Patient is " unable to see well but she has not noticed any blood in her urine or stool  - CBC WITH DIFFERENTIAL; Future  - FERRITIN; Future  - IRON/TOTAL IRON BIND; Future  - URINALYSIS,CULTURE IF INDICATED; Future  - OCCULT BLOOD STOOL; Future  - RETICULOCYTES COUNT; Future    3. Chronic respiratory failure with hypoxia (HCC)  Patient to keep her appointment with pulmonary    4. Chronic use of opiate drug for therapeutic purpose  Patient is getting pain medication from her pain management provider.  Patient did have thoracic and lumbar x-rays done this past February that showed degenerative disc disease along with wedging to T12 and L1    5. Age-related osteoporosis with current pathological fracture with delayed healing  Patient to follow-up with endocrinology.       Return in about 3 weeks (around 10/2/2024), or if symptoms worsen or fail to improve.    Please note that this dictation was created using voice recognition software. I have made every reasonable attempt to correct obvious errors, but I expect that there are errors of grammar and possibly content that I did not discover before finalizing the note.

## 2024-09-12 RX ORDER — CEPHALEXIN 500 MG/1
500 CAPSULE ORAL 2 TIMES DAILY
Qty: 14 CAPSULE | Refills: 0 | Status: SHIPPED | OUTPATIENT
Start: 2024-09-12 | End: 2024-09-13

## 2024-09-13 ENCOUNTER — OFFICE VISIT (OUTPATIENT)
Dept: CARDIOLOGY | Facility: MEDICAL CENTER | Age: 84
End: 2024-09-13
Attending: INTERNAL MEDICINE
Payer: MEDICARE

## 2024-09-13 VITALS
RESPIRATION RATE: 20 BRPM | WEIGHT: 133 LBS | HEIGHT: 59 IN | BODY MASS INDEX: 26.81 KG/M2 | OXYGEN SATURATION: 93 % | DIASTOLIC BLOOD PRESSURE: 52 MMHG | HEART RATE: 73 BPM | SYSTOLIC BLOOD PRESSURE: 118 MMHG

## 2024-09-13 DIAGNOSIS — N18.32 STAGE 3B CHRONIC KIDNEY DISEASE: ICD-10-CM

## 2024-09-13 DIAGNOSIS — R06.09 DYSPNEA ON EXERTION: ICD-10-CM

## 2024-09-13 DIAGNOSIS — I25.119 ATHEROSCLEROSIS OF NATIVE CORONARY ARTERY OF NATIVE HEART WITH ANGINA PECTORIS (HCC): ICD-10-CM

## 2024-09-13 DIAGNOSIS — I51.89 LEFT VENTRICULAR SYSTOLIC DYSFUNCTION, NYHA CLASS 3: ICD-10-CM

## 2024-09-13 DIAGNOSIS — I50.20 ACC/AHA STAGE C SYSTOLIC HEART FAILURE (HCC): ICD-10-CM

## 2024-09-13 DIAGNOSIS — I27.20 PULMONARY HYPERTENSION (HCC): ICD-10-CM

## 2024-09-13 LAB
BACTERIA UR CULT: NORMAL
SIGNIFICANT IND 70042: NORMAL
SITE SITE: NORMAL
SOURCE SOURCE: NORMAL

## 2024-09-13 PROCEDURE — 3078F DIAST BP <80 MM HG: CPT | Performed by: INTERNAL MEDICINE

## 2024-09-13 PROCEDURE — G2211 COMPLEX E/M VISIT ADD ON: HCPCS | Performed by: INTERNAL MEDICINE

## 2024-09-13 PROCEDURE — 3074F SYST BP LT 130 MM HG: CPT | Performed by: INTERNAL MEDICINE

## 2024-09-13 PROCEDURE — 99213 OFFICE O/P EST LOW 20 MIN: CPT | Performed by: INTERNAL MEDICINE

## 2024-09-13 PROCEDURE — 99214 OFFICE O/P EST MOD 30 MIN: CPT | Performed by: INTERNAL MEDICINE

## 2024-09-13 RX ORDER — BISOPROLOL FUMARATE 5 MG/1
5 TABLET, FILM COATED ORAL DAILY
Qty: 100 TABLET | Refills: 4 | Status: SHIPPED | OUTPATIENT
Start: 2024-09-13

## 2024-09-13 RX ORDER — EMPAGLIFLOZIN 10 MG/1
10 TABLET, FILM COATED ORAL DAILY
Qty: 90 TABLET | Refills: 4 | Status: SHIPPED | OUTPATIENT
Start: 2024-09-13

## 2024-09-13 ASSESSMENT — ENCOUNTER SYMPTOMS
ORTHOPNEA: 0
HEADACHES: 0
VOMITING: 0
EYE DISCHARGE: 0
BRUISES/BLEEDS EASILY: 0
FALLS: 0
BLURRED VISION: 0
FEVER: 0
DIZZINESS: 0
CHILLS: 0
SENSORY CHANGE: 0
BLOOD IN STOOL: 0
EYE PAIN: 0
DEPRESSION: 0
DOUBLE VISION: 0
PALPITATIONS: 1
WEIGHT LOSS: 0
COUGH: 0
CLAUDICATION: 0
SHORTNESS OF BREATH: 0
LOSS OF CONSCIOUSNESS: 0
PND: 0
MYALGIAS: 0
ABDOMINAL PAIN: 0
SPEECH CHANGE: 0
NAUSEA: 0
HALLUCINATIONS: 0

## 2024-09-13 ASSESSMENT — FIBROSIS 4 INDEX: FIB4 SCORE: 1.61
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
## 2024-09-16 ENCOUNTER — TELEPHONE (OUTPATIENT)
Dept: HEALTH INFORMATION MANAGEMENT | Facility: OTHER | Age: 84
End: 2024-09-16
Payer: MEDICARE

## 2024-09-16 NOTE — TELEPHONE ENCOUNTER
Charleen STEPHENS needs assistance with patient's Rx for Keflex - spoke to PCP informed Alexandra that it's contaminants and also Dr. Rdz (Cardiology) discontinued the Keflex. PCP recommendation is to do lab one week before next visit 10/3/2024 so Alexandra said she will take patient at [facility] lab on 09/26/2024. No other questions/concerns at this time.

## 2024-09-18 DIAGNOSIS — D50.9 IRON DEFICIENCY ANEMIA, UNSPECIFIED IRON DEFICIENCY ANEMIA TYPE: ICD-10-CM

## 2024-09-20 RX ORDER — ESCITALOPRAM OXALATE 10 MG/1
TABLET ORAL
Qty: 100 TABLET | Refills: 1 | Status: SHIPPED | OUTPATIENT
Start: 2024-09-20

## 2024-09-25 ENCOUNTER — HOSPITAL ENCOUNTER (OUTPATIENT)
Facility: MEDICAL CENTER | Age: 84
End: 2024-09-25
Attending: FAMILY MEDICINE
Payer: MEDICARE

## 2024-09-25 DIAGNOSIS — D50.9 IRON DEFICIENCY ANEMIA, UNSPECIFIED IRON DEFICIENCY ANEMIA TYPE: ICD-10-CM

## 2024-09-25 PROCEDURE — 82272 OCCULT BLD FECES 1-3 TESTS: CPT

## 2024-09-25 RX ORDER — LOVASTATIN 40 MG
TABLET ORAL
Qty: 100 TABLET | Refills: 1 | Status: SHIPPED | OUTPATIENT
Start: 2024-09-25

## 2024-09-26 LAB — HEMOCCULT STL QL: NEGATIVE

## 2024-10-03 ENCOUNTER — TELEPHONE (OUTPATIENT)
Dept: MEDICAL GROUP | Facility: PHYSICIAN GROUP | Age: 84
End: 2024-10-03

## 2024-10-03 ENCOUNTER — OFFICE VISIT (OUTPATIENT)
Dept: MEDICAL GROUP | Facility: PHYSICIAN GROUP | Age: 84
End: 2024-10-03
Payer: MEDICARE

## 2024-10-03 VITALS
DIASTOLIC BLOOD PRESSURE: 64 MMHG | RESPIRATION RATE: 20 BRPM | OXYGEN SATURATION: 93 % | SYSTOLIC BLOOD PRESSURE: 132 MMHG | BODY MASS INDEX: 28.18 KG/M2 | WEIGHT: 139.8 LBS | HEIGHT: 59 IN | TEMPERATURE: 98.3 F | HEART RATE: 67 BPM

## 2024-10-03 DIAGNOSIS — K44.9 LARGE HIATAL HERNIA: ICD-10-CM

## 2024-10-03 DIAGNOSIS — G89.29 CHRONIC BILATERAL LOW BACK PAIN WITHOUT SCIATICA: ICD-10-CM

## 2024-10-03 DIAGNOSIS — J96.11 CHRONIC RESPIRATORY FAILURE WITH HYPOXIA (HCC): ICD-10-CM

## 2024-10-03 DIAGNOSIS — Z79.891 CHRONIC USE OF OPIATE DRUG FOR THERAPEUTIC PURPOSE: ICD-10-CM

## 2024-10-03 DIAGNOSIS — M54.6 ACUTE BILATERAL THORACIC BACK PAIN: ICD-10-CM

## 2024-10-03 DIAGNOSIS — I27.20 PULMONARY HYPERTENSION (HCC): ICD-10-CM

## 2024-10-03 DIAGNOSIS — D64.9 ANEMIA, UNSPECIFIED TYPE: ICD-10-CM

## 2024-10-03 DIAGNOSIS — M54.50 CHRONIC BILATERAL LOW BACK PAIN WITHOUT SCIATICA: ICD-10-CM

## 2024-10-03 DIAGNOSIS — Z23 NEED FOR VACCINATION: ICD-10-CM

## 2024-10-03 DIAGNOSIS — I10 PRIMARY HYPERTENSION: ICD-10-CM

## 2024-10-03 PROCEDURE — 99214 OFFICE O/P EST MOD 30 MIN: CPT | Mod: 25 | Performed by: FAMILY MEDICINE

## 2024-10-03 PROCEDURE — 3075F SYST BP GE 130 - 139MM HG: CPT | Performed by: FAMILY MEDICINE

## 2024-10-03 PROCEDURE — G0008 ADMIN INFLUENZA VIRUS VAC: HCPCS | Performed by: FAMILY MEDICINE

## 2024-10-03 PROCEDURE — 90662 IIV NO PRSV INCREASED AG IM: CPT | Performed by: FAMILY MEDICINE

## 2024-10-03 PROCEDURE — 3078F DIAST BP <80 MM HG: CPT | Performed by: FAMILY MEDICINE

## 2024-10-03 RX ORDER — GABAPENTIN 100 MG/1
CAPSULE ORAL
COMMUNITY
Start: 2024-08-28

## 2024-10-03 ASSESSMENT — FIBROSIS 4 INDEX: FIB4 SCORE: 1.61

## 2024-10-04 ENCOUNTER — HOSPITAL ENCOUNTER (OUTPATIENT)
Dept: RADIOLOGY | Facility: MEDICAL CENTER | Age: 84
End: 2024-10-04
Attending: FAMILY MEDICINE
Payer: MEDICARE

## 2024-10-04 ENCOUNTER — HOSPITAL ENCOUNTER (OUTPATIENT)
Dept: LAB | Facility: MEDICAL CENTER | Age: 84
End: 2024-10-04
Attending: FAMILY MEDICINE
Payer: MEDICARE

## 2024-10-04 DIAGNOSIS — D50.9 IRON DEFICIENCY ANEMIA, UNSPECIFIED IRON DEFICIENCY ANEMIA TYPE: ICD-10-CM

## 2024-10-04 DIAGNOSIS — M54.6 ACUTE BILATERAL THORACIC BACK PAIN: ICD-10-CM

## 2024-10-04 LAB
ALBUMIN SERPL BCP-MCNC: 4.2 G/DL (ref 3.2–4.9)
ALBUMIN/GLOB SERPL: 1.2 G/DL
ALP SERPL-CCNC: 77 U/L (ref 30–99)
ALT SERPL-CCNC: 15 U/L (ref 2–50)
ANION GAP SERPL CALC-SCNC: 18 MMOL/L (ref 7–16)
ANISOCYTOSIS BLD QL SMEAR: ABNORMAL
AST SERPL-CCNC: 22 U/L (ref 12–45)
BASOPHILS # BLD AUTO: 0.7 % (ref 0–1.8)
BASOPHILS # BLD: 0.04 K/UL (ref 0–0.12)
BILIRUB SERPL-MCNC: 0.2 MG/DL (ref 0.1–1.5)
BUN SERPL-MCNC: 31 MG/DL (ref 8–22)
CALCIUM ALBUM COR SERPL-MCNC: 9.4 MG/DL (ref 8.5–10.5)
CALCIUM SERPL-MCNC: 9.6 MG/DL (ref 8.5–10.5)
CHLORIDE SERPL-SCNC: 106 MMOL/L (ref 96–112)
CO2 SERPL-SCNC: 19 MMOL/L (ref 20–33)
COMMENT 1642: NORMAL
CREAT SERPL-MCNC: 1.36 MG/DL (ref 0.5–1.4)
EOSINOPHIL # BLD AUTO: 0.03 K/UL (ref 0–0.51)
EOSINOPHIL NFR BLD: 0.6 % (ref 0–6.9)
ERYTHROCYTE [DISTWIDTH] IN BLOOD BY AUTOMATED COUNT: 46.6 FL (ref 35.9–50)
GFR SERPLBLD CREATININE-BSD FMLA CKD-EPI: 38 ML/MIN/1.73 M 2
GLOBULIN SER CALC-MCNC: 3.4 G/DL (ref 1.9–3.5)
GLUCOSE SERPL-MCNC: 131 MG/DL (ref 65–99)
HCT VFR BLD AUTO: 36.2 % (ref 37–47)
HGB BLD-MCNC: 9.4 G/DL (ref 12–16)
IMM GRANULOCYTES # BLD AUTO: 0.01 K/UL (ref 0–0.11)
IMM GRANULOCYTES NFR BLD AUTO: 0.2 % (ref 0–0.9)
LYMPHOCYTES # BLD AUTO: 1.43 K/UL (ref 1–4.8)
LYMPHOCYTES NFR BLD: 26.7 % (ref 22–41)
MCH RBC QN AUTO: 17.4 PG (ref 27–33)
MCHC RBC AUTO-ENTMCNC: 26 G/DL (ref 32.2–35.5)
MCV RBC AUTO: 67 FL (ref 81.4–97.8)
MICROCYTES BLD QL SMEAR: ABNORMAL
MONOCYTES # BLD AUTO: 0.77 K/UL (ref 0–0.85)
MONOCYTES NFR BLD AUTO: 14.4 % (ref 0–13.4)
MORPHOLOGY BLD-IMP: NORMAL
NEUTROPHILS # BLD AUTO: 3.07 K/UL (ref 1.82–7.42)
NEUTROPHILS NFR BLD: 57.4 % (ref 44–72)
NRBC # BLD AUTO: 0 K/UL
NRBC BLD-RTO: 0 /100 WBC (ref 0–0.2)
OVALOCYTES BLD QL SMEAR: NORMAL
PLATELET # BLD AUTO: 283 K/UL (ref 164–446)
PLATELET BLD QL SMEAR: NORMAL
PMV BLD AUTO: 9.3 FL (ref 9–12.9)
POIKILOCYTOSIS BLD QL SMEAR: NORMAL
POTASSIUM SERPL-SCNC: 5.1 MMOL/L (ref 3.6–5.5)
PROT SERPL-MCNC: 7.6 G/DL (ref 6–8.2)
RBC # BLD AUTO: 5.4 M/UL (ref 4.2–5.4)
RBC BLD AUTO: PRESENT
SODIUM SERPL-SCNC: 143 MMOL/L (ref 135–145)
WBC # BLD AUTO: 5.4 K/UL (ref 4.8–10.8)

## 2024-10-04 PROCEDURE — 72070 X-RAY EXAM THORAC SPINE 2VWS: CPT

## 2024-10-04 PROCEDURE — 36415 COLL VENOUS BLD VENIPUNCTURE: CPT

## 2024-10-04 PROCEDURE — 80053 COMPREHEN METABOLIC PANEL: CPT

## 2024-10-04 PROCEDURE — 85025 COMPLETE CBC W/AUTO DIFF WBC: CPT

## 2024-10-09 ENCOUNTER — ANTICOAGULATION VISIT (OUTPATIENT)
Dept: VASCULAR LAB | Facility: MEDICAL CENTER | Age: 84
End: 2024-10-09
Attending: INTERNAL MEDICINE
Payer: MEDICARE

## 2024-10-09 VITALS — HEART RATE: 57 BPM | SYSTOLIC BLOOD PRESSURE: 109 MMHG | DIASTOLIC BLOOD PRESSURE: 62 MMHG

## 2024-10-09 DIAGNOSIS — Z86.718 HISTORY OF DVT (DEEP VEIN THROMBOSIS): ICD-10-CM

## 2024-10-09 PROCEDURE — 99212 OFFICE O/P EST SF 10 MIN: CPT

## 2024-10-22 ENCOUNTER — OFFICE VISIT (OUTPATIENT)
Dept: MEDICAL GROUP | Facility: PHYSICIAN GROUP | Age: 84
End: 2024-10-22
Payer: MEDICARE

## 2024-10-22 VITALS
SYSTOLIC BLOOD PRESSURE: 118 MMHG | RESPIRATION RATE: 18 BRPM | OXYGEN SATURATION: 95 % | DIASTOLIC BLOOD PRESSURE: 80 MMHG | HEIGHT: 59 IN | BODY MASS INDEX: 26.55 KG/M2 | HEART RATE: 65 BPM | TEMPERATURE: 97.6 F | WEIGHT: 131.7 LBS

## 2024-10-22 DIAGNOSIS — G89.29 CHRONIC BILATERAL LOW BACK PAIN WITHOUT SCIATICA: ICD-10-CM

## 2024-10-22 DIAGNOSIS — K21.9 GASTROESOPHAGEAL REFLUX DISEASE, UNSPECIFIED WHETHER ESOPHAGITIS PRESENT: ICD-10-CM

## 2024-10-22 DIAGNOSIS — M54.50 CHRONIC BILATERAL LOW BACK PAIN WITHOUT SCIATICA: ICD-10-CM

## 2024-10-22 DIAGNOSIS — D64.9 ANEMIA, UNSPECIFIED TYPE: ICD-10-CM

## 2024-10-22 DIAGNOSIS — J96.11 CHRONIC RESPIRATORY FAILURE WITH HYPOXIA (HCC): ICD-10-CM

## 2024-10-22 DIAGNOSIS — R54 FRAILTY: ICD-10-CM

## 2024-10-22 DIAGNOSIS — Z79.891 CHRONIC USE OF OPIATE DRUG FOR THERAPEUTIC PURPOSE: ICD-10-CM

## 2024-10-22 PROCEDURE — 99214 OFFICE O/P EST MOD 30 MIN: CPT | Performed by: FAMILY MEDICINE

## 2024-10-22 PROCEDURE — 3079F DIAST BP 80-89 MM HG: CPT | Performed by: FAMILY MEDICINE

## 2024-10-22 PROCEDURE — 3074F SYST BP LT 130 MM HG: CPT | Performed by: FAMILY MEDICINE

## 2024-10-22 ASSESSMENT — FIBROSIS 4 INDEX: FIB4 SCORE: 1.73

## 2024-10-24 ENCOUNTER — OFF SITE VISIT (OUTPATIENT)
Dept: PALLIATIVE MEDICINE | Facility: HOSPICE | Age: 84
End: 2024-10-24
Payer: MEDICARE

## 2024-10-24 DIAGNOSIS — N18.31 CHRONIC KIDNEY DISEASE, STAGE 3A: ICD-10-CM

## 2024-10-24 PROCEDURE — 99497 ADVNCD CARE PLAN 30 MIN: CPT

## 2024-10-24 PROCEDURE — 99350 HOME/RES VST EST HIGH MDM 60: CPT | Mod: 25

## 2024-11-04 ENCOUNTER — HOSPITAL ENCOUNTER (OUTPATIENT)
Dept: LAB | Facility: MEDICAL CENTER | Age: 84
End: 2024-11-04
Attending: FAMILY MEDICINE
Payer: MEDICARE

## 2024-11-04 DIAGNOSIS — D64.9 ANEMIA, UNSPECIFIED TYPE: ICD-10-CM

## 2024-11-04 LAB
ANISOCYTOSIS BLD QL SMEAR: ABNORMAL
BASOPHILS # BLD AUTO: 0.4 % (ref 0–1.8)
BASOPHILS # BLD: 0.02 K/UL (ref 0–0.12)
COMMENT 1642: NORMAL
EOSINOPHIL # BLD AUTO: 0.03 K/UL (ref 0–0.51)
EOSINOPHIL NFR BLD: 0.6 % (ref 0–6.9)
ERYTHROCYTE [DISTWIDTH] IN BLOOD BY AUTOMATED COUNT: 47.2 FL (ref 35.9–50)
HCT VFR BLD AUTO: 36.9 % (ref 37–47)
HGB BLD-MCNC: 9.4 G/DL (ref 12–16)
HYPOCHROMIA BLD QL SMEAR: ABNORMAL
IMM GRANULOCYTES # BLD AUTO: 0.01 K/UL (ref 0–0.11)
IMM GRANULOCYTES NFR BLD AUTO: 0.2 % (ref 0–0.9)
LYMPHOCYTES # BLD AUTO: 1.39 K/UL (ref 1–4.8)
LYMPHOCYTES NFR BLD: 26 % (ref 22–41)
MCH RBC QN AUTO: 16.7 PG (ref 27–33)
MCHC RBC AUTO-ENTMCNC: 25.5 G/DL (ref 32.2–35.5)
MCV RBC AUTO: 65.7 FL (ref 81.4–97.8)
MICROCYTES BLD QL SMEAR: ABNORMAL
MONOCYTES # BLD AUTO: 0.83 K/UL (ref 0–0.85)
MONOCYTES NFR BLD AUTO: 15.5 % (ref 0–13.4)
MORPHOLOGY BLD-IMP: NORMAL
NEUTROPHILS # BLD AUTO: 3.06 K/UL (ref 1.82–7.42)
NEUTROPHILS NFR BLD: 57.3 % (ref 44–72)
NRBC # BLD AUTO: 0 K/UL
NRBC BLD-RTO: 0 /100 WBC (ref 0–0.2)
OVALOCYTES BLD QL SMEAR: NORMAL
PLATELET # BLD AUTO: 328 K/UL (ref 164–446)
PLATELET BLD QL SMEAR: NORMAL
PMV BLD AUTO: 8.9 FL (ref 9–12.9)
POIKILOCYTOSIS BLD QL SMEAR: NORMAL
RBC # BLD AUTO: 5.62 M/UL (ref 4.2–5.4)
RBC BLD AUTO: PRESENT
WBC # BLD AUTO: 5.3 K/UL (ref 4.8–10.8)

## 2024-11-04 PROCEDURE — 83550 IRON BINDING TEST: CPT

## 2024-11-04 PROCEDURE — 82728 ASSAY OF FERRITIN: CPT

## 2024-11-04 PROCEDURE — 83540 ASSAY OF IRON: CPT

## 2024-11-04 PROCEDURE — 36415 COLL VENOUS BLD VENIPUNCTURE: CPT

## 2024-11-04 PROCEDURE — 85025 COMPLETE CBC W/AUTO DIFF WBC: CPT

## 2024-11-04 RX ORDER — HYDROCODONE BITARTRATE AND ACETAMINOPHEN 5; 325 MG/1; MG/1
1 TABLET ORAL 3 TIMES DAILY PRN
COMMUNITY
Start: 2024-10-14 | End: 2024-11-21 | Stop reason: SDUPTHER

## 2024-11-05 ENCOUNTER — OFFICE VISIT (OUTPATIENT)
Dept: MEDICAL GROUP | Facility: PHYSICIAN GROUP | Age: 84
End: 2024-11-05
Payer: MEDICARE

## 2024-11-05 VITALS
HEIGHT: 59 IN | WEIGHT: 133.2 LBS | SYSTOLIC BLOOD PRESSURE: 114 MMHG | HEART RATE: 64 BPM | TEMPERATURE: 97.1 F | OXYGEN SATURATION: 98 % | BODY MASS INDEX: 26.85 KG/M2 | DIASTOLIC BLOOD PRESSURE: 60 MMHG | RESPIRATION RATE: 18 BRPM

## 2024-11-05 DIAGNOSIS — J40 BRONCHITIS: ICD-10-CM

## 2024-11-05 DIAGNOSIS — K44.9 LARGE HIATAL HERNIA: ICD-10-CM

## 2024-11-05 DIAGNOSIS — D50.9 IRON DEFICIENCY ANEMIA, UNSPECIFIED IRON DEFICIENCY ANEMIA TYPE: ICD-10-CM

## 2024-11-05 DIAGNOSIS — R54 FRAILTY: ICD-10-CM

## 2024-11-05 LAB
FERRITIN SERPL-MCNC: 11.2 NG/ML (ref 10–291)
IRON SATN MFR SERPL: 3 % (ref 15–55)
IRON SERPL-MCNC: 13 UG/DL (ref 40–170)
TIBC SERPL-MCNC: 381 UG/DL (ref 250–450)
UIBC SERPL-MCNC: 368 UG/DL (ref 110–370)

## 2024-11-05 PROCEDURE — 3074F SYST BP LT 130 MM HG: CPT | Performed by: FAMILY MEDICINE

## 2024-11-05 PROCEDURE — 99214 OFFICE O/P EST MOD 30 MIN: CPT | Performed by: FAMILY MEDICINE

## 2024-11-05 PROCEDURE — 3078F DIAST BP <80 MM HG: CPT | Performed by: FAMILY MEDICINE

## 2024-11-05 RX ORDER — AZITHROMYCIN 250 MG/1
TABLET, FILM COATED ORAL
Qty: 6 TABLET | Refills: 0 | Status: SHIPPED | OUTPATIENT
Start: 2024-11-05

## 2024-11-05 ASSESSMENT — FIBROSIS 4 INDEX: FIB4 SCORE: 1.49

## 2024-11-05 NOTE — PROGRESS NOTES
Subjective:     CC:   Chief Complaint   Patient presents with    Follow-Up       HPI:   Guillermina presents today with to her caregivers.  Patient did see palliative care and she does have appointment to see him back November 21.  It sounds like palliative care may be taking over her pain management.  Unfortunately pain management cannot not give her another injection she is only allowed 3 in a year.  Patient is having upper lumbar back discomfort.  Patient also has a wet cough today.  Patient cannot see so she does not know if what she is coughing up is discolored or not.    Past Medical History:   Diagnosis Date    Arthritis     Asthma     Back pain     Bowel habit changes     diarrhea    Breath shortness     COPD    Cataract     bilateral IOL    Chronic pain 11/2019    hips    Dyslipidemia 8/1/2016    Emphysema of lung ([facility])     GERD (gastroesophageal reflux disease)     [place] measles     Heart burn     Hepatitis 1970's    A based on labs done 2016    Hiatus hernia syndrome     High cholesterol     History of total hip replacement     L    Hyperlipidemia     Influenza     Macular degeneration     Memory loss     Mumps     NSTEMI (non-ST elevated myocardial infarction) ([facility]) 9/12/2017    OA (osteoarthritis)     back, hip, shoulds, knees and hands    Other pulmonary embolism without acute cor pulmonale ([facility]) 12/14/2019    Painful joint     Psychiatric problem     depression    S/P cataract extraction     bilat    Shortness of breath     Snoring     stomach flu 11/08/2019    Urinary incontinence     Vision loss     Wears dentures     Wears glasses        Social History     Tobacco Use    Smoking status: Never     Passive exposure: Yes    Smokeless tobacco: Never    Tobacco comments:     lived with smokers   Vaping Use    Vaping status: Never Used    Passive vaping exposure: Yes   Substance Use Topics    Alcohol use: Not Currently     Alcohol/week: 0.0 oz     Comment: 3 per year    Drug use: No     Comment: In the  Distant Past, but not since 32 y.o.  (prior - Pink heart,[employer], crosstops - stimulants for working double shifts, but not for several decades)       Current Outpatient Medications Ordered in Epic   Medication Sig Dispense Refill    azithromycin (ZITHROMAX) 250 MG Tab 2 tablets today 1 each day for the next 4 more days 6 Tablet 0    HYDROcodone-acetaminophen (NORCO) 5-325 MG Tab per tablet Take 1 Tablet by mouth 3 times a day as needed.      gabapentin (NEURONTIN) 100 MG Cap TAKE 2 CAPS BY MOUTH AT BEDTIME      lovastatin (MEVACOR) 40 MG tablet TAKE 1 TABLET BY MOUTH EVERYDAY AT BEDTIME 100 Tablet 1    escitalopram (LEXAPRO) 10 MG Tab TAKE 1 TABLET BY MOUTH EVERY DAY  DAYS. 100 Tablet 1    Empagliflozin (JARDIANCE) 10 MG Tab tablet Take 1 Tablet by mouth every day. 90 Tablet 4    bisoprolol (ZEBETA) 5 MG Tab Take 1 Tablet by mouth every day. 100 Tablet 4    fluticasone furoate-vilanterol (BREO ELLIPTA) 100-25 MCG/ACT AEROSOL POWDER, BREATH ACTIVATED Inhale 1 Puff every day. Rinse mouth after each use. 1 Each 11    omeprazole (PRILOSEC) 40 MG delayed-release capsule TAKE 1 CAPSULE BY MOUTH EVERY DAY 90 Capsule 1    montelukast (SINGULAIR) 10 MG Tab TAKE 1 TABLET BY MOUTH EVERY DAY IN THE EVENING 100 Tablet 1    budesonide-formoterol (SYMBICORT) 160-4.5 MCG/ACT Aerosol       Cyanocobalamin (B-12 PO) Take  by mouth.      HYDROcodone/acetaminophen (NORCO)  MG Tab Take 0.5 Tablets by mouth 2 times a day.      diclofenac sodium (VOLTAREN) 1 % Gel diclofenac 1 % topical gel   APPLY 2-3 GRAMS TO AFFECTED AREA 4 TIMES A DAY AS NEEDED FOR PAIN      famotidine (PEPCID) 20 MG Tab TAKE 1 TABLET BY MOUTH EVERY DAY 30MINS PRIOR TO FOOD 90 Tablet 3    Multiple Vitamins-Minerals (CENTRUM SILVER 50+WOMEN) Tab Take  by mouth.      albuterol 108 (90 Base) MCG/ACT Aero Soln inhalation aerosol Inhale 1-2 Puffs by mouth every four hours as needed for Shortness of Breath.      Biotin 10 MG Tab Take  by mouth.       "polyethylene glycol/lytes (MIRALAX) Pack Take 1 Packet by mouth 1 time daily as needed (if sennosides and docusate ineffective after 24 hours).  3    Multiple Vitamins-Minerals (OCUVITE-LUTEIN) Tab Take 1 tablet by mouth every morning.      vitamin D 2000 UNIT Tab Take 1 Tab by mouth every day. 60 Tab      No current Epic-ordered facility-administered medications on file.       Allergies:  [place]    Health Maintenance: Completed    ROS:  Gen: no fevers/chills, patient has gained 2 pounds since have last seen her  Eyes: no changes in vision  ENT: no sore throat, no hearing loss, no bloody nose  Pulm: no sob, no cough  CV: no chest pain, no palpitations  GI: no nausea/vomiting, no diarrhea  : no dysuria  Neuro: no headaches, no numbness/tingling  Heme/Lymph: no easy bruising    Objective:     Exam:  /60 (BP Location: Left arm, Patient Position: Sitting, BP Cuff Size: Child)   Pulse 64   Temp 36.2 °C (97.1 °F) (Temporal)   Resp 18   Ht 1.499 m (4' 11\")   Wt 60.4 kg (133 lb 3.2 oz)   SpO2 98%   BMI 26.90 kg/m²  Body mass index is 26.9 kg/m².    Gen: Alert and oriented, No apparent distress.  Skin: Warm and dry.  No obvious lesions.  Eyes: Sclera wnl Pupils normal in size  Lungs: Normal effort, patient does have on the lower bases some signs of congestion  CV: Regular rate and rhythm. No murmurs, rubs, or gallops.  Musculoskeletal: Normal gait. No extremity cyanosis, clubbing, or edema.  Neuro: Oriented to person, place and time  Psych: Mood is wnl       Assessment & Plan:     84 y.o. female with the following -     1. Bronchitis  With her cough we will go ahead and start her on antibiotics patient very agreeable with the plan    2. Iron deficiency anemia, unspecified iron deficiency anemia type  Would recommend patient start taking over-the-counter iron also we had a discussion of food she can eat that are high in iron.  I would recommend repeating her CBC and iron level a week before I see her " back    3. Frailty  Palliative care will be seeing her back    4. Large hiatal hernia    - azithromycin (ZITHROMAX) 250 MG Tab; 2 tablets today 1 each day for the next 4 more days  Dispense: 6 Tablet; Refill: 0       Return in about 4 weeks (around 12/3/2024), or if symptoms worsen or fail to improve.    Please note that this dictation was created using voice recognition software. I have made every reasonable attempt to correct obvious errors, but I expect that there are errors of grammar and possibly content that I did not discover before finalizing the note.

## 2024-11-21 ENCOUNTER — OFF SITE VISIT (OUTPATIENT)
Dept: PALLIATIVE MEDICINE | Facility: HOSPICE | Age: 84
End: 2024-11-21
Payer: MEDICARE

## 2024-11-21 DIAGNOSIS — M54.50 CHRONIC BILATERAL LOW BACK PAIN WITHOUT SCIATICA: ICD-10-CM

## 2024-11-21 DIAGNOSIS — G89.29 CHRONIC BILATERAL LOW BACK PAIN WITHOUT SCIATICA: ICD-10-CM

## 2024-11-21 PROCEDURE — 99350 HOME/RES VST EST HIGH MDM 60: CPT

## 2024-11-21 PROCEDURE — G0318 PR PROLONG HOME E&M ADDL 15 MIN: HCPCS

## 2024-11-21 RX ORDER — HYDROCODONE BITARTRATE AND ACETAMINOPHEN 5; 325 MG/1; MG/1
1 TABLET ORAL EVERY 8 HOURS PRN
Qty: 90 TABLET | Refills: 0 | Status: SHIPPED | OUTPATIENT
Start: 2024-11-21 | End: 2024-12-21

## 2024-11-21 RX ORDER — HYDROCODONE BITARTRATE AND ACETAMINOPHEN 5; 325 MG/1; MG/1
1 TABLET ORAL EVERY 8 HOURS PRN
Qty: 90 TABLET | Refills: 0 | Status: SHIPPED | OUTPATIENT
Start: 2025-01-20 | End: 2025-02-19

## 2024-11-21 RX ORDER — HYDROCODONE BITARTRATE AND ACETAMINOPHEN 5; 325 MG/1; MG/1
1 TABLET ORAL EVERY 8 HOURS PRN
Qty: 90 TABLET | Refills: 0 | Status: SHIPPED | OUTPATIENT
Start: 2024-12-21 | End: 2025-01-20

## 2024-11-21 NOTE — PROGRESS NOTES
In-Home Palliative Medicine Evaluation        Guillermina Recio  84 y.o.  female  MRN [id number]  PCP Brenda Valenzuela M.D.  Referral Source: PCP  Location: Patient's residence, Son present. Friends Brenda and Alexandra present     Reason for palliative medicine consultation and/or visit: goals of care, symptom management     Assessment and Plan:     Summary: POLST in home and EMR: DNR, comfort focused treatment only, NO IV fluids or artificial nutrition. Patient wants to stay in home and avoid any hospitalizations.     11/21/24: Patient doing well, no major changes. She will no longer be seeing pain management, I have agreed to take over her pain medications as needed. RX sent for refill today.  Will f/u in January.     10/24/24: Met with patient and ENDY's, son. Patient wheelchair bound but doing well in home currently with significant social support. She struggles with fatigue and SOB from her multiple comorbid conditions.      Primary diagnosis: Stage C Systolic heart failure, Respiratory failure with hypoxia. CKD stage 3b.     Prognosis: PPS 50%     Physical aspects of care:     Pain: Low back pain, neck pain, chronic. Takes 1/2 of a 10 mg Norco tablet three times daily, total MME 15. Start one APAP extra strength 500 mg TID as well. Discussed bowel regimen with opiate use.       SOB: Uses oxygen at times, prefers not too. Mild dyspnea at rest, much worse with exertion. Takes several minutes to catch her breath. Continue current breathing med regimen for symptom management. O2 sat in high 80's today at room air while sitting.      Fatigue: Anemic, CHF likely contributing. Encouraged patient to do things when she feels good and enjoy spending time with friends and family. Eat iron rich foods if she likes them.      Constipation:Denies currently, educated on bowel regimen with opiates. Has MIralax if needed.      Psychological aspects of care:     Anxiety: denies     Depression: denies feelings of hopelessness, despair or  lack of pleasure in daily activities.      Social aspects of care:  Lives with son who provides significant amounts of assistance. She has two close friends that visit several days weekly to assist with planning, finances, scheduling and ADL's.      Spiritual aspects of care:  Yazidi, goes to [employer] every Sunday.      Goals of care:  Stay at home as much as possible, avoid any invasive treatments, reduce MD appts and start de-prescribing as appropriate.     Other Pertinent Medical History OR Surgery Not Listed Above:   Asthma, COPD, arthritis, HLD, hiatal hernia, hx of NSTEMI, hx of pulmonary embolism, macular degeneration, GERD      Physical Exam  Constitutional:       Appearance: She is not ill-appearing.      Comments: Frail, wheelchair bound.    Pulmonary:      Effort: No respiratory distress.   Abdominal:      General: There is no distension.      Palpations: Abdomen is soft.      Tenderness: There is no abdominal tenderness. There is no guarding.   Neurological:      Mental Status: She is alert.      Motor: Weakness present.   Psychiatric:         Mood and Affect: Mood normal.         Behavior: Behavior normal.         Thought Content: Thought content normal.         Judgment: Judgment normal.             Current Medications:    Current Outpatient Medications:     azithromycin (ZITHROMAX) 250 MG Tab, 2 tablets today 1 each day for the next 4 more days, Disp: 6 Tablet, Rfl: 0    HYDROcodone-acetaminophen (NORCO) 5-325 MG Tab per tablet, Take 1 Tablet by mouth 3 times a day as needed., Disp: , Rfl:     gabapentin (NEURONTIN) 100 MG Cap, TAKE 2 CAPS BY MOUTH AT BEDTIME, Disp: , Rfl:     lovastatin (MEVACOR) 40 MG tablet, TAKE 1 TABLET BY MOUTH EVERYDAY AT BEDTIME, Disp: 100 Tablet, Rfl: 1    escitalopram (LEXAPRO) 10 MG Tab, TAKE 1 TABLET BY MOUTH EVERY DAY  DAYS., Disp: 100 Tablet, Rfl: 1    Empagliflozin (JARDIANCE) 10 MG Tab tablet, Take 1 Tablet by mouth every day., Disp: 90 Tablet, Rfl: 4     bisoprolol (ZEBETA) 5 MG Tab, Take 1 Tablet by mouth every day., Disp: 100 Tablet, Rfl: 4    fluticasone furoate-vilanterol (BREO ELLIPTA) 100-25 MCG/ACT AEROSOL POWDER, BREATH ACTIVATED, Inhale 1 Puff every day. Rinse mouth after each use., Disp: 1 Each, Rfl: 11    omeprazole (PRILOSEC) 40 MG delayed-release capsule, TAKE 1 CAPSULE BY MOUTH EVERY DAY, Disp: 90 Capsule, Rfl: 1    montelukast (SINGULAIR) 10 MG Tab, TAKE 1 TABLET BY MOUTH EVERY DAY IN THE EVENING, Disp: 100 Tablet, Rfl: 1    budesonide-formoterol (SYMBICORT) 160-4.5 MCG/ACT Aerosol, , Disp: , Rfl:     Cyanocobalamin (B-12 PO), Take  by mouth., Disp: , Rfl:     HYDROcodone/acetaminophen (NORCO)  MG Tab, Take 0.5 Tablets by mouth 2 times a day., Disp: , Rfl:     diclofenac sodium (VOLTAREN) 1 % Gel, diclofenac 1 % topical gel  APPLY 2-3 GRAMS TO AFFECTED AREA 4 TIMES A DAY AS NEEDED FOR PAIN, Disp: , Rfl:     famotidine (PEPCID) 20 MG Tab, TAKE 1 TABLET BY MOUTH EVERY DAY 30MINS PRIOR TO FOOD, Disp: 90 Tablet, Rfl: 3    Multiple Vitamins-Minerals (CENTRUM SILVER 50+WOMEN) Tab, Take  by mouth., Disp: , Rfl:     albuterol 108 (90 Base) MCG/ACT Aero Soln inhalation aerosol, Inhale 1-2 Puffs by mouth every four hours as needed for Shortness of Breath., Disp: , Rfl:     Biotin 10 MG Tab, Take  by mouth., Disp: , Rfl:     polyethylene glycol/lytes (MIRALAX) Pack, Take 1 Packet by mouth 1 time daily as needed (if sennosides and docusate ineffective after 24 hours)., Disp: , Rfl: 3    Multiple Vitamins-Minerals (OCUVITE-LUTEIN) Tab, Take 1 tablet by mouth every morning., Disp: , Rfl:     vitamin D 2000 UNIT Tab, Take 1 Tab by mouth every day., Disp: 60 Tab, Rfl:     Medication Allergies:  Sagebrush    Thank you for allowing me the opportunity to participate in the care of Guillermina Cam Hemal    I spent a total of 120 minutes reviewing medical records, direct face-to-face time with the patient and/or family, documentation and coordination of care. This is  separate from the time spent on advance care planning, which is documented above.       CARRIE Ramos  Home Health and Palliative Medicine  [address]  [address]  P: [phone]  F: [phone]

## 2024-12-11 ENCOUNTER — HOSPITAL ENCOUNTER (OUTPATIENT)
Dept: LAB | Facility: MEDICAL CENTER | Age: 84
End: 2024-12-11
Attending: FAMILY MEDICINE
Payer: MEDICARE

## 2024-12-11 DIAGNOSIS — J40 BRONCHITIS: ICD-10-CM

## 2024-12-11 DIAGNOSIS — D50.9 IRON DEFICIENCY ANEMIA, UNSPECIFIED IRON DEFICIENCY ANEMIA TYPE: ICD-10-CM

## 2024-12-11 LAB
ANISOCYTOSIS BLD QL SMEAR: ABNORMAL
BASOPHILS # BLD AUTO: 0.7 % (ref 0–1.8)
BASOPHILS # BLD: 0.03 K/UL (ref 0–0.12)
BURR CELLS BLD QL SMEAR: NORMAL
COMMENT 1642: NORMAL
EOSINOPHIL # BLD AUTO: 0.02 K/UL (ref 0–0.51)
EOSINOPHIL NFR BLD: 0.4 % (ref 0–6.9)
ERYTHROCYTE [DISTWIDTH] IN BLOOD BY AUTOMATED COUNT: 50.2 FL (ref 35.9–50)
HCT VFR BLD AUTO: 33.7 % (ref 37–47)
HGB BLD-MCNC: 8.8 G/DL (ref 12–16)
HYPOCHROMIA BLD QL SMEAR: ABNORMAL
IMM GRANULOCYTES # BLD AUTO: 0.02 K/UL (ref 0–0.11)
IMM GRANULOCYTES NFR BLD AUTO: 0.4 % (ref 0–0.9)
LYMPHOCYTES # BLD AUTO: 1.18 K/UL (ref 1–4.8)
LYMPHOCYTES NFR BLD: 25.8 % (ref 22–41)
MCH RBC QN AUTO: 16.9 PG (ref 27–33)
MCHC RBC AUTO-ENTMCNC: 26.1 G/DL (ref 32.2–35.5)
MCV RBC AUTO: 64.8 FL (ref 81.4–97.8)
MICROCYTES BLD QL SMEAR: ABNORMAL
MONOCYTES # BLD AUTO: 0.71 K/UL (ref 0–0.85)
MONOCYTES NFR BLD AUTO: 15.5 % (ref 0–13.4)
MORPHOLOGY BLD-IMP: NORMAL
NEUTROPHILS # BLD AUTO: 2.61 K/UL (ref 1.82–7.42)
NEUTROPHILS NFR BLD: 57.2 % (ref 44–72)
NRBC # BLD AUTO: 0 K/UL
NRBC BLD-RTO: 0 /100 WBC (ref 0–0.2)
OVALOCYTES BLD QL SMEAR: NORMAL
PLATELET # BLD AUTO: 283 K/UL (ref 164–446)
PLATELET BLD QL SMEAR: NORMAL
PMV BLD AUTO: 8.9 FL (ref 9–12.9)
POIKILOCYTOSIS BLD QL SMEAR: NORMAL
RBC # BLD AUTO: 5.2 M/UL (ref 4.2–5.4)
RBC BLD AUTO: PRESENT
WBC # BLD AUTO: 4.6 K/UL (ref 4.8–10.8)

## 2024-12-11 PROCEDURE — 85025 COMPLETE CBC W/AUTO DIFF WBC: CPT

## 2024-12-11 PROCEDURE — 83540 ASSAY OF IRON: CPT

## 2024-12-11 PROCEDURE — 80053 COMPREHEN METABOLIC PANEL: CPT

## 2024-12-11 PROCEDURE — 82728 ASSAY OF FERRITIN: CPT

## 2024-12-11 PROCEDURE — 83550 IRON BINDING TEST: CPT

## 2024-12-11 PROCEDURE — 36415 COLL VENOUS BLD VENIPUNCTURE: CPT

## 2024-12-12 LAB
ALBUMIN SERPL BCP-MCNC: 4.1 G/DL (ref 3.2–4.9)
ALBUMIN/GLOB SERPL: 1.6 G/DL
ALP SERPL-CCNC: 64 U/L (ref 30–99)
ALT SERPL-CCNC: 12 U/L (ref 2–50)
ANION GAP SERPL CALC-SCNC: 10 MMOL/L (ref 7–16)
AST SERPL-CCNC: 19 U/L (ref 12–45)
BILIRUB SERPL-MCNC: 0.3 MG/DL (ref 0.1–1.5)
BUN SERPL-MCNC: 24 MG/DL (ref 8–22)
CALCIUM ALBUM COR SERPL-MCNC: 9 MG/DL (ref 8.5–10.5)
CALCIUM SERPL-MCNC: 9.1 MG/DL (ref 8.5–10.5)
CHLORIDE SERPL-SCNC: 107 MMOL/L (ref 96–112)
CO2 SERPL-SCNC: 22 MMOL/L (ref 20–33)
CREAT SERPL-MCNC: 1.38 MG/DL (ref 0.5–1.4)
FERRITIN SERPL-MCNC: 9.9 NG/ML (ref 10–291)
GFR SERPLBLD CREATININE-BSD FMLA CKD-EPI: 37 ML/MIN/1.73 M 2
GLOBULIN SER CALC-MCNC: 2.5 G/DL (ref 1.9–3.5)
GLUCOSE SERPL-MCNC: 90 MG/DL (ref 65–99)
IRON SATN MFR SERPL: 4 % (ref 15–55)
IRON SERPL-MCNC: 14 UG/DL (ref 40–170)
POTASSIUM SERPL-SCNC: 5.1 MMOL/L (ref 3.6–5.5)
PROT SERPL-MCNC: 6.6 G/DL (ref 6–8.2)
SODIUM SERPL-SCNC: 139 MMOL/L (ref 135–145)
TIBC SERPL-MCNC: 351 UG/DL (ref 250–450)
UIBC SERPL-MCNC: 337 UG/DL (ref 110–370)

## 2024-12-16 ENCOUNTER — OFFICE VISIT (OUTPATIENT)
Dept: MEDICAL GROUP | Facility: PHYSICIAN GROUP | Age: 84
End: 2024-12-16
Payer: MEDICARE

## 2024-12-16 VITALS
SYSTOLIC BLOOD PRESSURE: 120 MMHG | WEIGHT: 131.4 LBS | TEMPERATURE: 97.2 F | BODY MASS INDEX: 26.49 KG/M2 | OXYGEN SATURATION: 91 % | RESPIRATION RATE: 16 BRPM | HEIGHT: 59 IN | DIASTOLIC BLOOD PRESSURE: 62 MMHG | HEART RATE: 58 BPM

## 2024-12-16 DIAGNOSIS — K44.9 LARGE HIATAL HERNIA: ICD-10-CM

## 2024-12-16 DIAGNOSIS — R63.4 WEIGHT LOSS: ICD-10-CM

## 2024-12-16 DIAGNOSIS — J96.11 CHRONIC RESPIRATORY FAILURE WITH HYPOXIA (HCC): ICD-10-CM

## 2024-12-16 DIAGNOSIS — R54 FRAILTY: ICD-10-CM

## 2024-12-16 DIAGNOSIS — D50.9 IRON DEFICIENCY ANEMIA, UNSPECIFIED IRON DEFICIENCY ANEMIA TYPE: ICD-10-CM

## 2024-12-16 PROCEDURE — 99214 OFFICE O/P EST MOD 30 MIN: CPT | Performed by: FAMILY MEDICINE

## 2024-12-16 PROCEDURE — 3078F DIAST BP <80 MM HG: CPT | Performed by: FAMILY MEDICINE

## 2024-12-16 PROCEDURE — 3074F SYST BP LT 130 MM HG: CPT | Performed by: FAMILY MEDICINE

## 2024-12-16 ASSESSMENT — FIBROSIS 4 INDEX: FIB4 SCORE: 1.67

## 2024-12-16 NOTE — PROGRESS NOTES
Subjective:     CC:   Chief Complaint   Patient presents with    Follow-Up    Lab Results       HPI:   Guillermina presents today with one of her caregivers.  Patient is being visited by palliative care.  Caregiver was confused over some of her medications I will need to be get with the Senior care RN I feel that palliative care is taking over her pain management and may had stopped some of her medications to make things less complicated.  Caregiver feels that patient is not taking her iron Gummies every day.    Past Medical History:   Diagnosis Date    Arthritis     Asthma     Back pain     Bowel habit changes     diarrhea    Breath shortness     COPD    Cataract     bilateral IOL    Chronic pain 11/2019    hips    Dyslipidemia 8/1/2016    Emphysema of lung ([facility])     GERD (gastroesophageal reflux disease)     Irish measles     Heart burn     Hepatitis 1970's    A based on labs done 2016    Hiatus hernia syndrome     High cholesterol     History of total hip replacement     L    Hyperlipidemia     Influenza     Macular degeneration     Memory loss     Mumps     NSTEMI (non-ST elevated myocardial infarction) ([facility]) 9/12/2017    OA (osteoarthritis)     back, hip, shoulds, knees and hands    Other pulmonary embolism without acute cor pulmonale ([facility]) 12/14/2019    Painful joint     Psychiatric problem     depression    S/P cataract extraction     bilat    Shortness of breath     Snoring     stomach flu 11/08/2019    Urinary incontinence     Vision loss     Wears dentures     Wears glasses        Social History     Tobacco Use    Smoking status: Never     Passive exposure: Yes    Smokeless tobacco: Never    Tobacco comments:     lived with smokers   Vaping Use    Vaping status: Never Used    Passive vaping exposure: Yes   Substance Use Topics    Alcohol use: Not Currently     Alcohol/week: 0.0 oz     Comment: 3 per year    Drug use: No     Comment: In the Distant Past, but not since 32 y.o.  (prior - [employer] heart,[employer],  crosstops - stimulants for working double shifts, but not for several decades)       Current Outpatient Medications Ordered in Epic   Medication Sig Dispense Refill    HYDROcodone-acetaminophen (NORCO) 5-325 MG Tab per tablet Take 1 Tablet by mouth every 8 hours as needed (If 1 tab not effective after 1 hour, OK to take second tab. Not to exceed 3 tabs daily.) for up to 30 days. 90 Tablet 0    lovastatin (MEVACOR) 40 MG tablet TAKE 1 TABLET BY MOUTH EVERYDAY AT BEDTIME 100 Tablet 1    escitalopram (LEXAPRO) 10 MG Tab TAKE 1 TABLET BY MOUTH EVERY DAY  DAYS. 100 Tablet 1    Empagliflozin (JARDIANCE) 10 MG Tab tablet Take 1 Tablet by mouth every day. 90 Tablet 4    bisoprolol (ZEBETA) 5 MG Tab Take 1 Tablet by mouth every day. 100 Tablet 4    fluticasone furoate-vilanterol (BREO ELLIPTA) 100-25 MCG/ACT AEROSOL POWDER, BREATH ACTIVATED Inhale 1 Puff every day. Rinse mouth after each use. 1 Each 11    omeprazole (PRILOSEC) 40 MG delayed-release capsule TAKE 1 CAPSULE BY MOUTH EVERY DAY 90 Capsule 1    montelukast (SINGULAIR) 10 MG Tab TAKE 1 TABLET BY MOUTH EVERY DAY IN THE EVENING 100 Tablet 1    budesonide-formoterol (SYMBICORT) 160-4.5 MCG/ACT Aerosol       Cyanocobalamin (B-12 PO) Take  by mouth.      diclofenac sodium (VOLTAREN) 1 % Gel diclofenac 1 % topical gel   APPLY 2-3 GRAMS TO AFFECTED AREA 4 TIMES A DAY AS NEEDED FOR PAIN      famotidine (PEPCID) 20 MG Tab TAKE 1 TABLET BY MOUTH EVERY DAY 30MINS PRIOR TO FOOD 90 Tablet 3    Multiple Vitamins-Minerals (CENTRUM SILVER 50+WOMEN) Tab Take  by mouth.      albuterol 108 (90 Base) MCG/ACT Aero Soln inhalation aerosol Inhale 1-2 Puffs by mouth every four hours as needed for Shortness of Breath.      Biotin 10 MG Tab Take  by mouth.      polyethylene glycol/lytes (MIRALAX) Pack Take 1 Packet by mouth 1 time daily as needed (if sennosides and docusate ineffective after 24 hours).  3    Multiple Vitamins-Minerals (OCUVITE-LUTEIN) Tab Take 1 tablet by mouth every  "morning.      vitamin D 2000 UNIT Tab Take 1 Tab by mouth every day. 60 Tab     [START ON 12/21/2024] HYDROcodone-acetaminophen (NORCO) 5-325 MG Tab per tablet Take 1 Tablet by mouth every 8 hours as needed (If 1 tab not effective after 1 hour, OK to take second tab. Not to exceed 3 tabs daily.) for up to 30 days. 90 Tablet 0    [START ON 1/20/2025] HYDROcodone-acetaminophen (NORCO) 5-325 MG Tab per tablet Take 1 Tablet by mouth every 8 hours as needed (If 1 tab not effective after 1 hour, OK to take second tab. Not to exceed 3 tabs daily.) for up to 30 days. DNFU-1/20/25 (Patient not taking: Reported on 12/16/2024) 90 Tablet 0    azithromycin (ZITHROMAX) 250 MG Tab 2 tablets today 1 each day for the next 4 more days (Patient not taking: Reported on 12/16/2024) 6 Tablet 0    gabapentin (NEURONTIN) 100 MG Cap TAKE 2 CAPS BY MOUTH AT BEDTIME (Patient not taking: Reported on 12/16/2024)      HYDROcodone/acetaminophen (NORCO)  MG Tab Take 0.5 Tablets by mouth 2 times a day.       No current [facility]-ordered facility-administered medications on file.       Allergies:  Sagebrush    Health Maintenance: Completed    ROS:  Gen: no fevers/chills, patient has lost 8 pounds since October  Eyes: no changes in vision  ENT: no sore throat, no hearing loss, no bloody nose  Pulm: no sob, no cough  CV: no chest pain, no palpitations  GI: no nausea/vomiting, no diarrhea  : no dysuria  Neuro: no headaches, no numbness/tingling  Heme/Lymph: no easy bruising    Objective:     Exam:  /62 (BP Location: Right arm, Patient Position: Sitting, BP Cuff Size: Adult)   Pulse (!) 58   Temp 36.2 °C (97.2 °F) (Temporal)   Resp 16   Ht 1.499 m (4' 11\")   Wt 59.6 kg (131 lb 6.4 oz)   SpO2 91% Comment: 4l  BMI 26.54 kg/m²  Body mass index is 26.54 kg/m².    Gen: Alert and oriented, No apparent distress.  Skin: Warm and dry.  No obvious lesions.  Eyes: Sclera wnl Pupils normal in size  Lungs: Normal effort, CTA bilaterally, no wheezes, " rhonchi, or rales  CV: Regular rate and rhythm. No murmurs, rubs, or gallops.  Musculoskeletal: Normal gait. No extremity cyanosis, clubbing, or edema.  Neuro: Oriented to person, place and time  Psych: Mood is wnl       Assessment & Plan:     84 y.o. female with the following -     1. Iron deficiency anemia, unspecified iron deficiency anemia type  Patient's hemoglobin is down to 8.8 patient's vital signs are within normal limits.  It sounds like patient is not taking her iron tablets every day.  Will get in touch with my senior care RN to contact palliative care to see what we can do at this point.  Patient does have appointment with pulmonary in February along with cardiology at that time.  2. Weight loss  Urged patient to continue to eat so she does not lose any further weight  3. Chronic respiratory failure with hypoxia (HCC)  Patient does have a follow-up appointment with pulmonary coming up    4. Frailty    5. Large hiatal hernia  Patient does have a large hiatal hernia that is inoperable at this time due to her other medical issues.       Return in about 2 months (around 2/16/2025), or if symptoms worsen or fail to improve.    Please note that this dictation was created using voice recognition software. I have made every reasonable attempt to correct obvious errors, but I expect that there are errors of grammar and possibly content that I did not discover before finalizing the note.

## 2025-01-07 ENCOUNTER — OFF SITE VISIT (OUTPATIENT)
Dept: PALLIATIVE MEDICINE | Facility: HOSPICE | Age: 85
End: 2025-01-07
Payer: MEDICARE

## 2025-01-07 DIAGNOSIS — M54.2 CHRONIC NECK PAIN: ICD-10-CM

## 2025-01-07 DIAGNOSIS — G89.29 CHRONIC NECK PAIN: ICD-10-CM

## 2025-01-07 PROCEDURE — RXMED WILLOW AMBULATORY MEDICATION CHARGE

## 2025-01-07 PROCEDURE — 99350 HOME/RES VST EST HIGH MDM 60: CPT

## 2025-01-07 PROCEDURE — G0318 PR PROLONG HOME E&M ADDL 15 MIN: HCPCS

## 2025-01-07 RX ORDER — GABAPENTIN 100 MG/1
200 CAPSULE ORAL
Qty: 180 CAPSULE | Refills: 3 | Status: SHIPPED | OUTPATIENT
Start: 2025-01-07 | End: 2026-01-07

## 2025-01-07 RX ORDER — OXYCODONE HYDROCHLORIDE 5 MG/1
5 TABLET ORAL EVERY 4 HOURS PRN
Qty: 90 TABLET | Refills: 0 | Status: SHIPPED | OUTPATIENT
Start: 2025-01-07 | End: 2025-02-07

## 2025-01-08 ENCOUNTER — PHARMACY VISIT (OUTPATIENT)
Dept: PHARMACY | Facility: MEDICAL CENTER | Age: 85
End: 2025-01-08
Payer: COMMERCIAL

## 2025-01-08 RX ORDER — OMEPRAZOLE 40 MG/1
40 CAPSULE, DELAYED RELEASE ORAL DAILY
Qty: 100 CAPSULE | Refills: 0 | Status: SHIPPED | OUTPATIENT
Start: 2025-01-08

## 2025-01-08 NOTE — PROGRESS NOTES
"In-Home Palliative Medicine Evaluation        Guillermina Recio  84 y.o.  female  MRN [id number]  PCP Brenda Valenzuela M.D.  Referral Source: PCP  Location: Patient's residence, Son present. Friends Brenda and Alexandra present     Reason for palliative medicine consultation and/or visit: goals of care, symptom management     Assessment and Plan:     Summary: POLST in home and EMR: DNR, comfort focused treatment only, NO IV fluids or artificial nutrition. Patient adamantly wants to stay in home and avoid any hospitalizations.      1/7/24: Patient has been out of pain meds for 2-3 weeks, she did not know she needed to call for more. She has been taking 1000 mg tylenol as needed \"when it gets real bad\" and has been using the lidocaine patches with some relief. Switch regimen to 1000 mg APAP TID and oxycodone 5 mg q 4 hours PRN. F/U in 2 months.     11/21/24: Patient doing well, no major changes. She will no longer be seeing pain management, I have agreed to take over her pain medications as needed. RX sent for refill today.  Will f/u in January.      10/24/24: Met with patient and ENDY's, son. Patient wheelchair bound but doing well in home currently with significant social support. She struggles with fatigue and SOB from her multiple comorbid conditions.      Primary diagnosis: Stage C Systolic heart failure, Respiratory failure with hypoxia. CKD stage 3b.     Prognosis: PPS 50%     Physical aspects of care:     Pain: Low back pain, neck pain, chronic. Has been out of norco, been using only tylenol PRN. Stop Norco today, start tylenol 1000 mg TID and oxycodone 5 mg q4 hours PRN. Refilled gabapentin today as well.      SOB: Uses oxygen at times, prefers not too. Mild dyspnea at rest, much worse with exertion. Takes several minutes to catch her breath. Continue current breathing med regimen for symptom management. O2 sat in high 80's today at room air while sitting.      Fatigue: Anemic, CHF likely contributing. Encouraged " patient to do things when she feels good and enjoy spending time with friends and family. Eat iron rich foods if she likes them.      Constipation:Denies currently, educated on bowel regimen with opiates. Has MIralax if needed.      Psychological aspects of care:     Anxiety: denies     Depression: denies feelings of hopelessness, despair or lack of pleasure in daily activities.      Social aspects of care:  Lives with son who provides significant amounts of assistance. She has two close friends that visit several days weekly to assist with planning, finances, scheduling and ADL's.      Spiritual aspects of care:  Mormon, goes to [employer] every Sunday.      Goals of care:  Stay at home as much as possible, avoid any invasive treatments, reduce MD appts and start de-prescribing as appropriate.      Other Pertinent Medical History OR Surgery Not Listed Above:   Asthma, COPD, arthritis, HLD, hiatal hernia, hx of NSTEMI, hx of pulmonary embolism, macular degeneration, GERD        Physical Exam  Constitutional:       General: She is not in acute distress.     Appearance: She is not ill-appearing.      Comments: Frail appearing, in wheelchair   Musculoskeletal:         General: No swelling.      Right lower leg: No edema.      Left lower leg: No edema.   Neurological:      Mental Status: She is alert.   Psychiatric:         Mood and Affect: Mood normal.         Behavior: Behavior normal.         Thought Content: Thought content normal.         Judgment: Judgment normal.             Current Medications:    Current Outpatient Medications:     gabapentin (NEURONTIN) 100 MG Cap, Take 2 Capsules by mouth at bedtime., Disp: 180 Capsule, Rfl: 3    oxyCODONE immediate-release (ROXICODONE) 5 MG Tab, Take 1 Tablet by mouth every four hours as needed for Severe Pain for up to 30 days., Disp: 90 Tablet, Rfl: 0    azithromycin (ZITHROMAX) 250 MG Tab, 2 tablets today 1 each day for the next 4 more days (Patient not taking:  Reported on 12/16/2024), Disp: 6 Tablet, Rfl: 0    lovastatin (MEVACOR) 40 MG tablet, TAKE 1 TABLET BY MOUTH EVERYDAY AT BEDTIME, Disp: 100 Tablet, Rfl: 1    escitalopram (LEXAPRO) 10 MG Tab, TAKE 1 TABLET BY MOUTH EVERY DAY  DAYS., Disp: 100 Tablet, Rfl: 1    Empagliflozin (JARDIANCE) 10 MG Tab tablet, Take 1 Tablet by mouth every day., Disp: 90 Tablet, Rfl: 4    bisoprolol (ZEBETA) 5 MG Tab, Take 1 Tablet by mouth every day., Disp: 100 Tablet, Rfl: 4    fluticasone furoate-vilanterol (BREO ELLIPTA) 100-25 MCG/ACT AEROSOL POWDER, BREATH ACTIVATED, Inhale 1 Puff every day. Rinse mouth after each use., Disp: 1 Each, Rfl: 11    omeprazole (PRILOSEC) 40 MG delayed-release capsule, TAKE 1 CAPSULE BY MOUTH EVERY DAY, Disp: 90 Capsule, Rfl: 1    montelukast (SINGULAIR) 10 MG Tab, TAKE 1 TABLET BY MOUTH EVERY DAY IN THE EVENING, Disp: 100 Tablet, Rfl: 1    budesonide-formoterol (SYMBICORT) 160-4.5 MCG/ACT Aerosol, , Disp: , Rfl:     Cyanocobalamin (B-12 PO), Take  by mouth., Disp: , Rfl:     diclofenac sodium (VOLTAREN) 1 % Gel, diclofenac 1 % topical gel  APPLY 2-3 GRAMS TO AFFECTED AREA 4 TIMES A DAY AS NEEDED FOR PAIN, Disp: , Rfl:     famotidine (PEPCID) 20 MG Tab, TAKE 1 TABLET BY MOUTH EVERY DAY 30MINS PRIOR TO FOOD, Disp: 90 Tablet, Rfl: 3    Multiple Vitamins-Minerals (CENTRUM SILVER 50+WOMEN) Tab, Take  by mouth., Disp: , Rfl:     albuterol 108 (90 Base) MCG/ACT Aero Soln inhalation aerosol, Inhale 1-2 Puffs by mouth every four hours as needed for Shortness of Breath., Disp: , Rfl:     Biotin 10 MG Tab, Take  by mouth., Disp: , Rfl:     polyethylene glycol/lytes (MIRALAX) Pack, Take 1 Packet by mouth 1 time daily as needed (if sennosides and docusate ineffective after 24 hours)., Disp: , Rfl: 3    Multiple Vitamins-Minerals (OCUVITE-LUTEIN) Tab, Take 1 tablet by mouth every morning., Disp: , Rfl:     vitamin D 2000 UNIT Tab, Take 1 Tab by mouth every day., Disp: 60 Tab, Rfl:     Medication  Allergies:  Sagebrush    Thank you for allowing me the opportunity to participate in the care of Guillermina Recio    I spent a total of 120 minutes reviewing medical records, direct face-to-face time with the patient and/or family, documentation and coordination of care. This is separate from the time spent on advance care planning, which is documented above.       Alexi Garcia, CARRIE  Home Health and Palliative Medicine  [address]  [address]  P: [phone]  F: [phone]

## 2025-01-08 NOTE — TELEPHONE ENCOUNTER
Received request via: Pharmacy    Was the patient seen in the last year in this department? Yes    Does the patient have an active prescription (recently filled or refills available) for medication(s) requested? No    Pharmacy Name:   [facility] #[id number] - [place] - [address] [id number] [facility]  [place] [address]  Phone: [phone] Fax: [phone]      Does the patient have [facility] and need 100-day supply? (This applies to ALL medications) Yes, quantity updated to 100 days

## 2025-01-16 ENCOUNTER — TELEPHONE (OUTPATIENT)
Dept: PALLIATIVE MEDICINE | Facility: HOSPICE | Age: 85
End: 2025-01-16
Payer: MEDICARE

## 2025-01-16 NOTE — TELEPHONE ENCOUNTER
"[facility]    LCSW received update from palliative care team that pt's DPOA Alexandra had called and is requesting resources.    LCSW called pt's DPOA Alexandra Burnett ([phone]) to discuss in-home resources. Alexandra reports that pt is nearly blind and her son whom she lives with has \"balance issues\" and thus they are needing some extra support for housekeeping/homemaking services. Alexandra is agreeable to having resources emailed to her at [email or web address].    LCSW emailed resources for [facility] homemaker program and list of private pay caregivers.        Jenna Linn, SHAGGY  [facility]      "

## 2025-01-30 ENCOUNTER — TELEPHONE (OUTPATIENT)
Dept: MEDICAL GROUP | Facility: PHYSICIAN GROUP | Age: 85
End: 2025-01-30
Payer: MEDICARE

## 2025-01-30 NOTE — TELEPHONE ENCOUNTER
Spoke to Palliative Care patient is okay to be continued on maintenance medication and appointments.  CARRIE Ramos  Home Health and Palliative Medicine will take care of the pain medications and management.

## 2025-02-10 ENCOUNTER — APPOINTMENT (OUTPATIENT)
Dept: SLEEP MEDICINE | Facility: MEDICAL CENTER | Age: 85
End: 2025-02-10
Attending: INTERNAL MEDICINE
Payer: MEDICARE

## 2025-02-19 ENCOUNTER — HOSPITAL ENCOUNTER (OUTPATIENT)
Dept: LAB | Facility: MEDICAL CENTER | Age: 85
End: 2025-02-19
Attending: FAMILY MEDICINE
Payer: MEDICARE

## 2025-02-19 DIAGNOSIS — M81.6 LOCALIZED OSTEOPOROSIS WITHOUT CURRENT PATHOLOGICAL FRACTURE: ICD-10-CM

## 2025-02-19 DIAGNOSIS — D50.9 IRON DEFICIENCY ANEMIA, UNSPECIFIED IRON DEFICIENCY ANEMIA TYPE: ICD-10-CM

## 2025-02-19 LAB
ACANTHOCYTES BLD QL SMEAR: NORMAL
ALBUMIN SERPL BCP-MCNC: 3.9 G/DL (ref 3.2–4.9)
ALBUMIN/GLOB SERPL: 1.5 G/DL
ALP SERPL-CCNC: 84 U/L (ref 30–99)
ALT SERPL-CCNC: 13 U/L (ref 2–50)
ANION GAP SERPL CALC-SCNC: 13 MMOL/L (ref 7–16)
ANISOCYTOSIS BLD QL SMEAR: ABNORMAL
AST SERPL-CCNC: 28 U/L (ref 12–45)
BASOPHILS # BLD AUTO: 0.5 % (ref 0–1.8)
BASOPHILS # BLD: 0.02 K/UL (ref 0–0.12)
BILIRUB SERPL-MCNC: 0.2 MG/DL (ref 0.1–1.5)
BUN SERPL-MCNC: 21 MG/DL (ref 8–22)
BURR CELLS BLD QL SMEAR: NORMAL
CALCIUM ALBUM COR SERPL-MCNC: 9.3 MG/DL (ref 8.5–10.5)
CALCIUM SERPL-MCNC: 9.2 MG/DL (ref 8.5–10.5)
CHLORIDE SERPL-SCNC: 110 MMOL/L (ref 96–112)
CO2 SERPL-SCNC: 18 MMOL/L (ref 20–33)
COMMENT 1642: NORMAL
CREAT SERPL-MCNC: 1.4 MG/DL (ref 0.5–1.4)
EOSINOPHIL # BLD AUTO: 0.04 K/UL (ref 0–0.51)
EOSINOPHIL NFR BLD: 1 % (ref 0–6.9)
ERYTHROCYTE [DISTWIDTH] IN BLOOD BY AUTOMATED COUNT: 56.8 FL (ref 35.9–50)
FERRITIN SERPL-MCNC: 13.8 NG/ML (ref 10–291)
GFR SERPLBLD CREATININE-BSD FMLA CKD-EPI: 36 ML/MIN/1.73 M 2
GLOBULIN SER CALC-MCNC: 2.6 G/DL (ref 1.9–3.5)
GLUCOSE SERPL-MCNC: 104 MG/DL (ref 65–99)
HCT VFR BLD AUTO: 34.4 % (ref 37–47)
HGB BLD-MCNC: 8.6 G/DL (ref 12–16)
HYPOCHROMIA BLD QL SMEAR: ABNORMAL
IMM GRANULOCYTES # BLD AUTO: 0.01 K/UL (ref 0–0.11)
IMM GRANULOCYTES NFR BLD AUTO: 0.3 % (ref 0–0.9)
IRON SATN MFR SERPL: 5 % (ref 15–55)
IRON SERPL-MCNC: 18 UG/DL (ref 40–170)
LYMPHOCYTES # BLD AUTO: 1.17 K/UL (ref 1–4.8)
LYMPHOCYTES NFR BLD: 30 % (ref 22–41)
MCH RBC QN AUTO: 17.6 PG (ref 27–33)
MCHC RBC AUTO-ENTMCNC: 25 G/DL (ref 32.2–35.5)
MCV RBC AUTO: 70.5 FL (ref 81.4–97.8)
MICROCYTES BLD QL SMEAR: ABNORMAL
MONOCYTES # BLD AUTO: 0.48 K/UL (ref 0–0.85)
MONOCYTES NFR BLD AUTO: 12.3 % (ref 0–13.4)
MORPHOLOGY BLD-IMP: NORMAL
NEUTROPHILS # BLD AUTO: 2.18 K/UL (ref 1.82–7.42)
NEUTROPHILS NFR BLD: 55.9 % (ref 44–72)
NEUTS HYPERSEG BLD QL SMEAR: NORMAL
NRBC # BLD AUTO: 0 K/UL
NRBC BLD-RTO: 0 /100 WBC (ref 0–0.2)
OVALOCYTES BLD QL SMEAR: NORMAL
PLATELET # BLD AUTO: 261 K/UL (ref 164–446)
PLATELET BLD QL SMEAR: NORMAL
PMV BLD AUTO: 9.2 FL (ref 9–12.9)
POIKILOCYTOSIS BLD QL SMEAR: NORMAL
POTASSIUM SERPL-SCNC: 4.3 MMOL/L (ref 3.6–5.5)
PROT SERPL-MCNC: 6.5 G/DL (ref 6–8.2)
RBC # BLD AUTO: 4.88 M/UL (ref 4.2–5.4)
RBC BLD AUTO: PRESENT
SCHISTOCYTES BLD QL SMEAR: NORMAL
SODIUM SERPL-SCNC: 141 MMOL/L (ref 135–145)
TIBC SERPL-MCNC: 387 UG/DL (ref 250–450)
UIBC SERPL-MCNC: 369 UG/DL (ref 110–370)
WBC # BLD AUTO: 3.9 K/UL (ref 4.8–10.8)

## 2025-02-19 PROCEDURE — 83540 ASSAY OF IRON: CPT

## 2025-02-19 PROCEDURE — 36415 COLL VENOUS BLD VENIPUNCTURE: CPT

## 2025-02-19 PROCEDURE — 85025 COMPLETE CBC W/AUTO DIFF WBC: CPT

## 2025-02-19 PROCEDURE — 80053 COMPREHEN METABOLIC PANEL: CPT

## 2025-02-19 PROCEDURE — 83550 IRON BINDING TEST: CPT

## 2025-02-19 PROCEDURE — 82728 ASSAY OF FERRITIN: CPT

## 2025-02-24 ENCOUNTER — OFFICE VISIT (OUTPATIENT)
Dept: MEDICAL GROUP | Facility: PHYSICIAN GROUP | Age: 85
End: 2025-02-24
Payer: MEDICARE

## 2025-02-24 VITALS
HEIGHT: 59 IN | HEART RATE: 68 BPM | SYSTOLIC BLOOD PRESSURE: 120 MMHG | TEMPERATURE: 97.3 F | BODY MASS INDEX: 25.54 KG/M2 | WEIGHT: 126.7 LBS | OXYGEN SATURATION: 92 % | DIASTOLIC BLOOD PRESSURE: 68 MMHG

## 2025-02-24 DIAGNOSIS — I27.20 PULMONARY HYPERTENSION (HCC): ICD-10-CM

## 2025-02-24 DIAGNOSIS — N18.32 STAGE 3B CHRONIC KIDNEY DISEASE: ICD-10-CM

## 2025-02-24 DIAGNOSIS — J96.11 CHRONIC RESPIRATORY FAILURE WITH HYPOXIA (HCC): ICD-10-CM

## 2025-02-24 DIAGNOSIS — F11.20 OPIOID DEPENDENCE, UNCOMPLICATED (HCC): ICD-10-CM

## 2025-02-24 DIAGNOSIS — D64.9 ANEMIA, UNSPECIFIED TYPE: ICD-10-CM

## 2025-02-24 DIAGNOSIS — D50.9 IRON DEFICIENCY ANEMIA, UNSPECIFIED IRON DEFICIENCY ANEMIA TYPE: ICD-10-CM

## 2025-02-24 DIAGNOSIS — R63.4 WEIGHT LOSS: ICD-10-CM

## 2025-02-24 PROCEDURE — 3078F DIAST BP <80 MM HG: CPT | Performed by: FAMILY MEDICINE

## 2025-02-24 PROCEDURE — 3074F SYST BP LT 130 MM HG: CPT | Performed by: FAMILY MEDICINE

## 2025-02-24 PROCEDURE — 99214 OFFICE O/P EST MOD 30 MIN: CPT | Performed by: FAMILY MEDICINE

## 2025-02-24 RX ORDER — MONTELUKAST SODIUM 10 MG/1
10 TABLET ORAL EVERY EVENING
Qty: 100 TABLET | Refills: 1 | Status: SHIPPED | OUTPATIENT
Start: 2025-02-24

## 2025-02-24 ASSESSMENT — FIBROSIS 4 INDEX: FIB4 SCORE: 2.56

## 2025-02-24 ASSESSMENT — PATIENT HEALTH QUESTIONNAIRE - PHQ9: CLINICAL INTERPRETATION OF PHQ2 SCORE: 0

## 2025-02-24 NOTE — TELEPHONE ENCOUNTER
Received request via: Pharmacy    Was the patient seen in the last year in this department? Yes    Does the patient have an active prescription (recently filled or refills available) for medication(s) requested? No      Does the patient have nursing home Plus and need 100-day supply? (This applies to ALL medications) Yes, quantity updated to 100 days

## 2025-02-24 NOTE — PROGRESS NOTES
Subjective:     CC:   Chief Complaint   Patient presents with    Follow-Up     Lab results        HPI:   Guillermina presents today with her 2 caregivers.  Patient is still seeing palliative at this time.  Patient has no major complaints.  I did mention that I got a order for her to get  Prolia but this time after discussing with the caregivers and since patient does not like shots I will go ahead and cancel the Prolia at this time.  Patient does have appointment for cardiology in March also pulmonary in July.    Past Medical History:   Diagnosis Date    Arthritis     Asthma     Back pain     Bowel habit changes     diarrhea    Breath shortness     COPD    Cataract     bilateral IOL    Chronic pain 11/2019    hips    Dyslipidemia 8/1/2016    Emphysema of lung (HCC)     GERD (gastroesophageal reflux disease)     Russian measles     Heart burn     Hepatitis 1970's    A based on labs done 2016    Hiatus hernia syndrome     High cholesterol     History of total hip replacement     L    Hyperlipidemia     Influenza     Macular degeneration     Memory loss     Mumps     NSTEMI (non-ST elevated myocardial infarction) ([facility]) 9/12/2017    OA (osteoarthritis)     back, hip, shoulds, knees and hands    Other pulmonary embolism without acute cor pulmonale ([facility]) 12/14/2019    Painful joint     Psychiatric problem     depression    S/P cataract extraction     bilat    Shortness of breath     Snoring     stomach flu 11/08/2019    Urinary incontinence     Vision loss     Wears dentures     Wears glasses        Social History     Tobacco Use    Smoking status: Never     Passive exposure: Yes    Smokeless tobacco: Never    Tobacco comments:     lived with smokers   Vaping Use    Vaping status: Never Used    Passive vaping exposure: Yes   Substance Use Topics    Alcohol use: Not Currently     Alcohol/week: 0.0 oz     Comment: 3 per year    Drug use: No     Comment: In the Distant Past, but not since 32 y.o.  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 180 Capsule 3    azithromycin (ZITHROMAX) 250 MG Tab 2 tablets today 1 each day for the next 4 more days 6 Tablet 0    lovastatin (MEVACOR) 40 MG tablet TAKE 1 TABLET BY MOUTH EVERYDAY AT BEDTIME 100 Tablet 1    escitalopram (LEXAPRO) 10 MG Tab TAKE 1 TABLET BY MOUTH EVERY DAY  DAYS. 100 Tablet 1    Empagliflozin (JARDIANCE) 10 MG Tab tablet Take 1 Tablet by mouth every day. 90 Tablet 4    bisoprolol (ZEBETA) 5 MG Tab Take 1 Tablet by mouth every day. 100 Tablet 4    fluticasone furoate-vilanterol (BREO ELLIPTA) 100-25 MCG/ACT AEROSOL POWDER, BREATH ACTIVATED Inhale 1 Puff every day. Rinse mouth after each use. 1 Each 11    budesonide-formoterol (SYMBICORT) 160-4.5 MCG/ACT Aerosol       Cyanocobalamin (B-12 PO) Take  by mouth.      diclofenac sodium (VOLTAREN) 1 % Gel diclofenac 1 % topical gel   APPLY 2-3 GRAMS TO AFFECTED AREA 4 TIMES A DAY AS NEEDED FOR PAIN      famotidine (PEPCID) 20 MG Tab TAKE 1 TABLET BY MOUTH EVERY DAY 30MINS PRIOR TO FOOD 90 Tablet 3    Multiple Vitamins-Minerals (CENTRUM SILVER 50+WOMEN) Tab Take  by mouth.      albuterol 108 (90 Base) MCG/ACT Aero Soln inhalation aerosol Inhale 1-2 Puffs by mouth every four hours as needed for Shortness of Breath.      Biotin 10 MG Tab Take  by mouth.      polyethylene glycol/lytes (MIRALAX) Pack Take 1 Packet by mouth 1 time daily as needed (if sennosides and docusate ineffective after 24 hours).  3    Multiple Vitamins-Minerals (OCUVITE-LUTEIN) Tab Take 1 tablet by mouth every morning.      vitamin D 2000  "UNIT Tab Take 1 Tab by mouth every day. 60 Tab      No current Epic-ordered facility-administered medications on file.       Allergies:  Sagebrush    Health Maintenance: Completed    ROS:  Gen: no fevers/chills, has lost 5 pounds since December  Eyes: no changes in vision  ENT: no sore throat, no hearing loss, no bloody nose  Pulm: no sob, no cough  CV: no chest pain, no palpitations  GI: no nausea/vomiting, no diarrhea  : no dysuria  Neuro: no headaches, no numbness/tingling  Heme/Lymph: no easy bruising    Objective:     Exam:  /68 (BP Location: Right arm, Patient Position: Sitting, BP Cuff Size: Adult)   Pulse 68   Temp 36.3 °C (97.3 °F) (Temporal)   Ht 1.499 m (4' 11\")   Wt 57.5 kg (126 lb 11.2 oz)   SpO2 92% Comment: on 3 liters of oxygen  BMI 25.59 kg/m²  Body mass index is 25.59 kg/m².    Gen: Alert and oriented, No apparent distress.  Skin: Warm and dry.  No obvious lesions.  Eyes: Sclera wnl Pupils normal in size  Lungs: Normal effort, CTA bilaterally, no wheezes, rhonchi, or rales  CV: Regular rate and rhythm. No murmurs, rubs, or gallops.  Musculoskeletal: Normal gait. No extremity cyanosis, clubbing, or edema.  Neuro: Oriented to person, place and time  Psych: Mood is wnl       Assessment & Plan:     84 y.o. female with the following -     1. Weight loss  Encourage patient to eat more at this point patient will have continue palliative care services.  Recommend seeing her back in about 3 to 4 months or sooner if she has problems  - Comp Metabolic Panel; Future    2. Iron deficiency anemia, unspecified iron deficiency anemia type  Patient to take her iron Gummies at this point very difficult and that patient has multiple reasons for her anemia but this point we will go ahead and keep her comfortable encouraged her to eat more iron rich foods patient a lot of spinach.  - CBC WITH DIFFERENTIAL; Future  - FERRITIN; Future  - IRON/TOTAL IRON BIND; Future    3. Chronic respiratory failure with " hypoxia (HCC)  Patient appears to be about the same not having worsening of her breathing issues    4. Anemia, unspecified type  Recheck this in 3 months  - CBC WITH DIFFERENTIAL; Future  - FERRITIN; Future  - IRON/TOTAL IRON BIND; Future    5. Opioid dependence, uncomplicated (HCC)  Patient will continue pain management    6. Pulmonary hypertension (HCC)    7. Stage 3b chronic kidney disease  Creatinine about the same  HCC Gap Form    Diagnosis to address: F11.20 - Opioid dependence, uncomplicated (HCC)  Assessment and plan: Chronic, stable. Continue with current defined treatment plan: Palliative is handling her medications for pain. Follow-up at least annually.  Diagnosis: J96.11 - Chronic respiratory failure with hypoxia (HCC)  Assessment and plan: Chronic, stable. Continue with current defined treatment plan: Patient does need a refill on her oxygen [facility] nurse will help facilitate to get the order through pulmonary. Follow-up at least annually.  Diagnosis: I27.20 - Pulmonary hypertension (HCC)  Assessment and plan: Chronic, stable. Continue with current defined treatment plan: Patientappears not to be worsening. Follow-up at least annually.  Diagnosis: N18.32 - Stage 3b chronic kidney disease  Assessment and plan: Chronic, stable. Continue with current defined treatment plan: Kidney function appears stable. Follow-up at least annually.  Last edited 02/24/25 13:46 PST by Brenda Valenzuela M.D.            Return in about 3 months (around 5/24/2025), or if symptoms worsen or fail to improve.    Please note that this dictation was created using voice recognition software. I have made every reasonable attempt to correct obvious errors, but I expect that there are errors of grammar and possibly content that I did not discover before finalizing the note.

## 2025-02-25 ENCOUNTER — TELEPHONE (OUTPATIENT)
Dept: CARDIOLOGY | Facility: MEDICAL CENTER | Age: 85
End: 2025-02-25
Payer: MEDICARE

## 2025-02-25 DIAGNOSIS — R06.09 DYSPNEA ON EXERTION: ICD-10-CM

## 2025-02-25 DIAGNOSIS — I51.89 LEFT VENTRICULAR SYSTOLIC DYSFUNCTION, NYHA CLASS 3: ICD-10-CM

## 2025-02-25 DIAGNOSIS — I50.20 ACC/AHA STAGE C SYSTOLIC HEART FAILURE (HCC): ICD-10-CM

## 2025-02-25 RX ORDER — EMPAGLIFLOZIN 10 MG/1
10 TABLET, FILM COATED ORAL DAILY
Qty: 100 TABLET | Refills: 2 | Status: SHIPPED | OUTPATIENT
Start: 2025-02-25

## 2025-03-13 ENCOUNTER — HOSPITAL ENCOUNTER (OUTPATIENT)
Dept: CARDIOLOGY | Facility: MEDICAL CENTER | Age: 85
End: 2025-03-13
Attending: INTERNAL MEDICINE
Payer: MEDICARE

## 2025-03-13 DIAGNOSIS — R06.09 DYSPNEA ON EXERTION: ICD-10-CM

## 2025-03-13 DIAGNOSIS — I25.119 ATHEROSCLEROSIS OF NATIVE CORONARY ARTERY OF NATIVE HEART WITH ANGINA PECTORIS (HCC): ICD-10-CM

## 2025-03-13 DIAGNOSIS — I27.20 PULMONARY HYPERTENSION (HCC): ICD-10-CM

## 2025-03-13 DIAGNOSIS — I51.89 LEFT VENTRICULAR SYSTOLIC DYSFUNCTION, NYHA CLASS 3: ICD-10-CM

## 2025-03-13 DIAGNOSIS — N18.32 STAGE 3B CHRONIC KIDNEY DISEASE: ICD-10-CM

## 2025-03-13 DIAGNOSIS — I50.20 ACC/AHA STAGE C SYSTOLIC HEART FAILURE (HCC): ICD-10-CM

## 2025-03-13 PROCEDURE — 93306 TTE W/DOPPLER COMPLETE: CPT

## 2025-03-14 ENCOUNTER — OFF SITE VISIT (OUTPATIENT)
Dept: PALLIATIVE MEDICINE | Facility: HOSPICE | Age: 85
End: 2025-03-14
Payer: MEDICARE

## 2025-03-14 DIAGNOSIS — G89.29 CHRONIC NECK PAIN: ICD-10-CM

## 2025-03-14 DIAGNOSIS — I50.20 ACC/AHA STAGE C SYSTOLIC HEART FAILURE (HCC): ICD-10-CM

## 2025-03-14 DIAGNOSIS — I51.89 LEFT VENTRICULAR SYSTOLIC DYSFUNCTION, NYHA CLASS 3: ICD-10-CM

## 2025-03-14 DIAGNOSIS — R06.09 DYSPNEA ON EXERTION: ICD-10-CM

## 2025-03-14 DIAGNOSIS — M54.2 CHRONIC NECK PAIN: ICD-10-CM

## 2025-03-14 PROCEDURE — 99497 ADVNCD CARE PLAN 30 MIN: CPT

## 2025-03-14 PROCEDURE — 99350 HOME/RES VST EST HIGH MDM 60: CPT | Mod: 25

## 2025-03-14 PROCEDURE — G0318 PR PROLONG HOME E&M ADDL 15 MIN: HCPCS

## 2025-03-17 RX ORDER — OXYCODONE HYDROCHLORIDE 5 MG/1
5 TABLET ORAL EVERY 4 HOURS PRN
Qty: 90 TABLET | Refills: 0 | Status: SHIPPED | OUTPATIENT
Start: 2025-03-17 | End: 2025-04-16

## 2025-03-17 RX ORDER — ALBUTEROL SULFATE 90 UG/1
1-2 INHALANT RESPIRATORY (INHALATION) EVERY 4 HOURS PRN
Qty: 8.5 G | Refills: 5 | Status: SHIPPED | OUTPATIENT
Start: 2025-03-17

## 2025-03-17 NOTE — PROGRESS NOTES
"In-Home Palliative Medicine Evaluation        Guillermina Recio  84 y.o.  female  MRN [id number]  PCP Brenda Valenzuela M.D.  Referral Source: PCP  Location: Patient's residence, Son present. Friends Brenda and Alexandra present     Reason for palliative medicine consultation and/or visit: goals of care, symptom management     Assessment and Plan:     Summary: POLST in home and EMR: DNR, comfort focused treatment only, NO IV fluids or artificial nutrition. Patient adamantly wants to stay in home and avoid any hospitalizations.      3/14/25: Guillermina continues to do well, no major changes. Denies falls, recent hospitalizations. Continue to encourage patient to call in when refills are needed and take medications as prescribed. Will f/u in 3 months.     1/7/24: Patient has been out of pain meds for 2-3 weeks, she did not know she needed to call for more. She has been taking 1000 mg tylenol as needed \"when it gets real bad\" and has been using the lidocaine patches with some relief. Switch regimen to 1000 mg APAP TID and oxycodone 5 mg q 4 hours PRN. F/U in 2 months.      11/21/24: Patient doing well, no major changes. She will no longer be seeing pain management, I have agreed to take over her pain medications as needed. RX sent for refill today.  Will f/u in January.      10/24/24: Met with patient and ENDY's, son. Patient wheelchair bound but doing well in home currently with significant social support. She struggles with fatigue and SOB from her multiple comorbid conditions.      Primary diagnosis: Stage C Systolic heart failure, Respiratory failure with hypoxia. CKD stage 3b.     Prognosis: PPS 50%     Physical aspects of care:     Pain: Low back pain, neck pain, chronic. Has been out of norco, been using only tylenol PRN. Stop Norco today, start tylenol 1000 mg TID and oxycodone 5 mg q4 hours PRN. Refilled meds today.      SOB: Uses oxygen at times, prefers not too. Mild dyspnea at rest, much worse with exertion. Takes " several minutes to catch her breath. Continue current breathing med regimen for symptom management.       Fatigue: Anemic, CHF likely contributing. Encouraged patient to do things when she feels good and enjoy spending time with friends and family. Eat iron rich foods if she likes them.      Constipation:Denies currently, educated on bowel regimen with opiates. Has MIralax if needed.      Psychological aspects of care:     Anxiety: denies     Depression: denies feelings of hopelessness, despair or lack of pleasure in daily activities.      Social aspects of care:  Lives with son who provides significant amounts of assistance. She has two close friends that visit several days weekly to assist with planning, finances, scheduling and ADL's.      Spiritual aspects of care:  [employer], goes to [employer] 1000AM every Sunday.      Goals of care:  Stay at home as much as possible, avoid any invasive treatments, reduce MD appts and start de-prescribing as appropriate.      Other Pertinent Medical History OR Surgery Not Listed Above:   Asthma, COPD, arthritis, HLD, hiatal hernia, hx of NSTEMI, hx of pulmonary embolism, macular degeneration, GERD    Advance care planning:    The patient and/or legal decision maker has provided voluntary consent to discuss advance care planning. We discussed goals of care. Total time spent in ACP discussion 30 minutes, which is separate from the time spent completing the evaluation and management visit.     Physical Exam  Constitutional:       General: She is not in acute distress.     Appearance: She is ill-appearing.      Comments: In wheelchair, frail   Pulmonary:      Effort: Pulmonary effort is normal. No respiratory distress.   Abdominal:      General: Abdomen is flat. There is no distension.      Tenderness: There is no abdominal tenderness. There is no guarding.   Musculoskeletal:         General: No deformity.   Skin:     Coloration: Skin is pale. Skin is not jaundiced.   Neurological:       Mental Status: She is alert.      Motor: Weakness present.      Gait: Gait abnormal.   Psychiatric:         Mood and Affect: Mood normal.         Behavior: Behavior normal.         Thought Content: Thought content normal.         Judgment: Judgment normal.             Current Medications:    Current Outpatient Medications:     Empagliflozin (JARDIANCE) 10 MG Tab tablet, Take 1 Tablet by mouth every day., Disp: 100 Tablet, Rfl: 2    montelukast (SINGULAIR) 10 MG Tab, TAKE 1 TABLET BY MOUTH EVERY DAY IN THE EVENING, Disp: 100 Tablet, Rfl: 1    omeprazole (PRILOSEC) 40 MG delayed-release capsule, TAKE 1 CAPSULE BY MOUTH EVERY DAY, Disp: 100 Capsule, Rfl: 0    gabapentin (NEURONTIN) 100 MG Cap, Take 2 Capsules by mouth at bedtime., Disp: 180 Capsule, Rfl: 3    azithromycin (ZITHROMAX) 250 MG Tab, 2 tablets today 1 each day for the next 4 more days, Disp: 6 Tablet, Rfl: 0    escitalopram (LEXAPRO) 10 MG Tab, TAKE 1 TABLET BY MOUTH EVERY DAY  DAYS., Disp: 100 Tablet, Rfl: 1    bisoprolol (ZEBETA) 5 MG Tab, Take 1 Tablet by mouth every day., Disp: 100 Tablet, Rfl: 4    fluticasone furoate-vilanterol (BREO ELLIPTA) 100-25 MCG/ACT AEROSOL POWDER, BREATH ACTIVATED, Inhale 1 Puff every day. Rinse mouth after each use., Disp: 1 Each, Rfl: 11    budesonide-formoterol (SYMBICORT) 160-4.5 MCG/ACT Aerosol, , Disp: , Rfl:     Cyanocobalamin (B-12 PO), Take  by mouth., Disp: , Rfl:     diclofenac sodium (VOLTAREN) 1 % Gel, diclofenac 1 % topical gel  APPLY 2-3 GRAMS TO AFFECTED AREA 4 TIMES A DAY AS NEEDED FOR PAIN, Disp: , Rfl:     famotidine (PEPCID) 20 MG Tab, TAKE 1 TABLET BY MOUTH EVERY DAY 30MINS PRIOR TO FOOD, Disp: 90 Tablet, Rfl: 3    Multiple Vitamins-Minerals (CENTRUM SILVER 50+WOMEN) Tab, Take  by mouth., Disp: , Rfl:     albuterol 108 (90 Base) MCG/ACT Aero Soln inhalation aerosol, Inhale 1-2 Puffs by mouth every four hours as needed for Shortness of Breath., Disp: , Rfl:     Biotin 10 MG Tab, Take  by mouth.,  Disp: , Rfl:     polyethylene glycol/lytes (MIRALAX) Pack, Take 1 Packet by mouth 1 time daily as needed (if sennosides and docusate ineffective after 24 hours)., Disp: , Rfl: 3    Multiple Vitamins-Minerals (OCUVITE-LUTEIN) Tab, Take 1 tablet by mouth every morning., Disp: , Rfl:     vitamin D 2000 UNIT Tab, Take 1 Tab by mouth every day., Disp: 60 Tab, Rfl:     Medication Allergies:  Sagebrush    Thank you for allowing me the opportunity to participate in the care of Guillermina Recio    I spent a total of 120 minutes reviewing medical records, direct face-to-face time with the patient and/or family, documentation and coordination of care. This is separate from the time spent on advance care planning, which is documented above.       Alexi Garcia, APRN  Home Health and Palliative Medicine  [address]  [id number]  P: [phone]  F: [phone]

## 2025-03-24 ENCOUNTER — RESULTS FOLLOW-UP (OUTPATIENT)
Dept: CARDIOLOGY | Facility: MEDICAL CENTER | Age: 85
End: 2025-03-24

## 2025-03-24 LAB — LV EJECT FRACT  99904: 60

## 2025-04-10 RX ORDER — ESCITALOPRAM OXALATE 10 MG/1
TABLET ORAL
Qty: 100 TABLET | Refills: 1 | Status: SHIPPED | OUTPATIENT
Start: 2025-04-10

## 2025-04-14 RX ORDER — OMEPRAZOLE 40 MG/1
40 CAPSULE, DELAYED RELEASE ORAL DAILY
Qty: 100 CAPSULE | Refills: 0 | Status: SHIPPED | OUTPATIENT
Start: 2025-04-14

## 2025-04-21 DIAGNOSIS — M54.2 CHRONIC NECK PAIN: ICD-10-CM

## 2025-04-21 DIAGNOSIS — G89.29 CHRONIC NECK PAIN: ICD-10-CM

## 2025-04-21 RX ORDER — OXYCODONE HYDROCHLORIDE 5 MG/1
5 TABLET ORAL EVERY 4 HOURS PRN
Qty: 90 TABLET | Refills: 0 | Status: SHIPPED | OUTPATIENT
Start: 2025-04-21 | End: 2025-05-21

## 2025-05-27 DIAGNOSIS — M54.2 CHRONIC NECK PAIN: ICD-10-CM

## 2025-05-27 DIAGNOSIS — G89.29 CHRONIC NECK PAIN: ICD-10-CM

## 2025-05-27 RX ORDER — OXYCODONE HYDROCHLORIDE 5 MG/1
5 TABLET ORAL EVERY 4 HOURS PRN
Qty: 90 TABLET | Refills: 0 | Status: SHIPPED | OUTPATIENT
Start: 2025-05-27 | End: 2025-06-26

## 2025-06-09 NOTE — Clinical Note
[facility]  [address]  [place] [address]    [id number]    Guillermina Recio  [address]  [address]  [place] [address]    February 28, 2025    Member Name: Guillermina Recio   Member Number: [id number]   Reference Number: [id number]   Approved Services: Echos and EKG   Approved Service Dates: 03/13/2025 - 06/11/2025   Requesting Provider: Carmelo To   Requested Provider: [facility]     Dear Guillermina Levy Hemal:    The following medical service(s) requested by Camrelo To have been approved:    Procedure Code Procedure Code Name Requested Quantity Approved Quantity Status   01017 (CPT®) HI ECHO HEART XTHORACIC,COMPLETE W DOPPLER 1 1 Authorized       Approved Quantity means the number of visits approved for medication treatments and/or medical services.    The services should be provided by [facility] no later than 06/11/2025. Please contact the provider listed below with any questions.     Provider Information:  [facility]  [phone]    Your plan benefit may require a deductible, co-payment or coinsurance for these services. This authorization does not guarantee [facility] will pay the claim for services that you receive. Payment by [facility] for these services is subject to the terms of your Evidence of Coverage, your eligibility at the time of service, and confirmation of benefit coverage.    For any questions or additional information, please contact Customer Service:    [facility] Toll Free: [phone]  [employer] users dial: [phone]   Call Center Hours:  Oct 1 - Mar 31, Mon - Fri 7 AM to 8 PM PST  Oct 1 - Mar 31, Sat - Sun 8 AM to 8 PM PST  Apr 1 - Sep 30, Mon - Fri 7 AM to 8 PM PST   Office Hours: Mon - Fri 8 AM to 5 PM PST   E-mail: [email or web address]   Website:  [email or web address]      This information is available for free in other languages. Please contact Customer  Called pt no answer left VM I was returning her call.   Service at the phone number above for more information. [facility] complies with applicable Federal civil rights laws and does not discriminate on the basis of race, color, national origin, age, disability or sex.    Sincerely,     Healthcare Utilization Management Department     Cc: [facility]   Carmelo To    Multi-Language Insert  Multi- Services  English: We have free  services to answer any questions you may have about our health or drug plan.  To get an , just call us at [phone].  Someone who speaks English/Language can help you.  This is a free service.  Omani: Tenemos servicios de intérprete sin costo alguno  para responder cualquier pregunta que pueda tener sobre nuestro plan de luis angel o medicamentos. Para hablar con un intérprete, por favor llame al [phone]. Alguien que hable español le podrá ayudar. Marine es un servicio gratuito.  Chinese Mandarin: ?????????????????????????????? ???????????????? [phone]????????????????? ?????????  Chinese Cantonese: ?????????????????????????????? ????????????? [phone]???????????????????? ????????  Tagalog:  Stephanie salmon serbisyo sa cruzsahernando welcha thang johnsonngoscar alejandre o panggamot.  rodney Connor  [phone]. Maaari kayong edgar Thomason.  Anupam lechuga.  Kazakh:  Nous proposons kay services gratuits d'interprétation pour répondre à toutes hollie questions relatives à notre régime de santé ou d'assurance-médicaments. Pour accéder au service d'interprétation, il vous suffit de nous appeler au [phone]. Un interlocuteur parThedaCare Medical Center - Wild Roseaman Françs pourra vous aider. Ce service est gratuit.  Armenian:  Sukhdev diali có d?ch v? thông d?ch mi?n phí ð? tr? l?i các câu h?i v? chýõng s?c kh?e và chýõng trình thu?c men. N?u quí v?  c?n thông d?ch viên malcolm g?i [phone] s? có nhân viên nói ti?ng Vi?t giúp ð? quí v?. Ðây là d?ch v? mi?n phí .  Scottish:  Unser kostenser Dolmetscherservice beantwortet Ihren Fragen zu unserem Gesundheits- und Arzneimittelplan. Unsere Dolmetscher erreichen Sie [phone]. Man wird Ihnen va auf [facility]. Dieser Service ist jenniferEncompass Health.  Faroese:  ??? ?? ?? ?? ?? ??? ?? ??? ?? ???? ?? ?? ???? ???? ????. ?? ???? ????? ?? [phone] ??? ??? ????.  ???? ?? ???? ?? ?? ????. ? ???? ??? ?????.   Palauan: Åñëè ó âàñ âîçíèêíóò âîïðîñû îòíîñèòåëüíî ñòðàõîâîãî èëè ìåäèêàìåíòíîãî ïëàíà, âû ìîæåòå âîñïîëüçîâàòüñÿ íàøèìè áåñïëàòíûìè óñëóãàìè ïåðåâîä÷èêîâ. ×òîáû âîñïîëüçîâàòüñÿ óñëóãàìè ïåðåâîä÷èêà, ïîçâîíèòå íàì ïî òåëåôîíó [phone]. Âàì îêàæåò ïîìîùü ñîòðóäíèê, êîòîðûé ãîâîðèò ïî-póññêè. Äàííàÿ óñëóãà áåñïëàòíàÿ.  Telugu: ÅääÇ äÞÏã ÎÏãÇÊ ÇáãÊÑÌã ÇáÝæÑí ÇáãÌÇäíÉ ááÅÌÇÈÉ Úä Ãí ÃÓÆáÉ ÊÊÚáÞ ÈÇáÕÍÉ Ãæ ÌÏæá ÇáÃÏæíÉ áÏíäÇ. ááÍÕæá Úáì ãÊÑÌã ÝæÑí¡ áíÓ Úáíß Óæì ÇáÇÊÕÇá ÈäÇ Úáì [phone] . ÓíÞæã ÔÎÕ ãÇ íÊÍÏË ÇáÚÑÈíÉ ÈãÓÇÚÏÊß. åÐå ÎÏãÉ ãÌÇäíÉ.  Nile: ????? ????????? ?? ??? ?? ????? ?? ???? ??? ???? ???? ?? ?????? ?? ???? ???? ?? ??? ????? ??? ????? ???????? ?????? ?????? ???. ?? ???????? ??????? ???? ?? ???, ?? ???? [phone] ?? ??? ????. ??? ??????? ?? ?????? ????? ?? ???? ??? ?? ???? ??. ?? ?? ????? ???? ??.   Malay:  È disponibile un servizio di interpretariato gratuito per rispondere a eventuali domande sul nostro piano sanitario e farmaceutico. Per un interprete, contattare il marisol [phone]. Un nostro incaricato jeanne parla Italianovi fornirà l'assistenza necessaria. È un servizio gratuito.  Portugués:  Dispomos de serviços de interpretação gratuitos para responder a qualquer questão que tenha acerca do nosso plano de saúde ou de medicação. Para obter um intérprete, contacte-nos através do número [phone]. Irá encontrar alguém que fale o idioma  Português para o ajudar. Marine  serviço é gratuito.  Italian Creole:  Nou genyen sèvis entèprèt gratis jeremy reponn tout kesyon ou ta genyen konsènan plan medikal oswa dwòg nou an.  Jeremy jwenn yon entèprèt, jis rele nou nan [phone]. Yon moun ki pale Kreyòl kapab donato w.  Sa a se yon sèvis ki gratis.  Polish:  Umo¿liwiamy bezp³atne skorzystanie z us³ug t³umacza ustnego, który pomo¿e w uzyskaniu odpowiedzi na temat planu zdrowotnego lub dawfavian alexandre. Mary skorzystaæ z pomocy t³umacza znaj¹cego leila domingo¿y zadzwoniæ pod numer [phone]. Ta us³uga jest bezp³atna.  Spanish: ????? ??????? ????????????????????? ??????????????????????????????????[phone] ???????????????? ? ????????????????? ?????

## 2025-06-11 ENCOUNTER — OFF SITE VISIT (OUTPATIENT)
Dept: PALLIATIVE MEDICINE | Facility: HOSPICE | Age: 85
End: 2025-06-11
Payer: MEDICARE

## 2025-06-11 DIAGNOSIS — J96.11 CHRONIC RESPIRATORY FAILURE WITH HYPOXIA (HCC): Primary | ICD-10-CM

## 2025-06-11 DIAGNOSIS — N18.32 STAGE 3B CHRONIC KIDNEY DISEASE: ICD-10-CM

## 2025-06-11 PROCEDURE — 99497 ADVNCD CARE PLAN 30 MIN: CPT | Mod: 25

## 2025-06-11 PROCEDURE — G0318 PR PROLONG HOME E&M ADDL 15 MIN: HCPCS

## 2025-06-11 PROCEDURE — 99350 HOME/RES VST EST HIGH MDM 60: CPT

## 2025-06-13 NOTE — PROGRESS NOTES
"In-Home Palliative Medicine Evaluation        Guillermina Recio  84 y.o.  female  MRN [id number]  PCP Brenda Valenzuela M.D.  Referral Source: PCP  Location: Patient's residence, Son present. Friends Brenda and Alexandra present     Reason for palliative medicine consultation and/or visit: goals of care, symptom management     Assessment and Plan:     Summary: POLST in home and EMR: DNR, comfort focused treatment only, NO IV fluids or artificial nutrition. Patient adamantly wants to stay in home and avoid any hospitalizations.      6/11/25: Guillermina has had no major changes, continue current pain regimen and medications. She continues to have good support in the home. Will discuss goals and benefits of hospice at future appts and she would like to stay home for her remaining days and avoid any hospitalizations.     3/14/25: Guillermina continues to do well, no major changes. Denies falls, recent hospitalizations. Continue to encourage patient to call in when refills are needed and take medications as prescribed. Will f/u in 3 months.     1/7/24: Patient has been out of pain meds for 2-3 weeks, she did not know she needed to call for more. She has been taking 1000 mg tylenol as needed \"when it gets real bad\" and has been using the lidocaine patches with some relief. Switch regimen to 1000 mg APAP TID and oxycodone 5 mg q 4 hours PRN. F/U in 2 months.      11/21/24: Patient doing well, no major changes. She will no longer be seeing pain management, I have agreed to take over her pain medications as needed. RX sent for refill today.  Will f/u in January.      10/24/24: Met with patient and DPJOCELIN's, son. Patient wheelchair bound but doing well in home currently with significant social support. She struggles with fatigue and SOB from her multiple comorbid conditions.      Primary diagnosis: Stage C Systolic heart failure, Respiratory failure with hypoxia. CKD stage 3b.     Prognosis: PPS 50%     Physical aspects of care:     Pain: Low " back pain, neck pain, chronic. Has been out of norco, been using only tylenol PRN. Stop Norco today, start tylenol 1000 mg TID and oxycodone 5 mg q4 hours PRN. Refilled meds today.      SOB: Uses oxygen at times, prefers not too. Mild dyspnea at rest, much worse with exertion. Takes several minutes to catch her breath. Continue current breathing med regimen for symptom management.       Fatigue: Anemic, CHF likely contributing. Encouraged patient to do things when she feels good and enjoy spending time with friends and family. Eat iron rich foods if she likes them.      Constipation:Denies currently, educated on bowel regimen with opiates. Has MIralax if needed.      Psychological aspects of care:     Anxiety: denies     Depression: denies feelings of hopelessness, despair or lack of pleasure in daily activities.      Social aspects of care:  Lives with son who provides significant amounts of assistance. She has two close friends that visit several days weekly to assist with planning, finances, scheduling and ADL's.      Spiritual aspects of care:  Amish, goes to [employer] every Sunday.      Goals of care:  Stay at home as much as possible, avoid any invasive treatments, reduce MD appts and start de-prescribing as appropriate.      Other Pertinent Medical History OR Surgery Not Listed Above:   Asthma, COPD, arthritis, HLD, hiatal hernia, hx of NSTEMI, hx of pulmonary embolism, macular degeneration, GERD    Advance care planning:    The patient and/or legal decision maker has provided voluntary consent to discuss advance care planning. We discussed goals of care. Total time spent in ACP discussion 30 minutes, which is separate from the time spent completing the evaluation and management visit.     Physical Exam  Constitutional:       General: She is not in acute distress.     Appearance: She is ill-appearing.      Comments: In wheelchair, frail   Pulmonary:      Effort: Pulmonary effort is normal. No  respiratory distress.   Abdominal:      General: Abdomen is flat. There is no distension.      Tenderness: There is no abdominal tenderness. There is no guarding.   Musculoskeletal:         General: No deformity.   Skin:     Coloration: Skin is pale. Skin is not jaundiced.   Neurological:      Mental Status: She is alert.      Motor: Weakness present.      Gait: Gait abnormal.   Psychiatric:         Mood and Affect: Mood normal.         Behavior: Behavior normal.         Thought Content: Thought content normal.         Judgment: Judgment normal.         Current Medications:  Current Medications[1]    Medication Allergies:  Sagebrush    Thank you for allowing me the opportunity to participate in the care of Guillermina Recio    I spent a total of 90 minutes reviewing medical records, direct face-to-face time with the patient and/or family, documentation and coordination of care. This is separate from the time spent on advance care planning, which is documented above.       CARRIE Ramos  Home Health and Palliative Medicine  [id number]  [id number]  P: [phone]  F: [phone]           [1]   Current Outpatient Medications:     oxyCODONE immediate-release (ROXICODONE) 5 MG Tab, Take 1 Tablet by mouth every four hours as needed for Severe Pain for up to 30 days., Disp: 90 Tablet, Rfl: 0    omeprazole (PRILOSEC) 40 MG delayed-release capsule, TAKE 1 CAPSULE BY MOUTH EVERY DAY, Disp: 100 Capsule, Rfl: 0    escitalopram (LEXAPRO) 10 MG Tab, TAKE 1 TABLET BY MOUTH EVERY DAY  DAYS., Disp: 100 Tablet, Rfl: 1    albuterol 108 (90 Base) MCG/ACT Aero Soln inhalation aerosol, Inhale 1-2 Puffs every four hours as needed for Shortness of Breath., Disp: 8.5 g, Rfl: 5    Empagliflozin (JARDIANCE) 10 MG Tab tablet, Take 1 Tablet by mouth every day., Disp: 100 Tablet, Rfl: 2    montelukast (SINGULAIR) 10 MG Tab, TAKE 1 TABLET BY MOUTH EVERY DAY IN THE EVENING, Disp: 100 Tablet, Rfl: 1    gabapentin  (NEURONTIN) 100 MG Cap, Take 2 Capsules by mouth at bedtime., Disp: 180 Capsule, Rfl: 3    azithromycin (ZITHROMAX) 250 MG Tab, 2 tablets today 1 each day for the next 4 more days, Disp: 6 Tablet, Rfl: 0    bisoprolol (ZEBETA) 5 MG Tab, Take 1 Tablet by mouth every day., Disp: 100 Tablet, Rfl: 4    fluticasone furoate-vilanterol (BREO ELLIPTA) 100-25 MCG/ACT AEROSOL POWDER, BREATH ACTIVATED, Inhale 1 Puff every day. Rinse mouth after each use., Disp: 1 Each, Rfl: 11    budesonide-formoterol (SYMBICORT) 160-4.5 MCG/ACT Aerosol, , Disp: , Rfl:     Cyanocobalamin (B-12 PO), Take  by mouth., Disp: , Rfl:     diclofenac sodium (VOLTAREN) 1 % Gel, diclofenac 1 % topical gel  APPLY 2-3 GRAMS TO AFFECTED AREA 4 TIMES A DAY AS NEEDED FOR PAIN, Disp: , Rfl:     famotidine (PEPCID) 20 MG Tab, TAKE 1 TABLET BY MOUTH EVERY DAY 30MINS PRIOR TO FOOD, Disp: 90 Tablet, Rfl: 3    Multiple Vitamins-Minerals (CENTRUM SILVER 50+WOMEN) Tab, Take  by mouth., Disp: , Rfl:     Biotin 10 MG Tab, Take  by mouth., Disp: , Rfl:     polyethylene glycol/lytes (MIRALAX) Pack, Take 1 Packet by mouth 1 time daily as needed (if sennosides and docusate ineffective after 24 hours)., Disp: , Rfl: 3    Multiple Vitamins-Minerals (OCUVITE-LUTEIN) Tab, Take 1 tablet by mouth every morning., Disp: , Rfl:     vitamin D 2000 UNIT Tab, Take 1 Tab by mouth every day., Disp: 60 Tab, Rfl:

## 2025-06-16 ENCOUNTER — HOSPITAL ENCOUNTER (OUTPATIENT)
Dept: LAB | Facility: MEDICAL CENTER | Age: 85
End: 2025-06-16
Attending: FAMILY MEDICINE
Payer: MEDICARE

## 2025-06-16 DIAGNOSIS — D64.9 ANEMIA, UNSPECIFIED TYPE: ICD-10-CM

## 2025-06-16 DIAGNOSIS — D50.9 IRON DEFICIENCY ANEMIA, UNSPECIFIED IRON DEFICIENCY ANEMIA TYPE: ICD-10-CM

## 2025-06-16 DIAGNOSIS — R63.4 WEIGHT LOSS: ICD-10-CM

## 2025-06-16 LAB
ALBUMIN SERPL BCP-MCNC: 3.7 G/DL (ref 3.2–4.9)
ALBUMIN/GLOB SERPL: 1.3 G/DL
ALP SERPL-CCNC: 91 U/L (ref 30–99)
ALT SERPL-CCNC: 9 U/L (ref 2–50)
ANION GAP SERPL CALC-SCNC: 12 MMOL/L (ref 7–16)
ANISOCYTOSIS BLD QL SMEAR: ABNORMAL
AST SERPL-CCNC: 21 U/L (ref 12–45)
BASOPHILS # BLD AUTO: 0.5 % (ref 0–1.8)
BASOPHILS # BLD: 0.03 K/UL (ref 0–0.12)
BILIRUB SERPL-MCNC: 0.2 MG/DL (ref 0.1–1.5)
BUN SERPL-MCNC: 15 MG/DL (ref 8–22)
CALCIUM ALBUM COR SERPL-MCNC: 9.3 MG/DL (ref 8.5–10.5)
CALCIUM SERPL-MCNC: 9.1 MG/DL (ref 8.5–10.5)
CHLORIDE SERPL-SCNC: 107 MMOL/L (ref 96–112)
CO2 SERPL-SCNC: 21 MMOL/L (ref 20–33)
COMMENT 1642: NORMAL
CREAT SERPL-MCNC: 1.43 MG/DL (ref 0.5–1.4)
EOSINOPHIL # BLD AUTO: 0.03 K/UL (ref 0–0.51)
EOSINOPHIL NFR BLD: 0.5 % (ref 0–6.9)
ERYTHROCYTE [DISTWIDTH] IN BLOOD BY AUTOMATED COUNT: 51.5 FL (ref 35.9–50)
FERRITIN SERPL-MCNC: 13.5 NG/ML (ref 10–291)
GFR SERPLBLD CREATININE-BSD FMLA CKD-EPI: 35 ML/MIN/1.73 M 2
GLOBULIN SER CALC-MCNC: 2.8 G/DL (ref 1.9–3.5)
GLUCOSE SERPL-MCNC: 96 MG/DL (ref 65–99)
HCT VFR BLD AUTO: 35.4 % (ref 37–47)
HGB BLD-MCNC: 9 G/DL (ref 12–16)
HYPOCHROMIA BLD QL SMEAR: ABNORMAL
IMM GRANULOCYTES # BLD AUTO: 0.02 K/UL (ref 0–0.11)
IMM GRANULOCYTES NFR BLD AUTO: 0.4 % (ref 0–0.9)
IRON SATN MFR SERPL: 5 % (ref 15–55)
IRON SERPL-MCNC: 15 UG/DL (ref 40–170)
LYMPHOCYTES # BLD AUTO: 1.03 K/UL (ref 1–4.8)
LYMPHOCYTES NFR BLD: 18.7 % (ref 22–41)
MCH RBC QN AUTO: 16.4 PG (ref 27–33)
MCHC RBC AUTO-ENTMCNC: 25.4 G/DL (ref 32.2–35.5)
MCV RBC AUTO: 64.4 FL (ref 81.4–97.8)
MICROCYTES BLD QL SMEAR: ABNORMAL
MONOCYTES # BLD AUTO: 0.92 K/UL (ref 0–0.85)
MONOCYTES NFR BLD AUTO: 16.7 % (ref 0–13.4)
MORPHOLOGY BLD-IMP: NORMAL
NEUTROPHILS # BLD AUTO: 3.48 K/UL (ref 1.82–7.42)
NEUTROPHILS NFR BLD: 63.2 % (ref 44–72)
NRBC # BLD AUTO: 0 K/UL
NRBC BLD-RTO: 0 /100 WBC (ref 0–0.2)
OVALOCYTES BLD QL SMEAR: NORMAL
PLATELET # BLD AUTO: 298 K/UL (ref 164–446)
PLATELET BLD QL SMEAR: NORMAL
PMV BLD AUTO: 9 FL (ref 9–12.9)
POIKILOCYTOSIS BLD QL SMEAR: NORMAL
POTASSIUM SERPL-SCNC: 4.6 MMOL/L (ref 3.6–5.5)
PROT SERPL-MCNC: 6.5 G/DL (ref 6–8.2)
RBC # BLD AUTO: 5.5 M/UL (ref 4.2–5.4)
RBC BLD AUTO: PRESENT
SCHISTOCYTES BLD QL SMEAR: NORMAL
SODIUM SERPL-SCNC: 140 MMOL/L (ref 135–145)
TIBC SERPL-MCNC: 328 UG/DL (ref 250–450)
UIBC SERPL-MCNC: 313 UG/DL (ref 110–370)
WBC # BLD AUTO: 5.5 K/UL (ref 4.8–10.8)

## 2025-06-16 PROCEDURE — 80053 COMPREHEN METABOLIC PANEL: CPT

## 2025-06-16 PROCEDURE — 85025 COMPLETE CBC W/AUTO DIFF WBC: CPT

## 2025-06-16 PROCEDURE — 83550 IRON BINDING TEST: CPT

## 2025-06-16 PROCEDURE — 83540 ASSAY OF IRON: CPT

## 2025-06-16 PROCEDURE — 82728 ASSAY OF FERRITIN: CPT

## 2025-06-16 PROCEDURE — 36415 COLL VENOUS BLD VENIPUNCTURE: CPT

## 2025-06-23 ENCOUNTER — APPOINTMENT (OUTPATIENT)
Dept: MEDICAL GROUP | Facility: PHYSICIAN GROUP | Age: 85
End: 2025-06-23
Payer: MEDICARE

## 2025-06-23 VITALS
BODY MASS INDEX: 25.44 KG/M2 | WEIGHT: 126.2 LBS | OXYGEN SATURATION: 90 % | SYSTOLIC BLOOD PRESSURE: 124 MMHG | TEMPERATURE: 98.6 F | HEART RATE: 65 BPM | RESPIRATION RATE: 20 BRPM | DIASTOLIC BLOOD PRESSURE: 74 MMHG | HEIGHT: 59 IN

## 2025-06-23 DIAGNOSIS — R63.4 WEIGHT LOSS: ICD-10-CM

## 2025-06-23 DIAGNOSIS — D64.9 ANEMIA, UNSPECIFIED TYPE: ICD-10-CM

## 2025-06-23 DIAGNOSIS — D50.9 IRON DEFICIENCY ANEMIA, UNSPECIFIED IRON DEFICIENCY ANEMIA TYPE: ICD-10-CM

## 2025-06-23 DIAGNOSIS — R54 FRAILTY: Primary | ICD-10-CM

## 2025-06-23 PROCEDURE — 3078F DIAST BP <80 MM HG: CPT | Performed by: FAMILY MEDICINE

## 2025-06-23 PROCEDURE — 3074F SYST BP LT 130 MM HG: CPT | Performed by: FAMILY MEDICINE

## 2025-06-23 PROCEDURE — 99214 OFFICE O/P EST MOD 30 MIN: CPT | Performed by: FAMILY MEDICINE

## 2025-06-23 ASSESSMENT — FIBROSIS 4 INDEX: FIB4 SCORE: 2.04

## 2025-06-23 NOTE — PROGRESS NOTES
"Subjective:     CC:   Chief Complaint   Patient presents with    Follow-Up       HPI:   Guillermina presents today with her caregivers.  Patient apparently did not take her pain pill today and is complaining about back pain.  Patient's son is administrating her medications since he lives with her.  I did review the note from palliative about decreasing medication she is taking.  Apparently between her caregivers there is discussion on whether she is really keeping her appointment with pulmonary July 2 since it appears that the pulmonologist is not doing anything different at this time.  There is a question of whether she should continue the montelukast at this point I would recommend stop but we should definitely continue the omeprazole since she has a large hiatal hernia.    Past Medical History[1]    Social History[2]    Current Medications and Prescriptions Ordered in Epic[3]    Allergies:  Sagebrush    Health Maintenance: Completed    ROS:  Gen: no fevers/chills, no changes in weight  Eyes: no changes in vision  ENT: no sore throat, no hearing loss, no bloody nose  Pulm: no sob, no cough  CV: no chest pain, no palpitations  GI: no nausea/vomiting, no diarrhea  : no dysuria  Neuro: no headaches, no numbness/tingling  Heme/Lymph: no easy bruising    Objective:     Exam:  /74 (BP Location: Right arm, Patient Position: Sitting, BP Cuff Size: Child)   Pulse 65   Temp 37 °C (98.6 °F)   Resp 20   Ht 1.499 m (4' 11\")   Wt 57.2 kg (126 lb 3.2 oz)   SpO2 90%   BMI 25.49 kg/m²  Body mass index is 25.49 kg/m².    Gen: Alert and oriented, No apparent distress.  Skin: Warm and dry.  No obvious lesions.  Eyes: Sclera wnl Pupils normal in size  Lungs: Normal effort, CTA bilaterally, no wheezes, rhonchi, or rales  CV: Regular rate and rhythm. No murmurs, rubs, or gallops.  Musculoskeletal: Normal gait. No extremity cyanosis, clubbing, or edema.  Neuro: Oriented to person, place and time  Psych: Mood is wnl "       Assessment & Plan:     85 y.o. female with the following -     1. Weight loss  Patient's weight is stabilizing we will continue to monitor this.  - Comp Metabolic Panel; Future    2. Anemia, unspecified type  Patient's hemoglobin is up to 9 encourage patient to continue her iron Gummies.  - CBC WITH DIFFERENTIAL; Future  - FERRITIN; Future  - IRON/TOTAL IRON BIND; Future    3. Iron deficiency anemia, unspecified iron deficiency anemia type  - FERRITIN; Future  - IRON/TOTAL IRON BIND; Future    4. Frailty  At this point reviewing palliative's note patient does not want to be hospitalized wants to be at home.  It looks like there is talk possible hospice but patient has not made that decision as of yet.  I will try to decrease medications that she is taking        Return in about 3 months (around 9/23/2025).    Please note that this dictation was created using voice recognition software. I have made every reasonable attempt to correct obvious errors, but I expect that there are errors of grammar and possibly content that I did not discover before finalizing the note.       [1]   Past Medical History:  Diagnosis Date    Arthritis     Asthma     Back pain     Bowel habit changes     diarrhea    Breath shortness     COPD    Cataract     bilateral IOL    Chronic pain 11/2019    hips    Dyslipidemia 8/1/2016    Emphysema of lung (HCC)     GERD (gastroesophageal reflux disease)     [place] measles     Heart burn     Hepatitis 1970's    A based on labs done 2016    Hiatus hernia syndrome     High cholesterol     History of total hip replacement     L    Hyperlipidemia     Influenza     Macular degeneration     Memory loss     Mumps     NSTEMI (non-ST elevated myocardial infarction) ([facility]) 9/12/2017    OA (osteoarthritis)     back, hip, shoulds, knees and hands    Other pulmonary embolism without acute cor pulmonale ([facility]) 12/14/2019    Painful joint     Psychiatric problem     depression    S/P cataract extraction      bilat    Shortness of breath     Snoring     stomach flu 11/08/2019    Urinary incontinence     Vision loss     Wears dentures     Wears glasses    [2]   Social History  Tobacco Use    Smoking status: Never     Passive exposure: Yes    Smokeless tobacco: Never    Tobacco comments:     lived with smokers   Vaping Use    Vaping status: Never Used    Passive vaping exposure: Yes   Substance Use Topics    Alcohol use: Not Currently     Alcohol/week: 0.0 oz     Comment: 3 per year    Drug use: No     Comment: In the Distant Past, but not since 32 y.o.  (prior - [employer], crosstops - stimulants for working double shifts, but not for several decades)   [3]   Current Outpatient Medications Ordered in Epic   Medication Sig Dispense Refill    oxyCODONE immediate-release (ROXICODONE) 5 MG Tab Take 1 Tablet by mouth every four hours as needed for Severe Pain for up to 30 days. 90 Tablet 0    omeprazole (PRILOSEC) 40 MG delayed-release capsule TAKE 1 CAPSULE BY MOUTH EVERY  Capsule 0    escitalopram (LEXAPRO) 10 MG Tab TAKE 1 TABLET BY MOUTH EVERY DAY  DAYS. 100 Tablet 1    albuterol 108 (90 Base) MCG/ACT Aero Soln inhalation aerosol Inhale 1-2 Puffs every four hours as needed for Shortness of Breath. 8.5 g 5    Empagliflozin (JARDIANCE) 10 MG Tab tablet Take 1 Tablet by mouth every day. 100 Tablet 2    gabapentin (NEURONTIN) 100 MG Cap Take 2 Capsules by mouth at bedtime. 180 Capsule 3    bisoprolol (ZEBETA) 5 MG Tab Take 1 Tablet by mouth every day. 100 Tablet 4    fluticasone furoate-vilanterol (BREO ELLIPTA) 100-25 MCG/ACT AEROSOL POWDER, BREATH ACTIVATED Inhale 1 Puff every day. Rinse mouth after each use. 1 Each 11    budesonide-formoterol (SYMBICORT) 160-4.5 MCG/ACT Aerosol       Cyanocobalamin (B-12 PO) Take  by mouth.      diclofenac sodium (VOLTAREN) 1 % Gel diclofenac 1 % topical gel   APPLY 2-3 GRAMS TO AFFECTED AREA 4 TIMES A DAY AS NEEDED FOR PAIN      famotidine (PEPCID) 20 MG Tab TAKE  1 TABLET BY MOUTH EVERY DAY 30MINS PRIOR TO FOOD 90 Tablet 3    Multiple Vitamins-Minerals (CENTRUM SILVER 50+WOMEN) Tab Take  by mouth.      Biotin 10 MG Tab Take  by mouth.      polyethylene glycol/lytes (MIRALAX) Pack Take 1 Packet by mouth 1 time daily as needed (if sennosides and docusate ineffective after 24 hours).  3    Multiple Vitamins-Minerals (OCUVITE-LUTEIN) Tab Take 1 tablet by mouth every morning.      vitamin D 2000 UNIT Tab Take 1 Tab by mouth every day. 60 Tab      No current Epic-ordered facility-administered medications on file.

## 2025-06-24 ENCOUNTER — HOME HEALTH ADMISSION (OUTPATIENT)
Dept: HOME HEALTH SERVICES | Facility: HOME HEALTHCARE | Age: 85
End: 2025-06-24
Payer: MEDICARE

## 2025-06-24 DIAGNOSIS — G89.29 CHRONIC BILATERAL LOW BACK PAIN WITHOUT SCIATICA: ICD-10-CM

## 2025-06-24 DIAGNOSIS — M54.50 CHRONIC BILATERAL LOW BACK PAIN WITHOUT SCIATICA: ICD-10-CM

## 2025-06-24 DIAGNOSIS — J96.11 CHRONIC RESPIRATORY FAILURE WITH HYPOXIA (HCC): Primary | ICD-10-CM

## 2025-06-24 DIAGNOSIS — N18.31 CHRONIC KIDNEY DISEASE, STAGE 3A: ICD-10-CM

## 2025-06-24 DIAGNOSIS — Z79.891 CHRONIC USE OF OPIATE DRUG FOR THERAPEUTIC PURPOSE: ICD-10-CM

## 2025-06-26 ENCOUNTER — TELEPHONE (OUTPATIENT)
Dept: HOME HEALTH SERVICES | Facility: HOME HEALTHCARE | Age: 85
End: 2025-06-26
Payer: MEDICARE

## 2025-06-26 NOTE — TELEPHONE ENCOUNTER
This Home Health referral has been forwarded to [facility].  Patient was contacted by nursing supervisor to discuss transfer of care.      [facility] has accepted.

## 2025-06-30 DIAGNOSIS — M54.2 CHRONIC NECK PAIN: ICD-10-CM

## 2025-06-30 DIAGNOSIS — G89.29 CHRONIC NECK PAIN: ICD-10-CM

## 2025-06-30 RX ORDER — OXYCODONE HYDROCHLORIDE 5 MG/1
5 TABLET ORAL EVERY 4 HOURS PRN
Qty: 90 TABLET | Refills: 0 | Status: SHIPPED | OUTPATIENT
Start: 2025-06-30 | End: 2025-07-30

## 2025-07-02 ENCOUNTER — APPOINTMENT (OUTPATIENT)
Dept: SLEEP MEDICINE | Facility: MEDICAL CENTER | Age: 85
End: 2025-07-02
Payer: MEDICARE

## 2025-07-21 RX ORDER — OMEPRAZOLE 40 MG/1
40 CAPSULE, DELAYED RELEASE ORAL DAILY
Qty: 100 CAPSULE | Refills: 0 | Status: SHIPPED | OUTPATIENT
Start: 2025-07-21

## 2025-08-05 DIAGNOSIS — M54.2 CHRONIC NECK PAIN: ICD-10-CM

## 2025-08-05 DIAGNOSIS — G89.29 CHRONIC NECK PAIN: ICD-10-CM

## 2025-08-05 RX ORDER — OXYCODONE HYDROCHLORIDE 5 MG/1
5 TABLET ORAL EVERY 4 HOURS PRN
Qty: 90 TABLET | Refills: 0 | Status: SHIPPED | OUTPATIENT
Start: 2025-10-02 | End: 2025-11-01

## 2025-08-05 RX ORDER — OXYCODONE HYDROCHLORIDE 5 MG/1
5 TABLET ORAL EVERY 4 HOURS PRN
Qty: 90 TABLET | Refills: 0 | Status: SHIPPED | OUTPATIENT
Start: 2025-08-05 | End: 2025-09-04

## 2025-08-05 RX ORDER — OXYCODONE HYDROCHLORIDE 5 MG/1
5 TABLET ORAL EVERY 4 HOURS PRN
Qty: 90 TABLET | Refills: 0 | Status: SHIPPED | OUTPATIENT
Start: 2025-09-03 | End: 2025-10-03

## 2025-09-24 ENCOUNTER — APPOINTMENT (OUTPATIENT)
Dept: LAB | Facility: MEDICAL CENTER | Age: 85
End: 2025-09-24
Payer: MEDICARE

## (undated) DEVICE — HEAD HOLDER JUNIOR/ADULT

## (undated) DEVICE — SUTURE GENERAL

## (undated) DEVICE — LACTATED RINGERS INJ 1000 ML - (14EA/CA 60CA/PF)

## (undated) DEVICE — GOWN WARMING STANDARD FLEX - (30/CA)

## (undated) DEVICE — MASK ANESTHESIA ADULT  - (100/CA)

## (undated) DEVICE — SLEEVE, VASO, THIGH, MED

## (undated) DEVICE — ELECTRODE 850 FOAM ADHESIVE - HYDROGEL RADIOTRNSPRNT (50/PK)

## (undated) DEVICE — DRAPE STRLE REG TOWEL 18X24 - (10/BX 4BX/CA)"

## (undated) DEVICE — LENS/HOOD FOR SPACESUIT - (32/PK) PEEL AWAY FACE

## (undated) DEVICE — HANDPIECE 10FT INTPLS SCT PLS IRRIGATION HAND CONTROL SET (6/PK)

## (undated) DEVICE — CHLORAPREP 26 ML APPLICATOR - ORANGE TINT(25/CA)

## (undated) DEVICE — NEPTUNE 4 PORT MANIFOLD - (20/PK)

## (undated) DEVICE — SODIUM CHL. IRRIGATION 0.9% 3000ML (4EA/CA 65CA/PF)

## (undated) DEVICE — SYRINGE 30 ML LL (56/BX)

## (undated) DEVICE — CANISTER SUCTION 3000ML MECHANICAL FILTER AUTO SHUTOFF MEDI-VAC NONSTERILE LF DISP  (40EA/CA)

## (undated) DEVICE — SUTURE 1 VICRYL PLUS CTX - 36 INCH (36/BX)

## (undated) DEVICE — GOWN SURGICAL XX-LARGE - (28EA/CA) SIRUS NON REINFORCED

## (undated) DEVICE — PACK TOTAL HIP - (1/CA)

## (undated) DEVICE — GLOVE BIOGEL INDICATOR SZ 8.5 SURGICAL PF LTX - (50/BX 4BX/CA)

## (undated) DEVICE — SUCTION INSTRUMENT YANKAUER BULBOUS TIP W/O VENT (50EA/CA)

## (undated) DEVICE — CLOSURE PRINEO SKIN - (2EA/BX)

## (undated) DEVICE — SODIUM CHL IRRIGATION 0.9% 1000ML (12EA/CA)

## (undated) DEVICE — PROTECTOR ULNA NERVE - (36PR/CA)

## (undated) DEVICE — TIP INTPLS HFLO ML ORFC BTRY - (12/CS)  FOR SURGILAV

## (undated) DEVICE — SENSOR SPO2 NEO LNCS ADHESIVE (20/BX) SEE USER NOTES

## (undated) DEVICE — WATER IRRIG. STER. 1500 ML - (9/CA)

## (undated) DEVICE — SUTURE 5 TI-CRON HOS-14 - (36/BX)

## (undated) DEVICE — SET LEADWIRE 5 LEAD BEDSIDE DISPOSABLE ECG (1SET OF 5/EA)

## (undated) DEVICE — TUBING CLEARLINK DUO-VENT - C-FLO (48EA/CA)

## (undated) DEVICE — GLOVE BIOGEL SZ 8.5 SURGICAL PF LTX - (50PR/BX 4BX/CA)

## (undated) DEVICE — SUTURE 2-0 VICRYL PLUS CT-1 36 (36PK/BX)"

## (undated) DEVICE — KIT ROOM DECONTAMINATION

## (undated) DEVICE — SUCTION TIP STRAIGHT ARGYLE - 50EA/CA

## (undated) DEVICE — KIT ANESTHESIA W/CIRCUIT & 3/LT BAG W/FILTER (20EA/CA)

## (undated) DEVICE — BLADE SAGITTAL SAW DUAL CUT 75.0 X 25.0MM (1/EA)

## (undated) DEVICE — BRUSH FMCNL TIP IRR STRL - (12/PKG) FOR SURGILAV

## (undated) DEVICE — SET EXTENSION WITH 2 PORTS (48EA/CA) ***PART #2C8610 IS A SUBSTITUTE*****

## (undated) DEVICE — ELECTRODE DUAL RETURN W/ CORD - (50/PK)